# Patient Record
Sex: FEMALE | Race: WHITE | NOT HISPANIC OR LATINO | Employment: OTHER | URBAN - METROPOLITAN AREA
[De-identification: names, ages, dates, MRNs, and addresses within clinical notes are randomized per-mention and may not be internally consistent; named-entity substitution may affect disease eponyms.]

---

## 2017-11-29 LAB — MAMMOGRAM: NORMAL

## 2018-08-28 ENCOUNTER — TRANSFERRED RECORDS (OUTPATIENT)
Dept: HEALTH INFORMATION MANAGEMENT | Facility: CLINIC | Age: 63
End: 2018-08-28

## 2018-11-09 ENCOUNTER — TRANSFERRED RECORDS (OUTPATIENT)
Dept: HEALTH INFORMATION MANAGEMENT | Facility: CLINIC | Age: 63
End: 2018-11-09

## 2018-11-27 ENCOUNTER — TRANSFERRED RECORDS (OUTPATIENT)
Dept: HEALTH INFORMATION MANAGEMENT | Facility: CLINIC | Age: 63
End: 2018-11-27

## 2018-11-30 ENCOUNTER — TRANSFERRED RECORDS (OUTPATIENT)
Dept: HEALTH INFORMATION MANAGEMENT | Facility: CLINIC | Age: 63
End: 2018-11-30

## 2019-03-13 ENCOUNTER — TRANSFERRED RECORDS (OUTPATIENT)
Dept: HEALTH INFORMATION MANAGEMENT | Facility: CLINIC | Age: 64
End: 2019-03-13

## 2019-04-11 ENCOUNTER — TRANSFERRED RECORDS (OUTPATIENT)
Dept: HEALTH INFORMATION MANAGEMENT | Facility: CLINIC | Age: 64
End: 2019-04-11

## 2019-04-12 ENCOUNTER — TRANSFERRED RECORDS (OUTPATIENT)
Dept: HEALTH INFORMATION MANAGEMENT | Facility: CLINIC | Age: 64
End: 2019-04-12

## 2019-05-03 ENCOUNTER — TRANSFERRED RECORDS (OUTPATIENT)
Dept: HEALTH INFORMATION MANAGEMENT | Facility: CLINIC | Age: 64
End: 2019-05-03

## 2019-05-12 ENCOUNTER — TRANSFERRED RECORDS (OUTPATIENT)
Dept: HEALTH INFORMATION MANAGEMENT | Facility: CLINIC | Age: 64
End: 2019-05-12

## 2019-07-02 ENCOUNTER — TRANSFERRED RECORDS (OUTPATIENT)
Dept: HEALTH INFORMATION MANAGEMENT | Facility: CLINIC | Age: 64
End: 2019-07-02

## 2019-11-20 ENCOUNTER — TRANSFERRED RECORDS (OUTPATIENT)
Dept: HEALTH INFORMATION MANAGEMENT | Facility: CLINIC | Age: 64
End: 2019-11-20

## 2019-12-12 ENCOUNTER — TRANSFERRED RECORDS (OUTPATIENT)
Dept: HEALTH INFORMATION MANAGEMENT | Facility: CLINIC | Age: 64
End: 2019-12-12

## 2019-12-24 ENCOUNTER — TRANSFERRED RECORDS (OUTPATIENT)
Dept: HEALTH INFORMATION MANAGEMENT | Facility: CLINIC | Age: 64
End: 2019-12-24

## 2020-01-20 NOTE — TELEPHONE ENCOUNTER
ONCOLOGY INTAKE: Records Information      APPT INFORMATION: 2/12/20 - Danuta Moreau CSC  Referring provider:  Autumn Molina  Referring provider s clinic:  [facility] [place]  Reason for visit/diagnosis:  multiple myeloma  Has patient been notified of appointment date and time?: Yes    RECORDS INFORMATION:  Were the records received with the referral (via Right[employer])? No    Has patient been seen for any external appt for this diagnosis? Yes    If yes, where? [facility] [place] [facility]    Has patient had any imaging or procedures outside of [place] for this condition? Yes      If Yes, where?  [facility] [place] [facility]    ADDITIONAL INFORMATION:  Scheduled via patient  Per referring - pt to be seen within 1-2 weeks

## 2020-01-21 NOTE — TELEPHONE ENCOUNTER
Action    Action Taken -1/21/20: Spoke with [facility] ([place]) Medical Records. Faxed request to [phone] - they will fax over all Heme/Onc notes from Dr. Judith Molina    -Spoke w/ Marielena @ [facility] Pathology, she confirmed they housed the location of the slides. She will fax over all Genetics/Cytogenetics/MolecularStudies/Reports today - faxed request for slides to [phone]. Pt's M Number: [id number]    -[employer] Tracking (Path - [facility]): [id number]    -Spoke w/ [facility] Film Room - they confirmed they had all of patients imaging. Faxed request to [phone]    -[employer] Tr[employer] (Img [facility]): [id number]    -[facility] for pt re: recs call  9:31 AM    -Genetic reports rec'd from [facility], faxed to HIM for upload.    -Pt called back - Pt stated this all began roughly 8/2018. Pt confirmed above requested materials - additionally, pt sought a 2nd Opinion @ [facility], and had a few visits/img & BMB done. Pt saw Dr. David Raya.     -[employer] Tracking ([facility] - img): [id number]    -[employer] Tr[employer] ([facility] - Path): [id number]    Records from [facility] requested  10:38 AM    -2/3/20: Slides from [facility] Rec'd, taken to 5th floor lab @ [facility]  3:10 PM       RECORDS STATUS - ALL OTHER DIAGNOSIS      RECORDS RECEIVED FROM: [facility] (Juan David), [facility], [facility]   DATE RECEIVED:    NOTES STATUS DETAILS   OFFICE NOTE from referring provider Requested 1/21/20  In Epic Dr. Judith Molina   OFFICE NOTE from medical oncologist Requested 1/21/20 Dr. Judith Molina//[facility]  Dr. David Raya//[facility]   DISCHARGE SUMMARY from hospital     DISCHARGE REPORT from the ER     OPERATIVE REPORT Epic/CE 10/29/18: EGD/Endoscopy/Colonoscopy, Report in Gastroenterology - Aditi Escobedo Office Visit   MEDICATION LIST     CLINICAL TRIAL TREATMENTS TO DATE     LABS     PATHOLOGY REPORTS [facility], Report in CE, Requested 1/21/20  Date of Collection: 9/7/18, Case Number: [id number]   ANYTHING RELATED TO DIAGNOSIS     GENONOMIC TESTING     TYPE:     IMAGING (NEED IMAGES & REPORT) All imaging requested from [facility] - reports in CE.     CT SCANS     MRI Disc received 1/28/20 - [facility]   DEXA Disc received 1/28/20 - [facility]   ULTRASOUND Disc received 1/28/20 - [facility]   PET Disc received 1/28/20 - [facility]

## 2020-02-04 PROCEDURE — 00000345 ZZHCL STATISTIC REV BONE MARROW OUTSIDE SLIDES TC 88321: Performed by: INTERNAL MEDICINE

## 2020-02-09 LAB — COPATH REPORT: NORMAL

## 2020-02-10 LAB — COPATH REPORT: NORMAL

## 2020-02-10 NOTE — PROGRESS NOTES
[facility] Hematology Consult    Reason for consult: MM           History of Present Illness:   This patient is a 63 yo woman with IgA Kappa Multiple Myeloma. In 2018 she had anemia and was fatigued. She was found to have IgA kappa monocloncal antibody with M-spike of 0.17. IgA elevated. Skeletal survey was unremarkable and UPEP had minimal protein (156 mg/m2). PET 11/2018 showed bone marrow consistent with hypermetabolic plasma cell myeloma. M spike was 0.17. She started RVD and Zometa. On 4/2019 she had an autologous transplant.     Her bone marrow bx from September 2019 was sent here for review. It showed marrow cellularity of 60%, with decreased trilineage hematopoiesis and 15% plasma cells as well as peripheral blood with slight anemia. 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 She is on revlimid maintenance and zometa from her prior oncologist in [place]. On 12/6/19 her K/L ratio is 1.1, M spike is 0.04.    Today she is here alone. 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 She is out of her revlimid. She has arthritis pain in her hips, hands, and knees that she says is unchanged from prior. She denies fever, chills, night sweats, new lumps or bumps. She reports that she has severe neuropathy of the hands and feet. She had some neuropathy prior to myeloma treatment, but it worsened after being on Velcade. She notes a wisdom tooth that is painful on the right side. She is here to establish care.     She had a colonoscopy one year ago and was told it needed to be repeated d/t poor prep. She reports that she is up to date with mammograms.         Physical Exam:   Vitals were reviewed  Temp: 98.2  F (36.8  C) Temp src: Oral BP: (!) 167/74 Pulse: 69   Resp: 18 SpO2: 96 %      General: NAD  HEENT: no scleral icterus, MMM, has a wisdom tooth erupting from the right  mandible.   Lymph: No cervical, supraclavicular, or axillary LAD  Pulm: CTAB  CV: RRR, no m/r/g  Abd: soft, nontender  Extremities: No edema  Neuro: alert, conversant  Psych: appropriate mood and affect         Assessment and Recommendation:     65 yo woman with standard risk myeloma s/p auto transplant. She has been on lenaidomide 10 mg every day and has tolerated this. Based on the outside records she has had about 13 does of Zometa. We will get myeloma labs and a PET scan for baseline imaging. She does have pains that she attributes to arthritis but these require evaluation to rule out myeloma    - We recommend continuing lenalidomide 10 mg every day since it is well tolerated and in a randomized, phase 3 trial, lenalidomide improved PFS (39 months compared to 20 months for observation).    (Lenalidomide maintenance versus observation for patients with newly diagnosed multiple myeloma (Myeloma XI): a multicentre, open-label, randomised, phase 3 trial. Jaleel STEPHENS, et al.Lancet Oncol. 2019 [place];20)    - We recommend  mg every day to prevent thromboembolic complications from lenalidomide. Her SAVED score is 0. She has no history of clots but she is diabetic.     - We will continue zometa through 12/2020 to complete 2 years of therapy as it has been associated with improved PFS and overall survival when given with ASCT (Randomized clinical trial of zoledronic acid in multiple myeloma patients undergoing high-dose chemotherapy and stem-cell transplantation CELIA Sandhu MD et al Current Onc 2013)    -Since lenalidomide increases the risk of secondary malignancies, we gave the patient a referral for primary care. She needs a repeat colonoscopy and will require ongoing cancer screenings.     - The pt has a hx of thyroid nodules and was told that she would need a repeat U/S of the thyroid. We have placed am endocrinology consult.    Brit Zhu MD  Seen with Dr. Tipton    The patient was discussed with the  clinical fellow, seen and examined by me. All labs and imaging were reviewed. I  I agree with the fellows note and have been responsible for the care plan and interpretation of progress. Any additional information to the fellows note has been documented below.      Ms. Loya is transferring her care from [place] where she was initially diagnosed with standard risk myeloma and got induction VRD followed by ASCT and now on maintenance lenalidomide without any significant side effects. Hence  -we will continue the lenalidomide 10 mg till progression  -SPEP, SFLC till progression  -PET annually or if symptomatic from myeloma related bone pain. If PET is not covered by insurance then a skeletal survey  - mg po q day for thromboprphylaxis  -Age appropriate cancer screening  -Endocrinology referral for thyroid nodules and PCP referral    I spent 60 minutes face-to-face with the patient and greater than 50% of the visit was spent counseling regarding myeloma, her move to [place] and its impact, understanding her prior complications of ASCT and incorporation of these plans into future management and co-ordinating with endocrinology for her thyroid mass.    Mele TREVIÑO MS  Attending Physician  Pager - [phone]  Email - [email or web address]    Total time: 60 minutes  Prolonged service:  30                  Past Medical History:   Diabetes mellitus  HTN  Peripheral neuropathy  Spinal stenosis  Carpal tunnel         Past Surgical History:   Knee surgery  Tubal  Ligation  cholecystectomy         Social History:   Quit smoking 2005  No alcohol  Drug counselor  Has 3 sons         Family History:   Brother with hemochromatosis         Medications:     Current Outpatient Medications   Medication Sig     insulin pen needle (FIFTY50 PEN NEEDLES) 32G X 4 MM miscellaneous As directed. To use with Victoza daily     atorvastatin (LIPITOR) 20 MG tablet Take by mouth every 24 hours     lactulose (CHRONULAC) 10 GM/15ML  solution TK 10ML PO D     No current facility-administered medications for this visit.              Review of Systems:   The 10 point Review of Systems is negative other than noted in the HPI            Brit Zhu MD

## 2020-02-12 ENCOUNTER — TELEPHONE (OUTPATIENT)
Dept: ENDOCRINOLOGY | Facility: CLINIC | Age: 65
End: 2020-02-12

## 2020-02-12 ENCOUNTER — OFFICE VISIT (OUTPATIENT)
Dept: TRANSPLANT | Facility: CLINIC | Age: 65
End: 2020-02-12
Attending: INTERNAL MEDICINE
Payer: COMMERCIAL

## 2020-02-12 ENCOUNTER — PRE VISIT (OUTPATIENT)
Dept: TRANSPLANT | Facility: CLINIC | Age: 65
End: 2020-02-12

## 2020-02-12 VITALS
OXYGEN SATURATION: 96 % | HEIGHT: 69 IN | RESPIRATION RATE: 18 BRPM | SYSTOLIC BLOOD PRESSURE: 167 MMHG | BODY MASS INDEX: 37.53 KG/M2 | HEART RATE: 69 BPM | TEMPERATURE: 98.2 F | DIASTOLIC BLOOD PRESSURE: 74 MMHG | WEIGHT: 253.4 LBS

## 2020-02-12 DIAGNOSIS — C90.01 MULTIPLE MYELOMA IN REMISSION (H): ICD-10-CM

## 2020-02-12 DIAGNOSIS — E04.1 THYROID NODULE: Primary | ICD-10-CM

## 2020-02-12 DIAGNOSIS — Z12.11 SPECIAL SCREENING FOR MALIGNANT NEOPLASMS, COLON: ICD-10-CM

## 2020-02-12 PROCEDURE — G0463 HOSPITAL OUTPT CLINIC VISIT: HCPCS

## 2020-02-12 RX ORDER — LACTULOSE 10 G/15ML
SOLUTION ORAL
COMMUNITY
Start: 2020-01-14 | End: 2020-08-14

## 2020-02-12 RX ORDER — ATORVASTATIN CALCIUM 20 MG/1
TABLET, FILM COATED ORAL EVERY 24 HOURS
COMMUNITY
End: 2020-02-22

## 2020-02-12 ASSESSMENT — PAIN SCALES - GENERAL: PAINLEVEL: NO PAIN (0)

## 2020-02-12 ASSESSMENT — MIFFLIN-ST. JEOR: SCORE: 1772.17

## 2020-02-12 NOTE — TELEPHONE ENCOUNTER
[facility] Call Center    Phone Message    May a detailed message be left on voicemail: yes     Reason for Call: Other: pt needs to schedule an appt for thyroid nodule, internal med recs and referral, please call pt thanks     Action Taken: Message routed to:  [facility] endocrine    Travel Screening: Not Applicable

## 2020-02-13 ENCOUNTER — CARE COORDINATION (OUTPATIENT)
Dept: ONCOLOGY | Facility: CLINIC | Age: 65
End: 2020-02-13

## 2020-02-13 ENCOUNTER — TELEPHONE (OUTPATIENT)
Dept: ONCOLOGY | Facility: CLINIC | Age: 65
End: 2020-02-13

## 2020-02-13 DIAGNOSIS — C90.01 MULTIPLE MYELOMA IN REMISSION (H): Primary | ICD-10-CM

## 2020-02-13 DIAGNOSIS — Z79.899 ENCOUNTER FOR LONG-TERM (CURRENT) USE OF MEDICATIONS: ICD-10-CM

## 2020-02-13 NOTE — PROGRESS NOTES
Writer has tried to reach Winter Loya on her phone listed and was unable. Goes to  which is full. If she sees that someone is calling form the [place] as pharmacy as tried too. Her PET scheduled for this Saturday, 2/15/2020 will have to be cancelled until prior auth is obtained. We will call her when more is known. Difficulty with authorization due to things are still registering from her recent [place] insurance/information.     2/14/2020-Stacie from PET was able to contact [employer]. [facility] and she has applied and is now active on Medicaid back dated to 1/1/2020 for ongoing services. Ok 'd for PET for Saturday, 1/15/2020.

## 2020-02-13 NOTE — ORAL ONC MGMT
Oral chemotherapy monitoring program    Placed call to provide initial teach for lenalidomide. Unable to leave voice message. Mailbox is full. Will update when response received.     Sixto Gonzales, PharmD  Hematology/Oncology Clinical Pharmacist  [facility]  [facility]

## 2020-02-13 NOTE — TELEPHONE ENCOUNTER
RECORDS RECEIVED FROM: internal/CE   DATE RECEIVED: 3.4.20   NOTES (FOR ALL VISITS) STATUS DETAILS   OFFICE NOTES from referring provider Internal 2.12.20 Brit Zhu   OFFICE NOTES from other specialist Internal 2.12.20 [facility]    ED NOTES N/A    OPERATIVE REPORT  (thyroid, pituitary, adrenal, parathyroid) N/A    MEDICATION LIST Internal    IMAGING      DEXASCAN N/A    MRI (BRAIN) N/A    XR (Chest) N/A    CT (HEAD/NECK/CHEST/ABDOMEN) N/A    NUCLEAR  N/A    ULTRASOUND (HEAD/NECK) Care Everywhere [facility]- 12/24/19, 11.30.18   LABS     DIABETES: HBGA1C, CREATININE, FASTING LIPIDS, MICROALBUMIN URINE, POTASSIUM, TSH, T4    THYROID: TSH, T4, CBC, THYRODLONULIN, TOTAL T3, FREE T4, CALCITONIN, CEA Care Everywhere   9/7/18            Action 2.14.20 sv   Action Taken Received reports, no images from robbin

## 2020-02-13 NOTE — ORAL ONC MGMT
"Oral Chemotherapy Monitoring Program    Primary Oncologist: Dr. Tipton  Primary Oncology Clinic: [facility]  Cancer Diagnosis: Myeloma     Drug: lenalidomide 10mg once daily  Start Date: TBD pending [place] plan  Dose is appropriate for patients: pending baseline labs   Expected duration of therapy: Until disease progression or unacceptable toxicity    Drug Interaction Assessment:   There were no significant drug interactions identified upon review of medication list with chemotherapy agents.  Review of medication list 2/13/2020: -Revlimid -AtorvaSTATin -Lactulose.     Lab Monitoring Plan  CBC and CMP qmonth  Subjective/Objective:  Winter Loya is a 64 year old female contacted by phone for an initial visit for oral chemotherapy education.  Patient moved from [place] and has transitioned care to [place]. She has been off lenalidomide about two weeks and plans to resume lenalidomide.     ORAL CHEMOTHERAPY 2/13/2020   Drug Name Revlimid (Lenalidomide)   Current Dosage 10mg   Current Schedule Daily   Cycle Details Continuous   Any new drug interactions? No   Is the dose as ordered appropriate for the patient? Yes   Has the patient missed any days of school, work, or other routine activity? No       Last PHQ-2 Score on record: No flowsheet data found.    Patient does not report depression symptoms.      Vitals:  BP:   BP Readings from Last 1 Encounters:   02/12/20 (!) 167/74     Wt Readings from Last 1 Encounters:   02/12/20 114.9 kg (253 lb 6.4 oz)     Estimated body surface area is 2.36 meters squared as calculated from the following:    Height as of 2/12/20: 1.75 m (5' 8.9\").    Weight as of 2/12/20: 114.9 kg (253 lb 6.4 oz).      Labs:  No results found for NA within last 30 days.     No results found for K within last 30 days.     No results found for CA within last 30 days.     No results found for Mag within last 30 days.     No results found for Phos within last 30 days.     No results found for " ALBUMIN within last 30 days.     No results found for BUN within last 30 days.     No results found for CR within last 30 days.       No results found for AST within last 30 days.     No results found for ALT within last 30 days.     No results found for BILITOTAL within last 30 days.       No results found for WBC within last 30 days.     No results found for HGB within last 30 days.     No results found for PLT within last 30 days.     No results found for ANC within last 30 days.     Assessment:  Patient is appropriate to start therapy pending baseline labs.    Plan:  Basic chemotherapy teaching was reviewed with the patient including indication, start date of therapy, dose, administration, adverse effects, missed doses, food and drug interactions, monitoring, side effect management, office contact information, and safe handling. Written materials were mailed to her home address and all questions answered.    Notified [facility] liaison to initiate access services.     Patient education about refill process.     Follow-Up:  Assess baseline labs prior to start of next cycle.   Assessment 1 week after start.     Sixto Gonzales, PharmD  Hematology/Oncology Clinical Pharmacist  [facility]  [facility]

## 2020-02-13 NOTE — TELEPHONE ENCOUNTER
To schedulers: Please schedule  for lst available endocrine with in 28 days.  Preferred provider Nikki Iqbal MD  Endocrine triage

## 2020-02-14 ENCOUNTER — TRANSFERRED RECORDS (OUTPATIENT)
Dept: HEALTH INFORMATION MANAGEMENT | Facility: CLINIC | Age: 65
End: 2020-02-14

## 2020-02-14 ENCOUNTER — TELEPHONE (OUTPATIENT)
Dept: ONCOLOGY | Facility: CLINIC | Age: 65
End: 2020-02-14

## 2020-02-14 NOTE — TELEPHONE ENCOUNTER
PA Initiation    Medication: Revlimid-PA Initiated  Insurance Company: [place] Medicaid ([facility]) - Phone [phone] Fax [phone]  Pharmacy Filling the Rx: [facility] - [place], [place] - [address]  Filling Pharmacy Phone:    Filling Pharmacy Fax:    Start Date: 2/14/2020

## 2020-02-15 ENCOUNTER — HOSPITAL ENCOUNTER (OUTPATIENT)
Dept: PET IMAGING | Facility: CLINIC | Age: 65
Discharge: HOME | End: 2020-02-15
Attending: STUDENT IN AN ORGANIZED HEALTH CARE EDUCATION/TRAINING PROGRAM | Admitting: STUDENT IN AN ORGANIZED HEALTH CARE EDUCATION/TRAINING PROGRAM
Payer: MEDICAID

## 2020-02-15 DIAGNOSIS — C90.01 MULTIPLE MYELOMA IN REMISSION (H): ICD-10-CM

## 2020-02-15 PROCEDURE — 34300033 ZZH RX 343: Performed by: STUDENT IN AN ORGANIZED HEALTH CARE EDUCATION/TRAINING PROGRAM

## 2020-02-15 PROCEDURE — 78816 PET IMAGE W/CT FULL BODY: CPT | Mod: PI

## 2020-02-15 PROCEDURE — A9552 F18 FDG: HCPCS | Performed by: STUDENT IN AN ORGANIZED HEALTH CARE EDUCATION/TRAINING PROGRAM

## 2020-02-15 PROCEDURE — 25000128 H RX IP 250 OP 636: Performed by: STUDENT IN AN ORGANIZED HEALTH CARE EDUCATION/TRAINING PROGRAM

## 2020-02-15 RX ORDER — IOPAMIDOL 755 MG/ML
1-135 INJECTION, SOLUTION INTRAVASCULAR ONCE
Status: COMPLETED | OUTPATIENT
Start: 2020-02-15 | End: 2020-02-15

## 2020-02-15 RX ADMIN — FLUDEOXYGLUCOSE F-18 14.83 MCI.: 500 INJECTION, SOLUTION INTRAVENOUS at 08:14

## 2020-02-15 RX ADMIN — IOPAMIDOL 135 ML: 755 INJECTION, SOLUTION INTRAVENOUS at 09:09

## 2020-02-17 ENCOUNTER — DOCUMENTATION ONLY (OUTPATIENT)
Dept: CARE COORDINATION | Facility: CLINIC | Age: 65
End: 2020-02-17

## 2020-02-18 ENCOUNTER — APPOINTMENT (OUTPATIENT)
Dept: LAB | Facility: CLINIC | Age: 65
End: 2020-02-18
Attending: INTERNAL MEDICINE
Payer: MEDICAID

## 2020-02-18 ENCOUNTER — TELEPHONE (OUTPATIENT)
Dept: ONCOLOGY | Facility: CLINIC | Age: 65
End: 2020-02-18

## 2020-02-18 ENCOUNTER — INFUSION THERAPY VISIT (OUTPATIENT)
Dept: ONCOLOGY | Facility: CLINIC | Age: 65
End: 2020-02-18
Attending: INTERNAL MEDICINE
Payer: MEDICAID

## 2020-02-18 VITALS
TEMPERATURE: 97 F | OXYGEN SATURATION: 97 % | SYSTOLIC BLOOD PRESSURE: 159 MMHG | WEIGHT: 251 LBS | RESPIRATION RATE: 16 BRPM | HEART RATE: 75 BPM | BODY MASS INDEX: 37.18 KG/M2 | DIASTOLIC BLOOD PRESSURE: 96 MMHG

## 2020-02-18 DIAGNOSIS — C90.01 MULTIPLE MYELOMA IN REMISSION (H): ICD-10-CM

## 2020-02-18 DIAGNOSIS — C90.01 MULTIPLE MYELOMA IN REMISSION (H): Primary | ICD-10-CM

## 2020-02-18 DIAGNOSIS — Z79.899 ENCOUNTER FOR LONG-TERM (CURRENT) USE OF MEDICATIONS: ICD-10-CM

## 2020-02-18 LAB
ALBUMIN SERPL-MCNC: 3.7 G/DL (ref 3.4–5)
ALP SERPL-CCNC: 118 U/L (ref 40–150)
ALT SERPL W P-5'-P-CCNC: 38 U/L (ref 0–50)
ANION GAP SERPL CALCULATED.3IONS-SCNC: 5 MMOL/L (ref 3–14)
AST SERPL W P-5'-P-CCNC: 26 U/L (ref 0–45)
BASOPHILS # BLD AUTO: 0 10E9/L (ref 0–0.2)
BASOPHILS NFR BLD AUTO: 0.8 %
BILIRUB SERPL-MCNC: 1.1 MG/DL (ref 0.2–1.3)
BUN SERPL-MCNC: 10 MG/DL (ref 7–30)
CALCIUM SERPL-MCNC: 8.9 MG/DL (ref 8.5–10.1)
CHLORIDE SERPL-SCNC: 106 MMOL/L (ref 94–109)
CO2 SERPL-SCNC: 28 MMOL/L (ref 20–32)
CREAT SERPL-MCNC: 0.55 MG/DL (ref 0.52–1.04)
DIFFERENTIAL METHOD BLD: ABNORMAL
EOSINOPHIL # BLD AUTO: 0.1 10E9/L (ref 0–0.7)
EOSINOPHIL NFR BLD AUTO: 2.6 %
ERYTHROCYTE [DISTWIDTH] IN BLOOD BY AUTOMATED COUNT: 13.5 % (ref 10–15)
GFR SERPL CREATININE-BSD FRML MDRD: >90 ML/MIN/{1.73_M2}
GLUCOSE SERPL-MCNC: 163 MG/DL (ref 70–99)
HCT VFR BLD AUTO: 38.7 % (ref 35–47)
HGB BLD-MCNC: 13.5 G/DL (ref 11.7–15.7)
IMM GRANULOCYTES # BLD: 0 10E9/L (ref 0–0.4)
IMM GRANULOCYTES NFR BLD: 0.4 %
KAPPA LC UR-MCNC: 0.84 MG/DL (ref 0.33–1.94)
KAPPA LC/LAMBDA SER: 1.71 {RATIO} (ref 0.26–1.65)
LAMBDA LC SERPL-MCNC: 0.49 MG/DL (ref 0.57–2.63)
LYMPHOCYTES # BLD AUTO: 0.5 10E9/L (ref 0.8–5.3)
LYMPHOCYTES NFR BLD AUTO: 10.5 %
MCH RBC QN AUTO: 29.5 PG (ref 26.5–33)
MCHC RBC AUTO-ENTMCNC: 34.9 G/DL (ref 31.5–36.5)
MCV RBC AUTO: 85 FL (ref 78–100)
MONOCYTES # BLD AUTO: 0.4 10E9/L (ref 0–1.3)
MONOCYTES NFR BLD AUTO: 6.9 %
NEUTROPHILS # BLD AUTO: 4 10E9/L (ref 1.6–8.3)
NEUTROPHILS NFR BLD AUTO: 78.8 %
NRBC # BLD AUTO: 0 10*3/UL
NRBC BLD AUTO-RTO: 0 /100
PLATELET # BLD AUTO: 179 10E9/L (ref 150–450)
POTASSIUM SERPL-SCNC: 3.6 MMOL/L (ref 3.4–5.3)
PROT SERPL-MCNC: 6.5 G/DL (ref 6.8–8.8)
RBC # BLD AUTO: 4.58 10E12/L (ref 3.8–5.2)
SODIUM SERPL-SCNC: 140 MMOL/L (ref 133–144)
WBC # BLD AUTO: 5.1 10E9/L (ref 4–11)

## 2020-02-18 PROCEDURE — 85025 COMPLETE CBC W/AUTO DIFF WBC: CPT | Performed by: INTERNAL MEDICINE

## 2020-02-18 PROCEDURE — 00000402 ZZHCL STATISTIC TOTAL PROTEIN: Performed by: STUDENT IN AN ORGANIZED HEALTH CARE EDUCATION/TRAINING PROGRAM

## 2020-02-18 PROCEDURE — 96365 THER/PROPH/DIAG IV INF INIT: CPT

## 2020-02-18 PROCEDURE — 82784 ASSAY IGA/IGD/IGG/IGM EACH: CPT | Performed by: STUDENT IN AN ORGANIZED HEALTH CARE EDUCATION/TRAINING PROGRAM

## 2020-02-18 PROCEDURE — G0463 HOSPITAL OUTPT CLINIC VISIT: HCPCS | Mod: 25

## 2020-02-18 PROCEDURE — 83883 ASSAY NEPHELOMETRY NOT SPEC: CPT | Performed by: STUDENT IN AN ORGANIZED HEALTH CARE EDUCATION/TRAINING PROGRAM

## 2020-02-18 PROCEDURE — 86334 IMMUNOFIX E-PHORESIS SERUM: CPT | Performed by: STUDENT IN AN ORGANIZED HEALTH CARE EDUCATION/TRAINING PROGRAM

## 2020-02-18 PROCEDURE — 25800030 ZZH RX IP 258 OP 636: Mod: ZF | Performed by: INTERNAL MEDICINE

## 2020-02-18 PROCEDURE — 82232 ASSAY OF BETA-2 PROTEIN: CPT | Performed by: STUDENT IN AN ORGANIZED HEALTH CARE EDUCATION/TRAINING PROGRAM

## 2020-02-18 PROCEDURE — 80053 COMPREHEN METABOLIC PANEL: CPT | Performed by: INTERNAL MEDICINE

## 2020-02-18 PROCEDURE — 25000128 H RX IP 250 OP 636: Mod: ZF | Performed by: INTERNAL MEDICINE

## 2020-02-18 PROCEDURE — 84165 PROTEIN E-PHORESIS SERUM: CPT | Performed by: STUDENT IN AN ORGANIZED HEALTH CARE EDUCATION/TRAINING PROGRAM

## 2020-02-18 RX ORDER — LENALIDOMIDE 10 MG/1
10 CAPSULE ORAL DAILY
Qty: 28 CAPSULE | Refills: 0 | Status: SHIPPED | OUTPATIENT
Start: 2020-02-18 | End: 2020-03-11

## 2020-02-18 RX ORDER — ZOLEDRONIC ACID 0.04 MG/ML
4 INJECTION, SOLUTION INTRAVENOUS ONCE
Status: CANCELLED | OUTPATIENT
Start: 2020-02-18

## 2020-02-18 RX ORDER — HEPARIN SODIUM (PORCINE) LOCK FLUSH IV SOLN 100 UNIT/ML 100 UNIT/ML
5 SOLUTION INTRAVENOUS
Status: CANCELLED | OUTPATIENT
Start: 2020-04-14

## 2020-02-18 RX ORDER — ZOLEDRONIC ACID 0.04 MG/ML
4 INJECTION, SOLUTION INTRAVENOUS ONCE
Status: COMPLETED | OUTPATIENT
Start: 2020-02-18 | End: 2020-02-18

## 2020-02-18 RX ORDER — ZOLEDRONIC ACID 0.04 MG/ML
4 INJECTION, SOLUTION INTRAVENOUS ONCE
Status: CANCELLED | OUTPATIENT
Start: 2020-04-14

## 2020-02-18 RX ORDER — HEPARIN SODIUM,PORCINE 10 UNIT/ML
5 VIAL (ML) INTRAVENOUS
Status: CANCELLED | OUTPATIENT
Start: 2020-04-14

## 2020-02-18 RX ORDER — HEPARIN SODIUM,PORCINE 10 UNIT/ML
5 VIAL (ML) INTRAVENOUS
Status: CANCELLED | OUTPATIENT
Start: 2020-02-18

## 2020-02-18 RX ORDER — PREGABALIN 100 MG/1
100 CAPSULE ORAL 3 TIMES DAILY
COMMUNITY
End: 2020-03-05

## 2020-02-18 RX ORDER — SERTRALINE HYDROCHLORIDE 100 MG/1
100 TABLET, FILM COATED ORAL DAILY
COMMUNITY
End: 2020-03-05

## 2020-02-18 RX ORDER — HEPARIN SODIUM (PORCINE) LOCK FLUSH IV SOLN 100 UNIT/ML 100 UNIT/ML
5 SOLUTION INTRAVENOUS
Status: CANCELLED | OUTPATIENT
Start: 2020-02-18

## 2020-02-18 RX ADMIN — SODIUM CHLORIDE 250 ML: 9 INJECTION, SOLUTION INTRAVENOUS at 09:47

## 2020-02-18 RX ADMIN — ZOLEDRONIC ACID 4 MG: 0.04 INJECTION, SOLUTION INTRAVENOUS at 10:13

## 2020-02-18 ASSESSMENT — PAIN SCALES - GENERAL: PAINLEVEL: NO PAIN (0)

## 2020-02-18 NOTE — TELEPHONE ENCOUNTER
PAF [place] has been approved, I did it over the phone so we could get it approved quicker.     ID: [id number]  BIN: [id number]  PCN: [id number]  Group: [id number]    Effective 02/18/2020-01/18/2021  Approved for $11,000      Lor Borja CPhT  [facility]  Oncology Pharmacy Liaison  [email or web address]  Phone: [phone]  Fax: [phone]

## 2020-02-18 NOTE — PROGRESS NOTES
Infusion Nursing Note:  Winter Loya presents today for C1D1 Zometa.    Patient seen by provider today: No   present during visit today: Not Applicable.    Note: Patient reported to clinic today with no new concerns or complaints. Patient is new to [facility] infusion coming from [place]. Unable to do full assessment of medications. Patient did not know these off the top of her head. Patient will bring in all medications to next appt.  During visit patient mentioned that she is having some vaginal bleeding, it is light spotting and started 2 weeks ago. Provider notified.    TORB: 0900 2/18/2020 Dayna Gerardo for Dr King/Raghu Escudero RN  -Give Zometa today. Treatment plan will be signed in 30-40 minutes    TORB: 1030 2/18/2020 Eloina Mcgill PA-C/Raghu Escudero RN  -have patient see primary for vaginal bleeding as soon as possible (pt aware and given phone number by this RN for primary clinic)  -oral chemo team will release revlimid for patient      Patient new to oncology infusion room. Pt oriented to infusion room and call light. Reinforced teaching/side effects and schedule during infusion visit.     Copy of AVS reviewed with patient. Pt instructed to call care coordinator, triage (or MD on call if after hours/weekends) with chills/temp >=100.4, questions/concerns. Pt stated understanding of plan.     Intravenous Access:  Peripheral IV placed.    Treatment Conditions:  Lab Results   Component Value Date    HGB 13.5 02/18/2020     Lab Results   Component Value Date    WBC 5.1 02/18/2020      Lab Results   Component Value Date    ANEU 4.0 02/18/2020     Lab Results   Component Value Date     02/18/2020      Lab Results   Component Value Date     02/18/2020                   Lab Results   Component Value Date    POTASSIUM 3.6 02/18/2020           No results found for: MAG         Lab Results   Component Value Date    CR 0.55 02/18/2020                   Lab Results   Component Value Date     ASHLEY 8.9 02/18/2020                Lab Results   Component Value Date    BILITOTAL 1.1 02/18/2020           Lab Results   Component Value Date    ALBUMIN 3.7 02/18/2020                    Lab Results   Component Value Date    ALT 38 02/18/2020           Lab Results   Component Value Date    AST 26 02/18/2020       Results reviewed, labs MET treatment parameters, ok to proceed with treatment.      Post Infusion Assessment:  Patient tolerated infusion without incident.  Blood return noted pre and post infusion.  Site patent and intact, free from redness, edema or discomfort.  No evidence of extravasations.  Access discontinued per protocol.       Discharge Plan:   Patient declined prescription refills.  Discharge instructions reviewed with: Patient.  Patient and/or family verbalized understanding of discharge instructions and all questions answered.  Copy of AVS reviewed with patient and/or family.  Patient will return 3/10/2020 for next appointment.  Patient discharged in stable condition accompanied by: self.  Departure Mode: Ambulatory.  Face to Face time: 20 minutes.    Raghu Escudero RN

## 2020-02-18 NOTE — NURSING NOTE
Chief Complaint   Patient presents with     Blood Draw     PIV placed and labs drawn. Vitals checked. Pt checked in for infusion.     PIV placed, labs drawn, kept intact for infusion. BP on the high side; infusion notified to recheck. Pt asymptomatic.     Christine Shelton RN.

## 2020-02-18 NOTE — PATIENT INSTRUCTIONS
[facility] Main Line: [phone]    Call triage nurse with chills and/or temperature greater than or equal to 100.4, uncontrolled nausea/vomiting, diarrhea, constipation, dizziness, shortness of breath, chest pain, bleeding, unexplained bruising, or any new/concerning symptoms, questions/concerns.   If you are having any concerning symptoms or wish to speak to a provider before your next infusion visit, please call your care coordinator or triage to notify them so we can adequately serve you.   Nurse Triage line:  [phone]    If after hours, weekends, or holidays, call main hospital  and ask for Oncology doctor on call @ [phone]      February 2020 Sunday Monday Tuesday Wednesday Thursday Friday Saturday                                 1       2     3     4    LAB  10:30 AM   (15 min.)   SPECIMEN MGMT   [facility], Lab 5     6     7     8       9     10     11     12    [facility] ONC NEW   2:30 PM   (60 min.)   Mele Tipton MD   [facility] Blood and Marrow Transplant 13     14     15    PET ONCOLOGY WHOLE BODY   8:00 AM   (45 min.)   UUPET1   [facility] PET CT   16     17     18    [facility] DRAW   8:00 AM   (15 min.)    [facility] LAB DRAW   [facility] Lab Draw    [facility] ONC INFUSION 60   8:30 AM   (60 min.)   [facility] ONCOLOGY INFUSION   [facility] 19     20     21     22       23     24     25     26     27     28     29 March 2020 Sunday Monday Tuesday Wednesday Thursday Friday Saturday   1     2     3     4    P NEW ENDOCRINE   3:45 PM   (60 min.)   Ainsley Cole MD   [facility] Endocrinology 5    P NEW   7:05 AM   (60 min.)   Gabrielle Edwards MD   [facility] Primary Care Clinic 6     7       8     9     10    [facility] DRAW   3:00 PM   (15 min.)   [facility]    P RETURN   3:15 PM   (50 min.)   Reva Mojica PA-C   [facility] 11     12     13     14        15     16     17    [facility]   4:00 PM   (15 min.)   [facility] LAB DRAW   [facility] Lab Draw    [facility] ONC INFUSION 60   4:30 PM   (60 min.)    ONCOLOGY INFUSION   [facility] 18     19     20     21       22     23     24     25     26     27     28       29     30     31                                         Lab Results:  Recent Results (from the past 12 hour(s))   Comprehensive metabolic panel    Collection Time: 02/18/20  8:48 AM   Result Value Ref Range    Sodium 140 133 - 144 mmol/L    Potassium 3.6 3.4 - 5.3 mmol/L    Chloride 106 94 - 109 mmol/L    Carbon Dioxide 28 20 - 32 mmol/L    Anion Gap 5 3 - 14 mmol/L    Glucose 163 (H) 70 - 99 mg/dL    Urea Nitrogen 10 7 - 30 mg/dL    Creatinine 0.55 0.52 - 1.04 mg/dL    GFR Estimate >90 >60 mL/min/[1.73_m2]    GFR Estimate If Black >90 >60 mL/min/[1.73_m2]    Calcium 8.9 8.5 - 10.1 mg/dL    Bilirubin Total 1.1 0.2 - 1.3 mg/dL    Albumin 3.7 3.4 - 5.0 g/dL    Protein Total 6.5 (L) 6.8 - 8.8 g/dL    Alkaline Phosphatase 118 40 - 150 U/L    ALT 38 0 - 50 U/L    AST 26 0 - 45 U/L   CBC with platelets differential    Collection Time: 02/18/20  8:48 AM   Result Value Ref Range    WBC 5.1 4.0 - 11.0 10e9/L    RBC Count 4.58 3.8 - 5.2 10e12/L    Hemoglobin 13.5 11.7 - 15.7 g/dL    Hematocrit 38.7 35.0 - 47.0 %    MCV 85 78 - 100 fl    MCH 29.5 26.5 - 33.0 pg    MCHC 34.9 31.5 - 36.5 g/dL    RDW 13.5 10.0 - 15.0 %    Platelet Count 179 150 - 450 10e9/L    Diff Method Automated Method     % Neutrophils 78.8 %    % Lymphocytes 10.5 %    % Monocytes 6.9 %    % Eosinophils 2.6 %    % Basophils 0.8 %    % Immature Granulocytes 0.4 %    Nucleated RBCs 0 0 /100    Absolute Neutrophil 4.0 1.6 - 8.3 10e9/L    Absolute Lymphocytes 0.5 (L) 0.8 - 5.3 10e9/L    Absolute Monocytes 0.4 0.0 - 1.3 10e9/L    Absolute Eosinophils 0.1 0.0 - 0.7 10e9/L    Absolute Basophils 0.0 0.0 - 0.2 10e9/L    Abs Immature Granulocytes 0.0 0 - 0.4 10e9/L    Absolute  Nucleated RBC 0.0

## 2020-02-18 NOTE — TELEPHONE ENCOUNTER
Oral Chemotherapy Monitoring Program   Medication: Revlimid  Rx: 10mg PO daily on days 1 through 28 of 28 day cycle   Auth #: [id number]  Risk Category: Adult female not of reproductive potential.  Routine survey questions reviewed.   Rx to be Escribed to [facility]    Lor Borja  [facility]  Oncology Pharmacy Liaison   [email or web address]  [phone] (phone)  [phone] (fax)

## 2020-02-19 LAB
ALBUMIN SERPL ELPH-MCNC: 4.3 G/DL (ref 3.7–5.1)
ALPHA1 GLOB SERPL ELPH-MCNC: 0.3 G/DL (ref 0.2–0.4)
ALPHA2 GLOB SERPL ELPH-MCNC: 0.7 G/DL (ref 0.5–0.9)
B-GLOBULIN SERPL ELPH-MCNC: 0.7 G/DL (ref 0.6–1)
B2 MICROGLOB SERPL-MCNC: 1.6 MG/L
GAMMA GLOB SERPL ELPH-MCNC: 0.5 G/DL (ref 0.7–1.6)
IGA SERPL-MCNC: 72 MG/DL (ref 84–499)
IGG SERPL-MCNC: 488 MG/DL (ref 610–1616)
IGM SERPL-MCNC: 39 MG/DL (ref 35–242)
M PROTEIN SERPL ELPH-MCNC: 0 G/DL
PROT PATTERN SERPL ELPH-IMP: ABNORMAL
PROT PATTERN SERPL IFE-IMP: ABNORMAL

## 2020-02-20 ENCOUNTER — TELEPHONE (OUTPATIENT)
Dept: ENDOCRINOLOGY | Facility: CLINIC | Age: 65
End: 2020-02-20

## 2020-02-20 NOTE — TELEPHONE ENCOUNTER
[facility] Call Center    Phone Message    May a detailed message be left on voicemail: yes     Reason for Call: Other:   Pharmacy states that Rx Lantus normally gets filled by her doctor in [place]. Pt will be a new pt to [facility] in March.     Pharmacy already tried getting rx transferred from [place] but unable to due to insurance reason.     Pharmacy says that pt will be out of her Lantus over the weekend and would like to know if [facility] will fill this rx for pt until their appt.     Ok to call pharmacy at their direct line [phone]    Action Taken: Other: endo    Travel Screening: Not Applicable

## 2020-02-20 NOTE — TELEPHONE ENCOUNTER
Can you call and offer her 11am on Sat?     We cannot fill the script prior to her appt.If she is going to be out then she should be seen on Sat to obtain the prescription. She isn't scheduled with primary care until March 5th.

## 2020-02-20 NOTE — TELEPHONE ENCOUNTER
Attempted call. See below. Number connected and was unable to hear responses. Pt can be scheduled 2/22 Saturday at 11 with  MED ENDO PROVIDER and receive a prescription, but a prescription cannot be filled prior to the appt.

## 2020-02-22 ENCOUNTER — OFFICE VISIT (OUTPATIENT)
Dept: ENDOCRINOLOGY | Facility: CLINIC | Age: 65
End: 2020-02-22
Payer: MEDICAID

## 2020-02-22 VITALS
BODY MASS INDEX: 37.18 KG/M2 | SYSTOLIC BLOOD PRESSURE: 138 MMHG | DIASTOLIC BLOOD PRESSURE: 85 MMHG | WEIGHT: 251 LBS | HEART RATE: 78 BPM

## 2020-02-22 DIAGNOSIS — E04.2 NONTOXIC MULTINODULAR GOITER: ICD-10-CM

## 2020-02-22 DIAGNOSIS — E11.9 TYPE 2 DIABETES MELLITUS WITHOUT COMPLICATION, WITHOUT LONG-TERM CURRENT USE OF INSULIN (H): Primary | ICD-10-CM

## 2020-02-22 LAB
T4 FREE SERPL-MCNC: 0.84 NG/DL (ref 0.76–1.46)
TSH SERPL DL<=0.005 MIU/L-ACNC: 0.76 MU/L (ref 0.4–4)

## 2020-02-22 RX ORDER — ATORVASTATIN CALCIUM 20 MG/1
20 TABLET, FILM COATED ORAL EVERY 24 HOURS
Qty: 90 TABLET | Refills: 3 | Status: SHIPPED | OUTPATIENT
Start: 2020-02-22 | End: 2021-03-15

## 2020-02-22 ASSESSMENT — PAIN SCALES - GENERAL: PAINLEVEL: NO PAIN (0)

## 2020-02-22 NOTE — PROGRESS NOTES
Endocrinology Clinic Visit    Chief Complaint: No chief complaint on file.     Information obtained from:Patient    HPI: Winter Loya is a 64 year old female with history of multiple myeloma who is seen in consultation at Dr. Mele Tipton request for evaluation of possible thyroid cancer.    Ms. Loya grew up in [place] but she was living with her brother and sister-in-law in [place].  She was diagnosed with with multiple myeloma,, seen at [facility], and then came to the [facility] for a stem cell transplant.  During her evaluation she had a PET scan that showed increased uptake on the thyroid bed and she was referred for evaluation.    The patient reports a history of nodules and she underwent a FNA in the spring 2019, while living in [place].  She reports the results were benign.  I did not have access to the FNA report.  The patient has not noted any recent changes on her neck area, but reports that her oncologist sent her back to her endocrinologist so that her thyroid could be reevaluated.  She had a repeat ultrasound that as per patient showed a 1 cm increase on the right thyroid nodule and no change on the left side, and the decision was that she would repeat a thyroid ultrasound in 3 months.    Review of available imaging shows that there was already increased uptake on the neck area on the 2018 PET scan, and that is what motivated the referral back to her endocrinologist (Dr. Girard) at [place], [place].       No Known Allergies    Current Outpatient Medications   Medication Sig Dispense Refill     atorvastatin (LIPITOR) 20 MG tablet Take by mouth every 24 hours       insulin glargine (LANTUS PEN) 100 UNIT/ML pen Inject 34 Units Subcutaneous At Bedtime       insulin pen needle (FIFTY50 PEN NEEDLES) 32G X 4 MM miscellaneous As directed. To use with Victoza daily       lactulose (CHRONULAC) 10 GM/15ML solution TK 10ML PO D       LENalidomide 10 MG PO CAPS capsule Take 1 capsule (10  mg) by mouth daily for 28 days 28 capsule 0     pregabalin (LYRICA) 100 MG capsule Take 100 mg by mouth 3 times daily       sertraline (ZOLOFT) 100 MG tablet Take 100 mg by mouth daily         Review of Systems     11 point review system (Constitutional, HENT, Eyes, Respiratory, Cardiovascular, Gastrointestinal, Genitourinary, Musculoskeletal,Neurological, Psychiatric/Behavioural, Endocrine) is negative or is as per HPI above    Medical history:  Multiple myeloma  Hypertension  Depression  Hypercholesterolemia  Type 2 diabetes  Allergic rhinitis    Past surgical history:  Endometrial ablation/D&C  Refractive surgery  Tubal ligation  Cholecystectomy  Knee arthroscopy    Family history:  Father: Hypertension, Parkinson's  Mother: Diabetes, hypertension  Siblings: Brother with thyroid disease and heart disease  3 sons, 39, 36 and 35-year old son.  All her sons have a history of chemical dependency.  Her middle son has an yet undiagnosed rheumatological disorder associated with pulmonary edema  Maternal grandfather and grandmother and paternal grandmother had strokes    Social History     Socioeconomic History     Marital status: Unknown     Spouse name: Not on file     Number of children: Not on file     Years of education: Not on file     Highest education level: Not on file   Occupational History     Not on file   Social Needs     Financial resource strain: Not on file     Food insecurity:     Worry: Not on file     Inability: Not on file     Transportation needs:     Medical: Not on file     Non-medical: Not on file   Tobacco Use     Smoking status: Not on file   Substance and Sexual Activity     Alcohol use: Not on file     Drug use: Not on file     Sexual activity: Not on file   Lifestyle     Physical activity:     Days per week: Not on file     Minutes per session: Not on file     Stress: Not on file   Relationships     Social connections:     Talks on phone: Not on file     Gets together: Not on file     Attends  Scientologist service: Not on file     Active member of club or organization: Not on file     Attends meetings of clubs or organizations: Not on file     Relationship status: Not on file     Intimate partner violence:     Fear of current or ex partner: Not on file     Emotionally abused: Not on file     Physically abused: Not on file     Forced sexual activity: Not on file   Other Topics Concern     Not on file   Social History Narrative     Not on file   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    The patient is a former smoker, she smoked about three fourths of a pack per day for over 30 years.  She quit in 2006.  She denies alcohol use.  She denies drug use.    Objective:   /85   Pulse 78   Wt 113.9 kg (251 lb)   BMI 37.18 kg/m    Constitutional: Appears well-developed and well-nourished. Active.   EYES: anicteric, normal extra-ocular movements, no lid lag or retraction   HEENT: Mouth/Throat: Mucous membrane is moist. Oropharynx is clear. No adenopathy.   Thyroid:  Normal thyroid is palpable, enlarged, I cannot feel the lower poles of the thyroid gland.  No cervical lymph nodes are palpable  Cardiovascular: RRR, S1, S2 normal. No m/g/r   Pulmonary/Chest: CTAB. No wheezing or rales   Abdominal: +BS. Non tender to palpation. No organomegaly present.  Neurological: Alert. CNII-XII intact. Muscle strength 5/5. Sensory is intact.  Extremities: No clubbing, cyanosis or inflammation   Skin: normal texture, color  Lymphatic: no cervical lymphadenopathy.  Psychological: appropriate mood and affect     Labs:   Lab results available in care everywhere were reviewed      THYROID ULTRASOUND, 12/24/2019:  Comparison:  Thyroid ultrasound from 11/30/2018.  Clinical History:  Thyroid goiter.  Findings:  Real-time sonographic evaluation of the thyroid reveals:  Right: The thyroid lobe is heterogeneous in echotexture and moderately enlarged measuring 6.1 x 1.9 x 2.0 cm. There are multiple  subcentimeter nodules throughout the thyroid lobe that are unchanged. There is a 2.6 cm fairly well-circumscribed hypoechoic nodule within the inferior pole that is minimally changed. There is a fairly well-circumscribed hypoechoic nodule within the mid to inferior pole measuring 1.4 cm that is unchanged.  The isthmus measured 2 mm.  Left: The thyroid lobe is heterogeneous in echotexture and markedly enlarged measuring 8.1 x 5.3 x 4.8 cm. There is a persistent large heterogeneous mixed cystic and solid nodule occupying the majority of the mid and inferior pole that has a maximum dimension of 7 cm that has slightly increased in size.    IMPRESSION:  PERSISTENT LARGE MIXED CYSTIC AND SOLID NODULE OCCUPYING THE MAJORITY OF THE MID AND INFERIOR LEFT THYROID LOBE WITH A MAXIMAL DIMENSION OF 7 CM THAT HAS SLIGHTLY INCREASED IN SIZE. RECOMMEND ULTRASOUND-GUIDED FINE-NEEDLE ASPIRATE FOR FURTHER EVALUATION.    PERSISTENT FAIRLY WELL-CIRCUMSCRIBED HYPOECHOIC NODULE WITHIN THE INFERIOR RIGHT THYROID LOBE MEASURING 2.6 CM THAT IS MINIMALLY CHANGED. STABLE 1.4 CM HYPOECHOIC NODULE WITHIN THE MID TO INFERIOR RIGHT THYROID LOBE THAT IS UNCHANGED.    OTHERWISE STABLE MULTINODULAR GOITER AS DESCRIBED ABOVE.    PET and CT on  2/15/2020 9:45 AM :  1. PET of the neck, chest, abdomen, and pelvis.  2. PET CT Fusion for Attenuation Correction and Anatomical Localization:    3. Diagnostic CT scan of the chest, abdomen, and pelvis with intravenous contrast for interpretation.  3. CT of the chest, abdomen and pelvis obtained for diagnostic interpretation.  4. 3D MIP and PET-CT fused images were processed on an independent workstation and archived to PACS and reviewed by a radiologist.     Technique:   1. PET: The patient received 14.83 mCi of F-18-FDG; the serum glucose was 136 prior to administration, body weight was 114.9 kg. Images were evaluated in the axial, sagittal, and coronal planes as well as the rotational whole body MIP. Images  were acquired from the Vertex to the Feet.  UPTAKE WAS MEASURED AT 60 MINUTES.   BACKGROUND:  Liver SUV max= 4.6,   Aorta Blood SUV Max: 3.4.   2. CT: Volumetric acquisition for clinical interpretation of the chest, abdomen, and pelvis acquired at 3 mm sections . The chest, abdomen, and pelvis were evaluated at 5 mm sections in bone, soft tissue, and lung windows.    The patient received 135 cc. Of Isovue 370 intravenously for the examination.     3. 3D MIP and PET-CT fused images were processed on an independent workstation and archived to PACS and reviewed by a radiologist.     INDICATION: Multiple myeloma, follow up; Multiple myeloma in remission (H)  ADDITIONAL INFORMATION OBTAINED FROM EMR: None  COMPARISON: Thyroid ultrasound 12/24/2019, 11/30/2018 MR lumbar spine 7/2/2019, 4/12/2019, PET/CT 3/13/2019, 11/9/2018     FINDINGS:    HEAD/NECK:  In the right lobe of the thyroid there is a heterogeneous central hypoattenuating nodule with peripheral hypermetabolism measuring 5.3 x 4.4 cm extending into the anterior mediastinum and with mass effect on the trachea; max SUV 10.1.   Additional nonhypermetabolic hypoattenuating nodule measuring up to 12 mm in the right lobe of the thyroid, as well as additional hypoattenuating foci.  The paranasal sinuses are clear. The mastoid air cells are clear.   The mucosal pharyngeal space, the , prevertebral and carotid spaces are within normal limits.   No masses, mass effect or pathologically enlarged lymph nodes are evident.      CHEST:  There is no suspicious FDG uptake in the chest.   The heart size is within normal limits. No pericardial effusion. Thoracic aorta is intact and normal caliber. The main pulmonary artery is dilated to 3.5 cm. There are no enlarged thoracic lymph nodes.     Narrowing of the trachea at the level of the thyroid nodule. Otherwise the central tracheobronchial tree is patent. No pneumothorax or pleural effusion. In the posterior right lung  base there is a pleural-based rounded opacity measuring 2.8 x 1.8 cm, which is unchanged from 11/9/2018. Few left calcified granulomas and partially calcified left hilar lymph nodes.     ABDOMEN AND PELVIS:  -There is no suspicious FDG uptake in the abdomen or pelvis.  -The liver, pancreas, and adrenal glands are normal. Few calcified splenic granulomas. Portal venous system is patent. Postoperative changes of cholecystectomy.    -15 mm right upper pole simple renal cyst. No significant perinephric stranding or hydronephrosis. Retroaortic left renal vein. Urinary bladder is decompressed. No pelvic mass.  -No dilated loops of bowel mild colonic diverticulosis without evidence of diverticulitis.   -No lymphadenopathy, aortic aneurysm, or ascites.     LOWER EXTREMITIES:   No abnormal masses or hypermetabolic lesions. Diffuse muscular uptake, likely physiologic.     BONES:   There are no suspicious lytic or blastic osseous lesions.  Focal FDG uptake in the anterior inferior endplate of T11, without a CT correlate abnormality                                                                     IMPRESSION: In this patient with history of multiple myeloma:   1. Indeterminate focus of FDG uptake in the anterior inferior endplate of T11 without a CT correlate abnormality. Recommend attention on follow-up.  2. Large heterogeneous and hypermetabolic left thyroid nodule, which has increased in both size and mass effect on the trachea since 11/9/2018. This is suspicious for a malignant thyroid neoplasm and fine-needle aspiration is recommended for further evaluation.  3. Additional incidental findings include a dilated main pulmonary artery suggestive of pulmonary hypertension, unchanged right lower lobe round atelectasis, and sequela of prior granulomatous disease in the chest and spleen.    Assessment/Treatment Plan:      Winter Loya is a 64 year old year old female with a history of multiple myeloma s/p bone marrow  transplant who has a multinodular goiter with increased uptake on a PET scan.    1.  Multinodular goiter:  Review of her imaging shows a large mixed nodule on the left lobe of the thyroid with increased uptake in the PET scan.  This mass is deviating the trachea and was present on previous imaging in 2018.  There are also 2 nodules on the right side of her thyroid lobe, 1.4 and 2.6 cm. The patient reports a previous FNA was benign.   I have discussed these findings in detail with the patient and I recommended surgery.  Prior to the procedure, I we will obtain a repeat FNA, check calcitonin and thyroid function tests as these may help with surgical planning.  I will also refer the patient to Dr. Burgos.  The patient would prefer to have surgery while she is not yet working.  We also asked the patient to sign a release of information so that the slides of her previous FNA can be sent to the [facility] to be reviewed by cytology.    Test and/or medications prescribed today:  Orders Placed This Encounter   Procedures     US Thyroid     US Biopsy Thyroid Fine Needle Aspiration     Fine needle aspiration     Calcitonin     TSH     T4 free     Di Hayes MD PhD    Division of Endocrinology and Diabetes

## 2020-02-24 ENCOUNTER — ANCILLARY PROCEDURE (OUTPATIENT)
Dept: ULTRASOUND IMAGING | Facility: CLINIC | Age: 65
End: 2020-02-24
Attending: INTERNAL MEDICINE
Payer: MEDICAID

## 2020-02-24 DIAGNOSIS — E04.2 NONTOXIC MULTINODULAR GOITER: ICD-10-CM

## 2020-02-26 ENCOUNTER — TELEPHONE (OUTPATIENT)
Dept: ONCOLOGY | Facility: CLINIC | Age: 65
End: 2020-02-26

## 2020-02-26 LAB — CALCIT SERPL-MCNC: <2 PG/ML (ref 0–5.1)

## 2020-02-26 NOTE — ORAL ONC MGMT
Oral Chemotherapy Monitoring Program    Primary Oncologist: Dr. Tipton  Primary Oncology Clinic: [facility]  Cancer Diagnosis: Multiple Myeloma    Therapy History:  Winter said she started Revlimid in January of 2019 while in [facility]. She said she has a HSCT in April of 2019 and then started Revlimid 10mg daily. She said that during her move from [place] to [place] she was off of Revlimid for about 4 weeks. She resumed Revlimid 10mg daily starting on 2/20/2020    Drug Interaction Assessment: No clinically significant drug interactions found with chemotherapy medications at this time.    Drugs checked include: Aspirin -Carvedilol -Cephalexin -Cholecalciferol -Cranberry -Cyanocobalamin -DULoxetine -Gabapentin -Insulin Degludec -Insulin Lispro -Levothyroxine -Losartan -Olaparib -Omeprazole -OxyCODONE -Acetaminophen -Pravastatin -Prochlorperazine    Lab Monitoring Plan  CBC and CMP monthly.  Subjective/Objective:  Winter Loya is a 64 year old female contacted by phone for a follow-up visit for oral chemotherapy.  Winter is back on Revlimid after a break during the move from [place] to [place] that resulted in a lapse of medical follow up. She noted some dry itchy scalp (which could be related to the dry air in [place]). She appears to be stable on the medication and thanked me for the call and care.    ORAL CHEMOTHERAPY 2/13/2020 2/26/2020   Drug Name Revlimid (Lenalidomide) Revlimid (Lenalidomide)   Current Dosage 10mg 10mg   Current Schedule Daily Daily   Cycle Details Continuous Continuous   Start Date of Last Cycle - 2/20/2020   Planned next cycle start date - 3/19/2020   Doses missed in last 2 weeks - 0   Adherence Assessment - Adherent   Adverse Effects - Other (see note for details)   Pharmacist intervention? - Yes   Intervention(s) - Patient education   Any new drug interactions? No No   Is the dose as ordered appropriate for the patient? Yes Yes   Has the patient missed any days of school,  "work, or other routine activity? No -       Last PHQ-2 Score on record:   PHQ-2 ( 1999 [employer]) 2/22/2020   Q1: Little interest or pleasure in doing things 1   Q2: Feeling down, depressed or hopeless 0   PHQ-2 Score 1         Vitals:  BP:   BP Readings from Last 1 Encounters:   02/22/20 138/85     Wt Readings from Last 1 Encounters:   02/22/20 113.9 kg (251 lb)     Estimated body surface area is 2.35 meters squared as calculated from the following:    Height as of 2/12/20: 1.75 m (5' 8.9\").    Weight as of 2/22/20: 113.9 kg (251 lb).    Labs:  _  Result Component Current Result Ref Range   Sodium 140 (2/18/2020) 133 - 144 mmol/L     _  Result Component Current Result Ref Range   Potassium 3.6 (2/18/2020) 3.4 - 5.3 mmol/L     _  Result Component Current Result Ref Range   Calcium 8.9 (2/18/2020) 8.5 - 10.1 mg/dL     No results found for Mag within last 30 days.     No results found for Phos within last 30 days.     _  Result Component Current Result Ref Range   Albumin 3.7 (2/18/2020) 3.4 - 5.0 g/dL     _  Result Component Current Result Ref Range   Urea Nitrogen 10 (2/18/2020) 7 - 30 mg/dL     _  Result Component Current Result Ref Range   Creatinine 0.55 (2/18/2020) 0.52 - 1.04 mg/dL       _  Result Component Current Result Ref Range   AST 26 (2/18/2020) 0 - 45 U/L     _  Result Component Current Result Ref Range   ALT 38 (2/18/2020) 0 - 50 U/L     _  Result Component Current Result Ref Range   Bilirubin Total 1.1 (2/18/2020) 0.2 - 1.3 mg/dL       _  Result Component Current Result Ref Range   WBC 5.1 (2/18/2020) 4.0 - 11.0 10e9/L     _  Result Component Current Result Ref Range   Hemoglobin 13.5 (2/18/2020) 11.7 - 15.7 g/dL     _  Result Component Current Result Ref Range   Platelet Count 179 (2/18/2020) 150 - 450 10e9/L     _  Result Component Current Result Ref Range   Absolute Neutrophil 4.0 (2/18/2020) 1.6 - 8.3 10e9/L       Assessment:  Winter appears to be stable on Revlimid 10mg daily for nearly one " year.    Plan:  Continue Revlimid. Next office visit is scheduled for 3/10/2020    Follow-Up:  Pending next office visit.    Refill Due:  By 3/19/2020    Jigar RosaD  [facility]  [phone]  February 26, 2020

## 2020-02-27 NOTE — TELEPHONE ENCOUNTER
FUTURE VISIT INFORMATION      FUTURE VISIT INFORMATION:    Date: 4/13/2020    Time: 2PM    Location: [facility]  REFERRAL INFORMATION:    Referring provider:  Dr Hayes    Referring providers clinic:  [facility]    Reason for visit/diagnosis  : Thyroid nodule and referred by Dr. Haeys in Endocrinology (see 2/22/20 office notes), per patient. All records in [facility], per patient.    RECORDS REQUESTED FROM:       Clinic name Comments Records Status Imaging Status   [facility]2020 notes and referral from Dr Hayes [facility] imaging 12/24/19 US Thyroid Scanned in [facility] PACS   Imaging 2/24/2020 US Thyroid  2/15/2020 PET     3/3/2020 US Thyroid Biopsy (scheduled)  EPI PACS

## 2020-03-02 ASSESSMENT — ENCOUNTER SYMPTOMS
SMELL DISTURBANCE: 0
FATIGUE: 0
SINUS PAIN: 1
FEVER: 0
HEADACHES: 0
POLYDIPSIA: 1
NERVOUS/ANXIOUS: 1
STIFFNESS: 1
ORTHOPNEA: 0
DYSURIA: 0
MUSCLE WEAKNESS: 0
LIGHT-HEADEDNESS: 1
PALPITATIONS: 0
ARTHRALGIAS: 1
NECK PAIN: 1
INCREASED ENERGY: 0
POLYPHAGIA: 1
WEIGHT LOSS: 0
JOINT SWELLING: 1
EXERCISE INTOLERANCE: 0
NECK MASS: 0
WEAKNESS: 1
BACK PAIN: 0
NAIL CHANGES: 1
HALLUCINATIONS: 0
TREMORS: 1
SEIZURES: 0
DECREASED CONCENTRATION: 1
HYPOTENSION: 0
DECREASED APPETITE: 0
POOR WOUND HEALING: 1
PANIC: 0
TINGLING: 1
SKIN CHANGES: 1
NUMBNESS: 1
MEMORY LOSS: 1
EYE REDNESS: 0
EYE IRRITATION: 0
TASTE DISTURBANCE: 0
ALTERED TEMPERATURE REGULATION: 1
HEMATURIA: 0
DIZZINESS: 1
CHILLS: 0
MYALGIAS: 1
HOARSE VOICE: 0
EYE PAIN: 0
WEIGHT GAIN: 1
DISTURBANCES IN COORDINATION: 1
FLANK PAIN: 0
LOSS OF CONSCIOUSNESS: 0
NIGHT SWEATS: 0
HYPERTENSION: 1
EYE WATERING: 0
DIFFICULTY URINATING: 0
DOUBLE VISION: 1
DEPRESSION: 1
SYNCOPE: 0
SLEEP DISTURBANCES DUE TO BREATHING: 0
SORE THROAT: 0
SPEECH CHANGE: 0
LEG PAIN: 0
SINUS CONGESTION: 1
INSOMNIA: 1
TROUBLE SWALLOWING: 0
MUSCLE CRAMPS: 0
PARALYSIS: 0

## 2020-03-03 ENCOUNTER — ANCILLARY PROCEDURE (OUTPATIENT)
Dept: ULTRASOUND IMAGING | Facility: CLINIC | Age: 65
End: 2020-03-03
Attending: INTERNAL MEDICINE
Payer: MEDICAID

## 2020-03-03 DIAGNOSIS — E04.2 NONTOXIC MULTINODULAR GOITER: ICD-10-CM

## 2020-03-03 PROCEDURE — 88172 CYTP DX EVAL FNA 1ST EA SITE: CPT | Performed by: INTERNAL MEDICINE

## 2020-03-03 PROCEDURE — 88173 CYTOPATH EVAL FNA REPORT: CPT | Mod: 59 | Performed by: INTERNAL MEDICINE

## 2020-03-03 RX ORDER — LIDOCAINE HYDROCHLORIDE 10 MG/ML
5 INJECTION, SOLUTION INFILTRATION; PERINEURAL ONCE
Status: COMPLETED | OUTPATIENT
Start: 2020-03-03 | End: 2020-03-03

## 2020-03-03 RX ADMIN — LIDOCAINE HYDROCHLORIDE 5 ML: 10 INJECTION, SOLUTION INFILTRATION; PERINEURAL at 10:15

## 2020-03-04 ENCOUNTER — PRE VISIT (OUTPATIENT)
Dept: ENDOCRINOLOGY | Facility: CLINIC | Age: 65
End: 2020-03-04

## 2020-03-04 LAB — COPATH REPORT: NORMAL

## 2020-03-05 ENCOUNTER — HOSPITAL ENCOUNTER (OUTPATIENT)
Facility: AMBULATORY SURGERY CENTER | Age: 65
End: 2020-03-05
Attending: INTERNAL MEDICINE
Payer: MEDICAID

## 2020-03-05 ENCOUNTER — OFFICE VISIT (OUTPATIENT)
Dept: INTERNAL MEDICINE | Facility: CLINIC | Age: 65
End: 2020-03-05
Attending: STUDENT IN AN ORGANIZED HEALTH CARE EDUCATION/TRAINING PROGRAM
Payer: MEDICAID

## 2020-03-05 VITALS
SYSTOLIC BLOOD PRESSURE: 146 MMHG | BODY MASS INDEX: 37.18 KG/M2 | DIASTOLIC BLOOD PRESSURE: 75 MMHG | HEART RATE: 68 BPM | WEIGHT: 251 LBS | OXYGEN SATURATION: 97 %

## 2020-03-05 DIAGNOSIS — E11.40 TYPE 2 DIABETES MELLITUS WITH DIABETIC NEUROPATHY, WITH LONG-TERM CURRENT USE OF INSULIN (H): ICD-10-CM

## 2020-03-05 DIAGNOSIS — C90.01 MULTIPLE MYELOMA IN REMISSION (H): ICD-10-CM

## 2020-03-05 DIAGNOSIS — Z12.11 SPECIAL SCREENING FOR MALIGNANT NEOPLASMS, COLON: ICD-10-CM

## 2020-03-05 DIAGNOSIS — Z79.4 TYPE 2 DIABETES MELLITUS WITH DIABETIC POLYNEUROPATHY, WITH LONG-TERM CURRENT USE OF INSULIN (H): Primary | ICD-10-CM

## 2020-03-05 DIAGNOSIS — E11.42 TYPE 2 DIABETES MELLITUS WITH DIABETIC POLYNEUROPATHY, WITH LONG-TERM CURRENT USE OF INSULIN (H): Primary | ICD-10-CM

## 2020-03-05 DIAGNOSIS — F32.9 MAJOR DEPRESSIVE DISORDER WITH CURRENT ACTIVE EPISODE, UNSPECIFIED DEPRESSION EPISODE SEVERITY, UNSPECIFIED WHETHER RECURRENT: ICD-10-CM

## 2020-03-05 DIAGNOSIS — Z79.4 TYPE 2 DIABETES MELLITUS WITH DIABETIC NEUROPATHY, WITH LONG-TERM CURRENT USE OF INSULIN (H): ICD-10-CM

## 2020-03-05 DIAGNOSIS — G56.03 BILATERAL CARPAL TUNNEL SYNDROME: ICD-10-CM

## 2020-03-05 LAB
ALBUMIN SERPL-MCNC: 4 G/DL (ref 3.4–5)
ALP SERPL-CCNC: 112 U/L (ref 40–150)
ALT SERPL W P-5'-P-CCNC: 43 U/L (ref 0–50)
ANION GAP SERPL CALCULATED.3IONS-SCNC: 5 MMOL/L (ref 3–14)
AST SERPL W P-5'-P-CCNC: 18 U/L (ref 0–45)
BASOPHILS # BLD AUTO: 0 10E9/L (ref 0–0.2)
BASOPHILS NFR BLD AUTO: 0.5 %
BILIRUB SERPL-MCNC: 1.2 MG/DL (ref 0.2–1.3)
BUN SERPL-MCNC: 11 MG/DL (ref 7–30)
CALCIUM SERPL-MCNC: 9 MG/DL (ref 8.5–10.1)
CHLORIDE SERPL-SCNC: 106 MMOL/L (ref 94–109)
CO2 SERPL-SCNC: 28 MMOL/L (ref 20–32)
CREAT SERPL-MCNC: 0.56 MG/DL (ref 0.52–1.04)
DIFFERENTIAL METHOD BLD: ABNORMAL
EOSINOPHIL # BLD AUTO: 0.3 10E9/L (ref 0–0.7)
EOSINOPHIL NFR BLD AUTO: 4.9 %
ERYTHROCYTE [DISTWIDTH] IN BLOOD BY AUTOMATED COUNT: 13.4 % (ref 10–15)
GFR SERPL CREATININE-BSD FRML MDRD: >90 ML/MIN/{1.73_M2}
GLUCOSE SERPL-MCNC: 134 MG/DL (ref 70–99)
HBA1C MFR BLD: 6.7 % (ref 0–5.6)
HCG UR QL: NEGATIVE
HCT VFR BLD AUTO: 41.5 % (ref 35–47)
HGB BLD-MCNC: 13.7 G/DL (ref 11.7–15.7)
IMM GRANULOCYTES # BLD: 0 10E9/L (ref 0–0.4)
IMM GRANULOCYTES NFR BLD: 0.7 %
LYMPHOCYTES # BLD AUTO: 0.7 10E9/L (ref 0.8–5.3)
LYMPHOCYTES NFR BLD AUTO: 11 %
MCH RBC QN AUTO: 29 PG (ref 26.5–33)
MCHC RBC AUTO-ENTMCNC: 33 G/DL (ref 31.5–36.5)
MCV RBC AUTO: 88 FL (ref 78–100)
MONOCYTES # BLD AUTO: 0.6 10E9/L (ref 0–1.3)
MONOCYTES NFR BLD AUTO: 9.8 %
NEUTROPHILS # BLD AUTO: 4.5 10E9/L (ref 1.6–8.3)
NEUTROPHILS NFR BLD AUTO: 73.1 %
NRBC # BLD AUTO: 0 10*3/UL
NRBC BLD AUTO-RTO: 0 /100
PLATELET # BLD AUTO: 125 10E9/L (ref 150–450)
POTASSIUM SERPL-SCNC: 3.6 MMOL/L (ref 3.4–5.3)
PROT SERPL-MCNC: 6.7 G/DL (ref 6.8–8.8)
RBC # BLD AUTO: 4.72 10E12/L (ref 3.8–5.2)
SODIUM SERPL-SCNC: 140 MMOL/L (ref 133–144)
WBC # BLD AUTO: 6.1 10E9/L (ref 4–11)

## 2020-03-05 RX ORDER — SERTRALINE HYDROCHLORIDE 100 MG/1
100 TABLET, FILM COATED ORAL DAILY
Qty: 30 TABLET | Refills: 3 | Status: ON HOLD | OUTPATIENT
Start: 2020-03-05 | End: 2020-08-26

## 2020-03-05 RX ORDER — PREGABALIN 100 MG/1
100 CAPSULE ORAL 3 TIMES DAILY
Qty: 90 CAPSULE | Refills: 3 | Status: SHIPPED | OUTPATIENT
Start: 2020-03-05 | End: 2020-09-17

## 2020-03-05 RX ORDER — ACYCLOVIR 800 MG/1
800 TABLET ORAL 2 TIMES DAILY
Status: ON HOLD | COMMUNITY
End: 2020-08-26

## 2020-03-05 NOTE — PROGRESS NOTES
[facility]         HPI:       A 64 year old female patient with past medical hx of MM s/p stem cell transplant, multinodular goiter, T2DM, HTN, HLD, who presents for establishing care.     Multiple Myeloma   Patient was diagnosed with MM in 2018 after a work-up for anemia and fatigue with elevated IgA kappa monoclonal antibodies and PET on 11/2018 showing bone marrow consistent with hypermetabolic plasma cell myeloma. She had BM biopsy in Sep/2019 showing 60% cellularity and decreased trilineage hematopoiesis and 15% plasma cells. Currently she is following up with BMT at [facility]. She is on lenalidomide and Zometa(until 12/2020) and a ASA of 325 mg per oncologist as prophylaxis to VTE 2/2 lenalidomide    Multinodular Goiter  Diabetes type II  Patient has a hx of thyroid nodules. Follow up U/S on 2/24/2020 showed multinodular goiter with largest nodule on left lobe with mass effect. FNA was done on 3/3/2020 showing Atypia of undetermined significance on right lobe nodule and benign left lobe nodule. Patient is f/u with endocrinology which also referred the patient to surgery.   Patient reported that her last A1c was around 6 or less (no data on chart since 2017 with A1c of 9.3 ) She said her blood sugars are higher than before, but she is not paying attention to her diet as before. She is currently on Lantus and Humalog.     Patient reported hand and feet numbness, She said that she underwent a wok-up for this in [place] with imaging studies and EMG which per patient showed bilateral carpel tunnel syndrome.   L-spine MRI around 7/2019 showed DJD with L4-L5 foraminal stenosis. She is on Lyrica max dose of 300 mg daily. She reported that her neuropathy got very worse after Chemotherapy to the degree of right dropped foot, but reported that it all got better after PT and lyrica increased dose. She is still having numbness in her hands and feet, but she said she is now used to it and would not prefer to be on  more medications.    She had a colonoscopy one year ago and was told it needed to be repeated d/t poor prep. She reports that she is up to date with mammograms.      Problem, Medication and Allergy Lists were reviewed and are current.  Patient is an established patient of this clinic.         Review of Systems:     ROS  I have personally reviewed and updated the complete ROS on the day of the visit.           Physical Exam:   BP (!) 146/75   Pulse 68   Wt 113.9 kg (251 lb)   SpO2 97%   BMI 37.18 kg/m    Body mass index is 37.18 kg/m .  Vitals were reviewed       GENERAL APPEARANCE: healthy, alert and no distress     EYES: EOMI,  PERRL     HENT: ear canals and TM's normal and nose and mouth without ulcers or lesions     NECK: no adenopathy, thyromegaly with asymmetry as left lobe>right Lobe     RESP: lungs clear to auscultation - no rales, rhonchi or wheezes     CV: regular rates and rhythm, normal S1 S2, no S3 or S4 and no murmur, click or rub     ABDOMEN:  soft, nontender, no HSM or masses and bowel sounds normal     MS: extremities normal- no gross deformities noted, no evidence of inflammation in joints, FROM in all extremities.     SKIN: no suspicious lesions or rashes     NEURO: Normal strength and tone, sensory exam grossly normal except for decreased sensation in both feet, mentation intact and speech normal     PSYCH: mentation appears normal. and affect normal/bright        Results:     Results from last visit:  Ancillary Procedure on 03/03/2020   Component Date Value Ref Range Status     Copath Report 03/03/2020    Final                    Value:Patient Name: PIEDAD LO  MR#: [id number]  Specimen #: [id number]  Collected: 3/3/2020  Received: 3/3/2020  Reported: 3/4/2020 15:40  Ordering Phy(s): ASHLEY CROWE    For improved result formatting, select 'View Enhanced Report Format' under   Linked Documents section.    SPECIMEN/STAIN PROCESS:  A: Thyroid, right #2 inferior, ultrasound guided  fine needle aspiration       Pap-Cyto x 3, Diff Quick Stain-cyto x 3  B: Thyroid, left #1 whole , ultrasound guided fine needle aspiration       Pap-Cyto x 3, Diff Quick Stain-cyto x 3    ----------------------------------------------------------------    CYTOLOGIC INTERPRETATION:    A.  Thyroid, right #2 inferior, ultrasound guided fine needle aspiration:     Atypia of undetermined  significance    Atypia of undetermined significance  [place] implied risk of malignancy and recommended clinical   management:  Atypia of undetermined significance has a 10-30% risk of malignancy,   recommend repeat FNA in 4-6 weeks,  molecular t                          esting or lobectomy    Specimen Adequacy: Satisfactory for evaluation.    B.  Thyroid, left #1 whole , ultrasound guided fine needle aspiration:     Benign  Consistent with a benign nodule (includes adenomatoid nodule, colloid   nodule, etc.)    The [place] implied risk of malignancy and recommended clinical   management:  Benign has a 0-3% risk of malignancy, recommended management is clinical   follow-up    Specimen Adequacy: Satisfactory for evaluation.    I have personally reviewed all specimens and/or slides, including the   listed special stains, and used them  with my medical judgement to determine or confirm the final diagnosis.    Electronically signed out by:  Santos Zaragoza M.D., [facility]    CLINICAL HISTORY:  History of multiple myeloma, right #2 inferior (2.3 cm), left #1 whole   (6.9 cm).    ,    GROSS:  A. Thyroid, right #2 inferior, ultrasound guided fine needle aspiration:    Received are 3 fixed slides,  processed for Pap stain, and 3 air dried slides, proces                          sed for Diff Quik   stain. Afirma tube held.    B. Thyroid, left #1 whole , ultrasound guided fine needle aspiration:    Received are 3 fixed slides, processed  for Pap stain, and 3 air dried slides, processed for Diff Quik stain.    Afirma tube  held.    INTRAOPERATIVE CONSULTATION:  FNA Performance: Fine needle aspiration was not performed by [facility],    Pathology staff.  Aspirate immediate study/adequacy:  I, Dr. HIMA Shepherd MD, attest that I immediately examined smears while the   procedure was underway and determined or  confirmed the adequacy of the specimens via telepathology.  It is of note that the final assessment and report may be performed and   signed by a different pathologist.    Onsite adequacy/interpretation:  Pass A1 adequate, Pass A2 put into afirma, Pass A3-A4 adequate.  Pass B1 adequate, Pass B2 put into afirma, Pass B3-B4 adequate.    MICROSCOPIC:  Microscopic examination is performed.    Mary Anne Hebert MD (Cytopathology Fellow)          Santos Zaragoza III, MD    CPT Codes                          :  A: 41144-SZME-JUP, 12005-SKRM-YJS, 59079-DEOS  B: 28811-XKJD-WLE, 71939-OQHQ-JNG, 73674-BHEN    COLLECTION SITE:  Client:  [facility]  Location:  [facility] (B)    The technical component of this testing was completed at the [facility], with the professional component performed   at the [facility], [address] [place] [phone] ([phone])    Resident  [email or web address]         Assessment and Plan     A 64 year old female patient with past medical hx of MM s/p stem cell transplant, multinodular goiter, T2DM, HTN, HLD, who presents for establishing care.     Diagnoses and all orders for this visit:    Type 2 diabetes mellitus with diabetic polyneuropathy, with long-term current use of insulin (H)  Patient reported that her last A1c was around 6 or less, but no records on file since 2018 and was 9.3. Patient has a follow up with Endocrinology in the next few months    -     Hemoglobin A1c  -     Refill: pregabalin (LYRICA) 100 MG capsule; Take 1 capsule (100 mg) by mouth 3 times daily        -      PHYSICAL THERAPY REFERRAL; Future       Special screening for malignant neoplasms, colon  Patient last colonoscopy was normal, but with no-adequate prep, recommendation per oncology to repeat colonoscopy as she has a high risk for malignancy with her MM treatment regimen.  -     GASTROENTEROLOGY ADULT REF PROCEDURE ONLY    Bilateral carpal tunnel syndrome  No records available. Will sent in request to get all patient's records and refer the patient to ortho in the mean time       -     ORTHOPEDICS ADULT REFERRAL    Depression   -   Sent in a refill on Zoloft    Options for treatment and follow-up care were reviewed with the patient. Winter Loya engaged in the decision making process and verbalized understanding of the options discussed and agreed with the final plan.    Follow up in 3 months     Gabrielle Edwards MD  Internal Medicine PGY-1  Mar 5, 2020    Pt was seen and plan of care discussed with Dr. Mcpherson     Pt was seen and examined by me; I agree with the detailed A/P documentation above    Stacie Mcpherson MD

## 2020-03-05 NOTE — TELEPHONE ENCOUNTER
DIAGNOSIS: Carpal Tunnel in both hands   APPOINTMENT DATE: 4.14.2020   NOTES STATUS DETAILS   OFFICE NOTE from referring provider Internal 3.5.2020  Dr. Edwards, [facility]   OFFICE NOTE from other specialist N/A    DISCHARGE SUMMARY from hospital N/A    DISCHARGE REPORT from the ER N/A    OPERATIVE REPORT N/A    MEDICATION LIST Internal    IMPLANT RECORD/STICKER N/A    LABS     CBC/DIFF Internal 3.5.20   CULTURES N/A    INJECTIONS DONE IN RADIOLOGY N/A    MRI N/A    CT SCAN N/A    XRAYS (IMAGES & REPORTS) N/A    TUMOR     PATHOLOGY  Slides & report N/A

## 2020-03-05 NOTE — NURSING NOTE
Chief Complaint   Patient presents with     Establish Care     pt here to establish care     Kimberly Nissen, EMT at 7:10 AM on 3/5/2020

## 2020-03-09 ENCOUNTER — OFFICE VISIT (OUTPATIENT)
Dept: OPHTHALMOLOGY | Facility: CLINIC | Age: 65
End: 2020-03-09
Attending: OPHTHALMOLOGY
Payer: MEDICAID

## 2020-03-09 ENCOUNTER — OFFICE VISIT (OUTPATIENT)
Dept: OTOLARYNGOLOGY | Facility: CLINIC | Age: 65
End: 2020-03-09
Payer: MEDICAID

## 2020-03-09 VITALS
BODY MASS INDEX: 37.62 KG/M2 | WEIGHT: 254 LBS | HEART RATE: 67 BPM | HEIGHT: 69 IN | SYSTOLIC BLOOD PRESSURE: 165 MMHG | DIASTOLIC BLOOD PRESSURE: 87 MMHG

## 2020-03-09 DIAGNOSIS — E11.9 DIABETES MELLITUS TYPE 2 WITHOUT RETINOPATHY (H): Primary | ICD-10-CM

## 2020-03-09 DIAGNOSIS — H52.4 PRESBYOPIA: ICD-10-CM

## 2020-03-09 DIAGNOSIS — H52.13 MYOPIC ASTIGMATISM OF BOTH EYES: ICD-10-CM

## 2020-03-09 DIAGNOSIS — E04.9 SUBSTERNAL THYROID GOITER: Primary | ICD-10-CM

## 2020-03-09 DIAGNOSIS — H40.9 GLAUCOMA OF BOTH EYES, UNSPECIFIED GLAUCOMA TYPE: ICD-10-CM

## 2020-03-09 DIAGNOSIS — H35.9 RETINAL LESION: ICD-10-CM

## 2020-03-09 DIAGNOSIS — H52.203 MYOPIC ASTIGMATISM OF BOTH EYES: ICD-10-CM

## 2020-03-09 DIAGNOSIS — H04.122 DRY EYE OF LEFT SIDE: ICD-10-CM

## 2020-03-09 DIAGNOSIS — H25.13 NUCLEAR SCLEROTIC CATARACT OF BOTH EYES: ICD-10-CM

## 2020-03-09 PROCEDURE — G0463 HOSPITAL OUTPT CLINIC VISIT: HCPCS | Mod: ZF

## 2020-03-09 PROCEDURE — 92015 DETERMINE REFRACTIVE STATE: CPT | Mod: ZF

## 2020-03-09 RX ORDER — LATANOPROST 50 UG/ML
1 SOLUTION/ DROPS OPHTHALMIC DAILY
Qty: 1 BOTTLE | Refills: 11 | Status: SHIPPED | OUTPATIENT
Start: 2020-03-09 | End: 2020-12-14

## 2020-03-09 ASSESSMENT — REFRACTION_WEARINGRX
OS_SPHERE: -0.75
OS_AXIS: 038
OS_ADD: +2.25
OD_AXIS: 134
OD_ADD: +2.25
OD_SPHERE: -3.50
OS_CYLINDER: +0.75
OD_CYLINDER: +0.75

## 2020-03-09 ASSESSMENT — CONF VISUAL FIELD
OS_NORMAL: 1
OD_NORMAL: 1

## 2020-03-09 ASSESSMENT — REFRACTION_MANIFEST
OD_AXIS: 135
OD_SPHERE: -2.50
OS_ADD: +2.50
OS_SPHERE: -0.25
OS_AXIS: 035
OS_CYLINDER: +0.75
OD_CYLINDER: +0.75
OD_ADD: +2.50

## 2020-03-09 ASSESSMENT — EXTERNAL EXAM - LEFT EYE: OS_EXAM: NORMAL

## 2020-03-09 ASSESSMENT — EXTERNAL EXAM - RIGHT EYE: OD_EXAM: NORMAL

## 2020-03-09 ASSESSMENT — SLIT LAMP EXAM - LIDS
COMMENTS: DERMATOCHALASIS
COMMENTS: DERMATOCHALASIS

## 2020-03-09 ASSESSMENT — TONOMETRY
IOP_METHOD: APPLANATION
OS_IOP_MMHG: 18
OD_IOP_MMHG: 18

## 2020-03-09 ASSESSMENT — MIFFLIN-ST. JEOR: SCORE: 1772.55

## 2020-03-09 ASSESSMENT — CUP TO DISC RATIO
OS_RATIO: 0.7
OD_RATIO: 0.7

## 2020-03-09 ASSESSMENT — VISUAL ACUITY
OD_PH_CC: 20/25
OD_CC+: +1
METHOD: SNELLEN - LINEAR
OD_CC: 20/40
CORRECTION_TYPE: GLASSES
OS_CC: 20/30

## 2020-03-09 ASSESSMENT — PAIN SCALES - GENERAL: PAINLEVEL: MODERATE PAIN (4)

## 2020-03-09 NOTE — LETTER
3/9/2020       RE: Winter Loya  [address]  [place] [address]     Dear Colleague,    Thank you for referring your patient, Winter Loya, to the [facility] EAR NOSE AND THROAT at [facility]. Please see a copy of my visit note below.    SURGICAL ONCOLOGY - NEW PATIENT OFFICE VISIT        I saw Ms. Loya in consultation for the evaluation and treatment of a thyroid nodule.     HPI  Ms. Winter Loya is a 64 year old year-old female with history of multiple myeloma s/p stem cell transplant currently receiving therapy with lenalidomide and zometa who presents due to symptomatic thyroid nodules.  She has had a large left thyroid nodule for several years, had a biopsy in 2017 which was benign.  On a recent PET scan the left thyroid nodule was pet avid thus repeat biopsy was recommended.  The large left nodule was consistent with benign findings however a smaller right thyroid nodule was found to have atypia of undetermined significance.  She denies any hyper or hypo-thyroid symptoms.  She endorses a sensation of heaviness when she lies on her back but denies dysphagia.  She has a brother with history of thyroid problems though has not had thyroid removed.    Previsit Tests   US:  Impression:  Multiple thyroid nodules as above. Recommend fine-needle aspiration  biopsy of right nodule #2 and left nodule #1. The enlarged 5.9 cm left  thyroid nodule corresponds to the FDG avid and heterogeneously  enhancing left thyroid nodule identified on recently performed PET/CT  02/15/2020.    FNA:  CYTOLOGIC INTERPRETATION:     A.  Thyroid, right #2 inferior, ultrasound guided fine needle aspiration:     Atypia of undetermined   significance     Atypia of undetermined significance   The [place] implied risk of malignancy and recommended clinical   management:   Atypia of undetermined significance has a 10-30% risk of malignancy,   recommend repeat FNA in 4-6 weeks,   molecular  testing or lobectomy     Specimen Adequacy: Satisfactory for evaluation.     B.  Thyroid, left #1 whole , ultrasound guided fine needle aspiration:     Benign   Consistent with a benign nodule (includes adenomatoid nodule, colloid   nodule, etc.)     PMH  Multiple myeloma  HTN    Physical examination  NAD  NLB  Neck soft, palpable left thyroid nodule, inferior border is palpable with head tilt.  No surgical scars    From a personal perspective, she works as a chemical dependency counselor, starting a new job tomorrow.      IMPRESSION No diagnosis found.   64 year old year-old female with a large benign left thyroid nodule with mass effect and a smaller right thyroid nodule with atypia of undetermined significance.    PLAN  I spent a total of 45 minutes with Ms. Loya, more than 50% of which were spent in counseling, coordination of care, and face-to-face time. I reviewed the plan as follows:  1) Procedure planned: Total thyroidectomy with intraoperative nerve monitoring. I reviewed the indications, risks, and benefits of the procedure with Ms. Loya and she wishes to proceed with surgery.     Patient will call in several months when she wishes to schedule surgery.    They had all their questions answered and were in agreement with the plan.    Patient seen with staff, Dr. Burgos.    Eloina Busch  General Surgery PGY3    I spent >30 minutes in the care, exam and consultation of this patient for large substernal thyroid goiter. I recommend total thyroidectomy. I discussed the surgical procedure and risks with the patient. All of her questions and concerns were addressed. I agree with the above note, exam and plan.    Tiff Burgos MD      Again, thank you for allowing me to participate in the care of your patient.      Sincerely,    Tiff Burgos MD

## 2020-03-09 NOTE — PROGRESS NOTES
SURGICAL ONCOLOGY - NEW PATIENT OFFICE VISIT        I saw Ms. Loya in consultation for the evaluation and treatment of a thyroid nodule.     HPI  Ms. Winter Loya is a 64 year old year-old female with history of multiple myeloma s/p stem cell transplant currently receiving therapy with lenalidomide and zometa who presents due to symptomatic thyroid nodules.  She has had a large left thyroid nodule for several years, had a biopsy in 2017 which was benign.  On a recent PET scan the left thyroid nodule was pet avid thus repeat biopsy was recommended.  The large left nodule was consistent with benign findings however a smaller right thyroid nodule was found to have atypia of undetermined significance.  She denies any hyper or hypo-thyroid symptoms.  She endorses a sensation of heaviness when she lies on her back but denies dysphagia.  She has a brother with history of thyroid problems though has not had thyroid removed.    Previsit Tests   US:  Impression:  Multiple thyroid nodules as above. Recommend fine-needle aspiration  biopsy of right nodule #2 and left nodule #1. The enlarged 5.9 cm left  thyroid nodule corresponds to the FDG avid and heterogeneously  enhancing left thyroid nodule identified on recently performed PET/CT  02/15/2020.    FNA:  CYTOLOGIC INTERPRETATION:     A.  Thyroid, right #2 inferior, ultrasound guided fine needle aspiration:     Atypia of undetermined   significance     Atypia of undetermined significance   The [place] implied risk of malignancy and recommended clinical   management:   Atypia of undetermined significance has a 10-30% risk of malignancy,   recommend repeat FNA in 4-6 weeks,   molecular testing or lobectomy     Specimen Adequacy: Satisfactory for evaluation.     B.  Thyroid, left #1 whole , ultrasound guided fine needle aspiration:     Benign   Consistent with a benign nodule (includes adenomatoid nodule, colloid   nodule, etc.)     PMH  Multiple myeloma  HTN    Physical  examination  NAD  NLB  Neck soft, palpable left thyroid nodule, inferior border is palpable with head tilt.  No surgical scars    From a personal perspective, she works as a chemical dependency counselor, starting a new job tomorrow.      IMPRESSION No diagnosis found.   64 year old year-old female with a large benign left thyroid nodule with mass effect and a smaller right thyroid nodule with atypia of undetermined significance.    PLAN  I spent a total of 45 minutes with Ms. Loya, more than 50% of which were spent in counseling, coordination of care, and face-to-face time. I reviewed the plan as follows:  1) Procedure planned: Total thyroidectomy with intraoperative nerve monitoring. I reviewed the indications, risks, and benefits of the procedure with Ms. Loya and she wishes to proceed with surgery.     Patient will call in several months when she wishes to schedule surgery.    They had all their questions answered and were in agreement with the plan.    Patient seen with staff, Dr. Burgos.    Eloina Busch  General Surgery PGY3    I spent >30 minutes in the care, exam and consultation of this patient for large substernal thyroid goiter. I recommend total thyroidectomy. I discussed the surgical procedure and risks with the patient. All of her questions and concerns were addressed. I agree with the above note, exam and plan.    Tiff Burgos MD

## 2020-03-09 NOTE — NURSING NOTE
Chief Complaints and History of Present Illnesses   Patient presents with     Annual Eye Exam     Chief Complaint(s) and History of Present Illness(es)     Annual Eye Exam     Laterality: both eyes    Onset: sudden    Onset: 4 months ago    Location: central vision    Quality: hazy    Severity: severe    Frequency: intermittently    Timing: at random times and throughout the day    Course: gradually improving    Associated symptoms: floaters.  Negative for eye pain, flashes and pain with eye movement    Treatments tried: eye drops    Response to treatment: no improvement              Comments     Vision foggy at times x 3-4 months  Seen retina in [place] told leaking in LE, also diag w glaucoma   Blood sugar didn't check today  AC 6.7 taken  last week  Latanoprost every day BE  Lissette Mckoy COA 1:37 PM March 9, 2020

## 2020-03-10 ENCOUNTER — ONCOLOGY VISIT (OUTPATIENT)
Dept: ONCOLOGY | Facility: CLINIC | Age: 65
End: 2020-03-10
Attending: PHYSICIAN ASSISTANT
Payer: MEDICAID

## 2020-03-10 ENCOUNTER — APPOINTMENT (OUTPATIENT)
Dept: LAB | Facility: CLINIC | Age: 65
End: 2020-03-10
Attending: PHYSICIAN ASSISTANT
Payer: MEDICAID

## 2020-03-10 VITALS
TEMPERATURE: 98.9 F | OXYGEN SATURATION: 96 % | SYSTOLIC BLOOD PRESSURE: 150 MMHG | RESPIRATION RATE: 16 BRPM | BODY MASS INDEX: 38.51 KG/M2 | DIASTOLIC BLOOD PRESSURE: 80 MMHG | HEIGHT: 69 IN | HEART RATE: 77 BPM | WEIGHT: 260 LBS

## 2020-03-10 DIAGNOSIS — Z79.899 ENCOUNTER FOR LONG-TERM (CURRENT) USE OF MEDICATIONS: ICD-10-CM

## 2020-03-10 DIAGNOSIS — C90.01 MULTIPLE MYELOMA IN REMISSION (H): Primary | ICD-10-CM

## 2020-03-10 LAB
ALBUMIN SERPL-MCNC: 4 G/DL (ref 3.4–5)
ALP SERPL-CCNC: 113 U/L (ref 40–150)
ALT SERPL W P-5'-P-CCNC: 45 U/L (ref 0–50)
ANION GAP SERPL CALCULATED.3IONS-SCNC: 6 MMOL/L (ref 3–14)
AST SERPL W P-5'-P-CCNC: 18 U/L (ref 0–45)
BASOPHILS # BLD AUTO: 0 10E9/L (ref 0–0.2)
BASOPHILS NFR BLD AUTO: 0.9 %
BILIRUB SERPL-MCNC: 1.2 MG/DL (ref 0.2–1.3)
BUN SERPL-MCNC: 12 MG/DL (ref 7–30)
CALCIUM SERPL-MCNC: 9.4 MG/DL (ref 8.5–10.1)
CHLORIDE SERPL-SCNC: 104 MMOL/L (ref 94–109)
CO2 SERPL-SCNC: 28 MMOL/L (ref 20–32)
CREAT SERPL-MCNC: 0.59 MG/DL (ref 0.52–1.04)
DIFFERENTIAL METHOD BLD: ABNORMAL
EOSINOPHIL # BLD AUTO: 0.3 10E9/L (ref 0–0.7)
EOSINOPHIL NFR BLD AUTO: 6 %
ERYTHROCYTE [DISTWIDTH] IN BLOOD BY AUTOMATED COUNT: 13.7 % (ref 10–15)
GFR SERPL CREATININE-BSD FRML MDRD: >90 ML/MIN/{1.73_M2}
GLUCOSE SERPL-MCNC: 212 MG/DL (ref 70–99)
HCG UR QL: NEGATIVE
HCT VFR BLD AUTO: 40 % (ref 35–47)
HGB BLD-MCNC: 13.7 G/DL (ref 11.7–15.7)
IMM GRANULOCYTES # BLD: 0 10E9/L (ref 0–0.4)
IMM GRANULOCYTES NFR BLD: 0.7 %
LYMPHOCYTES # BLD AUTO: 0.7 10E9/L (ref 0.8–5.3)
LYMPHOCYTES NFR BLD AUTO: 14.7 %
MCH RBC QN AUTO: 29.5 PG (ref 26.5–33)
MCHC RBC AUTO-ENTMCNC: 34.3 G/DL (ref 31.5–36.5)
MCV RBC AUTO: 86 FL (ref 78–100)
MONOCYTES # BLD AUTO: 0.5 10E9/L (ref 0–1.3)
MONOCYTES NFR BLD AUTO: 11.6 %
NEUTROPHILS # BLD AUTO: 3 10E9/L (ref 1.6–8.3)
NEUTROPHILS NFR BLD AUTO: 66.1 %
NRBC # BLD AUTO: 0 10*3/UL
NRBC BLD AUTO-RTO: 0 /100
PLATELET # BLD AUTO: 143 10E9/L (ref 150–450)
POTASSIUM SERPL-SCNC: 3.5 MMOL/L (ref 3.4–5.3)
PROT SERPL-MCNC: 6.8 G/DL (ref 6.8–8.8)
RBC # BLD AUTO: 4.65 10E12/L (ref 3.8–5.2)
SODIUM SERPL-SCNC: 138 MMOL/L (ref 133–144)
WBC # BLD AUTO: 4.5 10E9/L (ref 4–11)

## 2020-03-10 PROCEDURE — 80053 COMPREHEN METABOLIC PANEL: CPT | Performed by: INTERNAL MEDICINE

## 2020-03-10 PROCEDURE — 81025 URINE PREGNANCY TEST: CPT | Performed by: INTERNAL MEDICINE

## 2020-03-10 PROCEDURE — G0463 HOSPITAL OUTPT CLINIC VISIT: HCPCS | Mod: ZF

## 2020-03-10 PROCEDURE — 99214 OFFICE O/P EST MOD 30 MIN: CPT | Mod: ZP | Performed by: PHYSICIAN ASSISTANT

## 2020-03-10 PROCEDURE — 85025 COMPLETE CBC W/AUTO DIFF WBC: CPT | Performed by: INTERNAL MEDICINE

## 2020-03-10 PROCEDURE — 36415 COLL VENOUS BLD VENIPUNCTURE: CPT

## 2020-03-10 ASSESSMENT — MIFFLIN-ST. JEOR: SCORE: 1799.6

## 2020-03-10 ASSESSMENT — PAIN SCALES - GENERAL: PAINLEVEL: NO PAIN (0)

## 2020-03-10 NOTE — PROGRESS NOTES
[facility]  MEDICAL ONCOLOGY/HEMATOLOGY PROGRESS NOTE  Mar 10, 2020    CHIEF COMPLAINT: MM, s/p auto, on maintenance Rev    ONCOLOGY HPI:   Winter Loya is a 64 year old woman with IgA Kappa Multiple Myeloma. In 2018 she had anemia and was fatigued. She was found to have IgA kappa monocloncal antibody with M-spike of 0.17. IgA elevated. Skeletal survey was unremarkable and UPEP had minimal protein (156 mg/m2). PET 11/2018 showed bone marrow consistent with hypermetabolic plasma cell myeloma. M spike was 0.17. She started RVD and Zometa. On 4/2019 she had an autologous transplant.      Her bone marrow bx from September 2019 was sent here for review. It showed marrow cellularity of 60%, with decreased trilineage hematopoiesis and 15% plasma cells as well as peripheral blood with slight anemia. 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 She is on revlimid maintenance and zometa from her prior oncologist in [place]. On 12/6/19 her K/L ratio is 1.1, M spike is 0.04.    INTERVAL HISTORY:   Winter presents today unaccompanied.     Overall she is doing well.  She has no new or different complaints today.  She continues to tolerate the Revlimid very well.  She denies any GI or respiratory symptoms.  She denies any fevers, chills or night sweats.  She is eating and drinking well.  She does have baseline neuropathy from the transplant but this has overall improved and she has no worsening of the symptoms.    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  She is happy to be doing things again.      ROS: 10 point ROS neg other than the symptoms noted above in the HPI.      No Known Allergies    Current Outpatient Medications   Medication     acyclovir (ZOVIRAX) 800 MG tablet     atorvastatin (LIPITOR) 20 MG tablet     insulin glargine (LANTUS PEN) 100 UNIT/ML pen     insulin  "lispro (HUMALOG VIAL) 100 UNIT/ML vial     insulin pen needle (FIFTY50 PEN NEEDLES) 32G X 4 MM miscellaneous     lactulose (CHRONULAC) 10 GM/15ML solution     latanoprost (XALATAN) 0.005 % ophthalmic solution     LENalidomide 10 MG PO CAPS capsule     pregabalin (LYRICA) 100 MG capsule     sertraline (ZOLOFT) 100 MG tablet     metFORMIN (GLUCOPHAGE) 500 MG tablet     No current facility-administered medications for this visit.        EXAM:  BP (!) 150/80   Pulse 77   Temp 98.9  F (37.2  C) (Oral)   Resp 16   Ht 1.746 m (5' 8.74\")   Wt 117.9 kg (260 lb)   SpO2 96%   BMI 38.69 kg/m    Wt Readings from Last 4 Encounters:   03/10/20 117.9 kg (260 lb)   03/09/20 115.2 kg (254 lb)   03/05/20 113.9 kg (251 lb)   02/22/20 113.9 kg (251 lb)        General: No acute distress, anxious  HEENT: EOMI, PERRLA, no scleral icterus, mucus membranes moist and no lesions or thrush  Lymph: Neck is supple and shows no nodes in cervical or supraclavicular regions  Heart:  RRR, no murmur, gallop or rub  Lungs: CTA-B, no wheezes, rhonchi or rales  Abdomen: Normal bowel sounds, soft, non tender, not distended, no rebound or guarding, no palpable masses  Extremities: No pitting edema  Skin: No significant rashes or lesions on exposed skin  Neuro: CN II-XII are intact    LABS:    3/10/2020 14:54   Sodium 138   Potassium 3.5   Chloride 104   Carbon Dioxide 28   Urea Nitrogen 12   Creatinine 0.59   GFR Estimate >90   GFR Estimate If Black >90   Calcium 9.4   Anion Gap 6   Albumin 4.0   Protein Total 6.8   Bilirubin Total 1.2   Alkaline Phosphatase 113   ALT 45   AST 18   Glucose 212 (H)   WBC 4.5   Hemoglobin 13.7   Hematocrit 40.0   Platelet Count 143 (L)   RBC Count 4.65   MCV 86   MCH 29.5   MCHC 34.3   RDW 13.7   Diff Method Automated Method   % Neutrophils 66.1   % Lymphocytes 14.7   % Monocytes 11.6   % Eosinophils 6.0   % Basophils 0.9   % Immature Granulocytes 0.7   Nucleated RBCs 0   Absolute Neutrophil 3.0   Absolute Lymphocytes " 0.7 (L)   Absolute Monocytes 0.5   Absolute Eosinophils 0.3   Absolute Basophils 0.0   Abs Immature Granulocytes 0.0   Absolute Nucleated RBC 0.0       IMAGING:   No new imaging    IMPRESSION/PLAN:    MM, standard risk, IgA Kappa  -s/p auto HSCT in 4/2019  -now on maintenance Revlimid 10 mg daily. Tolerating very well with no side effects. Will continue until progression  -will going SPEP and SFLC monthly (add these to her therapy plan)  -PET/CT annually or if symptomatic from MM due to bone pain   -continue  mg while on Rev for thromboprophylaxis      -continue zometa through 12/2020 to complete a total of 2 years of therapy, associated with improved PFS and overall survival when given with ASCT  -last given on 2/18/20. Next due on next week     -will follow-up with me in about 5 weeks to correspond to with next Zometa    Thyroid Nodules  -saw endocrinology on 2/22  -FNA was done on 3/3/2020 showing atypia of undetermined significance on right lobe nodule and benign left lobe nodule.   -calcitonin and thyroid function tests were both normal  -met with Dr. Burgos, surgery yesterday. Plan to get a total thyroidectomy.     Vaginal Bleeding  -will follow-up with PCP     Reva Mojica PA-C  [facility]  [address] [place] [address] [phone]

## 2020-03-10 NOTE — LETTER
RE: Winter Loya  [address]  [place] [address]       [facility]  MEDICAL ONCOLOGY/HEMATOLOGY PROGRESS NOTE  Mar 10, 2020    CHIEF COMPLAINT: MM, s/p auto, on maintenance Rev    ONCOLOGY HPI:   Winter Loya is a 64 year old woman with IgA Kappa Multiple Myeloma. In 2018 she had anemia and was fatigued. She was found to have IgA kappa monocloncal antibody with M-spike of 0.17. IgA elevated. Skeletal survey was unremarkable and UPEP had minimal protein (156 mg/m2). PET 11/2018 showed bone marrow consistent with hypermetabolic plasma cell myeloma. M spike was 0.17. She started RVD and Zometa. On 4/2019 she had an autologous transplant.      Her bone marrow bx from September 2019 was sent here for review. It showed marrow cellularity of 60%, with decreased trilineage hematopoiesis and 15% plasma cells as well as peripheral blood with slight anemia. 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 She is on revlimid maintenance and zometa from her prior oncologist in [place]. On 12/6/19 her K/L ratio is 1.1, M spike is 0.04.    INTERVAL HISTORY:   Winter presents today unaccompanied.     Overall she is doing well.  She has no new or different complaints today.  She continues to tolerate the Revlimid very well.  She denies any GI or respiratory symptoms.  She denies any fevers, chills or night sweats.  She is eating and drinking well.  She does have baseline neuropathy from the transplant but this has overall improved and she has no worsening of the symptoms.    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  She is happy to be doing things again.      ROS: 10 point ROS neg other than the symptoms noted above in the HPI.      No Known Allergies    Current Outpatient Medications   Medication     acyclovir (ZOVIRAX) 800 MG tablet     atorvastatin (LIPITOR) 20  "MG tablet     insulin glargine (LANTUS PEN) 100 UNIT/ML pen     insulin lispro (HUMALOG VIAL) 100 UNIT/ML vial     insulin pen needle (FIFTY50 PEN NEEDLES) 32G X 4 MM miscellaneous     lactulose (CHRONULAC) 10 GM/15ML solution     latanoprost (XALATAN) 0.005 % ophthalmic solution     LENalidomide 10 MG PO CAPS capsule     pregabalin (LYRICA) 100 MG capsule     sertraline (ZOLOFT) 100 MG tablet     metFORMIN (GLUCOPHAGE) 500 MG tablet     No current facility-administered medications for this visit.        EXAM:  BP (!) 150/80   Pulse 77   Temp 98.9  F (37.2  C) (Oral)   Resp 16   Ht 1.746 m (5' 8.74\")   Wt 117.9 kg (260 lb)   SpO2 96%   BMI 38.69 kg/m    Wt Readings from Last 4 Encounters:   03/10/20 117.9 kg (260 lb)   03/09/20 115.2 kg (254 lb)   03/05/20 113.9 kg (251 lb)   02/22/20 113.9 kg (251 lb)        General: No acute distress, anxious  HEENT: EOMI, PERRLA, no scleral icterus, mucus membranes moist and no lesions or thrush  Lymph: Neck is supple and shows no nodes in cervical or supraclavicular regions  Heart:  RRR, no murmur, gallop or rub  Lungs: CTA-B, no wheezes, rhonchi or rales  Abdomen: Normal bowel sounds, soft, non tender, not distended, no rebound or guarding, no palpable masses  Extremities: No pitting edema  Skin: No significant rashes or lesions on exposed skin  Neuro: CN II-XII are intact    LABS:    3/10/2020 14:54   Sodium 138   Potassium 3.5   Chloride 104   Carbon Dioxide 28   Urea Nitrogen 12   Creatinine 0.59   GFR Estimate >90   GFR Estimate If Black >90   Calcium 9.4   Anion Gap 6   Albumin 4.0   Protein Total 6.8   Bilirubin Total 1.2   Alkaline Phosphatase 113   ALT 45   AST 18   Glucose 212 (H)   WBC 4.5   Hemoglobin 13.7   Hematocrit 40.0   Platelet Count 143 (L)   RBC Count 4.65   MCV 86   MCH 29.5   MCHC 34.3   RDW 13.7   Diff Method Automated Method   % Neutrophils 66.1   % Lymphocytes 14.7   % Monocytes 11.6   % Eosinophils 6.0   % Basophils 0.9   % Immature Granulocytes " 0.7   Nucleated RBCs 0   Absolute Neutrophil 3.0   Absolute Lymphocytes 0.7 (L)   Absolute Monocytes 0.5   Absolute Eosinophils 0.3   Absolute Basophils 0.0   Abs Immature Granulocytes 0.0   Absolute Nucleated RBC 0.0       IMAGING:   No new imaging    IMPRESSION/PLAN:    MM, standard risk, IgA Kappa  -s/p auto HSCT in 4/2019  -now on maintenance Revlimid 10 mg daily. Tolerating very well with no side effects. Will continue until progression  -will going SPEP and SFLC monthly (add these to her therapy plan)  -PET/CT annually or if symptomatic from MM due to bone pain   -continue  mg while on Rev for thromboprophylaxis      -continue zometa through 12/2020 to complete a total of 2 years of therapy, associated with improved PFS and overall survival when given with ASCT  -last given on 2/18/20. Next due on next week     -will follow-up with me in about 5 weeks to correspond to with next Zometa    Thyroid Nodules  -saw endocrinology on 2/22  -FNA was done on 3/3/2020 showing atypia of undetermined significance on right lobe nodule and benign left lobe nodule.   -calcitonin and thyroid function tests were both normal  -met with Dr. Burgos, surgery yesterday. Plan to get a total thyroidectomy.     Vaginal Bleeding  -will follow-up with PCP     Reva Mojica PA-C  [facility]  [address] [place] [address] [phone]

## 2020-03-11 DIAGNOSIS — C90.01 MULTIPLE MYELOMA IN REMISSION (H): Primary | ICD-10-CM

## 2020-03-11 RX ORDER — LENALIDOMIDE 10 MG/1
10 CAPSULE ORAL DAILY
Qty: 28 CAPSULE | Refills: 0 | Status: SHIPPED | OUTPATIENT
Start: 2020-03-11 | End: 2020-04-14

## 2020-03-12 ENCOUNTER — TELEPHONE (OUTPATIENT)
Dept: ONCOLOGY | Facility: CLINIC | Age: 65
End: 2020-03-12

## 2020-03-12 NOTE — TELEPHONE ENCOUNTER
Oral Chemotherapy Monitoring Program   Medication: Revlimid  Rx: 10mg PO daily on days 1 through 28 of 28 day cycle   Auth #: [id number]   Risk Category: Adult Female not of reproductive capacity  Routine survey questions reviewed.   Rx to be Escribed to [facility]    Lor Borja  [facility]  Oncology Pharmacy Liaison   [email or web address]  [phone] (phone)  [phone] (fax)

## 2020-03-19 ENCOUNTER — VIRTUAL VISIT (OUTPATIENT)
Dept: INTERNAL MEDICINE | Facility: CLINIC | Age: 65
End: 2020-03-19
Payer: MEDICAID

## 2020-03-19 DIAGNOSIS — N93.9 ABNORMAL UTERINE BLEEDING: Primary | ICD-10-CM

## 2020-03-19 DIAGNOSIS — R07.89 ATYPICAL CHEST PAIN: ICD-10-CM

## 2020-03-19 DIAGNOSIS — I27.20 PULMONARY HYPERTENSION (H): ICD-10-CM

## 2020-03-19 DIAGNOSIS — G47.33 OBSTRUCTIVE SLEEP APNEA SYNDROME: ICD-10-CM

## 2020-03-19 NOTE — PROGRESS NOTES
Resident telephone visit documentation    Person spoken to: Patient     Patient opted to conduct today's return visit via telephone versus an in person visit to the clinic, due to efforts to reduce the spread of COVID-19 clinic, world, nation and state-wide.    Winter Loya is a 64 year old patient with a history of IgA kappa multiple myeloma s/p autologous stem cell transplant 4/2019 on maintenance Revlimid c/b peripheral neuropathy, multinodular goiter with atypia, T2DM, HTN, HLD, who presents for a phone visit with vaginal bleeding. The patient has never had vaginal bleeding previously. Notes that it was mild, and that the bleeding has since stopped. She is postmenopausal. The patient had a Pap smear in [place] in November 2019; she did not hear back about results so she assumes that it is normal. Has not had any lightheadedness or weakness since. The patient also notes that she has a prior history of DAILY, and was on CPAP in [place]. She left [place] for [place] in somewhat of a hurry and was not able to bring her CPAP with her. She is wondering about getting a sleep study to acquire another CPAP machine. 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 She is reasonably concerned about exposures amid the COVID-19 crisis, and inquires about safety of her being at work with all of these potential exposures. She has had a cough and runny nose for the past 6 months, which she previously attributed to her cat, although she has not had her cat for the past 6 months. The patient also notes that she has been having chest pain at rest, at night in bed, tight in character, lasting 30 seconds, resolving spontaneously, across the top of her chest, with associated radiating pain down both arms, with no nausea, diaphoresis or shortness of breath. The pain has been going on for the past two months, about once a week, improving with movement, last  episode about 1 week ago. The patient also inquired about her PET-CT results; she noted that she had a back injury some time ago, and also that she was told the T11 hypermetabolic area was not multiple myeloma by her hematologist. She also inquired about her dilated pulmonary artery, and about her thyroid. After she was found to have atypia on thyroid FNA, that she should have a lobectomy, though this may be getting postponed. Has chronic neck pain, has used tylenol for this. Has gained a bit of weight recently. The patient also endorses some nausea, wants to think about starting an antiemetic but declines for now. The patient denies headache, vision changes, fever, chills, emesis, dizziness, lightheadedness, shortness of breath, abdominal pain, diarrhea, constipation, hematochezia, melena, dysuria or hematuria.     ROS:  10-point review of systems negative unless otherwise specified above.     Medications, allergies, past medical history, habits, social history, family history were reviewed by me.    Nursing notes were reviewed by me.    No exam -- phone visit.     Laboratory and imaging results were reviewed by me.    Assessment and plan:  64 year old patient with a history of IgA kappa multiple myeloma s/p autologous stem cell transplant 4/2019 on maintenance Revlimid c/b peripheral neuropathy, multinodular goiter with atypia, DAILY not on CPAP, T2DM, HTN, HLD, who presents for a phone visit with vaginal bleeding which has resolved, and atypical chest pain.    Atypical chest pain: At rest, brief, with radiating pain down both arms, though confounded by peripheral neuropathy. Will send for dobutamine stress test to evaluate. Unable to do EKG today.   - DSE    Radiographic evidence of pulmonary HTN: No TTE results available to review.  - TTE    Obstructive sleep apnea: Untreated currently, on diagnosis list in [facility]. Will refer to Sleep Medicine for evaluation.   - Sleep Medicine referral    Abnormal uterine  bleeding: Has resolved. Lasted nearly 2 weeks. Will evaluate, do not want to miss anything given transplant status.  - Transvaginal ultrasound  - OB/GYN referral     Time call initiated: 1435  Time call ended: 1546  Total length of call: 71 minutes    The patient should return to clinic for follow up with her PCP in 4 weeks.    The patient was discussed with Dr. Eva Perry, who agrees with the assessment and plan.    Ben Dsouza MD PhD  Internal Medicine PGY-3  Pager [phone]    Teaching Physician Note:  I was present during the telephone visit.  I discussed the case with the resident and agree with the note as documented by the resident with the following exceptions:     Eva Perry MD        For staff coding purposes:  5-10 minutes:  LOS [id number]  11-20 minutes:  [id number]  21-30 minutes:  [id number]

## 2020-03-19 NOTE — PROGRESS NOTES
Chief Complaint   Patient presents with     Vaginal Bleeding     Pt states that she had vaginal bleeding for about 2 weeks but it has stopped recently     Would like to talk about COVID risks and such    Kimberly Nissen, EMT at 2:36 PM on 3/19/2020

## 2020-03-19 NOTE — PATIENT INSTRUCTIONS
It was nice to talk to you on the phone today. I referred you to OB/GYN and to Sleep Medicine. Schedule your echo and stress test. Follow up with your PCP in 4 weeks.

## 2020-04-03 DIAGNOSIS — C90.01 MULTIPLE MYELOMA IN REMISSION (H): Primary | ICD-10-CM

## 2020-04-03 RX ORDER — ACYCLOVIR 400 MG/1
400 TABLET ORAL EVERY 12 HOURS
Qty: 60 TABLET | Refills: 11 | Status: SHIPPED | OUTPATIENT
Start: 2020-04-03 | End: 2020-05-08

## 2020-04-06 DIAGNOSIS — C90.01 MULTIPLE MYELOMA IN REMISSION (H): Primary | ICD-10-CM

## 2020-04-07 DIAGNOSIS — C90.01 MULTIPLE MYELOMA IN REMISSION (H): Primary | ICD-10-CM

## 2020-04-07 RX ORDER — LENALIDOMIDE 10 MG/1
10 CAPSULE ORAL DAILY
Qty: 28 CAPSULE | Refills: 0 | Status: SHIPPED | OUTPATIENT
Start: 2020-04-07 | End: 2020-05-08

## 2020-04-08 ENCOUNTER — TELEPHONE (OUTPATIENT)
Dept: ONCOLOGY | Facility: CLINIC | Age: 65
End: 2020-04-08

## 2020-04-08 ENCOUNTER — VIRTUAL VISIT (OUTPATIENT)
Dept: INTERNAL MEDICINE | Facility: CLINIC | Age: 65
End: 2020-04-08
Payer: MEDICAID

## 2020-04-08 DIAGNOSIS — J32.9 SINUSITIS, UNSPECIFIED CHRONICITY, UNSPECIFIED LOCATION: Primary | ICD-10-CM

## 2020-04-08 ASSESSMENT — PAIN SCALES - GENERAL: PAINLEVEL: NO PAIN (0)

## 2020-04-08 NOTE — TELEPHONE ENCOUNTER
Oral Chemotherapy Monitoring Program   Medication: Revlimid  Rx: 10mg PO daily on days 1 through 28 of 28 day cycle   Auth #: [id number]   Risk Category: Adult Female not of reproductive potential  Routine survey questions reviewed.   Rx to be Escribed to Harvey Borja  [facility]  Oncology Pharmacy Liaison   [email or web address]  [phone] (phone)  [phone] (fax)

## 2020-04-08 NOTE — NURSING NOTE
Chief Complaint   Patient presents with     Sinus Problem     pt would like to discuss cough, sinus issues       Iliana Lopez CMA, EMT at 7:48 AM on 4/8/2020.

## 2020-04-08 NOTE — PROGRESS NOTES
Phone call regarding recurrent sinus stuff for several months associated with morning cough and drainage.  In February treated with augmentin in urgent care.  Since then intermittent symptoms associated with sore throat and voice changes but no fever chill, myalgias.  Has not taken the amoxicillin yet due to the intermittent symptoms and questioning if she should start.  Recommended she take the course of augmentin 875 bid for 1 week as prescribed if symptoms not resolved in 10-14 days will get sinus CT scan.  (5 minutes)  Dayron Peralta MD

## 2020-04-13 ENCOUNTER — PRE VISIT (OUTPATIENT)
Dept: OTOLARYNGOLOGY | Facility: CLINIC | Age: 65
End: 2020-04-13

## 2020-04-14 ENCOUNTER — VIRTUAL VISIT (OUTPATIENT)
Dept: ONCOLOGY | Facility: CLINIC | Age: 65
End: 2020-04-14
Attending: INTERNAL MEDICINE
Payer: MEDICAID

## 2020-04-14 ENCOUNTER — APPOINTMENT (OUTPATIENT)
Dept: LAB | Facility: CLINIC | Age: 65
End: 2020-04-14
Attending: INTERNAL MEDICINE
Payer: MEDICAID

## 2020-04-14 ENCOUNTER — VIRTUAL VISIT (OUTPATIENT)
Dept: ORTHOPEDICS | Facility: CLINIC | Age: 65
End: 2020-04-14
Payer: MEDICAID

## 2020-04-14 ENCOUNTER — PRE VISIT (OUTPATIENT)
Dept: ORTHOPEDICS | Facility: CLINIC | Age: 65
End: 2020-04-14

## 2020-04-14 VITALS
DIASTOLIC BLOOD PRESSURE: 93 MMHG | RESPIRATION RATE: 18 BRPM | TEMPERATURE: 97.7 F | HEART RATE: 73 BPM | OXYGEN SATURATION: 98 % | SYSTOLIC BLOOD PRESSURE: 142 MMHG

## 2020-04-14 DIAGNOSIS — M50.30 DEGENERATIVE DISC DISEASE, CERVICAL: ICD-10-CM

## 2020-04-14 DIAGNOSIS — C90.01 MULTIPLE MYELOMA IN REMISSION (H): Primary | ICD-10-CM

## 2020-04-14 DIAGNOSIS — M54.12 CERVICAL RADICULAR PAIN: Primary | ICD-10-CM

## 2020-04-14 LAB
ALBUMIN SERPL-MCNC: 3.7 G/DL (ref 3.4–5)
ALP SERPL-CCNC: 103 U/L (ref 40–150)
ALT SERPL W P-5'-P-CCNC: 45 U/L (ref 0–50)
ANION GAP SERPL CALCULATED.3IONS-SCNC: 6 MMOL/L (ref 3–14)
AST SERPL W P-5'-P-CCNC: 20 U/L (ref 0–45)
BASOPHILS # BLD AUTO: 0.1 10E9/L (ref 0–0.2)
BASOPHILS NFR BLD AUTO: 1.6 %
BILIRUB SERPL-MCNC: 1.2 MG/DL (ref 0.2–1.3)
BUN SERPL-MCNC: 10 MG/DL (ref 7–30)
CALCIUM SERPL-MCNC: 9.4 MG/DL (ref 8.5–10.1)
CHLORIDE SERPL-SCNC: 106 MMOL/L (ref 94–109)
CO2 SERPL-SCNC: 28 MMOL/L (ref 20–32)
CREAT SERPL-MCNC: 0.56 MG/DL (ref 0.52–1.04)
DIFFERENTIAL METHOD BLD: ABNORMAL
EOSINOPHIL # BLD AUTO: 0.3 10E9/L (ref 0–0.7)
EOSINOPHIL NFR BLD AUTO: 6.8 %
ERYTHROCYTE [DISTWIDTH] IN BLOOD BY AUTOMATED COUNT: 13.1 % (ref 10–15)
GFR SERPL CREATININE-BSD FRML MDRD: >90 ML/MIN/{1.73_M2}
GLUCOSE SERPL-MCNC: 266 MG/DL (ref 70–99)
HCT VFR BLD AUTO: 40 % (ref 35–47)
HGB BLD-MCNC: 13.7 G/DL (ref 11.7–15.7)
IMM GRANULOCYTES # BLD: 0 10E9/L (ref 0–0.4)
IMM GRANULOCYTES NFR BLD: 0.7 %
LYMPHOCYTES # BLD AUTO: 0.6 10E9/L (ref 0.8–5.3)
LYMPHOCYTES NFR BLD AUTO: 13.4 %
MCH RBC QN AUTO: 29.1 PG (ref 26.5–33)
MCHC RBC AUTO-ENTMCNC: 34.3 G/DL (ref 31.5–36.5)
MCV RBC AUTO: 85 FL (ref 78–100)
MONOCYTES # BLD AUTO: 0.4 10E9/L (ref 0–1.3)
MONOCYTES NFR BLD AUTO: 9.8 %
NEUTROPHILS # BLD AUTO: 3 10E9/L (ref 1.6–8.3)
NEUTROPHILS NFR BLD AUTO: 67.7 %
NRBC # BLD AUTO: 0 10*3/UL
NRBC BLD AUTO-RTO: 0 /100
PLATELET # BLD AUTO: 123 10E9/L (ref 150–450)
POTASSIUM SERPL-SCNC: 3.5 MMOL/L (ref 3.4–5.3)
PROT SERPL-MCNC: 6.7 G/DL (ref 6.8–8.8)
RBC # BLD AUTO: 4.71 10E12/L (ref 3.8–5.2)
SODIUM SERPL-SCNC: 139 MMOL/L (ref 133–144)
WBC # BLD AUTO: 4.4 10E9/L (ref 4–11)

## 2020-04-14 PROCEDURE — 84165 PROTEIN E-PHORESIS SERUM: CPT | Performed by: PHYSICIAN ASSISTANT

## 2020-04-14 PROCEDURE — 85025 COMPLETE CBC W/AUTO DIFF WBC: CPT | Performed by: PHYSICIAN ASSISTANT

## 2020-04-14 PROCEDURE — 25000128 H RX IP 250 OP 636: Mod: ZF | Performed by: INTERNAL MEDICINE

## 2020-04-14 PROCEDURE — 96365 THER/PROPH/DIAG IV INF INIT: CPT

## 2020-04-14 PROCEDURE — 99443 ZZC PHYSICIAN TELEPHONE EVALUATION 21-30 MIN: CPT | Performed by: PHYSICIAN ASSISTANT

## 2020-04-14 PROCEDURE — 25800030 ZZH RX IP 258 OP 636: Mod: ZF | Performed by: INTERNAL MEDICINE

## 2020-04-14 PROCEDURE — 00000402 ZZHCL STATISTIC TOTAL PROTEIN: Performed by: PHYSICIAN ASSISTANT

## 2020-04-14 PROCEDURE — 80053 COMPREHEN METABOLIC PANEL: CPT | Performed by: PHYSICIAN ASSISTANT

## 2020-04-14 RX ORDER — TRAMADOL HYDROCHLORIDE 50 MG/1
50 TABLET ORAL 2 TIMES DAILY PRN
Qty: 15 TABLET | Refills: 0 | Status: SHIPPED | OUTPATIENT
Start: 2020-04-14 | End: 2020-06-03

## 2020-04-14 RX ORDER — ZOLEDRONIC ACID 0.04 MG/ML
4 INJECTION, SOLUTION INTRAVENOUS ONCE
Status: COMPLETED | OUTPATIENT
Start: 2020-04-14 | End: 2020-04-14

## 2020-04-14 RX ADMIN — ZOLEDRONIC ACID 4 MG: 0.04 INJECTION, SOLUTION INTRAVENOUS at 14:17

## 2020-04-14 RX ADMIN — SODIUM CHLORIDE 250 ML: 9 INJECTION, SOLUTION INTRAVENOUS at 14:10

## 2020-04-14 NOTE — LETTER
"4/14/2020       RE: Winter Loya  [address]  [place] [address]     Dear Colleague,    Thank you for referring your patient, Winter Loya, to the [facility]. Please see a copy of my visit note below.    Winter Loya is a 64 year old female who is being evaluated via a billable telephone visit.      Please call patient on Mobile phone.     The patient has been notified of following:     \"This telephone visit will be conducted via a call between you and your physician/provider. We have found that certain health care needs can be provided without the need for a physical exam.  This service lets us provide the care you need with a short phone conversation.  If a prescription is necessary we can send it directly to your pharmacy.  If lab work is needed we can place an order for that and you can then stop by our lab to have the test done at a later time.    Telephone visits are billed at different rates depending on your insurance coverage. During this emergency period, for some insurers they may be billed the same as an in-person visit.  Please reach out to your insurance provider with any questions.    If during the course of the call the physician/provider feels a telephone visit is not appropriate, you will not be charged for this service.\"     Physician has received verbal consent for a Telephone Visit from the patient? Yes    How would you like to obtain your AVS? Mail a copy    Winter Loya complains of    Chief Complaint   Patient presents with     Telephone     TELEPHONE VISIT; MULTIPLE MYELOMA        Allergies reviewed: Yes  Medications reviewed: Yes    Medications: MEDICATION REFILLS NEEDED TODAY. Provider was notified. Patient is requesting a refill on Lantus pens and Zoloft.     Pharmacy name entered into [facility]:    [facility] - [place], [place] - [address] [facility] [address] [facility] [employer] - [place], [place] - [address] " [place], [place] [address]      Lucero Lui, DENIA April 14, 2020  1:07 PM     ALLERGIES  Patient has no known allergies.      Provider Note:   Apr 14, 2020     Please see previous notes for complete oncologic history.     Interim History:   She has been having sinus issues for 6 months though they have been worse in the last couple weeks. She talked with her PCP who gave her Augementin. This did not help and she continues to have sinus HA, RN/congestion and cough.     Her appetite has been good and she has been gaining a lot of weight. She feels she is likely stress eating. She has been having a lot of difficulties with her roommate, health insurance and the stress of COVID and not being able to go to work. Her roommate is an alcoholic who just got out of rehab though already drinking again and she has been very sad. This is also her friend so she is trying to take care of her though this is very exhausting for Winter as well. She decided to go back to work to get out of the house because risking COVID was better than always being home.     She notes she believes she can feel her thyroid now. She feels it is uncomfortable and can cause her some SOB.     She otherwise denies ns, f/c, new pain, other respiratory or GI symptoms. Her energy levels have been good.     ROS: 10 point ROS neg other than the symptoms noted above in the HPI.    Objectives:  Vital Signs 4/14/2020   Systolic 142   Diastolic 93   Pulse 73   Temperature 97.7   Respirations 18   O2 98     General: patient sounds in no audible acute distress, alert and oriented, speech clear and fluid  Resp: Speaking in full sentences, no audible respiratory distress, no cough, no audible wheeze  Psych: able to articulate logical thoughts, able to abstract reason, no tangential thoughts, no hallucinations or delusions  His affect is normal    Labs:   Labs were personally reviewed     4/14/2020 12:50   Sodium 139   Potassium 3.5    Chloride 106   Carbon Dioxide 28   Urea Nitrogen 10   Creatinine 0.56   GFR Estimate >90   GFR Estimate If Black >90   Calcium 9.4   Anion Gap 6   Albumin 3.7   Protein Total 6.7 (L)   Bilirubin Total 1.2   Alkaline Phosphatase 103   ALT 45   AST 20   Glucose 266 (H)   WBC 4.4   Hemoglobin 13.7   Hematocrit 40.0   Platelet Count 123 (L)   RBC Count 4.71   MCV 85   MCH 29.1   MCHC 34.3   RDW 13.1   Diff Method Automated Method   % Neutrophils 67.7   % Lymphocytes 13.4   % Monocytes 9.8   % Eosinophils 6.8   % Basophils 1.6   % Immature Granulocytes 0.7   Nucleated RBCs 0   Absolute Neutrophil 3.0   Absolute Lymphocytes 0.6 (L)   Absolute Monocytes 0.4   Absolute Eosinophils 0.3   Absolute Basophils 0.1   Abs Immature Granulocytes 0.0   Absolute Nucleated RBC 0.0     Imaging:  No new imaging    Assessment/Plan:    MM, standard risk, IgA Kappa  -s/p auto HSCT in 4/2019  -now on maintenance Revlimid 10 mg daily. Tolerating very well with no side effects. Will continue until progression. Will reach out to oral chemo as she still has not received her next cycle yet   -will going SPEP and SFLC monthly (add these to her therapy plan)  -PET/CT annually or if symptomatic from MM due to bone pain. Last PET was 2/15/20  -continue  mg while on Rev for thromboprophylaxis       -continue zometa through 12/2020 to complete a total of 2 years of therapy, associated with improved PFS and overall survival when given with ASCT  -last given on 2/18. Will give today though will cancel next months infusion due to COVID. Will plan to resume again in June though can delay this further if we are still in high risk precautions      -will follow-up in one month with me (will plan for video visit, can change later if in person is realistic) and then follow-up with Dr. Tipton in June     PPx: Continue ACV     Thyroid Nodules  -saw endocrinology on 2/22  -FNA was done on 3/3/2020 showing atypia of undetermined significance on right  lobe nodule and benign left lobe nodule.   -calcitonin and thyroid function tests were both normal  -met with Dr. Burgos, surgery yesterday. Plan to get a total thyroidectomy. Planned to postpone for 6 months in order to get settled in her job first. She feels like she is able to feel her thyroid now and thinks it may be causing SOB. Unable to assess today over the phone. Should follow-up with endocrinology if this is really a concern of hers     Vaginal Bleeding  -lasted 2 weeks though has now resolved.  -Saw PCP who referred her to GYN with US    Atypical Chest Pain  -plan for stress ECHO. Likely postponed due to COVID     Recurrent Sinusitis   -treated with Augmentin x 7 days on 4/8. Symptoms are persistent. Her PCP planned to get CT if symptoms persisted. Should follow-up with him     Psych  -having lots of issues with her roommate and dealing with insurance. Also struggling a lot with isolation and not going to work. She knows it would be best to stay home though she feels it is worth it to go to work at this point. Discussed if she is going to go to work to wear a mask, distance herself by 6 ft, wash her hands often and avoid touching her face.   -mental health is also worse because she has not been taking her Zoloft due to cost. There has been an issue with her insurance and she does not have the money to pay for it out of pocket. She is working with [facility] to resolve this. Hopefully she is able to resume soon and this will help with sadness and anxiety     Phone call duration: 25 minutes    Reva Mojica PA-C      Again, thank you for allowing me to participate in the care of your patient.      Sincerely,    Reva Mojica PA-C

## 2020-04-14 NOTE — PROGRESS NOTES
Provider Note:   Apr 14, 2020     Please see previous notes for complete oncologic history.     Interim History:   ***    ROS: 10 point ROS neg other than the symptoms noted above in the HPI.    Objectives:  General: patient sounds in no audible acute distress, alert and oriented, speech clear and fluid  Resp: Speaking in full sentences, no audible respiratory distress, no cough, no audible wheeze  Psych: able to articulate logical thoughts, able to abstract reason, no tangential thoughts, no hallucinations or delusions  His affect is normal  ***    Labs:   Labs were personally reviewed      Imaging:  No new imaging    Assessment/Plan:    MM, standard risk, IgA Kappa  -s/p auto HSCT in 4/2019  -now on maintenance Revlimid 10 mg daily. Tolerating very well with no side effects. Will continue until progression  -will going SPEP and SFLC monthly (add these to her therapy plan)  -PET/CT annually or if symptomatic from MM due to bone pain. Last PET was 2/15/20  -continue  mg while on Rev for thromboprophylaxis       -continue zometa through 12/2020 to complete a total of 2 years of therapy, associated with improved PFS and overall survival when given with ASCT  -last given on 2/18. Will give today      -will follow-up in one month with me(will plan for video visit, can change later if in person is realistic) and then follow-up with Dr. Tipton in June     PPx: Continue ACV     Thyroid Nodules  -saw endocrinology on 2/22  -FNA was done on 3/3/2020 showing atypia of undetermined significance on right lobe nodule and benign left lobe nodule.   -calcitonin and thyroid function tests were both normal  -met with Dr. Burgos, surgery yesterday. Plan to get a total thyroidectomy. This is currently postponed due to COVID     Vaginal Bleeding  -lasted 2 weeks though has now resolved.  -Saw PCP who referred her to GYN with US    Atypical Chest Pain  -plan for stress ECHO .     Recurrent Sinusitis   -treated with  Augmentin x 7 days on 4/8. Symptoms now ***  -PCP plans to get CT if symptoms persistent

## 2020-04-14 NOTE — PROGRESS NOTES
Chief Complaint   Patient presents with     Blood Draw     PIV placed and labs drawn by IVAN Guajardo RN

## 2020-04-14 NOTE — PROGRESS NOTES
"After review of patient's medical issues this visit was conducted over the phone, as opposed to in person, in effort to reduce risk of COVID-19 exposure.    Winter Loya is a 64 year old female who is being evaluated via a billable video visit.      The patient has been notified of following:     \"This video visit will be conducted via a call between you and your physician/provider. We have found that certain health care needs can be provided without the need for an in-person physical exam.  This service lets us provide the care you need with a video conversation.  If a prescription is necessary we can send it directly to your pharmacy.  If lab work is needed we can place an order for that and you can then stop by our lab to have the test done at a later time.    Video visits are billed at different rates depending on your insurance coverage.  Please reach out to your insurance provider with any questions.    If during the course of the call the physician/provider feels a video visit is not appropriate, you will not be charged for this service.\"    Patient has given verbal consent for Video visit? Yes    How would you like to obtain your AVS? Lisbeth    Patient would like the video invitation sent by: Send to e-mail at: [email or web address]      Video Start Time: 4:30pm    Additional provider notes:  HISTORY OF PRESENT ILLNESS  Ms. Loya is a pleasant 64 year old year old female who presents to clinic today with history of bilateral hand pain and numbness, and neck pain  Location: neck and hands  Quality:  achy pain    Severity: 8/10 at worst    Duration: 6 months worse  Timing: occurs intermittently  Context: occurs while doing ADLs  Modifying factors:  resting and non-use makes it better, movement and use makes it worse  Associated signs & symptoms: weakness, pain and tingling in her hands, down her arm from her neck  Additional history: as documented    MEDICAL HISTORY  Patient Active Problem List   Diagnosis     " Multiple myeloma in remission (H)       Current Outpatient Medications   Medication Sig Dispense Refill     tiZANidine (ZANAFLEX) 4 MG tablet Take 1-2 tablets (4-8 mg) by mouth nightly as needed 30 tablet 1     traMADol (ULTRAM) 50 MG tablet Take 1 tablet (50 mg) by mouth 2 times daily as needed for severe pain 15 tablet 0     acyclovir (ZOVIRAX) 400 MG tablet Take 1 tablet (400 mg) by mouth every 12 hours 60 tablet 11     acyclovir (ZOVIRAX) 800 MG tablet Take 800 mg by mouth       atorvastatin (LIPITOR) 20 MG tablet Take 1 tablet (20 mg) by mouth every 24 hours 90 tablet 3     insulin glargine (LANTUS PEN) 100 UNIT/ML pen Inject 34 Units Subcutaneous At Bedtime 9 mL 3     insulin lispro (HUMALOG VIAL) 100 UNIT/ML vial        insulin pen needle (FIFTY50 PEN NEEDLES) 32G X 4 MM miscellaneous As directed. To use with Victoza daily       lactulose (CHRONULAC) 10 GM/15ML solution TK 10ML PO D       latanoprost (XALATAN) 0.005 % ophthalmic solution Place 1 drop into both eyes daily 1 Bottle 11     LENalidomide (REVLIMID) 10 MG CAPS capsule Take 1 capsule (10 mg) by mouth daily for 28 days 28 capsule 0     metFORMIN (GLUCOPHAGE) 500 MG tablet Take by mouth every 24 hours       pregabalin (LYRICA) 100 MG capsule Take 1 capsule (100 mg) by mouth 3 times daily 90 capsule 3     sertraline (ZOLOFT) 100 MG tablet Take 1 tablet (100 mg) by mouth daily 30 tablet 3       No Known Allergies    Family History   Problem Relation Age of Onset     Glaucoma Paternal Grandfather      Social History     Socioeconomic History     Marital status: Unknown     Spouse name: Not on file     Number of children: Not on file     Years of education: Not on file     Highest education level: Not on file   Occupational History     Not on file   Social Needs     Financial resource strain: Not on file     Food insecurity     Worry: Not on file     Inability: Not on file     Transportation needs     Medical: Not on file     Non-medical: Not on file    Tobacco Use     Smoking status: Former Smoker     Smokeless tobacco: Former User   Substance and Sexual Activity     Alcohol use: Not on file     Drug use: Not on file     Sexual activity: Not on file   Lifestyle     Physical activity     Days per week: Not on file     Minutes per session: Not on file     Stress: Not on file   Relationships     Social connections     Talks on phone: Not on file     Gets together: Not on file     Attends Restoration service: Not on file     Active member of club or organization: Not on file     Attends meetings of clubs or organizations: Not on file     Relationship status: Not on file     Intimate partner violence     Fear of current or ex partner: Not on file     Emotionally abused: Not on file     Physically abused: Not on file     Forced sexual activity: Not on file   Other Topics Concern     Not on file   Social History Narrative     Not on file       Additional medical/Social/Surgical histories reviewed in [facility] and updated as appropriate.     REVIEW OF SYSTEMS (4/15/2020)  10 point ROS of systems including Constitutional, Eyes, Respiratory, Cardiovascular, Gastroenterology, Genitourinary, Integumentary, Musculoskeletal, Psychiatric, Allergic/Immunologic were all negative except for pertinent positives noted in my HPI.     PHYSICAL EXAM    General  - normal appearance, in no obvious distress  HEENT  - conjunctivae not injected, moist mucous membranes, normocephalic/atraumatic head, ears normal appearance, no lesions, mouth normal appearance, no scars, normal dentition and teeth present  CV  - normal peripheral perfusion  Pulm  - normal respiratory pattern, non-labored    Musculoskeletal - CERVICAL SPINE  - stance and posture: normal gait without limp, no obvious leg length discrepancy, normal heel and toe walk, normal balance, slightly forward shoulders, head balanced normally on trunk  - inspection: normal bone and joint alignment, no obvious kyphosis  - palpation: has pain to  palpation at base of neck and trapezius muscles  - ROM: normal and painful flexion, extension, left and right sidebending and rotation with some discomfort  - strength: reports weakness in hands with  strength    Neuro  - grossly normal coordination, normal muscle tone  Skin  - no ecchymosis, erythema, warmth, or induration, no obvious rash  Psych  - interactive, appropriate, normal mood and affect  ASSESSMENT & PLAN  65 yo female with bilateral hand numbness, neck pain and cervical radicular pain, due to ddd  Reviewed her history, and will consider getting her in for a cervical MRI when able after pandemic  Tramadol and tizanadine RX given  F/u 1 week and check in  If pain and hand pain and numbness persists, will consider an in person visit for imaging        Mika Morgan MD, CAQSM        Video-Visit Details    Type of service:  Video Visit    Video End Time (time video stopped): 4:45pm    Originating Location (pt. Location): Home    Distant Location (provider location):  [facility]     Mode of Communication:  Video Conference via [employer]      Mika Morgan MD

## 2020-04-14 NOTE — PROGRESS NOTES
"Winter Loya is a 64 year old female who is being evaluated via a billable telephone visit.      Please call patient on Mobile phone.     The patient has been notified of following:     \"This telephone visit will be conducted via a call between you and your physician/provider. We have found that certain health care needs can be provided without the need for a physical exam.  This service lets us provide the care you need with a short phone conversation.  If a prescription is necessary we can send it directly to your pharmacy.  If lab work is needed we can place an order for that and you can then stop by our lab to have the test done at a later time.    Telephone visits are billed at different rates depending on your insurance coverage. During this emergency period, for some insurers they may be billed the same as an in-person visit.  Please reach out to your insurance provider with any questions.    If during the course of the call the physician/provider feels a telephone visit is not appropriate, you will not be charged for this service.\"     Physician has received verbal consent for a Telephone Visit from the patient? Yes    How would you like to obtain your AVS? Mail a copy    Winter Loya complains of    Chief Complaint   Patient presents with     Telephone     TELEPHONE VISIT; MULTIPLE MYELOMA        Allergies reviewed: Yes  Medications reviewed: Yes    Medications: MEDICATION REFILLS NEEDED TODAY. Provider was notified. Patient is requesting a refill on Lantus pens and Zoloft.     Pharmacy name entered into [facility]:    [facility] - [place], [place] - [address]   [id number] - [place], [place] [facility] [address]  [facility] [place] [address] [facility]      Lucero Lui CMA April 14, 2020  1:07 PM     ALLERGIES  Patient has no known allergies.      Provider Note:   Apr 14, 2020     Please see previous notes for complete oncologic " history.     Interim History:   She has been having sinus issues for 6 months though they have been worse in the last couple weeks. She talked with her PCP who gave her Augementin. This did not help and she continues to have sinus HA, RN/congestion and cough.     Her appetite has been good and she has been gaining a lot of weight. She feels she is likely stress eating. She has been having a lot of difficulties with her roommate, health insurance and the stress of COVID and not being able to go to work. Her roommate is an alcoholic who just got out of rehab though already drinking again and she has been very sad. This is also her friend so she is trying to take care of her though this is very exhausting for Winter as well. She decided to go back to work to get out of the house because risking COVID was better than always being home.     She notes she believes she can feel her thyroid now. She feels it is uncomfortable and can cause her some SOB.     She otherwise denies ns, f/c, new pain, other respiratory or GI symptoms. Her energy levels have been good.     ROS: 10 point ROS neg other than the symptoms noted above in the HPI.    Objectives:  Vital Signs 4/14/2020   Systolic 142   Diastolic 93   Pulse 73   Temperature 97.7   Respirations 18   O2 98     General: patient sounds in no audible acute distress, alert and oriented, speech clear and fluid  Resp: Speaking in full sentences, no audible respiratory distress, no cough, no audible wheeze  Psych: able to articulate logical thoughts, able to abstract reason, no tangential thoughts, no hallucinations or delusions  His affect is normal    Labs:   Labs were personally reviewed     4/14/2020 12:50   Sodium 139   Potassium 3.5   Chloride 106   Carbon Dioxide 28   Urea Nitrogen 10   Creatinine 0.56   GFR Estimate >90   GFR Estimate If Black >90   Calcium 9.4   Anion Gap 6   Albumin 3.7   Protein Total 6.7 (L)   Bilirubin Total 1.2   Alkaline Phosphatase 103   ALT 45   AST  20   Glucose 266 (H)   WBC 4.4   Hemoglobin 13.7   Hematocrit 40.0   Platelet Count 123 (L)   RBC Count 4.71   MCV 85   MCH 29.1   MCHC 34.3   RDW 13.1   Diff Method Automated Method   % Neutrophils 67.7   % Lymphocytes 13.4   % Monocytes 9.8   % Eosinophils 6.8   % Basophils 1.6   % Immature Granulocytes 0.7   Nucleated RBCs 0   Absolute Neutrophil 3.0   Absolute Lymphocytes 0.6 (L)   Absolute Monocytes 0.4   Absolute Eosinophils 0.3   Absolute Basophils 0.1   Abs Immature Granulocytes 0.0   Absolute Nucleated RBC 0.0     Imaging:  No new imaging    Assessment/Plan:    MM, standard risk, IgA Kappa  -s/p auto HSCT in 4/2019  -now on maintenance Revlimid 10 mg daily. Tolerating very well with no side effects. Will continue until progression. Will reach out to oral chemo as she still has not received her next cycle yet   -will going SPEP and SFLC monthly (add these to her therapy plan)  -PET/CT annually or if symptomatic from MM due to bone pain. Last PET was 2/15/20  -continue  mg while on Rev for thromboprophylaxis       -continue zometa through 12/2020 to complete a total of 2 years of therapy, associated with improved PFS and overall survival when given with ASCT  -last given on 2/18. Will give today though will cancel next months infusion due to COVID. Will plan to resume again in June though can delay this further if we are still in high risk precautions      -will follow-up in one month with me (will plan for video visit, can change later if in person is realistic) and then follow-up with Dr. Tipton in June     PPx: Continue ACV     Thyroid Nodules  -saw endocrinology on 2/22  -FNA was done on 3/3/2020 showing atypia of undetermined significance on right lobe nodule and benign left lobe nodule.   -calcitonin and thyroid function tests were both normal  -met with Dr. Burgos, surgery yesterday. Plan to get a total thyroidectomy. Planned to postpone for 6 months in order to get settled in her job  first. She feels like she is able to feel her thyroid now and thinks it may be causing SOB. Unable to assess today over the phone. Should follow-up with endocrinology if this is really a concern of hers     Vaginal Bleeding  -lasted 2 weeks though has now resolved.  -Saw PCP who referred her to GYN with US    Atypical Chest Pain  -plan for stress ECHO. Likely postponed due to COVID     Recurrent Sinusitis   -treated with Augmentin x 7 days on 4/8. Symptoms are persistent. Her PCP planned to get CT if symptoms persisted. Should follow-up with him     Psych  -having lots of issues with her roommate and dealing with insurance. Also struggling a lot with isolation and not going to work. She knows it would be best to stay home though she feels it is worth it to go to work at this point. Discussed if she is going to go to work to wear a mask, distance herself by 6 ft, wash her hands often and avoid touching her face.   -mental health is also worse because she has not been taking her Zoloft due to cost. There has been an issue with her insurance and she does not have the money to pay for it out of pocket. She is working with [facility] to resolve this. Hopefully she is able to resume soon and this will help with sadness and anxiety     Phone call duration: 25 minutes    Reva Mojica PA-C

## 2020-04-14 NOTE — PROGRESS NOTES
Infusion Nursing Note:  Winter Loya presents today for Zometa.    Patient seen by provider today: Yes: Reva Mojica PA-C.   present during visit today: Not Applicable.    Note: Pt confirms she is taking calcium and vitamin D in her multivitamin. Pt likes to receive entire 250ml NS bag.     Intravenous Access:  Peripheral IV placed.    Treatment Conditions:               Lab Results   Component Value Date    ASHLEY 9.4 04/14/2020     Results reviewed, labs MET treatment parameters, ok to proceed with treatment.    Post Infusion Assessment:  Patient tolerated infusion without incident.  Blood return noted pre and post infusion.  Site patent and intact, free from redness, edema or discomfort.  No evidence of extravasations.  Access discontinued per protocol.     Discharge Plan:   Patient declined prescription refills.  AVS to patient via [employer].  Patient will return 5/12 for next appointment. IB sent to care team to schedule and confirm qmonthly zometa.   Patient discharged in stable condition accompanied by: self.  Departure Mode: Ambulatory.  Face to Face time: 0.    Lesa Walker RN

## 2020-04-15 ENCOUNTER — TELEPHONE (OUTPATIENT)
Dept: ONCOLOGY | Facility: CLINIC | Age: 65
End: 2020-04-15

## 2020-04-15 LAB
ALBUMIN SERPL ELPH-MCNC: 4.3 G/DL (ref 3.7–5.1)
ALPHA1 GLOB SERPL ELPH-MCNC: 0.3 G/DL (ref 0.2–0.4)
ALPHA2 GLOB SERPL ELPH-MCNC: 0.7 G/DL (ref 0.5–0.9)
B-GLOBULIN SERPL ELPH-MCNC: 0.7 G/DL (ref 0.6–1)
GAMMA GLOB SERPL ELPH-MCNC: 0.5 G/DL (ref 0.7–1.6)
M PROTEIN SERPL ELPH-MCNC: 0 G/DL
PROT PATTERN SERPL ELPH-IMP: ABNORMAL

## 2020-04-15 NOTE — TELEPHONE ENCOUNTER
Spoke with [facility] in regards to Revlimid prescription this morning. They do have it and they are working on it. Apparently they had an issue with insurance which they have straightened out now. They said it is almost ready to be set up for delivery. I contacted the patient and let her know this information. I gave her a different number to call to set up the medication. I also have her my direct line should she have any issues.    Lor Borja CPEast [facility]  Oncology Pharmacy Liaison  [email or web address]  Phone: [phone]  Fax: [phone]

## 2020-04-16 ENCOUNTER — TELEPHONE (OUTPATIENT)
Dept: ORTHOPEDICS | Facility: CLINIC | Age: 65
End: 2020-04-16

## 2020-04-16 NOTE — TELEPHONE ENCOUNTER
Called and LVM that due to  having a patient facing schedule on 4/21 and him being overbooked I asked if she could move her appointment to 4/24 or beyond. I gave her our callback number to reschedule.

## 2020-04-21 ENCOUNTER — VIRTUAL VISIT (OUTPATIENT)
Dept: ORTHOPEDICS | Facility: CLINIC | Age: 65
End: 2020-04-21
Payer: MEDICAID

## 2020-04-21 DIAGNOSIS — M54.12 CERVICAL RADICULAR PAIN: Primary | ICD-10-CM

## 2020-04-21 RX ORDER — MELOXICAM 15 MG/1
15 TABLET ORAL DAILY
Qty: 30 TABLET | Refills: 1 | Status: SHIPPED | OUTPATIENT
Start: 2020-04-21 | End: 2020-05-01

## 2020-04-21 RX ORDER — TRAMADOL HYDROCHLORIDE 50 MG/1
50 TABLET ORAL
Qty: 15 TABLET | Refills: 0 | Status: SHIPPED | OUTPATIENT
Start: 2020-04-21 | End: 2020-06-03

## 2020-04-21 NOTE — PROGRESS NOTES
"Winter Loya is a 64 year old female who is being evaluated via a billable telephone visit.      The patient has been notified of following:     \"This telephone visit will be conducted via a call between you and your physician/provider. We have found that certain health care needs can be provided without the need for a physical exam.  This service lets us provide the care you need with a short phone conversation.  If a prescription is necessary we can send it directly to your pharmacy.  If lab work is needed we can place an order for that and you can then stop by our lab to have the test done at a later time.    Telephone visits are billed at different rates depending on your insurance coverage. During this emergency period, for some insurers they may be billed the same as an in-person visit.  Please reach out to your insurance provider with any questions.    If during the course of the call the physician/provider feels a telephone visit is not appropriate, you will not be charged for this service.\"    Patient has given verbal consent for Telephone visit?  Yes    How would you like to obtain your AVS? MyChart       *Mentions pain has improved significantly, unsure if it was directly related to medication or rest.  65 yo female with cervical radicular pain, ddd, improved  Has been using tizanadine and tramadol at nighttime and sleeping much better  Still has tingling in her hands, but improved slightly    Assessment/plan  Cont. tizanadine nightly  Add mobic in the AM  Tramadol PRN  F/u 1 week  Consider imaging of cervical spine when pandemic over    Phone call duration: 11 minutes    Mika Morgan MD        "

## 2020-05-01 ENCOUNTER — VIRTUAL VISIT (OUTPATIENT)
Dept: ORTHOPEDICS | Facility: CLINIC | Age: 65
End: 2020-05-01
Payer: MEDICAID

## 2020-05-01 DIAGNOSIS — M54.12 CERVICAL RADICULAR PAIN: ICD-10-CM

## 2020-05-01 RX ORDER — MELOXICAM 15 MG/1
15 TABLET ORAL DAILY
Qty: 30 TABLET | Refills: 1 | Status: SHIPPED | OUTPATIENT
Start: 2020-05-01 | End: 2020-08-14

## 2020-05-01 ASSESSMENT — PATIENT HEALTH QUESTIONNAIRE - PHQ9: SUM OF ALL RESPONSES TO PHQ QUESTIONS 1-9: 2

## 2020-05-01 NOTE — PROGRESS NOTES
"Reason For Visit:   Chief Complaint   Patient presents with     Follow Up     1.5 wk f/u cervical radiculopathy       There were no vitals taken for this visit.    Pain Assessment  Patient Currently in Pain: Ana Maria Mojica ATC    After review of patient's medical issues this visit was conducted over the phone, as opposed to in person, in effort to reduce risk of COVID-19 exposure.      Winter Loya is a 64 year old female who is being evaluated via a billable video visit.      The patient has been notified of following:     \"This video visit will be conducted via a call between you and your physician/provider. We have found that certain health care needs can be provided without the need for an in-person physical exam.  This service lets us provide the care you need with a video conversation.  If a prescription is necessary we can send it directly to your pharmacy.  If lab work is needed we can place an order for that and you can then stop by our lab to have the test done at a later time.    Video visits are billed at different rates depending on your insurance coverage.  Please reach out to your insurance provider with any questions.    If during the course of the call the physician/provider feels a video visit is not appropriate, you will not be charged for this service.\"    Patient has given verbal consent for Video visit? Yes    How would you like to obtain your AVS? Lisbeth    Patient would like the video invitation sent by: Send to e-mail at: [email or web address]    Will anyone else be joining your video visit? No      HISTORY OF PRESENT ILLNESS  Ms. Loya is a pleasant 64 year old year old female who presents to clinic today for followup for neck and arm pain overall improved  The mobic and tizanadine are working well for her    Location: neck and arms  Quality:  achy pain    Severity: 3/10 at worst    Duration: months  Timing: occurs intermittently  Context: occurs while exercising and " lifting  Modifying factors:  resting and non-use makes it better, movement and use makes it worse  Associated signs & symptoms: no   Additional history: as documented    MEDICAL HISTORY  Patient Active Problem List   Diagnosis     Multiple myeloma in remission (H)       Current Outpatient Medications   Medication Sig Dispense Refill     acyclovir (ZOVIRAX) 400 MG tablet Take 1 tablet (400 mg) by mouth every 12 hours 60 tablet 11     acyclovir (ZOVIRAX) 800 MG tablet Take 800 mg by mouth       atorvastatin (LIPITOR) 20 MG tablet Take 1 tablet (20 mg) by mouth every 24 hours 90 tablet 3     insulin glargine (LANTUS PEN) 100 UNIT/ML pen Inject 34 Units Subcutaneous At Bedtime 9 mL 3     insulin lispro (HUMALOG VIAL) 100 UNIT/ML vial        insulin pen needle (FIFTY50 PEN NEEDLES) 32G X 4 MM miscellaneous As directed. To use with Victoza daily       lactulose (CHRONULAC) 10 GM/15ML solution TK 10ML PO D       latanoprost (XALATAN) 0.005 % ophthalmic solution Place 1 drop into both eyes daily 1 Bottle 11     LENalidomide (REVLIMID) 10 MG CAPS capsule Take 1 capsule (10 mg) by mouth daily for 28 days 28 capsule 0     meloxicam (MOBIC) 15 MG tablet Take 1 tablet (15 mg) by mouth daily 30 tablet 1     metFORMIN (GLUCOPHAGE) 500 MG tablet Take by mouth every 24 hours       pregabalin (LYRICA) 100 MG capsule Take 1 capsule (100 mg) by mouth 3 times daily 90 capsule 3     sertraline (ZOLOFT) 100 MG tablet Take 1 tablet (100 mg) by mouth daily 30 tablet 3     tiZANidine (ZANAFLEX) 4 MG tablet Take 1-2 tablets (4-8 mg) by mouth nightly as needed 30 tablet 1     traMADol (ULTRAM) 50 MG tablet Take 1 tablet (50 mg) by mouth nightly as needed for severe pain 15 tablet 0       No Known Allergies    Family History   Problem Relation Age of Onset     Glaucoma Paternal Grandfather      Social History     Socioeconomic History     Marital status: Unknown     Spouse name: None     Number of children: None     Years of education: None      Highest education level: None   Occupational History     None   Social Needs     Financial resource strain: None     Food insecurity     Worry: None     Inability: None     Transportation needs     Medical: None     Non-medical: None   Tobacco Use     Smoking status: Former Smoker     Smokeless tobacco: Former User   Substance and Sexual Activity     Alcohol use: None     Drug use: None     Sexual activity: None   Lifestyle     Physical activity     Days per week: None     Minutes per session: None     Stress: None   Relationships     Social connections     Talks on phone: None     Gets together: None     Attends [employer] service: None     Active member of club or organization: None     Attends meetings of clubs or organizations: None     Relationship status: None     Intimate partner violence     Fear of current or ex partner: None     Emotionally abused: None     Physically abused: None     Forced sexual activity: None   Other Topics Concern     None   Social History Narrative     None       Additional medical/Social/Surgical histories reviewed in EPIC and updated as appropriate.     REVIEW OF SYSTEMS (5/3/2020)  10 point ROS of systems including Constitutional, Eyes, Respiratory, Cardiovascular, Gastroenterology, Genitourinary, Integumentary, Musculoskeletal, Psychiatric, Allergic/Immunologic were all negative except for pertinent positives noted in my HPI.     PHYSICAL EXAM    General  - normal appearance, in no obvious distress  HEENT  - conjunctivae not injected, moist mucous membranes, normocephalic/atraumatic head, ears normal appearance, no lesions, mouth normal appearance, no scars, normal dentition and teeth present  CV  - normal peripheral perfusion  Pulm  - normal respiratory pattern, non-labored    Musculoskeletal - CERVICAL SPINE  - stance and posture: normal gait without limp, no obvious leg length discrepancy, normal heel and toe walk, normal balance, slightly forward shoulders, head balanced  normally on trunk  - inspection: normal bone and joint alignment, no obvious kyphosis    - ROM: normal and painless flexion, extension, left and right sidebending and rotation with some discomfort    Neuro   grossly normal coordination, normal muscle tone  Skin  - no ecchymosis, erythema, warmth, or induration, no obvious rash  Psych  - interactive, appropriate, normal mood and affect  ASSESSMENT & PLAN  63 yo female with improved  1. Cervical radicular pain  - meloxicam (MOBIC) 15 MG tablet; Take 1 tablet (15 mg) by mouth daily  Dispense: 30 tablet; Refill: 1  - tiZANidine (ZANAFLEX) 4 MG tablet; Take 1-2 tablets (4-8 mg) by mouth nightly as needed  Dispense: 30 tablet; Refill: 1    F/u PRN  Mika Morgan MD, CAQSM      Video-Visit Details    Type of service:  Video Visit    Video Start Time: 3:20pm  Video End Time: 3:33pm    Originating Location (pt. Location): Home    Distant Location (provider location):  [facility]     Platform used for Video Visit: [facility]    Mika Morgan MD

## 2020-05-07 DIAGNOSIS — C90.01 MULTIPLE MYELOMA IN REMISSION (H): Primary | ICD-10-CM

## 2020-05-08 ENCOUNTER — TELEPHONE (OUTPATIENT)
Dept: ONCOLOGY | Facility: CLINIC | Age: 65
End: 2020-05-08

## 2020-05-08 DIAGNOSIS — C90.01 MULTIPLE MYELOMA IN REMISSION (H): Primary | ICD-10-CM

## 2020-05-08 RX ORDER — LENALIDOMIDE 10 MG/1
10 CAPSULE ORAL DAILY
Qty: 28 CAPSULE | Refills: 0 | Status: SHIPPED | OUTPATIENT
Start: 2020-05-08 | End: 2020-06-05

## 2020-05-08 NOTE — TELEPHONE ENCOUNTER
Oral Chemotherapy Monitoring Program   Medication: Revlimid  Rx: 10mg PO daily on days 1 through 28 of 28 day cycle   Auth #: [id number]   Risk Category: Adult female not of reproductive potential  Routine survey questions reviewed.   Rx to be Escribed to Sheyla Borja  [facility]  Oncology Pharmacy Liaison   [email or web address]  [phone] (phone)  [phone] (fax)

## 2020-05-11 VITALS
DIASTOLIC BLOOD PRESSURE: 76 MMHG | TEMPERATURE: 98.8 F | WEIGHT: 261.2 LBS | OXYGEN SATURATION: 99 % | HEART RATE: 65 BPM | RESPIRATION RATE: 16 BRPM | SYSTOLIC BLOOD PRESSURE: 153 MMHG | BODY MASS INDEX: 38.86 KG/M2

## 2020-05-11 DIAGNOSIS — C90.01 MULTIPLE MYELOMA IN REMISSION (H): ICD-10-CM

## 2020-05-11 LAB
ALBUMIN SERPL-MCNC: 3.8 G/DL (ref 3.4–5)
ALP SERPL-CCNC: 105 U/L (ref 40–150)
ALT SERPL W P-5'-P-CCNC: 53 U/L (ref 0–50)
ANION GAP SERPL CALCULATED.3IONS-SCNC: 8 MMOL/L (ref 3–14)
AST SERPL W P-5'-P-CCNC: 23 U/L (ref 0–45)
BASOPHILS # BLD AUTO: 0.1 10E9/L (ref 0–0.2)
BASOPHILS NFR BLD AUTO: 1.9 %
BILIRUB SERPL-MCNC: 1 MG/DL (ref 0.2–1.3)
BUN SERPL-MCNC: 12 MG/DL (ref 7–30)
CALCIUM SERPL-MCNC: 9 MG/DL (ref 8.5–10.1)
CHLORIDE SERPL-SCNC: 105 MMOL/L (ref 94–109)
CO2 SERPL-SCNC: 25 MMOL/L (ref 20–32)
CREAT SERPL-MCNC: 0.55 MG/DL (ref 0.52–1.04)
DIFFERENTIAL METHOD BLD: ABNORMAL
EOSINOPHIL # BLD AUTO: 0.4 10E9/L (ref 0–0.7)
EOSINOPHIL NFR BLD AUTO: 9 %
ERYTHROCYTE [DISTWIDTH] IN BLOOD BY AUTOMATED COUNT: 13.4 % (ref 10–15)
GFR SERPL CREATININE-BSD FRML MDRD: >90 ML/MIN/{1.73_M2}
GLUCOSE SERPL-MCNC: 237 MG/DL (ref 70–99)
HCT VFR BLD AUTO: 41.9 % (ref 35–47)
HGB BLD-MCNC: 14.2 G/DL (ref 11.7–15.7)
IMM GRANULOCYTES # BLD: 0 10E9/L (ref 0–0.4)
IMM GRANULOCYTES NFR BLD: 0.5 %
LYMPHOCYTES # BLD AUTO: 0.7 10E9/L (ref 0.8–5.3)
LYMPHOCYTES NFR BLD AUTO: 15.5 %
MCH RBC QN AUTO: 28.9 PG (ref 26.5–33)
MCHC RBC AUTO-ENTMCNC: 33.9 G/DL (ref 31.5–36.5)
MCV RBC AUTO: 85 FL (ref 78–100)
MONOCYTES # BLD AUTO: 0.5 10E9/L (ref 0–1.3)
MONOCYTES NFR BLD AUTO: 10.6 %
NEUTROPHILS # BLD AUTO: 2.7 10E9/L (ref 1.6–8.3)
NEUTROPHILS NFR BLD AUTO: 62.5 %
NRBC # BLD AUTO: 0 10*3/UL
NRBC BLD AUTO-RTO: 0 /100
PLATELET # BLD AUTO: 143 10E9/L (ref 150–450)
POTASSIUM SERPL-SCNC: 3.7 MMOL/L (ref 3.4–5.3)
PROT SERPL-MCNC: 7 G/DL (ref 6.8–8.8)
RBC # BLD AUTO: 4.91 10E12/L (ref 3.8–5.2)
SODIUM SERPL-SCNC: 138 MMOL/L (ref 133–144)
WBC # BLD AUTO: 4.3 10E9/L (ref 4–11)

## 2020-05-11 PROCEDURE — 36415 COLL VENOUS BLD VENIPUNCTURE: CPT | Performed by: PHYSICIAN ASSISTANT

## 2020-05-11 PROCEDURE — 85025 COMPLETE CBC W/AUTO DIFF WBC: CPT | Performed by: PHYSICIAN ASSISTANT

## 2020-05-11 PROCEDURE — 84165 PROTEIN E-PHORESIS SERUM: CPT | Performed by: PHYSICIAN ASSISTANT

## 2020-05-11 PROCEDURE — 80053 COMPREHEN METABOLIC PANEL: CPT | Performed by: PHYSICIAN ASSISTANT

## 2020-05-11 PROCEDURE — 00000402 ZZHCL STATISTIC TOTAL PROTEIN: Performed by: PHYSICIAN ASSISTANT

## 2020-05-11 ASSESSMENT — PAIN SCALES - GENERAL: PAINLEVEL: NO PAIN (0)

## 2020-05-11 NOTE — NURSING NOTE
Chief Complaint   Patient presents with     Blood Draw     labs drawn by venipuncture by rn in lab.  vital signs taken.     Labs drawn by venipuncture by RN in lab.  Vital signs taken.     Nia Marvin RN

## 2020-05-12 ENCOUNTER — VIRTUAL VISIT (OUTPATIENT)
Dept: ONCOLOGY | Facility: CLINIC | Age: 65
End: 2020-05-12
Attending: INTERNAL MEDICINE
Payer: MEDICAID

## 2020-05-12 DIAGNOSIS — C90.01 MULTIPLE MYELOMA IN REMISSION (H): Primary | ICD-10-CM

## 2020-05-12 LAB
ALBUMIN SERPL ELPH-MCNC: 4.3 G/DL (ref 3.7–5.1)
ALPHA1 GLOB SERPL ELPH-MCNC: 0.3 G/DL (ref 0.2–0.4)
ALPHA2 GLOB SERPL ELPH-MCNC: 0.6 G/DL (ref 0.5–0.9)
B-GLOBULIN SERPL ELPH-MCNC: 0.7 G/DL (ref 0.6–1)
GAMMA GLOB SERPL ELPH-MCNC: 0.5 G/DL (ref 0.7–1.6)
M PROTEIN SERPL ELPH-MCNC: 0 G/DL
PROT PATTERN SERPL ELPH-IMP: ABNORMAL

## 2020-05-12 PROCEDURE — 40000114 ZZH STATISTIC NO CHARGE CLINIC VISIT

## 2020-05-12 PROCEDURE — 99214 OFFICE O/P EST MOD 30 MIN: CPT | Mod: GT | Performed by: PHYSICIAN ASSISTANT

## 2020-05-12 NOTE — LETTER
"5/12/2020       RE: Winter Loya  [address]  [place] [address]     Dear Colleague,    Thank you for referring your patient, Winter Loya, to the [facility]. Please see a copy of my visit note below.    Winter Loya is a 64 year old female who is being evaluated via a billable video visit.      The patient has been notified of following:     \"This video visit will be conducted via a call between you and your physician/provider. We have found that certain health care needs can be provided without the need for an in-person physical exam.  This service lets us provide the care you need with a video conversation.  If a prescription is necessary we can send it directly to your pharmacy.  If lab work is needed we can place an order for that and you can then stop by our lab to have the test done at a later time.    Video visits are billed at different rates depending on your insurance coverage.  Please reach out to your insurance provider with any questions.    If during the course of the call the physician/provider feels a video visit is not appropriate, you will not be charged for this service.\"    Patient has given verbal consent for Video visit? Yes    How would you like to obtain your AVS? MyChart    Patient would like the video invitation sent by: Text to cell phone: [phone]     Will anyone else be joining your video visit? No       I have reviewed and updated the patient's allergies and medication list.    Concerns: No new concerns   Refills: None needed.      Secondary Video Option (Doximity), send text message to: [phone]     Odalis Lockhart CMA                Provider Note:   May 12, 2020    Oncologic history:  Winter Loya is a 64 year old woman with IgA Kappa Multiple Myeloma. In 2018 she had anemia and was fatigued. She was found to have IgA kappa monocloncal antibody with M-spike of 0.17. IgA elevated. Skeletal survey was unremarkable and UPEP had minimal " protein (156 mg/m2). PET 11/2018 showed bone marrow consistent with hypermetabolic plasma cell myeloma. M spike was 0.17. She started RVD and Zometa. On 4/2019 she had an autologous transplant.      Her bone marrow bx from September 2019 was sent here for review. It showed marrow cellularity of 60%, with decreased trilineage hematopoiesis and 15% plasma cells as well as peripheral blood with slight anemia. 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 She is on revlimid maintenance and zometa from her prior oncologist in [place]. On 12/6/19 her K/L ratio is 1.1, M spike is 0.04.     Interim History:   Supposed to go back to work FT next Monday. She is fearful about losing her social security.     Having ongoing sinus issues which has been going on for months. Just keeps getting worse. Hard to breath at night and has worsening HA. Has been using Sudafed and Nyquil. Does have some cough and SOB from this.     Eating and drinking well. No f/c. Otherwise tolerating Rev well.     Has restarted Zoloft, though still having insurance issues. This is a point of frustration for her.     ROS: 10 point ROS neg other than the symptoms noted above in the HPI.    Objectives:  Vital Signs 5/11/2020   Systolic 153   Diastolic 76   Pulse 65   Temperature 98.8   Respirations 16   Weight (LB) 261 lb 3.2 oz   O2 99     Wt Readings from Last 4 Encounters:   05/11/20 118.5 kg (261 lb 3.2 oz)   03/10/20 117.9 kg (260 lb)   03/09/20 115.2 kg (254 lb)   03/05/20 113.9 kg (251 lb)       Video physical exam  General: Patient appears well in no acute distress. obese body habitus.  Skin: No visualized rash or lesions on visualized skin  Eyes: EOMI, no erythema, sclera icterus or discharge noted  Resp: Appears to be breathing comfortably without accessory muscle usage, speaking in full sentences, no cough  MSK:  "Appears to have normal range of motion based on visualized movements  Neurologic: No apparent tremors, facial movements symmetric  Psych: affect sad, alert and oriented    The rest of a comprehensive physical examination is deferred due to PHE (public health emergency) video restrictions\"      Labs:   Labs were personally reviewed     5/11/2020 10:35   Sodium 138   Potassium 3.7   Chloride 105   Carbon Dioxide 25   Urea Nitrogen 12   Creatinine 0.55   GFR Estimate >90   GFR Estimate If Black >90   Calcium 9.0   Anion Gap 8   Albumin 3.8   Protein Total 7.0   Bilirubin Total 1.0   Alkaline Phosphatase 105   ALT 53 (H)   AST 23   Glucose 237 (H)   WBC 4.3   Hemoglobin 14.2   Hematocrit 41.9   Platelet Count 143 (L)   RBC Count 4.91   MCV 85   MCH 28.9   MCHC 33.9   RDW 13.4   Diff Method Automated Method   % Neutrophils 62.5   % Lymphocytes 15.5   % Monocytes 10.6   % Eosinophils 9.0   % Basophils 1.9   % Immature Granulocytes 0.5   Nucleated RBCs 0   Absolute Neutrophil 2.7   Absolute Lymphocytes 0.7 (L)   Absolute Monocytes 0.5   Absolute Eosinophils 0.4   Absolute Basophils 0.1   Abs Immature Granulocytes 0.0   Absolute Nucleated RBC 0.0   Albumin Fraction PENDING   Alpha 1 Fraction PENDING   Alpha 2 Fraction PENDING   Beta Fraction PENDING   ELP Interpretation: PENDING   Gamma Fraction PENDING   Monoclonal Peak PENDING       Imaging:  No new imaging    Assessment/Plan:    MM, standard risk, IgA Kappa  -s/p auto HSCT in 4/2019  -now on maintenance Revlimid 10 mg daily. Tolerating very well with no side effects. Will continue until progression.   -will going SPEP and SFLC monthly. These are pending still. Last Mspike was 0.0 from 4/14  -PET/CT annually or if symptomatic from MM due to bone pain. Last PET was 2/15/20  -continue  mg while on Rev for thromboprophylaxis    -she is stable and going well from an oncology standpoint, though does have a lot of other ongoing issues     -continue zometa through 12/2020 " to complete a total of 2 years of therapy, associated with improved PFS and overall survival when given with ASCT  -last given on 4/14, though now on hold with COVID. Will plan to resume again in June though can delay this further if we are still in high risk precautions      -will follow-up in one month with Dr. Tipton    PPx: Continue ACV     Thyroid Nodules  -saw endocrinology on 2/22  -FNA was done on 3/3/2020 showing atypia of undetermined significance on right lobe nodule and benign left lobe nodule.   -calcitonin and thyroid function tests were both normal  -met with Dr. Burgos, surgery. Plan to get a total thyroidectomy. Planned to postpone for 6 months in order to get settled in her job first.      Vaginal Bleeding  -lasted 2 weeks though has now resolved.  -Saw PCP who referred her to GYN with US, this has not been scheduled yet with COVID    Atypical Chest Pain  -plan for stress ECHO. Likely postponed due to COVID     Recurrent Sinusitis   -treated with Augmentin x 7 days on 4/8. Symptoms are persistent. This has been very bothersome for her. Since she started management with PCP, should call them again as they were planning on doing a CT of sinuses. Ok to continue nasal sprays, Sudafed and Nyquil to help with symptoms    Psych  -having lots of issues with her roommate and dealing with insurance. She also found out the [facility] is not covered by her insurance This causes her a lot of fear and anxiety. Will have SW reach out to her to discuss these issues  -now back on Zoloft though seems to still have a lot of mood difficulties. At this point, I think a lot of is situational with COVID and the unknown. Will monitor though could increase dose of Zoloft if needed    Cervical Radicular Pain  -managed by sports medicine/ortho  -last seen on 5/1  -given mobic daily and tizanidine. These are working well for her    Video-Visit Details    Type of service:  Video Visit    Video Start Time: 11:59 AM  Video End  Time: 12:10 PM    Originating Location (pt. Location): Home    Distant Location (provider location):  Provider's Home    Platform used for Video Visit: Johnson Mojica PA-C      Again, thank you for allowing me to participate in the care of your patient.      Sincerely,    Reva Mojica PA-C

## 2020-05-12 NOTE — PROGRESS NOTES
Provider Note:   May 12, 2020    Oncologic history:  Winter Loya is a 64 year old woman with IgA Kappa Multiple Myeloma. In 2018 she had anemia and was fatigued. She was found to have IgA kappa monocloncal antibody with M-spike of 0.17. IgA elevated. Skeletal survey was unremarkable and UPEP had minimal protein (156 mg/m2). PET 11/2018 showed bone marrow consistent with hypermetabolic plasma cell myeloma. M spike was 0.17. She started RVD and Zometa. On 4/2019 she had an autologous transplant.      Her bone marrow bx from September 2019 was sent here for review. It showed marrow cellularity of 60%, with decreased trilineage hematopoiesis and 15% plasma cells as well as peripheral blood with slight anemia. 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 She is on revlimid maintenance and zometa from her prior oncologist in [place]. On 12/6/19 her K/L ratio is 1.1, M spike is 0.04.     Interim History:   Supposed to go back to work FT next Monday. She is fearful about losing her social security.     Having ongoing sinus issues which has been going on for months. Just keeps getting worse. Hard to breath at night and has worsening HA. Has been using Sudafed and Nyquil. Does have some cough and SOB from this.     Eating and drinking well. No f/c. Otherwise tolerating Rev well.     Has restarted Zoloft, though still having insurance issues. This is a point of frustration for her.     ROS: 10 point ROS neg other than the symptoms noted above in the HPI.    Objectives:  Vital Signs 5/11/2020   Systolic 153   Diastolic 76   Pulse 65   Temperature 98.8   Respirations 16   Weight (LB) 261 lb 3.2 oz   O2 99     Wt Readings from Last 4 Encounters:   05/11/20 118.5 kg (261 lb 3.2 oz)   03/10/20 117.9 kg (260 lb)   03/09/20 115.2 kg (254 lb)   03/05/20 113.9 kg (251 lb)       Video physical  "exam  General: Patient appears well in no acute distress. obese body habitus.  Skin: No visualized rash or lesions on visualized skin  Eyes: EOMI, no erythema, sclera icterus or discharge noted  Resp: Appears to be breathing comfortably without accessory muscle usage, speaking in full sentences, no cough  MSK: Appears to have normal range of motion based on visualized movements  Neurologic: No apparent tremors, facial movements symmetric  Psych: affect sad, alert and oriented    The rest of a comprehensive physical examination is deferred due to PHE (public health emergency) video restrictions\"      Labs:   Labs were personally reviewed     5/11/2020 10:35   Sodium 138   Potassium 3.7   Chloride 105   Carbon Dioxide 25   Urea Nitrogen 12   Creatinine 0.55   GFR Estimate >90   GFR Estimate If Black >90   Calcium 9.0   Anion Gap 8   Albumin 3.8   Protein Total 7.0   Bilirubin Total 1.0   Alkaline Phosphatase 105   ALT 53 (H)   AST 23   Glucose 237 (H)   WBC 4.3   Hemoglobin 14.2   Hematocrit 41.9   Platelet Count 143 (L)   RBC Count 4.91   MCV 85   MCH 28.9   MCHC 33.9   RDW 13.4   Diff Method Automated Method   % Neutrophils 62.5   % Lymphocytes 15.5   % Monocytes 10.6   % Eosinophils 9.0   % Basophils 1.9   % Immature Granulocytes 0.5   Nucleated RBCs 0   Absolute Neutrophil 2.7   Absolute Lymphocytes 0.7 (L)   Absolute Monocytes 0.5   Absolute Eosinophils 0.4   Absolute Basophils 0.1   Abs Immature Granulocytes 0.0   Absolute Nucleated RBC 0.0   Albumin Fraction PENDING   Alpha 1 Fraction PENDING   Alpha 2 Fraction PENDING   Beta Fraction PENDING   ELP Interpretation: PENDING   Gamma Fraction PENDING   Monoclonal Peak PENDING       Imaging:  No new imaging    Assessment/Plan:    MM, standard risk, IgA Kappa  -s/p auto HSCT in 4/2019  -now on maintenance Revlimid 10 mg daily. Tolerating very well with no side effects. Will continue until progression.   -will going SPEP and SFLC monthly. These are pending still. Last " Mspike was 0.0 from 4/14  -PET/CT annually or if symptomatic from MM due to bone pain. Last PET was 2/15/20  -continue  mg while on Rev for thromboprophylaxis    -she is stable and going well from an oncology standpoint, though does have a lot of other ongoing issues     -continue zometa through 12/2020 to complete a total of 2 years of therapy, associated with improved PFS and overall survival when given with ASCT  -last given on 4/14, though now on hold with COVID. Will plan to resume again in June though can delay this further if we are still in high risk precautions      -will follow-up in one month with Dr. Tipton    PPx: Continue ACV     Thyroid Nodules  -saw endocrinology on 2/22  -FNA was done on 3/3/2020 showing atypia of undetermined significance on right lobe nodule and benign left lobe nodule.   -calcitonin and thyroid function tests were both normal  -met with Dr. Burgos, surgery. Plan to get a total thyroidectomy. Planned to postpone for 6 months in order to get settled in her job first.      Vaginal Bleeding  -lasted 2 weeks though has now resolved.  -Saw PCP who referred her to GYN with US, this has not been scheduled yet with COVID    Atypical Chest Pain  -plan for stress ECHO. Likely postponed due to COVID     Recurrent Sinusitis   -treated with Augmentin x 7 days on 4/8. Symptoms are persistent. This has been very bothersome for her. Since she started management with PCP, should call them again as they were planning on doing a CT of sinuses. Ok to continue nasal sprays, Sudafed and Nyquil to help with symptoms    Psych  -having lots of issues with her roommate and dealing with insurance. She also found out the [facility] is not covered by her insurance This causes her a lot of fear and anxiety. Will have SW reach out to her to discuss these issues  -now back on Zoloft though seems to still have a lot of mood difficulties. At this point, I think a lot of is situational with COVID and the  unknown. Will monitor though could increase dose of Zoloft if needed    Cervical Radicular Pain  -managed by sports medicine/ortho  -last seen on 5/1  -given mobic daily and tizanidine. These are working well for her    Video-Visit Details    Type of service:  Video Visit    Video Start Time: 11:59 AM  Video End Time: 12:10 PM    Originating Location (pt. Location): Home    Distant Location (provider location):  Provider's Home    Platform used for Video Visit: Johnson Mojica PA-C

## 2020-05-12 NOTE — PROGRESS NOTES
"Winter Loya is a 64 year old female who is being evaluated via a billable video visit.      The patient has been notified of following:     \"This video visit will be conducted via a call between you and your physician/provider. We have found that certain health care needs can be provided without the need for an in-person physical exam.  This service lets us provide the care you need with a video conversation.  If a prescription is necessary we can send it directly to your pharmacy.  If lab work is needed we can place an order for that and you can then stop by our lab to have the test done at a later time.    Video visits are billed at different rates depending on your insurance coverage.  Please reach out to your insurance provider with any questions.    If during the course of the call the physician/provider feels a video visit is not appropriate, you will not be charged for this service.\"    Patient has given verbal consent for Video visit? Yes    How would you like to obtain your AVS? MyChart    Patient would like the video invitation sent by: Text to cell phone: [phone]     Will anyone else be joining your video visit? No       I have reviewed and updated the patient's allergies and medication list.    Concerns: No new concerns   Refills: None needed.      Secondary Video Option (Doximity), send text message to: [phone]     Odalis Lockhart CMA              "

## 2020-05-13 ENCOUNTER — TELEPHONE (OUTPATIENT)
Dept: CARE COORDINATION | Facility: CLINIC | Age: 65
End: 2020-05-13

## 2020-05-13 DIAGNOSIS — C90.01 MULTIPLE MYELOMA IN REMISSION (H): Primary | ICD-10-CM

## 2020-05-13 NOTE — TELEPHONE ENCOUNTER
"Social Work Note: Telephone Call  Oncology Clinic    Data/Intervention:  Patient Name:  Winter Loya  /Age:  1955 (64 year old)    Call From:  SW spoke with patient by phone.  Reason for Call:  Referral from DAMARIS indicating questions about insurance.    Assessment:  SW spoke with patient over the phone.  Patient expressed her concerns are around returning to work and how this may affect her SSDI payments.  Patient recently returned to work part time and indicated she would be transitioning to being full time starting next week.  She has been receiving income through [employer] and had talked with \"someone about having a couple months to go back but they said I might need a medical review.\" When asked about who had stated she would need a medical review, patient stated she was told this through [employer] and expressed much anxiety over this prospect, expressing fear she is \"making a mistake\" and \"I might do something wrong.\" Patient appeared to have difficulty expressing her concerns, her voice was clearly affected by her anxiety and shaky. SW provided supportive counseling regarding pt's fear of the transition back to work and her anxiety with the process of working with the social security administration.  REYNALDO explained patient would need to speak with social security in order to find out whether a medical review was being initiated or necessary in addition to finding out what next steps patient may need to complete. Patient expressed understanding and indicated no other needs or concerns.     Plan:  REYNALDO contact information provided. REYNALDO continues to be available to assist with needs.      Soo Yeon Han, MSW, [facility] Center[facility]  Pager: [phone]  Phone: [phone]        "

## 2020-06-03 ENCOUNTER — OFFICE VISIT (OUTPATIENT)
Dept: ORTHOPEDICS | Facility: CLINIC | Age: 65
End: 2020-06-03
Payer: MEDICAID

## 2020-06-03 ENCOUNTER — ANCILLARY PROCEDURE (OUTPATIENT)
Dept: GENERAL RADIOLOGY | Facility: CLINIC | Age: 65
End: 2020-06-03
Attending: PREVENTIVE MEDICINE
Payer: MEDICAID

## 2020-06-03 DIAGNOSIS — M54.12 CERVICAL RADICULAR PAIN: Primary | ICD-10-CM

## 2020-06-03 DIAGNOSIS — M51.369 DDD (DEGENERATIVE DISC DISEASE), LUMBAR: ICD-10-CM

## 2020-06-03 DIAGNOSIS — M54.12 CERVICAL RADICULAR PAIN: ICD-10-CM

## 2020-06-03 PROBLEM — E11.9 DIABETES MELLITUS, TYPE 2 (H): Status: ACTIVE | Noted: 2020-06-03

## 2020-06-03 PROCEDURE — 99213 OFFICE O/P EST LOW 20 MIN: CPT | Performed by: PREVENTIVE MEDICINE

## 2020-06-03 PROCEDURE — 72040 X-RAY EXAM NECK SPINE 2-3 VW: CPT | Performed by: RADIOLOGY

## 2020-06-03 NOTE — NURSING NOTE
Winter Loya's chief complaint for this visit includes:  Chief Complaint   Patient presents with     Spine - Follow Up     PCP: Gabrielle Edwards    Referring Provider:  No referring provider defined for this encounter.    There were no vitals taken for this visit.  Data Unavailable     Do you need any medication refills at today's visit? No.  Mireya Aguilar RN

## 2020-06-03 NOTE — LETTER
6/3/2020         RE: Winter Loya  [address]  [address]  [place] [facility] [place] [address]        Dear Colleague,    Thank you for referring your patient, Winter Loya, to the [facility]. Please see a copy of my visit note below.    HISTORY OF PRESENT ILLNESS  Ms. Loya is a pleasant 64 year old year old female who presents to clinic today with neck and low back pain worse  Winter explains that neck and lumbar  Location: neck and lumbar  Quality:  achy pain    5/10 at worst    Duration: worse over past several months  Timing: occurs intermittently  Context: occurs while exercising and lifting  Modifying factors:  resting and non-use makes it better, movement and use makes it worse  Associated signs & symptoms: radiation into legs and arms    MEDICAL HISTORY  Patient Active Problem List   Diagnosis     Multiple myeloma in remission (H)     Diabetes mellitus, type 2 (H)       Current Outpatient Medications   Medication Sig Dispense Refill     acyclovir (ZOVIRAX) 800 MG tablet Take 800 mg by mouth       atorvastatin (LIPITOR) 20 MG tablet Take 1 tablet (20 mg) by mouth every 24 hours 90 tablet 3     insulin lispro (HUMALOG VIAL) 100 UNIT/ML vial        insulin pen needle (FIFTY50 PEN NEEDLES) 32G X 4 MM miscellaneous As directed. To use with Victoza daily       lactulose (CHRONULAC) 10 GM/15ML solution TK 10ML PO D       latanoprost (XALATAN) 0.005 % ophthalmic solution Place 1 drop into both eyes daily 1 Bottle 11     meloxicam (MOBIC) 15 MG tablet Take 1 tablet (15 mg) by mouth daily 30 tablet 1     metFORMIN (GLUCOPHAGE) 500 MG tablet Take by mouth every 24 hours       pregabalin (LYRICA) 100 MG capsule Take 1 capsule (100 mg) by mouth 3 times daily 90 capsule 3     sertraline (ZOLOFT) 100 MG tablet Take 1 tablet (100 mg) by mouth daily 30 tablet 3     insulin glargine (LANTUS PEN) 100 UNIT/ML pen Inject 34 Units Subcutaneous At Bedtime 30 mL 3     LENalidomide (REVLIMID) 10 MG CAPS capsule Take 1 capsule (10  mg) by mouth daily for 28 days 28 capsule 0     LENalidomide (REVLIMID) 10 MG CAPS capsule Take 1 capsule (10 mg) by mouth daily for 28 days 28 capsule 0     tiZANidine (ZANAFLEX) 4 MG tablet Take 1-2 tablets (4-8 mg) by mouth nightly as needed (Patient not taking: Reported on 6/3/2020) 30 tablet 1       No Known Allergies    Family History   Problem Relation Age of Onset     Glaucoma Paternal Grandfather      Social History     Socioeconomic History     Marital status: Unknown     Spouse name: Not on file     Number of children: Not on file     Years of education: Not on file     Highest education level: Not on file   Occupational History     Not on file   Social Needs     Financial resource strain: Not on file     Food insecurity     Worry: Not on file     Inability: Not on file     Transportation needs     Medical: Not on file     Non-medical: Not on file   Tobacco Use     Smoking status: Former Smoker     Smokeless tobacco: Former User   Substance and Sexual Activity     Alcohol use: Not on file     Drug use: Not on file     Sexual activity: Not on file   Lifestyle     Physical activity     Days per week: Not on file     Minutes per session: Not on file     Stress: Not on file   Relationships     Social connections     Talks on phone: Not on file     Gets together: Not on file     Attends [employer] service: Not on file     Active member of club or organization: Not on file     Attends meetings of clubs or organizations: Not on file     Relationship status: Not on file     Intimate partner violence     Fear of current or ex partner: Not on file     Emotionally abused: Not on file     Physically abused: Not on file     Forced sexual activity: Not on file   Other Topics Concern     Not on file   Social History Narrative     Not on file       Additional medical/Social/Surgical histories reviewed in [facility] and updated as appropriate.     REVIEW OF SYSTEMS (7/4/2020)  10 point ROS of systems including Constitutional,  Eyes, Respiratory, Cardiovascular, Gastroenterology, Genitourinary, Integumentary, Musculoskeletal, Psychiatric, Allergic/Immunologic were all negative except for pertinent positives noted in my HPI.     PHYSICAL EXAM  Vital Signs: There were no vitals taken for this visit. Patient declined being weighed. There is no height or weight on file to calculate BMI.    General  - normal appearance, in no obvious distress  HEENT  - conjunctivae not injected, moist mucous membranes, normocephalic/atraumatic head, ears normal appearance, no lesions, mouth normal appearance, no scars, normal dentition and teeth present  CV  - normal peripheral perfusion  Pulm  - normal respiratory pattern, non-labored    Musculoskeletal - CERVICAL SPINE  - stance and posture: normal gait without limp, no obvious leg length discrepancy, normal heel and toe walk, normal balance, slightly forward shoulders, head balanced normally on trunk  - inspection: normal bone and joint alignment, no obvious kyphosis  - palpation: no paravertebral or bony tenderness, except at base of neck and trapezius muscles  - ROM: normal and painless flexion, extension, left and right sidebending and rotation with some discomfort  - strength: upper extremities 5/5 in all planes  - special tests:  (-) spurlings    Neuro  - biceps, triceps, supinator DTRs 2+ bilaterally, no sensory or motor deficit, grossly normal coordination, normal muscle tone  Skin  - no ecchymosis, erythema, warmth, or induration, no obvious rash  Psych  - interactive, appropriate, normal mood and affect  Lumbar: has pain with flexion and extension, positive SLR  ASSESSMENT & PLAN  63 yo female with cervical ddd, radicular pain, lumbar ddd, radicular pain  Cervical xray: shows ddd  Ordered cervical MRI  Reviewed her previous lumbar imaging, shows ddd  Ordered lumbar MRI  Consider FARHAN    Mika Morgan MD, CAQSM      Again, thank you for allowing me to participate in the care of your patient.         Sincerely,        Mika Morgan MD

## 2020-06-03 NOTE — PATIENT INSTRUCTIONS
Thanks for coming today.  Ortho/Sports Medicine Clinic  [address] [place] [address]    To schedule future appointments in Ortho Clinic, you may call [phone].    To schedule ordered imaging by your provider:   Call Central Imaging [phone]    To schedule an injection ordered by your provider:  Call Central Imaging Injection scheduling line: [phone]  [facility] available online at:  [email or web address]    Please call if any further questions or concerns ([phone]).  Clinic hours 8 am to 5 pm.    Return to clinic (call) if symptoms worsen or fail to improve.

## 2020-06-05 ENCOUNTER — TELEPHONE (OUTPATIENT)
Dept: ONCOLOGY | Facility: CLINIC | Age: 65
End: 2020-06-05

## 2020-06-05 DIAGNOSIS — C90.01 MULTIPLE MYELOMA IN REMISSION (H): Primary | ICD-10-CM

## 2020-06-05 RX ORDER — LENALIDOMIDE 10 MG/1
10 CAPSULE ORAL DAILY
Qty: 28 CAPSULE | Refills: 0 | Status: SHIPPED | OUTPATIENT
Start: 2020-06-05 | End: 2020-07-22

## 2020-06-05 RX ORDER — LENALIDOMIDE 10 MG/1
10 CAPSULE ORAL DAILY
Qty: 28 CAPSULE | Refills: 0 | Status: CANCELLED | OUTPATIENT
Start: 2020-06-05

## 2020-06-05 NOTE — TELEPHONE ENCOUNTER
Oral Chemotherapy Monitoring Program   Medication: Revlimid  Rx: 10mg PO daily on days 1 through 28 of 28 day cycle   Auth #: [id number]   Risk Category: Adult Female NORP  Routine survey questions reviewed.   Rx to be Escribed to Harvey Borja  [facility]  Oncology Pharmacy Liaison   [email or web address]  [phone] (phone)  [phone] (fax)

## 2020-06-09 DIAGNOSIS — C90.01 MULTIPLE MYELOMA IN REMISSION (H): Primary | ICD-10-CM

## 2020-06-09 RX ORDER — HEPARIN SODIUM,PORCINE 10 UNIT/ML
5 VIAL (ML) INTRAVENOUS
Status: CANCELLED | OUTPATIENT
Start: 2020-06-10

## 2020-06-09 RX ORDER — HEPARIN SODIUM (PORCINE) LOCK FLUSH IV SOLN 100 UNIT/ML 100 UNIT/ML
5 SOLUTION INTRAVENOUS
Status: CANCELLED | OUTPATIENT
Start: 2020-06-10

## 2020-06-09 RX ORDER — ZOLEDRONIC ACID 0.04 MG/ML
4 INJECTION, SOLUTION INTRAVENOUS ONCE
Status: CANCELLED | OUTPATIENT
Start: 2020-06-10

## 2020-06-10 ENCOUNTER — APPOINTMENT (OUTPATIENT)
Dept: LAB | Facility: CLINIC | Age: 65
End: 2020-06-10
Attending: PHYSICIAN ASSISTANT
Payer: MEDICAID

## 2020-06-10 ENCOUNTER — OFFICE VISIT (OUTPATIENT)
Dept: TRANSPLANT | Facility: CLINIC | Age: 65
End: 2020-06-10
Attending: PHYSICIAN ASSISTANT
Payer: MEDICAID

## 2020-06-10 ENCOUNTER — INFUSION THERAPY VISIT (OUTPATIENT)
Dept: ONCOLOGY | Facility: CLINIC | Age: 65
End: 2020-06-10
Attending: PHYSICIAN ASSISTANT
Payer: MEDICAID

## 2020-06-10 VITALS
HEART RATE: 75 BPM | TEMPERATURE: 97 F | SYSTOLIC BLOOD PRESSURE: 135 MMHG | BODY MASS INDEX: 39.12 KG/M2 | RESPIRATION RATE: 16 BRPM | DIASTOLIC BLOOD PRESSURE: 79 MMHG | HEIGHT: 69 IN | WEIGHT: 264.1 LBS | OXYGEN SATURATION: 96 %

## 2020-06-10 DIAGNOSIS — C90.01 MULTIPLE MYELOMA IN REMISSION (H): Primary | ICD-10-CM

## 2020-06-10 LAB
ALBUMIN SERPL-MCNC: 4 G/DL (ref 3.4–5)
ALP SERPL-CCNC: 106 U/L (ref 40–150)
ALT SERPL W P-5'-P-CCNC: 47 U/L (ref 0–50)
ANION GAP SERPL CALCULATED.3IONS-SCNC: 6 MMOL/L (ref 3–14)
AST SERPL W P-5'-P-CCNC: 18 U/L (ref 0–45)
BASOPHILS # BLD AUTO: 0.1 10E9/L (ref 0–0.2)
BASOPHILS NFR BLD AUTO: 2 %
BILIRUB SERPL-MCNC: 1.6 MG/DL (ref 0.2–1.3)
BUN SERPL-MCNC: 16 MG/DL (ref 7–30)
CALCIUM SERPL-MCNC: 8.9 MG/DL (ref 8.5–10.1)
CHLORIDE SERPL-SCNC: 105 MMOL/L (ref 94–109)
CO2 SERPL-SCNC: 28 MMOL/L (ref 20–32)
CREAT SERPL-MCNC: 0.62 MG/DL (ref 0.52–1.04)
DIFFERENTIAL METHOD BLD: ABNORMAL
EOSINOPHIL # BLD AUTO: 0.3 10E9/L (ref 0–0.7)
EOSINOPHIL NFR BLD AUTO: 6.6 %
ERYTHROCYTE [DISTWIDTH] IN BLOOD BY AUTOMATED COUNT: 13.8 % (ref 10–15)
GFR SERPL CREATININE-BSD FRML MDRD: >90 ML/MIN/{1.73_M2}
GLUCOSE SERPL-MCNC: 226 MG/DL (ref 70–99)
HCT VFR BLD AUTO: 41.4 % (ref 35–47)
HGB BLD-MCNC: 14.1 G/DL (ref 11.7–15.7)
IMM GRANULOCYTES # BLD: 0 10E9/L (ref 0–0.4)
IMM GRANULOCYTES NFR BLD: 0.3 %
LYMPHOCYTES # BLD AUTO: 0.7 10E9/L (ref 0.8–5.3)
LYMPHOCYTES NFR BLD AUTO: 18.3 %
MCH RBC QN AUTO: 28.7 PG (ref 26.5–33)
MCHC RBC AUTO-ENTMCNC: 34.1 G/DL (ref 31.5–36.5)
MCV RBC AUTO: 84 FL (ref 78–100)
MONOCYTES # BLD AUTO: 0.4 10E9/L (ref 0–1.3)
MONOCYTES NFR BLD AUTO: 8.9 %
NEUTROPHILS # BLD AUTO: 2.5 10E9/L (ref 1.6–8.3)
NEUTROPHILS NFR BLD AUTO: 63.9 %
NRBC # BLD AUTO: 0 10*3/UL
NRBC BLD AUTO-RTO: 0 /100
PLATELET # BLD AUTO: 123 10E9/L (ref 150–450)
POTASSIUM SERPL-SCNC: 3.5 MMOL/L (ref 3.4–5.3)
PROT SERPL-MCNC: 7 G/DL (ref 6.8–8.8)
RBC # BLD AUTO: 4.92 10E12/L (ref 3.8–5.2)
SODIUM SERPL-SCNC: 140 MMOL/L (ref 133–144)
WBC # BLD AUTO: 3.9 10E9/L (ref 4–11)

## 2020-06-10 PROCEDURE — 96365 THER/PROPH/DIAG IV INF INIT: CPT

## 2020-06-10 PROCEDURE — 25000128 H RX IP 250 OP 636: Mod: ZF | Performed by: INTERNAL MEDICINE

## 2020-06-10 PROCEDURE — G0463 HOSPITAL OUTPT CLINIC VISIT: HCPCS | Mod: ZF

## 2020-06-10 PROCEDURE — 85025 COMPLETE CBC W/AUTO DIFF WBC: CPT | Performed by: PHYSICIAN ASSISTANT

## 2020-06-10 PROCEDURE — G0463 HOSPITAL OUTPT CLINIC VISIT: HCPCS | Mod: 25

## 2020-06-10 PROCEDURE — 25800030 ZZH RX IP 258 OP 636: Mod: ZF | Performed by: INTERNAL MEDICINE

## 2020-06-10 PROCEDURE — 00000402 ZZHCL STATISTIC TOTAL PROTEIN: Performed by: PHYSICIAN ASSISTANT

## 2020-06-10 PROCEDURE — 80053 COMPREHEN METABOLIC PANEL: CPT | Performed by: PHYSICIAN ASSISTANT

## 2020-06-10 PROCEDURE — 84165 PROTEIN E-PHORESIS SERUM: CPT | Performed by: PHYSICIAN ASSISTANT

## 2020-06-10 RX ORDER — ZOLEDRONIC ACID 0.04 MG/ML
4 INJECTION, SOLUTION INTRAVENOUS ONCE
Status: CANCELLED | OUTPATIENT
Start: 2020-09-17

## 2020-06-10 RX ORDER — HEPARIN SODIUM (PORCINE) LOCK FLUSH IV SOLN 100 UNIT/ML 100 UNIT/ML
5 SOLUTION INTRAVENOUS
Status: CANCELLED | OUTPATIENT
Start: 2020-09-17

## 2020-06-10 RX ORDER — HEPARIN SODIUM,PORCINE 10 UNIT/ML
5 VIAL (ML) INTRAVENOUS
Status: CANCELLED | OUTPATIENT
Start: 2020-09-17

## 2020-06-10 RX ORDER — ZOLEDRONIC ACID 0.04 MG/ML
4 INJECTION, SOLUTION INTRAVENOUS ONCE
Status: COMPLETED | OUTPATIENT
Start: 2020-06-10 | End: 2020-06-10

## 2020-06-10 RX ADMIN — SODIUM CHLORIDE 250 ML: 9 INJECTION, SOLUTION INTRAVENOUS at 14:38

## 2020-06-10 RX ADMIN — ZOLEDRONIC ACID 4 MG: 0.04 INJECTION, SOLUTION INTRAVENOUS at 14:49

## 2020-06-10 ASSESSMENT — PAIN SCALES - GENERAL: PAINLEVEL: NO PAIN (0)

## 2020-06-10 ASSESSMENT — MIFFLIN-ST. JEOR: SCORE: 1818.2

## 2020-06-10 NOTE — PATIENT INSTRUCTIONS
Contact Numbers  [facility]: [phone] (for symptom and scheduling needs)    Please call the [facility] Triage line if you experience a temperature greater than or equal to 100.4, shaking chills, have uncontrolled nausea, vomiting and/or diarrhea, dizziness, shortness of breath, chest pain, bleeding, unexplained bruising, or if you have any other new/concerning symptoms, questions or concerns.     If you are having any concerning symptoms or wish to speak to a provider before your next infusion visit, please call your care coordinator or triage to notify them so we can adequately serve you.     If you need a refill on a narcotic prescription or other medication, please call triage before your infusion appointment.

## 2020-06-10 NOTE — NURSING NOTE
"Oncology Rooming Note    Lindsey 10, 2020 1:52 PM   Winter Loya is a 64 year old female who presents for:    Chief Complaint   Patient presents with     Blood Draw     Labs drawn via PIV placed by RN in lab. VS taken. Patient checked in for next appt.     Oncology Clinic Visit     RETURN VISIT; MULTIPLE MYELOMA      Initial Vitals: /79 (BP Location: Right arm, Patient Position: Sitting, Cuff Size: Adult Regular)   Pulse 75   Temp 97  F (36.1  C) (Oral)   Resp 16   Ht 1.746 m (5' 8.74\")   Wt 119.8 kg (264 lb 1.6 oz)   SpO2 96%   BMI 39.30 kg/m   Estimated body mass index is 39.3 kg/m  as calculated from the following:    Height as of this encounter: 1.746 m (5' 8.74\").    Weight as of this encounter: 119.8 kg (264 lb 1.6 oz). Body surface area is 2.41 meters squared.  No Pain (0) Comment: Data Unavailable   No LMP recorded. Patient is postmenopausal.  Allergies reviewed: Yes  Medications reviewed: Yes    Medications: Medication refills not needed today.  Pharmacy name entered into [facility]:    [facility] [place] [place] [facility] [address] [facility] [address] [facility] - [place], [place] - [address] [place], [place] [address]    Clinical concerns: Patient states that she has a hard time breathing but it comes and goes so fast. She is wondering if her Thyroid is getting in the way. She states that she is having a lot of memory issues. And she is concerned about working. She complains about her thumb \"clicking\" when she moves it around.  Dr. Tipton  was notified.      Lucero Lui              "

## 2020-06-10 NOTE — LETTER
6/10/2020         RE: Winter Loya  [address]  [place] [address]        Dear Colleague,    Thank you for referring your patient, Winter Loya, to the [facility]. Please see a copy of my visit note below.    [facility] Hematology Consult    Reason for consult: MM           History of Present Illness:   This patient is a 63 yo woman with IgA Kappa Multiple Myeloma. In 2018 she had anemia and was fatigued. She was found to have IgA kappa monocloncal antibody with M-spike of 0.17. IgA elevated. Skeletal survey was unremarkable and UPEP had minimal protein (156 mg/m2). PET 11/2018 showed bone marrow consistent with hypermetabolic plasma cell myeloma. M spike was 0.17. She started RVD and Zometa. On 4/2019 she had an autologous transplant.     Her bone marrow bx from September 2019 was sent here for review. It showed marrow cellularity of 60%, with decreased trilineage hematopoiesis and 15% plasma cells as well as peripheral blood with slight anemia. 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 She is on revlimid maintenance and zometa from her prior oncologist in [place]. On 12/6/19 her K/L ratio is 1.1, M spike is 0.04.    She is doing fine form a myeloma standpoint and is on lenalidomide maintenance.         Physical Exam:   Vitals were reviewed  Temp: 98.2  F (36.8  C) Temp src: Oral BP: (!) 167/74 Pulse: 69   Resp: 18 SpO2: 96 %      General: NAD  HEENT: no scleral icterus, MMM, has a wisdom tooth erupting from the right mandible.   Lymph: No cervical, supraclavicular, or axillary LAD  Pulm: CTAB  CV: RRR, no m/r/g  Abd: soft, nontender  Extremities: No edema  Neuro: alert, conversant  Psych: appropriate mood and affect         Assessment and Recommendation:     63 yo woman with standard risk myeloma s/p auto transplant. She has been on  lenaidomide 10 mg every day and has tolerated this. Based on the outside records she has had about 13 does of Zometa. We will get myeloma labs and a PET scan for baseline imaging. She does have pains that she attributes to arthritis but these require evaluation to rule out myeloma    - We recommend continuing lenalidomide 10 mg every day since it is well tolerated and in a randomized, phase 3 trial, lenalidomide improved PFS (39 months compared to 20 months for observation).    (Lenalidomide maintenance versus observation for patients with newly diagnosed multiple myeloma (Myeloma XI): a multicentre, open-label, randomised, phase 3 trial. Jaleel STEPHENS, et al.Lancet Oncol. 2019 Jan;20)    - We recommend  mg every day to prevent thromboembolic complications from lenalidomide. Her SAVED score is 0. She has no history of clots but she is diabetic.     - We will continue zometa every 3 months through 12/2020 to complete 2 years of therapy as it has been associated with improved PFS and overall survival when given with ASCT (Randomized clinical trial of zoledronic acid in multiple myeloma patients undergoing high-dose chemotherapy and stem-cell transplantation CELIA Sandhu MD et al Current Onc 2013)    -Since lenalidomide increases the risk of secondary malignancies, we gave the patient a referral for primary care. She needs a repeat colonoscopy and will require ongoing cancer screenings.     - The pt has a hx of thyroid nodules and was told that she would need a repeat U/S of the thyroid. She has been recommended surgery and she is debating about it.    -I have asked Sushil Salas to update her about vaccinations since she hasnt had any since her ASCT a year ago.      Brit Zhu MD  Seen with Dr. Tipton    The patient was discussed with the clinical fellow, seen and examined by me. All labs and imaging were reviewed. I  I agree with the fellows note and have been responsible for the care plan and interpretation  of progress. Any additional information to the fellows note has been documented below.      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 To use with Victoza daily     lactulose (CHRONULAC) 10 GM/15ML solution TK 10ML PO D     latanoprost (XALATAN) 0.005 % ophthalmic solution Place 1 drop into both eyes daily     LENalidomide (REVLIMID) 10 MG CAPS capsule Take 1 capsule (10 mg) by mouth daily for 28 days     meloxicam (MOBIC) 15 MG tablet Take 1 tablet (15 mg) by mouth daily     metFORMIN (GLUCOPHAGE) 500 MG tablet Take by mouth every 24 hours     pregabalin (LYRICA) 100 MG capsule Take 1 capsule (100 mg) by mouth 3 times daily     sertraline (ZOLOFT) 100 MG tablet Take 1 tablet (100 mg) by mouth daily     tiZANidine (ZANAFLEX) 4 MG tablet Take 1-2 tablets (4-8 mg) by mouth nightly as needed (Patient not taking: Reported on 6/3/2020)     No current facility-administered medications for this visit.              Review of Systems:   The 10 point Review of Systems is negative other than noted in the HPI        Again, thank you for allowing me to participate in the care of your patient.         Sincerely,        Mele Tipton MD

## 2020-06-10 NOTE — NURSING NOTE
Chief Complaint   Patient presents with     Blood Draw     Labs drawn via PIV placed by RN in lab. VS taken. Patient checked in for next appt.     Labs drawn from PIV placed by RN. Line flushed with saline. Vitals taken. Pt checked in for appointment.    Aaliyah Marx RN

## 2020-06-11 NOTE — PROGRESS NOTES
[facility] Hematology Consult    Reason for consult: MM           History of Present Illness:   This patient is a 65 yo woman with IgA Kappa Multiple Myeloma. In 2018 she had anemia and was fatigued. She was found to have IgA kappa monocloncal antibody with M-spike of 0.17. IgA elevated. Skeletal survey was unremarkable and UPEP had minimal protein (156 mg/m2). PET 11/2018 showed bone marrow consistent with hypermetabolic plasma cell myeloma. M spike was 0.17. She started RVD and Zometa. On 4/2019 she had an autologous transplant.     Her bone marrow bx from September 2019 was sent here for review. It showed marrow cellularity of 60%, with decreased trilineage hematopoiesis and 15% plasma cells as well as peripheral blood with slight anemia. 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 She is on revlimid maintenance and zometa from her prior oncologist in [place]. On 12/6/19 her K/L ratio is 1.1, M spike is 0.04.    She is doing fine form a myeloma standpoint and is on lenalidomide maintenance.         Physical Exam:   Vitals were reviewed  Temp: 98.2  F (36.8  C) Temp src: Oral BP: (!) 167/74 Pulse: 69   Resp: 18 SpO2: 96 %      General: NAD  HEENT: no scleral icterus, MMM, has a wisdom tooth erupting from the right mandible.   Lymph: No cervical, supraclavicular, or axillary LAD  Pulm: CTAB  CV: RRR, no m/r/g  Abd: soft, nontender  Extremities: No edema  Neuro: alert, conversant  Psych: appropriate mood and affect         Assessment and Recommendation:     65 yo woman with standard risk myeloma s/p auto transplant. She has been on lenaidomide 10 mg every day and has tolerated this. Based on the outside records she has had about 13 does of Zometa. We will get myeloma labs and a PET scan for baseline imaging. She does have pains that she attributes to arthritis but these require  evaluation to rule out myeloma    - We recommend continuing lenalidomide 10 mg every day since it is well tolerated and in a randomized, phase 3 trial, lenalidomide improved PFS (39 months compared to 20 months for observation).    (Lenalidomide maintenance versus observation for patients with newly diagnosed multiple myeloma (Myeloma XI): a multicentre, open-label, randomised, phase 3 trial. Jaleel STEPHENS, et al.Lancet Oncol. 2019 Jan;20)    - We recommend  mg every day to prevent thromboembolic complications from lenalidomide. Her SAVED score is 0. She has no history of clots but she is diabetic.     - We will continue zometa every 3 months through 12/2020 to complete 2 years of therapy as it has been associated with improved PFS and overall survival when given with ASCT (Randomized clinical trial of zoledronic acid in multiple myeloma patients undergoing high-dose chemotherapy and stem-cell transplantation CELIA Sandhu MD et al Current Onc 2013)    -Since lenalidomide increases the risk of secondary malignancies, we gave the patient a referral for primary care. She needs a repeat colonoscopy and will require ongoing cancer screenings.     - The pt has a hx of thyroid nodules and was told that she would need a repeat U/S of the thyroid. She has been recommended surgery and she is debating about it.    -I have asked Sushil Salas to update her about vaccinations since she hasnt had any since her ASCT a year ago.      Brit Zhu MD  Seen with Dr. Tipton    The patient was discussed with the clinical fellow, seen and examined by me. All labs and imaging were reviewed. I  I agree with the fellows note and have been responsible for the care plan and interpretation of progress. Any additional information to the fellows note has been documented below.      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 To use with Victoza daily     lactulose (CHRONULAC) 10 GM/15ML solution TK 10ML PO D     latanoprost (XALATAN) 0.005 % ophthalmic solution Place 1 drop into both eyes daily     LENalidomide (REVLIMID) 10 MG CAPS capsule Take 1 capsule (10 mg) by mouth daily for 28 days     meloxicam (MOBIC) 15 MG tablet Take 1 tablet (15 mg) by mouth daily     metFORMIN (GLUCOPHAGE) 500 MG tablet Take by mouth every 24 hours     pregabalin (LYRICA) 100 MG capsule Take 1 capsule (100 mg) by mouth 3 times daily     sertraline (ZOLOFT) 100 MG tablet Take 1 tablet (100 mg) by mouth daily     tiZANidine (ZANAFLEX) 4 MG tablet Take 1-2 tablets (4-8 mg) by mouth nightly as needed (Patient not taking: Reported on 6/3/2020)     No current facility-administered medications for this visit.              Review of Systems:   The 10 point Review of Systems is negative other than noted in the HPI

## 2020-06-15 ENCOUNTER — TELEPHONE (OUTPATIENT)
Dept: ENDOCRINOLOGY | Facility: CLINIC | Age: 65
End: 2020-06-15

## 2020-06-15 DIAGNOSIS — E11.9 TYPE 2 DIABETES MELLITUS WITHOUT COMPLICATION, WITHOUT LONG-TERM CURRENT USE OF INSULIN (H): ICD-10-CM

## 2020-06-15 NOTE — TELEPHONE ENCOUNTER
[facility] Call Center    Phone Message    May a detailed message be left on voicemail: yes     Reason for Call: Medication Refill Request    Has the patient contacted the pharmacy for the refill? Yes   Name of medication being requested: insulin glargine (LANTUS PEN) 100 UNIT/ML pen   Provider who prescribed the medication: Abigail  Pharmacy: [facility]  Date medication is needed: 6/15/2020     Pt stated all out of medication to the pharmacy.       Action Taken: Message routed to:  [facility] ([facility]): [place], Diabetes    Travel Screening: Not Applicable

## 2020-06-15 NOTE — TELEPHONE ENCOUNTER
insulin glargine (LANTUS PEN) 100 UNIT/ML pen     Last Written Prescription Date: 2/22/20  Last Fill Quantity: 9 ml,   # refills: 3  Last Office Visit :2/22/20  Future Office visit:   none    Routing refill request to provider for review/approval because: request per pt call. Needs endo triage approval. Pharmacy confirmed no rfs remaining.

## 2020-06-15 NOTE — TELEPHONE ENCOUNTER
Lantus refill sent to [facility]. On our side there was  3 refills  But sent again for 90 days supply 3 refills. Jaylin Causey RN on 6/15/2020 at 9:51 AM

## 2020-06-20 ENCOUNTER — ANCILLARY PROCEDURE (OUTPATIENT)
Dept: MRI IMAGING | Facility: CLINIC | Age: 65
End: 2020-06-20
Attending: PREVENTIVE MEDICINE
Payer: MEDICAID

## 2020-06-20 ENCOUNTER — OFFICE VISIT (OUTPATIENT)
Dept: TRANSPLANT | Facility: CLINIC | Age: 65
End: 2020-06-20
Attending: INTERNAL MEDICINE
Payer: MEDICAID

## 2020-06-20 VITALS
TEMPERATURE: 98 F | HEART RATE: 75 BPM | SYSTOLIC BLOOD PRESSURE: 132 MMHG | DIASTOLIC BLOOD PRESSURE: 78 MMHG | RESPIRATION RATE: 18 BRPM | OXYGEN SATURATION: 97 %

## 2020-06-20 DIAGNOSIS — M54.12 CERVICAL RADICULAR PAIN: ICD-10-CM

## 2020-06-20 DIAGNOSIS — M51.369 DDD (DEGENERATIVE DISC DISEASE), LUMBAR: ICD-10-CM

## 2020-06-20 DIAGNOSIS — C90.01 MULTIPLE MYELOMA IN REMISSION (H): Primary | ICD-10-CM

## 2020-06-20 PROCEDURE — 25000581 ZZH RX MED A9270 GY (STAT IND- M) 250: Mod: ZF | Performed by: INTERNAL MEDICINE

## 2020-06-20 PROCEDURE — 90472 IMMUNIZATION ADMIN EACH ADD: CPT

## 2020-06-20 PROCEDURE — 90723 DTAP-HEP B-IPV VACCINE IM: CPT | Mod: ZF | Performed by: INTERNAL MEDICINE

## 2020-06-20 PROCEDURE — 25000128 H RX IP 250 OP 636: Mod: ZF | Performed by: INTERNAL MEDICINE

## 2020-06-20 PROCEDURE — G0009 ADMIN PNEUMOCOCCAL VACCINE: HCPCS

## 2020-06-20 PROCEDURE — 90670 PCV13 VACCINE IM: CPT | Mod: ZF | Performed by: INTERNAL MEDICINE

## 2020-06-20 PROCEDURE — 90750 HZV VACC RECOMBINANT IM: CPT | Mod: ZF | Performed by: INTERNAL MEDICINE

## 2020-06-20 PROCEDURE — 90648 HIB PRP-T VACCINE 4 DOSE IM: CPT | Mod: ZF | Performed by: INTERNAL MEDICINE

## 2020-06-20 RX ADMIN — HAEMOPHILUS B POLYSACCHARIDE CONJUGATE VACCINE FOR INJ 0.5 ML: RECON SOLN at 08:51

## 2020-06-20 RX ADMIN — PNEUMOCOCCAL 13-VALENT CONJUGATE VACCINE 0.5 ML: 2.2; 2.2; 2.2; 2.2; 2.2; 4.4; 2.2; 2.2; 2.2; 2.2; 2.2; 2.2; 2.2 INJECTION, SUSPENSION INTRAMUSCULAR at 08:50

## 2020-06-20 RX ADMIN — DIPHTHERIA AND TETANUS TOXOIDS AND ACELLULAR PERTUSSIS ADSORBED, HEPATITIS B (RECOMBINANT) AND INACTIVATED POLIOVIRUS VACCINE COMBINED 0.5 ML: 25; 10; 25; 25; 8; 10; 40; 8; 32 INJECTION, SUSPENSION INTRAMUSCULAR at 08:49

## 2020-06-20 RX ADMIN — ZOSTER VACCINE RECOMBINANT, ADJUVANTED 0.5 ML: KIT at 08:51

## 2020-06-20 ASSESSMENT — PAIN SCALES - GENERAL: PAINLEVEL: NO PAIN (0)

## 2020-06-20 NOTE — NURSING NOTE
"Oncology Rooming Note    June 20, 2020 11:04 AM   Winter Loya is a 64 year old female who presents for:    Chief Complaint   Patient presents with     Infusion     nurse visit for vaccinations s/p bmt txp for multiple myeloma     Initial Vitals: /78   Pulse 75   Temp 98  F (36.7  C)   Resp 18   SpO2 97%  Estimated body mass index is 39.3 kg/m  as calculated from the following:    Height as of 6/10/20: 1.746 m (5' 8.74\").    Weight as of 6/10/20: 119.8 kg (264 lb 1.6 oz). There is no height or weight on file to calculate BSA.  No Pain (0) Comment: Data Unavailable   No LMP recorded. Patient is postmenopausal.  Allergies reviewed: Yes  Medications reviewed: Yes    Medications: Medication refills not needed today.  Pharmacy name entered into [facility]:    [facility] - [place], [place] - [address]   [id number] [place], [place] [facility] [address]  [place], [place] - [address]    Clinical concerns: Patient denied fevers/N/V/D/respiratory symptoms.  She denies any pain/discomfort today.  She had no complaints.      MARIE HAIDER RN              "

## 2020-06-20 NOTE — PROGRESS NOTES
.Infusion Nursing Note:  Winter Loya presents today for scheduled vaccinations.    Patient seen by provider today: No   present during visit today: Not Applicable.    Note: No labs ordered for today.      Treatment Conditions:  Patient received scheduled four vaccinations.  See MAR for details.      Post Infusion Assessment:  Patient tolerated injections without incident.       Discharge Plan:   Patient discharged in stable condition accompanied by: self.    MARIE HAIDER RN

## 2020-06-20 NOTE — LETTER
6/20/2020         RE: Winter Loya  [address]  [place] [address]        Dear Colleague,    Thank you for referring your patient, Winter Loya, to the [facility]. Please see a copy of my visit note below.    .Infusion Nursing Note:  Winter Loya presents today for scheduled vaccinations.    Patient seen by provider today: No   present during visit today: Not Applicable.    Note: No labs ordered for today.      Treatment Conditions:  Patient received scheduled four vaccinations.  See MAR for details.      Post Infusion Assessment:  Patient tolerated injections without incident.       Discharge Plan:   Patient discharged in stable condition accompanied by: self.    MARIE HAIDER RN                          Again, thank you for allowing me to participate in the care of your patient.        Sincerely,        BMT Nurse

## 2020-06-29 ENCOUNTER — TELEPHONE (OUTPATIENT)
Dept: ENDOCRINOLOGY | Facility: CLINIC | Age: 65
End: 2020-06-29

## 2020-06-29 DIAGNOSIS — E04.2 NONTOXIC MULTINODULAR GOITER: Primary | ICD-10-CM

## 2020-06-29 NOTE — TELEPHONE ENCOUNTER
Called cytology and biopsy done 3/3/20  Does have the afirma vial for testing. Place order and they will send it out. Jaylin Causey RN on 6/29/2020 at 9:31 AM

## 2020-06-29 NOTE — TELEPHONE ENCOUNTER
----- Message from ASHLEY Hayes MD sent at 6/27/2020 12:56 PM CDT -----  Regarding: Afirma  vial  Mikey Mosqueda/ Elizabeth  Can you please check with Pathology if we still have the Afirma vial on hold and let me know?  Thanks,    Di Hayes MD PhD    Division of Endocrinology and Diabetes

## 2020-06-30 ENCOUNTER — TELEPHONE (OUTPATIENT)
Dept: ENDOCRINOLOGY | Facility: CLINIC | Age: 65
End: 2020-06-30

## 2020-07-03 DIAGNOSIS — C90.01 MULTIPLE MYELOMA IN REMISSION (H): Primary | ICD-10-CM

## 2020-07-03 RX ORDER — LENALIDOMIDE 10 MG/1
10 CAPSULE ORAL DAILY
Qty: 28 CAPSULE | Refills: 0 | Status: SHIPPED | OUTPATIENT
Start: 2020-07-03 | End: 2020-09-29

## 2020-07-04 NOTE — PROGRESS NOTES
HISTORY OF PRESENT ILLNESS  Ms. Loya is a pleasant 64 year old year old female who presents to clinic today with neck and low back pain worse  Winter explains that neck and lumbar  Location: neck and lumbar  Quality:  achy pain    5/10 at worst    Duration: worse over past several months  Timing: occurs intermittently  Context: occurs while exercising and lifting  Modifying factors:  resting and non-use makes it better, movement and use makes it worse  Associated signs & symptoms: radiation into legs and arms    MEDICAL HISTORY  Patient Active Problem List   Diagnosis     Multiple myeloma in remission (H)     Diabetes mellitus, type 2 (H)       Current Outpatient Medications   Medication Sig Dispense Refill     acyclovir (ZOVIRAX) 800 MG tablet Take 800 mg by mouth       atorvastatin (LIPITOR) 20 MG tablet Take 1 tablet (20 mg) by mouth every 24 hours 90 tablet 3     insulin lispro (HUMALOG VIAL) 100 UNIT/ML vial        insulin pen needle (FIFTY50 PEN NEEDLES) 32G X 4 MM miscellaneous As directed. To use with Victoza daily       lactulose (CHRONULAC) 10 GM/15ML solution TK 10ML PO D       latanoprost (XALATAN) 0.005 % ophthalmic solution Place 1 drop into both eyes daily 1 Bottle 11     meloxicam (MOBIC) 15 MG tablet Take 1 tablet (15 mg) by mouth daily 30 tablet 1     metFORMIN (GLUCOPHAGE) 500 MG tablet Take by mouth every 24 hours       pregabalin (LYRICA) 100 MG capsule Take 1 capsule (100 mg) by mouth 3 times daily 90 capsule 3     sertraline (ZOLOFT) 100 MG tablet Take 1 tablet (100 mg) by mouth daily 30 tablet 3     insulin glargine (LANTUS PEN) 100 UNIT/ML pen Inject 34 Units Subcutaneous At Bedtime 30 mL 3     LENalidomide (REVLIMID) 10 MG CAPS capsule Take 1 capsule (10 mg) by mouth daily for 28 days 28 capsule 0     LENalidomide (REVLIMID) 10 MG CAPS capsule Take 1 capsule (10 mg) by mouth daily for 28 days 28 capsule 0     tiZANidine (ZANAFLEX) 4 MG tablet Take 1-2 tablets (4-8 mg) by mouth nightly as  needed (Patient not taking: Reported on 6/3/2020) 30 tablet 1       No Known Allergies    Family History   Problem Relation Age of Onset     Glaucoma Paternal Grandfather      Social History     Socioeconomic History     Marital status: Unknown     Spouse name: Not on file     Number of children: Not on file     Years of education: Not on file     Highest education level: Not on file   Occupational History     Not on file   Social Needs     Financial resource strain: Not on file     Food insecurity     Worry: Not on file     Inability: Not on file     Transportation needs     Medical: Not on file     Non-medical: Not on file   Tobacco Use     Smoking status: Former Smoker     Smokeless tobacco: Former User   Substance and Sexual Activity     Alcohol use: Not on file     Drug use: Not on file     Sexual activity: Not on file   Lifestyle     Physical activity     Days per week: Not on file     Minutes per session: Not on file     Stress: Not on file   Relationships     Social connections     Talks on phone: Not on file     Gets together: Not on file     Attends Church service: Not on file     Active member of club or organization: Not on file     Attends meetings of clubs or organizations: Not on file     Relationship status: Not on file     Intimate partner violence     Fear of current or ex partner: Not on file     Emotionally abused: Not on file     Physically abused: Not on file     Forced sexual activity: Not on file   Other Topics Concern     Not on file   Social History Narrative     Not on file       Additional medical/Social/Surgical histories reviewed in [facility] and updated as appropriate.     REVIEW OF SYSTEMS (7/4/2020)  10 point ROS of systems including Constitutional, Eyes, Respiratory, Cardiovascular, Gastroenterology, Genitourinary, Integumentary, Musculoskeletal, Psychiatric, Allergic/Immunologic were all negative except for pertinent positives noted in my HPI.     PHYSICAL EXAM  Vital Signs: There  were no vitals taken for this visit. Patient declined being weighed. There is no height or weight on file to calculate BMI.    General  - normal appearance, in no obvious distress  HEENT  - conjunctivae not injected, moist mucous membranes, normocephalic/atraumatic head, ears normal appearance, no lesions, mouth normal appearance, no scars, normal dentition and teeth present  CV  - normal peripheral perfusion  Pulm  - normal respiratory pattern, non-labored    Musculoskeletal - CERVICAL SPINE  - stance and posture: normal gait without limp, no obvious leg length discrepancy, normal heel and toe walk, normal balance, slightly forward shoulders, head balanced normally on trunk  - inspection: normal bone and joint alignment, no obvious kyphosis  - palpation: no paravertebral or bony tenderness, except at base of neck and trapezius muscles  - ROM: normal and painless flexion, extension, left and right sidebending and rotation with some discomfort  - strength: upper extremities 5/5 in all planes  - special tests:  (-) spurlings    Neuro  - biceps, triceps, supinator DTRs 2+ bilaterally, no sensory or motor deficit, grossly normal coordination, normal muscle tone  Skin  - no ecchymosis, erythema, warmth, or induration, no obvious rash  Psych  - interactive, appropriate, normal mood and affect  Lumbar: has pain with flexion and extension, positive SLR  ASSESSMENT & PLAN  63 yo female with cervical ddd, radicular pain, lumbar ddd, radicular pain  Cervical xray: shows ddd  Ordered cervical MRI  Reviewed her previous lumbar imaging, shows ddd  Ordered lumbar MRI  Consider FARHAN    Mika Morgan MD, CAM

## 2020-07-06 DIAGNOSIS — Z11.59 ENCOUNTER FOR SCREENING FOR OTHER VIRAL DISEASES: Primary | ICD-10-CM

## 2020-07-07 ENCOUNTER — TELEPHONE (OUTPATIENT)
Dept: ONCOLOGY | Facility: CLINIC | Age: 65
End: 2020-07-07

## 2020-07-07 NOTE — TELEPHONE ENCOUNTER
Oral Chemotherapy Monitoring Program   Medication: Revlimid  Rx: 10mg PO daily on days 1 through 28 of 28 day cycle   Auth #: [id number]   Risk Category: Adult Female NORP  Routine survey questions reviewed.   Rx to be Escribed to Sheyla Borja  [facility]  Oncology Pharmacy Liaison   [email or web address]  [phone] (phone)  [phone] (fax)

## 2020-07-09 LAB — LAB SCANNED RESULT: NORMAL

## 2020-07-16 DIAGNOSIS — Z12.11 SPECIAL SCREENING FOR MALIGNANT NEOPLASMS, COLON: Primary | ICD-10-CM

## 2020-07-16 RX ORDER — BISACODYL 5 MG/1
10 TABLET, DELAYED RELEASE ORAL DAILY
Qty: 4 TABLET | Refills: 0 | Status: SHIPPED | OUTPATIENT
Start: 2020-07-16 | End: 2020-07-22

## 2020-07-16 NOTE — NURSING NOTE
eRx Dulcolax et Golytely: [id number] - [place], [place] - [address] JAYNE Alvarez RN  [facility]

## 2020-07-17 ENCOUNTER — TELEPHONE (OUTPATIENT)
Dept: GASTROENTEROLOGY | Facility: CLINIC | Age: 65
End: 2020-07-17

## 2020-07-17 NOTE — TELEPHONE ENCOUNTER
Patient scheduled for Colonoscopy    Indication for procedure. Screening colonoscopy    Referring Provider. Gabrielle Edwards MD    ? No     Arrival time verified? 6:30 AM    Facility location verified? [address] [place] [address]    Instructions given regarding prep and procedure patient declined review    Prep Type Golytely    Are you taking any anticoagulants or blood thinners? No     Instructions given? N/a     Electronic implanted devices? Denies     Pre procedure teaching completed? Yes    Transportation from procedure?  policy reviewed. Instructed patient to have someone stay with her for 24 hours post exam    H&P / Pre op physical completed? N/a

## 2020-07-21 DIAGNOSIS — Z11.59 ENCOUNTER FOR SCREENING FOR OTHER VIRAL DISEASES: ICD-10-CM

## 2020-07-21 PROCEDURE — U0003 INFECTIOUS AGENT DETECTION BY NUCLEIC ACID (DNA OR RNA); SEVERE ACUTE RESPIRATORY SYNDROME CORONAVIRUS 2 (SARS-COV-2) (CORONAVIRUS DISEASE [COVID-19]), AMPLIFIED PROBE TECHNIQUE, MAKING USE OF HIGH THROUGHPUT TECHNOLOGIES AS DESCRIBED BY CMS-2020-01-R: HCPCS | Performed by: INTERNAL MEDICINE

## 2020-07-21 NOTE — LETTER
July 23, 2020        Winter Loya  [address]  [address]  [place] [address]    This letter provides a written record that you were tested for COVID-19 on 7/21/2020.       Your result was negative. This means that we didn t find the virus that causes COVID-19 in your sample. A test may show negative when you do actually have the virus. This can happen when the virus is in the early stages of infection, before you feel illness symptoms.    If you have symptoms   Stay home and away from others (self-isolate) until you meet ALL of the guidelines below:    You ve had no fever--and no medicine that reduces fever--for 3 full days (72 hours). And      Your other symptoms have gotten better. For example, your cough or breathing has improved. And     At least 10 days have passed since your symptoms started.    During this time:    Stay home. Don t go to work, school or anywhere else.     Stay in your own room, including for meals. Use your own bathroom if you can.    Stay away from others in your home. No hugging, kissing or shaking hands. No visitors.    Clean  high touch  surfaces often (doorknobs, counters, handles, etc.). Use a household cleaning spray or wipes. You can find a full list on the EPA website at www.epa.gov/pesticide-registration/list-n-disinfectants-use-against-sars-cov-2.    Cover your mouth and nose with a mask, tissue or washcloth to avoid spreading germs.    Wash your hands and face often with soap and water.    Going back to work  Check with your employer for any guidelines to follow for going back to work.    Employers: This document serves as formal notice that your employee tested negative for COVID-19, as of the testing date shown above.

## 2020-07-22 ENCOUNTER — VIRTUAL VISIT (OUTPATIENT)
Dept: ONCOLOGY | Facility: CLINIC | Age: 65
End: 2020-07-22
Attending: INTERNAL MEDICINE
Payer: MEDICAID

## 2020-07-22 DIAGNOSIS — C90.01 MULTIPLE MYELOMA IN REMISSION (H): Primary | ICD-10-CM

## 2020-07-22 LAB
SARS-COV-2 RNA SPEC QL NAA+PROBE: NOT DETECTED
SPECIMEN SOURCE: NORMAL

## 2020-07-22 PROCEDURE — 40001009 ZZH VIDEO/TELEPHONE VISIT; NO CHARGE

## 2020-07-22 PROCEDURE — 99443 ZZC PHYSICIAN TELEPHONE EVALUATION 21-30 MIN: CPT | Performed by: PHYSICIAN ASSISTANT

## 2020-07-22 NOTE — PROGRESS NOTES
"Winter Loya is a 64 year old female who is being evaluated via a billable telephone visit.      The patient has been notified of following:     \"This telephone visit will be conducted via a call between you and your physician/provider. We have found that certain health care needs can be provided without the need for a physical exam.  This service lets us provide the care you need with a short phone conversation.  If a prescription is necessary we can send it directly to your pharmacy.  If lab work is needed we can place an order for that and you can then stop by our lab to have the test done at a later time.    Telephone visits are billed at different rates depending on your insurance coverage. During this emergency period, for some insurers they may be billed the same as an in-person visit.  Please reach out to your insurance provider with any questions.    If during the course of the call the physician/provider feels a telephone visit is not appropriate, you will not be charged for this service.\"    Patient has given verbal consent for Telephone visit?  Yes    What phone number would you like to be contacted at? [phone]    How would you like to obtain your AVS? MyChart     I have reviewed and updated the patient's allergies and medication list. Patient was asked to provide any patient recorded vital signs, height and/or weight.  Please see in \"Patient Reported Vital Signs\" tab information.        Concerns: For 4 weeks, pt has had diarrhea and will be having a colonoscopy tomorrow.        Refills: None     Dom Loyola, EMT      "

## 2020-07-22 NOTE — PROGRESS NOTES
Provider Note:   Jul 22, 2020    Oncologic history:  Winter Loya is a 64 year old woman with IgA Kappa Multiple Myeloma. In 2018 she had anemia and was fatigued. She was found to have IgA kappa monocloncal antibody with M-spike of 0.17. IgA elevated. Skeletal survey was unremarkable and UPEP had minimal protein (156 mg/m2). PET 11/2018 showed bone marrow consistent with hypermetabolic plasma cell myeloma. M spike was 0.17. She started RVD and Zometa. On 4/2019 she had an autologous transplant.      Her bone marrow bx from September 2019 was sent here for review. It showed marrow cellularity of 60%, with decreased trilineage hematopoiesis and 15% plasma cells as well as peripheral blood with slight anemia. 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 She is on revlimid maintenance and zometa from her prior oncologist in [place]. On 12/6/19 her K/L ratio is 1.1, M spike is 0.04.     Interim History:   -Has been having diarrhea for the past 4 weeks. A little better this week. Having about 3 stools in the AM. She is having a colonoscopy tomorrow  -continual neuropathy. Stepped on something and was unable to feel it and ended up bleeding  -Eating and drinking well.   -still working and doing relatively well   -has been having a lot of memory issues and losing words  -now living with son, though they do not have a good relationship  -Has been having a lot of stress. Still taking Zoloft. She can tell the difference when she is off of it   -she feels better physically better than she has in years    ROS: 10 point ROS neg other than the symptoms noted above in the HPI.    Objectives:  Vital Signs 5/11/2020   Systolic 153   Diastolic 76   Pulse 65   Temperature 98.8   Respirations 16   Weight (LB) 261 lb 3.2 oz   O2 99     Wt Readings from Last 4 Encounters:   06/10/20 119.8 kg (264 lb 1.6 oz)    05/11/20 118.5 kg (261 lb 3.2 oz)   03/10/20 117.9 kg (260 lb)   03/09/20 115.2 kg (254 lb)     Objective:  General: patient sounds in no audible acute distress, alert and oriented, speech clear and fluid  Resp: Speaking in full sentences, no audible respiratory distress, no cough, no audible wheeze  Psych: able to articulate logical thoughts, able to abstract reason, no tangential thoughts, no hallucinations or delusions  His affect is normal    Labs:   Labs were personally reviewed    No recent labs    Imaging:  No new imaging    Assessment/Plan:    MM, standard risk, IgA Kappa  -s/p auto HSCT in 4/2019  -now on maintenance Revlimid 10 mg daily.   -will going SPEP and SFLC monthly. Last Mspike was 0.0 from 6/10. Did not have labs this month. Will request for them this week  -PET/CT annually or if symptomatic from MM due to bone pain. Last PET was 2/15/20  -still stable from a MM standpoint. Continue Rev. Will follow-up with me about every 6 weeks. Should follow-up with PCP about other ongoing issues   -will follow-up with Dr. Tipton in 6 months (December)      -continue zometa through 12/2020 to complete a total of 2 years of therapy, associated with improved PFS and overall survival when given with ASCT. Did have temporary pause with COVID and now will give every 3 months to limit clinic visits     PPx: Continue ACV and  mg for VTE ppx while on Rev    Psych  -depression and anxiety has been something she has dealt with for a long time.   -Currently maintained on Zoloft 100 mg daily.  This has been helpful for her though she continues to struggle with ongoing stress, anxiety and sadness.  Has not seen a counselor for a while.  Will refer her to palliative SW here for counseling    Diarrhea  -Ongoing for the past 4 weeks.  Having about 3 stools a day primarily in the morning.  Has improved in the last week.  Has not taken any medications for this.  She is having a colonoscopy tomorrow though this was  scheduled prior to the diarrhea starting.  This could be helpful diagnostically though I encouraged her to follow-up with her primary care provider if it continues to be ongoing. This is not due to her multiple myeloma and unlikely due to the Revlimid as she has been this on this for a while with no difficulties. Feel like stress could be a likely cause     Memory, word finding difficulties  -Has been worse lately and this is very bothersome and anxiety provoking for her.  I feel like her depression is likely worsening this though with her concern, I will refer her to neuropsych testing for further evaluation     Neuropathy  -due to previous chemotherapy. Not painful, just numb. No helpful medical management for this currently. Discussed being more caution and careful when she is walking and trying to be aware of her surroundings    Not discussed today:     Thyroid Nodules  -saw endocrinology on 2/22  -FNA was done on 3/3/2020 showing atypia of undetermined significance on right lobe nodule and benign left lobe nodule.   -calcitonin and thyroid function tests were both normal  -met with Dr. Burgos, surgery. Plan to get a total thyroidectomy. Planned to postpone for 6 months in order to get settled in her job first.     Phone call duration: 31 minutes    Reva Mojica PA-C

## 2020-07-22 NOTE — LETTER
"    7/22/2020         RE: Winter Loya  [address]  [address]  [facility] [address]        Dear Colleague,    Thank you for referring your patient, Winter Loya, to the [facility]. Please see a copy of my visit note below.    Winter Loya is a 64 year old female who is being evaluated via a billable telephone visit.          I have reviewed and updated the patient's allergies and medication list. Patient was asked to provide any patient recorded vital signs, height and/or weight.  Please see in \"Patient Reported Vital Signs\" tab information.        Concerns: For 4 weeks, pt has had diarrhea and will be having a colonoscopy tomorrow.        Refills: None     Dom Loyola, JOSE        Provider Note:   Jul 22, 2020    Oncologic history:  Winter Loya is a 64 year old woman with IgA Kappa Multiple Myeloma. In 2018 she had anemia and was fatigued. She was found to have IgA kappa monocloncal antibody with M-spike of 0.17. IgA elevated. Skeletal survey was unremarkable and UPEP had minimal protein (156 mg/m2). PET 11/2018 showed bone marrow consistent with hypermetabolic plasma cell myeloma. M spike was 0.17. She started RVD and Zometa. On 4/2019 she had an autologous transplant.      Her bone marrow bx from September 2019 was sent here for review. It showed marrow cellularity of 60%, with decreased trilineage hematopoiesis and 15% plasma cells as well as peripheral blood with slight anemia. 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 She is on revlimid maintenance and zometa from her prior oncologist in [place]. On 12/6/19 her K/L ratio is 1.1, M spike is 0.04.     Interim History:   -Has been having diarrhea for the past 4 weeks. A little better this week. Having about 3 stools in the AM. She is having a colonoscopy tomorrow  -continual neuropathy. Stepped on something " and was unable to feel it and ended up bleeding  -Eating and drinking well.   -still working and doing relatively well   -has been having a lot of memory issues and losing words  -now living with son, though they do not have a good relationship  -Has been having a lot of stress. Still taking Zoloft. She can tell the difference when she is off of it   -she feels better physically better than she has in years    ROS: 10 point ROS neg other than the symptoms noted above in the HPI.    Objectives:  Vital Signs 5/11/2020   Systolic 153   Diastolic 76   Pulse 65   Temperature 98.8   Respirations 16   Weight (LB) 261 lb 3.2 oz   O2 99     Wt Readings from Last 4 Encounters:   06/10/20 119.8 kg (264 lb 1.6 oz)   05/11/20 118.5 kg (261 lb 3.2 oz)   03/10/20 117.9 kg (260 lb)   03/09/20 115.2 kg (254 lb)     Objective:  General: patient sounds in no audible acute distress, alert and oriented, speech clear and fluid  Resp: Speaking in full sentences, no audible respiratory distress, no cough, no audible wheeze  Psych: able to articulate logical thoughts, able to abstract reason, no tangential thoughts, no hallucinations or delusions  His affect is normal    Labs:   Labs were personally reviewed    No recent labs    Imaging:  No new imaging    Assessment/Plan:    MM, standard risk, IgA Kappa  -s/p auto HSCT in 4/2019  -now on maintenance Revlimid 10 mg daily.   -will going SPEP and SFLC monthly. Last Mspike was 0.0 from 6/10. Did not have labs this month. Will request for them this week  -PET/CT annually or if symptomatic from MM due to bone pain. Last PET was 2/15/20  -still stable from a MM standpoint. Continue Rev. Will follow-up with me about every 6 weeks. Should follow-up with PCP about other ongoing issues   -will follow-up with Dr. Tipton in 6 months (December)      -continue zometa through 12/2020 to complete a total of 2 years of therapy, associated with improved PFS and overall survival when given with ASCT. Did  have temporary pause with COVID and now will give every 3 months to limit clinic visits     PPx: Continue ACV and  mg for VTE ppx while on Rev    Psych  -depression and anxiety has been something she has dealt with for a long time.   -Currently maintained on Zoloft 100 mg daily.  This has been helpful for her though she continues to struggle with ongoing stress, anxiety and sadness.  Has not seen a counselor for a while.  Will refer her to palliative SW here for counseling    Diarrhea  -Ongoing for the past 4 weeks.  Having about 3 stools a day primarily in the morning.  Has improved in the last week.  Has not taken any medications for this.  She is having a colonoscopy tomorrow though this was scheduled prior to the diarrhea starting.  This could be helpful diagnostically though I encouraged her to follow-up with her primary care provider if it continues to be ongoing. This is not due to her multiple myeloma and unlikely due to the Revlimid as she has been this on this for a while with no difficulties. Feel like stress could be a likely cause     Memory, word finding difficulties  -Has been worse lately and this is very bothersome and anxiety provoking for her.  I feel like her depression is likely worsening this though with her concern, I will refer her to neuropsych testing for further evaluation     Neuropathy  -due to previous chemotherapy. Not painful, just numb. No helpful medical management for this currently. Discussed being more caution and careful when she is walking and trying to be aware of her surroundings    Not discussed today:     Thyroid Nodules  -saw endocrinology on 2/22  -FNA was done on 3/3/2020 showing atypia of undetermined significance on right lobe nodule and benign left lobe nodule.   -calcitonin and thyroid function tests were both normal  -met with Dr. Burgos, surgery. Plan to get a total thyroidectomy. Planned to postpone for 6 months in order to get settled in her job first.      Phone call duration: 31 minutes    Reva Mojica PA-C

## 2020-07-23 ENCOUNTER — HOSPITAL ENCOUNTER (OUTPATIENT)
Facility: AMBULATORY SURGERY CENTER | Age: 65
End: 2020-07-23
Attending: INTERNAL MEDICINE
Payer: MEDICAID

## 2020-07-23 ENCOUNTER — DOCUMENTATION ONLY (OUTPATIENT)
Dept: CARE COORDINATION | Facility: CLINIC | Age: 65
End: 2020-07-23

## 2020-07-23 VITALS
WEIGHT: 250 LBS | BODY MASS INDEX: 37.89 KG/M2 | TEMPERATURE: 97 F | HEART RATE: 64 BPM | OXYGEN SATURATION: 97 % | DIASTOLIC BLOOD PRESSURE: 70 MMHG | RESPIRATION RATE: 16 BRPM | HEIGHT: 68 IN | SYSTOLIC BLOOD PRESSURE: 144 MMHG

## 2020-07-23 LAB
COLONOSCOPY: NORMAL
GLUCOSE BLDC GLUCOMTR-MCNC: 142 MG/DL (ref 70–99)

## 2020-07-23 RX ORDER — SIMETHICONE
LIQUID (ML) MISCELLANEOUS PRN
Status: DISCONTINUED | OUTPATIENT
Start: 2020-07-23 | End: 2020-07-23 | Stop reason: HOSPADM

## 2020-07-23 RX ORDER — ONDANSETRON 2 MG/ML
4 INJECTION INTRAMUSCULAR; INTRAVENOUS
Status: DISCONTINUED | OUTPATIENT
Start: 2020-07-23 | End: 2020-07-24 | Stop reason: HOSPADM

## 2020-07-23 RX ORDER — LIDOCAINE 40 MG/G
CREAM TOPICAL
Status: DISCONTINUED | OUTPATIENT
Start: 2020-07-23 | End: 2020-07-24 | Stop reason: HOSPADM

## 2020-07-23 RX ORDER — FENTANYL CITRATE 50 UG/ML
INJECTION, SOLUTION INTRAMUSCULAR; INTRAVENOUS PRN
Status: DISCONTINUED | OUTPATIENT
Start: 2020-07-23 | End: 2020-07-23 | Stop reason: HOSPADM

## 2020-07-23 ASSESSMENT — MIFFLIN-ST. JEOR: SCORE: 1742.49

## 2020-07-27 DIAGNOSIS — Z79.899 ENCOUNTER FOR LONG-TERM (CURRENT) USE OF MEDICATIONS: ICD-10-CM

## 2020-07-27 DIAGNOSIS — C90.01 MULTIPLE MYELOMA IN REMISSION (H): ICD-10-CM

## 2020-07-27 LAB
ALBUMIN SERPL-MCNC: 3.8 G/DL (ref 3.4–5)
ALP SERPL-CCNC: 125 U/L (ref 40–150)
ALT SERPL W P-5'-P-CCNC: 45 U/L (ref 0–50)
ANION GAP SERPL CALCULATED.3IONS-SCNC: 7 MMOL/L (ref 3–14)
AST SERPL W P-5'-P-CCNC: 19 U/L (ref 0–45)
BASOPHILS # BLD AUTO: 0.1 10E9/L (ref 0–0.2)
BASOPHILS NFR BLD AUTO: 1.7 %
BILIRUB SERPL-MCNC: 1.4 MG/DL (ref 0.2–1.3)
BUN SERPL-MCNC: 8 MG/DL (ref 7–30)
CALCIUM SERPL-MCNC: 8.9 MG/DL (ref 8.5–10.1)
CHLORIDE SERPL-SCNC: 102 MMOL/L (ref 94–109)
CO2 SERPL-SCNC: 26 MMOL/L (ref 20–32)
CREAT SERPL-MCNC: 0.59 MG/DL (ref 0.52–1.04)
DIFFERENTIAL METHOD BLD: ABNORMAL
EOSINOPHIL # BLD AUTO: 0.2 10E9/L (ref 0–0.7)
EOSINOPHIL NFR BLD AUTO: 5.8 %
ERYTHROCYTE [DISTWIDTH] IN BLOOD BY AUTOMATED COUNT: 14.2 % (ref 10–15)
GFR SERPL CREATININE-BSD FRML MDRD: >90 ML/MIN/{1.73_M2}
GLUCOSE SERPL-MCNC: 322 MG/DL (ref 70–99)
HCT VFR BLD AUTO: 41.5 % (ref 35–47)
HGB BLD-MCNC: 14.3 G/DL (ref 11.7–15.7)
IMM GRANULOCYTES # BLD: 0 10E9/L (ref 0–0.4)
IMM GRANULOCYTES NFR BLD: 0.5 %
LYMPHOCYTES # BLD AUTO: 0.7 10E9/L (ref 0.8–5.3)
LYMPHOCYTES NFR BLD AUTO: 16.8 %
MCH RBC QN AUTO: 28.6 PG (ref 26.5–33)
MCHC RBC AUTO-ENTMCNC: 34.5 G/DL (ref 31.5–36.5)
MCV RBC AUTO: 83 FL (ref 78–100)
MONOCYTES # BLD AUTO: 0.4 10E9/L (ref 0–1.3)
MONOCYTES NFR BLD AUTO: 8.4 %
NEUTROPHILS # BLD AUTO: 2.8 10E9/L (ref 1.6–8.3)
NEUTROPHILS NFR BLD AUTO: 66.8 %
NRBC # BLD AUTO: 0 10*3/UL
NRBC BLD AUTO-RTO: 0 /100
PLATELET # BLD AUTO: 133 10E9/L (ref 150–450)
POTASSIUM SERPL-SCNC: 3.2 MMOL/L (ref 3.4–5.3)
PROT SERPL-MCNC: 7 G/DL (ref 6.8–8.8)
RBC # BLD AUTO: 5 10E12/L (ref 3.8–5.2)
SODIUM SERPL-SCNC: 136 MMOL/L (ref 133–144)
WBC # BLD AUTO: 4.2 10E9/L (ref 4–11)

## 2020-07-27 PROCEDURE — 80053 COMPREHEN METABOLIC PANEL: CPT | Performed by: PHYSICIAN ASSISTANT

## 2020-07-27 PROCEDURE — 84165 PROTEIN E-PHORESIS SERUM: CPT | Performed by: PHYSICIAN ASSISTANT

## 2020-07-27 PROCEDURE — 00000402 ZZHCL STATISTIC TOTAL PROTEIN: Performed by: PHYSICIAN ASSISTANT

## 2020-07-27 PROCEDURE — 85025 COMPLETE CBC W/AUTO DIFF WBC: CPT | Performed by: PHYSICIAN ASSISTANT

## 2020-07-27 NOTE — PATIENT INSTRUCTIONS
Patient Education     Potassium-Rich Foods  The normal adult diet usually contains 2,000 mg to 4,000 mg of potassium per day. More potassium is needed when you lose too much potassium from your body. This can happen if you have diarrhea or vomiting. It can also happen if you take a medicine to make you urinate more (diuretic). To increase the amount of potassium in your diet, include these high-potassium foods.     [The (*) indicates foods highest in potassium.]  Vegetables  Artichokes. Cooked 1/2 cup, 200 mg to 300 mg*  Asparagus. Cooked 1/2 cup, 200 mg to 300 mg  Beans. White, red, baig cooked 1/2 cup, 300 mg to 500 mg*  Beets. Cooked 1/2 cup, 200 mg to 300 mg  Broccoli. Cooked or raw 1 cup, 200 mg to 500 mg*  [place] sprouts. Cooked 1/2 cup, 200 mg to 300 mg  Cabbage. Raw 1 cup, 100 mg to 200 mg  Carrots. Raw or cooked 1/2 cup, 100 mg to 200 mg  Celery. Raw 1 cup, 200 mg to 300 mg  Lima beans. Fresh or frozen 1/2 cup, 300 mg to 500 mg*   Mushrooms. Raw or cooked 1/2 cup, 100 mg to 300 mg  Peas. Cooked 1/2 cup, 150 mg to 250 mg   Potatoes. Baked 1 medium, 500 mg to 900 mg*   Spinach. Cooked 1 cup, 800 mg to 900 mg*   Spinach. Raw 2 cups, 300 mg to 400 mg *  Squash, winter. Fresh, frozen, or cooked 1/2 cup, 200 mg to 400 mg   Tomato. Fresh 1 medium, 200 mg to 300 mg   Tomato juice. Canned 1/2 cup, 200 mg to 300 mg   Fruits  Apple juice. Unsweetened 1 cup, 200 mg to 300 mg   Apricots. Canned 1/2 cup, 200 mg to 300 mg   Apricots. Dried 4 pieces, 100 mg to 200 mg   Avocado. Raw 1/2 cup, 300 mg to 400 mg*  Banana. Fresh 1 small, 300 mg to 400 mg*   Cantaloupe. Fresh 1 cup diced, 300 mg to 400 mg*   Grape juice. Unsweetened 1 cup, 200 mg to 300 mg   Honeydew melon. Fresh 1 cup diced, 300 mg to 400 mg*   Orange. Fresh 1 medium, 200 mg to 300 mg    Orange juice. Unsweetened, fresh or frozen 1/2 cup, 200 mg to 300 mg  Pineapple juice. Unsweetened 1 cup, 300 mg to 400 mg   Prune juice. Unsweetened 1/2 cup, 300 mg to 400  mg*   Prunes. Dried 5 pieces, 300 mg to 400 mg*   Strawberries. Fresh or frozen 1 cup, 200 mg to 300 mg  Meat  Red meat. Cooked 3 ounces, 100 mg to 300 mg   Seafood  Cod, flounder, halibut. Cooked 3 ounces, 100 mg to 300 mg*  Barnard. Cooked, 3 ounces 300 mg to 400 mg*   Scallops. Cooked 3 ounces, 200 mg to 300 mg*  Shrimp. Cooked 3/4 cup, 100 mg to 200 mg   Tuna. Fresh or canned 3/4 cup, 200 mg to 500 mg   Date Last Reviewed: 10/1/2016    2765-5546 [employer] [address] [place] [address], [place] [address]. All rights reserved. This information is not intended as a substitute for professional medical care. Always follow your healthcare professional's instructions.

## 2020-07-27 NOTE — NURSING NOTE
Chief Complaint   Patient presents with     Labs Only     labs drawn via venipuncture by RN in lab     There were no vitals taken for this visit.    Labs collected and sent from right antecubital venipuncture in lab by RN. Pt tolerated well.       Jojo Awan RN

## 2020-07-28 ENCOUNTER — VIRTUAL VISIT (OUTPATIENT)
Dept: OTOLARYNGOLOGY | Facility: CLINIC | Age: 65
End: 2020-07-28
Payer: MEDICAID

## 2020-07-28 ENCOUNTER — PREP FOR PROCEDURE (OUTPATIENT)
Dept: OTOLARYNGOLOGY | Facility: CLINIC | Age: 65
End: 2020-07-28

## 2020-07-28 DIAGNOSIS — M54.12 CERVICAL RADICULAR PAIN: ICD-10-CM

## 2020-07-28 DIAGNOSIS — E04.9 SUBSTERNAL THYROID GOITER: ICD-10-CM

## 2020-07-28 DIAGNOSIS — E04.9 THYROID GOITER: Primary | ICD-10-CM

## 2020-07-28 DIAGNOSIS — E04.9 SUBSTERNAL THYROID GOITER: Primary | ICD-10-CM

## 2020-07-28 LAB
ALBUMIN SERPL ELPH-MCNC: 4.4 G/DL (ref 3.7–5.1)
ALPHA1 GLOB SERPL ELPH-MCNC: 0.3 G/DL (ref 0.2–0.4)
ALPHA2 GLOB SERPL ELPH-MCNC: 0.6 G/DL (ref 0.5–0.9)
B-GLOBULIN SERPL ELPH-MCNC: 0.7 G/DL (ref 0.6–1)
GAMMA GLOB SERPL ELPH-MCNC: 0.5 G/DL (ref 0.7–1.6)
M PROTEIN SERPL ELPH-MCNC: 0 G/DL
PROT PATTERN SERPL ELPH-IMP: ABNORMAL

## 2020-07-28 RX ORDER — DEXAMETHASONE SODIUM PHOSPHATE 4 MG/ML
10 INJECTION, SOLUTION INTRA-ARTICULAR; INTRALESIONAL; INTRAMUSCULAR; INTRAVENOUS; SOFT TISSUE ONCE
Status: CANCELLED | OUTPATIENT
Start: 2020-07-28 | End: 2020-07-28

## 2020-07-28 RX ORDER — CEFAZOLIN SODIUM 2 G/50ML
2 SOLUTION INTRAVENOUS
Status: CANCELLED | OUTPATIENT
Start: 2020-07-28

## 2020-07-28 RX ORDER — CEFAZOLIN SODIUM 1 G/50ML
1 INJECTION, SOLUTION INTRAVENOUS SEE ADMIN INSTRUCTIONS
Status: CANCELLED | OUTPATIENT
Start: 2020-07-28

## 2020-07-28 NOTE — LETTER
7/28/2020       RE: Winter Loya  [address]  [address]  [facility] [address]     Dear Colleague,    Thank you for referring your patient, Winter Loya, to the [facility] at [facility]. Please see a copy of my visit note below.    Winter Loya is a 64 year old female who is being evaluated via a billable telephone visit.            Phone call duration: 15 minutes  Discussed total thyroidectomy, including but not limited to the risks of bleeding, infection, injury to the recurrent laryngeal nerve(s), loss of airway or permanent hypocalcemia. Will monitor the patient overnight in the hospital. All questions and concerns addressed.  Per last visit-patient needs a total thyroidectomy. Orders placed.    Tiff Burgos MD

## 2020-07-28 NOTE — TELEPHONE ENCOUNTER
tiZANidine (ZANAFLEX) 4 MG tablet      Last Written Prescription Date:  5/1/2020  Last Fill Quantity: 30,   # refills: 1  Last Office Visit: 6/3/2020  Future Office visit:  None scheduled

## 2020-07-28 NOTE — PROGRESS NOTES
"Winter Loya is a 64 year old female who is being evaluated via a billable telephone visit.      The patient has been notified of following:     \"This telephone visit will be conducted via a call between you and your physician/provider. We have found that certain health care needs can be provided without the need for a physical exam.  This service lets us provide the care you need with a short phone conversation.  If a prescription is necessary we can send it directly to your pharmacy.  If lab work is needed we can place an order for that and you can then stop by our lab to have the test done at a later time.    Telephone visits are billed at different rates depending on your insurance coverage. During this emergency period, for some insurers they may be billed the same as an in-person visit.  Please reach out to your insurance provider with any questions.    If during the course of the call the physician/provider feels a telephone visit is not appropriate, you will not be charged for this service.\"    Patient has given verbal consent for Telephone visit?  Yes    What phone number would you like to be contacted at? [phone]    How would you like to obtain your AVS? MyChart    Phone call duration: 15 minutes  Discussed total thyroidectomy, including but not limited to the risks of bleeding, infection, injury to the recurrent laryngeal nerve(s), loss of airway or permanent hypocalcemia. Will monitor the patient overnight in the hospital. All questions and concerns addressed.  Per last visit-patient needs a total thyroidectomy. Orders placed.    Tiff Burgos MD      "

## 2020-08-04 ENCOUNTER — TELEPHONE (OUTPATIENT)
Dept: ONCOLOGY | Facility: CLINIC | Age: 65
End: 2020-08-04

## 2020-08-04 DIAGNOSIS — C90.01 MULTIPLE MYELOMA IN REMISSION (H): Primary | ICD-10-CM

## 2020-08-04 RX ORDER — LENALIDOMIDE 10 MG/1
10 CAPSULE ORAL DAILY
Qty: 28 CAPSULE | Refills: 0 | Status: SHIPPED | OUTPATIENT
Start: 2020-08-04 | End: 2020-09-29

## 2020-08-04 NOTE — TELEPHONE ENCOUNTER
Oral Chemotherapy Monitoring Program   Medication: Revlimid  Rx: 10mg PO daily on days 1 through 28 of 28 day cycle   Auth #: [id number]   Risk Category: Adult female NORP  Routine survey questions reviewed.   Rx to be Escribed to Sheyla Borja  [facility]  Oncology Pharmacy Liaison   [email or web address]  [phone] (phone)  [phone] (fax)

## 2020-08-05 ENCOUNTER — TELEPHONE (OUTPATIENT)
Dept: OTOLARYNGOLOGY | Facility: CLINIC | Age: 65
End: 2020-08-05

## 2020-08-05 DIAGNOSIS — Z11.59 ENCOUNTER FOR SCREENING FOR OTHER VIRAL DISEASES: Primary | ICD-10-CM

## 2020-08-05 NOTE — TELEPHONE ENCOUNTER
Called patient to schedule surgery    Surgeon: MD Aubrey  Date of Surgery: 08/26/2020  Location of surgery: [place] OR  Pre-Op H&P: PAC appt scheduled   Post-Op Appt Date: 2 weeks post op    Imaging needed:  No  Discussed COVID-19 testing:  Yes  Pre-cert/Authorization completed:  No  Packet sent out: Yes  08/05/20    Patient understands taht she will be called for covid19 test. Asked patient to call direct dial number if she does not receive phone call. [employer] message sent with information.       Montse Bruce  08/05/20 4:08 PM  P: [phone]

## 2020-08-06 NOTE — TELEPHONE ENCOUNTER
FUTURE VISIT INFORMATION      SURGERY INFORMATION:    Date: 8/26/20    Location: [facility] or    Surgeon:  Tiff Burgos MD     Anesthesia Type:  general    Procedure: THYROIDECTOMY, TOTAL     Consult: virtual visit 7/28    RECORDS REQUESTED FROM:       Primary Care Provider: Gabrielle Edwards MD- [facility]    Most recent EKG+ Tracing: 10/11/18- [facility]    Most recent ECHO: 3/19/20    Most recent Cardiac Stress Test: 3/19/20

## 2020-08-14 ENCOUNTER — PRE VISIT (OUTPATIENT)
Dept: SURGERY | Facility: CLINIC | Age: 65
End: 2020-08-14

## 2020-08-14 ENCOUNTER — VIRTUAL VISIT (OUTPATIENT)
Dept: SURGERY | Facility: CLINIC | Age: 65
End: 2020-08-14
Payer: MEDICAID

## 2020-08-14 ENCOUNTER — ANESTHESIA EVENT (OUTPATIENT)
Dept: SURGERY | Facility: CLINIC | Age: 65
End: 2020-08-14
Payer: MEDICAID

## 2020-08-14 DIAGNOSIS — Z01.818 PREOP EXAMINATION: Primary | ICD-10-CM

## 2020-08-14 DIAGNOSIS — E04.9 THYROID GOITER: ICD-10-CM

## 2020-08-14 PROBLEM — F41.1 GAD (GENERALIZED ANXIETY DISORDER): Status: ACTIVE | Noted: 2017-05-23

## 2020-08-14 PROBLEM — Z98.890 HISTORY OF ENDOMETRIAL ABLATION: Status: ACTIVE | Noted: 2017-04-17

## 2020-08-14 ASSESSMENT — LIFESTYLE VARIABLES: TOBACCO_USE: 1

## 2020-08-14 ASSESSMENT — PAIN SCALES - GENERAL: PAINLEVEL: NO PAIN (0)

## 2020-08-14 NOTE — PROGRESS NOTES
"Winter Loya is a 64 year old female who is being evaluated via a billable video visit.      The patient has been notified of following:     \"This video visit will be conducted via a call between you and your physician/provider. We have found that certain health care needs can be provided without the need for an in-person physical exam.  This service lets us provide the care you need with a video conversation.  If a prescription is necessary we can send it directly to your pharmacy.  If lab work is needed we can place an order for that and you can then stop by our lab to have the test done at a later time.    Video visits are billed at different rates depending on your insurance coverage.  Please reach out to your insurance provider with any questions.    If during the course of the call the physician/provider feels a video visit is not appropriate, you will not be charged for this service.\"    Patient has given verbal consent for Video visit? Yes  How would you like to obtain your AVS? MyChart    Will anyone else be joining your video visit? No    RAMAN Marks LPN          "

## 2020-08-14 NOTE — PATIENT INSTRUCTIONS
Preparing for Your Surgery      Name:  Winter Loya   MRN:  [id number]   :  1955   Today's Date:  2020       Arriving for surgery:  Surgery date:  20  Arrival time:  7:45 am    Restrictions due to COVID 19:  Patients are allowed one visitor in the pre-op period  All visitors must wear a mask  No visitors under 18  No ill visitors   parking is not available     Please come to:       [facility] [address]  [address] [place]  [address]     -    Please proceed to the Surgery Lounge on the 3rd floor. [phone]?     - ?If you are in need of directions, wheelchair or escort please stop at the Information Desk in the lobby.  Inform the information person that you are here for surgery; a wheelchair and escort will be provided to the Surgery Lounge .?     What can I eat or drink?  -  You may eat and drink normally for up to 8 hours before your surgery. (Until 5:45 am)  -  You may have clear liquids until 2 hours before surgery. (Until 11:45 pm)    Examples of clear liquids:  Water  Clear broth  Juices (apple, white grape, white cranberry  and cider) without pulp  Noncarbonated, powder based beverages  (lemonade and Amrit-Aid)  Sodas (Sprite, 7-Up, ginger ale and seltzer)  Coffee or tea (without milk or cream)  Gatorade    -  No Alcohol for at least 24 hours before surgery     Which medicines can I take?    Hold Aspirin for 7 days before surgery.   Hold Multivitamins for 7 days before surgery.  Hold Supplements for 7 days before surgery.  Hold Ibuprofen (Advil, Motrin) for 1 day before surgery--unless otherwise directed by surgeon.  Hold Naproxen (Aleve) for 4 days before surgery.  **Reduce Insulin Glargine (Lantus) to 27 units the night before surgery.**    -  PLEASE TAKE these medications the day of surgery:  Acyclovir (Zovirax)  Atorvastatin (Lipitor)  Lantanoprost (Xalatan) eye drops  Lenalidomide (Revlimid)  Pregabalin (Lyrica)  Sertraline  (Zoloft)  Tizanidine (Znaflex)    How do I prepare myself?  - Please take 2 showers before surgery using Scrubcare or Hibiclens soap.    Use this soap only from the neck to your toes.     Leave the soap on your skin for one minute--then rinse thoroughly.      You may use your own shampoo and conditioner; no other hair products.   - Please remove all jewelry and body piercings.  - No lotions, deodorants or fragrance.  - No makeup or fingernail polish.   - Bring your ID and insurance card.    - All patients are required to have a Covid-19 test within 4 days of surgery/procedure.      -Patients will be contacted by the [facility] scheduling team within 1 week of surgery to make an appointment.      - Patients may call the Scheduling team at [phone] if they have not been scheduled within 4 days of  surgery.      ALL PATIENTS GOING HOME THE SAME DAY OF SURGERY ARE REQUIRED TO HAVE A RESPONSIBLE ADULT TO DRIVE AND BE IN ATTENDANCE WITH THEM FOR 24 HOURS FOLLOWING SURGERY     Questions or Concerns:    - For any questions regarding the day of surgery or your hospital stay, please contact the Pre Admission Nursing Office at [phone].       - If you have health changes between today and your surgery please call your surgeon.       For questions after surgery please call your surgeons office.

## 2020-08-14 NOTE — ANESTHESIA PREPROCEDURE EVALUATION
"Anesthesia Pre-Procedure Evaluation    Patient: Winter Loya   MRN:     [id number] Gender:   female   Age:    64 year old :      1955        Preoperative Diagnosis: Thyroid goiter [E04.9]   Procedure(s):  THYROIDECTOMY, TOTAL     LABS:  CBC:   Lab Results   Component Value Date    WBC 3.6 (L) 2020    WBC 4.2 2020    HGB 14.0 2020    HGB 14.3 2020    HCT 42.0 2020    HCT 41.5 2020     (L) 2020     (L) 2020     BMP:   Lab Results   Component Value Date     2020     2020    POTASSIUM 3.7 2020    POTASSIUM 3.2 (L) 2020    CHLORIDE 106 2020    CHLORIDE 102 2020    CO2 28 2020    CO2 26 2020    BUN 8 2020    BUN 8 2020    CR 0.62 2020    CR 0.59 2020     (H) 2020     (H) 2020     COAGS: No results found for: PTT, INR, FIBR  POC:   Lab Results   Component Value Date     (H) 2020    HCG Negative 03/10/2020     OTHER:   Lab Results   Component Value Date    A1C 9.1 (H) 2020    ASHLEY 8.5 2020    ALBUMIN 3.8 2020    PROTTOTAL 7.0 2020    ALT 45 2020    AST 19 2020    ALKPHOS 125 2020    BILITOTAL 1.4 (H) 2020    TSH 0.76 2020    T4 0.84 2020        Preop Vitals    BP Readings from Last 3 Encounters:   20 (!) 144/70   20 132/78   06/10/20 135/79    Pulse Readings from Last 3 Encounters:   20 64   20 75   06/10/20 75      Resp Readings from Last 3 Encounters:   20 16   20 18   06/10/20 16    SpO2 Readings from Last 3 Encounters:   20 97%   20 97%   06/10/20 96%      Temp Readings from Last 1 Encounters:   20 36.1  C (97  F) (Temporal)    Ht Readings from Last 1 Encounters:   20 1.727 m (5' 8\")      Wt Readings from Last 1 Encounters:   20 113.4 kg (250 lb)    Estimated body mass index is 38.01 kg/m  as calculated " "from the following:    Height as of 7/23/20: 1.727 m (5' 8\").    Weight as of 7/23/20: 113.4 kg (250 lb).     LDA:  Peripheral IV 08/24/20 Left (Active)   Number of days: 2       ETT (Active)   Number of days: 0        Past Medical History:   Diagnosis Date     Anxiety      Depression      Diabetes (H)      Glaucoma (increased eye pressure)      History of smoking      Hyperlipidemia      Multiple myeloma in remission (H)      Nonsenile cataract      Obesity      Sleep apnea     no c-pap use      Past Surgical History:   Procedure Laterality Date     ARTHROSCOPY KNEE       CHOLECYSTECTOMY       COLONOSCOPY N/A 7/23/2020    Procedure: COLONOSCOPY;  Surgeon: Za Denis MD;  Location: [facility] OR     EYE SURGERY       TONSILLECTOMY       TUBAL LIGATION        No Known Allergies     Anesthesia Evaluation     . Pt has had prior anesthetic. Type: General and MAC    No history of anesthetic complications          ROS/MED HX    ENT/Pulmonary:     (+)sleep apnea, tobacco use, Past use 1 packs/day  asthma doesn't use CPAP , . .   (-) recent URI   Neurologic:     (+)neuropathy - both,    (-) CVA and TIA   Cardiovascular:     (+) Dyslipidemia, hypertension----. : . . . :. . Previous cardiac testing Echodate:8/21/20results:Interpretation Summary  Global and regional left ventricular function is normal with an EF of 60-65%.  Right ventricular function, chamber size, wall motion, and thickness are  normal.  Pulmonary artery systolic pressure is normal.  The inferior vena cava is normal.  No pericardial effusion is present.  Previous study not available for comparison.Stress Testdate:8/24/20 results:Interpretation Summary  Normal, low-risk dobutamine echocardiogram without evidence of ischemia.  91% of maximal predicted heart rate (MPHR) was achieved.  Normal biventricular size, thickness, and global systolic function at  baseline.  With low-dose dobutamine, LVEF augmented and LV cavity size " decreased  appropriately.  With peak dobutamine, LVEF increased further to >70% and LV cavity size  decreased appropriately. Blood pressure was unremarkable.  No regional wall motion abnormalities at rest or with dobutamine.  The test was terminated due to the achievement of target dobutamine dose.  No angina was elicited.  There was 1 mm ST depression in III, aVF, V4-V6 and 1.5 mm ST depression in II  at peak stress. Normal heart rate and blood pressure response to dobutamine.  Occasional PACs and PVCs during stress.  No significant valvular abnormalities are noted on screening Doppler exam.  The aortic root and visualized ascending aorta are normal.  No prior stress tests available for comparison. date: results: date: results:          METS/Exercise Tolerance:  3 - Able to walk 1-2 blocks without stopping   Hematologic:  - neg hematologic  ROS       Musculoskeletal:  - neg musculoskeletal ROS      (-) arthritis   GI/Hepatic:  - neg GI/hepatic ROS      (-) GERD and liver disease   Renal/Genitourinary:  - ROS Renal section negative    (-) renal disease   Endo:     (+) type II DM Last HgA1c: 8/21/20 date: 9.1 Using insulin - not using insulin pump Normal glucose range: unknown not previously admitted for DM/DKA Diabetic complications: neuropathy, thyroid problem  Thyroid disease - Other thyroid goiter, Obesity, .      Psychiatric:     (+) psychiatric history anxiety and depression      Infectious Disease:  - neg infectious disease ROS      (-) Recent Fever   Malignancy:   (+) Malignancy History of Other  Other CA multiple myeloma - s/p stem cell transplant Remission status post Chemo         Other:    (+) no H/O Chronic Pain,                       PHYSICAL EXAM:   Mental Status/Neuro: A/A/O   Airway: Facies: Thick Neck  Mallampati: II  Mouth/Opening: Full  TM distance: > 6 cm  Neck ROM: Full   Respiratory: Auscultation: CTAB     Resp. Rate: Normal     Resp. Effort: Normal      CV: Rhythm: Regular  Rate: Age  appropriate  Heart: Normal Sounds  Edema: None   Comments:      Dental: Normal Dentition                Assessment:   ASA SCORE: 3    H&P: History and physical reviewed and following examination; no interval change.   Smoking Status:  Non-Smoker/Unknown   NPO Status: NPO Appropriate     Plan:   Anes. Type:  General   Pre-Medication: None   Induction:  IV (Standard)   Airway: ETT; Oral   Access/Monitoring: PIV   Maintenance: Balanced     Postop Plan:   Postop Pain: Opioids  Postop Sedation/Airway: Not planned  Disposition: Outpatient     PONV Management:   Adult Risk Factors: Female, Non-Smoker, Postop Opioids   Prevention: Ondansetron, Dexamethasone     CONSENT: Direct conversation   Plan and risks discussed with: Patient   Blood Products: Consented (ALL Blood Products)       Comments for Plan/Consent:  64yoF w/ sleep apnea, history of smoking, multiple myeloma, peripheral neuropathy, diabetes, obesity, depression, and anxiety that has a thyroid goiter. Today, she presents for thyroidectomy.              PAC Discussion and Assessment    ASA Classification: 3  Case is suitable for: [place]  Anesthetic techniques and relevant risks discussed: GA  Invasive monitoring and risk discussed:   Types:   Possibility and Risk of blood transfusion discussed:   NPO instructions given:   Additional anesthetic preparation and risks discussed:   Needs early admission to pre-op area:   Other:     PAC Resident/NP Anesthesia Assessment:  Winter Loya is a 64 year old female scheduled for a THYROIDECTOMY, TOTAL on 8/26/20 by Dr. Burgos in treatment of a thyroid goiter.  PAC referral for risk assessment and optimization for anesthesia with comorbid conditions of: hyperlipidemia, sleep apnea, history of smoking, multiple myeloma, peripheral neuropathy, diabetes, obesity, depression and anxiety.      Pre-operative considerations:  1.  Cardiac:  Functional status- METS 3.  She doesn't exercise purposefully and is quite sedentary, but  reports she can walk a block or two.  She had a stress test and echocardiogram ordered by her primary care provider in March 2020 for atypical chest pain (what she thinks might be a type of neuropathy), but she forgot to do the tests.  She has been instructed to complete those tests before approval to proceed will occur.  Will have a  call her today to get them scheduled.  She is agreeable.   She reports that she used to be on hypertension medication, but those were stopped last year when she had her stem cell transplant and never were restarted.  Most recent blood pressures noted from previous office visits were WNL.  Intermediate risk surgery with 0.9% risk of major adverse cardiac event.   2.  Pulm:  Airway feasible.  She reports that she has diagnosed sleep apnea, but doesn't use a CPAP due to losing it during a move.  She intends to do another sleep study soon in order to get a new CPAP ordered.  She quit smoking in 2005.  3.  GI:  Risk of PONV score = 3.  If > 2, anti-emetic intervention recommended.  4. Endo:  She has metformin, humalog and lantus noted on her med list for diabetes management, but she notes she is only taking the lantus and didn't know if she was supposed to be taking the other two or not.  Take only 80% of lantus the night before surgery.  She is obese with a BMI >30.      VTE risk: 0.5%    Virtual visit - Please refer to the physical examination documented by the anesthesiologist in the anesthesia record on the day of surgery      **Addendum (8/25/20): Stress test and echocardiogram completed.  Stress test negative for ischemia and echocardiogram showed an EF of 60-65% and no wall motion abnormalities.  Communicated with Dr. Burgos regarding A1c of 9.1.  Called patient with all results.  Encouraged patient to follow up with primary care provider regarding diabetes management plan asap.  Patient agreeable.**    Patient is optimized and is acceptable candidate for the proposed  procedure.  No further diagnostic evaluation is needed.     **For further details of assessment, testing, and physical exam please see H and P completed on same date.          Mel Bruce DNP, RN, APRN      Reviewed and Signed by PAC Mid-Level Provider/Resident  Mid-Level Provider/Resident: Mel Bruce DNP, RN, APRN  Date: 8/14/20  Time: 1602    Attending Anesthesiologist Anesthesia Assessment:        Anesthesiologist:   Date:   Time:   Pass/Fail:   Disposition:     PAC Pharmacist Assessment:        Pharmacist:   Date:   Time:    Yasmani Daugherty MD

## 2020-08-14 NOTE — H&P
Pre-Operative H & P     CC:  Preoperative exam to assess for increased cardiopulmonary risk while undergoing surgery and anesthesia.    Date of Encounter: 8/14/2020  Primary Care Physician:  Gabrielle Edwards  Associated diagnosis:  Thyroid goiter    HPI  Winter Loya is a 64 year old female who presents for pre-operative H & P in preparation fora THYROIDECTOMY, TOTAL on 8/26/20 by Dr. Burgos at [facility].     Winter Loya is a 64 year old female with hyperlipidemia, sleep apnea, history of smoking, multiple myeloma, peripheral neuropathy, diabetes, obesity, depression and anxiety that has a thyroid goiter.   She follows with oncology for multiple myeloma s/p chemo and stem cell transplant.  On a PET scan earlier this year a left thyroid nodule was noted and biopsy was recommended.  A biopsy was done and the left was negative, but there was also a right nodule that was biopsied and was found to have atypia of undetermined significance.   She does have some mass effect from the left thyroid nodule.  She was referred to Dr. Burgos and the above listed procedure has been recommended.      History is obtained from the patient and the medical record.    Past Medical History  Past Medical History:   Diagnosis Date     Anxiety      Depression      Diabetes (H)      Glaucoma (increased eye pressure)      History of smoking      Hyperlipidemia      Multiple myeloma in remission (H)      Nonsenile cataract      Obesity        Past Surgical History  Past Surgical History:   Procedure Laterality Date     ARTHROSCOPY KNEE       CHOLECYSTECTOMY       COLONOSCOPY N/A 7/23/2020    Procedure: COLONOSCOPY;  Surgeon: Za Denis MD;  Location:  OR     EYE SURGERY       TONSILLECTOMY       TUBAL LIGATION         Hx of Blood transfusions/reactions: none     Hx of abnormal bleeding or anti-platelet use: none    Menstrual history: No LMP recorded. Patient is  postmenopausal.:     Steroid use in the last year: none    Personal or FH with difficulty with Anesthesia:  none    Prior to Admission Medications  Current Outpatient Medications   Medication Sig Dispense Refill     acyclovir (ZOVIRAX) 800 MG tablet Take 800 mg by mouth 2 times daily        atorvastatin (LIPITOR) 20 MG tablet Take 1 tablet (20 mg) by mouth every 24 hours (Patient taking differently: Take 20 mg by mouth At Bedtime ) 90 tablet 3     insulin glargine (LANTUS PEN) 100 UNIT/ML pen Inject 34 Units Subcutaneous At Bedtime 30 mL 3     latanoprost (XALATAN) 0.005 % ophthalmic solution Place 1 drop into both eyes daily (Patient taking differently: Place 1 drop into both eyes At Bedtime ) 1 Bottle 11     LENalidomide (REVLIMID) 10 MG CAPS capsule Take 1 capsule (10 mg) by mouth daily for 28 days (Patient taking differently: Take 10 mg by mouth every morning ) 28 capsule 0     pregabalin (LYRICA) 100 MG capsule Take 1 capsule (100 mg) by mouth 3 times daily 90 capsule 3     sertraline (ZOLOFT) 100 MG tablet Take 1 tablet (100 mg) by mouth daily (Patient taking differently: Take 100 mg by mouth every morning ) 30 tablet 3     tiZANidine (ZANAFLEX) 4 MG tablet Take 1-2 tablets (4-8 mg) by mouth nightly as needed 30 tablet 1     insulin pen needle (FIFTY50 PEN NEEDLES) 32G X 4 MM miscellaneous As directed. To use with Victoza daily       LENalidomide (REVLIMID) 10 MG CAPS capsule Take 1 capsule (10 mg) by mouth daily for 28 days (Patient taking differently: Take 10 mg by mouth every morning ) 28 capsule 0       Allergies  No Known Allergies    Social History  Social History     Socioeconomic History     Marital status:      Spouse name: Not on file     Number of children: 3     Years of education: Not on file     Highest education level: Not on file   Occupational History     Occupation: alcohol and drug counselor   Social Needs     Financial resource strain: Not on file     Food insecurity     Worry: Not  on file     Inability: Not on file     Transportation needs     Medical: Not on file     Non-medical: Not on file   Tobacco Use     Smoking status: Former Smoker     Packs/day: 1.00     Types: Cigarettes     Last attempt to quit: 2005     Years since quitting: 15.6     Smokeless tobacco: Never Used   Substance and Sexual Activity     Alcohol use: Yes     Comment: occasional     Drug use: Never     Sexual activity: Not on file   Lifestyle     Physical activity     Days per week: Not on file     Minutes per session: Not on file     Stress: Not on file   Relationships     Social connections     Talks on phone: Not on file     Gets together: Not on file     Attends [employer] service: Not on file     Active member of club or organization: Not on file     Attends meetings of clubs or organizations: Not on file     Relationship status: Not on file     Intimate partner violence     Fear of current or ex partner: Not on file     Emotionally abused: Not on file     Physically abused: Not on file     Forced sexual activity: Not on file   Other Topics Concern     Not on file   Social History Narrative     Not on file       Family History  Family History   Problem Relation Age of Onset     Glaucoma Paternal Grandfather      Chronic Obstructive Pulmonary Disease Mother      Substance Abuse Mother      Alcoholism Mother      Diabetes Mother      Parkinsonism Father      No Known Problems Sister      Diabetes Brother      Arrhythmia Brother      Diabetes Brother      Other - See Comments Brother         potts's disease     Gastrointestinal Disease Brother              ROS/MED HX  The complete review of systems is negative other than noted in the HPI or here.   ENT/Pulmonary:     (+)sleep apnea, tobacco use, Past use 1 packs/day  doesn't use CPAP , . .   (-) recent URI   Neurologic:     (+)neuropathy - both,     Cardiovascular:     (+) Dyslipidemia, ----. : . . . :. .       METS/Exercise Tolerance:  3 - Able to walk 1-2 blocks  without stopping   Hematologic:  - neg hematologic  ROS       Musculoskeletal:  - neg musculoskeletal ROS       GI/Hepatic:  - neg GI/hepatic ROS       Renal/Genitourinary:  - ROS Renal section negative       Endo:     (+) type II DM Using insulin - not using insulin pump Normal glucose range: unknown not previously admitted for DM/DKA Diabetic complications: neuropathy, thyroid problem  Thyroid disease - Other thyroid goiter, Obesity, .      Psychiatric:     (+) psychiatric history anxiety and depression      Infectious Disease:  - neg infectious disease ROS      (-) Recent Fever   Malignancy:   (+) Malignancy History of Other  Other CA multiple myeloma - s/p stem cell transplant Remission status post Chemo         Other:    (+) no H/O Chronic Pain,                                         0 lbs 0 oz  Data Unavailable   There is no height or weight on file to calculate BMI.       Physical Exam  - virtual visit  Constitutional: Awake, alert, cooperative, no apparent distress, and appears stated age.  Neurologic: Awake, alert, oriented to name, place and time.   Neuropsychiatric: Calm, cooperative. Normal affect.     Labs: (personally reviewed)   Component      Latest Ref Rng & Units 8/21/2020   Sodium      133 - 144 mmol/L 137   Potassium      3.4 - 5.3 mmol/L 3.7   Chloride      94 - 109 mmol/L 106   Carbon Dioxide      20 - 32 mmol/L 28   Anion Gap      3 - 14 mmol/L 4   Glucose      70 - 99 mg/dL 253 (H)   Urea Nitrogen      7 - 30 mg/dL 8   Creatinine      0.52 - 1.04 mg/dL 0.62   GFR Estimate      >60 mL/min/1.73:m2 >90   GFR Estimate If Black      >60 mL/min/1.73:m2 >90   Calcium      8.5 - 10.1 mg/dL 8.5   WBC      4.0 - 11.0 10e9/L 3.6 (L)   RBC Count      3.8 - 5.2 10e12/L 4.87   Hemoglobin      11.7 - 15.7 g/dL 14.0   Hematocrit      35.0 - 47.0 % 42.0   MCV      78 - 100 fl 86   MCH      26.5 - 33.0 pg 28.7   MCHC      31.5 - 36.5 g/dL 33.3   RDW      10.0 - 15.0 % 14.1   Platelet Count      150 - 450  10e9/L 124 (L)   Hemoglobin A1C      0 - 5.6 % 9.1 (H)       Cardiac echo: 8/21/20  Interpretation Summary  Global and regional left ventricular function is normal with an EF of 60-65%.  Right ventricular function, chamber size, wall motion, and thickness are  normal.  Pulmonary artery systolic pressure is normal.  The inferior vena cava is normal.  No pericardial effusion is present.  Previous study not available for comparison.      Stress test  8/25/20  Interpretation Summary  Normal, low-risk dobutamine echocardiogram without evidence of ischemia.  91% of maximal predicted heart rate (MPHR) was achieved.  Normal biventricular size, thickness, and global systolic function at  baseline.  With low-dose dobutamine, LVEF augmented and LV cavity size decreased  appropriately.  With peak dobutamine, LVEF increased further to >70% and LV cavity size  decreased appropriately. Blood pressure was unremarkable.  No regional wall motion abnormalities at rest or with dobutamine.  The test was terminated due to the achievement of target dobutamine dose.  No angina was elicited.  There was 1 mm ST depression in III, aVF, V4-V6 and 1.5 mm ST depression in II  at peak stress. Normal heart rate and blood pressure response to dobutamine.  Occasional PACs and PVCs during stress.  No significant valvular abnormalities are noted on screening Doppler exam.  The aortic root and visualized ascending aorta are normal.  No prior stress tests available for comparison.        ASSESSMENT and PLAN  Winter Loya is a 64 year old female scheduled for a THYROIDECTOMY, TOTAL on 8/26/20 by Dr. Burgos in treatment of a thyroid goiter.  PAC referral for risk assessment and optimization for anesthesia with comorbid conditions of: hyperlipidemia, sleep apnea, history of smoking, multiple myeloma, peripheral neuropathy, diabetes, obesity, depression and anxiety.      Pre-operative considerations:  1.  Cardiac:  Functional status- METS 3.  She doesn't  exercise purposefully and is quite sedentary, but reports she can walk a block or two.  She had a stress test and echocardiogram ordered by her primary care provider in March 2020 for atypical chest pain (what she thinks might be a type of neuropathy), but she forgot to do the tests.  She has been instructed to complete those tests before approval to proceed will occur.  Will have a  call her today to get them scheduled.  She is agreeable.   She reports that she used to be on hypertension medication, but those were stopped last year when she had her stem cell transplant and never were restarted.  Most recent blood pressures noted from previous office visits were WNL.  Intermediate risk surgery with 0.9% risk of major adverse cardiac event.   2.  Pulm:  Airway feasible.  She reports that she has diagnosed sleep apnea, but doesn't use a CPAP due to losing it during a move.  She intends to do another sleep study soon in order to get a new CPAP ordered.  She quit smoking in 2005.  3.  GI:  Risk of PONV score = 3.  If > 2, anti-emetic intervention recommended.  4. Endo:  She has metformin, humalog and lantus noted on her med list for diabetes management, but she notes she is only taking the lantus and didn't know if she was supposed to be taking the other two or not.  Take only 80% of lantus the night before surgery.  She is obese with a BMI >30.      VTE risk: 0.5%    Virtual visit - Please refer to the physical examination documented by the anesthesiologist in the anesthesia record on the day of surgery        **Addendum (8/25/20): Stress test and echocardiogram completed.  Stress test negative for ischemia and echocardiogram showed an EF of 60-65% and no wall motion abnormalities.  Communicated with Dr. Burgos regarding A1c of 9.1.  Called patient with all results.  Encouraged patient to follow up with primary care provider regarding diabetes management plan asap.  Patient agreeable.**      Patient is  optimized and is acceptable candidate for the proposed procedure.  No further diagnostic evaluation is needed.           Video-Visit Details    Type of service:  Video Visit    Patient verbally consented to video service today: YES      Video Start Time: 1505  Video End Time (time video stopped): 1529    Originating Location (pt. Location): Home    Distant Location (provider location):  [facility]     Mode of Communication:  Video Conference via Osei Bruce DNP, RN, APRN  Preoperative Assessment Center  [facility]  Phone: [phone]  Fax: [phone]

## 2020-08-19 ENCOUNTER — OFFICE VISIT (OUTPATIENT)
Dept: TRANSPLANT | Facility: CLINIC | Age: 65
End: 2020-08-19
Attending: INTERNAL MEDICINE
Payer: MEDICAID

## 2020-08-19 DIAGNOSIS — C90.01 MULTIPLE MYELOMA IN REMISSION (H): Primary | ICD-10-CM

## 2020-08-19 PROCEDURE — 90723 DTAP-HEP B-IPV VACCINE IM: CPT | Mod: ZF | Performed by: INTERNAL MEDICINE

## 2020-08-19 PROCEDURE — 90648 HIB PRP-T VACCINE 4 DOSE IM: CPT | Mod: ZF | Performed by: INTERNAL MEDICINE

## 2020-08-19 PROCEDURE — 90750 HZV VACC RECOMBINANT IM: CPT | Mod: ZF | Performed by: INTERNAL MEDICINE

## 2020-08-19 PROCEDURE — G0009 ADMIN PNEUMOCOCCAL VACCINE: HCPCS

## 2020-08-19 PROCEDURE — 25000128 H RX IP 250 OP 636: Mod: ZF | Performed by: INTERNAL MEDICINE

## 2020-08-19 PROCEDURE — 90472 IMMUNIZATION ADMIN EACH ADD: CPT

## 2020-08-19 PROCEDURE — 90670 PCV13 VACCINE IM: CPT | Mod: ZF | Performed by: INTERNAL MEDICINE

## 2020-08-19 PROCEDURE — G0463 HOSPITAL OUTPT CLINIC VISIT: HCPCS | Mod: ZF

## 2020-08-19 PROCEDURE — 25000581 ZZH RX MED A9270 GY (STAT IND- M) 250: Mod: ZF | Performed by: INTERNAL MEDICINE

## 2020-08-19 RX ADMIN — DIPHTHERIA AND TETANUS TOXOIDS AND ACELLULAR PERTUSSIS ADSORBED, HEPATITIS B (RECOMBINANT) AND INACTIVATED POLIOVIRUS VACCINE COMBINED 0.5 ML: 25; 10; 25; 25; 8; 10; 40; 8; 32 INJECTION, SUSPENSION INTRAMUSCULAR at 15:03

## 2020-08-19 RX ADMIN — PNEUMOCOCCAL 13-VALENT CONJUGATE VACCINE 0.5 ML: 2.2; 2.2; 2.2; 2.2; 2.2; 4.4; 2.2; 2.2; 2.2; 2.2; 2.2; 2.2; 2.2 INJECTION, SUSPENSION INTRAMUSCULAR at 15:04

## 2020-08-19 RX ADMIN — HAEMOPHILUS B POLYSACCHARIDE CONJUGATE VACCINE FOR INJ 0.5 ML: RECON SOLN at 15:02

## 2020-08-19 RX ADMIN — ZOSTER VACCINE RECOMBINANT, ADJUVANTED 0.5 ML: KIT at 15:05

## 2020-08-19 NOTE — NURSING NOTE
Pt was given her Vaccines today:  Pedirex  HIB  Shingrix  Pneumococcal     Vaccine information supplied.    Pt tolerated this well.     Ronna Wood MA

## 2020-08-19 NOTE — LETTER
8/19/2020     RE: Winter Loya  [address]  [address]  [place] [address]    Dear Colleague,    Thank you for referring your patient, Winter Loya, to the [facility]. Please see a copy of my visit note below.    .Please see Nursing Note.     Ronna Wood MA      Again, thank you for allowing me to participate in the care of your patient.        Sincerely,        BMT Nurse

## 2020-08-21 ENCOUNTER — ANCILLARY PROCEDURE (OUTPATIENT)
Dept: CARDIOLOGY | Facility: CLINIC | Age: 65
End: 2020-08-21
Payer: MEDICAID

## 2020-08-21 DIAGNOSIS — F32.9 MAJOR DEPRESSIVE DISORDER WITH CURRENT ACTIVE EPISODE, UNSPECIFIED DEPRESSION EPISODE SEVERITY, UNSPECIFIED WHETHER RECURRENT: ICD-10-CM

## 2020-08-21 DIAGNOSIS — Z01.818 PREOP EXAMINATION: ICD-10-CM

## 2020-08-21 DIAGNOSIS — I27.20 PULMONARY HYPERTENSION (H): ICD-10-CM

## 2020-08-21 LAB
ANION GAP SERPL CALCULATED.3IONS-SCNC: 4 MMOL/L (ref 3–14)
BUN SERPL-MCNC: 8 MG/DL (ref 7–30)
CALCIUM SERPL-MCNC: 8.5 MG/DL (ref 8.5–10.1)
CHLORIDE SERPL-SCNC: 106 MMOL/L (ref 94–109)
CO2 SERPL-SCNC: 28 MMOL/L (ref 20–32)
CREAT SERPL-MCNC: 0.62 MG/DL (ref 0.52–1.04)
ERYTHROCYTE [DISTWIDTH] IN BLOOD BY AUTOMATED COUNT: 14.1 % (ref 10–15)
GFR SERPL CREATININE-BSD FRML MDRD: >90 ML/MIN/{1.73_M2}
GLUCOSE SERPL-MCNC: 253 MG/DL (ref 70–99)
HBA1C MFR BLD: 9.1 % (ref 0–5.6)
HCT VFR BLD AUTO: 42 % (ref 35–47)
HGB BLD-MCNC: 14 G/DL (ref 11.7–15.7)
MCH RBC QN AUTO: 28.7 PG (ref 26.5–33)
MCHC RBC AUTO-ENTMCNC: 33.3 G/DL (ref 31.5–36.5)
MCV RBC AUTO: 86 FL (ref 78–100)
PLATELET # BLD AUTO: 124 10E9/L (ref 150–450)
POTASSIUM SERPL-SCNC: 3.7 MMOL/L (ref 3.4–5.3)
RBC # BLD AUTO: 4.87 10E12/L (ref 3.8–5.2)
SODIUM SERPL-SCNC: 137 MMOL/L (ref 133–144)
WBC # BLD AUTO: 3.6 10E9/L (ref 4–11)

## 2020-08-24 ENCOUNTER — HOSPITAL ENCOUNTER (OUTPATIENT)
Dept: CARDIOLOGY | Facility: CLINIC | Age: 65
Discharge: HOME | End: 2020-08-24
Attending: INTERNAL MEDICINE | Admitting: INTERNAL MEDICINE
Payer: MEDICAID

## 2020-08-24 DIAGNOSIS — R07.89 ATYPICAL CHEST PAIN: ICD-10-CM

## 2020-08-24 DIAGNOSIS — Z11.59 ENCOUNTER FOR SCREENING FOR OTHER VIRAL DISEASES: ICD-10-CM

## 2020-08-24 PROCEDURE — U0003 INFECTIOUS AGENT DETECTION BY NUCLEIC ACID (DNA OR RNA); SEVERE ACUTE RESPIRATORY SYNDROME CORONAVIRUS 2 (SARS-COV-2) (CORONAVIRUS DISEASE [COVID-19]), AMPLIFIED PROBE TECHNIQUE, MAKING USE OF HIGH THROUGHPUT TECHNOLOGIES AS DESCRIBED BY CMS-2020-01-R: HCPCS | Performed by: SURGERY

## 2020-08-24 PROCEDURE — 25000128 H RX IP 250 OP 636: Performed by: STUDENT IN AN ORGANIZED HEALTH CARE EDUCATION/TRAINING PROGRAM

## 2020-08-24 PROCEDURE — 93016 CV STRESS TEST SUPVJ ONLY: CPT | Performed by: STUDENT IN AN ORGANIZED HEALTH CARE EDUCATION/TRAINING PROGRAM

## 2020-08-24 PROCEDURE — 93350 STRESS TTE ONLY: CPT | Mod: 26 | Performed by: STUDENT IN AN ORGANIZED HEALTH CARE EDUCATION/TRAINING PROGRAM

## 2020-08-24 PROCEDURE — 93321 DOPPLER ECHO F-UP/LMTD STD: CPT | Mod: 26 | Performed by: STUDENT IN AN ORGANIZED HEALTH CARE EDUCATION/TRAINING PROGRAM

## 2020-08-24 PROCEDURE — 25000125 ZZHC RX 250: Performed by: STUDENT IN AN ORGANIZED HEALTH CARE EDUCATION/TRAINING PROGRAM

## 2020-08-24 PROCEDURE — 93321 DOPPLER ECHO F-UP/LMTD STD: CPT | Mod: TC

## 2020-08-24 PROCEDURE — 25500064 ZZH RX 255 OP 636: Performed by: STUDENT IN AN ORGANIZED HEALTH CARE EDUCATION/TRAINING PROGRAM

## 2020-08-24 PROCEDURE — 93325 DOPPLER ECHO COLOR FLOW MAPG: CPT | Mod: 26 | Performed by: STUDENT IN AN ORGANIZED HEALTH CARE EDUCATION/TRAINING PROGRAM

## 2020-08-24 PROCEDURE — 93018 CV STRESS TEST I&R ONLY: CPT | Performed by: STUDENT IN AN ORGANIZED HEALTH CARE EDUCATION/TRAINING PROGRAM

## 2020-08-24 RX ORDER — DOBUTAMINE HYDROCHLORIDE 200 MG/100ML
10-50 INJECTION INTRAVENOUS CONTINUOUS
Status: ACTIVE | OUTPATIENT
Start: 2020-08-24 | End: 2020-08-24

## 2020-08-24 RX ORDER — ATROPINE SULFATE 0.4 MG/ML
.2-2 AMPUL (ML) INJECTION
Status: DISCONTINUED | OUTPATIENT
Start: 2020-08-24 | End: 2020-08-25 | Stop reason: HOSPADM

## 2020-08-24 RX ORDER — METOPROLOL TARTRATE 1 MG/ML
1-20 INJECTION, SOLUTION INTRAVENOUS
Status: ACTIVE | OUTPATIENT
Start: 2020-08-24 | End: 2020-08-24

## 2020-08-24 RX ORDER — SODIUM CHLORIDE 9 MG/ML
INJECTION, SOLUTION INTRAVENOUS CONTINUOUS
Status: ACTIVE | OUTPATIENT
Start: 2020-08-24 | End: 2020-08-24

## 2020-08-24 RX ADMIN — DOBUTAMINE HYDROCHLORIDE 10 MCG/KG/MIN: 200 INJECTION INTRAVENOUS at 14:56

## 2020-08-24 RX ADMIN — PERFLUTREN 7 ML: 6.52 INJECTION, SUSPENSION INTRAVENOUS at 15:09

## 2020-08-24 RX ADMIN — METOPROLOL TARTRATE 2 MG: 5 INJECTION INTRAVENOUS at 15:06

## 2020-08-25 LAB
LABORATORY COMMENT REPORT: NORMAL
SARS-COV-2 RNA SPEC QL NAA+PROBE: NEGATIVE
SARS-COV-2 RNA SPEC QL NAA+PROBE: NORMAL
SPECIMEN SOURCE: NORMAL
SPECIMEN SOURCE: NORMAL

## 2020-08-26 ENCOUNTER — HOSPITAL ENCOUNTER (OUTPATIENT)
Facility: CLINIC | Age: 65
Discharge: HOME | End: 2020-08-27
Attending: SURGERY | Admitting: SURGERY
Payer: MEDICAID

## 2020-08-26 ENCOUNTER — ANESTHESIA (OUTPATIENT)
Dept: SURGERY | Facility: CLINIC | Age: 65
End: 2020-08-26
Payer: MEDICAID

## 2020-08-26 DIAGNOSIS — E04.9 THYROID GOITER: ICD-10-CM

## 2020-08-26 PROBLEM — E89.0 STATUS POST COMPLETE THYROIDECTOMY: Status: ACTIVE | Noted: 2020-08-26

## 2020-08-26 LAB
GLUCOSE BLDC GLUCOMTR-MCNC: 206 MG/DL (ref 70–99)
GLUCOSE BLDC GLUCOMTR-MCNC: 212 MG/DL (ref 70–99)
GLUCOSE BLDC GLUCOMTR-MCNC: 225 MG/DL (ref 70–99)
GLUCOSE BLDC GLUCOMTR-MCNC: 279 MG/DL (ref 70–99)
GLUCOSE BLDC GLUCOMTR-MCNC: 304 MG/DL (ref 70–99)
PTH-INTACT SERPL-MCNC: <7 PG/ML (ref 18–80)

## 2020-08-26 PROCEDURE — 25000128 H RX IP 250 OP 636: Performed by: STUDENT IN AN ORGANIZED HEALTH CARE EDUCATION/TRAINING PROGRAM

## 2020-08-26 PROCEDURE — 25800030 ZZH RX IP 258 OP 636: Performed by: NURSE ANESTHETIST, CERTIFIED REGISTERED

## 2020-08-26 PROCEDURE — 27210794 ZZH OR GENERAL SUPPLY STERILE: Performed by: SURGERY

## 2020-08-26 PROCEDURE — 25000132 ZZH RX MED GY IP 250 OP 250 PS 637: Performed by: STUDENT IN AN ORGANIZED HEALTH CARE EDUCATION/TRAINING PROGRAM

## 2020-08-26 PROCEDURE — 36000062 ZZH SURGERY LEVEL 4 1ST 30 MIN - UMMC: Performed by: SURGERY

## 2020-08-26 PROCEDURE — 12000001 ZZH R&B MED SURG/OB UMMC

## 2020-08-26 PROCEDURE — 36415 COLL VENOUS BLD VENIPUNCTURE: CPT | Performed by: STUDENT IN AN ORGANIZED HEALTH CARE EDUCATION/TRAINING PROGRAM

## 2020-08-26 PROCEDURE — 25000566 ZZH SEVOFLURANE, EA 15 MIN: Performed by: SURGERY

## 2020-08-26 PROCEDURE — 40000141 ZZH STATISTIC PERIPHERAL IV START W/O US GUIDANCE

## 2020-08-26 PROCEDURE — 00000146 ZZHCL STATISTIC GLUCOSE BY METER IP

## 2020-08-26 PROCEDURE — 25000128 H RX IP 250 OP 636: Performed by: NURSE ANESTHETIST, CERTIFIED REGISTERED

## 2020-08-26 PROCEDURE — 71000015 ZZH RECOVERY PHASE 1 LEVEL 2 EA ADDTL HR: Performed by: SURGERY

## 2020-08-26 PROCEDURE — 25000131 ZZH RX MED GY IP 250 OP 636 PS 637: Performed by: STUDENT IN AN ORGANIZED HEALTH CARE EDUCATION/TRAINING PROGRAM

## 2020-08-26 PROCEDURE — 37000008 ZZH ANESTHESIA TECHNICAL FEE, 1ST 30 MIN: Performed by: SURGERY

## 2020-08-26 PROCEDURE — 25800030 ZZH RX IP 258 OP 636: Performed by: STUDENT IN AN ORGANIZED HEALTH CARE EDUCATION/TRAINING PROGRAM

## 2020-08-26 PROCEDURE — 40000170 ZZH STATISTIC PRE-PROCEDURE ASSESSMENT II: Performed by: SURGERY

## 2020-08-26 PROCEDURE — 36000064 ZZH SURGERY LEVEL 4 EA 15 ADDTL MIN - UMMC: Performed by: SURGERY

## 2020-08-26 PROCEDURE — 25000128 H RX IP 250 OP 636: Performed by: SURGERY

## 2020-08-26 PROCEDURE — 25000132 ZZH RX MED GY IP 250 OP 250 PS 637: Performed by: SURGERY

## 2020-08-26 PROCEDURE — 88307 TISSUE EXAM BY PATHOLOGIST: CPT | Performed by: SURGERY

## 2020-08-26 PROCEDURE — 71000014 ZZH RECOVERY PHASE 1 LEVEL 2 FIRST HR: Performed by: SURGERY

## 2020-08-26 PROCEDURE — 25000125 ZZHC RX 250: Performed by: NURSE ANESTHETIST, CERTIFIED REGISTERED

## 2020-08-26 PROCEDURE — 37000009 ZZH ANESTHESIA TECHNICAL FEE, EACH ADDTL 15 MIN: Performed by: SURGERY

## 2020-08-26 PROCEDURE — 83970 ASSAY OF PARATHORMONE: CPT | Performed by: STUDENT IN AN ORGANIZED HEALTH CARE EDUCATION/TRAINING PROGRAM

## 2020-08-26 RX ORDER — LIDOCAINE 40 MG/G
CREAM TOPICAL
Status: DISCONTINUED | OUTPATIENT
Start: 2020-08-26 | End: 2020-08-27 | Stop reason: HOSPADM

## 2020-08-26 RX ORDER — CALCITRIOL 0.25 UG/1
0.25 CAPSULE, LIQUID FILLED ORAL 3 TIMES DAILY
Qty: 90 CAPSULE | Refills: 0 | Status: SHIPPED | OUTPATIENT
Start: 2020-08-26 | End: 2020-09-15

## 2020-08-26 RX ORDER — CALCITRIOL 0.25 UG/1
0.25 CAPSULE, LIQUID FILLED ORAL 3 TIMES DAILY
Status: DISCONTINUED | OUTPATIENT
Start: 2020-08-26 | End: 2020-08-27 | Stop reason: HOSPADM

## 2020-08-26 RX ORDER — EPHEDRINE SULFATE 50 MG/ML
INJECTION, SOLUTION INTRAMUSCULAR; INTRAVENOUS; SUBCUTANEOUS PRN
Status: DISCONTINUED | OUTPATIENT
Start: 2020-08-26 | End: 2020-08-26

## 2020-08-26 RX ORDER — ACETAMINOPHEN 325 MG/1
975 TABLET ORAL EVERY 8 HOURS
Status: DISCONTINUED | OUTPATIENT
Start: 2020-08-26 | End: 2020-08-27 | Stop reason: HOSPADM

## 2020-08-26 RX ORDER — CEFAZOLIN SODIUM 1 G/3ML
1 INJECTION, POWDER, FOR SOLUTION INTRAMUSCULAR; INTRAVENOUS SEE ADMIN INSTRUCTIONS
Status: DISCONTINUED | OUTPATIENT
Start: 2020-08-26 | End: 2020-08-26 | Stop reason: HOSPADM

## 2020-08-26 RX ORDER — ONDANSETRON 2 MG/ML
4 INJECTION INTRAMUSCULAR; INTRAVENOUS EVERY 30 MIN PRN
Status: DISCONTINUED | OUTPATIENT
Start: 2020-08-26 | End: 2020-08-26 | Stop reason: HOSPADM

## 2020-08-26 RX ORDER — NICOTINE POLACRILEX 4 MG
15-30 LOZENGE BUCCAL
Status: DISCONTINUED | OUTPATIENT
Start: 2020-08-26 | End: 2020-08-27 | Stop reason: HOSPADM

## 2020-08-26 RX ORDER — LATANOPROST 50 UG/ML
1 SOLUTION/ DROPS OPHTHALMIC AT BEDTIME
Status: DISCONTINUED | OUTPATIENT
Start: 2020-08-26 | End: 2020-08-27 | Stop reason: HOSPADM

## 2020-08-26 RX ORDER — LIDOCAINE HYDROCHLORIDE 20 MG/ML
INJECTION, SOLUTION INFILTRATION; PERINEURAL PRN
Status: DISCONTINUED | OUTPATIENT
Start: 2020-08-26 | End: 2020-08-26

## 2020-08-26 RX ORDER — OXYCODONE HYDROCHLORIDE 5 MG/1
5-10 TABLET ORAL EVERY 6 HOURS PRN
Qty: 15 TABLET | Refills: 0 | Status: SHIPPED | OUTPATIENT
Start: 2020-08-26 | End: 2020-12-14

## 2020-08-26 RX ORDER — DEXAMETHASONE SODIUM PHOSPHATE 4 MG/ML
10 INJECTION, SOLUTION INTRA-ARTICULAR; INTRALESIONAL; INTRAMUSCULAR; INTRAVENOUS; SOFT TISSUE ONCE
Status: COMPLETED | OUTPATIENT
Start: 2020-08-26 | End: 2020-08-26

## 2020-08-26 RX ORDER — AMOXICILLIN 250 MG
2 CAPSULE ORAL 2 TIMES DAILY
Status: DISCONTINUED | OUTPATIENT
Start: 2020-08-26 | End: 2020-08-27 | Stop reason: HOSPADM

## 2020-08-26 RX ORDER — ACETAMINOPHEN 325 MG/1
650 TABLET ORAL EVERY 4 HOURS PRN
Status: DISCONTINUED | OUTPATIENT
Start: 2020-08-29 | End: 2020-08-27 | Stop reason: HOSPADM

## 2020-08-26 RX ORDER — ONDANSETRON 2 MG/ML
4 INJECTION INTRAMUSCULAR; INTRAVENOUS EVERY 6 HOURS PRN
Status: DISCONTINUED | OUTPATIENT
Start: 2020-08-26 | End: 2020-08-27 | Stop reason: HOSPADM

## 2020-08-26 RX ORDER — AMOXICILLIN 250 MG
1 CAPSULE ORAL 2 TIMES DAILY
Status: DISCONTINUED | OUTPATIENT
Start: 2020-08-26 | End: 2020-08-27 | Stop reason: HOSPADM

## 2020-08-26 RX ORDER — ONDANSETRON 4 MG/1
4 TABLET, ORALLY DISINTEGRATING ORAL EVERY 6 HOURS PRN
Status: DISCONTINUED | OUTPATIENT
Start: 2020-08-26 | End: 2020-08-27 | Stop reason: HOSPADM

## 2020-08-26 RX ORDER — FENTANYL CITRATE 50 UG/ML
INJECTION, SOLUTION INTRAMUSCULAR; INTRAVENOUS PRN
Status: DISCONTINUED | OUTPATIENT
Start: 2020-08-26 | End: 2020-08-26

## 2020-08-26 RX ORDER — PROPOFOL 10 MG/ML
INJECTION, EMULSION INTRAVENOUS PRN
Status: DISCONTINUED | OUTPATIENT
Start: 2020-08-26 | End: 2020-08-26

## 2020-08-26 RX ORDER — CALCIUM CARBONATE 500 MG/1
1000 TABLET, CHEWABLE ORAL 3 TIMES DAILY
Status: DISCONTINUED | OUTPATIENT
Start: 2020-08-26 | End: 2020-08-27 | Stop reason: HOSPADM

## 2020-08-26 RX ORDER — SERTRALINE HYDROCHLORIDE 100 MG/1
100 TABLET, FILM COATED ORAL DAILY
Qty: 90 TABLET | Refills: 1 | Status: SHIPPED | OUTPATIENT
Start: 2020-08-26 | End: 2021-03-15

## 2020-08-26 RX ORDER — CEFAZOLIN SODIUM 2 G/100ML
2 INJECTION, SOLUTION INTRAVENOUS
Status: DISCONTINUED | OUTPATIENT
Start: 2020-08-26 | End: 2020-08-26 | Stop reason: HOSPADM

## 2020-08-26 RX ORDER — AMOXICILLIN 250 MG
1 CAPSULE ORAL 2 TIMES DAILY
Qty: 28 TABLET | Refills: 0 | Status: SHIPPED | OUTPATIENT
Start: 2020-08-26 | End: 2020-09-09

## 2020-08-26 RX ORDER — OXYCODONE HYDROCHLORIDE 5 MG/1
5-10 TABLET ORAL EVERY 4 HOURS PRN
Status: DISCONTINUED | OUTPATIENT
Start: 2020-08-26 | End: 2020-08-27 | Stop reason: HOSPADM

## 2020-08-26 RX ORDER — ONDANSETRON 2 MG/ML
INJECTION INTRAMUSCULAR; INTRAVENOUS PRN
Status: DISCONTINUED | OUTPATIENT
Start: 2020-08-26 | End: 2020-08-26

## 2020-08-26 RX ORDER — NALOXONE HYDROCHLORIDE 0.4 MG/ML
.1-.4 INJECTION, SOLUTION INTRAMUSCULAR; INTRAVENOUS; SUBCUTANEOUS
Status: DISCONTINUED | OUTPATIENT
Start: 2020-08-26 | End: 2020-08-27 | Stop reason: HOSPADM

## 2020-08-26 RX ORDER — SODIUM CHLORIDE, SODIUM LACTATE, POTASSIUM CHLORIDE, CALCIUM CHLORIDE 600; 310; 30; 20 MG/100ML; MG/100ML; MG/100ML; MG/100ML
INJECTION, SOLUTION INTRAVENOUS CONTINUOUS PRN
Status: DISCONTINUED | OUTPATIENT
Start: 2020-08-26 | End: 2020-08-26

## 2020-08-26 RX ORDER — CALCIUM CARBONATE 500 MG/1
2 TABLET, CHEWABLE ORAL 3 TIMES DAILY
Qty: 180 TABLET | Refills: 0 | Status: SHIPPED | OUTPATIENT
Start: 2020-08-26 | End: 2020-09-15

## 2020-08-26 RX ORDER — CALCITRIOL 0.25 UG/1
0.25 CAPSULE, LIQUID FILLED ORAL 2 TIMES DAILY
Status: DISCONTINUED | OUTPATIENT
Start: 2020-08-26 | End: 2020-08-26

## 2020-08-26 RX ORDER — LENALIDOMIDE 10 MG/1
10 CAPSULE ORAL EVERY MORNING
Status: DISCONTINUED | OUTPATIENT
Start: 2020-08-27 | End: 2020-08-26

## 2020-08-26 RX ORDER — PREGABALIN 100 MG/1
100 CAPSULE ORAL 3 TIMES DAILY
Status: DISCONTINUED | OUTPATIENT
Start: 2020-08-26 | End: 2020-08-27 | Stop reason: HOSPADM

## 2020-08-26 RX ORDER — ACYCLOVIR 400 MG/1
400 TABLET ORAL 2 TIMES DAILY
Status: DISCONTINUED | OUTPATIENT
Start: 2020-08-26 | End: 2020-08-27 | Stop reason: HOSPADM

## 2020-08-26 RX ORDER — ACETAMINOPHEN 325 MG/1
650 TABLET ORAL EVERY 4 HOURS PRN
Qty: 50 TABLET | Refills: 0 | Status: SHIPPED | OUTPATIENT
Start: 2020-08-29 | End: 2021-01-27

## 2020-08-26 RX ORDER — SERTRALINE HYDROCHLORIDE 100 MG/1
100 TABLET, FILM COATED ORAL DAILY
Status: DISCONTINUED | OUTPATIENT
Start: 2020-08-27 | End: 2020-08-27 | Stop reason: HOSPADM

## 2020-08-26 RX ORDER — SODIUM CHLORIDE, SODIUM LACTATE, POTASSIUM CHLORIDE, CALCIUM CHLORIDE 600; 310; 30; 20 MG/100ML; MG/100ML; MG/100ML; MG/100ML
INJECTION, SOLUTION INTRAVENOUS CONTINUOUS
Status: DISCONTINUED | OUTPATIENT
Start: 2020-08-26 | End: 2020-08-26

## 2020-08-26 RX ORDER — ONDANSETRON 4 MG/1
4 TABLET, ORALLY DISINTEGRATING ORAL EVERY 30 MIN PRN
Status: DISCONTINUED | OUTPATIENT
Start: 2020-08-26 | End: 2020-08-26 | Stop reason: HOSPADM

## 2020-08-26 RX ORDER — LABETALOL 20 MG/4 ML (5 MG/ML) INTRAVENOUS SYRINGE
10
Status: COMPLETED | OUTPATIENT
Start: 2020-08-26 | End: 2020-08-26

## 2020-08-26 RX ORDER — HYDROMORPHONE HYDROCHLORIDE 1 MG/ML
.3-.5 INJECTION, SOLUTION INTRAMUSCULAR; INTRAVENOUS; SUBCUTANEOUS EVERY 5 MIN PRN
Status: DISCONTINUED | OUTPATIENT
Start: 2020-08-26 | End: 2020-08-26 | Stop reason: HOSPADM

## 2020-08-26 RX ORDER — LEVOTHYROXINE SODIUM 175 UG/1
175 TABLET ORAL
Qty: 30 TABLET | Refills: 3 | Status: SHIPPED | OUTPATIENT
Start: 2020-08-27 | End: 2020-09-23

## 2020-08-26 RX ORDER — ESMOLOL HYDROCHLORIDE 10 MG/ML
INJECTION INTRAVENOUS PRN
Status: DISCONTINUED | OUTPATIENT
Start: 2020-08-26 | End: 2020-08-26

## 2020-08-26 RX ORDER — SODIUM CHLORIDE, SODIUM LACTATE, POTASSIUM CHLORIDE, CALCIUM CHLORIDE 600; 310; 30; 20 MG/100ML; MG/100ML; MG/100ML; MG/100ML
INJECTION, SOLUTION INTRAVENOUS CONTINUOUS
Status: DISCONTINUED | OUTPATIENT
Start: 2020-08-26 | End: 2020-08-26 | Stop reason: HOSPADM

## 2020-08-26 RX ORDER — ACYCLOVIR 400 MG/1
400 TABLET ORAL 2 TIMES DAILY
COMMUNITY
End: 2021-05-03

## 2020-08-26 RX ORDER — ACYCLOVIR 800 MG/1
800 TABLET ORAL 2 TIMES DAILY
Status: DISCONTINUED | OUTPATIENT
Start: 2020-08-26 | End: 2020-08-26

## 2020-08-26 RX ORDER — ATORVASTATIN CALCIUM 20 MG/1
20 TABLET, FILM COATED ORAL AT BEDTIME
Status: DISCONTINUED | OUTPATIENT
Start: 2020-08-26 | End: 2020-08-27 | Stop reason: HOSPADM

## 2020-08-26 RX ORDER — FENTANYL CITRATE 50 UG/ML
25-50 INJECTION, SOLUTION INTRAMUSCULAR; INTRAVENOUS
Status: DISCONTINUED | OUTPATIENT
Start: 2020-08-26 | End: 2020-08-26 | Stop reason: HOSPADM

## 2020-08-26 RX ORDER — DEXTROSE MONOHYDRATE 25 G/50ML
25-50 INJECTION, SOLUTION INTRAVENOUS
Status: DISCONTINUED | OUTPATIENT
Start: 2020-08-26 | End: 2020-08-27 | Stop reason: HOSPADM

## 2020-08-26 RX ADMIN — PREGABALIN 100 MG: 100 CAPSULE ORAL at 16:37

## 2020-08-26 RX ADMIN — CALCIUM CARBONATE (ANTACID) CHEW TAB 500 MG 1000 MG: 500 CHEW TAB at 21:22

## 2020-08-26 RX ADMIN — ACETAMINOPHEN 975 MG: 325 TABLET, FILM COATED ORAL at 16:30

## 2020-08-26 RX ADMIN — HUMAN INSULIN 5 UNITS: 100 INJECTION, SOLUTION SUBCUTANEOUS at 07:51

## 2020-08-26 RX ADMIN — DEXAMETHASONE SODIUM PHOSPHATE 10 MG: 4 INJECTION, SOLUTION INTRA-ARTICULAR; INTRALESIONAL; INTRAMUSCULAR; INTRAVENOUS; SOFT TISSUE at 08:40

## 2020-08-26 RX ADMIN — Medication 5 MG: at 10:41

## 2020-08-26 RX ADMIN — ACETAMINOPHEN 975 MG: 325 TABLET, FILM COATED ORAL at 23:31

## 2020-08-26 RX ADMIN — ATORVASTATIN CALCIUM 20 MG: 20 TABLET, FILM COATED ORAL at 21:21

## 2020-08-26 RX ADMIN — PREGABALIN 100 MG: 100 CAPSULE ORAL at 21:21

## 2020-08-26 RX ADMIN — DOCUSATE SODIUM 50 MG AND SENNOSIDES 8.6 MG 1 TABLET: 8.6; 5 TABLET, FILM COATED ORAL at 21:21

## 2020-08-26 RX ADMIN — FENTANYL CITRATE 100 MCG: 50 INJECTION, SOLUTION INTRAMUSCULAR; INTRAVENOUS at 08:27

## 2020-08-26 RX ADMIN — CALCITRIOL CAPSULES 0.25 MCG 0.25 MCG: 0.25 CAPSULE ORAL at 18:35

## 2020-08-26 RX ADMIN — INSULIN GLARGINE 34 UNITS: 100 INJECTION, SOLUTION SUBCUTANEOUS at 21:16

## 2020-08-26 RX ADMIN — Medication 10 MG: at 09:22

## 2020-08-26 RX ADMIN — INSULIN ASPART 3 UNITS: 100 INJECTION, SOLUTION INTRAVENOUS; SUBCUTANEOUS at 17:18

## 2020-08-26 RX ADMIN — CALCIUM CARBONATE (ANTACID) CHEW TAB 500 MG 1000 MG: 500 CHEW TAB at 16:36

## 2020-08-26 RX ADMIN — Medication 10 MG: at 08:44

## 2020-08-26 RX ADMIN — ESMOLOL HYDROCHLORIDE 30 MG: 10 INJECTION, SOLUTION INTRAVENOUS at 10:53

## 2020-08-26 RX ADMIN — SODIUM CHLORIDE, POTASSIUM CHLORIDE, SODIUM LACTATE AND CALCIUM CHLORIDE: 600; 310; 30; 20 INJECTION, SOLUTION INTRAVENOUS at 08:23

## 2020-08-26 RX ADMIN — Medication 5 MG: at 09:14

## 2020-08-26 RX ADMIN — FENTANYL CITRATE 50 MCG: 50 INJECTION, SOLUTION INTRAMUSCULAR; INTRAVENOUS at 10:23

## 2020-08-26 RX ADMIN — ONDANSETRON 4 MG: 2 INJECTION INTRAMUSCULAR; INTRAVENOUS at 10:23

## 2020-08-26 RX ADMIN — REMIFENTANIL HYDROCHLORIDE 0.1 MCG/KG/MIN: 1 INJECTION, POWDER, LYOPHILIZED, FOR SOLUTION INTRAVENOUS at 08:27

## 2020-08-26 RX ADMIN — PROPOFOL 200 MG: 10 INJECTION, EMULSION INTRAVENOUS at 08:27

## 2020-08-26 RX ADMIN — SODIUM CHLORIDE, POTASSIUM CHLORIDE, SODIUM LACTATE AND CALCIUM CHLORIDE: 600; 310; 30; 20 INJECTION, SOLUTION INTRAVENOUS at 12:33

## 2020-08-26 RX ADMIN — LATANOPROST 1 DROP: 50 SOLUTION OPHTHALMIC at 21:20

## 2020-08-26 RX ADMIN — HUMAN INSULIN 5 UNITS: 100 INJECTION, SOLUTION SUBCUTANEOUS at 12:08

## 2020-08-26 RX ADMIN — LABETALOL 20 MG/4 ML (5 MG/ML) INTRAVENOUS SYRINGE 10 MG: at 13:15

## 2020-08-26 RX ADMIN — ACYCLOVIR 400 MG: 400 TABLET ORAL at 21:25

## 2020-08-26 RX ADMIN — Medication 10 MG: at 08:54

## 2020-08-26 RX ADMIN — LIDOCAINE HYDROCHLORIDE 100 MG: 20 INJECTION, SOLUTION INFILTRATION; PERINEURAL at 08:27

## 2020-08-26 RX ADMIN — CALCITRIOL CAPSULES 0.25 MCG 0.25 MCG: 0.25 CAPSULE ORAL at 21:21

## 2020-08-26 RX ADMIN — FENTANYL CITRATE 50 MCG: 50 INJECTION, SOLUTION INTRAMUSCULAR; INTRAVENOUS at 09:05

## 2020-08-26 ASSESSMENT — ACTIVITIES OF DAILY LIVING (ADL)
SWALLOWING: 0-->SWALLOWS FOODS/LIQUIDS WITHOUT DIFFICULTY
TOILETING: 0-->INDEPENDENT
ADLS_ACUITY_SCORE: 12
ADLS_ACUITY_SCORE: 18
RETIRED_COMMUNICATION: 0-->UNDERSTANDS/COMMUNICATES WITHOUT DIFFICULTY
FALL_HISTORY_WITHIN_LAST_SIX_MONTHS: NO
AMBULATION: 0-->INDEPENDENT
TRANSFERRING: 0-->INDEPENDENT
BATHING: 0-->INDEPENDENT
COGNITION: 0 - NO COGNITION ISSUES REPORTED
DRESS: 0-->INDEPENDENT
RETIRED_EATING: 0-->INDEPENDENT

## 2020-08-26 ASSESSMENT — MIFFLIN-ST. JEOR: SCORE: 1804.41

## 2020-08-26 NOTE — OR NURSING
BG in PACU 206, pt states she did not get insulin pre-op and BP elevated (172/106) Dr. Daugherty notified, awaiting orders for insulin and antihypertensive.

## 2020-08-26 NOTE — PROGRESS NOTES
Arrived from:  PACU  Belongings/meds:  Remain with patient, no meds with patient  2 RN Skin Assessment Completed by:  Domenica ARREOLA RN and Nubia RAMIREZ RN  Non-intact findings documented (yes/no/NA): Yes, neck thyroidectomy site. Neck CLAIRE site with redness. Scabs throughout.     VSS, intermittently HTN. On RA. AxOx4, upper extremity tremors. Clear liquid diet, currently tolerating ice water.  States some difficulty speaking. Up SBA with IV pole.

## 2020-08-26 NOTE — TELEPHONE ENCOUNTER
Last Clinic Visit: 4/8/2020 [facility]    *Visit with PCP staffed by Dr Mcpherson

## 2020-08-26 NOTE — BRIEF OP NOTE
[facility], [place]    Brief Operative Note    Pre-operative diagnosis: Thyroid goiter [E04.9]  Post-operative diagnosis Same as pre-operative diagnosis    Procedure: Procedure(s):  THYROIDECTOMY, TOTAL  Surgeon: Surgeon(s) and Role:     * Tiff Burgos MD - Primary     * Mariangel Kumari DO - Resident - Assisting  Anesthesia: General   Estimated blood loss: 10ml  Drains: Jaleel-Null drain to neck incision  Specimens:   ID Type Source Tests Collected by Time Destination   A : Left Lobe of Thyroid Tissue Thyroid SURGICAL PATHOLOGY EXAM Tiff Burgos MD 8/26/2020  9:02 AM    B : Right Lobe of Thyroid Tissue Thyroid SURGICAL PATHOLOGY EXAM Tiff Burgos MD 8/26/2020 10:11 AM      Findings:   See operative note for details. .  Complications: None.  Implants: * No implants in log *    Plan:  Admit overnight to 7c  Pain control Tylenol and prn Oxy  ADAT  IVF until adequate PO  sliding scale insulin + home lantus  CLAIRE to bulb suction  Start Synthroid,Calcitriol and Tums  PTH tonight and repeat labs in a.m.  Likely dispo home tomorrow    Mariangel Kumari DO  General Surgery, PGY3

## 2020-08-26 NOTE — ANESTHESIA CARE TRANSFER NOTE
Patient: Winter Loya    Procedure(s):  THYROIDECTOMY, TOTAL    Diagnosis: Thyroid goiter [E04.9]  Diagnosis Additional Information: No value filed.    Anesthesia Type:   General     Note:  Airway :Nasal Cannula  Patient transferred to:PACU  Comments: Vss, report to RN,  Handoff Report: Identifed the Patient, Identified the Reponsible Provider, Reviewed the pertinent medical history, Discussed the surgical course, Reviewed Intra-OP anesthesia mangement and issues during anesthesia, Set expectations for post-procedure period and Allowed opportunity for questions and acknowledgement of understanding      Vitals: (Last set prior to Anesthesia Care Transfer)    CRNA VITALS  8/26/2020 1027 - 8/26/2020 1103      8/26/2020             Pulse:  79    Ht Rate:  79    SpO2:  100 %    Resp Rate (observed):  (!) 6                Electronically Signed By: FALGUNI Garcia CRNA  August 26, 2020  11:03 AM

## 2020-08-26 NOTE — OR NURSING
Talked with pharmacist .  He stated that it will take at least 20 minutes to obtain regular subcutaneous  Insulin Colton MAHONEY notified

## 2020-08-26 NOTE — ANESTHESIA POSTPROCEDURE EVALUATION
Anesthesia POST Procedure Evaluation    Patient: Winter Loya   MRN:     [id number] Gender:   female   Age:    64 year old :      1955        Preoperative Diagnosis: Thyroid goiter [E04.9]   Procedure(s):  THYROIDECTOMY, TOTAL   Postop Comments: No value filed.     Anesthesia Type: General          Postop Pain Control: Uneventful            Sign Out: Well controlled pain   PONV: No   Neuro/Psych: Uneventful            Sign Out: Acceptable/Baseline neuro status   Airway/Respiratory: Uneventful            Sign Out: Acceptable/Baseline resp. status   CV/Hemodynamics: Uneventful            Sign Out: Acceptable CV status   Other NRE: NONE   DID A NON-ROUTINE EVENT OCCUR? No         Last Anesthesia Record Vitals:  CRNA VITALS  2020 1027 - 2020 1127      2020             Pulse:  79    Ht Rate:  79    SpO2:  100 %    Resp Rate (observed):  (!) 6          Last PACU Vitals:  Vitals Value Taken Time   /71 2020 12:20 PM   Temp 36.4  C (97.6  F) 2020 11:30 AM   Pulse 77 2020 12:24 PM   Resp 16 2020 12:15 PM   SpO2 98 % 2020 12:24 PM   Temp src     NIBP     Pulse     SpO2     Resp     Temp     Ht Rate     Temp 2     Vitals shown include unvalidated device data.      Electronically Signed By: Yasmani Daugherty MD, 2020, 12:25 PM

## 2020-08-26 NOTE — PROGRESS NOTES
"Post Op Check    08/26/2020    Winter Loya is a 64 year old female with h/o thyroid goiter now POD#0 s/p total thyroidectomy.    Pt reports pain well controlled. Tolerating liquids without nausea. No appetite at the moment. Denies SOB, chest pain, or dizziness. No BM. Voiding independently. Ambulating.    BP (!) 164/84 (BP Location: Right arm)   Pulse 76   Temp 97.1  F (36.2  C) (Oral)   Resp 15   Ht 1.746 m (5' 8.75\")   Wt 118.4 kg (261 lb 0.4 oz)   SpO2 95%   BMI 38.83 kg/m      Gen: A&O x3, NAD  HEENT: neck incision c/d/i with skin glue intact  CLAIRE drain with thin, dark output  Chest: breathing non-labored on room air  Abdomen: soft, non-tender, non-distended  Extremities: warm and well perfused    A/P: No acute post-op issues. Continue plan of care:  Pain control Tylenol and prn Oxy  Mod CHO diet  Discontinue IVF  sliding scale insulin + home lantus  CLAIRE to bulb suction  Start Synthroid,Calcitriol and Tums  Repeat labs in a.m.  Likely dispo home tomorrow    Please call with any questions.    Mariangel Kumari, DO  General Surgery PGY3  (nights/weekends/holidays page job code [phone])    "

## 2020-08-27 VITALS
BODY MASS INDEX: 38.66 KG/M2 | DIASTOLIC BLOOD PRESSURE: 91 MMHG | SYSTOLIC BLOOD PRESSURE: 160 MMHG | RESPIRATION RATE: 16 BRPM | WEIGHT: 261.02 LBS | TEMPERATURE: 98.1 F | OXYGEN SATURATION: 96 % | HEIGHT: 69 IN | HEART RATE: 69 BPM

## 2020-08-27 LAB
CALCIUM SERPL-MCNC: 8.9 MG/DL (ref 8.5–10.1)
GLUCOSE BLDC GLUCOMTR-MCNC: 176 MG/DL (ref 70–99)
GLUCOSE BLDC GLUCOMTR-MCNC: 193 MG/DL (ref 70–99)
GLUCOSE BLDC GLUCOMTR-MCNC: 223 MG/DL (ref 70–99)
PTH-INTACT SERPL-MCNC: <7 PG/ML (ref 18–80)

## 2020-08-27 PROCEDURE — 82310 ASSAY OF CALCIUM: CPT | Performed by: STUDENT IN AN ORGANIZED HEALTH CARE EDUCATION/TRAINING PROGRAM

## 2020-08-27 PROCEDURE — 99024 POSTOP FOLLOW-UP VISIT: CPT | Performed by: PHYSICIAN ASSISTANT

## 2020-08-27 PROCEDURE — 25000132 ZZH RX MED GY IP 250 OP 250 PS 637: Performed by: SURGERY

## 2020-08-27 PROCEDURE — 36415 COLL VENOUS BLD VENIPUNCTURE: CPT | Performed by: STUDENT IN AN ORGANIZED HEALTH CARE EDUCATION/TRAINING PROGRAM

## 2020-08-27 PROCEDURE — 25000132 ZZH RX MED GY IP 250 OP 250 PS 637: Performed by: STUDENT IN AN ORGANIZED HEALTH CARE EDUCATION/TRAINING PROGRAM

## 2020-08-27 PROCEDURE — 82962 GLUCOSE BLOOD TEST: CPT

## 2020-08-27 PROCEDURE — 83970 ASSAY OF PARATHORMONE: CPT | Performed by: STUDENT IN AN ORGANIZED HEALTH CARE EDUCATION/TRAINING PROGRAM

## 2020-08-27 RX ADMIN — CALCITRIOL CAPSULES 0.25 MCG 0.25 MCG: 0.25 CAPSULE ORAL at 07:59

## 2020-08-27 RX ADMIN — ACYCLOVIR 400 MG: 400 TABLET ORAL at 07:59

## 2020-08-27 RX ADMIN — ACETAMINOPHEN 975 MG: 325 TABLET, FILM COATED ORAL at 07:59

## 2020-08-27 RX ADMIN — INSULIN ASPART 1 UNITS: 100 INJECTION, SOLUTION INTRAVENOUS; SUBCUTANEOUS at 07:59

## 2020-08-27 RX ADMIN — PREGABALIN 100 MG: 100 CAPSULE ORAL at 07:59

## 2020-08-27 RX ADMIN — CALCIUM CARBONATE (ANTACID) CHEW TAB 500 MG 1000 MG: 500 CHEW TAB at 07:59

## 2020-08-27 RX ADMIN — LEVOTHYROXINE SODIUM 175 MCG: 25 TABLET ORAL at 07:59

## 2020-08-27 RX ADMIN — SERTRALINE HYDROCHLORIDE 100 MG: 100 TABLET ORAL at 07:59

## 2020-08-27 ASSESSMENT — ACTIVITIES OF DAILY LIVING (ADL)
ADLS_ACUITY_SCORE: 11

## 2020-08-27 NOTE — PROGRESS NOTES
SW received orders to see pt for health care directive.  REYNALDO will see pt following am rounds.     JC Vazquez  Social Work, 6A  Phone:  [phone]  Pager:  [phone]  8/27/2020

## 2020-08-27 NOTE — PLAN OF CARE
Status: POD #1 s/p total thyroidectomy   Vitals: VSS on RA, capno in place   Neuros: Intact ex baseline N/T to hands and feet and BUE tremors   IV: PIV SL   Resp/trach: Clear   Diet: Mod CHO   Bowel status:Had a BM overnight   : Voiding   Skin: Throat incision with glue, WNL. CLAIRE x1 to neck   Pain: Mild throat pain controlled with scheduled tylenol  Activity: SBA   Plan: Continue to monitor. May D/C, will need CLAIRE education. Continue to monitor and follow POC

## 2020-08-27 NOTE — PROGRESS NOTES
SW received orders to assist pt in completing a Health Care Directive. SW was unable to meet with pt as pt discharged  prior to being seen (per rounds report, pt discharged at 9am).      JC Vazquez  Social Work, 6A  Phone:  [phone]  Pager:  [phone]  8/27/2020

## 2020-08-27 NOTE — UTILIZATION REVIEW
Admission Status; Secondary Review Determination       Under the authority of the Utilization Management Committee, the utilization review process indicated a secondary review on the above patient. The review outcome is based on review of the medical records, discussions with staff, and applying clinical experience noted on the date of the review.     (x) Outpatient Status with extended recovery is appropriate - This patient does not meet hospital inpatient criteria. If this patient's primary payer is Medicare and was admitted as an inpatient, Condition Code 44 should be used and patient status changed to outpatient recovery.    RATIONALE FOR DETERMINATION   64 years old  woman who underwent a total thyroidectomy. Patient was admitted to the hospital after the procedure. Patient has Medicare and the procedure is not on the CMS inpatient list. No documented complications or unexpected recovery. Patient can be safely  monitored for bleeding and recover in outpatient/extended recovery setting. The severity of illness, intensity of service provided, expected LOS and risk for adverse outcome doesn't meet inpatient hospital admission. Dr. Mariangel Kumari notified.    This document was produced using voice recognition software.     The information on this document is developed by the utilization review team in order for the business office to ensure compliance. This only denotes the appropriateness of proper admission status and does not reflect the quality of care rendered.   The definitions of Inpatient Status and Observation Status used in making the determination above are those provided in the CMS Coverage Manual, Chapter 1 and Chapter 6, section 70.4.   Sincerely,   AARON CARRANZA MD   System Medical Director   Utilization Management   [facility].

## 2020-08-27 NOTE — DISCHARGE SUMMARY
[facility] Discharge Summary    Winter Loya MRN# [id number]   Age: 64 year old YOB: 1955     Date of Admission:  8/26/2020  Date of Discharge::  8/27/2020  Admitting Physician:  Tiff Burgos MD  Discharge Physician:  Tiff Burgos MD     PCP:  Gabrielle Edwards    Disposition: Patient discharged to home in stable condition.    Admission Diagnosis:  Past Medical History:   Diagnosis Date     Anxiety      Depression      Diabetes (H)      Glaucoma (increased eye pressure)      History of smoking      Hyperlipidemia      Multiple myeloma in remission (H)      Nonsenile cataract      Obesity      Sleep apnea     no c-pap use       Discharge Diagnosis:  Patient Active Problem List   Diagnosis     Multiple myeloma in remission (H)     Diabetes mellitus, type 2 (H)     Thyroid goiter     Adjustment disorder with mixed anxiety and depressed mood     Allergic rhinitis     Essential hypertension     NA (generalized anxiety disorder)     History of endometrial ablation     Hyperlipidemia     Major depressive disorder, recurrent episode, moderate (H)     Mild intermittent asthma     Osteoarthrosis involving lower leg     Type 2 diabetes mellitus with hyperglycemia (H)     Status post complete thyroidectomy       Discharge medications  Current Discharge Medication List      START taking these medications    Details   acetaminophen (TYLENOL) 325 MG tablet Take 2 tablets (650 mg) by mouth every 4 hours as needed for pain  Qty: 50 tablet, Refills: 0    Associated Diagnoses: Thyroid goiter      calcitRIOL (ROCALTROL) 0.25 MCG capsule Take 1 capsule (0.25 mcg) by mouth 3 times daily  Qty: 90 capsule, Refills: 0    Associated Diagnoses: Thyroid goiter      calcium carbonate (TUMS) 500 MG chewable tablet Take 2 tablets (1,000 mg) by mouth 3 times daily  Qty: 180 tablet, Refills: 0    Associated Diagnoses: Thyroid goiter      levothyroxine (SYNTHROID/LEVOTHROID) 175 MCG tablet  Take 1 tablet (175 mcg) by mouth every morning (before breakfast)  Qty: 30 tablet, Refills: 3    Associated Diagnoses: Thyroid goiter      oxyCODONE (ROXICODONE) 5 MG tablet Take 1-2 tablets (5-10 mg) by mouth every 6 hours as needed for moderate to severe pain  Qty: 15 tablet, Refills: 0    Associated Diagnoses: Thyroid goiter      senna-docusate (SENOKOT-S/PERICOLACE) 8.6-50 MG tablet Take 1 tablet by mouth 2 times daily for 14 days  Qty: 28 tablet, Refills: 0    Associated Diagnoses: Thyroid goiter         CONTINUE these medications which have NOT CHANGED    Details   acyclovir (ZOVIRAX) 400 MG tablet Take 400 mg by mouth 2 times daily      atorvastatin (LIPITOR) 20 MG tablet Take 1 tablet (20 mg) by mouth every 24 hours  Qty: 90 tablet, Refills: 3    Associated Diagnoses: Type 2 diabetes mellitus without complication, without long-term current use of insulin (H)      insulin glargine (LANTUS PEN) 100 UNIT/ML pen Inject 34 Units Subcutaneous At Bedtime  Qty: 30 mL, Refills: 3    Comments: If Lantus is not covered by insurance, may substitute Basaglar at same dose and frequency.    Associated Diagnoses: Type 2 diabetes mellitus without complication, without long-term current use of insulin (H)      insulin pen needle (FIFTY50 PEN NEEDLES) 32G X 4 MM miscellaneous As directed. To use with Victoza daily      latanoprost (XALATAN) 0.005 % ophthalmic solution Place 1 drop into both eyes daily  Qty: 1 Bottle, Refills: 11    Associated Diagnoses: Glaucoma of both eyes, unspecified glaucoma type      LENalidomide (REVLIMID) 10 MG CAPS capsule Take 1 capsule (10 mg) by mouth daily for 28 days  Qty: 28 capsule, Refills: 0    Comments: Adult female NOT of reproductive capacity.  [id number]  Associated Diagnoses: Multiple myeloma in remission (H)      pregabalin (LYRICA) 100 MG capsule Take 1 capsule (100 mg) by mouth 3 times daily  Qty: 90 capsule, Refills: 3    Associated Diagnoses: Type 2 diabetes  mellitus with diabetic polyneuropathy, with long-term current use of insulin (H)      tiZANidine (ZANAFLEX) 4 MG tablet Take 1-2 tablets (4-8 mg) by mouth nightly as needed  Qty: 30 tablet, Refills: 1    Associated Diagnoses: Cervical radicular pain      sertraline (ZOLOFT) 100 MG tablet Take 1 tablet (100 mg) by mouth daily  Qty: 90 tablet, Refills: 1    Associated Diagnoses: Major depressive disorder with current active episode, unspecified depression episode severity, unspecified whether recurrent             Follow up, Special Instructions:  After Care     Future Labs/Procedures    Activity     Comments:    Your activity upon discharge: activity as tolerated    Diet     Comments:    Follow this diet upon discharge: Regular    Discharge Instructions     Comments:    Incision site(s):   - Keep dressing/incision clean/dry/intact for 24 hours. 24 hours after your operation, you may shower. You have surgical glue over your incision, this will wear off in time. No submersion in water (lake/pool/tub) for one month or until approved by your surgeon. There are two small tails of suture on either end of your incision; this will be trimmed at your follow-up appointment if still present.   - If you develop any fever/chills, difficulty breathing, worsening pain, redness, swelling, nausea or vomiting, tingling of your hands or around your lips, or drainage from your wound please call Dr Burgos at [phone]. If unable to reach her, call [phone] and ask to page the Surgical Oncology resident on call.     Pain control:   - Ok to take acetaminophen or ibuprofen if your pain is not severe enough for narcotic medication.   -No driving while taking narcotic pain medication.   -It is recommendable to take miralax while on narcotic pain medication to prevent constipation.     Medications:  Your post operative/ post hospital discharge  Medication is important and can be confusing due to the fact that several of the drugs  "have similar names and uses.  It is very important that you precisely follow the instructions on which drugs to take and that you not get the drugs mixed up due to their similar names and uses.     When you follow up in the clinic, please carry your pill bottles in for review by the doctor, so we can be sure you are getting the medication as we planned.      Here is a list of the generic and brand names of some of the medications you might have been prescribed:    Calcitriol (otherwise known as Rocaltrol):   This is a potent vitamin D which is usually used only temporarily after thyroid surgery.   This medication is not interchangeable with other forms of vitamin D.      Calcium    Common generic names: calcium carbonate, Calcium citrate   Common brand names: Tums, Oscal, Citracal  Labels on the calcium supplements can be confusing due to the fact that the number of milligrams of calcium (which we call the \"elemental calcium\") is different from the total calcium dose that might show up on the label.  For example, TUMS Ultra 1000 has 1000 mg of calcium carbonate but only 400 mg of elemental calcium.    It is very important that you actually use the dose (or # of tablets) of calcium that is prescribed (and not substitute with another dose or preparation).      Levothyroxine  (brand names  Synthroid, Levoxyl, Levothroid, Unithroid, Tirosint and others).  This is a long acting thyroid hormone replacement (T4) that is used chronically to replace thyroid deficiency.          Procedures:  8/27/2020 total thyroidectomy     Consultations:  VASCULAR ACCESS CARE ADULT IP CONSULT  ADVANCE DIRECTIVE IP CONSULT    Brief HPI:  64 year old year old female with hyperlipidemia, sleep apnea, history of smoking, multiple myeloma, peripheral neuropathy, diabetes, obesity, depression and anxiety that has a thyroid goiter with recommendation for total thyroidectomy.    Hospital Course:  The patient was admitted and underwent the above " procedure. The patient tolerated the procedure well. The patient recovered well with no post-operative complications. Prior to discharge pain was controlled with oral pain medication and the patient was able to ambulate and void without difficulty. The patient received appropriate education post operatively. On POD #1 the drain was removed and patient was discharged to home.    Surgical pathology  Pending     Mariangel Kumari DO  General Surgery, PGY3

## 2020-08-27 NOTE — PLAN OF CARE
VSS, slightly hypertensive. AxOx4. Tolerating mod CHO diet. Up ad jonny in room, steady. Neck incision with liquid bandage, CLAIRE removed by provided this AM. States some discomfort, managed with scheduled tylenol. Follow up appointments discussed with patient, verbalized understanding. Discussed signs and symptoms of infection and when to reach out to surgical oncology provider. Patient discharged with all belongings with wheelchair transport to discharge pharmacy and lobby.

## 2020-08-27 NOTE — PLAN OF CARE
Status: POD#0 s/p total thyroidectomy  Vitals: VSS  Neuros: A&Ox4. 5/5 strengths throughout. Baseline N/T to hands and feet. Tremors in upper extremities, per patient intermittent and not new to this encounter  IV: PIV SL  Resp/trach: LS Clear  Diet: Modified CHO, tolerating  Bowel status: No BM this shift  : Voiding spontaneously  Skin: Neck incision CDI with glue intact. CLAIRE to bulb suction with 30mL dark bloody output  Pain: Reported some facial pain and HA today, well controlled with Tylenol  Activity: SBA with GB  Social: Son was updated by physician today  Plan: Continue to monitor and follow POC, will likely discharge to home tomorrow

## 2020-08-30 DIAGNOSIS — C90.01 MULTIPLE MYELOMA IN REMISSION (H): Primary | ICD-10-CM

## 2020-09-01 DIAGNOSIS — C90.01 MULTIPLE MYELOMA IN REMISSION (H): Primary | ICD-10-CM

## 2020-09-01 LAB — COPATH REPORT: NORMAL

## 2020-09-01 RX ORDER — LENALIDOMIDE 10 MG/1
10 CAPSULE ORAL DAILY
Qty: 28 CAPSULE | Refills: 0 | Status: SHIPPED | OUTPATIENT
Start: 2020-09-01 | End: 2020-09-29

## 2020-09-03 ENCOUNTER — VIRTUAL VISIT (OUTPATIENT)
Dept: INTERNAL MEDICINE | Facility: CLINIC | Age: 65
End: 2020-09-03
Payer: MEDICAID

## 2020-09-03 DIAGNOSIS — Z79.899 POLYPHARMACY: Primary | ICD-10-CM

## 2020-09-03 DIAGNOSIS — E11.9 TYPE 2 DIABETES MELLITUS WITHOUT COMPLICATION, WITHOUT LONG-TERM CURRENT USE OF INSULIN (H): ICD-10-CM

## 2020-09-03 NOTE — PROGRESS NOTES
"Virtual audio visit    CC: diabetes management    PCP:  Thursday  resident clinic, most recently saw staff Dr. Perry    HPI:  64 year old female, pmhx notable for hx multiple myeloma, s/p autologous stem cell transplant 4/20/19, type 2 DM, peripheral neuropathy, morbid obesity, multinodular goiter, s/p thryoidectomy on 8/27/20, hypertension, hyperlipidemia, depression, anxiety, sleep apnea     Admitted [facility] 8/26-8/27/20 by Dr. Burgos for thyroidectomy  At discharge was given acetaminophen, calcitriol, calcium carbonate, levothyroxine, oxycodone, senna-docusate.  Otherwise, PTA meds were not changed.    Today Winter states \"my blood sugars are probably really bad\".  Hasn't had a meter for the last few months. Taking Lantus 34 units every night.  Not sure if she is supposed to be taking Humalog or metformin. The only glucose data we have is from our lab results (see below).  Does not have Humalog nor a meter at this time, and so there is no home blood glucose record to review..  Would like an order for a machine, strips. Has one pen left of Lantus.  Winter agrees to start checking her blood glucose daily, before breakfast and 2-3 hours after lunch, and record the readings.  I offered for her to meet with our clinical pharmacist and she is eager to do so.  Will have her follow up with her PCP, Dr. Edwards, after she has had a chance to connect with the pharmacist. We reviewed the following test results:    Component      Latest Ref Rng & Units 3/5/2020 8/21/2020   Hemoglobin A1C      0 - 5.6 % 6.7 (H) 9.1 (H)     Component      Latest Ref Rng & Units 8/26/2020 8/26/2020 8/26/2020 8/26/2020           7:04 AM 11:02 AM 12:05 PM  4:48 PM   Glucose      70 - 99 mg/dL 212 (H) 206 (H) 225 (H) 279 (H)     Component      Latest Ref Rng & Units 8/26/2020 8/27/2020 8/27/2020 8/27/2020           8:15 PM  3:27 AM  6:28 AM  7:18 AM   Glucose      70 - 99 mg/dL 304 (H) 223 (H) 193 (H) 176 (H)     Pending OV's:  9/10 Oncology  9/14 " ENT  9/14 Palliative Care  10/22 Neuropsych  12/9 BMT    Patient Active Problem List   Diagnosis     Multiple myeloma in remission (H)     Diabetes mellitus, type 2 (H)     Thyroid goiter     Adjustment disorder with mixed anxiety and depressed mood     Allergic rhinitis     Essential hypertension     NA (generalized anxiety disorder)     History of endometrial ablation     Hyperlipidemia     Major depressive disorder, recurrent episode, moderate (H)     Mild intermittent asthma     Osteoarthrosis involving lower leg     Type 2 diabetes mellitus with hyperglycemia (H)     Status post complete thyroidectomy     Current Outpatient Medications   Medication Sig Dispense Refill     acetaminophen (TYLENOL) 325 MG tablet Take 2 tablets (650 mg) by mouth every 4 hours as needed for pain 50 tablet 0     acyclovir (ZOVIRAX) 400 MG tablet Take 400 mg by mouth 2 times daily       atorvastatin (LIPITOR) 20 MG tablet Take 1 tablet (20 mg) by mouth every 24 hours (Patient taking differently: Take 20 mg by mouth At Bedtime ) 90 tablet 3     calcitRIOL (ROCALTROL) 0.25 MCG capsule Take 1 capsule (0.25 mcg) by mouth 3 times daily 90 capsule 0     calcium carbonate (TUMS) 500 MG chewable tablet Take 2 tablets (1,000 mg) by mouth 3 times daily 180 tablet 0     insulin glargine (LANTUS PEN) 100 UNIT/ML pen Inject 34 Units Subcutaneous At Bedtime 30 mL 3     insulin pen needle (FIFTY50 PEN NEEDLES) 32G X 4 MM miscellaneous As directed. To use with Victoza daily       latanoprost (XALATAN) 0.005 % ophthalmic solution Place 1 drop into both eyes daily (Patient taking differently: Place 1 drop into both eyes At Bedtime ) 1 Bottle 11     LENalidomide (REVLIMID) 10 MG CAPS capsule Take 1 capsule (10 mg) by mouth daily for 28 days 28 capsule 0     LENalidomide (REVLIMID) 10 MG CAPS capsule Take 1 capsule (10 mg) by mouth daily for 28 days (Patient taking differently: Take 10 mg by mouth every morning ) 28 capsule 0     LENalidomide (REVLIMID)  "10 MG CAPS capsule Take 1 capsule (10 mg) by mouth daily for 28 days (Patient taking differently: Take 10 mg by mouth every morning ) 28 capsule 0     levothyroxine (SYNTHROID/LEVOTHROID) 175 MCG tablet Take 1 tablet (175 mcg) by mouth every morning (before breakfast) 30 tablet 3     oxyCODONE (ROXICODONE) 5 MG tablet Take 1-2 tablets (5-10 mg) by mouth every 6 hours as needed for moderate to severe pain 15 tablet 0     pregabalin (LYRICA) 100 MG capsule Take 1 capsule (100 mg) by mouth 3 times daily 90 capsule 3     senna-docusate (SENOKOT-S/PERICOLACE) 8.6-50 MG tablet Take 1 tablet by mouth 2 times daily for 14 days 28 tablet 0     sertraline (ZOLOFT) 100 MG tablet Take 1 tablet (100 mg) by mouth daily 90 tablet 1     tiZANidine (ZANAFLEX) 4 MG tablet Take 1-2 tablets (4-8 mg) by mouth nightly as needed 30 tablet 1     No Known Allergies  BP Readings from Last 6 Encounters:   08/27/20 (!) 160/91   07/23/20 (!) 144/70   06/20/20 132/78   06/10/20 135/79   05/11/20 (!) 153/76   04/14/20 (!) 142/93     Wt Readings from Last 5 Encounters:   08/26/20 118.4 kg (261 lb 0.4 oz)   07/23/20 113.4 kg (250 lb)   06/10/20 119.8 kg (264 lb 1.6 oz)   05/11/20 118.5 kg (261 lb 3.2 oz)   03/10/20 117.9 kg (260 lb)     Estimated body mass index is 38.83 kg/m  as calculated from the following:    Height as of 8/26/20: 1.746 m (5' 8.75\").    Weight as of 8/26/20: 118.4 kg (261 lb 0.4 oz).    Gen: Sounds a bit weary today, but not anxious or agitated.    Winter was seen today for hospital f/u.    Diagnoses and all orders for this visit:    Polypharmacy  -     PHARMACY (MT) REFERRAL    Type 2 diabetes mellitus without complication, without long-term current use of insulin (H)  -     PHARMACY ([facility]) REFERRAL  -     Hemoglobin A1c; Future (2-3 months)  -     blood glucose monitoring (NO BRAND SPECIFIED) meter device kit; Use to test blood sugar two times daily or as directed.  -     blood glucose (NO BRAND SPECIFIED) test strip; Use to " "test blood sugar two times daily or as directed.  -     insulin glargine (LANTUS PEN) 100 UNIT/ML pen; Inject 34 Units Subcutaneous At Bedtime    See additional details of today's visit in the HPI.  F/U with Dr. Edwards or other Thursday  resident in 2-3 months.    This patient is being evaluated via a billable telephone visit; THIS VISIT WAS INITIATED BY THE PT, as AN ALTERNATIVE TO IN PERSON VISIT .       The patient has has been notified of following:      \"This billable telephone visit will be conducted via a call between you and your physician/provider. We have found that certain health care needs can be provided without the need for a physical exam.  This service lets us provide the care you need with a short phone conversation.  If a prescription is necessary we can send it directly to your pharmacy.  If lab work is needed we can place an order for that and you can then stop by our lab to have the test done at a later time. We can also place orders for a limited number of imaging tests if they are deemed urgently necessary.     If during the course of the call the physician/provider feels a telephone visit is not appropriate, you will not be charged for this service.\"     Due to efforts to reduce the spread of COVID-19 in the clinic, state, nation, telephone visits are encouraged currently. Patient understands that diagnose and advice is limited by the inability to exam him/her/them face-to-face.    Patient has given verbal consent for the virtual audio visit? Yes  Did patient initiate this virtual visit? Yes    Person spoken to: patient    This was a synchronous virtual visit  Time call initiated:  2:03 pm  Time call ended:  2:20 pm  Total length of call: 17 min    Brittany Rosenthal M.D.  Internal Medicine  [facility]   pager [phone]              "

## 2020-09-03 NOTE — NURSING NOTE
Chief Complaint   Patient presents with     Hospital F/U     Pt comes in for hospital follow up       Debora Burnett EMT at 12:16 PM sign on 9/3/2020

## 2020-09-04 ENCOUNTER — TELEPHONE (OUTPATIENT)
Dept: ONCOLOGY | Facility: CLINIC | Age: 65
End: 2020-09-04

## 2020-09-04 NOTE — TELEPHONE ENCOUNTER
Oral Chemotherapy Monitoring Program   Medication: Revlimid  Rx: 10mg PO daily on days 1 through 28 of 28 day cycle   Auth #: [id number]   Risk Category: Adult Female NORP  Routine survey questions reviewed.   Rx to be Escribed to Harvey Borja  [facility]  Oncology Pharmacy Liaison   [email or web address]  [phone] (phone)  [phone] (fax)

## 2020-09-09 NOTE — OP NOTE
Procedure Date: 08/26/2020      PREOPERATIVE DIAGNOSIS:  Large thyroid goiter.      POSTOPERATIVE DIAGNOSIS:  Large thyroid goiter.      SURGICAL PROCEDURE PERFORMED:  Total thyroidectomy with 1 hour of intraoperative recurrent laryngeal nerve monitoring.      SURGEON:  Tiff Burgos MD      ASSISTANT:  Mariangel Kumari DO       ANESTHESIA:  General endotracheal with nerve monitoring endotracheal tube.      COMPLICATIONS:  None.      ESTIMATED BLOOD LOSS:  Less than 5 mL.      CLINICAL INDICATIONS FOR THE PROCEDURE:  This is a 64-year-old female with a noted large left thyroid nodule, greater than 6 cm.  She also had multiple nodules on the right side as well.  She is now becoming more symptomatic.  Based on the size and the symptoms, it was recommended the patient undergo a total thyroidectomy.  The surgical procedure was discussed with the patient, including but not limited to the risks of bleeding, infection, injury to the recurrent laryngeal nerve or nerves, potential permanent hypocalcemia, loss of airway.  The patient is aware of this and agreed to proceed with surgery and consent was obtained and site was marked.      DETAILS OF PROCEDURE:  The patient was brought to the operating room in stable condition, placed on the operating table in supine position.  After appropriate general anesthesia was obtained, the patient was prepped and draped in sterile fashion.  A time-out was then performed.  A 7 cm incision was made over the anterior neck following natural skin crease.  The platysma was then divided and subplatysmal planes were then created.  The strap muscles were divided at the midline and retracted laterally.  As we entered into the neck, the left lobe of the thyroid gland was quite large and therefore, it was necessary to divide the left-sided strap muscles both the superficial and deep.  This permitted better access to the thyroid gland.  We initially identified the middle thyroid vein.  This was  separately skeletonized, surgically tied and/or clipped and then divided using the LigaSure.  We then carried our dissection cephalad, identified the left superior thyroidal vessels.  These were each separately skeletonized, surgically tied and clipped and then divided.  We then carried our dissection inferiorly because it was somewhat difficult to mobilize the thyroid gland out up into the field.  There were small vascular branches that were separately clipped and then divided.  This actually permitted us to mobilize the gland more readily up out into the operative field and identified the left superior parathyroid gland.  This was dissected maintained its viability.  The left recurrent laryngeal nerve was then identified and followed from a superior to inferior manner, dissecting the thyroid gland off it anteriorly.  We identified the left inferior parathyroid gland and dissected it from the undersurface of the thyroid, maintaining its viability.  The left inferior thyroidal vessels were separately skeletonized, surgically tied and/or clipped and then divided using the LigaSure.  There was a large vein draining the isthmus that was separately skeletonized and tied.  As we continued rotating the gland from lateral to medial manner.  Because of the size of the thyroid gland, it was necessary to divide the thyroid gland at the isthmus to permit better mobilization of the right lobe.  The thyroid gland was then divided and the left lobe of the thyroid gland and isthmus and sent for permanent pathologic evaluation.  It was not necessary to divide the right strap muscles.  The right lobe of the thyroid gland was dissected in a similar manner as that to the right.  Once the thyroid gland was completely removed, the area was copiously irrigated.  Valsalva maneuver was performed and there was no obvious bleeding.  All 4 parathyroid glands appeared to be intact and viable at the conclusion of the case.  The left strap  muscles were reapproximated using a 3-0 Vicryl horizontal mattress suture.  A drain was then placed in the operative bed, exited inferolateral to the incision on the left.  The drain was secured to the skin using a 3-0 nylon suture.  Fibrillar was then placed in the operative bed.  The strap muscles were then approximated at the midline using a 3-0 chromic interrupted suture.  The platysma was approximated using a 3-0 chromic interrupted suture.  The skin incision was approximated with 5-0 Monocryl running subcuticular suture.  The wound was dressed with Dermabond.  The patient was then extubated and returned to the recovery room in stable condition.  I was present during the entire surgical procedure.         RAYMOND BRAVO MD             D: 2020   T: 2020   MT: SHAI      Name:     PIEDAD LO   MRN:      [id number]        Account:        [id number]   :      1955           Procedure Date: 2020      Document: [id number]

## 2020-09-10 ENCOUNTER — VIRTUAL VISIT (OUTPATIENT)
Dept: ONCOLOGY | Facility: CLINIC | Age: 65
End: 2020-09-10
Attending: STUDENT IN AN ORGANIZED HEALTH CARE EDUCATION/TRAINING PROGRAM
Payer: MEDICAID

## 2020-09-10 DIAGNOSIS — C90.01 MULTIPLE MYELOMA IN REMISSION (H): Primary | ICD-10-CM

## 2020-09-10 DIAGNOSIS — G62.9 NEUROPATHY: ICD-10-CM

## 2020-09-10 PROCEDURE — 99443 ZZC PHYSICIAN TELEPHONE EVALUATION 21-30 MIN: CPT | Performed by: PHYSICIAN ASSISTANT

## 2020-09-10 PROCEDURE — 40001009 ZZH VIDEO/TELEPHONE VISIT; NO CHARGE

## 2020-09-10 RX ORDER — MULTIVIT-MIN/IRON/FOLIC ACID/K 18-600-40
CAPSULE ORAL
COMMUNITY
End: 2020-09-15

## 2020-09-10 NOTE — PROGRESS NOTES
"Winter Loya is a 64 year old female who is being evaluated via a billable telephone visit.      The patient has been notified of following:     \"This telephone visit will be conducted via a call between you and your physician/provider. We have found that certain health care needs can be provided without the need for a physical exam.  This service lets us provide the care you need with a short phone conversation.  If a prescription is necessary we can send it directly to your pharmacy.  If lab work is needed we can place an order for that and you can then stop by our lab to have the test done at a later time.    Telephone visits are billed at different rates depending on your insurance coverage. During this emergency period, for some insurers they may be billed the same as an in-person visit.  Please reach out to your insurance provider with any questions.    If during the course of the call the physician/provider feels a telephone visit is not appropriate, you will not be charged for this service.\"    Patient has given verbal consent for Telephone visit?  Yes    What phone number would you like to be contacted at? [phone]    How would you like to obtain your AVS? MyChart    I have reviewed and updated the patient's allergies and medication list.  Patient was asked to provide any patient recorded vital signs, height and/or weight.  Please see \"Patient Reported Vital Signs\" tab for that information.  Patient instructed to be in the virtual waiting room 10-15 minutes prior to appointment time.      Concerns: Patient has no new concerns.      Refills: None         JOSE Le        Phone call duration: 34 minutes    FALGUNI Cannon CNP              Reason for Visit: IgA Kappa Multiple Myeloma    Oncology HPI:   Winter Loya is a 64 year old woman with IgA Kappa Multiple Myeloma. In 2018 she had anemia and was fatigued. She was found to have IgA kappa monocloncal antibody with M-spike of 0.17. IgA " "elevated. Skeletal survey was unremarkable and UPEP had minimal protein (156 mg/m2). PET 11/2018 showed bone marrow consistent with hypermetabolic plasma cell myeloma. M spike was 0.17. She started RVD and Zometa. On 4/2019 she had an autologous transplant.      Her bone marrow bx from September 2019 was sent here for review. It showed marrow cellularity of 60%, with decreased trilineage hematopoiesis and 15% plasma cells as well as peripheral blood with slight anemia. 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 She is on revlimid maintenance and zometa from her prior oncologist in [place]. On 12/6/19 her K/L ratio is 1.1, M spike is 0.04.     Interval history:   -Reports things have been going okay. Reports some fatigue but thinks this may be related to waking up early for work.     -Reports thyroidectomy on 25th was uneventful. Feels good, no remains sx. Easier to swallow now then prior to surgery.    -Reports Revlimid is going well, taking every day, no missed doses. Has concerns over neuropathy in feet. Primarily in toes and balls of feet, reports legs all the way down feel \"different\". Reports it is getting worse this summer, not spreading but more numb in feet only.  Questioning whether she should continue on Revlimid, reports she has been blindly following what doctors have said. Has concerns the Revlimid is causing worsening neuropathy    -Reports she has not been managing DM 2 well. Just re-established with primary care earlier this month. A1C on 8/21 was 9.1. Reports mood has been down, moving quite a bit, and she has not been making good food choices.     -Neuropathy in hands resolved with muscle relaxer's. Following with primary for his. Believes neck tension was causing nerve pain/numbness.     -Reports she gets nauseous a lot, but is not nauseous during our visit. No " "vomiting. Is not taking nausea medication- not that bad. Reports appetite is good. Denies bladder or bowel concerns.     -Reports balance is off, especially as neuropathy has worsened. Reports she went to PT after SCT and was helpful. Has fallen 4 times at home this summer, last time >1 month ago. Denies hitting her head, injuring self. Interested in PT referral.     -Reports familial tremors in bilateral hands. Stable but bothersome.     -Reports she is \"having headaches like crazy\". Believes this is r/t sinuses. Reports air freshener at work maybe it worse. Reports headaches are located in the front bilaterally. Reports she wakes up 3 times per week with sinus HA. Reports sinus rinse is very helpful. Denies blurred vision- does have glaucoma.    -Reports mood has actually been really good. Feels this has been improving. Appt coming up with palliative care for anxiety and depression.     -Denies fever, chills, dyspnea, chest pain, abdominal pain, vomiting, constipation, diarrhea, dysuria, hematuria, hematochezia, melena, new lumps/bumps and unusual bruising and bleeding.         Past Medical History:   Diagnosis Date     Anxiety      Depression      Diabetes (H)      Glaucoma (increased eye pressure)      History of smoking      Hyperlipidemia      Multiple myeloma in remission (H)      Nonsenile cataract      Obesity      Sleep apnea     no c-pap use        Current Outpatient Medications   Medication Sig Dispense Refill     acetaminophen (TYLENOL) 325 MG tablet Take 2 tablets (650 mg) by mouth every 4 hours as needed for pain 50 tablet 0     acyclovir (ZOVIRAX) 400 MG tablet Take 400 mg by mouth 2 times daily       atorvastatin (LIPITOR) 20 MG tablet Take 1 tablet (20 mg) by mouth every 24 hours (Patient taking differently: Take 20 mg by mouth At Bedtime ) 90 tablet 3     blood glucose (NO BRAND SPECIFIED) test strip Use to test blood sugar two times daily or as directed. 200 strip 3     blood glucose monitoring " (NO BRAND SPECIFIED) meter device kit Use to test blood sugar two times daily or as directed. 1 kit 3     calcitRIOL (ROCALTROL) 0.25 MCG capsule Take 1 capsule (0.25 mcg) by mouth 3 times daily 90 capsule 0     calcium carbonate (TUMS) 500 MG chewable tablet Take 2 tablets (1,000 mg) by mouth 3 times daily 180 tablet 0     insulin glargine (LANTUS PEN) 100 UNIT/ML pen Inject 34 Units Subcutaneous At Bedtime 30 mL 3     insulin pen needle (FIFTY50 PEN NEEDLES) 32G X 4 MM miscellaneous As directed. To use with Victoza daily       latanoprost (XALATAN) 0.005 % ophthalmic solution Place 1 drop into both eyes daily (Patient taking differently: Place 1 drop into both eyes At Bedtime ) 1 Bottle 11     LENalidomide (REVLIMID) 10 MG CAPS capsule Take 1 capsule (10 mg) by mouth daily for 28 days 28 capsule 0     LENalidomide (REVLIMID) 10 MG CAPS capsule Take 1 capsule (10 mg) by mouth daily for 28 days (Patient taking differently: Take 10 mg by mouth every morning ) 28 capsule 0     LENalidomide (REVLIMID) 10 MG CAPS capsule Take 1 capsule (10 mg) by mouth daily for 28 days (Patient taking differently: Take 10 mg by mouth every morning ) 28 capsule 0     levothyroxine (SYNTHROID/LEVOTHROID) 175 MCG tablet Take 1 tablet (175 mcg) by mouth every morning (before breakfast) 30 tablet 3     oxyCODONE (ROXICODONE) 5 MG tablet Take 1-2 tablets (5-10 mg) by mouth every 6 hours as needed for moderate to severe pain 15 tablet 0     pregabalin (LYRICA) 100 MG capsule Take 1 capsule (100 mg) by mouth 3 times daily 90 capsule 3     sertraline (ZOLOFT) 100 MG tablet Take 1 tablet (100 mg) by mouth daily 90 tablet 1     tiZANidine (ZANAFLEX) 4 MG tablet Take 1-2 tablets (4-8 mg) by mouth nightly as needed 30 tablet 1        No Known Allergies    Exam:   There were no vitals taken for this visit.   General: alert and no distress   Psych: Alert and oriented times; coherent speech, normal rate and volume, able to articulate logical thoughts,  "able to abstract reason, no tangential thoughts, no hallucinations or delusions  Patient's affect is appropriate.    Pulm: Speaking in full sentences, unlabored, no audible wheezes or cough.   The rest of a comprehensive physical examination is deferred due to PHE (public health emergency) video restrictions\"     Labs: no recent labs, pt rescheduled labs that were suppose to be completed this morning     Assessment/plan:   1.) MM, standard risk, IgA Kappa  -s/p auto HSCT in 4/2019  -now on maintenance Revlimid 10 mg daily. Explained this is important to continue in order to keep myeloma in remission. Pt agreeable to continue   -will going SPEP and SFLC monthly. Last Mspike was 0.0 from 7/27. Will complete labs later this week to reassess  -PET/CT annually or if symptomatic from MM due to bone pain. Last PET was 2/15/20  -still stable from a MM standpoint. Continue Rev. Will follow-up with me about every 6 weeks. Should follow-up with PCP about other ongoing issues   -will follow-up with Dr. Tipton in December    PPx: Continue ACV and  mg for VTE ppx while on Rev    2.) Bone lesions  -continue zometa through 12/2020 to complete a total of 2 years of therapy, associated with improved PFS and overall survival when given with ASCT. Did have temporary pause with COVID and now will give every 3 months to limit clinic visits. Last given on 6/10/20. Was scheduled today though cancel. Will reschedule within this week     3.) Depression with Anxiety  -depression and anxiety has been something she has dealt with for a long time.   -Currently maintained on Zoloft 100 mg daily.  This has been helpful for her though she continues to struggle with ongoing stress, anxiety and sadness  -Scheduled with palliative SW here for counseling    4.) Neuropathy   -multifactorial likely d/t previous chemotherapy and uncontrolled diabetes. Suspect the progressively neuropathy is r/t diabetes since A1C has increased this summer along " with the neuropathy changes, when A1C was controlled, neuropathy was stable  -encouraged good food choices and close f/u with PCP    5.) Nausea   -mild, encouraged small, frequent meals and use of anti-emetics as needed     6.) Hx of falls  -Will refer to cancer rehab for work on balance with worsening neuropathy, continued balance issues, and hx of falls    7.) DM2   -latest A1C was 9.1. Encouraged good food choices. PCP following. Can be contributing to worsening neuropathy    8.) Sinus Headaches   -Stable, encouraged consistent use of netipot and f/u with PCP    9.) Memory changes   -Stable. Could also do OT for chemo brain with cancer rehab. She is scheduled for neuropsych testing later this month. Encouraged lists and reminders around the house.     10.) Thyroidectomy   -Went well, will continue to f/u with with surgery and PCP    11.) Familial tremor  -Stable. Has been worked up in the past when pt lived in [place]. Encouraged f/u with PCP for management      FALGUNI Cannon, CNP    The patient was seen in conjunction with FALGUNI Cannon CNP who served as a scribe for today's visit. I have reviewed and edited the above note, and agree with the above findings and plan.    Reva Mojica PA-C

## 2020-09-10 NOTE — LETTER
"    9/10/2020         RE: Winter Loya  [address]  [place] [place] [facility] [address]        Dear Colleague,    Thank you for referring your patient, Winter Loya, to the [facility]. Please see a copy of my visit note below.    Winter Loya is a 64 year old female who is being evaluated via a billable telephone visit.      The patient has been notified of following:     \"This telephone visit will be conducted via a call between you and your physician/provider. We have found that certain health care needs can be provided without the need for a physical exam.  This service lets us provide the care you need with a short phone conversation.  If a prescription is necessary we can send it directly to your pharmacy.  If lab work is needed we can place an order for that and you can then stop by our lab to have the test done at a later time.    Telephone visits are billed at different rates depending on your insurance coverage. During this emergency period, for some insurers they may be billed the same as an in-person visit.  Please reach out to your insurance provider with any questions.    If during the course of the call the physician/provider feels a telephone visit is not appropriate, you will not be charged for this service.\"    Patient has given verbal consent for Telephone visit?  Yes    What phone number would you like to be contacted at? [phone]    How would you like to obtain your AVS? Lisbeth    I have reviewed and updated the patient's allergies and medication list.  Patient was asked to provide any patient recorded vital signs, height and/or weight.  Please see \"Patient Reported Vital Signs\" tab for that information.  Patient instructed to be in the virtual waiting room 10-15 minutes prior to appointment time.      Concerns: Patient has no new concerns.      Refills: None         JOSE Le        Phone call duration: 34 minutes    FALGUNI Cannon CNP              Reason for " "Visit: IgA Kappa Multiple Myeloma    Oncology HPI:   Winter Loya is a 64 year old woman with IgA Kappa Multiple Myeloma. In 2018 she had anemia and was fatigued. She was found to have IgA kappa monocloncal antibody with M-spike of 0.17. IgA elevated. Skeletal survey was unremarkable and UPEP had minimal protein (156 mg/m2). PET 11/2018 showed bone marrow consistent with hypermetabolic plasma cell myeloma. M spike was 0.17. She started RVD and Zometa. On 4/2019 she had an autologous transplant.      Her bone marrow bx from September 2019 was sent here for review. It showed marrow cellularity of 60%, with decreased trilineage hematopoiesis and 15% plasma cells as well as peripheral blood with slight anemia. 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 She is on revlimid maintenance and zometa from her prior oncologist in [place]. On 12/6/19 her K/L ratio is 1.1, M spike is 0.04.     Interval history:   -Reports things have been going okay. Reports some fatigue but thinks this may be related to waking up early for work.     -Reports thyroidectomy on 25th was uneventful. Feels good, no remains sx. Easier to swallow now then prior to surgery.    -Reports Revlimid is going well, taking every day, no missed doses. Has concerns over neuropathy in feet. Primarily in toes and balls of feet, reports legs all the way down feel \"different\". Reports it is getting worse this summer, not spreading but more numb in feet only.  Questioning whether she should continue on Revlimid, reports she has been blindly following what doctors have said. Has concerns the Revlimid is causing worsening neuropathy    -Reports she has not been managing DM 2 well. Just re-established with primary care earlier this month. A1C on 8/21 was 9.1. Reports mood has been down, moving quite a bit, and she has not been making good " "food choices.     -Neuropathy in hands resolved with muscle relaxer's. Following with primary for his. Believes neck tension was causing nerve pain/numbness.     -Reports she gets nauseous a lot, but is not nauseous during our visit. No vomiting. Is not taking nausea medication- not that bad. Reports appetite is good. Denies bladder or bowel concerns.     -Reports balance is off, especially as neuropathy has worsened. Reports she went to PT after SCT and was helpful. Has fallen 4 times at home this summer, last time >1 month ago. Denies hitting her head, injuring self. Interested in PT referral.     -Reports familial tremors in bilateral hands. Stable but bothersome.     -Reports she is \"having headaches like crazy\". Believes this is r/t sinuses. Reports air freshener at work maybe it worse. Reports headaches are located in the front bilaterally. Reports she wakes up 3 times per week with sinus HA. Reports sinus rinse is very helpful. Denies blurred vision- does have glaucoma.    -Reports mood has actually been really good. Feels this has been improving. Appt coming up with palliative care for anxiety and depression.     -Denies fever, chills, dyspnea, chest pain, abdominal pain, vomiting, constipation, diarrhea, dysuria, hematuria, hematochezia, melena, new lumps/bumps and unusual bruising and bleeding.         Past Medical History:   Diagnosis Date     Anxiety      Depression      Diabetes (H)      Glaucoma (increased eye pressure)      History of smoking      Hyperlipidemia      Multiple myeloma in remission (H)      Nonsenile cataract      Obesity      Sleep apnea     no c-pap use        Current Outpatient Medications   Medication Sig Dispense Refill     acetaminophen (TYLENOL) 325 MG tablet Take 2 tablets (650 mg) by mouth every 4 hours as needed for pain 50 tablet 0     acyclovir (ZOVIRAX) 400 MG tablet Take 400 mg by mouth 2 times daily       atorvastatin (LIPITOR) 20 MG tablet Take 1 tablet (20 mg) by mouth " every 24 hours (Patient taking differently: Take 20 mg by mouth At Bedtime ) 90 tablet 3     blood glucose (NO BRAND SPECIFIED) test strip Use to test blood sugar two times daily or as directed. 200 strip 3     blood glucose monitoring (NO BRAND SPECIFIED) meter device kit Use to test blood sugar two times daily or as directed. 1 kit 3     calcitRIOL (ROCALTROL) 0.25 MCG capsule Take 1 capsule (0.25 mcg) by mouth 3 times daily 90 capsule 0     calcium carbonate (TUMS) 500 MG chewable tablet Take 2 tablets (1,000 mg) by mouth 3 times daily 180 tablet 0     insulin glargine (LANTUS PEN) 100 UNIT/ML pen Inject 34 Units Subcutaneous At Bedtime 30 mL 3     insulin pen needle (FIFTY50 PEN NEEDLES) 32G X 4 MM miscellaneous As directed. To use with Victoza daily       latanoprost (XALATAN) 0.005 % ophthalmic solution Place 1 drop into both eyes daily (Patient taking differently: Place 1 drop into both eyes At Bedtime ) 1 Bottle 11     LENalidomide (REVLIMID) 10 MG CAPS capsule Take 1 capsule (10 mg) by mouth daily for 28 days 28 capsule 0     LENalidomide (REVLIMID) 10 MG CAPS capsule Take 1 capsule (10 mg) by mouth daily for 28 days (Patient taking differently: Take 10 mg by mouth every morning ) 28 capsule 0     LENalidomide (REVLIMID) 10 MG CAPS capsule Take 1 capsule (10 mg) by mouth daily for 28 days (Patient taking differently: Take 10 mg by mouth every morning ) 28 capsule 0     levothyroxine (SYNTHROID/LEVOTHROID) 175 MCG tablet Take 1 tablet (175 mcg) by mouth every morning (before breakfast) 30 tablet 3     oxyCODONE (ROXICODONE) 5 MG tablet Take 1-2 tablets (5-10 mg) by mouth every 6 hours as needed for moderate to severe pain 15 tablet 0     pregabalin (LYRICA) 100 MG capsule Take 1 capsule (100 mg) by mouth 3 times daily 90 capsule 3     sertraline (ZOLOFT) 100 MG tablet Take 1 tablet (100 mg) by mouth daily 90 tablet 1     tiZANidine (ZANAFLEX) 4 MG tablet Take 1-2 tablets (4-8 mg) by mouth nightly as needed  "30 tablet 1        No Known Allergies    Exam:   There were no vitals taken for this visit.   General: alert and no distress   Psych: Alert and oriented times; coherent speech, normal rate and volume, able to articulate logical thoughts, able to abstract reason, no tangential thoughts, no hallucinations or delusions  Patient's affect is appropriate.    Pulm: Speaking in full sentences, unlabored, no audible wheezes or cough.   The rest of a comprehensive physical examination is deferred due to PHE (public health emergency) video restrictions\"     Labs: no recent labs, pt rescheduled labs that were suppose to be completed this morning     Assessment/plan:   1.) MM, standard risk, IgA Kappa  -s/p auto HSCT in 4/2019  -now on maintenance Revlimid 10 mg daily. Explained this is important to continue in order to keep myeloma in remission. Pt agreeable to continue   -will going SPEP and SFLC monthly. Last Mspike was 0.0 from 7/27. Will complete labs later this week to reassess  -PET/CT annually or if symptomatic from MM due to bone pain. Last PET was 2/15/20  -still stable from a MM standpoint. Continue Rev. Will follow-up with me about every 6 weeks. Should follow-up with PCP about other ongoing issues   -will follow-up with Dr. Tipton in December    PPx: Continue ACV and  mg for VTE ppx while on Rev    2.) Bone lesions  -continue zometa through 12/2020 to complete a total of 2 years of therapy, associated with improved PFS and overall survival when given with ASCT. Did have temporary pause with COVID and now will give every 3 months to limit clinic visits. Last given on 6/10/20. Was scheduled today though cancel. Will reschedule within this week     3.) Depression with Anxiety  -depression and anxiety has been something she has dealt with for a long time.   -Currently maintained on Zoloft 100 mg daily.  This has been helpful for her though she continues to struggle with ongoing stress, anxiety and " sadness  -Scheduled with palliative SW here for counseling    4.) Neuropathy   -multifactorial likely d/t previous chemotherapy and uncontrolled diabetes. Suspect the progressively neuropathy is r/t diabetes since A1C has increased this summer along with the neuropathy changes, when A1C was controlled, neuropathy was stable  -encouraged good food choices and close f/u with PCP    5.) Nausea   -mild, encouraged small, frequent meals and use of anti-emetics as needed     6.) Hx of falls  -Will refer to cancer rehab for work on balance with worsening neuropathy, continued balance issues, and hx of falls    7.) DM2   -latest A1C was 9.1. Encouraged good food choices. PCP following. Can be contributing to worsening neuropathy    8.) Sinus Headaches   -Stable, encouraged consistent use of netipot and f/u with PCP    9.) Memory changes   -Stable. Could also do OT for chemo brain with cancer rehab. She is scheduled for neuropsych testing later this month. Encouraged lists and reminders around the house.     10.) Thyroidectomy   -Went well, will continue to f/u with with surgery and PCP    11.) Familial tremor  -Stable. Has been worked up in the past when pt lived in [place]. Encouraged f/u with PCP for management      FALGUNI Cannon, CNP    The patient was seen in conjunction with FALGUNI Cannon CNP who served as a scribe for today's visit. I have reviewed and edited the above note, and agree with the above findings and plan.    Reva Mojica PA-C

## 2020-09-11 ENCOUNTER — E-VISIT (OUTPATIENT)
Dept: ONCOLOGY | Facility: CLINIC | Age: 65
End: 2020-09-11
Payer: MEDICAID

## 2020-09-11 DIAGNOSIS — B37.31 YEAST INFECTION OF THE VAGINA: Primary | ICD-10-CM

## 2020-09-11 PROCEDURE — 99207 ZZC NON-BILLABLE SERV PER CHARTING: CPT | Mod: ZP | Performed by: STUDENT IN AN ORGANIZED HEALTH CARE EDUCATION/TRAINING PROGRAM

## 2020-09-12 NOTE — TELEPHONE ENCOUNTER
The pt is experiencing symptoms of vaginal discharge consistent with a prior yeast infection. Monistat cream was sent to her pharmacy.

## 2020-09-14 ENCOUNTER — VIRTUAL VISIT (OUTPATIENT)
Dept: PALLIATIVE CARE | Facility: CLINIC | Age: 65
End: 2020-09-14
Attending: SOCIAL WORKER
Payer: MEDICAID

## 2020-09-14 ENCOUNTER — OFFICE VISIT (OUTPATIENT)
Dept: OTOLARYNGOLOGY | Facility: CLINIC | Age: 65
End: 2020-09-14
Payer: MEDICAID

## 2020-09-14 VITALS
DIASTOLIC BLOOD PRESSURE: 85 MMHG | OXYGEN SATURATION: 97 % | WEIGHT: 262 LBS | SYSTOLIC BLOOD PRESSURE: 132 MMHG | BODY MASS INDEX: 38.97 KG/M2 | HEART RATE: 62 BPM | TEMPERATURE: 96.5 F

## 2020-09-14 DIAGNOSIS — E04.9 THYROID GOITER: Primary | ICD-10-CM

## 2020-09-14 DIAGNOSIS — Z51.5 ENCOUNTER FOR PALLIATIVE CARE: Primary | ICD-10-CM

## 2020-09-14 PROCEDURE — 40000114 ZZH STATISTIC NO CHARGE CLINIC VISIT

## 2020-09-14 ASSESSMENT — PAIN SCALES - GENERAL: PAINLEVEL: NO PAIN (0)

## 2020-09-14 NOTE — LETTER
2020       RE: Winter Loya  [address]  [place] [address]     Dear Colleague,    Thank you for referring your patient, Winter Loya, to the [facility] at [facility]. Please see a copy of my visit note below.    Palliative Care Counseling Contact    Data: Winter was scheduled for initial session with me by video; we adjusted the time.    Intervention: Attempted to reach Winter by video link and phone at agreed upon time.     Response/Assessment: Unable to reach Winter - Unable to assess.    Plan: Remain available; no further outreach planned.    AMY Santiago, JOSÉ MIGUEL  Palliative Care Counseling Services  [facility]  Office Phone: [phone]  [phone] ([facility]) or [phone] ([facility])              Again, thank you for allowing me to participate in the care of your patient.      Sincerely,    JOSÉ MIGUEL Dennis

## 2020-09-14 NOTE — LETTER
9/14/2020       RE: Winter Loya  [address]  [facility] [address]     Dear Colleague,    Thank you for referring your patient, Winter Loya, to the [facility] EAR NOSE AND THROAT at [facility]. Please see a copy of my visit note below.    Chief Complaint   Patient presents with     Post-op Visit     2 weeks     Blood pressure 132/85, pulse 62, temperature 96.5  F (35.8  C), temperature source Temporal, weight 118.8 kg (262 lb), SpO2 97 %, not currently breastfeeding.    Corina Renee, YADY      POSTOPERATIVE FOLLOWUP  Winter Loya is a 64 year old female who presents 2-1/2 weeks for post op visit s/p total thyroidectomy.      Since the surgery the patient denies any problems with voice quality, inspiration or swallowing. No sx of hypocalcemia. She has baseline peripheral neuropathy.     PE: Neck wound appears to be healing well. No evidence of hematoma, seroma or infection. She does have some tightening of her skin with neck extension.     Pathology reviewed with patient   FINAL DIAGNOSIS:   A. 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 Thyroid lobe, right, hemithyroidectomy:   - Benign multinodular adenomatous hyperplasia        Plan  1. Reviewed wound management and massage  2. Check Calcium lab in the next week,   3. Will check TSH with free T4 one month post-op  4. Discontinue Tums and Calcitriol  5. Follow up as needed    Patient seen and discussed with Staff, Dr. Burgos.    Mariangel Kumari,PGY3  General Surgery    I personally saw, examined and reviewed the plan with this patient. She is 2 weeks s/p thyroidectomy.  I agree with the above note and plan  Tiff Burgos MD    Again, thank you for allowing me to participate in the care of your patient.      Sincerely,    Tiff Burgos MD

## 2020-09-14 NOTE — PROGRESS NOTES
Chief Complaint   Patient presents with     Post-op Visit     2 weeks     Blood pressure 132/85, pulse 62, temperature 96.5  F (35.8  C), temperature source Temporal, weight 118.8 kg (262 lb), SpO2 97 %, not currently breastfeeding.    Corina Renee LPN

## 2020-09-14 NOTE — PATIENT INSTRUCTIONS
1. You were seen in the ENT Clinic today by Dr. Burgos.  If you have any questions or concerns after your appointment, please call   -  ENT Clinic: [phone]      2.   Plan to return to clinic to complete labs TSH with Free T4    Verena Estrada LPN  [facility] Otolaryngology  [phone]

## 2020-09-15 ENCOUNTER — VIRTUAL VISIT (OUTPATIENT)
Dept: PHARMACY | Facility: CLINIC | Age: 65
End: 2020-09-15
Payer: MEDICAID

## 2020-09-15 DIAGNOSIS — Z79.4 TYPE 2 DIABETES MELLITUS WITH HYPERGLYCEMIA, WITH LONG-TERM CURRENT USE OF INSULIN (H): Primary | ICD-10-CM

## 2020-09-15 DIAGNOSIS — F33.1 MAJOR DEPRESSIVE DISORDER, RECURRENT EPISODE, MODERATE (H): ICD-10-CM

## 2020-09-15 DIAGNOSIS — G62.9 NEUROPATHY: ICD-10-CM

## 2020-09-15 DIAGNOSIS — E89.0 STATUS POST COMPLETE THYROIDECTOMY: ICD-10-CM

## 2020-09-15 DIAGNOSIS — B37.31 YEAST INFECTION OF THE VAGINA: ICD-10-CM

## 2020-09-15 DIAGNOSIS — G89.29 OTHER CHRONIC PAIN: ICD-10-CM

## 2020-09-15 DIAGNOSIS — C90.01 MULTIPLE MYELOMA IN REMISSION (H): ICD-10-CM

## 2020-09-15 DIAGNOSIS — E11.65 TYPE 2 DIABETES MELLITUS WITH HYPERGLYCEMIA, WITH LONG-TERM CURRENT USE OF INSULIN (H): Primary | ICD-10-CM

## 2020-09-15 DIAGNOSIS — H40.003 GLAUCOMA SUSPECT, BILATERAL: ICD-10-CM

## 2020-09-15 PROCEDURE — 99607 MTMS BY PHARM ADDL 15 MIN: CPT | Mod: TEL | Performed by: PHARMACIST

## 2020-09-15 PROCEDURE — 99605 MTMS BY PHARM NP 15 MIN: CPT | Mod: TEL | Performed by: PHARMACIST

## 2020-09-15 NOTE — PROGRESS NOTES
MTM ENCOUNTER  SUBJECTIVE/OBJECTIVE:                           Winter Loya is a 64 year old female called for an initial visit. She was referred to me from Brittany Rosenthal.      Patient consented to a telehealth visit: yes  Telemedicine Visit Details  Type of service:  Telephone visit  Start Time: 3:32 PM  End Time: 4:06 PM  Originating Location (pt. Location): Home  Distant Location (provider location):  [facility] MEDICATION THERAPY MANAGEMENT  Mode of Communication:  Telephone    Chief Complaint: DM2, polypharmacy    She recently moved back to [place] in February. 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 Her life is much more settled than it was earlier    Allergies/ADRs: Reviewed in chart  Tobacco: She reports that she quit smoking about 15 years ago. Her smoking use included cigarettes. She smoked 1.00 pack per day. She has never used smokeless tobacco.  Alcohol: not currently using  Caffeine: 1 cups/day of coffee  Activity: none at this time  PMH: Reviewed in chart    Medication Adherence/Access: no issues reported    Type 2 Diabetes:  Pt currently taking Lantus 34 units at bedtime. She has used Humalog in the past but infrequently because she rarely needed it. She has also used metformin, glipizide, glyburide and Victoza (made her sick with nausea and diarrhea) in the past as well.  Pt is not experiencing side effects.  Blood sugar monitorin time(s) daily. Ranges (patient reported): Post-Prandial- 200's  Symptoms of low blood sugar? none  Symptoms of high blood sugar? Neuropathy   Eye exam: up to date  Foot exam: due  Diet/Exercise: none specified  Aspirin: Not taking due to unclear reason; unclear documentation re: whether she has taken it in the past or not  Statin: Yes: atorvastatin 20 mg daily     No results for input(s): CHOL, HDL, LDL, TRIG, CHOLHDLRATIO in the last 84442 hours.    The ASCVD Risk score (Kellymeg JACINTO Jr., et al., 2013) failed to calculate for the  following reasons:    Cannot find a previous HDL lab    Cannot find a previous total cholesterol lab    ACEi/ARB: No.     BP Readings from Last 3 Encounters:   09/17/20 136/85   09/14/20 132/85   08/27/20 (!) 160/91       Urine Albumin: No results found for: UMALCR   Lab Results   Component Value Date    A1C 9.1 08/21/2020    A1C 6.7 03/05/2020     Last Comprehensive Metabolic Panel:  Sodium   Date Value Ref Range Status   08/21/2020 137 133 - 144 mmol/L Final     Potassium   Date Value Ref Range Status   08/21/2020 3.7 3.4 - 5.3 mmol/L Final     Chloride   Date Value Ref Range Status   08/21/2020 106 94 - 109 mmol/L Final     Carbon Dioxide   Date Value Ref Range Status   08/21/2020 28 20 - 32 mmol/L Final     Anion Gap   Date Value Ref Range Status   08/21/2020 4 3 - 14 mmol/L Final     Glucose   Date Value Ref Range Status   08/21/2020 253 (H) 70 - 99 mg/dL Final     Urea Nitrogen   Date Value Ref Range Status   08/21/2020 8 7 - 30 mg/dL Final     Creatinine   Date Value Ref Range Status   08/21/2020 0.62 0.52 - 1.04 mg/dL Final     GFR Estimate   Date Value Ref Range Status   08/21/2020 >90 >60 mL/min/[1.73_m2] Final     Comment:     Non  GFR Calc  Starting 12/18/2018, serum creatinine based estimated GFR (eGFR) will be   calculated using the Chronic Kidney Disease Epidemiology Collaboration   (CKD-EPI) equation.       Calcium   Date Value Ref Range Status   08/27/2020 8.9 8.5 - 10.1 mg/dL Final     Hypothyroidism/S/p Thyroidectomy: Patient is taking levothyroxine 175 mcg daily, calcitriol 0.25 mcg three times daily (done 9/25/20), calcium carbonate 1000 mg three times daily until 9/25/20,  oxycodone 5 mg take 1-2 tablets every 6 hour as needed for pain (not using), APAP 325-650 mg as needed every 4 hours (occasional use), senna-docusate as needed for opioid-induced constipation (not using). Her ENT discontinued calcium carbonate and calcitriol yesterday. She reports that her incision is healing  "well and she feels well; \"so far so good\". She denies side effects associated with medications today.  TSH   Date Value Ref Range Status   02/22/2020 0.76 0.40 - 4.00 mU/L Final     T4 Free   Date Value Ref Range Status   02/22/2020 0.84 0.76 - 1.46 ng/dL Final     Multiple Myeloma/S/p Autologous Stem Cell Transplant: Current medications include acyclovir 400 mg twice daily, Revlimid 10 mg daily x28 days. She reports neuropathy but her oncologist feels it is related to her blood sugars, not Revlimid. Otherwise, she denies side effects and has good oncology care established.     Neuropathy: Current medications include Lyrica 100 mg three times daily. She always misses her afternoon dose. She does not have any supply today and is concerned her symptoms will worsen. She denies side effects and feels this is helpful in her neuropathy.     Chronic Back Pain: Current medications include tizanidine 4-8 mg at night as needed. She is not currently using this because it was not helpful for pain, only for sleep. She became tolerant to the effects on her sleep. She decided at that time to avoid them in the future.     Depression:  Current medications include: Sertraline 100 mg once daily. Pt reports that depression symptoms are stable. She is a \"blithering idiot without it\". Denies side effects.   PHQ-9 SCORE 5/1/2020   PHQ-9 Total Score 2     Yeast infection: Current medications include Monastat 1. She feels her symptoms of yeast infection are improving and hopes they will resolve soon. She denies side effects.     Glaucoma: Current medications include latanoprost 0.00% drops as directed. She continues to take these and has no questions or concerns. Denies side effects.     Today's Vitals: There were no vitals taken for this visit. - telemed    BP Readings from Last 1 Encounters:   09/14/20 132/85     Pulse Readings from Last 1 Encounters:   09/14/20 62     Wt Readings from Last 1 Encounters:   09/14/20 262 lb (118.8 kg) " "    Ht Readings from Last 1 Encounters:   08/26/20 5' 8.75\" (1.746 m)     Estimated body mass index is 38.97 kg/m  as calculated from the following:    Height as of 8/26/20: 5' 8.75\" (1.746 m).    Weight as of 9/14/20: 262 lb (118.8 kg).    Temp Readings from Last 1 Encounters:   09/14/20 96.5  F (35.8  C) (Temporal)     ASSESSMENT:                              Medication Adherence: No issues identified    Type 2 Diabetes: Needs improvement. Patient is not meeting A1c goal of < 8%. Self monitoring of blood glucose is not at goal of post prandial < 180 mg/dL. Pt would benefit from starting metformin to achieve glycemic goals.     Hypothyroidism/S/p Thyroidectomy: Plan in place with endocrinology.     Multiple Myeloma/S/p Autologous Stem Cell Transplant: Plan in place with oncology. Revlimid does cause neuropathy at a rate of 6.5-15.2% in patients with multiple myeloma. Benefits of continued treatment appear to outweigh the risks of side effects.     Neuropathy: Needs improvement. Patient would benefit from access to Lyrica to treat neuropathy.     Chronic Back Pain: apparently stable.     Depression: Stable. Per patient report.     Yeast infection: Resolving. Patient may use second dose of miconazole if symptoms are not resolved.     Glaucoma: Stable per patient report.     PLAN:                          Post Discharge Medication Reconciliation Status: discharge medications reconciled and changed, per note/orders.    1. Start metformin 500 mg daily x1 week, increase to twice daily if not diarrhea or bloating.   2. Will request Lyrica refill from PCP.     I spent 34 minutes with this patient today. All changes were made via collaborative practice agreement with Brittany Rosenthal. A copy of the visit note was provided to the patient's primary care provider.    Will follow up in 2 weeks.    The patient declined a summary of these recommendations.     Coco Antoine, Pharm.D., [facility]  Medication Therapy Management " Pharmacist  Page/VM:  [phone]

## 2020-09-17 ENCOUNTER — INFUSION THERAPY VISIT (OUTPATIENT)
Dept: ONCOLOGY | Facility: CLINIC | Age: 65
End: 2020-09-17
Attending: INTERNAL MEDICINE
Payer: MEDICAID

## 2020-09-17 ENCOUNTER — APPOINTMENT (OUTPATIENT)
Dept: LAB | Facility: CLINIC | Age: 65
End: 2020-09-17
Attending: INTERNAL MEDICINE
Payer: MEDICAID

## 2020-09-17 VITALS
DIASTOLIC BLOOD PRESSURE: 85 MMHG | BODY MASS INDEX: 39.15 KG/M2 | TEMPERATURE: 98.9 F | WEIGHT: 263.2 LBS | RESPIRATION RATE: 18 BRPM | HEART RATE: 68 BPM | SYSTOLIC BLOOD PRESSURE: 136 MMHG | OXYGEN SATURATION: 96 %

## 2020-09-17 DIAGNOSIS — Z79.899 ENCOUNTER FOR LONG-TERM (CURRENT) USE OF MEDICATIONS: ICD-10-CM

## 2020-09-17 DIAGNOSIS — C90.01 MULTIPLE MYELOMA IN REMISSION (H): Primary | ICD-10-CM

## 2020-09-17 DIAGNOSIS — E11.42 TYPE 2 DIABETES MELLITUS WITH DIABETIC POLYNEUROPATHY, WITH LONG-TERM CURRENT USE OF INSULIN (H): ICD-10-CM

## 2020-09-17 DIAGNOSIS — Z79.4 TYPE 2 DIABETES MELLITUS WITH DIABETIC POLYNEUROPATHY, WITH LONG-TERM CURRENT USE OF INSULIN (H): ICD-10-CM

## 2020-09-17 LAB
ALBUMIN SERPL-MCNC: 3.7 G/DL (ref 3.4–5)
ALP SERPL-CCNC: 132 U/L (ref 40–150)
ALT SERPL W P-5'-P-CCNC: 37 U/L (ref 0–50)
ANION GAP SERPL CALCULATED.3IONS-SCNC: 9 MMOL/L (ref 3–14)
AST SERPL W P-5'-P-CCNC: 14 U/L (ref 0–45)
BILIRUB SERPL-MCNC: 0.9 MG/DL (ref 0.2–1.3)
BUN SERPL-MCNC: 14 MG/DL (ref 7–30)
CALCIUM SERPL-MCNC: 8.6 MG/DL (ref 8.5–10.1)
CHLORIDE SERPL-SCNC: 106 MMOL/L (ref 94–109)
CO2 SERPL-SCNC: 23 MMOL/L (ref 20–32)
CREAT SERPL-MCNC: 0.54 MG/DL (ref 0.52–1.04)
GFR SERPL CREATININE-BSD FRML MDRD: >90 ML/MIN/{1.73_M2}
GLUCOSE SERPL-MCNC: 305 MG/DL (ref 70–99)
POTASSIUM SERPL-SCNC: 3.6 MMOL/L (ref 3.4–5.3)
PROT SERPL-MCNC: 6.9 G/DL (ref 6.8–8.8)
SODIUM SERPL-SCNC: 138 MMOL/L (ref 133–144)

## 2020-09-17 PROCEDURE — 00000402 ZZHCL STATISTIC TOTAL PROTEIN: Performed by: PHYSICIAN ASSISTANT

## 2020-09-17 PROCEDURE — 25800030 ZZH RX IP 258 OP 636: Mod: ZF | Performed by: INTERNAL MEDICINE

## 2020-09-17 PROCEDURE — 80053 COMPREHEN METABOLIC PANEL: CPT | Performed by: INTERNAL MEDICINE

## 2020-09-17 PROCEDURE — 25000128 H RX IP 250 OP 636: Mod: ZF | Performed by: INTERNAL MEDICINE

## 2020-09-17 PROCEDURE — 96365 THER/PROPH/DIAG IV INF INIT: CPT

## 2020-09-17 PROCEDURE — 84165 PROTEIN E-PHORESIS SERUM: CPT | Performed by: PHYSICIAN ASSISTANT

## 2020-09-17 RX ORDER — ZOLEDRONIC ACID 0.04 MG/ML
4 INJECTION, SOLUTION INTRAVENOUS ONCE
Status: COMPLETED | OUTPATIENT
Start: 2020-09-17 | End: 2020-09-17

## 2020-09-17 RX ORDER — PREGABALIN 100 MG/1
100 CAPSULE ORAL 3 TIMES DAILY
Qty: 270 CAPSULE | Refills: 1 | Status: SHIPPED | OUTPATIENT
Start: 2020-09-17 | End: 2021-06-07

## 2020-09-17 RX ADMIN — ZOLEDRONIC ACID 4 MG: 0.04 INJECTION, SOLUTION INTRAVENOUS at 15:28

## 2020-09-17 RX ADMIN — SODIUM CHLORIDE 250 ML: 9 INJECTION, SOLUTION INTRAVENOUS at 15:23

## 2020-09-17 ASSESSMENT — PAIN SCALES - GENERAL: PAINLEVEL: MODERATE PAIN (5)

## 2020-09-17 NOTE — PROGRESS NOTES
Chief Complaint   Patient presents with     Blood Draw     IV placement [facility] blood draw and vitals     Labs drawn via IV placed by Carolina Lay in left arm

## 2020-09-17 NOTE — TELEPHONE ENCOUNTER
Patient is requesting refill of Lyrica. Sending to original prescriber (Dr. Morocho) and preceptor. Please see pended order below.

## 2020-09-17 NOTE — PROGRESS NOTES
POSTOPERATIVE FOLLOWUP  Winter Loya is a 64 year old female who presents 2-1/2 weeks for post op visit s/p total thyroidectomy.      Since the surgery the patient denies any problems with voice quality, inspiration or swallowing. No sx of hypocalcemia. She has baseline peripheral neuropathy.     PE: Neck wound appears to be healing well. No evidence of hematoma, seroma or infection. She does have some tightening of her skin with neck extension.     Pathology reviewed with patient   FINAL DIAGNOSIS:   A. 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 Thyroid lobe, right, hemithyroidectomy:   - Benign multinodular adenomatous hyperplasia        Plan  1. Reviewed wound management and massage  2. Check Calcium lab in the next week,   3. Will check TSH with free T4 one month post-op  4. Discontinue Tums and Calcitriol  5. Follow up as needed    Patient seen and discussed with Staff, Dr. Burgos.    Mariangel Kumari,PGY3  General Surgery    I personally saw, examined and reviewed the plan with this patient. She is 2 weeks s/p thyroidectomy.  I agree with the above note and plan  Tiff Burgos MD

## 2020-09-17 NOTE — PROGRESS NOTES
Infusion Nursing Note:  Winter Loya presents today for Zometa.    Patient seen by provider today: No   present during visit today: Not Applicable.    Note: Patient reports feeling well today. Denies fever/chills. Denies nausea/vomiting nor chest and abdominal discomfort. No new concerns made. Otherwise well.    Intravenous Access:  Peripheral IV placed.    Treatment Conditions:  Lab Results   Component Value Date    HGB 14.0 08/21/2020     Lab Results   Component Value Date    WBC 3.6 08/21/2020      Lab Results   Component Value Date    ANEU 2.8 07/27/2020     Lab Results   Component Value Date     08/21/2020      Lab Results   Component Value Date     09/17/2020                   Lab Results   Component Value Date    POTASSIUM 3.6 09/17/2020           No results found for: MAG         Lab Results   Component Value Date    CR 0.54 09/17/2020                   Lab Results   Component Value Date    ASHLEY 8.6 09/17/2020                Lab Results   Component Value Date    BILITOTAL 0.9 09/17/2020           Lab Results   Component Value Date    ALBUMIN 3.7 09/17/2020                    Lab Results   Component Value Date    ALT 37 09/17/2020           Lab Results   Component Value Date    AST 14 09/17/2020       Results reviewed, labs MET treatment parameters, ok to proceed with treatment.      Post Infusion Assessment:  Patient tolerated infusion without incident.  Blood return noted pre and post infusion.  Site patent and intact, free from redness, edema or discomfort.  No evidence of extravasations.  Access discontinued per protocol.       Discharge Plan:   Patient declined prescription refills.  Discharge instructions reviewed with: Patient.  Patient and/or family verbalized understanding of discharge instructions and all questions answered.  AVS to patient via [employer].  Patient will return 10/14 for next appointment.   Patient discharged in stable condition accompanied by: self.  Departure  Mode: Ambulatory.    CELY HOANG, IVAN

## 2020-09-21 LAB
ALBUMIN SERPL ELPH-MCNC: 4.2 G/DL (ref 3.7–5.1)
ALPHA1 GLOB SERPL ELPH-MCNC: 0.3 G/DL (ref 0.2–0.4)
ALPHA2 GLOB SERPL ELPH-MCNC: 0.7 G/DL (ref 0.5–0.9)
B-GLOBULIN SERPL ELPH-MCNC: 0.7 G/DL (ref 0.6–1)
GAMMA GLOB SERPL ELPH-MCNC: 0.5 G/DL (ref 0.7–1.6)
M PROTEIN SERPL ELPH-MCNC: 0 G/DL
PROT PATTERN SERPL ELPH-IMP: ABNORMAL

## 2020-09-23 DIAGNOSIS — E04.9 THYROID GOITER: ICD-10-CM

## 2020-09-23 RX ORDER — LEVOTHYROXINE SODIUM 175 UG/1
175 TABLET ORAL
Qty: 30 TABLET | Refills: 0 | Status: SHIPPED | OUTPATIENT
Start: 2020-09-23 | End: 2020-10-20

## 2020-09-28 DIAGNOSIS — C90.01 MULTIPLE MYELOMA IN REMISSION (H): Primary | ICD-10-CM

## 2020-09-29 DIAGNOSIS — C90.01 MULTIPLE MYELOMA IN REMISSION (H): Primary | ICD-10-CM

## 2020-09-29 RX ORDER — LENALIDOMIDE 10 MG/1
10 CAPSULE ORAL DAILY
Qty: 28 CAPSULE | Refills: 0 | Status: SHIPPED | OUTPATIENT
Start: 2020-09-29 | End: 2020-10-27

## 2020-09-30 NOTE — PROGRESS NOTES
Palliative Care Counseling Contact    Data: Winter was scheduled for initial session with me by video; we adjusted the time.    Intervention: Attempted to reach Winter by video link and phone at agreed upon time.     Response/Assessment: Unable to reach Winter - Unable to assess.    Plan: Remain available; no further outreach planned.    AMY Santiago, [facility]  Palliative Care Counseling Services  [facility]  Office Phone: [phone]  [phone] ([facility]) or [phone] ([place])

## 2020-10-01 ENCOUNTER — HOSPITAL ENCOUNTER (OUTPATIENT)
Dept: PHYSICAL THERAPY | Facility: CLINIC | Age: 65
Setting detail: THERAPIES SERIES
End: 2020-10-01
Attending: PHYSICIAN ASSISTANT
Payer: MEDICAID

## 2020-10-01 DIAGNOSIS — Z79.4 TYPE 2 DIABETES MELLITUS WITH DIABETIC NEUROPATHY, WITH LONG-TERM CURRENT USE OF INSULIN (H): ICD-10-CM

## 2020-10-01 DIAGNOSIS — E11.40 TYPE 2 DIABETES MELLITUS WITH DIABETIC NEUROPATHY, WITH LONG-TERM CURRENT USE OF INSULIN (H): ICD-10-CM

## 2020-10-01 PROCEDURE — 97110 THERAPEUTIC EXERCISES: CPT | Mod: GP | Performed by: PHYSICAL THERAPIST

## 2020-10-01 PROCEDURE — 97161 PT EVAL LOW COMPLEX 20 MIN: CPT | Mod: GP | Performed by: PHYSICAL THERAPIST

## 2020-10-01 PROCEDURE — 97116 GAIT TRAINING THERAPY: CPT | Mod: GP | Performed by: PHYSICAL THERAPIST

## 2020-10-01 NOTE — PROGRESS NOTES
Dynamic Gait Index (DGI):The DGI is a measure of balance during gait that is reliable and valid for the elderly and individuals with Parkinson's disease, MS, vestibular disorders, or s/p stroke. Gait assistive device used: None    Patient score: 16/24  Scores ?19/24 indicate an increased risk for falls according to Diane et al 2000  Minimal Detectable Change = 2.9 in community dwelling elderly according to Lucien et al 2011    Assessment (rationale for performing, application to patient s function & care plan): Performed to assess balance with gait. Patient with increased unsteadiness with balance tasks d/t neuropathy and weakness. Patient will benefit from skilled PT to address these areas of impairment.   Minutes billed as physical performance test: 8           10/01/20 1600   Signing Clinician's Name / Credentials   Signing clinician's name / credentials Emily Mar, PT, DPT   Dynamic Gait Index (Panda and Thornton Prieto, 1995)   Gait Level Surface 2   Change in Gait Speed 2   Gait and Horizontal Head Turns 1   Gait with Vertical Head Turns 2   Gait and Pivot Turns 2   Step Over Obstacle 2   Step Around Obstacles 3   Steps 2   Total Dynamic Gait Index Score  (A score of 19 or less has been correlated to an increased risk of falls in community dwelling older adults, patients with vestibular disorders, and patients with MS.)   Total Score (out of 24) 16

## 2020-10-02 NOTE — PROGRESS NOTES
10/01/20 1600   Quick Adds   Quick Adds Certification   Type of Visit Initial OP PT Evaluation   General Information   Start of Care Date 10/01/20   Referring Physician Reva Mojica PA-C   Orders Evaluate and Treat as Indicated   Order Date 09/10/20   Medical Diagnosis Multiple Myeloma, neuropathy    Onset of illness/injury or Date of Surgery 09/10/20   Surgical/Medical history reviewed Yes  (MM, DMII)   Pertinent history of current problem (include personal factors and/or comorbidities that impact the POC) She is concerned about her balance this winter. She had two falls this past year. She has hx of using cane but has not been using this lately but wonders if she should be. Tries hard to pay attention with her walking-- but can't always catch all things. She would fall in the garden sometimes but she usually could catch herself. 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 She also notes possible back injury around this time as well so wonders about this.    Pertinent Visual History  denies issues    Prior level of function comment She was out doing gardening this summer at a friends house, feels she is less active then she was in the summer; technically is disabled but she wants to work; does not do walking for exercise    Patient role/Employment history Employed  (full time but at desk, chemical dependency counselor )   Living environment Apartment/condo   Home/Community Accessibility Comments son lives with her, he is in 30s   Patient/Family Goals Statement decide if she needs her cane, ideas for staying in shape    Fall Risk Screen   Fall screen completed by PT   Have you fallen 2 or more times in the past year? Yes   Have you fallen and had an injury in the past year? Yes   Timed Up and Go score (seconds) not tested    Is patient a fall risk? Yes   Fall screen comments see DGI, scores at risk of falls, hx of falls    Abuse Screen (yes response  referral indicated)   Feels Unsafe at Home or Work/School no   Feels Threatened by Someone no   Does Anyone Try to Keep You From Having Contact with Others or Doing Things Outside Your Home? no   Physical Signs of Abuse Present no   Pain   Pain comments no pain today    Vitals Signs   Heart Rate 72   Blood Pressure 136/76   Cognitive Status Examination   Orientation orientation to person, place and time   Level of Consciousness alert   Follows Commands and Answers Questions 100% of the time   Personal Safety and Judgment intact   Memory impaired   Cognitive Comment does feel memory is still an issues    Posture   Posture Comments forward shoulder posture in sitting and standing    Range of Motion (ROM)   ROM Comment heel cord tightness    Strength   Strength Comments DF R 2+/5, L 4/5; 4/5 hip flexors, 4/5 hip ABD    Bed Mobility   Bed Mobility Comments did not assess    Transfer Skills   Transfer Comments sit<>stand with and without UEs    Gait   Gait Comments increased hip flexion thorugh R hip d/t ankle weakness, increased WB and stance time on LLE, lateral deviates frequently    Gait Special Tests   Gait Special Tests 25 FOOT TIMED WALK;DYNAMIC GAIT INDEX   Gait Special Tests 25 Foot Timed Walk   Seconds 8.15   Steps 18 Steps   Gait Special Tests Dynamic Gait Index   Score out of 24 16   Comments see progress note    Balance   Balance Comments unsteady when eyes are closed, has trouble when washing her hair in the shower    Balance Special Tests   Balance Special Tests Sit to stand reps;Single leg stance right;Single leg stance left;Modified CTSIB Conditions   Balance Special Tests Modified CTSIB Conditions   Condition 1, seconds 30 Seconds   Condition 2, seconds 12 Seconds   Balance Special Tests Sit to Stand Reps in 30 Seconds   Reps in 30 seconds 9   Height 18   Comments increased work of breathing    Sensory Examination   Sensory Perception Comments n/t in hand and feet, sensation loss in her toes    Planned  Therapy Interventions   Planned Therapy Interventions IADL retraining;balance training;gait training;neuromuscular re-education;ROM;strengthening;stretching;transfer training   Clinical Impression   Criteria for Skilled Therapeutic Interventions Met yes, treatment indicated   PT Diagnosis weakness and gait impairments    Influenced by the following impairments decreased strength and ROM, foot drop, neuropathy in feet and hands, decreased activity tolerance, balance impairments    Functional limitations due to impairments at increased risk of falls, decreased confidence with ambulation/mobility   Clinical Presentation Stable/Uncomplicated   Clinical Presentation Rationale stable medically, PT impairments, clinical judgement    Clinical Decision Making (Complexity) Low complexity   Therapy Frequency 1 time/week   Predicted Duration of Therapy Intervention (days/wks) up to 8 weeks   Risk & Benefits of therapy have been explained Yes   Patient, Family & other staff in agreement with plan of care Yes   Clinical Impression Comments Patient presents with hx of neuropathy and weakness with balance and gait impairments. Hx of falls over the last couple years and she is worried about the winter and her balance. Patient will benefit from skilled PT to address these areas of impairments and decrease her risk of falls and improve her confidence with mobility.    Education Assessment   Preferred Learning Style Demonstration;Pictures/video   Barriers to Learning No barriers   GOALS   PT Eval Goals 1;2;3   Goal 1   Goal Identifier sit<>stand   Goal Description Winter will perform sit<>stand x 14 reps in 30 secs in order to show improved LE strength and balance needed for day to day mobility.    Target Date 11/29/20   Goal 2   Goal Identifier foot up/orthotics    Goal Description Winter will identify proper orthotics to improve her foot drop in order to decrease her risk of falls at home and in the community.    Target Date 11/29/20    Goal 3   Goal Identifier DGI   Goal Description Winter will score a 20 or > on DGI without AD in order to show improved balance and decreased risk of falls with functional mobility.    Target Date 11/29/20   Total Evaluation Time   PT Eval, Low Complexity Minutes (13731) 33   Therapy Certification   Certification date from 10/01/20   Certification date to 12/29/20   Medical Diagnosis Multiple myeloma and neuropathy    Certification I certify the need for these services furnished under this plan of treatment and while under my care.  (Physician co-signature of this document indicates review and certification of the therapy plan).

## 2020-10-02 NOTE — PROGRESS NOTES
[facility]        OUTPATIENT PHYSICAL THERAPY FUNCTIONAL EVALUATION  PLAN OF TREATMENT FOR OUTPATIENT REHABILITATION  (COMPLETE FOR INITIAL CLAIMS ONLY)  Patient's Last Name, First Name, M.I.  YOB: 1955  Winter Loya     Provider's Name   [facility]   Medical Record No.  [id number]     Start of Care Date:  10/01/20   Onset Date:  09/10/20   Type:     _X__PT   ____OT  ____SLP Medical Diagnosis:  Multiple myeloma and neuropathy      PT Diagnosis:  weakness and gait impairments  Visits from SOC:  1                              __________________________________________________________________________________  Plan of Treatment/Functional Goals:  IADL retraining, balance training, gait training, neuromuscular re-education, ROM, strengthening, stretching, transfer training           GOALS  sit<>stand  Winter will perform sit<>stand x 14 reps in 30 secs in order to show improved LE strength and balance needed for day to day mobility.   11/29/20    foot up/orthotics   Winter will identify proper orthotics to improve her foot drop in order to decrease her risk of falls at home and in the community.   11/29/20    DGI  Winter will score a 20 or > on DGI without AD in order to show improved balance and decreased risk of falls with functional mobility.   11/29/20                Therapy Frequency:  1 time/week   Predicted Duration of Therapy Intervention:  up to 8 weeks    Emily Mar PT                                    I CERTIFY THE NEED FOR THESE SERVICES FURNISHED UNDER        THIS PLAN OF TREATMENT AND WHILE UNDER MY CARE     (Physician co-signature of this document indicates review and certification of the therapy plan).                Certification Date From:  10/01/20   Certification Date To:  12/29/20    Referring Provider:  Reva Mojica PA-C Initial  Assessment  See Epic Evaluation- Start of Care Date: 10/01/20

## 2020-10-08 ENCOUNTER — TELEPHONE (OUTPATIENT)
Dept: ONCOLOGY | Facility: CLINIC | Age: 65
End: 2020-10-08

## 2020-10-08 NOTE — TELEPHONE ENCOUNTER
Oral Chemotherapy Monitoring Program   Medication: Revlimid  Rx: 10mg PO daily on days 1 through 28 of 28 day cycle   Auth #: [id number]   Risk Category: Adult female NORP  Routine survey questions reviewed.   Rx to be Escribed to Harvey Borja  [facility]  Oncology Pharmacy Liaison   [email or web address]  [phone] (phone)  [phone] (fax)

## 2020-10-19 VITALS
DIASTOLIC BLOOD PRESSURE: 81 MMHG | OXYGEN SATURATION: 98 % | RESPIRATION RATE: 16 BRPM | SYSTOLIC BLOOD PRESSURE: 130 MMHG | TEMPERATURE: 98.1 F | HEART RATE: 68 BPM | WEIGHT: 266 LBS | BODY MASS INDEX: 39.57 KG/M2

## 2020-10-19 DIAGNOSIS — E04.9 THYROID GOITER: ICD-10-CM

## 2020-10-19 DIAGNOSIS — C90.01 MULTIPLE MYELOMA IN REMISSION (H): ICD-10-CM

## 2020-10-19 LAB
ALBUMIN SERPL-MCNC: 3.8 G/DL (ref 3.4–5)
ALP SERPL-CCNC: 122 U/L (ref 40–150)
ALT SERPL W P-5'-P-CCNC: 48 U/L (ref 0–50)
ANION GAP SERPL CALCULATED.3IONS-SCNC: 7 MMOL/L (ref 3–14)
AST SERPL W P-5'-P-CCNC: 23 U/L (ref 0–45)
BASOPHILS # BLD AUTO: 0.1 10E9/L (ref 0–0.2)
BASOPHILS NFR BLD AUTO: 1.8 %
BILIRUB SERPL-MCNC: 1.2 MG/DL (ref 0.2–1.3)
BUN SERPL-MCNC: 10 MG/DL (ref 7–30)
CALCIUM SERPL-MCNC: 8.7 MG/DL (ref 8.5–10.1)
CHLORIDE SERPL-SCNC: 104 MMOL/L (ref 94–109)
CO2 SERPL-SCNC: 25 MMOL/L (ref 20–32)
CREAT SERPL-MCNC: 0.57 MG/DL (ref 0.52–1.04)
DIFFERENTIAL METHOD BLD: ABNORMAL
EOSINOPHIL # BLD AUTO: 0.2 10E9/L (ref 0–0.7)
EOSINOPHIL NFR BLD AUTO: 6.1 %
ERYTHROCYTE [DISTWIDTH] IN BLOOD BY AUTOMATED COUNT: 14.2 % (ref 10–15)
GFR SERPL CREATININE-BSD FRML MDRD: >90 ML/MIN/{1.73_M2}
GLUCOSE SERPL-MCNC: 225 MG/DL (ref 70–99)
HCT VFR BLD AUTO: 40.9 % (ref 35–47)
HGB BLD-MCNC: 13.7 G/DL (ref 11.7–15.7)
IMM GRANULOCYTES # BLD: 0 10E9/L (ref 0–0.4)
IMM GRANULOCYTES NFR BLD: 0.3 %
LYMPHOCYTES # BLD AUTO: 0.8 10E9/L (ref 0.8–5.3)
LYMPHOCYTES NFR BLD AUTO: 20.8 %
MCH RBC QN AUTO: 28.6 PG (ref 26.5–33)
MCHC RBC AUTO-ENTMCNC: 33.5 G/DL (ref 31.5–36.5)
MCV RBC AUTO: 85 FL (ref 78–100)
MONOCYTES # BLD AUTO: 0.4 10E9/L (ref 0–1.3)
MONOCYTES NFR BLD AUTO: 10 %
NEUTROPHILS # BLD AUTO: 2.3 10E9/L (ref 1.6–8.3)
NEUTROPHILS NFR BLD AUTO: 61 %
NRBC # BLD AUTO: 0 10*3/UL
NRBC BLD AUTO-RTO: 0 /100
PLATELET # BLD AUTO: 132 10E9/L (ref 150–450)
POTASSIUM SERPL-SCNC: 3.6 MMOL/L (ref 3.4–5.3)
PROT SERPL-MCNC: 6.9 G/DL (ref 6.8–8.8)
RBC # BLD AUTO: 4.79 10E12/L (ref 3.8–5.2)
SODIUM SERPL-SCNC: 136 MMOL/L (ref 133–144)
TSH SERPL DL<=0.005 MIU/L-ACNC: 2.32 MU/L (ref 0.4–4)
WBC # BLD AUTO: 3.8 10E9/L (ref 4–11)

## 2020-10-19 PROCEDURE — 80053 COMPREHEN METABOLIC PANEL: CPT | Performed by: SURGERY

## 2020-10-19 PROCEDURE — 999N001036 HC STATISTIC TOTAL PROTEIN: Performed by: SURGERY

## 2020-10-19 PROCEDURE — 85025 COMPLETE CBC W/AUTO DIFF WBC: CPT | Performed by: SURGERY

## 2020-10-19 PROCEDURE — 84165 PROTEIN E-PHORESIS SERUM: CPT | Mod: 26 | Performed by: PATHOLOGY

## 2020-10-19 PROCEDURE — 84443 ASSAY THYROID STIM HORMONE: CPT | Performed by: SURGERY

## 2020-10-19 PROCEDURE — 84165 PROTEIN E-PHORESIS SERUM: CPT | Mod: TC | Performed by: SURGERY

## 2020-10-19 ASSESSMENT — PAIN SCALES - GENERAL: PAINLEVEL: NO PAIN (0)

## 2020-10-19 NOTE — NURSING NOTE
Chief Complaint   Patient presents with     Blood Draw     Labs drawn via  by RN in lab. VS taken.        Skip Joseph RN

## 2020-10-20 ENCOUNTER — MYC REFILL (OUTPATIENT)
Dept: OTOLARYNGOLOGY | Facility: CLINIC | Age: 65
End: 2020-10-20

## 2020-10-20 ENCOUNTER — VIRTUAL VISIT (OUTPATIENT)
Dept: ONCOLOGY | Facility: CLINIC | Age: 65
End: 2020-10-20
Attending: PHYSICIAN ASSISTANT
Payer: MEDICAID

## 2020-10-20 ENCOUNTER — MYC REFILL (OUTPATIENT)
Dept: PHARMACY | Facility: CLINIC | Age: 65
End: 2020-10-20

## 2020-10-20 DIAGNOSIS — E04.9 THYROID GOITER: ICD-10-CM

## 2020-10-20 DIAGNOSIS — G62.9 NEUROPATHY: ICD-10-CM

## 2020-10-20 DIAGNOSIS — E11.65 TYPE 2 DIABETES MELLITUS WITH HYPERGLYCEMIA, WITH LONG-TERM CURRENT USE OF INSULIN (H): ICD-10-CM

## 2020-10-20 DIAGNOSIS — C90.01 MULTIPLE MYELOMA IN REMISSION (H): Primary | ICD-10-CM

## 2020-10-20 DIAGNOSIS — Z79.4 TYPE 2 DIABETES MELLITUS WITH HYPERGLYCEMIA, WITH LONG-TERM CURRENT USE OF INSULIN (H): ICD-10-CM

## 2020-10-20 LAB
ALBUMIN SERPL ELPH-MCNC: 4.5 G/DL (ref 3.7–5.1)
ALPHA1 GLOB SERPL ELPH-MCNC: 0.3 G/DL (ref 0.2–0.4)
ALPHA2 GLOB SERPL ELPH-MCNC: 0.7 G/DL (ref 0.5–0.9)
B-GLOBULIN SERPL ELPH-MCNC: 0.8 G/DL (ref 0.6–1)
GAMMA GLOB SERPL ELPH-MCNC: 0.6 G/DL (ref 0.7–1.6)
M PROTEIN SERPL ELPH-MCNC: 0 G/DL
PROT PATTERN SERPL ELPH-IMP: ABNORMAL

## 2020-10-20 PROCEDURE — 999N001193 HC VIDEO/TELEPHONE VISIT; NO CHARGE

## 2020-10-20 PROCEDURE — 99442 PR PHYSICIAN TELEPHONE EVALUATION 11-20 MIN: CPT | Performed by: PHYSICIAN ASSISTANT

## 2020-10-20 NOTE — LETTER
"    10/20/2020         RE: Winter Loya  [address]  [place] [facility] [place] [address]        Dear Colleague,    Thank you for referring your patient, Winter Loya, to the [facility]. Please see a copy of my visit note below.    Winter Loya is a 64 year old female who is being evaluated via a billable telephone visit.      The patient has been notified of following:     \"This telephone visit will be conducted via a call between you and your physician/provider. We have found that certain health care needs can be provided without the need for a physical exam.  This service lets us provide the care you need with a short phone conversation.  If a prescription is necessary we can send it directly to your pharmacy.  If lab work is needed we can place an order for that and you can then stop by our lab to have the test done at a later time.    Telephone visits are billed at different rates depending on your insurance coverage. During this emergency period, for some insurers they may be billed the same as an in-person visit.  Please reach out to your insurance provider with any questions.    If during the course of the call the physician/provider feels a telephone visit is not appropriate, you will not be charged for this service.\"    Patient has given verbal consent for Telephone visit?  Yes    What phone number would you like to be contacted at? [phone]    How would you like to obtain your AVS? BERYL Calvert    Phone call duration: 16 minutes    Reva Mojica PA-C        ONCOLOGY/HEMATOLOGY PROGRESS NOTE  Oct 20, 2020    Reason for Visit: IgA Kappa Multiple Myeloma    Oncology HPI:   Winter Loya is a 64 year old woman with IgA Kappa Multiple Myeloma. In 2018 she had anemia and was fatigued. She was found to have IgA kappa monocloncal antibody with M-spike of 0.17. IgA elevated. Skeletal survey was unremarkable and UPEP had minimal protein (156 mg/m2). PET 11/2018 showed bone " marrow consistent with hypermetabolic plasma cell myeloma. M spike was 0.17. She started RVD and Zometa. On 4/2019 she had an autologous transplant.      Her bone marrow bx from September 2019 was sent here for review. It showed marrow cellularity of 60%, with decreased trilineage hematopoiesis and 15% plasma cells as well as peripheral blood with slight anemia. 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 She is on revlimid maintenance and zometa from her prior oncologist in [place]. On 12/6/19 her K/L ratio is 1.1, M spike is 0.04.     Interval history:   -Has a good appetite and eating lots. She is trying to work on portions and lose some weight  -worried about her leg strength and still having difficulties with balance . Did go to PT. Was shown how to use a cane and do exercises. Has drop foot which she got a brace for, though doesn't have it yet. She plans to go back to PT again   -memory been worse, though stable. Has been writing things down to remember  -work has been going well. She likes being able to work as it gives her a purpose  -has achy and numbness from calf down, R>L. Unchanged. Has been trying to walk more  -HA come and go. She thinks it is from wearing the masks    ROS: 10 point ROS neg other than the symptoms noted above in the HPI.      Past Medical History:   Diagnosis Date     Anxiety      Depression      Diabetes (H)      Glaucoma (increased eye pressure)      History of smoking      Hyperlipidemia      Multiple myeloma in remission (H)      Nonsenile cataract      Obesity      Sleep apnea     no c-pap use        Current Outpatient Medications   Medication Sig Dispense Refill     acetaminophen (TYLENOL) 325 MG tablet Take 2 tablets (650 mg) by mouth every 4 hours as needed for pain 50 tablet 0     acyclovir (ZOVIRAX) 400 MG tablet Take 400 mg by mouth 2 times  daily       atorvastatin (LIPITOR) 20 MG tablet Take 1 tablet (20 mg) by mouth every 24 hours (Patient taking differently: Take 20 mg by mouth At Bedtime ) 90 tablet 3     blood glucose (NO BRAND SPECIFIED) test strip Use to test blood sugar two times daily or as directed. 200 strip 3     blood glucose monitoring (NO BRAND SPECIFIED) meter device kit Use to test blood sugar two times daily or as directed. 1 kit 3     insulin glargine (LANTUS PEN) 100 UNIT/ML pen Inject 34 Units Subcutaneous At Bedtime 30 mL 3     insulin pen needle (FIFTY50 PEN NEEDLES) 32G X 4 MM miscellaneous As directed. To use with Victoza daily       latanoprost (XALATAN) 0.005 % ophthalmic solution Place 1 drop into both eyes daily (Patient taking differently: Place 1 drop into both eyes At Bedtime ) 1 Bottle 11     LENalidomide (REVLIMID) 10 MG CAPS capsule Take 1 capsule (10 mg) by mouth daily for 28 days 28 capsule 0     levothyroxine (SYNTHROID/LEVOTHROID) 175 MCG tablet Take 1 tablet (175 mcg) by mouth every morning (before breakfast) 30 tablet 0     miconazole (MONISTAT 1 DAY OR NIGHT) 1200 & 2 MG & % kit Apply inside the vagina 1 kit 1     pregabalin (LYRICA) 100 MG capsule Take 1 capsule (100 mg) by mouth 3 times daily 270 capsule 1     sertraline (ZOLOFT) 100 MG tablet Take 1 tablet (100 mg) by mouth daily 90 tablet 1     metFORMIN (GLUCOPHAGE) 500 MG tablet Take 1 tablet (500 mg) by mouth daily (with breakfast) for 7 days, THEN 1 tablet (500 mg) 2 times daily (with meals) for 23 days. 180 tablet 0     oxyCODONE (ROXICODONE) 5 MG tablet Take 1-2 tablets (5-10 mg) by mouth every 6 hours as needed for moderate to severe pain (Patient not taking: Reported on 9/15/2020) 15 tablet 0        No Known Allergies    Exam:      General: alert and no distress   Psych: Alert and oriented times; coherent speech, normal rate and volume, able to articulate logical thoughts, able to abstract reason, no tangential thoughts, no hallucinations or  delusions  Patient's affect is appropriate.    Pulm: Speaking in full sentences, unlabored, no audible wheezes or cough.     The rest of a comprehensive physical examination is deferred due to PHE (public health emergency) video restrictions    Labs:      10/19/2020 15:08   Sodium 136   Potassium 3.6   Chloride 104   Carbon Dioxide 25   Urea Nitrogen 10   Creatinine 0.57   GFR Estimate >90   GFR Estimate If Black >90   Calcium 8.7   Anion Gap 7   Albumin 3.8   Protein Total 6.9   Bilirubin Total 1.2   Alkaline Phosphatase 122   ALT 48   AST 23   TSH 2.32   Glucose 225 (H)   WBC 3.8 (L)   Hemoglobin 13.7   Hematocrit 40.9   Platelet Count 132 (L)   RBC Count 4.79   MCV 85   MCH 28.6   MCHC 33.5   RDW 14.2   Diff Method Automated Method   % Neutrophils 61.0   % Lymphocytes 20.8   % Monocytes 10.0   % Eosinophils 6.1   % Basophils 1.8   % Immature Granulocytes 0.3   Nucleated RBCs 0   Absolute Neutrophil 2.3   Absolute Lymphocytes 0.8   Absolute Monocytes 0.4   Absolute Eosinophils 0.2   Absolute Basophils 0.1   Abs Immature Granulocytes 0.0   Absolute Nucleated RBC 0.0   Albumin Fraction PENDING   Alpha 1 Fraction PENDING   Alpha 2 Fraction PENDING   Beta Fraction PENDING   ELP Interpretation: PENDING   Gamma Fraction PENDING   Monoclonal Peak PENDING       Assessment/plan:   1.) MM, standard risk, IgA Kappa  -s/p auto HSCT in 4/2019  -now on maintenance Revlimid 10 mg daily.   -Continue SPEP and SFLC monthly. Last Mspike was still 0.0 from 9/17.   -PET/CT annually or if symptomatic from MM due to bone pain. Last PET was 2/15/20  -still stable from a MM standpoint, SPEP pending from today   -will continue Rev maintenance. Tolerating overall well  -will follow-up with Dr. Tipton in December, sooner if needed    PPx: Continue ACV and  mg for VTE ppx while on Rev    2.) Bone lesions  -continue zometa through 12/2020 to complete a total of 2 years of therapy, associated with improved PFS and overall survival  when given with ASCT. Did have temporary pause with COVID and now will give every 3 months to limit clinic visits. Last given on 9/17/20. Next due in Decemeber     3.) Depression with Anxiety  -depression and anxiety has been something she has dealt with for a long time.   -Currently maintained on Zoloft 100 mg daily.  This has been helpful for her though she continues to struggle with ongoing stress, anxiety and sadness  -Scheduled with palliative SW here for counseling, though she no showed appt. She feels like her mood is better recently and work has been giving her a purpose.   -Should reschedule the palliative SW appt as I do think this still would be helpful for her    4.) Neuropathy   -multifactorial likely d/t previous chemotherapy and uncontrolled diabetes.   -currently on Lyrica 300 mg  -could consider adding gabapentin if needed, though stable currently     5.) Nausea   -mild. Can use antiemetics prn    6.) Hx of falls  -Established with cancer rehab to help with balance with worsening neuropathy. She is doing the exercises at home. She also has order a brace for her foot to help with her foot drop  -continue to follow-up with PT    7.) DM2   -latest A1C was 9.1 on 8/21/20. Managed by PCP. Using insulin  -trying to eat better portion sizes and watch intake  -likely contributing to worsening neuropathy     8.) Memory changes   -Stable. She is scheduled for neuropsych testing tomorrow. Encouraged her to continue lists and reminders around the house.   -consider cancer rehab OT for chemo brain    9.) Thyroidectomy, 8/26/20  -will continue to f/u with with ENT and PCP       Reva Mojica PA-C

## 2020-10-20 NOTE — PROGRESS NOTES
ONCOLOGY/HEMATOLOGY PROGRESS NOTE  Oct 20, 2020    Reason for Visit: IgA Kappa Multiple Myeloma    Oncology HPI:   Winter Loya is a 64 year old woman with IgA Kappa Multiple Myeloma. In 2018 she had anemia and was fatigued. She was found to have IgA kappa monocloncal antibody with M-spike of 0.17. IgA elevated. Skeletal survey was unremarkable and UPEP had minimal protein (156 mg/m2). PET 11/2018 showed bone marrow consistent with hypermetabolic plasma cell myeloma. M spike was 0.17. She started RVD and Zometa. On 4/2019 she had an autologous transplant.      Her bone marrow bx from September 2019 was sent here for review. It showed marrow cellularity of 60%, with decreased trilineage hematopoiesis and 15% plasma cells as well as peripheral blood with slight anemia. 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 She is on revlimid maintenance and zometa from her prior oncologist in [place]. On 12/6/19 her K/L ratio is 1.1, M spike is 0.04.     Interval history:   -Has a good appetite and eating lots. She is trying to work on portions and lose some weight  -worried about her leg strength and still having difficulties with balance . Did go to PT. Was shown how to use a cane and do exercises. Has drop foot which she got a brace for, though doesn't have it yet. She plans to go back to PT again   -memory been worse, though stable. Has been writing things down to remember  -work has been going well. She likes being able to work as it gives her a purpose  -has achy and numbness from calf down, R>L. Unchanged. Has been trying to walk more  -HA come and go. She thinks it is from wearing the masks    ROS: 10 point ROS neg other than the symptoms noted above in the HPI.      Past Medical History:   Diagnosis Date     Anxiety      Depression      Diabetes (H)      Glaucoma (increased eye pressure)       History of smoking      Hyperlipidemia      Multiple myeloma in remission (H)      Nonsenile cataract      Obesity      Sleep apnea     no c-pap use        Current Outpatient Medications   Medication Sig Dispense Refill     acetaminophen (TYLENOL) 325 MG tablet Take 2 tablets (650 mg) by mouth every 4 hours as needed for pain 50 tablet 0     acyclovir (ZOVIRAX) 400 MG tablet Take 400 mg by mouth 2 times daily       atorvastatin (LIPITOR) 20 MG tablet Take 1 tablet (20 mg) by mouth every 24 hours (Patient taking differently: Take 20 mg by mouth At Bedtime ) 90 tablet 3     blood glucose (NO BRAND SPECIFIED) test strip Use to test blood sugar two times daily or as directed. 200 strip 3     blood glucose monitoring (NO BRAND SPECIFIED) meter device kit Use to test blood sugar two times daily or as directed. 1 kit 3     insulin glargine (LANTUS PEN) 100 UNIT/ML pen Inject 34 Units Subcutaneous At Bedtime 30 mL 3     insulin pen needle (FIFTY50 PEN NEEDLES) 32G X 4 MM miscellaneous As directed. To use with Victoza daily       latanoprost (XALATAN) 0.005 % ophthalmic solution Place 1 drop into both eyes daily (Patient taking differently: Place 1 drop into both eyes At Bedtime ) 1 Bottle 11     LENalidomide (REVLIMID) 10 MG CAPS capsule Take 1 capsule (10 mg) by mouth daily for 28 days 28 capsule 0     levothyroxine (SYNTHROID/LEVOTHROID) 175 MCG tablet Take 1 tablet (175 mcg) by mouth every morning (before breakfast) 30 tablet 0     miconazole (MONISTAT 1 DAY OR NIGHT) 1200 & 2 MG & % kit Apply inside the vagina 1 kit 1     pregabalin (LYRICA) 100 MG capsule Take 1 capsule (100 mg) by mouth 3 times daily 270 capsule 1     sertraline (ZOLOFT) 100 MG tablet Take 1 tablet (100 mg) by mouth daily 90 tablet 1     metFORMIN (GLUCOPHAGE) 500 MG tablet Take 1 tablet (500 mg) by mouth daily (with breakfast) for 7 days, THEN 1 tablet (500 mg) 2 times daily (with meals) for 23 days. 180 tablet 0     oxyCODONE (ROXICODONE) 5 MG  tablet Take 1-2 tablets (5-10 mg) by mouth every 6 hours as needed for moderate to severe pain (Patient not taking: Reported on 9/15/2020) 15 tablet 0        No Known Allergies    Exam:      General: alert and no distress   Psych: Alert and oriented times; coherent speech, normal rate and volume, able to articulate logical thoughts, able to abstract reason, no tangential thoughts, no hallucinations or delusions  Patient's affect is appropriate.    Pulm: Speaking in full sentences, unlabored, no audible wheezes or cough.     The rest of a comprehensive physical examination is deferred due to PHE (public health emergency) video restrictions    Labs:      10/19/2020 15:08   Sodium 136   Potassium 3.6   Chloride 104   Carbon Dioxide 25   Urea Nitrogen 10   Creatinine 0.57   GFR Estimate >90   GFR Estimate If Black >90   Calcium 8.7   Anion Gap 7   Albumin 3.8   Protein Total 6.9   Bilirubin Total 1.2   Alkaline Phosphatase 122   ALT 48   AST 23   TSH 2.32   Glucose 225 (H)   WBC 3.8 (L)   Hemoglobin 13.7   Hematocrit 40.9   Platelet Count 132 (L)   RBC Count 4.79   MCV 85   MCH 28.6   MCHC 33.5   RDW 14.2   Diff Method Automated Method   % Neutrophils 61.0   % Lymphocytes 20.8   % Monocytes 10.0   % Eosinophils 6.1   % Basophils 1.8   % Immature Granulocytes 0.3   Nucleated RBCs 0   Absolute Neutrophil 2.3   Absolute Lymphocytes 0.8   Absolute Monocytes 0.4   Absolute Eosinophils 0.2   Absolute Basophils 0.1   Abs Immature Granulocytes 0.0   Absolute Nucleated RBC 0.0   Albumin Fraction PENDING   Alpha 1 Fraction PENDING   Alpha 2 Fraction PENDING   Beta Fraction PENDING   ELP Interpretation: PENDING   Gamma Fraction PENDING   Monoclonal Peak PENDING       Assessment/plan:   1.) MM, standard risk, IgA Kappa  -s/p auto HSCT in 4/2019  -now on maintenance Revlimid 10 mg daily.   -Continue SPEP and SFLC monthly. Last Mspike was still 0.0 from 9/17.   -PET/CT annually or if symptomatic from MM due to bone pain. Last PET was  2/15/20  -still stable from a MM standpoint, SPEP pending from today   -will continue Rev maintenance. Tolerating overall well  -will follow-up with Dr. Tipton in December, sooner if needed    PPx: Continue ACV and  mg for VTE ppx while on Rev    2.) Bone lesions  -continue zometa through 12/2020 to complete a total of 2 years of therapy, associated with improved PFS and overall survival when given with ASCT. Did have temporary pause with COVID and now will give every 3 months to limit clinic visits. Last given on 9/17/20. Next due in Decemeber     3.) Depression with Anxiety  -depression and anxiety has been something she has dealt with for a long time.   -Currently maintained on Zoloft 100 mg daily.  This has been helpful for her though she continues to struggle with ongoing stress, anxiety and sadness  -Scheduled with palliative SW here for counseling, though she no showed appt. She feels like her mood is better recently and work has been giving her a purpose.   -Should reschedule the palliative SW appt as I do think this still would be helpful for her    4.) Neuropathy   -multifactorial likely d/t previous chemotherapy and uncontrolled diabetes.   -currently on Lyrica 300 mg  -could consider adding gabapentin if needed, though stable currently     5.) Nausea   -mild. Can use antiemetics prn    6.) Hx of falls  -Established with cancer rehab to help with balance with worsening neuropathy. She is doing the exercises at home. She also has order a brace for her foot to help with her foot drop  -continue to follow-up with PT    7.) DM2   -latest A1C was 9.1 on 8/21/20. Managed by PCP. Using insulin  -trying to eat better portion sizes and watch intake  -likely contributing to worsening neuropathy     8.) Memory changes   -Stable. She is scheduled for neuropsych testing tomorrow. Encouraged her to continue lists and reminders around the house.   -consider cancer rehab OT for chemo brain    9.) Thyroidectomy,  8/26/20  -will continue to f/u with with ENT and PCP       Reva Mojica PA-C

## 2020-10-20 NOTE — PROGRESS NOTES
"Winter Loya is a 64 year old female who is being evaluated via a billable telephone visit.      The patient has been notified of following:     \"This telephone visit will be conducted via a call between you and your physician/provider. We have found that certain health care needs can be provided without the need for a physical exam.  This service lets us provide the care you need with a short phone conversation.  If a prescription is necessary we can send it directly to your pharmacy.  If lab work is needed we can place an order for that and you can then stop by our lab to have the test done at a later time.    Telephone visits are billed at different rates depending on your insurance coverage. During this emergency period, for some insurers they may be billed the same as an in-person visit.  Please reach out to your insurance provider with any questions.    If during the course of the call the physician/provider feels a telephone visit is not appropriate, you will not be charged for this service.\"    Patient has given verbal consent for Telephone visit?  Yes    What phone number would you like to be contacted at? [phone]    How would you like to obtain your AVS? BERYL Calvert    Phone call duration: 16 minutes    Reva Mojica PA-C      "

## 2020-10-22 ENCOUNTER — VIRTUAL VISIT (OUTPATIENT)
Dept: NEUROPSYCHOLOGY | Facility: CLINIC | Age: 65
End: 2020-10-22
Attending: PHYSICIAN ASSISTANT
Payer: MEDICAID

## 2020-10-22 DIAGNOSIS — C90.01 MULTIPLE MYELOMA IN REMISSION (H): Primary | ICD-10-CM

## 2020-10-22 DIAGNOSIS — F06.8 OTHER SPECIFIED MENTAL DISORDERS DUE TO KNOWN PHYSIOLOGICAL CONDITION: ICD-10-CM

## 2020-10-22 DIAGNOSIS — F41.9 ANXIETY: ICD-10-CM

## 2020-10-22 DIAGNOSIS — R41.89 COGNITIVE DEFICITS: ICD-10-CM

## 2020-10-22 PROCEDURE — 96138 PSYCL/NRPSYC TECH 1ST: CPT | Mod: 95 | Performed by: CLINICAL NEUROPSYCHOLOGIST

## 2020-10-22 PROCEDURE — 96139 PSYCL/NRPSYC TST TECH EA: CPT | Mod: 95 | Performed by: CLINICAL NEUROPSYCHOLOGIST

## 2020-10-22 PROCEDURE — 96116 NUBHVL XM PHYS/QHP 1ST HR: CPT | Mod: 95 | Performed by: CLINICAL NEUROPSYCHOLOGIST

## 2020-10-22 PROCEDURE — 96132 NRPSYC TST EVAL PHYS/QHP 1ST: CPT | Mod: 95 | Performed by: CLINICAL NEUROPSYCHOLOGIST

## 2020-10-22 PROCEDURE — 96133 NRPSYC TST EVAL PHYS/QHP EA: CPT | Mod: 95 | Performed by: CLINICAL NEUROPSYCHOLOGIST

## 2020-10-22 NOTE — PROGRESS NOTES
The patient was seen for neuropsychological evaluation via telehealth at the request of Reva Mojica PA-C for the purposes of diagnostic clarification and treatment planning.  97 minutes of telephone test administration and scoring were provided by this writer.  Please see Dr. Mj Mcgrath's report for a full interpretation of the findings.    Flores Bailey  Psychometrist

## 2020-10-23 NOTE — PROGRESS NOTES
Patient:  Winter Loya MRN:  [id number] :  [birth year] DOMINGUEZ:  10/22/20   Education: 16 Handedness:  RH Provider: JOSE Psychometrist:  ACE   Station: OP Age: 64 Visit Type: (tel/vid) Telephone     ORIENTATION  WAIS-IV Raw SS RDS     Personal Info 4 Digit Span 22 8 8     Place 2 Vocabulary 38 10      Time 0         Presidents 2 Similarities 22 8      COWAT  ANIMAL FLUENCY  COMPLEX IDEATIONAL MATERIAL     Form FAS Raw 16 Raw 11     Raw 41 SS 8 SS 9     SS 10 T 38 T 41     T 45         ORAL TRAILS    TSAT      Trails A 11 Z -4.60 Total time 119 Z -0.68   Trails B 34 Z -1.06 Total Errors 2 Z 0.12   HVLT           Trial 1 5   T-Score   T-Score   Trial 2 8 Total Recall 22 37 True Positives 11    Trial 3 9 Delayed Recall 7 35 False Positives 0    Learning 4 Percent Retention 78% 40 Discrim. Index 11 52   RBANS STORY           Total Immediate 12 z Score -1.83       Total Delay 5 z Score -2.05       [employer] HEALTH AND SAFETY QUESTIONNAIRE         Total 32 Classification Moderate       PHQ-9          Total Score 4 Interpretation Minimal       NA-7          Total Score 7 Interpretation Mild
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
## 2020-10-26 DIAGNOSIS — C90.01 MULTIPLE MYELOMA IN REMISSION (H): Primary | ICD-10-CM

## 2020-10-27 DIAGNOSIS — C90.01 MULTIPLE MYELOMA IN REMISSION (H): Primary | ICD-10-CM

## 2020-10-27 RX ORDER — LEVOTHYROXINE SODIUM 175 UG/1
175 TABLET ORAL
Qty: 90 TABLET | Refills: 0 | Status: SHIPPED | OUTPATIENT
Start: 2020-10-27 | End: 2020-12-09

## 2020-10-27 RX ORDER — LEVOTHYROXINE SODIUM 175 UG/1
175 TABLET ORAL
Qty: 90 TABLET | Refills: 0 | Status: SHIPPED | OUTPATIENT
Start: 2020-10-27 | End: 2020-10-27

## 2020-10-27 RX ORDER — LENALIDOMIDE 10 MG/1
10 CAPSULE ORAL DAILY
Qty: 28 CAPSULE | Refills: 0 | Status: SHIPPED | OUTPATIENT
Start: 2020-10-27 | End: 2020-11-19

## 2020-10-27 NOTE — TELEPHONE ENCOUNTER
RX for Metformin and levothyroxine (SYNTHROID/LEVOTHROID) 175 MCG tablet sent to pt pharmacy.  Tried to call pt but her mailbox is full.  Mansi Alexander RN 9:33 AM on 10/27/2020.

## 2020-10-28 ENCOUNTER — VIRTUAL VISIT (OUTPATIENT)
Dept: PHARMACY | Facility: CLINIC | Age: 65
End: 2020-10-28
Payer: MEDICAID

## 2020-10-28 ENCOUNTER — TELEPHONE (OUTPATIENT)
Dept: INTERNAL MEDICINE | Facility: CLINIC | Age: 65
End: 2020-10-28

## 2020-10-28 DIAGNOSIS — Z79.4 TYPE 2 DIABETES MELLITUS WITH HYPERGLYCEMIA, WITH LONG-TERM CURRENT USE OF INSULIN (H): Primary | ICD-10-CM

## 2020-10-28 DIAGNOSIS — B37.31 YEAST INFECTION OF THE VAGINA: ICD-10-CM

## 2020-10-28 DIAGNOSIS — E11.42 TYPE 2 DIABETES MELLITUS WITH DIABETIC POLYNEUROPATHY, WITH LONG-TERM CURRENT USE OF INSULIN (H): ICD-10-CM

## 2020-10-28 DIAGNOSIS — E11.65 TYPE 2 DIABETES MELLITUS WITH HYPERGLYCEMIA, WITH LONG-TERM CURRENT USE OF INSULIN (H): Primary | ICD-10-CM

## 2020-10-28 DIAGNOSIS — Z79.4 TYPE 2 DIABETES MELLITUS WITH DIABETIC POLYNEUROPATHY, WITH LONG-TERM CURRENT USE OF INSULIN (H): ICD-10-CM

## 2020-10-28 DIAGNOSIS — E11.9 TYPE 2 DIABETES MELLITUS WITHOUT COMPLICATION, WITHOUT LONG-TERM CURRENT USE OF INSULIN (H): Primary | ICD-10-CM

## 2020-10-28 PROCEDURE — 99607 MTMS BY PHARM ADDL 15 MIN: CPT | Mod: TEL | Performed by: PHARMACIST

## 2020-10-28 PROCEDURE — 99606 MTMS BY PHARM EST 15 MIN: CPT | Mod: TEL | Performed by: PHARMACIST

## 2020-10-28 RX ORDER — SEMAGLUTIDE 1.34 MG/ML
INJECTION, SOLUTION SUBCUTANEOUS
Qty: 1.5 ML | Refills: 5 | Status: SHIPPED | OUTPATIENT
Start: 2020-10-28 | End: 2020-10-29

## 2020-10-28 NOTE — PROGRESS NOTES
MTM ENCOUNTER  SUBJECTIVE/OBJECTIVE:                           Winter Loya is a 64 year old female called for a follow-up visit. She was referred to me from Brittany Rosenthal and Gabrielle Edwards.  Today's visit is a follow-up MTM visit from 9/15/20.     Chief Complaint: uncontrolled diabetes.    Allergies/ADRs: Reviewed in chart  Tobacco: She reports that she quit smoking about 15 years ago. Her smoking use included cigarettes. She smoked 1.00 pack per day. She has never used smokeless tobacco.  Alcohol: not currently using  Caffeine: 1 cups/day of coffee  Activity: none  Past Medical History: Reviewed in chart    Medication Adherence/Access: no issues reported    Type 2 Diabetes:  Currently taking metformin 500 mg twice daily and Lantus 34 units at bedtime. Patient is not experiencing side effects. She has used Victoza in the past but experienced a lot of nausea. She is interested in restarting due to her liberal eating habits. She is interested in the appetite suppression associated with GLP1 Norris.   Blood sugar monitorin-2 time(s) daily. Ranges (patient reported):     Fasting- 200 or so  Post-Prandial- she doesn't want to test in the evening because the numbers are high    Symptoms of low blood sugar? none  Symptoms of high blood sugar? none  Eye exam: up to date  Foot exam: due  Diet/Exercise: she is really struggling with diet right now. She would really like to continue working on her diet and is interested in   Aspirin: Not taking due to unclear reason  Statin: Yes: atorvastatin 20 mg daily    No results for input(s): CHOL, HDL, LDL, TRIG, CHOLHDLRATIO in the last 80771 hours.     ACEi/ARB: No.     BP Readings from Last 3 Encounters:   10/19/20 130/81   20 136/85   20 132/85     Urine Albumin: No results found for: UMALCR   Lab Results   Component Value Date    A1C 9.1 2020    A1C 6.7 2020     Yeast Infection: Current medications include miconazole cream. She has finished the monistat  "course and experience relief for a few days. No side effects reported but, unfortunately, her symptoms have returned.     Today's Vitals: There were no vitals taken for this visit. - telemed    BP Readings from Last 1 Encounters:   10/19/20 130/81     Pulse Readings from Last 1 Encounters:   10/19/20 68     Wt Readings from Last 1 Encounters:   10/19/20 266 lb (120.7 kg)     Ht Readings from Last 1 Encounters:   08/26/20 5' 8.75\" (1.746 m)     Estimated body mass index is 39.57 kg/m  as calculated from the following:    Height as of 8/26/20: 5' 8.75\" (1.746 m).    Weight as of 10/19/20: 266 lb (120.7 kg).    Temp Readings from Last 1 Encounters:   10/19/20 98.1  F (36.7  C) (Oral)     ASSESSMENT:                              Medication Adherence: No issues identified    Type 2 Diabetes: Patient is not meeting A1c goal of < 8%. Self monitoring of blood glucose is not at goal of fasting  mg/dL and post prandial < 180 mg/dL. Patient would benefit from restarting GLP1RA and taking full dose of metformin. Would like also benefit from ASA therapy and ACE/ARB.     Yeast Infection: Needs further evaluation. Could consider fluconazole next.   PLAN:                            1. Start Ozempic 0.25 mg once weekly.   2. Increase metformin to 1000 mg twice daily.   3. Will request RN outreach for yeast infection symptoms.     I spent 22 minutes with this patient today. All changes were made via collaborative practice agreement with Brittany Rosenthal. A copy of the visit note was provided to the patient's primary care provider.    Will follow up in 2 weeks.    The patient declined a summary of these recommendations.     Coco Antoine, Pharm.D., [facility]  Medication Therapy Management Pharmacist  Page/VM:  [phone]      Patient consented to a telehealth visit: yes  Telemedicine Visit Details  Type of service:  Telephone visit  Start Time: 2:33 PM  End Time: 2:55 PM  Originating Location (patient location): Home  Distant " Location (provider location):  [facility] MEDICATION THERAPY MANAGEMENT  Mode of Communication:  Telephone

## 2020-10-28 NOTE — TELEPHONE ENCOUNTER
Prior Authorization Retail Medication Request    Medication/Dose: Semaglutide,0.25 or 0.5MG/DOS, (OZEMPIC, 0.25 OR 0.5 MG/DOSE,) 2 MG/1.5ML SOPN   ICD code (if different than what is on RX):    Previously Tried and Failed:    Rationale:      Insurance Name:    Insurance ID:        Pharmacy Information (if different than what is on RX)  Name:    Phone:

## 2020-10-29 ENCOUNTER — VIRTUAL VISIT (OUTPATIENT)
Dept: INTERNAL MEDICINE | Facility: CLINIC | Age: 65
End: 2020-10-29
Payer: MEDICAID

## 2020-10-29 DIAGNOSIS — E11.40 TYPE 2 DIABETES MELLITUS WITH DIABETIC NEUROPATHY, WITH LONG-TERM CURRENT USE OF INSULIN (H): Primary | ICD-10-CM

## 2020-10-29 DIAGNOSIS — B37.31 CANDIDIASIS OF VAGINA: ICD-10-CM

## 2020-10-29 DIAGNOSIS — Z79.4 TYPE 2 DIABETES MELLITUS WITH DIABETIC NEUROPATHY, WITH LONG-TERM CURRENT USE OF INSULIN (H): Primary | ICD-10-CM

## 2020-10-29 DIAGNOSIS — G47.39 OTHER SLEEP APNEA: ICD-10-CM

## 2020-10-29 PROCEDURE — 99442 PR PHYSICIAN TELEPHONE EVALUATION 11-20 MIN: CPT | Mod: GC | Performed by: STUDENT IN AN ORGANIZED HEALTH CARE EDUCATION/TRAINING PROGRAM

## 2020-10-29 RX ORDER — FLUCONAZOLE 150 MG/1
150 TABLET ORAL ONCE
Qty: 2 TABLET | Refills: 0 | Status: SHIPPED | OUTPATIENT
Start: 2020-10-29 | End: 2020-10-29

## 2020-10-29 RX ORDER — EXENATIDE 2 MG/.85ML
2 INJECTION, SUSPENSION, EXTENDED RELEASE SUBCUTANEOUS
Qty: 3.4 ML | Refills: 5 | Status: SHIPPED | OUTPATIENT
Start: 2020-10-29 | End: 2020-11-20

## 2020-10-29 NOTE — TELEPHONE ENCOUNTER
PRIOR AUTHORIZATION DENIED    Medication: Semaglutide,0.25 or 0.5MG/DOS, (OZEMPIC, 0.25 OR 0.5 MG/DOSE,) 2 MG/1.5ML SOPN     Denial Date: 10/29/2020    Denial Rational:         Appeal Information:

## 2020-10-29 NOTE — TELEPHONE ENCOUNTER
Central Prior Authorization Team   Phone: [phone]      PA Initiation    Medication: Semaglutide,0.25 or 0.5MG/DOS, (OZEMPIC, 0.25 OR 0.5 MG/DOSE,) 2 MG/1.5ML SOPN   Insurance Company: [place] Medicaid ([facility]) - Phone [phone] Fax [phone]  Pharmacy Filling the Rx: [id number] [place], [place] [id number]  Filling Pharmacy Phone: [phone]  Filling Pharmacy Fax:    Start Date: 10/29/2020

## 2020-10-29 NOTE — NURSING NOTE
Chief Complaint   Patient presents with     Recheck Medication     pt would like to follow up       Iliana Lopez CMA, EMT at 1:33 PM on 10/29/2020.

## 2020-10-29 NOTE — PROGRESS NOTES
"  This patient is being evaluated via a billable telephone visit; THIS VISIT WAS INITIATED BY THE PT, as AN ALTERNATIVE TO IN PERSON VISIT .       The patient has has been notified of following:      \"This billable telephone visit will be conducted via a call between you and your physician/provider. We have found that certain health care needs can be provided without the need for a physical exam.  This service lets us provide the care you need with a short phone conversation.  If a prescription is necessary we can send it directly to your pharmacy.  If lab work is needed we can place an order for that and you can then stop by our lab to have the test done at a later time. We can also place orders for a limited number of imaging tests if they are deemed urgently necessary.     If during the course of the call the physician/provider feels a telephone visit is not appropriate, you will not be charged for this service.\"     Due to efforts to reduce the spread of COVID-19 in the clinic, state, nation, virtual visits are encouraged currently. Patient understands that diagnose and advice is limited by the inability to exam him/her/them face-to-face.    Person(s) spoken to: patient     Time call initiated:  02:14 PM  Time call ended:  02:32 PM  Total length of call: 18 mins  Staffed with: Dr. Banks                           PRIMARY CARE CENTER       SUBJECTIVE:  Winter Loya is a 64 year old female patient with past medical hx of MM s/p stem cell transplant, multinodular goiter, T2DM, HTN, HLD, who presents for follow up on T2DM.    Patient has been taking metformin 500 mg BID and Lantus 34 unites at HS and not experiencing side effects. She reported that her fasting blood glucose has been around low 200 and has not being taking her BG during the day because of high readings.   She had a virtual visit with our clinic yesterday and was recommended Ozempic.     She also mentioned that her son noticed her snoring during her " sleep as well as apnea episodes. She has always felt tired in the morning.     Patient has also c/o vaginal itching, but reported no bleedng or discharges. She tired one time Miconazole topical application which helped for 2 days only, but symptoms reoccurred.       Medications and allergies reviewed by me today.     ROS:   Constitutional, neuro, ENT, endocrine, pulmonary, cardiac, gastrointestinal, genitourinary, musculoskeletal, integument and psychiatric systems are negative, except as otherwise noted.    OBJECTIVE:    There were no vitals taken for this visit.   Wt Readings from Last 1 Encounters:   10/19/20 120.7 kg (266 lb)       GENERAL APPEARANCE: healthy, alert and no distress     ASSESSMENT/PLAN:    Winter was seen today for recheck medication.    Diagnoses and all orders for this visit:    Type 2 diabetes mellitus with diabetic neuropathy, with long-term current use of insulin (H)  Patient has been having worsening and high blood glucose measurments around 200s in as fasting blood glucose. Her A1c went up from 6.7 on 3/5/2020 to 9.1 on 8/21/2020. She was started on Metformin 500 mg BID back in Sep/2020 and had a virtual visit yesterday with pharmacist and was recommended to add-on Ozempic. Patient was educated about combination approach for Dm management with diet and exercises. She is willing to try DASH diet and has been walking around with her friend in AM.  - Cont pta metformin, lantus  - pendng PA for ozempic   - Hemoglobin A1c **(3 MO); Future    Candidiasis of vagina  -     fluconazole (DIFLUCAN) 150 MG tablet; Take 1 tablet (150 mg) by mouth once for 1 dose Take 1 tablet only, then repeat in 1 week if symptoms did not resolve  - if symptoms not resolving, will need in-person evaluation     Other sleep apnea  -     SLEEP EVALUATION & MANAGEMENT REFERRAL - [facility] [place] [phone] (Age 15 and up); Future       Pt should return to clinic for f/u with me in 3  arie Edwards MD  Internal Medicine PGY-2  Oct 29, 2020    Pt was discussed with Dr. Banks    On the day of this phone visit, I reviewed the pertinent medical history and results.  I discussed the current findings on physical examination, as well as the patient s diagnosis and treatment plan with the resident and agree with the information as documented with the following exceptions: none.  Haresh Banks MD

## 2020-11-06 ENCOUNTER — TELEPHONE (OUTPATIENT)
Dept: ONCOLOGY | Facility: CLINIC | Age: 65
End: 2020-11-06

## 2020-11-06 NOTE — TELEPHONE ENCOUNTER
Oral Chemotherapy Monitoring Program   Medication: Revlimid  Rx: 10mg PO daily on days 1 through 28 of 28 day cycle   Auth #: [id number]   Risk Category: Adult Female NORP  Routine survey questions reviewed.   Rx to be Escribed to Sheyla Borja  [facility]  Oncology Pharmacy Liaison   [email or web address]  [phone] (phone)  [phone] (fax)

## 2020-11-12 ENCOUNTER — TELEPHONE (OUTPATIENT)
Dept: PHARMACY | Facility: CLINIC | Age: 65
End: 2020-11-12

## 2020-11-12 NOTE — TELEPHONE ENCOUNTER
[facility] Diabetes IMPACT Project Post-Visit Call    Outreach attempted x 2. Left message on patient's voicemail with call back information and requested a return call.     Plan:  Will follow-up with Comunitae message.     Follow up: 11/19/20      Coco Antoine RPH

## 2020-11-16 ENCOUNTER — HEALTH MAINTENANCE LETTER (OUTPATIENT)
Age: 65
End: 2020-11-16

## 2020-11-17 PROBLEM — E89.0 POSTOPERATIVE HYPOTHYROIDISM: Chronic | Status: ACTIVE | Noted: 2020-11-17

## 2020-11-17 PROBLEM — E11.9 DIABETES MELLITUS, TYPE 2 (H): Chronic | Status: ACTIVE | Noted: 2020-06-03

## 2020-11-17 PROBLEM — C90.01 MULTIPLE MYELOMA IN REMISSION (H): Chronic | Status: ACTIVE | Noted: 2020-02-18

## 2020-11-17 PROBLEM — E04.9 THYROID GOITER: Status: RESOLVED | Noted: 2020-08-05 | Resolved: 2020-11-17

## 2020-11-17 PROBLEM — E89.0 STATUS POST COMPLETE THYROIDECTOMY: Status: RESOLVED | Noted: 2020-08-26 | Resolved: 2020-11-17

## 2020-11-18 ENCOUNTER — VIRTUAL VISIT (OUTPATIENT)
Dept: SLEEP MEDICINE | Facility: CLINIC | Age: 65
End: 2020-11-18
Payer: COMMERCIAL

## 2020-11-18 ENCOUNTER — TELEPHONE (OUTPATIENT)
Dept: ONCOLOGY | Facility: CLINIC | Age: 65
End: 2020-11-18

## 2020-11-18 DIAGNOSIS — G47.39 OTHER SLEEP APNEA: Primary | ICD-10-CM

## 2020-11-18 DIAGNOSIS — E11.69 TYPE 2 DIABETES MELLITUS WITH OTHER SPECIFIED COMPLICATION, UNSPECIFIED WHETHER LONG TERM INSULIN USE (H): Chronic | ICD-10-CM

## 2020-11-18 DIAGNOSIS — E66.01 CLASS 2 SEVERE OBESITY DUE TO EXCESS CALORIES WITH SERIOUS COMORBIDITY AND BODY MASS INDEX (BMI) OF 39.0 TO 39.9 IN ADULT (H): ICD-10-CM

## 2020-11-18 DIAGNOSIS — E66.812 CLASS 2 SEVERE OBESITY DUE TO EXCESS CALORIES WITH SERIOUS COMORBIDITY AND BODY MASS INDEX (BMI) OF 39.0 TO 39.9 IN ADULT (H): ICD-10-CM

## 2020-11-18 PROBLEM — F41.1 GAD (GENERALIZED ANXIETY DISORDER): Chronic | Status: ACTIVE | Noted: 2017-05-23

## 2020-11-18 PROCEDURE — 99203 OFFICE O/P NEW LOW 30 MIN: CPT | Mod: 95 | Performed by: INTERNAL MEDICINE

## 2020-11-18 NOTE — TELEPHONE ENCOUNTER
Prior Authorization Approval    Authorization Effective Date: 12/21/2020  Authorization Expiration Date: 11/21/2021  Medication: Revlimid - APPROVED  Approved Dose/Quantity:   Reference #:     Insurance Company: [employer] [place] - Phone [phone] Fax [phone]  Expected CoPay:       CoPay Card Available:      Foundation Assistance Needed:    Which Pharmacy is filling the prescription (Not needed for infusion/clinic administered):    Pharmacy Notified:    Patient Notified:          Lor Borja CPhT  [facility]  Oncology Pharmacy Liaison  [email or web address]  Phone: [phone]  Fax: [phone]

## 2020-11-18 NOTE — PROGRESS NOTES
"Winter Loya is a 64 year old female who is being evaluated via a billable video visit.      The patient has been notified of following:     \"This video visit will be conducted via a call between you and your physician/provider. We have found that certain health care needs can be provided without the need for an in-person physical exam.  This service lets us provide the care you need with a video conversation.  If a prescription is necessary we can send it directly to your pharmacy.  If lab work is needed we can place an order for that and you can then stop by our lab to have the test done at a later time.    Video visits are billed at different rates depending on your insurance coverage.  Please reach out to your insurance provider with any questions.    If during the course of the call the physician/provider feels a video visit is not appropriate, you will not be charged for this service.\"    Patient has given verbal consent for Video visit? Yes  How would you like to obtain your AVS? MyChart  If you are dropped from the video visit, the video invite should be resent to: Text to cell phone: [phone]  Will anyone else be joining your video visit? No        Video-Visit Details    Type of service:  Video Visit    Video Start Time: 2:53 PM  Video End Time: 3:26 PM    Originating Location (pt. Location): Home    Distant Location (provider location):  [facility]     Platform used for Video Visit: DoxpoLight          Sleep Consultation:    Date on this visit: 11/18/2020    Winter Loya is sent by Gabrielle singletary for a sleep consultation regarding obstructive sleep apnea .    Primary Physician: Jude [place]     Chief Complaint   Patient presents with     Consult     mildred, snoring, not currently using cpap therapy       She says she was diagnosed with obstructive sleep apnea in [place] approximately 2017    She presented with snoring    She was prescribed CPAP, and " tolerated it well, but lost in when she moved from [place] to [place].     No Sleep study available for review ([facility] [place] [place], [place])       Winter goes to bed at 7:00 AM during the week. She wakes up at 4:00 AM with an alarm. She falls asleep in 10 minutes.  Winter denies difficulty falling asleep usually.  She wakes up 2-4 times a night without difficulty falling back to sleep.  Winter wakes up to go to the bathroom.  On weekends, Winter goes to bed at 8-9 PM.  She wakes up at 7-8 AM without an alarm.    Patient does use electronics in bed and watch TV in bed.     Winter does snore snoring is very loud. Patient does not have a regular bed partner. She does have witnessed apneas. Patient sleeps on her side. She denies no morning headaches     She has neuropathy. She may infrequently also get restless legs.     Winter denies any sleep walking, sleep talking and dream enactment.    Winter has recently had reflux at night.      Wniter has gained 0-5 pounds since her sleep study.  Patient describes themself as a morning person. Patient's Wat[place] Sleepiness score 5/24 inconsistent with excessive daytime sleepiness.  She denies significant fatigue.     Winter naps rarely. She takes rare inadvertant naps.  She denies dozing while driving. She uses no caffeine.      Recent Labs   Lab Test 10/19/20  1508 09/17/20  1445    138   POTASSIUM 3.6 3.6   CHLORIDE 104 106   CO2 25 23   ANIONGAP 7 9   * 305*   BUN 10 14   CR 0.57 0.54   ASHLEY 8.7 8.6         Allergies:    No Known Allergies    Medications:    Current Outpatient Medications   Medication Sig Dispense Refill     acetaminophen (TYLENOL) 325 MG tablet Take 2 tablets (650 mg) by mouth every 4 hours as needed for pain 50 tablet 0     acyclovir (ZOVIRAX) 400 MG tablet Take 400 mg by mouth 2 times daily       atorvastatin (LIPITOR) 20 MG tablet Take 1 tablet (20 mg) by mouth every 24 hours (Patient taking differently: Take 20 mg by mouth At Bedtime ) 90 tablet 3      blood glucose (NO BRAND SPECIFIED) test strip Use to test blood sugar two times daily or as directed. 200 strip 3     blood glucose monitoring (NO BRAND SPECIFIED) meter device kit Use to test blood sugar two times daily or as directed. 1 kit 3     exenatide ER (BYDUREON BCISE) 2 MG/0.85ML auto-injector Inject 2 mg Subcutaneous every 7 days 3.4 mL 5     insulin glargine (LANTUS PEN) 100 UNIT/ML pen Inject 34 Units Subcutaneous At Bedtime 30 mL 3     insulin pen needle (FIFTY50 PEN NEEDLES) 32G X 4 MM miscellaneous As directed. To use with Victoza daily       latanoprost (XALATAN) 0.005 % ophthalmic solution Place 1 drop into both eyes daily (Patient taking differently: Place 1 drop into both eyes At Bedtime ) 1 Bottle 11     LENalidomide (REVLIMID) 10 MG CAPS capsule Take 1 capsule (10 mg) by mouth daily for 28 days 28 capsule 0     levothyroxine (SYNTHROID/LEVOTHROID) 175 MCG tablet Take 1 tablet (175 mcg) by mouth every morning (before breakfast) 90 tablet 3     levothyroxine (SYNTHROID/LEVOTHROID) 175 MCG tablet Take 1 tablet (175 mcg) by mouth every morning (before breakfast) 90 tablet 0     metFORMIN (GLUCOPHAGE) 500 MG tablet Take 2 tablets (1,000 mg) by mouth 2 times daily (with meals) 360 tablet 1     miconazole (MONISTAT 1 DAY OR NIGHT) 1200 & 2 MG & % kit Apply inside the vagina 1 kit 1     oxyCODONE (ROXICODONE) 5 MG tablet Take 1-2 tablets (5-10 mg) by mouth every 6 hours as needed for moderate to severe pain (Patient not taking: Reported on 9/15/2020) 15 tablet 0     pregabalin (LYRICA) 100 MG capsule Take 1 capsule (100 mg) by mouth 3 times daily 270 capsule 1     sertraline (ZOLOFT) 100 MG tablet Take 1 tablet (100 mg) by mouth daily 90 tablet 1       Problem List:  Patient Active Problem List    Diagnosis Date Noted     Multiple myeloma in remission (H) 02/18/2020     Priority: High      IgA Kappa Multiple Myeloma. Presented 2018 with anemia and fatigue. Skeletal survey was unremarkable and UPEP  had minimal protein (156 mg/m2). PET 11/2018 showed bone marrow consistent with hypermetabolic plasma cell myeloma. M spike was 0.17. She started RVD and Zometa. 4/2019 she had an autologous transplant.      She was on revlimid maintenance and zometa from her prior oncologist in [place]. Zometa through 12/2020 to complete a total of 2 years of therapy       Sleep apnea      Priority: Medium     ?sleep study [facility] 2017       Diabetes mellitus, type 2 (H) 06/03/2020     Priority: Medium     NA (generalized anxiety disorder) 05/23/2017     Priority: Medium     Hyperlipidemia 07/05/2011     Priority: Medium     Major depressive disorder, recurrent episode, moderate (H) 12/17/2007     Priority: Medium     Class 2 severe obesity due to excess calories with serious comorbidity and body mass index (BMI) of 39.0 to 39.9 in adult (H) 11/18/2020     Priority: Low     Hypothyroidism, postoperative  11/17/2020     Priority: Low     History of endometrial ablation 04/17/2017     Priority: Low     Type 2 diabetes mellitus with hyperglycemia (H) 10/19/2016     Priority: Low     Allergic rhinitis 05/23/2007     Priority: Low     perfumes       Osteoarthrosis involving lower leg 07/22/2005     Priority: Low        Past Medical/Surgical History:  Past Medical History:   Diagnosis Date     Adjustment disorder with mixed anxiety and depressed mood 3/16/2013     Anxiety      Depression      Diabetes (H)      Glaucoma (increased eye pressure)      History of smoking      Hyperlipidemia      Multiple myeloma in remission (H)      Nonsenile cataract      Obesity      Sleep apnea     no c-pap use     Status post complete thyroidectomy 8/26/2020     Thyroid goiter 8/5/2020     Past Surgical History:   Procedure Laterality Date     ARTHROSCOPY KNEE Left 02/2014     ARTHROSCOPY KNEE Right 12/2010    KNEE ARTHROSCOPY Dec 2010  Right     CHOLECYSTECTOMY  2002     COLONOSCOPY N/A 07/23/2020    Procedure: COLONOSCOPY;  Surgeon: Za Denis  Jone ARREOLA MD;  Location: [facility] OR     EYE SURGERY  1985    lasersx-spot behind eye started leaking     THYROIDECTOMY N/A 08/26/2020    Procedure: THYROIDECTOMY, TOTAL;  Surgeon: Tiff Burgos MD;  Location: [facility] OR     TONSILLECTOMY  2003     TUBAL LIGATION  1986       Social History:  Social History     Socioeconomic History     Marital status:      Spouse name: Not on file     Number of children: 3     Years of education: Not on file     Highest education level: Not on file   Occupational History     Occupation: alcohol and drug counselor   Social Needs     Financial resource strain: Not on file     Food insecurity     Worry: Not on file     Inability: Not on file     Transportation needs     Medical: Not on file     Non-medical: Not on file   Tobacco Use     Smoking status: Former Smoker     Packs/day: 1.00     Types: Cigarettes     Quit date: 2005     Years since quitting: 15.8     Smokeless tobacco: Never Used   Substance and Sexual Activity     Alcohol use: Yes     Comment: occasional     Drug use: Never     Sexual activity: Not on file   Lifestyle     Physical activity     Days per week: Not on file     Minutes per session: Not on file     Stress: Not on file   Relationships     Social connections     Talks on phone: Not on file     Gets together: Not on file     Attends Jewish service: Not on file     Active member of club or organization: Not on file     Attends meetings of clubs or organizations: Not on file     Relationship status: Not on file     Intimate partner violence     Fear of current or ex partner: Not on file     Emotionally abused: Not on file     Physically abused: Not on file     Forced sexual activity: Not on file   Other Topics Concern     Not on file   Social History Narrative     Not on file       Family History:  Family History   Problem Relation Age of Onset     Glaucoma Paternal Grandfather      Chronic Obstructive Pulmonary Disease Mother      Substance Abuse Mother       Alcoholism Mother      Diabetes Mother      Parkinsonism Father      No Known Problems Sister      Diabetes Brother      Arrhythmia Brother      Diabetes Brother      Other - See Comments Brother         potts's disease     Gastrointestinal Disease Brother        Review of Systems:  A complete review of systems reviewed by me is negative with the exeption of what has been mentioned in the history of present illness.  CONSTITUTIONAL: NEGATIVE for weight gain/loss, fever, chills, sweats or night sweats, drug allergies.  EYES: NEGATIVE for changes in vision, blind spots, double vision.  ENT:  POSITIVE for  sore throat, sinus pain and post-nasal drip  CARDIAC:  NEGATIVE for  fast heart beats, fluttering in chest, chest pain, chest pressure, breathlessness when lying flat, swollen legs and swollen feet  NEUROLOGIC:  POSITIVE for  headaches and weakness or numbness in the arms or legs  DERMATOLOGIC:  POSITIVE for  change in mole(s)  PULMONARY:  POSITIVE for  SOB with activity  GASTROINTESTINAL:  POSITIVE for  loose or watery stools  GENITOURINARY:  NEGATIVE for  urinating more frequently than usual  MUSCULOSKELETAL:  POSITIVE for  bone or joint pain  ENDOCRINE:  NEGATIVE for  diabetes  LYMPHATIC: NEGATIVE for swollen lymph nodes, lumps or bumps in the breasts or nipple discharge.    Physical Examination:  Vitals: There were no vitals taken for this visit.  BMI= There is no height or weight on file to calculate BMI.         Smiley Total Score 11/18/2020   Total score - Smiley 5          SpO2 Readings from Last 4 Encounters:   10/19/20 98%   09/17/20 96%   09/14/20 97%   08/27/20 96%       GENERAL APPEARANCE: alert and no distress, tremulous voice and head  EYES: Eyes grossly normal to inspection  HENT: mouth without ulcers or lesions  NECK: generous size  LUNGS: no shortness of breath , cough  NEURO: mentation intact, speech normal and cranial nerves 2-12 appear intact  PSYCH: affect  normal/bright        Impression/Plan:    History of obstructive sleep apnea of uncertain severity by sleep study approximately 2017, weight stable since then. Current symptoms of loud snoring, witnessed apneas, nocturia. Comorbid diabetes mellitus, obesity  - Get old study report ([facility], [address], [place] [address] [phone])  - Order replacement machine when available    Juan Jose Salazar MD     CC: Gabrielle Edwards

## 2020-11-18 NOTE — TELEPHONE ENCOUNTER
PA Initiation    Medication: Revlimid - Submitted  Insurance Company: [facility] - Phone [phone] Fax [phone]  Pharmacy Filling the Rx:    Filling Pharmacy Phone:    Filling Pharmacy Fax:    Start Date: 11/18/2020        Lor Borja CPhT  [facility]  Oncology Pharmacy Liaison  [email or web address]  Phone: [phone]  Fax: [phone]

## 2020-11-19 ENCOUNTER — TELEPHONE (OUTPATIENT)
Dept: ONCOLOGY | Facility: CLINIC | Age: 65
End: 2020-11-19

## 2020-11-19 DIAGNOSIS — C90.01 MULTIPLE MYELOMA IN REMISSION (H): ICD-10-CM

## 2020-11-19 RX ORDER — LENALIDOMIDE 10 MG/1
10 CAPSULE ORAL DAILY
Qty: 28 CAPSULE | Refills: 0 | Status: SHIPPED | OUTPATIENT
Start: 2020-11-19 | End: 2020-12-14

## 2020-11-19 NOTE — PROGRESS NOTES
RX re-released to [facility] at request of pharmacy liaison, Lor Borja, now that PA for Revlimid has been completed.     Laura Gruber, PharmD  Oral Chemotherapy Monitoring Program  [facility]  [phone]

## 2020-11-19 NOTE — TELEPHONE ENCOUNTER
Prior Authorization Approval    Authorization Effective Date: 11/19/2020  Authorization Expiration Date: 11/19/2023  Medication: Revlimid - APPROVED  Approved Dose/Quantity:     Reference #:     Insurance Company: [employer] - Phone [phone] Fax [phone]  Expected CoPay:       CoPay Card Available:      Foundation Assistance Needed:    Which Pharmacy is filling the prescription (Not needed for infusion/clinic administered):    Pharmacy Notified:    Patient Notified:          Lor Borja CPhT  [facility]  Oncology Pharmacy Liaison  [email or web address]  Phone: [phone]  Fax: [phone]

## 2020-11-19 NOTE — TELEPHONE ENCOUNTER
PA Initiation    Medication: Revlimid - Submitted  Insurance Company: [facility] - Phone [phone] Fax [phone]  Pharmacy Filling the Rx:    Filling Pharmacy Phone:    Filling Pharmacy Fax:    Start Date: 11/19/2020        Lor Borja CPhT  [facility]  Oncology Pharmacy Liaison  [email or web address]  Phone: [phone]  Fax: [phone]

## 2020-11-20 ENCOUNTER — OFFICE VISIT (OUTPATIENT)
Dept: FAMILY MEDICINE | Facility: CLINIC | Age: 65
End: 2020-11-20
Payer: COMMERCIAL

## 2020-11-20 ENCOUNTER — VIRTUAL VISIT (OUTPATIENT)
Dept: PHARMACY | Facility: CLINIC | Age: 65
End: 2020-11-20
Payer: COMMERCIAL

## 2020-11-20 VITALS
HEART RATE: 79 BPM | OXYGEN SATURATION: 96 % | BODY MASS INDEX: 40.23 KG/M2 | HEIGHT: 69 IN | DIASTOLIC BLOOD PRESSURE: 88 MMHG | TEMPERATURE: 97.6 F | RESPIRATION RATE: 20 BRPM | SYSTOLIC BLOOD PRESSURE: 160 MMHG | WEIGHT: 271.6 LBS

## 2020-11-20 DIAGNOSIS — E11.9 TYPE 2 DIABETES MELLITUS WITH INSULIN THERAPY (H): ICD-10-CM

## 2020-11-20 DIAGNOSIS — Z79.4 TYPE 2 DIABETES MELLITUS WITH INSULIN THERAPY (H): ICD-10-CM

## 2020-11-20 DIAGNOSIS — Z80.8 FAMILY HISTORY OF SKIN CANCER: ICD-10-CM

## 2020-11-20 DIAGNOSIS — Z13.220 SCREENING FOR HYPERLIPIDEMIA: ICD-10-CM

## 2020-11-20 DIAGNOSIS — J06.9 UPPER RESPIRATORY TRACT INFECTION, UNSPECIFIED TYPE: Primary | ICD-10-CM

## 2020-11-20 DIAGNOSIS — Z11.4 SCREENING FOR HIV (HUMAN IMMUNODEFICIENCY VIRUS): ICD-10-CM

## 2020-11-20 DIAGNOSIS — Z12.31 ENCOUNTER FOR SCREENING MAMMOGRAM FOR BREAST CANCER: ICD-10-CM

## 2020-11-20 DIAGNOSIS — Z11.59 NEED FOR HEPATITIS C SCREENING TEST: ICD-10-CM

## 2020-11-20 DIAGNOSIS — Z23 NEED FOR PROPHYLACTIC VACCINATION AND INOCULATION AGAINST INFLUENZA: ICD-10-CM

## 2020-11-20 DIAGNOSIS — E11.65 TYPE 2 DIABETES MELLITUS WITH HYPERGLYCEMIA, WITH LONG-TERM CURRENT USE OF INSULIN (H): Primary | ICD-10-CM

## 2020-11-20 DIAGNOSIS — R25.1 TREMOR: ICD-10-CM

## 2020-11-20 DIAGNOSIS — Z79.4 TYPE 2 DIABETES MELLITUS WITH HYPERGLYCEMIA, WITH LONG-TERM CURRENT USE OF INSULIN (H): Primary | ICD-10-CM

## 2020-11-20 PROCEDURE — 90471 IMMUNIZATION ADMIN: CPT | Performed by: NURSE PRACTITIONER

## 2020-11-20 PROCEDURE — 99214 OFFICE O/P EST MOD 30 MIN: CPT | Mod: 25 | Performed by: NURSE PRACTITIONER

## 2020-11-20 PROCEDURE — 99607 MTMS BY PHARM ADDL 15 MIN: CPT | Mod: TEL | Performed by: PHARMACIST

## 2020-11-20 PROCEDURE — 99605 MTMS BY PHARM NP 15 MIN: CPT | Mod: TEL | Performed by: PHARMACIST

## 2020-11-20 PROCEDURE — 90682 RIV4 VACC RECOMBINANT DNA IM: CPT | Performed by: NURSE PRACTITIONER

## 2020-11-20 PROCEDURE — U0003 INFECTIOUS AGENT DETECTION BY NUCLEIC ACID (DNA OR RNA); SEVERE ACUTE RESPIRATORY SYNDROME CORONAVIRUS 2 (SARS-COV-2) (CORONAVIRUS DISEASE [COVID-19]), AMPLIFIED PROBE TECHNIQUE, MAKING USE OF HIGH THROUGHPUT TECHNOLOGIES AS DESCRIBED BY CMS-2020-01-R: HCPCS | Performed by: NURSE PRACTITIONER

## 2020-11-20 ASSESSMENT — PATIENT HEALTH QUESTIONNAIRE - PHQ9
SUM OF ALL RESPONSES TO PHQ QUESTIONS 1-9: 8
5. POOR APPETITE OR OVEREATING: NOT AT ALL

## 2020-11-20 ASSESSMENT — ANXIETY QUESTIONNAIRES
6. BECOMING EASILY ANNOYED OR IRRITABLE: SEVERAL DAYS
2. NOT BEING ABLE TO STOP OR CONTROL WORRYING: SEVERAL DAYS
7. FEELING AFRAID AS IF SOMETHING AWFUL MIGHT HAPPEN: SEVERAL DAYS
3. WORRYING TOO MUCH ABOUT DIFFERENT THINGS: SEVERAL DAYS
1. FEELING NERVOUS, ANXIOUS, OR ON EDGE: SEVERAL DAYS
5. BEING SO RESTLESS THAT IT IS HARD TO SIT STILL: NOT AT ALL
GAD7 TOTAL SCORE: 5
IF YOU CHECKED OFF ANY PROBLEMS ON THIS QUESTIONNAIRE, HOW DIFFICULT HAVE THESE PROBLEMS MADE IT FOR YOU TO DO YOUR WORK, TAKE CARE OF THINGS AT HOME, OR GET ALONG WITH OTHER PEOPLE: SOMEWHAT DIFFICULT

## 2020-11-20 ASSESSMENT — MIFFLIN-ST. JEOR: SCORE: 1852.38

## 2020-11-20 NOTE — PROGRESS NOTES
Subjective     Winter Loya is a 64 year old female who presents to clinic today for the following health issues:    HPI         Diabetes Follow-up    How often are you checking your blood sugar? One time daily  What time of day are you checking your blood sugars (select all that apply)?  Before meals  Have you had any blood sugars above 200?  Yes,   Have you had any blood sugars below 70?  No    What symptoms do you notice when your blood sugar is low?  None    What concerns do you have today about your diabetes? None     Do you have any of these symptoms? (Select all that apply)  Numbness in feet, Burning in feet, Blurry vision and Weight gain  Has been following with MTM in [place], last appointment was today, next appointment is in 2 weeks.     BP Readings from Last 2 Encounters:   11/20/20 (!) 160/88   10/19/20 130/81     Hemoglobin A1C (%)   Date Value   08/21/2020 9.1 (H)   03/05/2020 6.7 (H)     Hyperlipidemia Follow-Up      Are you regularly taking any medication or supplement to lower your cholesterol?   Yes- taking as directed    Are you having muscle aches or other side effects that you think could be caused by your cholesterol lowering medication?  No    Depression and Anxiety Follow-Up    How are you doing with your depression since your last visit? Improved     How are you doing with your anxiety since your last visit?  Improved     Are you having other symptoms that might be associated with depression or anxiety? No    Have you had a significant life event? No     Do you have any concerns with your use of alcohol or other drugs? No    Social History     Tobacco Use     Smoking status: Former Smoker     Packs/day: 1.00     Types: Cigarettes     Quit date: 2005     Years since quitting: 15.8     Smokeless tobacco: Never Used   Substance Use Topics     Alcohol use: Yes     Comment: occasional     Drug use: Never     PHQ 5/1/2020   PHQ-9 Total Score 2   Q9: Thoughts of better off dead/self-harm past  2 weeks Not at all     No flowsheet data found.  Last PHQ-9 5/1/2020   1.  Little interest or pleasure in doing things 0   2.  Feeling down, depressed, or hopeless 0   3.  Trouble falling or staying asleep, or sleeping too much 0   4.  Feeling tired or having little energy 0   5.  Poor appetite or overeating 2   6.  Feeling bad about yourself 0   7.  Trouble concentrating 0   8.  Moving slowly or restless 0   Q9: Thoughts of better off dead/self-harm past 2 weeks 0   PHQ-9 Total Score 2   Difficulty at work, home, or with people Not difficult at all     No flowsheet data found.    Suicide Assessment Five-step Evaluation and Treatment (SAFE-T)    Hypothyroidism Follow-up      Since last visit, patient describes the following symptoms: dry skin, loose stools, tremors and fatigue      How many servings of fruits and vegetables do you eat daily?  2-3    On average, how many sweetened beverages do you drink each day (Examples: soda, juice, sweet tea, etc.  Do NOT count diet or artificially sweetened beverages)?   0    How many days per week do you exercise enough to make your heart beat faster? none    How many minutes a day do you exercise enough to make your heart beat faster? none    How many days per week do you miss taking your medication? 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 Worsening in her right hand. States that she has had difficulty with typing and using a mouse. States that she can longer write by hand. Father had parkinson's.     Review of Systems   Constitutional, HEENT, cardiovascular, pulmonary, gi and gu systems are negative, except as otherwise noted.      Objective    BP (!) 160/88   Pulse 79   Temp 97.6  F (36.4  C) (Oral)   Resp 20   Ht 1.746 m (5' 8.75\")   Wt 123.2 kg (271 lb 9.6 oz)   SpO2 96%   BMI 40.40 kg/m    Body mass index is 40.4 kg/m .  Physical Exam   GENERAL: healthy, alert and no distress  EYES: Eyes grossly normal to inspection, PERRL and conjunctivae and sclerae normal  HENT: normal cephalic/atraumatic, ear canals and TM's normal, nasal mucosa edematous , rhinorrhea clear, oropharynx clear and oral mucous membranes moist  NECK: no adenopathy and no asymmetry, masses, or scars  RESP: lungs clear to auscultation - no rales, rhonchi or wheezes  CV: regular rate and rhythm, normal S1 S2, no S3 or S4, no murmur, click or rub, no peripheral edema and peripheral pulses strong  PSYCH: mentation appears normal, affect normal/bright    No results found for this or any previous visit (from the past 24 hour(s)).        Assessment & Plan     Upper respiratory tract infection, unspecified type  - Symptomatic COVID-19 Virus (Coronavirus) by PCR; Future    Type 2 diabetes mellitus with insulin therapy (H)  Continue to follow with MTM.   - Albumin Random Urine Quantitative with Creat Ratio; Future  - **A1C FUTURE anytime; Future    Screening for hyperlipidemia  - Lipid panel reflex to direct LDL Fasting; Future    Screening for HIV (human immunodeficiency virus)  - HIV Antigen Antibody Combo; Future    Need for hepatitis C screening test  - Hepatitis C Screen Reflex to HCV RNA Quant and Genotype; Future    Need for prophylactic vaccination and inoculation against influenza  - INFLUENZA QUAD, RECOMBINANT, P-FREE (RIV4) (FLUBLOCK) " "[85939]    Encounter for screening mammogram for breast cancer  - MA SCREENING DIGITAL BILAT - Future  (s+30); Future    Family history of skin cancer  States that her father and brother have both had skin cancer, she is not sure about type and that she was told to see a dermatologist for a skin check. She does not note any specific lesions that she is concerned about at this time.   - DERMATOLOGY ADULT REFERRAL; Future    Tremor  Worsening. She is no longer able to write by hand and is having difficulty using a keyboard and mouse.   - NEUROLOGY ADULT REFERRAL     BMI:   Estimated body mass index is 40.4 kg/m  as calculated from the following:    Height as of this encounter: 1.746 m (5' 8.75\").    Weight as of this encounter: 123.2 kg (271 lb 9.6 oz).   Weight management plan: Discussed healthy diet and exercise guidelines         Return in about 3 months (around 2/20/2021) for Physical.    FALGUNI [facility]    "

## 2020-11-20 NOTE — PROGRESS NOTES
MTM ENCOUNTER  SUBJECTIVE/OBJECTIVE:                           Winter Loya is a 64 year old female called for a follow-up visit. She was referred to me from Brittany Rosenthal.  Today's visit is a follow-up MTM visit from 10/28/20.     Reason for visit: uncontrolled diabetes.    Allergies/ADRs: Reviewed in chart  Tobacco: She reports that she quit smoking about 15 years ago. Her smoking use included cigarettes. She smoked 1.00 pack per day. She has never used smokeless tobacco.  Alcohol: not currently using  Caffeine: 1 cups/day of coffee  Activity: none  Past Medical History: Reviewed in chart    Medication Adherence/Access: no issues reported    Type 2 Diabetes:  Currently taking metformin 1000 mg twice daily and Lantus 34 units daily. She is not taking a GLP1RA because the medication was not covered. Patient is experiencing the following side effects: diarrhea. Some days are better than others but she wants to try a few more weeks to see if symptoms improve while we try to figure out insurance coverage as a GLP1 RA. She also indicates that she just got a new health insurance plan and she wonders whether this will cover her meds more completely.   Blood sugar monitorin time(s) daily. Ranges (patient reported): Fasting- 180s now, better than before  Symptoms of low blood sugar? none  Symptoms of high blood sugar? none  Eye exam: up to date  Foot exam: due  Diet/Exercise: none specified  Aspirin: Not taking due to unclear reason    The ASCVD Risk score (Morrismeg JACINTO Jr., et al., 2013) failed to calculate for the following reasons:    Cannot find a previous HDL lab    Cannot find a previous total cholesterol lab    Statin: Yes: atorvastatin 20 mg daily   ACEi/ARB: No.     BP Readings from Last 3 Encounters:   10/19/20 130/81   20 136/85   20 132/85     Urine Albumin: No results found for: UMALCR   Lab Results   Component Value Date    A1C 9.1 2020    A1C 6.7 2020     Last Comprehensive Metabolic  "Panel:  Sodium   Date Value Ref Range Status   10/19/2020 136 133 - 144 mmol/L Final     Potassium   Date Value Ref Range Status   10/19/2020 3.6 3.4 - 5.3 mmol/L Final     Chloride   Date Value Ref Range Status   10/19/2020 104 94 - 109 mmol/L Final     Carbon Dioxide   Date Value Ref Range Status   10/19/2020 25 20 - 32 mmol/L Final     Anion Gap   Date Value Ref Range Status   10/19/2020 7 3 - 14 mmol/L Final     Glucose   Date Value Ref Range Status   10/19/2020 225 (H) 70 - 99 mg/dL Final     Urea Nitrogen   Date Value Ref Range Status   10/19/2020 10 7 - 30 mg/dL Final     Creatinine   Date Value Ref Range Status   10/19/2020 0.57 0.52 - 1.04 mg/dL Final     GFR Estimate   Date Value Ref Range Status   10/19/2020 >90 >60 mL/min/[1.73_m2] Final     Comment:     Non  GFR Calc  Starting 12/18/2018, serum creatinine based estimated GFR (eGFR) will be   calculated using the Chronic Kidney Disease Epidemiology Collaboration   (CKD-EPI) equation.       Calcium   Date Value Ref Range Status   10/19/2020 8.7 8.5 - 10.1 mg/dL Final       Today's Vitals: There were no vitals taken for this visit. - telemed    BP Readings from Last 1 Encounters:   10/19/20 130/81     Pulse Readings from Last 1 Encounters:   10/19/20 68     Wt Readings from Last 1 Encounters:   10/19/20 266 lb (120.7 kg)     Ht Readings from Last 1 Encounters:   08/26/20 5' 8.75\" (1.746 m)     Estimated body mass index is 39.57 kg/m  as calculated from the following:    Height as of 8/26/20: 5' 8.75\" (1.746 m).    Weight as of 10/19/20: 266 lb (120.7 kg).    Temp Readings from Last 1 Encounters:   10/19/20 98.1  F (36.7  C) (Oral)     ASSESSMENT:                              Medication Adherence: No issues identified    Type 2 Diabetes: Patient would benefit from initiating GLP1RA therapy that is covered by insurance. Patient would also benefit from a dose reduction in metformin to alleviate diarrhea.     PLAN:                            1. " Will contact pharmacy to update insurance information and see what GLP1RA is covered. Will contact patient via [employer] with updates. - Ozempic is non-formulary on her new plan. They also attempted to run Bydureon BCise and it was also non-formulary. The alternatives provided by the pharmacy include Bydureon pen, Byetta, Januvia, Tradjenta. Bydureon is sent to the pharmacy.     2. If symptoms of diarrhea persist, reduce metformin to 1500 mg daily.     I spent 8 minutes with this patient today. A copy of the visit note was provided to the patient's primary care provider.    Will follow up in 2 weeks.     The patient declined a summary of these recommendations.     Coco Antoine, Pharm.D., [facility]  Medication Therapy Management Pharmacist  Page/VM:  [phone]    Patient consented to a telehealth visit: yes  Telemedicine Visit Details  Type of service:  Telephone visit  Start Time: 9:47 AM  End Time: 9:55 AM  Originating Location (patient location): Home  Distant Location (provider location):  [facility] MEDICATION THERAPY MANAGEMENT  Mode of Communication:  Telephone

## 2020-11-21 LAB
SARS-COV-2 RNA SPEC QL NAA+PROBE: NOT DETECTED
SPECIMEN SOURCE: NORMAL

## 2020-11-21 ASSESSMENT — ANXIETY QUESTIONNAIRES: GAD7 TOTAL SCORE: 5

## 2020-11-24 ENCOUNTER — TELEPHONE (OUTPATIENT)
Dept: NEUROLOGY | Facility: CLINIC | Age: 65
End: 2020-11-24

## 2020-11-24 NOTE — TELEPHONE ENCOUNTER
[facility] Call Center    Phone Message    May a detailed message be left on voicemail: yes     Reason for Call: Appointment Intake    Referring Provider Name: Delmi Gross APRN CNP   Diagnosis and/or Symptoms: Tremors    Please review. Per guidelines, this diagnosis needs to be reviewed by the clinic.  Thanks.    Action Taken: Other: Neurology    Travel Screening: Not Applicable

## 2020-12-07 DIAGNOSIS — Z79.4 TYPE 2 DIABETES MELLITUS WITH INSULIN THERAPY (H): ICD-10-CM

## 2020-12-07 DIAGNOSIS — C90.01 MULTIPLE MYELOMA IN REMISSION (H): ICD-10-CM

## 2020-12-07 DIAGNOSIS — E11.9 TYPE 2 DIABETES MELLITUS WITH INSULIN THERAPY (H): ICD-10-CM

## 2020-12-07 DIAGNOSIS — Z11.4 SCREENING FOR HIV (HUMAN IMMUNODEFICIENCY VIRUS): ICD-10-CM

## 2020-12-07 DIAGNOSIS — Z11.59 NEED FOR HEPATITIS C SCREENING TEST: ICD-10-CM

## 2020-12-07 LAB
ALBUMIN SERPL-MCNC: 4.2 G/DL (ref 3.4–5)
ALP SERPL-CCNC: 120 U/L (ref 40–150)
ALT SERPL W P-5'-P-CCNC: 42 U/L (ref 0–50)
ANION GAP SERPL CALCULATED.3IONS-SCNC: 7 MMOL/L (ref 3–14)
AST SERPL W P-5'-P-CCNC: 23 U/L (ref 0–45)
BASOPHILS # BLD AUTO: 0 10E9/L (ref 0–0.2)
BASOPHILS NFR BLD AUTO: 0.7 %
BILIRUB SERPL-MCNC: 1.7 MG/DL (ref 0.2–1.3)
BUN SERPL-MCNC: 15 MG/DL (ref 7–30)
CALCIUM SERPL-MCNC: 9.1 MG/DL (ref 8.5–10.1)
CHLORIDE SERPL-SCNC: 102 MMOL/L (ref 94–109)
CO2 SERPL-SCNC: 28 MMOL/L (ref 20–32)
CREAT SERPL-MCNC: 0.62 MG/DL (ref 0.52–1.04)
CREAT UR-MCNC: 227 MG/DL
DIFFERENTIAL METHOD BLD: ABNORMAL
EOSINOPHIL # BLD AUTO: 0.3 10E9/L (ref 0–0.7)
EOSINOPHIL NFR BLD AUTO: 6.5 %
ERYTHROCYTE [DISTWIDTH] IN BLOOD BY AUTOMATED COUNT: 14.6 % (ref 10–15)
GFR SERPL CREATININE-BSD FRML MDRD: >90 ML/MIN/{1.73_M2}
GLUCOSE SERPL-MCNC: 177 MG/DL (ref 70–99)
HBA1C MFR BLD: 8.8 % (ref 0–5.6)
HCT VFR BLD AUTO: 42 % (ref 35–47)
HGB BLD-MCNC: 14.1 G/DL (ref 11.7–15.7)
LYMPHOCYTES # BLD AUTO: 0.8 10E9/L (ref 0.8–5.3)
LYMPHOCYTES NFR BLD AUTO: 20.1 %
MCH RBC QN AUTO: 28.2 PG (ref 26.5–33)
MCHC RBC AUTO-ENTMCNC: 33.6 G/DL (ref 31.5–36.5)
MCV RBC AUTO: 84 FL (ref 78–100)
MICROALBUMIN UR-MCNC: 167 MG/L
MICROALBUMIN/CREAT UR: 73.57 MG/G CR (ref 0–25)
MONOCYTES # BLD AUTO: 0.4 10E9/L (ref 0–1.3)
MONOCYTES NFR BLD AUTO: 9.1 %
NEUTROPHILS # BLD AUTO: 2.7 10E9/L (ref 1.6–8.3)
NEUTROPHILS NFR BLD AUTO: 63.6 %
PLATELET # BLD AUTO: 140 10E9/L (ref 150–450)
POTASSIUM SERPL-SCNC: 3.7 MMOL/L (ref 3.4–5.3)
PROT SERPL-MCNC: 7.1 G/DL (ref 6.8–8.8)
RBC # BLD AUTO: 5 10E12/L (ref 3.8–5.2)
SODIUM SERPL-SCNC: 137 MMOL/L (ref 133–144)
WBC # BLD AUTO: 4.2 10E9/L (ref 4–11)

## 2020-12-07 PROCEDURE — 36415 COLL VENOUS BLD VENIPUNCTURE: CPT | Performed by: NURSE PRACTITIONER

## 2020-12-07 PROCEDURE — 82043 UR ALBUMIN QUANTITATIVE: CPT | Performed by: NURSE PRACTITIONER

## 2020-12-07 PROCEDURE — 83036 HEMOGLOBIN GLYCOSYLATED A1C: CPT | Performed by: NURSE PRACTITIONER

## 2020-12-07 PROCEDURE — 86803 HEPATITIS C AB TEST: CPT | Performed by: NURSE PRACTITIONER

## 2020-12-07 PROCEDURE — 87389 HIV-1 AG W/HIV-1&-2 AB AG IA: CPT | Performed by: NURSE PRACTITIONER

## 2020-12-07 PROCEDURE — 80053 COMPREHEN METABOLIC PANEL: CPT | Performed by: NURSE PRACTITIONER

## 2020-12-07 PROCEDURE — 99N1036 PR STATISTIC TOTAL PROTEIN: Performed by: NURSE PRACTITIONER

## 2020-12-07 PROCEDURE — 85025 COMPLETE CBC W/AUTO DIFF WBC: CPT | Performed by: NURSE PRACTITIONER

## 2020-12-07 PROCEDURE — 84165 PROTEIN E-PHORESIS SERUM: CPT | Performed by: PATHOLOGY

## 2020-12-08 LAB
ALBUMIN SERPL ELPH-MCNC: 4.6 G/DL (ref 3.7–5.1)
ALPHA1 GLOB SERPL ELPH-MCNC: 0.3 G/DL (ref 0.2–0.4)
ALPHA2 GLOB SERPL ELPH-MCNC: 0.7 G/DL (ref 0.5–0.9)
B-GLOBULIN SERPL ELPH-MCNC: 0.8 G/DL (ref 0.6–1)
GAMMA GLOB SERPL ELPH-MCNC: 0.6 G/DL (ref 0.7–1.6)
HCV AB SERPL QL IA: NONREACTIVE
HIV 1+2 AB+HIV1 P24 AG SERPL QL IA: NONREACTIVE
M PROTEIN SERPL ELPH-MCNC: 0 G/DL
PROT PATTERN SERPL ELPH-IMP: ABNORMAL

## 2020-12-09 ENCOUNTER — VIRTUAL VISIT (OUTPATIENT)
Dept: TRANSPLANT | Facility: CLINIC | Age: 65
End: 2020-12-09
Payer: COMMERCIAL

## 2020-12-09 DIAGNOSIS — C90.01 MULTIPLE MYELOMA IN REMISSION (H): Primary | ICD-10-CM

## 2020-12-09 PROCEDURE — 99214 OFFICE O/P EST MOD 30 MIN: CPT | Mod: 95 | Performed by: INTERNAL MEDICINE

## 2020-12-09 RX ORDER — CALCIUM CARBONATE 500 MG/1
TABLET, CHEWABLE ORAL
COMMUNITY
Start: 2020-08-26 | End: 2020-12-14

## 2020-12-09 RX ORDER — MELOXICAM 15 MG/1
15 TABLET ORAL DAILY
COMMUNITY
Start: 2020-05-19 | End: 2020-12-14

## 2020-12-09 RX ORDER — LORATADINE 10 MG/1
1 TABLET ORAL DAILY
COMMUNITY
Start: 2020-02-14 | End: 2020-12-14

## 2020-12-09 RX ORDER — CALCITRIOL 0.25 UG/1
CAPSULE, LIQUID FILLED ORAL
COMMUNITY
Start: 2020-08-26 | End: 2020-12-14

## 2020-12-09 RX ORDER — TRAMADOL HYDROCHLORIDE 50 MG/1
TABLET ORAL
COMMUNITY
Start: 2020-04-25 | End: 2020-12-13

## 2020-12-09 NOTE — PROGRESS NOTES
"    VIDEO VISIT - smart phone; has an iphone - able to do video over her iphone  Had some problems with docimity    Date of Visit: December 14, 2020  Name: Winter Loya  Date of Birth 1955  [address]   [place] [id number]  [email or web address]  [phone] (M)   [phone] (H)   Peter Deleon son  [phone]    Assessment:  (R25.1) Tremor -worsening and affecting her ability to use a keyboard and mouse  Duration - 5 years or more.  Father had tremor/parkinson  Not a big drinker - not clear if alcohol helps her tremor  Smell perception is okay  She has sleep apnea  She cut out caffeine and there were no changes in her tremors  She is right handed and has more right than left handed tremor.   She was told she has ET as she is \"not stooped over\"  Her tremors are present when she is using her hands and she has a vocal and head tremor as well.     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 She has not worked with OT    Exercise/Therapy  - none    Cognitive/Driving - has had some memory/cognitive issues - seen on 10/22/2020 by Dr. Mcgrath for neuropsychological evaluation: there were mild frontal and possibly left temporal changes and recommended to be evaluated again in one year. She has not had a brain mri. She has mood issues that could benefit from psychotherapy as anxiety may be aggravating her memory.     Mood Generalized Anxiety disorder/Depression  Mood is good per her report.  Sertraline zoloft 100mg  Living with son basil - health  .     Hallucinations/delusions - denies    Sleep Apnea -  Dr. Juan Jose Salazar  Denies RBD features    Bladder  - wears a pad.   Has changes in urinary frequency/urgency if blood sugars are out of control  2-4/noc " getting up to urinate  Has not seen a urologist.   Seen by Ob-gyn a year ago.     Respiratory  Loratadine claritin 10mg - not taking  Former smoker - quit 15 years prior was 1 ppd  Quit 2005    GI/Constipation/GERD  - no issue  Calcium antacid 500mg tums - not taking    ENDO  Atorvastatin lipitor 20mg at bedtime   Calcitriol rocaltrol 0.25mcg - not taking     Type 2 Diabetes with neuropathy  Exenatide ER Bydureon 2mg weekly   Lantus 34 units at bedtime  Metformin 500mg2 tabs  twice daily  Has not seen dietician   Seeing Coco Antoine, Pharm D  DOes not have an endocrinologist  Has not seen nutrition  A1c was 8.8 on 12/7/2020    Hypothyroidism;   Levothyroxine synthroid/levothyroid 175 mcg daily  s/p thyroidectomy for multinodular goiter    Cardio/heart  - blood pressure is stable initially after stem cell therapy but has gone up at times  States she is trying to lose weight to help her diabetes and blood pressure.   Her chart has been 160/88; encouraged her to get a home blood pressure cuff.     Vision  - on treatment for glaucoma  Latanoprost xalatan 0.005% ophthalmic solution    HEME:  Multiple myeloma - in remision  IgA Kappa Multiple Myeloma. Presented 2018 with anemia and fatigue. Skeletal survey was unremarkable and UPEP had minimal protein (156 mg/m2). PET 11/2018 showed bone marrow consistent with hypermetabolic plasma cell myeloma. M spike was 0.17. She started RVD and Zometa. 4/2019 she had an autologous transplant. She was on revlimid maintenance and zometa from her prior oncologist in [place]. Zometa through 12/2020 to complete a total of 2 years of therapy  [place]    PCP Dr. Gabrielle Edwards    Acetaminophen tylenol 325mg - as needed  Acyclovir zovirax 400mg twice daily  Meloxicam mobic 15mg  - not taking  Oxycodone roxicodone - not taking  Pregabalin lyrica 100mg 3/day for neuropathy and helping her mood as well.   Tizanidine zanaflex 4mg - not taking   Tramadol ultram 50mg - not  taking    IN summary  - essential tremor  - neuropathy  - other medical issues  -diabetes     Medications            Acetaminophen tylenol 325mg As needed       Acyclovir zovirax 400mg 1  1     Atorvastatin lipitor 20mg    1     Calcitriol rocaltrol 0.25mcg Not taking       Calcium antacid 500mg tums        Exenatide ER Bydureon 2mg weekly       Insulin glargine lantus   34 units     Latanoprost xalatan 0.005% ophthalmic solution   Both eyes     Lenalidomide revlimid 10mg  ?       Levothyroxine synthroid/levothyroid 175 mcg 1       Loratadine claritin 10mg         Meloxicam mobic 15mg  Not using       Metformin glucophage 500mg  2  2     Miconazole monistat Not using       Nonformulary revlimid ?       NOnformulary zometa ?       Oxycodone roxicodone Not taking       Pregabalin lyrica 100mg  1 1 1     Sertraline zoloft 100mg 1       Tizanidine zanaflex 4mg  Not taking       Tramadol ultram 50mg  Not taking                                                 Plan:    1. Needs help with her diabetes management - recent A1c was 8.8 and is working with pharmacist Coco Antoine. She may benefit from seeing nutrition and possibly endocrinology in addition to her pcp    2. For her tremor - referred her to occupational therapy for assistive help as her tremor is impacting her work    3. She should buy a home blood pressure cuff so we have accurate measurements.     4. Consultation with Pharmacist Flores Hutchinson about a trial of a beta blocker for her tremor - if appropriate with patient monitoring her bp/heart rate. Her work schedule is 530am  - 2pm  And may consider a twice daily vs 3/day regimen of a low dose propranolol     5. Ongoing memory/cognitive issues. She will forego getting a brain mri at this time - discussed this and not sure needed at this point other than for a baseline. She will need to go back and see Dr. Mcgrath in a year for retesting of her cognition.     6. Stress/anxiety may be impacting her tremor as  "well as mood. She may benefit from counseling.     I have reviewed the note as documented above.  This accurately captures the substance of my conversation with the patient.    Patient contact time  60  minutes. Time of visit  240pm - 340pm    Over 50% of this visit was spent in patient care and care coordination.     Gabriel Santoyo MD      ------------------------------------------------------------------------------------------------------------------------------------------------------------------------    Video-Visit Details    Examination  Alert oriented x 3  3 objects is able to repeat and recall 3/3  Able to spell world backwards 5/5  Normal oculomotor movements, facial expression, hearing   She has mild postural and action tremor  There is no slowness on finger tapping, hand movements and CLOVIS  She has not dysmetria  She has a mild vocal and head tremor.   She has neuropathy in her right foot and walks with a slightly widened  Gait   Her son helped with video of her gait.     The patient has been notified of following:     \"After a review of the patient s situation, this visit was changed from an in-person visit to a video visit to reduce the risk of COVID-19 exposure.   The patient is being evaluated via a billable video visit.\"    \"This video visit will be conducted via a call between you and your physician/provider. We have found that certain health care needs can be provided without the need for an in-person physical exam.  This service lets us provide the care you need with a video conversation.  If a prescription is necessary we can send it directly to your pharmacy.  If lab work is needed we can place an order for that and you can then stop by our lab to have the test done at a later time.    If during the course of the call the physician/provider feels a video visit is not appropriate, you will not be charged for this service.\"     Patient has given verbal consent for Video visit? Yes    Patient would " like the video invitation sent by:     Type of service:  Video Visit    Video Start Time:     Video End Time (time video stopped):     Duration:  minutes - see above    Originating Location (pt. Location):     Distant Location (provider location):  [facility] [place]     Mode of Communication:  Video Conference via [employer] (and if not possible then doximity)      Gabriel Santoyo MD      --------------------------------------------------------------------------------------------------------------    Winter Loya is a 64 year old female who is being evaluated via a billable video visit.      Charts reviewed  Consult from  Images reviewed        I have reviewed and updated the patient's Past Medical History, Social History, Family History and Medication List.    ALLERGIES  Patient has no known allergies.    Lasts visit details if there was a last visit:       14 Review of systems  are negative except for   Patient Active Problem List   Diagnosis     Multiple myeloma in remission (H)     Diabetes mellitus, type 2 (H)     Allergic rhinitis     NA (generalized anxiety disorder)     History of endometrial ablation     Hyperlipidemia     Major depressive disorder, recurrent episode, moderate (H)     Osteoarthrosis involving lower leg     Type 2 diabetes mellitus with hyperglycemia (H)     Sleep apnea     Hypothyroidism, postoperative      Class 2 severe obesity due to excess calories with serious comorbidity and body mass index (BMI) of 39.0 to 39.9 in adult (H)     Tremor      No Known Allergies  Past Surgical History:   Procedure Laterality Date     ARTHROSCOPY KNEE Left 02/2014     ARTHROSCOPY KNEE Right 12/2010    KNEE ARTHROSCOPY Dec 2010  Right     CHOLECYSTECTOMY  2002     COLONOSCOPY N/A 07/23/2020    Procedure: COLONOSCOPY;  Surgeon: Za Denis MD;  Location:  OR     EYE SURGERY  1985    lasersx-spot behind eye started leaking     THYROIDECTOMY N/A 08/26/2020     Procedure: THYROIDECTOMY, TOTAL;  Surgeon: Tiff Burgos MD;  Location: [facility] OR     TONSILLECTOMY  2003     TUBAL LIGATION  1986     Past Medical History:   Diagnosis Date     Adjustment disorder with mixed anxiety and depressed mood 3/16/2013     Anxiety      Depression      Diabetes (H)      Glaucoma (increased eye pressure)      History of smoking      Hyperlipidemia      Multiple myeloma in remission (H)      Nonsenile cataract      Obesity      Sleep apnea     no c-pap use     Status post complete thyroidectomy 8/26/2020     Thyroid goiter 8/5/2020     Tremor 12/9/2020     Social History     Socioeconomic History     Marital status:      Spouse name: Not on file     Number of children: 3     Years of education: Not on file     Highest education level: Not on file   Occupational History     Occupation: alcohol and drug counselor   Social Needs     Financial resource strain: Not on file     Food insecurity     Worry: Not on file     Inability: Not on file     Transportation needs     Medical: Not on file     Non-medical: Not on file   Tobacco Use     Smoking status: Former Smoker     Packs/day: 1.00     Types: Cigarettes     Quit date: 2005     Years since quitting: 15.9     Smokeless tobacco: Never Used   Substance and Sexual Activity     Alcohol use: Yes     Comment: occasional     Drug use: Never     Sexual activity: Not on file   Lifestyle     Physical activity     Days per week: Not on file     Minutes per session: Not on file     Stress: Not on file   Relationships     Social connections     Talks on phone: Not on file     Gets together: Not on file     Attends Sikhism service: Not on file     Active member of club or organization: Not on file     Attends meetings of clubs or organizations: Not on file     Relationship status: Not on file     Intimate partner violence     Fear of current or ex partner: Not on file     Emotionally abused: Not on file     Physically abused: Not on file      Forced sexual activity: Not on file   Other Topics Concern     Not on file   Social History Narrative     Not on file     Family History   Problem Relation Age of Onset     Glaucoma Paternal Grandfather      Chronic Obstructive Pulmonary Disease Mother      Substance Abuse Mother      Alcoholism Mother      Diabetes Mother      Parkinsonism Father      No Known Problems Sister      Diabetes Brother      Arrhythmia Brother      Diabetes Brother      Other - See Comments Brother         potts's disease     Gastrointestinal Disease Brother      Current Outpatient Medications   Medication Sig Dispense Refill     acetaminophen (TYLENOL) 325 MG tablet Take 2 tablets (650 mg) by mouth every 4 hours as needed for pain (Patient not taking: Reported on 11/20/2020) 50 tablet 0     acyclovir (ZOVIRAX) 400 MG tablet Take 400 mg by mouth 2 times daily       atorvastatin (LIPITOR) 20 MG tablet Take 1 tablet (20 mg) by mouth every 24 hours (Patient taking differently: Take 20 mg by mouth At Bedtime ) 90 tablet 3     blood glucose (NO BRAND SPECIFIED) test strip Use to test blood sugar two times daily or as directed. 200 strip 3     blood glucose monitoring (NO BRAND SPECIFIED) meter device kit Use to test blood sugar two times daily or as directed. 1 kit 3     calcitRIOL (ROCALTROL) 0.25 MCG capsule        CALCIUM ANTACID 500 MG chewable tablet        exenatide ER (BYDUREON) 2 MG pen Inject 2 mg Subcutaneous every 7 days 4 each 11     insulin glargine (LANTUS PEN) 100 UNIT/ML pen Inject 34 Units Subcutaneous At Bedtime 30 mL 3     insulin pen needle (FIFTY50 PEN NEEDLES) 32G X 4 MM miscellaneous As directed. To use with Victoza daily       latanoprost (XALATAN) 0.005 % ophthalmic solution Place 1 drop into both eyes daily (Patient taking differently: Place 1 drop into both eyes At Bedtime ) 1 Bottle 11     LENalidomide (REVLIMID) 10 MG CAPS capsule Take 1 capsule (10 mg) by mouth daily 28 capsule 0     levothyroxine  (SYNTHROID/LEVOTHROID) 175 MCG tablet Take 1 tablet (175 mcg) by mouth every morning (before breakfast) 90 tablet 3     levothyroxine (SYNTHROID/LEVOTHROID) 175 MCG tablet Take 1 tablet (175 mcg) by mouth every morning (before breakfast) (Patient not taking: Reported on 11/20/2020) 90 tablet 0     loratadine (CLARITIN) 10 MG tablet Take 1 tablet by mouth daily       meloxicam (MOBIC) 15 MG tablet Take 15 mg by mouth daily       metFORMIN (GLUCOPHAGE) 500 MG tablet Take 2 tablets (1,000 mg) by mouth 2 times daily (with meals) 360 tablet 1     miconazole (MONISTAT 1 DAY OR NIGHT) 1200 & 2 MG & % kit Apply inside the vagina 1 kit 1     oxyCODONE (ROXICODONE) 5 MG tablet Take 1-2 tablets (5-10 mg) by mouth every 6 hours as needed for moderate to severe pain (Patient not taking: Reported on 9/15/2020) 15 tablet 0     pregabalin (LYRICA) 100 MG capsule Take 1 capsule (100 mg) by mouth 3 times daily 270 capsule 1     sertraline (ZOLOFT) 100 MG tablet Take 1 tablet (100 mg) by mouth daily 90 tablet 1     tiZANidine (ZANAFLEX) 4 MG tablet Take 1-2 tablets (4-8 mg) by mouth nightly as needed       traMADol (ULTRAM) 50 MG tablet TAKE 1 TABLET (50 MG) BY MOUTH NIGHTLY AS NEEDED FOR SEVERE PAIN

## 2020-12-09 NOTE — PROGRESS NOTES
"Winter Loya is a 64 year old female who is being evaluated via a billable video visit.      The patient has been notified of following:     \"This video visit will be conducted via a call between you and your physician/provider. We have found that certain health care needs can be provided without the need for an in-person physical exam.  This service lets us provide the care you need with a video conversation.  If a prescription is necessary we can send it directly to your pharmacy.  If lab work is needed we can place an order for that and you can then stop by our lab to have the test done at a later time.    Video visits are billed at different rates depending on your insurance coverage.  Please reach out to your insurance provider with any questions.    If during the course of the call the physician/provider feels a video visit is not appropriate, you will not be charged for this service.\"    Patient has given verbal consent for Video visit? Yes  How would you like to obtain your AVS? MyChart  If you are dropped from the video visit, the video invite should be resent to: Text to cell phone: [phone]  Will anyone else be joining your video visit? Mary CORDOBA    Video-Visit Details    Type of service:  Video Visit      [facility]    Reason for consult: MM           History of Present Illness:   This patient is a 63 yo woman with IgA Kappa Multiple Myeloma. In 2018 she had anemia and was fatigued. She was found to have IgA kappa monocloncal antibody with M-spike of 0.17. IgA elevated. Skeletal survey was unremarkable and UPEP had minimal protein (156 mg/m2). PET 11/2018 showed bone marrow consistent with hypermetabolic plasma cell myeloma. M spike was 0.17. She started RVD and Zometa. On 4/2019 she had an autologous transplant.     Her bone marrow bx from September 2019 was sent here for review. It showed marrow cellularity of 60%, with decreased trilineage hematopoiesis and 15% " plasma cells as well as peripheral blood with slight anemia. 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 She is on revlimid maintenance and zometa from her prior oncologist in [place]. On 12/6/19 her K/L ratio is 1.1, M spike is 0.04.    She is doing fine form a myeloma standpoint and is on lenalidomide maintenance.         Physical Exam:     ECOG PS 0     General Appearance: healthy, alert and no distress  Eyes: PERRL, conjunctiva and lids normal, sclera nonicteric  Neck supple, free range of motion,  Resp Effort And Auscultation: Normal respiratory effort without distress  Musculoskeletal: Musculoskeletal normal  Skin: Skin color, texture, turgor normal.  Psych/Affect:Normal mood and affect , some grumpiness which is his normal           Assessment and Recommendation:     65 yo woman with standard risk myeloma s/p auto transplant. She has been on lenaidomide 10 mg every day and has tolerated this. Based on the outside records she has had about 13 does of Zometa. We will get myeloma labs and a PET scan for baseline imaging. She does have pains that she attributes to arthritis but these require evaluation to rule out myeloma    - We recommend continuing lenalidomide 10 mg every day since it is well tolerated and in a randomized, phase 3 trial, lenalidomide improved PFS (39 months compared to 20 months for observation).    (Lenalidomide maintenance versus observation for patients with newly diagnosed multiple myeloma (Myeloma XI): a multicentre, open-label, randomised, phase 3 trial. Jaleel STEPHENS, et al.Lancet Oncol. 2019 [place];20)    - We recommend  mg every day to prevent thromboembolic complications from lenalidomide. Her SAVED score is 0. 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     blood glucose monitoring (NO BRAND SPECIFIED) meter device kit Use to test blood sugar two times daily or as directed.     calcitRIOL (ROCALTROL) 0.25 MCG capsule      CALCIUM ANTACID 500 MG chewable tablet      exenatide ER (BYDUREON) 2 MG pen Inject 2 mg Subcutaneous every 7 days     insulin glargine (LANTUS PEN) 100 UNIT/ML pen Inject 34 Units Subcutaneous At Bedtime     insulin pen needle (FIFTY50 PEN NEEDLES) 32G X 4 MM miscellaneous As directed. To use with Victoza daily     latanoprost (XALATAN) 0.005 % ophthalmic solution Place 1 drop into both eyes daily (Patient taking differently: Place 1 drop into both eyes At Bedtime )     LENalidomide (REVLIMID) 10 MG CAPS capsule Take 1 capsule (10 mg) by mouth daily     levothyroxine (SYNTHROID/LEVOTHROID) 175 MCG tablet Take 1 tablet (175 mcg) by mouth every morning (before breakfast)     loratadine (CLARITIN) 10 MG tablet Take 1 tablet by mouth daily     meloxicam (MOBIC) 15 MG tablet Take 15 mg by mouth daily     metFORMIN (GLUCOPHAGE) 500 MG tablet  Take 2 tablets (1,000 mg) by mouth 2 times daily (with meals)     miconazole (MONISTAT 1 DAY OR NIGHT) 1200 & 2 MG & % kit Apply inside the vagina     pregabalin (LYRICA) 100 MG capsule Take 1 capsule (100 mg) by mouth 3 times daily     sertraline (ZOLOFT) 100 MG tablet Take 1 tablet (100 mg) by mouth daily     tiZANidine (ZANAFLEX) 4 MG tablet Take 1-2 tablets (4-8 mg) by mouth nightly as needed     traMADol (ULTRAM) 50 MG tablet TAKE 1 TABLET (50 MG) BY MOUTH NIGHTLY AS NEEDED FOR SEVERE PAIN     acetaminophen (TYLENOL) 325 MG tablet Take 2 tablets (650 mg) by mouth every 4 hours as needed for pain (Patient not taking: Reported on 11/20/2020)     oxyCODONE (ROXICODONE) 5 MG tablet Take 1-2 tablets (5-10 mg) by mouth every 6 hours as needed for moderate to severe pain (Patient not taking: Reported on 9/15/2020)     No current facility-administered medications for this visit.              Review of Systems:   The 10 point Review of Systems is negative other than noted in the HPI

## 2020-12-09 NOTE — LETTER
"    12/9/2020         RE: Winter Loya  [address]  [facility] [address]        Dear Colleague,    Thank you for referring your patient, Winter Loya, to the [facility]ANSPLANT [facility] [place]. Please see a copy of my visit note below.    Winter Loya is a 64 year old female who is being evaluated via a billable video visit.      The patient has been notified of following:     \"This video visit will be conducted via a call between you and your physician/provider. We have found that certain health care needs can be provided without the need for an in-person physical exam.  This service lets us provide the care you need with a video conversation.  If a prescription is necessary we can send it directly to your pharmacy.  If lab work is needed we can place an order for that and you can then stop by our lab to have the test done at a later time.    Video visits are billed at different rates depending on your insurance coverage.  Please reach out to your insurance provider with any questions.    If during the course of the call the physician/provider feels a video visit is not appropriate, you will not be charged for this service.\"    Patient has given verbal consent for Video visit? Yes  How would you like to obtain your AVS? MyChart  If you are dropped from the video visit, the video invite should be resent to: Text to cell phone: [phone]  Will anyone else be joining your video visit? Mary CORDOBA    Video-Visit Details    Type of service:  Video Visit      [facility]    Reason for consult: MM           History of Present Illness:   This patient is a 65 yo woman with IgA Kappa Multiple Myeloma. In 2018 she had anemia and was fatigued. She was found to have IgA kappa monocloncal antibody with M-spike of 0.17. IgA elevated. Skeletal survey was unremarkable and UPEP had minimal protein (156 mg/m2). PET 11/2018 showed bone marrow consistent with " hypermetabolic plasma cell myeloma. M spike was 0.17. She started RVD and Zometa. On 4/2019 she had an autologous transplant.     Her bone marrow bx from September 2019 was sent here for review. It showed marrow cellularity of 60%, with decreased trilineage hematopoiesis and 15% plasma cells as well as peripheral blood with slight anemia. 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 She is on revlimid maintenance and zometa from her prior oncologist in [place]. On 12/6/19 her K/L ratio is 1.1, M spike is 0.04.    She is doing fine form a myeloma standpoint and is on lenalidomide maintenance.         Physical Exam:     ECOG PS 0     General Appearance: healthy, alert and no distress  Eyes: PERRL, conjunctiva and lids normal, sclera nonicteric  Neck supple, free range of motion,  Resp Effort And Auscultation: Normal respiratory effort without distress  Musculoskeletal: Musculoskeletal normal  Skin: Skin color, texture, turgor normal.  Psych/Affect:Normal mood and affect , some grumpiness which is his normal           Assessment and Recommendation:     65 yo woman with standard risk myeloma s/p auto transplant. She has been on lenaidomide 10 mg every day and has tolerated this. Based on the outside records she has had about 13 does of Zometa. We will get myeloma labs and a PET scan for baseline imaging. She does have pains that she attributes to arthritis but these require evaluation to rule out myeloma    - We recommend continuing lenalidomide 10 mg every day since it is well tolerated and in a randomized, phase 3 trial, lenalidomide improved PFS (39 months compared to 20 months for observation).    (Lenalidomide maintenance versus observation for patients with newly diagnosed multiple myeloma (Myeloma XI): a multicentre, open-label, randomised, phase 3 trial. Jaleel STEPHENS, et al.Lancet  Oncol. 2019 Jan;20)    - We recommend  mg every day to prevent thromboembolic complications from lenalidomide. Her SAVED score is 0. 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     blood glucose monitoring (NO BRAND SPECIFIED) meter device kit Use to test blood sugar two times daily or as directed.     calcitRIOL (ROCALTROL) 0.25 MCG capsule      CALCIUM ANTACID 500 MG chewable tablet      exenatide ER (BYDUREON) 2 MG pen Inject 2 mg Subcutaneous every 7 days     insulin glargine (LANTUS PEN) 100 UNIT/ML pen Inject 34 Units Subcutaneous At Bedtime     insulin pen needle (FIFTY50 PEN NEEDLES) 32G X 4 MM miscellaneous As directed. To use with Victoza daily     latanoprost (XALATAN) 0.005 % ophthalmic solution Place 1 drop into both eyes daily (Patient taking differently: Place 1 drop into both eyes At Bedtime )     LENalidomide (REVLIMID) 10 MG CAPS capsule Take 1 capsule (10 mg) by mouth daily      levothyroxine (SYNTHROID/LEVOTHROID) 175 MCG tablet Take 1 tablet (175 mcg) by mouth every morning (before breakfast)     loratadine (CLARITIN) 10 MG tablet Take 1 tablet by mouth daily     meloxicam (MOBIC) 15 MG tablet Take 15 mg by mouth daily     metFORMIN (GLUCOPHAGE) 500 MG tablet Take 2 tablets (1,000 mg) by mouth 2 times daily (with meals)     miconazole (MONISTAT 1 DAY OR NIGHT) 1200 & 2 MG & % kit Apply inside the vagina     pregabalin (LYRICA) 100 MG capsule Take 1 capsule (100 mg) by mouth 3 times daily     sertraline (ZOLOFT) 100 MG tablet Take 1 tablet (100 mg) by mouth daily     tiZANidine (ZANAFLEX) 4 MG tablet Take 1-2 tablets (4-8 mg) by mouth nightly as needed     traMADol (ULTRAM) 50 MG tablet TAKE 1 TABLET (50 MG) BY MOUTH NIGHTLY AS NEEDED FOR SEVERE PAIN     acetaminophen (TYLENOL) 325 MG tablet Take 2 tablets (650 mg) by mouth every 4 hours as needed for pain (Patient not taking: Reported on 11/20/2020)     oxyCODONE (ROXICODONE) 5 MG tablet Take 1-2 tablets (5-10 mg) by mouth every 6 hours as needed for moderate to severe pain (Patient not taking: Reported on 9/15/2020)     No current facility-administered medications for this visit.              Review of Systems:   The 10 point Review of Systems is negative other than noted in the HPI            Again, thank you for allowing me to participate in the care of your patient.        Sincerely,        Mele Tipton MD

## 2020-12-11 ENCOUNTER — VIRTUAL VISIT (OUTPATIENT)
Dept: PHARMACY | Facility: CLINIC | Age: 65
End: 2020-12-11
Payer: COMMERCIAL

## 2020-12-11 DIAGNOSIS — Z79.4 TYPE 2 DIABETES MELLITUS WITH HYPERGLYCEMIA, WITH LONG-TERM CURRENT USE OF INSULIN (H): Primary | ICD-10-CM

## 2020-12-11 DIAGNOSIS — E11.65 TYPE 2 DIABETES MELLITUS WITH HYPERGLYCEMIA, WITH LONG-TERM CURRENT USE OF INSULIN (H): Primary | ICD-10-CM

## 2020-12-11 PROCEDURE — 99606 MTMS BY PHARM EST 15 MIN: CPT | Mod: TEL | Performed by: PHARMACIST

## 2020-12-11 NOTE — PROGRESS NOTES
MTM ENCOUNTER  SUBJECTIVE/OBJECTIVE:                           Winter Loya is a 64 year old female called for a follow-up visit. She was referred to me from Brittany Rosenthal.  Today's visit is a follow-up MTM visit from 20.     Reason for visit: uncontrolled diabetes.    Allergies/ADRs: Reviewed in chart  Tobacco: She reports that she quit smoking about 15 years ago. Her smoking use included cigarettes. She smoked 1.00 pack per day. She has never used smokeless tobacco.  Alcohol: not currently using  Caffeine: 1 cups/day of coffee  Activity: none  Past Medical History: Reviewed in chart    Medication Adherence/Access: no issues reported    Type 2 Diabetes:  Currently taking metformin 1000 mg twice daily, Lantus 34 units daily and Bydureon 2 mg once weekly. She notes that her appetite is less and she is not having food cravings. No other side effects reported.    Blood sugar monitorin time(s) daily. Ranges (patient reported): Fasting- 180-200 but not monitoring as often as she wants to. She has had 3 doses of Bydureon so far. She is still having some diarrhea but it is improved since we last talked. She is still having to bring extra clothes to work and wear diapers to manage the symptoms.   Symptoms of low blood sugar? none  Symptoms of high blood sugar? none  Eye exam: up to date  Foot exam: up to date  Diet/Exercise: none specified, she is working on reducing sugar in her diet. She appreciates the appetite reduction from Bydureon and has lost about 6 lbs, per her report  Aspirin: Not taking due to unclear reason    The ASCVD Risk score (Kelly DC Jr., et al., 2013) failed to calculate for the following reasons:    Cannot find a previous HDL lab    Cannot find a previous total cholesterol lab    Statin: Yes: atorvastatin 20 mg daily      No results for input(s): CHOL, HDL, LDL, TRIG, CHOLHDLRATIO in the last 80925 hours.    ACEi/ARB: No.     BP Readings from Last 3 Encounters:   20 (!) 160/88  "  10/19/20 130/81   09/17/20 136/85     Urine Albumin:   Lab Results   Component Value Date    UMALCR 73.57 (H) 12/07/2020      Lab Results   Component Value Date    A1C 8.8 12/07/2020    A1C 9.1 08/21/2020    A1C 6.7 03/05/2020     Today's Vitals: There were no vitals taken for this visit. - telemed    BP Readings from Last 1 Encounters:   11/20/20 (!) 160/88     Pulse Readings from Last 1 Encounters:   11/20/20 79     Wt Readings from Last 1 Encounters:   11/20/20 271 lb 9.6 oz (123.2 kg)     Ht Readings from Last 1 Encounters:   11/20/20 5' 8.75\" (1.746 m)     Estimated body mass index is 40.4 kg/m  as calculated from the following:    Height as of 11/20/20: 5' 8.75\" (1.746 m).    Weight as of 11/20/20: 271 lb 9.6 oz (123.2 kg).    Temp Readings from Last 1 Encounters:   11/20/20 97.6  F (36.4  C) (Oral)     ASSESSMENT:                              Medication Adherence: No issues identified    Type 2 Diabetes: Patient is not meeting A1c goal of < 8%. Self monitoring of blood glucose is not at goal of fasting  mg/dL. Patient would benefit from dose reduction in metformin to improve diarrhea symptoms as well as a few more weeks on Bydureon to assess full effect (has had 2 doses). Unfortunately, lipids were not drawn at most recent labs. Would benefit from lipid labs. Given recent BP and microalbumin test, she would also benefit from an ACE/ARB.     PLAN:                            1. Will discuss ACE/ARB with new PCP.   2. Will reorder lipids.   3. Reduce metformin to 1500 mg daily.     I spent 10 minutes with this patient today. All changes were made via collaborative practice agreement with Delmi Gross. A copy of the visit note was provided to the patient's primary care provider.    Will follow up in 2 weeks.    The patient declined a summary of these recommendations.     Coco Antoine, Pharm.D., [facility]  Medication Therapy Management Pharmacist  Page/VM:  [phone]      Patient consented to a " telehealth visit: yes  Telemedicine Visit Details  Type of service:  Telephone visit  Start Time: 9:30 AM  End Time: 9:40 AM  Originating Location (patient location): Home  Distant Location (provider location):  [facility] MEDICATION THERAPY MANAGEMENT  Mode of Communication:  Telephone        Medication Therapy Recommendations Needing Review  Diabetes mellitus, type 2 (H)    Current Medication: atorvastatin (LIPITOR) 20 MG tablet   Rationale: Medication requires monitoring - Needs additional monitoring   Recommendation: Order Lab   Status: Accepted per CPA          Rationale: Untreated condition - Needs additional medication therapy - Indication   Recommendation: Start Medication - losartan 12.5 mg Tabs half-tab   Status: Contact Provider - Awaiting Response         Type 2 diabetes mellitus with hyperglycemia (H)    Current Medication: metFORMIN (GLUCOPHAGE) 500 MG tablet   Rationale: Undesirable effect - Adverse medication event - Safety   Recommendation: Decrease Dose - Decrease metformin to 1500 mg daily   Status: Accepted per CPA

## 2020-12-14 ENCOUNTER — TELEPHONE (OUTPATIENT)
Dept: ONCOLOGY | Facility: CLINIC | Age: 65
End: 2020-12-14

## 2020-12-14 ENCOUNTER — VIRTUAL VISIT (OUTPATIENT)
Dept: NEUROLOGY | Facility: CLINIC | Age: 65
End: 2020-12-14
Attending: NURSE PRACTITIONER
Payer: COMMERCIAL

## 2020-12-14 DIAGNOSIS — H40.9 GLAUCOMA OF BOTH EYES, UNSPECIFIED GLAUCOMA TYPE: ICD-10-CM

## 2020-12-14 DIAGNOSIS — R25.1 TREMOR: ICD-10-CM

## 2020-12-14 DIAGNOSIS — Z79.4 TYPE 2 DIABETES MELLITUS WITH DIABETIC NEUROPATHY, WITH LONG-TERM CURRENT USE OF INSULIN (H): Primary | ICD-10-CM

## 2020-12-14 DIAGNOSIS — E11.40 TYPE 2 DIABETES MELLITUS WITH DIABETIC NEUROPATHY, WITH LONG-TERM CURRENT USE OF INSULIN (H): Primary | ICD-10-CM

## 2020-12-14 DIAGNOSIS — C90.01 MULTIPLE MYELOMA IN REMISSION (H): Primary | ICD-10-CM

## 2020-12-14 DIAGNOSIS — Z94.84 HISTORY OF PERIPHERAL STEM CELL TRANSPLANT (H): ICD-10-CM

## 2020-12-14 PROCEDURE — 99205 OFFICE O/P NEW HI 60 MIN: CPT | Mod: 95 | Performed by: PSYCHIATRY & NEUROLOGY

## 2020-12-14 RX ORDER — LATANOPROST 50 UG/ML
1 SOLUTION/ DROPS OPHTHALMIC AT BEDTIME
COMMUNITY
Start: 2020-12-14 | End: 2021-05-03

## 2020-12-14 RX ORDER — LENALIDOMIDE 10 MG/1
10 CAPSULE ORAL DAILY
Qty: 28 CAPSULE | Refills: 0 | OUTPATIENT
Start: 2020-12-14 | End: 2020-12-14

## 2020-12-14 RX ORDER — LENALIDOMIDE 10 MG/1
10 CAPSULE ORAL DAILY
Qty: 28 CAPSULE | Refills: 0 | Status: SHIPPED | OUTPATIENT
Start: 2020-12-14 | End: 2021-01-08

## 2020-12-14 NOTE — LETTER
"    12/14/2020         RE: Winter Loya  [address]  [place] [address]        Dear Colleague,    Thank you for referring your patient, Winter Loya, to the [facility]UROLOG[facility] [place]. Please see a copy of my visit note below.        VIDEO VISIT - smart phone; has an iphone - able to do video over her iphone  Had some problems with docimity    Date of Visit: December 14, 2020  Name: Winter Loya  Date of Birth 1955  [address]   [place] [address]  [email or web address]  [phone] (M)   [phone] (H)   Peter Deleon son  [phone]    Assessment:  (R25.1) Tremor -worsening and affecting her ability to use a keyboard and mouse  Duration - 5 years or more.  Father had tremor/parkinson  Not a big drinker - not clear if alcohol helps her tremor  Smell perception is okay  She has sleep apnea  She cut out caffeine and there were no changes in her tremors  She is right handed and has more right than left handed tremor.   She was told she has ET as she is \"not stooped over\"  Her tremors are present when she is using her hands and she has a vocal and head tremor as well.     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 She has not worked with OT    Exercise/Therapy  - none    Cognitive/Driving - has had some memory/cognitive issues - seen on 10/22/2020 by Dr. Mcgrath for neuropsychological evaluation: there were mild frontal and possibly left temporal changes and recommended to be evaluated again in one year. She has not had a brain mri. She has mood issues that could benefit from psychotherapy as anxiety may be aggravating her memory.     Mood Generalized Anxiety disorder/Depression  Mood is good per her report.  Sertraline zoloft " 100mg  Living with son basil - health  .     Hallucinations/delusions - denies    Sleep Apnea -  Dr. Juan Jose Salazar  Denies RBD features    Bladder  - wears a pad.   Has changes in urinary frequency/urgency if blood sugars are out of control  2-4/noc getting up to urinate  Has not seen a urologist.   Seen by Ob-gyn a year ago.     Respiratory  Loratadine claritin 10mg - not taking  Former smoker - quit 15 years prior was 1 ppd  Quit 2005    GI/Constipation/GERD  - no issue  Calcium antacid 500mg tums - not taking    ENDO  Atorvastatin lipitor 20mg at bedtime   Calcitriol rocaltrol 0.25mcg - not taking     Type 2 Diabetes with neuropathy  Exenatide ER Bydureon 2mg weekly   Lantus 34 units at bedtime  Metformin 500mg2 tabs  twice daily  Has not seen dietician   Seeing Coco Antoine, Pharm D  DOes not have an endocrinologist  Has not seen nutrition  A1c was 8.8 on 12/7/2020    Hypothyroidism;   Levothyroxine synthroid/levothyroid 175 mcg daily  s/p thyroidectomy for multinodular goiter    Cardio/heart  - blood pressure is stable initially after stem cell therapy but has gone up at times  States she is trying to lose weight to help her diabetes and blood pressure.   Her chart has been 160/88; encouraged her to get a home blood pressure cuff.     Vision  - on treatment for glaucoma  Latanoprost xalatan 0.005% ophthalmic solution    HEME:  Multiple myeloma - in remision  IgA Kappa Multiple Myeloma. Presented 2018 with anemia and fatigue. Skeletal survey was unremarkable and UPEP had minimal protein (156 mg/m2). PET 11/2018 showed bone marrow consistent with hypermetabolic plasma cell myeloma. M spike was 0.17. She started RVD and Zometa. 4/2019 she had an autologous transplant. She was on revlimid maintenance and zometa from her prior oncologist in [place]. Zometa through 12/2020 to complete a total of 2 years of therapy  [place] [place]    PCP Dr. Gabrielle Edwards    Acetaminophen tylenol 325mg - as  needed  Acyclovir zovirax 400mg twice daily  Meloxicam mobic 15mg  - not taking  Oxycodone roxicodone - not taking  Pregabalin lyrica 100mg 3/day for neuropathy and helping her mood as well.   Tizanidine zanaflex 4mg - not taking   Tramadol ultram 50mg - not taking    IN summary  - essential tremor  - neuropathy  - other medical issues  -diabetes     Medications            Acetaminophen tylenol 325mg As needed       Acyclovir zovirax 400mg 1  1     Atorvastatin lipitor 20mg    1     Calcitriol rocaltrol 0.25mcg Not taking       Calcium antacid 500mg tums        Exenatide ER Bydureon 2mg weekly       Insulin glargine lantus   34 units     Latanoprost xalatan 0.005% ophthalmic solution   Both eyes     Lenalidomide revlimid 10mg  ?       Levothyroxine synthroid/levothyroid 175 mcg 1       Loratadine claritin 10mg         Meloxicam mobic 15mg  Not using       Metformin glucophage 500mg  2  2     Miconazole monistat Not using       Nonformulary revlimid ?       NOnformulary zometa ?       Oxycodone roxicodone Not taking       Pregabalin lyrica 100mg  1 1 1     Sertraline zoloft 100mg 1       Tizanidine zanaflex 4mg  Not taking       Tramadol ultram 50mg  Not taking                                                 Plan:    1. Needs help with her diabetes management - recent A1c was 8.8 and is working with pharmacist Coco Antoine. She may benefit from seeing nutrition and possibly endocrinology in addition to her pcp    2. For her tremor - referred her to occupational therapy for assistive help as her tremor is impacting her work    3. She should buy a home blood pressure cuff so we have accurate measurements.     4. Consultation with Pharmacist Flores Hutchinson about a trial of a beta blocker for her tremor - if appropriate with patient monitoring her bp/heart rate. Her work schedule is 530am  - 2pm  And may consider a twice daily vs 3/day regimen of a low dose propranolol     5. Ongoing memory/cognitive issues. She will  "forego getting a brain mri at this time - discussed this and not sure needed at this point other than for a baseline. She will need to go back and see Dr. Mcgrath in a year for retesting of her cognition.     6. Stress/anxiety may be impacting her tremor as well as mood. She may benefit from counseling.     I have reviewed the note as documented above.  This accurately captures the substance of my conversation with the patient.    Patient contact time  60  minutes. Time of visit  240pm - 340pm    Over 50% of this visit was spent in patient care and care coordination.     Gabriel Santoyo MD      ------------------------------------------------------------------------------------------------------------------------------------------------------------------------    Video-Visit Details    Examination  Alert oriented x 3  3 objects is able to repeat and recall 3/3  Able to spell world backwards 5/5  Normal oculomotor movements, facial expression, hearing   She has mild postural and action tremor  There is no slowness on finger tapping, hand movements and CLOVIS  She has not dysmetria  She has a mild vocal and head tremor.   She has neuropathy in her right foot and walks with a slightly widened  Gait   Her son helped with video of her gait.     The patient has been notified of following:     \"After a review of the patient s situation, this visit was changed from an in-person visit to a video visit to reduce the risk of COVID-19 exposure.   The patient is being evaluated via a billable video visit.\"    \"This video visit will be conducted via a call between you and your physician/provider. We have found that certain health care needs can be provided without the need for an in-person physical exam.  This service lets us provide the care you need with a video conversation.  If a prescription is necessary we can send it directly to your pharmacy.  If lab work is needed we can place an order for that and you can then stop by our lab " "to have the test done at a later time.    If during the course of the call the physician/provider feels a video visit is not appropriate, you will not be charged for this service.\"     Patient has given verbal consent for Video visit? Yes    Patient would like the video invitation sent by:     Type of service:  Video Visit    Video Start Time:     Video End Time (time video stopped):     Duration:  minutes - see above    Originating Location (pt. Location):     Distant Location (provider location):  [facility] [place]     Mode of Communication:  Video Conference via [employer] (and if not possible then doximity)      Gabriel Santoyo MD      --------------------------------------------------------------------------------------------------------------    Winter Loya is a 64 year old female who is being evaluated via a billable video visit.      Charts reviewed  Consult from  Images reviewed        I have reviewed and updated the patient's Past Medical History, Social History, Family History and Medication List.    ALLERGIES  Patient has no known allergies.    Lasts visit details if there was a last visit:       14 Review of systems  are negative except for   Patient Active Problem List   Diagnosis     Multiple myeloma in remission (H)     Diabetes mellitus, type 2 (H)     Allergic rhinitis     NA (generalized anxiety disorder)     History of endometrial ablation     Hyperlipidemia     Major depressive disorder, recurrent episode, moderate (H)     Osteoarthrosis involving lower leg     Type 2 diabetes mellitus with hyperglycemia (H)     Sleep apnea     Hypothyroidism, postoperative      Class 2 severe obesity due to excess calories with serious comorbidity and body mass index (BMI) of 39.0 to 39.9 in adult (H)     Tremor      No Known Allergies  Past Surgical History:   Procedure Laterality Date     ARTHROSCOPY KNEE Left 02/2014     ARTHROSCOPY KNEE Right 12/2010    KNEE ARTHROSCOPY Dec " 2010  Right     CHOLECYSTECTOMY  2002     COLONOSCOPY N/A 07/23/2020    Procedure: COLONOSCOPY;  Surgeon: Za Denis MD;  Location: [facility] OR     EYE SURGERY  1985    lasersx-spot behind eye started leaking     THYROIDECTOMY N/A 08/26/2020    Procedure: THYROIDECTOMY, TOTAL;  Surgeon: Tiff Burgos MD;  Location: [facility] OR     TONSILLECTOMY  2003     TUBAL LIGATION  1986     Past Medical History:   Diagnosis Date     Adjustment disorder with mixed anxiety and depressed mood 3/16/2013     Anxiety      Depression      Diabetes (H)      Glaucoma (increased eye pressure)      History of smoking      Hyperlipidemia      Multiple myeloma in remission (H)      Nonsenile cataract      Obesity      Sleep apnea     no c-pap use     Status post complete thyroidectomy 8/26/2020     Thyroid goiter 8/5/2020     Tremor 12/9/2020     Social History     Socioeconomic History     Marital status:      Spouse name: Not on file     Number of children: 3     Years of education: Not on file     Highest education level: Not on file   Occupational History     Occupation: alcohol and drug counselor   Social Needs     Financial resource strain: Not on file     Food insecurity     Worry: Not on file     Inability: Not on file     Transportation needs     Medical: Not on file     Non-medical: Not on file   Tobacco Use     Smoking status: Former Smoker     Packs/day: 1.00     Types: Cigarettes     Quit date: 2005     Years since quitting: 15.9     Smokeless tobacco: Never Used   Substance and Sexual Activity     Alcohol use: Yes     Comment: occasional     Drug use: Never     Sexual activity: Not on file   Lifestyle     Physical activity     Days per week: Not on file     Minutes per session: Not on file     Stress: Not on file   Relationships     Social connections     Talks on phone: Not on file     Gets together: Not on file     Attends Oriental orthodox service: Not on file     Active member of club or organization: Not on  file     Attends meetings of clubs or organizations: Not on file     Relationship status: Not on file     Intimate partner violence     Fear of current or ex partner: Not on file     Emotionally abused: Not on file     Physically abused: Not on file     Forced sexual activity: Not on file   Other Topics Concern     Not on file   Social History Narrative     Not on file     Family History   Problem Relation Age of Onset     Glaucoma Paternal Grandfather      Chronic Obstructive Pulmonary Disease Mother      Substance Abuse Mother      Alcoholism Mother      Diabetes Mother      Parkinsonism Father      No Known Problems Sister      Diabetes Brother      Arrhythmia Brother      Diabetes Brother      Other - See Comments Brother         potts's disease     Gastrointestinal Disease Brother      Current Outpatient Medications   Medication Sig Dispense Refill     acetaminophen (TYLENOL) 325 MG tablet Take 2 tablets (650 mg) by mouth every 4 hours as needed for pain (Patient not taking: Reported on 11/20/2020) 50 tablet 0     acyclovir (ZOVIRAX) 400 MG tablet Take 400 mg by mouth 2 times daily       atorvastatin (LIPITOR) 20 MG tablet Take 1 tablet (20 mg) by mouth every 24 hours (Patient taking differently: Take 20 mg by mouth At Bedtime ) 90 tablet 3     blood glucose (NO BRAND SPECIFIED) test strip Use to test blood sugar two times daily or as directed. 200 strip 3     blood glucose monitoring (NO BRAND SPECIFIED) meter device kit Use to test blood sugar two times daily or as directed. 1 kit 3     calcitRIOL (ROCALTROL) 0.25 MCG capsule        CALCIUM ANTACID 500 MG chewable tablet        exenatide ER (BYDUREON) 2 MG pen Inject 2 mg Subcutaneous every 7 days 4 each 11     insulin glargine (LANTUS PEN) 100 UNIT/ML pen Inject 34 Units Subcutaneous At Bedtime 30 mL 3     insulin pen needle (FIFTY50 PEN NEEDLES) 32G X 4 MM miscellaneous As directed. To use with Victoza daily       latanoprost (XALATAN) 0.005 % ophthalmic  solution Place 1 drop into both eyes daily (Patient taking differently: Place 1 drop into both eyes At Bedtime ) 1 Bottle 11     LENalidomide (REVLIMID) 10 MG CAPS capsule Take 1 capsule (10 mg) by mouth daily 28 capsule 0     levothyroxine (SYNTHROID/LEVOTHROID) 175 MCG tablet Take 1 tablet (175 mcg) by mouth every morning (before breakfast) 90 tablet 3     levothyroxine (SYNTHROID/LEVOTHROID) 175 MCG tablet Take 1 tablet (175 mcg) by mouth every morning (before breakfast) (Patient not taking: Reported on 11/20/2020) 90 tablet 0     loratadine (CLARITIN) 10 MG tablet Take 1 tablet by mouth daily       meloxicam (MOBIC) 15 MG tablet Take 15 mg by mouth daily       metFORMIN (GLUCOPHAGE) 500 MG tablet Take 2 tablets (1,000 mg) by mouth 2 times daily (with meals) 360 tablet 1     miconazole (MONISTAT 1 DAY OR NIGHT) 1200 & 2 MG & % kit Apply inside the vagina 1 kit 1     oxyCODONE (ROXICODONE) 5 MG tablet Take 1-2 tablets (5-10 mg) by mouth every 6 hours as needed for moderate to severe pain (Patient not taking: Reported on 9/15/2020) 15 tablet 0     pregabalin (LYRICA) 100 MG capsule Take 1 capsule (100 mg) by mouth 3 times daily 270 capsule 1     sertraline (ZOLOFT) 100 MG tablet Take 1 tablet (100 mg) by mouth daily 90 tablet 1     tiZANidine (ZANAFLEX) 4 MG tablet Take 1-2 tablets (4-8 mg) by mouth nightly as needed       traMADol (ULTRAM) 50 MG tablet TAKE 1 TABLET (50 MG) BY MOUTH NIGHTLY AS NEEDED FOR SEVERE PAIN                 Again, thank you for allowing me to participate in the care of your patient.        Sincerely,        Gabriel Santoyo MD

## 2020-12-14 NOTE — TELEPHONE ENCOUNTER
Oral Chemotherapy Monitoring Program   Medication: Revlimid  Rx: 10mg PO daily on days 1 through 28 of 28 day cycle   Auth #: [id number]   Risk Category: Adult Female NORp  Routine survey questions reviewed.   Rx to be Escribed to [facility]    Lor Borja  [facility]  Oncology Pharmacy Liaison   [email or web address]  [phone] (phone)  [phone] (fax)

## 2020-12-14 NOTE — PATIENT INSTRUCTIONS
"Assessment:  (R25.1) Tremor -worsening and affecting her ability to use a keyboard and mouse  Duration - 5 years or more.  Father had tremor/parkinson  Not a big drinker - not clear if alcohol helps her tremor  Smell perception is okay  She has sleep apnea  She cut out caffeine and there were no changes in her tremors  She is right handed and has more right than left handed tremor.   She was told she has ET as she is \"not stooped over\"  Her tremors are present when she is using her hands and she has a vocal and head tremor as well.     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 She has not worked with OT    Exercise/Therapy  - none    Cognitive/Driving - has had some memory/cognitive issues - seen on 10/22/2020 by Dr. Mcgrath for neuropsychological evaluation: there were mild frontal and possibly left temporal changes and recommended to be evaluated again in one year. She has not had a brain mri. She has mood issues that could benefit from psychotherapy as anxiety may be aggravating her memory.     Mood Generalized Anxiety disorder/Depression  Mood is good per her report.  Sertraline zoloft 100mg  Living with son basil - health  .     Hallucinations/delusions - denies    Sleep Apnea -  Dr. Juan Jose Salazar  Denies RBD features    Bladder  - wears a pad.   Has changes in urinary frequency/urgency if blood sugars are out of control  2-4/noc getting up to urinate  Has not seen a urologist.   Seen by Ob-gyn a year ago.     Respiratory  Loratadine claritin 10mg - not taking  Former smoker - quit 15 years prior was 1 ppd  Quit 2005    GI/Constipation/GERD  - no issue  Calcium antacid 500mg tums - not taking    ENDO  Atorvastatin lipitor 20mg at bedtime   Calcitriol rocaltrol 0.25mcg - not " taking     Type 2 Diabetes with neuropathy  Exenatide ER Bydureon 2mg weekly   Lantus 34 units at bedtime  Metformin 500mg2 tabs  twice daily  Has not seen dietician   Seeing Coco Antoine, Pharm D  DOes not have an endocrinologist  Has not seen nutrition  A1c was 8.8 on 12/7/2020    Hypothyroidism;   Levothyroxine synthroid/levothyroid 175 mcg daily  s/p thyroidectomy for multinodular goiter    Cardio/heart  - blood pressure is stable initially after stem cell therapy but has gone up at times  States she is trying to lose weight to help her diabetes and blood pressure.   Her chart has been 160/88; encouraged her to get a home blood pressure cuff.     Vision  - on treatment for glaucoma  Latanoprost xalatan 0.005% ophthalmic solution    HEME:  Multiple myeloma - in remision  IgA Kappa Multiple Myeloma. Presented 2018 with anemia and fatigue. Skeletal survey was unremarkable and UPEP had minimal protein (156 mg/m2). PET 11/2018 showed bone marrow consistent with hypermetabolic plasma cell myeloma. M spike was 0.17. She started RVD and Zometa. 4/2019 she had an autologous transplant. She was on revlimid maintenance and zometa from her prior oncologist in [place]. Zometa through 12/2020 to complete a total of 2 years of therapy    PCP Dr. Gabrielle Edwards    Acetaminophen tylenol 325mg - as needed  Acyclovir zovirax 400mg twice daily  Meloxicam mobic 15mg  - not taking  Oxycodone roxicodone - not taking  Pregabalin lyrica 100mg 3/day for neuropathy and helping her mood as well.   Tizanidine zanaflex 4mg - not taking   Tramadol ultram 50mg - not taking    IN summary  - essential tremor  - neuropathy  - other medical issues  -diabetes     Medications            Acetaminophen tylenol 325mg As needed       Acyclovir zovirax 400mg 1  1     Atorvastatin lipitor 20mg    1     Calcitriol rocaltrol 0.25mcg Not taking       Calcium antacid 500mg tums        Exenatide ER Bydureon 2mg weekly       Insulin glargine lantus   34  units     Latanoprost xalatan 0.005% ophthalmic solution   Both eyes     Lenalidomide revlimid 10mg         Levothyroxine synthroid/levothyroid 175 mcg 1       Loratadine claritin 10mg         Meloxicam mobic 15mg  Not using       Metformin glucophage 500mg  2  2     Miconazole monistat Not using       Nonformulary revlimid ?       NOnformulary zometa ?       Oxycodone roxicodone Not taking       Pregabalin lyrica 100mg  1 1 1     Sertraline zoloft 100mg 1       Tizanidine zanaflex 4mg  Not taking       Tramadol ultram 50mg  Not taking                                                 Plan:    1. Needs help with her diabetes management - recent A1c was 8.8 and is working with pharmacist Coco Antoine. She may benefit from seeing nutrition and possibly endocrinology in addition to her pcp    2. For her tremor - referred her to occupational therapy for assistive help as her tremor is impacting her work    3. She should buy a home blood pressure cuff so we have accurate measurements.     4. Consultation with Pharmacist Flores Hutchinson about a trial of a beta blocker for her tremor - if appropriate with patient monitoring her bp/heart rate. Her work schedule is 530am  - 2pm  And may consider a twice daily vs 3/day regimen of a low dose propranolol     5. Ongoing memory/cognitive issues. She will forego getting a brain mri at this time - discussed this and not sure needed at this point other than for a baseline. She will need to go back and see Dr. Mcgrath in a year for retesting of her cognition.     6. Stress/anxiety may be impacting her tremor as well as mood. She may benefit from counseling.

## 2020-12-16 ENCOUNTER — TELEPHONE (OUTPATIENT)
Dept: NEUROLOGY | Facility: CLINIC | Age: 65
End: 2020-12-16

## 2020-12-16 NOTE — TELEPHONE ENCOUNTER
1st attempt.    Left voicemail to schedule 3 month virtual follow up with Dr. Santoyo (around 3/14/21) and also left phone number for Cecelia ([phone]) to schedule Nutrition/Diabetes/Endo Referrals.    Corrie Merlos  Medical Specialty Procedure   [facility]

## 2020-12-29 ENCOUNTER — VIRTUAL VISIT (OUTPATIENT)
Dept: PHARMACY | Facility: CLINIC | Age: 65
End: 2020-12-29
Payer: COMMERCIAL

## 2020-12-29 DIAGNOSIS — Z79.4 TYPE 2 DIABETES MELLITUS WITH HYPERGLYCEMIA, WITH LONG-TERM CURRENT USE OF INSULIN (H): Primary | ICD-10-CM

## 2020-12-29 DIAGNOSIS — E11.65 TYPE 2 DIABETES MELLITUS WITH HYPERGLYCEMIA, WITH LONG-TERM CURRENT USE OF INSULIN (H): Primary | ICD-10-CM

## 2020-12-29 PROCEDURE — 99607 MTMS BY PHARM ADDL 15 MIN: CPT | Mod: TEL | Performed by: PHARMACIST

## 2020-12-29 PROCEDURE — 99606 MTMS BY PHARM EST 15 MIN: CPT | Mod: TEL | Performed by: PHARMACIST

## 2020-12-29 RX ORDER — LOSARTAN POTASSIUM 25 MG/1
25 TABLET ORAL DAILY
Qty: 90 TABLET | Refills: 3 | Status: SHIPPED | OUTPATIENT
Start: 2020-12-29 | End: 2022-01-18

## 2020-12-29 NOTE — PROGRESS NOTES
MTM ENCOUNTER  SUBJECTIVE/OBJECTIVE:                           Winter Loya is a 65 year old female called for a follow-up visit. She was referred to me from Brittany Rosenhtal.  Today's visit is a follow-up MTM visit from 20.     Reason for visit: diabetes follow-up.    Allergies/ADRs: Reviewed in chart  Tobacco: She reports that she quit smoking about 16 years ago. Her smoking use included cigarettes. She smoked 1.00 pack per day. She has never used smokeless tobacco.  Alcohol: not currently using  Caffeine: 1 cups/day of coffee  Activity: none  Past Medical History: Reviewed in chart    Medication Adherence/Access: no issues reported    Type 2 Diabetes:  Currently taking metformin 1500 mg daily, Lantus 34 units daily and Bydureon 2 mg weekly. She reports diarrhea is improved but not resolved.  She is only requiring a change in clothes once a week for stool incontinence (down from 2-3 times weekly). She is experiencing some nausea but she attributes this to overeating. She prefers to keep this guardrail in place to manage her appetite.  Other side effects are denied.   Blood sugar monitorin time(s) daily. Ranges (patient reported):     Fasting- 217 (had ice cream the night before), 150, 127 (slept late this day)    She continues to have trouble remembering to monitor after eating.     Symptoms of low blood sugar? none  Symptoms of high blood sugar? none  Eye exam: up to date  Foot exam: due  Diet/Exercise: She shares that she has been eating a lot of treats and ice cream during the  holiday. She was talked to some coworkers and they use protein shakes. She has questions about this today. No exercise endorsed. She also notes that she was referred to diabetes education but has not completed the referral yet. She wonders what they service is about.   Aspirin: Not taking due to ??  Statin: Yes: atorvastatin 20 mg daily     The ASCVD Risk score (Kelly ORVILLE Jr., et al., 2013) failed to calculate for the  "following reasons:    Cannot find a previous HDL lab    Cannot find a previous total cholesterol lab    ACEi/ARB: No.   Urine Albumin:   Lab Results   Component Value Date    UMALCR 73.57 (H) 12/07/2020      Lab Results   Component Value Date    A1C 8.8 12/07/2020    A1C 9.1 08/21/2020    A1C 6.7 03/05/2020     Today's Vitals: There were no vitals taken for this visit. - telemed    BP Readings from Last 1 Encounters:   11/20/20 (!) 160/88     Pulse Readings from Last 1 Encounters:   11/20/20 79     Wt Readings from Last 1 Encounters:   11/20/20 271 lb 9.6 oz (123.2 kg)     Ht Readings from Last 1 Encounters:   11/20/20 5' 8.75\" (1.746 m)     Estimated body mass index is 40.4 kg/m  as calculated from the following:    Height as of 11/20/20: 5' 8.75\" (1.746 m).    Weight as of 11/20/20: 271 lb 9.6 oz (123.2 kg).    Temp Readings from Last 1 Encounters:   11/20/20 97.6  F (36.4  C) (Oral)     ASSESSMENT:                            Medication Adherence: No issues identified    Type 2 Diabetes: Patient is not meeting A1c goal of < 8%. Self monitoring of blood glucose is not at goal of fasting  mg/dL but has made significant progress towards this goal. Patient would benefit from further dose reduction in metformin to resolve diarrhea and monitoring BG twice daily fasting and post-prandial. Last, would benefit from starting ACE/ARB therapy due to proterinuria and HTN.     PLAN:                            1. Monitor BG fasting and 2 hours after a meal.   2. Reduce metformin to 1g daily - patient declines today.   3. Start losartan 25 mg daily.     I spent 23 minutes with this patient today. All changes were made via collaborative practice agreement with Delmi Gross. A copy of the visit note was provided to the patient's primary care provider.    Will follow up in 2 weeks.    The patient declined a summary of these recommendations.     Coco Antoine, Pharm.D., [facility]  Medication Therapy Management " Pharmacist  Page/VM:  [phone]      Patient consented to a telehealth visit: yes  Telemedicine Visit Details  Type of service:  Telephone visit  Start Time: 3:07 PM  End Time: 3:30 PM  Originating Location (patient location): Home  Distant Location (provider location):  [facility] MEDICATION THERAPY MANAGEMENT  Mode of Communication:  Telephone        Medication Therapy Recommendations  Diabetes mellitus, type 2 (H)    Current Medication: exenatide ER (BYDUREON) 2 MG pen   Rationale: Medication requires monitoring - Needs additional monitoring   Recommendation: Self-Monitoring - Monitor fasting and 2 hours after eating   Status: Patient Agreed - Adherence/Education          Rationale: Untreated condition - Needs additional medication therapy - Indication   Recommendation: Start Medication - losartan 12.5 mg Tabs half-tab   Status: Accepted with Changes per Provider

## 2020-12-30 NOTE — TELEPHONE ENCOUNTER
2nd attempt.    See note below.    Corrie Merlos  Medical Specialty Procedure   [facility] [place]

## 2021-01-04 DIAGNOSIS — C90.01 MULTIPLE MYELOMA IN REMISSION (H): Primary | ICD-10-CM

## 2021-01-08 ENCOUNTER — TELEPHONE (OUTPATIENT)
Dept: ONCOLOGY | Facility: CLINIC | Age: 66
End: 2021-01-08

## 2021-01-08 DIAGNOSIS — C90.01 MULTIPLE MYELOMA IN REMISSION (H): Primary | ICD-10-CM

## 2021-01-08 RX ORDER — LENALIDOMIDE 10 MG/1
10 CAPSULE ORAL DAILY
Qty: 28 CAPSULE | Refills: 0 | Status: SHIPPED | OUTPATIENT
Start: 2021-01-08 | End: 2021-02-05

## 2021-01-08 NOTE — TELEPHONE ENCOUNTER
Oral Chemotherapy Monitoring Program   Medication: Revlimid  Rx: 10mg PO daily on days 1 through 28 of 28 day cycle   Auth #: [id number]   Risk Category: Adult Female NORP  Routine survey questions reviewed.   Rx to be Escribed to [facility]    Lor Borja  [facility]  Oncology Pharmacy Liaison   [email or web address]  [phone] (phone)  [phone] (fax)

## 2021-01-13 NOTE — TELEPHONE ENCOUNTER
3rd attempt.     Chart letter printed & sent.    Corrie Merlos  Medical Specialty Procedure   [facility] [place]

## 2021-01-22 ENCOUNTER — VIRTUAL VISIT (OUTPATIENT)
Dept: PHARMACY | Facility: CLINIC | Age: 66
End: 2021-01-22
Payer: COMMERCIAL

## 2021-01-22 DIAGNOSIS — E11.65 TYPE 2 DIABETES MELLITUS WITH HYPERGLYCEMIA, WITH LONG-TERM CURRENT USE OF INSULIN (H): Primary | ICD-10-CM

## 2021-01-22 DIAGNOSIS — E89.0 STATUS POST COMPLETE THYROIDECTOMY: ICD-10-CM

## 2021-01-22 DIAGNOSIS — C90.01 MULTIPLE MYELOMA IN REMISSION (H): ICD-10-CM

## 2021-01-22 DIAGNOSIS — F33.1 MAJOR DEPRESSIVE DISORDER, RECURRENT EPISODE, MODERATE (H): ICD-10-CM

## 2021-01-22 DIAGNOSIS — Z79.4 TYPE 2 DIABETES MELLITUS WITH HYPERGLYCEMIA, WITH LONG-TERM CURRENT USE OF INSULIN (H): Primary | ICD-10-CM

## 2021-01-22 DIAGNOSIS — G62.9 NEUROPATHY: ICD-10-CM

## 2021-01-22 PROCEDURE — 99607 MTMS BY PHARM ADDL 15 MIN: CPT | Mod: TEL | Performed by: PHARMACIST

## 2021-01-22 PROCEDURE — 99606 MTMS BY PHARM EST 15 MIN: CPT | Mod: TEL | Performed by: PHARMACIST

## 2021-01-22 NOTE — Clinical Note
Hi Delmi Moreau Winter's diabetes is improving! Started Jardiance today to see if we can minimize use of insulin and achieve fasting goals.

## 2021-01-22 NOTE — LETTER
"        Date: 2021    Winter Loya  [address]  [place] [address]    Dear Ms. Loya,    Thank you for talking with me on 21 about your health and medications. Medicare s MTM (Medication Therapy Management) program helps you understand your medications and use them safely.      This letter includes an action plan (Medication Action Plan) and medication list (Personal Medication List). The action plan has steps you should take to help you get the best results from your medications. The medication list will help you keep track of your medications and how to use them the right way.       Have your action plan and medication list with you when you talk with your doctors, pharmacists, and other healthcare providers in your care team.     Ask your doctors, pharmacists, and other healthcare providers to update the action plan and medication list at every visit.     Take your medication list with you if you go to the hospital or emergency room.     Give a copy of the action plan and medication list to your family or caregivers.     If you want to talk about this letter or any of the papers with it, please call   [phone].We look forward to working with you, your doctors, and other healthcare providers to help you stay healthy through the [facility] MTM program.    Sincerely,  Coco Antoine [facility]    Enclosed: Medication Action Plan and Personal Medication List    MEDICATION ACTION PLAN FOR Winter Loya,  1955     This action plan will help you get the best results from your medications if you:   1. Read \"What we talked about.\"   2. Take the steps listed in the \"What I need to do\" boxes.   3. Fill in \"What I did and when I did it.\"   4. Fill in \"My follow-up plan\" and \"Questions I want to ask.\"     Have this action plan with you when you talk with your doctors, pharmacists, and other healthcare providers in your care team. Share this with your family or caregivers too.  DATE PREPARED: " 2021  What we talked about: Diabetes control                                                  What I need to do: Start Jardiance 10 mg daily to help with blood sugars. I am hoping we will be able to reduce insulin! What I did and when I did it:                                              My follow-up plan:                 Questions I want to ask:              If you have any questions about your action plan, call Coco Antoine [facility], Phone: [phone] , Monday-Friday 8-4:30pm.           PERSONAL MEDICATION LIST FOR Winter Loya,  1955     This medication list was made for you after we talked. We also used information from your doctor's chart.      Use blank rows to add new medications. Then fill in the dates you started using them.    Cross out medications when you no longer use them. Then write the date and why you stopped using them.    Ask your doctors, pharmacists, and other healthcare providers to update this list at every visit. Keep this list up-to-date with:       Prescription medications    Over the counter drugs     Herbals    Vitamins    Minerals      If you go to the hospital or emergency room, take this list with you. Share this with your family or caregivers too.     DATE PREPARED: 2021  Allergies or side effects: Patient has no known allergies.     Medication:  ACETAMINOPHEN 325 MG PO TABS      How I use it:  Take 2 tablets (650 mg) by mouth every 4 hours as needed for pain      Why I use it: Thyroid goiter    Prescriber:  Mariangel Kumari DO      Date I started using it:       Date I stopped using it:         Why I stopped using it:            Medication:  ACYCLOVIR 400 MG PO TABS      How I use it:  Take 400 mg by mouth 2 times daily      Why I use it:  Multiple Myeloma    Prescriber:  Mele Tipton MD      Date I started using it:       Date I stopped using it:         Why I stopped using it:            Medication:  ATORVASTATIN CALCIUM 20 MG PO TABS      How I use  it:  Take 1 tablet (20 mg) by mouth every 24 hours      Why I use it: Type 2 diabetes mellitus without complication, without long-term current use of insulin (H)    Prescriber:  Tiff Hayes MD      Date I started using it:       Date I stopped using it:         Why I stopped using it:            Medication:  BLOOD GLUCOSE MONITORING SUPPL KIT      How I use it:  Use to test blood sugar two times daily or as directed.      Why I use it: Type 2 diabetes mellitus without complication, without long-term current use of insulin (H)    Prescriber:  Brittany Rosenthal MD      Date I started using it:       Date I stopped using it:         Why I stopped using it:            Medication:  EMPAGLIFLOZIN 10 MG PO TABS      How I use it:  Take 1 tablet (10 mg) by mouth daily      Why I use it: Type 2 diabetes mellitus with hyperglycemia, with long-term current use of insulin (H)    Prescriber:  Delmi Gross APRN CNP      Date I started using it:       Date I stopped using it:         Why I stopped using it:            Medication:  EXENATIDE ER 2 MG SC PEN      How I use it:  Inject 2 mg Subcutaneous every 7 days      Why I use it: Type 2 diabetes mellitus with hyperglycemia, with long-term current use of insulin (H)    Prescriber:  Brittany Rosenthal MD      Date I started using it:       Date I stopped using it:         Why I stopped using it:            Medication:  GLUCOSE BLOOD VI STRP      How I use it:  Use to test blood sugar two times daily or as directed.      Why I use it: Type 2 diabetes mellitus without complication, without long-term current use of insulin (H)    Prescriber:  Brittany Rosenthal MD      Date I started using it:       Date I stopped using it:         Why I stopped using it:            Medication:  INSULIN GLARGINE  UNIT/ML SC SOPN      How I use it:  Inject 34 Units Subcutaneous At Bedtime      Why I use it: Type 2 diabetes mellitus without complication, without long-term  current use of insulin (H)    Prescriber:  Brittany Rosenthal MD      Date I started using it:       Date I stopped using it:         Why I stopped using it:               Medication:  LATANOPROST 0.005 % OP SOLN      How I use it:  Place 1 drop into both eyes At Bedtime      Why I use it: Glaucoma of both eyes, unspecified glaucoma type    Prescriber:  FALGUNI Florian      Date I started using it:       Date I stopped using it:         Why I stopped using it:            Medication:  LENALIDOMIDE 10 MG PO CAPS      How I use it:  Take 1 capsule (10 mg) by mouth daily for 28 days      Why I use it: Multiple myeloma in remission (H)    Prescriber:  Mele Tipton MD      Date I started using it:       Date I stopped using it:         Why I stopped using it:            Medication:  LEVOTHYROXINE SODIUM 175 MCG PO TABS      How I use it:  Take 1 tablet (175 mcg) by mouth every morning (before breakfast)      Why I use it: Thyroid goiter    Prescriber:  Brittany Rosenthal MD      Date I started using it:       Date I stopped using it:         Why I stopped using it:            Medication:  LOSARTAN POTASSIUM 25 MG PO TABS      How I use it:  Take 1 tablet (25 mg) by mouth daily      Why I use it: Type 2 diabetes mellitus with hyperglycemia, with long-term current use of insulin (H)    Prescriber:  FALGUNI Mederos CNP      Date I started using it:       Date I stopped using it:         Why I stopped using it:            Medication:  METFORMIN  MG PO TABS      How I use it:  Take 2 tablets (1,000 mg) by mouth 2 times daily (with meals)      Why I use it: Type 2 diabetes mellitus with diabetic polyneuropathy, with long-term current use of insulin (H)    Prescriber:  Brittany Rosenthal MD      Date I started using it:       Date I stopped using it:         Why I stopped using it:            Medication:  PREGABALIN 100 MG PO CAPS      How I use it:  Take 1 capsule (100 mg) by mouth 3 times daily       Why I use it: Type 2 diabetes mellitus with diabetic polyneuropathy, with long-term current use of insulin (H)    Prescriber:  Brittany Rosenthal MD      Date I started using it:       Date I stopped using it:         Why I stopped using it:            Medication:  SERTRALINE  MG PO TABS      How I use it:  Take 1 tablet (100 mg) by mouth daily      Why I use it: Major depressive disorder with current active episode, unspecified depression episode severity, unspecified whether recurrent    Prescriber:  Stacie Blanton MD      Date I started using it:       Date I stopped using it:         Why I stopped using it:            Medication:         How I use it:         Why I use it:      Prescriber:         Date I started using it:       Date I stopped using it:         Why I stopped using it:            Medication:         How I use it:         Why I use it:      Prescriber:         Date I started using it:       Date I stopped using it:         Why I stopped using it:            Medication:         How I use it:         Why I use it:      Prescriber:         Date I started using it:       Date I stopped using it:         Why I stopped using it:              Other Information:     If you have any questions about your medication list, call Coco Antoine [facility], Phone: [phone] , Monday-Friday 8-4:30pm.    According to the Paperwork Reduction Act of 1995, no persons are required to respond to a collection of information unless it displays a valid OMB control number. The valid OMB number for this information collection is [id number]. The time required to complete this information collection is estimated to average 40 minutes per response, including the time to review instructions, searching existing data resources, gather the data needed, and complete and review the information collection. If you have any comments concerning the accuracy of the time estimate(s) or suggestions for improving this  form, please write to: CMS, Attn: [employer] Reports Clearance Officer, [address] [place] [address], [place], [place] [address].

## 2021-01-22 NOTE — PROGRESS NOTES
Medication Therapy Management (MTM) Encounter    ASSESSMENT:                            Medication Adherence/Access: No issues identified    Type 2 Diabetes: Patient is not meeting A1c goal of < 8%. Self monitoring of blood glucose is at goal of post prandial < 180 mg/dL but is not at fasting goal of  mg/dL. Patient would benefit from adding an SGLT2i.    Hypothyroidism/S/p Thyroidectomy: Stable.     Multiple Myeloma/S/p Autologous Stem Cell Transplant: plan in place with oncology.     Neuropathy: Apparently stable.     Depression: Stable.     PLAN:                            1. Start Jardiance 10 mg daily. Continue SMBG.     Follow-up: 2 weeks    SUBJECTIVE/OBJECTIVE:                          Winter Loya is a 65 year old female called for a follow-up visit. She was referred to me from Brittany Rosenthal.  Today's visit is a follow-up MTM visit from 20.     Reason for visit:  Uncontrolled diabetes.    Allergies/ADRs: Reviewed in chart  Tobacco: She reports that she quit smoking about 16 years ago. Her smoking use included cigarettes. She smoked 1.00 pack per day. She has never used smokeless tobacco.  Alcohol: not currently using  Caffeine: 1 cups/day of coffee  Activity: none at this time, is interested in walking more often but does not want to go to gyms yet  Past Medical History: Reviewed in chart      Medication Adherence/Access: no issues reported    Type 2 Diabetes:  Pt currently taking Lantus 34 units at bedtime, Bydureon 2 mg weekly and metformin 2000 mg daily. She she reports stomach upset and heartburn in recent days that has been bothersome. She has been treating heartburn with Tums. She reports that her diet is improving and using a GL1RA inhibitor has been helpful in improving her dietary habits.   Blood sugar monitorin time(s) daily. Ranges (patient reported):     FPs  2HR PP-180    Symptoms of low blood sugar? none  Symptoms of high blood sugar? Neuropathy   Eye exam: up to  date  Foot exam: due  Diet/Exercise: none specified  Aspirin: Not taking due to unclear reason; unclear documentation re: whether she has taken it in the past or not  Statin: Yes: atorvastatin 20 mg daily     No results for input(s): CHOL, HDL, LDL, TRIG, CHOLHDLRATIO in the last 53434 hours.    ACEi/ARB: Losartan 25 mg daily   BP Readings from Last 3 Encounters:   11/20/20 (!) 160/88   10/19/20 130/81   09/17/20 136/85     Lab Results   Component Value Date    A1C 8.8 12/07/2020    A1C 9.1 08/21/2020    A1C 6.7 03/05/2020     Last Comprehensive Metabolic Panel:  Sodium   Date Value Ref Range Status   12/07/2020 137 133 - 144 mmol/L Final     Potassium   Date Value Ref Range Status   12/07/2020 3.7 3.4 - 5.3 mmol/L Final     Chloride   Date Value Ref Range Status   12/07/2020 102 94 - 109 mmol/L Final     Carbon Dioxide   Date Value Ref Range Status   12/07/2020 28 20 - 32 mmol/L Final     Anion Gap   Date Value Ref Range Status   12/07/2020 7 3 - 14 mmol/L Final     Glucose   Date Value Ref Range Status   12/07/2020 177 (H) 70 - 99 mg/dL Final     Comment:     Non Fasting     Urea Nitrogen   Date Value Ref Range Status   12/07/2020 15 7 - 30 mg/dL Final     Creatinine   Date Value Ref Range Status   12/07/2020 0.62 0.52 - 1.04 mg/dL Final     GFR Estimate   Date Value Ref Range Status   12/07/2020 >90 >60 mL/min/[1.73_m2] Final     Comment:     Non  GFR Calc  Starting 12/18/2018, serum creatinine based estimated GFR (eGFR) will be   calculated using the Chronic Kidney Disease Epidemiology Collaboration   (CKD-EPI) equation.       Calcium   Date Value Ref Range Status   12/07/2020 9.1 8.5 - 10.1 mg/dL Final     Hypothyroidism/S/p Thyroidectomy: Patient is taking levothyroxine 175 mcg daily. She denies side effects associated with medications today.  TSH   Date Value Ref Range Status   10/19/2020 2.32 0.40 - 4.00 mU/L Final     T4 Free   Date Value Ref Range Status   02/22/2020 0.84 0.76 - 1.46 ng/dL  "Final     Multiple Myeloma/S/p Autologous Stem Cell Transplant: Current medications include acyclovir 400 mg twice daily, Revlimid 10 mg daily x28 days. Oncology prefers she continues with therapy to prevent recurrence of cancer. She has been told that her neuropathy is not related to Revlimid.     Neuropathy: Current medications include Lyrica 100 mg three times daily. She still often always misses her afternoon dose. She does not have any supply today and is concerned her symptoms will worsen. She denies side effects and feels this is helpful in her neuropathy.     Depression:  Current medications include: Sertraline 100 mg once daily. Pt reports that depression symptoms are stable. Denies side effects.     PHQ-2 ( 1999 Pfizer) 9/14/2020 8/14/2020   Q1: Little interest or pleasure in doing things 0 0   Q2: Feeling down, depressed or hopeless 0 0   PHQ-2 Score 0 0       Today's Vitals: There were no vitals taken for this visit. - telemed    BP Readings from Last 1 Encounters:   11/20/20 (!) 160/88     Pulse Readings from Last 1 Encounters:   11/20/20 79     Wt Readings from Last 1 Encounters:   11/20/20 271 lb 9.6 oz (123.2 kg)     Ht Readings from Last 1 Encounters:   11/20/20 5' 8.75\" (1.746 m)     Estimated body mass index is 40.4 kg/m  as calculated from the following:    Height as of 11/20/20: 5' 8.75\" (1.746 m).    Weight as of 11/20/20: 271 lb 9.6 oz (123.2 kg).    Temp Readings from Last 1 Encounters:   11/20/20 97.6  F (36.4  C) (Oral)       ----------------    Medicare Part D topics discussed:Medication changes    I spent 22 minutes with this patient today (an extra 15 minutes was spent creating the Medication Action Plan). All changes were made via collaborative practice agreement with Delmi Gross. A copy of the visit note was provided to the patient's primary care provider.    The patient declined a summary of these recommendations.     Coco Antoine, Pharm.D., [facility]  Medication Therapy " Management Pharmacist  Page/VM:  [phone]    Telemedicine Visit Details  Type of service:  Telephone visit  Start Time: 2:31 PM  End Time: 2:53 PM  Originating Location (patient location): Home  Distant Location (provider location):  [facility] MEDICATION THERAPY MANAGEMENT      Medication Therapy Recommendations  No medication therapy recommendations to display

## 2021-01-27 ENCOUNTER — IMMUNIZATION (OUTPATIENT)
Dept: NURSING | Facility: CLINIC | Age: 66
End: 2021-01-27
Payer: COMMERCIAL

## 2021-01-27 ENCOUNTER — OFFICE VISIT (OUTPATIENT)
Dept: FAMILY MEDICINE | Facility: CLINIC | Age: 66
End: 2021-01-27
Payer: COMMERCIAL

## 2021-01-27 VITALS
WEIGHT: 266 LBS | TEMPERATURE: 97.3 F | HEIGHT: 69 IN | BODY MASS INDEX: 39.4 KG/M2 | SYSTOLIC BLOOD PRESSURE: 116 MMHG | HEART RATE: 76 BPM | DIASTOLIC BLOOD PRESSURE: 74 MMHG

## 2021-01-27 DIAGNOSIS — C90.01 MULTIPLE MYELOMA IN REMISSION (H): Chronic | ICD-10-CM

## 2021-01-27 DIAGNOSIS — N89.8 VAGINAL DISCHARGE: ICD-10-CM

## 2021-01-27 DIAGNOSIS — E11.65 TYPE 2 DIABETES MELLITUS WITH HYPERGLYCEMIA, WITH LONG-TERM CURRENT USE OF INSULIN (H): ICD-10-CM

## 2021-01-27 DIAGNOSIS — Z87.891 PERSONAL HISTORY OF TOBACCO USE: ICD-10-CM

## 2021-01-27 DIAGNOSIS — E78.5 HYPERLIPIDEMIA, UNSPECIFIED HYPERLIPIDEMIA TYPE: Chronic | ICD-10-CM

## 2021-01-27 DIAGNOSIS — Z00.01 ENCOUNTER FOR ROUTINE ADULT MEDICAL EXAM WITH ABNORMAL FINDINGS: Primary | ICD-10-CM

## 2021-01-27 DIAGNOSIS — F41.1 GAD (GENERALIZED ANXIETY DISORDER): Chronic | ICD-10-CM

## 2021-01-27 DIAGNOSIS — E66.01 CLASS 2 SEVERE OBESITY DUE TO EXCESS CALORIES WITH SERIOUS COMORBIDITY AND BODY MASS INDEX (BMI) OF 39.0 TO 39.9 IN ADULT (H): Chronic | ICD-10-CM

## 2021-01-27 DIAGNOSIS — Z79.4 TYPE 2 DIABETES MELLITUS WITH HYPERGLYCEMIA, WITH LONG-TERM CURRENT USE OF INSULIN (H): ICD-10-CM

## 2021-01-27 DIAGNOSIS — Z12.4 SCREENING FOR MALIGNANT NEOPLASM OF CERVIX: ICD-10-CM

## 2021-01-27 DIAGNOSIS — R25.1 TREMOR: ICD-10-CM

## 2021-01-27 DIAGNOSIS — B37.2 CANDIDAL INTERTRIGO: ICD-10-CM

## 2021-01-27 DIAGNOSIS — F33.1 MAJOR DEPRESSIVE DISORDER, RECURRENT EPISODE, MODERATE (H): Chronic | ICD-10-CM

## 2021-01-27 DIAGNOSIS — E66.812 CLASS 2 SEVERE OBESITY DUE TO EXCESS CALORIES WITH SERIOUS COMORBIDITY AND BODY MASS INDEX (BMI) OF 39.0 TO 39.9 IN ADULT (H): Chronic | ICD-10-CM

## 2021-01-27 DIAGNOSIS — E89.0 POSTOPERATIVE HYPOTHYROIDISM: Chronic | ICD-10-CM

## 2021-01-27 LAB
BASOPHILS # BLD AUTO: 0 10E9/L (ref 0–0.2)
BASOPHILS NFR BLD AUTO: 0.9 %
DIFFERENTIAL METHOD BLD: ABNORMAL
EOSINOPHIL # BLD AUTO: 0.3 10E9/L (ref 0–0.7)
EOSINOPHIL NFR BLD AUTO: 6.8 %
ERYTHROCYTE [DISTWIDTH] IN BLOOD BY AUTOMATED COUNT: 14.7 % (ref 10–15)
HCT VFR BLD AUTO: 40.4 % (ref 35–47)
HGB BLD-MCNC: 13.5 G/DL (ref 11.7–15.7)
LYMPHOCYTES # BLD AUTO: 1 10E9/L (ref 0.8–5.3)
LYMPHOCYTES NFR BLD AUTO: 23.8 %
MCH RBC QN AUTO: 28.3 PG (ref 26.5–33)
MCHC RBC AUTO-ENTMCNC: 33.4 G/DL (ref 31.5–36.5)
MCV RBC AUTO: 85 FL (ref 78–100)
MONOCYTES # BLD AUTO: 0.5 10E9/L (ref 0–1.3)
MONOCYTES NFR BLD AUTO: 11.8 %
NEUTROPHILS # BLD AUTO: 2.4 10E9/L (ref 1.6–8.3)
NEUTROPHILS NFR BLD AUTO: 56.7 %
PLATELET # BLD AUTO: 141 10E9/L (ref 150–450)
RBC # BLD AUTO: 4.77 10E12/L (ref 3.8–5.2)
SPECIMEN SOURCE: NORMAL
WBC # BLD AUTO: 4.3 10E9/L (ref 4–11)
WET PREP SPEC: NORMAL

## 2021-01-27 PROCEDURE — 99396 PREV VISIT EST AGE 40-64: CPT | Performed by: NURSE PRACTITIONER

## 2021-01-27 PROCEDURE — 84443 ASSAY THYROID STIM HORMONE: CPT | Performed by: NURSE PRACTITIONER

## 2021-01-27 PROCEDURE — 36415 COLL VENOUS BLD VENIPUNCTURE: CPT | Performed by: NURSE PRACTITIONER

## 2021-01-27 PROCEDURE — 91300 PR COVID VAC PFIZER DIL RECON 30 MCG/0.3 ML IM: CPT

## 2021-01-27 PROCEDURE — 80061 LIPID PANEL: CPT | Performed by: NURSE PRACTITIONER

## 2021-01-27 PROCEDURE — 0001A PR COVID VAC PFIZER DIL RECON 30 MCG/0.3 ML IM: CPT

## 2021-01-27 PROCEDURE — 87624 HPV HI-RISK TYP POOLED RSLT: CPT | Performed by: NURSE PRACTITIONER

## 2021-01-27 PROCEDURE — 85025 COMPLETE CBC W/AUTO DIFF WBC: CPT | Performed by: NURSE PRACTITIONER

## 2021-01-27 PROCEDURE — 99213 OFFICE O/P EST LOW 20 MIN: CPT | Mod: 25 | Performed by: NURSE PRACTITIONER

## 2021-01-27 PROCEDURE — G0145 SCR C/V CYTO,THINLAYER,RESCR: HCPCS | Performed by: NURSE PRACTITIONER

## 2021-01-27 PROCEDURE — 87210 SMEAR WET MOUNT SALINE/INK: CPT | Performed by: NURSE PRACTITIONER

## 2021-01-27 RX ORDER — NYSTATIN 100000 [USP'U]/G
POWDER TOPICAL 3 TIMES DAILY
Qty: 60 G | Refills: 3 | Status: SHIPPED | OUTPATIENT
Start: 2021-01-27 | End: 2021-06-28

## 2021-01-27 RX ORDER — PROPRANOLOL HCL 60 MG
60 CAPSULE, EXTENDED RELEASE 24HR ORAL DAILY
Qty: 30 CAPSULE | Refills: 3 | Status: SHIPPED | OUTPATIENT
Start: 2021-01-27 | End: 2021-06-08

## 2021-01-27 ASSESSMENT — ENCOUNTER SYMPTOMS
DIARRHEA: 0
SHORTNESS OF BREATH: 0
NERVOUS/ANXIOUS: 0
PARESTHESIAS: 0
ABDOMINAL PAIN: 0
PALPITATIONS: 0
FEVER: 0
CONSTIPATION: 0
DIZZINESS: 0
HEMATOCHEZIA: 0
NAUSEA: 1
MYALGIAS: 1
ARTHRALGIAS: 1
WEAKNESS: 0
CHILLS: 0
SORE THROAT: 0
HEADACHES: 0
BREAST MASS: 0
HEARTBURN: 1
EYE PAIN: 0
COUGH: 0
JOINT SWELLING: 0
FREQUENCY: 1
DYSURIA: 0
HEMATURIA: 0

## 2021-01-27 ASSESSMENT — MIFFLIN-ST. JEOR: SCORE: 1825.95

## 2021-01-27 NOTE — PATIENT INSTRUCTIONS
Preventive Health Recommendations  Female Ages 50 - 64    Yearly exam: See your health care provider every year in order to  o Review health changes.   o Discuss preventive care.    o Review your medicines if your doctor has prescribed any.      Get a Pap test every three years (unless you have an abnormal result and your provider advises testing more often).    If you get Pap tests with HPV test, you only need to test every 5 years, unless you have an abnormal result.     You do not need a Pap test if your uterus was removed (hysterectomy) and you have not had cancer.    You should be tested each year for STDs (sexually transmitted diseases) if you're at risk.     Have a mammogram every 1 to 2 years.    Have a colonoscopy at age 50, or have a yearly FIT test (stool test). These exams screen for colon cancer.      Have a cholesterol test every 5 years, or more often if advised.    Have a diabetes test (fasting glucose) every three years. If you are at risk for diabetes, you should have this test more often.     If you are at risk for osteoporosis (brittle bone disease), think about having a bone density scan (DEXA).    Shots: Get a flu shot each year. Get a tetanus shot every 10 years.    Nutrition:     Eat at least 5 servings of fruits and vegetables each day.    Eat whole-grain bread, whole-wheat pasta and brown rice instead of white grains and rice.    Get adequate Calcium and Vitamin D.     Lifestyle    Exercise at least 150 minutes a week (30 minutes a day, 5 days a week). This will help you control your weight and prevent disease.    Limit alcohol to one drink per day.    No smoking.     Wear sunscreen to prevent skin cancer.     See your dentist every six months for an exam and cleaning.    See your eye doctor every 1 to 2 years.    Preventive Health Recommendations  Female Ages 50 - 64    Yearly exam: See your health care provider every year in order to  o Review health changes.   o Discuss preventive  care.    o Review your medicines if your doctor has prescribed any.      Get a Pap test every three years (unless you have an abnormal result and your provider advises testing more often).    If you get Pap tests with HPV test, you only need to test every 5 years, unless you have an abnormal result.     You do not need a Pap test if your uterus was removed (hysterectomy) and you have not had cancer.    You should be tested each year for STDs (sexually transmitted diseases) if you're at risk.     Have a mammogram every 1 to 2 years.    Have a colonoscopy at age 50, or have a yearly FIT test (stool test). These exams screen for colon cancer.      Have a cholesterol test every 5 years, or more often if advised.    Have a diabetes test (fasting glucose) every three years. If you are at risk for diabetes, you should have this test more often.     If you are at risk for osteoporosis (brittle bone disease), think about having a bone density scan (DEXA).    Shots: Get a flu shot each year. Get a tetanus shot every 10 years.    Nutrition:     Eat at least 5 servings of fruits and vegetables each day.    Eat whole-grain bread, whole-wheat pasta and brown rice instead of white grains and rice.    Get adequate Calcium and Vitamin D.     Lifestyle    Exercise at least 150 minutes a week (30 minutes a day, 5 days a week). This will help you control your weight and prevent disease.    Limit alcohol to one drink per day.    No smoking.     Wear sunscreen to prevent skin cancer.     See your dentist every six months for an exam and cleaning.    See your eye doctor every 1 to 2 years.    Preventive Health Recommendations  Female Ages 50 - 64    Yearly exam: See your health care provider every year in order to  o Review health changes.   o Discuss preventive care.    o Review your medicines if your doctor has prescribed any.      Get a Pap test every three years (unless you have an abnormal result and your provider advises testing  more often).    If you get Pap tests with HPV test, you only need to test every 5 years, unless you have an abnormal result.     You do not need a Pap test if your uterus was removed (hysterectomy) and you have not had cancer.    You should be tested each year for STDs (sexually transmitted diseases) if you're at risk.     Have a mammogram every 1 to 2 years.    Have a colonoscopy at age 50, or have a yearly FIT test (stool test). These exams screen for colon cancer.      Have a cholesterol test every 5 years, or more often if advised.    Have a diabetes test (fasting glucose) every three years. If you are at risk for diabetes, you should have this test more often.     If you are at risk for osteoporosis (brittle bone disease), think about having a bone density scan (DEXA).    Shots: Get a flu shot each year. Get a tetanus shot every 10 years.    Nutrition:     Eat at least 5 servings of fruits and vegetables each day.    Eat whole-grain bread, whole-wheat pasta and brown rice instead of white grains and rice.    Get adequate Calcium and Vitamin D.     Lifestyle    Exercise at least 150 minutes a week (30 minutes a day, 5 days a week). This will help you control your weight and prevent disease.    Limit alcohol to one drink per day.    No smoking.     Wear sunscreen to prevent skin cancer.     See your dentist every six months for an exam and cleaning.    See your eye doctor every 1 to 2 years.    Lung Cancer Screening   Frequently Asked Questions  If you are at high-risk for lung cancer, getting screened with low-dose computed tomography (LDCT) every year can help save your life. This handout offers answers to some of the most common questions about lung cancer screening. If you have other questions, please call [phone] ([phone]).     What is it?  Lung cancer screening uses special X-ray technology to create an image of your lung tissue. The exam is quick and easy and takes less than 10 seconds. We  don t give you any medicine or use any needles. You can eat before and after the exam. You don t need to change your clothes as long as the clothing on your chest doesn t contain metal. But, you do need to be able to hold your breath for at least 6 seconds during the exam.    What is the goal of lung cancer screening?  The goal of lung cancer screening is to save lives. Many times, lung cancer is not found until a person starts having physical symptoms. Lung cancer screening can help detect lung cancer in the earliest stages when it may be easier to treat.    Who should be screened for lung cancer?  We suggest lung cancer screening for anyone who is at high-risk for lung cancer. You are in the high-risk group if you:      are between the ages of 55 and 79, and    have smoked at least 1 pack of cigarettes a day for 30 or more years, and    still smoke or have quit within the past 15 years.    However, if you have a new cough or shortness of breath, you should talk to your doctor before being screened.    Some national lung health advocacy groups also recommend screening for people ages 50 to 79 who have smoked an average of 1 pack of cigarettes a day for 20 years. They must also have at least 1 other risk factor for lung cancer, not including exposure to secondhand smoke. Other risk factors are having had cancer in the past, emphysema, pulmonary fibrosis, COPD, a family history of lung cancer, or exposure to certain materials such as arsenic, asbestos, beryllium, cadmium, chromium, diesel fumes, nickel, radon or silica. Your care team can help you know if you have one of these risk factors.     Why does it matter if I have symptoms?  Certain symptoms can be a sign that you have a condition in your lungs that should be checked and treated by your doctor. These symptoms include fever, chest pain, a new or changing cough, shortness of breath that you have never felt before, coughing up blood or unexplained weight loss.  Having any of these symptoms can greatly affect the results of lung cancer screening.       Should all smokers get an LDCT lung cancer screening exam?  It depends. Lung cancer screening is for a very specific group of men and women who have a history of heavy smoking over a long period of time (see  Who should be screened for lung cancer  above).  I am in the high-risk group, but have been diagnosed with cancer in the past. Is LDCT lung cancer screening right for me?  In some cases, you should not have LDCT lung screening, such as when your doctor is already following your cancer with CT scan studies. Your doctor will help you decide if LDCT lung screening is right for you.  Do I need to have a screening exam every year?  Yes. If you are in the high-risk group described earlier, you should get an LDCT lung cancer screening exam every year until you are 79, or are no longer willing or able to undergo screening and possible procedures to diagnose and treat lung cancer.  How effective is LDCT at preventing death from lung cancer?  Studies have shown that LDCT lung cancer screening can lower the risk of death from lung cancer by 20 percent in people who are at high-risk.  What are the risks?  There are some risks and limitations of LDCT lung cancer screening. We want to make sure you understand the risks and benefits, so please let us know if you have any questions. Your doctor may want to talk with you more about these risks.    Radiation exposure: As with any exam that uses radiation, there is a very small increased risk of cancer. The amount of radiation in LDCT is small--about the same amount a person would get from a mammogram. Your doctor orders the exam when he or she feels the potential benefits outweigh the risks.    False negatives: No test is perfect, including LDCT. It is possible that you may have a medical condition, including lung cancer, that is not found during your exam. This is called a false negative  result.    False positives and more testing: LDCT very often finds something in the lung that could be cancer, but in fact is not. This is called a false positive result. False positive tests often cause anxiety. To make sure these findings are not cancer, you may need to have more tests. These tests will be done only if you give us permission. Sometimes patients need a treatment that can have side effects, such as a biopsy. For more information on false positives, see  What can I expect from the results?     Findings not related to lung cancer: Your LDCT exam also takes pictures of areas of your body next to your lungs. In a very small number of cases, the CT scan will show an abnormal finding in one of these areas, such as your kidneys, adrenal glands, liver or thyroid. This finding may not be serious, but you may need more tests. Your doctor can help you decide what other tests you may need, if any.  What can I expect from the results?  About 1 out of 4 LDCT exams will find something that may need more tests. Most of the time, these findings are lung nodules. Lung nodules are very small collections of tissue in the lung. These nodules are very common, and the vast majority--more than 97 percent--are not cancer (benign). Most are normal lymph nodes or small areas of scarring from past infections.  But, if a small lung nodule is found to be cancer, the cancer can be cured more than 90 percent of the time. To know if the nodule is cancer, we may need to get more images before your next yearly screening exam. If the nodule has suspicious features (for example, it is large, has an odd shape or grows over time), we will refer you to a specialist for further testing.  Will my doctor also get the results?  Yes. Your doctor will get a copy of your results.  Is it okay to keep smoking now that there s a cancer screening exam?  No. Tobacco is one of the strongest cancer-causing agents. It causes not only lung cancer, but  other cancers and cardiovascular (heart) diseases as well. The damage caused by smoking builds over time. This means that the longer you smoke, the higher your risk of disease. While it is never too late to quit, the sooner you quit, the better.  Where can I find help to quit smoking?  The best way to prevent lung cancer is to stop smoking. If you have already quit smoking, congratulations and keep it up! For help on quitting smoking, please call [employer] at 4-193-642-NWEP ([phone]) or the American [employer] Society at 1-555.980.3708 to find local resources near you.  One-on-one health coaching:  If you d prefer to work individually with a health care provider on tobacco cessation, we offer:      Medication Therapy Management:  Our specially trained pharmacists work closely with you and your doctor to help you quit smoking.  Call [phone] or [phone] (toll free).     Can Do: Health coaching offered by [facility].  [email or web address]

## 2021-01-27 NOTE — PROGRESS NOTES
SUBJECTIVE:   CC: Winter Loya is an 65 year old woman who presents for preventive health visit.     {Split Bill scripting  The purpose of this visit is to discuss your medical history and prevent health problems before you are sick. You may be responsible for a co-pay, coinsurance, or deductible if your visit today includes services such as checking on a sore throat, having an x-ray or lab test, or treating and evaluating a new or existing condition :721275}  Patient has been advised of split billing requirements and indicates understanding: {Yes and No:179515}  Healthy Habits:     Getting at least 3 servings of Calcium per day:  NO    Bi-annual eye exam:  Yes    Dental care twice a year:  NO    Sleep apnea or symptoms of sleep apnea:  Sleep apnea    Diet:  Diabetic    Frequency of exercise:  1 day/week    Duration of exercise:  Less than 15 minutes    Taking medications regularly:  Yes    Medication side effects:  Muscle aches and Other    PHQ-2 Total Score: 2    Additional concerns today:  No    {Add if <65 person on Medicare  - Required Questions (Optional):265697}  {Outside tests to abstract? :366867}    {additional problems to add (Optional):344246}    Today's PHQ-2 Score:   PHQ-2 (  [employer]) 2020   Q1: Little interest or pleasure in doing things 0   Q2: Feeling down, depressed or hopeless 0   PHQ-2 Score 0       Abuse: Current or Past (Physical, Sexual or Emotional) - { :533210}  Do you feel safe in your environment? { :091479}    Have you ever done Advance Care Planning? (For example, a Health Directive, POLST, or a discussion with a medical provider or your loved ones about your wishes): { :726309}    Social History     Tobacco Use     Smoking status: Former Smoker     Packs/day: 1.00     Types: Cigarettes     Quit date:      Years since quittin.0     Smokeless tobacco: Never Used   Substance Use Topics     Alcohol use: Yes     Comment: occasional     {Rooming Staff- Complete this  "question if Prescreen response is not shown below for today's visit. If you drink alcohol do you typically have >3 drinks per day or >7 drinks per week? (Optional):876637}    No flowsheet data found.{add AUDIT responses (Optional) (A score of 7 for adult men is an indication of hazardous drinking; a score of 8 or more is an indication of an alcohol use disorder.  A score of 7 or more for adult women is an indication of hazardous drinking or an alchohol use disorder):448314}    Any new diagnosis of family breast, ovarian, or bowel cancer? {If yes - repeat FHS-7 if not done today :057020::\"Yes\"} {Link to FHS-7 Assessment :124899}    Reviewed orders with patient.  Reviewed health maintenance and updated orders accordingly - { :534394::\"Yes\"}  {Chronicprobdata (optional):762836}    Breast CA Risk Screening:  No flowsheet data found.  {Pull in link for FHS-7 answers if completed after opening note (Optional) :550781}  {If any of the questions to the BCRA (FHS-7) are answered yes, consider ordering referral for genetic counseling (Optional) :069492::\"click delete button to remove this line now\"}  {AMB Mammogram Decision Support :846454}  Pertinent mammograms are reviewed under the imaging tab.    History of abnormal Pap smear: { :109498}     Reviewed and updated as needed this visit by clinical staff                 Reviewed and updated as needed this visit by Provider                {HISTORY OPTIONS (Optional):361011}    Review of Systems   Constitutional: Negative for chills and fever.   HENT: Positive for hearing loss. Negative for congestion, ear pain and sore throat.    Eyes: Negative for pain and visual disturbance.   Respiratory: Negative for cough and shortness of breath.    Cardiovascular: Negative for chest pain, palpitations and peripheral edema.   Gastrointestinal: Positive for heartburn and nausea. Negative for abdominal pain, constipation, diarrhea and hematochezia.   Breasts:  Negative for tenderness, " "breast mass and discharge.   Genitourinary: Positive for frequency. Negative for dysuria, genital sores, hematuria, pelvic pain, urgency, vaginal bleeding and vaginal discharge.   Musculoskeletal: Positive for arthralgias and myalgias. Negative for joint swelling.   Skin: Negative for rash.   Neurological: Negative for dizziness, weakness, headaches and paresthesias.   Psychiatric/Behavioral: Negative for mood changes. The patient is not nervous/anxious.      {FEMALE ROS (Optional):658984}     OBJECTIVE:   There were no vitals taken for this visit.  Physical Exam  {Exam Choices (Optional):020406}    {Diagnostic Test Results (Optional):354082::\"Diagnostic Test Results:\",\"Labs reviewed in Epic\"}    ASSESSMENT/PLAN:   {Diag Picklist:077143}    Patient has been advised of split billing requirements and indicates understanding: {YES / NO:376228::\"Yes\"}  COUNSELING:  {FEMALE COUNSELING MESSAGES:268136::\"Reviewed preventive health counseling, as reflected in patient instructions\"}    Estimated body mass index is 40.4 kg/m  as calculated from the following:    Height as of 11/20/20: 1.746 m (5' 8.75\").    Weight as of 11/20/20: 123.2 kg (271 lb 9.6 oz).    {Weight Management Plan (ACO) Complete if BMI is abnormal-  Ages 18-64  BMI >24.9.  Age 65+ with BMI <23 or >30 (Optional):295838}    She reports that she quit smoking about 16 years ago. Her smoking use included cigarettes. She smoked 1.00 pack per day. She has never used smokeless tobacco.      Counseling Resources:  ATP IV Guidelines  Pooled Cohorts Equation Calculator  Breast Cancer Risk Calculator  BRCA-Related Cancer Risk Assessment: FHS-7 Tool  FRAX Risk Assessment  ICSI Preventive Guidelines  Dietary Guidelines for Americans, 2010  USDA's MyPlate  ASA Prophylaxis  Lung CA Screening    FALGUNI Duenas CNP  [facility]  "

## 2021-01-27 NOTE — PROGRESS NOTES
SUBJECTIVE:   CC: Winter Loya is an 65 year old woman who presents for preventive health visit.       Patient has been advised of split billing requirements and indicates understanding: Yes  Healthy Habits:     Getting at least 3 servings of Calcium per day:  NO    Bi-annual eye exam:  Yes    Dental care twice a year:  NO    Sleep apnea or symptoms of sleep apnea:  Sleep apnea    Diet:  Diabetic    Frequency of exercise:  1 day/week    Duration of exercise:  Less than 15 minutes    Taking medications regularly:  Yes    Medication side effects:  Muscle aches and Other    PHQ-2 Total Score: 2    Additional concerns today:  No    Joint Pain    Onset: month     Description:   Location: legs,arms, back  Character: Sharp and Cramping    Intensity: 9/10    Progression of Symptoms: worse    Accompanying Signs & Symptoms:  Other symptoms: none    History:   Previous similar pain: no       Precipitating factors:   Trauma or overuse: no     Alleviating factors:  Improved by: nothing    Therapies Tried and outcome: tylenol         Today's PHQ-2 Score:   PHQ-2 (  [employer]) 2021   Q1: Little interest or pleasure in doing things 1   Q2: Feeling down, depressed or hopeless 1   PHQ-2 Score 2   Q1: Little interest or pleasure in doing things Several days   Q2: Feeling down, depressed or hopeless Several days   PHQ-2 Score 2       Abuse: Current or Past (Physical, Sexual or Emotional) - No  Do you feel safe in your environment? Yes    Have you ever done Advance Care Planning? (For example, a Health Directive, POLST, or a discussion with a medical provider or your loved ones about your wishes): No, advance care planning information given to patient to review.  Patient plans to discuss their wishes with loved ones or provider.      Social History     Tobacco Use     Smoking status: Former Smoker     Packs/day: 1.00     Types: Cigarettes     Quit date:      Years since quittin.0     Smokeless tobacco: Never Used    Substance Use Topics     Alcohol use: Yes     Comment: occasional     If you drink alcohol do you typically have >3 drinks per day or >7 drinks per week? No    Alcohol Use 1/27/2021   Prescreen: >3 drinks/day or >7 drinks/week? No   No flowsheet data found.    Any new diagnosis of family breast, ovarian, or bowel cancer? No       Reviewed orders with patient.  Reviewed health maintenance and updated orders accordingly - Yes  Lab work is in process    Breast CA Risk Screening:  No flowsheet data found.  No flowsheet data found.  click delete button to remove this line now  Mammogram Screening - Mammography discussed and declined  Pertinent mammograms are reviewed under the imaging tab.    History of abnormal Pap smear: YES - updated in Problem List and Health Maintenance accordingly     Reviewed and updated as needed this visit by clinical staff    Reviewed and updated as needed this visit by Provider    Review of Systems   Constitutional: Negative for chills and fever.   HENT: Positive for hearing loss. Negative for congestion, ear pain and sore throat.    Eyes: Negative for pain and visual disturbance.   Respiratory: Negative for cough and shortness of breath.    Cardiovascular: Negative for chest pain, palpitations and peripheral edema.   Gastrointestinal: Positive for heartburn and nausea. Negative for abdominal pain, constipation, diarrhea and hematochezia.   Breasts:  Negative for tenderness, breast mass and discharge.   Genitourinary: Positive for frequency. Negative for dysuria, genital sores, hematuria, pelvic pain, urgency, vaginal bleeding and vaginal discharge.   Musculoskeletal: Positive for arthralgias and myalgias. Negative for joint swelling.   Skin: Negative for rash.   Neurological: Negative for dizziness, weakness, headaches and paresthesias.   Psychiatric/Behavioral: Negative for mood changes. The patient is not nervous/anxious.        OBJECTIVE:   /74   Pulse 76   Temp 97.3  F (36.3  C)  "(Tympanic)   Ht 1.753 m (5' 9\")   Wt 120.7 kg (266 lb)   BMI 39.28 kg/m    EXAM:  GENERAL APPEARANCE: healthy, alert and no distress  EYES: Eyes grossly normal to inspection, PERRL and conjunctivae and sclerae normal  HENT: ear canals and TM's normal, nose and mouth without ulcers or lesions, oropharynx clear and oral mucous membranes moist  NECK: no adenopathy, no asymmetry, masses, or scars and thyroid normal to palpation  RESP: lungs clear to auscultation - no rales, rhonchi or wheezes  BREAST: normal without masses, tenderness or nipple discharge and no palpable axillary masses or adenopathy  CV: regular rate and rhythm, normal S1 S2, no S3 or S4, no murmur, click or rub, no peripheral edema and peripheral pulses strong  ABDOMEN: soft, nontender, no hepatosplenomegaly, no masses and bowel sounds normal   (female): vaginal discharge - moderate, yellow, thick and odorless  MS: no musculoskeletal defects are noted and gait is age appropriate without ataxia  MS: muscle cramping, Tremors  SKIN: erythema - groin and excoriation - groin  NEURO: Normal strength and tone, sensory exam grossly normal, mentation intact and speech normal  PSYCH: mentation appears normal and affect normal/bright    Diagnostic Test Results:  Labs reviewed in Epic      ASSESSMENT/PLAN:   Encounter for routine general medical examination with abnormal findings  Patient verbalized not doing enough exercise. She is slowly adopting to eat more healthy diet, Stated it is hard to avoid fast food which she normally like to eat. Counseled on self care measures such as walking, 3-4 servings of vegetables and fruits a day.    Class 2 severe obesity due to excess calories with serious comorbidity and body mass index (BMI) of 39.0 to 39.9 in adult (H)  Encourged and counseled on slef care measures as above    Type 2 diabetes mellitus with hyperglycemia, with long-term current use of insulin (H)  Managed in collaboration with PharmD. Starting on " Jardiance as soon as medication arrives in mail. Follow up with MTM as planned in 1-2 weeks and in office with me in 3 months to recheck HA1C.   - CBC with platelets and differential  - **Comprehensive metabolic panel FUTURE anytime; Future    Multiple myeloma in remission (H)  - CBC with platelets and differential    Major depressive disorder, recurrent episode, moderate (H)  Symptoms well controlled with medications.    NA (generalized anxiety disorder)  Symptoms well controlled with medications without side effects.     Hyperlipidemia, unspecified hyperlipidemia type  - Lipid panel reflex to direct LDL Non-fasting    Hypothyroidism, postoperative  Well controlled with medications without side effects.  - TSH with free T4 reflex    Tremor  Stated her tremor has been worsening and it makes other people scared from her startle. She has seen neurology regarding this this past year and recommendation was trial of BB therapy if symptoms worsen. Discussed risks/benefits of medication with patient. She would like to proceed with trial of propranolol.  - propranolol ER (INDERAL LA) 60 MG 24 hr capsule; Take 1 capsule (60 mg) by mouth daily  Dispense: 30 capsule; Refill: 3    Vaginal discharge  Thick yellow discharge noted in cervix during exam.  - Wet prep    Candidal intertrigo  Satellite rashes with erythematous skin irritation noted in groin intertrigous area. Advised pt to keep skin fold clean and dry.  - nystatin (MYCOSTATIN) 591498 UNIT/GM external powder; Apply topically 3 times daily  Dispense: 60 g; Refill: 3     Screening for malignant neoplasm of cervix  - Pap imaged thin layer screen with HPV - recommended age 30 - 65 years (select HPV order below)     Personal history of tobacco use  Former Cigarette smoker for > 30 years.  - Prof Fee: Shared Decision Making Visit for Lung Cancer Screening  - CT Chest Lung Cancer Scrn Low Dose wo; Future    Patient has been advised of split billing requirements and indicates  "understanding: Yes  COUNSELING:  Reviewed preventive health counseling, as reflected in patient instructions       Regular exercise       Healthy diet/nutrition       Vision screening       Consider lung cancer screening for ages 55-80 years and 30 pack-year smoking history     Estimated body mass index is 40.4 kg/m  as calculated from the following:    Height as of 11/20/20: 1.746 m (5' 8.75\").    Weight as of 11/20/20: 123.2 kg (271 lb 9.6 oz).    Weight management plan: Discussed healthy diet and exercise guidelines    She reports that she quit smoking about 16 years ago. Her smoking use included cigarettes. She smoked 1.00 pack per day. She has never used smokeless tobacco.    Lung Cancer Screening Shared Decision Making Visit     Winter Loya is eligible for lung cancer screening on the basis of the information provided in my signed lung cancer screening order.     I have discussed with patient the risks and benefits of screening for lung cancer with low-dose CT.     The risks include:  radiation exposure: one low dose chest CT has as much ionizing radiation as about 15 chest x-rays or 6 months of background radiation living in [place]    false positives: 96% of positive findings/nodules are NOT cancer, but some might still require additional diagnostic evaluation, including biopsy  over-diagnosis: some slow growing cancers that might never have been clinically significant will be detected and treated unnecessarily     The benefit of early detection of lung cancer is contingent upon adherence to annual screening or more frequent follow up if indicated.     Furthermore, reaping the benefits of screening requires Winter Loya to be willing and physically able to undergo diagnostic procedures, if indicated. Although no specific guide is available for determining severity of comorbidities, it is reasonable to withhold screening in patients who have greater mortality risk from other diseases.     We did discuss " that the only way to prevent lung cancer is to not smoke. Smoking cessation counseling was not given.      I did not offer risk estimation using a calculator such as this one:    Luisana Tapia RN, FNP student, [facility]    I very much appreciated the opportunity to see and assess this patient in the clinic with NP student.  I agree with the above note which summarizes my findings and current recommendations. I have reviewed all diagnostics noted and performed physical exam. Changes were made in the body of the note to achieve one comprehensive document.    FALGUNI [facility]

## 2021-01-27 NOTE — PROGRESS NOTES
"   SUBJECTIVE:   CC: Winter Loya is an 65 year old woman who presents for preventive health visit.     {Split Bill scripting  The purpose of this visit is to discuss your medical history and prevent health problems before you are sick. You may be responsible for a co-pay, coinsurance, or deductible if your visit today includes services such as checking on a sore throat, having an x-ray or lab test, or treating and evaluating a new or existing condition :750092}  Patient has been advised of split billing requirements and indicates understanding: {Yes and No:676240}  Healthy Habits:    Do you get at least three servings of calcium containing foods daily (dairy, green leafy vegetables, etc.)? { :973681::\"yes\"}    Amount of exercise or daily activities, outside of work: { :418199}    Problems taking medications regularly { :204987::\"No\"}    Medication side effects: { :537005::\"No\"}    Have you had an eye exam in the past two years? { :233775}    Do you see a dentist twice per year? { :456675}    Do you have sleep apnea, excessive snoring or daytime drowsiness?{ :847514}  {Outside tests to abstract? :017140}    {additional problems to add (Optional):321460}    Today's PHQ-2 Score:   PHQ-2 ( 1999 [employer]) 1/27/2021 9/14/2020   Q1: Little interest or pleasure in doing things 1 0   Q2: Feeling down, depressed or hopeless 1 0   PHQ-2 Score 2 0   Q1: Little interest or pleasure in doing things Several days -   Q2: Feeling down, depressed or hopeless Several days -   PHQ-2 Score 2 -     {PHQ-2 LOOK IN ASSESSMENTS (Optional) :987918}  Abuse: Current or Past(Physical, Sexual or Emotional)- {YES/NO/NA:344941}  Do you feel safe in your environment? {YES/NO/NA:692155}    Have you ever done Advance Care Planning? (For example, a Health Directive, POLST, or a discussion with a medical provider or your loved ones about your wishes): { :640927}    Social History     Tobacco Use     Smoking status: Former Smoker     Packs/day: 1.00    " " Types: Cigarettes     Quit date:      Years since quittin.0     Smokeless tobacco: Never Used   Substance Use Topics     Alcohol use: Yes     Comment: occasional     If you drink alcohol do you typically have >3 drinks per day or >7 drinks per week? {ETOH :504183}                     Reviewed orders with patient.  Reviewed health maintenance and updated orders accordingly - {Yes/No:771589::\"Yes\"}  {Chronicprobdata (Optional):809029}    Breast CA Risk Screening:  No flowsheet data found.  {Pull in link for FHS-7 answers if completed after opening note (Optional) :499190}  {If any of the questions to the BCRA (FHS-7) are answered yes, consider ordering referral for genetic counseling (Optional) :805301::\"click delete button to remove this line now\"}  {AMB Mammogram Decision Support :419094}  Pertinent mammograms are reviewed under the imaging tab.    Pertinent mammograms are reviewed under the imaging tab.  History of abnormal Pap smear: {PAP HX:170819}     Reviewed and updated as needed this visit by clinical staff                 Reviewed and updated as needed this visit by Provider                {HISTORY OPTIONS (Optional):477975}    ROS:  { :558750}    OBJECTIVE:   There were no vitals taken for this visit.  EXAM:  {Exam Choices:465208}    {Diagnostic Test Results (Optional):777019::\"Diagnostic Test Results:\",\"Labs reviewed in Epic\"}    ASSESSMENT/PLAN:   {Diag Picklist:505106}    Patient has been advised of split billing requirements and indicates understanding: {YES / NO:870657::\"Yes\"}  COUNSELING:   {FEMALE COUNSELING MESSAGES:944961::\"Reviewed preventive health counseling, as reflected in patient instructions\"}    Estimated body mass index is 40.4 kg/m  as calculated from the following:    Height as of 20: 1.746 m (5' 8.75\").    Weight as of 20: 123.2 kg (271 lb 9.6 oz).    {Weight Management Plan (ACO) Complete if BMI is abnormal-  Ages 18-64  BMI >24.9.  Age 65+ with BMI <23 or >30 " (Optional):546062}    She reports that she quit smoking about 16 years ago. Her smoking use included cigarettes. She smoked 1.00 pack per day. She has never used smokeless tobacco.      Counseling Resources:  ATP IV Guidelines  Pooled Cohorts Equation Calculator  Breast Cancer Risk Calculator  BRCA-Related Cancer Risk Assessment: FHS-7 Tool  FRAX Risk Assessment  ICSI Preventive Guidelines  Dietary Guidelines for Americans, 2010  USDA's MyPlate  ASA Prophylaxis  Lung CA Screening    Delmi Gross, FALGUNI [facility]

## 2021-01-28 LAB
CHOLEST SERPL-MCNC: 135 MG/DL
HDLC SERPL-MCNC: 48 MG/DL
LDLC SERPL CALC-MCNC: 46 MG/DL
NONHDLC SERPL-MCNC: 87 MG/DL
TRIGL SERPL-MCNC: 207 MG/DL
TSH SERPL DL<=0.005 MIU/L-ACNC: 2.08 MU/L (ref 0.4–4)

## 2021-02-02 LAB
COPATH REPORT: NORMAL
PAP: NORMAL

## 2021-02-03 LAB
FINAL DIAGNOSIS: NORMAL
HPV HR 12 DNA CVX QL NAA+PROBE: NEGATIVE
HPV16 DNA SPEC QL NAA+PROBE: NEGATIVE
HPV18 DNA SPEC QL NAA+PROBE: NEGATIVE
SPECIMEN DESCRIPTION: NORMAL
SPECIMEN SOURCE CVX/VAG CYTO: NORMAL

## 2021-02-05 ENCOUNTER — TELEPHONE (OUTPATIENT)
Dept: ONCOLOGY | Facility: CLINIC | Age: 66
End: 2021-02-05

## 2021-02-05 ENCOUNTER — TELEPHONE (OUTPATIENT)
Dept: FAMILY MEDICINE | Facility: CLINIC | Age: 66
End: 2021-02-05

## 2021-02-05 DIAGNOSIS — C90.01 MULTIPLE MYELOMA IN REMISSION (H): Primary | ICD-10-CM

## 2021-02-05 RX ORDER — LENALIDOMIDE 10 MG/1
10 CAPSULE ORAL DAILY
Qty: 28 CAPSULE | Refills: 0 | Status: SHIPPED | OUTPATIENT
Start: 2021-02-05 | End: 2021-03-09

## 2021-02-05 NOTE — TELEPHONE ENCOUNTER
Oral Chemotherapy Monitoring Program   Medication: Revlimid  Rx: 10mg PO daily on days 1 through 28 of 28 day cycle   Auth #: [id number]   Risk Category: Adult Female NORP  Routine survey questions reviewed.   Rx to be Escribed to [facility]    Lor Borja  [facility]  Oncology Pharmacy Liaison   [email or web address]  [phone] (phone)  [phone] (fax)

## 2021-02-05 NOTE — TELEPHONE ENCOUNTER
Called patient. No answer.  Left message to call us back Would like to verify with patient if she had an abnormal PAP in the past or not. This is so that we can determine when her next her next PAP will be due as we do not have records of abnormal PAP. Most recent PAP is normal.   Delmi Gross, FALGUNI, CNP

## 2021-02-17 ENCOUNTER — IMMUNIZATION (OUTPATIENT)
Dept: NURSING | Facility: CLINIC | Age: 66
End: 2021-02-17
Attending: FAMILY MEDICINE
Payer: COMMERCIAL

## 2021-02-17 PROCEDURE — 91300 PR COVID VAC PFIZER DIL RECON 30 MCG/0.3 ML IM: CPT

## 2021-02-17 PROCEDURE — 0002A PR COVID VAC PFIZER DIL RECON 30 MCG/0.3 ML IM: CPT

## 2021-02-23 ENCOUNTER — VIRTUAL VISIT (OUTPATIENT)
Dept: PHARMACY | Facility: CLINIC | Age: 66
End: 2021-02-23
Payer: COMMERCIAL

## 2021-02-23 DIAGNOSIS — Z79.4 TYPE 2 DIABETES MELLITUS WITH HYPERGLYCEMIA, WITH LONG-TERM CURRENT USE OF INSULIN (H): Primary | ICD-10-CM

## 2021-02-23 DIAGNOSIS — E11.65 TYPE 2 DIABETES MELLITUS WITH HYPERGLYCEMIA, WITH LONG-TERM CURRENT USE OF INSULIN (H): Primary | ICD-10-CM

## 2021-02-23 PROCEDURE — 99607 MTMS BY PHARM ADDL 15 MIN: CPT | Mod: TEL | Performed by: PHARMACIST

## 2021-02-23 PROCEDURE — 99606 MTMS BY PHARM EST 15 MIN: CPT | Mod: TEL | Performed by: PHARMACIST

## 2021-02-23 NOTE — PROGRESS NOTES
Medication Therapy Management (MTM) Encounter    ASSESSMENT:                            Medication Adherence/Access: See below for considerations    Type 2 Diabetes: Would benefit from taking Jardiance as directed.     PLAN:                            1. Patient to call [facility] to request Jardiance.     Follow-up: 2-4 weeks    SUBJECTIVE/OBJECTIVE:                          Winter Loya is a 65 year old female called for a follow-up visit. She was referred to me from Brittany Rosenthal.  She now sees Delmi Gross. Today's visit is a follow-up MTM visit from 21.     Reason for visit: uncontrolled diabetes.    Allergies/ADRs: Reviewed in chart  Tobacco: She reports that she quit smoking about 16 years ago. Her smoking use included cigarettes. She smoked 1.00 pack per day. She has never used smokeless tobacco.  Alcohol: none  Caffeine: 1-2 cups/day of coffee  Activity: none  Past Medical History: Reviewed in chart    Medication Adherence/Access: Still does not have access to Jardiance.     Type 2 Diabetes:  Currently taking metformin 1000 mg twice daily, Jardiance 10 mg daily (does not have), Bydureon ER 2 mg once weekly, Lantus 34 units at bedtime. Patient is experiencing the following side effects: GI upset and nausea, heartburn. She reports that this isn't worse than when she had this last time. She continues to treat this with Tums.   Blood sugar monitorin time(s) daily. Ranges (patient reported):     Fasting- 140-180  Post-Prandial- 150-180    Symptoms of low blood sugar? none  Symptoms of high blood sugar? none  Eye exam: up to date  Foot exam: due  Diet/Exercise: She is working hard to reduce her sweets in the evening.   Aspirin: Not taking due to unclear reason  Statin: Yes: atorvastatin 20 mg daily   ACEi/ARB: Yes: losartan 25 mg daily.   Urine Albumin:   Lab Results   Component Value Date    UMALCR 73.57 (H) 2020      Lab Results   Component Value Date    A1C 8.8 2020     "A1C 9.1 08/21/2020    A1C 6.7 03/05/2020     Today's Vitals: There were no vitals taken for this visit. - telemed    BP Readings from Last 1 Encounters:   01/27/21 116/74     Pulse Readings from Last 1 Encounters:   01/27/21 76     Wt Readings from Last 1 Encounters:   01/27/21 266 lb (120.7 kg)     Ht Readings from Last 1 Encounters:   01/27/21 5' 9\" (1.753 m)     Estimated body mass index is 39.28 kg/m  as calculated from the following:    Height as of 1/27/21: 5' 9\" (1.753 m).    Weight as of 1/27/21: 266 lb (120.7 kg).    Temp Readings from Last 1 Encounters:   01/27/21 97.3  F (36.3  C) (Tympanic)     ----------------    I spent 17 minutes with this patient today. A copy of the visit note was provided to the patient's primary care provider.    The patient was sent via [employer] a summary of these recommendations.     Coco Antoine, Pharm.D., [facility]  Medication Therapy Management Pharmacist  Page/VM:  [phone]      Telemedicine Visit Details  Type of service:  Telephone visit  Start Time: 4:30 PM  End Time: 4:47 PM  Originating Location (patient location): [place]  Distant Location (provider location):  [facility] MEDICATION THERAPY MANAGEMENT        Medication Therapy Recommendations  Diabetes mellitus, type 2 (H)    Current Medication: empagliflozin (JARDIANCE) 10 MG TABS tablet   Rationale: Cannot swallow/administer medication - Adherence - Adherence   Recommendation: Provide Adherence Intervention   Status: Resolved Med Access Issue               "

## 2021-02-24 RX ORDER — ACETAMINOPHEN 325 MG/1
TABLET ORAL
COMMUNITY
Start: 2020-08-26 | End: 2021-03-15

## 2021-02-24 NOTE — PATIENT INSTRUCTIONS
Recommendations from today's MTM visit:                                                       Please call [facility] to request Jardiance 10 mg daily. The phone number is [phone].     It was great to speak with you today.  I value your experience and would be very thankful for your time with providing feedback on our clinic survey. You may receive a survey via email or text message in the next few days.     Next MTM visit: 2-4 weeks, I'll MyChart to set it up.     To schedule another MTM appointment, please call the clinic directly or you may call the MTM scheduling line at [phone] or toll-free at [phone].     My Clinical Pharmacist's contact information:                                                      It was a pleasure talking with you today!  Please feel free to contact me with any questions or concerns you have.      Coco Antoine, Pharm.D., [facility]  Medication Therapy Management Pharmacist  Page/VM:  [phone]

## 2021-02-24 NOTE — PROGRESS NOTES
"    Diagnosis/Summary/Recommendations:    PATIENT: Winter Loya  65 year old female     : 1955    TOMASA: MARCH 15, 2021    [address]   [place] [id number]  [email or web address]  [phone] (M)   [phone] (H)   Peter Deleon son       [phone]     Assessment:    (R25.1) Tremor  (primary encounter diagnosis)  worsening and affecting her ability to use a keyboard and mouse  Duration - 5 years or more.  Father had tremor/parkinson  Not a big drinker - not clear if alcohol helps her tremor  Smell perception is okay  She has sleep apnea  She cut out caffeine and there were no changes in her tremors  She is right handed and has more right than left handed tremor.   She was told she has ET as she is \"not stooped over\"  Her tremors are present when she is using her hands and she has a vocal and head tremor as well.     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 She has not worked with OT     Exercise/Therapy  - none     Driving - has had some memory/cognitive issues - seen on 10/22/2020 by Dr. Mcgrath for neuropsychological evaluation: there were mild frontal and possibly left temporal changes and recommended to be evaluated again in one year. She has not had a brain mri. She has mood issues that could benefit from psychotherapy as anxiety may be aggravating her memory.      Mood Generalized Anxiety disorder/Depression  Mood is good per her report.  Living with son basil - health  .   No counselor     Sertraline zoloft 100mg     Hallucinations/delusions - denies     Sleep Apnea -  Dr. Juan Jose Salazar  Denies RBD features     Bladder  - wears a pad.   Has changes in urinary " frequency/urgency if blood sugars are out of control  2-4/noc getting up to urinate  Has not seen a urologist.   Seen by Ob-gyn a year ago.      Respiratory  Loratadine claritin 10mg - not taking    Former smoker - quit 15 years prior was 1 ppd  Quit 2005     GI/Constipation/GERD  - no issue  Calcium antacid 500mg tums - not taking     ENDO    recent A1c was 8.8 and is working with pharmacist Coco Antoine. She may benefit from seeing nutrition and possibly endocrinology in addition to her pcp     Type 2 Diabetes with neuropathy    Atorvastatin lipitor 20mg at bedtime            Calcitriol rocaltrol 0.25mcg - not taking     Empagliflozin jardiance 10mg daily  Exenatide ER Bydureon 2mg weekly   Lantus 34 units at bedtime  Levothyroxine synthroid/levothyroid 175 mcg daily  Metformin 500mg 2 tabs  twice daily    Has not seen dietician            Seeing Coco Antoine, Pharm D  DOes not have an endocrinologist  Has not seen nutrition  A1c was 8.8 on 12/7/2020     Hypothyroidism;     s/p thyroidectomy for multinodular goiter     Cardio/heart  - blood pressure is stable initially after stem cell therapy but has gone up at times  States she is trying to lose weight to help her diabetes and blood pressure.   Her chart has been 160/88; encouraged her to get a home blood pressure cuff.     Losartan cozaar 25mg daily     Vision  - on treatment for glaucoma  Latanoprost xalatan 0.005% ophthalmic solution     HEME:  Multiple myeloma - in remision  IgA Kappa Multiple Myeloma. Presented 2018 with anemia and fatigue. Skeletal survey was unremarkable and UPEP had minimal protein (156 mg/m2). PET 11/2018 showed bone marrow consistent with hypermetabolic plasma cell myeloma. M spike was 0.17. She started RVD and Zometa. 4/2019 she had an autologous transplant. She was on revlimid maintenance and zometa from her prior oncologist in [place]. Zometa through 12/2020 to complete a total of 2 years of therapy  Mele  Janakiram    Lenalidomide revlimid 10mg daiy  Nonformulary revlimid - as above   NOnformulary zometa every 3 months.      PCP Dr. Gabrielle Edwards     Acetaminophen tylenol 325mg - as needed  Acyclovir zovirax 400mg twice daily  Meloxicam mobic 15mg  - not taking  Oxycodone roxicodone - not taking  Pregabalin lyrica 100mg 3/day for neuropathy and helping her mood as well.   Tizanidine zanaflex 4mg - not taking   Tramadol ultram 50mg - not taking     IN summary  - essential tremor  - neuropathy  - other medical issues  -diabetes     Acetaminophen tylenol 325mg as needed    Impression: Multilevel cervical spondylosis, most pronounced at the  level of C4-5 and C5-6 with moderate to severe spinal canal stenosis  and moderate spinal canal stenosis at C6-7. No abnormal cord signal.  No significant neural foraminal narrowing at any level.     I have personally reviewed the examination and initial interpretation  and I agree with the findings.     ANNE GOODMAN MD     Medications                 Acetaminophen tylenol 325mg As needed           Acyclovir zovirax 400mg 1   1       Atorvastatin lipitor 20mg      1       Calcitriol rocaltrol 0.25mcg Not taking           Calcium antacid 500mg tums             Empagliflozin jardiance 10mg 1       Exenatide ER Bydureon 2mg weekly           Insulin glargine lantus     34 units       Latanoprost xalatan 0.005% ophthalmic solution     Both eyes       Lenalidomide revlimid 10mg  1           Levothyroxine synthroid/levothyroid 175 mcg 1           Loratadine claritin 10mg              Losartan cozaar 25mg  1       Metformin glucophage 500mg  2   2       Nonformulary revlimid See above           NOnformulary zometa ? Every 3 months           Nystatin mycostatin 100,000 unit/gm external powder Using        Pregabalin lyrica 100mg  1 1 1       Propranolol ER inderal LA 60mg  1       Sertraline zoloft 100mg 1                                                                                     Plan:    Diabetes - better control of her diabetes - A1c was 7.6   Working with Coco Antoine, Pharmacist    Blood pressure was good today - she is on propranolol LA 60mg  Discussed getting a blood pressure cuff to monitor her blood pressure and heart rate.     cognitive re-evaluation with Dr Mcgrath 10/2021  Order placed    Stress/anxiety issues  - still on sertraline and consider counsellor  Referral placed.    [facility]  [facility]  [address] [place] [address]      Jessica Guajardo, Ph.D., LP  [phone]    Pallavi Loza,Ph.D.,LP  [phone] (inpatient)    Linda Shah,Ph.D.,LP  [phone]    Ayla Patel, Ph.D.  [phone]    Domenica Willoughby, Ph.D., LP  [phone]    Julien Stoddard, Ph.D., ABPP, LP  [phone]    Marya Espinoza,Ph.D., LP  [phone]    Tremor is better with the propranolol LA 60mg     Consideration for EMG and consultation with general neurology for neuropathy, carpal tunnel syndrome and cervical radiculopathy.     Coding statement:   Medical Decision Making:  #  Chronic progressive medical conditions addressed  yes  Review and/or interpretation of unique test or documentation from a provider outside of neurology no   Independent historian provided additional details  no I  Prescription drug management and review of potential side effects and/or monitoring for side effects  yes   Health impacted by social determinants of health  no    I have reviewed the note as documented above.  This accurately captures the substance of my conversation with the patient and total time spent preparing for visit, executing visit and completing visit on the day of the visit:  30 minutes.      Gabriel Santoyo MD     ______________________________________    Last visit date and details:             ______________________________________      Patient was asked about 14 Review of systems including changes in vision (dry eyes, double vision), hearing, heart,  lungs, musculoskeletal, depression, anxiety, snoring, RBD, insomnia, urinary frequency, urinary urgency, constipation, swallowing problems, hematological, ID, allergies, skin problems: seborrhea, endocrinological: thyroid, diabetes, cholesterol; balance, weight changes, and other neurological problems and these were not significant at this time except for   Patient Active Problem List   Diagnosis     Multiple myeloma in remission (H)     Diabetes mellitus, type 2 (H)     Allergic rhinitis     NA (generalized anxiety disorder)     History of endometrial ablation     Hyperlipidemia     Major depressive disorder, recurrent episode, moderate (H)     Osteoarthrosis involving lower leg     Type 2 diabetes mellitus with hyperglycemia (H)     Sleep apnea     Hypothyroidism, postoperative      Class 2 severe obesity due to excess calories with serious comorbidity and body mass index (BMI) of 39.0 to 39.9 in adult (H)     Tremor     History of peripheral stem cell transplant (H)        No Known Allergies  Past Surgical History:   Procedure Laterality Date     ARTHROSCOPY KNEE Left 02/2014     ARTHROSCOPY KNEE Right 12/2010    KNEE ARTHROSCOPY Dec 2010  Right     CHOLECYSTECTOMY  2002     COLONOSCOPY N/A 07/23/2020    Procedure: COLONOSCOPY;  Surgeon: Za Denis MD;  Location:  OR     EYE SURGERY  1985    lasersx-spot behind eye started leaking     THYROIDECTOMY N/A 08/26/2020    Procedure: THYROIDECTOMY, TOTAL;  Surgeon: Tiff Burgos MD;  Location: [facility] OR     TONSILLECTOMY  2003     TUBAL LIGATION  1986     Past Medical History:   Diagnosis Date     Adjustment disorder with mixed anxiety and depressed mood 3/16/2013     Anxiety      Depression      Diabetes (H)      Glaucoma (increased eye pressure)      History of peripheral stem cell transplant (H) 12/9/2020     History of smoking      Hyperlipidemia      Multiple myeloma in remission (H)      Nonsenile cataract      Obesity      Sleep apnea      no c-pap use     Status post complete thyroidectomy 2020     Thyroid goiter 2020     Tremor 2020     Social History     Socioeconomic History     Marital status:      Spouse name: Not on file     Number of children: 3     Years of education: Not on file     Highest education level: Not on file   Occupational History     Occupation: alcohol and drug counselor   Social Needs     Financial resource strain: Not on file     Food insecurity     Worry: Not on file     Inability: Not on file     Transportation needs     Medical: Not on file     Non-medical: Not on file   Tobacco Use     Smoking status: Former Smoker     Packs/day: 1.00     Types: Cigarettes     Quit date:      Years since quittin.1     Smokeless tobacco: Never Used   Substance and Sexual Activity     Alcohol use: Yes     Comment: occasional     Drug use: Never     Sexual activity: Not Currently   Lifestyle     Physical activity     Days per week: Not on file     Minutes per session: Not on file     Stress: Not on file   Relationships     Social connections     Talks on phone: Not on file     Gets together: Not on file     Attends Mandaeism service: Not on file     Active member of club or organization: Not on file     Attends meetings of clubs or organizations: Not on file     Relationship status: Not on file     Intimate partner violence     Fear of current or ex partner: Not on file     Emotionally abused: Not on file     Physically abused: Not on file     Forced sexual activity: Not on file   Other Topics Concern     Not on file   Social History Narrative     Not on file       Drug and lactation database from the [place] National [employer] of Medicine:  http://toxnet.nlm.nih.gov/cgi-bin/sis/htmlgen?LACT      B/P: Data Unavailable, T: Data Unavailable, P: Data Unavailable, R: Data Unavailable 0 lbs 0 oz  not currently breastfeeding., There is no height or weight on file to calculate BMI.  Medications and Vitals not  listed above were documented in the cart and reviewed by me.     Current Outpatient Medications   Medication Sig Dispense Refill     acetaminophen (TYLENOL) 325 MG tablet        acyclovir (ZOVIRAX) 400 MG tablet Take 400 mg by mouth 2 times daily       atorvastatin (LIPITOR) 20 MG tablet Take 1 tablet (20 mg) by mouth every 24 hours (Patient taking differently: Take 20 mg by mouth At Bedtime ) 90 tablet 3     blood glucose (NO BRAND SPECIFIED) test strip Use to test blood sugar two times daily or as directed. 200 strip 3     blood glucose monitoring (NO BRAND SPECIFIED) meter device kit Use to test blood sugar two times daily or as directed. 1 kit 3     empagliflozin (JARDIANCE) 10 MG TABS tablet Take 1 tablet (10 mg) by mouth daily 90 tablet 1     exenatide ER (BYDUREON) 2 MG pen Inject 2 mg Subcutaneous every 7 days 4 each 11     insulin glargine (LANTUS PEN) 100 UNIT/ML pen Inject 34 Units Subcutaneous At Bedtime 30 mL 3     insulin pen needle (FIFTY50 PEN NEEDLES) 32G X 4 MM miscellaneous As directed. To use with Victoza daily       latanoprost (XALATAN) 0.005 % ophthalmic solution Place 1 drop into both eyes At Bedtime       LENalidomide (REVLIMID) 10 MG CAPS capsule Take 1 capsule (10 mg) by mouth daily for 28 days 28 capsule 0     levothyroxine (SYNTHROID/LEVOTHROID) 175 MCG tablet Take 1 tablet (175 mcg) by mouth every morning (before breakfast) 90 tablet 3     losartan (COZAAR) 25 MG tablet Take 1 tablet (25 mg) by mouth daily 90 tablet 3     metFORMIN (GLUCOPHAGE) 500 MG tablet Take 2 tablets (1,000 mg) by mouth 2 times daily (with meals) 360 tablet 1     nystatin (MYCOSTATIN) 667301 UNIT/GM external powder Apply topically 3 times daily 60 g 3     pregabalin (LYRICA) 100 MG capsule Take 1 capsule (100 mg) by mouth 3 times daily 270 capsule 1     propranolol ER (INDERAL LA) 60 MG 24 hr capsule Take 1 capsule (60 mg) by mouth daily 30 capsule 3     sertraline (ZOLOFT) 100 MG tablet Take 1 tablet (100 mg) by  mouth daily 90 tablet 1         Medications                                                                                                                                                  Gabriel Santoyo MD

## 2021-03-04 DIAGNOSIS — C90.01 MULTIPLE MYELOMA IN REMISSION (H): Primary | ICD-10-CM

## 2021-03-08 DIAGNOSIS — C90.01 MULTIPLE MYELOMA IN REMISSION (H): ICD-10-CM

## 2021-03-08 DIAGNOSIS — E11.65 TYPE 2 DIABETES MELLITUS WITH HYPERGLYCEMIA, WITH LONG-TERM CURRENT USE OF INSULIN (H): ICD-10-CM

## 2021-03-08 DIAGNOSIS — Z79.4 TYPE 2 DIABETES MELLITUS WITH HYPERGLYCEMIA, WITH LONG-TERM CURRENT USE OF INSULIN (H): ICD-10-CM

## 2021-03-08 LAB
ALBUMIN SERPL-MCNC: 4.2 G/DL (ref 3.4–5)
ALP SERPL-CCNC: 136 U/L (ref 40–150)
ALT SERPL W P-5'-P-CCNC: 47 U/L (ref 0–50)
ANION GAP SERPL CALCULATED.3IONS-SCNC: 4 MMOL/L (ref 3–14)
AST SERPL W P-5'-P-CCNC: 23 U/L (ref 0–45)
BASOPHILS # BLD AUTO: 0.1 10E9/L (ref 0–0.2)
BASOPHILS NFR BLD AUTO: 1.1 %
BILIRUB SERPL-MCNC: 1.4 MG/DL (ref 0.2–1.3)
BUN SERPL-MCNC: 16 MG/DL (ref 7–30)
CALCIUM SERPL-MCNC: 9.8 MG/DL (ref 8.5–10.1)
CHLORIDE SERPL-SCNC: 106 MMOL/L (ref 94–109)
CO2 SERPL-SCNC: 29 MMOL/L (ref 20–32)
CREAT SERPL-MCNC: 0.81 MG/DL (ref 0.52–1.04)
DIFFERENTIAL METHOD BLD: ABNORMAL
EOSINOPHIL # BLD AUTO: 0.3 10E9/L (ref 0–0.7)
EOSINOPHIL NFR BLD AUTO: 6.5 %
ERYTHROCYTE [DISTWIDTH] IN BLOOD BY AUTOMATED COUNT: 15.2 % (ref 10–15)
GFR SERPL CREATININE-BSD FRML MDRD: 77 ML/MIN/{1.73_M2}
GLUCOSE SERPL-MCNC: 127 MG/DL (ref 70–99)
HBA1C MFR BLD: 7.6 % (ref 0–5.6)
HCT VFR BLD AUTO: 40.5 % (ref 35–47)
HGB BLD-MCNC: 13.7 G/DL (ref 11.7–15.7)
LYMPHOCYTES # BLD AUTO: 1 10E9/L (ref 0.8–5.3)
LYMPHOCYTES NFR BLD AUTO: 21.7 %
MCH RBC QN AUTO: 29.4 PG (ref 26.5–33)
MCHC RBC AUTO-ENTMCNC: 33.8 G/DL (ref 31.5–36.5)
MCV RBC AUTO: 87 FL (ref 78–100)
MONOCYTES # BLD AUTO: 0.5 10E9/L (ref 0–1.3)
MONOCYTES NFR BLD AUTO: 11.4 %
NEUTROPHILS # BLD AUTO: 2.6 10E9/L (ref 1.6–8.3)
NEUTROPHILS NFR BLD AUTO: 59.3 %
PLATELET # BLD AUTO: 142 10E9/L (ref 150–450)
POTASSIUM SERPL-SCNC: 4 MMOL/L (ref 3.4–5.3)
PROT SERPL-MCNC: 7.2 G/DL (ref 6.8–8.8)
RBC # BLD AUTO: 4.66 10E12/L (ref 3.8–5.2)
SODIUM SERPL-SCNC: 139 MMOL/L (ref 133–144)
WBC # BLD AUTO: 4.5 10E9/L (ref 4–11)

## 2021-03-08 PROCEDURE — 83036 HEMOGLOBIN GLYCOSYLATED A1C: CPT | Performed by: INTERNAL MEDICINE

## 2021-03-08 PROCEDURE — 36415 COLL VENOUS BLD VENIPUNCTURE: CPT | Performed by: INTERNAL MEDICINE

## 2021-03-08 PROCEDURE — 99N1036 PR STATISTIC TOTAL PROTEIN: Performed by: INTERNAL MEDICINE

## 2021-03-08 PROCEDURE — 84165 PROTEIN E-PHORESIS SERUM: CPT | Performed by: PATHOLOGY

## 2021-03-08 PROCEDURE — 80053 COMPREHEN METABOLIC PANEL: CPT | Performed by: INTERNAL MEDICINE

## 2021-03-08 PROCEDURE — 85025 COMPLETE CBC W/AUTO DIFF WBC: CPT | Performed by: INTERNAL MEDICINE

## 2021-03-09 ENCOUNTER — TELEPHONE (OUTPATIENT)
Dept: ONCOLOGY | Facility: CLINIC | Age: 66
End: 2021-03-09

## 2021-03-09 DIAGNOSIS — C90.01 MULTIPLE MYELOMA IN REMISSION (H): Primary | ICD-10-CM

## 2021-03-09 LAB
ALBUMIN SERPL ELPH-MCNC: 4.4 G/DL (ref 3.7–5.1)
ALPHA1 GLOB SERPL ELPH-MCNC: 0.3 G/DL (ref 0.2–0.4)
ALPHA2 GLOB SERPL ELPH-MCNC: 0.7 G/DL (ref 0.5–0.9)
B-GLOBULIN SERPL ELPH-MCNC: 0.8 G/DL (ref 0.6–1)
GAMMA GLOB SERPL ELPH-MCNC: 0.6 G/DL (ref 0.7–1.6)
M PROTEIN SERPL ELPH-MCNC: 0 G/DL
PROT PATTERN SERPL ELPH-IMP: ABNORMAL

## 2021-03-09 RX ORDER — LENALIDOMIDE 10 MG/1
10 CAPSULE ORAL DAILY
Qty: 28 CAPSULE | Refills: 0 | Status: SHIPPED | OUTPATIENT
Start: 2021-03-09 | End: 2021-03-15

## 2021-03-09 NOTE — TELEPHONE ENCOUNTER
Oral Chemotherapy Monitoring Program   Medication: Revlimid  Rx: 10mg PO daily on days 1 through 28 of 28 day cycle   Auth #: [id number]   Risk Category: Adult Female NORP  Routine survey questions reviewed.   Rx to be Escribed to [facility]    Lor Borja  [facility]  Oncology Pharmacy Liaison   [email or web address]  [phone] (phone)  [phone] (fax)

## 2021-03-11 ENCOUNTER — VIRTUAL VISIT (OUTPATIENT)
Dept: ONCOLOGY | Facility: CLINIC | Age: 66
End: 2021-03-11
Attending: PHYSICIAN ASSISTANT
Payer: COMMERCIAL

## 2021-03-11 DIAGNOSIS — F32.9 MAJOR DEPRESSIVE DISORDER WITH CURRENT ACTIVE EPISODE, UNSPECIFIED DEPRESSION EPISODE SEVERITY, UNSPECIFIED WHETHER RECURRENT: ICD-10-CM

## 2021-03-11 DIAGNOSIS — C90.01 MULTIPLE MYELOMA IN REMISSION (H): Primary | ICD-10-CM

## 2021-03-11 PROCEDURE — 99214 OFFICE O/P EST MOD 30 MIN: CPT | Mod: 95 | Performed by: PHYSICIAN ASSISTANT

## 2021-03-11 PROCEDURE — 999N001193 HC VIDEO/TELEPHONE VISIT; NO CHARGE

## 2021-03-11 NOTE — PROGRESS NOTES
ONCOLOGY/HEMATOLOGY PROGRESS NOTE  Mar 11, 2021    Reason for Visit: IgA Kappa Multiple Myeloma    Oncology HPI:   Winter Loya is a 64 year old woman with IgA Kappa Multiple Myeloma. In 2018 she had anemia and was fatigued. She was found to have IgA kappa monocloncal antibody with M-spike of 0.17. IgA elevated. Skeletal survey was unremarkable and UPEP had minimal protein (156 mg/m2). PET 11/2018 showed bone marrow consistent with hypermetabolic plasma cell myeloma. M spike was 0.17. She started RVD and Zometa. On 4/2019 she had an autologous transplant.      Her bone marrow bx from September 2019 was sent here for review. It showed marrow cellularity of 60%, with decreased trilineage hematopoiesis and 15% plasma cells as well as peripheral blood with slight anemia. 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 She is on revlimid maintenance and zometa from her prior oncologist in [place]. On 12/6/19 her K/L ratio is 1.1, M spike is 0.04.     Interval history:   -no new bone pain   -No f/c/ns  -Eating and drinking well, appetite good, dieting to help control her DM  -Previously having diarrhea, though now has constipation.   -Denies SOB, CP or cough  -Leg strength is better. Not going PT anymore, not doing exercises at home  -No HA or edema  -Neuropathy in feet is stable     ROS: 10 point ROS neg other than the symptoms noted above in the HPI.      Past Medical History:   Diagnosis Date     Adjustment disorder with mixed anxiety and depressed mood 3/16/2013     Anxiety      Depression      Diabetes (H)      Glaucoma (increased eye pressure)      History of peripheral stem cell transplant (H) 12/9/2020     History of smoking      Hyperlipidemia      Multiple myeloma in remission (H)      Nonsenile cataract      Obesity      Sleep apnea     no c-pap use     Status post complete  thyroidectomy 8/26/2020     Thyroid goiter 8/5/2020     Tremor 12/9/2020        Current Outpatient Medications   Medication Sig Dispense Refill     acetaminophen (TYLENOL) 325 MG tablet        acyclovir (ZOVIRAX) 400 MG tablet Take 400 mg by mouth 2 times daily       atorvastatin (LIPITOR) 20 MG tablet Take 1 tablet (20 mg) by mouth every 24 hours (Patient taking differently: Take 20 mg by mouth At Bedtime ) 90 tablet 3     blood glucose (NO BRAND SPECIFIED) test strip Use to test blood sugar two times daily or as directed. 200 strip 3     blood glucose monitoring (NO BRAND SPECIFIED) meter device kit Use to test blood sugar two times daily or as directed. 1 kit 3     empagliflozin (JARDIANCE) 10 MG TABS tablet Take 1 tablet (10 mg) by mouth daily 90 tablet 1     exenatide ER (BYDUREON) 2 MG pen Inject 2 mg Subcutaneous every 7 days 4 each 11     insulin glargine (LANTUS PEN) 100 UNIT/ML pen Inject 34 Units Subcutaneous At Bedtime 30 mL 3     insulin pen needle (FIFTY50 PEN NEEDLES) 32G X 4 MM miscellaneous As directed. To use with Victoza daily       latanoprost (XALATAN) 0.005 % ophthalmic solution Place 1 drop into both eyes At Bedtime       LENalidomide (REVLIMID) 10 MG CAPS capsule Take 1 capsule (10 mg) by mouth daily for 28 days 28 capsule 0     levothyroxine (SYNTHROID/LEVOTHROID) 175 MCG tablet Take 1 tablet (175 mcg) by mouth every morning (before breakfast) 90 tablet 3     losartan (COZAAR) 25 MG tablet Take 1 tablet (25 mg) by mouth daily 90 tablet 3     metFORMIN (GLUCOPHAGE) 500 MG tablet Take 2 tablets (1,000 mg) by mouth 2 times daily (with meals) 360 tablet 1     nystatin (MYCOSTATIN) 678934 UNIT/GM external powder Apply topically 3 times daily 60 g 3     pregabalin (LYRICA) 100 MG capsule Take 1 capsule (100 mg) by mouth 3 times daily 270 capsule 1     propranolol ER (INDERAL LA) 60 MG 24 hr capsule Take 1 capsule (60 mg) by mouth daily 30 capsule 3     sertraline (ZOLOFT) 100 MG tablet Take 1  tablet (100 mg) by mouth daily 90 tablet 1        No Known Allergies    Video physical exam  General: Patient appears well in no acute distress.   Skin: No visualized rash or lesions on visualized skin  Eyes: EOMI, no erythema, sclera icterus or discharge noted  Resp: Appears to be breathing comfortably without accessory muscle usage, speaking in full sentences, no cough  MSK: Appears to have normal range of motion based on visualized movements  Neurologic: No apparent tremors, facial movements symmetric  Psych: affect flat, alert and oriented    The rest of a comprehensive physical examination is deferred due to PHE (public health emergency) video restrictions    Labs:      3/8/2021 13:31   Sodium 139   Potassium 4.0   Chloride 106   Carbon Dioxide 29   Urea Nitrogen 16   Creatinine 0.81   GFR Estimate 77   GFR Estimate If Black 89   Calcium 9.8   Anion Gap 4   Albumin 4.2   Protein Total 7.2   Bilirubin Total 1.4 (H)   Alkaline Phosphatase 136   ALT 47   AST 23   Hemoglobin A1C 7.6 (H)   Glucose 127 (H)   WBC 4.5   Hemoglobin 13.7   Hematocrit 40.5   Platelet Count 142 (L)   RBC Count 4.66   MCV 87   MCH 29.4   MCHC 33.8   RDW 15.2 (H)   Diff Method Automated Method   % Neutrophils 59.3   % Lymphocytes 21.7   % Monocytes 11.4   % Eosinophils 6.5   % Basophils 1.1   Absolute Neutrophil 2.6   Absolute Lymphocytes 1.0   Absolute Monocytes 0.5   Absolute Eosinophils 0.3   Absolute Basophils 0.1   Albumin Fraction 4.4   Alpha 1 Fraction 0.3   Alpha 2 Fraction 0.7   Beta Fraction 0.8   ELP Interpretation: Essentially lupis...   Gamma Fraction 0.6 (L)   Monoclonal Peak 0.0       Assessment/plan:     #MM, standard risk, IgA Kappa  -s/p auto HSCT in 4/2019  -now on maintenance Revlimid 10 mg daily.   -Continue SPEP and SFLC monthly. MM labs are still stable, Mspike was 0.0, in a biochemical CR  -PET/CT annually or if symptomatic from MM due to bone pain. Last PET was 2/15/20  -will continue Rev maintenance. Tolerating  overall well. No significant cytopenias. No need for dose change. Ok for labs every 3 months  -will follow-up with Dr. Tipton in 3 months    PPx: Continue ACV and  mg for VTE ppx while on Rev    #Bone lesions  -was on zometa through 12/2020 to complete a total of 2 years of therapy. No plan for further therapy at this time      #Neuropathy   -multifactorial likely d/t previous chemotherapy and uncontrolled diabetes.   -currently on Lyrica 300 mg  -could consider adding gabapentin if needed. Stable. Also seeing neurology now    #Hx of falls  -Established with cancer rehab to help with balance with worsening neuropathy. No longer doing therapies or doing the exercises at home. Encouraged her to resume her exercises at home and to increase her activity as tolerated    #DM2   -latest A1C was 9.1 on 8/21/20, most recently 7.6 on 3/8. Managed by PCP and PharmD. Using insulin, Jardiance, Bydureon and Metformin, goal is get off Lantus  -trying to eat better and working on exercising more     Chronic Issues:   #Depression with Anxiety, chronic   -Currently maintained on Zoloft 100 mg daily.      #Memory changes   -Stable. Had neuropsych testing on 10/22/20. Saw Neurology in Dec 2020  -consider cancer rehab OT for chemo brain    #Thyroidectomy, 8/26/20  -will continue to f/u with with ENT and PCP       Reva Mojica PA-C

## 2021-03-11 NOTE — PROGRESS NOTES
"Winter is a 65 year old who is being evaluated via a billable video visit.      How would you like to obtain your AVS? MyChart  If the video visit is dropped, the invitation should be resent by: Text to cell phone: [phone]  Will anyone else be joining your video visit? No     Vitals - Patient Reported  Weight (Patient Reported): 117.9 kg (260 lb)  Height (Patient Reported): 175.3 cm (5' 9\")  BMI (Based on Pt Reported Ht/Wt): 38.39  Pain Score: No Pain (0)    Christiana CORDOBA        Video Start Time: 1:29 PM    Video-Visit Details    Type of service:  Video Visit    Video End Time:1:39 PM    Originating Location (pt. Location): Home    Distant Location (provider location):  [facility]     Platform used for Video Visit: Radha  "

## 2021-03-11 NOTE — LETTER
"    3/11/2021         RE: Winter Loya  [address]  [facility] [address]        Dear Colleague,    Thank you for referring your patient, Winter Loya, to the [facility]. Please see a copy of my visit note below.    Winter is a 65 year old who is being evaluated via a billable video visit.      How would you like to obtain your AVS? MyChart  If the video visit is dropped, the invitation should be resent by: Text to cell phone: [phone]  Will anyone else be joining your video visit? No     Vitals - Patient Reported  Weight (Patient Reported): 117.9 kg (260 lb)  Height (Patient Reported): 175.3 cm (5' 9\")  BMI (Based on Pt Reported Ht/Wt): 38.39  Pain Score: No Pain (0)    Christiana CORDOBA        Video Start Time: 1:29 PM    Video-Visit Details    Type of service:  Video Visit    Video End Time:1:39 PM    Originating Location (pt. Location): Home    Distant Location (provider location):  [facility]     Platform used for Video Visit: [email or web address]    ONCOLOGY/HEMATOLOGY PROGRESS NOTE  Mar 11, 2021    Reason for Visit: IgA Kappa Multiple Myeloma    Oncology HPI:   Winter Loya is a 64 year old woman with IgA Kappa Multiple Myeloma. In 2018 she had anemia and was fatigued. She was found to have IgA kappa monocloncal antibody with M-spike of 0.17. IgA elevated. Skeletal survey was unremarkable and UPEP had minimal protein (156 mg/m2). PET 11/2018 showed bone marrow consistent with hypermetabolic plasma cell myeloma. M spike was 0.17. She started RVD and Zometa. On 4/2019 she had an autologous transplant.      Her bone marrow bx from September 2019 was sent here for review. It showed marrow cellularity of 60%, with decreased trilineage hematopoiesis and 15% plasma cells as well as peripheral blood with slight anemia. Cytogenetics showed normal karyotype and FISH showed gains of chromosomes 5, 9, and 15, with an IGH rearrangement that could not be further " characterized given lack of material. She is on revlimid maintenance and zometa from her prior oncologist in [place]. On 12/6/19 her K/L ratio is 1.1, M spike is 0.04.     Interval history:   -no new bone pain   -No f/c/ns  -Eating and drinking well, appetite good, dieting to help control her DM  -Previously having diarrhea, though now has constipation.   -Denies SOB, CP or cough  -Leg strength is better. Not going PT anymore, not doing exercises at home  -No HA or edema  -Neuropathy in feet is stable     ROS: 10 point ROS neg other than the symptoms noted above in the HPI.      Past Medical History:   Diagnosis Date     Adjustment disorder with mixed anxiety and depressed mood 3/16/2013     Anxiety      Depression      Diabetes (H)      Glaucoma (increased eye pressure)      History of peripheral stem cell transplant (H) 12/9/2020     History of smoking      Hyperlipidemia      Multiple myeloma in remission (H)      Nonsenile cataract      Obesity      Sleep apnea     no c-pap use     Status post complete thyroidectomy 8/26/2020     Thyroid goiter 8/5/2020     Tremor 12/9/2020        Current Outpatient Medications   Medication Sig Dispense Refill     acetaminophen (TYLENOL) 325 MG tablet        acyclovir (ZOVIRAX) 400 MG tablet Take 400 mg by mouth 2 times daily       atorvastatin (LIPITOR) 20 MG tablet Take 1 tablet (20 mg) by mouth every 24 hours (Patient taking differently: Take 20 mg by mouth At Bedtime ) 90 tablet 3     blood glucose (NO BRAND SPECIFIED) test strip Use to test blood sugar two times daily or as directed. 200 strip 3     blood glucose monitoring (NO BRAND SPECIFIED) meter device kit Use to test blood sugar two times daily or as directed. 1 kit 3     empagliflozin (JARDIANCE) 10 MG TABS tablet Take 1 tablet (10 mg) by mouth daily 90 tablet 1     exenatide ER (BYDUREON) 2 MG pen Inject 2 mg Subcutaneous every 7 days 4 each 11     insulin glargine (LANTUS PEN) 100 UNIT/ML pen Inject 34 Units  Subcutaneous At Bedtime 30 mL 3     insulin pen needle (FIFTY50 PEN NEEDLES) 32G X 4 MM miscellaneous As directed. To use with Victoza daily       latanoprost (XALATAN) 0.005 % ophthalmic solution Place 1 drop into both eyes At Bedtime       LENalidomide (REVLIMID) 10 MG CAPS capsule Take 1 capsule (10 mg) by mouth daily for 28 days 28 capsule 0     levothyroxine (SYNTHROID/LEVOTHROID) 175 MCG tablet Take 1 tablet (175 mcg) by mouth every morning (before breakfast) 90 tablet 3     losartan (COZAAR) 25 MG tablet Take 1 tablet (25 mg) by mouth daily 90 tablet 3     metFORMIN (GLUCOPHAGE) 500 MG tablet Take 2 tablets (1,000 mg) by mouth 2 times daily (with meals) 360 tablet 1     nystatin (MYCOSTATIN) 930470 UNIT/GM external powder Apply topically 3 times daily 60 g 3     pregabalin (LYRICA) 100 MG capsule Take 1 capsule (100 mg) by mouth 3 times daily 270 capsule 1     propranolol ER (INDERAL LA) 60 MG 24 hr capsule Take 1 capsule (60 mg) by mouth daily 30 capsule 3     sertraline (ZOLOFT) 100 MG tablet Take 1 tablet (100 mg) by mouth daily 90 tablet 1        No Known Allergies    Video physical exam  General: Patient appears well in no acute distress.   Skin: No visualized rash or lesions on visualized skin  Eyes: EOMI, no erythema, sclera icterus or discharge noted  Resp: Appears to be breathing comfortably without accessory muscle usage, speaking in full sentences, no cough  MSK: Appears to have normal range of motion based on visualized movements  Neurologic: No apparent tremors, facial movements symmetric  Psych: affect flat, alert and oriented    The rest of a comprehensive physical examination is deferred due to PHE (public health emergency) video restrictions    Labs:      3/8/2021 13:31   Sodium 139   Potassium 4.0   Chloride 106   Carbon Dioxide 29   Urea Nitrogen 16   Creatinine 0.81   GFR Estimate 77   GFR Estimate If Black 89   Calcium 9.8   Anion Gap 4   Albumin 4.2   Protein Total 7.2   Bilirubin Total  1.4 (H)   Alkaline Phosphatase 136   ALT 47   AST 23   Hemoglobin A1C 7.6 (H)   Glucose 127 (H)   WBC 4.5   Hemoglobin 13.7   Hematocrit 40.5   Platelet Count 142 (L)   RBC Count 4.66   MCV 87   MCH 29.4   MCHC 33.8   RDW 15.2 (H)   Diff Method Automated Method   % Neutrophils 59.3   % Lymphocytes 21.7   % Monocytes 11.4   % Eosinophils 6.5   % Basophils 1.1   Absolute Neutrophil 2.6   Absolute Lymphocytes 1.0   Absolute Monocytes 0.5   Absolute Eosinophils 0.3   Absolute Basophils 0.1   Albumin Fraction 4.4   Alpha 1 Fraction 0.3   Alpha 2 Fraction 0.7   Beta Fraction 0.8   ELP Interpretation: Essentially lupis...   Gamma Fraction 0.6 (L)   Monoclonal Peak 0.0       Assessment/plan:     #MM, standard risk, IgA Kappa  -s/p auto HSCT in 4/2019  -now on maintenance Revlimid 10 mg daily.   -Continue SPEP and SFLC monthly. MM labs are still stable, Mspike was 0.0, in a biochemical CR  -PET/CT annually or if symptomatic from MM due to bone pain. Last PET was 2/15/20  -will continue Rev maintenance. Tolerating overall well. No significant cytopenias. No need for dose change. Ok for labs every 3 months  -will follow-up with Dr. Tipton in 3 months    PPx: Continue ACV and  mg for VTE ppx while on Rev    #Bone lesions  -was on zometa through 12/2020 to complete a total of 2 years of therapy. No plan for further therapy at this time      #Neuropathy   -multifactorial likely d/t previous chemotherapy and uncontrolled diabetes.   -currently on Lyrica 300 mg  -could consider adding gabapentin if needed. Stable. Also seeing neurology now    #Hx of falls  -Established with cancer rehab to help with balance with worsening neuropathy. No longer doing therapies or doing the exercises at home. Encouraged her to resume her exercises at home and to increase her activity as tolerated    #DM2   -latest A1C was 9.1 on 8/21/20, most recently 7.6 on 3/8. Managed by PCP and PharmD. Using insulin, Jardiance, Bydureon and Metformin,  goal is get off Lantus  -trying to eat better and working on exercising more     Chronic Issues:   #Depression with Anxiety, chronic   -Currently maintained on Zoloft 100 mg daily.      #Memory changes   -Stable. Had neuropsych testing on 10/22/20. Saw Neurology in Dec 2020  -consider cancer rehab OT for chemo brain    #Thyroidectomy, 8/26/20  -will continue to f/u with with ENT and PCP       Reva Mojica PA-C

## 2021-03-15 ENCOUNTER — OFFICE VISIT (OUTPATIENT)
Dept: NEUROLOGY | Facility: CLINIC | Age: 66
End: 2021-03-15
Payer: COMMERCIAL

## 2021-03-15 ENCOUNTER — TELEPHONE (OUTPATIENT)
Dept: NEUROLOGY | Facility: CLINIC | Age: 66
End: 2021-03-15

## 2021-03-15 VITALS — HEART RATE: 60 BPM | SYSTOLIC BLOOD PRESSURE: 117 MMHG | RESPIRATION RATE: 16 BRPM | DIASTOLIC BLOOD PRESSURE: 77 MMHG

## 2021-03-15 DIAGNOSIS — Z92.89 HISTORY OF MRI OF CERVICAL SPINE: ICD-10-CM

## 2021-03-15 DIAGNOSIS — C90.01 MULTIPLE MYELOMA IN REMISSION (H): ICD-10-CM

## 2021-03-15 DIAGNOSIS — Z92.89 H/O MAGNETIC RESONANCE IMAGING OF LUMBAR SPINE: ICD-10-CM

## 2021-03-15 DIAGNOSIS — G62.9 NEUROPATHY: ICD-10-CM

## 2021-03-15 DIAGNOSIS — G56.00 CARPAL TUNNEL SYNDROME, UNSPECIFIED LATERALITY: ICD-10-CM

## 2021-03-15 DIAGNOSIS — F41.1 GAD (GENERALIZED ANXIETY DISORDER): Chronic | ICD-10-CM

## 2021-03-15 DIAGNOSIS — M54.12 CERVICAL RADICULOPATHY: ICD-10-CM

## 2021-03-15 DIAGNOSIS — R41.3 MEMORY LOSS: ICD-10-CM

## 2021-03-15 DIAGNOSIS — E11.9 TYPE 2 DIABETES MELLITUS WITHOUT COMPLICATION, WITHOUT LONG-TERM CURRENT USE OF INSULIN (H): ICD-10-CM

## 2021-03-15 DIAGNOSIS — R25.1 TREMOR: Primary | ICD-10-CM

## 2021-03-15 PROCEDURE — 99214 OFFICE O/P EST MOD 30 MIN: CPT | Performed by: PSYCHIATRY & NEUROLOGY

## 2021-03-15 RX ORDER — ATORVASTATIN CALCIUM 20 MG/1
20 TABLET, FILM COATED ORAL AT BEDTIME
COMMUNITY
Start: 2021-03-15 | End: 2021-05-04

## 2021-03-15 RX ORDER — ACETAMINOPHEN 325 MG/1
325 TABLET ORAL EVERY 6 HOURS PRN
COMMUNITY
Start: 2021-03-15 | End: 2021-12-22

## 2021-03-15 RX ORDER — LENALIDOMIDE 10 MG/1
10 CAPSULE ORAL DAILY
Qty: 28 CAPSULE | Refills: 0 | COMMUNITY
Start: 2021-03-15 | End: 2021-04-09

## 2021-03-15 RX ORDER — SERTRALINE HYDROCHLORIDE 100 MG/1
100 TABLET, FILM COATED ORAL DAILY
Qty: 90 TABLET | Refills: 0 | Status: SHIPPED | OUTPATIENT
Start: 2021-03-15 | End: 2021-06-25

## 2021-03-15 NOTE — CONFIDENTIAL NOTE
sertraline (ZOLOFT) 100 MG tablet  Last Written Prescription Date:  8/26/20  Last Fill Quantity: 90,   # refills: 1  Last Office Visit : 10/29/20  Future Office visit: none     Pt should return to clinic for f/u with me in 3 months    Scheduling has been notified to contact the pt for appointment.    Routing refill request to provider for review/approval because:  appt [id number]

## 2021-03-15 NOTE — PATIENT INSTRUCTIONS
"Assessment:    (R25.1) Tremor  (primary encounter diagnosis)  worsening and affecting her ability to use a keyboard and mouse  Duration - 5 years or more.  Father had tremor/parkinson  Not a big drinker - not clear if alcohol helps her tremor  Smell perception is okay  She has sleep apnea  She cut out caffeine and there were no changes in her tremors  She is right handed and has more right than left handed tremor.   She was told she has ET as she is \"not stooped over\"  Her tremors are present when she is using her hands and she has a vocal and head tremor as well.     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 She has not worked with OT     Exercise/Therapy  - none     Driving - has had some memory/cognitive issues - seen on 10/22/2020 by Dr. Mcgrath for neuropsychological evaluation: there were mild frontal and possibly left temporal changes and recommended to be evaluated again in one year. She has not had a brain mri. She has mood issues that could benefit from psychotherapy as anxiety may be aggravating her memory.      Mood Generalized Anxiety disorder/Depression  Mood is good per her report.  Living with son basil - health  .   No counselor     Sertraline zoloft 100mg     Hallucinations/delusions - denies     Sleep Apnea -  Dr. Juan Jose Salazar  Denies RBD features     Bladder  - wears a pad.   Has changes in urinary frequency/urgency if blood sugars are out of control  2-4/noc getting up to urinate  Has not seen a urologist.   Seen by Ob-gyn a year ago.      Respiratory  Loratadine claritin 10mg - not taking    Former smoker - quit 15 years prior was 1 ppd  Quit 2005     GI/Constipation/GERD  - no " issue  Calcium antacid 500mg tums - not taking     ENDO    recent A1c was 8.8 and is working with pharmacist Coco Antoine. She may benefit from seeing nutrition and possibly endocrinology in addition to her pcp     Type 2 Diabetes with neuropathy    Atorvastatin lipitor 20mg at bedtime            Calcitriol rocaltrol 0.25mcg - not taking     Empagliflozin jardiance 10mg daily  Exenatide ER Bydureon 2mg weekly   Lantus 34 units at bedtime  Levothyroxine synthroid/levothyroid 175 mcg daily  Metformin 500mg 2 tabs  twice daily    Has not seen dietician            Seeing Coco Antoine, Pharm D  DOes not have an endocrinologist  Has not seen nutrition  A1c was 8.8 on 12/7/2020     Hypothyroidism;     s/p thyroidectomy for multinodular goiter     Cardio/heart  - blood pressure is stable initially after stem cell therapy but has gone up at times  States she is trying to lose weight to help her diabetes and blood pressure.   Her chart has been 160/88; encouraged her to get a home blood pressure cuff.     Losartan cozaar 25mg daily     Vision  - on treatment for glaucoma  Latanoprost xalatan 0.005% ophthalmic solution     HEME:  Multiple myeloma - in remision  IgA Kappa Multiple Myeloma. Presented 2018 with anemia and fatigue. Skeletal survey was unremarkable and UPEP had minimal protein (156 mg/m2). PET 11/2018 showed bone marrow consistent with hypermetabolic plasma cell myeloma. M spike was 0.17. She started RVD and Zometa. 4/2019 she had an autologous transplant. She was on revlimid maintenance and zometa from her prior oncologist in [place]. Zometa through 12/2020 to complete a total of 2 years of therapy  Mele Janakiram    Lenalidomide revlimid 10mg daiy  Nonformulary revlimid - as above   NOnformulary zometa every 3 months.      PCP Dr. Gabrielle Edwards     Acetaminophen tylenol 325mg - as needed  Acyclovir zovirax 400mg twice daily  Meloxicam mobic 15mg  - not taking  Oxycodone roxicodone - not  taking  Pregabalin lyrica 100mg 3/day for neuropathy and helping her mood as well.   Tizanidine zanaflex 4mg - not taking   Tramadol ultram 50mg - not taking     IN summary  - essential tremor  - neuropathy  - other medical issues  -diabetes     Acetaminophen tylenol 325mg as needed    Impression: Multilevel cervical spondylosis, most pronounced at the  level of C4-5 and C5-6 with moderate to severe spinal canal stenosis  and moderate spinal canal stenosis at C6-7. No abnormal cord signal.  No significant neural foraminal narrowing at any level.     I have personally reviewed the examination and initial interpretation  and I agree with the findings.     ANNE GOODMAN MD     Medications                 Acetaminophen tylenol 325mg As needed           Acyclovir zovirax 400mg 1   1       Atorvastatin lipitor 20mg      1       Calcitriol rocaltrol 0.25mcg Not taking           Calcium antacid 500mg tums             Empagliflozin jardiance 10mg 1       Exenatide ER Bydureon 2mg weekly           Insulin glargine lantus     34 units       Latanoprost xalatan 0.005% ophthalmic solution     Both eyes       Lenalidomide revlimid 10mg  1           Levothyroxine synthroid/levothyroid 175 mcg 1           Loratadine claritin 10mg              Losartan cozaar 25mg  1       Metformin glucophage 500mg  2   2       Nonformulary revlimid See above           NOnformulary zometa ? Every 3 months           Nystatin mycostatin 100,000 unit/gm external powder Using        Pregabalin lyrica 100mg  1 1 1       Propranolol ER inderal LA 60mg  1       Sertraline zoloft 100mg 1                                                                                    Plan:    Diabetes - better control of her diabetes - A1c was 7.6   Working with Nicole Fernando    Blood pressure was good today - she is on propranolol LA 60mg  Discussed getting a blood pressure cuff to monitor her blood pressure and heart rate.     cognitive re-evaluation  with Dr Mcgrath 10/2021  Order placed    Stress/anxiety issues  - still on sertraline and consider counsellor  Referral placed.    [facility]  [address] [place] [id number]      Jessica Guajardo, Ph.D., LP  [phone]    Pallavi Loza,Ph.D.,LP  [phone] (inpatient)    Linda Shah,Ph.D.,LP  [phone]    Ayla Patel, Ph.D.  [phone]    Domenica Willoughby, Ph.D., LP  [phone]    Julien Stoddard, Ph.D., [facility], [facility]  [phone]    Marya Espinoza,Ph.D., LP  [phone]    Tremor is better with the propranolol LA 60mg     Consideration for EMG and consultation with general neurology for neuropathy, carpal tunnel syndrome and cervical radiculopathy.

## 2021-03-15 NOTE — TELEPHONE ENCOUNTER
3/15 Provided phone number [phone] to schedule follow up with Dr. Mcgrath around 10/2021. Provided phone number [phone] to schedule ruthann Kuhn   Procedure    Ortho/Sports Med/Pod/Ent/Eye  [facility]   [phone]

## 2021-03-15 NOTE — LETTER
"    3/15/2021         RE: Winter Loya  [address]  [place] [address]        Dear Colleague,    Thank you for referring your patient, Winter Loya, to the [facility] [place]. Please see a copy of my visit note below.        Diagnosis/Summary/Recommendations:    PATIENT: Winter Loya  65 year old female     : 1955    TOMASA: MARCH 15, 2021    [address]   [place] [address]  [email or web address]  [phone] (M)   [phone] (H)   Peter Deleon son  [phone]     Assessment:    (R25.1) Tremor  (primary encounter diagnosis)  worsening and affecting her ability to use a keyboard and mouse  Duration - 5 years or more.  Father had tremor/parkinson  Not a big drinker - not clear if alcohol helps her tremor  Smell perception is okay  She has sleep apnea  She cut out caffeine and there were no changes in her tremors  She is right handed and has more right than left handed tremor.   She was told she has ET as she is \"not stooped over\"  Her tremors are present when she is using her hands and she has a vocal and head tremor as well.     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 She has not worked with OT     Exercise/Therapy  - none     Driving - has had some memory/cognitive issues - seen on 10/22/2020 by Dr. Mcgrath for neuropsychological evaluation: there were mild frontal and possibly left temporal changes and recommended to be evaluated again in one year. She has not had a brain mri. She has mood issues that could benefit from psychotherapy as anxiety may be aggravating her memory.      Mood Generalized Anxiety disorder/Depression  Mood is " good per her report.  Living with son basil - health  .   No counselor     Sertraline zoloft 100mg     Hallucinations/delusions - denies     Sleep Apnea -  Dr. Juan Jose Salazar  Denies RBD features     Bladder  - wears a pad.   Has changes in urinary frequency/urgency if blood sugars are out of control  2-4/noc getting up to urinate  Has not seen a urologist.   Seen by Ob-gyn a year ago.      Respiratory  Loratadine claritin 10mg - not taking    Former smoker - quit 15 years prior was 1 ppd  Quit 2005     GI/Constipation/GERD  - no issue  Calcium antacid 500mg tums - not taking     ENDO    recent A1c was 8.8 and is working with pharmacist Coco Antoine. She may benefit from seeing nutrition and possibly endocrinology in addition to her pcp     Type 2 Diabetes with neuropathy    Atorvastatin lipitor 20mg at bedtime            Calcitriol rocaltrol 0.25mcg - not taking     Empagliflozin jardiance 10mg daily  Exenatide ER Bydureon 2mg weekly   Lantus 34 units at bedtime  Levothyroxine synthroid/levothyroid 175 mcg daily  Metformin 500mg 2 tabs  twice daily    Has not seen dietician            Seeing Coco Antoine, Pharm D  DOes not have an endocrinologist  Has not seen nutrition  A1c was 8.8 on 12/7/2020     Hypothyroidism;     s/p thyroidectomy for multinodular goiter     Cardio/heart  - blood pressure is stable initially after stem cell therapy but has gone up at times  States she is trying to lose weight to help her diabetes and blood pressure.   Her chart has been 160/88; encouraged her to get a home blood pressure cuff.     Losartan cozaar 25mg daily     Vision  - on treatment for glaucoma  Latanoprost xalatan 0.005% ophthalmic solution     HEME:  Multiple myeloma - in remision  IgA Kappa Multiple Myeloma. Presented 2018 with anemia and fatigue. Skeletal survey was unremarkable and UPEP had minimal protein (156 mg/m2). PET 11/2018 showed bone marrow consistent with hypermetabolic plasma cell myeloma. M  spike was 0.17. She started RVD and Zometa. 4/2019 she had an autologous transplant. She was on revlimid maintenance and zometa from her prior oncologist in [place]. Zometa through 12/2020 to complete a total of 2 years of therapy  Mele Croninram    Lenalidomide revlimid 10mg daiy  Nonformulary revlimid - as above   NOnformulary zometa every 3 months.      PCP Dr. Gabrielle Edwards     Acetaminophen tylenol 325mg - as needed  Acyclovir zovirax 400mg twice daily  Meloxicam mobic 15mg  - not taking  Oxycodone roxicodone - not taking  Pregabalin lyrica 100mg 3/day for neuropathy and helping her mood as well.   Tizanidine zanaflex 4mg - not taking   Tramadol ultram 50mg - not taking     IN summary  - essential tremor  - neuropathy  - other medical issues  -diabetes     Acetaminophen tylenol 325mg as needed    Impression: Multilevel cervical spondylosis, most pronounced at the  level of C4-5 and C5-6 with moderate to severe spinal canal stenosis  and moderate spinal canal stenosis at C6-7. No abnormal cord signal.  No significant neural foraminal narrowing at any level.     I have personally reviewed the examination and initial interpretation  and I agree with the findings.     ANNE GOODMAN MD     Medications                 Acetaminophen tylenol 325mg As needed           Acyclovir zovirax 400mg 1   1       Atorvastatin lipitor 20mg      1       Calcitriol rocaltrol 0.25mcg Not taking           Calcium antacid 500mg tums             Empagliflozin jardiance 10mg 1       Exenatide ER Bydureon 2mg weekly           Insulin glargine lantus     34 units       Latanoprost xalatan 0.005% ophthalmic solution     Both eyes       Lenalidomide revlimid 10mg  1           Levothyroxine synthroid/levothyroid 175 mcg 1           Loratadine claritin 10mg              Losartan cozaar 25mg  1       Metformin glucophage 500mg  2   2       Nonformulary revlimid See above           NOnformulary zometa ? Every 3 months           Nystatin  mycostatin 100,000 unit/gm external powder Using        Pregabalin lyrica 100mg  1 1 1       Propranolol ER inderal LA 60mg  1       Sertraline zoloft 100mg 1                                                                                    Plan:    Diabetes - better control of her diabetes - A1c was 7.6   Working with Coco Antoine, Pharmacist    Blood pressure was good today - she is on propranolol LA 60mg  Discussed getting a blood pressure cuff to monitor her blood pressure and heart rate.     cognitive re-evaluation with Dr Mcgrath 10/2021  Order placed    Stress/anxiety issues  - still on sertraline and consider counsellor  Referral placed.    [facility]  [facility]  [address] [place] [id number]      Jessica Guajardo, Ph.D., LP  [phone]    Pallavi Loza,Ph.D.,LP  [phone] (inpatient)    Lnida Shah,Ph.D.,LP  [phone]    Ayla Patel, Ph.D.  [phone]    Domenica Willoughby, Ph.D., LP  [phone]    Julien Stoddard, Ph.D., ABPP, LP  [phone]    Marya Espinoza,Ph.D., LP  [phone]    Tremor is better with the propranolol LA 60mg     Consideration for EMG and consultation with general neurology for neuropathy, carpal tunnel syndrome and cervical radiculopathy.     Coding statement:   Medical Decision Making:  #  Chronic progressive medical conditions addressed  yes  Review and/or interpretation of unique test or documentation from a provider outside of neurology no   Independent historian provided additional details  no I  Prescription drug management and review of potential side effects and/or monitoring for side effects  yes   Health impacted by social determinants of health  no    I have reviewed the note as documented above.  This accurately captures the substance of my conversation with the patient and total time spent preparing for visit, executing visit and completing visit on the day of the visit:  30 minutes.      Gabriel Santoyo MD      ______________________________________    Last visit date and details:             ______________________________________      Patient was asked about 14 Review of systems including changes in vision (dry eyes, double vision), hearing, heart, lungs, musculoskeletal, depression, anxiety, snoring, RBD, insomnia, urinary frequency, urinary urgency, constipation, swallowing problems, hematological, ID, allergies, skin problems: seborrhea, endocrinological: thyroid, diabetes, cholesterol; balance, weight changes, and other neurological problems and these were not significant at this time except for   Patient Active Problem List   Diagnosis     Multiple myeloma in remission (H)     Diabetes mellitus, type 2 (H)     Allergic rhinitis     NA (generalized anxiety disorder)     History of endometrial ablation     Hyperlipidemia     Major depressive disorder, recurrent episode, moderate (H)     Osteoarthrosis involving lower leg     Type 2 diabetes mellitus with hyperglycemia (H)     Sleep apnea     Hypothyroidism, postoperative      Class 2 severe obesity due to excess calories with serious comorbidity and body mass index (BMI) of 39.0 to 39.9 in adult (H)     Tremor     History of peripheral stem cell transplant (H)        No Known Allergies  Past Surgical History:   Procedure Laterality Date     ARTHROSCOPY KNEE Left 02/2014     ARTHROSCOPY KNEE Right 12/2010    KNEE ARTHROSCOPY Dec 2010  Right     CHOLECYSTECTOMY  2002     COLONOSCOPY N/A 07/23/2020    Procedure: COLONOSCOPY;  Surgeon: Za Denis MD;  Location:  OR     EYE SURGERY  1985    lasersx-spot behind eye started leaking     THYROIDECTOMY N/A 08/26/2020    Procedure: THYROIDECTOMY, TOTAL;  Surgeon: Tiff Burgos MD;  Location: [facility] OR     TONSILLECTOMY  2003     TUBAL LIGATION  1986     Past Medical History:   Diagnosis Date     Adjustment disorder with mixed anxiety and depressed mood 3/16/2013     Anxiety      Depression       Diabetes (H)      Glaucoma (increased eye pressure)      History of peripheral stem cell transplant (H) 2020     History of smoking      Hyperlipidemia      Multiple myeloma in remission (H)      Nonsenile cataract      Obesity      Sleep apnea     no c-pap use     Status post complete thyroidectomy 2020     Thyroid goiter 2020     Tremor 2020     Social History     Socioeconomic History     Marital status:      Spouse name: Not on file     Number of children: 3     Years of education: Not on file     Highest education level: Not on file   Occupational History     Occupation: alcohol and drug counselor   Social Needs     Financial resource strain: Not on file     Food insecurity     Worry: Not on file     Inability: Not on file     Transportation needs     Medical: Not on file     Non-medical: Not on file   Tobacco Use     Smoking status: Former Smoker     Packs/day: 1.00     Types: Cigarettes     Quit date:      Years since quittin.1     Smokeless tobacco: Never Used   Substance and Sexual Activity     Alcohol use: Yes     Comment: occasional     Drug use: Never     Sexual activity: Not Currently   Lifestyle     Physical activity     Days per week: Not on file     Minutes per session: Not on file     Stress: Not on file   Relationships     Social connections     Talks on phone: Not on file     Gets together: Not on file     Attends Denominational service: Not on file     Active member of club or organization: Not on file     Attends meetings of clubs or organizations: Not on file     Relationship status: Not on file     Intimate partner violence     Fear of current or ex partner: Not on file     Emotionally abused: Not on file     Physically abused: Not on file     Forced sexual activity: Not on file   Other Topics Concern     Not on file   Social History Narrative     Not on file       Drug and lactation database from the [employer]  [employer]:  http://toxnet.nlm.nih.gov/cgi-bin/sis/htmlgen?LACT      B/P: Data Unavailable, T: Data Unavailable, P: Data Unavailable, R: Data Unavailable 0 lbs 0 oz  not currently breastfeeding., There is no height or weight on file to calculate BMI.  Medications and Vitals not listed above were documented in the cart and reviewed by me.     Current Outpatient Medications   Medication Sig Dispense Refill     acetaminophen (TYLENOL) 325 MG tablet        acyclovir (ZOVIRAX) 400 MG tablet Take 400 mg by mouth 2 times daily       atorvastatin (LIPITOR) 20 MG tablet Take 1 tablet (20 mg) by mouth every 24 hours (Patient taking differently: Take 20 mg by mouth At Bedtime ) 90 tablet 3     blood glucose (NO BRAND SPECIFIED) test strip Use to test blood sugar two times daily or as directed. 200 strip 3     blood glucose monitoring (NO BRAND SPECIFIED) meter device kit Use to test blood sugar two times daily or as directed. 1 kit 3     empagliflozin (JARDIANCE) 10 MG TABS tablet Take 1 tablet (10 mg) by mouth daily 90 tablet 1     exenatide ER (BYDUREON) 2 MG pen Inject 2 mg Subcutaneous every 7 days 4 each 11     insulin glargine (LANTUS PEN) 100 UNIT/ML pen Inject 34 Units Subcutaneous At Bedtime 30 mL 3     insulin pen needle (FIFTY50 PEN NEEDLES) 32G X 4 MM miscellaneous As directed. To use with Victoza daily       latanoprost (XALATAN) 0.005 % ophthalmic solution Place 1 drop into both eyes At Bedtime       LENalidomide (REVLIMID) 10 MG CAPS capsule Take 1 capsule (10 mg) by mouth daily for 28 days 28 capsule 0     levothyroxine (SYNTHROID/LEVOTHROID) 175 MCG tablet Take 1 tablet (175 mcg) by mouth every morning (before breakfast) 90 tablet 3     losartan (COZAAR) 25 MG tablet Take 1 tablet (25 mg) by mouth daily 90 tablet 3     metFORMIN (GLUCOPHAGE) 500 MG tablet Take 2 tablets (1,000 mg) by mouth 2 times daily (with meals) 360 tablet 1     nystatin (MYCOSTATIN) 443968 UNIT/GM external powder Apply topically 3 times  daily 60 g 3     pregabalin (LYRICA) 100 MG capsule Take 1 capsule (100 mg) by mouth 3 times daily 270 capsule 1     propranolol ER (INDERAL LA) 60 MG 24 hr capsule Take 1 capsule (60 mg) by mouth daily 30 capsule 3     sertraline (ZOLOFT) 100 MG tablet Take 1 tablet (100 mg) by mouth daily 90 tablet 1         Medications                                                                                                                                                  Gabriel Santoyo MD        Again, thank you for allowing me to participate in the care of your patient.        Sincerely,        Gabriel Santoyo MD

## 2021-04-02 ENCOUNTER — MYC MEDICAL ADVICE (OUTPATIENT)
Dept: SLEEP MEDICINE | Facility: CLINIC | Age: 66
End: 2021-04-02

## 2021-04-02 DIAGNOSIS — G47.33 OSA (OBSTRUCTIVE SLEEP APNEA): Primary | Chronic | ICD-10-CM

## 2021-04-02 PROBLEM — Z94.84 HISTORY OF PERIPHERAL STEM CELL TRANSPLANT (H): Status: ACTIVE | Noted: 2020-12-09

## 2021-04-02 PROBLEM — R25.1 TREMOR: Status: ACTIVE | Noted: 2020-12-09

## 2021-04-02 PROBLEM — Z94.84 HISTORY OF PERIPHERAL STEM CELL TRANSPLANT (H): Chronic | Status: ACTIVE | Noted: 2020-12-09

## 2021-04-05 DIAGNOSIS — C90.01 MULTIPLE MYELOMA IN REMISSION (H): Primary | ICD-10-CM

## 2021-04-08 ENCOUNTER — ANCILLARY PROCEDURE (OUTPATIENT)
Dept: CT IMAGING | Facility: CLINIC | Age: 66
End: 2021-04-08
Attending: NURSE PRACTITIONER
Payer: COMMERCIAL

## 2021-04-08 DIAGNOSIS — Z87.891 PERSONAL HISTORY OF TOBACCO USE: ICD-10-CM

## 2021-04-08 PROCEDURE — 71271 CT THORAX LUNG CANCER SCR C-: CPT | Mod: GC | Performed by: RADIOLOGY

## 2021-04-08 NOTE — RESULT ENCOUNTER NOTE
Winter,    No lung cancer was seen.     Discuss these results with your provider at your next planned visit.     Kat Chong MD

## 2021-04-09 ENCOUNTER — TELEPHONE (OUTPATIENT)
Dept: FAMILY MEDICINE | Facility: CLINIC | Age: 66
End: 2021-04-09

## 2021-04-09 ENCOUNTER — TELEPHONE (OUTPATIENT)
Dept: ONCOLOGY | Facility: CLINIC | Age: 66
End: 2021-04-09

## 2021-04-09 DIAGNOSIS — C90.01 MULTIPLE MYELOMA IN REMISSION (H): Primary | ICD-10-CM

## 2021-04-09 RX ORDER — LENALIDOMIDE 10 MG/1
10 CAPSULE ORAL DAILY
Qty: 28 CAPSULE | Refills: 0 | Status: SHIPPED | OUTPATIENT
Start: 2021-04-09 | End: 2021-05-10

## 2021-04-09 NOTE — TELEPHONE ENCOUNTER
Oral Chemotherapy Monitoring Program   Medication: Revlimid  Rx: 10mg PO daily on days 1 through 28 of 28 day cycle   Auth #: [id number]  Risk Category: Adult female NORP  Routine survey questions reviewed.   Rx to [facility] Escribed to [facility]    Lor Borja  [facility]  Oncology Pharmacy Liaison   [email or web address]  [phone] (phone)  [phone] (fax)

## 2021-04-09 NOTE — TELEPHONE ENCOUNTER
Imaging dept called with incidental findings:     CT Chest 4/8/21  1-scattered areas of peripheral mucous plugging   2-Left lower lobe with associated patchy fibral atelectasias  3-Stable right lower lobe atelectasis  4-Main pulmonary artery dilated up to 3.5 cm which can be seen in setting of pulmonary artery hypertension  sequale of prior granulomatous infection    Stacie Albarran RN    Per imaging result note 4/9/21 patient has been instructed to discuss results with primary

## 2021-04-13 ENCOUNTER — VIRTUAL VISIT (OUTPATIENT)
Dept: PHARMACY | Facility: CLINIC | Age: 66
End: 2021-04-13
Payer: COMMERCIAL

## 2021-04-13 DIAGNOSIS — E11.65 TYPE 2 DIABETES MELLITUS WITH HYPERGLYCEMIA, WITH LONG-TERM CURRENT USE OF INSULIN (H): Primary | ICD-10-CM

## 2021-04-13 DIAGNOSIS — Z79.4 TYPE 2 DIABETES MELLITUS WITH HYPERGLYCEMIA, WITH LONG-TERM CURRENT USE OF INSULIN (H): Primary | ICD-10-CM

## 2021-04-13 PROCEDURE — 99607 MTMS BY PHARM ADDL 15 MIN: CPT | Mod: TEL | Performed by: PHARMACIST

## 2021-04-13 PROCEDURE — 99606 MTMS BY PHARM EST 15 MIN: CPT | Mod: TEL | Performed by: PHARMACIST

## 2021-04-13 NOTE — PROGRESS NOTES
Medication Therapy Management (MTM) Encounter    ASSESSMENT:                            Medication Adherence/Access: No issues identified    Type 2 Diabetes: Patient is meeting A1c goal of < 8%. Self monitoring of blood glucose is at goal of fasting  mg/dL and post prandial < 180 mg/dL. Patient would benefit from dose decrease in Lantus.    PLAN:                            1. Reduce Lantus to 30 units daily.     Follow-up: 3 weeks    SUBJECTIVE/OBJECTIVE:                          Winter Loya is a 65 year old female called for a follow-up visit. She was referred to me from Brittany Rosenthal.  Today's visit is a follow-up MTM visit from 21     Reason for visit: diabetes follow-up.    Allergies/ADRs: Reviewed in chart  Tobacco: She reports that she quit smoking about 16 years ago. Her smoking use included cigarettes. She smoked 1.00 pack per day. She has never used smokeless tobacco.  Alcohol: not currently using  Caffeine: 1-2 cups/day of coffee  Activity: working on increasing activity  Past Medical History: Reviewed in chart    Medication Adherence/Access: no issues reported    Type 2 Diabetes:  Currently taking Jardiance 10 mg daily, Bydureon 2 mg once weekly, metformin 1000 mg twice daily, Lantus 34 units daily. Patient is experiencing the following side effects: urinary frequency, she is getting up to pee 1-2 more times to pee at night. She gets up 3-4 times each night for the bathroom. Goes back to bed quickly. She reports some recent falls that appears to be related to her balance and deconditioning. She also has a dropped foot that impacts her ability to be mobile.   Blood sugar monitorin time(s) daily. Ranges (patient reported):     Fasting- 120-150  Post-Prandial- 150's    Symptoms of low blood sugar? none  Symptoms of high blood sugar? none  Eye exam: due  Foot exam: due  Diet/Exercise: She is eating less and appreciates the change in the appetite. She is attempting to increase exercise. She  "would like to join the [employer] to start swim exercise classes.   Aspirin: Not taking due to not indicated    The 10-year ASCVD risk score (Bostonmeg JACINTO Jr., et al., 2013) is: 6.1%    Values used to calculate the score:      Age: 65 years      Sex: Female      Is Non- : No      Diabetic: Yes      Tobacco smoker: No      Systolic Blood Pressure: 117 mmHg      Is BP treated: No      HDL Cholesterol: 48 mg/dL      Total Cholesterol: 135 mg/dL    Statin: Yes: atorvastatin 20 mg daily   ACEi/ARB: Yes: losartan 25 mg daily.   Urine Albumin:   Lab Results   Component Value Date    UMALCR 73.57 (H) 12/07/2020      Lab Results   Component Value Date    A1C 7.6 03/08/2021    A1C 8.8 12/07/2020    A1C 9.1 08/21/2020    A1C 6.7 03/05/2020     Today's Vitals: There were no vitals taken for this visit. - telemed    BP Readings from Last 1 Encounters:   03/15/21 117/77     Pulse Readings from Last 1 Encounters:   03/15/21 60     Wt Readings from Last 1 Encounters:   01/27/21 266 lb (120.7 kg)     Ht Readings from Last 1 Encounters:   01/27/21 5' 9\" (1.753 m)     Estimated body mass index is 39.28 kg/m  as calculated from the following:    Height as of 1/27/21: 5' 9\" (1.753 m).    Weight as of 1/27/21: 266 lb (120.7 kg).    Temp Readings from Last 1 Encounters:   01/27/21 97.3  F (36.3  C) (Tympanic)     ----------------    I spent 14 minutes with this patient today. All changes were made via collaborative practice agreement with Delmi Gross. A copy of the visit note was provided to the patient's primary care provider.    The patient was sent via Zigabid a summary of these recommendations.     Coco Antoine, Pharm.D., [facility]  Medication Therapy Management Pharmacist  Page/VM:  [phone]    Telemedicine Visit Details  Type of service:  Telephone visit  Start Time: 2:35 PM  End Time: 2:49 PM  Originating Location (patient location): [place]  Distant Location (provider location):  [facility] MEDICATION THERAPY " MANAGEMENT        Medication Therapy Recommendations  Diabetes mellitus, type 2 (H)    Current Medication: insulin glargine (LANTUS PEN) 100 UNIT/ML pen   Rationale: Dose too high - Dosage too high - Safety   Recommendation: Decrease Dose - Reduce Lantus to 30 units daily.   Status: Accepted per CPA

## 2021-04-13 NOTE — PATIENT INSTRUCTIONS
Recommendations from today's MTM visit:                                                       1. Decrease Lantus to 30 units daily.     Next MTM visit: Tuesday, 5/4 at 2:30 PM by phone    It was great to speak with you today.  I value your experience and would be very thankful for your time with providing feedback on our clinic survey. You may receive a survey via email or text message in the next few days.     To schedule another MTM appointment, please call the clinic directly or you may call the MTM scheduling line at [phone] or toll-free at [phone].     My Clinical Pharmacist's contact information:                                                      Please feel free to contact me with any questions or concerns you have.      Coco Antoine, Pharm.D., [facility]  Medication Therapy Management Pharmacist  Page/VM:  [phone]

## 2021-04-19 ENCOUNTER — MYC MEDICAL ADVICE (OUTPATIENT)
Dept: FAMILY MEDICINE | Facility: CLINIC | Age: 66
End: 2021-04-19

## 2021-04-21 ENCOUNTER — TELEPHONE (OUTPATIENT)
Dept: PHARMACY | Facility: CLINIC | Age: 66
End: 2021-04-21

## 2021-04-21 ENCOUNTER — VIRTUAL VISIT (OUTPATIENT)
Dept: FAMILY MEDICINE | Facility: CLINIC | Age: 66
End: 2021-04-21
Payer: COMMERCIAL

## 2021-04-21 DIAGNOSIS — Z79.4 TYPE 2 DIABETES MELLITUS WITH HYPERGLYCEMIA, WITH LONG-TERM CURRENT USE OF INSULIN (H): Primary | ICD-10-CM

## 2021-04-21 DIAGNOSIS — I27.20 PULMONARY HYPERTENSION (H): ICD-10-CM

## 2021-04-21 DIAGNOSIS — R06.00 DYSPNEA, UNSPECIFIED TYPE: Primary | ICD-10-CM

## 2021-04-21 DIAGNOSIS — E11.65 TYPE 2 DIABETES MELLITUS WITH HYPERGLYCEMIA, WITH LONG-TERM CURRENT USE OF INSULIN (H): Primary | ICD-10-CM

## 2021-04-21 PROCEDURE — 99213 OFFICE O/P EST LOW 20 MIN: CPT | Mod: 95 | Performed by: NURSE PRACTITIONER

## 2021-04-21 RX ORDER — EXENATIDE 2 MG/.85ML
2 INJECTION, SUSPENSION, EXTENDED RELEASE SUBCUTANEOUS
Qty: 10.2 ML | Refills: 3 | Status: SHIPPED | OUTPATIENT
Start: 2021-04-21 | End: 2021-06-08 | Stop reason: ALTCHOICE

## 2021-04-21 ASSESSMENT — PAIN SCALES - GENERAL: PAINLEVEL: NO PAIN (0)

## 2021-04-21 NOTE — PROGRESS NOTES
Winter is a 65 year old who is being evaluated via a billable telephone visit.      What phone number would you like to be contacted at? [phone]   How would you like to obtain your AVS? Mail a copy    Assessment & Plan     (R06.00) Dyspnea, unspecified type  (primary encounter diagnosis)  Comment: Patient with pulmonary hypertension, her main pulmonary artery is dilated up to 3.5 cm on recent lung cancer screen, with dyspnea that is limiting her ability to be active. Unremarkable stress echo last year. Echocardiogram showed diastolic dysfunction.   Plan: PULMONARY MEDICINE REFERRAL          (I27.20) Pulmonary hypertension (H)  Comment: Increasing symptoms of dyspnea and fatigue. Her main pulmonary artery is dilated up to 3.5 cm on recent lung cancer screen.   Plan: PULMONARY MEDICINE REFERRAL          Return in about 3 weeks (around 5/12/2021), or if symptoms worsen or fail to improve.    FALGUNI Duenas CNP  [facility] [place]    Subjective   Winter is a 65 year old who presents for the following health issues     HPI     Patient would like to discuss lung cancer results.   She would like a clarification.     Patient states that she has occasional shortness of breath, worse with activity which is why so avoids exercise. Fatigues easily.  Had one episode of near syncope in the last year, medications were changed and now has very brief episodes of dizziness about two times a week.  Denies blue lips or finger nails, fast heart rate, syncope, chest pain or chest pressure.     Review of Systems   Constitutional, HEENT, cardiovascular, pulmonary, gi and gu systems are negative, except as otherwise noted.      Objective           Vitals:  No vitals were obtained today due to virtual visit.    Physical Exam   healthy, alert and no distress  PSYCH: Alert and oriented times 3; coherent speech, normal   rate and volume, able to articulate logical thoughts, able   to abstract reason, no tangential  thoughts, no hallucinations   or delusions  Her affect is normal  RESP: No cough, no audible wheezing, able to talk in full sentences  Remainder of exam unable to be completed due to telephone visits    Diagnostics reviewed today  Exam Information    Exam Date Exam Time Accession # Performing Department Results    21  3:03 PM [id number] [facility]    PACS Images     Show images for CT Chest Lung Cancer Scrn Low Dose wo   Study Result    CT Low Dose Lung Cancer Screening     History: Screening for lung cancer, smoking.     Number of packs-year of smokin  Current or former smoker?: Former  If former, number of years since quit?: 15     Comparison: PET CT 2/15/2020     Technique: Low dose CT chest. Images reviewed in lung, soft tissue and  bone windows.  DLP: 152 (mGy*cm)  CTDIvol: 4.83 (mGy)     Findings: [All follow up of nodules are based on ACR guidelines for  lung cancer screening and measurements of each nodule size must be the  mean of the longest axial plane measurement by its perpendicular  measured to the nearest decimal and rounded up to the nearest whole  number. ]  Nodules: <5  The largest of these nodule(s) are as follows:     - 5  mm ground glass nodule in the right upper lobe on series:  5  image:  73.     Emphysema: Mild centrilobular emphysema     Coronary artery calcium: No significant coronary artery  calcifications.     Additional findings: Scattered calcified granulomas. Scattered areas  of peripheral mucous plugging in the left lower lobe. Overall  unchanged pleural-based elongated rounded opacity in the posterior  right lung base and scattered areas of peripheral reticular  abnormality and faint groundglass opacity. Mitral annular  calcifications. The main pulmonary artery is dilated up to 3.5 cm.  Calcified left hilar lymph nodes. Postsurgical changes of  thyroidectomy and cholecystectomy. Calcified splenic granulomas.  Diffusely hypoattenuating  hepatic lobe.                                                                      Impression:   1. ACR Assessment Category (v1.1):  Lung-RADS Category 2. Benign  appearance or behavior.       Recommendation:  Lung-RADS Category 2. Benign appearance or behavior.  Recommendation:  continue annual screening with Lung cancer screening  CT (please order exam code [id number]).      2. Significant Incidental Finding(s):  Category S: Yes.  a.  Scattered areas of peripheral mucous plugging in the left lower  lobe with associated patchy fibroatelectasis.   b.  Stable right lower lobe round atelectasis.    c.  The main pulmonary artery is dilated up to 3.5 cm, which can be  seen in the setting of pulmonary artery hypertension.    d. Sequela of prior granulomatous infection.     3. Avoidance of tobacco smoke is strongly advised. Please consider  referral for smoking cessation to [facility] Medication Therapy Management  (MTM) if clinically appropriate.   Echocardiogram Dobutamine Stress  Order: [id number]  Status:  Edited Result - FINAL   Visible to patient:  Yes (MyChart) Next appt:  2021 at 02:30 PM in Pharm D ([facility], [facility]) Dx:  Atypical chest pain  Details    Reading Physician Reading Date Result Priority   Vern Romero MD  [phone] 2020       Narrative & Impression     [id number]  [facility]  [id number]  [id number]^MAHNAZ^ALYSE^ELIZABETH           [facility]  Echocardiography Laboratory  [address] [place] [id number]     Name: PIEDAD LO  MRN: [id number]  : 1955  Study Date: 2020 02:52 PM  Age: 64 yrs  Gender: Female  Patient Location: [facility]  Reason For Study: Atypical chest pain  Ordering Physician: ALYSE JOYA  Referring Physician: BHAKTI MEADE  Performed By: Roque Bermudez RDCS     BSA: 2.3 m2  Height: 68 in  Weight: 255 lb  HR: 76  BP: 133/71 mmHg  _____________________________________________________________________________  __      _____________________________________________________________________________  __        Interpretation Summary  Normal, low-risk dobutamine echocardiogram without evidence of ischemia.  91% of maximal predicted heart rate (MPHR) was achieved.  Normal biventricular size, thickness, and global systolic function at  baseline.  With low-dose dobutamine, LVEF augmented and LV cavity size decreased  appropriately.  With peak dobutamine, LVEF increased further to >70% and LV cavity size  decreased appropriately. Blood pressure was unremarkable.  No regional wall motion abnormalities at rest or with dobutamine.  The test was terminated due to the achievement of target dobutamine dose.  No angina was elicited.  There was 1 mm ST depression in III, aVF, V4-V6 and 1.5 mm ST depression in II  at peak stress. Normal heart rate and blood pressure response to dobutamine.  Occasional PACs and PVCs during stress.  No significant valvular abnormalities are noted on screening Doppler exam.  The aortic root and visualized ascending aorta are normal.  No prior stress tests available for comparison.  _____________________________________________________________________________  __     Stress  The patient did not exhibit any symptoms during drug infusion.  The drug infusion was stopped due to target heart rate achieved.  There was a normal BP response to drug infusion.  The maximum dose of dobutamine was 30mcg/kg/min.  The maximum dose of metoprolol was 2mg.  Definity (NDC #79402-986-74) given intravenously.  Patient was given 7ml mixture of 1.5ml Definity and 8.5ml saline.  3 ml wasted.  IV start location LAC .     Stress Results                                       Maximum Predicted HR:   158 bpm             Target HR: 134 bpm        % Maximum Predicted HR: 91 %                                 Stage  Heart Rate  BP                                       (bpm)                             Baseline    76    133/71                                Peak     143    168/40                              Maximum Stress HR: 143 bpm     Procedure  Dobutamine stress echo with two dimensional color and spectral Doppler  performed. Contrast Definity.     _____________________________________________________________________________  __  MMode/2D Measurements & Calculations  asc Aorta Diam: 3.5 cm        Doppler Measurements & Calculations  PA acc time: 0.10 sec           Echocardiogram Complete  Order: [id number]  Status:  Edited Result - FINAL   Visible to patient:  Yes (K[place]) Next appt:  2021 at 02:30 PM in Pharm D (Coco Antoine, [facility]) Dx:  Pulmonary hypertension (H)  Details    Reading Physician Reading Date Result Priority   HIMA Weir MD  [phone] 2020       Narrative & Impression     [id number]  [id number]  [id number]^MAHNAZ^ALYSE^ELIZABETH           [facility]  Diagnostic and Treamtent-3rd Floor  [address] [place] [address]     Name: PIEDAD LO  MRN: [id number]  : 1955  Study Date: 2020 02:24 PM  Age: 64 yrs  Gender: Female  Patient [facility]: [facility]  Reason For Study: Pulmonary hypertension (H)  Ordering Physician: ALYSE JOYA  Referring Physician: BHAKTI MEADE  Performed By: Camden Fernandez RDCS     BSA: 2.2 m2  Height: 68 in  Weight: 250 lb  BP: 144/70 mmHg  _____________________________________________________________________________  __        Procedure  Echocardiogram with two-dimensional, color and spectral Doppler performed.  _____________________________________________________________________________  __        Interpretation Summary  Global and regional left ventricular function is normal with an EF of 60-65%.  Right ventricular function, chamber size, wall motion, and thickness are  normal.  Pulmonary artery systolic pressure is normal.  The inferior vena cava is normal.  No pericardial effusion is present.  Previous study not  available for comparison.  _____________________________________________________________________________  __        Left Ventricle  Global and regional left ventricular function is normal with an EF of 60-65%.  Left ventricular wall thickness is normal. Left ventricular size is normal.  Grade II or moderate diastolic dysfunction. No regional wall motion  abnormalities are seen.     Right Ventricle  Right ventricular function, chamber size, wall motion, and thickness are  normal.     Atria  Both atria appear normal. The atrial septum is intact as assessed by color  Doppler .     Mitral Valve  Moderate mitral annular calcification is present.        Aortic Valve  Aortic valve is normal in structure and function.     Tricuspid Valve  The tricuspid valve is normal. Trace tricuspid insufficiency is present. The  right ventricular systolic pressure is approximated at 22.8 mmHg plus the  right atrial pressure. Pulmonary artery systolic pressure is normal.     Pulmonic Valve  The pulmonic valve is normal.     Vessels  The thoracic aorta is normal. The pulmonary artery is normal. The inferior  vena cava is normal.     Pericardium  No pericardial effusion is present.        Compared to Previous Study  Previous study not available for comparison.  _____________________________________________________________________________  __  MMode/2D Measurements & Calculations     IVSd: 0.91 cm  LVIDd: 4.9 cm  LVIDs: 2.9 cm  LVPWd: 0.87 cm  FS: 40.4 %  LV mass(C)d: 151.3 grams  LV mass(C)dI: 67.3 grams/m2  Ao root diam: 3.4 cm  asc Aorta Diam: 3.7 cm  Ao Arch Diam (Proximal trans.): 3.4 cm  LVOT diam: 2.2 cm  LVOT area: 3.8 cm2  LA Volume (BP): 87.4 ml  LA Volume Index (BP): 38.8 ml/m2  RWT: 0.35     TAPSE: 2.5 cm        Doppler Measurements & Calculations  MV E max melisa: 106.0 cm/sec  MV A max melisa: 113.0 cm/sec  MV E/A: 0.94  MV dec time: 0.30 sec  Ao V2 max: 196.5 cm/sec  Ao max P.0 mmHg  DARY(V,D): 2.7 cm2  LV V1 max P.4  mmHg  LV V1 max: 135.6 cm/sec  LV V1 VTI: 29.8 cm  CO(LVOT): 7.9 l/min  CI(LVOT): 3.5 l/min/m2  SV(LVOT): 114.8 ml  SI(LVOT): 51.1 ml/m2  PA V2 max: 122.8 cm/sec  PA max P.0 mmHg  TR max amandeep: 238.9 cm/sec  TR max P.8 mmHg  Pulm Sys Amandeep: 44.9 cm/sec  Pulm Vital Amandeep: 33.2 cm/sec  Pulm S/D: 1.4  AV Amandeep Ratio (DI): 0.69  E/E' av.7  Lateral E/e': 25.2  Medial E/e': 28.1        ___________________________________________                     Phone call duration: 11 minutes

## 2021-04-21 NOTE — TELEPHONE ENCOUNTER
Patient calls stating that Bydureon is no longer available at her pharmacy. Sending Rx for Bydureon BCise. Will likely need a PA.       Routing to PCP as FYI in case PA paperwork is sent directly to her.

## 2021-04-21 NOTE — TELEPHONE ENCOUNTER
Central Prior Authorization Team   Phone: [phone]      PA Initiation    Medication: Bydureon BCise  Insurance Company: [employer] - Phone [phone] Fax [phone]  Pharmacy Filling the Rx: [id number] - [place] [address]  Filling Pharmacy Phone: [phone]  Filling Pharmacy Fax:    Start Date: 4/21/2021

## 2021-04-21 NOTE — PATIENT INSTRUCTIONS
Patient Education     Pulmonary Hypertension  Pulmonary hypertension is high pressure in the blood vessels that carry blood into the lungs. This strains the lungs and heart and can lead to serious problems.    Systemic hypertension means the pressure is too high in blood vessels throughout the body. A person with pulmonary hypertension may also have high blood pressure throughout the body.  What causes pulmonary hypertension?  The cause of pulmonary hypertension is sometimes unknown. But it's most often caused by another health problem. In many cases, controlling this problem can help prevent or control pulmonary hypertension. Some of the most common causes of pulmonary hypertension are:  In children:    Severe lung problems in a     Lung conditions, such as cystic fibrosis or interstitial lung disease    Heart disease    Congenital heart defects    HIV infection    Other conditions, such as scleroderma, lupus, or sickle cell disease  In adults:    Lung conditions, such as chronic obstructive pulmonary disease (COPD), advanced bronchitis, cystic fibrosis, or pulmonary fibrosis    Liver disease    Blood clots in the lungs    Left-sided heart failure    HIV infection    Sleep apnea    Other conditions, such as scleroderma, lupus, or sickle cell disease  What are the symptoms of pulmonary hypertension?  Symptoms may come on suddenly. Or, they may come on slowly over time. Symptoms can include:    Shortness of breath    Blue lips or fingernails (signs that the body is having trouble getting oxygen)    Tiring quickly, especially when active    Fast heartbeat    Pain in the upper right side of the abdomen    Swelling in the legs or ankles    Chest pain or pressure    Fainting or dizzy spells  How is pulmonary hypertension diagnosed?  Your healthcare provider will examine you and listen to your heart and lungs. Your blood pressure will also be measured. Tests may be done to help find the cause. These may  include:    Blood tests. These measure certain body functions. They also check for problems such as infection.    Chest X-ray. This takes a picture of the inside of the chest. It can show certain heart and lung problems.    Electrocardiogram (ECG). This test records the heart s electrical activity.    Echocardiogram (echo). This test uses sound waves to create a moving picture of the heart.    Pulmonary function tests. These tests measure breathing and lung capacity.    Perfusion lung scan. This scan may be used to find changes in the arteries leading to the lungs and blood flow within the lungs and to identify blood clots.    CT scan of the chest. This test takes detailed pictures of the lungs.    6-minute walk test. This test measures your exercise tolerance and to check if your oxygen levels drop when you exert yourself.    Right heart catheterization (cath). This procedure n measures pressures in the heart and lungs. . A thin tube (catheter) is put into a blood vessel in the groin or neck and guided into the right side of the heart and to the pulmonary artery. This is the main artery that carries blood to your lungs. Certain blood pressure tests are then done. This is the only test that measures the pressure inside the pulmonary arteries.  How is pulmonary hypertension treated?   Treatment depends on your age, health, and the severity of your symptoms. Any underlying health problems you have will be treated. Treatment may also include:    Oxygen    Medicine to lower the pressure in the lung blood vessels    Medicine to help the body lose excess water    Medicine to prevent blood clots    Medicine that help the heart beat stronger, pump more blood and control abnormal heart rhythms  What are the long-term concerns?  Though pulmonary hypertension has no cure, certain treatments may relieve symptoms and slow progression of the disease. In rare and severe cases, a lung transplant may be needed. Your healthcare  provider can tell you more about this if needed.  When you should call your healthcare provider  Call your healthcare provider right away if you have any of the following:    Persistent blueness of lips or fingernails    Shortness of breath    Fever of 100.4  F ( 38.0 C ) or higher or as advised by your healthcare provider    Fainting spells    New symptoms or worsening of current symptoms  [place] last reviewed this educational content on 6/1/2019 2000-2021 [employer]. All rights reserved. This information is not intended as a substitute for professional medical care. Always follow your healthcare professional's instructions.

## 2021-04-21 NOTE — TELEPHONE ENCOUNTER
Prior Authorization Approval    Authorization Effective Date: 3/22/2021  Authorization Expiration Date: 4/21/2022  Medication: Bydureon BCise - APPROVED  Approved Dose/Quantity:   Reference #:     Insurance Company: [employer] - Phone [phone] Fax [phone]  Expected CoPay:       CoPay Card Available:      Foundation Assistance Needed:    Which Pharmacy is filling the prescription (Not needed for infusion/clinic administered): [id number] - [place], [address]  Pharmacy Notified: Yes-Pharmacy will notify patient when ready.  Patient Notified: No

## 2021-04-21 NOTE — TELEPHONE ENCOUNTER
Prior Authorization Retail Medication Request    Medication/Dose:   ICD code (if different than what is on RX):  E11.65  Previously Tried and Failed:  Victoza, Bydureon, Jardiance, Lantus, Humalog, metformin  Rationale:  Patient was previously well controlled on Bydureon, Jardiance, Lantus and metformin. Bydureon is no longer available at her pharmacy. She has tried Victoza in the past with intense diarrhea and stomach cramping. Would benefit from Starting Bydureon BCise to replace Bydureon which is no longer manufactured.     Insurance Name:  [employer] [place]  Insurance ID:  [id number]      Pharmacy Information (if different than what is on RX)  Name:  [facility]  Phone:  [phone]

## 2021-04-27 ENCOUNTER — DOCUMENTATION ONLY (OUTPATIENT)
Dept: SLEEP MEDICINE | Facility: CLINIC | Age: 66
End: 2021-04-27

## 2021-04-27 DIAGNOSIS — E66.812 CLASS 2 SEVERE OBESITY DUE TO EXCESS CALORIES WITH SERIOUS COMORBIDITY AND BODY MASS INDEX (BMI) OF 39.0 TO 39.9 IN ADULT (H): ICD-10-CM

## 2021-04-27 DIAGNOSIS — E66.01 CLASS 2 SEVERE OBESITY DUE TO EXCESS CALORIES WITH SERIOUS COMORBIDITY AND BODY MASS INDEX (BMI) OF 39.0 TO 39.9 IN ADULT (H): ICD-10-CM

## 2021-04-27 DIAGNOSIS — G47.33 OSA (OBSTRUCTIVE SLEEP APNEA): ICD-10-CM

## 2021-04-27 NOTE — PROGRESS NOTES
Patient was offered choice of vendor and chose [facility].  Patient [place] Loya was set up at [place]  on April 27, 2021. Patient received a Resmed AirSense 10 Auto. Pressures were set at 9-19 cm H2O.   Patient s ramp is 5 cm H2O for Auto and FLEX/EPR is EPR, 2.  Patient received a Resmed Mask name: AIRFIT F20  Full Face mask size Medium, heated tubing and heated humidifier.  Patient does need to meet compliance.   Mel Hernández

## 2021-04-30 ENCOUNTER — DOCUMENTATION ONLY (OUTPATIENT)
Dept: SLEEP MEDICINE | Facility: CLINIC | Age: 66
End: 2021-04-30

## 2021-04-30 DIAGNOSIS — E66.812 CLASS 2 SEVERE OBESITY DUE TO EXCESS CALORIES WITH SERIOUS COMORBIDITY AND BODY MASS INDEX (BMI) OF 39.0 TO 39.9 IN ADULT (H): ICD-10-CM

## 2021-04-30 DIAGNOSIS — G47.33 OSA (OBSTRUCTIVE SLEEP APNEA): ICD-10-CM

## 2021-04-30 DIAGNOSIS — E66.01 CLASS 2 SEVERE OBESITY DUE TO EXCESS CALORIES WITH SERIOUS COMORBIDITY AND BODY MASS INDEX (BMI) OF 39.0 TO 39.9 IN ADULT (H): ICD-10-CM

## 2021-04-30 NOTE — PROGRESS NOTES
3 day Sleep therapy management telephone visit        Confirmed with patient at time of call- N/A Patient is still interested in [facility] service       Data only recheck    Replacement device: Yes  STM ordered by provider: Yes    Objective data     Order Settings for PAP  CPAP min 9    CPAP max 19     Assessment:       Action plan: Patient removed from STM, STM not required or ordered. . Patient has a follow up visit scheduled:   no, but is required by insurance for compliance.      Total time spent on accessing and  interpreting remote patient PAP therapy data  10 minutes    Total time spent counseling, coaching  and reviewing PAP therapy data with patient  0 minutes    [id number] no

## 2021-05-01 DIAGNOSIS — E04.9 THYROID GOITER: ICD-10-CM

## 2021-05-01 DIAGNOSIS — C90.01 MULTIPLE MYELOMA IN REMISSION (H): Primary | ICD-10-CM

## 2021-05-02 DIAGNOSIS — H40.9 GLAUCOMA OF BOTH EYES, UNSPECIFIED GLAUCOMA TYPE: ICD-10-CM

## 2021-05-03 DIAGNOSIS — C90.01 MULTIPLE MYELOMA IN REMISSION (H): Primary | ICD-10-CM

## 2021-05-03 RX ORDER — LATANOPROST 50 UG/ML
1 SOLUTION/ DROPS OPHTHALMIC DAILY
Qty: 2.5 ML | Refills: 0 | Status: SHIPPED | OUTPATIENT
Start: 2021-05-03 | End: 2021-06-28

## 2021-05-03 RX ORDER — ACYCLOVIR 400 MG/1
TABLET ORAL
Qty: 60 TABLET | Refills: 11 | Status: SHIPPED | OUTPATIENT
Start: 2021-05-03 | End: 2021-07-26

## 2021-05-03 RX ORDER — LEVOTHYROXINE SODIUM 175 UG/1
175 TABLET ORAL
Qty: 90 TABLET | Refills: 0 | OUTPATIENT
Start: 2021-05-03

## 2021-05-03 NOTE — TELEPHONE ENCOUNTER
Last OV 3/11 with Reva Mojica SKYLER, next follow-up 6/9 with Dr. Tipton    Per last OV notes: PPx: Continue ACV and  mg for VTE ppx while on Rev    Last filled: 4/3/2020 for #60 and 11 refills by Reva Mojica SKYLER    Routed to provider for approval

## 2021-05-03 NOTE — TELEPHONE ENCOUNTER
Medication: latanoprost (XALATAN) 0.005 % ophthalmic solution     Requested directions: same  Current directions on the medication list: Place 1 drop into both eyes daily     Last Written Prescription Date:  3/9/20-12/14/20  Last Fill Quantity: 1 bottle ,   # refills: 11    Last Office Visit: 3/9/20  Future Office visit: none    Attending Provider: Sarika Bonilla MD  Last Clinic Note: 3/9/20  Recheck in 1-2 months with applanation IOP, OVF 24-2, dilation, nerve OCT OU    Routing refill request to provider for review/approval because:  Not active on medication list    Scheduling has been notified to contact the pt for appointment.

## 2021-05-04 DIAGNOSIS — E11.9 TYPE 2 DIABETES MELLITUS WITHOUT COMPLICATION, WITHOUT LONG-TERM CURRENT USE OF INSULIN (H): ICD-10-CM

## 2021-05-04 RX ORDER — ATORVASTATIN CALCIUM 20 MG/1
20 TABLET, FILM COATED ORAL AT BEDTIME
Qty: 90 TABLET | Refills: 3 | Status: SHIPPED | OUTPATIENT
Start: 2021-05-04 | End: 2021-07-26

## 2021-05-04 NOTE — TELEPHONE ENCOUNTER
atorvastatin (LIPITOR) 20 MG tablet      HISTORICAL ONLY ON MEDICATION LIST    Last Office Visit : 2-  Future Office visit:  none    Routing refill request to provider for review/approval because:  Medication is reported/historical.      Kathleen M Doege RN

## 2021-05-10 DIAGNOSIS — C90.01 MULTIPLE MYELOMA IN REMISSION (H): Primary | ICD-10-CM

## 2021-05-10 RX ORDER — LENALIDOMIDE 10 MG/1
10 CAPSULE ORAL DAILY
Qty: 28 CAPSULE | Refills: 0 | Status: SHIPPED | OUTPATIENT
Start: 2021-05-10 | End: 2021-06-05

## 2021-05-24 ENCOUNTER — OFFICE VISIT (OUTPATIENT)
Dept: NEUROLOGY | Facility: CLINIC | Age: 66
End: 2021-05-24
Attending: PSYCHIATRY & NEUROLOGY
Payer: COMMERCIAL

## 2021-05-24 DIAGNOSIS — M54.12 CERVICAL RADICULOPATHY: ICD-10-CM

## 2021-05-24 DIAGNOSIS — G62.9 NEUROPATHY: ICD-10-CM

## 2021-05-24 DIAGNOSIS — G56.00 CARPAL TUNNEL SYNDROME, UNSPECIFIED LATERALITY: ICD-10-CM

## 2021-05-24 PROCEDURE — 95910 NRV CNDJ TEST 7-8 STUDIES: CPT | Performed by: PSYCHIATRY & NEUROLOGY

## 2021-05-24 NOTE — LETTER
5/24/2021         RE: Winter Loya  [address]  [facility] [address]        Dear Colleague,    Thank you for referring your patient, Winter Loya, to the [facility] NEUROLOGY CLINIC [place]. Please see a copy of my visit note below.    [facility]  Electrodiagnostic Laboratory    Nerve Conduction & EMG Report          Patient: Winter Loya YOB: 1955  Patient ID: [id number] Age: 65 Years 4 Months  Gender: Female          History & Examination:  Winter Loya is a 65 year old woman with numbness in the feet and intermittent sensory symptoms in the hands. Diagnostic considerations have included polyneuropathy due to diabetes, chemotherapy-induced polyneuropathy, carpal tunnel syndrome, and cervical radiculopathy. She is referred for evaluation of suspected polyneuropathy as well as carpal tunnel syndrome and cervical radiculopathy. She reports marked improvement in upper limb symptoms and requests that today s evaluation be limited to assessment of polyneuropathy.    Techniques:  Sensory and motor conduction studies were done with surface recording electrodes. EMG was done with a concentric needle electrode.     Results:  Sural sensory nerve action potentials were absent bilaterally. The left radial sensory nerve action potential amplitude was mildly attenuated. Left tibial and ulnar motor conduction studies were normal. Peroneal compound muscle action potentials were mildly and markedly attenuated on the left and right, respectively, without conduction slowing across the fibular head. The left tibial minimum F-response latency was at the upper limit of the normal range.     Interpretation:  This is an abnormal study, demonstrating electrophysiologic evidence of a length-dependent axonal sensorimotor polyneuropathy. Asymmetry of peroneal compound muscle action potential amplitudes is a finding of uncertain significance.     Mika Armando M.D.      Sensory NCS      Nerve /  Sites Rec. Site Onset Peak NP Amp Ref. PP Amp Dist Amandeep Ref. Temp     ms ms  V  V  V cm m/s m/s  C   L RADIAL - Snuff      Forearm Snuff 1.98 2.81 12.2 15.0 10.5 12.5 63.2 48.0 34.4   L SURAL - Lat Mall 60      Calf Ankle NR NR NR 5.0 NR 14 NR 38.0 32.4   R SURAL - Lat Mall 60      Calf Ankle NR NR NR 5.0 NR 14 NR 38.0 30.6       Motor NCS      Nerve / Sites Rec. Site Lat Ref. Amp Ref. Rel Amp Dist Amandeep Ref. Dur. Area Temp.     ms ms mV mV % cm m/s m/s ms %  C   L ULNAR - ADM      Wrist ADM 2.81 3.50 9.1 5.0 100 8   6.09 100 34.1      B.Elbow ADM 6.67  8.9  98 19.2 49.8 48.0 6.72 95.8 34.3   L DEEP PERONEAL - EDB 60      Ankle EDB 4.69 6.00 1.6 2.0 100 8   5.68 100 32.5      FibHead EDB 12.76  1.7  107 31 38.4 38.0 5.63 101 32.4      Pop Fos EDB 14.22  1.6  101 8 54.9 38.0 5.94 96.4 32.4   R DEEP PERONEAL - EDB 60      Ankle EDB 6.46 6.00 0.1 2.0 100 8   8.28  31.2   L TIBIAL - AH      Ankle AH 4.06 6.00 6.2 4.0 100 8   6.30 100 32.4      Pop Fos AH 14.32  3.7  60.9 39 38.0 38.0 8.23 93.3 32.4   R PERONEAL - Tib Ant      Fib Head Tib Ant 3.85  3.3  100    12.24 100 30.8      Knee Tib Ant 5.63  3.3  100 9.5 53.6  11.82 119 30.8       F  Wave      Nerve Min F Lat Max F Lat Mean FLat Temp.    ms ms ms  C   L TIBIAL 61.88 63.65 62.89 32.4                                      Again, thank you for allowing me to participate in the care of your patient.        Sincerely,        Mika Armando MD

## 2021-05-24 NOTE — PROGRESS NOTES
[facility]  Electrodiagnostic Laboratory    Nerve Conduction & EMG Report          Patient: Winter Loya YOB: 1955  Patient ID: [id number] Age: 65 Years 4 Months  Gender: Female          History & Examination:  Winter Loya is a 65 year old woman with numbness in the feet and intermittent sensory symptoms in the hands. Diagnostic considerations have included polyneuropathy due to diabetes, chemotherapy-induced polyneuropathy, carpal tunnel syndrome, and cervical radiculopathy. She is referred for evaluation of suspected polyneuropathy as well as carpal tunnel syndrome and cervical radiculopathy. She reports marked improvement in upper limb symptoms and requests that today s evaluation be limited to assessment of polyneuropathy.    Techniques:  Sensory and motor conduction studies were done with surface recording electrodes. EMG was done with a concentric needle electrode.     Results:  Sural sensory nerve action potentials were absent bilaterally. The left radial sensory nerve action potential amplitude was mildly attenuated. Left tibial and ulnar motor conduction studies were normal. Peroneal compound muscle action potentials were mildly and markedly attenuated on the left and right, respectively, without conduction slowing across the fibular head. The left tibial minimum F-response latency was at the upper limit of the normal range.     Interpretation:  This is an abnormal study, demonstrating electrophysiologic evidence of a length-dependent axonal sensorimotor polyneuropathy. Asymmetry of peroneal compound muscle action potential amplitudes is a finding of uncertain significance.     Mika Armando M.D.      Sensory NCS      Nerve / Sites Rec. Site Onset Peak NP Amp Ref. PP Amp Dist Amandeep Ref. Temp     ms ms  V  V  V cm m/s m/s  C   L RADIAL - Snuff      Forearm Snuff 1.98 2.81 12.2 15.0 10.5 12.5 63.2 48.0 34.4   L SURAL - Lat Mall 60      Calf Ankle NR NR NR 5.0 NR 14 NR 38.0 32.4   R  SURAL - Lat Mall 60      Calf Ankle NR NR NR 5.0 NR 14 NR 38.0 30.6       Motor NCS      Nerve / Sites Rec. Site Lat Ref. Amp Ref. Rel Amp Dist Amandeep Ref. Dur. Area Temp.     ms ms mV mV % cm m/s m/s ms %  C   L ULNAR - ADM      Wrist ADM 2.81 3.50 9.1 5.0 100 8   6.09 100 34.1      B.Elbow ADM 6.67  8.9  98 19.2 49.8 48.0 6.72 95.8 34.3   L DEEP PERONEAL - EDB 60      Ankle EDB 4.69 6.00 1.6 2.0 100 8   5.68 100 32.5      FibHead EDB 12.76  1.7  107 31 38.4 38.0 5.63 101 32.4      Pop Fos EDB 14.22  1.6  101 8 54.9 38.0 5.94 96.4 32.4   R DEEP PERONEAL - EDB 60      Ankle EDB 6.46 6.00 0.1 2.0 100 8   8.28  31.2   L TIBIAL - AH      Ankle AH 4.06 6.00 6.2 4.0 100 8   6.30 100 32.4      Pop Fos AH 14.32  3.7  60.9 39 38.0 38.0 8.23 93.3 32.4   R PERONEAL - Tib Ant      Fib Head Tib Ant 3.85  3.3  100    12.24 100 30.8      Knee Tib Ant 5.63  3.3  100 9.5 53.6  11.82 119 30.8       F  Wave      Nerve Min F Lat Max F Lat Mean FLat Temp.    ms ms ms  C   L TIBIAL 61.88 63.65 62.89 32.4

## 2021-05-25 DIAGNOSIS — R94.131 ABNORMAL EMG: ICD-10-CM

## 2021-05-25 RX ORDER — EXENATIDE 2 MG/.65ML
INJECTION, SUSPENSION, EXTENDED RELEASE SUBCUTANEOUS
COMMUNITY
Start: 2021-04-12 | End: 2021-06-04

## 2021-05-27 ENCOUNTER — OFFICE VISIT (OUTPATIENT)
Dept: NEUROLOGY | Facility: CLINIC | Age: 66
End: 2021-05-27
Attending: PSYCHIATRY & NEUROLOGY
Payer: COMMERCIAL

## 2021-05-27 VITALS
SYSTOLIC BLOOD PRESSURE: 129 MMHG | HEIGHT: 69 IN | HEART RATE: 66 BPM | BODY MASS INDEX: 39.4 KG/M2 | DIASTOLIC BLOOD PRESSURE: 80 MMHG

## 2021-05-27 DIAGNOSIS — G62.0 DRUG-INDUCED POLYNEUROPATHY (H): ICD-10-CM

## 2021-05-27 DIAGNOSIS — Z79.4 TYPE 2 DIABETES MELLITUS WITH DIABETIC POLYNEUROPATHY, WITH LONG-TERM CURRENT USE OF INSULIN (H): Primary | ICD-10-CM

## 2021-05-27 DIAGNOSIS — E11.42 TYPE 2 DIABETES MELLITUS WITH DIABETIC POLYNEUROPATHY, WITH LONG-TERM CURRENT USE OF INSULIN (H): Primary | ICD-10-CM

## 2021-05-27 PROCEDURE — 99215 OFFICE O/P EST HI 40 MIN: CPT | Performed by: PSYCHIATRY & NEUROLOGY

## 2021-05-27 ASSESSMENT — PAIN SCALES - GENERAL: PAINLEVEL: NO PAIN (0)

## 2021-05-27 NOTE — LETTER
5/27/2021         RE: Winter Loya  [address]  [facility] [address]        Dear Colleague,    Thank you for referring your patient, Winter Loya, to the [facility] NEUROLOGY CLINIC [place]. Please see a copy of my visit note below.    Visit Date: 05/27/2021    REASON FOR VISIT:  Winter Loya is a 65-year-old female referred by Dr. Gabriel Santoyo for further evaluation of peripheral neuropathy.  History is obtained from review of her medical records as well as the patient.    Ms. Loya was diagnosed with IgA kappa multiple myeloma in 2018.  She was started on chemotherapy RVD, which is Revlimid, Velcade and dexamethasone.  She subsequently underwent an autologous bone marrow transplant in 04/2019.  While she cannot recall the development of any neuropathic symptoms leading up to the transplant she does recall that she had a lot of problems after the transplant.  She was extremely weak and unbalanced.  She required physical therapy for 3-4 months.  She tells me her caregivers in [place] where she resided at that time felt that she had problems related to her low back, diabetes and chemotherapy.    She was treated with Zometa through December of 2020 and now is on Revlimid alone.    She tells me in some respects, she has improved.  She was experiencing sensory symptoms in her hands and upper extremities and these have resolved.  She, however, continues to have numbness in her feet.  She indicates to about the ankles.  She feels like her feet are very cold at times and sometimes like they are wrapped in tape.  She is taking Lyrica for this, which she finds helpful.  Interestingly, the Lyrica has helped her depression.    She did have an EMG study done by Dr. Armando on 05/24/2021 that I reviewed.  It was an abnormal study demonstrating evidence of a length-dependent axonal sensory motor polyneuropathy.    The patient does have a known type 2 diabetes.  Her hemoglobin A1c was 8.8 in December of this past  year. In March it had improved to 7.6.  Her most recent protein electrophoresis did not reveal a monoclonal protein.  Other laboratory studies done over the last few months included a normal comprehensive metabolic panel, TSH.  She had a negative HIV serology and hepatitis C serology.    Her past history is notable for type 2 diabetes.  She has essential tremor and she follows with Dr. Santoyo for this.  She has hypothyroidism related to previous thyroidectomy.  She has glaucoma and depression.    CURRENT MEDICATIONS:  Tylenol, acyclovir, atorvastatin, Bydureon, Jardiance, insulin, Revlimid, levothyroxine, losartan, metformin, Nystatin powder, Lyrica 100 mg 3 times a day, propranolol 60 mg a day, sertraline.  She has stopped using her latanoprost ophthalmic solution because it irritated her eyes, but she is going to be discussing other treatment options for her glaucoma with her ophthalmologist soon.    ALLERGIES:  She has no medical allergies.    FAMILY HISTORY:  Negative for neuropathy.  Her son has leukemia.    SOCIAL HISTORY:  She is an addiction counselor.  She does not smoke, does not use alcohol.    PHYSICAL EXAMINATION:    VITAL SIGNS:  Reveals her heart rate is 66.  Blood pressure 129/80.  NEUROLOGIC:  Pupils are equal, round, react well to light.  She has full extraocular motility.  Facial strength is normal.  Facial sensation is normal and otherwise cranial nerves II through XII are intact.  Motor examination reveals mild weakness of toe flexion and toe extension, but otherwise good strength in the upper and lower extremities.  On sensory testing, she has absent position sense in the toes on the right and near absent position sense on the left.  She has absent vibratory sense in the toes and a mild reduction of vibratory appreciation of the left ankle.  She has a decrease in touch, cold, and pinprick to the ankle level.  She does have a voice tremor.  She has a mild postural tremor, more pronounced action  tremor of the upper extremities.  Finger-to-nose is done accurately.  Gait is unremarkable.  Reflexes are 1+ in the upper extremities, absent in lower extremities.  Plantar responses are flexor.    IMPRESSION:    1.  Sensory predominant polyneuropathy.  2.  Electrodiagnostic evidence of axonal neuropathy.  3.  IgA kappa myeloma, which appears in remission.  4.  Type 2 diabetes.  5.  Essential tremor    Her neuropathy may well have been precipitated by chemotherapy she received prior to and around the time of her bone marrow transplant.  I particularly note that Velcade (bortezomib) has a high incidence of painful sensory neuropathy.  She is no longer receiving this agent.  I doubt the Revlimid is a factor.  She also has type 2 diabetes.  This is likely contributing to her neuropathy as well.    PLAN:  I did discuss the above with the patient.  She tells me that there has been some improvement.  Otherwise, she is relatively stable at this point.    The plan now is just to monitor her course and she is open to this.  I have recommended a neurologic followup in 6 months.  She will be seeing a new neurologist at that followup visit as I am retiring soon.  I did discuss this with her as well.    Total visit today was 45 minutes.  This included reviewing her medical record prior to seeing her, history, examination, counseling, and documentation.    Julian Robert MD        D: 2021   T: 2021   MT: [id number]    Name:     PIEDAD LO  MRN:      [id number]        Account:    [id number]   :      1955           Visit Date: 2021     Document: [id number]        Again, thank you for allowing me to participate in the care of your patient.        Sincerely,        Julian Robert MD

## 2021-05-27 NOTE — PROGRESS NOTES
Visit Date: 05/27/2021    REASON FOR VISIT:  Winter Loya is a 65-year-old female referred by Dr. Gabriel Santoyo for further evaluation of peripheral neuropathy.  History is obtained from review of her medical records as well as the patient.    Ms. Loya was diagnosed with IgA kappa multiple myeloma in 2018.  She was started on chemotherapy RVD, which is Revlimid, Velcade and dexamethasone.  She subsequently underwent an autologous bone marrow transplant in 04/2019.  While she cannot recall the development of any neuropathic symptoms leading up to the transplant she does recall that she had a lot of problems after the transplant.  She was extremely weak and unbalanced.  She required physical therapy for 3-4 months.  She tells me her caregivers in [place] where she resided at that time felt that she had problems related to her low back, diabetes and chemotherapy.    She was treated with Zometa through December of 2020 and now is on Revlimid alone.    She tells me in some respects, she has improved.  She was experiencing sensory symptoms in her hands and upper extremities and these have resolved.  She, however, continues to have numbness in her feet.  She indicates to about the ankles.  She feels like her feet are very cold at times and sometimes like they are wrapped in tape.  She is taking Lyrica for this, which she finds helpful.  Interestingly, the Lyrica has helped her depression.    She did have an EMG study done by Dr. Armando on 05/24/2021 that I reviewed.  It was an abnormal study demonstrating evidence of a length-dependent axonal sensory motor polyneuropathy.    The patient does have a known type 2 diabetes.  Her hemoglobin A1c was 8.8 in December of this past year. In March it had improved to 7.6.  Her most recent protein electrophoresis did not reveal a monoclonal protein.  Other laboratory studies done over the last few months included a normal comprehensive metabolic panel, TSH.  She had a negative HIV  serology and hepatitis C serology.    Her past history is notable for type 2 diabetes.  She has essential tremor and she follows with Dr. Santoyo for this.  She has hypothyroidism related to previous thyroidectomy.  She has glaucoma and depression.    CURRENT MEDICATIONS:  Tylenol, acyclovir, atorvastatin, Bydureon, Jardiance, insulin, Revlimid, levothyroxine, losartan, metformin, Nystatin powder, Lyrica 100 mg 3 times a day, propranolol 60 mg a day, sertraline.  She has stopped using her latanoprost ophthalmic solution because it irritated her eyes, but she is going to be discussing other treatment options for her glaucoma with her ophthalmologist soon.    ALLERGIES:  She has no medical allergies.    FAMILY HISTORY:  Negative for neuropathy.  Her son has leukemia.    SOCIAL HISTORY:  She is an addiction counselor.  She does not smoke, does not use alcohol.    PHYSICAL EXAMINATION:    VITAL SIGNS:  Reveals her heart rate is 66.  Blood pressure 129/80.  NEUROLOGIC:  Pupils are equal, round, react well to light.  She has full extraocular motility.  Facial strength is normal.  Facial sensation is normal and otherwise cranial nerves II through XII are intact.  Motor examination reveals mild weakness of toe flexion and toe extension, but otherwise good strength in the upper and lower extremities.  On sensory testing, she has absent position sense in the toes on the right and near absent position sense on the left.  She has absent vibratory sense in the toes and a mild reduction of vibratory appreciation of the left ankle.  She has a decrease in touch, cold, and pinprick to the ankle level.  She does have a voice tremor.  She has a mild postural tremor, more pronounced action tremor of the upper extremities.  Finger-to-nose is done accurately.  Gait is unremarkable.  Reflexes are 1+ in the upper extremities, absent in lower extremities.  Plantar responses are flexor.    IMPRESSION:    1.  Sensory predominant  polyneuropathy.  2.  Electrodiagnostic evidence of axonal neuropathy.  3.  IgA kappa myeloma, which appears in remission.  4.  Type 2 diabetes.  5.  Essential tremor    Her neuropathy may well have been precipitated by chemotherapy she received prior to and around the time of her bone marrow transplant.  I particularly note that Velcade (bortezomib) has a high incidence of painful sensory neuropathy.  She is no longer receiving this agent.  I doubt the Revlimid is a factor.  She also has type 2 diabetes.  This is likely contributing to her neuropathy as well.    PLAN:  I did discuss the above with the patient.  She tells me that there has been some improvement.  Otherwise, she is relatively stable at this point.    The plan now is just to monitor her course and she is open to this.  I have recommended a neurologic followup in 6 months.  She will be seeing a new neurologist at that followup visit as I am retiring soon.  I did discuss this with her as well.    Total visit today was 45 minutes.  This included reviewing her medical record prior to seeing her, history, examination, counseling, and documentation.    Julian Robert MD        D: 2021   T: 2021   MT: [id number]    Name:     PIEDAD LO  MRN:      [id number]        Account:    [id number]   :      1955           Visit Date: 2021     Document: [id number]

## 2021-06-04 ENCOUNTER — VIRTUAL VISIT (OUTPATIENT)
Dept: PHARMACY | Facility: CLINIC | Age: 66
End: 2021-06-04
Payer: COMMERCIAL

## 2021-06-04 DIAGNOSIS — Z79.4 TYPE 2 DIABETES MELLITUS WITH HYPERGLYCEMIA, WITH LONG-TERM CURRENT USE OF INSULIN (H): Primary | ICD-10-CM

## 2021-06-04 DIAGNOSIS — E11.65 TYPE 2 DIABETES MELLITUS WITH HYPERGLYCEMIA, WITH LONG-TERM CURRENT USE OF INSULIN (H): Primary | ICD-10-CM

## 2021-06-04 PROCEDURE — 99607 MTMS BY PHARM ADDL 15 MIN: CPT | Performed by: PHARMACIST

## 2021-06-04 PROCEDURE — 99606 MTMS BY PHARM EST 15 MIN: CPT | Performed by: PHARMACIST

## 2021-06-04 NOTE — PROGRESS NOTES
Medication Therapy Management (MTM) Encounter    ASSESSMENT:                            Medication Adherence/Access: No issues identified    Type 2 Diabetes: Patient is not meeting A1c goal of < 7%. Self monitoring of blood glucose is not at goal of fasting  mg/dL. Patient would benefit from switching to a more potent GLP1RA to help achieve glycemic goals and more potent appetite reducing therapy.     PLAN:                            1. Stop Bydureon  2. Start Trulicity 0.75 mg once weekly    Follow-up: Return for I will call when I find out whether Trulicity is covered .    SUBJECTIVE/OBJECTIVE:                          Winter Loya is a 65 year old female called for a follow-up visit. She was referred to me from Brittany Rosenthal. She now sees Delmi Gross.  Today's visit is a follow-up MTM visit from 21.     Reason for visit: diabetes follow-up.    Allergies/ADRs: Reviewed in chart  Tobacco: She reports that she quit smoking about 16 years ago. Her smoking use included cigarettes. She smoked 1.00 pack per day. She has never used smokeless tobacco.  Alcohol: none  Caffeine: 1-2 cups/day of coffee  Activity: mostly sedentary, is starting to increase activity  Past Medical History: Reviewed in chart    Medication Adherence/Access: no issues reported    Type 2 Diabetes:  Currently taking Bydureon BCise 2 mg weekly, Jardiance 10 mg daily, Lantus 30 units daily, metformin 1000 mg twice daily. She is having a really hard time controlling her eating right now.  Patient is not experiencing side effects.  Blood sugar monitorin time(s) daily. Ranges (patient reported): Fasting- 180-200, she knows why because eating for comfort.   Symptoms of low blood sugar? none  Symptoms of high blood sugar? none  Eye exam: due  Foot exam: due  Diet/Exercise: diet is pretty liberal right now  Aspirin: Not taking due to not indicated  Statin: Yes: atorvastatin 20 mg daily   ACEi/ARB: Yes: losartan 25 mg daily.   Urine  "Albumin:   Lab Results   Component Value Date    UMALCR 73.57 (H) 12/07/2020      Lab Results   Component Value Date    A1C 7.6 03/08/2021    A1C 8.8 12/07/2020    A1C 9.1 08/21/2020    A1C 6.7 03/05/2020     Last Comprehensive Metabolic Panel:  Sodium   Date Value Ref Range Status   06/07/2021 140 133 - 144 mmol/L Final     Potassium   Date Value Ref Range Status   06/07/2021 3.7 3.4 - 5.3 mmol/L Final     Chloride   Date Value Ref Range Status   06/07/2021 106 94 - 109 mmol/L Final     Carbon Dioxide   Date Value Ref Range Status   06/07/2021 28 20 - 32 mmol/L Final     Anion Gap   Date Value Ref Range Status   06/07/2021 6 3 - 14 mmol/L Final     Glucose   Date Value Ref Range Status   06/07/2021 140 (H) 70 - 99 mg/dL Final     Urea Nitrogen   Date Value Ref Range Status   06/07/2021 10 7 - 30 mg/dL Final     Creatinine   Date Value Ref Range Status   06/07/2021 0.77 0.52 - 1.04 mg/dL Final     GFR Estimate   Date Value Ref Range Status   06/07/2021 82 >60 mL/min/[1.73_m2] Final     Comment:     Non  GFR Calc  Starting 12/18/2018, serum creatinine based estimated GFR (eGFR) will be   calculated using the Chronic Kidney Disease Epidemiology Collaboration   (CKD-EPI) equation.       Calcium   Date Value Ref Range Status   06/07/2021 9.8 8.5 - 10.1 mg/dL Final     Today's Vitals: There were no vitals taken for this visit. - telemed    BP Readings from Last 1 Encounters:   05/27/21 129/80     Pulse Readings from Last 1 Encounters:   05/27/21 66     Wt Readings from Last 1 Encounters:   01/27/21 266 lb (120.7 kg)     Ht Readings from Last 1 Encounters:   05/27/21 5' 8.9\" (1.75 m)     Estimated body mass index is 39.4 kg/m  as calculated from the following:    Height as of 5/27/21: 5' 8.9\" (1.75 m).    Weight as of 1/27/21: 266 lb (120.7 kg).    Temp Readings from Last 1 Encounters:   01/27/21 97.3  F (36.3  C) (Tympanic)     ----------------    I spent 14 minutes with this patient today. All changes " were made via collaborative practice agreement with Delmi Gross. A copy of the visit note was provided to the patient's primary care provider.    The patient was sent via [employer] a summary of these recommendations.     Coco Antoine, Pharm.D., [facility]  Medication Therapy Management Pharmacist  Page/VM:  [phone]    Telemedicine Visit Details  Type of service:  Telephone visit  Start Time: 2:31 PM  End Time: 2:45 PM  Originating Location (patient location): [place]  Distant Location (provider location):  [facility]        Medication Therapy Recommendations  Diabetes mellitus, type 2 (H)    Rationale: More effective medication available - Ineffective medication - Effectiveness   Recommendation: Change Medication - Start Trulicity   Status: Accepted per CPA                no

## 2021-06-05 DIAGNOSIS — C90.01 MULTIPLE MYELOMA IN REMISSION (H): Primary | ICD-10-CM

## 2021-06-05 DIAGNOSIS — R25.1 TREMOR: ICD-10-CM

## 2021-06-05 DIAGNOSIS — E11.42 TYPE 2 DIABETES MELLITUS WITH DIABETIC POLYNEUROPATHY, WITH LONG-TERM CURRENT USE OF INSULIN (H): ICD-10-CM

## 2021-06-05 DIAGNOSIS — Z79.4 TYPE 2 DIABETES MELLITUS WITH DIABETIC POLYNEUROPATHY, WITH LONG-TERM CURRENT USE OF INSULIN (H): ICD-10-CM

## 2021-06-05 RX ORDER — LENALIDOMIDE 10 MG/1
10 CAPSULE ORAL DAILY
Qty: 28 CAPSULE | Refills: 0 | Status: SHIPPED | OUTPATIENT
Start: 2021-06-05 | End: 2021-07-05

## 2021-06-06 DIAGNOSIS — E11.42 TYPE 2 DIABETES MELLITUS WITH DIABETIC POLYNEUROPATHY, WITH LONG-TERM CURRENT USE OF INSULIN (H): ICD-10-CM

## 2021-06-06 DIAGNOSIS — Z79.4 TYPE 2 DIABETES MELLITUS WITH DIABETIC POLYNEUROPATHY, WITH LONG-TERM CURRENT USE OF INSULIN (H): ICD-10-CM

## 2021-06-07 DIAGNOSIS — C90.01 MULTIPLE MYELOMA IN REMISSION (H): ICD-10-CM

## 2021-06-07 LAB
ALBUMIN SERPL-MCNC: 4.2 G/DL (ref 3.4–5)
ALP SERPL-CCNC: 137 U/L (ref 40–150)
ALT SERPL W P-5'-P-CCNC: 43 U/L (ref 0–50)
ANION GAP SERPL CALCULATED.3IONS-SCNC: 6 MMOL/L (ref 3–14)
AST SERPL W P-5'-P-CCNC: 21 U/L (ref 0–45)
BASOPHILS # BLD AUTO: 0.1 10E9/L (ref 0–0.2)
BASOPHILS NFR BLD AUTO: 1.7 %
BILIRUB SERPL-MCNC: 1.3 MG/DL (ref 0.2–1.3)
BUN SERPL-MCNC: 10 MG/DL (ref 7–30)
CALCIUM SERPL-MCNC: 9.8 MG/DL (ref 8.5–10.1)
CHLORIDE SERPL-SCNC: 106 MMOL/L (ref 94–109)
CO2 SERPL-SCNC: 28 MMOL/L (ref 20–32)
CREAT SERPL-MCNC: 0.77 MG/DL (ref 0.52–1.04)
DIFFERENTIAL METHOD BLD: ABNORMAL
EOSINOPHIL # BLD AUTO: 0.2 10E9/L (ref 0–0.7)
EOSINOPHIL NFR BLD AUTO: 7 %
ERYTHROCYTE [DISTWIDTH] IN BLOOD BY AUTOMATED COUNT: 15.3 % (ref 10–15)
GFR SERPL CREATININE-BSD FRML MDRD: 82 ML/MIN/{1.73_M2}
GLUCOSE SERPL-MCNC: 140 MG/DL (ref 70–99)
HCT VFR BLD AUTO: 39.9 % (ref 35–47)
HGB BLD-MCNC: 13.3 G/DL (ref 11.7–15.7)
LYMPHOCYTES # BLD AUTO: 0.7 10E9/L (ref 0.8–5.3)
LYMPHOCYTES NFR BLD AUTO: 19.8 %
MCH RBC QN AUTO: 29 PG (ref 26.5–33)
MCHC RBC AUTO-ENTMCNC: 33.3 G/DL (ref 31.5–36.5)
MCV RBC AUTO: 87 FL (ref 78–100)
MONOCYTES # BLD AUTO: 0.3 10E9/L (ref 0–1.3)
MONOCYTES NFR BLD AUTO: 9.6 %
NEUTROPHILS # BLD AUTO: 2.1 10E9/L (ref 1.6–8.3)
NEUTROPHILS NFR BLD AUTO: 61.9 %
PLATELET # BLD AUTO: 126 10E9/L (ref 150–450)
POTASSIUM SERPL-SCNC: 3.7 MMOL/L (ref 3.4–5.3)
PROT SERPL-MCNC: 7.1 G/DL (ref 6.8–8.8)
RBC # BLD AUTO: 4.59 10E12/L (ref 3.8–5.2)
SODIUM SERPL-SCNC: 140 MMOL/L (ref 133–144)
WBC # BLD AUTO: 3.4 10E9/L (ref 4–11)

## 2021-06-07 PROCEDURE — 36415 COLL VENOUS BLD VENIPUNCTURE: CPT | Performed by: INTERNAL MEDICINE

## 2021-06-07 PROCEDURE — 80053 COMPREHEN METABOLIC PANEL: CPT | Performed by: INTERNAL MEDICINE

## 2021-06-07 PROCEDURE — 99N1036 PR STATISTIC TOTAL PROTEIN: Performed by: INTERNAL MEDICINE

## 2021-06-07 PROCEDURE — 84165 PROTEIN E-PHORESIS SERUM: CPT | Performed by: STUDENT IN AN ORGANIZED HEALTH CARE EDUCATION/TRAINING PROGRAM

## 2021-06-07 PROCEDURE — 85025 COMPLETE CBC W/AUTO DIFF WBC: CPT | Performed by: INTERNAL MEDICINE

## 2021-06-07 RX ORDER — PREGABALIN 100 MG/1
CAPSULE ORAL
Qty: 270 CAPSULE | Refills: 0 | Status: SHIPPED | OUTPATIENT
Start: 2021-06-07 | End: 2021-07-26

## 2021-06-07 NOTE — TELEPHONE ENCOUNTER
Patient Requested  pregabalin (LYRICA) 100 MG capsule  Last Filled  09/20/2021  Last Office Visit  10/29/2021  Next Office Visit     Checked  06/07/2021    DX:     Pharmacy:     JOSELIN KIM CMA at 6:51 AM on 6/7/2021.

## 2021-06-08 ENCOUNTER — TELEPHONE (OUTPATIENT)
Dept: ONCOLOGY | Facility: CLINIC | Age: 66
End: 2021-06-08

## 2021-06-08 DIAGNOSIS — F32.9 MAJOR DEPRESSIVE DISORDER WITH CURRENT ACTIVE EPISODE, UNSPECIFIED DEPRESSION EPISODE SEVERITY, UNSPECIFIED WHETHER RECURRENT: ICD-10-CM

## 2021-06-08 RX ORDER — DULAGLUTIDE 0.75 MG/.5ML
0.75 INJECTION, SOLUTION SUBCUTANEOUS
Qty: 2 ML | Refills: 1 | Status: SHIPPED | OUTPATIENT
Start: 2021-06-08 | End: 2021-07-26

## 2021-06-08 RX ORDER — PROPRANOLOL HCL 60 MG
CAPSULE, EXTENDED RELEASE 24HR ORAL
Qty: 90 CAPSULE | Refills: 0 | Status: SHIPPED | OUTPATIENT
Start: 2021-06-08 | End: 2021-07-26

## 2021-06-08 NOTE — PATIENT INSTRUCTIONS
Recommendations from today's MTM visit:                                                       1. Will change to a more potent GLP1 RA medication. Stop Bydureon, Start Trulicity.     Follow-up: Return for I will call when I find out whether Trulicity is covered .    It was great to speak with you today.  I value your experience and would be very thankful for your time with providing feedback on our clinic survey. You may receive a survey via email or text message in the next few days.     To schedule another MTM appointment, please call the clinic directly or you may call the MTM scheduling line at [phone] or toll-free at [phone].     My Clinical Pharmacist's contact information:                                                      Please feel free to contact me with any questions or concerns you have.      Coco Antoine, Pharm.D., [facility]  Medication Therapy Management Pharmacist  Page/VM:  [phone]

## 2021-06-08 NOTE — TELEPHONE ENCOUNTER
Routing refill request to provider for review/approval because:  Patient needs to be seen because:  Due for follow up    Medication pended for approval, 90 day supply with reminder.

## 2021-06-08 NOTE — TELEPHONE ENCOUNTER
Oral Chemotherapy Monitoring Program   Medication: Revlimid  Rx: 10mg PO daily on days 1 through 28 of 28 day cycle   Auth #: [id number]   Risk Category: Adult Female NORP  Routine survey questions reviewed.   Rx to be Escribed to [facility]    Lor Borja  [facility]  Oncology Pharmacy Liaison   [email or web address]  [phone] (phone)  [phone] (fax)

## 2021-06-09 ENCOUNTER — VIRTUAL VISIT (OUTPATIENT)
Dept: TRANSPLANT | Facility: CLINIC | Age: 66
End: 2021-06-09
Attending: PHYSICIAN ASSISTANT
Payer: COMMERCIAL

## 2021-06-09 ENCOUNTER — TELEPHONE (OUTPATIENT)
Dept: FAMILY MEDICINE | Facility: CLINIC | Age: 66
End: 2021-06-09

## 2021-06-09 DIAGNOSIS — C90.01 MULTIPLE MYELOMA IN REMISSION (H): Primary | ICD-10-CM

## 2021-06-09 PROCEDURE — 99214 OFFICE O/P EST MOD 30 MIN: CPT | Mod: 95 | Performed by: INTERNAL MEDICINE

## 2021-06-09 NOTE — PROGRESS NOTES
"Winter is a 65 year old who is being evaluated via a billable video visit.      How would you like to obtain your AVS? MyChart  If the video visit is dropped, the invitation should be resent by: Text to cell phone: [phone]  Will anyone else be joining your video visit? No       BERYL Schneider      Winter Loya is a 64 year old female who is being evaluated via a billable video visit.      The patient has been notified of following:     \"This video visit will be conducted via a call between you and your physician/provider. We have found that certain health care needs can be provided without the need for an in-person physical exam.  This service lets us provide the care you need with a video conversation.  If a prescription is necessary we can send it directly to your pharmacy.  If lab work is needed we can place an order for that and you can then stop by our lab to have the test done at a later time.    Video visits are billed at different rates depending on your insurance coverage.  Please reach out to your insurance provider with any questions.    If during the course of the call the physician/provider feels a video visit is not appropriate, you will not be charged for this service.\"    Patient has given verbal consent for Video visit? Yes  How would you like to obtain your AVS? MyChart  If you are dropped from the video visit, the video invite should be resent to: Text to cell phone: [phone]  Will anyone else be joining your video visit? No      Christiana CORDOBA    Video-Visit Details    Type of service:  Video Visit      [facility]    Reason for consult: MM           History of Present Illness:   This patient is a 63 yo woman with IgA Kappa Multiple Myeloma. In 2018 she had anemia and was fatigued. She was found to have IgA kappa monocloncal antibody with M-spike of 0.17. IgA elevated. Skeletal survey was unremarkable and UPEP had minimal protein (156 mg/m2). PET 11/2018 showed bone " marrow consistent with hypermetabolic plasma cell myeloma. M spike was 0.17. She started RVD and Zometa. On 4/2019 she had an autologous transplant.     Her bone marrow bx from September 2019 was sent here for review. It showed marrow cellularity of 60%, with decreased trilineage hematopoiesis and 15% plasma cells as well as peripheral blood with slight anemia. 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 She is on revlimid maintenance and zometa from her prior oncologist in [place]. On 12/6/19 her K/L ratio is 1.1, M spike is 0.04.    She is doing fine form a myeloma standpoint and is on lenalidomide maintenance.    Her son unfortunately has been diagnosed with leukemia         Physical Exam:     ECOG PS 0              Assessment and Recommendation:     65 yo woman with standard risk myeloma s/p auto transplant. She has been on lenaidomide 10 mg every day and has tolerated this. Based on the outside records she has had about 13 does of Zometa. We will get myeloma labs and a PET scan for baseline imaging. She does have pains that she attributes to arthritis but these require evaluation to rule out myeloma    - We recommend continuing lenalidomide 10 mg every day since it is well tolerated and in a randomized, phase 3 trial, lenalidomide improved PFS (39 months compared to 20 months for observation).    (Lenalidomide maintenance versus observation for patients with newly diagnosed multiple myeloma (Myeloma XI): a multicentre, open-label, randomised, phase 3 trial. Jaleel STEPHENS, et al.Lancet Oncol. 2019 [place];20)    - We recommend  mg every day to prevent thromboembolic complications from lenalidomide. Her SAVED score is 0. 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     blood glucose monitoring (NO BRAND SPECIFIED) meter device kit Use to test blood sugar two times daily or as directed.     dulaglutide (TRULICITY) 0.75 MG/0.5ML pen Inject 0.75 mg Subcutaneous every 7 days     empagliflozin (JARDIANCE) 10 MG TABS tablet Take 1 tablet (10 mg) by mouth daily     insulin glargine (LANTUS PEN) 100 UNIT/ML pen Inject 34 Units Subcutaneous At Bedtime     insulin pen needle (FIFTY50 PEN NEEDLES) 32G X 4 MM miscellaneous As directed. To use with Victoza daily     LENalidomide (REVLIMID) 10 MG CAPS capsule Take 1 capsule (10 mg) by mouth daily for 28 days     levothyroxine (SYNTHROID/LEVOTHROID) 175 MCG tablet Take 1 tablet (175 mcg) by mouth every morning (before breakfast)     losartan (COZAAR) 25 MG tablet Take 1 tablet (25 mg) by mouth daily     NONFORMULARY zometa for bones every 3 months     nystatin (MYCOSTATIN) 062816 UNIT/GM external powder Apply topically 3 times daily     pregabalin (LYRICA) 100 MG capsule TAKE ONE CAPSULE BY MOUTH THREE TIMES DAILY      propranolol ER (INDERAL LA) 60 MG 24 hr capsule  TAKE ONE CAPSULE BY MOUTH ONE TIME DAILY      sertraline (ZOLOFT) 100 MG tablet Take 1 tablet (100 mg) by mouth daily Call clinic to schedule follow up appointment.     latanoprost (XALATAN) 0.005 % ophthalmic solution Place 1 drop into both eyes daily (Patient not taking: Reported on 5/27/2021)     metFORMIN (GLUCOPHAGE) 500 MG tablet Take two tabs BID     No current facility-administered medications for this visit.              Review of Systems:   The 10 point Review of Systems is negative other than noted in the HPI

## 2021-06-09 NOTE — TELEPHONE ENCOUNTER
Prior Authorization Approval    Authorization Effective Date: 5/10/2021  Authorization Expiration Date: 6/9/2022  Medication: dulaglutide (TRULICITY) 0.75 MG/0.5ML pen-PA APPROVED   Approved Dose/Quantity:   Reference #:     Insurance Company: [employer] - Phone [phone] Fax [phone]  Expected CoPay:       CoPay Card Available:      Foundation Assistance Needed:    Which Pharmacy is filling the prescription (Not needed for infusion/clinic administered): [id number] - [place], [address]  Pharmacy Notified: Yes- **Instructed pharmacy to notify patient when script is ready to /ship.**  Patient Notified: Yes

## 2021-06-09 NOTE — TELEPHONE ENCOUNTER
Prior Authorization Retail Medication Request    Medication/Dose: dulaglutide (TRULICITY) 0.75 MG/0.5ML pen  ICD code (if different than what is on RX):  Type 2 diabetes mellitus with hyperglycemia, with long-term current use of insulin (H) [E11.65, Z79.4]  Previously Tried and Failed:    Rationale:     Insurance Name: [employer]  Insurance ID:  [id number]    Pharmacy Information (if different than what is on RX)  Name:  [facility][id number] - [place]  Phone:  [phone]

## 2021-06-09 NOTE — LETTER
"    6/9/2021         RE: Winter Loya  [address]  [facility] [place] [facility] [address]        Dear Colleague,    Thank you for referring your patient, Winter Loya, to the [facility] BLOOD AND MARROW TRANSPLANT PROGRAM [place]. Please see a copy of my visit note below.    Winter is a 65 year old who is being evaluated via a billable video visit.      How would you like to obtain your AVS? MyChart  If the video visit is dropped, the invitation should be resent by: Text to cell phone: [phone]  Will anyone else be joining your video visit? No       BERYL Schneider      Winter Loya is a 64 year old female who is being evaluated via a billable video visit.      The patient has been notified of following:     \"This video visit will be conducted via a call between you and your physician/provider. We have found that certain health care needs can be provided without the need for an in-person physical exam.  This service lets us provide the care you need with a video conversation.  If a prescription is necessary we can send it directly to your pharmacy.  If lab work is needed we can place an order for that and you can then stop by our lab to have the test done at a later time.    Video visits are billed at different rates depending on your insurance coverage.  Please reach out to your insurance provider with any questions.    If during the course of the call the physician/provider feels a video visit is not appropriate, you will not be charged for this service.\"    Patient has given verbal consent for Video visit? Yes  How would you like to obtain your AVS? MyChart  If you are dropped from the video visit, the video invite should be resent to: Text to cell phone: [phone]  Will anyone else be joining your video visit? No      Christiana CORDOBA    Video-Visit Details    Type of service:  Video Visit      [facility]    Reason for consult: MM           History of Present Illness:   This patient is " a 63 yo woman with IgA Kappa Multiple Myeloma. In 2018 she had anemia and was fatigued. She was found to have IgA kappa monocloncal antibody with M-spike of 0.17. IgA elevated. Skeletal survey was unremarkable and UPEP had minimal protein (156 mg/m2). PET 11/2018 showed bone marrow consistent with hypermetabolic plasma cell myeloma. M spike was 0.17. She started RVD and Zometa. On 4/2019 she had an autologous transplant.     Her bone marrow bx from September 2019 was sent here for review. It showed marrow cellularity of 60%, with decreased trilineage hematopoiesis and 15% plasma cells as well as peripheral blood with slight anemia. 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 She is on revlimid maintenance and zometa from her prior oncologist in [place]. On 12/6/19 her K/L ratio is 1.1, M spike is 0.04.    She is doing fine form a myeloma standpoint and is on lenalidomide maintenance.    Her son unfortunately has been diagnosed with leukemia         Physical Exam:     ECOG PS 0              Assessment and Recommendation:     63 yo woman with standard risk myeloma s/p auto transplant. She has been on lenaidomide 10 mg every day and has tolerated this. Based on the outside records she has had about 13 does of Zometa. We will get myeloma labs and a PET scan for baseline imaging. She does have pains that she attributes to arthritis but these require evaluation to rule out myeloma    - We recommend continuing lenalidomide 10 mg every day since it is well tolerated and in a randomized, phase 3 trial, lenalidomide improved PFS (39 months compared to 20 months for observation).    (Lenalidomide maintenance versus observation for patients with newly diagnosed multiple myeloma (Myeloma XI): a multicentre, open-label, randomised, phase 3 trial. Jaleel STEPHENS, et al.Lancet Oncol. 2019 [place];20)    - We  recommend  mg every day to prevent thromboembolic complications from lenalidomide. Her SAVED score is 0. 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     blood glucose monitoring (NO BRAND SPECIFIED) meter device kit Use to test blood sugar two times daily or as directed.     dulaglutide (TRULICITY) 0.75 MG/0.5ML pen Inject 0.75 mg Subcutaneous every 7 days     empagliflozin (JARDIANCE) 10 MG TABS tablet Take 1 tablet (10 mg) by mouth daily     insulin glargine (LANTUS PEN) 100 UNIT/ML pen Inject 34 Units Subcutaneous At Bedtime     insulin pen needle (FIFTY50 PEN NEEDLES) 32G X 4 MM miscellaneous As directed. To use with Victoza daily     LENalidomide (REVLIMID) 10 MG CAPS capsule Take 1 capsule (10 mg) by mouth daily for 28 days     levothyroxine (SYNTHROID/LEVOTHROID) 175 MCG tablet Take 1 tablet (175 mcg) by mouth every morning (before breakfast)     losartan (COZAAR) 25 MG tablet Take 1 tablet (25 mg) by mouth daily      NONFORMULARY zometa for bones every 3 months     nystatin (MYCOSTATIN) 209400 UNIT/GM external powder Apply topically 3 times daily     pregabalin (LYRICA) 100 MG capsule TAKE ONE CAPSULE BY MOUTH THREE TIMES DAILY      propranolol ER (INDERAL LA) 60 MG 24 hr capsule TAKE ONE CAPSULE BY MOUTH ONE TIME DAILY      sertraline (ZOLOFT) 100 MG tablet Take 1 tablet (100 mg) by mouth daily Call clinic to schedule follow up appointment.     latanoprost (XALATAN) 0.005 % ophthalmic solution Place 1 drop into both eyes daily (Patient not taking: Reported on 5/27/2021)     metFORMIN (GLUCOPHAGE) 500 MG tablet Take two tabs BID     No current facility-administered medications for this visit.              Review of Systems:   The 10 point Review of Systems is negative other than noted in the HPI            Again, thank you for allowing me to participate in the care of your patient.        Sincerely,        Mele Tipton MD

## 2021-06-09 NOTE — TELEPHONE ENCOUNTER
Central Prior Authorization Team   [phone]    PA Initiation    Medication: dulaglutide (TRULICITY) 0.75 MG/0.5ML pen  Insurance Company: [employer] - Phone [phone] Fax [phone]  Pharmacy Filling the Rx: [facility] #[id number] - [address]  Filling Pharmacy Phone: [phone]  Filling Pharmacy Fax: [phone]  Start Date: 6/9/2021

## 2021-06-10 NOTE — TELEPHONE ENCOUNTER
Tried calling patient, no answered. Left message with Primary Care clinic number requesting patient to call back to schedule annual appointment.    Tiffany Thomas, Clinic Coordinator, Lindsey 10, 2021 at 12:50 PM

## 2021-06-12 RX ORDER — SERTRALINE HYDROCHLORIDE 100 MG/1
100 TABLET, FILM COATED ORAL DAILY
Qty: 90 TABLET | Refills: 0 | OUTPATIENT
Start: 2021-06-12

## 2021-06-15 ENCOUNTER — TELEPHONE (OUTPATIENT)
Dept: PHARMACY | Facility: CLINIC | Age: 66
End: 2021-06-15

## 2021-06-15 DIAGNOSIS — E11.42 TYPE 2 DIABETES MELLITUS WITH DIABETIC POLYNEUROPATHY, WITH LONG-TERM CURRENT USE OF INSULIN (H): ICD-10-CM

## 2021-06-15 DIAGNOSIS — Z79.4 TYPE 2 DIABETES MELLITUS WITH DIABETIC POLYNEUROPATHY, WITH LONG-TERM CURRENT USE OF INSULIN (H): ICD-10-CM

## 2021-06-15 NOTE — TELEPHONE ENCOUNTER
Winter calls with concerns about diarrhea. Symptoms have been intense for the last 6 months. She is just reporting this to me now. She has just changed from Bydureon to Trulicity and doesn't report changes in symptoms with this change. The diarrhea has been persistent since starting metformin last fall.     Decrease metformin to 500 mg twice daily. Use loperamide as needed while diarrhea persists. Will follow-up in 1 week.

## 2021-06-16 ENCOUNTER — OFFICE VISIT (OUTPATIENT)
Dept: OPHTHALMOLOGY | Facility: CLINIC | Age: 66
End: 2021-06-16
Attending: OPHTHALMOLOGY
Payer: COMMERCIAL

## 2021-06-16 DIAGNOSIS — H25.13 NUCLEAR SCLEROTIC CATARACT OF BOTH EYES: ICD-10-CM

## 2021-06-16 DIAGNOSIS — H52.203 MYOPIC ASTIGMATISM OF BOTH EYES: ICD-10-CM

## 2021-06-16 DIAGNOSIS — H52.13 MYOPIC ASTIGMATISM OF BOTH EYES: ICD-10-CM

## 2021-06-16 DIAGNOSIS — H40.003 GLAUCOMA SUSPECT, BOTH EYES: Primary | ICD-10-CM

## 2021-06-16 DIAGNOSIS — E11.9 DIABETES MELLITUS TYPE 2 WITHOUT RETINOPATHY (H): ICD-10-CM

## 2021-06-16 DIAGNOSIS — H52.4 PRESBYOPIA: ICD-10-CM

## 2021-06-16 DIAGNOSIS — H04.123 DRY EYES, BILATERAL: ICD-10-CM

## 2021-06-16 PROCEDURE — 92133 CPTRZD OPH DX IMG PST SGM ON: CPT | Performed by: OPHTHALMOLOGY

## 2021-06-16 PROCEDURE — G0463 HOSPITAL OUTPT CLINIC VISIT: HCPCS

## 2021-06-16 PROCEDURE — 92134 CPTRZ OPH DX IMG PST SGM RTA: CPT | Performed by: OPHTHALMOLOGY

## 2021-06-16 PROCEDURE — 92083 EXTENDED VISUAL FIELD XM: CPT | Performed by: OPHTHALMOLOGY

## 2021-06-16 PROCEDURE — 92015 DETERMINE REFRACTIVE STATE: CPT

## 2021-06-16 PROCEDURE — 92014 COMPRE OPH EXAM EST PT 1/>: CPT | Performed by: OPHTHALMOLOGY

## 2021-06-16 ASSESSMENT — VISUAL ACUITY
OS_SC: 20/20
OD_PH_SC: 20/30
METHOD: SNELLEN - LINEAR
OS_SC+: -2
OD_SC+: -1
OD_SC: 20/100

## 2021-06-16 ASSESSMENT — TONOMETRY
IOP_METHOD: APPLANATION
OS_IOP_MMHG: 15
OD_IOP_MMHG: 15

## 2021-06-16 ASSESSMENT — CONF VISUAL FIELD
OD_NORMAL: 1
OS_NORMAL: 1
METHOD: COUNTING FINGERS

## 2021-06-16 ASSESSMENT — REFRACTION_MANIFEST
OD_CYLINDER: +1.25
OD_ADD: +2.50
OS_AXIS: 030
OS_SPHERE: PLANO
OS_ADD: +2.50
OS_CYLINDER: +0.75
OD_SPHERE: -2.25
OD_AXIS: 145

## 2021-06-16 ASSESSMENT — REFRACTION_WEARINGRX
OS_SPHERE: -0.75
OD_ADD: +2.25
OS_CYLINDER: +0.75
OS_ADD: +2.25
OD_AXIS: 134
OS_AXIS: 038
OD_SPHERE: -3.50
OD_CYLINDER: +0.75

## 2021-06-16 ASSESSMENT — CUP TO DISC RATIO
OS_RATIO: 0.7
OD_RATIO: 0.7

## 2021-06-16 ASSESSMENT — PACHYMETRY
OS_CT(UM): 570
EXAM_DATE: 6/16/2021
OD_CT(UM): 567

## 2021-06-16 ASSESSMENT — SLIT LAMP EXAM - LIDS
COMMENTS: DERMATOCHALASIS
COMMENTS: DERMATOCHALASIS

## 2021-06-16 ASSESSMENT — EXTERNAL EXAM - LEFT EYE: OS_EXAM: NORMAL

## 2021-06-16 ASSESSMENT — EXTERNAL EXAM - RIGHT EYE: OD_EXAM: NORMAL

## 2021-06-16 NOTE — PROGRESS NOTES
"HPI  Winter Loya is a 65 year old female here for follow-up of glaucoma suspect in both eyes and blurred vision since breaking her glasses. No eye pain, redness, discharge at this time. However, she noted irritation about 3 months after using the latanoprost, so self-discontinued it and has not been using it for about a year now. No flashes/floaters.     Ocular Hx: retinal leak? S/p laser in left eye, not visually significant cataracts, glaucoma  PMH: DM2, multiple myeloma  FHx: glaucoma- grandfather went blind    Assessment & Plan      (H40.003) Glaucoma suspect, both eyes  (primary encounter diagnosis)  Comment: Patient states she was told this was \"borderline.\" On Latanoprost but has been out of drops  Plan: Refilled latanoprost Rx for now.  Information request sent to the eye care provider in [place] who prescribed her latanoprost to get copies of her prior records/testing.    FH: Paternal GF with glaucoma  Pachymetry:  Gonioscopy:  Tmax:  Today's IOP: 15/15  Target IOP:  Current medications: None  Meds to avoid: Latanoprost (irritation, pt self DC'd)  Visual field: OVF 24-2 (6/16/21)  right eye: Generalized depression, unreliable (FP 9%, FN 9%)  left eye: Generalized depression, 10% FN, non-specific  Nerve OCT: Spectralis optic nerve OCT (6/16/21)  OD: Central RNFL thickness 88, all green   OS: Central RNFL thickness 76, yellow central, red temporal (secondary to PPA)     Good IOPs with stable ONH appearance today off latanoprost for the last year. Visual fields unreliable. Nerve OCT largely reassuring - temporal thinning left eye due to PPA, not a glaucomatous pattern.     Plan: Repeat OVF and nerve OCT in 1 year. Ok to continue off latanoprost.     (E11.9) Diabetes mellitus type 2 without active retinopathy (H)   Comment: She has diabetes and was diagnosed about 10 years ago. She is currently on Insulin. Most recent A1C was 7.6% on 3/8/21. She states she had peripheral laser done in both eyes for diabetes " while she pregnant, > 20 years ago, but never had diabetes changes after that. No edema on Mac OCT both eyes.   Plan: Discussed the importance of tight blood glucose control in the prevention of diabetic retinopathy. Recommend yearly dilated eye exam.     (H25.13) Nuclear sclerotic cataract of both eyes  Comment: Not visually significant at this time  Plan: Monitor    (H04.122) Dry eye of left side  Comment: Contributing to intermittent blurred vision  Plan: Continue ATs 3-4 x daily both eyes     (H52.203,  H52.13) Myopic astigmatism of both eyes  (H52.4) Presbyopia  Comment: Good vision with updated refraction  Plan: Given updated glasses Rx.     Patient disposition:   Return in about 1 year (around 6/16/2022) for applanation IOP, OVF 24-2, dilation, nerve OCT OU, or sooner as needed.     Teaching statement:  Complete documentation of historical and exam elements from today's encounter can be found in the full encounter summary report (not reduplicated in this progress note). I personally obtained the chief complaint(s) and history of present illness.  I confirmed and edited as necessary the review of systems, past medical/surgical history, family history, social history, and examination findings as documented by others; and I examined the patient myself. I personally reviewed the relevant tests, images, and reports as documented above.     I formulated and edited as necessary the assessment and plan and discussed the findings and management plan with the patient and family.    Sarika Bonilla MD  Comprehensive Ophthalmology & Ocular Pathology  Department of Ophthalmology and Visual Neurosciences  [email or web address]  Pager [phone]

## 2021-06-16 NOTE — NURSING NOTE
Chief Complaints and History of Present Illnesses   Patient presents with     Glaucoma Follow-Up     Chief Complaint(s) and History of Present Illness(es)     Glaucoma Follow-Up     Laterality: both eyes    Quality: blurred    Associated symptoms: itching and tearing.  Negative for flashes and dryness    Treatment side effects: irritation    Pain scale: 0/10              Comments     Glaucoma follow up.    The patient broke her glasses.  She thinks her vision has worsened.  The patient tried the Latanoprost after last visit.  She notes she developed irritation and stopped taking them after three months.   The patient notes that she has newer onset of tearing eyes.    Lab Results       Component                Value               Date                       A1C                      7.6                 03/08/2021                 A1C                      8.8                 12/07/2020                 A1C                      9.1                 08/21/2020                 A1C                      6.7                 03/05/2020            Patient presents with:  Glaucoma Follow-Up    Chief Complaint(s) and History of Present Illness(es)     Glaucoma Follow-Up     Laterality: both eyes    Quality: blurred    Associated symptoms: itching and tearing.  Negative for flashes and   dryness    Treatment side effects: irritation    Pain scale: 0/10              Comments     Glaucoma follow up.    The patient broke her glasses.  She thinks her vision has worsened.  The patient tried the Latanoprost after last visit.  She notes she   developed irritation and stopped taking them after three months.   Lab Results       Component                Value               Date                       A1C                      7.6                 03/08/2021                 A1C                      8.8                 12/07/2020                 A1C                      9.1                 08/21/2020                 A1C                      6.7                  03/05/2020            Staci Lennon, COA, COA 2:00 PM 06/16/2021

## 2021-06-17 DIAGNOSIS — F32.9 MAJOR DEPRESSIVE DISORDER WITH CURRENT ACTIVE EPISODE, UNSPECIFIED DEPRESSION EPISODE SEVERITY, UNSPECIFIED WHETHER RECURRENT: ICD-10-CM

## 2021-06-20 ENCOUNTER — DOCUMENTATION ONLY (OUTPATIENT)
Dept: ONCOLOGY | Facility: CLINIC | Age: 66
End: 2021-06-20

## 2021-06-20 RX ORDER — SERTRALINE HYDROCHLORIDE 100 MG/1
100 TABLET, FILM COATED ORAL DAILY
Qty: 90 TABLET | Refills: 0 | OUTPATIENT
Start: 2021-06-20

## 2021-06-22 ENCOUNTER — TELEPHONE (OUTPATIENT)
Dept: PHARMACY | Facility: CLINIC | Age: 66
End: 2021-06-22

## 2021-06-22 ENCOUNTER — VIRTUAL VISIT (OUTPATIENT)
Dept: PHARMACY | Facility: CLINIC | Age: 66
End: 2021-06-22
Payer: COMMERCIAL

## 2021-06-22 DIAGNOSIS — E11.42 TYPE 2 DIABETES MELLITUS WITH DIABETIC POLYNEUROPATHY, WITH LONG-TERM CURRENT USE OF INSULIN (H): Primary | ICD-10-CM

## 2021-06-22 DIAGNOSIS — E11.9 TYPE 2 DIABETES MELLITUS WITHOUT COMPLICATION, WITHOUT LONG-TERM CURRENT USE OF INSULIN (H): ICD-10-CM

## 2021-06-22 DIAGNOSIS — F33.1 MAJOR DEPRESSIVE DISORDER, RECURRENT EPISODE, MODERATE (H): ICD-10-CM

## 2021-06-22 DIAGNOSIS — Z79.4 TYPE 2 DIABETES MELLITUS WITH DIABETIC POLYNEUROPATHY, WITH LONG-TERM CURRENT USE OF INSULIN (H): Primary | ICD-10-CM

## 2021-06-22 PROCEDURE — 99607 MTMS BY PHARM ADDL 15 MIN: CPT | Performed by: PHARMACIST

## 2021-06-22 PROCEDURE — 99606 MTMS BY PHARM EST 15 MIN: CPT | Performed by: PHARMACIST

## 2021-06-22 RX ORDER — METFORMIN HCL 500 MG
500 TABLET, EXTENDED RELEASE 24 HR ORAL 2 TIMES DAILY WITH MEALS
Qty: 180 TABLET | Refills: 3 | Status: SHIPPED | OUTPATIENT
Start: 2021-06-22 | End: 2021-09-30

## 2021-06-22 NOTE — TELEPHONE ENCOUNTER
[facility] Call Center    Phone Message    May a detailed message be left on voicemail: yes     Reason for Call: Medication Question or concern regarding medication   Prescription Clarification  Name of Medication: blood glucose monitoring (NO BRAND SPECIFIED) meter device kit  Prescribing Provider: Arash   Pharmacy:  [facility] #[id number] - [facility], [address]   What on the order needs clarification? Are you asking for the monitor, test strips and lancets? If so, pharmacy requests 3 separate scripts. Thank you!           Action Taken: Message routed to:  [facility] ([facility] pcc    Travel Screening: Not Applicable

## 2021-06-22 NOTE — PATIENT INSTRUCTIONS
Recommendations from today's MTM visit:                                                       1. Update A1c when convenient. New lab is signed.   2. New prescription for a meter is sent to the pharmacy.   3. I will request a new Rx for sertraline from Del[facility] Gross.     Follow-up: Return in about 4 weeks (around 7/20/2021) for Follow up, with me, using a phone visit after labs return.    It was great to speak with you today.  I value your experience and would be very thankful for your time with providing feedback on our clinic survey. You may receive a survey via email or text message in the next few days.     To schedule another MTM appointment, please call the clinic directly or you may call the MTM scheduling line at [phone] or toll-free at [phone].     My Clinical Pharmacist's contact information:                                                      Please feel free to contact me with any questions or concerns you have.      Coco Antoine, Pharm.D., [facility]  Medication Therapy Management Pharmacist  Page/VM:  [phone]

## 2021-06-22 NOTE — PROGRESS NOTES
"Medication Therapy Management (MTM) Encounter    ASSESSMENT:                            Medication Adherence/Access: No issues identified    Type 2 Diabetes: Patient would benefit from new BG meter and updated a1c.  Depression: Would benefit from access to sertraline to continue to achieve her MH goals.    PLAN:                            1. Rx for new BG meter sent to pharmacy.   2. A1c signed.   3. Pio to request new Rx for sertraline from PCP.     Follow-up: Return in about 4 weeks (around 7/20/2021) for Follow up, with me, using a phone visit after labs return.    SUBJECTIVE/OBJECTIVE:                          Winter Loya is a 65 year old female called for a follow-up visit. She was referred to me from Brittany Rosenthal.  Today's visit is a follow-up MTM visit from 6/4/21 and subsequent communications.     Reason for visit: diabetes follow-up.    Allergies/ADRs: Reviewed in chart  Tobacco: She reports that she quit smoking about 16 years ago. Her smoking use included cigarettes. She smoked 1.00 pack per day. She has never used smokeless tobacco.  Alcohol: not currently using  Caffeine: 2-32 cups/day of coffee  Activity: none  Past Medical History: Reviewed in chart    Medication Adherence/Access: no issues reported    Type 2 Diabetes:  Currently taking Lantus 34 units daily, metformin 500 mg twice daily, Trulicity 0.75 mg once weekly, Jardiance 10 mg daily. Diarrhea is resolved since the dose reduction in metformin. She reports the beginnings of some constipation as well as lack of appetite, \"nothing sounds good\".   Blood sugar monitoring: Not monitoring because she can't find her meter right now. Ranges: NA  Symptoms of low blood sugar? none  Symptoms of high blood sugar? none  Eye exam: up to date  Foot exam: due  Diet/Exercise: she is eating less due to GLP1 ra right now.   Aspirin: Not taking due to no IVD  Statin: Yes: atorvastatin 20 mg daily   ACEi/ARB: Yes: losartan 25 mg daily.   Urine Albumin:   Lab " "Results   Component Value Date    UMALCR 73.57 (H) 12/07/2020      Lab Results   Component Value Date    A1C 7.6 03/08/2021    A1C 8.8 12/07/2020    A1C 9.1 08/21/2020    A1C 6.7 03/05/2020     Depression:  Current medications include: Sertraline 100 mg once daily. She hasn't had this med in 2 weeks because no one will provider her a new prescription. She is tearful on the phone as we talk about this. She gets a lot of benefit from her sertraline and is upset she has had to be without it.   PHQ-9 SCORE 5/1/2020 11/20/2020   PHQ-9 Total Score 2 8     Today's Vitals: There were no vitals taken for this visit. - telemed    BP Readings from Last 1 Encounters:   05/27/21 129/80     Pulse Readings from Last 1 Encounters:   05/27/21 66     Wt Readings from Last 1 Encounters:   01/27/21 266 lb (120.7 kg)     Ht Readings from Last 1 Encounters:   05/27/21 5' 8.9\" (1.75 m)     Estimated body mass index is 39.4 kg/m  as calculated from the following:    Height as of 5/27/21: 5' 8.9\" (1.75 m).    Weight as of 1/27/21: 266 lb (120.7 kg).    Temp Readings from Last 1 Encounters:   01/27/21 97.3  F (36.3  C) (Tympanic)     ----------------    I spent 15 minutes with this patient today. All changes were made via collaborative practice agreement with Delmi Gross. A copy of the visit note was provided to the patient's primary care provider.    The patient was sent via [employer] a summary of these recommendations.     Coco Antoine, Pharm.D., [facility]  Medication Therapy Management Pharmacist  Page/VM:  [phone]      Telemedicine Visit Details  Type of service:  Telephone visit  Start Time: 3:03 PM  End Time: 3:18 PM  Originating Location (patient location): Home  Distant Location (provider location):  [facility]        Medication Therapy Recommendations  Diabetes mellitus, type 2 (H)    Current Medication: blood glucose monitoring (NO BRAND SPECIFIED) meter device kit   Rationale: Cannot " swallow/administer medication - Adherence - Adherence   Recommendation: Change Medication   Status: Accepted per CPA          Current Medication: metFORMIN (GLUCOPHAGE) 500 MG tablet (Discontinued)   Rationale: Medication requires monitoring - Needs additional monitoring - Effectiveness   Recommendation: Order Lab - Update A1c   Status: Accepted per CPA         Major depressive disorder, recurrent episode, moderate (H)    Current Medication: sertraline (ZOLOFT) 100 MG tablet   Rationale: Cannot swallow/administer medication - Adherence - Adherence   Recommendation: Start Medication - Restart sertraline 100 mg daily   Status: Contact Provider - Awaiting Response

## 2021-06-23 DIAGNOSIS — F32.9 MAJOR DEPRESSIVE DISORDER WITH CURRENT ACTIVE EPISODE, UNSPECIFIED DEPRESSION EPISODE SEVERITY, UNSPECIFIED WHETHER RECURRENT: ICD-10-CM

## 2021-06-25 ENCOUNTER — TELEPHONE (OUTPATIENT)
Dept: PHARMACY | Facility: CLINIC | Age: 66
End: 2021-06-25

## 2021-06-25 DIAGNOSIS — F32.9 MAJOR DEPRESSIVE DISORDER WITH CURRENT ACTIVE EPISODE, UNSPECIFIED DEPRESSION EPISODE SEVERITY, UNSPECIFIED WHETHER RECURRENT: ICD-10-CM

## 2021-06-25 RX ORDER — SERTRALINE HYDROCHLORIDE 100 MG/1
100 TABLET, FILM COATED ORAL DAILY
Qty: 90 TABLET | Refills: 3 | Status: SHIPPED | OUTPATIENT
Start: 2021-06-25 | End: 2021-06-28

## 2021-06-25 NOTE — TELEPHONE ENCOUNTER
----- Message from FALGNUI Mederos CNP sent at 6/25/2021  8:02 AM CDT -----  Regarding: RE: Sertraline  Yes. I agree with her rx for sertraline. Thanks for your help!  FALGUNI Florian, CNP    ----- Message -----  From: Coco Antoine RPH  Sent: 6/22/2021   3:13 PM CDT  To: FALGUNI Mederos CNP  Subject: Sertraline                                       Hi Winter Awad needs a new Rx for sertraline. She has been out for a few weeks because the pharmacy kept sending it to the primary care clinic at [facility] (her previous PCP is here). Is is okay if I sign a new Rx for her under your name so refills come to you in the future?    Pio Antoine, Pharm.D., [facility]  Medication Therapy Management Pharmacist  Page/VM:  [phone]

## 2021-06-28 ENCOUNTER — VIRTUAL VISIT (OUTPATIENT)
Dept: FAMILY MEDICINE | Facility: CLINIC | Age: 66
End: 2021-06-28
Payer: COMMERCIAL

## 2021-06-28 DIAGNOSIS — F32.9 MAJOR DEPRESSIVE DISORDER WITH CURRENT ACTIVE EPISODE, UNSPECIFIED DEPRESSION EPISODE SEVERITY, UNSPECIFIED WHETHER RECURRENT: ICD-10-CM

## 2021-06-28 DIAGNOSIS — C90.01 MULTIPLE MYELOMA IN REMISSION (H): Primary | ICD-10-CM

## 2021-06-28 PROCEDURE — 96127 BRIEF EMOTIONAL/BEHAV ASSMT: CPT | Mod: 59 | Performed by: NURSE PRACTITIONER

## 2021-06-28 PROCEDURE — 99214 OFFICE O/P EST MOD 30 MIN: CPT | Performed by: NURSE PRACTITIONER

## 2021-06-28 RX ORDER — SERTRALINE HYDROCHLORIDE 100 MG/1
100 TABLET, FILM COATED ORAL DAILY
Qty: 90 TABLET | Refills: 3 | Status: SHIPPED | OUTPATIENT
Start: 2021-06-28 | End: 2022-07-19

## 2021-06-28 RX ORDER — SERTRALINE HYDROCHLORIDE 100 MG/1
100 TABLET, FILM COATED ORAL DAILY
Qty: 90 TABLET | Refills: 0 | OUTPATIENT
Start: 2021-06-28

## 2021-06-28 RX ORDER — ASPIRIN 325 MG
325 TABLET, DELAYED RELEASE (ENTERIC COATED) ORAL DAILY
Qty: 90 TABLET | Refills: 3 | Status: SHIPPED | OUTPATIENT
Start: 2021-06-28 | End: 2022-08-10

## 2021-06-28 ASSESSMENT — ANXIETY QUESTIONNAIRES
1. FEELING NERVOUS, ANXIOUS, OR ON EDGE: SEVERAL DAYS
GAD7 TOTAL SCORE: 6
2. NOT BEING ABLE TO STOP OR CONTROL WORRYING: SEVERAL DAYS
IF YOU CHECKED OFF ANY PROBLEMS ON THIS QUESTIONNAIRE, HOW DIFFICULT HAVE THESE PROBLEMS MADE IT FOR YOU TO DO YOUR WORK, TAKE CARE OF THINGS AT HOME, OR GET ALONG WITH OTHER PEOPLE: SOMEWHAT DIFFICULT
5. BEING SO RESTLESS THAT IT IS HARD TO SIT STILL: NOT AT ALL
3. WORRYING TOO MUCH ABOUT DIFFERENT THINGS: SEVERAL DAYS
6. BECOMING EASILY ANNOYED OR IRRITABLE: SEVERAL DAYS
7. FEELING AFRAID AS IF SOMETHING AWFUL MIGHT HAPPEN: SEVERAL DAYS

## 2021-06-28 ASSESSMENT — PATIENT HEALTH QUESTIONNAIRE - PHQ9
SUM OF ALL RESPONSES TO PHQ QUESTIONS 1-9: 20
5. POOR APPETITE OR OVEREATING: SEVERAL DAYS

## 2021-06-28 ASSESSMENT — PAIN SCALES - GENERAL: PAINLEVEL: NO PAIN (0)

## 2021-06-28 NOTE — PROGRESS NOTES
Winter is a 65 year old who is being evaluated via a billable telephone visit.      What phone number would you like to be contacted at? [phone]  How would you like to obtain your AVS? MyChart    Assessment & Plan     Major depressive disorder with current active episode, unspecified depression episode severity, unspecified whether recurrent  She has been out of her sertraline for almost a month and has had worsening symptoms. Will restart today.   - sertraline (ZOLOFT) 100 MG tablet; Take 1 tablet (100 mg) by mouth daily    Multiple myeloma in remission (H)  States that she has not been taking her daily aspirin  mg every day to prevent thromboembolic complications from lenalidomide as recommended by her oncologist as she ran out.   - aspirin (ASA) 325 MG EC tablet; Take 1 tablet (325 mg) by mouth daily    Depression Screening Follow Up    PHQ 6/28/2021   PHQ-9 Total Score 20   Q9: Thoughts of better off dead/self-harm past 2 weeks Several days     Follow Up      Follow Up Actions Taken  Patient has mental health provider and I will restart her sertaline today.    Discussed the following ways the patient can remain in a safe environment:  be around others    Return in about 4 weeks (around 7/26/2021) for Follow up clinic visit, depression.    FALGUNI [facility]   Winter is a 65 year old who presents for the following health issues     HPI     Diabetes Follow-up    How often are you checking your blood sugar? A few times a month  What time of day are you checking your blood sugars (select all that apply)?  Before and after meals  Have you had any blood sugars above 200?  Yes   Have you had any blood sugars below 70?  No    What symptoms do you notice when your blood sugar is low?  Shaky, Dizzy, Weak and Hungry     What concerns do you have today about your diabetes? None     Do you have any of these symptoms? (Select all that apply)  Numbness in feet  and Burning in feet     How many servings of fruits and vegetables do you eat daily?  0-1    On average, how many sweetened beverages do you drink each day (Examples: soda, juice, sweet tea, etc.  Do NOT count diet or artificially sweetened beverages)?   0    How many days per week do you exercise enough to make your heart beat faster? 0    How many minutes a day do you exercise enough to make your heart beat faster? 0    How many days per week do you miss taking your medication? 0      BP Readings from Last 2 Encounters:   05/27/21 129/80   03/15/21 117/77     Hemoglobin A1C (%)   Date Value   03/08/2021 7.6 (H)   12/07/2020 8.8 (H)     LDL Cholesterol Calculated (mg/dL)   Date Value   01/27/2021 46     Hyperlipidemia Follow-Up      Are you regularly taking any medication or supplement to lower your cholesterol?   Yes- Atorvastatin    Are you having muscle aches or other side effects that you think could be caused by your cholesterol lowering medication?  No        Hypothyroidism Follow-up      Since last visit, patient describes the following symptoms: dry skin, constipation, loose stools, tremors, depression and fatigue        Depression Followup    How are you doing with your depression since your last visit? Worsened     Are you having other symptoms that might be associated with depression? Yes:  crying, cannot think straight, loss of interest     Have you had a significant life event?  OTHER: Son dx with Leukemia      Are you feeling anxious or having panic attacks?   No    Do you have any concerns with your use of alcohol or other drugs? No   States that she has been out of her sertraline for almost a month and has noted an increase in symptoms of depression and being very bothered by things such as a client yelling at her at work that would not usually bother her as much. Denies SI. States that she does have a therapist that she is going to but is thinking about switching to someone different.     States  that she has not been taking her daily aspirin  mg every day to prevent thromboembolic complications from lenalidomide as recommended by her oncologist as she ran out.     Social History     Tobacco Use     Smoking status: Former Smoker     Packs/day: 1.00     Types: Cigarettes     Quit date:      Years since quittin.4     Smokeless tobacco: Never Used   Substance Use Topics     Alcohol use: Yes     Comment: occasional     Drug use: Never     PHQ 2020   PHQ-9 Total Score 2 8   Q9: Thoughts of better off dead/self-harm past 2 weeks Not at all Not at all     NA-7 SCORE 2020   Total Score 5     Last PHQ-9 2021   1.  Little interest or pleasure in doing things 3   2.  Feeling down, depressed, or hopeless 3   3.  Trouble falling or staying asleep, or sleeping too much 1   4.  Feeling tired or having little energy 3   5.  Poor appetite or overeating 3   6.  Feeling bad about yourself 3   7.  Trouble concentrating 3   8.  Moving slowly or restless 0   Q9: Thoughts of better off dead/self-harm past 2 weeks 1   PHQ-9 Total Score 20   Difficulty at work, home, or with people Somewhat difficult     NA-7  2021   1. Feeling nervous, anxious, or on edge 1   2. Not being able to stop or control worrying 1   3. Worrying too much about different things 1   4. Trouble relaxing 1   5. Being so restless that it is hard to sit still 0   6. Becoming easily annoyed or irritable 1   7. Feeling afraid, as if something awful might happen 1   NA-7 Total Score 6   If you checked any problems, how difficult have they made it for you to do your work, take care of things at home, or get along with other people? Somewhat difficult       Suicide Assessment Five-step Evaluation and Treatment (SAFE-T)    Review of Systems   Constitutional, HEENT, cardiovascular, pulmonary, gi and gu systems are negative, except as otherwise noted.      Objective           Vitals:  No vitals were obtained today due to  virtual visit.    Physical Exam   healthy, alert and no distress  PSYCH: Alert and oriented times 3; coherent speech, normal   rate and volume, able to articulate logical thoughts, able   to abstract reason, no tangential thoughts, no hallucinations   or delusions  Her affect is normal  RESP: No cough, no audible wheezing, able to talk in full sentences  Remainder of exam unable to be completed due to telephone visits          Phone call duration: 16 minutes

## 2021-06-29 ASSESSMENT — ANXIETY QUESTIONNAIRES: GAD7 TOTAL SCORE: 6

## 2021-06-30 DIAGNOSIS — C90.01 MULTIPLE MYELOMA IN REMISSION (H): Primary | ICD-10-CM

## 2021-07-05 DIAGNOSIS — C90.01 MULTIPLE MYELOMA IN REMISSION (H): Primary | ICD-10-CM

## 2021-07-05 RX ORDER — LENALIDOMIDE 10 MG/1
10 CAPSULE ORAL DAILY
Qty: 28 CAPSULE | Refills: 0 | Status: SHIPPED | OUTPATIENT
Start: 2021-07-05 | End: 2021-08-02

## 2021-07-07 ENCOUNTER — TELEPHONE (OUTPATIENT)
Dept: NEUROLOGY | Facility: CLINIC | Age: 66
End: 2021-07-07

## 2021-07-07 ENCOUNTER — TELEPHONE (OUTPATIENT)
Dept: ONCOLOGY | Facility: CLINIC | Age: 66
End: 2021-07-07

## 2021-07-07 NOTE — TELEPHONE ENCOUNTER
1ST attempt to get patients 6 month follow up rescheduled from 9/20/2021 to another date. Left voicemail to call [phone].    Verena Pepper [facility]   Neurology & Neurosurgery Specialties  [facility]   [phone]

## 2021-07-07 NOTE — TELEPHONE ENCOUNTER
Oral Chemotherapy Monitoring Program   Medication: Revlimid  Rx: 10mg PO daily on days 1 through 28 of 28 day cycle   Auth #: [id number]   Risk Category: Adult Female NORP  Routine survey questions reviewed.   Rx to be Escribed to [facility]    Lor Borja  [facility]  Oncology Pharmacy Liaison   [email or web address]  [phone] (phone)  [phone] (fax)

## 2021-07-24 ENCOUNTER — HEALTH MAINTENANCE LETTER (OUTPATIENT)
Age: 66
End: 2021-07-24

## 2021-07-26 ENCOUNTER — OFFICE VISIT (OUTPATIENT)
Dept: FAMILY MEDICINE | Facility: CLINIC | Age: 66
End: 2021-07-26
Payer: COMMERCIAL

## 2021-07-26 VITALS
BODY MASS INDEX: 39.25 KG/M2 | HEART RATE: 65 BPM | DIASTOLIC BLOOD PRESSURE: 82 MMHG | OXYGEN SATURATION: 97 % | TEMPERATURE: 98.4 F | SYSTOLIC BLOOD PRESSURE: 132 MMHG | RESPIRATION RATE: 22 BRPM | WEIGHT: 265 LBS | HEIGHT: 69 IN

## 2021-07-26 DIAGNOSIS — E04.9 THYROID GOITER: ICD-10-CM

## 2021-07-26 DIAGNOSIS — F33.2 SEVERE EPISODE OF RECURRENT MAJOR DEPRESSIVE DISORDER, WITHOUT PSYCHOTIC FEATURES (H): Primary | ICD-10-CM

## 2021-07-26 DIAGNOSIS — Z79.4 TYPE 2 DIABETES MELLITUS WITH DIABETIC POLYNEUROPATHY, WITH LONG-TERM CURRENT USE OF INSULIN (H): ICD-10-CM

## 2021-07-26 DIAGNOSIS — B37.31 CANDIDIASIS OF VULVA AND VAGINA: ICD-10-CM

## 2021-07-26 DIAGNOSIS — Z12.31 ENCOUNTER FOR SCREENING MAMMOGRAM FOR BREAST CANCER: ICD-10-CM

## 2021-07-26 DIAGNOSIS — E11.42 TYPE 2 DIABETES MELLITUS WITH DIABETIC POLYNEUROPATHY, WITH LONG-TERM CURRENT USE OF INSULIN (H): ICD-10-CM

## 2021-07-26 DIAGNOSIS — R25.1 TREMOR: ICD-10-CM

## 2021-07-26 DIAGNOSIS — C90.01 MULTIPLE MYELOMA IN REMISSION (H): ICD-10-CM

## 2021-07-26 DIAGNOSIS — F41.1 GAD (GENERALIZED ANXIETY DISORDER): Chronic | ICD-10-CM

## 2021-07-26 LAB
AMPHETAMINES UR QL: NOT DETECTED
BARBITURATES UR QL SCN: NOT DETECTED
BENZODIAZ UR QL SCN: NOT DETECTED
BUPRENORPHINE UR QL: NOT DETECTED
CANNABINOIDS UR QL: NOT DETECTED
COCAINE UR QL SCN: NOT DETECTED
D-METHAMPHET UR QL: NOT DETECTED
HBA1C MFR BLD: 7.8 % (ref 0–5.6)
METHADONE UR QL SCN: NOT DETECTED
OPIATES UR QL SCN: NOT DETECTED
OXYCODONE UR QL SCN: NOT DETECTED
PCP UR QL SCN: NOT DETECTED
PROPOXYPH UR QL: NOT DETECTED
TRICYCLICS UR QL SCN: NOT DETECTED

## 2021-07-26 PROCEDURE — 99214 OFFICE O/P EST MOD 30 MIN: CPT | Performed by: NURSE PRACTITIONER

## 2021-07-26 PROCEDURE — 83036 HEMOGLOBIN GLYCOSYLATED A1C: CPT | Performed by: NURSE PRACTITIONER

## 2021-07-26 PROCEDURE — 99207 PR FOOT EXAM NO CHARGE: CPT | Mod: 25 | Performed by: NURSE PRACTITIONER

## 2021-07-26 PROCEDURE — 96127 BRIEF EMOTIONAL/BEHAV ASSMT: CPT | Performed by: NURSE PRACTITIONER

## 2021-07-26 PROCEDURE — 36415 COLL VENOUS BLD VENIPUNCTURE: CPT | Performed by: NURSE PRACTITIONER

## 2021-07-26 PROCEDURE — 80306 DRUG TEST PRSMV INSTRMNT: CPT | Performed by: NURSE PRACTITIONER

## 2021-07-26 RX ORDER — PROPRANOLOL HCL 60 MG
60 CAPSULE, EXTENDED RELEASE 24HR ORAL DAILY
Qty: 90 CAPSULE | Refills: 3 | Status: SHIPPED | OUTPATIENT
Start: 2021-07-26 | End: 2021-09-27

## 2021-07-26 RX ORDER — DULAGLUTIDE 0.75 MG/.5ML
0.75 INJECTION, SOLUTION SUBCUTANEOUS
Qty: 2 ML | Refills: 1 | Status: SHIPPED | OUTPATIENT
Start: 2021-07-26 | End: 2021-09-30 | Stop reason: DRUGHIGH

## 2021-07-26 RX ORDER — PREGABALIN 100 MG/1
CAPSULE ORAL
Qty: 270 CAPSULE | Refills: 3 | Status: SHIPPED | OUTPATIENT
Start: 2021-07-26 | End: 2022-02-02

## 2021-07-26 RX ORDER — LEVOTHYROXINE SODIUM 175 UG/1
175 TABLET ORAL
Qty: 90 TABLET | Refills: 3 | Status: SHIPPED | OUTPATIENT
Start: 2021-07-26 | End: 2022-07-11

## 2021-07-26 RX ORDER — ATORVASTATIN CALCIUM 20 MG/1
20 TABLET, FILM COATED ORAL AT BEDTIME
Qty: 90 TABLET | Refills: 3 | Status: SHIPPED | OUTPATIENT
Start: 2021-07-26 | End: 2022-10-18

## 2021-07-26 RX ORDER — ACYCLOVIR 400 MG/1
TABLET ORAL
Qty: 60 TABLET | Refills: 11 | Status: SHIPPED | OUTPATIENT
Start: 2021-07-26 | End: 2022-07-11

## 2021-07-26 RX ORDER — FLUCONAZOLE 150 MG/1
TABLET ORAL
Qty: 2 TABLET | Refills: 1 | Status: SHIPPED | OUTPATIENT
Start: 2021-07-26 | End: 2021-09-27

## 2021-07-26 ASSESSMENT — PATIENT HEALTH QUESTIONNAIRE - PHQ9
5. POOR APPETITE OR OVEREATING: MORE THAN HALF THE DAYS
SUM OF ALL RESPONSES TO PHQ QUESTIONS 1-9: 21

## 2021-07-26 ASSESSMENT — ANXIETY QUESTIONNAIRES
1. FEELING NERVOUS, ANXIOUS, OR ON EDGE: MORE THAN HALF THE DAYS
2. NOT BEING ABLE TO STOP OR CONTROL WORRYING: NEARLY EVERY DAY
IF YOU CHECKED OFF ANY PROBLEMS ON THIS QUESTIONNAIRE, HOW DIFFICULT HAVE THESE PROBLEMS MADE IT FOR YOU TO DO YOUR WORK, TAKE CARE OF THINGS AT HOME, OR GET ALONG WITH OTHER PEOPLE: VERY DIFFICULT
7. FEELING AFRAID AS IF SOMETHING AWFUL MIGHT HAPPEN: NEARLY EVERY DAY
3. WORRYING TOO MUCH ABOUT DIFFERENT THINGS: NEARLY EVERY DAY
6. BECOMING EASILY ANNOYED OR IRRITABLE: SEVERAL DAYS
5. BEING SO RESTLESS THAT IT IS HARD TO SIT STILL: NOT AT ALL
GAD7 TOTAL SCORE: 14

## 2021-07-26 ASSESSMENT — MIFFLIN-ST. JEOR: SCORE: 1819.79

## 2021-07-26 NOTE — LETTER
My Depression Action Plan  Name: Winter Loya   Date of Birth 1955  Date: 7/26/2021    My doctor: Víctor Roque   My clinic: [facility] [place]  [address]  [place] [address]  [phone]          GREEN    ZONE   Good Control    What it looks like:     Things are going generally well. You have normal ups and downs. You may even feel depressed from time to time, but bad moods usually last less than a day.   What you need to do:  1. Continue to care for yourself (see self care plan)  2. Check your depression survival kit and update it as needed  3. Follow your physician s recommendations including any medication.  4. Do not stop taking medication unless you consult with your physician first.           YELLOW         ZONE Getting Worse    What it looks like:     Depression is starting to interfere with your life.     It may be hard to get out of bed; you may be starting to isolate yourself from others.    Symptoms of depression are starting to last most all day and this has happened for several days.     You may have suicidal thoughts but they are not constant.   What you need to do:     1. Call your care team. Your response to treatment will improve if you keep your care team informed of your progress. Yellow periods are signs an adjustment may need to be made.     2. Continue your self-care.  Just get dressed and ready for the day.  Don't give yourself time to talk yourself out of it.    3. Talk to someone in your support network.    4. Open up your Depression Self-Care Plan/Wellness Kit.           RED    ZONE Medical Alert - Get Help    What it looks like:     Depression is seriously interfering with your life.     You may experience these or other symptoms: You can t get out of bed most days, can t work or engage in other necessary activities, you have trouble taking care of basic hygiene, or basic responsibilities, thoughts of suicide or death that will  not go away, self-injurious behavior.     What you need to do:  1. Call your care team and request a same-day appointment. If they are not available (weekends or after hours) call your local crisis line, emergency room or 911.          Depression Self-Care Plan / Wellness Kit    Many people find that medication and therapy are helpful treatments for managing depression. In addition, making small changes to your everyday life can help to boost your mood and improve your wellbeing. Below are some tips for you to consider. Be sure to talk with your medical provider and/or behavioral health consultant if your symptoms are worsening or not improving.     Sleep   Sleep hygiene  means all of the habits that support good, restful sleep. It includes maintaining a consistent bedtime and wake time, using your bedroom only for sleeping or sex, and keeping the bedroom dark and free of distractions like a computer, smartphone, or television.     Develop a Healthy Routine  Maintain good hygiene. Get out of bed in the morning, make your bed, brush your teeth, take a shower, and get dressed. Don t spend too much time viewing media that makes you feel stressed. Find time to relax each day.    Exercise  Get some form of exercise every day. This will help reduce pain and release endorphins, the  feel good  chemicals in your brain. It can be as simple as just going for a walk or doing some gardening, anything that will get you moving.      Diet  Strive to eat healthy foods, including fruits and vegetables. Drink plenty of water. Avoid excessive sugar, caffeine, alcohol, and other mood-altering substances.     Stay Connected with Others  Stay in touch with friends and family members.    Manage Your Mood  Try deep breathing, massage therapy, biofeedback, or meditation. Take part in fun activities when you can. Try to find something to smile about each day.     Psychotherapy  Be open to working with a therapist if your provider recommends  it.     Medication  Be sure to take your medication as prescribed. Most anti-depressants need to be taken every day. It usually takes several weeks for medications to work. Not all medicines work for all people. It is important to follow-up with your provider to make sure you have a treatment plan that is working for you. Do not stop your medication abruptly without first discussing it with your provider.    Crisis Resources   These hotlines are for both adults and children. They and are open 24 hours a day, 7 days a week unless noted otherwise.      National Suicide Prevention Lifeline   7-307-648-TALK (2627)      Crisis Text Line    www.crisistextline.org  Text HOME to 719815 from anywhere in the [place], anytime, about any type of crisis. A live, trained crisis counselor will receive the text and respond quickly.      [facility] Lifeline for LGBTQ Youth  A national crisis intervention and suicide lifeline for LGBTQ youth under 25. Provides a safe place to talk without judgement. Call 1-553.102.8309; text START to 948040 or visit www.thetrevorproject.org to talk to a trained counselor.      For [facility] crisis numbers, visit the [facility] website at:  https://[place].gov/dhs/people-we-serve/adults/health-care/mental-health/resources/crisis-contacts.jsp

## 2021-07-26 NOTE — PROGRESS NOTES
Assessment & Plan     Severe episode of recurrent major depressive disorder, without psychotic features (H)  Current stressors include worry about her son who is undergoing treatment for Leukemia and allergic reaction to his medications. She feels that her sertraline is helpful with managing her symptoms. She also feels that since starting her pregabalin for polyneuropathy, her depression and anxiety symptoms have improved. Denies SI.   Continue current treatment plan without change.    NA (generalized anxiety disorder)  Continue current treatment plan without change.    Type 2 diabetes mellitus with diabetic polyneuropathy, with long-term current use of insulin (H)  She will run out of refills soon, refills reordered. Continue current treatment plan without change. Will draw A1c and message patient if changes are needed.   - pregabalin (LYRICA) 100 MG capsule; TAKE ONE CAPSULE BY MOUTH THREE TIMES DAILY  - insulin glargine (LANTUS PEN) 100 UNIT/ML pen; Inject 30 Units Subcutaneous At Bedtime  - empagliflozin (JARDIANCE) 10 MG TABS tablet; Take 1 tablet (10 mg) by mouth daily  - dulaglutide (TRULICITY) 0.75 MG/0.5ML pen; Inject 0.75 mg Subcutaneous every 7 days  - atorvastatin (LIPITOR) 20 MG tablet; Take 1 tablet (20 mg) by mouth At Bedtime  - blood glucose (NO BRAND SPECIFIED) test strip; Use to test blood sugar two times daily or as directed.  - Drug Abuse Screen Panel 13, Urine (Pain Care Package) - lab collect  - FOOT EXAM  - Hemoglobin A1c    Tremor  Well controlled with medications without side effects.  Continue current treatment plan without change, refills provided.  - propranolol ER (INDERAL LA) 60 MG 24 hr capsule; Take 1 capsule (60 mg) by mouth daily    Thyroid goiter  Well controlled with medications without side effects.  - levothyroxine (SYNTHROID/LEVOTHROID) 175 MCG tablet; Take 1 tablet (175 mcg) by mouth every morning (before breakfast)    Multiple myeloma in remission (H)  Continues to take  "acyclovir for suppression of HSV, when she has tried to stop suppressive therapy she gets outbreaks.   - acyclovir (ZOVIRAX) 400 MG tablet; TAKE ONE TABLET BY MOUTH EVERY TWELVE HOURS    Candidiasis of vulva and vagina  Will treat yeast infection. Patient will follow up if symptoms do not resolve.   - fluconazole (DIFLUCAN) 150 MG tablet; Take one tablet (150 mg) by mouth now. Repeat in 3 days if symptoms do not resolve.    Encounter for screening mammogram for breast cancer  - *MA Screening Digital Bilateral; Future    Review of the result(s) of each unique test - as noted  Ordering of each unique test  Prescription drug management  36 minutes spent on the date of the encounter doing chart review, history and exam, documentation and further activities per the note     BMI:   Estimated body mass index is 39.25 kg/m  as calculated from the following:    Height as of this encounter: 1.75 m (5' 8.9\").    Weight as of this encounter: 120.2 kg (265 lb).   Weight management plan: Discussed healthy diet and exercise guidelines    Depression Screening Follow Up    PHQ 7/26/2021   PHQ-9 Total Score 21   Q9: Thoughts of better off dead/self-harm past 2 weeks More than half the days     Last PHQ-9 7/26/2021   1.  Little interest or pleasure in doing things 3   2.  Feeling down, depressed, or hopeless 2   3.  Trouble falling or staying asleep, or sleeping too much 2   4.  Feeling tired or having little energy 2   5.  Poor appetite or overeating 3   6.  Feeling bad about yourself 2   7.  Trouble concentrating 3   8.  Moving slowly or restless 2   Q9: Thoughts of better off dead/self-harm past 2 weeks 2   PHQ-9 Total Score 21   Difficulty at work, home, or with people Very difficult       Follow Up      Follow Up Actions Taken  Crisis resource information provided in the After Visit Summary    Discussed the following ways the patient can remain in a safe environment:  be around others  See Patient Instructions    Return in about " "3 months (around 10/26/2021) for Follow up clinic visit diabetes.    Delmi Gross, APRN CNP  [facility] [place] Max is a 65 year old who presents for the following health issues     HPI     Depression and Anxiety Follow-Up    How are you doing with your depression since your last visit? \"It's up and down. I have a son dx with Leukemia for two months now at [facility].\"    How are you doing with your anxiety since your last visit?  \"-\"     Are you having other symptoms that might be associated with depression or anxiety? Yes:  Feeling stressed and anxious     Have you had a significant life event? OTHER: Son hospitalized      Do you have any concerns with your use of alcohol or other drugs? No    Social History     Tobacco Use     Smoking status: Former Smoker     Packs/day: 1.00     Types: Cigarettes     Quit date:      Years since quittin.5     Smokeless tobacco: Never Used   Substance Use Topics     Alcohol use: Yes     Comment: occasional     Drug use: Never     PHQ 2020   PHQ-9 Total Score 8 20 21   Q9: Thoughts of better off dead/self-harm past 2 weeks Not at all Several days More than half the days     NA-7 SCORE 2020   Total Score 5 6 14     Suicide Assessment Five-step Evaluation and Treatment (SAFE-T)      States that she is taking propranolol for her essential tremor and she finds it helpful, notes that her shaking worsens if she misses a dose.     Review of Systems   Constitutional, HEENT, cardiovascular, pulmonary, gi and gu systems are negative, except as otherwise noted.      Objective    /82 (BP Location: Right arm, Patient Position: Sitting, Cuff Size: Adult Large)   Pulse 65   Temp 98.4  F (36.9  C) (Oral)   Resp 22   Ht 1.75 m (5' 8.9\")   Wt 120.2 kg (265 lb)   SpO2 97%   BMI 39.25 kg/m    Body mass index is 39.25 kg/m .  Physical Exam   GENERAL: healthy, alert and no distress  EYES: Eyes " grossly normal to inspection, PERRL and conjunctivae and sclerae normal  RESP: lungs clear to auscultation - no rales, rhonchi or wheezes  CV: regular rate and rhythm, normal S1 S2, no S3 or S4, no murmur, click or rub, no peripheral edema and peripheral pulses strong  NEURO: Normal strength and tone, mentation intact and speech normal  PSYCH: mentation appears normal, affect normal/bright

## 2021-07-26 NOTE — PATIENT INSTRUCTIONS
Patient Education     Know the Symptoms of Depression  Everyone feels down at times. The blues are a natural part of life. But an unhappy period that s intense or lasts for more than a few weeks is different. It can be a sign of depression. Depression is a serious illness. It's not a sign of weakness. It's not a character flaw. And it's not something you can just snap out of. Most people with depression need treatment to get better. Depression can disrupt the lives of family and friends. If you know someone who may be depressed, find out what you can do to help.    Symptoms of depression  People who are depressed may:    Feel unhappy, sad, blue, down, or miserable almost every day    Feel helpless, hopeless, or worthless    Lose interest in hobbies, friends, and activities that used to give pleasure    Not sleep well or sleep too much    Gain or lose weight    Feel low on energy or always tired    Have a hard time focusing or making decisions    Lose interest in sex    Have physical symptoms, such as stomachaches, headaches, or backaches  Know the serious signs  Never ignore a person's comments about suicide. Or behaviors that can lead to self-harm. Warning signs for suicide include:    Threats or talk of suicide. Talk of harming themselves or others.    Saying things such as  I won t be a problem much longer  or  Nothing matters.     Giving away their things. Or making a will or  plans.    Buying a gun or other weapon.    Sudden, unexplained cheerfulness or calm after a period of depression.  If you see any of these signs, get help right away. Call a healthcare provider, mental health clinic, or suicide hotline. Ask what you should do. In an emergency, call 911.  Resources:    National Institutes of Mental Zhiuur709-236-7939scz.[facility] Hospitalh.nih.gov    National [place] on Mental Wpnqppe956-239-4034irq.gardenia.org     Mental Health Iqfyhht078-940-7995grn.[facility].org    National Suicide Qnecfjf094-122-5677  (800-SUICIDE)    National Suicide Prevention Qhhkxrbw019-766-9408 (117-005-DGYK)www.suicidepreventionlifeline.org    [place] last reviewed this educational content on 4/1/2019 2000-2021 [employer]. All rights reserved. This information is not intended as a substitute for professional medical care. Always follow your healthcare professional's instructions.

## 2021-07-27 ASSESSMENT — ANXIETY QUESTIONNAIRES: GAD7 TOTAL SCORE: 14

## 2021-08-02 ENCOUNTER — TELEPHONE (OUTPATIENT)
Dept: ONCOLOGY | Facility: CLINIC | Age: 66
End: 2021-08-02

## 2021-08-02 DIAGNOSIS — C90.01 MULTIPLE MYELOMA IN REMISSION (H): Primary | ICD-10-CM

## 2021-08-02 RX ORDER — LENALIDOMIDE 10 MG/1
10 CAPSULE ORAL DAILY
Qty: 28 CAPSULE | Refills: 0 | Status: SHIPPED | OUTPATIENT
Start: 2021-08-02 | End: 2021-09-04

## 2021-08-02 NOTE — TELEPHONE ENCOUNTER
Oral Chemotherapy Monitoring Program   Medication: Revlimid  Rx: 10mg PO daily on days 1 through 28 of 28 day cycle   Auth #: [id number]   Risk Category: Adult Female NORP  Routine survey questions reviewed.   Rx to be Escribed to [facility]    Lor Borja  [facility]  Oncology Pharmacy Liaison   [email or web address]  [phone] (phone)  [phone] (fax)

## 2021-08-10 ENCOUNTER — TELEPHONE (OUTPATIENT)
Dept: NEUROLOGY | Facility: CLINIC | Age: 66
End: 2021-08-10
Payer: COMMERCIAL

## 2021-08-10 NOTE — TELEPHONE ENCOUNTER
I called patient and LM for her to call back and set up Follow-up with either Dr. Suarez or Dr. Pereyra in Nov 2021. Pt will need a 60 min appt since they will be new to this provider.    Deyanira Lynch LPN

## 2021-08-23 ENCOUNTER — MYC MEDICAL ADVICE (OUTPATIENT)
Dept: FAMILY MEDICINE | Facility: CLINIC | Age: 66
End: 2021-08-23

## 2021-08-24 ENCOUNTER — ANCILLARY PROCEDURE (OUTPATIENT)
Dept: GENERAL RADIOLOGY | Facility: CLINIC | Age: 66
End: 2021-08-24
Attending: PHYSICIAN ASSISTANT
Payer: COMMERCIAL

## 2021-08-24 ENCOUNTER — OFFICE VISIT (OUTPATIENT)
Dept: URGENT CARE | Facility: URGENT CARE | Age: 66
End: 2021-08-24
Payer: COMMERCIAL

## 2021-08-24 VITALS
RESPIRATION RATE: 20 BRPM | HEART RATE: 63 BPM | OXYGEN SATURATION: 99 % | DIASTOLIC BLOOD PRESSURE: 69 MMHG | TEMPERATURE: 97.9 F | SYSTOLIC BLOOD PRESSURE: 120 MMHG | WEIGHT: 264.2 LBS | BODY MASS INDEX: 39.13 KG/M2

## 2021-08-24 DIAGNOSIS — S89.91XA KNEE INJURY, RIGHT, INITIAL ENCOUNTER: ICD-10-CM
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
## 2021-08-24 DIAGNOSIS — S89.91XA KNEE INJURY, RIGHT, INITIAL ENCOUNTER: Primary | ICD-10-CM

## 2021-08-24 DIAGNOSIS — C90.00 MULTIPLE MYELOMA, REMISSION STATUS UNSPECIFIED (H): ICD-10-CM

## 2021-08-24 DIAGNOSIS — Z79.4 TYPE 2 DIABETES MELLITUS WITH DIABETIC POLYNEUROPATHY, WITH LONG-TERM CURRENT USE OF INSULIN (H): ICD-10-CM

## 2021-08-24 DIAGNOSIS — E11.42 TYPE 2 DIABETES MELLITUS WITH DIABETIC POLYNEUROPATHY, WITH LONG-TERM CURRENT USE OF INSULIN (H): ICD-10-CM

## 2021-08-24 DIAGNOSIS — S90.511A ABRASION OF RIGHT ANKLE, INITIAL ENCOUNTER: ICD-10-CM

## 2021-08-24 PROCEDURE — 99214 OFFICE O/P EST MOD 30 MIN: CPT | Performed by: PHYSICIAN ASSISTANT

## 2021-08-24 PROCEDURE — 73562 X-RAY EXAM OF KNEE 3: CPT | Mod: RT | Performed by: RADIOLOGY

## 2021-08-24 ASSESSMENT — PAIN SCALES - GENERAL: PAINLEVEL: SEVERE PAIN (7)

## 2021-08-24 NOTE — PROGRESS NOTES
Chief Complaint   Patient presents with     Knee Pain     Right knee injury on Sunday          Medical Decision Making:    Differential Diagnosis:  MS Injury Pain: sprain, fracture, tendonitis, muscle strain, contusion, dislocation, bursitis and osteoarthritis    xrays- I see no obvious fracture.    Results for orders placed or performed in visit on 08/24/21   XR Knee Right 3 Views     Status: None (Preliminary result)    Narrative    RIGHT KNEE THREE VIEWS   8/24/2021 10:28 AM     HISTORY: Right knee pain after injury.    COMPARISON: None.      Impression    IMPRESSION: Moderate medial compartment joint space narrowing. Mild  marginal osteophyte formation lateral compartment without joint space  narrowing. Mild patellofemoral degenerative changes. No evidence of  joint effusion or fracture.         ASSESSMENT:    ICD-10-CM    1. Knee injury, right, initial encounter  S89.91XA XR Knee Right 3 Views     Knee Supplies Order for DME - ONLY FOR DME     Orthopedic  Referral   2. Abrasion of right ankle, initial encounter  S90.511A    3. Fall, initial encounter  W19.XXXA Orthopedic  Referral   4. Multiple myeloma, remission status unspecified (H)  C90.00    5. Type 2 diabetes mellitus with diabetic polyneuropathy, with long-term current use of insulin (H)  E11.42     Z79.4            PLAN: R knee contusion. Ice, elevate, Ibu or tylenol, knee brace, see ortho 1 week. Bacitracin to ankle abrasion, cover daily. See Primary if ankle abrasion has increased pain, redness, drainage, fever.      Lindsey Madrigal PA-C        SUBJECTIVE:  Winter Loya is an 65 year old female who presents with right knee injury that occurred after tripping over all samuel were dogs while she was cleaning.  Occurred 2 days ago.  Past medical history significant for multiple myeloma, stem cell transplant, diabetes.  No fever.  Has lower extremity numbness/tingling secondary to diabetic neuropathy.    Chart reviewed-normal  creatinine and GFR 6/7/2021.  tdap 2020      Past Medical History:   Diagnosis Date     Abnormal EMG 2021 5/25/2021    Interpretation: This is an abnormal study, demonstrating electrophysiologic evidence of a length-dependent axonal sensorimotor polyneuropathy. Asymmetry of peroneal compound muscle action potential amplitudes is a finding of uncertain significance.        Adjustment disorder with mixed anxiety and depressed mood 3/16/2013     Anxiety      Depression      Diabetes (H)      Glaucoma (increased eye pressure)      H/O magnetic resonance imaging of lumbar spine 2020 3/15/2021    IMPRESSION: Multilevel mild lumbar spondylosis, most pronounced at the level of L4-5 and L5-S1 as detailed above.   I have personally reviewed the examination and initial interpretation and I agree with the findings.   ANNE GOODMAN MD     History of MRI of cervical spine 6/2020 3/15/2021    Impression: Multilevel cervical spondylosis, most pronounced at the level of C4-5 and C5-6 with moderate to severe spinal canal stenosis and moderate spinal canal stenosis at C6-7. No abnormal cord signal. No significant neural foraminal narrowing at any level.   I have personally reviewed the examination and initial interpretation and I agree with the findings.   ANNE GOODMAN MD     History of peripheral stem cell transplant (H) 12/9/2020     History of smoking      Hyperlipidemia      Multiple myeloma in remission (H)      Nonsenile cataract      Obesity      Sleep apnea     no c-pap use     Status post complete thyroidectomy 8/26/2020     Thyroid goiter 8/5/2020     Tremor 12/9/2020     History   Smoking Status     Former Smoker     Packs/day: 1.00     Types: Cigarettes     Quit date: 2005   Smokeless Tobacco     Never Used       ROS:  GEN no fevers  SKIN no erythema  Musculoskeletal:  See HPI.      OBJECTIVE:  Blood pressure 120/69, pulse 63, temperature 97.9  F (36.6  C), temperature source Tympanic, resp. rate 20, weight 119.8 kg  (264 lb 3.2 oz), SpO2 99 %, not currently breastfeeding.  Patient is alert and NAD.  EYES: conjunctiva clear  Knee Exam (right):  Inspection:swellinf inferior medial patella area, small scab/abrasion  Palpation:medial lower patella  Cap refill intact.    Good doralis pedis.  Neurovascularly Intact Distally.   Decreased ROM knee, mild  Also small abrasion r lateral ankle, scab formation.Mildly pink.      Lindsey Madrigal PA-C

## 2021-08-24 NOTE — TELEPHONE ENCOUNTER
Received call from patient. She is requesting an appointment today with Delmi Gross for swollen pain full knee. There are no openings today at the [facility]. Advised trying another [facility] location or considering urgent care ([facility] or [facility]) or the minute clinic with [facility] in [place]. Pt verbalized understanding and was in agreement with plan. She would like to see if there is an opening at [facility]. Transferred patient back to scheduling.     Emily Calderon RN

## 2021-08-27 ENCOUNTER — LAB (OUTPATIENT)
Dept: LAB | Facility: CLINIC | Age: 66
End: 2021-08-27
Payer: COMMERCIAL

## 2021-08-27 DIAGNOSIS — E11.42 TYPE 2 DIABETES MELLITUS WITH DIABETIC POLYNEUROPATHY, WITH LONG-TERM CURRENT USE OF INSULIN (H): ICD-10-CM

## 2021-08-27 DIAGNOSIS — Z79.4 TYPE 2 DIABETES MELLITUS WITH DIABETIC POLYNEUROPATHY, WITH LONG-TERM CURRENT USE OF INSULIN (H): ICD-10-CM

## 2021-08-27 DIAGNOSIS — C90.01 MULTIPLE MYELOMA IN REMISSION (H): ICD-10-CM

## 2021-08-27 LAB
ALBUMIN SERPL-MCNC: 4 G/DL (ref 3.4–5)
ALP SERPL-CCNC: 136 U/L (ref 40–150)
ALT SERPL W P-5'-P-CCNC: 41 U/L (ref 0–50)
ANION GAP SERPL CALCULATED.3IONS-SCNC: 6 MMOL/L (ref 3–14)
AST SERPL W P-5'-P-CCNC: 21 U/L (ref 0–45)
BASOPHILS # BLD AUTO: 0 10E3/UL (ref 0–0.2)
BASOPHILS NFR BLD AUTO: 1 %
BILIRUB SERPL-MCNC: 1.4 MG/DL (ref 0.2–1.3)
BUN SERPL-MCNC: 13 MG/DL (ref 7–30)
CALCIUM SERPL-MCNC: 9 MG/DL (ref 8.5–10.1)
CHLORIDE BLD-SCNC: 109 MMOL/L (ref 94–109)
CO2 SERPL-SCNC: 26 MMOL/L (ref 20–32)
CREAT SERPL-MCNC: 0.79 MG/DL (ref 0.52–1.04)
EOSINOPHIL # BLD AUTO: 0.3 10E3/UL (ref 0–0.7)
EOSINOPHIL NFR BLD AUTO: 8 %
ERYTHROCYTE [DISTWIDTH] IN BLOOD BY AUTOMATED COUNT: 15.4 % (ref 10–15)
GFR SERPL CREATININE-BSD FRML MDRD: 80 ML/MIN/1.73M2
GLUCOSE BLD-MCNC: 163 MG/DL (ref 70–99)
HBA1C MFR BLD: 7.8 % (ref 0–5.6)
HCT VFR BLD AUTO: 39.9 % (ref 35–47)
HGB BLD-MCNC: 13.5 G/DL (ref 11.7–15.7)
LYMPHOCYTES # BLD AUTO: 0.7 10E3/UL (ref 0.8–5.3)
LYMPHOCYTES NFR BLD AUTO: 21 %
MCH RBC QN AUTO: 29.1 PG (ref 26.5–33)
MCHC RBC AUTO-ENTMCNC: 33.8 G/DL (ref 31.5–36.5)
MCV RBC AUTO: 86 FL (ref 78–100)
MONOCYTES # BLD AUTO: 0.4 10E3/UL (ref 0–1.3)
MONOCYTES NFR BLD AUTO: 11 %
NEUTROPHILS # BLD AUTO: 1.9 10E3/UL (ref 1.6–8.3)
NEUTROPHILS NFR BLD AUTO: 59 %
PLATELET # BLD AUTO: 127 10E3/UL (ref 150–450)
POTASSIUM BLD-SCNC: 3.9 MMOL/L (ref 3.4–5.3)
PROT SERPL-MCNC: 6.9 G/DL (ref 6.8–8.8)
RBC # BLD AUTO: 4.64 10E6/UL (ref 3.8–5.2)
SODIUM SERPL-SCNC: 141 MMOL/L (ref 133–144)
TOTAL PROTEIN SERUM FOR ELP: 6.7 G/DL (ref 6.8–8.8)
WBC # BLD AUTO: 3.2 10E3/UL (ref 4–11)

## 2021-08-27 PROCEDURE — 84155 ASSAY OF PROTEIN SERUM: CPT | Mod: 59

## 2021-08-27 PROCEDURE — 85025 COMPLETE CBC W/AUTO DIFF WBC: CPT

## 2021-08-27 PROCEDURE — 80053 COMPREHEN METABOLIC PANEL: CPT

## 2021-08-27 PROCEDURE — 36415 COLL VENOUS BLD VENIPUNCTURE: CPT

## 2021-08-27 PROCEDURE — 84165 PROTEIN E-PHORESIS SERUM: CPT | Performed by: PATHOLOGY

## 2021-08-27 PROCEDURE — 83036 HEMOGLOBIN GLYCOSYLATED A1C: CPT

## 2021-08-30 LAB
ALBUMIN SERPL ELPH-MCNC: 4.3 G/DL (ref 3.7–5.1)
ALPHA1 GLOB SERPL ELPH-MCNC: 0.3 G/DL (ref 0.2–0.4)
ALPHA2 GLOB SERPL ELPH-MCNC: 0.6 G/DL (ref 0.5–0.9)
B-GLOBULIN SERPL ELPH-MCNC: 0.8 G/DL (ref 0.6–1)
GAMMA GLOB SERPL ELPH-MCNC: 0.6 G/DL (ref 0.7–1.6)
M PROTEIN SERPL ELPH-MCNC: 0 G/DL
PROT PATTERN SERPL ELPH-IMP: ABNORMAL

## 2021-09-01 ENCOUNTER — VIRTUAL VISIT (OUTPATIENT)
Dept: ONCOLOGY | Facility: CLINIC | Age: 66
End: 2021-09-01
Attending: INTERNAL MEDICINE
Payer: MEDICARE

## 2021-09-01 DIAGNOSIS — C90.01 MULTIPLE MYELOMA IN REMISSION (H): Primary | ICD-10-CM

## 2021-09-01 PROCEDURE — 99214 OFFICE O/P EST MOD 30 MIN: CPT | Mod: 95 | Performed by: PHYSICIAN ASSISTANT

## 2021-09-01 NOTE — PROGRESS NOTES
Winter is a 65 year old who is being evaluated via a billable video visit.      How would you like to obtain your AVS? [employer]  If the video visit is dropped, the invitation should be resent by: Text to cell phone: [phone]  Will anyone else be joining your video visit? No      Video Start Time: 11:40 AM    Video-Visit Details    Type of service:  Video Visit    Video End Time:11:56 AM    Originating Location (pt. Location): Home    Distant Location (provider location):  [facility]     Platform used for Video Visit: [employer]

## 2021-09-01 NOTE — LETTER
9/1/2021         RE: Winter Loya  [address]  [place] [address]        Dear Colleague,    Thank you for referring your patient, Winter Loya, to the [facility]. Please see a copy of my visit note below.    Winter is a 65 year old who is being evaluated via a billable video visit.      How would you like to obtain your AVS? MyChart  If the video visit is dropped, the invitation should be resent by: Text to cell phone: [phone]  Will anyone else be joining your video visit? No      Video Start Time: 11:40 AM    Video-Visit Details    Type of service:  Video Visit    Video End Time:11:56 AM    Originating Location (pt. Location): Home    Distant Location (provider location):  [facility]     Platform used for Video Visit: [employer]    ONCOLOGY/HEMATOLOGY PROGRESS NOTE  Sep 1, 2021    Reason for Visit: IgA Kappa Multiple Myeloma    Oncology HPI:   Winter Lyoa is a 64 year old woman with IgA Kappa Multiple Myeloma. In 2018 she had anemia and was fatigued. She was found to have IgA kappa monocloncal antibody with M-spike of 0.17. IgA elevated. Skeletal survey was unremarkable and UPEP had minimal protein (156 mg/m2). PET 11/2018 showed bone marrow consistent with hypermetabolic plasma cell myeloma. M spike was 0.17. She started RVD and Zometa. On 4/2019 she had an autologous transplant.      Her bone marrow bx from September 2019 was sent here for review. It showed marrow cellularity of 60%, with decreased trilineage hematopoiesis and 15% plasma cells as well as peripheral blood with slight anemia. 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 She is on revlimid maintenance and zometa from her prior oncologist in [place]. On 12/6/19 her K/L ratio is 1.1, M spike is 0.04.     Interval history:   -Neuropathy is bad in her  feet, has fallen 3x in the last week   -Does not use assistive devices  -Went to PT last year  -otherwise no new symptoms  -Her son has been diagnosed with leukemia and this has been causing a lot of stress as he has been in the hospital for many months    ROS: 10 point ROS neg other than the symptoms noted above in the HPI.      Past Medical History:   Diagnosis Date     Abnormal EMG 2021 5/25/2021    Interpretation: This is an abnormal study, demonstrating electrophysiologic evidence of a length-dependent axonal sensorimotor polyneuropathy. Asymmetry of peroneal compound muscle action potential amplitudes is a finding of uncertain significance.        Adjustment disorder with mixed anxiety and depressed mood 3/16/2013     Anxiety      Depression      Diabetes (H)      Glaucoma (increased eye pressure)      H/O magnetic resonance imaging of lumbar spine 2020 3/15/2021    IMPRESSION: Multilevel mild lumbar spondylosis, most pronounced at the level of L4-5 and L5-S1 as detailed above.   I have personally reviewed the examination and initial interpretation and I agree with the findings.   ANNE GOODMAN MD     History of MRI of cervical spine 6/2020 3/15/2021    Impression: Multilevel cervical spondylosis, most pronounced at the level of C4-5 and C5-6 with moderate to severe spinal canal stenosis and moderate spinal canal stenosis at C6-7. No abnormal cord signal. No significant neural foraminal narrowing at any level.   I have personally reviewed the examination and initial interpretation and I agree with the findings.   ANNE GOODMAN MD     History of peripheral stem cell transplant (H) 12/9/2020     History of smoking      Hyperlipidemia      Multiple myeloma in remission (H)      Nonsenile cataract      Obesity      Sleep apnea     no c-pap use     Status post complete thyroidectomy 8/26/2020     Thyroid goiter 8/5/2020     Tremor 12/9/2020        Current Outpatient Medications   Medication Sig Dispense Refill      acetaminophen (TYLENOL) 325 MG tablet Take 1 tablet (325 mg) by mouth every 6 hours as needed for mild pain       acyclovir (ZOVIRAX) 400 MG tablet TAKE ONE TABLET BY MOUTH EVERY TWELVE HOURS 60 tablet 11     aspirin (ASA) 325 MG EC tablet Take 1 tablet (325 mg) by mouth daily 90 tablet 3     atorvastatin (LIPITOR) 20 MG tablet Take 1 tablet (20 mg) by mouth At Bedtime 90 tablet 3     blood glucose (NO BRAND SPECIFIED) test strip Use to test blood sugar two times daily or as directed. 200 strip 3     blood glucose monitoring (NO BRAND SPECIFIED) meter device kit Use to test blood sugar two times daily or as directed. 1 kit 3     dulaglutide (TRULICITY) 0.75 MG/0.5ML pen Inject 0.75 mg Subcutaneous every 7 days 2 mL 1     empagliflozin (JARDIANCE) 10 MG TABS tablet Take 1 tablet (10 mg) by mouth daily 90 tablet 3     insulin glargine (LANTUS PEN) 100 UNIT/ML pen Inject 30 Units Subcutaneous At Bedtime 30 mL 3     insulin pen needle (FIFTY50 PEN NEEDLES) 32G X 4 MM miscellaneous As directed. To use with Victoza daily       metFORMIN (GLUCOPHAGE-XR) 500 MG 24 hr tablet Take 1 tablet (500 mg) by mouth 2 times daily (with meals) 180 tablet 3     pregabalin (LYRICA) 100 MG capsule TAKE ONE CAPSULE BY MOUTH THREE TIMES DAILY 270 capsule 3     propranolol ER (INDERAL LA) 60 MG 24 hr capsule Take 1 capsule (60 mg) by mouth daily 90 capsule 3     sertraline (ZOLOFT) 100 MG tablet Take 1 tablet (100 mg) by mouth daily 90 tablet 3     fluconazole (DIFLUCAN) 150 MG tablet Take one tablet (150 mg) by mouth now. Repeat in 3 days if symptoms do not resolve. (Patient not taking: Reported on 9/1/2021) 2 tablet 1     LENalidomide (REVLIMID) 10 MG CAPS capsule Take 1 capsule (10 mg) by mouth daily for 28 days 28 capsule 0     levothyroxine (SYNTHROID/LEVOTHROID) 175 MCG tablet Take 1 tablet (175 mcg) by mouth every morning (before breakfast) (Patient not taking: Reported on 9/1/2021) 90 tablet 3     losartan (COZAAR) 25 MG tablet  Take 1 tablet (25 mg) by mouth daily (Patient not taking: Reported on 9/1/2021) 90 tablet 3        No Known Allergies    Video physical exam  General: Patient appears well in no acute distress.   Skin: No visualized rash or lesions on visualized skin  Eyes: EOMI, no erythema, sclera icterus or discharge noted  Resp: Appears to be breathing comfortably without accessory muscle usage, speaking in full sentences, no cough  MSK: Appears to have normal range of motion based on visualized movements  Neurologic: No apparent tremors, facial movements symmetric  Psych: affect flat, alert and oriented    The rest of a comprehensive physical examination is deferred due to PHE (public health emergency) video restrictions    Labs:     Most Recent 3 CBC's:Recent Labs   Lab Test 08/27/21  1352 06/07/21  1340 03/08/21  1331   WBC 3.2* 3.4* 4.5   HGB 13.5 13.3 13.7   MCV 86 87 87   * 126* 142*     Most Recent 3 BMP's:  Recent Labs   Lab Test 08/27/21  1352 06/07/21  1340 03/08/21  1331    140 139   POTASSIUM 3.9 3.7 4.0   CHLORIDE 109 106 106   CO2 26 28 29   BUN 13 10 16   CR 0.79 0.77 0.81   ANIONGAP 6 6 4   ASHLEY 9.0 9.8 9.8   * 140* 127*     Most Recent 2 LFT's:  Recent Labs   Lab Test 08/27/21  1352 06/07/21  1340   AST 21 21   ALT 41 43   ALKPHOS 136 137   BILITOTAL 1.4* 1.3        8/27/2021 13:52   Albumin Fraction 4.3   Alpha 1 Fraction 0.3   Alpha 2 Fraction 0.6   Beta Fraction 0.8   ELP Interpretation: Essentially normal electrophoretic pattern. No obvious monoclonal proteins seen. Pathologic signi...   Gamma Fraction 0.6 (L)   Monoclonal Peak 0.0       Assessment/plan:     #MM, standard risk, IgA Kappa  -s/p auto HSCT in 4/2019  -now on maintenance Revlimid 10 mg daily.   -Have been checking SPEP regularly though have not been getting FLC or immunoglobulins. Will add these to be checked with MM labs. Mspike continues to be 0.0, in a biochemical CR  -PET/CT annually or if symptomatic from MM due to bone  pain. Last PET was 2/15/20  -will continue Rev maintenance. Does have this ongoing worsening neuropathy in feet which is now a grade 2. Unlikely due to revlimid, though could consider trial of holding to see if symptoms improve, though would not want to do maintenance Velcade with these symptoms  --Will follow-up with Dr. Mares in lieu of Dr. Tipton leaving our clinic in about 3 months with labs, sooner if needed    PPx: Continue ACV and  mg for VTE ppx while on Rev    #Bone lesions  -was on zometa through 12/2020 to complete a total of 2 years of therapy. No plan for further therapy at this time   -no current bone pain     #Neuropathy   -multifactorial likely d/t previous chemotherapy and uncontrolled diabetes.   -currently on Lyrica 300 mg  -has established care with neurology and they felt it was like velcade and DM as well. Again, unlikely to be Revlimid, though if things continue to progressively worsen and DM is well controlled, then could hold to see if it improves    #Hx of falls  -Has done PT in the past. Having more falls. Working with PCP on this. Should be using assistive devices to prevent accidents     #DM2   -Last hgb A1c was 7.8. Managed by PCP and PharmD. Using insulin, Jardiance, Trulicity and Metformin  -trying to eat better and working on exercising more   -important to get good control      Chronic Issues:   #Depression with Anxiety, chronic   -Currently maintained on Zoloft 100 mg daily. Increased stress and sadness with son's leukemia.      #Memory changes   -Stable. Had neuropsych testing on 10/22/20. Saw Neurology in Dec 2020  -consider cancer rehab OT for chemo brain    #Thyroidectomy, 8/26/20  -will continue to f/u with with ENT and PCP       Reva Mojica PA-C        Again, thank you for allowing me to participate in the care of your patient.        Sincerely,        Reva Mojica PA-C

## 2021-09-01 NOTE — PROGRESS NOTES
ONCOLOGY/HEMATOLOGY PROGRESS NOTE  Sep 1, 2021    Reason for Visit: IgA Kappa Multiple Myeloma    Oncology HPI:   Winter Loya is a 64 year old woman with IgA Kappa Multiple Myeloma. In 2018 she had anemia and was fatigued. She was found to have IgA kappa monocloncal antibody with M-spike of 0.17. IgA elevated. Skeletal survey was unremarkable and UPEP had minimal protein (156 mg/m2). PET 11/2018 showed bone marrow consistent with hypermetabolic plasma cell myeloma. M spike was 0.17. She started RVD and Zometa. On 4/2019 she had an autologous transplant.      Her bone marrow bx from September 2019 was sent here for review. It showed marrow cellularity of 60%, with decreased trilineage hematopoiesis and 15% plasma cells as well as peripheral blood with slight anemia. 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 She is on revlimid maintenance and zometa from her prior oncologist in [place]. On 12/6/19 her K/L ratio is 1.1, M spike is 0.04.     Interval history:   -Neuropathy is bad in her feet, has fallen 3x in the last week   -Does not use assistive devices  -Went to PT last year  -otherwise no new symptoms  -Her son has been diagnosed with leukemia and this has been causing a lot of stress as he has been in the hospital for many months    ROS: 10 point ROS neg other than the symptoms noted above in the HPI.      Past Medical History:   Diagnosis Date     Abnormal EMG 2021 5/25/2021    Interpretation: This is an abnormal study, demonstrating electrophysiologic evidence of a length-dependent axonal sensorimotor polyneuropathy. Asymmetry of peroneal compound muscle action potential amplitudes is a finding of uncertain significance.        Adjustment disorder with mixed anxiety and depressed mood 3/16/2013     Anxiety      Depression      Diabetes (H)      Glaucoma (increased eye pressure)       H/O magnetic resonance imaging of lumbar spine 2020 3/15/2021    IMPRESSION: Multilevel mild lumbar spondylosis, most pronounced at the level of L4-5 and L5-S1 as detailed above.   I have personally reviewed the examination and initial interpretation and I agree with the findings.   ANNE GOODMAN MD     History of MRI of cervical spine 6/2020 3/15/2021    Impression: Multilevel cervical spondylosis, most pronounced at the level of C4-5 and C5-6 with moderate to severe spinal canal stenosis and moderate spinal canal stenosis at C6-7. No abnormal cord signal. No significant neural foraminal narrowing at any level.   I have personally reviewed the examination and initial interpretation and I agree with the findings.   ANNE GOODMAN MD     History of peripheral stem cell transplant (H) 12/9/2020     History of smoking      Hyperlipidemia      Multiple myeloma in remission (H)      Nonsenile cataract      Obesity      Sleep apnea     no c-pap use     Status post complete thyroidectomy 8/26/2020     Thyroid goiter 8/5/2020     Tremor 12/9/2020        Current Outpatient Medications   Medication Sig Dispense Refill     acetaminophen (TYLENOL) 325 MG tablet Take 1 tablet (325 mg) by mouth every 6 hours as needed for mild pain       acyclovir (ZOVIRAX) 400 MG tablet TAKE ONE TABLET BY MOUTH EVERY TWELVE HOURS 60 tablet 11     aspirin (ASA) 325 MG EC tablet Take 1 tablet (325 mg) by mouth daily 90 tablet 3     atorvastatin (LIPITOR) 20 MG tablet Take 1 tablet (20 mg) by mouth At Bedtime 90 tablet 3     blood glucose (NO BRAND SPECIFIED) test strip Use to test blood sugar two times daily or as directed. 200 strip 3     blood glucose monitoring (NO BRAND SPECIFIED) meter device kit Use to test blood sugar two times daily or as directed. 1 kit 3     dulaglutide (TRULICITY) 0.75 MG/0.5ML pen Inject 0.75 mg Subcutaneous every 7 days 2 mL 1     empagliflozin (JARDIANCE) 10 MG TABS tablet Take 1 tablet (10 mg) by mouth daily 90  tablet 3     insulin glargine (LANTUS PEN) 100 UNIT/ML pen Inject 30 Units Subcutaneous At Bedtime 30 mL 3     insulin pen needle (FIFTY50 PEN NEEDLES) 32G X 4 MM miscellaneous As directed. To use with Victoza daily       metFORMIN (GLUCOPHAGE-XR) 500 MG 24 hr tablet Take 1 tablet (500 mg) by mouth 2 times daily (with meals) 180 tablet 3     pregabalin (LYRICA) 100 MG capsule TAKE ONE CAPSULE BY MOUTH THREE TIMES DAILY 270 capsule 3     propranolol ER (INDERAL LA) 60 MG 24 hr capsule Take 1 capsule (60 mg) by mouth daily 90 capsule 3     sertraline (ZOLOFT) 100 MG tablet Take 1 tablet (100 mg) by mouth daily 90 tablet 3     fluconazole (DIFLUCAN) 150 MG tablet Take one tablet (150 mg) by mouth now. Repeat in 3 days if symptoms do not resolve. (Patient not taking: Reported on 9/1/2021) 2 tablet 1     LENalidomide (REVLIMID) 10 MG CAPS capsule Take 1 capsule (10 mg) by mouth daily for 28 days 28 capsule 0     levothyroxine (SYNTHROID/LEVOTHROID) 175 MCG tablet Take 1 tablet (175 mcg) by mouth every morning (before breakfast) (Patient not taking: Reported on 9/1/2021) 90 tablet 3     losartan (COZAAR) 25 MG tablet Take 1 tablet (25 mg) by mouth daily (Patient not taking: Reported on 9/1/2021) 90 tablet 3        No Known Allergies    Video physical exam  General: Patient appears well in no acute distress.   Skin: No visualized rash or lesions on visualized skin  Eyes: EOMI, no erythema, sclera icterus or discharge noted  Resp: Appears to be breathing comfortably without accessory muscle usage, speaking in full sentences, no cough  MSK: Appears to have normal range of motion based on visualized movements  Neurologic: No apparent tremors, facial movements symmetric  Psych: affect flat, alert and oriented    The rest of a comprehensive physical examination is deferred due to PHE (public health emergency) video restrictions    Labs:     Most Recent 3 CBC's:Recent Labs   Lab Test 08/27/21  1352 06/07/21  1340 03/08/21  1331    WBC 3.2* 3.4* 4.5   HGB 13.5 13.3 13.7   MCV 86 87 87   * 126* 142*     Most Recent 3 BMP's:  Recent Labs   Lab Test 08/27/21  1352 06/07/21  1340 03/08/21  1331    140 139   POTASSIUM 3.9 3.7 4.0   CHLORIDE 109 106 106   CO2 26 28 29   BUN 13 10 16   CR 0.79 0.77 0.81   ANIONGAP 6 6 4   ASHLEY 9.0 9.8 9.8   * 140* 127*     Most Recent 2 LFT's:  Recent Labs   Lab Test 08/27/21  1352 06/07/21  1340   AST 21 21   ALT 41 43   ALKPHOS 136 137   BILITOTAL 1.4* 1.3        8/27/2021 13:52   Albumin Fraction 4.3   Alpha 1 Fraction 0.3   Alpha 2 Fraction 0.6   Beta Fraction 0.8   ELP Interpretation: Essentially normal electrophoretic pattern. No obvious monoclonal proteins seen. Pathologic signi...   Gamma Fraction 0.6 (L)   Monoclonal Peak 0.0       Assessment/plan:     #MM, standard risk, IgA Kappa  -s/p auto HSCT in 4/2019  -now on maintenance Revlimid 10 mg daily.   -Have been checking SPEP regularly though have not been getting FLC or immunoglobulins. Will add these to be checked with MM labs. Mspike continues to be 0.0, in a biochemical CR  -PET/CT annually or if symptomatic from MM due to bone pain. Last PET was 2/15/20  -will continue Rev maintenance. Does have this ongoing worsening neuropathy in feet which is now a grade 2. Unlikely due to revlimid, though could consider trial of holding to see if symptoms improve, though would not want to do maintenance Velcade with these symptoms  --Will follow-up with Dr. Mares in lieu of Dr. Tipton leaving our clinic in about 3 months with labs, sooner if needed    PPx: Continue ACV and  mg for VTE ppx while on Rev    #Bone lesions  -was on zometa through 12/2020 to complete a total of 2 years of therapy. No plan for further therapy at this time   -no current bone pain     #Neuropathy   -multifactorial likely d/t previous chemotherapy and uncontrolled diabetes.   -currently on Lyrica 300 mg  -has established care with neurology and they felt it  was like velcade and DM as well. Again, unlikely to be Revlimid, though if things continue to progressively worsen and DM is well controlled, then could hold to see if it improves    #Hx of falls  -Has done PT in the past. Having more falls. Working with PCP on this. Should be using assistive devices to prevent accidents     #DM2   -Last hgb A1c was 7.8. Managed by PCP and PharmD. Using insulin, Jardiance, Trulicity and Metformin  -trying to eat better and working on exercising more   -important to get good control      Chronic Issues:   #Depression with Anxiety, chronic   -Currently maintained on Zoloft 100 mg daily. Increased stress and sadness with son's leukemia.      #Memory changes   -Stable. Had neuropsych testing on 10/22/20. Saw Neurology in Dec 2020  -consider cancer rehab OT for chemo brain    #Thyroidectomy, 8/26/20  -will continue to f/u with with ENT and PCP       Reva Mojica PA-C

## 2021-09-02 ENCOUNTER — OFFICE VISIT (OUTPATIENT)
Dept: SLEEP MEDICINE | Facility: CLINIC | Age: 66
End: 2021-09-02
Payer: MEDICARE

## 2021-09-02 ENCOUNTER — PATIENT OUTREACH (OUTPATIENT)
Dept: CARE COORDINATION | Facility: CLINIC | Age: 66
End: 2021-09-02

## 2021-09-02 VITALS — HEIGHT: 69 IN | BODY MASS INDEX: 39.31 KG/M2 | HEART RATE: 63 BPM | OXYGEN SATURATION: 95 % | WEIGHT: 265.4 LBS

## 2021-09-02 DIAGNOSIS — G47.33 OSA (OBSTRUCTIVE SLEEP APNEA): Primary | ICD-10-CM

## 2021-09-02 PROCEDURE — 99214 OFFICE O/P EST MOD 30 MIN: CPT | Performed by: PHYSICIAN ASSISTANT

## 2021-09-02 ASSESSMENT — MIFFLIN-ST. JEOR: SCORE: 1821.64

## 2021-09-02 NOTE — PATIENT INSTRUCTIONS
Your Body mass index is 39.31 kg/m .  Weight management is a personal decision.  If you are interested in exploring weight loss strategies, the following discussion covers the approaches that may be successful. Body mass index (BMI) is one way to tell whether you are at a healthy weight, overweight, or obese. It measures your weight in relation to your height.  A BMI of 18.5 to 24.9 is in the healthy range. A person with a BMI of 25 to 29.9 is considered overweight, and someone with a BMI of 30 or greater is considered obese. More than two-thirds of American adults are considered overweight or obese.  Being overweight or obese increases the risk for further weight gain. Excess weight may lead to heart disease and diabetes.  Creating and following plans for healthy eating and physical activity may help you improve your health.  Weight control is part of healthy lifestyle and includes exercise, emotional health, and healthy eating habits. Careful eating habits lifelong are the mainstay of weight control. Though there are significant health benefits from weight loss, long-term weight loss with diet alone may be very difficult to achieve- studies show long-term success with dietary management in less than 10% of people. Attaining a healthy weight may be especially difficult to achieve in those with severe obesity. In some cases, medications, devices and surgical management might be considered.  What can you do?  If you are overweight or obese and are interested in methods for weight loss, you should discuss this with your provider.     Consider reducing daily calorie intake by 500 calories.     Keep a food journal.     Avoiding skipping meals, consider cutting portions instead.    Diet combined with exercise helps maintain muscle while optimizing fat loss. Strength training is particularly important for building and maintaining muscle mass. Exercise helps reduce stress, increase energy, and improves fitness.  Increasing exercise without diet control, however, may not burn enough calories to loose weight.       Start walking three days a week 10-20 minutes at a time    Work towards walking thirty minutes five days a week     Eventually, increase the speed of your walking for 1-2 minutes at time    In addition, we recommend that you review healthy lifestyles and methods for weight loss available through the National Institutes of Health patient information sites:  http://win.niddk.nih.gov/publications/index.htm    And look into health and wellness programs that may be available through your health insurance provider, employer, local community center, or yanni club.            Your Body mass index is 39.31 kg/m .  Weight management is a personal decision.  If you are interested in exploring weight loss strategies, the following discussion covers the approaches that may be successful. Body mass index (BMI) is one way to tell whether you are at a healthy weight, overweight, or obese. It measures your weight in relation to your height.  A BMI of 18.5 to 24.9 is in the healthy range. A person with a BMI of 25 to 29.9 is considered overweight, and someone with a BMI of 30 or greater is considered obese. More than two-thirds of American adults are considered overweight or obese.  Being overweight or obese increases the risk for further weight gain. Excess weight may lead to heart disease and diabetes.  Creating and following plans for healthy eating and physical activity may help you improve your health.  Weight control is part of healthy lifestyle and includes exercise, emotional health, and healthy eating habits. Careful eating habits lifelong are the mainstay of weight control. Though there are significant health benefits from weight loss, long-term weight loss with diet alone may be very difficult to achieve- studies show long-term success with dietary management in less than 10% of people. Attaining a healthy weight may be  especially difficult to achieve in those with severe obesity. In some cases, medications, devices and surgical management might be considered.  What can you do?  If you are overweight or obese and are interested in methods for weight loss, you should discuss this with your provider.     Consider reducing daily calorie intake by 500 calories.     Keep a food journal.     Avoiding skipping meals, consider cutting portions instead.    Diet combined with exercise helps maintain muscle while optimizing fat loss. Strength training is particularly important for building and maintaining muscle mass. Exercise helps reduce stress, increase energy, and improves fitness. Increasing exercise without diet control, however, may not burn enough calories to loose weight.       Start walking three days a week 10-20 minutes at a time    Work towards walking thirty minutes five days a week     Eventually, increase the speed of your walking for 1-2 minutes at time    In addition, we recommend that you review healthy lifestyles and methods for weight loss available through the National Institutes of Health patient information sites:  http://win.niddk.nih.gov/publications/index.htm    And look into health and wellness programs that may be available through your health insurance provider, employer, local community center, or yanni club.    Weight management plan: Patient was referred to their PCP to discuss a diet and exercise plan.

## 2021-09-02 NOTE — PROGRESS NOTES
Obstructive Sleep Apnea - PAP Follow-Up Visit:    Chief Complaint   Patient presents with     CPAP Follow Up       Winter Loya comes in today for follow-up of their moderate sleep apnea, managed with CPAP.     She says she was diagnosed with obstructive sleep apnea in [place]. She presented with snoring.     HST 8/24/2018 (BMI 38) AHI 17, low O2 63%, Sao2 <= 90% for 21 minutes     CPAP titration 9/25/2018 CPAP 11 cm optimal     She was prescribed CPAP, and tolerated it well, but lost in when she moved from [place] to [place].     on April 27, 2021. Patient received a Resmed AirSense 10 Auto. Pressures were set at 9-19 cm H2O. Patient does need to meet compliance.     Overall, the patient rates her experience with PAP as 10 (0 poor, 10 great). The mask is comfortable. The mask is not leaking.  She is not snoring with the mask on. She is not having gasp arousals. She is not having any oral or nasal dryness. The pressure settings are comfortable     Her PAP interface is Full Face Mask.    Bedtime is typically 8 PM. Usually it takes about 10 minutes to fall asleep with the mask on. Wake time is typically 4:30 AM.  Patient is using PAP therapy 7-8 hours per night. The patient is usually getting 7-8 hours of sleep per night.    She does feel rested in the morning.    Total score - [place]: 5 (11/18/2020  2:29 PM)      ResMed   Auto-PAP 9.0 - 19.0 cmH2O 30 day usage data:    33% of days with > 4 hours of use. 14/30 days with no use.   Average use 160 minutes per day.   95%ile Leak 97.76 L/min.   CPAP 95% pressure 11.9 cm.   AHI 8.31 events per hour.     She did meat compliance earlier (4/27-5/26/2021).   CPAP download:  Average use 7 hours  hours 46 minutes per day.  30 total days of use. 0 nonuse days.  100 days with >4 hours use.   Median Leak 18.4 L/min. 95%ile Leak 85.4 L/min. AHI 4.     Current problems with PAP use include:  1. Last couple of weeks not using CPAP, because the clip on the headgear broke.     Past  medical/surgical history, family history, social history, medications and allergies were reviewed.      Problem List:  Patient Active Problem List    Diagnosis Date Noted     Abnormal EMG 2021 05/25/2021     Priority: Medium     Interpretation:  This is an abnormal study, demonstrating electrophysiologic evidence of a length-dependent axonal sensorimotor polyneuropathy. Asymmetry of peroneal compound muscle action potential amplitudes is a finding of uncertain significance.           History of MRI of cervical spine 6/2020 03/15/2021     Priority: Medium     Impression: Multilevel cervical spondylosis, most pronounced at the level of C4-5 and C5-6 with moderate to severe spinal canal stenosis and moderate spinal canal stenosis at C6-7. No abnormal cord signal. No significant neural foraminal narrowing at any level.       H/O magnetic resonance imaging of lumbar spine 2020 03/15/2021     Priority: Medium     IMPRESSION: Multilevel mild lumbar spondylosis, most pronounced at the level of L4-5 and L5-S1 as detailed above.       Tremor 12/09/2020     Priority: Medium     History of peripheral stem cell transplant (H) 12/09/2020     Priority: Medium     Class 2 severe obesity due to excess calories with serious comorbidity and body mass index (BMI) of 39.0 to 39.9 in adult (H) 11/18/2020     Priority: Medium     DAILY (obstructive sleep apnea)- moderate (AHI 17)      Priority: Medium     HST [place] 8/24/2018 (BMI 38) AHI 17, low O2 63%, Sao2 <= 90% for 21 minutes  CPAP titration 9/25/2018 CPAP 11 cm optimal       Diabetes mellitus, type 2 (H) 06/03/2020     Priority: Medium     Multiple myeloma in remission (H) 02/18/2020     Priority: Medium      IgA Kappa Multiple Myeloma. Presented 2018 with anemia and fatigue. Skeletal survey was unremarkable and UPEP had minimal protein (156 mg/m2). PET 11/2018 showed bone marrow consistent with hypermetabolic plasma cell myeloma. M spike was 0.17. She started RVD and Zometa. 4/2019  "she had an autologous transplant.      She was on revlimid maintenance and zometa from her prior oncologist in [place]. Zometa through 12/2020 to complete a total of 2 years of therapy       NA (generalized anxiety disorder) 05/23/2017     Priority: Medium     History of endometrial ablation 04/17/2017     Priority: Medium     Type 2 diabetes mellitus with hyperglycemia (H) 10/19/2016     Priority: Medium     Major depressive disorder, recurrent episode, severe (H) 03/16/2013     Priority: Medium     Formatting of this note might be different from the original.  IMO Update       Hyperlipidemia 07/05/2011     Priority: Medium     Major depressive disorder, recurrent episode, moderate (H) 12/17/2007     Priority: Medium     Allergic rhinitis 05/23/2007     Priority: Medium     perfumes       Osteoarthrosis involving lower leg 07/22/2005     Priority: Medium     Hypothyroidism, postoperative  11/17/2020     Priority: Low        Pulse 63   Ht 1.75 m (5' 8.9\")   Wt 120.4 kg (265 lb 6.4 oz)   SpO2 95%   BMI 39.31 kg/m      Impression/Plan:    Moderate Sleep apnea.  Tolerating PAP well. Daytime symptoms are improved.   She qualifies for a new mask. She will call [facility] to get equipment. She will review with the them schedules of when to replace parts.   Patient counseled to use CPAP with all sleep.  Comprehensive DME    Winter Loya will follow up in about 6 months.    Rolo Prasad PA-C  "

## 2021-09-02 NOTE — PROGRESS NOTES
Oncology Distress Screening Follow-up  Clinical Social Work  [facility]    Identified Concern and Score From Distress Screenin. How concerned are you about your ability to eat?   5           2. How concerned are you about unintended weight loss or your current weight?   10Abnormal            3. How concerned are you about feeling depressed or very sad?   5           4. How concerned are you about feeling anxious or very scared?   7Abnormal            5. Do you struggle with the loss of meaning and ross in your life?   Quite a bitAbnormal            6. How concerned are you about work and home life issues that may be affected by your cancer?   6Abnormal            7. How concerned are you about knowing what resources are available to help you?   4           8. Do you currently have what you would describe as Quaker or spiritual struggles?              Not at all           You can also ask to be contacted by one of our Oncology Supportive Care professionals.    9. If you want to be contacted by one of our professionals, I can send a message to them right now.   Oncology Dieti-  cassie;Oncology  Spiritual Care                   Date of Distress Screenin21      Intervention/Education Provided:: REYNALDO called and spoke to Winter this afternoon, introducing self and reason for call. Winter states she is doing ok, but reports she can't speak right now. REYNALDO offered to send [employer] message with REYNALDO contact information and asked Winter to call back when she has more time. Winter agreed to this plan.       Follow-up Required: REYNALDO will await Winter's call back.         AMY Curran, [facility]  Clinical , Adult Oncology  Phone: [phone]

## 2021-09-03 NOTE — NURSING NOTE
Return in about 6 months reminder sent via [employer]. CPAP compliance letter sent via [employer].       BERYL Reeves  [facility]

## 2021-09-04 DIAGNOSIS — C90.01 MULTIPLE MYELOMA IN REMISSION (H): Primary | ICD-10-CM

## 2021-09-04 RX ORDER — LENALIDOMIDE 10 MG/1
10 CAPSULE ORAL DAILY
Qty: 28 CAPSULE | Refills: 0 | Status: SHIPPED | OUTPATIENT
Start: 2021-09-04 | End: 2021-09-29

## 2021-09-07 DIAGNOSIS — C90.01 MULTIPLE MYELOMA IN REMISSION (H): Primary | Chronic | ICD-10-CM

## 2021-09-08 ENCOUNTER — TELEPHONE (OUTPATIENT)
Dept: ONCOLOGY | Facility: CLINIC | Age: 66
End: 2021-09-08

## 2021-09-08 NOTE — TELEPHONE ENCOUNTER
Oral Chemotherapy Monitoring Program   Medication: Revlimid  Rx: 10mg PO daily on days 1 through 28 of 28 day cycle   Auth #: [id number]   Risk Category: Adult Female   Routine survey questions reviewed.   Rx to be Escribed to Pete Borja  [facility]  Oncology Pharmacy Liaison   [email or web address]  [phone] (phone)  [phone] (fax)

## 2021-09-10 ENCOUNTER — TELEPHONE (OUTPATIENT)
Dept: ONCOLOGY | Facility: CLINIC | Age: 66
End: 2021-09-10

## 2021-09-10 NOTE — TELEPHONE ENCOUNTER
Patient is now restricted to using [facility] for Revlimid. Writer attempted to reach out to the patient to let her know this information. Writer reached out 9/7, 9/8, 9/9 and 9/10. Patient did not answer any of the phone calls. Messages were left but have not been returned. Writer will continue to try to reach the patient.     Lor Borja CPhT  [facility]  Oncology Pharmacy Liaison  [email or web address]  Phone: [phone]  Fax: [phone]

## 2021-09-16 ENCOUNTER — TELEPHONE (OUTPATIENT)
Dept: ONCOLOGY | Facility: CLINIC | Age: 66
End: 2021-09-16

## 2021-09-16 NOTE — TELEPHONE ENCOUNTER
CLINICAL NUTRITION SERVICES     Reason for Contact: Patient was contacted by phone due to requested to speak with a Dietitian on the Oncology Distress Screening tool.    Action: RD called patient indicating reason for phone call. Left a VM with a return call back number.     Follow up: Wait for a return phone call.    Zoey Mosqueda RDN, MARTÍNN  [facility]  [phone]

## 2021-09-17 NOTE — PROGRESS NOTES
"    VIDEO VISIT    Date of Visit: September 27, 2021  Name: Winter Loya  Date of Birth 1955    [address]   [place] [address]  [email or web address]  [phone] (M)   [phone] (H)   Peter Deleon son  [phone]    iphone today       Assessment:  (R25.1) Tremor  (primary encounter diagnosis)  worsening and affecting her ability to use a keyboard and mouse  Duration - 5 years or more.  Father had tremor/parkinson  Not a big drinker - not clear if alcohol helps her tremor  Smell perception is okay  She has sleep apnea  She cut out caffeine and there were no changes in her tremors  She is right handed and has more right than left handed tremor.   She was told she has ET as she is \"not stooped over\"  Her tremors are present when she is using her hands and she has a vocal and head tremor as well.      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 She has not worked with OT     Exercise/Therapy  - none     Driving - has had some memory/cognitive issues - seen on 10/22/2020 by Dr. Mcgrath for neuropsychological evaluation: there were mild frontal and possibly left temporal changes and recommended to be evaluated again in one year. She has not had a brain mri. She has mood issues that could benefit from psychotherapy as anxiety may be aggravating her memory.     cognitive re-evaluation with Dr Mcgrath 10/2021  Order placed     Mood Generalized Anxiety disorder/Depression  Mood is good per her report.  Living with son basil - health  .   No counselor     Stress/anxiety issues  - still on sertraline and consider counsellor  Referral " placed.     Sertraline zoloft 100mg      Hallucinations/delusions - denies     Sleep Apnea -  Dr. Juan Jose Salazar  Denies RBD features  [facility] sleep     Bladder  - wears a pad.   Has changes in urinary frequency/urgency if blood sugars are out of control  2-4/noc getting up to urinate  Has not seen a urologist.   Seen by Ob-gyn a year ago.      Respiratory  Loratadine claritin 10mg - not taking     Former smoker - quit 15 years prior was 1 ppd  Quit 2005     GI/Constipation/GERD  - no issue  Calcium antacid 500mg tums - not taking     ENDO     recent A1c was 8.8 and is working with pharmacist Coco Antoine. She may benefit from seeing nutrition and possibly endocrinology in addition to her pcp     Type 2 Diabetes with neuropathy     Atorvastatin lipitor 20mg at bedtime            Calcitriol rocaltrol 0.25mcg - not taking   Dulaglutide trulicity 0.75mg/0.5ml pen weekly     Empagliflozin jardiance 10mg daily  Exenatide ER Bydureon - not using  Lantus 30 units at bedtime  Levothyroxine synthroid/levothyroid 175 mcg daily  Metformin 500mg 1 tabs  twice daily     Has not seen dietician            Seeing Coco Antoine, Pharm D  DOes not have an endocrinologist  Has not seen nutrition  A1c was 8.8 on 12/7/2020    Diabetes - better control of her diabetes - A1c was 7.6 and most recently was 7.8  Had been working with  Coco Antoine, Pharmacist  Has someone new and has an appointment with them this week  Mika Gross October     yane Mares oncologist    [facility]      Hypothyroidism;      s/p thyroidectomy for multinodular goiter     Cardio/heart  - blood pressure is stable initially after stem cell therapy but has gone up at times  States she is trying to lose weight to help her diabetes and blood pressure.   Her chart has been 160/88; encouraged her to get a home blood pressure cuff.      Losartan cozaar 25mg daily    Blood pressure was good today - she is on  propranolol LA 60mg  Discussed getting a blood pressure cuff to monitor her blood pressure and heart rate.      Vision  - on treatment for glaucoma  Latanoprost xalatan 0.005% ophthalmic solution     HEME:  Multiple myeloma - in remision  IgA Kappa Multiple Myeloma. Presented 2018 with anemia and fatigue. Skeletal survey was unremarkable and UPEP had minimal protein (156 mg/m2). PET 11/2018 showed bone marrow consistent with hypermetabolic plasma cell myeloma. M spike was 0.17. She started RVD and Zometa. 4/2019 she had an autologous transplant. She was on revlimid maintenance and zometa from her prior oncologist in [place]. Zometa through 12/2020 to complete a total of 2 years of therapy  Mele Janakiram    Aspirin 325mg     Lenalidomide revlimid 10mg daiy  Nonformulary revlimid - as above   NOnformulary zometa - not getting     PCP Dr. Gabrielle Edwards     Acetaminophen tylenol 325mg - as needed  Acyclovir zovirax 400mg twice daily  Meloxicam mobic 15mg  - not taking  Oxycodone roxicodone - not taking  Pregabalin lyrica 100mg 3/day for neuropathy and helping her mood as well.   Tizanidine zanaflex 4mg - not taking   Tramadol ultram 50mg - not taking     Consideration for EMG and consultation with general neurology for neuropathy, carpal tunnel syndrome and cervical radiculopathy.     EMG:  This is an abnormal study, demonstrating electrophysiologic evidence of a length-dependent axonal sensorimotor polyneuropathy. Asymmetry of peroneal compound muscle action potential amplitudes is a finding of uncertain significance.      IMPRESSION:    1.  Sensory predominant polyneuropathy.  2.  Electrodiagnostic evidence of axonal neuropathy.  3.  IgA kappa myeloma, which appears in remission.  4.  Type 2 diabetes.  5.  Essential tremor     Her neuropathy may well have been precipitated by chemotherapy she received prior to and around the time of her bone marrow transplant.  I particularly note that Velcade (bortezomib) has a  high incidence of painful sensory neuropathy.  She is no longer receiving this agent.  I doubt the Revlimid is a factor.  She also has type 2 diabetes.  This is likely contributing to her neuropathy as well.     PLAN:  I did discuss the above with the patient.  She tells me that there has been some improvement.  Otherwise, she is relatively stable at this point.     The plan now is just to monitor her course and she is open to this.  I have recommended a neurologic followup in 6 months.  She will be seeing a new neurologist at that followup visit as I am retiring soon.  I did discuss this with her as well.     IN summary  - essential tremor  - neuropathy  - other medical issues  -diabetes      Acetaminophen tylenol 325mg as needed     Impression: Multilevel cervical spondylosis, most pronounced at the  level of C4-5 and C5-6 with moderate to severe spinal canal stenosis  and moderate spinal canal stenosis at C6-7. No abnormal cord signal.  No significant neural foraminal narrowing at any level.     I have personally reviewed the examination and initial interpretation  and I agree with the findings.     ANNE GOODMAN MD     Medications                 Acetaminophen tylenol 325mg As needed           Acyclovir zovirax 400mg 1   1       Aspirin 325mg 1       Atorvastatin lipitor 20mg      1       Calcitriol rocaltrol 0.25mcg Not taking           Calcium antacid 500mg tums             Dulaglutide trulicity 0.75mg/0.5ml pen  weekly       Empagliflozin jardiance 10mg 1           Exenatide ER Bydureon 2mg Not using           Fluconazole diflucan 150mg Not using       Insulin glargine lantus     30 units       Latanoprost xalatan 0.005% ophthalmic solution     Both eyes       Lenalidomide revlimid 10mg  1           Levothyroxine synthroid/levothyroid 175 mcg 1           Loratadine claritin 10mg  Not using            Losartan cozaar 25mg  1           Metformin glucophage 500mg  1  1       Nonformulary revlimid See above      "      NOnformulary zometa Not taking           Nystatin mycostatin 100,000 unit/gm external powder Using            Pregabalin lyrica 100mg  1 1 1       Propranolol ER inderal LA 60mg  1           Sertraline zoloft 100mg 1                                                                                      Plan:    Has ongoing blood sugar issues and will work on getting better control. a1c was 7.8    Her tremors are well managed and she denies problems with her blood pressure/heart rate being too low or having side effects. Renewed her propranolol.     She has an iphone if     She has ongoing oncological care.     Return back in 9-12 months.     avs      Medical Decision Making:  #  Chronic progressive medical conditions addressed  Yes cancer, tremor  Review and/or interpretation of unique test or documentation from a provider outside of neurology yes - looked at a1c   Independent historian provided additional details  no   Prescription drug management and review of potential side effects and/or monitoring for side effects  yes   Health impacted by social determinants of health  no    I have reviewed the note as documented above.  This accurately captures the substance of my conversation with the patient and total time spent preparing for visit, executing visit and completing visit on the day of the visit:  20 minutes.     Time of phone call: 225pm pm - 240pm    Gabriel Santoyo MD      ------------------------------------------------------------------------------------------------------------------------------------------------------------------------    Video-Visit Details    The patient has been notified of following:     \"After a review of the patient s situation, this visit was changed from an in-person visit to a video visit to reduce the risk of COVID-19 exposure.   The patient is being evaluated via a billable video visit.\"    \"This video visit will be conducted via a call between you and your physician/provider. " "We have found that certain health care needs can be provided without the need for an in-person physical exam.  This service lets us provide the care you need with a video conversation.  If a prescription is necessary we can send it directly to your pharmacy.  If lab work is needed we can place an order for that and you can then stop by our lab to have the test done at a later time.    If during the course of the call the physician/provider feels a video visit is not appropriate, you will not be charged for this service.\"     Patient has given verbal consent for Video visit? Yes    Patient would like the video invitation sent by:     Type of service:  Video Visit    Video Start Time:     Video End Time (time video stopped):     Duration:  minutes - see above    Originating Location (pt. Location):     Distant Location (provider location):  [facility] NEUROLOGY [facility]     Mode of Communication:  Video Conference via [employer] (and if not possible then doximity)      Gabriel Santoyo MD      --------------------------------------------------------------------------------------------------------------    Winter Loya is a 65 year old female who is being evaluated via a billable video visit.      Charts reviewed  Consult from  Images reviewed        I have reviewed and updated the patient's Past Medical History, Social History, Family History and Medication List.    ALLERGIES  Patient has no known allergies.    Lasts visit details if there was a last visit:       14 Review of systems  are negative except for   Patient Active Problem List   Diagnosis     Multiple myeloma in remission (H)     Diabetes mellitus, type 2 (H)     Allergic rhinitis     NA (generalized anxiety disorder)     History of endometrial ablation     Hyperlipidemia     Major depressive disorder, recurrent episode, moderate (H)     Osteoarthrosis involving lower leg     Type 2 diabetes mellitus with hyperglycemia (H)     DAILY " (obstructive sleep apnea)- moderate (AHI 17)     Hypothyroidism, postoperative      Class 2 severe obesity due to excess calories with serious comorbidity and body mass index (BMI) of 39.0 to 39.9 in adult (H)     Tremor     History of peripheral stem cell transplant (H)     History of MRI of cervical spine 6/2020     H/O magnetic resonance imaging of lumbar spine 2020     Major depressive disorder, recurrent episode, severe (H)     Abnormal EMG 2021      No Known Allergies  Past Surgical History:   Procedure Laterality Date     ARTHROSCOPY KNEE Left 02/2014     ARTHROSCOPY KNEE Right 12/2010    KNEE ARTHROSCOPY Dec 2010  Right     CHOLECYSTECTOMY  2002     COLONOSCOPY N/A 07/23/2020    Procedure: COLONOSCOPY;  Surgeon: Za Denis MD;  Location: [facility] OR     EYE SURGERY  1985    lasersx-spot behind eye started leaking     THYROIDECTOMY N/A 08/26/2020    Procedure: THYROIDECTOMY, TOTAL;  Surgeon: Tiff Burgos MD;  Location: [facility] OR     TONSILLECTOMY  2003     TUBAL LIGATION  1986     Past Medical History:   Diagnosis Date     Abnormal EMG 2021 5/25/2021    Interpretation: This is an abnormal study, demonstrating electrophysiologic evidence of a length-dependent axonal sensorimotor polyneuropathy. Asymmetry of peroneal compound muscle action potential amplitudes is a finding of uncertain significance.        Adjustment disorder with mixed anxiety and depressed mood 3/16/2013     Anxiety      Depression      Diabetes (H)      Glaucoma (increased eye pressure)      H/O magnetic resonance imaging of lumbar spine 2020 3/15/2021    IMPRESSION: Multilevel mild lumbar spondylosis, most pronounced at the level of L4-5 and L5-S1 as detailed above.   I have personally reviewed the examination and initial interpretation and I agree with the findings.   ANNE GOODMAN MD     History of MRI of cervical spine 6/2020 3/15/2021    Impression: Multilevel cervical spondylosis, most pronounced at the level of C4-5  and C5-6 with moderate to severe spinal canal stenosis and moderate spinal canal stenosis at C6-7. No abnormal cord signal. No significant neural foraminal narrowing at any level.   I have personally reviewed the examination and initial interpretation and I agree with the findings.   ANNE GOODMAN MD     History of peripheral stem cell transplant (H) 2020     History of smoking      Hyperlipidemia      Multiple myeloma in remission (H)      Nonsenile cataract      Obesity      Sleep apnea     no c-pap use     Status post complete thyroidectomy 2020     Thyroid goiter 2020     Tremor 2020     Social History     Socioeconomic History     Marital status:      Spouse name: Not on file     Number of children: 3     Years of education: Not on file     Highest education level: Not on file   Occupational History     Occupation: alcohol and drug counselor   Tobacco Use     Smoking status: Former Smoker     Packs/day: 1.00     Types: Cigarettes     Quit date:      Years since quittin.7     Smokeless tobacco: Never Used   Substance and Sexual Activity     Alcohol use: Yes     Comment: occasional     Drug use: Never     Sexual activity: Not Currently   Other Topics Concern     Not on file   Social History Narrative     Not on file     Social Determinants of Health     Financial Resource Strain:      Difficulty of Paying Living Expenses:    Food Insecurity:      Worried About Running Out of Food in the Last Year:      Ran Out of Food in the Last Year:    Transportation Needs:      Lack of Transportation (Medical):      Lack of Transportation (Non-Medical):    Physical Activity:      Days of Exercise per Week:      Minutes of Exercise per Session:    Stress:      Feeling of Stress :    Social Connections:      Frequency of Communication with Friends and Family:      Frequency of Social Gatherings with Friends and Family:      Attends [employer] Services:      Active Member of Clubs or  Organizations:      Attends Club or Organization Meetings:      Marital Status:    Intimate Partner Violence:      Fear of Current or Ex-Partner:      Emotionally Abused:      Physically Abused:      Sexually Abused:      Family History   Problem Relation Age of Onset     Glaucoma Paternal Grandfather      Chronic Obstructive Pulmonary Disease Mother      Substance Abuse Mother      Alcoholism Mother      Diabetes Mother      Parkinsonism Father         bed bound, stiffness. no dementia or hallucinatinos     Tremor Father      Other - See Comments Father         hip problems     Other - See Comments Sister         lives in [place]     Diabetes Brother      Arrhythmia Brother      Other - See Comments Brother         lives in [place]     Diabetes Brother      Other - See Comments Brother         potts's disease, lives in [facility] and in [place]     Gastrointestinal Disease Brother      Other - See Comments Brother         [place]     Current Outpatient Medications   Medication Sig Dispense Refill     acetaminophen (TYLENOL) 325 MG tablet Take 1 tablet (325 mg) by mouth every 6 hours as needed for mild pain       acyclovir (ZOVIRAX) 400 MG tablet TAKE ONE TABLET BY MOUTH EVERY TWELVE HOURS 60 tablet 11     aspirin (ASA) 325 MG EC tablet Take 1 tablet (325 mg) by mouth daily 90 tablet 3     atorvastatin (LIPITOR) 20 MG tablet Take 1 tablet (20 mg) by mouth At Bedtime 90 tablet 3     blood glucose (NO BRAND SPECIFIED) test strip Use to test blood sugar two times daily or as directed. 200 strip 3     blood glucose monitoring (NO BRAND SPECIFIED) meter device kit Use to test blood sugar two times daily or as directed. 1 kit 3     dulaglutide (TRULICITY) 0.75 MG/0.5ML pen Inject 0.75 mg Subcutaneous every 7 days 2 mL 1     empagliflozin (JARDIANCE) 10 MG TABS tablet Take 1 tablet (10 mg) by mouth daily 90 tablet 3     fluconazole (DIFLUCAN) 150 MG tablet Take one tablet (150 mg) by mouth now. Repeat in 3 days if  symptoms do not resolve. (Patient not taking: Reported on 9/1/2021) 2 tablet 1     insulin glargine (LANTUS PEN) 100 UNIT/ML pen Inject 30 Units Subcutaneous At Bedtime 30 mL 3     insulin pen needle (FIFTY50 PEN NEEDLES) 32G X 4 MM miscellaneous As directed. To use with Victoza daily       LENalidomide (REVLIMID) 10 MG CAPS capsule Take 1 capsule (10 mg) by mouth daily for 28 days 28 capsule 0     levothyroxine (SYNTHROID/LEVOTHROID) 175 MCG tablet Take 1 tablet (175 mcg) by mouth every morning (before breakfast) (Patient not taking: Reported on 9/1/2021) 90 tablet 3     losartan (COZAAR) 25 MG tablet Take 1 tablet (25 mg) by mouth daily (Patient not taking: Reported on 9/1/2021) 90 tablet 3     metFORMIN (GLUCOPHAGE-XR) 500 MG 24 hr tablet Take 1 tablet (500 mg) by mouth 2 times daily (with meals) 180 tablet 3     pregabalin (LYRICA) 100 MG capsule TAKE ONE CAPSULE BY MOUTH THREE TIMES DAILY 270 capsule 3     propranolol ER (INDERAL LA) 60 MG 24 hr capsule Take 1 capsule (60 mg) by mouth daily 90 capsule 3     sertraline (ZOLOFT) 100 MG tablet Take 1 tablet (100 mg) by mouth daily 90 tablet 3         Medications

## 2021-09-18 ENCOUNTER — HEALTH MAINTENANCE LETTER (OUTPATIENT)
Age: 66
End: 2021-09-18

## 2021-09-27 ENCOUNTER — VIRTUAL VISIT (OUTPATIENT)
Dept: NEUROLOGY | Facility: CLINIC | Age: 66
End: 2021-09-27
Payer: MEDICARE

## 2021-09-27 DIAGNOSIS — R25.1 TREMOR: Primary | ICD-10-CM

## 2021-09-27 DIAGNOSIS — G62.89 AXONAL NEUROPATHY: ICD-10-CM

## 2021-09-27 DIAGNOSIS — G60.8 SENSORY POLYNEUROPATHY: ICD-10-CM

## 2021-09-27 PROCEDURE — 99213 OFFICE O/P EST LOW 20 MIN: CPT | Mod: 95 | Performed by: PSYCHIATRY & NEUROLOGY

## 2021-09-27 RX ORDER — PROPRANOLOL HCL 60 MG
60 CAPSULE, EXTENDED RELEASE 24HR ORAL DAILY
Qty: 90 CAPSULE | Refills: 3 | Status: SHIPPED | OUTPATIENT
Start: 2021-09-27 | End: 2022-12-06

## 2021-09-27 NOTE — LETTER
"    9/27/2021         RE: Winter Loya  [address]  [place] [address]        Dear Colleague,    Thank you for referring your patient, Winter Loya, to the [facility] [place]. Please see a copy of my visit note below.        VIDEO VISIT    Date of Visit: September 27, 2021  Name: Winter Loya  Date of Birth 1955    [address]   [place] [address]  [email or web address]  [phone] (M)   [phone] (H)   Peter Deleon son  [phone]    iphone today       Assessment:  (R25.1) Tremor  (primary encounter diagnosis)  worsening and affecting her ability to use a keyboard and mouse  Duration - 5 years or more.  Father had tremor/parkinson  Not a big drinker - not clear if alcohol helps her tremor  Smell perception is okay  She has sleep apnea  She cut out caffeine and there were no changes in her tremors  She is right handed and has more right than left handed tremor.   She was told she has ET as she is \"not stooped over\"  Her tremors are present when she is using her hands and she has a vocal and head tremor as well.      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 She has not worked with OT     Exercise/Therapy  - none     Driving - has had some memory/cognitive issues - seen on 10/22/2020 by Dr. Mcgrath for neuropsychological evaluation: there were mild frontal and possibly left temporal changes and recommended to be evaluated again in one year. She has not had a brain mri. She has mood issues that could benefit from psychotherapy as anxiety may be aggravating her memory.     cognitive re-evaluation with " Dr Mcgrath 10/2021  Order placed     Mood Generalized Anxiety disorder/Depression  Mood is good per her report.  Living with son basil - health  .   No counselor     Stress/anxiety issues  - still on sertraline and consider counsellor  Referral placed.     Sertraline zoloft 100mg      Hallucinations/delusions - denies     Sleep Apnea -  Dr. Juan Jose Salazar  Denies RBD features  [facility] sleep     Bladder  - wears a pad.   Has changes in urinary frequency/urgency if blood sugars are out of control  2-4/noc getting up to urinate  Has not seen a urologist.   Seen by Ob-gyn a year ago.      Respiratory  Loratadine claritin 10mg - not taking     Former smoker - quit 15 years prior was 1 ppd  Quit 2005     GI/Constipation/GERD  - no issue  Calcium antacid 500mg tums - not taking     ENDO     recent A1c was 8.8 and is working with pharmacist Coco Antoine. She may benefit from seeing nutrition and possibly endocrinology in addition to her pcp     Type 2 Diabetes with neuropathy     Atorvastatin lipitor 20mg at bedtime            Calcitriol rocaltrol 0.25mcg - not taking   Dulaglutide trulicity 0.75mg/0.5ml pen weekly     Empagliflozin jardiance 10mg daily  Exenatide ER Bydureon - not using  Lantus 30 units at bedtime  Levothyroxine synthroid/levothyroid 175 mcg daily  Metformin 500mg 1 tabs  twice daily     Has not seen dietician            Seeing Coco Antoine, Pharm D  DOes not have an endocrinologist  Has not seen nutrition  A1c was 8.8 on 12/7/2020    Diabetes - better control of her diabetes - A1c was 7.6 and most recently was 7.8  Had been working with  Coco Antoine, Pharmacist  Has someone new and has an appointment with them this week  Mika Gross October     yane Mares oncologist    [facility]      Hypothyroidism;      s/p thyroidectomy for multinodular goiter     Cardio/heart  - blood pressure is stable initially after stem cell therapy but  has gone up at times  States she is trying to lose weight to help her diabetes and blood pressure.   Her chart has been 160/88; encouraged her to get a home blood pressure cuff.      Losartan cozaar 25mg daily    Blood pressure was good today - she is on propranolol LA 60mg  Discussed getting a blood pressure cuff to monitor her blood pressure and heart rate.      Vision  - on treatment for glaucoma  Latanoprost xalatan 0.005% ophthalmic solution     HEME:  Multiple myeloma - in remision  IgA Kappa Multiple Myeloma. Presented 2018 with anemia and fatigue. Skeletal survey was unremarkable and UPEP had minimal protein (156 mg/m2). PET 11/2018 showed bone marrow consistent with hypermetabolic plasma cell myeloma. M spike was 0.17. She started RVD and Zometa. 4/2019 she had an autologous transplant. She was on revlimid maintenance and zometa from her prior oncologist in [place]. Zometa through 12/2020 to complete a total of 2 years of therapy  Mele Janakiram    Aspirin 325mg     Lenalidomide revlimid 10mg daiy  Nonformulary revlimid - as above   NOnformulary zometa - not getting     PCP Dr. Gabrielle Edwards     Acetaminophen tylenol 325mg - as needed  Acyclovir zovirax 400mg twice daily  Meloxicam mobic 15mg  - not taking  Oxycodone roxicodone - not taking  Pregabalin lyrica 100mg 3/day for neuropathy and helping her mood as well.   Tizanidine zanaflex 4mg - not taking   Tramadol ultram 50mg - not taking     Consideration for EMG and consultation with general neurology for neuropathy, carpal tunnel syndrome and cervical radiculopathy.     EMG:  This is an abnormal study, demonstrating electrophysiologic evidence of a length-dependent axonal sensorimotor polyneuropathy. Asymmetry of peroneal compound muscle action potential amplitudes is a finding of uncertain significance.      IMPRESSION:    1.  Sensory predominant polyneuropathy.  2.  Electrodiagnostic evidence of axonal neuropathy.  3.  IgA kappa myeloma, which  appears in remission.  4.  Type 2 diabetes.  5.  Essential tremor     Her neuropathy may well have been precipitated by chemotherapy she received prior to and around the time of her bone marrow transplant.  I particularly note that Velcade (bortezomib) has a high incidence of painful sensory neuropathy.  She is no longer receiving this agent.  I doubt the Revlimid is a factor.  She also has type 2 diabetes.  This is likely contributing to her neuropathy as well.     PLAN:  I did discuss the above with the patient.  She tells me that there has been some improvement.  Otherwise, she is relatively stable at this point.     The plan now is just to monitor her course and she is open to this.  I have recommended a neurologic followup in 6 months.  She will be seeing a new neurologist at that followup visit as I am retiring soon.  I did discuss this with her as well.     IN summary  - essential tremor  - neuropathy  - other medical issues  -diabetes      Acetaminophen tylenol 325mg as needed     Impression: Multilevel cervical spondylosis, most pronounced at the  level of C4-5 and C5-6 with moderate to severe spinal canal stenosis  and moderate spinal canal stenosis at C6-7. No abnormal cord signal.  No significant neural foraminal narrowing at any level.     I have personally reviewed the examination and initial interpretation  and I agree with the findings.     ANNE GOODMAN MD     Medications                 Acetaminophen tylenol 325mg As needed           Acyclovir zovirax 400mg 1   1       Aspirin 325mg 1       Atorvastatin lipitor 20mg      1       Calcitriol rocaltrol 0.25mcg Not taking           Calcium antacid 500mg tums             Dulaglutide trulicity 0.75mg/0.5ml pen  weekly       Empagliflozin jardiance 10mg 1           Exenatide ER Bydureon 2mg Not using           Fluconazole diflucan 150mg Not using       Insulin glargine lantus     30 units       Latanoprost xalatan 0.005% ophthalmic solution     Both eyes  "      Lenalidomide revlimid 10mg  1           Levothyroxine synthroid/levothyroid 175 mcg 1           Loratadine claritin 10mg  Not using            Losartan cozaar 25mg  1           Metformin glucophage 500mg  1  1       Nonformulary revlimid See above           NOnformulary zometa Not taking           Nystatin mycostatin 100,000 unit/gm external powder Using            Pregabalin lyrica 100mg  1 1 1       Propranolol ER inderal LA 60mg  1           Sertraline zoloft 100mg 1                                                                                      Plan:    Has ongoing blood sugar issues and will work on getting better control. a1c was 7.8    Her tremors are well managed and she denies problems with her blood pressure/heart rate being too low or having side effects. Renewed her propranolol.     She has an iphone if     She has ongoing oncological care.     Return back in 9-12 months.     avs      Medical Decision Making:  #  Chronic progressive medical conditions addressed  Yes cancer, tremor  Review and/or interpretation of unique test or documentation from a provider outside of neurology yes - looked at a1c   Independent historian provided additional details  no   Prescription drug management and review of potential side effects and/or monitoring for side effects  yes   Health impacted by social determinants of health  no    I have reviewed the note as documented above.  This accurately captures the substance of my conversation with the patient and total time spent preparing for visit, executing visit and completing visit on the day of the visit:  20 minutes.     Time of phone call: 225pm pm - 240pm    Gabriel Santoyo MD      ------------------------------------------------------------------------------------------------------------------------------------------------------------------------    Video-Visit Details    The patient has been notified of following:     \"After a review of the patient s situation, " "this visit was changed from an in-person visit to a video visit to reduce the risk of COVID-19 exposure.   The patient is being evaluated via a billable video visit.\"    \"This video visit will be conducted via a call between you and your physician/provider. We have found that certain health care needs can be provided without the need for an in-person physical exam.  This service lets us provide the care you need with a video conversation.  If a prescription is necessary we can send it directly to your pharmacy.  If lab work is needed we can place an order for that and you can then stop by our lab to have the test done at a later time.    If during the course of the call the physician/provider feels a video visit is not appropriate, you will not be charged for this service.\"     Patient has given verbal consent for Video visit? Yes    Patient would like the video invitation sent by:     Type of service:  Video Visit    Video Start Time:     Video End Time (time video stopped):     Duration:  minutes - see above    Originating Location (pt. Location):     Distant Location (provider location):  [facility] NEUROLOGY [facility]     Mode of Communication:  Video Conference via WHATT (and if not possible then doximity)      Gabriel Santoyo MD      --------------------------------------------------------------------------------------------------------------    Winter Loya is a 65 year old female who is being evaluated via a billable video visit.      Charts reviewed  Consult from  Images reviewed        I have reviewed and updated the patient's Past Medical History, Social History, Family History and Medication List.    ALLERGIES  Patient has no known allergies.    Lasts visit details if there was a last visit:       14 Review of systems  are negative except for   Patient Active Problem List   Diagnosis     Multiple myeloma in remission (H)     Diabetes mellitus, type 2 (H)     Allergic rhinitis     NA " (generalized anxiety disorder)     History of endometrial ablation     Hyperlipidemia     Major depressive disorder, recurrent episode, moderate (H)     Osteoarthrosis involving lower leg     Type 2 diabetes mellitus with hyperglycemia (H)     DAILY (obstructive sleep apnea)- moderate (AHI 17)     Hypothyroidism, postoperative      Class 2 severe obesity due to excess calories with serious comorbidity and body mass index (BMI) of 39.0 to 39.9 in adult (H)     Tremor     History of peripheral stem cell transplant (H)     History of MRI of cervical spine 6/2020     H/O magnetic resonance imaging of lumbar spine 2020     Major depressive disorder, recurrent episode, severe (H)     Abnormal EMG 2021      No Known Allergies  Past Surgical History:   Procedure Laterality Date     ARTHROSCOPY KNEE Left 02/2014     ARTHROSCOPY KNEE Right 12/2010    KNEE ARTHROSCOPY Dec 2010  Right     CHOLECYSTECTOMY  2002     COLONOSCOPY N/A 07/23/2020    Procedure: COLONOSCOPY;  Surgeon: Za Denis MD;  Location: [facility] OR     EYE SURGERY  1985    lasersx-spot behind eye started leaking     THYROIDECTOMY N/A 08/26/2020    Procedure: THYROIDECTOMY, TOTAL;  Surgeon: Tiff Burgos MD;  Location: [facility] OR     TONSILLECTOMY  2003     TUBAL LIGATION  1986     Past Medical History:   Diagnosis Date     Abnormal EMG 2021 5/25/2021    Interpretation: This is an abnormal study, demonstrating electrophysiologic evidence of a length-dependent axonal sensorimotor polyneuropathy. Asymmetry of peroneal compound muscle action potential amplitudes is a finding of uncertain significance.        Adjustment disorder with mixed anxiety and depressed mood 3/16/2013     Anxiety      Depression      Diabetes (H)      Glaucoma (increased eye pressure)      H/O magnetic resonance imaging of lumbar spine 2020 3/15/2021    IMPRESSION: Multilevel mild lumbar spondylosis, most pronounced at the level of L4-5 and L5-S1 as detailed above.   I have  personally reviewed the examination and initial interpretation and I agree with the findings.   ANNE GOODMAN MD     History of MRI of cervical spine 2020 3/15/2021    Impression: Multilevel cervical spondylosis, most pronounced at the level of C4-5 and C5-6 with moderate to severe spinal canal stenosis and moderate spinal canal stenosis at C6-7. No abnormal cord signal. No significant neural foraminal narrowing at any level.   I have personally reviewed the examination and initial interpretation and I agree with the findings.   ANNE GOODMAN MD     History of peripheral stem cell transplant (H) 2020     History of smoking      Hyperlipidemia      Multiple myeloma in remission (H)      Nonsenile cataract      Obesity      Sleep apnea     no c-pap use     Status post complete thyroidectomy 2020     Thyroid goiter 2020     Tremor 2020     Social History     Socioeconomic History     Marital status:      Spouse name: Not on file     Number of children: 3     Years of education: Not on file     Highest education level: Not on file   Occupational History     Occupation: alcohol and drug counselor   Tobacco Use     Smoking status: Former Smoker     Packs/day: 1.00     Types: Cigarettes     Quit date:      Years since quittin.7     Smokeless tobacco: Never Used   Substance and Sexual Activity     Alcohol use: Yes     Comment: occasional     Drug use: Never     Sexual activity: Not Currently   Other Topics Concern     Not on file   Social History Narrative     Not on file     Social Determinants of Health     Financial Resource Strain:      Difficulty of Paying Living Expenses:    Food Insecurity:      Worried About Running Out of Food in the Last Year:      Ran Out of Food in the Last Year:    Transportation Needs:      Lack of Transportation (Medical):      Lack of Transportation (Non-Medical):    Physical Activity:      Days of Exercise per Week:      Minutes of Exercise per  Session:    Stress:      Feeling of Stress :    Social Connections:      Frequency of Communication with Friends and Family:      Frequency of Social Gatherings with Friends and Family:      Attends [employer] Services:      Active Member of Clubs or Organizations:      Attends Club or Organization Meetings:      Marital Status:    Intimate Partner Violence:      Fear of Current or Ex-Partner:      Emotionally Abused:      Physically Abused:      Sexually Abused:      Family History   Problem Relation Age of Onset     Glaucoma Paternal Grandfather      Chronic Obstructive Pulmonary Disease Mother      Substance Abuse Mother      Alcoholism Mother      Diabetes Mother      Parkinsonism Father         bed bound, stiffness. no dementia or hallucinatinos     Tremor Father      Other - See Comments Father         hip problems     Other - See Comments Sister         lives in [place]     Diabetes Brother      Arrhythmia Brother      Other - See Comments Brother         lives in [place]     Diabetes Brother      Other - See Comments Brother         potts's disease, lives in [facility] and in [place]     Gastrointestinal Disease Brother      Other - See Comments Brother         [facility]     Current Outpatient Medications   Medication Sig Dispense Refill     acetaminophen (TYLENOL) 325 MG tablet Take 1 tablet (325 mg) by mouth every 6 hours as needed for mild pain       acyclovir (ZOVIRAX) 400 MG tablet TAKE ONE TABLET BY MOUTH EVERY TWELVE HOURS 60 tablet 11     aspirin (ASA) 325 MG EC tablet Take 1 tablet (325 mg) by mouth daily 90 tablet 3     atorvastatin (LIPITOR) 20 MG tablet Take 1 tablet (20 mg) by mouth At Bedtime 90 tablet 3     blood glucose (NO BRAND SPECIFIED) test strip Use to test blood sugar two times daily or as directed. 200 strip 3     blood glucose monitoring (NO BRAND SPECIFIED) meter device kit Use to test blood sugar two times daily or as directed. 1 kit 3     dulaglutide (TRULICITY) 0.75 MG/0.5ML  pen Inject 0.75 mg Subcutaneous every 7 days 2 mL 1     empagliflozin (JARDIANCE) 10 MG TABS tablet Take 1 tablet (10 mg) by mouth daily 90 tablet 3     fluconazole (DIFLUCAN) 150 MG tablet Take one tablet (150 mg) by mouth now. Repeat in 3 days if symptoms do not resolve. (Patient not taking: Reported on 9/1/2021) 2 tablet 1     insulin glargine (LANTUS PEN) 100 UNIT/ML pen Inject 30 Units Subcutaneous At Bedtime 30 mL 3     insulin pen needle (FIFTY50 PEN NEEDLES) 32G X 4 MM miscellaneous As directed. To use with Victoza daily       LENalidomide (REVLIMID) 10 MG CAPS capsule Take 1 capsule (10 mg) by mouth daily for 28 days 28 capsule 0     levothyroxine (SYNTHROID/LEVOTHROID) 175 MCG tablet Take 1 tablet (175 mcg) by mouth every morning (before breakfast) (Patient not taking: Reported on 9/1/2021) 90 tablet 3     losartan (COZAAR) 25 MG tablet Take 1 tablet (25 mg) by mouth daily (Patient not taking: Reported on 9/1/2021) 90 tablet 3     metFORMIN (GLUCOPHAGE-XR) 500 MG 24 hr tablet Take 1 tablet (500 mg) by mouth 2 times daily (with meals) 180 tablet 3     pregabalin (LYRICA) 100 MG capsule TAKE ONE CAPSULE BY MOUTH THREE TIMES DAILY 270 capsule 3     propranolol ER (INDERAL LA) 60 MG 24 hr capsule Take 1 capsule (60 mg) by mouth daily 90 capsule 3     sertraline (ZOLOFT) 100 MG tablet Take 1 tablet (100 mg) by mouth daily 90 tablet 3         Medications                                                                                                                                                                                                                    Winter is a 65 year old who is being evaluated via a billable telephone visit.      What phone number would you like to be contacted at?[phone]  How would you like to obtain your AVS? MyChart        Again, thank you for allowing me to participate in the care of your patient.        Sincerely,        Gabriel Santoyo MD

## 2021-09-27 NOTE — PROGRESS NOTES
Winter is a 65 year old who is being evaluated via a billable telephone visit.      What phone number would you like to be contacted at?[phone]  How would you like to obtain your AVS? [facility]

## 2021-09-27 NOTE — PATIENT INSTRUCTIONS
"(R25.1) Tremor  (primary encounter diagnosis)  worsening and affecting her ability to use a keyboard and mouse  Duration - 5 years or more.  Father had tremor/parkinson  Not a big drinker - not clear if alcohol helps her tremor  Smell perception is okay  She has sleep apnea  She cut out caffeine and there were no changes in her tremors  She is right handed and has more right than left handed tremor.   She was told she has ET as she is \"not stooped over\"  Her tremors are present when she is using her hands and she has a vocal and head tremor as well.      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 She has not worked with OT     Exercise/Therapy  - none     Driving - has had some memory/cognitive issues - seen on 10/22/2020 by Dr. Mcgrath for neuropsychological evaluation: there were mild frontal and possibly left temporal changes and recommended to be evaluated again in one year. She has not had a brain mri. She has mood issues that could benefit from psychotherapy as anxiety may be aggravating her memory.     cognitive re-evaluation with Dr Mcgrath 10/2021  Order placed     Mood Generalized Anxiety disorder/Depression  Mood is good per her report.  Living with son basil - health  .   No counselor     Stress/anxiety issues  - still on sertraline and consider counsellor  Referral placed.     Sertraline zoloft 100mg      Hallucinations/delusions - denies     Sleep Apnea -  Dr. Juan Jose Salazar  Denies RBD features  [facility] sleep     Bladder  - wears a pad.   Has changes in urinary frequency/urgency if blood sugars are out of control  2-4/noc " getting up to urinate  Has not seen a urologist.   Seen by Ob-gyn a year ago.      Respiratory  Loratadine claritin 10mg - not taking     Former smoker - quit 15 years prior was 1 ppd  Quit 2005     GI/Constipation/GERD  - no issue  Calcium antacid 500mg tums - not taking     ENDO     recent A1c was 8.8 and is working with pharmacist Coco Antoine. She may benefit from seeing nutrition and possibly endocrinology in addition to her pcp     Type 2 Diabetes with neuropathy     Atorvastatin lipitor 20mg at bedtime            Calcitriol rocaltrol 0.25mcg - not taking   Dulaglutide trulicity 0.75mg/0.5ml pen weekly     Empagliflozin jardiance 10mg daily  Exenatide ER Bydureon - not using  Lantus 30 units at bedtime  Levothyroxine synthroid/levothyroid 175 mcg daily  Metformin 500mg 1 tabs  twice daily     Has not seen dietician            Seeing Coco Antoine, Pharm D  DOes not have an endocrinologist  Has not seen nutrition  A1c was 8.8 on 12/7/2020    Diabetes - better control of her diabetes - A1c was 7.6 and most recently was 7.8  Had been working with  Coco Antoine, Pharmacist  Has someone new and has an appointment with them this week  Mika Gross October     yane Mares oncologist    [facility]      Hypothyroidism;      s/p thyroidectomy for multinodular goiter     Cardio/heart  - blood pressure is stable initially after stem cell therapy but has gone up at times  States she is trying to lose weight to help her diabetes and blood pressure.   Her chart has been 160/88; encouraged her to get a home blood pressure cuff.      Losartan cozaar 25mg daily    Blood pressure was good today - she is on propranolol LA 60mg  Discussed getting a blood pressure cuff to monitor her blood pressure and heart rate.      Vision  - on treatment for glaucoma  Latanoprost xalatan 0.005% ophthalmic solution     HEME:  Multiple myeloma - in remision  IgA Kappa Multiple Myeloma. Presented 2018  with anemia and fatigue. Skeletal survey was unremarkable and UPEP had minimal protein (156 mg/m2). PET 11/2018 showed bone marrow consistent with hypermetabolic plasma cell myeloma. M spike was 0.17. She started RVD and Zometa. 4/2019 she had an autologous transplant. She was on revlimid maintenance and zometa from her prior oncologist in [place]. Zometa through 12/2020 to complete a total of 2 years of therapy  Mele Janakiram    Aspirin 325mg     Lenalidomide revlimid 10mg daiy  Nonformulary revlimid - as above   NOnformulary zometa - not getting     PCP Dr. Gabrielle Edwards     Acetaminophen tylenol 325mg - as needed  Acyclovir zovirax 400mg twice daily  Meloxicam mobic 15mg  - not taking  Oxycodone roxicodone - not taking  Pregabalin lyrica 100mg 3/day for neuropathy and helping her mood as well.   Tizanidine zanaflex 4mg - not taking   Tramadol ultram 50mg - not taking     Consideration for EMG and consultation with general neurology for neuropathy, carpal tunnel syndrome and cervical radiculopathy.     EMG:  This is an abnormal study, demonstrating electrophysiologic evidence of a length-dependent axonal sensorimotor polyneuropathy. Asymmetry of peroneal compound muscle action potential amplitudes is a finding of uncertain significance.      IMPRESSION:    1.  Sensory predominant polyneuropathy.  2.  Electrodiagnostic evidence of axonal neuropathy.  3.  IgA kappa myeloma, which appears in remission.  4.  Type 2 diabetes.  5.  Essential tremor     Her neuropathy may well have been precipitated by chemotherapy she received prior to and around the time of her bone marrow transplant.  I particularly note that Velcade (bortezomib) has a high incidence of painful sensory neuropathy.  She is no longer receiving this agent.  I doubt the Revlimid is a factor.  She also has type 2 diabetes.  This is likely contributing to her neuropathy as well.     PLAN:  I did discuss the above with the patient.  She tells me that  there has been some improvement.  Otherwise, she is relatively stable at this point.     The plan now is just to monitor her course and she is open to this.  I have recommended a neurologic followup in 6 months.  She will be seeing a new neurologist at that followup visit as I am retiring soon.  I did discuss this with her as well.     IN summary  - essential tremor  - neuropathy  - other medical issues  -diabetes      Acetaminophen tylenol 325mg as needed     Impression: Multilevel cervical spondylosis, most pronounced at the  level of C4-5 and C5-6 with moderate to severe spinal canal stenosis  and moderate spinal canal stenosis at C6-7. No abnormal cord signal.  No significant neural foraminal narrowing at any level.     I have personally reviewed the examination and initial interpretation  and I agree with the findings.     ANNE GOODMAN MD     Medications                 Acetaminophen tylenol 325mg As needed           Acyclovir zovirax 400mg 1   1       Aspirin 325mg 1       Atorvastatin lipitor 20mg      1       Calcitriol rocaltrol 0.25mcg Not taking           Calcium antacid 500mg tums             Dulaglutide trulicity 0.75mg/0.5ml pen  weekly       Empagliflozin jardiance 10mg 1           Exenatide ER Bydureon 2mg Not using           Fluconazole diflucan 150mg Not using       Insulin glargine lantus     30 units       Latanoprost xalatan 0.005% ophthalmic solution     Both eyes       Lenalidomide revlimid 10mg  1           Levothyroxine synthroid/levothyroid 175 mcg 1           Loratadine claritin 10mg  Not using            Losartan cozaar 25mg  1           Metformin glucophage 500mg  1  1       Nonformulary revlimid See above           NOnformulary zometa Not taking           Nystatin mycostatin 100,000 unit/gm external powder Using            Pregabalin lyrica 100mg  1 1 1       Propranolol ER inderal LA 60mg  1           Sertraline zoloft 100mg 1                                                                                       Plan:    Has ongoing blood sugar issues and will work on getting better control. a1c was 7.8    Her tremors are well managed and she denies problems with her blood pressure/heart rate being too low or having side effects. Renewed her propranolol.     She has an iphone if     She has ongoing oncological care.     Return back in 9-12 months.     vlad

## 2021-09-29 DIAGNOSIS — C90.01 MULTIPLE MYELOMA IN REMISSION (H): Primary | ICD-10-CM

## 2021-09-29 RX ORDER — LENALIDOMIDE 10 MG/1
10 CAPSULE ORAL DAILY
Qty: 28 CAPSULE | Refills: 0 | Status: SHIPPED | OUTPATIENT
Start: 2021-09-29 | End: 2021-12-22

## 2021-09-30 ENCOUNTER — VIRTUAL VISIT (OUTPATIENT)
Dept: PHARMACY | Facility: CLINIC | Age: 66
End: 2021-09-30
Payer: COMMERCIAL

## 2021-09-30 DIAGNOSIS — Z79.4 TYPE 2 DIABETES MELLITUS WITH DIABETIC POLYNEUROPATHY, WITH LONG-TERM CURRENT USE OF INSULIN (H): ICD-10-CM

## 2021-09-30 DIAGNOSIS — E11.42 TYPE 2 DIABETES MELLITUS WITH DIABETIC POLYNEUROPATHY, WITH LONG-TERM CURRENT USE OF INSULIN (H): ICD-10-CM

## 2021-09-30 PROCEDURE — 99607 MTMS BY PHARM ADDL 15 MIN: CPT | Performed by: PHARMACIST

## 2021-09-30 PROCEDURE — 99606 MTMS BY PHARM EST 15 MIN: CPT | Performed by: PHARMACIST

## 2021-09-30 RX ORDER — DULAGLUTIDE 1.5 MG/.5ML
1.5 INJECTION, SOLUTION SUBCUTANEOUS
Qty: 2 ML | Refills: 1 | Status: SHIPPED | OUTPATIENT
Start: 2021-09-30 | End: 2021-12-12

## 2021-09-30 RX ORDER — METFORMIN HCL 500 MG
1000 TABLET, EXTENDED RELEASE 24 HR ORAL
Qty: 180 TABLET | Refills: 3 | COMMUNITY
Start: 2021-09-30 | End: 2022-07-11

## 2021-09-30 NOTE — PROGRESS NOTES
Medication Therapy Management (MTM) Encounter    ASSESSMENT:                            Medication Adherence/Access: we discussed putting an alarm on her phone to remember the levothyroxine.    Type 2 Diabetes: Patient is not meeting A1c goal of < 7%.  Patient's diarrhea could potentially be caused by the Metformin so she would benefit from a decrease.  Since she is also interested in weight loss and potentially going off of insulin it would be beneficial to increase the Trulicity.  With an increase of Trulicity which should also decrease the Lantus since we are unaware of the blood sugars that she is currently experiencing due to the lack of monitoring.  She would benefit from monitoring once in the morning and 2 hours after dinner.    PLAN:                            1. Decrease metformin to 500 mg XR once at night.  2. Trulicity increase to 1.5 mg weekly.   3. Decrease Lantus to 27 units nightly.  4. Monitor blood sugars in the morning before food and 1-2 hours after dinner.    Follow-up: 2 weeks.      SUBJECTIVE/OBJECTIVE:                          Winter Loya is a 65 year old female called for a follow-up visit. She was referred to me from garrett Rosenthal.  Today's visit is a follow-up MTM visit from 6/22/21     Reason for visit: Diabetes follow-up.    Allergies/ADRs: Reviewed in chart  Past Medical History: Reviewed in chart  Tobacco: She reports that she quit smoking about 16 years ago. Her smoking use included cigarettes. She smoked 1.00 pack per day. She has never used smokeless tobacco.  Alcohol: on holidays only.  Caffeine: 1 cup of coffee today.     Medication Adherence/Access: no issues reported. Sometimes forgets levothyroxine once week because she thought that she couldn't take other medications with it.    Type 2 Diabetes:  Currently taking Jardiance 10 mg, Trulicity 0.75 mg weekly, metformin  mg twice a day, and Lantus 30 units. Patient is experiencing the following side effects: diarrhea  "(started around time she started metformin). Usually happens around 10 am.   \"I have such bad sugar cravings\"  Blood sugar monitoring: never.   Symptoms of low blood sugar? none  Symptoms of high blood sugar? diarrhea  Eye exam: up to date  Foot exam: up to date  Aspirin: Taking 325mg daily and denies side effects  Statin: Yes: atorvastatin 20 mg at bedtime   ACEi/ARB: Yes: losartan 25 mg.   Urine Albumin:   Lab Results   Component Value Date    UMALCR 73.57 (H) 12/07/2020      Lab Results   Component Value Date    A1C 7.8 08/27/2021    A1C 7.8 07/26/2021    A1C 7.6 03/08/2021    A1C 8.8 12/07/2020    A1C 9.1 08/21/2020    A1C 6.7 03/05/2020       Today's Vitals: There were no vitals taken for this visit.  ----------------      I spent 20 minutes with this patient today. All changes were made via collaborative practice agreement with [facility]. A copy of the visit note was provided to the patient's primary care provider.    The patient was sent via [place] a summary of these recommendations.     Mika Armstrong, Pharm. D.   Medication Therapy Management Pharmacist  Direct Voicemail: [phone]    Telemedicine Visit Details  Type of service:  Telephone visit  Start Time: 2:30 pm  End Time: 2:50 PM  Originating Location (patient location): [place]  Distant Location (provider location):  [facility]     Medication Therapy Recommendations  Diabetes mellitus, type 2 (H)    Rationale: Medication requires monitoring - Needs additional monitoring   Recommendation: Self-Monitoring   Status: Patient Agreed - Adherence/Education         Major depressive disorder, recurrent episode, moderate (H)    Current Medication: sertraline (ZOLOFT) 100 MG tablet (Discontinued)   Rationale: Cannot swallow/administer medication - Adherence - Adherence   Recommendation: Start Medication - Restart sertraline 100 mg daily   Status: Accepted per Provider         Type 2 diabetes mellitus with hyperglycemia (H) "    Current Medication: metFORMIN (GLUCOPHAGE-XR) 500 MG 24 hr tablet (Discontinued)   Rationale: Undesirable effect - Adverse medication event - Safety   Recommendation: Change Medication   Status: Accepted per CPA          Rationale: Dose too low - Dosage too low - Effectiveness   Recommendation: Increase Dose   Status: Accepted per CPA

## 2021-09-30 NOTE — PATIENT INSTRUCTIONS
Recommendations from today's MTM visit:                                                      1. Decrease metformin to 500 mg XR once at night.  2. Trulicity increase to 1.5 mg weekly.   3. Decrease Lantus to 27 units nightly.  4. Monitor blood sugars in the morning before food and 1-2 hours after dinner.    Follow-up: 2 weeks.    It was great to speak with you today.  I value your experience and would be very thankful for your time with providing feedback on our clinic survey. You may receive a survey via email or text message in the next few days.     To schedule another MTM appointment, please call the clinic directly or you may call the MTM scheduling line at [phone] or toll-free at [phone].     My Clinical Pharmacist's contact information:                                                      Please feel free to contact me with any questions or concerns you have.      Mika Armstrong, Pharm. D.   Medication Therapy Management Pharmacist  Direct Voicemail: [phone]

## 2021-10-06 ENCOUNTER — TELEPHONE (OUTPATIENT)
Dept: ONCOLOGY | Facility: CLINIC | Age: 66
End: 2021-10-06

## 2021-10-06 DIAGNOSIS — C90.01 MULTIPLE MYELOMA IN REMISSION (H): ICD-10-CM

## 2021-10-06 NOTE — TELEPHONE ENCOUNTER
Oral Chemotherapy Monitoring Program   Medication: Revlimid  Rx: 25mg PO daily on days 1 through 28 of 28 day cycle   Auth #: [id number]   Risk Category: Adult Female NORP  Routine survey questions reviewed.   Rx to be Escribed to Pete Borja  [facility]  Oncology Pharmacy Liaison   [email or web address]  [phone] (phone)  [phone] (fax)

## 2021-10-07 RX ORDER — LENALIDOMIDE 10 MG/1
CAPSULE ORAL
Refills: 0 | OUTPATIENT
Start: 2021-10-07

## 2021-10-13 ENCOUNTER — MYC MEDICAL ADVICE (OUTPATIENT)
Dept: PHARMACY | Facility: CLINIC | Age: 66
End: 2021-10-13

## 2021-10-19 ENCOUNTER — VIRTUAL VISIT (OUTPATIENT)
Dept: FAMILY MEDICINE | Facility: CLINIC | Age: 66
End: 2021-10-19
Payer: MEDICARE

## 2021-10-19 DIAGNOSIS — H91.93 HEARING DECREASED, BILATERAL: Primary | ICD-10-CM

## 2021-10-19 DIAGNOSIS — R19.7 DIARRHEA, UNSPECIFIED TYPE: ICD-10-CM

## 2021-10-19 PROCEDURE — 99213 OFFICE O/P EST LOW 20 MIN: CPT | Mod: 95 | Performed by: NURSE PRACTITIONER

## 2021-10-19 ASSESSMENT — PAIN SCALES - GENERAL: PAINLEVEL: NO PAIN (0)

## 2021-10-19 NOTE — PROGRESS NOTES
Winter is a 65 year old who is being evaluated via a billable telephone visit.      What phone number would you like to be contacted at? [phone]  How would you like to obtain your AVS? MyChart    Assessment & Plan     Hearing decreased, bilateral  - Adult Audiology Referral; Future    Diarrhea, unspecified type  Reduction in dose of metformin not helpful for diarrhea. Will get basic workup for chronic diarrhea. May continue OTC imodium PRN.   - CBC with platelets; Future  - Comprehensive metabolic panel; Future  - TSH; Future  - Tissue transglutaminase christin IgA and IgG; Future  - Erythrocyte sedimentation rate auto; Future  - CRP inflammation; Future  - Fat Stool Qual Random Collection; Future  - Elastase Fecal; Future  - Cryptosporidium/Giardia Immunoassay; Future  2  Return in about 2 weeks (around 11/2/2021), or if symptoms worsen or fail to improve.    FALGUNI Duenas CNP  M [facility] [place]    Subjective   Winter is a 65 year old who presents for the following health issues     HPI     Diarrhea  Onset/Duration: Months now   Description:       Consistency of stool: watery       Blood in stool: no       Number of loose stools past 24 hours: 1       Goes 4-6 times/day  Progression of Symptoms: intermittent  Accompanying signs and symptoms:       Fever: no       Nausea/Vomiting: Nausea        Abdominal pain: no       Weight loss: no       Episodes of constipation: no  History   Ill contacts: no  Recent use of antibiotics: no  Recent travels: no  Recent medication-new or changes(Rx or OTC): YES- lowered Metformin to 500mg from higher dose to see if diarrhea would resolve, no improvement. She had been on this dose metformin in the past without problems.  Precipitating or alleviating factors: None  Therapies tried and outcome: Imodium; temporary relief.     States that she has noted gradually increasing difficulty with hearing.     Review of Systems   Constitutional, HEENT, cardiovascular,  pulmonary, gi and gu systems are negative, except as otherwise noted.      Objective    Vitals - Patient Reported  Pain Score: No Pain (0)        Physical Exam   healthy, alert and no distress  PSYCH: Alert and oriented times 3; coherent speech, normal   rate and volume, able to articulate logical thoughts, able   to abstract reason, no tangential thoughts, no hallucinations   or delusions  Her affect is normal  RESP: No cough, no audible wheezing, able to talk in full sentences  Remainder of exam unable to be completed due to telephone visits          Phone call duration: 8 minutes

## 2021-10-26 ENCOUNTER — OFFICE VISIT (OUTPATIENT)
Dept: FAMILY MEDICINE | Facility: CLINIC | Age: 66
End: 2021-10-26
Payer: MEDICARE

## 2021-10-26 VITALS
RESPIRATION RATE: 20 BRPM | DIASTOLIC BLOOD PRESSURE: 73 MMHG | OXYGEN SATURATION: 95 % | HEIGHT: 69 IN | HEART RATE: 66 BPM | WEIGHT: 258.5 LBS | BODY MASS INDEX: 38.29 KG/M2 | SYSTOLIC BLOOD PRESSURE: 114 MMHG | TEMPERATURE: 97.5 F

## 2021-10-26 DIAGNOSIS — R19.7 DIARRHEA, UNSPECIFIED TYPE: ICD-10-CM

## 2021-10-26 DIAGNOSIS — E11.9 TYPE 2 DIABETES MELLITUS WITHOUT COMPLICATION, WITH LONG-TERM CURRENT USE OF INSULIN (H): Primary | ICD-10-CM

## 2021-10-26 DIAGNOSIS — Z79.4 TYPE 2 DIABETES MELLITUS WITHOUT COMPLICATION, WITH LONG-TERM CURRENT USE OF INSULIN (H): Primary | ICD-10-CM

## 2021-10-26 LAB
ALBUMIN SERPL-MCNC: 3.6 G/DL (ref 3.4–5)
ALP SERPL-CCNC: 129 U/L (ref 40–150)
ALT SERPL W P-5'-P-CCNC: 37 U/L (ref 0–50)
AMYLASE SERPL-CCNC: 49 U/L (ref 30–110)
ANION GAP SERPL CALCULATED.3IONS-SCNC: 4 MMOL/L (ref 3–14)
AST SERPL W P-5'-P-CCNC: 18 U/L (ref 0–45)
BILIRUB SERPL-MCNC: 1.5 MG/DL (ref 0.2–1.3)
BUN SERPL-MCNC: 12 MG/DL (ref 7–30)
CALCIUM SERPL-MCNC: 8.4 MG/DL (ref 8.5–10.1)
CHLORIDE BLD-SCNC: 107 MMOL/L (ref 94–109)
CO2 SERPL-SCNC: 29 MMOL/L (ref 20–32)
CREAT SERPL-MCNC: 0.71 MG/DL (ref 0.52–1.04)
CRP SERPL-MCNC: 4 MG/L (ref 0–8)
ERYTHROCYTE [DISTWIDTH] IN BLOOD BY AUTOMATED COUNT: 15.2 % (ref 10–15)
ERYTHROCYTE [SEDIMENTATION RATE] IN BLOOD BY WESTERGREN METHOD: 7 MM/HR (ref 0–30)
GFR SERPL CREATININE-BSD FRML MDRD: >90 ML/MIN/1.73M2
GLUCOSE BLD-MCNC: 201 MG/DL (ref 70–99)
HCT VFR BLD AUTO: 40.3 % (ref 35–47)
HGB BLD-MCNC: 13.3 G/DL (ref 11.7–15.7)
LIPASE SERPL-CCNC: 266 U/L (ref 73–393)
MCH RBC QN AUTO: 28.2 PG (ref 26.5–33)
MCHC RBC AUTO-ENTMCNC: 33 G/DL (ref 31.5–36.5)
MCV RBC AUTO: 85 FL (ref 78–100)
PLATELET # BLD AUTO: 113 10E3/UL (ref 150–450)
POTASSIUM BLD-SCNC: 3.8 MMOL/L (ref 3.4–5.3)
PROT SERPL-MCNC: 6.6 G/DL (ref 6.8–8.8)
RBC # BLD AUTO: 4.72 10E6/UL (ref 3.8–5.2)
SODIUM SERPL-SCNC: 140 MMOL/L (ref 133–144)
TSH SERPL DL<=0.005 MIU/L-ACNC: 2.94 MU/L (ref 0.4–4)
WBC # BLD AUTO: 3.4 10E3/UL (ref 4–11)

## 2021-10-26 PROCEDURE — 84443 ASSAY THYROID STIM HORMONE: CPT | Performed by: NURSE PRACTITIONER

## 2021-10-26 PROCEDURE — 82150 ASSAY OF AMYLASE: CPT | Performed by: NURSE PRACTITIONER

## 2021-10-26 PROCEDURE — 83690 ASSAY OF LIPASE: CPT | Performed by: NURSE PRACTITIONER

## 2021-10-26 PROCEDURE — 90471 IMMUNIZATION ADMIN: CPT | Performed by: NURSE PRACTITIONER

## 2021-10-26 PROCEDURE — 85652 RBC SED RATE AUTOMATED: CPT | Performed by: NURSE PRACTITIONER

## 2021-10-26 PROCEDURE — 36415 COLL VENOUS BLD VENIPUNCTURE: CPT | Performed by: NURSE PRACTITIONER

## 2021-10-26 PROCEDURE — 86140 C-REACTIVE PROTEIN: CPT | Performed by: NURSE PRACTITIONER

## 2021-10-26 PROCEDURE — 85027 COMPLETE CBC AUTOMATED: CPT | Performed by: NURSE PRACTITIONER

## 2021-10-26 PROCEDURE — 90682 RIV4 VACC RECOMBINANT DNA IM: CPT | Performed by: NURSE PRACTITIONER

## 2021-10-26 PROCEDURE — 83516 IMMUNOASSAY NONANTIBODY: CPT | Performed by: NURSE PRACTITIONER

## 2021-10-26 PROCEDURE — 99213 OFFICE O/P EST LOW 20 MIN: CPT | Mod: 25 | Performed by: NURSE PRACTITIONER

## 2021-10-26 PROCEDURE — 80053 COMPREHEN METABOLIC PANEL: CPT | Performed by: NURSE PRACTITIONER

## 2021-10-26 ASSESSMENT — PAIN SCALES - GENERAL: PAINLEVEL: MILD PAIN (2)

## 2021-10-26 ASSESSMENT — MIFFLIN-ST. JEOR: SCORE: 1790.34

## 2021-10-26 NOTE — LETTER
October 29, 2021      Winter Loya  [address]  [place] [address]        Dear ,    We are writing to inform you of your test results.  Your platelet level is lower. I sent a message to your hematology/oncology specialist and we are okay to just continue to watch those numbers and follow up with them as scheduled on 12/2/2021.       Your other results are all acceptable.       Please let us know if you have any questions or concerns.      Resulted Orders   CBC with platelets   Result Value Ref Range    WBC Count 3.4 (L) 4.0 - 11.0 10e3/uL    RBC Count 4.72 3.80 - 5.20 10e6/uL    Hemoglobin 13.3 11.7 - 15.7 g/dL    Hematocrit 40.3 35.0 - 47.0 %    MCV 85 78 - 100 fL    MCH 28.2 26.5 - 33.0 pg    MCHC 33.0 31.5 - 36.5 g/dL    RDW 15.2 (H) 10.0 - 15.0 %    Platelet Count 113 (L) 150 - 450 10e3/uL   Comprehensive metabolic panel   Result Value Ref Range    Sodium 140 133 - 144 mmol/L    Potassium 3.8 3.4 - 5.3 mmol/L    Chloride 107 94 - 109 mmol/L    Carbon Dioxide (CO2) 29 20 - 32 mmol/L    Anion Gap 4 3 - 14 mmol/L    Urea Nitrogen 12 7 - 30 mg/dL    Creatinine 0.71 0.52 - 1.04 mg/dL    Calcium 8.4 (L) 8.5 - 10.1 mg/dL    Glucose 201 (H) 70 - 99 mg/dL    Alkaline Phosphatase 129 40 - 150 U/L    AST 18 0 - 45 U/L    ALT 37 0 - 50 U/L    Protein Total 6.6 (L) 6.8 - 8.8 g/dL    Albumin 3.6 3.4 - 5.0 g/dL    Bilirubin Total 1.5 (H) 0.2 - 1.3 mg/dL    GFR Estimate >90 >60 mL/min/1.73m2      Comment:      As of July 11, 2021, eGFR is calculated by the CKD-EPI creatinine equation, without race adjustment. eGFR can be influenced by muscle mass, exercise, and diet. The reported eGFR is an estimation only and is only applicable if the renal function is stable.   TSH   Result Value Ref Range    TSH 2.94 0.40 - 4.00 mU/L   Tissue transglutaminase christin IgA and IgG   Result Value Ref Range    Tissue Transglutaminase Antibody IgA 0.3 <7.0 U/mL      Comment:      Negative- The tTG-IgA assay has limited utility for  patients with decreased levels of IgA. Screening for celiac disease should include IgA testing to rule out selective IgA deficiency and to guide selection and interpretation of serological testing. tTG-IgG testing may be positive in celiac disease patients with IgA deficiency.    Tissue Transglutaminase Antibody IgG 0.8 <7.0 U/mL      Comment:      Negative   Erythrocyte sedimentation rate auto   Result Value Ref Range    Erythrocyte Sedimentation Rate 7 0 - 30 mm/hr   CRP inflammation   Result Value Ref Range    CRP Inflammation 4.0 0.0 - 8.0 mg/L   Amylase   Result Value Ref Range    Amylase 49 30 - 110 U/L   Lipase   Result Value Ref Range    Lipase 266 73 - 393 U/L       If you have any questions or concerns, please call the clinic at the number listed above.       Sincerely,      Delmi Gross, FALGUNI CNP/blt

## 2021-10-26 NOTE — PROGRESS NOTES
Assessment & Plan     Type 2 diabetes mellitus without complication, with long-term current use of insulin (H)  A1c not at goal of less than 7. Is following with [facility] pharmacy, has follow up in 2 days.     Diarrhea, unspecified type  Diarrhea has not improved with changes in metformin. Will get workup to evaluate for other causes of diarrhea. Continue imodium PRN.   - Adult Gastro Ref - Consult Only; Future  - Clostridium difficile toxin B PCR  - CBC with platelets  - Comprehensive metabolic panel  - TSH  - Tissue transglutaminase christin IgA and IgG  - Erythrocyte sedimentation rate auto  - CRP inflammation  - Fat Stool Qual Random Collection  - Elastase Fecal  - Cryptosporidium/Giardia Immunoassay  - Amylase  - Lipase    Return in about 1 day (around 10/27/2021) for ancillary flu and covid vaccine. Annual physical in 3 months. .    FALGUNI Duenas CNP  M [facility] KINDRA Max is a 65 year old who presents for the following health issues     HPI     Diabetes Follow-up    How often are you checking your blood sugar? Two times daily  Blood sugar testing frequency justification:  Controlled   What time of day are you checking your blood sugars (select all that apply)?  Before and after meals  Have you had any blood sugars above 200?  No  Have you had any blood sugars below 70?  No    What symptoms do you notice when your blood sugar is low?  None    What concerns do you have today about your diabetes? None     Do you have any of these symptoms? (Select all that apply)  Numbness in feet and Burning in feet     How many servings of fruits and vegetables do you eat daily?  0-1    On average, how many sweetened beverages do you drink each day (Examples: soda, juice, sweet tea, etc.  Do NOT count diet or artificially sweetened beverages)?   0    How many days per week do you exercise enough to make your heart beat faster? 1    How many minutes a day do you exercise enough to make your  "heart beat faster? 30 minutes     How many days per week do you miss taking your medication? 0    BP Readings from Last 2 Encounters:   10/26/21 114/73   08/24/21 120/69     Hemoglobin A1C (%)   Date Value   08/27/2021 7.8 (H)   07/26/2021 7.8 (H)   03/08/2021 7.6 (H)   12/07/2020 8.8 (H)     LDL Cholesterol Calculated (mg/dL)   Date Value   01/27/2021 46     Review of Systems   Constitutional, HEENT, cardiovascular, pulmonary, gi and gu systems are negative, except as otherwise noted.      Objective    /73   Pulse 66   Temp 97.5  F (36.4  C) (Oral)   Resp 20   Ht 1.75 m (5' 8.9\")   Wt 117.3 kg (258 lb 8 oz)   SpO2 95%   BMI 38.28 kg/m    Body mass index is 38.28 kg/m .  Physical Exam   GENERAL: healthy, alert and no distress  EYES: Eyes grossly normal to inspection, PERRL and conjunctivae and sclerae normal  CV: regular rate and rhythm, normal S1 S2, no S3 or S4, no murmur, click or rub, no peripheral edema and peripheral pulses strong  ABDOMEN: soft, nontender, no hepatosplenomegaly, no masses and bowel sounds normal  PSYCH: mentation appears normal, affect normal/bright          "

## 2021-10-27 ENCOUNTER — IMMUNIZATION (OUTPATIENT)
Dept: NURSING | Facility: CLINIC | Age: 66
End: 2021-10-27
Payer: MEDICARE

## 2021-10-27 DIAGNOSIS — Z23 HIGH PRIORITY FOR 2019-NCOV VACCINE: Primary | ICD-10-CM

## 2021-10-27 PROCEDURE — 91300 COVID-19,PF,PFIZER (12+ YRS): CPT

## 2021-10-27 PROCEDURE — 99207 PR NO CHARGE LOS: CPT

## 2021-10-27 PROCEDURE — 0003A COVID-19,PF,PFIZER (12+ YRS): CPT

## 2021-10-28 LAB
TTG IGA SER-ACNC: 0.3 U/ML
TTG IGG SER-ACNC: 0.8 U/ML

## 2021-10-29 DIAGNOSIS — C90.01 MULTIPLE MYELOMA IN REMISSION (H): Primary | ICD-10-CM

## 2021-10-29 RX ORDER — LENALIDOMIDE 10 MG/1
10 CAPSULE ORAL DAILY
Qty: 28 CAPSULE | Refills: 0 | Status: SHIPPED | OUTPATIENT
Start: 2021-10-29 | End: 2021-12-22

## 2021-10-31 PROCEDURE — 82656 EL-1 FECAL QUAL/SEMIQ: CPT | Performed by: NURSE PRACTITIONER

## 2021-10-31 PROCEDURE — 87328 CRYPTOSPORIDIUM AG IA: CPT | Performed by: NURSE PRACTITIONER

## 2021-10-31 PROCEDURE — 87493 C DIFF AMPLIFIED PROBE: CPT | Performed by: NURSE PRACTITIONER

## 2021-10-31 PROCEDURE — 99000 SPECIMEN HANDLING OFFICE-LAB: CPT | Performed by: NURSE PRACTITIONER

## 2021-10-31 PROCEDURE — 87329 GIARDIA AG IA: CPT | Performed by: NURSE PRACTITIONER

## 2021-10-31 PROCEDURE — 82705 FATS/LIPIDS FECES QUAL: CPT | Mod: 90 | Performed by: NURSE PRACTITIONER

## 2021-11-01 ENCOUNTER — APPOINTMENT (OUTPATIENT)
Dept: LAB | Facility: CLINIC | Age: 66
End: 2021-11-01
Payer: MEDICARE

## 2021-11-01 LAB — C DIFF TOX B STL QL: NEGATIVE

## 2021-11-02 LAB
C PARVUM AG STL QL IA: NEGATIVE
FAT STL QL: NORMAL
G LAMBLIA AG STL QL IA: NEGATIVE
NEUTRAL FAT STL QL: NORMAL

## 2021-11-03 ENCOUNTER — TELEPHONE (OUTPATIENT)
Dept: GASTROENTEROLOGY | Facility: CLINIC | Age: 66
End: 2021-11-03
Payer: MEDICARE

## 2021-11-03 ENCOUNTER — TELEPHONE (OUTPATIENT)
Dept: FAMILY MEDICINE | Facility: CLINIC | Age: 66
End: 2021-11-03

## 2021-11-03 ENCOUNTER — TELEPHONE (OUTPATIENT)
Dept: ONCOLOGY | Facility: CLINIC | Age: 66
End: 2021-11-03

## 2021-11-03 DIAGNOSIS — K86.81 EXOCRINE PANCREATIC INSUFFICIENCY: Primary | ICD-10-CM

## 2021-11-03 LAB — ELASTASE PANC STL-MCNT: 37.2 UG/G

## 2021-11-03 NOTE — TELEPHONE ENCOUNTER
Oral Chemotherapy Monitoring Program   Medication: Revlimid  Rx: 10mg PO daily on days 1 through 21 of 28 day cycle   Auth #: [id number]   Risk Category: Adult Female NORP  Routine survey questions reviewed.   Rx to be Escribed to Pete Borja  [facility]  Oncology Pharmacy Liaison   [email or web address]  [phone] (phone)  [phone] (fax)

## 2021-11-03 NOTE — TELEPHONE ENCOUNTER
Advanced [facility]     Referring provider: Delmi Gross APRN CNP    Referred to: [facility]     Provider Requested: none specified     Referral Received: 11/3/21     Records received: Epic  Fecal elastase 37.2  Lipase 266  hgb A1C 7.8     Images received: none    Evaluation for: Exocrine pancreatic insufficiency     Clinical History (per RN review):     Office visit on 10/26  Type 2 diabetes mellitus without complication, with long-term current use of insulin (H)  A1c not at goal of less than 7. Is following with [facility] pharmacy, has follow up in 2 days.      Diarrhea, unspecified type  Diarrhea has not improved with changes in metformin. Will get workup to evaluate for other causes of diarrhea. Continue imodium PRN.   - Adult Gastro Ref - Consult Only; Future  - Clostridium difficile toxin B PCR  - CBC with platelets  - Comprehensive metabolic panel  - TSH  - Tissue transglutaminase chritsin IgA and IgG  - Erythrocyte sedimentation rate auto  - CRP inflammation  - Fat Stool Qual Random Collection  - Elastase Fecal  - Cryptosporidium/Giardia Immunoassay  - Amylase  - Lipase    MD review date:   MD Decision for clinic consultation/Orders:            Referral updates/Patient contacted:

## 2021-11-04 ENCOUNTER — TELEPHONE (OUTPATIENT)
Dept: PHARMACY | Facility: CLINIC | Age: 66
End: 2021-11-04

## 2021-11-04 ENCOUNTER — PATIENT OUTREACH (OUTPATIENT)
Dept: GASTROENTEROLOGY | Facility: CLINIC | Age: 66
End: 2021-11-04

## 2021-11-04 DIAGNOSIS — K86.81 EXOCRINE PANCREATIC INSUFFICIENCY: Primary | ICD-10-CM

## 2021-11-04 NOTE — TELEPHONE ENCOUNTER
PHARMACY TELEPHONE ENCOUNTER:     Reason: Schedule visit        I called this patient today in an attempt to schedule a visit. I left a voicemail for the patient to call back to schedule this appointment with me.      Mika Armstrong, Pharm. D.   Medication Therapy Management Pharmacist

## 2021-11-04 NOTE — PROGRESS NOTES
Called pt to discuss referral and Dr Bangura's recommendations  CT with IV contrast and pancreatic protocol and then I can see in clinic.    Pt opted for next available clinic date of 12/22, video visit, with request to be on waitlist if another clinic spot opens up sooner. Pt did voice concern of  having pretty consistent diarrhea, a lot of nausea, sometimes vomiting. We talked about her low fecal elastase level likely causing those symptoms.   Offered to see if she could get scheduled sooner for clinic with another Adv Endo provider, pt said no that she would keep the 12/22 appt     Ct scan ordered.    Message sent to clinic coordinators to assist in scheduling    Laurie Melton, RN, BSN,   [facility]  Care coordinator

## 2021-11-07 DIAGNOSIS — E11.42 TYPE 2 DIABETES MELLITUS WITH DIABETIC POLYNEUROPATHY, WITH LONG-TERM CURRENT USE OF INSULIN (H): ICD-10-CM

## 2021-11-07 DIAGNOSIS — Z79.4 TYPE 2 DIABETES MELLITUS WITH DIABETIC POLYNEUROPATHY, WITH LONG-TERM CURRENT USE OF INSULIN (H): ICD-10-CM

## 2021-11-07 RX ORDER — INSULIN GLARGINE 100 [IU]/ML
INJECTION, SOLUTION SUBCUTANEOUS
Qty: 30 ML | Refills: 0 | OUTPATIENT
Start: 2021-11-07

## 2021-11-09 DIAGNOSIS — Z79.4 TYPE 2 DIABETES MELLITUS WITH DIABETIC POLYNEUROPATHY, WITH LONG-TERM CURRENT USE OF INSULIN (H): ICD-10-CM

## 2021-11-09 DIAGNOSIS — E11.42 TYPE 2 DIABETES MELLITUS WITH DIABETIC POLYNEUROPATHY, WITH LONG-TERM CURRENT USE OF INSULIN (H): ICD-10-CM

## 2021-11-09 RX ORDER — INSULIN GLARGINE 100 [IU]/ML
INJECTION, SOLUTION SUBCUTANEOUS
Qty: 30 ML | Refills: 0 | OUTPATIENT
Start: 2021-11-09

## 2021-11-09 NOTE — TELEPHONE ENCOUNTER
Call to [facility] pharmacy after receiving 2nd refill request.  1st refill pharmacy notified to send to originating provider and pharmacy called.  Spoke with technician, their records are showing the refill request is being sent to Delmi Gross.  Advised it came to Dr Rosenthal.  They will correct and hand fax to correct provider. Refill declined

## 2021-11-21 DIAGNOSIS — C90.01 MULTIPLE MYELOMA IN REMISSION (H): Primary | ICD-10-CM

## 2021-11-29 ENCOUNTER — LAB (OUTPATIENT)
Dept: LAB | Facility: CLINIC | Age: 66
End: 2021-11-29
Payer: MEDICARE

## 2021-11-29 DIAGNOSIS — E11.65 TYPE 2 DIABETES MELLITUS WITH HYPERGLYCEMIA (H): Primary | ICD-10-CM

## 2021-11-29 DIAGNOSIS — C90.01 MULTIPLE MYELOMA IN REMISSION (H): ICD-10-CM

## 2021-11-29 LAB
ALBUMIN SERPL-MCNC: 3.8 G/DL (ref 3.4–5)
ALP SERPL-CCNC: 129 U/L (ref 40–150)
ALT SERPL W P-5'-P-CCNC: 35 U/L (ref 0–50)
ANION GAP SERPL CALCULATED.3IONS-SCNC: 6 MMOL/L (ref 3–14)
AST SERPL W P-5'-P-CCNC: 14 U/L (ref 0–45)
BASOPHILS # BLD AUTO: 0.1 10E3/UL (ref 0–0.2)
BASOPHILS NFR BLD AUTO: 2 %
BILIRUB SERPL-MCNC: 1.4 MG/DL (ref 0.2–1.3)
BUN SERPL-MCNC: 13 MG/DL (ref 7–30)
CALCIUM SERPL-MCNC: 9.3 MG/DL (ref 8.5–10.1)
CHLORIDE BLD-SCNC: 102 MMOL/L (ref 94–109)
CO2 SERPL-SCNC: 27 MMOL/L (ref 20–32)
CREAT SERPL-MCNC: 0.78 MG/DL (ref 0.52–1.04)
EOSINOPHIL # BLD AUTO: 0.4 10E3/UL (ref 0–0.7)
EOSINOPHIL NFR BLD AUTO: 10 %
ERYTHROCYTE [DISTWIDTH] IN BLOOD BY AUTOMATED COUNT: 15.4 % (ref 10–15)
GFR SERPL CREATININE-BSD FRML MDRD: 81 ML/MIN/1.73M2
GLUCOSE BLD-MCNC: 285 MG/DL (ref 70–99)
HCT VFR BLD AUTO: 41.1 % (ref 35–47)
HGB BLD-MCNC: 13.7 G/DL (ref 11.7–15.7)
LYMPHOCYTES # BLD AUTO: 0.8 10E3/UL (ref 0.8–5.3)
LYMPHOCYTES NFR BLD AUTO: 21 %
MCH RBC QN AUTO: 28.5 PG (ref 26.5–33)
MCHC RBC AUTO-ENTMCNC: 33.3 G/DL (ref 31.5–36.5)
MCV RBC AUTO: 85 FL (ref 78–100)
MONOCYTES # BLD AUTO: 0.4 10E3/UL (ref 0–1.3)
MONOCYTES NFR BLD AUTO: 11 %
NEUTROPHILS # BLD AUTO: 2.2 10E3/UL (ref 1.6–8.3)
NEUTROPHILS NFR BLD AUTO: 56 %
PLATELET # BLD AUTO: 129 10E3/UL (ref 150–450)
POTASSIUM BLD-SCNC: 3.7 MMOL/L (ref 3.4–5.3)
PROT SERPL-MCNC: 7.1 G/DL (ref 6.8–8.8)
RBC # BLD AUTO: 4.81 10E6/UL (ref 3.8–5.2)
SODIUM SERPL-SCNC: 135 MMOL/L (ref 133–144)
TOTAL PROTEIN SERUM FOR ELP: 6.7 G/DL (ref 6.8–8.8)
WBC # BLD AUTO: 3.9 10E3/UL (ref 4–11)

## 2021-11-29 PROCEDURE — 83883 ASSAY NEPHELOMETRY NOT SPEC: CPT

## 2021-11-29 PROCEDURE — 84155 ASSAY OF PROTEIN SERUM: CPT | Mod: 59

## 2021-11-29 PROCEDURE — 36415 COLL VENOUS BLD VENIPUNCTURE: CPT

## 2021-11-29 PROCEDURE — 84165 PROTEIN E-PHORESIS SERUM: CPT | Performed by: PATHOLOGY

## 2021-11-29 PROCEDURE — 82784 ASSAY IGA/IGD/IGG/IGM EACH: CPT

## 2021-11-29 PROCEDURE — 85025 COMPLETE CBC W/AUTO DIFF WBC: CPT

## 2021-11-29 PROCEDURE — 80053 COMPREHEN METABOLIC PANEL: CPT

## 2021-11-30 ENCOUNTER — TELEPHONE (OUTPATIENT)
Dept: ONCOLOGY | Facility: CLINIC | Age: 66
End: 2021-11-30
Payer: MEDICARE

## 2021-11-30 DIAGNOSIS — C90.01 MULTIPLE MYELOMA IN REMISSION (H): Primary | ICD-10-CM

## 2021-11-30 LAB
ALBUMIN SERPL ELPH-MCNC: 4.4 G/DL (ref 3.7–5.1)
ALPHA1 GLOB SERPL ELPH-MCNC: 0.3 G/DL (ref 0.2–0.4)
ALPHA2 GLOB SERPL ELPH-MCNC: 0.6 G/DL (ref 0.5–0.9)
B-GLOBULIN SERPL ELPH-MCNC: 0.8 G/DL (ref 0.6–1)
GAMMA GLOB SERPL ELPH-MCNC: 0.7 G/DL (ref 0.7–1.6)
IGA SERPL-MCNC: 153 MG/DL (ref 84–499)
IGG SERPL-MCNC: 684 MG/DL (ref 610–1616)
IGM SERPL-MCNC: 33 MG/DL (ref 35–242)
KAPPA LC FREE SER-MCNC: 2.17 MG/DL (ref 0.33–1.94)
KAPPA LC FREE/LAMBDA FREE SER NEPH: 1.32 {RATIO} (ref 0.26–1.65)
LAMBDA LC FREE SERPL-MCNC: 1.64 MG/DL (ref 0.57–2.63)
M PROTEIN SERPL ELPH-MCNC: 0 G/DL
PROT PATTERN SERPL ELPH-IMP: NORMAL

## 2021-11-30 RX ORDER — LENALIDOMIDE 10 MG/1
10 CAPSULE ORAL DAILY
Qty: 28 CAPSULE | Refills: 0 | Status: SHIPPED | OUTPATIENT
Start: 2021-11-30 | End: 2021-11-30

## 2021-11-30 RX ORDER — LENALIDOMIDE 10 MG/1
10 CAPSULE ORAL DAILY
Qty: 28 CAPSULE | Refills: 0 | Status: SHIPPED | OUTPATIENT
Start: 2021-11-30 | End: 2022-03-08

## 2021-11-30 NOTE — TELEPHONE ENCOUNTER
Oral Chemotherapy Monitoring Program   Medication: Revlimid  Rx: 10mg PO daily on days 1 through 28 of 28 day cycle   Auth #: [id number]   Risk Category: Adult Female NORP  Routine survey questions reviewed.   Rx to be Escribed to Pete Borja  [facility]  Oncology Pharmacy Liaison   [email or web address]  [phone] (phone)  [phone] (fax)

## 2021-12-01 RX ORDER — LENALIDOMIDE 10 MG/1
CAPSULE ORAL
OUTPATIENT
Start: 2021-12-01

## 2021-12-01 NOTE — PROGRESS NOTES
[facility]     Diagnosis:   1. IgA kappa MM, Dx 2018    Treatments:  1. RVD+Zometa   2. Cristin auto 4/2019  3. Rev maintenance 10 mg daily     PMH: HTN, DM    Interval history: Neuropathy stable in upper and lower extremities. Sugars not controlled. Pancreatic insufficiency recently diagnosed with a lot of diarrhea. 10-point ROS otherwise negative.     Lab Results   Component Value Date    WBC 3.9 (L) 11/29/2021    HGB 13.7 11/29/2021    HCT 41.1 11/29/2021     (L) 11/29/2021     11/29/2021    POTASSIUM 3.7 11/29/2021    CHLORIDE 102 11/29/2021    CO2 27 11/29/2021    BUN 13 11/29/2021    CR 0.78 11/29/2021     (H) 11/29/2021    SED 7 10/26/2021    AST 14 11/29/2021    ALT 35 11/29/2021    ALKPHOS 129 11/29/2021    BILITOTAL 1.4 (H) 11/29/2021       Current Outpatient Medications   Medication     acetaminophen (TYLENOL) 325 MG tablet     acyclovir (ZOVIRAX) 400 MG tablet     aspirin (ASA) 325 MG EC tablet     atorvastatin (LIPITOR) 20 MG tablet     blood glucose (NO BRAND SPECIFIED) test strip     blood glucose monitoring (NO BRAND SPECIFIED) meter device kit     dulaglutide (TRULICITY) 1.5 MG/0.5ML pen     empagliflozin (JARDIANCE) 10 MG TABS tablet     insulin glargine (LANTUS PEN) 100 UNIT/ML pen     insulin pen needle (FIFTY50 PEN NEEDLES) 32G X 4 MM miscellaneous     LENalidomide (REVLIMID) 10 MG CAPS capsule     LENalidomide (REVLIMID) 10 MG CAPS capsule     LENalidomide (REVLIMID) 10 MG CAPS capsule     levothyroxine (SYNTHROID/LEVOTHROID) 175 MCG tablet     losartan (COZAAR) 25 MG tablet     metFORMIN (GLUCOPHAGE-XR) 500 MG 24 hr tablet     pregabalin (LYRICA) 100 MG capsule     propranolol ER (INDERAL LA) 60 MG 24 hr capsule     sertraline (ZOLOFT) 100 MG tablet     No current facility-administered medications for this visit.     Ph/E:   Vitals: /71   Pulse 59   Temp 98.4  F (36.9  C) (Oral)   Resp 18   Wt 117.4 kg (258 lb 14.4 oz)   SpO2 98%    BMI 38.34 kg/m    General: NAD; H&N: no mucosal lesions; Lungs clear; Heart RRR; Abdomen; Soft, No organomegaly; Extremities: No edema; Skin: No rash; Neuro: Nonfocal; Mood/Affect: appropriate; Lymph: no LAD    A&P:   1. MM: ~2.5y post-auto, Kappa 2.17H but M spike zero, continue rev maintenance + ASA. Poorly controlled DM could be contributing to neuropathy and revlimid worsening it, but it is hard to blame the revlimid totally.  2. ID: Stop acyclovir. S/p covid and flu shots.   3. S/p thyroidectomy. On synthroid.

## 2021-12-02 ENCOUNTER — PATIENT OUTREACH (OUTPATIENT)
Dept: ONCOLOGY | Facility: CLINIC | Age: 66
End: 2021-12-02
Payer: COMMERCIAL

## 2021-12-02 ENCOUNTER — OFFICE VISIT (OUTPATIENT)
Dept: TRANSPLANT | Facility: CLINIC | Age: 66
End: 2021-12-02
Attending: INTERNAL MEDICINE
Payer: COMMERCIAL

## 2021-12-02 VITALS
BODY MASS INDEX: 38.34 KG/M2 | WEIGHT: 258.9 LBS | HEART RATE: 59 BPM | TEMPERATURE: 98.4 F | SYSTOLIC BLOOD PRESSURE: 124 MMHG | OXYGEN SATURATION: 98 % | DIASTOLIC BLOOD PRESSURE: 71 MMHG | RESPIRATION RATE: 18 BRPM

## 2021-12-02 DIAGNOSIS — C90.01 MULTIPLE MYELOMA IN REMISSION (H): Primary | ICD-10-CM

## 2021-12-02 PROCEDURE — 99213 OFFICE O/P EST LOW 20 MIN: CPT | Performed by: INTERNAL MEDICINE

## 2021-12-02 PROCEDURE — G0463 HOSPITAL OUTPT CLINIC VISIT: HCPCS

## 2021-12-02 NOTE — NURSING NOTE
"Oncology Rooming Note    December 2, 2021 2:50 PM   Winter Loya is a 65 year old female who presents for:    Chief Complaint   Patient presents with     Oncology Clinic Visit     Pt is here for a rtn for MM     Initial Vitals: Blood Pressure 124/71   Pulse 59   Temperature 98.4  F (36.9  C) (Oral)   Respiration 18   Weight 117.4 kg (258 lb 14.4 oz)   Oxygen Saturation 98%   Body Mass Index 38.34 kg/m   Estimated body mass index is 38.34 kg/m  as calculated from the following:    Height as of 10/26/21: 1.75 m (5' 8.9\").    Weight as of this encounter: 117.4 kg (258 lb 14.4 oz). Body surface area is 2.39 meters squared.  Data Unavailable Comment: Data Unavailable   No LMP recorded. Patient is postmenopausal.  Allergies reviewed: Yes  Medications reviewed: Yes    Medications: Medication refills not needed today.  Pharmacy name entered into [facility]:    [facility] - [place], [place] - [address]   [id number] [place], [place] - [address] [place] [place] - [address] [place] [address] [facility]Y [id number] - [place], [place] - [address] [place] [address]    Clinical concerns: none       Ronna Wood MA            "

## 2021-12-02 NOTE — LETTER
12/2/2021         RE: Winter Loya  [address]  [facility] [address]        Dear Colleague,    Thank you for referring your patient, Winter Loya, to the [facility] [place]. Please see a copy of my visit note below.    [facility]     Diagnosis:   1. IgA kappa MM, Dx 2018    Treatments:  1. RVD+Zometa   2. Cristin auto 4/2019  3. Rev maintenance 10 mg daily     PMH: HTN, DM    Interval history: Neuropathy stable in upper and lower extremities. Sugars not controlled. Pancreatic insufficiency recently diagnosed with a lot of diarrhea. 10-point ROS otherwise negative.     Lab Results   Component Value Date    WBC 3.9 (L) 11/29/2021    HGB 13.7 11/29/2021    HCT 41.1 11/29/2021     (L) 11/29/2021     11/29/2021    POTASSIUM 3.7 11/29/2021    CHLORIDE 102 11/29/2021    CO2 27 11/29/2021    BUN 13 11/29/2021    CR 0.78 11/29/2021     (H) 11/29/2021    SED 7 10/26/2021    AST 14 11/29/2021    ALT 35 11/29/2021    ALKPHOS 129 11/29/2021    BILITOTAL 1.4 (H) 11/29/2021       Current Outpatient Medications   Medication     acetaminophen (TYLENOL) 325 MG tablet     acyclovir (ZOVIRAX) 400 MG tablet     aspirin (ASA) 325 MG EC tablet     atorvastatin (LIPITOR) 20 MG tablet     blood glucose (NO BRAND SPECIFIED) test strip     blood glucose monitoring (NO BRAND SPECIFIED) meter device kit     dulaglutide (TRULICITY) 1.5 MG/0.5ML pen     empagliflozin (JARDIANCE) 10 MG TABS tablet     insulin glargine (LANTUS PEN) 100 UNIT/ML pen     insulin pen needle (FIFTY50 PEN NEEDLES) 32G X 4 MM miscellaneous     LENalidomide (REVLIMID) 10 MG CAPS capsule     LENalidomide (REVLIMID) 10 MG CAPS capsule     LENalidomide (REVLIMID) 10 MG CAPS capsule     levothyroxine (SYNTHROID/LEVOTHROID) 175 MCG tablet     losartan (COZAAR) 25 MG tablet     metFORMIN (GLUCOPHAGE-XR) 500 MG 24 hr tablet     pregabalin (LYRICA) 100 MG capsule      propranolol ER (INDERAL LA) 60 MG 24 hr capsule     sertraline (ZOLOFT) 100 MG tablet     No current facility-administered medications for this visit.     Ph/E:   Vitals: /71   Pulse 59   Temp 98.4  F (36.9  C) (Oral)   Resp 18   Wt 117.4 kg (258 lb 14.4 oz)   SpO2 98%   BMI 38.34 kg/m    General: NAD; H&N: no mucosal lesions; Lungs clear; Heart RRR; Abdomen; Soft, No organomegaly; Extremities: No edema; Skin: No rash; Neuro: Nonfocal; Mood/Affect: appropriate; Lymph: no LAD    A&P:   1. MM: ~2.5y post-auto, Kappa 2.17H but M spike zero, continue rev maintenance + ASA. Poorly controlled DM could be contributing to neuropathy and revlimid worsening it, but it is hard to blame the revlimid totally.  2. ID: Stop acyclovir. S/p covid and flu shots.   3. S/p thyroidectomy. On synthroid.       Flash Mares MD

## 2021-12-02 NOTE — PROGRESS NOTES
RN met with pt, amy Max. Introduced self and role of RN Care Coordinator at [facility]. Provided my contact information, [facility] phone number (which has options to talk with a Nurse available 24/7 - triage and RNCC via this option during business hours).   Discussed use of MyChart including to not use it for symptoms.  Had pt complete authorization to discuss PHI form.  Gave form to BOH team to enter into Epic.  Completed learning assessment with patient.  Pt did ask about continuing to see Reva Mojica PA-C, when in for [facility] visits.  Stated not sure if that will be possible given [facility] assignments but would check with Reva.  Message sent.  Pt denied any questions at end of conversation.

## 2021-12-03 ENCOUNTER — TELEPHONE (OUTPATIENT)
Dept: ONCOLOGY | Facility: CLINIC | Age: 66
End: 2021-12-03
Payer: COMMERCIAL

## 2021-12-03 NOTE — TELEPHONE ENCOUNTER
Per Laurie ENCARNACION called patient to get the CT scan. Left a VM for her to call back and schedule the scan.

## 2021-12-07 ENCOUNTER — ANCILLARY PROCEDURE (OUTPATIENT)
Dept: CT IMAGING | Facility: CLINIC | Age: 66
End: 2021-12-07
Attending: INTERNAL MEDICINE
Payer: COMMERCIAL

## 2021-12-07 PROCEDURE — 74177 CT ABD & PELVIS W/CONTRAST: CPT | Performed by: RADIOLOGY

## 2021-12-07 RX ORDER — IOPAMIDOL 755 MG/ML
135 INJECTION, SOLUTION INTRAVASCULAR ONCE
Status: COMPLETED | OUTPATIENT
Start: 2021-12-07 | End: 2021-12-07

## 2021-12-07 RX ADMIN — IOPAMIDOL 135 ML: 755 INJECTION, SOLUTION INTRAVASCULAR at 14:48

## 2021-12-10 DIAGNOSIS — Z79.4 TYPE 2 DIABETES MELLITUS WITH DIABETIC POLYNEUROPATHY, WITH LONG-TERM CURRENT USE OF INSULIN (H): ICD-10-CM

## 2021-12-10 DIAGNOSIS — E11.42 TYPE 2 DIABETES MELLITUS WITH DIABETIC POLYNEUROPATHY, WITH LONG-TERM CURRENT USE OF INSULIN (H): ICD-10-CM

## 2021-12-11 ENCOUNTER — DOCUMENTATION ONLY (OUTPATIENT)
Dept: ONCOLOGY | Facility: CLINIC | Age: 66
End: 2021-12-11
Payer: COMMERCIAL

## 2021-12-11 DIAGNOSIS — Z79.4 TYPE 2 DIABETES MELLITUS WITH DIABETIC POLYNEUROPATHY, WITH LONG-TERM CURRENT USE OF INSULIN (H): ICD-10-CM

## 2021-12-11 DIAGNOSIS — E11.42 TYPE 2 DIABETES MELLITUS WITH DIABETIC POLYNEUROPATHY, WITH LONG-TERM CURRENT USE OF INSULIN (H): ICD-10-CM

## 2021-12-12 RX ORDER — DULAGLUTIDE 1.5 MG/.5ML
1.5 INJECTION, SOLUTION SUBCUTANEOUS
Qty: 2 ML | Refills: 0 | Status: SHIPPED | OUTPATIENT
Start: 2021-12-12 | End: 2022-01-12

## 2021-12-12 RX ORDER — INSULIN GLARGINE 100 [IU]/ML
INJECTION, SOLUTION SUBCUTANEOUS
Qty: 30 ML | Refills: 0 | Status: SHIPPED | OUTPATIENT
Start: 2021-12-12 | End: 2022-03-07

## 2021-12-14 ENCOUNTER — PATIENT OUTREACH (OUTPATIENT)
Dept: GASTROENTEROLOGY | Facility: CLINIC | Age: 66
End: 2021-12-14
Payer: COMMERCIAL

## 2021-12-14 NOTE — PROGRESS NOTES
Called pt as a reminder of upcoming video visit with Dr Bangura on 12/22. Pt is aware, CT panc protocol done on 12/7 in preparation    Laurie Melton RN, BSN,   Advanced [facility]  Care coordinator

## 2021-12-22 ENCOUNTER — VIRTUAL VISIT (OUTPATIENT)
Dept: GASTROENTEROLOGY | Facility: CLINIC | Age: 66
End: 2021-12-22
Attending: INTERNAL MEDICINE
Payer: COMMERCIAL

## 2021-12-22 ENCOUNTER — PATIENT OUTREACH (OUTPATIENT)
Dept: GASTROENTEROLOGY | Facility: CLINIC | Age: 66
End: 2021-12-22

## 2021-12-22 ENCOUNTER — PREP FOR PROCEDURE (OUTPATIENT)
Dept: GASTROENTEROLOGY | Facility: CLINIC | Age: 66
End: 2021-12-22

## 2021-12-22 DIAGNOSIS — K86.89 PANCREATIC INSUFFICIENCY: Primary | ICD-10-CM

## 2021-12-22 DIAGNOSIS — K52.9 CHRONIC DIARRHEA: ICD-10-CM

## 2021-12-22 DIAGNOSIS — R93.89 ABNORMAL FINDING ON IMAGING: ICD-10-CM

## 2021-12-22 PROCEDURE — 99205 OFFICE O/P NEW HI 60 MIN: CPT | Mod: 95 | Performed by: INTERNAL MEDICINE

## 2021-12-22 PROCEDURE — 999N001193 HC VIDEO/TELEPHONE VISIT; NO CHARGE

## 2021-12-22 PROCEDURE — G0463 HOSPITAL OUTPT CLINIC VISIT: HCPCS | Mod: PN,RTG | Performed by: INTERNAL MEDICINE

## 2021-12-22 NOTE — LETTER
12/22/2021         RE: Winter Loya  [address]  [facility] [address]        Dear Colleague,    Thank you for referring your patient, Winter Loya, to the [facility]. Please see a copy of my visit note below.     INTERVENTIONAL ENDOSCOPY OUTPATIENT CLINIC CONSULT  DATE OF SERVICE: 12/22/2021    PHYSICIAN REQUESTING CONSULT: Delmi Gross APRN CNP  Reason for Consultation: diarrhea, pancreatic insufficiency    ASSESSMENT:  Winter is a 65 year old female with a PMHx of insulin dependant DM, multiple myeloma in remission, and thyroid tumor s/p thyroidectomy referred for a new diagnosis of pancreatic insufficiency (low fecal elastase) on work up of chronic diarrhea. A low fecal elastase is fairly specific for pancreatic insuffiencey. Occasionally a false positive can occur in the setting of very watery diarrhea or small bowel disease (Crohn's, SIBO, celiac) but with normal split fat results, TTG, and CT imaging that seems very unlikely. As for the etiology of her pancreatic insufficiency, I reviewed her CT images and see no evidence of chronic pancreatitis, PD obstruction, or mass. Minimal change chronic pancreatitis is probably the most likely culprit and would go along with her insulin dependant diabetes. Nevertheless, I do recommend an EUS to look at the pancreas with more sensitivity to  any obstructing lesions/revesible causes although will probably be normal. Some mild colonic thickening was seen on her CT and is likely artifact but we should perform a colonoscopy at the same time to ensure no co-existing IBD/microscopic colitis.     We'll start treatment for her pancreatic insufficiency with Creon to be taken with meals and snacks. The urgency/incontinence seems to be the biggest concern for her which hopefully should improve with more formed stools. If after a few days on Creon she is still having problems, I recommended adding supplemental fiber (Metamucil,  Fibercon, etc) to bulk her stools and continue with Kegel exercises. After that she can up titrate the imodium on a scheduled basis up to 6 times daily divided throughout the day. If despite adequate PERT she still has incontinence, she may need pelvic floor testing/rehabilitation. We'll have her meet with our dietician as well to discuss a low fat diet that may help in the setting of pancreatic insufficiency.     RECOMMENDATIONS:  - Start Creon 72k 3 caps with meals  - Then if needed daily fiber supplementation, then scheduled imodium up to 6x per day divided  - Kegel exercies  - Dietician visit  - EUS, colonoscopy    Jose Bangura MD  [facility]  Division of Gastroenterology and Hepatology  [facility] [address] [address]    ________________________________________________________________  HPI:  Winter is a 65 year old female with a PMHx of insulin dependant DM, multiple myeloma in remission, and thyroid tumor s/p thyroidectomy referred for a new diagnosis of pancreatic insufficiency (low fecal elastase) on work up of chronic diarrhea. She reports diarrhea/urgency symptoms starting about 1 year ago with progressive worsening. She has loose BMs ~3x per day with the largest issue of urgency and associated incontinence. She reports now having to wear adult diapers and recently having to change clothes 3 times in a day due to incontinence episodes. She now takes 1-2 tabs of imodium mainly in the morning with some help. Her providers tried changing her diabetes medications (metformin) without much improvement. She notes that her stools do seem 'greasy.' No blood in the stool. She reports a ~10 lbs unintentional weight loss over the past year. She denies any pancreas problems in the past. No family history of pancreas issues. She previously smoked 1 ppd until quitting in 2005. Denies ETOH use. She had a colonoscopy in 2020 that was normal but this pre-dated her  symptom onset.     PMHx:  Past Medical History:   Diagnosis Date     Abnormal EMG 2021 5/25/2021    Interpretation: This is an abnormal study, demonstrating electrophysiologic evidence of a length-dependent axonal sensorimotor polyneuropathy. Asymmetry of peroneal compound muscle action potential amplitudes is a finding of uncertain significance.        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   I have personally reviewed the examination and initial interpretation and I agree with the findings.   ANNE GOODMAN MD     History of MRI of cervical spine 6/2020 3/15/2021    Impression: Multilevel cervical spondylosis, most pronounced at the level of C4-5 and C5-6 with moderate to severe spinal canal stenosis and moderate spinal canal stenosis at C6-7. No abnormal cord signal. No significant neural foraminal narrowing at any level.   I have personally reviewed the examination and initial interpretation and I agree with the findings.   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     Sleep apnea     no c-pap use     Status post complete thyroidectomy 8/26/2020     Thyroid goiter 8/5/2020     Tremor 12/9/2020       PSurgHx:  Past Surgical History:   Procedure Laterality Date     ARTHROSCOPY KNEE Left 02/2014     ARTHROSCOPY KNEE Right 12/2010    KNEE ARTHROSCOPY Dec 2010  Right     CHOLECYSTECTOMY  2002     COLONOSCOPY N/A 07/23/2020    Procedure: COLONOSCOPY;  Surgeon: Za Denis MD;  Location:  [facility] OR     EYE SURGERY  1985    lasersx-spot behind eye started leaking     THYROIDECTOMY N/A 08/26/2020    Procedure: THYROIDECTOMY, TOTAL;  Surgeon: Tiff Burgos MD;  Location: [facility] OR     TONSILLECTOMY  2003     TUBAL LIGATION  1986       MEDS:  Current Outpatient Medications   Medication     acyclovir (ZOVIRAX) 400 MG tablet     aspirin (ASA) 325 MG EC tablet     atorvastatin (LIPITOR) 20 MG tablet     blood glucose (NO BRAND SPECIFIED) test strip     blood glucose monitoring (NO BRAND SPECIFIED) meter device kit     empagliflozin (JARDIANCE) 10 MG TABS tablet     insulin pen needle (FIFTY50 PEN NEEDLES) 32G X 4 MM miscellaneous     LANTUS SOLOSTAR 100 UNIT/ML soln     LENalidomide (REVLIMID) 10 MG CAPS capsule     levothyroxine (SYNTHROID/LEVOTHROID) 175 MCG tablet     losartan (COZAAR) 25 MG tablet     metFORMIN (GLUCOPHAGE-XR) 500 MG 24 hr tablet     pregabalin (LYRICA) 100 MG capsule     propranolol ER (INDERAL LA) 60 MG 24 hr capsule     sertraline (ZOLOFT) 100 MG tablet     TRULICITY 1.5 MG/0.5ML pen     No current facility-administered medications for this visit.     ALLERGIES:  No Known Allergies  FHx:  Family History   Problem Relation Age of Onset     Glaucoma Paternal Grandfather      Chronic Obstructive Pulmonary Disease Mother      Substance Abuse Mother      Alcoholism Mother      Diabetes Mother      Parkinsonism Father         bed bound, stiffness. no dementia or hallucinatinos     Tremor Father      Other - See Comments Father         hip problems     Other - See Comments Sister         lives in [place]     Diabetes Brother      Arrhythmia Brother      Other - See Comments Brother         lives in [place]     Diabetes Brother      Other - See Comments Brother         potts's disease, lives in [facility] and in [place]     Gastrointestinal Disease Brother      Other - See Comments [facility]       SOCIAL Hx:  Social History     Socioeconomic History     Marital status:       Spouse name: Not on file     Number of children: 3     Years of education: Not on file     Highest education level: Not on file   Occupational History     Occupation: alcohol and drug counselor   Tobacco Use     Smoking status: Former Smoker     Packs/day: 1.00     Types: Cigarettes     Quit date:      Years since quittin.9     Smokeless tobacco: Never Used   Substance and Sexual Activity     Alcohol use: Yes     Comment: occasional     Drug use: Never     Sexual activity: Not Currently   Other Topics Concern     Not on file   Social History Narrative     Not on file     Social Determinants of Health     Financial Resource Strain: Not on file   Food Insecurity: Not on file   Transportation Needs: Not on file   Physical Activity: Not on file   Stress: Not on file   Social Connections: Not on file   Intimate Partner Violence: Not on file   Housing Stability: Not on file       ROS: A comprehensive Review of Systems was asked and answered in the negative unless specifically commented upon in the HPI    Physical Exam  Gen: A&Ox3, NAD  HEENT: Moist mucus membranes, no scleral icterus.  Lungs: no respiratory distress    LABS:  Lab on 2021   Component Date Value Ref Range Status     Sodium 2021 135  133 - 144 mmol/L Final     Potassium 2021 3.7  3.4 - 5.3 mmol/L Final     Chloride 2021 102  94 - 109 mmol/L Final     Carbon Dioxide (CO2) 2021 27  20 - 32 mmol/L Final     Anion Gap 2021 6  3 - 14 mmol/L Final     Urea Nitrogen 2021 13  7 - 30 mg/dL Final     Creatinine 2021 0.78  0.52 - 1.04 mg/dL Final     Calcium 2021 9.3  8.5 - 10.1 mg/dL Final     Glucose 2021 285* 70 - 99 mg/dL Final     Alkaline Phosphatase 2021 129  40 - 150 U/L Final     AST 2021 14  0 - 45 U/L Final     ALT 2021 35  0 - 50 U/L Final     Protein Total 2021 7.1  6.8 - 8.8 g/dL Final     Albumin 2021 3.8  3.4 - 5.0 g/dL Final     Bilirubin  Total 11/29/2021 1.4* 0.2 - 1.3 mg/dL Final     GFR Estimate 11/29/2021 81  >60 mL/min/1.73m2 Final    As of July 11, 2021, eGFR is calculated by the CKD-EPI creatinine equation, without race adjustment. eGFR can be influenced by muscle mass, exercise, and diet. The reported eGFR is an estimation only and is only applicable if the renal function is stable.     Immunoglobulin G 11/29/2021 684  610-1,616 mg/dL Final     Immunoglobulin A 11/29/2021 153  84 - 499 mg/dL Final     Immunoglobulin M 11/29/2021 33* 35 - 242 mg/dL Final     Kappa Free Light Chains 11/29/2021 2.17* 0.33 - 1.94 mg/dL Final     Lambda Free Light Chains 11/29/2021 1.64  0.57 - 2.63 mg/dL Final     Kappa /Lambda Ratio 11/29/2021 1.32  0.26 - 1.65 Final     WBC Count 11/29/2021 3.9* 4.0 - 11.0 10e3/uL Final     RBC Count 11/29/2021 4.81  3.80 - 5.20 10e6/uL Final     Hemoglobin 11/29/2021 13.7  11.7 - 15.7 g/dL Final     Hematocrit 11/29/2021 41.1  35.0 - 47.0 % Final     MCV 11/29/2021 85  78 - 100 fL Final     MCH 11/29/2021 28.5  26.5 - 33.0 pg Final     MCHC 11/29/2021 33.3  31.5 - 36.5 g/dL Final     RDW 11/29/2021 15.4* 10.0 - 15.0 % Final     Platelet Count 11/29/2021 129* 150 - 450 10e3/uL Final     % Neutrophils 11/29/2021 56  % Final     % Lymphocytes 11/29/2021 21  % Final     % Monocytes 11/29/2021 11  % Final     % Eosinophils 11/29/2021 10  % Final     % Basophils 11/29/2021 2  % Final     Absolute Neutrophils 11/29/2021 2.2  1.6 - 8.3 10e3/uL Final     Absolute Lymphocytes 11/29/2021 0.8  0.8 - 5.3 10e3/uL Final     Absolute Monocytes 11/29/2021 0.4  0.0 - 1.3 10e3/uL Final     Absolute Eosinophils 11/29/2021 0.4  0.0 - 0.7 10e3/uL Final     Absolute Basophils 11/29/2021 0.1  0.0 - 0.2 10e3/uL Final     Total Protein Serum for ELP 11/29/2021 6.7* 6.8 - 8.8 g/dL Final     Albumin 11/29/2021 4.4  3.7 - 5.1 g/dL Final     Alpha 1 11/29/2021 0.3  0.2 - 0.4 g/dL Final     Alpha 2 11/29/2021 0.6  0.5 - 0.9 g/dL Final     Beta Globulin  11/29/2021 0.8  0.6 - 1.0 g/dL Final     Gamma Globulin 11/29/2021 0.7  0.7 - 1.6 g/dL Final     Monoclonal Peak 11/29/2021 0.0  <=0.0 g/dL Final     ELP Interpretation 11/29/2021 Essentially normal electrophoretic pattern. No obvious monoclonal proteins seen. Pathologic significance requires clinical correlation. MIGUEL A Luna M.D., Ph.D., Pathologist.   Final     10/31/21 Fecal elastase 37.2  Split fat normal  Neutral fat normal    C. Diff neg  Crypto/Giardia neg    TTG normal    IMAGING:  CT ABDOMEN PELVIS W CONTRAST 12/7/2021 2:52 PM      History: Pancreatic insufficiency. History of multiple myeloma.     Comparison: CT 2/15/2020.     Technique: CT of the abdomen and pelvis were obtained with contrast  utilizing pancreatic mass protocol. Sagittal and coronal  reconstructions created and reviewed. Contrast dose: Isovue 370 135mL     Findings:      Abdomen and pelvis: Normal hepatic parenchyma without focal lesions.  Gallbladder is surgically absent. No intra- or extrahepatic biliary  dilation. Relative preservation of the pancreatic parenchyma. The  pancreatic duct within normal limits. No suspicious pancreatic masses  or lesions. Spleen and adrenal glands are unremarkable. Tiny soft  tissue nodule in the left upper quadrant (series 7 image 54),  presumably splenule. Kidneys demonstrate symmetric enhancement without  solid lesions, hydronephrosis, or nephrolithiasis. Ureters and urinary  bladder are unremarkable. Uterus and ovaries are unremarkable.  Stomach, small intestine are unremarkable. Rectosigmoid wall  thickening versus decompressed bowel. Appendix is visualized and  normal in caliber.      No suspicious abdominal or pelvic lymphadenopathy. No free fluid. No  pneumoperitoneum.     Abdominal aorta is normal in caliber and patent. Celiac and mesenteric  artery origins are unremarkable. Portal veins and IVC are patent. Left  retroaortic renal vein.     Lower thorax: Chronic right basilar subsegmental  atelectasis and left  basilar scarring. Heart size within normal limits. Calcifications of  the mitral annulus.     Bones and soft tissues: Increased subcentimeter sclerotic foci in the  right sacrum (series 7 image 340) and the left posterior superior  iliac crest (series 7 image 290). Stable tiny sclerotic foci in the  right femoral neck and in the L3 vertebral body. Multilevel endplate  degenerative changes throughout the visualized thoracic lumbar spine.  No acute or suspicious osseous abnormality.                                                                      Impression:   1. No significant pancreatic atrophy. No convincing evidence of  chronic pancreatitis or suspicious pancreatic lesions.  2. Two slightly increased sclerotic foci in the right sacrum and left  iliac crest, presumably related to history of multiple myeloma.  3. No abnormal CT findings involving the anteroinferior T11 endplate  to correlate with focal FDG uptake demonstrated on prior PET/CT  2/15/2020.  4. Mild rectosigmoid wall thickening versus decompressed bowel. May  represent mild colitis in a concordant clinical setting alternatively  could be normal.       Endoscopies:  COLONOSCOPY 07/23/2020  7:04 AM [facility]   [address] [place] [id number] [phone]     Endoscopy Department   _______________________________________________________________________________   Patient Name: Winter Loya             Procedure Date: 7/23/2020 7:04 AM   MRN: [id number]                       Account Number: [id number]   YOB: 1955             Admit Type: Ambulatory   Age: 64                                Gender: Female   Note Status: Finalized                Attending MD: Za Denis MD   Pause for the Cause: completed        Total Sedation Time: 35 min. procedure    time 24 min.   _______________________________________________________________________________       Procedure:            Colonoscopy   Indications:         Screening for colorectal malignant neoplasm, Personal                        history of colonic polyps   Providers:           Za Denis MD, Christine Vieira MD:           Medicines:           Fentanyl 100 micrograms IV, Midazolam 3 mg IV   Complications:       No immediate complications.   _______________________________________________________________________________   Procedure:           Pre-Anesthesia Assessment:                        - Prior to the procedure, a History and Physical was                        performed, and patient medications and allergies were                        reviewed. The patient is competent. The risks and                        benefits of the procedure and the sedation options and                        risks were discussed with the patient. All questions                        were answered and informed consent was obtained. Patient                        identification and proposed procedure were verified by                        the nurse in the pre-procedure area. Mental Status                        Examination: normal. Airway Examination: normal                        oropharyngeal airway and neck mobility. Respiratory                        Examination: clear to auscultation. CV Examination:                        normal. Prophylactic Antibiotics: The patient does not                        require prophylactic antibiotics. Prior Anticoagulants:                        The patient has taken no previous anticoagulant or                        antiplatelet agents. ASA Grade Assessment: II - A                        patient with mild systemic disease. After reviewing the                        risks and benefits, the patient was deemed in                        satisfactory condition to undergo the procedure. The                        anesthesia plan was to use moderate sedation / analgesia                        (conscious  sedation). Immediately prior to                        administration of medications, the patient was                        re-assessed for adequacy to receive sedatives. The heart                        rate, respiratory rate, oxygen saturations, blood                        pressure, adequacy of pulmonary ventilation, and                        response to care were monitored throughout the                        procedure. The physical status of the patient was                        re-assessed after the procedure.                        After obtaining informed consent, the colonoscope was                        passed under direct vision. Throughout the procedure,                        the patient's blood pressure, pulse, and oxygen                        saturations were monitored continuously. The Colonoscope                        was introduced through the anus and advanced to the                        cecum, identified by appendiceal orifice and ileocecal                        valve. The colonoscopy was performed without difficulty.                        The patient tolerated the procedure well. The quality of                        the bowel preparation was good.                                                                                     Findings:        The perianal and digital rectal examinations were normal.        A few small-mouthed diverticula were found in the sigmoid colon.        The exam was otherwise without abnormality.                                                                                     Impression:          - Diverticulosis in the sigmoid colon.                        - The examination was otherwise normal.                        - No specimens collected.   Recommendation:      - Discharge patient to home (with escort).                        - Repeat colonoscopy in 5 years for surveillance.                        - Return to referring physician as previously  scheduled.                                               Jose Bangura MD

## 2021-12-22 NOTE — PROGRESS NOTES
Winter is a 65 year old who is being evaluated via a billable video visit.       Pt would like to be in contact with a dietitian.     How would you like to obtain your AVS? MyChart  If the video visit is dropped, the invitation should be resent by: Text to cell phone: [phone]  Will anyone else be joining your video visit? No      Video Start Time: 7:08 AM  Video-Visit Details    Type of service:  Video Visit    Video End Time:7:42 AM    Originating Location (pt. Location): Home    Distant Location (provider location):  [facility]     Platform used for Video Visit: ComptTIA     Kathrin Luna    INTERVENTIONAL ENDOSCOPY OUTPATIENT CLINIC CONSULT  DATE OF SERVICE: 12/22/2021  PHYSICIAN REQUESTING CONSULT: Delmi Gross APRN CNP  Reason for Consultation: diarrhea, pancreatic insufficiency    ASSESSMENT:  Winter is a 65 year old female with a PMHx of insulin dependant DM, multiple myeloma in remission, and thyroid tumor s/p thyroidectomy referred for a new diagnosis of pancreatic insufficiency (low fecal elastase) on work up of chronic diarrhea. A low fecal elastase is fairly specific for pancreatic insuffiencey. Occasionally a false positive can occur in the setting of very watery diarrhea or small bowel disease (Crohn's, SIBO, celiac) but with normal split fat results, TTG, and CT imaging that seems very unlikely. As for the etiology of her pancreatic insufficiency, I reviewed her CT images and see no evidence of chronic pancreatitis, PD obstruction, or mass. Minimal change chronic pancreatitis is probably the most likely culprit and would go along with her insulin dependant diabetes. Nevertheless, I do recommend an EUS to look at the pancreas with more sensitivity to  any obstructing lesions/revesible causes although will probably be normal. Some mild colonic thickening was seen on her CT and is likely artifact but we should perform a colonoscopy at the same time to  ensure no co-existing IBD/microscopic colitis.     We'll start treatment for her pancreatic insufficiency with Creon to be taken with meals and snacks. The urgency/incontinence seems to be the biggest concern for her which hopefully should improve with more formed stools. If after a few days on Creon she is still having problems, I recommended adding supplemental fiber (Metamucil, Fibercon, etc) to bulk her stools and continue with Kegel exercises. After that she can up titrate the imodium on a scheduled basis up to 6 times daily divided throughout the day. If despite adequate PERT she still has incontinence, she may need pelvic floor testing/rehabilitation. We'll have her meet with our dietician as well to discuss a low fat diet that may help in the setting of pancreatic insufficiency.     RECOMMENDATIONS:  - Start Creon 72k 3 caps with meals  - Then if needed daily fiber supplementation, then scheduled imodium up to 6x per day divided  - Kegel exercies  - Dietician visit  - EUS, colonoscopy    Jose Bangura MD  [facility]  Division of Gastroenterology and Hepatology  [place] [id number]    ________________________________________________________________  HPI:  Winter is a 65 year old female with a PMHx of insulin dependant DM, multiple myeloma in remission, and thyroid tumor s/p thyroidectomy referred for a new diagnosis of pancreatic insufficiency (low fecal elastase) on work up of chronic diarrhea. She reports diarrhea/urgency symptoms starting about 1 year ago with progressive worsening. She has loose BMs ~3x per day with the largest issue of urgency and associated incontinence. She reports now having to wear adult diapers and recently having to change clothes 3 times in a day due to incontinence episodes. She now takes 1-2 tabs of imodium mainly in the morning with some help. Her providers tried changing her diabetes medications (metformin)  without much improvement. She notes that her stools do seem 'greasy.' No blood in the stool. She reports a ~10 lbs unintentional weight loss over the past year. She denies any pancreas problems in the past. No family history of pancreas issues. She previously smoked 1 ppd until quitting in 2005. Denies ETOH use. She had a colonoscopy in 2020 that was normal but this pre-dated her symptom onset.     PMHx:  Past Medical History:   Diagnosis Date     Abnormal EMG 2021 5/25/2021    Interpretation: This is an abnormal study, demonstrating electrophysiologic evidence of a length-dependent axonal sensorimotor polyneuropathy. Asymmetry of peroneal compound muscle action potential amplitudes is a finding of uncertain significance.        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   I have personally reviewed the examination and initial interpretation and I agree with the findings.   ANNE GOODMAN MD     History of MRI of cervical spine 6/2020 3/15/2021    Impression: Multilevel cervical spondylosis, most pronounced at the level of C4-5 and C5-6 with moderate to severe spinal canal stenosis and moderate spinal canal stenosis at C6-7. No abnormal cord signal. No significant neural foraminal narrowing at any level.   I have personally reviewed the examination and initial interpretation and I agree with the findings.   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     Sleep apnea     no c-pap use     Status post complete  thyroidectomy 8/26/2020     Thyroid goiter 8/5/2020     Tremor 12/9/2020       PSurgHx:  Past Surgical History:   Procedure Laterality Date     ARTHROSCOPY KNEE Left 02/2014     ARTHROSCOPY KNEE Right 12/2010    KNEE ARTHROSCOPY Dec 2010  Right     CHOLECYSTECTOMY  2002     COLONOSCOPY N/A 07/23/2020    Procedure: COLONOSCOPY;  Surgeon: Za Denis MD;  Location:  OR     EYE SURGERY  1985    lasersx-spot behind eye started leaking     THYROIDECTOMY N/A 08/26/2020    Procedure: THYROIDECTOMY, TOTAL;  Surgeon: Tiff Burgos MD;  Location: [facility] OR     TONSILLECTOMY  2003     TUBAL LIGATION  1986       MEDS:  Current Outpatient Medications   Medication     acyclovir (ZOVIRAX) 400 MG tablet     aspirin (ASA) 325 MG EC tablet     atorvastatin (LIPITOR) 20 MG tablet     blood glucose (NO BRAND SPECIFIED) test strip     blood glucose monitoring (NO BRAND SPECIFIED) meter device kit     empagliflozin (JARDIANCE) 10 MG TABS tablet     insulin pen needle (FIFTY50 PEN NEEDLES) 32G X 4 MM miscellaneous     LANTUS SOLOSTAR 100 UNIT/ML soln     LENalidomide (REVLIMID) 10 MG CAPS capsule     levothyroxine (SYNTHROID/LEVOTHROID) 175 MCG tablet     losartan (COZAAR) 25 MG tablet     metFORMIN (GLUCOPHAGE-XR) 500 MG 24 hr tablet     pregabalin (LYRICA) 100 MG capsule     propranolol ER (INDERAL LA) 60 MG 24 hr capsule     sertraline (ZOLOFT) 100 MG tablet     TRULICITY 1.5 MG/0.5ML pen     No current facility-administered medications for this visit.     ALLERGIES:  No Known Allergies  FHx:  Family History   Problem Relation Age of Onset     Glaucoma Paternal Grandfather      Chronic Obstructive Pulmonary Disease Mother      Substance Abuse Mother      Alcoholism Mother      Diabetes Mother      Parkinsonism Father         bed bound, stiffness. no dementia or hallucinatinos     Tremor Father      Other - See Comments Father         hip problems     Other - See Comments Sister         lives in [place]      Diabetes Brother      Arrhythmia Brother      Other - See Comments Brother         lives in [place]     Diabetes Brother      Other - See Comments Brother         potts's disease, lives in [facility] and in [place]     Gastrointestinal Disease Brother      Other - See Comments Brother         [place]       SOCIAL Hx:  Social History     Socioeconomic History     Marital status:      Spouse name: Not on file     Number of children: 3     Years of education: Not on file     Highest education level: Not on file   Occupational History     Occupation: alcohol and drug counselor   Tobacco Use     Smoking status: Former Smoker     Packs/day: 1.00     Types: Cigarettes     Quit date:      Years since quittin.9     Smokeless tobacco: Never Used   Substance and Sexual Activity     Alcohol use: Yes     Comment: occasional     Drug use: Never     Sexual activity: Not Currently   Other Topics Concern     Not on file   Social History Narrative     Not on file     Social Determinants of Health     Financial Resource Strain: Not on file   Food Insecurity: Not on file   Transportation Needs: Not on file   Physical Activity: Not on file   Stress: Not on file   Social Connections: Not on file   Intimate Partner Violence: Not on file   Housing Stability: Not on file       ROS: A comprehensive Review of Systems was asked and answered in the negative unless specifically commented upon in the HPI    Physical Exam  Gen: A&Ox3, NAD  HEENT: Moist mucus membranes, no scleral icterus.  Lungs: no respiratory distress    LABS:  Lab on 2021   Component Date Value Ref Range Status     Sodium 2021 135  133 - 144 mmol/L Final     Potassium 2021 3.7  3.4 - 5.3 mmol/L Final     Chloride 2021 102  94 - 109 mmol/L Final     Carbon Dioxide (CO2) 2021 27  20 - 32 mmol/L Final     Anion Gap 2021 6  3 - 14 mmol/L Final     Urea Nitrogen 2021 13  7 - 30 mg/dL Final     Creatinine 2021 0.78   0.52 - 1.04 mg/dL Final     Calcium 11/29/2021 9.3  8.5 - 10.1 mg/dL Final     Glucose 11/29/2021 285* 70 - 99 mg/dL Final     Alkaline Phosphatase 11/29/2021 129  40 - 150 U/L Final     AST 11/29/2021 14  0 - 45 U/L Final     ALT 11/29/2021 35  0 - 50 U/L Final     Protein Total 11/29/2021 7.1  6.8 - 8.8 g/dL Final     Albumin 11/29/2021 3.8  3.4 - 5.0 g/dL Final     Bilirubin Total 11/29/2021 1.4* 0.2 - 1.3 mg/dL Final     GFR Estimate 11/29/2021 81  >60 mL/min/1.73m2 Final    As of July 11, 2021, eGFR is calculated by the CKD-EPI creatinine equation, without race adjustment. eGFR can be influenced by muscle mass, exercise, and diet. The reported eGFR is an estimation only and is only applicable if the renal function is stable.     Immunoglobulin G 11/29/2021 684  610-1,616 mg/dL Final     Immunoglobulin A 11/29/2021 153  84 - 499 mg/dL Final     Immunoglobulin M 11/29/2021 33* 35 - 242 mg/dL Final     Kappa Free Light Chains 11/29/2021 2.17* 0.33 - 1.94 mg/dL Final     Lambda Free Light Chains 11/29/2021 1.64  0.57 - 2.63 mg/dL Final     Kappa /Lambda Ratio 11/29/2021 1.32  0.26 - 1.65 Final     WBC Count 11/29/2021 3.9* 4.0 - 11.0 10e3/uL Final     RBC Count 11/29/2021 4.81  3.80 - 5.20 10e6/uL Final     Hemoglobin 11/29/2021 13.7  11.7 - 15.7 g/dL Final     Hematocrit 11/29/2021 41.1  35.0 - 47.0 % Final     MCV 11/29/2021 85  78 - 100 fL Final     MCH 11/29/2021 28.5  26.5 - 33.0 pg Final     MCHC 11/29/2021 33.3  31.5 - 36.5 g/dL Final     RDW 11/29/2021 15.4* 10.0 - 15.0 % Final     Platelet Count 11/29/2021 129* 150 - 450 10e3/uL Final     % Neutrophils 11/29/2021 56  % Final     % Lymphocytes 11/29/2021 21  % Final     % Monocytes 11/29/2021 11  % Final     % Eosinophils 11/29/2021 10  % Final     % Basophils 11/29/2021 2  % Final     Absolute Neutrophils 11/29/2021 2.2  1.6 - 8.3 10e3/uL Final     Absolute Lymphocytes 11/29/2021 0.8  0.8 - 5.3 10e3/uL Final     Absolute Monocytes 11/29/2021 0.4  0.0 - 1.3  10e3/uL Final     Absolute Eosinophils 11/29/2021 0.4  0.0 - 0.7 10e3/uL Final     Absolute Basophils 11/29/2021 0.1  0.0 - 0.2 10e3/uL Final     Total Protein Serum for ELP 11/29/2021 6.7* 6.8 - 8.8 g/dL Final     Albumin 11/29/2021 4.4  3.7 - 5.1 g/dL Final     Alpha 1 11/29/2021 0.3  0.2 - 0.4 g/dL Final     Alpha 2 11/29/2021 0.6  0.5 - 0.9 g/dL Final     Beta Globulin 11/29/2021 0.8  0.6 - 1.0 g/dL Final     Gamma Globulin 11/29/2021 0.7  0.7 - 1.6 g/dL Final     Monoclonal Peak 11/29/2021 0.0  <=0.0 g/dL Final     ELP Interpretation 11/29/2021 Essentially normal electrophoretic pattern. No obvious monoclonal proteins seen. Pathologic significance requires clinical correlation. MIGUEL A Luna M.D., Ph.D., Pathologist.   Final     10/31/21 Fecal elastase 37.2  Split fat normal  Neutral fat normal    C. Diff neg  Crypto/Giardia neg    TTG normal    IMAGING:  CT ABDOMEN PELVIS W CONTRAST 12/7/2021 2:52 PM      History: Pancreatic insufficiency. History of multiple myeloma.     Comparison: CT 2/15/2020.     Technique: CT of the abdomen and pelvis were obtained with contrast  utilizing pancreatic mass protocol. Sagittal and coronal  reconstructions created and reviewed. Contrast dose: Isovue 370 135mL     Findings:      Abdomen and pelvis: Normal hepatic parenchyma without focal lesions.  Gallbladder is surgically absent. No intra- or extrahepatic biliary  dilation. Relative preservation of the pancreatic parenchyma. The  pancreatic duct within normal limits. No suspicious pancreatic masses  or lesions. Spleen and adrenal glands are unremarkable. Tiny soft  tissue nodule in the left upper quadrant (series 7 image 54),  presumably splenule. Kidneys demonstrate symmetric enhancement without  solid lesions, hydronephrosis, or nephrolithiasis. Ureters and urinary  bladder are unremarkable. Uterus and ovaries are unremarkable.  Stomach, small intestine are unremarkable. Rectosigmoid wall  thickening versus decompressed  bowel. Appendix is visualized and  normal in caliber.      No suspicious abdominal or pelvic lymphadenopathy. No free fluid. No  pneumoperitoneum.     Abdominal aorta is normal in caliber and patent. Celiac and mesenteric  artery origins are unremarkable. Portal veins and IVC are patent. Left  retroaortic renal vein.     Lower thorax: Chronic right basilar subsegmental atelectasis and left  basilar scarring. Heart size within normal limits. Calcifications of  the mitral annulus.     Bones and soft tissues: Increased subcentimeter sclerotic foci in the  right sacrum (series 7 image 340) and the left posterior superior  iliac crest (series 7 image 290). Stable tiny sclerotic foci in the  right femoral neck and in the L3 vertebral body. Multilevel endplate  degenerative changes throughout the visualized thoracic lumbar spine.  No acute or suspicious osseous abnormality.                                                                      Impression:   1. No significant pancreatic atrophy. No convincing evidence of  chronic pancreatitis or suspicious pancreatic lesions.  2. Two slightly increased sclerotic foci in the right sacrum and left  iliac crest, presumably related to history of multiple myeloma.  3. No abnormal CT findings involving the anteroinferior T11 endplate  to correlate with focal FDG uptake demonstrated on prior PET/CT  2/15/2020.  4. Mild rectosigmoid wall thickening versus decompressed bowel. May  represent mild colitis in a concordant clinical setting alternatively  could be normal.       Endoscopies:  COLONOSCOPY 07/23/2020  7:04 AM [facility]   [address] [place] [id number] [phone]     Endoscopy Department   _______________________________________________________________________________   Patient Name: Winter Loya             Procedure Date: 7/23/2020 7:04 AM   MRN: [id number]                       Account Number: [id number]   YOB: 1955              Admit Type: Ambulatory   Age: 64                                Gender: Female   Note Status: Finalized                Attending MD: Za Denis MD   Pause for the Cause: completed        Total Sedation Time: 35 min. procedure    time 24 min.   _______________________________________________________________________________       Procedure:           Colonoscopy   Indications:         Screening for colorectal malignant neoplasm, Personal                        history of colonic polyps   Providers:           Za Denis MD, Christine Wong   Referring MD:           Medicines:           Fentanyl 100 micrograms IV, Midazolam 3 mg IV   Complications:       No immediate complications.   _______________________________________________________________________________   Procedure:           Pre-Anesthesia Assessment:                        - Prior to the procedure, a History and Physical was                        performed, and patient medications and allergies were                        reviewed. The patient is competent. The risks and                        benefits of the procedure and the sedation options and                        risks were discussed with the patient. All questions                        were answered and informed consent was obtained. Patient                        identification and proposed procedure were verified by                        the nurse in the pre-procedure area. Mental Status                        Examination: normal. Airway Examination: normal                        oropharyngeal airway and neck mobility. Respiratory                        Examination: clear to auscultation. CV Examination:                        normal. Prophylactic Antibiotics: The patient does not                        require prophylactic antibiotics. Prior Anticoagulants:                        The patient has taken no previous anticoagulant or                        antiplatelet agents.  ASA Grade Assessment: II - A                        patient with mild systemic disease. After reviewing the                        risks and benefits, the patient was deemed in                        satisfactory condition to undergo the procedure. The                        anesthesia plan was to use moderate sedation / analgesia                        (conscious sedation). Immediately prior to                        administration of medications, the patient was                        re-assessed for adequacy to receive sedatives. The heart                        rate, respiratory rate, oxygen saturations, blood                        pressure, adequacy of pulmonary ventilation, and                        response to care were monitored throughout the                        procedure. The physical status of the patient was                        re-assessed after the procedure.                        After obtaining informed consent, the colonoscope was                        passed under direct vision. Throughout the procedure,                        the patient's blood pressure, pulse, and oxygen                        saturations were monitored continuously. The Colonoscope                        was introduced through the anus and advanced to the                        cecum, identified by appendiceal orifice and ileocecal                        valve. The colonoscopy was performed without difficulty.                        The patient tolerated the procedure well. The quality of                        the bowel preparation was good.                                                                                     Findings:        The perianal and digital rectal examinations were normal.        A few small-mouthed diverticula were found in the sigmoid colon.        The exam was otherwise without abnormality.                                                                                     Impression:           - Diverticulosis in the sigmoid colon.                        - The examination was otherwise normal.                        - No specimens collected.   Recommendation:      - Discharge patient to home (with escort).                        - Repeat colonoscopy in 5 years for surveillance.                        - Return to referring physician as previously scheduled.

## 2021-12-22 NOTE — PROGRESS NOTES
Called pt to discuss    Procedure/Imaging/Clinic: EUS, colonoscopy   Physician: Willem   Timing: next avail   Procedure length: 90 min   Anesthesia: MAC   Dx: pancreatic insuffiencey, chronic diarrhea, abnormal CT finding   Tier: 2   Location: [place] endo/OR, [facility] OR  Feb 25th at [facility] would be patient's preferred date, states [place] and she knows the area well    Called to discuss with patient. Explained they can expect a call from  for date and time of procedure, will need a , someone to stay with them for 24 hours and should stay in town for 24 hours (within 45 min of Hospital) post procedure    Patient needs to get pre-op physical completed. If outside [facility] system will need physical faxed to number [phone]   If you do not get a preop physical, your procedure could be cancelled, patient voiced understanding*    Preop Plan: understands needs to be within 30  days of procedure, sees a PCP within [facility]    Med Review    Blood thinner -  none  ASA - none  Diabetic - lantus,     COVID test discussed: yes, required within 4 days of procedure    Patient Education r/t procedure: [facility]    A pre-op nurse will call 1-2 days prior to the procedure.     NPO/Prep: miralax prep, sent to [facility]    Verbalized understanding of all instructions. All questions answered.

## 2021-12-28 DIAGNOSIS — C90.01 MULTIPLE MYELOMA IN REMISSION (H): Primary | ICD-10-CM

## 2021-12-28 RX ORDER — LENALIDOMIDE 10 MG/1
10 CAPSULE ORAL DAILY
Qty: 28 CAPSULE | Refills: 0 | Status: SHIPPED | OUTPATIENT
Start: 2021-12-28 | End: 2022-03-08

## 2021-12-29 ENCOUNTER — LAB (OUTPATIENT)
Dept: LAB | Facility: CLINIC | Age: 66
End: 2021-12-29
Payer: COMMERCIAL

## 2021-12-29 DIAGNOSIS — E11.65 TYPE 2 DIABETES MELLITUS WITH HYPERGLYCEMIA (H): ICD-10-CM

## 2021-12-29 DIAGNOSIS — C90.01 MULTIPLE MYELOMA IN REMISSION (H): ICD-10-CM

## 2021-12-29 LAB
ALBUMIN SERPL-MCNC: 3.8 G/DL (ref 3.4–5)
ALP SERPL-CCNC: 144 U/L (ref 40–150)
ALT SERPL W P-5'-P-CCNC: 49 U/L (ref 0–50)
ANION GAP SERPL CALCULATED.3IONS-SCNC: 7 MMOL/L (ref 3–14)
AST SERPL W P-5'-P-CCNC: 19 U/L (ref 0–45)
BASOPHILS # BLD AUTO: 0 10E3/UL (ref 0–0.2)
BASOPHILS NFR BLD AUTO: 1 %
BILIRUB SERPL-MCNC: 1.3 MG/DL (ref 0.2–1.3)
BUN SERPL-MCNC: 13 MG/DL (ref 7–30)
CALCIUM SERPL-MCNC: 8.7 MG/DL (ref 8.5–10.1)
CHLORIDE BLD-SCNC: 104 MMOL/L (ref 94–109)
CO2 SERPL-SCNC: 27 MMOL/L (ref 20–32)
CREAT SERPL-MCNC: 0.69 MG/DL (ref 0.52–1.04)
EOSINOPHIL # BLD AUTO: 0.3 10E3/UL (ref 0–0.7)
EOSINOPHIL NFR BLD AUTO: 9 %
ERYTHROCYTE [DISTWIDTH] IN BLOOD BY AUTOMATED COUNT: 16 % (ref 10–15)
GFR SERPL CREATININE-BSD FRML MDRD: >90 ML/MIN/1.73M2
GLUCOSE BLD-MCNC: 174 MG/DL (ref 70–99)
HBA1C MFR BLD: 9 % (ref 0–5.6)
HCT VFR BLD AUTO: 39.7 % (ref 35–47)
HGB BLD-MCNC: 13.1 G/DL (ref 11.7–15.7)
LYMPHOCYTES # BLD AUTO: 0.8 10E3/UL (ref 0.8–5.3)
LYMPHOCYTES NFR BLD AUTO: 22 %
MCH RBC QN AUTO: 28.5 PG (ref 26.5–33)
MCHC RBC AUTO-ENTMCNC: 33 G/DL (ref 31.5–36.5)
MCV RBC AUTO: 86 FL (ref 78–100)
MONOCYTES # BLD AUTO: 0.4 10E3/UL (ref 0–1.3)
MONOCYTES NFR BLD AUTO: 12 %
NEUTROPHILS # BLD AUTO: 1.9 10E3/UL (ref 1.6–8.3)
NEUTROPHILS NFR BLD AUTO: 55 %
PLATELET # BLD AUTO: 121 10E3/UL (ref 150–450)
POTASSIUM BLD-SCNC: 3.6 MMOL/L (ref 3.4–5.3)
PROT SERPL-MCNC: 6.8 G/DL (ref 6.8–8.8)
RBC # BLD AUTO: 4.6 10E6/UL (ref 3.8–5.2)
SODIUM SERPL-SCNC: 138 MMOL/L (ref 133–144)
TOTAL PROTEIN SERUM FOR ELP: 6.5 G/DL (ref 6.8–8.8)
WBC # BLD AUTO: 3.5 10E3/UL (ref 4–11)

## 2021-12-29 PROCEDURE — 85025 COMPLETE CBC W/AUTO DIFF WBC: CPT

## 2021-12-29 PROCEDURE — 80053 COMPREHEN METABOLIC PANEL: CPT

## 2021-12-29 PROCEDURE — 84165 PROTEIN E-PHORESIS SERUM: CPT | Performed by: PATHOLOGY

## 2021-12-29 PROCEDURE — 36415 COLL VENOUS BLD VENIPUNCTURE: CPT

## 2021-12-29 PROCEDURE — 83036 HEMOGLOBIN GLYCOSYLATED A1C: CPT

## 2021-12-29 PROCEDURE — 84155 ASSAY OF PROTEIN SERUM: CPT | Mod: 59

## 2021-12-30 ENCOUNTER — PATIENT OUTREACH (OUTPATIENT)
Dept: ONCOLOGY | Facility: CLINIC | Age: 66
End: 2021-12-30
Payer: COMMERCIAL

## 2021-12-30 LAB
ALBUMIN SERPL ELPH-MCNC: 4.2 G/DL (ref 3.7–5.1)
ALPHA1 GLOB SERPL ELPH-MCNC: 0.3 G/DL (ref 0.2–0.4)
ALPHA2 GLOB SERPL ELPH-MCNC: 0.6 G/DL (ref 0.5–0.9)
B-GLOBULIN SERPL ELPH-MCNC: 0.8 G/DL (ref 0.6–1)
GAMMA GLOB SERPL ELPH-MCNC: 0.7 G/DL (ref 0.7–1.6)
M PROTEIN SERPL ELPH-MCNC: 0 G/DL
PROT PATTERN SERPL ELPH-IMP: NORMAL

## 2021-12-30 NOTE — PROGRESS NOTES
Oncology Distress Screening Follow-up  Clinical Social Work  [facility]    Identified Concern and Score From Distress Screenin. How concerned are you about your ability to eat?  5         2. How concerned are you about unintended weight loss or your current weight?  7 Abnormal          3. How concerned are you about feeling depressed or very sad?  3         4. How concerned are you about feeling anxious or very scared?  3         5. Do you struggle with the loss of meaning and ross in your life?  Somewhat         6. How concerned are you about work and home life issues that may be affected by your cancer?  8 Abnormal          7. How concerned are you about knowing what resources are available to help you?  8 Abnormal          8. Do you currently have what you would describe as Pentecostalism or spiritual struggles?             Not at all                   You can also ask to be contacted by one of our Oncology Supportive Care professionals.      9. If you want to be contacted by one of our professionals, I can send a message to them right now.  Oncology Dietitian           Date of Distress Screenin21    Intervention:   Winter is a 66-year-old woman with a history of multiple myeloma in remission, who follows with  regarding new diagnosis of pancreatic insufficiency. At time of recent visit with Dr. Bangura, Winter scored positive on oncology distress screen. This clinician called Winter today with goal of re-introducing role of oncology social work, and following up on elevated distress.     Winter voices that pancreatic insufficiency has had a huge impact on daily quality of life, with one day last week having 3 diarrhea episodes that needed outfit changes. Winter reports that coping with chronic diarrhea has been very embarrassing. Winter receptive to a connection with RD, but is unsure about who would be most appropriate for her to see. Winter reported that she would also be receptive to pelvic floor physical  therapy, as she remembers time in her past when she dealt with chronic constipation, and she feels that she could benefit from more muscle assistance in this area.    Education Provided:   Pelvic Floor Physical Therapy List  Onc SW contact information    Follow-up Required:   1) This clinician to collaborate with ZAIRA Greer from cancer service line to see if she is most appropriate provider for Winter to schedule with or if there is a more appropriate RD to support her.   2) Winter aware of how to reach out to oncology social work as needed. Oncology social work will remain available as ongoing resource for support.     AMY Mijares, [facility] Center[facility]  Phone: [phone]  [facility]: M, Thu  *every other Tue, 8am-4:30pm  [facility]: W, F, *every other Tue, 8am-4:30pm

## 2022-01-03 ENCOUNTER — TELEPHONE (OUTPATIENT)
Dept: ONCOLOGY | Facility: CLINIC | Age: 67
End: 2022-01-03
Payer: MEDICAID

## 2022-01-03 NOTE — TELEPHONE ENCOUNTER
Oral Chemotherapy Monitoring Program   Medication: Revlimid  Rx: 10mg PO daily on days 1 through 28 of 28 day cycle   Auth #: [id number]   Risk Category: Adult Male  Routine survey questions reviewed.   Rx to be Escribed to Pete Borja  [facility]  Oncology Pharmacy Liaison   [email or web address]  [phone] (phone)  [phone] (fax)

## 2022-01-05 ENCOUNTER — VIRTUAL VISIT (OUTPATIENT)
Dept: GASTROENTEROLOGY | Facility: CLINIC | Age: 67
End: 2022-01-05
Payer: MEDICAID

## 2022-01-05 DIAGNOSIS — K86.89 PANCREATIC INSUFFICIENCY: ICD-10-CM

## 2022-01-05 DIAGNOSIS — K52.9 CHRONIC DIARRHEA: ICD-10-CM

## 2022-01-05 DIAGNOSIS — K86.81 EXOCRINE PANCREATIC INSUFFICIENCY: Primary | ICD-10-CM

## 2022-01-05 DIAGNOSIS — E11.65 TYPE 2 DIABETES MELLITUS WITH HYPERGLYCEMIA (H): ICD-10-CM

## 2022-01-05 PROCEDURE — 97802 MEDICAL NUTRITION INDIV IN: CPT | Mod: 95 | Performed by: DIETITIAN, REGISTERED

## 2022-01-05 NOTE — LETTER
1/5/2022         RE: Winter Loya  [address]  [place] [address]        Dear Colleague,    Thank you for referring your patient, Winter Loya, to the [facility] [place]. Please see a copy of my visit note below.    Winter is a 66 year old who is being evaluated via a billable video visit.      How would you like to obtain your AVS? MyChart  If the video visit is dropped, the invitation should be resent by: Send to e-mail at: [email or web address]  Will anyone else be joining your video visit? No    Video Start Time: 3:00 PM  Video-Visit Details    Type of service:  Video Visit    Video End Time:3:50 PM    Originating Location (pt. Location): Home    Distant Location (provider location):  [facility] [place]     Platform used for Video Visit: [employer]     [facility] Outpatient Medical Nutrition Therapy      Time Spent:  50 minutes  Session Type:  Initial   Referring Physician:  Jose Bangura MD  Reason for RD Visit:   Pancreatic insufficiency     Nutrition Assessment:  Patient is a 66 year old female with history that include type 2 diabetes with hyperglycemia, multiple myeloma in remission, hx of stem cell transplant, hyperlipidemia, anxiety, depression, DAILY, obesity and pancreatic insufficiency. MD recently prescribed Creon 24,000 enzymes. She picked up her prescription but has been afraid to start taking them. She also reported taking imodium and has been trying to take it more consistently than in the past. She stated that she mostly has loose stools not usually form, occasional some form. She reported at work having to change her clothes about 3 times per day due to loose stools. She also stated that her blood sugars have been poorly controlled lately with having a very high A1c recently. She stated that her endocrinologist wants her to meet with a nutritionist. She stated that she has a sweet tooth and over the past couple of  "month, she has been eating more sweets. She wonders if dairy causes more loose stools for her, so recently decreased eating some ice cream at night and tries not to keep in the house. She likes to drink a large glass of milk either at night as a snack or with dinner. In the morning she makes her oatmeal with unsweetened almond milk. She eats 3 meals per day and usually an afternoon snack and evening snack. She has strong sweet cravings especially in the afternoon. Her son lives with her and does some grocery shopping and cooks some dinner meals for them. He likes to make a more balanced meal with meat, potato, vegetables. If she is on her own, she may just have a bowl of cereal or grilled cheese sandwich. She cannot eat large portions either.     Patient Active Problem List   Diagnosis     Multiple myeloma in remission (H)     Type 2 diabetes mellitus without complication, with long-term current use of insulin (H)     Allergic rhinitis     NA (generalized anxiety disorder)     History of endometrial ablation     Hyperlipidemia     Major depressive disorder, recurrent episode, moderate (H)     Osteoarthrosis involving lower leg     Type 2 diabetes mellitus with hyperglycemia (H)     DAILY (obstructive sleep apnea)- moderate (AHI 17)     Hypothyroidism, postoperative      Class 2 severe obesity due to excess calories with serious comorbidity and body mass index (BMI) of 39.0 to 39.9 in adult (H)     Tremor     History of peripheral stem cell transplant (H)     History of MRI of cervical spine 6/2020     H/O magnetic resonance imaging of lumbar spine 2020     Major depressive disorder, recurrent episode, severe (H)     Abnormal EMG 2021     Sensory polyneuropathy     Axonal neuropathy     Height:   Ht Readings from Last 1 Encounters:   10/26/21 1.75 m (5' 8.9\")     Weight:  Wt Readings from Last 10 Encounters:   12/02/21 117.4 kg (258 lb 14.4 oz)   10/26/21 117.3 kg (258 lb 8 oz)   09/02/21 120.4 kg (265 lb 6.4 oz) " "  08/24/21 119.8 kg (264 lb 3.2 oz)   07/26/21 120.2 kg (265 lb)   01/27/21 120.7 kg (266 lb)   11/20/20 123.2 kg (271 lb 9.6 oz)   10/19/20 120.7 kg (266 lb)   09/17/20 119.4 kg (263 lb 3.2 oz)   09/14/20 118.8 kg (262 lb)     BMI: Estimated body mass index is 38.34 kg/m  as calculated from the following:    Height as of 10/26/21: 1.75 m (5' 8.9\").    Weight as of 12/2/21: 117.4 kg (258 lb 14.4 oz).    Diet Recall:  (some usual/recent meals/snacks/beverages): loves milk but wonders if it increases the loose stool. Has decreased ice cream intake over the past couple of weeks. Gets sugar cravings \"really bad.\"  Meal Food    Breakfast 5:30 AM: 1 packet oatmeal   Lunch Progresso or Cub Soup (creamy chicken and wild rice soup or chicken/beef soup) or chili and crackers or occas Turkey sandwich with butter and fitzpatrick on wheat bread   Dinner Live with her son who loves to cook meat and potatoes: BBQ chicken, potato, vegetables or if by herself grilled cheese sandwich or bowl cereal   Snacks Sometimes snack at work: veggies or pkt oatmeal or nuts or whatever is around at work. At night has a snack: ice cream, bowl cereal or raw veggies   Beverages Large glass of skim Milk (or 2% if son buys it) at night, ~8 cups water, or 1 bottle diet soda   Alcohol Intake Very rare, maybe twice per year      Labs:  On 12/29/21: A1c 9% and on 10/31/2021: fecal elastase 37.2  Last Comprehensive Metabolic Panel:  Sodium   Date Value Ref Range Status   12/29/2021 138 133 - 144 mmol/L Final   06/07/2021 140 133 - 144 mmol/L Final     Potassium   Date Value Ref Range Status   12/29/2021 3.6 3.4 - 5.3 mmol/L Final   06/07/2021 3.7 3.4 - 5.3 mmol/L Final     Chloride   Date Value Ref Range Status   12/29/2021 104 94 - 109 mmol/L Final   06/07/2021 106 94 - 109 mmol/L Final     Carbon Dioxide   Date Value Ref Range Status   06/07/2021 28 20 - 32 mmol/L Final     Carbon Dioxide (CO2)   Date Value Ref Range Status   12/29/2021 27 20 - 32 mmol/L Final "     Anion Gap   Date Value Ref Range Status   12/29/2021 7 3 - 14 mmol/L Final   06/07/2021 6 3 - 14 mmol/L Final     Glucose   Date Value Ref Range Status   12/29/2021 174 (H) 70 - 99 mg/dL Final   06/07/2021 140 (H) 70 - 99 mg/dL Final     Urea Nitrogen   Date Value Ref Range Status   12/29/2021 13 7 - 30 mg/dL Final   06/07/2021 10 7 - 30 mg/dL Final     Creatinine   Date Value Ref Range Status   12/29/2021 0.69 0.52 - 1.04 mg/dL Final   06/07/2021 0.77 0.52 - 1.04 mg/dL Final     GFR Estimate   Date Value Ref Range Status   12/29/2021 >90 >60 mL/min/1.73m2 Final     Comment:     Effective December 21, 2021 eGFRcr in adults is calculated using the 2021 CKD-EPI creatinine equation which includes age and gender (Maryjane et al., [place], DOI: 10.1056/TCFCpg8919401)   06/07/2021 82 >60 mL/min/[1.73_m2] Final     Comment:     Non  GFR Calc  Starting 12/18/2018, serum creatinine based estimated GFR (eGFR) will be   calculated using the Chronic Kidney Disease Epidemiology Collaboration   (CKD-EPI) equation.       Calcium   Date Value Ref Range Status   12/29/2021 8.7 8.5 - 10.1 mg/dL Final   06/07/2021 9.8 8.5 - 10.1 mg/dL Final     CBC RESULTS:   Recent Labs   Lab Test 12/29/21  1429   WBC 3.5*   RBC 4.60   HGB 13.1   HCT 39.7   MCV 86   MCH 28.5   MCHC 33.0   RDW 16.0*   *       Pertinent Medications/vitamin and mineral supplements:      Current Outpatient Medications   Medication     acyclovir (ZOVIRAX) 400 MG tablet     amylase-lipase-protease (CREON 24) 60736-93527 units CPEP per EC capsule     aspirin (ASA) 325 MG EC tablet     atorvastatin (LIPITOR) 20 MG tablet     empagliflozin (JARDIANCE) 10 MG TABS tablet     LANTUS SOLOSTAR 100 UNIT/ML soln     LENalidomide (REVLIMID) 10 MG CAPS capsule     LENalidomide (REVLIMID) 10 MG CAPS capsule     levothyroxine (SYNTHROID/LEVOTHROID) 175 MCG tablet     losartan (COZAAR) 25 MG tablet     metFORMIN (GLUCOPHAGE-XR) 500 MG 24 hr tablet     pregabalin  (LYRICA) 100 MG capsule     propranolol ER (INDERAL LA) 60 MG 24 hr capsule     sertraline (ZOLOFT) 100 MG tablet     TRULICITY 1.5 MG/0.5ML pen     blood glucose (NO BRAND SPECIFIED) test strip     blood glucose monitoring (NO BRAND SPECIFIED) meter device kit     insulin pen needle (FIFTY50 PEN NEEDLES) 32G X 4 MM miscellaneous     No current facility-administered medications for this visit.     Food Allergies:  NKFA     MALNUTRITION:  % Weight Loss: no significant weight loss  % Intake:  No decreased intake noted  Subcutaneous Fat Loss:  None observed  Muscle Loss:  None observed  Fluid Retention:  None noted    Malnutrition Diagnosis: Patient does not meet two of the above criteria necessary for diagnosing malnutrition  In Context of:  Chronic illness or disease    Nutrition Diagnosis:      Food and nutrition related knowledge deficit related to lack of previous diet education/lack of complete recall of previous diet education as evidenced by pt report and interest in diet education.    Nutrition Prescription: Plate method, enzymes    Nutrition Intervention:    Nutrition Education/Counseling:  Provided diet education for pancreatic insufficiency, general diabetes diet tips and suggestions. Discussed some specific tips and ideas for meals, snacks to help with EPI and managing blood sugars. Reviewed enzymes and recommended starting creon and taking at beginning of meals, snacks and with protein drinks.    Patient verbalized understanding of education provided. See Goals below.     Goals:  I put in a lot of detailed ideas below, but wanted to summarize the 3 main ideas I recommended starting with:     1. Start and take the Creon enzymes consistently with meals, snacks, protein drinks.  2. Try having the Premier protein drink for a planned snack in the afternoon   3. Use the Plate method meal plan for general guidance on getting smaller balanced meals.    Nutrition recommendations: (Here are the more detailed ideas  we discussed and the links in blue are links to the handouts):    1. Can use the Plate method meal plan for general guidance on getting balanced meals. Here's the link to the handout and below is the plate method recommendations:    Create a Plate (How to Build a Healthy Meal) handout:  [email or web address]://[email or web address]     --Use a small plate such as salad plate size versus large dinner plate.  --Make half of your plate non-starchy vegetables  --Make 1/4 of your plate lean protein sources (skinless chicken and turkey breast, fish, lean pork, eggs, egg whites, nut butters, legumes, lowfat dairy products. Note: lowfat dairy products will usually have carbohydrates as well).  --Make the other 1/4 of your plate healthy carbohydrate choices (which includes whole grains, starchy vegetables and fruit). Choose higher fiber carbohydrate options.  --Include 1-2 healthy fat servings at a meal such as olive oil, nuts, seeds, avocado as some examples.    Here are the handouts for protein sources and carbohydrates:    Protein Sources handout:  [email or web address]://[email or web address]     Carbohydrates handout:  [email or web address]     Some other specific ideas we discussed today for breakfast, lunch and dinner or snack include:    -If having difficulty eating a meal/not hungry, can replace a meal with a higher protein, lower carbohydrate protein drink such as Premier protein, Pure Protein, Ensure Max and have some of your cut up vegetables on the side or a fruit serving on the side for that meal.    -You could have a Premier protein drink as a planned snack in the afternoon when you are hungry and craving sugar.     -At breakfast if having an oatmeal packet, choose the low sugar option or plain oatmeal packet and can add in some fresh berries/fresh fruit and nuts for a more balanced breakfast meal and/or you could have a hard boiled egg on the side along with the low sugar oatmeal.    -At lunch if having soup, instead of crackers  you can have some of your cut up vegetables on the side and you can also add in some additional protein into the soup such as cut up lean meat from the night before or add in some beans into your soup). And/or you could add in some additional cooked vegetables into the soup as well.    -Can pack up dinner leftovers that your son makes and bring to work for lunch.    2. Eating meals and snacks on a consistent eating pattern from day to day and generally the same amount of carbohydrate at a meal can be helpful with managing blood sugars. If using the Plate method plan above, this will help give your balanced meals and consistent intake.    3. With pancreatic insufficiency eating a lower to moderate amount of fat in diet is usually better tolerated.  -Choose lean proteins/lean cuts of meats. Remove skins from chicken and turkey breast, lean cuts of meats, 93% lean beef, fish (not fried/baked).  -Use lower fat cooking methods such as baking, broiling, grilling.  -Limit using a lot of fats/oil/high fat sauces/butter when preparing foods.  -Choose skim or 1% based dairy products (such as lowfat yogurts, skim/1% milk, non fat/1% cottage cheese).    4. Take your Creon enzyme capsules at the beginning meals, snacks, and with protein drinks. Also if having a glass of milk at night outside of a meal, take your snack dose of enzymes with milk.    --You can start with taking 2 capsules at a meal if you feel more comfortable and then work up to 3 capsules with a meal.    5. Continue drinking at least 64 oz or more of water per day.     6. Keeping daily food and beverage journals are helpful for getting a good day to day picture of what you are eating and drinking. You can also write down any symptoms you are experiencing to see if you can identify any specific foods or beverages causing more loose stools for you. (hopefully you will see a big difference and improvement in your stools once you start the Creon Enzymes). Here is the  link to a sample journal sheet if you do want to keep a paper journals. Otherwise there are phone apps such as My Fitness Pal or Lose it that are helpful for tracking intake as well.    Daily Food and Exercise Log handout:  [email or web address]     Nutrition Monitoring and Evaluation: Will monitor adherence to nutrition recommendations at future RD visits.     Further Medical Nutrition Therapy:  Follow-up in 1 month.    Patient was encouraged to call/contact RD with any further questions.    Val Crawford, MS, RD, LD

## 2022-01-05 NOTE — PATIENT INSTRUCTIONS
It was nice meeting you today. Below are the nutrition recommendations we discussed at your visit.     I put in a lot of detailed ideas below, but wanted to summarize the 3 main ideas I recommend starting with:     1. Start and take the Creon enzymes consistently with meals, snacks, protein drinks.  2. Try having the Premier protein drink for a planned snack in the afternoon   3. Use the Plate method meal plan for general guidance on getting smaller balanced meals.    Please let me know if you have any additional questions.    Nutrition recommendations: (Here are the more detailed ideas we discussed and the links in blue are links to the handouts):    1. Can use the Plate method meal plan for general guidance on getting balanced meals. Here's the link to the handout and below is the plate method recommendations:    Create a Plate (How to Build a Healthy Meal) handout:  [email or web address]     --Use a small plate such as salad plate size versus large dinner plate.  --Make half of your plate non-starchy vegetables  --Make 1/4 of your plate lean protein sources (skinless chicken and turkey breast, fish, lean pork, eggs, egg whites, nut butters, legumes, lowfat dairy products. Note: lowfat dairy products will usually have carbohydrates as well).  --Make the other 1/4 of your plate healthy carbohydrate choices (which includes whole grains, starchy vegetables and fruit). Choose higher fiber carbohydrate options.  --Include 1-2 healthy fat servings at a meal such as olive oil, nuts, seeds, avocado as some examples.    Here are the handouts for protein sources and carbohydrates:    Protein Sources handout:  [email or web address]     Carbohydrates handout:  [email or web address]     Some other specific ideas we discussed today for breakfast, lunch and dinner or snack include:    -If having difficulty eating a meal/not hungry, can replace a meal with a higher protein, lower carbohydrate protein  drink such as Premier protein, Pure Protein, Ensure Max and have some of your cut up vegetables on the side or a fruit serving on the side for that meal.    -You could have a Premier protein drink as a planned snack in the afternoon when you are hungry and craving sugar.     -At breakfast if having an oatmeal packet, choose the low sugar option or plain oatmeal packet and can add in some fresh berries/fresh fruit and nuts for a more balanced breakfast meal and/or you could have a hard boiled egg on the side along with the low sugar oatmeal.    -At lunch if having soup, instead of crackers you can have some of your cut up vegetables on the side and you can also add in some additional protein into the soup such as cut up lean meat from the night before or add in some beans into your soup). And/or you could add in some additional cooked vegetables into the soup as well.    -Can pack up dinner leftovers that your son makes and bring to work for lunch.    2. Eating meals and snacks on a consistent eating pattern from day to day and generally the same amount of carbohydrate at a meal can be helpful with managing blood sugars. If using the Plate method plan above, this will help give your balanced meals and consistent intake.    3. With pancreatic insufficiency eating a lower to moderate amount of fat in diet is usually better tolerated.  -Choose lean proteins/lean cuts of meats. Remove skins from chicken and turkey breast, lean cuts of meats, 93% lean beef, fish (not fried/baked).  -Use lower fat cooking methods such as baking, broiling, grilling.  -Limit using a lot of fats/oil/high fat sauces/butter when preparing foods.  -Choose skim or 1% based dairy products (such as lowfat yogurts, skim/1% milk, non fat/1% cottage cheese).    4. Take your Creon enzyme capsules at the beginning meals, snacks, and with protein drinks. Also if having a glass of milk at night outside of a meal, take your snack dose of enzymes with  milk.    --You can start with taking 2 capsules at a meal if you feel more comfortable and then work up to 3 capsules with a meal.    5. Continue drinking at least 64 oz or more of water per day.     6. Keeping daily food and beverage journals are helpful for getting a good day to day picture of what you are eating and drinking. You can also write down any symptoms you are experiencing to see if you can identify any specific foods or beverages causing more loose stools for you. (hopefully you will see a big difference and improvement in your stools once you start the Creon Enzymes). Here is the link to a sample journal sheet if you do want to keep a paper journals. Otherwise there are phone apps such as My Fitness Pal or Lose it that are helpful for tracking intake as well.    Daily Food and Exercise Log handout:  [email or web address]     Follow up in 1 month.    For appointments with Val Crawford Registered Dietitian, please call [phone].    Val Crawford, MS, RD, LD

## 2022-01-05 NOTE — PROGRESS NOTES
Winter is a 66 year old who is being evaluated via a billable video visit.      How would you like to obtain your AVS? MyChart  If the video visit is dropped, the invitation should be resent by: Send to e-mail at: [email or web address]  Will anyone else be joining your video visit? No    Video Start Time: 3:00 PM  Video-Visit Details    Type of service:  Video Visit    Video End Time:3:50 PM    Originating Location (pt. Location): Home    Distant Location (provider location):  [facility] [place]     Platform used for Video Visit: Admazely     [facility] Outpatient Medical Nutrition Therapy      Time Spent:  50 minutes  Session Type:  Initial   Referring Physician:  Jose Bangura MD  Reason for RD Visit:   Pancreatic insufficiency     Nutrition Assessment:  Patient is a 66 year old female with history that include type 2 diabetes with hyperglycemia, multiple myeloma in remission, hx of stem cell transplant, hyperlipidemia, anxiety, depression, DAILY, obesity and pancreatic insufficiency. MD recently prescribed Creon 24,000 enzymes. She picked up her prescription but has been afraid to start taking them. She also reported taking imodium and has been trying to take it more consistently than in the past. She stated that she mostly has loose stools not usually form, occasional some form. She reported at work having to change her clothes about 3 times per day due to loose stools. She also stated that her blood sugars have been poorly controlled lately with having a very high A1c recently. She stated that her endocrinologist wants her to meet with a nutritionist. She stated that she has a sweet tooth and over the past couple of month, she has been eating more sweets. She wonders if dairy causes more loose stools for her, so recently decreased eating some ice cream at night and tries not to keep in the house. She likes to drink a large glass of milk either at night as a snack or with dinner.  "In the morning she makes her oatmeal with unsweetened almond milk. She eats 3 meals per day and usually an afternoon snack and evening snack. She has strong sweet cravings especially in the afternoon. Her son lives with her and does some grocery shopping and cooks some dinner meals for them. He likes to make a more balanced meal with meat, potato, vegetables. If she is on her own, she may just have a bowl of cereal or grilled cheese sandwich. She cannot eat large portions either.     Patient Active Problem List   Diagnosis     Multiple myeloma in remission (H)     Type 2 diabetes mellitus without complication, with long-term current use of insulin (H)     Allergic rhinitis     NA (generalized anxiety disorder)     History of endometrial ablation     Hyperlipidemia     Major depressive disorder, recurrent episode, moderate (H)     Osteoarthrosis involving lower leg     Type 2 diabetes mellitus with hyperglycemia (H)     DAILY (obstructive sleep apnea)- moderate (AHI 17)     Hypothyroidism, postoperative      Class 2 severe obesity due to excess calories with serious comorbidity and body mass index (BMI) of 39.0 to 39.9 in adult (H)     Tremor     History of peripheral stem cell transplant (H)     History of MRI of cervical spine 6/2020     H/O magnetic resonance imaging of lumbar spine 2020     Major depressive disorder, recurrent episode, severe (H)     Abnormal EMG 2021     Sensory polyneuropathy     Axonal neuropathy     Height:   Ht Readings from Last 1 Encounters:   10/26/21 1.75 m (5' 8.9\")     Weight:  Wt Readings from Last 10 Encounters:   12/02/21 117.4 kg (258 lb 14.4 oz)   10/26/21 117.3 kg (258 lb 8 oz)   09/02/21 120.4 kg (265 lb 6.4 oz)   08/24/21 119.8 kg (264 lb 3.2 oz)   07/26/21 120.2 kg (265 lb)   01/27/21 120.7 kg (266 lb)   11/20/20 123.2 kg (271 lb 9.6 oz)   10/19/20 120.7 kg (266 lb)   09/17/20 119.4 kg (263 lb 3.2 oz)   09/14/20 118.8 kg (262 lb)     BMI: Estimated body mass index is 38.34 " "kg/m  as calculated from the following:    Height as of 10/26/21: 1.75 m (5' 8.9\").    Weight as of 12/2/21: 117.4 kg (258 lb 14.4 oz).    Diet Recall:  (some usual/recent meals/snacks/beverages): loves milk but wonders if it increases the loose stool. Has decreased ice cream intake over the past couple of weeks. Gets sugar cravings \"really bad.\"  Meal Food    Breakfast 5:30 AM: 1 packet oatmeal   Lunch Progresso or Cub Soup (creamy chicken and wild rice soup or chicken/beef soup) or chili and crackers or occas Turkey sandwich with butter and fitzpatrick on wheat bread   Dinner Live with her son who loves to cook meat and potatoes: BBQ chicken, potato, vegetables or if by herself grilled cheese sandwich or bowl cereal   Snacks Sometimes snack at work: veggies or pkt oatmeal or nuts or whatever is around at work. At night has a snack: ice cream, bowl cereal or raw veggies   Beverages Large glass of skim Milk (or 2% if son buys it) at night, ~8 cups water, or 1 bottle diet soda   Alcohol Intake Very rare, maybe twice per year      Labs:  On 12/29/21: A1c 9% and on 10/31/2021: fecal elastase 37.2  Last Comprehensive Metabolic Panel:  Sodium   Date Value Ref Range Status   12/29/2021 138 133 - 144 mmol/L Final   06/07/2021 140 133 - 144 mmol/L Final     Potassium   Date Value Ref Range Status   12/29/2021 3.6 3.4 - 5.3 mmol/L Final   06/07/2021 3.7 3.4 - 5.3 mmol/L Final     Chloride   Date Value Ref Range Status   12/29/2021 104 94 - 109 mmol/L Final   06/07/2021 106 94 - 109 mmol/L Final     Carbon Dioxide   Date Value Ref Range Status   06/07/2021 28 20 - 32 mmol/L Final     Carbon Dioxide (CO2)   Date Value Ref Range Status   12/29/2021 27 20 - 32 mmol/L Final     Anion Gap   Date Value Ref Range Status   12/29/2021 7 3 - 14 mmol/L Final   06/07/2021 6 3 - 14 mmol/L Final     Glucose   Date Value Ref Range Status   12/29/2021 174 (H) 70 - 99 mg/dL Final   06/07/2021 140 (H) 70 - 99 mg/dL Final     Urea Nitrogen   Date " Value Ref Range Status   12/29/2021 13 7 - 30 mg/dL Final   06/07/2021 10 7 - 30 mg/dL Final     Creatinine   Date Value Ref Range Status   12/29/2021 0.69 0.52 - 1.04 mg/dL Final   06/07/2021 0.77 0.52 - 1.04 mg/dL Final     GFR Estimate   Date Value Ref Range Status   12/29/2021 >90 >60 mL/min/1.73m2 Final     Comment:     Effective December 21, 2021 eGFRcr in adults is calculated using the 2021 CKD-EPI creatinine equation which includes age and gender (Maryjane et al., NEJ, DOI: 10.1056/OSDWoz4785787)   06/07/2021 82 >60 mL/min/[1.73_m2] Final     Comment:     Non  GFR Calc  Starting 12/18/2018, serum creatinine based estimated GFR (eGFR) will be   calculated using the Chronic Kidney Disease Epidemiology Collaboration   (CKD-EPI) equation.       Calcium   Date Value Ref Range Status   12/29/2021 8.7 8.5 - 10.1 mg/dL Final   06/07/2021 9.8 8.5 - 10.1 mg/dL Final     CBC RESULTS:   Recent Labs   Lab Test 12/29/21  1429   WBC 3.5*   RBC 4.60   HGB 13.1   HCT 39.7   MCV 86   MCH 28.5   MCHC 33.0   RDW 16.0*   *       Pertinent Medications/vitamin and mineral supplements:      Current Outpatient Medications   Medication     acyclovir (ZOVIRAX) 400 MG tablet     amylase-lipase-protease (CREON 24) 80513-43860 units CPEP per EC capsule     aspirin (ASA) 325 MG EC tablet     atorvastatin (LIPITOR) 20 MG tablet     empagliflozin (JARDIANCE) 10 MG TABS tablet     LANTUS SOLOSTAR 100 UNIT/ML soln     LENalidomide (REVLIMID) 10 MG CAPS capsule     LENalidomide (REVLIMID) 10 MG CAPS capsule     levothyroxine (SYNTHROID/LEVOTHROID) 175 MCG tablet     losartan (COZAAR) 25 MG tablet     metFORMIN (GLUCOPHAGE-XR) 500 MG 24 hr tablet     pregabalin (LYRICA) 100 MG capsule     propranolol ER (INDERAL LA) 60 MG 24 hr capsule     sertraline (ZOLOFT) 100 MG tablet     TRULICITY 1.5 MG/0.5ML pen     blood glucose (NO BRAND SPECIFIED) test strip     blood glucose monitoring (NO BRAND SPECIFIED) meter device kit      insulin pen needle (FIFTY50 PEN NEEDLES) 32G X 4 MM miscellaneous     No current facility-administered medications for this visit.     Food Allergies:  NKFA     MALNUTRITION:  % Weight Loss: no significant weight loss  % Intake:  No decreased intake noted  Subcutaneous Fat Loss:  None observed  Muscle Loss:  None observed  Fluid Retention:  None noted    Malnutrition Diagnosis: Patient does not meet two of the above criteria necessary for diagnosing malnutrition  In Context of:  Chronic illness or disease    Nutrition Diagnosis:      Food and nutrition related knowledge deficit related to lack of previous diet education/lack of complete recall of previous diet education as evidenced by pt report and interest in diet education.    Nutrition Prescription: Plate method, enzymes    Nutrition Intervention:    Nutrition Education/Counseling:  Provided diet education for pancreatic insufficiency, general diabetes diet tips and suggestions. Discussed some specific tips and ideas for meals, snacks to help with EPI and managing blood sugars. Reviewed enzymes and recommended starting creon and taking at beginning of meals, snacks and with protein drinks.    Patient verbalized understanding of education provided. See Goals below.     Goals:  I put in a lot of detailed ideas below, but wanted to summarize the 3 main ideas I recommended starting with:     1. Start and take the Creon enzymes consistently with meals, snacks, protein drinks.  2. Try having the Premier protein drink for a planned snack in the afternoon   3. Use the Plate method meal plan for general guidance on getting smaller balanced meals.    Nutrition recommendations: (Here are the more detailed ideas we discussed and the links in blue are links to the handouts):    1. Can use the Plate method meal plan for general guidance on getting balanced meals. Here's the link to the handout and below is the plate method recommendations:    Create a Plate (How to Build a  Healthy Meal) handout:  [email or web address]     --Use a small plate such as salad plate size versus large dinner plate.  --Make half of your plate non-starchy vegetables  --Make 1/4 of your plate lean protein sources (skinless chicken and turkey breast, fish, lean pork, eggs, egg whites, nut butters, legumes, lowfat dairy products. Note: lowfat dairy products will usually have carbohydrates as well).  --Make the other 1/4 of your plate healthy carbohydrate choices (which includes whole grains, starchy vegetables and fruit). Choose higher fiber carbohydrate options.  --Include 1-2 healthy fat servings at a meal such as olive oil, nuts, seeds, avocado as some examples.    Here are the handouts for protein sources and carbohydrates:    Protein Sources handout:  [email or web address]     Carbohydrates handout:  [email or web address]     Some other specific ideas we discussed today for breakfast, lunch and dinner or snack include:    -If having difficulty eating a meal/not hungry, can replace a meal with a higher protein, lower carbohydrate protein drink such as Premier protein, Pure Protein, Ensure Max and have some of your cut up vegetables on the side or a fruit serving on the side for that meal.    -You could have a Premier protein drink as a planned snack in the afternoon when you are hungry and craving sugar.     -At breakfast if having an oatmeal packet, choose the low sugar option or plain oatmeal packet and can add in some fresh berries/fresh fruit and nuts for a more balanced breakfast meal and/or you could have a hard boiled egg on the side along with the low sugar oatmeal.    -At lunch if having soup, instead of crackers you can have some of your cut up vegetables on the side and you can also add in some additional protein into the soup such as cut up lean meat from the night before or add in some beans into your soup). And/or you could add in some additional cooked vegetables  into the soup as well.    -Can pack up dinner leftovers that your son makes and bring to work for lunch.    2. Eating meals and snacks on a consistent eating pattern from day to day and generally the same amount of carbohydrate at a meal can be helpful with managing blood sugars. If using the Plate method plan above, this will help give your balanced meals and consistent intake.    3. With pancreatic insufficiency eating a lower to moderate amount of fat in diet is usually better tolerated.  -Choose lean proteins/lean cuts of meats. Remove skins from chicken and turkey breast, lean cuts of meats, 93% lean beef, fish (not fried/baked).  -Use lower fat cooking methods such as baking, broiling, grilling.  -Limit using a lot of fats/oil/high fat sauces/butter when preparing foods.  -Choose skim or 1% based dairy products (such as lowfat yogurts, skim/1% milk, non fat/1% cottage cheese).    4. Take your Creon enzyme capsules at the beginning meals, snacks, and with protein drinks. Also if having a glass of milk at night outside of a meal, take your snack dose of enzymes with milk.    --You can start with taking 2 capsules at a meal if you feel more comfortable and then work up to 3 capsules with a meal.    5. Continue drinking at least 64 oz or more of water per day.     6. Keeping daily food and beverage journals are helpful for getting a good day to day picture of what you are eating and drinking. You can also write down any symptoms you are experiencing to see if you can identify any specific foods or beverages causing more loose stools for you. (hopefully you will see a big difference and improvement in your stools once you start the Creon Enzymes). Here is the link to a sample journal sheet if you do want to keep a paper journals. Otherwise there are phone apps such as My Fitness Pal or Lose it that are helpful for tracking intake as well.    Daily Food and Exercise Log handout:  [email or web address]      Nutrition Monitoring and Evaluation: Will monitor adherence to nutrition recommendations at future RD visits.     Further Medical Nutrition Therapy:  Follow-up in 1 month.    Patient was encouraged to call/contact RD with any further questions.    Val Crawford MS, RD, LD

## 2022-01-10 DIAGNOSIS — Z79.4 TYPE 2 DIABETES MELLITUS WITH DIABETIC POLYNEUROPATHY, WITH LONG-TERM CURRENT USE OF INSULIN (H): ICD-10-CM

## 2022-01-10 DIAGNOSIS — E11.42 TYPE 2 DIABETES MELLITUS WITH DIABETIC POLYNEUROPATHY, WITH LONG-TERM CURRENT USE OF INSULIN (H): ICD-10-CM

## 2022-01-12 RX ORDER — DULAGLUTIDE 1.5 MG/.5ML
1.5 INJECTION, SOLUTION SUBCUTANEOUS
Qty: 2 ML | Refills: 0 | Status: SHIPPED | OUTPATIENT
Start: 2022-01-12 | End: 2022-02-04

## 2022-01-12 NOTE — TELEPHONE ENCOUNTER
Medication is being filled for 1 time refill only due to:  Patient needs to be seen because needs appt.  10/26/21-Annual physical in 3 months. .

## 2022-01-17 DIAGNOSIS — E11.65 TYPE 2 DIABETES MELLITUS WITH HYPERGLYCEMIA, WITH LONG-TERM CURRENT USE OF INSULIN (H): ICD-10-CM

## 2022-01-17 DIAGNOSIS — Z79.4 TYPE 2 DIABETES MELLITUS WITH HYPERGLYCEMIA, WITH LONG-TERM CURRENT USE OF INSULIN (H): ICD-10-CM

## 2022-01-18 RX ORDER — LOSARTAN POTASSIUM 25 MG/1
25 TABLET ORAL DAILY
Qty: 90 TABLET | Refills: 2 | Status: SHIPPED | OUTPATIENT
Start: 2022-01-18 | End: 2022-11-07

## 2022-01-18 NOTE — TELEPHONE ENCOUNTER
"Requested Prescriptions   Pending Prescriptions Disp Refills     losartan (COZAAR) 25 MG tablet [Pharmacy Med Name: Losartan Potassium Oral Tablet 25 MG] 90 tablet 0     Sig: Take 1 tablet (25 mg) by mouth daily       Angiotensin-II Receptors Passed - 1/17/2022  9:59 AM        Passed - Last blood pressure under 140/90 in past 12 months     BP Readings from Last 3 Encounters:   12/02/21 124/71   10/26/21 114/73   08/24/21 120/69           Passed - Recent (12 mo) or future (30 days) visit within the authorizing provider's specialty     Patient has had an office visit with the authorizing provider or a provider within the authorizing providers department within the previous 12 mos or has a future within next 30 days. See \"Patient Info\" tab in inbasket, or \"Choose Columns\" in Meds & Orders section of the refill encounter.            Passed - Medication is active on med list        Passed - Patient is age 18 or older        Passed - No active pregnancy on record        Passed - Normal serum creatinine on file in past 12 months     Recent Labs   Lab Test 12/29/21  1429   CR 0.69     Ok to refill medication if creatinine is low          Passed - Normal serum potassium on file in past 12 months     Recent Labs   Lab Test 12/29/21  1429   POTASSIUM 3.6              Passed - No positive pregnancy test in past 12 months           Copied from office visit dated 10/26/21    Return in about 1 day (around 10/27/2021) for ancillary flu and covid vaccine. Annual physical in 3 months.      FALGUNI Duenas [facility]       Left message on patient's unidentified voice mail.  Advised patient to call [phone] at her earliest convenience and schedule a follow-up appointment.  "

## 2022-01-24 DIAGNOSIS — C90.01 MULTIPLE MYELOMA IN REMISSION (H): Primary | ICD-10-CM

## 2022-01-24 RX ORDER — LENALIDOMIDE 10 MG/1
10 CAPSULE ORAL DAILY
Qty: 28 CAPSULE | Refills: 0 | Status: SHIPPED | OUTPATIENT
Start: 2022-01-24 | End: 2022-03-08

## 2022-01-27 ENCOUNTER — TELEPHONE (OUTPATIENT)
Dept: ONCOLOGY | Facility: CLINIC | Age: 67
End: 2022-01-27
Payer: MEDICAID

## 2022-01-27 NOTE — TELEPHONE ENCOUNTER
Oral Chemotherapy Monitoring Program    Medication: Revlimid  Rx:  10 mg PO daily days 1-28  Auth #: [id number]  Risk Category: Adult female NOT of reproductive capacity    Routine survey questions reviewed.      Mary Franklin  Oncology Pharmacy Liaison  [email or web address]  Phone: [phone]  Fax: [phone]

## 2022-02-02 ENCOUNTER — OFFICE VISIT (OUTPATIENT)
Dept: FAMILY MEDICINE | Facility: CLINIC | Age: 67
End: 2022-02-02
Payer: MEDICAID

## 2022-02-02 VITALS
OXYGEN SATURATION: 97 % | TEMPERATURE: 98.5 F | RESPIRATION RATE: 22 BRPM | BODY MASS INDEX: 37.12 KG/M2 | HEART RATE: 77 BPM | HEIGHT: 69 IN | DIASTOLIC BLOOD PRESSURE: 77 MMHG | SYSTOLIC BLOOD PRESSURE: 135 MMHG | WEIGHT: 250.6 LBS

## 2022-02-02 DIAGNOSIS — C90.01 MULTIPLE MYELOMA IN REMISSION (H): ICD-10-CM

## 2022-02-02 DIAGNOSIS — E66.812 CLASS 2 SEVERE OBESITY DUE TO EXCESS CALORIES WITH SERIOUS COMORBIDITY AND BODY MASS INDEX (BMI) OF 37.0 TO 37.9 IN ADULT (H): ICD-10-CM

## 2022-02-02 DIAGNOSIS — K86.81 EXOCRINE PANCREATIC INSUFFICIENCY: ICD-10-CM

## 2022-02-02 DIAGNOSIS — Z79.4 TYPE 2 DIABETES MELLITUS WITH DIABETIC POLYNEUROPATHY, WITH LONG-TERM CURRENT USE OF INSULIN (H): Primary | ICD-10-CM

## 2022-02-02 DIAGNOSIS — E66.01 CLASS 2 SEVERE OBESITY DUE TO EXCESS CALORIES WITH SERIOUS COMORBIDITY AND BODY MASS INDEX (BMI) OF 37.0 TO 37.9 IN ADULT (H): ICD-10-CM

## 2022-02-02 DIAGNOSIS — E11.65 TYPE 2 DIABETES MELLITUS WITH HYPERGLYCEMIA, WITH LONG-TERM CURRENT USE OF INSULIN (H): ICD-10-CM

## 2022-02-02 DIAGNOSIS — Z91.81 PERSONAL HISTORY OF FALL: ICD-10-CM

## 2022-02-02 DIAGNOSIS — R26.81 UNSTEADY GAIT: ICD-10-CM

## 2022-02-02 DIAGNOSIS — D69.6 THROMBOCYTOPENIA, UNSPECIFIED (H): ICD-10-CM

## 2022-02-02 DIAGNOSIS — R19.7 DIARRHEA, UNSPECIFIED TYPE: ICD-10-CM

## 2022-02-02 DIAGNOSIS — E11.42 TYPE 2 DIABETES MELLITUS WITH DIABETIC POLYNEUROPATHY, WITH LONG-TERM CURRENT USE OF INSULIN (H): Primary | ICD-10-CM

## 2022-02-02 DIAGNOSIS — F33.2 SEVERE EPISODE OF RECURRENT MAJOR DEPRESSIVE DISORDER, WITHOUT PSYCHOTIC FEATURES (H): ICD-10-CM

## 2022-02-02 DIAGNOSIS — Z94.84 STEM CELLS TRANSPLANT STATUS (H): ICD-10-CM

## 2022-02-02 DIAGNOSIS — Z79.4 TYPE 2 DIABETES MELLITUS WITH HYPERGLYCEMIA, WITH LONG-TERM CURRENT USE OF INSULIN (H): ICD-10-CM

## 2022-02-02 DIAGNOSIS — I27.20 PULMONARY HYPERTENSION (H): ICD-10-CM

## 2022-02-02 DIAGNOSIS — E78.5 HYPERLIPIDEMIA, UNSPECIFIED HYPERLIPIDEMIA TYPE: Chronic | ICD-10-CM

## 2022-02-02 DIAGNOSIS — G62.0 DRUG-INDUCED POLYNEUROPATHY (H): ICD-10-CM

## 2022-02-02 LAB
ALBUMIN SERPL-MCNC: 3.5 G/DL (ref 3.4–5)
ALP SERPL-CCNC: 127 U/L (ref 40–150)
ALT SERPL W P-5'-P-CCNC: 31 U/L (ref 0–50)
ANION GAP SERPL CALCULATED.3IONS-SCNC: 4 MMOL/L (ref 3–14)
AST SERPL W P-5'-P-CCNC: 10 U/L (ref 0–45)
BASOPHILS # BLD AUTO: 0 10E3/UL (ref 0–0.2)
BASOPHILS NFR BLD AUTO: 1 %
BILIRUB SERPL-MCNC: 1.2 MG/DL (ref 0.2–1.3)
BUN SERPL-MCNC: 8 MG/DL (ref 7–30)
CALCIUM SERPL-MCNC: 8.6 MG/DL (ref 8.5–10.1)
CHLORIDE BLD-SCNC: 108 MMOL/L (ref 94–109)
CHOLEST SERPL-MCNC: 118 MG/DL
CO2 SERPL-SCNC: 29 MMOL/L (ref 20–32)
CREAT SERPL-MCNC: 0.63 MG/DL (ref 0.52–1.04)
CREAT UR-MCNC: 89 MG/DL
EOSINOPHIL # BLD AUTO: 0.2 10E3/UL (ref 0–0.7)
EOSINOPHIL NFR BLD AUTO: 5 %
ERYTHROCYTE [DISTWIDTH] IN BLOOD BY AUTOMATED COUNT: 15.5 % (ref 10–15)
FASTING STATUS PATIENT QL REPORTED: YES
GFR SERPL CREATININE-BSD FRML MDRD: >90 ML/MIN/1.73M2
GLUCOSE BLD-MCNC: 163 MG/DL (ref 70–99)
HCT VFR BLD AUTO: 37.9 % (ref 35–47)
HDLC SERPL-MCNC: 44 MG/DL
HGB BLD-MCNC: 12.9 G/DL (ref 11.7–15.7)
LDLC SERPL CALC-MCNC: 29 MG/DL
LYMPHOCYTES # BLD AUTO: 0.6 10E3/UL (ref 0.8–5.3)
LYMPHOCYTES NFR BLD AUTO: 18 %
MCH RBC QN AUTO: 29.1 PG (ref 26.5–33)
MCHC RBC AUTO-ENTMCNC: 34 G/DL (ref 31.5–36.5)
MCV RBC AUTO: 86 FL (ref 78–100)
MICROALBUMIN UR-MCNC: 67 MG/L
MICROALBUMIN/CREAT UR: 75.28 MG/G CR (ref 0–25)
MONOCYTES # BLD AUTO: 0.4 10E3/UL (ref 0–1.3)
MONOCYTES NFR BLD AUTO: 12 %
NEUTROPHILS # BLD AUTO: 2.1 10E3/UL (ref 1.6–8.3)
NEUTROPHILS NFR BLD AUTO: 65 %
NONHDLC SERPL-MCNC: 74 MG/DL
PLATELET # BLD AUTO: 118 10E3/UL (ref 150–450)
POTASSIUM BLD-SCNC: 3.6 MMOL/L (ref 3.4–5.3)
PROT SERPL-MCNC: 6.8 G/DL (ref 6.8–8.8)
RBC # BLD AUTO: 4.43 10E6/UL (ref 3.8–5.2)
SODIUM SERPL-SCNC: 141 MMOL/L (ref 133–144)
TOTAL PROTEIN SERUM FOR ELP: 6.3 G/DL (ref 6.8–8.8)
TRIGL SERPL-MCNC: 223 MG/DL
WBC # BLD AUTO: 3.3 10E3/UL (ref 4–11)

## 2022-02-02 PROCEDURE — 85025 COMPLETE CBC W/AUTO DIFF WBC: CPT | Performed by: NURSE PRACTITIONER

## 2022-02-02 PROCEDURE — 80061 LIPID PANEL: CPT | Performed by: NURSE PRACTITIONER

## 2022-02-02 PROCEDURE — 36415 COLL VENOUS BLD VENIPUNCTURE: CPT | Performed by: NURSE PRACTITIONER

## 2022-02-02 PROCEDURE — 80053 COMPREHEN METABOLIC PANEL: CPT | Performed by: NURSE PRACTITIONER

## 2022-02-02 PROCEDURE — 99215 OFFICE O/P EST HI 40 MIN: CPT | Performed by: NURSE PRACTITIONER

## 2022-02-02 PROCEDURE — 84165 PROTEIN E-PHORESIS SERUM: CPT | Performed by: PATHOLOGY

## 2022-02-02 PROCEDURE — 82043 UR ALBUMIN QUANTITATIVE: CPT | Performed by: NURSE PRACTITIONER

## 2022-02-02 PROCEDURE — 83521 IG LIGHT CHAINS FREE EACH: CPT | Performed by: NURSE PRACTITIONER

## 2022-02-02 PROCEDURE — 82784 ASSAY IGA/IGD/IGG/IGM EACH: CPT | Performed by: NURSE PRACTITIONER

## 2022-02-02 PROCEDURE — 84155 ASSAY OF PROTEIN SERUM: CPT | Mod: 59 | Performed by: NURSE PRACTITIONER

## 2022-02-02 RX ORDER — PREGABALIN 100 MG/1
CAPSULE ORAL
Qty: 270 CAPSULE | Refills: 3 | Status: SHIPPED | OUTPATIENT
Start: 2022-02-19 | End: 2023-05-25

## 2022-02-02 ASSESSMENT — ANXIETY QUESTIONNAIRES
GAD7 TOTAL SCORE: 10
IF YOU CHECKED OFF ANY PROBLEMS ON THIS QUESTIONNAIRE, HOW DIFFICULT HAVE THESE PROBLEMS MADE IT FOR YOU TO DO YOUR WORK, TAKE CARE OF THINGS AT HOME, OR GET ALONG WITH OTHER PEOPLE: VERY DIFFICULT
5. BEING SO RESTLESS THAT IT IS HARD TO SIT STILL: NOT AT ALL
3. WORRYING TOO MUCH ABOUT DIFFERENT THINGS: MORE THAN HALF THE DAYS
2. NOT BEING ABLE TO STOP OR CONTROL WORRYING: SEVERAL DAYS
6. BECOMING EASILY ANNOYED OR IRRITABLE: SEVERAL DAYS
1. FEELING NERVOUS, ANXIOUS, OR ON EDGE: NEARLY EVERY DAY
7. FEELING AFRAID AS IF SOMETHING AWFUL MIGHT HAPPEN: MORE THAN HALF THE DAYS

## 2022-02-02 ASSESSMENT — PATIENT HEALTH QUESTIONNAIRE - PHQ9
SUM OF ALL RESPONSES TO PHQ QUESTIONS 1-9: 13
5. POOR APPETITE OR OVEREATING: SEVERAL DAYS

## 2022-02-02 ASSESSMENT — MIFFLIN-ST. JEOR: SCORE: 1747.91

## 2022-02-02 NOTE — PROGRESS NOTES
Assessment & Plan     Type 2 diabetes mellitus with diabetic polyneuropathy, with long-term current use of insulin (H)  Patient with a history of diabetes and recent elevated HbA1C from 7.8% to 9.0% over a month ago. Patient reports her insulin was reduced to 27 units by the pharmacist a couple of months ago. She admitted to not checking her blood sugars as often as she should. Patient denies any episodes of hypo/hyperglycemia stating when she checks her blood sugar it ranges between (150-160)mg/dl later in the day and the morning around 120's. Patient was counseled on proper diabetic diet, exercise and weight management. She endorsed she has not been able to exercise but she is trying to make adjustment in her diet. Advised patient she can increase her insulin therapy by 2 units if her blood sugar gets in the 200's. Refilled prescription for pregabalin (LYRICA) for neuropathy.  - pregabalin (LYRICA) 100 MG capsule; TAKE ONE CAPSULE BY MOUTH THREE TIMES DAILY    Drug-induced polyneuropathy (H)  Stable symptoms. No sensory deficits noted, however, patient endorsed numbness and tingling stating it's much better now.    Pulmonary hypertension (H)  Blood pressure well controlled with medication. Patient denies any symptoms.c     Thrombocytopenia, unspecified (H)  Patient denies any symptoms or bleeding concerns.    Multiple myeloma in remission (H)  Myeloma still in remission with no indication of abnormal plasma cells. Patient denies any symptoms. Will release oncology labs so she can have them drawn today.  - Comprehensive metabolic panel  - CBC with Platelets & Differential  - Immunoglobulins A G and M  - Kappa and lambda light chain  - Protein electrophoresis    Stem cells transplant status (H)  Patient denies any adverse events post transplant.     Severe episode of recurrent major depressive disorder, without psychotic features (H)  Managed with medications. Patient voiced some family dynamics and changes in  health status. She is interested in therapy for additional support and coping.  - Adult Mental Health  Referral; Future    Class 2 severe obesity due to excess calories with serious comorbidity and body mass index (BMI) of 37.0 to 37.9 in adult (H)  Counseled patient on proper diet, exercise and weight management. She endorsed she has not been able to exercise but she is trying to make adjustment in her diet.     Personal history of fall  Patient reports she fell about 2 months ago at home. She stated she has had balance issues since she was diagnosed and treated for myeloma. She denies any symptoms including SOB, chest pain, dizziness, lightheadedness or syncope. Discussed sending her to physical therapy to help with balance and strengthening exercise.  - Physical Therapy Referral; Future    Unsteady gait  Patient reports she fell about 2 months ago at home. She stated she has had balance issues since she was diagnosed and treated for myeloma. Discussed sending her to physical therapy to help with balance and strengthening exercise.  - Physical Therapy Referral; Future    Diarrhea, unspecified type  Patient with a history of pancreatic insufficiency and chronic diarrhea for the past year. States diarrhea has had a huge impact on daily quality of life. Patient voiced that coping with chronic diarrhea has been very challenging and embarrassing. She reports having to wear adult diapers and occasionally having to change clothes due to incontinence episodes. She now takes 3 capsules of pancreatic enzymes three times a day and 1-2 capsules with snack. She reports dietary changes, giving up milk and sweet dairy products like ice cream. She stated she stopped taking imodium for about a month now but she paln to  supply from the pharmacy today due to having episodes of diarrhea yesterday and today. She states she takes 1-2 tabs of imodium mainly in the morning with some help. She is scheduled to have a  colonoscopy 2/25/22 due to the recurrent diarrhea and thickening in the colon. Counseled patient to continue with lifestyle modification, mostly with diet and increase fluid intake. May take up to 6 imodium/day to help with diarrhea.     Exocrine pancreatic insufficiency  Patient was counseled and education on diet for pancreatic insufficiency, general diabetes diet tips and suggestions. Discussed some specific tips and ideas for meals, snacks to help with EPI and managing blood sugars. Reviewed enzymes and encouraged to continue taking creaon and taking at beginning of meals and snack.    Hyperlipidemia  Patient is on statins with no adverse reaction. Will check levels.  - Lipid panel reflex to direct LDL Fasting    Type 2 diabetes mellitus with hyperglycemia (H)  Patient with a history of diabetes and recent elevated HbA1C from 7.8% to 9.0% over a month ago. Patient reports her insulin was reduced to 27 units by the pharmacist a couple of months ago. She admitted to not checking her blood sugars as often as she should. Patient denies any episodes of hypo/hyperglycemia stating when she checks her blood sugar it ranges between (150-160)mg/dl later in the day and the morning around 120's. Patient was counseled on proper diabetic diet, exercise and weight management. She endorsed she has not been able to exercise but she is trying to make adjustment in her diet. Advised patient she can increase her insulin therapy by 2 units if her blood sugar gets in the 200's.   - Albumin Random Urine Quantitative with Creat Ratio    48 minutes spent on the date of the encounter doing chart review, history and exam, documentation and further activities per the note     Return in about 19 days (around 2/21/2022) for Preop physical.    LIYAH SandovalN, RN  DNP/FNP student  [facility]    I very much appreciated the opportunity to see and assess this patient in the clinic with NP student.  I agree with the  "above note which summarizes my findings and current recommendations. I have reviewed all diagnostics noted and performed physical exam. Changes were made in the body of the note to achieve one comprehensive document.    FALGUNI Duenas CNP  [facility] KINDRA Max is a 66 year old who presents for the following health issues     HPI     Pt tested positive for COVID 1/11/22, completed quarantine and now feeling \"fine.\"     Diarrhea:  Diet Changes: quit milk, quit sweets altogether and she has noticed improvement with diarrhea.   Stopped imodium about a month ago. Today diarrhea has started again with one episode so far. Had three episodes yesterday. No diarrhea the day before.    Hypertension Follow-up      Do you check your blood pressure regularly outside of the clinic? No     Are you following a low salt diet? Yes    Are your blood pressures ever more than 140 on the top number (systolic) OR more than 90 on the bottom number (diastolic), for example 140/90? unknown    Depression and Anxiety Follow-Up    How are you doing with your depression since your last visit? Worsened a little     How are you doing with your anxiety since your last visit?  Worsened a little    Are you having other symptoms that might be associated with depression or anxiety? No    Have you had a significant life event? No     Do you have any concerns with your use of alcohol or other drugs? No    Diabetes Follow-Up  Problems taking medications regularly? NO  Side effects? NO  What are you doing for exercise outside of work or your daily activities?NO  Do you eat breakfast regularly? YES: lately plain oatmeal with almond milk  Do you count carbs? NO    DIABETES: Patient's glucose self monitoring/averages:  blood glucose record WAS NOT brought in today, values range between 150 mg/dl to 160 mg/dl. 120's in the morning per patient. Patient however reports she has not been checking her blood sugar " consistently as she should.       BP Readings from Last 3 Encounters:   02/02/22 135/77   12/02/21 124/71   10/26/21 114/73       Health maintenance reviewed and appropriate orders placed?  Yes       Lab Results   Component Value Date    A1C 9.0 12/29/2021    A1C 7.8 08/27/2021    A1C 7.8 07/26/2021    A1C 7.6 03/08/2021    A1C 8.8 12/07/2020    A1C 9.1 08/21/2020       Recent Labs   Lab Test 01/27/21  1705   CHOL 135   HDL 48*   LDL 46   TRIG 207*       Wt Readings from Last 3 Encounters:   02/02/22 113.7 kg (250 lb 9.6 oz)   12/02/21 117.4 kg (258 lb 14.4 oz)   10/26/21 117.3 kg (258 lb 8 oz)       Current Outpatient Medications   Medication Sig Dispense Refill     acyclovir (ZOVIRAX) 400 MG tablet TAKE ONE TABLET BY MOUTH EVERY TWELVE HOURS 60 tablet 11     amylase-lipase-protease (CREON 24) 32024-35652 units CPEP per EC capsule Take 3 capsules by mouth 3 times daily (with meals) 1-2 capsules with snacks 810 capsule 4     aspirin (ASA) 325 MG EC tablet Take 1 tablet (325 mg) by mouth daily 90 tablet 3     atorvastatin (LIPITOR) 20 MG tablet Take 1 tablet (20 mg) by mouth At Bedtime 90 tablet 3     dulaglutide (TRULICITY) 1.5 MG/0.5ML pen Inject 1.5 mg Subcutaneous every 7 days +++NEED APPT+++ 2 mL 0     empagliflozin (JARDIANCE) 10 MG TABS tablet Take 1 tablet (10 mg) by mouth daily 90 tablet 3     LANTUS SOLOSTAR 100 UNIT/ML soln Inject 34 Units Subcutaneous At Bedtime 30 mL 0     LENalidomide (REVLIMID) 10 MG CAPS capsule Take 1 capsule (10 mg) by mouth daily for 28 days 28 capsule 0     LENalidomide (REVLIMID) 10 MG CAPS capsule Take 1 capsule (10 mg) by mouth daily 28 capsule 0     levothyroxine (SYNTHROID/LEVOTHROID) 175 MCG tablet Take 1 tablet (175 mcg) by mouth every morning (before breakfast) 90 tablet 3     losartan (COZAAR) 25 MG tablet Take 1 tablet (25 mg) by mouth daily 90 tablet 2     metFORMIN (GLUCOPHAGE-XR) 500 MG 24 hr tablet Take 1 tablet (500 mg) by mouth daily (with dinner) 180 tablet 3      "pregabalin (LYRICA) 100 MG capsule TAKE ONE CAPSULE BY MOUTH THREE TIMES DAILY 270 capsule 3     propranolol ER (INDERAL LA) 60 MG 24 hr capsule Take 1 capsule (60 mg) by mouth daily 90 capsule 3     sertraline (ZOLOFT) 100 MG tablet Take 1 tablet (100 mg) by mouth daily 90 tablet 3     blood glucose (NO BRAND SPECIFIED) test strip Use to test blood sugar two times daily or as directed. (Patient not taking: Reported on 2022) 200 strip 3     blood glucose monitoring (NO BRAND SPECIFIED) meter device kit Use to test blood sugar two times daily or as directed. (Patient not taking: Reported on 2022) 1 kit 3     insulin pen needle (FIFTY50 PEN NEEDLES) 32G X 4 MM miscellaneous As directed. To use with Victoza daily (Patient not taking: Reported on 2022)       LENalidomide (REVLIMID) 10 MG CAPS capsule Take 1 capsule (10 mg) by mouth daily for 28 days 28 capsule 0       Histories reviewed and updated in Epic.     REVIEW OF SYSTEMS:  Complete/DM ROS -  C: NEGATIVE for fatigue, unexpected change in weight  E: NEGATIVE for acute vision problems or changes  R: NEGATIVE for significant cough or shortness of breath  CV: NEGATIVE for chest pain, palpitations or new or worsening peripheral edema  P: NEGATIVE for changes in mood or affect    EXAM:  Vitals: /77 (Patient Position: Sitting, Cuff Size: Adult Large)   Pulse 77   Temp 98.5  F (36.9  C) (Oral)   Resp 22   Ht 1.748 m (5' 8.8\")   Wt 113.7 kg (250 lb 9.6 oz)   SpO2 97%   BMI 37.22 kg/m      BMIE= Body mass index is 37.22 kg/m .    Foot Exam: normal DP and PT pulses, no trophic changes or ulcerative lesions, normal sensory exam and normal monofilament exam, except for findings noted on diabetic foot graphical image Left Foot 1,2,4,5. Right foot 1,4,5.              Social History     Tobacco Use     Smoking status: Former Smoker     Packs/day: 1.00     Types: Cigarettes     Quit date:      Years since quittin.0     Smokeless tobacco: Never " "Used   Vaping Use     Vaping Use: Never used   Substance Use Topics     Alcohol use: Yes     Comment: occasional     Drug use: Never     PHQ 6/28/2021 7/26/2021 2/2/2022   PHQ-9 Total Score 20 21 13   Q9: Thoughts of better off dead/self-harm past 2 weeks Several days More than half the days Not at all     NA-7 SCORE 6/28/2021 7/26/2021 2/2/2022   Total Score 6 14 10         Suicide Assessment Five-step Evaluation and Treatment (SAFE-T)      How many servings of fruits and vegetables do you eat daily?  0-1    On average, how many sweetened beverages do you drink each day (Examples: soda, juice, sweet tea, etc.  Do NOT count diet or artificially sweetened beverages)?   0    How many days per week do you exercise enough to make your heart beat faster? 3 or less    How many minutes a day do you exercise enough to make your heart beat faster? 9 or less    How many days per week do you miss taking your medication? 0    Review of Systems   CONSTITUTIONAL: NEGATIVE for fever, chills, change in weight  INTEGUMENTARY/SKIN: NEGATIVE for worrisome rashes, moles or lesions  EYES: NEGATIVE for vision changes or irritation  ENT/MOUTH: NEGATIVE for ear, mouth and throat problems  RESP: NEGATIVE for significant cough or SOB  BREAST: NEGATIVE for masses, tenderness or discharge  CV: NEGATIVE for chest pain, palpitations or peripheral edema  GI: NEGATIVE for nausea,  heartburn,   GI: abdominal pain lower abdomen and diarrhea  : NEGATIVE for frequency, dysuria, or hematuria  MUSCULOSKELETAL: NEGATIVE for significant arthralgias or myalgia  NEURO: NEGATIVE for weakness, dizziness or paresthesias  ENDOCRINE: NEGATIVE for temperature intolerance, skin/hair changes  HEME: NEGATIVE for bleeding problems  PSYCHIATRIC: NEGATIVE for changes in mood or affect      Objective    /77 (Patient Position: Sitting, Cuff Size: Adult Large)   Pulse 77   Temp 98.5  F (36.9  C) (Oral)   Resp 22   Ht 1.748 m (5' 8.8\")   Wt 113.7 kg (250 lb " 9.6 oz)   SpO2 97%   BMI 37.22 kg/m    Body mass index is 37.22 kg/m .  Physical Exam   GENERAL: healthy, alert and no distress  NECK: no adenopathy, no asymmetry, masses, or scars and thyroid normal to palpation  RESP: lungs clear to auscultation - no rales, rhonchi or wheezes  CV: regular rate and rhythm, normal S1 S2, no S3 or S4, no murmur, click or rub, no peripheral edema and peripheral pulses strong  ABDOMEN: soft, nontender, no hepatosplenomegaly, no masses and bowel sounds normal  MS: no gross musculoskeletal defects noted, no edema  SKIN: no suspicious lesions or rashes      Lab on 12/29/2021   Component Date Value Ref Range Status     Sodium 12/29/2021 138  133 - 144 mmol/L Final     Potassium 12/29/2021 3.6  3.4 - 5.3 mmol/L Final     Chloride 12/29/2021 104  94 - 109 mmol/L Final     Carbon Dioxide (CO2) 12/29/2021 27  20 - 32 mmol/L Final     Anion Gap 12/29/2021 7  3 - 14 mmol/L Final     Urea Nitrogen 12/29/2021 13  7 - 30 mg/dL Final     Creatinine 12/29/2021 0.69  0.52 - 1.04 mg/dL Final     Calcium 12/29/2021 8.7  8.5 - 10.1 mg/dL Final     Glucose 12/29/2021 174* 70 - 99 mg/dL Final     Alkaline Phosphatase 12/29/2021 144  40 - 150 U/L Final     AST 12/29/2021 19  0 - 45 U/L Final     ALT 12/29/2021 49  0 - 50 U/L Final     Protein Total 12/29/2021 6.8  6.8 - 8.8 g/dL Final     Albumin 12/29/2021 3.8  3.4 - 5.0 g/dL Final     Bilirubin Total 12/29/2021 1.3  0.2 - 1.3 mg/dL Final     GFR Estimate 12/29/2021 >90  >60 mL/min/1.73m2 Final    Effective December 21, 2021 eGFRcr in adults is calculated using the 2021 CKD-EPI creatinine equation which includes age and gender (Maryjane et al., NEJM, DOI: 10.1056/ZOABwj6566397)     Hemoglobin A1C 12/29/2021 9.0* 0.0 - 5.6 % Final    Normal <5.7%   Prediabetes 5.7-6.4%    Diabetes 6.5% or higher     Note: Adopted from ADA consensus guidelines.     WBC Count 12/29/2021 3.5* 4.0 - 11.0 10e3/uL Final     RBC Count 12/29/2021 4.60  3.80 - 5.20 10e6/uL Final      Hemoglobin 12/29/2021 13.1  11.7 - 15.7 g/dL Final     Hematocrit 12/29/2021 39.7  35.0 - 47.0 % Final     MCV 12/29/2021 86  78 - 100 fL Final     MCH 12/29/2021 28.5  26.5 - 33.0 pg Final     MCHC 12/29/2021 33.0  31.5 - 36.5 g/dL Final     RDW 12/29/2021 16.0* 10.0 - 15.0 % Final     Platelet Count 12/29/2021 121* 150 - 450 10e3/uL Final     % Neutrophils 12/29/2021 55  % Final     % Lymphocytes 12/29/2021 22  % Final     % Monocytes 12/29/2021 12  % Final     % Eosinophils 12/29/2021 9  % Final     % Basophils 12/29/2021 1  % Final     Absolute Neutrophils 12/29/2021 1.9  1.6 - 8.3 10e3/uL Final     Absolute Lymphocytes 12/29/2021 0.8  0.8 - 5.3 10e3/uL Final     Absolute Monocytes 12/29/2021 0.4  0.0 - 1.3 10e3/uL Final     Absolute Eosinophils 12/29/2021 0.3  0.0 - 0.7 10e3/uL Final     Absolute Basophils 12/29/2021 0.0  0.0 - 0.2 10e3/uL Final     Total Protein Serum for ELP 12/29/2021 6.5* 6.8 - 8.8 g/dL Final     Albumin 12/29/2021 4.2  3.7 - 5.1 g/dL Final     Alpha 1 12/29/2021 0.3  0.2 - 0.4 g/dL Final     Alpha 2 12/29/2021 0.6  0.5 - 0.9 g/dL Final     Beta Globulin 12/29/2021 0.8  0.6 - 1.0 g/dL Final     Gamma Globulin 12/29/2021 0.7  0.7 - 1.6 g/dL Final     Monoclonal Peak 12/29/2021 0.0  <=0.0 g/dL Final     ELP Interpretation 12/29/2021 Essentially normal electrophoretic pattern. No obvious monoclonal proteins seen. Pathologic significance requires clinical correlation.  Alli Milan M.D., Ph.D., Pathologist.   Final

## 2022-02-03 LAB
ALBUMIN SERPL ELPH-MCNC: 4 G/DL (ref 3.7–5.1)
ALPHA1 GLOB SERPL ELPH-MCNC: 0.3 G/DL (ref 0.2–0.4)
ALPHA2 GLOB SERPL ELPH-MCNC: 0.6 G/DL (ref 0.5–0.9)
B-GLOBULIN SERPL ELPH-MCNC: 0.7 G/DL (ref 0.6–1)
GAMMA GLOB SERPL ELPH-MCNC: 0.7 G/DL (ref 0.7–1.6)
IGA SERPL-MCNC: 134 MG/DL (ref 84–499)
IGG SERPL-MCNC: 667 MG/DL (ref 610–1616)
IGM SERPL-MCNC: 30 MG/DL (ref 35–242)
KAPPA LC FREE SER-MCNC: 1.85 MG/DL (ref 0.33–1.94)
KAPPA LC FREE/LAMBDA FREE SER NEPH: 1.2 {RATIO} (ref 0.26–1.65)
LAMBDA LC FREE SERPL-MCNC: 1.54 MG/DL (ref 0.57–2.63)
M PROTEIN SERPL ELPH-MCNC: 0 G/DL
PROT PATTERN SERPL ELPH-IMP: NORMAL

## 2022-02-03 ASSESSMENT — ANXIETY QUESTIONNAIRES: GAD7 TOTAL SCORE: 10

## 2022-02-04 ENCOUNTER — VIRTUAL VISIT (OUTPATIENT)
Dept: PHARMACY | Facility: CLINIC | Age: 67
End: 2022-02-04
Payer: MEDICAID

## 2022-02-04 DIAGNOSIS — E89.0 POSTOPERATIVE HYPOTHYROIDISM: ICD-10-CM

## 2022-02-04 DIAGNOSIS — G62.9 NEUROPATHY: ICD-10-CM

## 2022-02-04 DIAGNOSIS — C90.01 MULTIPLE MYELOMA IN REMISSION (H): ICD-10-CM

## 2022-02-04 DIAGNOSIS — Z79.4 TYPE 2 DIABETES MELLITUS WITH HYPERGLYCEMIA, WITH LONG-TERM CURRENT USE OF INSULIN (H): Primary | ICD-10-CM

## 2022-02-04 DIAGNOSIS — F33.1 MAJOR DEPRESSIVE DISORDER, RECURRENT EPISODE, MODERATE (H): ICD-10-CM

## 2022-02-04 DIAGNOSIS — K86.89 PANCREATIC INSUFFICIENCY: ICD-10-CM

## 2022-02-04 DIAGNOSIS — R03.0 ELEVATED BLOOD PRESSURE READING WITHOUT DIAGNOSIS OF HYPERTENSION: ICD-10-CM

## 2022-02-04 DIAGNOSIS — E11.65 TYPE 2 DIABETES MELLITUS WITH HYPERGLYCEMIA, WITH LONG-TERM CURRENT USE OF INSULIN (H): Primary | ICD-10-CM

## 2022-02-04 PROCEDURE — 99607 MTMS BY PHARM ADDL 15 MIN: CPT | Performed by: PHARMACIST

## 2022-02-04 PROCEDURE — 99605 MTMS BY PHARM NP 15 MIN: CPT | Performed by: PHARMACIST

## 2022-02-04 RX ORDER — DULAGLUTIDE 3 MG/.5ML
3 INJECTION, SOLUTION SUBCUTANEOUS WEEKLY
Qty: 2 ML | Refills: 1 | Status: SHIPPED | OUTPATIENT
Start: 2022-02-04 | End: 2022-02-04

## 2022-02-04 RX ORDER — DULAGLUTIDE 1.5 MG/.5ML
1.5 INJECTION, SOLUTION SUBCUTANEOUS
Qty: 2 ML | Refills: 0 | COMMUNITY
Start: 2022-02-04 | End: 2022-02-20

## 2022-02-04 NOTE — PATIENT INSTRUCTIONS
Recommendations from today's MTM visit:                                                       1. Take your blood sugar consistently once daily in the morning.  2. Increase Jardiance to 25 mg daily.   3. Start Freestyle sonny meter  4. Increase to 30 units tonight on Lantus and we will plan to increase to 34 units next week.    Follow-up: Return in about 1 week (around 2/11/2022).    It was great to speak with you today.  I value your experience and would be very thankful for your time with providing feedback on our clinic survey. You may receive a survey via email or text message in the next few days.     To schedule another MTM appointment, please call the clinic directly or you may call the MTM scheduling line at [phone] or toll-free at [phone].     My Clinical Pharmacist's contact information:                                                      Please feel free to contact me with any questions or concerns you have.      Mika Armstrong, Pharm. D., [facility]  Medication Therapy Management Pharmacist  Direct Voicemail: [phone]

## 2022-02-04 NOTE — PROGRESS NOTES
Medication Therapy Management (MTM) Encounter    ASSESSMENT:                            Medication Adherence/Access: No issues identified    Type 2 Diabetes: Patient is not meeting A1c goal of < 7%.  Unable to assess self monitor for glucose as she does not take her blood glucose regularly.  She was recently told to increase her Lantus to 34 units by her primary care provider but has not done so yet.  Since we will make another change at this visit we will increase to 30 units and reassess within a week.  We decided to increase the Jardiance to 25 mg daily today, we decided to not increase the Trulicity at this point due to the uncertainty of the cause of the pancreatic insufficiency.  I will message the patient's GI doctor to inquire about the possibility of pancreatitis.  If the patient does indeed have pancreatitis then Trulicity should be discontinued.  Also since she is having issues remember to take her blood sugars and would be beneficial to do a continuous glucose monitor.    Hypertension: Patient is not currently meeting blood pressure goal of less than 130/80 mmHg.  If her blood pressure goal is at 1 less than 140/90 mmHg, then she is meeting her goal.  The blood pressure previous to this was at goal, and we may have some benefit on blood pressure from an increase in Jardiance.    Hypothyroidism: Stable.    Neuropathy: Stable.    Multiple Myeloma in Remission: Stable.  Plan Place with oncology    Pancreatic Insufficiency: Plan in place with GI    Depression: Stable.    PLAN:                            1. Take your blood sugar consistently once daily in the morning.  2. Increase Jardiance to 25 mg daily.   3. Start Freestyle sonny meter  4. Increase to 30 units tonight on Lantus and we will plan to increase to 34 units next week.    Follow-up: Return in about 1 week (around 2/11/2022).    SUBJECTIVE/OBJECTIVE:                          Winter Loya is a 66 year old female called for a follow-up visit.   "Today's visit is a follow-up MTM visit from 9/30/21     Reason for visit: diabetes follow-up.    Allergies/ADRs: Reviewed in chart  Past Medical History: Reviewed in chart  Tobacco: She reports that she quit smoking about 17 years ago. Her smoking use included cigarettes. She smoked 1.00 pack per day. She has never used smokeless tobacco.  Alcohol: rare      Medication Adherence/Access: no issues reported    Type 2 Diabetes:  Currently taking Trulicity 1.5 mg weekly, Jardiance 10 mg, metformin 500 mg and Lantus 27 units at bedtime. She is still having diarrhea due to her pancreatic insufficiency.  Blood sugar monitoring: couple days per week. Ranges (patient reported): Reports that on Tuesday in the afternoon after eating it was 152 mg/dL  Symptoms of low blood sugar? none  Symptoms of high blood sugar? polyuria  Eye exam: up to date  Foot exam: up to date  Aspirin: Taking 325mg daily and denies side effects  Statin: Yes: atorvastatin 20 mg   ACEi/ARB: Yes: losartan 25 mg.   Urine Albumin:   Lab Results   Component Value Date    MICROL 67 02/02/2022    MICROL 167 12/07/2020     No results found for: MICROALBUMIN    Lab Results   Component Value Date    A1C 9.0 12/29/2021    A1C 7.8 08/27/2021    A1C 7.8 07/26/2021    A1C 7.6 03/08/2021    A1C 8.8 12/07/2020    A1C 9.1 08/21/2020    A1C 6.7 03/05/2020       Recent Labs   Lab Test 02/02/22  0857 01/27/21  1705   CHOL 118 135   HDL 44* 48*   LDL 29 46   TRIG 223* 207*     Hypertension: Current medications include losartan 25 mg daily and propranolol ER 60 mg daily (mostly for tremors which has helped hands \"tremendously\").  Patient does not self-monitor blood pressure.  Patient reports no current medication side effects.  BP Readings from Last 3 Encounters:   02/02/22 135/77   12/02/21 124/71   10/26/21 114/73     Hypothyroidism: Patient is taking levothyroxine 175 mcg daily. Patient is having the following symptoms: none.   TSH   Date Value Ref Range Status " "  10/26/2021 2.94 0.40 - 4.00 mU/L Final   01/27/2021 2.08 0.40 - 4.00 mU/L Final     Neuropathy: Currently taking Lyrica 100 mg three times daily (sometimes forgets a dose and does it twice daily). Still has a lot of nerve pain and numbness in her legs.     Multiple Myeloma in Remission: Currently taking acyclovir 400 mg twice a day, aspirin  mg every day to prevent thromboembolic complications from lenalidomide as recommended by her oncologist, and Lenalidomide 10 mg daily    Pancreatic Insufficiency: Currently taking Creon 24 [id number] units three times a day. She reports that there has been some change in her diarrhea symptoms with the addition of the pancreatic enzymes. She has also started eliminating fats form her diet.     Depression: Currently taking sertraline 100 mg daily and reports that her mood is \"fine\" and ever since she started the Lyrica her mood has been better.     Today's Vitals: There were no vitals taken for this visit.  ----------------      I spent 28 minutes with this patient today. All changes were made via collaborative practice agreement with MISTI Pinedo. A copy of the visit note was provided to the patient's provider(s).    The patient was sent via [employer] a summary of these recommendations.     Mika Armstrong, Pharm. D., [facility]  Medication Therapy Management Pharmacist  Direct Voicemail: [phone]      Telemedicine Visit Details  Type of service:  Telephone visit  Start Time: 3:00 pm  End Time: 3:28 PM  Originating Location (patient location): Home  Distant Location (provider location):  [facility]     Medication Therapy Recommendations  Type 2 diabetes mellitus with diabetic polyneuropathy, with long-term current use of insulin (H)    Current Medication: LANTUS SOLOSTAR 100 UNIT/ML soln   Rationale: Dose too low - Dosage too low - Effectiveness   Recommendation: Increase Dose   Status: Accepted per CPA          Current Medication: empagliflozin " (JARDIANCE) 10 MG TABS tablet (Discontinued)   Rationale: Dose too low - Dosage too low - Effectiveness   Recommendation: Increase Dose   Status: Accepted per CPA

## 2022-02-07 DIAGNOSIS — E11.42 TYPE 2 DIABETES MELLITUS WITH DIABETIC POLYNEUROPATHY, WITH LONG-TERM CURRENT USE OF INSULIN (H): Primary | ICD-10-CM

## 2022-02-07 DIAGNOSIS — Z79.4 TYPE 2 DIABETES MELLITUS WITH DIABETIC POLYNEUROPATHY, WITH LONG-TERM CURRENT USE OF INSULIN (H): Primary | ICD-10-CM

## 2022-02-07 DIAGNOSIS — Z11.59 ENCOUNTER FOR SCREENING FOR OTHER VIRAL DISEASES: Primary | ICD-10-CM

## 2022-02-08 RX ORDER — PEN NEEDLE, DIABETIC 32GX 5/32"
NEEDLE, DISPOSABLE MISCELLANEOUS
Qty: 100 EACH | Refills: 2 | Status: SHIPPED | OUTPATIENT
Start: 2022-02-08 | End: 2023-02-02

## 2022-02-09 NOTE — TELEPHONE ENCOUNTER
Prescription approved per [facility] Refill Protocol.    Shadna Leo RN

## 2022-02-10 ENCOUNTER — TELEPHONE (OUTPATIENT)
Dept: FAMILY MEDICINE | Facility: CLINIC | Age: 67
End: 2022-02-10

## 2022-02-10 NOTE — TELEPHONE ENCOUNTER
Prior Authorization Retail Medication Request    Medication/Dose: Continuous Blood Gluc Sensor (FREESTYLE SEGUNDO 2 SENSOR) [facility]  ICD code (if different than what is on RX):  Type 2 diabetes mellitus with hyperglycemia, with long-term current use of insulin (H) [E11.65, Z79.4]   Previously Tried and Failed:    Rationale:      Insurance Name: [facility]  Insurance ID:  [id number]    Pharmacy Information (if different than what is on RX)  Name:  [id number] - [place]  Phone:  [phone]

## 2022-02-11 ENCOUNTER — VIRTUAL VISIT (OUTPATIENT)
Dept: PHARMACY | Facility: CLINIC | Age: 67
End: 2022-02-11
Payer: MEDICAID

## 2022-02-11 DIAGNOSIS — E11.65 TYPE 2 DIABETES MELLITUS WITH HYPERGLYCEMIA, WITH LONG-TERM CURRENT USE OF INSULIN (H): Primary | ICD-10-CM

## 2022-02-11 DIAGNOSIS — Z79.4 TYPE 2 DIABETES MELLITUS WITH HYPERGLYCEMIA, WITH LONG-TERM CURRENT USE OF INSULIN (H): Primary | ICD-10-CM

## 2022-02-11 PROCEDURE — 99606 MTMS BY PHARM EST 15 MIN: CPT | Performed by: PHARMACIST

## 2022-02-11 NOTE — Clinical Note
Hello,    I am unsure if the patient's pancreatic insufficiency is caused by pancreatitis or by another reason. If there is suspected pancreatitis the GLP-1 may want to be discontinued. However, I messaged the GI doctor and never heard back.    Do you have more information on this diagnosis?    Thanks,    Mika

## 2022-02-11 NOTE — PATIENT INSTRUCTIONS
Recommendations from today's MTM visit:                                                       1. Increase Lantus to 34 units as previously discussed.   2. Decrease Lantus to 30 units if you have any blood sugars below 80 once increasing your Jardiance.    Follow-up: 3/11/22 at 3:30 pm phone visit    It was great to speak with you today.  I value your experience and would be very thankful for your time with providing feedback on our clinic survey. You may receive a survey via email or text message in the next few days.     To schedule another MTM appointment, please call the clinic directly or you may call the MTM scheduling line at [phone] or toll-free at [phone].     My Clinical Pharmacist's contact information:                                                      Please feel free to contact me with any questions or concerns you have.      Mika Armstrong, Pharm. D., [facility]  Medication Therapy Management Pharmacist  Direct Voicemail: [phone]

## 2022-02-11 NOTE — PROGRESS NOTES
Medication Therapy Management (MTM) Encounter    ASSESSMENT:                            Medication Adherence/Access: No issues identified    Type 2 Diabetes: Patient is not meeting A1c goal of < 7%. Self monitoring of blood glucose is not at goal of fasting  mg/dL. Can increase Lantus as previously discussed.      PLAN:                            1. Increase Lantus to 34 units as previously discussed.   2. Decrease Lantus to 30 units if you have any blood sugars below 80 once increasing your Jardiance.    Follow-up: 3/11/22 at 3:30 pm phone visit    SUBJECTIVE/OBJECTIVE:                          Winter Loya is a 66 year old female called for a follow-up visit.  Today's visit is a follow-up MTM visit from 2/4/22     Reason for visit: diabetes follow-up.    Allergies/ADRs: Reviewed in chart  Past Medical History: Reviewed in chart  Tobacco: She reports that she quit smoking about 17 years ago. Her smoking use included cigarettes. She smoked 1.00 pack per day. She has never used smokeless tobacco.    Medication Adherence/Access: no issues reported    Type 2 Diabetes:  Currently taking Trulicity 1.5 mg weekly, Jardiance 10 mg (hasnt taken the 25 mg dose yet) , metformin 500 mg and Lantus 30 units at bedtime. She is still having diarrhea due to her pancreatic insufficiency- reports that it has been a little bit better lately.   Blood sugar monitoring: couple days per week. Ranges (patient reported): 130-150 mg/dL  Symptoms of low blood sugar? none  Symptoms of high blood sugar? polyuria  Eye exam: up to date  Foot exam: up to date  Aspirin: Taking 325mg daily and denies side effects  Statin: Yes: atorvastatin 20 mg   ACEi/ARB: Yes: losartan 25 mg.   Urine Albumin:         Lab Results   Component Value Date     MICROL 67 02/02/2022     MICROL 167 12/07/2020      No results found for: MICROALBUMIN           Lab Results   Component Value Date     A1C 9.0 12/29/2021     A1C 7.8 08/27/2021     A1C 7.8 07/26/2021     A1C  7.6 03/08/2021     A1C 8.8 12/07/2020     A1C 9.1 08/21/2020     A1C 6.7 03/05/2020              Recent Labs   Lab Test 02/02/22  0857 01/27/21  1705   CHOL 118 135   HDL 44* 48*   LDL 29 46   TRIG 223* 207*      Today's Vitals: There were no vitals taken for this visit.  ----------------      I spent 14 minutes with this patient today. All changes were made via collaborative practice agreement with Delmi Gross. A copy of the visit note was provided to the patient's provider(s).    The patient was sent via [employer] a summary of these recommendations.     Mika Armstrong, Pharm. D., [facility]  Medication Therapy Management Pharmacist  Direct Voicemail: [phone]    Telemedicine Visit Details  Type of service:  Telephone visit  Start Time: 3:30 PM  End Time: 3:44 PM  Originating Location (patient location): [place]  Distant Location (provider location):  [facility]     Medication Therapy Recommendations  Type 2 diabetes mellitus with diabetic polyneuropathy, with long-term current use of insulin (H)    Current Medication: LANTUS SOLOSTAR 100 UNIT/ML soln   Rationale: Dose too low - Dosage too low - Effectiveness   Recommendation: Increase Dose   Status: Accepted per CPA

## 2022-02-16 NOTE — TELEPHONE ENCOUNTER
Central Prior Authorization Team   Phone: [phone]    PA Initiation    Medication: Continuous Blood Gluc Sensor (FREESTYLE SEGUNDO 2 SENSOR) [facility]  Insurance Company: [employer] [place] - Phone [phone] Fax [phone]  Pharmacy Filling the Rx: [facility][id number] - [address]  Filling Pharmacy Phone: [phone]  Filling Pharmacy Fax: [phone]  Start Date: 2/16/2022

## 2022-02-17 NOTE — TELEPHONE ENCOUNTER
PRIOR AUTHORIZATION DENIED    Medication: Continuous Blood Gluc Sensor (FREESTYLE SEGUNDO 2 SENSOR) MISC-DENIED    Denial Date: 2/17/2022    Denial Rational: PATIENT DID NOT MEET CRITERIA -        Appeal Information:  IF YOU WOULD LIKE TO APPEAL PLEASE SUPPLY PA TEAM WITH A LETTER OF MEDICAL NECESSITY WITH CLINICAL REASON.

## 2022-02-19 DIAGNOSIS — E11.42 TYPE 2 DIABETES MELLITUS WITH DIABETIC POLYNEUROPATHY, WITH LONG-TERM CURRENT USE OF INSULIN (H): Primary | ICD-10-CM

## 2022-02-19 DIAGNOSIS — Z79.4 TYPE 2 DIABETES MELLITUS WITH DIABETIC POLYNEUROPATHY, WITH LONG-TERM CURRENT USE OF INSULIN (H): Primary | ICD-10-CM

## 2022-02-20 DIAGNOSIS — C90.01 MULTIPLE MYELOMA IN REMISSION (H): Primary | ICD-10-CM

## 2022-02-20 RX ORDER — DULAGLUTIDE 1.5 MG/.5ML
1.5 INJECTION, SOLUTION SUBCUTANEOUS
Qty: 2 ML | Refills: 0 | Status: SHIPPED | OUTPATIENT
Start: 2022-02-20 | End: 2022-03-28

## 2022-02-22 ENCOUNTER — LAB (OUTPATIENT)
Dept: LAB | Facility: CLINIC | Age: 67
End: 2022-02-22
Attending: INTERNAL MEDICINE
Payer: MEDICAID

## 2022-02-22 DIAGNOSIS — Z11.59 ENCOUNTER FOR SCREENING FOR OTHER VIRAL DISEASES: ICD-10-CM

## 2022-02-22 PROCEDURE — U0003 INFECTIOUS AGENT DETECTION BY NUCLEIC ACID (DNA OR RNA); SEVERE ACUTE RESPIRATORY SYNDROME CORONAVIRUS 2 (SARS-COV-2) (CORONAVIRUS DISEASE [COVID-19]), AMPLIFIED PROBE TECHNIQUE, MAKING USE OF HIGH THROUGHPUT TECHNOLOGIES AS DESCRIBED BY CMS-2020-01-R: HCPCS

## 2022-02-22 PROCEDURE — U0005 INFEC AGEN DETEC AMPLI PROBE: HCPCS

## 2022-02-23 LAB — SARS-COV-2 RNA RESP QL NAA+PROBE: NEGATIVE

## 2022-02-24 ENCOUNTER — OFFICE VISIT (OUTPATIENT)
Dept: FAMILY MEDICINE | Facility: CLINIC | Age: 67
End: 2022-02-24
Attending: INTERNAL MEDICINE
Payer: MEDICAID

## 2022-02-24 ENCOUNTER — ANESTHESIA EVENT (OUTPATIENT)
Dept: SURGERY | Facility: CLINIC | Age: 67
End: 2022-02-24
Payer: MEDICAID

## 2022-02-24 VITALS
BODY MASS INDEX: 36.91 KG/M2 | HEART RATE: 69 BPM | SYSTOLIC BLOOD PRESSURE: 118 MMHG | WEIGHT: 248.5 LBS | DIASTOLIC BLOOD PRESSURE: 76 MMHG | TEMPERATURE: 97.1 F

## 2022-02-24 DIAGNOSIS — C90.01 MULTIPLE MYELOMA IN REMISSION (H): ICD-10-CM

## 2022-02-24 DIAGNOSIS — Z94.84 STEM CELLS TRANSPLANT STATUS (H): ICD-10-CM

## 2022-02-24 DIAGNOSIS — E11.42 TYPE 2 DIABETES MELLITUS WITH DIABETIC POLYNEUROPATHY, WITH LONG-TERM CURRENT USE OF INSULIN (H): ICD-10-CM

## 2022-02-24 DIAGNOSIS — K86.81 EXOCRINE PANCREATIC INSUFFICIENCY: ICD-10-CM

## 2022-02-24 DIAGNOSIS — R19.7 DIARRHEA, UNSPECIFIED TYPE: ICD-10-CM

## 2022-02-24 DIAGNOSIS — G47.33 OSA (OBSTRUCTIVE SLEEP APNEA): Chronic | ICD-10-CM

## 2022-02-24 DIAGNOSIS — Z79.4 TYPE 2 DIABETES MELLITUS WITH DIABETIC POLYNEUROPATHY, WITH LONG-TERM CURRENT USE OF INSULIN (H): ICD-10-CM

## 2022-02-24 DIAGNOSIS — Z01.818 PREOP GENERAL PHYSICAL EXAM: Primary | ICD-10-CM

## 2022-02-24 PROCEDURE — 99215 OFFICE O/P EST HI 40 MIN: CPT | Performed by: PHYSICIAN ASSISTANT

## 2022-02-24 ASSESSMENT — PAIN SCALES - GENERAL: PAINLEVEL: NO PAIN (0)

## 2022-02-24 NOTE — PROGRESS NOTES
[facility]  [address]  [place] [address]  Phone: [phone]  Primary Provider: Víctor Roque  Pre-op Performing Provider: RENZO GHOSH      PREOPERATIVE EVALUATION:  Today's date: 2/24/2022    Winter Loya is a 66 year old female who presents for a preoperative evaluation.    Surgical Information:  Surgery/Procedure: Colonoscopy  Surgery Location: [facility]  Surgeon: Willem  Surgery Date: 02/25/2022  Time of Surgery: 05:30am arrival  Where patient plans to recover: At home with family  Fax number for surgical facility: Note does not need to be faxed, will be available electronically in Epic.    Type of Anesthesia Anticipated: General    Assessment & Plan     The proposed surgical procedure is considered INTERMEDIATE risk.    Preop general physical exam  Diarrhea, unspecified type  Exocrine pancreatic insufficiency    Type 2 diabetes mellitus with diabetic polyneuropathy, with long-term current use of insulin (H)  Last A1c was 9.0 on 12/29/21. Although uncontrolled, risk of bleeding and infection are low with this procedure. Benefit of diagnostic procedure outweighs risk.     DAILY (obstructive sleep apnea)- moderate (AHI 17)    Multiple myeloma in remission (H)    Stem cells transplant status (H)       Risks and Recommendations:  The patient has the following additional risks and recommendations for perioperative complications:  Diabetes:  - Patient is on insulin therapy; diabetic NPO guidelines provided and discussed.    Medication Instructions:   - aspirin: Bleeding risk is low for this procedure and daily aspirin may be continued without modification.    - ACE/ARB: May be continued on the day of surgery.    - Beta Blockers: Continue taking on the day of surgery.   - Statins: Continue taking on the day of surgery.    - Long acting insulin (e.g. glargine, detemir): Take 80% of the usual evening or morning dose before surgery.   - metformin: HOLD day of surgery.   -  SGLT2 Inhibitor (canagliflozin, dapagliflozin, or empagliflozin): HOLD 3 days before surgery.    - GLP-1 Injectable (exenitide, liraglutide, semaglutide, dulaglutide, etc.): HOLD day of surgery       RECOMMENDATION:  APPROVAL GIVEN to proceed with proposed procedure, without further diagnostic evaluation.              45 minutes spent on the date of the encounter doing chart review, history and exam, documentation and further activities per the note        Subjective     HPI related to upcoming procedure: Patient has had persistent diarrhea multiple times a day. She is needing to change her clothes 3-4 times daily due to fecal urgency. She has been working with gastroenterology     Preop Questions 2/24/2022   1. Have you ever had a heart attack or stroke? No   2. Have you ever had surgery on your heart or blood vessels, such as a stent placement, a coronary artery bypass, or surgery on an artery in your head, neck, heart, or legs? No   3. Do you have chest pain with activity? No   4. Do you have a history of  heart failure? No   5. Do you currently have a cold, bronchitis or symptoms of other infection? No   6. Do you have a cough, shortness of breath, or wheezing? No   7. Do you or anyone in your family have previous history of blood clots? No   8. Do you or does anyone in your family have a serious bleeding problem such as prolonged bleeding following surgeries or cuts? No   9. Have you ever had problems with anemia or been told to take iron pills? YES - h/o multiple myeloma, this has not been an issue for many years   10. Have you had any abnormal blood loss such as black, tarry or bloody stools, or abnormal vaginal bleeding? No   11. Have you ever had a blood transfusion? No   12. Are you willing to have a blood transfusion if it is medically needed before, during, or after your surgery? Yes   13. Have you or any of your relatives ever had problems with anesthesia? No   14. Do you have sleep apnea, excessive  snoring or daytime drowsiness? YES - sleep apnea   14a. Do you have a CPAP machine? Yes   15. Do you have any artifical heart valves or other implanted medical devices like a pacemaker, defibrillator, or continuous glucose monitor? No   16. Do you have artificial joints? No   17. Are you allergic to latex? No       Health Care Directive:  Patient does not have a Health Care Directive or Living Will: Discussed advance care planning with patient; however, patient declined at this time.    Preoperative Review of :   reviewed - controlled substances reflected in medication list.      Status of Chronic Conditions:  See problem list for active medical problems.  Problems all longstanding and stable, except as noted/documented.  See ROS for pertinent symptoms related to these conditions.    DIABETES - Patient has a longstanding history of DiabetesType Type II . Patient is being treated with oral agents and insulin injections and denies significant side effects. Control has been poor. Complicating factors include but are not limited to: hyperlipidemia, neuropathy and morbid obesity .     HYPERLIPIDEMIA - Patient has a long history of significant Hyperlipidemia requiring medication for treatment with recent good control. Patient reports no problems or side effects with the medication.       Review of Systems  CONSTITUTIONAL: NEGATIVE for fever, chills, change in weight  INTEGUMENTARY/SKIN: NEGATIVE for worrisome rashes, moles or lesions  EYES: NEGATIVE for vision changes or irritation  ENT/MOUTH: NEGATIVE for ear, mouth and throat problems  RESP: NEGATIVE for significant cough or SOB  CV: NEGATIVE for chest pain, palpitations or peripheral edema  GI: NEGATIVE for nausea, abdominal pain, heartburn, or change in bowel habits  : NEGATIVE for frequency, dysuria, or hematuria  MUSCULOSKELETAL: NEGATIVE for significant arthralgias or myalgia  NEURO: NEGATIVE for weakness, dizziness or paresthesias  ENDOCRINE: NEGATIVE for  temperature intolerance, skin/hair changes  HEME: NEGATIVE for bleeding problems  PSYCHIATRIC: NEGATIVE for changes in mood or affect    Patient Active Problem List    Diagnosis Date Noted     Drug-induced polyneuropathy (H) 02/02/2022     Priority: Medium     Pulmonary hypertension (H) 02/02/2022     Priority: Medium     Thrombocytopenia, unspecified (H) 02/02/2022     Priority: Medium     Exocrine pancreatic insufficiency 02/02/2022     Priority: Medium     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 Asymmetry of peroneal compound muscle action potential amplitudes is a finding of uncertain significance.           History of MRI of cervical spine 6/2020 03/15/2021     Priority: Medium     Impression: Multilevel cervical spondylosis, most pronounced at the level of C4-5 and C5-6 with moderate to severe spinal canal stenosis and moderate spinal canal stenosis at C6-7. No abnormal cord signal. No significant neural foraminal narrowing at any level.       H/O magnetic resonance imaging of lumbar spine 2020 03/15/2021     Priority: Medium     IMPRESSION: Multilevel mild lumbar spondylosis, most pronounced at the level of L4-5 and L5-S1 as detailed above.       Tremor 12/09/2020     Priority: Medium     History of peripheral stem cell transplant (H) 12/09/2020     Priority: Medium     Class 2 severe obesity due to excess calories with serious comorbidity and body mass index (BMI) of 39.0 to 39.9 in adult (H) 11/18/2020     Priority: Medium     DAILY (obstructive sleep apnea)- moderate (AHI 17)      Priority: Medium     HST [place] 8/24/2018 (BMI 38) AHI 17, low O2 63%, Sao2 <= 90% for 21 minutes  CPAP titration 9/25/2018 CPAP 11 cm optimal       Type 2  diabetes mellitus with diabetic polyneuropathy, with long-term current use of insulin (H) 06/03/2020     Priority: Medium     Multiple myeloma in remission (H) 02/18/2020     Priority: Medium      IgA Kappa Multiple Myeloma. Presented 2018 with anemia and fatigue. Skeletal survey was unremarkable and UPEP had minimal protein (156 mg/m2). PET 11/2018 showed bone marrow consistent with hypermetabolic plasma cell myeloma. M spike was 0.17. She started RVD and Zometa. 4/2019 she had an autologous transplant.      She was on revlimid maintenance and zometa from her prior oncologist in [place]. Zometa through 12/2020 to complete a total of 2 years of therapy       NA (generalized anxiety disorder) 05/23/2017     Priority: Medium     History of endometrial ablation 04/17/2017     Priority: Medium     Type 2 diabetes mellitus with hyperglycemia (H) 10/19/2016     Priority: Medium     Major depressive disorder, recurrent episode, severe (H) 03/16/2013     Priority: Medium     Formatting of this note might be different from the original.  IMO Update       Hyperlipidemia 07/05/2011     Priority: Medium     Major depressive disorder, recurrent episode, moderate (H) 12/17/2007     Priority: Medium     Allergic rhinitis 05/23/2007     Priority: Medium     perfumes       Osteoarthrosis involving lower leg 07/22/2005     Priority: Medium     Replacing diagnoses that were inactivated after the 10/1/2021 regulatory import.       Hypothyroidism, postoperative  11/17/2020     Priority: Low      Past Medical History:   Diagnosis Date     Abnormal EMG 2021 5/25/2021    Interpretation: This is an abnormal study, demonstrating electrophysiologic evidence of a length-dependent axonal sensorimotor polyneuropathy. Asymmetry of peroneal compound muscle action potential amplitudes is a finding of uncertain significance.        Adjustment disorder with mixed anxiety and depressed mood 3/16/2013     Anxiety      Axonal neuropathy 9/27/2021      Depression      Diabetes (H)      Glaucoma (increased eye pressure)      H/O magnetic resonance imaging of lumbar spine 2020 3/15/2021    IMPRESSION: Multilevel mild lumbar spondylosis, most pronounced at the level of L4-5 and L5-S1 as detailed above.   I have personally reviewed the examination and initial interpretation and I agree with the findings.   ANNE GOODMAN MD     History of MRI of cervical spine 6/2020 3/15/2021    Impression: Multilevel cervical spondylosis, most pronounced at the level of C4-5 and C5-6 with moderate to severe spinal canal stenosis and moderate spinal canal stenosis at C6-7. No abnormal cord signal. No significant neural foraminal narrowing at any level.   I have personally reviewed the examination and initial interpretation and I agree with the findings.   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     Sleep apnea     no c-pap use     Status post complete thyroidectomy 8/26/2020     Thyroid goiter 8/5/2020     Tremor 12/9/2020     Past Surgical History:   Procedure Laterality Date     ARTHROSCOPY KNEE Left 02/2014     ARTHROSCOPY KNEE Right 12/2010    KNEE ARTHROSCOPY Dec 2010  Right     CHOLECYSTECTOMY  2002     COLONOSCOPY N/A 07/23/2020    Procedure: COLONOSCOPY;  Surgeon: Za Denis MD;  Location:  OR     EYE SURGERY  1985    lasersx-spot behind eye started leaking     THYROIDECTOMY N/A 08/26/2020    Procedure: THYROIDECTOMY, TOTAL;  Surgeon: Tiff Burgos MD;  Location: [facility] OR     TONSILLECTOMY  2003     TUBAL LIGATION  1986     Current Outpatient Medications   Medication Sig Dispense Refill     acyclovir (ZOVIRAX) 400 MG tablet TAKE ONE TABLET BY MOUTH EVERY TWELVE HOURS 60 tablet 11     amylase-lipase-protease (CREON 24) 68578-52984 units CPEP per EC capsule  Take 3 capsules by mouth 3 times daily (with meals) 1-2 capsules with snacks 810 capsule 4     aspirin (ASA) 325 MG EC tablet Take 1 tablet (325 mg) by mouth daily 90 tablet 3     atorvastatin (LIPITOR) 20 MG tablet Take 1 tablet (20 mg) by mouth At Bedtime 90 tablet 3     blood glucose (NO BRAND SPECIFIED) test strip Use to test blood sugar two times daily or as directed. 200 strip 3     blood glucose monitoring (NO BRAND SPECIFIED) meter device kit Use to test blood sugar two times daily or as directed. 1 kit 3     Continuous Blood Gluc Sensor (FREESTYLE SEGUNDO 2 SENSOR) MISC 1 each every 14 days 1 each every 14 days. Change every 14 days. 6 each 3     empagliflozin (JARDIANCE) 25 MG TABS tablet Take 1 tablet (25 mg) by mouth daily 90 tablet 3     insulin pen needle (FIFTY50 PEN NEEDLES) 32G X 4 MM miscellaneous Use one pen needle daily or as directed. 100 each 2     LANTUS SOLOSTAR 100 UNIT/ML soln Inject 34 Units Subcutaneous At Bedtime (Patient taking differently: 34 Units At Bedtime ) 30 mL 0     LENalidomide (REVLIMID) 10 MG CAPS capsule Take 1 capsule (10 mg) by mouth daily 28 capsule 0     levothyroxine (SYNTHROID/LEVOTHROID) 175 MCG tablet Take 1 tablet (175 mcg) by mouth every morning (before breakfast) 90 tablet 3     losartan (COZAAR) 25 MG tablet Take 1 tablet (25 mg) by mouth daily 90 tablet 2     metFORMIN (GLUCOPHAGE-XR) 500 MG 24 hr tablet Take 1 tablet (500 mg) by mouth daily (with dinner) 180 tablet 3     pregabalin (LYRICA) 100 MG capsule TAKE ONE CAPSULE BY MOUTH THREE TIMES DAILY 270 capsule 3     propranolol ER (INDERAL LA) 60 MG 24 hr capsule Take 1 capsule (60 mg) by mouth daily 90 capsule 3     sertraline (ZOLOFT) 100 MG tablet Take 1 tablet (100 mg) by mouth daily 90 tablet 3     TRULICITY 1.5 MG/0.5ML pen Inject 1.5 mg Subcutaneous every 7 days +++NEED APPT+++ 2 mL 0     LENalidomide (REVLIMID) 10 MG CAPS capsule Take 1 capsule (10 mg) by mouth daily for 28 days 28 capsule 0      LENalidomide (REVLIMID) 10 MG CAPS capsule Take 1 capsule (10 mg) by mouth daily for 28 days 28 capsule 0       No Known Allergies     Social History     Tobacco Use     Smoking status: Former Smoker     Packs/day: 1.00     Types: Cigarettes     Quit date:      Years since quittin.1     Smokeless tobacco: Never Used   Substance Use Topics     Alcohol use: Yes     Comment: occasional       History   Drug Use Unknown         Objective     /76 (BP Location: Right arm, Patient Position: Chair, Cuff Size: Adult Regular)   Pulse 69   Temp 97.1  F (36.2  C) (Temporal)   Wt 112.7 kg (248 lb 8 oz)   Breastfeeding No   BMI 36.91 kg/m      Physical Exam    GENERAL APPEARANCE: healthy, alert and no distress     EYES: EOMI, PERRL     HENT: ear canals and TM's normal and nose and mouth without ulcers or lesions     NECK: no adenopathy, no asymmetry, masses, or scars and thyroid normal to palpation     RESP: lungs clear to auscultation - no rales, rhonchi or wheezes     CV: regular rates and rhythm, normal S1 S2, no S3 or S4 and no murmur, click or rub     ABDOMEN:  soft, nontender, no HSM or masses and bowel sounds normal     MS: extremities normal- no gross deformities noted, no evidence of inflammation in joints, FROM in all extremities.     SKIN: no suspicious lesions or rashes     NEURO: Normal strength and tone, sensory exam grossly normal, mentation intact and speech normal     PSYCH: mentation appears normal. and affect normal/bright     LYMPHATICS: No cervical adenopathy    Recent Labs   Lab Test 22  0858 22  0857 21  1429 10/26/21  0759 21  1352   HGB 12.9  --  13.1   < > 13.5   *  --  121*   < > 127*   NA  --  141 138   < > 141   POTASSIUM  --  3.6 3.6   < > 3.9   CR  --  0.63 0.69   < > 0.79   A1C  --   --  9.0*  --  7.8*    < > = values in this interval not displayed.        Diagnostics:  Recent Results (from the past 720 hour(s))   Comprehensive metabolic panel     Collection Time: 02/02/22  8:57 AM   Result Value Ref Range    Sodium 141 133 - 144 mmol/L    Potassium 3.6 3.4 - 5.3 mmol/L    Chloride 108 94 - 109 mmol/L    Carbon Dioxide (CO2) 29 20 - 32 mmol/L    Anion Gap 4 3 - 14 mmol/L    Urea Nitrogen 8 7 - 30 mg/dL    Creatinine 0.63 0.52 - 1.04 mg/dL    Calcium 8.6 8.5 - 10.1 mg/dL    Glucose 163 (H) 70 - 99 mg/dL    Alkaline Phosphatase 127 40 - 150 U/L    AST 10 0 - 45 U/L    ALT 31 0 - 50 U/L    Protein Total 6.8 6.8 - 8.8 g/dL    Albumin 3.5 3.4 - 5.0 g/dL    Bilirubin Total 1.2 0.2 - 1.3 mg/dL    GFR Estimate >90 >60 mL/min/1.73m2   Lipid panel reflex to direct LDL Fasting    Collection Time: 02/02/22  8:57 AM   Result Value Ref Range    Cholesterol 118 <200 mg/dL    Triglycerides 223 (H) <150 mg/dL    Direct Measure HDL 44 (L) >=50 mg/dL    LDL Cholesterol Calculated 29 <=100 mg/dL    Non HDL Cholesterol 74 <130 mg/dL    Patient Fasting > 8hrs? Yes    Immunoglobulins A G and M    Collection Time: 02/02/22  8:57 AM   Result Value Ref Range    Immunoglobulin G 667 610-1,616 mg/dL    Immunoglobulin A 134 84 - 499 mg/dL    Immunoglobulin M 30 (L) 35 - 242 mg/dL   Kappa and lambda light chain    Collection Time: 02/02/22  8:57 AM   Result Value Ref Range    Kappa Free Light Chains 1.85 0.33 - 1.94 mg/dL    Lambda Free Light Chains 1.54 0.57 - 2.63 mg/dL    Kappa /Lambda Ratio 1.20 0.26 - 1.65   Total Protein, Serum for ELP    Collection Time: 02/02/22  8:57 AM   Result Value Ref Range    Total Protein Serum for ELP 6.3 (L) 6.8 - 8.8 g/dL   Protein Electrophoresis, Serum    Collection Time: 02/02/22  8:57 AM   Result Value Ref Range    Albumin 4.0 3.7 - 5.1 g/dL    Alpha 1 0.3 0.2 - 0.4 g/dL    Alpha 2 0.6 0.5 - 0.9 g/dL    Beta Globulin 0.7 0.6 - 1.0 g/dL    Gamma Globulin 0.7 0.7 - 1.6 g/dL    Monoclonal Peak 0.0 <=0.0 g/dL    ELP Interpretation       Essentially normal electrophoretic pattern. No obvious monoclonal proteins seen. Pathologic significance requires  clinical correlation. THERESE Vinson M.D., Ph.D., Pathologist ().   Albumin Random Urine Quantitative with Creat Ratio    Collection Time: 02/02/22  8:58 AM   Result Value Ref Range    Creatinine Urine mg/dL 89 mg/dL    Albumin Urine mg/L 67 mg/L    Albumin Urine mg/g Cr 75.28 (H) 0.00 - 25.00 mg/g Cr   CBC with platelets and differential    Collection Time: 02/02/22  8:58 AM   Result Value Ref Range    WBC Count 3.3 (L) 4.0 - 11.0 10e3/uL    RBC Count 4.43 3.80 - 5.20 10e6/uL    Hemoglobin 12.9 11.7 - 15.7 g/dL    Hematocrit 37.9 35.0 - 47.0 %    MCV 86 78 - 100 fL    MCH 29.1 26.5 - 33.0 pg    MCHC 34.0 31.5 - 36.5 g/dL    RDW 15.5 (H) 10.0 - 15.0 %    Platelet Count 118 (L) 150 - 450 10e3/uL    % Neutrophils 65 %    % Lymphocytes 18 %    % Monocytes 12 %    % Eosinophils 5 %    % Basophils 1 %    Absolute Neutrophils 2.1 1.6 - 8.3 10e3/uL    Absolute Lymphocytes 0.6 (L) 0.8 - 5.3 10e3/uL    Absolute Monocytes 0.4 0.0 - 1.3 10e3/uL    Absolute Eosinophils 0.2 0.0 - 0.7 10e3/uL    Absolute Basophils 0.0 0.0 - 0.2 10e3/uL   Asymptomatic COVID-19 Virus (Coronavirus) by PCR Nose    Collection Time: 02/22/22  1:53 PM    Specimen: Nose; Swab   Result Value Ref Range    SARS CoV2 PCR Negative Negative, Testing sent to reference lab. Results will be returned via unsolicited result      No EKG required, no history of coronary heart disease, significant arrhythmia, peripheral arterial disease or other structural heart disease.    Revised Cardiac Risk Index (RCRI):  The patient has the following serious cardiovascular risks for perioperative complications:   - Diabetes Mellitus (on Insulin) = 1 point     RCRI Interpretation: 1 point: Class II (low risk - 0.9% complication rate)           Signed Electronically by: Montse Bucio PA-C  Copy of this evaluation report is provided to requesting physician.

## 2022-02-24 NOTE — PATIENT INSTRUCTIONS
Decrease lantus to 24 units tonight.  Do not take metformin today  Do not take Jardiance today or tomorrow  Do not take Trulicity today or tomorrow        Patient Education       Preparing for Your Surgery  Getting started  A nurse will call you to review your health history and instructions. They will give you an arrival time based on your scheduled surgery time. Please be ready to share:    Your doctor's clinic name and phone number    Your medical, surgical and anesthesia history    A list of allergies and sensitivities    A list of medicines, including herbal treatments and over-the-counter drugs    Whether the patient has a legal guardian (ask how to send us the papers in advance)  Please tell us if you're pregnant--or if there's any chance you might be pregnant. Some surgeries may injure a fetus (unborn baby), so they require a pregnancy test. Surgeries that are safe for a fetus don't always need a test, and you can choose whether to have one.   If you have a child who's having surgery, please ask for a copy of Preparing for Your Child's Surgery.    Preparing for surgery    Within 30 days of surgery: Have a pre-op exam (sometimes called an H&P, or History and Physical). This can be done at a clinic or pre-operative center.  ? If you're having a , you may not need this exam. Talk to your care team.    At your pre-op exam, talk to your care team about all medicines you take. If you need to stop any medicines before surgery, ask when to start taking them again.  ? We do this for your safety. Many medicines can make you bleed too much during surgery. Some change how well surgery (anesthesia) drugs work.    Call your insurance company to let them know you're having surgery. (If you don't have insurance, call [phone].)    Call your clinic if there's any change in your health. This includes signs of a cold or flu (sore throat, runny nose, cough, rash, fever). It also includes a scrape or scratch near  the surgery site.    If you have questions on the day of surgery, call your hospital or surgery center.  COVID testing  You may need to be tested for COVID-19 before having surgery. If so, your surgical team will give you instructions for scheduling this test, separate from your preoperative history and physical.  Eating and drinking guidelines  For your safety: Unless your surgeon tells you otherwise, follow the guidelines below.    Eat and drink as usual until 8 hours before surgery. After that, no food or milk.    Drink clear liquids until 2 hours before surgery. These are liquids you can see through, like water, Gatorade and Propel Water. You may also have black coffee and tea (no cream or milk).    Nothing by mouth within 2 hours of surgery. This includes gum, candy and breath mints.    If you drink alcohol: Stop drinking it the night before surgery.    If your care team tells you to take medicine on the morning of surgery, it's okay to take it with a sip of water.  Preventing infection    Shower or bathe the night before and morning of your surgery. Follow the instructions your clinic gave you. (If no instructions, use regular soap.)    Don't shave or clip hair near your surgery site. We'll remove the hair if needed.    Don't smoke or vape the morning of surgery. You may chew nicotine gum up to 2 hours before surgery. A nicotine patch is okay.  ? Note: Some surgeries require you to completely quit smoking and nicotine. Check with your surgeon.    Your care team will make every effort to keep you safe from infection. We will:  ? Clean our hands often with soap and water (or an alcohol-based hand rub).  ? Clean the skin at your surgery site with a special soap that kills germs.  ? Give you a special gown to keep you warm. (Cold raises the risk of infection.)  ? Wear special hair covers, masks, gowns and gloves during surgery.  ? Give antibiotic medicine, if prescribed. Not all surgeries need antibiotics.  What  to bring on the day of surgery    Photo ID and insurance card    Copy of your health care directive, if you have one    Glasses and hearing aides (bring cases)  ? You can't wear contacts during surgery    Inhaler and eye drops, if you use them (tell us about these when you arrive)    CPAP machine or breathing device, if you use them    A few personal items, if spending the night    If you have . . .  ? A pacemaker, ICD (cardiac defibrillator) or other implant: Bring the ID card.  ? An implanted stimulator: Bring the remote control.  ? A legal guardian: Bring a copy of the certified (court-stamped) guardianship papers.  Please remove any jewelry, including body piercings. Leave jewelry and other valuables at home.  If you're going home the day of surgery    You must have a responsible adult drive you home. They should stay with you overnight as well.    If you don't have someone to stay with you, and you aren't safe to go home alone, we may keep you overnight. Insurance often won't pay for this.  After surgery  If it's hard to control your pain or you need more pain medicine, please call your surgeon's office.  Questions?   If you have any questions for your care team, list them here: _________________________________________________________________________________________________________________________________________________________________________ ____________________________________ ____________________________________ ____________________________________  For informational purposes only. Not to replace the advice of your health care provider. Copyright   2003, 2019 [employer] [facility]. All rights reserved. Clinically reviewed by Greta Faith MD. [employer] 037899 - REV 07/21.

## 2022-02-25 ENCOUNTER — ANESTHESIA (OUTPATIENT)
Dept: SURGERY | Facility: CLINIC | Age: 67
End: 2022-02-25
Payer: MEDICAID

## 2022-02-25 ENCOUNTER — HOSPITAL ENCOUNTER (OUTPATIENT)
Facility: CLINIC | Age: 67
Discharge: HOME | End: 2022-02-25
Attending: INTERNAL MEDICINE | Admitting: INTERNAL MEDICINE
Payer: MEDICAID

## 2022-02-25 VITALS
RESPIRATION RATE: 16 BRPM | OXYGEN SATURATION: 100 % | HEIGHT: 68 IN | DIASTOLIC BLOOD PRESSURE: 52 MMHG | TEMPERATURE: 97.1 F | SYSTOLIC BLOOD PRESSURE: 110 MMHG | HEART RATE: 58 BPM | WEIGHT: 239.2 LBS | BODY MASS INDEX: 36.25 KG/M2

## 2022-02-25 LAB
COLONOSCOPY: NORMAL
GLUCOSE BLDC GLUCOMTR-MCNC: 105 MG/DL (ref 70–99)
GLUCOSE BLDC GLUCOMTR-MCNC: 125 MG/DL (ref 70–99)
UPPER EUS: NORMAL

## 2022-02-25 PROCEDURE — 250N000011 HC RX IP 250 OP 636: Performed by: NURSE ANESTHETIST, CERTIFIED REGISTERED

## 2022-02-25 PROCEDURE — 370N000017 HC ANESTHESIA TECHNICAL FEE, PER MIN: Performed by: INTERNAL MEDICINE

## 2022-02-25 PROCEDURE — 82962 GLUCOSE BLOOD TEST: CPT

## 2022-02-25 PROCEDURE — 360N000076 HC SURGERY LEVEL 3, PER MIN: Performed by: INTERNAL MEDICINE

## 2022-02-25 PROCEDURE — 88305 TISSUE EXAM BY PATHOLOGIST: CPT | Mod: TC | Performed by: INTERNAL MEDICINE

## 2022-02-25 PROCEDURE — 250N000009 HC RX 250: Performed by: INTERNAL MEDICINE

## 2022-02-25 PROCEDURE — 999N000141 HC STATISTIC PRE-PROCEDURE NURSING ASSESSMENT: Performed by: INTERNAL MEDICINE

## 2022-02-25 PROCEDURE — 710N000012 HC RECOVERY PHASE 2, PER MINUTE: Performed by: INTERNAL MEDICINE

## 2022-02-25 PROCEDURE — 258N000003 HC RX IP 258 OP 636: Performed by: NURSE ANESTHETIST, CERTIFIED REGISTERED

## 2022-02-25 PROCEDURE — 250N000011 HC RX IP 250 OP 636: Performed by: INTERNAL MEDICINE

## 2022-02-25 PROCEDURE — 250N000009 HC RX 250: Performed by: NURSE ANESTHETIST, CERTIFIED REGISTERED

## 2022-02-25 PROCEDURE — 272N000001 HC OR GENERAL SUPPLY STERILE: Performed by: INTERNAL MEDICINE

## 2022-02-25 RX ORDER — FENTANYL CITRATE 50 UG/ML
25 INJECTION, SOLUTION INTRAMUSCULAR; INTRAVENOUS
Status: DISCONTINUED | OUTPATIENT
Start: 2022-02-25 | End: 2022-02-25 | Stop reason: HOSPADM

## 2022-02-25 RX ORDER — FENTANYL CITRATE 50 UG/ML
25 INJECTION, SOLUTION INTRAMUSCULAR; INTRAVENOUS EVERY 5 MIN PRN
Status: DISCONTINUED | OUTPATIENT
Start: 2022-02-25 | End: 2022-02-25 | Stop reason: HOSPADM

## 2022-02-25 RX ORDER — LIDOCAINE 40 MG/G
CREAM TOPICAL
Status: DISCONTINUED | OUTPATIENT
Start: 2022-02-25 | End: 2022-02-25 | Stop reason: HOSPADM

## 2022-02-25 RX ORDER — MEPERIDINE HYDROCHLORIDE 25 MG/ML
12.5 INJECTION INTRAMUSCULAR; INTRAVENOUS; SUBCUTANEOUS
Status: DISCONTINUED | OUTPATIENT
Start: 2022-02-25 | End: 2022-02-25 | Stop reason: HOSPADM

## 2022-02-25 RX ORDER — HALOPERIDOL 5 MG/ML
1 INJECTION INTRAMUSCULAR
Status: DISCONTINUED | OUTPATIENT
Start: 2022-02-25 | End: 2022-02-25 | Stop reason: HOSPADM

## 2022-02-25 RX ORDER — NALOXONE HYDROCHLORIDE 0.4 MG/ML
0.4 INJECTION, SOLUTION INTRAMUSCULAR; INTRAVENOUS; SUBCUTANEOUS
Status: DISCONTINUED | OUTPATIENT
Start: 2022-02-25 | End: 2022-02-25 | Stop reason: HOSPADM

## 2022-02-25 RX ORDER — ONDANSETRON 4 MG/1
4 TABLET, ORALLY DISINTEGRATING ORAL EVERY 30 MIN PRN
Status: DISCONTINUED | OUTPATIENT
Start: 2022-02-25 | End: 2022-02-25 | Stop reason: HOSPADM

## 2022-02-25 RX ORDER — ONDANSETRON 2 MG/ML
4 INJECTION INTRAMUSCULAR; INTRAVENOUS EVERY 6 HOURS PRN
Status: DISCONTINUED | OUTPATIENT
Start: 2022-02-25 | End: 2022-02-25 | Stop reason: HOSPADM

## 2022-02-25 RX ORDER — NALOXONE HYDROCHLORIDE 0.4 MG/ML
0.2 INJECTION, SOLUTION INTRAMUSCULAR; INTRAVENOUS; SUBCUTANEOUS
Status: DISCONTINUED | OUTPATIENT
Start: 2022-02-25 | End: 2022-02-25 | Stop reason: HOSPADM

## 2022-02-25 RX ORDER — ONDANSETRON 2 MG/ML
4 INJECTION INTRAMUSCULAR; INTRAVENOUS
Status: COMPLETED | OUTPATIENT
Start: 2022-02-25 | End: 2022-02-25

## 2022-02-25 RX ORDER — SODIUM CHLORIDE, SODIUM LACTATE, POTASSIUM CHLORIDE, CALCIUM CHLORIDE 600; 310; 30; 20 MG/100ML; MG/100ML; MG/100ML; MG/100ML
INJECTION, SOLUTION INTRAVENOUS CONTINUOUS
Status: DISCONTINUED | OUTPATIENT
Start: 2022-02-25 | End: 2022-02-25 | Stop reason: HOSPADM

## 2022-02-25 RX ORDER — SODIUM CHLORIDE, SODIUM LACTATE, POTASSIUM CHLORIDE, CALCIUM CHLORIDE 600; 310; 30; 20 MG/100ML; MG/100ML; MG/100ML; MG/100ML
INJECTION, SOLUTION INTRAVENOUS CONTINUOUS PRN
Status: DISCONTINUED | OUTPATIENT
Start: 2022-02-25 | End: 2022-02-25

## 2022-02-25 RX ORDER — ONDANSETRON 4 MG/1
4 TABLET, ORALLY DISINTEGRATING ORAL EVERY 6 HOURS PRN
Status: DISCONTINUED | OUTPATIENT
Start: 2022-02-25 | End: 2022-02-25 | Stop reason: HOSPADM

## 2022-02-25 RX ORDER — ONDANSETRON 2 MG/ML
4 INJECTION INTRAMUSCULAR; INTRAVENOUS EVERY 30 MIN PRN
Status: DISCONTINUED | OUTPATIENT
Start: 2022-02-25 | End: 2022-02-25 | Stop reason: HOSPADM

## 2022-02-25 RX ORDER — EPHEDRINE SULFATE 50 MG/ML
INJECTION, SOLUTION INTRAVENOUS PRN
Status: DISCONTINUED | OUTPATIENT
Start: 2022-02-25 | End: 2022-02-25

## 2022-02-25 RX ORDER — FLUMAZENIL 0.1 MG/ML
0.2 INJECTION, SOLUTION INTRAVENOUS
Status: DISCONTINUED | OUTPATIENT
Start: 2022-02-25 | End: 2022-02-25 | Stop reason: HOSPADM

## 2022-02-25 RX ORDER — LIDOCAINE HYDROCHLORIDE 20 MG/ML
INJECTION, SOLUTION INFILTRATION; PERINEURAL PRN
Status: DISCONTINUED | OUTPATIENT
Start: 2022-02-25 | End: 2022-02-25

## 2022-02-25 RX ORDER — DEXMEDETOMIDINE HYDROCHLORIDE 4 UG/ML
INJECTION, SOLUTION INTRAVENOUS PRN
Status: DISCONTINUED | OUTPATIENT
Start: 2022-02-25 | End: 2022-02-25

## 2022-02-25 RX ORDER — PROPOFOL 10 MG/ML
INJECTION, EMULSION INTRAVENOUS CONTINUOUS PRN
Status: DISCONTINUED | OUTPATIENT
Start: 2022-02-25 | End: 2022-02-25

## 2022-02-25 RX ADMIN — DEXMEDETOMIDINE 8 MCG: 100 INJECTION, SOLUTION, CONCENTRATE INTRAVENOUS at 07:47

## 2022-02-25 RX ADMIN — LIDOCAINE HYDROCHLORIDE 60 MG: 20 INJECTION, SOLUTION INFILTRATION; PERINEURAL at 07:40

## 2022-02-25 RX ADMIN — TOPICAL ANESTHETIC 1 SPRAY: 200 SPRAY DENTAL; PERIODONTAL at 07:47

## 2022-02-25 RX ADMIN — SODIUM CHLORIDE, POTASSIUM CHLORIDE, SODIUM LACTATE AND CALCIUM CHLORIDE: 600; 310; 30; 20 INJECTION, SOLUTION INTRAVENOUS at 07:31

## 2022-02-25 RX ADMIN — DEXMEDETOMIDINE 8 MCG: 100 INJECTION, SOLUTION, CONCENTRATE INTRAVENOUS at 07:51

## 2022-02-25 RX ADMIN — EPHEDRINE SULFATE 5 MG: 50 INJECTION, SOLUTION INTRAVENOUS at 08:16

## 2022-02-25 RX ADMIN — EPHEDRINE SULFATE 5 MG: 50 INJECTION, SOLUTION INTRAVENOUS at 08:12

## 2022-02-25 RX ADMIN — ONDANSETRON 4 MG: 2 INJECTION INTRAMUSCULAR; INTRAVENOUS at 07:37

## 2022-02-25 RX ADMIN — PROPOFOL 125 MCG/KG/MIN: 10 INJECTION, EMULSION INTRAVENOUS at 07:40

## 2022-02-25 ASSESSMENT — LIFESTYLE VARIABLES: TOBACCO_USE: 1

## 2022-02-25 NOTE — OP NOTE
Upper EUS 02/25/2022  7:40 AM [address] [place] [address] [phone]     Endoscopy Department   _______________________________________________________________________________   Patient Name: Winter Loya             Procedure Date: 2/25/2022 7:40 AM   MRN: [id number]                       Account Number: [id number]   YOB: 1955             Admit Type: Outpatient   Age: 66                               Room: OR   Gender: Female                        Note Status: Finalized   Attending MD: Jose Bangura MD       Pause for the Cause: time out performed   Total Sedation Time:                     _______________________________________________________________________________       Procedure:             Upper EUS   Indications:           History of pancreatic insufficiency, Diarrhea, 65 year                          old female with a PMHx of insulin dependant DM,                          multiple myeloma in remission, and thyroid tumor s/p                          thyroidectomy referred for a new diagnosis of                          pancreatic insufficiency (low fecal elastase) on work                          up of chronic diarrhea. Still with 3-4x watery BMs                          daily with incontinence despite Creon 72k/meal.   Providers:             Jose Bangura MD   Referring MD:             Requesting Provider:   Delmi Pinedo   Medicines:             Monitored Anesthesia Care   Complications:         No immediate complications. Estimated blood loss:                          Minimal.   _______________________________________________________________________________   Procedure:             Pre-Anesthesia Assessment:                          - Prior to the procedure, a History and Physical was                          performed, and patient medications and allergies were                          reviewed. The patient is  competent. The risks and                          benefits of the procedure and the sedation options and                          risks were discussed with the patient. All questions                          were answered and informed consent was obtained.                          Patient identification and proposed procedure were                          verified by the physician, the nurse, the                          anesthesiologist and the anesthetist in the procedure                          room. Mental Status Examination: alert and oriented.                          Airway Examination: normal oropharyngeal airway and                          neck mobility. Respiratory Examination: clear to                          auscultation. CV Examination: normal. Prophylactic                          Antibiotics: The patient does not require prophylactic                          antibiotics. Prior Anticoagulants: The patient has                          taken no anticoagulant or antiplatelet agents. ASA                          Grade Assessment: II - A patient with mild systemic                          disease. After reviewing the risks and benefits, the                          patient was deemed in satisfactory condition to                          undergo the procedure. The anesthesia plan was to use                          monitored anesthesia care (MAC). Immediately prior to                          administration of medications, the patient was                          re-assessed for adequacy to receive sedatives. The                          heart rate, respiratory rate, oxygen saturations,                          blood pressure, adequacy of pulmonary ventilation, and                          response to care were monitored throughout the                          procedure. The physical status of the patient was                          re-assessed after the procedure.                          After  obtaining informed consent, the endoscope was                          passed under direct vision. Throughout the procedure,                          the patient's blood pressure, pulse, and oxygen                          saturations were monitored continuously. The Endoscope                          was introduced through the mouth, and advanced to the                          second part of duodenum. The Endoscope was introduced                          through the mouth, and advanced to the second part of                          duodenum. The upper EUS was accomplished without                          difficulty. The patient tolerated the procedure well.                                                                                     Findings:        ENDOSCOPIC FINDING: :        The examined esophagus was endoscopically normal.        The entire examined stomach was endoscopically normal.        Diffuse mildly atrophic mucosa was found in the second portion of the        duodenum. Biopsies for histology were taken with a cold forceps for        evaluation of celiac disease. Verification of patient identification for        the specimen was done by the physician and nurse using the patient's        name, birth date and medical record number. Estimated blood loss was        minimal.        ENDOSONOGRAPHIC FINDING: :        Endoscopic exam with the side viewing echoendoscope was normal. The        major papilla was normal endoscopically and sonographically.        The bile duct was non-dilated and measured 7 mm in maximal diameter. The        gallbladder was surgically absent. There were no stones or sludge.        The pancreatic parenchyma appeared normal. There were no features of        chronic pancreatitis. No masses, cysts or stones were visualized in the        pancreatic parenchyma. The main pancreatic duct was followed from the        major papilla to the body, excluding pancreas divisum. The pancreatic         duct measured 4 mm in the head, 0.7 mm in the neck and 0.9 mm in the        body of the pancreas.        No lymph nodes were seen in the upper abdomen and mediastinum.        No masses were seen in the visualized portions of the liver. Diffusely        hyperecohic liver parenchyma.        The left adrenal appeared normal.                                                                                     Impression:            - Mildly atrophic duodenal mucosa. Biopsed to rule out                          sprue. Otherwise normal upper endoscopy                          - Normal pancreatic parencyma and pancreatic duct. No                          features of chronic pancreatitis.                          - Hepatic steatosis   Recommendation:        - Await path results.                          - Normal appearing pancreas on EUS may suggest that                          the low fecal elastase may be false positive in the                          setting of an alternate cause for watery diarrhea.                          Would explain lack of complete response to Creon                          supplementation.                          - Proceed with a colonoscopy today.                                                                                       Jose Bangura MD      COLONOSCOPY 02/25/2022  7:42 AM [address] [place] [address] [phone]     Endoscopy Department   _______________________________________________________________________________   Patient Name: Winter Loya             Procedure Date: 2/25/2022 7:42 AM   MRN: [id number]                       Account Number: [id number]   YOB: 1955             Admit Type: Outpatient   Age: 66                               Room: OR   Gender: Female                        Note Status: Finalized   Attending MD: Jose Bangura MD       Pause for the Cause: time out performed    Total Sedation Time:                     _______________________________________________________________________________       Procedure:             Colonoscopy   Indications:           Chronic diarrhea, 65 year old female with a PMHx of                          insulin dependant DM, multiple myeloma in remission,                          and thyroid tumor s/p thyroidectomy referred for a new                          diagnosis of pancreatic insufficiency (low fecal                          elastase) on work up of chronic diarrhea. Still with                          3-4x watery BMs daily with incontinence despite Creon                          72k/meal.   Providers:             Jose Bangura MD   Referring MD:             Requesting Provider:   Delmi Pinedo   Medicines:             Monitored Anesthesia Care   Complications:         No immediate complications. Estimated blood loss:                          Minimal.   _______________________________________________________________________________   Procedure:             Pre-Anesthesia Assessment:                          - Prior to the procedure, a History and Physical was                          performed, and patient medications and allergies were                          reviewed. The patient is competent. The risks and                          benefits of the procedure and the sedation options and                          risks were discussed with the patient. All questions                          were answered and informed consent was obtained.                          Patient identification and proposed procedure were                          verified by the physician, the nurse, the                          anesthesiologist and the anesthetist in the procedure                          room. Mental Status Examination: alert and oriented.                          Airway Examination: normal oropharyngeal airway and                          neck  mobility. Respiratory Examination: clear to                          auscultation. CV Examination: normal. Prophylactic                          Antibiotics: The patient does not require prophylactic                          antibiotics. Prior Anticoagulants: The patient has                          taken no anticoagulant or antiplatelet agents. ASA                          Grade Assessment: II - A patient with mild systemic                          disease. After reviewing the risks and benefits, the                          patient was deemed in satisfactory condition to                          undergo the procedure. The anesthesia plan was to use                          monitored anesthesia care (MAC). Immediately prior to                          administration of medications, the patient was                          re-assessed for adequacy to receive sedatives. The                          heart rate, respiratory rate, oxygen saturations,                          blood pressure, adequacy of pulmonary ventilation, and                          response to care were monitored throughout the                          procedure. The physical status of the patient was                          re-assessed after the procedure.                          After obtaining informed consent, the colonoscope was                          passed under direct vision. Throughout the procedure,                          the patient's blood pressure, pulse, and oxygen                          saturations were monitored continuously. The                          Colonoscope was introduced through the anus and                          advanced to the terminal ileum. The colonoscopy was                          performed without difficulty. The patient tolerated                          the procedure well. The quality of the bowel                          preparation was evaluated using the BBPS ([place] Bowel                           Preparation Scale) with scores of: Right Colon = 2                          (minor amount of residual staining, small fragments of                          stool and/or opaque liquid, but mucosa seen well),                          Transverse Colon = 2 (minor amount of residual                          staining, small fragments of stool and/or opaque                          liquid, but mucosa seen well) and Left Colon = 2                          (minor amount of residual staining, small fragments of                          stool and/or opaque liquid, but mucosa seen well). The                          total BBPS score equals 6. The quality of the bowel                          preparation was good.                                                                                     Findings:        The perianal and digital rectal examinations were normal. Pertinent        negatives include no palpable rectal lesions.        The terminal ileum appeared normal.        A diffuse area of moderately atrophic mucosa was found in the entire        colon. Biopsies for histology were taken with a cold forceps from the        right colon and left colon for evaluation of microscopic colitis.        Verification of patient identification for the specimen was done by the        physician and nurse using the patient's name, birth date and medical        record number. Estimated blood loss was minimal.        The retroflexed view of the distal rectum and anal verge was normal and        showed no anal or rectal abnormalities.                                                                                     Impression:            - The examined portion of the ileum was normal.                          - Atrophic mucosa in the entire examined colon. Right                          and left colon biopsied to rule out microscopic                          colitis.                          - The distal rectum and anal verge are  normal on                          retroflexion view.   Recommendation:        - Discharge patient to home (ambulatory).                          - Await pathology results.                          - Continue Creon for now.                          - Start uptitrating Immodium. Schedule Immodium. Can                          at first take twice daily and can uptitrate after a                          couple days to a max of 6 times daily                                                                                       Jose Bangura MD

## 2022-02-25 NOTE — ANESTHESIA CARE TRANSFER NOTE
Patient: Winter Loya    Procedure: Procedure(s):  COLONOSCOPY with biopsies  ENDOSCOPIC ULTRASOUND, ESOPHAGOSCOPY with biopsies / UPPER GASTROINTESTINAL TRACT (GI)       Diagnosis: Pancreatic insufficiency [K86.89]  Chronic diarrhea [K52.9]  Abnormal finding on imaging [R93.89]  Diagnosis Additional Information: No value filed.    Anesthesia Type:   MAC     Note:    Oropharynx: spontaneously breathing  Level of Consciousness: drowsy  Oxygen Supplementation: nasal cannula    Independent Airway: airway patency satisfactory and stable  Dentition: dentition unchanged  Vital Signs Stable: post-procedure vital signs reviewed and stable  Report to RN Given: handoff report given  Patient transferred to: Phase II    Handoff Report: Identifed the Patient, Identified the Reponsible Provider, Reviewed the pertinent medical history, Discussed the surgical course, Reviewed Intra-OP anesthesia mangement and issues during anesthesia, Set expectations for post-procedure period and Allowed opportunity for questions and acknowledgement of understanding      Vitals:  Vitals Value Taken Time   BP     Temp     Pulse     Resp     SpO2         Electronically Signed By: FALGUNI Napoles CRNA  February 25, 2022  8:37 AM

## 2022-02-25 NOTE — DISCHARGE INSTRUCTIONS
[facility]  Same-Day Surgery   Adult Discharge Orders & Instructions     For 24 hours after surgery    1. Get plenty of rest.  A responsible adult must stay with you for at least 24 hours after you leave the hospital.   2. Do not drive or use heavy equipment.  If you have weakness or tingling, don't drive or use heavy equipment until this feeling goes away.  3. Do not drink alcohol.  4. Avoid strenuous or risky activities.  Ask for help when climbing stairs.   5. You may feel lightheaded.  IF so, sit for a few minutes before standing.  Have someone help you get up.   6. If you have nausea (feel sick to your stomach): Drink only clear liquids such as apple juice, ginger ale, broth or 7-Up.  Rest may also help.  Be sure to drink enough fluids.  Move to a regular diet as you feel able.  7. You may have a slight fever. Call the doctor if your fever is over 100 F (37.7 C) (taken under the tongue) or lasts longer than 24 hours.  8. You may have a dry mouth, a sore throat, muscle aches or trouble sleeping.  These should go away after 24 hours.  9. Do not make important or legal decisions.   Call your doctor for any of the followin.  Signs of infection (fever, growing tenderness at the surgery site, a large amount of drainage or bleeding, severe pain, foul-smelling drainage, redness, swelling).    2. It has been over 8 to 10 hours since surgery and you are still not able to urinate (pass water).    3.  Headache for over 24 hours.    To contact a doctor, call Dr. Bangura's Office at [phone]  or:        [phone] and ask for the resident on call for   Gastroenterology (answered 24 hours a day)      Emergency Department:    [facility]: [phone]       (TTY for hearing impaired: [phone])    [facility]: [phone]       (TTY for hearing impaired: [phone])

## 2022-02-25 NOTE — ANESTHESIA PREPROCEDURE EVALUATION
"Anesthesia Pre-Procedure Evaluation    Patient: Winter Loya   MRN:     [id number] Gender:   female   Age:    64 year old :      1955        Preoperative Diagnosis: Thyroid goiter [E04.9]   Procedure(s):  THYROIDECTOMY, TOTAL     LABS:  CBC:   Lab Results   Component Value Date    WBC 3.3 (L) 2022    WBC 3.5 (L) 2021    HGB 12.9 2022    HGB 13.1 2021    HCT 37.9 2022    HCT 39.7 2021     (L) 2022     (L) 2021     BMP:   Lab Results   Component Value Date     2022     2021    POTASSIUM 3.6 2022    POTASSIUM 3.6 2021    CHLORIDE 108 2022    CHLORIDE 104 2021    CO2 29 2022    CO2 27 2021    BUN 8 2022    BUN 13 2021    CR 0.63 2022    CR 0.69 2021     (H) 2022     (H) 2022     COAGS: No results found for: PTT, INR, FIBR  POC:   Lab Results   Component Value Date     (H) 2020    HCG Negative 03/10/2020     OTHER:   Lab Results   Component Value Date    A1C 9.0 (H) 2021    ASHLEY 8.6 2022    ALBUMIN 3.5 2022    PROTTOTAL 6.8 2022    ALT 31 2022    AST 10 2022    ALKPHOS 127 2022    BILITOTAL 1.2 2022    LIPASE 266 10/26/2021    AMYLASE 49 10/26/2021    TSH 2.94 10/26/2021    T4 0.84 2020    CRP 4.0 10/26/2021    SED 7 10/26/2021        Preop Vitals    BP Readings from Last 3 Encounters:   22 123/79   22 118/76   02/02/22 135/77    Pulse Readings from Last 3 Encounters:   22 70   22 69   22 77      Resp Readings from Last 3 Encounters:   22 22   22 22   21 18    SpO2 Readings from Last 3 Encounters:   22 98%   22 97%   21 98%      Temp Readings from Last 1 Encounters:   22 36.9  C (98.5  F) (Oral)    Ht Readings from Last 1 Encounters:   22 1.727 m (5' 8\")      Wt Readings from Last 1 Encounters: " "  02/25/22 108.5 kg (239 lb 3.2 oz)    Estimated body mass index is 36.37 kg/m  as calculated from the following:    Height as of an earlier encounter on 2/25/22: 1.727 m (5' 8\").    Weight as of an earlier encounter on 2/25/22: 108.5 kg (239 lb 3.2 oz).     LDA:  Closed/Suction Drain 1 Left Neck 15 Thai (Active)   Number of days: 548        Past Medical History:   Diagnosis Date     Abnormal EMG 2021 5/25/2021    Interpretation: This is an abnormal study, demonstrating electrophysiologic evidence of a length-dependent axonal sensorimotor polyneuropathy. Asymmetry of peroneal compound muscle action potential amplitudes is a finding of uncertain significance.        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   I have personally reviewed the examination and initial interpretation and I agree with the findings.   ANNE GOODMAN MD     History of MRI of cervical spine 6/2020 3/15/2021    Impression: Multilevel cervical spondylosis, most pronounced at the level of C4-5 and C5-6 with moderate to severe spinal canal stenosis and moderate spinal canal stenosis at C6-7. No abnormal cord signal. No significant neural foraminal narrowing at any level.   I have personally reviewed the examination and initial interpretation and I agree with the findings.   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     Sleep apnea     no c-pap use     Status post complete thyroidectomy 8/26/2020     Thyroid goiter 8/5/2020     Tremor " 12/9/2020      Past Surgical History:   Procedure Laterality Date     ARTHROSCOPY KNEE Left 02/2014     ARTHROSCOPY KNEE Right 12/2010    KNEE ARTHROSCOPY Dec 2010  Right     CHOLECYSTECTOMY  2002     COLONOSCOPY N/A 07/23/2020    Procedure: COLONOSCOPY;  Surgeon: Za Denis MD;  Location:  OR     EYE SURGERY  1985    lasersx-spot behind eye started leaking     THYROIDECTOMY N/A 08/26/2020    Procedure: THYROIDECTOMY, TOTAL;  Surgeon: Tiff Burgos MD;  Location: [facility] OR     TONSILLECTOMY  2003     TUBAL LIGATION  1986      No Known Allergies     Anesthesia Evaluation     . Pt has had prior anesthetic.     No history of anesthetic complications          ROS/MED HX    ENT/Pulmonary:     (+) sleep apnea, tobacco use, asthma     Neurologic:       Cardiovascular:     (+) Dyslipidemia hypertension-----      METS/Exercise Tolerance:  4 - Raking leaves, gardening   Hematologic:  - neg hematologic  ROS       Musculoskeletal:  - neg musculoskeletal ROS       GI/Hepatic:  - neg GI/hepatic ROS       Renal/Genitourinary:  - neg Renal ROS       Endo:     (+) type II DM, Obesity,       Psychiatric:     (+) psychiatric history anxiety and depression       Infectious Disease:  - neg infectious disease ROS       Malignancy: (+) Malignancy,       Other:                     ANESTHESIA PHYSICAL [id number]    Assessment:   ASA SCORE: 3       NPO Status: NPO Appropriate     Plan:   Anes. Type:  MAC                               PAC Discussion and Assessment    ASA Classification: 3  Case is suitable for: [place]  Anesthetic techniques and relevant risks discussed: GA                  PAC Resident/NP Anesthesia Assessment: Winter Loya is a 64 year old female scheduled for a THYROIDECTOMY, TOTAL on 8/26/20 by Dr. Burgos in treatment of a thyroid goiter.  PAC referral for risk assessment and optimization for anesthesia with comorbid conditions of: hyperlipidemia, sleep apnea, history of smoking,  multiple myeloma, peripheral neuropathy, diabetes, obesity, depression and anxiety.      Pre-operative considerations:  1.  Cardiac:  Functional status- METS 3.  She doesn't exercise purposefully and is quite sedentary, but reports she can walk a block or two.  She had a stress test and echocardiogram ordered by her primary care provider in March 2020 for atypical chest pain (what she thinks might be a type of neuropathy), but she forgot to do the tests.  She has been instructed to complete those tests before approval to proceed will occur.  Will have a  call her today to get them scheduled.  She is agreeable.   She reports that she used to be on hypertension medication, but those were stopped last year when she had her stem cell transplant and never were restarted.  Most recent blood pressures noted from previous office visits were WNL.  Intermediate risk surgery with 0.9% risk of major adverse cardiac event.   2.  Pulm:  Airway feasible.  She reports that she has diagnosed sleep apnea, but doesn't use a CPAP due to losing it during a move.  She intends to do another sleep study soon in order to get a new CPAP ordered.  She quit smoking in 2005.  3.  GI:  Risk of PONV score = 3.  If > 2, anti-emetic intervention recommended.  4. Endo:  She has metformin, humalog and lantus noted on her med list for diabetes management, but she notes she is only taking the lantus and didn't know if she was supposed to be taking the other two or not.  Take only 80% of lantus the night before surgery.  She is obese with a BMI >30.      VTE risk: 0.5%    Virtual visit - Please refer to the physical examination documented by the anesthesiologist in the anesthesia record on the day of surgery      **Addendum (8/25/20): Stress test and echocardiogram completed.  Stress test negative for ischemia and echocardiogram showed an EF of 60-65% and no wall motion abnormalities.  Communicated with Dr. Burgos regarding A1c of 9.1.   Called patient with all results.  Encouraged patient to follow up with primary care provider regarding diabetes management plan asap.  Patient agreeable.**    Patient is optimized and is acceptable candidate for the proposed procedure.  No further diagnostic evaluation is needed.     **For further details of assessment, testing, and physical exam please see H and P completed on same date.          Mel Bruce DNP, RN, APRN    Reviewed and Signed by PAC Mid-Level Provider/Resident  Mid-Level Provider/Resident: Mel Bruce DNP, RN, APRN  Date: 8/14/20  Time: 1602                                Seferino Deleon MD    Anesthesia Evaluation   Pt has had prior anesthetic.     No history of anesthetic complications       ROS/MED HX  ENT/Pulmonary:     (+) sleep apnea, tobacco use, asthma     Neurologic:       Cardiovascular:     (+) Dyslipidemia hypertension-----    METS/Exercise Tolerance: 4 - Raking leaves, gardening    Hematologic:  - neg hematologic  ROS     Musculoskeletal:  - neg musculoskeletal ROS     GI/Hepatic:  - neg GI/hepatic ROS     Renal/Genitourinary:  - neg Renal ROS     Endo:     (+) type II DM, Obesity,     Psychiatric/Substance Use:     (+) psychiatric history anxiety and depression     Infectious Disease:  - neg infectious disease ROS     Malignancy: Comment: CA multiple myeloma - s/p stem cell transplant Remission status post Chemo   (+) Malignancy,     Other:              Physical Exam    Airway        Mallampati: III   TM distance: > 3 FB   Neck ROM: full   Mouth opening: > 3 cm    Respiratory Devices and Support         Dental       (+) chipped      Cardiovascular   cardiovascular exam normal          Pulmonary   pulmonary exam normal                  Anesthesia Plan    ASA Status:  3   NPO Status:  NPO Appropriate    Anesthesia Type: MAC.   Induction: Intravenous.   Maintenance: TIVA.        Consents    Anesthesia Plan(s) and associated risks, benefits, and realistic alternatives discussed.  Questions answered and patient/representative(s) expressed understanding.     - Discussed: Risks, Benefits and Alternatives for BOTH SEDATION and the PROCEDURE were discussed     - Discussed with:  Patient      - Extended Intubation/Ventilatory Support Discussed: No.      - Patient is DNR/DNI Status: No    Use of blood products discussed: No .     Postoperative Care    Pain management: Multi-modal analgesia.   PONV prophylaxis: Dexamethasone or Solumedrol     Comments:

## 2022-02-28 ENCOUNTER — TELEPHONE (OUTPATIENT)
Dept: ONCOLOGY | Facility: CLINIC | Age: 67
End: 2022-02-28
Payer: MEDICAID

## 2022-02-28 DIAGNOSIS — C90.01 MULTIPLE MYELOMA IN REMISSION (H): Primary | ICD-10-CM

## 2022-02-28 RX ORDER — LENALIDOMIDE 10 MG/1
10 CAPSULE ORAL DAILY
Qty: 28 CAPSULE | Refills: 0 | Status: SHIPPED | OUTPATIENT
Start: 2022-02-28 | End: 2022-05-23

## 2022-02-28 NOTE — TELEPHONE ENCOUNTER
Oral Chemotherapy Monitoring Program    Medication: Revlimid  Rx:  10 mg PO daily for 28 day cycle    Auth #: [id number]  Risk Category: Adult female NOT of reproductive capacity    Routine survey questions reviewed.    Mary Franklin  Oncology Pharmacy Liaison II  [email or web address]  Phone: [phone]  Fax: [phone]

## 2022-03-03 LAB
PATH REPORT.COMMENTS IMP SPEC: NORMAL
PATH REPORT.FINAL DX SPEC: NORMAL
PATH REPORT.GROSS SPEC: NORMAL
PATH REPORT.MICROSCOPIC SPEC OTHER STN: NORMAL
PATH REPORT.RELEVANT HX SPEC: NORMAL
PHOTO IMAGE: NORMAL

## 2022-03-03 PROCEDURE — 88341 IMHCHEM/IMCYTCHM EA ADD ANTB: CPT | Mod: 26 | Performed by: PATHOLOGY

## 2022-03-03 PROCEDURE — 88305 TISSUE EXAM BY PATHOLOGIST: CPT | Mod: 26 | Performed by: PATHOLOGY

## 2022-03-03 PROCEDURE — 88342 IMHCHEM/IMCYTCHM 1ST ANTB: CPT | Mod: 26 | Performed by: PATHOLOGY

## 2022-03-04 ENCOUNTER — TELEPHONE (OUTPATIENT)
Dept: BEHAVIORAL HEALTH | Facility: CLINIC | Age: 67
End: 2022-03-04
Payer: MEDICAID

## 2022-03-05 ENCOUNTER — HEALTH MAINTENANCE LETTER (OUTPATIENT)
Age: 67
End: 2022-03-05

## 2022-03-06 DIAGNOSIS — Z79.4 TYPE 2 DIABETES MELLITUS WITH DIABETIC POLYNEUROPATHY, WITH LONG-TERM CURRENT USE OF INSULIN (H): ICD-10-CM

## 2022-03-06 DIAGNOSIS — E11.42 TYPE 2 DIABETES MELLITUS WITH DIABETIC POLYNEUROPATHY, WITH LONG-TERM CURRENT USE OF INSULIN (H): ICD-10-CM

## 2022-03-07 RX ORDER — INSULIN GLARGINE 100 [IU]/ML
34 INJECTION, SOLUTION SUBCUTANEOUS AT BEDTIME
Qty: 30 ML | Refills: 3 | Status: SHIPPED | OUTPATIENT
Start: 2022-03-07 | End: 2022-07-11

## 2022-03-08 ENCOUNTER — VIRTUAL VISIT (OUTPATIENT)
Dept: PSYCHOLOGY | Facility: CLINIC | Age: 67
End: 2022-03-08
Payer: MEDICAID

## 2022-03-08 ENCOUNTER — VIRTUAL VISIT (OUTPATIENT)
Dept: ONCOLOGY | Facility: CLINIC | Age: 67
End: 2022-03-08
Attending: PHYSICIAN ASSISTANT
Payer: MEDICAID

## 2022-03-08 DIAGNOSIS — F41.1 GENERALIZED ANXIETY DISORDER: ICD-10-CM

## 2022-03-08 DIAGNOSIS — C90.01 MULTIPLE MYELOMA IN REMISSION (H): Primary | ICD-10-CM

## 2022-03-08 DIAGNOSIS — F33.1 MAJOR DEPRESSIVE DISORDER, RECURRENT EPISODE, MODERATE WITH ANXIOUS DISTRESS (H): Primary | ICD-10-CM

## 2022-03-08 PROCEDURE — 90834 PSYTX W PT 45 MINUTES: CPT | Mod: 95 | Performed by: STUDENT IN AN ORGANIZED HEALTH CARE EDUCATION/TRAINING PROGRAM

## 2022-03-08 PROCEDURE — 99443 PR PHYSICIAN TELEPHONE EVALUATION 21-30 MIN: CPT | Performed by: PHYSICIAN ASSISTANT

## 2022-03-08 ASSESSMENT — ANXIETY QUESTIONNAIRES
7. FEELING AFRAID AS IF SOMETHING AWFUL MIGHT HAPPEN: NEARLY EVERY DAY
4. TROUBLE RELAXING: MORE THAN HALF THE DAYS
1. FEELING NERVOUS, ANXIOUS, OR ON EDGE: NEARLY EVERY DAY
GAD7 TOTAL SCORE: 17
2. NOT BEING ABLE TO STOP OR CONTROL WORRYING: NEARLY EVERY DAY
IF YOU CHECKED OFF ANY PROBLEMS ON THIS QUESTIONNAIRE, HOW DIFFICULT HAVE THESE PROBLEMS MADE IT FOR YOU TO DO YOUR WORK, TAKE CARE OF THINGS AT HOME, OR GET ALONG WITH OTHER PEOPLE: VERY DIFFICULT
6. BECOMING EASILY ANNOYED OR IRRITABLE: SEVERAL DAYS
5. BEING SO RESTLESS THAT IT IS HARD TO SIT STILL: MORE THAN HALF THE DAYS
3. WORRYING TOO MUCH ABOUT DIFFERENT THINGS: NEARLY EVERY DAY

## 2022-03-08 ASSESSMENT — COLUMBIA-SUICIDE SEVERITY RATING SCALE - C-SSRS
5. HAVE YOU STARTED TO WORK OUT OR WORKED OUT THE DETAILS OF HOW TO KILL YOURSELF? DO YOU INTEND TO CARRY OUT THIS PLAN?: NO
6. HAVE YOU EVER DONE ANYTHING, STARTED TO DO ANYTHING, OR PREPARED TO DO ANYTHING TO END YOUR LIFE?: YES
2. HAVE YOU ACTUALLY HAD ANY THOUGHTS OF KILLING YOURSELF IN THE PAST MONTH?: NO
4. HAVE YOU HAD THESE THOUGHTS AND HAD SOME INTENTION OF ACTING ON THEM?: NO
1. IN THE PAST MONTH, HAVE YOU WISHED YOU WERE DEAD OR WISHED YOU COULD GO TO SLEEP AND NOT WAKE UP?: NO

## 2022-03-08 ASSESSMENT — PATIENT HEALTH QUESTIONNAIRE - PHQ9: SUM OF ALL RESPONSES TO PHQ QUESTIONS 1-9: 16

## 2022-03-08 NOTE — LETTER
3/8/2022         RE: Winter Loya  [address]  [place] [address]        Dear Colleague,    Thank you for referring your patient, Winter Loya, to the [facility]. Please see a copy of my visit note below.    ONCOLOGY/HEMATOLOGY PROGRESS NOTE  Mar 8, 2022    Reason for Visit: IgA Kappa Multiple Myeloma    Oncology HPI:   Winter Loya is a 64 year old woman with IgA Kappa Multiple Myeloma. In 2018 she had anemia and was fatigued. She was found to have IgA kappa monocloncal antibody with M-spike of 0.17. IgA elevated. Skeletal survey was unremarkable and UPEP had minimal protein (156 mg/m2). PET 11/2018 showed bone marrow consistent with hypermetabolic plasma cell myeloma. M spike was 0.17. She started RVD and Zometa. On 4/2019 she had an autologous transplant.      Her bone marrow bx from September 2019 was sent here for review. It showed marrow cellularity of 60%, with decreased trilineage hematopoiesis and 15% plasma cells as well as peripheral blood with slight anemia. 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 She is on revlimid maintenance and zometa from her prior oncologist in [place]. On 12/6/19 her K/L ratio is 1.1, M spike is 0.04.     Interval history:   -having some neck and back pain, though thinks it is due to her office chair  -started having bad diarrhea last summer, thought it was the metformin, though has changed meds and nothing changed. Now using imodium daily. About 4-6 pills/day. This controls her symptoms well. 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 This is obviously very stressful and sad     ROS: 10 point ROS neg other than the symptoms noted above in the HPI.      Past Medical History:   Diagnosis Date     Abnormal  EMG 2021 5/25/2021    Interpretation: This is an abnormal study, demonstrating electrophysiologic evidence of a length-dependent axonal sensorimotor polyneuropathy. Asymmetry of peroneal compound muscle action potential amplitudes is a finding of uncertain significance.        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   I have personally reviewed the examination and initial interpretation and I agree with the findings.   ANNE GOODMAN MD     History of MRI of cervical spine 6/2020 3/15/2021    Impression: Multilevel cervical spondylosis, most pronounced at the level of C4-5 and C5-6 with moderate to severe spinal canal stenosis and moderate spinal canal stenosis at C6-7. No abnormal cord signal. No significant neural foraminal narrowing at any level.   I have personally reviewed the examination and initial interpretation and I agree with the findings.   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     Sleep apnea     no c-pap use     Status post complete thyroidectomy 8/26/2020     Thyroid goiter 8/5/2020     Tremor 12/9/2020        Current Outpatient Medications   Medication Sig Dispense Refill     acyclovir (ZOVIRAX) 400 MG tablet TAKE ONE TABLET BY MOUTH EVERY TWELVE HOURS 60 tablet 11     amylase-lipase-protease (CREON 24) 72299-77145 units CPEP per EC capsule Take 3 capsules by mouth 3 times daily (with meals) 1-2 capsules with snacks 810 capsule 4     aspirin (ASA) 325 MG EC tablet Take 1 tablet (325  mg) by mouth daily 90 tablet 3     atorvastatin (LIPITOR) 20 MG tablet Take 1 tablet (20 mg) by mouth At Bedtime 90 tablet 3     blood glucose (NO BRAND SPECIFIED) test strip Use to test blood sugar two times daily or as directed. 200 strip 3     blood glucose monitoring (NO BRAND SPECIFIED) meter device kit Use to test blood sugar two times daily or as directed. 1 kit 3     Continuous Blood Gluc Sensor (FREESTYLE SEGUNDO 2 SENSOR) MISC 1 each every 14 days 1 each every 14 days. Change every 14 days. 6 each 3     empagliflozin (JARDIANCE) 25 MG TABS tablet Take 1 tablet (25 mg) by mouth daily 90 tablet 3     insulin glargine (LANTUS SOLOSTAR) 100 UNIT/ML pen Inject 34 Units Subcutaneous At Bedtime 30 mL 3     insulin pen needle (FIFTY50 PEN NEEDLES) 32G X 4 MM miscellaneous Use one pen needle daily or as directed. 100 each 2     LENalidomide (REVLIMID) 10 MG CAPS capsule Take 1 capsule (10 mg) by mouth daily for 28 days 28 capsule 0     LENalidomide (REVLIMID) 10 MG CAPS capsule Take 1 capsule (10 mg) by mouth daily for 28 days 28 capsule 0     LENalidomide (REVLIMID) 10 MG CAPS capsule Take 1 capsule (10 mg) by mouth daily for 28 days 28 capsule 0     LENalidomide (REVLIMID) 10 MG CAPS capsule Take 1 capsule (10 mg) by mouth daily 28 capsule 0     levothyroxine (SYNTHROID/LEVOTHROID) 175 MCG tablet Take 1 tablet (175 mcg) by mouth every morning (before breakfast) 90 tablet 3     losartan (COZAAR) 25 MG tablet Take 1 tablet (25 mg) by mouth daily 90 tablet 2     metFORMIN (GLUCOPHAGE-XR) 500 MG 24 hr tablet Take 1 tablet (500 mg) by mouth daily (with dinner) 180 tablet 3     pregabalin (LYRICA) 100 MG capsule TAKE ONE CAPSULE BY MOUTH THREE TIMES DAILY 270 capsule 3     propranolol ER (INDERAL LA) 60 MG 24 hr capsule Take 1 capsule (60 mg) by mouth daily 90 capsule 3     sertraline (ZOLOFT) 100 MG tablet Take 1 tablet (100 mg) by mouth daily 90 tablet 3     TRULICITY 1.5 MG/0.5ML pen Inject 1.5 mg Subcutaneous every 7  days +++NEED APPT+++ 2 mL 0        No Known Allergies    Objective:  General: patient sounds in no audible acute distress, alert and oriented, speech clear and fluid  Resp: Speaking in full sentences, no audible respiratory distress, no cough, no audible wheeze  Psych: able to articulate logical thoughts, able to abstract reason, no tangential thoughts, no hallucinations or delusions.  Affect is normal    The rest of a comprehensive physical examination is deferred due to PHE (public health emergency) phone restrictions    Labs:     Most Recent 3 CBC's:  Recent Labs   Lab Test 02/02/22  0858 12/29/21  1429 11/29/21  1347   WBC 3.3* 3.5* 3.9*   HGB 12.9 13.1 13.7   MCV 86 86 85   * 121* 129*     Most Recent 3 BMP's:  Recent Labs   Lab Test 02/25/22  0847 02/25/22  0605 02/02/22  0857 12/29/21  1429 11/29/21  1347   NA  --   --  141 138 135   POTASSIUM  --   --  3.6 3.6 3.7   CHLORIDE  --   --  108 104 102   CO2  --   --  29 27 27   BUN  --   --  8 13 13   CR  --   --  0.63 0.69 0.78   ANIONGAP  --   --  4 7 6   ASHLEY  --   --  8.6 8.7 9.3   * 125* 163* 174* 285*     Most Recent 2 LFT's:  Recent Labs   Lab Test 02/02/22  0857 12/29/21  1429   AST 10 19   ALT 31 49   ALKPHOS 127 144   BILITOTAL 1.2 1.3           Assessment/plan:     #MM, standard risk, IgA Kappa  -s/p auto HSCT in 4/2019  -now on maintenance Revlimid 10 mg daily.   -Have been checking SPEP regularly though have not been getting FLC or immunoglobulins. Will add these to be checked with MM labs. Mspike continues to be 0.0, in a biochemical CR  -PET/CT annually or if symptomatic from MM due to bone pain. Last PET was 2/15/20  -will continue Rev maintenance for now. Possible diarrhea is due to Rev, though timing does not match. Consider holding if unable to get symptomatic control, though continue for now  -Will follow-up with Dr. Zhu in lieu of Dr. Mares (previously Danuta) leaving our clinic in about 3 months with labs, sooner if  needed  -She wants to continue with me as her SKYLER.     PPx: Held ACV. Continue  mg for VTE ppx while on Rev    #Bone lesions  -was on zometa through 12/2020 to complete a total of 2 years of therapy. No plan for further therapy at this time   -no current bone pain    #diarrhea   -ongoing since Summer 2021. C.diff negative on 10/31/21. Met with GI and was started on creon TID for pancreatic insufficiency and told to use imodium prn  -met with nutritionist on 1/5/22  -Colonoscopy on 2/25/22, path shows NCS  -could be revlimid, though the timing is off as she has been on Rev for years and the diarrhea is new. We talked about trying to get symptom control. If unable to get symptom control, could try to hold rev and see if symptoms improved and then consider dose reduction  -continue imodium prn, currently 4-6 pills/day. Suggested adding fiber 1-2x/day. Follow-up again with GI  -stress could also be contributing to this too      #Neuropathy   -multifactorial likely d/t previous chemotherapy and uncontrolled diabetes.   -currently on Lyrica 300 mg  -has established care with neurology and they felt it was like velcade and DM as well. Again, unlikely to be Revlimid, though if things continue to progressively worsen and DM is well controlled, then could hold to see if it improves   -continue to follow-up with PCP and neuro    #DM2   -Last hgb A1c was 7.8. Managed by PCP and PharmD. Using insulin, Jardiance, Trulicity and Metformin  -trying to eat better and working on exercising more   -important to get good control      #Depression with Anxiety, chronic   -Currently maintained on Zoloft 100 mg daily. Increased stress and sadness with son's leukemia and now switching to hospice   -meeting with psychology now. Provided actively listening and support. Continue to follow-up with mental health providers    Chronic Issues:   #Memory changes   -Stable. Had neuropsych testing on 10/22/20. Saw Neurology in Dec  2020  -consider cancer rehab OT for chemo brain  -likely stress is making this worse    #Thyroidectomy, 8/26/20  -will continue to f/u with with ENT and PCP    #Hx of falls  -Has done PT in the past. Having more falls. Working with PCP on this. Should be using assistive devices to prevent accidents     Phone Call Duration: 22 minutes            Again, thank you for allowing me to participate in the care of your patient.      Sincerely,    Reva Mojica PA-C

## 2022-03-08 NOTE — PROGRESS NOTES
ONCOLOGY/HEMATOLOGY PROGRESS NOTE  Mar 8, 2022    Reason for Visit: IgA Kappa Multiple Myeloma    Oncology HPI:   Winter Loya is a 64 year old woman with IgA Kappa Multiple Myeloma. In 2018 she had anemia and was fatigued. She was found to have IgA kappa monocloncal antibody with M-spike of 0.17. IgA elevated. Skeletal survey was unremarkable and UPEP had minimal protein (156 mg/m2). PET 11/2018 showed bone marrow consistent with hypermetabolic plasma cell myeloma. M spike was 0.17. She started RVD and Zometa. On 4/2019 she had an autologous transplant.      Her bone marrow bx from September 2019 was sent here for review. It showed marrow cellularity of 60%, with decreased trilineage hematopoiesis and 15% plasma cells as well as peripheral blood with slight anemia. 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 She is on revlimid maintenance and zometa from her prior oncologist in [place]. On 12/6/19 her K/L ratio is 1.1, M spike is 0.04.     Interval history:   -having some neck and back pain, though thinks it is due to her office chair  -started having bad diarrhea last summer, thought it was the metformin, though has changed meds and nothing changed. Now using imodium daily. About 4-6 pills/day. This controls her symptoms well. 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 This is obviously very stressful and sad     ROS: 10 point ROS neg other than the symptoms noted above in the HPI.      Past Medical History:   Diagnosis Date     Abnormal EMG 2021 5/25/2021    Interpretation: This is an abnormal study, demonstrating electrophysiologic evidence of a length-dependent axonal sensorimotor polyneuropathy. Asymmetry of peroneal compound muscle action potential amplitudes is a finding of  uncertain significance.        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   I have personally reviewed the examination and initial interpretation and I agree with the findings.   ANNE GOODMAN MD     History of MRI of cervical spine 6/2020 3/15/2021    Impression: Multilevel cervical spondylosis, most pronounced at the level of C4-5 and C5-6 with moderate to severe spinal canal stenosis and moderate spinal canal stenosis at C6-7. No abnormal cord signal. No significant neural foraminal narrowing at any level.   I have personally reviewed the examination and initial interpretation and I agree with the findings.   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     Sleep apnea     no c-pap use     Status post complete thyroidectomy 8/26/2020     Thyroid goiter 8/5/2020     Tremor 12/9/2020        Current Outpatient Medications   Medication Sig Dispense Refill     acyclovir (ZOVIRAX) 400 MG tablet TAKE ONE TABLET BY MOUTH EVERY TWELVE HOURS 60 tablet 11     amylase-lipase-protease (CREON 24) 97424-24776 units CPEP per EC capsule Take 3 capsules by mouth 3 times daily (with meals) 1-2 capsules with snacks 810 capsule 4     aspirin (ASA) 325 MG EC tablet Take 1 tablet (325 mg) by mouth daily 90 tablet 3     atorvastatin (LIPITOR) 20 MG tablet Take 1 tablet (20 mg) by mouth At Bedtime 90 tablet 3     blood glucose (NO BRAND SPECIFIED) test strip Use to test blood sugar two times daily or as directed. 200 strip 3      blood glucose monitoring (NO BRAND SPECIFIED) meter device kit Use to test blood sugar two times daily or as directed. 1 kit 3     Continuous Blood Gluc Sensor (FREESTYLE SEGUNDO 2 SENSOR) MISC 1 each every 14 days 1 each every 14 days. Change every 14 days. 6 each 3     empagliflozin (JARDIANCE) 25 MG TABS tablet Take 1 tablet (25 mg) by mouth daily 90 tablet 3     insulin glargine (LANTUS SOLOSTAR) 100 UNIT/ML pen Inject 34 Units Subcutaneous At Bedtime 30 mL 3     insulin pen needle (FIFTY50 PEN NEEDLES) 32G X 4 MM miscellaneous Use one pen needle daily or as directed. 100 each 2     LENalidomide (REVLIMID) 10 MG CAPS capsule Take 1 capsule (10 mg) by mouth daily for 28 days 28 capsule 0     LENalidomide (REVLIMID) 10 MG CAPS capsule Take 1 capsule (10 mg) by mouth daily for 28 days 28 capsule 0     LENalidomide (REVLIMID) 10 MG CAPS capsule Take 1 capsule (10 mg) by mouth daily for 28 days 28 capsule 0     LENalidomide (REVLIMID) 10 MG CAPS capsule Take 1 capsule (10 mg) by mouth daily 28 capsule 0     levothyroxine (SYNTHROID/LEVOTHROID) 175 MCG tablet Take 1 tablet (175 mcg) by mouth every morning (before breakfast) 90 tablet 3     losartan (COZAAR) 25 MG tablet Take 1 tablet (25 mg) by mouth daily 90 tablet 2     metFORMIN (GLUCOPHAGE-XR) 500 MG 24 hr tablet Take 1 tablet (500 mg) by mouth daily (with dinner) 180 tablet 3     pregabalin (LYRICA) 100 MG capsule TAKE ONE CAPSULE BY MOUTH THREE TIMES DAILY 270 capsule 3     propranolol ER (INDERAL LA) 60 MG 24 hr capsule Take 1 capsule (60 mg) by mouth daily 90 capsule 3     sertraline (ZOLOFT) 100 MG tablet Take 1 tablet (100 mg) by mouth daily 90 tablet 3     TRULICITY 1.5 MG/0.5ML pen Inject 1.5 mg Subcutaneous every 7 days +++NEED APPT+++ 2 mL 0        No Known Allergies    Objective:  General: patient sounds in no audible acute distress, alert and oriented, speech clear and fluid  Resp: Speaking in full sentences, no audible respiratory distress, no cough, no  audible wheeze  Psych: able to articulate logical thoughts, able to abstract reason, no tangential thoughts, no hallucinations or delusions.  Affect is normal    The rest of a comprehensive physical examination is deferred due to PHE (public health emergency) phone restrictions    Labs:     Most Recent 3 CBC's:  Recent Labs   Lab Test 02/02/22  0858 12/29/21  1429 11/29/21  1347   WBC 3.3* 3.5* 3.9*   HGB 12.9 13.1 13.7   MCV 86 86 85   * 121* 129*     Most Recent 3 BMP's:  Recent Labs   Lab Test 02/25/22  0847 02/25/22  0605 02/02/22  0857 12/29/21  1429 11/29/21  1347   NA  --   --  141 138 135   POTASSIUM  --   --  3.6 3.6 3.7   CHLORIDE  --   --  108 104 102   CO2  --   --  29 27 27   BUN  --   --  8 13 13   CR  --   --  0.63 0.69 0.78   ANIONGAP  --   --  4 7 6   ASHLEY  --   --  8.6 8.7 9.3   * 125* 163* 174* 285*     Most Recent 2 LFT's:  Recent Labs   Lab Test 02/02/22  0857 12/29/21  1429   AST 10 19   ALT 31 49   ALKPHOS 127 144   BILITOTAL 1.2 1.3           Assessment/plan:     #MM, standard risk, IgA Kappa  -s/p auto HSCT in 4/2019  -now on maintenance Revlimid 10 mg daily.   -Have been checking SPEP regularly though have not been getting FLC or immunoglobulins. Will add these to be checked with MM labs. Mspike continues to be 0.0, in a biochemical CR  -PET/CT annually or if symptomatic from MM due to bone pain. Last PET was 2/15/20  -will continue Rev maintenance for now. Possible diarrhea is due to Rev, though timing does not match. Consider holding if unable to get symptomatic control, though continue for now  -Will follow-up with Dr. Zhu in lieu of Dr. Mares (previously Danuta) leaving our clinic in about 3 months with labs, sooner if needed  -She wants to continue with me as her SKYLER.     PPx: Held ACV. Continue  mg for VTE ppx while on Rev    #Bone lesions  -was on zometa through 12/2020 to complete a total of 2 years of therapy. No plan for further therapy at this time    -no current bone pain    #diarrhea   -ongoing since Summer 2021. C.diff negative on 10/31/21. Met with GI and was started on creon TID for pancreatic insufficiency and told to use imodium prn  -met with nutritionist on 1/5/22  -Colonoscopy on 2/25/22, path shows NCS  -could be revlimid, though the timing is off as she has been on Rev for years and the diarrhea is new. We talked about trying to get symptom control. If unable to get symptom control, could try to hold rev and see if symptoms improved and then consider dose reduction  -continue imodium prn, currently 4-6 pills/day. Suggested adding fiber 1-2x/day. Follow-up again with GI  -stress could also be contributing to this too      #Neuropathy   -multifactorial likely d/t previous chemotherapy and uncontrolled diabetes.   -currently on Lyrica 300 mg  -has established care with neurology and they felt it was like velcade and DM as well. Again, unlikely to be Revlimid, though if things continue to progressively worsen and DM is well controlled, then could hold to see if it improves   -continue to follow-up with PCP and neuro    #DM2   -Last hgb A1c was 7.8. Managed by PCP and PharmD. Using insulin, Jardiance, Trulicity and Metformin  -trying to eat better and working on exercising more   -important to get good control      #Depression with Anxiety, chronic   -Currently maintained on Zoloft 100 mg daily. Increased stress and sadness with son's leukemia and now switching to hospice   -meeting with psychology now. Provided actively listening and support. Continue to follow-up with mental health providers    Chronic Issues:   #Memory changes   -Stable. Had neuropsych testing on 10/22/20. Saw Neurology in Dec 2020  -consider cancer rehab OT for chemo brain  -likely stress is making this worse    #Thyroidectomy, 8/26/20  -will continue to f/u with with ENT and PCP    #Hx of falls  -Has done PT in the past. Having more falls. Working with PCP on this. Should be using  assistive devices to prevent accidents     Phone Call Duration: 22 minutes      Reva Mojica PA-C

## 2022-03-08 NOTE — RESULT ENCOUNTER NOTE
I called the patient and reviewed her EGD/Colonoscopy results. Essentially biopsies normal. Diarrhea does not appear to be from pancreatic insufficiency and the fecal elastase is likely diluted (false positive). In review of her medications, potential other culprits would be Revlimid, trulicity, or jardiance (less common). She has been on Revlimid for years and diarrhea started well after beginning that. She does think diarrhea onset may have coincided with starting either trulicity or jardiance. She has an upcoming visit for her diabetes. For now her diarrhea seems reasonably managed with QID Imodium. I recommended adding daily fiber supplementation. I'll see her back in follow up. She will also do a lactose-free trial to see if that may be a culprit. Once diarrhea improves, we'll recheck the fecal elastase as I suspect we'll be able to stop her Creon.     Dr. Armstrong, could the Trulicity be causing her diarrhea? Any chance this could be held?    Beata, can we set up a clinic follow up in ~1 month? Thanks.    Jose Bangura MD  [facility]  Division of Gastroenterology and Hepatology  [facility] [address] [place], [place] [address]

## 2022-03-08 NOTE — PROGRESS NOTES
[facility]   Mental Health & Addiction Services     Progress Note - Initial Visit    Patient  Name:  Winter Loya Date: 2022         Service Type: Individual     Visit Start Time: 8.00  Visit End Time: .50    Visit #: 1    Attendees: Client attended alone    Service Modality:  Video Visit:      Provider verified identity through the following two step process.  Patient provided:  Patient photo, Patient  and Patient address    Telemedicine Visit: The patient's condition can be safely assessed and treated via synchronous audio and visual telemedicine encounter.      Reason for Telemedicine Visit: Patient has requested telehealth visit    Originating Site (Patient Location): Patient's home    Distant Site (Provider Location): Provider Remote Setting- Home Office    Consent:  The patient/guardian has verbally consented to: the potential risks and benefits of telemedicine (video visit) versus in person care; bill my insurance or make self-payment for services provided; and responsibility for payment of non-covered services.     Patient would like the video invitation sent by:  My Chart    Mode of Communication:  Video Conference via Amwell    As the provider I attest to compliance with applicable laws and regulations related to telemedicine.       DATA:   Interactive Complexity: No   Crisis: No     Presenting Concerns/  Current Stressors:   Depression and anxiety per phq 9 and gad7 scores. Patient reported that she has a son that has been diagnosed with leukemia and currently on palliative care and given six months to live. Pt reported that her son her a wife and a daughter and limited financial income to support family now and after and that has been stressful as well.      ASSESSMENT:  Mental Status Assessment:  Appearance:   Appropriate   Eye Contact:   Good   Psychomotor Behavior: Normal   Attitude:   Cooperative  Interested  Orientation:   All  Speech   Rate /  Production: Emotional   Volume:  Normal   Mood:    Anxious  Depressed  Anhedonia  Affect:    Worrisome   Thought Content:  Clear   Thought Form:  Coherent   Insight:    Good       Safety Issues and Plan for Safety and Risk Management:     Solomon Suicide Severity Rating Scale (Lifetime/Recent)  Solomon Suicide Severity Rating (Lifetime/Recent) 3/8/2022   Q1 Wished to be Dead (Past Month) no   Q2 Suicidal Thoughts (Past Month) no   Q4 Suicidal Intent without Specific Plan no   Q5 Suicide Intent with Specific Plan no   Q6 Suicide Behavior (Lifetime) yes   Within the Past 3 Months? no   Level of Risk per Screen moderate risk     Patient denies current fears or concerns for personal safety.  Patient denies current or recent suicidal ideation or behaviors.  Patient denies current or recent homicidal ideation or behaviors.  Patient denies current or recent self injurious behavior or ideation.  Patient denies other safety concerns.  Recommended that patient call 911 or go to the local ED should there be a change in any of these risk factors.  Patient reports there are no firearms in the house.     PHQ 7/26/2021 2/2/2022 3/8/2022   PHQ-9 Total Score 21 13 16   Q9: Thoughts of better off dead/self-harm past 2 weeks More than half the days Not at all Not at all     NA-7 SCORE 7/26/2021 2/2/2022 3/8/2022   Total Score 14 10 17         Diagnostic Criteria:  Generalized Anxiety Disorder  C. Select 3 or more symptoms (required for diagnosis). Only one item is required in children.   - Restlessness or feeling keyed up or on edge.    - Being easily fatigued.    - Difficulty concentrating or mind going blank.    - Irritability.    - Muscle tension.    - Sleep disturbance (difficulty falling or staying asleep, or restless unsatisfying sleep).   D. The focus of the anxiety and worry is not confined to features of an Axis I disorder.  E. The anxiety, worry, or physical symptoms cause clinically significant distress or impairment in  social, occupational, or other important areas of functioning.   F. The disturbance is not due to the direct physiological effects of a substance (e.g., a drug of abuse, a medication) or a general medical condition (e.g., hyperthyroidism) and does not occur exclusively during a Mood Disorder, a Psychotic Disorder, or a Pervasive Developmental Disorder.    - The aformentioned symptoms began 3 month(s) ago and occurs 5 days per week and is experienced as moderate.  Major Depressive Disorder   - Depressed mood. Note: In children and adolescents, can be irritable mood.     - Diminished interest or pleasure in all, or almost all, activities.    - Decreased sleep.    - Psychomotor activity retardation.    - Fatigue or loss of energy.    - Feelings of worthlessness or inappropriate guilt.    - Diminished ability to think or concentrate, or indecisiveness.   B) The symptoms cause clinically significant distress or impairment in social, occupational, or other important areas of functioning  C) The episode is not attributable to the physiological effects of a substance or to another medical condition  D) The occurence of major depressive episode is not better explained by other thought / psychotic disorders  E) There has never been a manic episode or hypomanic episode      DSM5 Diagnoses: (Sustained by DSM5 Criteria Listed Above)  Diagnoses: 296.32 (F33.1) Major Depressive Disorder, Recurrent Episode, Moderate _ and With anxious distress  300.02 (F41.1) Generalized Anxiety Disorder  Psychosocial & Contextual Factors: Patient has a son diagnosed with leukemia with 6 months to live/ agism and associated health problems.  WHODAS 2.0 (12 item): No flowsheet data found.  Intervention:  - Motivational Interviewing: Utilizing OARS to build insight around situation, observing barriers patient is seeing in her ability to support son, wife and daughter, and identifying what is needed to mitigate current depressive and anxious  mood.    Collateral Reports Completed:  Not Applicable      PLAN: (Homework, other):  1. Provider will continue Diagnostic Assessment.  Patient was given the following to do until next session:  Take 10-20 minutes daily walks    2. Provider recommended the following referrals: NA.       3.  Suicide Risk and Safety Concerns were assessed for Winter Loya.           Mj Cline [facility]  March 8, 2022    ----- Service Performed and Documented by [facility]------  This note was reviewed and clinical supervision by AMY Ayala [facility]     3/14/2022

## 2022-03-09 ASSESSMENT — ANXIETY QUESTIONNAIRES: GAD7 TOTAL SCORE: 17

## 2022-03-14 ENCOUNTER — PATIENT OUTREACH (OUTPATIENT)
Dept: GASTROENTEROLOGY | Facility: CLINIC | Age: 67
End: 2022-03-14
Payer: MEDICAID

## 2022-03-14 NOTE — PROGRESS NOTES
Called pt to arrange follow up visit per Dr Bangura's request, set up a clinic follow up in ~1 month  We arranged virtual visit on 4/13 at 7am. Message routed to clinic coordinators to schedule    Laurie Melton RN, BSN,   [facility]  Care coordinator

## 2022-03-23 DIAGNOSIS — Z79.4 TYPE 2 DIABETES MELLITUS WITH DIABETIC POLYNEUROPATHY, WITH LONG-TERM CURRENT USE OF INSULIN (H): ICD-10-CM

## 2022-03-23 DIAGNOSIS — E11.42 TYPE 2 DIABETES MELLITUS WITH DIABETIC POLYNEUROPATHY, WITH LONG-TERM CURRENT USE OF INSULIN (H): ICD-10-CM

## 2022-03-25 NOTE — TELEPHONE ENCOUNTER
Routing refill request to provider for review/approval because:  Labs out of range:    Lab Results   Component Value Date    A1C 9.0 12/29/2021    A1C 7.8 08/27/2021    A1C 7.8 07/26/2021    A1C 7.6 03/08/2021    A1C 8.8 12/07/2020    A1C 9.1 08/21/2020    A1C 6.7 03/05/2020

## 2022-03-28 DIAGNOSIS — C90.01 MULTIPLE MYELOMA IN REMISSION (H): Primary | ICD-10-CM

## 2022-03-28 RX ORDER — LENALIDOMIDE 10 MG/1
10 CAPSULE ORAL DAILY
Qty: 28 CAPSULE | Refills: 0 | Status: SHIPPED | OUTPATIENT
Start: 2022-03-28 | End: 2022-05-23

## 2022-03-28 RX ORDER — DULAGLUTIDE 1.5 MG/.5ML
1.5 INJECTION, SOLUTION SUBCUTANEOUS
Qty: 2 ML | Refills: 0 | Status: SHIPPED | OUTPATIENT
Start: 2022-03-28 | End: 2022-05-10

## 2022-04-01 ENCOUNTER — TELEPHONE (OUTPATIENT)
Dept: ONCOLOGY | Facility: CLINIC | Age: 67
End: 2022-04-01
Payer: MEDICAID

## 2022-04-01 NOTE — TELEPHONE ENCOUNTER
Oral Chemotherapy Monitoring Program    Medication: Revlimid  Rx:  10 mg PO daily for a 28 day cycle    Auth #: [id number]  Risk Category: Adult female NOT of reproductive capacity    Routine survey questions reviewed.    Mary Franklin  Oncology Pharmacy Liaison II  [email or web address]  Phone: [phone]  Fax: [phone]

## 2022-04-01 NOTE — TELEPHONE ENCOUNTER
Oral Chemotherapy Monitoring Program    Medication: Revlimid  Rx:  10 mg PO daily for a 28 day cycle  Auth #: [id number]  Risk Category: Adult male    Routine survey questions reviewed.    Mary Franklin  Oncology Pharmacy Liaison II  [email or web address]  Phone: [phone]  Fax: [phone]

## 2022-04-04 ENCOUNTER — PSYCHE (OUTPATIENT)
Dept: PSYCHOLOGY | Facility: CLINIC | Age: 67
End: 2022-04-04
Payer: MEDICAID

## 2022-04-04 DIAGNOSIS — F33.1 MAJOR DEPRESSIVE DISORDER, RECURRENT EPISODE, MODERATE WITH ATYPICAL FEATURES (H): Primary | ICD-10-CM

## 2022-04-04 DIAGNOSIS — F41.1 GENERALIZED ANXIETY DISORDER: ICD-10-CM

## 2022-04-04 PROCEDURE — 90791 PSYCH DIAGNOSTIC EVALUATION: CPT | Performed by: STUDENT IN AN ORGANIZED HEALTH CARE EDUCATION/TRAINING PROGRAM

## 2022-04-04 NOTE — PROGRESS NOTES
[facility]  Provider Name:  Mj Cline     Credentials:  LGSW    PATIENT'S NAME: Winter Loya  PREFERRED NAME: Winter  PRONOUNS:     she/her  MRN: [id number]  : 1955  ADDRESS: [address]  [place] [address]  ACCT. NUMBER:  [id number]  DATE OF SERVICE: 22  START TIME: 2.30  END TIME: 3.20  PREFERRED PHONE: [phone]  May we leave a program related message: Yes  SERVICE MODALITY:  In-person    [facility] ADULT Mental Health DIAGNOSTIC ASSESSMENT    Identifying Information:  Patient is a 66 year old,   .  The pronoun use throughout this assessment reflects the patient's chosen pronoun.  Patient was referred for an assessment by self .  Patient attended the session alone.    Chief Complaint:    Pt reported depression and anxiety associated with son's terminal illness and life stressors.    Social/Family History:  Patient reported they grew up in  [facility]  .  They were raised by    biological parents  .  Patient reported that their childhood was dysfunctional and stressfull.  Patient described their current relationships with family of origin as  supportive and stressful.     The patient describes their cultural background as  .  Cultural influences and impact on patient's life structure, values, norms, and healthcare: None  .  Contextual influences on patient's health include: Health- Seeking Factors relating to anxiety and depression.    These factors will be addressed in the Preliminary Treatment plan. Patient identified their preferred language to be  English. Patient reported they does not need the assistance of an  or other support involved in therapy.     Patient reported had no significant delays in developmental tasks.   Patient's highest education level was  [employer] grad  .  Patient identified the following learning problems: none reported.  Modifications will not be used to assist communication in therapy.  Patient reports they are  able  to understand written materials.    Patient reported the following relationship history dated someone and was engage once but did not  partner.  Patient's current relationship status is  single for 24 years.   Patient identified their sexual orientation as heterosexual  .  Patient reported having   3 child(tayla). Patient identified   friends, sister mehnaz and two brothers as part of their support system.  Patient identified the quality of these relationships as good.     Patient's current living/housing situation involves  living in own home.  The immediate members of family and household include youngest son Melo and they report that housing is stable.    Patient is currently  living with son.  Patient reports their finances are obtained through  work. Patient  did  identify finances as a current stressor.      Patient reported that they have  not  been involved with the legal system.   Patient did not report being under probation/ parole/ jurisdiction.     Patient's Strengths and Limitations:  Patient identified the following strengths or resources that will help them succeed in treatment: commitment to health and well being, ángel / spirituality, positive work environment, motivation and work ethic. Things that may interfere with the patient's success in treatment include: few friends and financial hardship.     Assessments:  PHQ9:   PHQ-9 SCORE 5/1/2020 11/20/2020 6/28/2021 7/26/2021 2/2/2022 3/8/2022   PHQ-9 Total Score 2 8 20 21 13 16     GAD7:   NA-7 SCORE 11/20/2020 6/28/2021 7/26/2021 2/2/2022 3/8/2022   Total Score 5 6 14 10 17     PROMIS 10-Global Health (all questions and answers displayed):   PROMIS 10 4/4/2022   In general, would you say your health is: 2   In general, would you say your quality of life is: 2   In general, how would you rate your physical health? 1   In general, how would you rate your mental health, including your mood and your ability to think? 2   In general, how would you  rate your satisfaction with your social activities and relationships? 1   In general, please rate how well you carry out your usual social activities and roles. (This includes activities at home, at work and in your community, and responsibilities as a parent, child, spouse, employee, friend, etc.) 2   To what extent are you able to carry out your everyday physical activities such as walking, climbing stairs, carrying groceries, or moving a chair? 2   In the past 7 days, how often have you been bothered by emotional problems such as feeling anxious, depressed, or irritable? 4   In the past 7 days, how would you rate your fatigue on average? 3   In the past 7 days, how would you rate your pain on average, where 0 means no pain, and 10 means worst imaginable pain? 2   Global Mental Health Score 7   Global Physical Health Score 10   PROMIS TOTAL - SUBSCORES 17   Some recent data might be hidden       Personal and Family Medical History:  Patient   report a family history of mental health concerns.  Patient reports family history includes Alcoholism in her mother; Arrhythmia in her brother; Chronic Obstructive Pulmonary Disease in her mother; Diabetes in her brother, brother, and mother; Gastrointestinal Disease in her brother; Glaucoma in her paternal grandfather; Other - See Comments in her brother, brother, brother, father, and sister; Parkinsonism in her father; Substance Abuse in her mother; Tremor in her father..     Patient does report Mental Health Diagnosis and/or Treatment.  Patient Patient reported the following previous diagnoses which include(s): depression and anxiety  .  Patient reported symptoms began 5 years ago.  Patient has received mental health services in the past: [facility]    [facility]iatric Hospitalizations: [facility] , when 2017 Patient denies a history of civil commitment.  Currently, patient is not receiving other mental health services.         Patient has had a physical exam to rule out medical  causes for current symptoms.  Date of last physical exam was within the past year. Client was encouraged to follow up with PCP if symptoms were to develop. The patient has a [facility] Provider, who is named Delmi Gross..  Patient reports the following current medical concerns: mild multiple miloma and stomach issues and no current dental concerns.  Patient denies any issues with pain..   There are not significant appetite / nutritional concerns / weight changes.   Patient does not report a history of head injury / trauma / cognitive impairment.      Patient reports not taking any current medications    Medication Adherence: NA    Patient Allergies: Therapist inquired about allergies. Patient to follow up with medication provider if allergies develop or persist.  Medical History:    Past Medical History:   Diagnosis Date     Abnormal EMG 2021 5/25/2021    Interpretation: This is an abnormal study, demonstrating electrophysiologic evidence of a length-dependent axonal sensorimotor polyneuropathy. Asymmetry of peroneal compound muscle action potential amplitudes is a finding of uncertain significance.        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   I have personally reviewed the examination and initial interpretation and I agree with the findings.   ANNE GOODMAN MD     History of MRI of cervical spine 6/2020 3/15/2021    Impression: Multilevel cervical spondylosis, most pronounced at the level of C4-5 and C5-6 with moderate to severe spinal canal stenosis and moderate spinal canal stenosis at C6-7. No abnormal cord signal. No significant neural foraminal narrowing at any  level.   I have personally reviewed the examination and initial interpretation and I agree with the findings.   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     Sleep apnea     no c-pap use     Status post complete thyroidectomy 8/26/2020     Thyroid goiter 8/5/2020     Tremor 12/9/2020         Current Mental Status Exam:   Appearance:  Appropriate    Eye Contact:  Good   Psychomotor:  Retarded (Slowed)       Gait / station:  unsteady  Attitude / Demeanor: Cooperative  Interested  Speech      Rate / Production: Talkative      Volume:  Normal  volume      Language:  no problems  Mood:   Anxious  Depressed  Sad  Anhedonia  Affect:   Blunted    Thought Content: Clear   Thought Process: Coherent       Associations: No loosening of associations  Insight:   Good   Judgment:  Intact   Orientation:  All  Attention/concentration: Good      Substance Use:  Patient did report a family history of substance use concerns; see medical history section for details.  Patient has not received chemical dependency treatment in the past.  Patient has not ever been to detox.      Patient is not currently receiving any chemical dependency treatment.           Substance History of use Age of first use Date of last use     Pattern and duration of use (include amounts and frequency)   Alcohol    17    2022 REPORTS SUBSTANCE USE: reports using substance 3 times per year and has 1 glass at a time.   Patient reports heaviest use was past.   Cannabis         REPORTS SUBSTANCE USE: N/A     Amphetamines         REPORTS SUBSTANCE USE: N/A   Cocaine/crack            REPORTS SUBSTANCE USE: N/A   Hallucinogens           REPORTS SUBSTANCE USE: N/A   Inhalants           REPORTS SUBSTANCE USE: N/A   Heroin           REPORTS SUBSTANCE USE: N/A   Other Opiates        REPORTS SUBSTANCE USE: N/A   Benzodiazepine         REPORTS SUBSTANCE USE: N/A   Barbiturates       REPORTS SUBSTANCE USE: N/A   Over the counter meds       REPORTS SUBSTANCE USE: N/A   Caffeine  18 2022 REPORTS SUBSTANCE USE: reports using substance 1 times per day and has 1 cup at a time.   Patient reports heaviest use is current use.   Nicotine   18    2005 REPORTS SUBSTANCE USE: reports using substance 10 times per day and has 1 stick at a time.   Patient reports heaviest use is current use.   Other substances not listed above:  Identify:        REPORTS SUBSTANCE USE: N/A     Patient reported the following problems as a result of their substance use:  .NA    Substance Use: No symptoms    Based on the negative CAGE score and clinical interview there  are not indications of drug or alcohol abuse.      Significant Losses / Trauma / Abuse / Neglect Issues:   Patient   did not  serve in the .  There are indications or report of significant loss, trauma, abuse or neglect issues related to: are no indications and client denies any losses, trauma, abuse, or neglect concerns.  Concerns for possible neglect are not present.     Safety Assessment:   Patient denies current homicidal ideation and behaviors.  Patient denies current self-injurious ideation and behaviors.    Patient denied risk behaviors associated with substance use.  Patient denies any high risk behaviors associated with mental health symptoms.  Patient reports the following current concerns for their personal safety: None.  Patient reports there  no  firearms in the house.      History of Safety Concerns:  Patient denied a history of homicidal ideation.     Patient denied a history of personal safety concerns.    Patient denied a history of assaultive behaviors.    Patient denied a history of sexual assault behaviors.     Patient denied a history of risk behaviors associated with substance use.  Patient denies any history of high risk behaviors  associated with mental health symptoms.  Patient reports the following protective factors:      Risk Plan:  See Recommendations for Safety and Risk Management Plan    Review of Symptoms per patient report:  Depression: Lack of interest, Change in energy level, Difficulties concentrating, Feelings of hopelessness, Feelings of helplessness, Irritability, Feeling sad, down, or depressed and Withdrawn  Gema:  No Symptoms  Psychosis: No Symptoms  Anxiety: Excessive worry, Nervousness, Physical complaints, such as headaches, stomachaches, muscle tension, Sleep disturbance and Ruminations  Panic:  No symptoms  Post Traumatic Stress Disorder:  No Symptoms   Eating Disorder: No Symptoms  ADD / ADHD:  No symptoms  Conduct Disorder: No symptoms  Autism Spectrum Disorder: No symptoms  Obsessive Compulsive Disorder: No Symptoms       Diagnostic Criteria:   Generalized Anxiety Disorder   - Restlessness or feeling keyed up or on edge.    - Being easily fatigued.    - Difficulty concentrating or mind going blank.    - Sleep disturbance (difficulty falling or staying asleep, or restless unsatisfying sleep).   D. The focus of the anxiety and worry is not confined to features of an Axis I disorder.  E. The anxiety, worry, or physical symptoms cause clinically significant distress or impairment in social, occupational, or other important areas of functioning.   F. The disturbance is not due to the direct physiological effects of a substance (e.g., a drug of abuse, a medication) or a general medical condition (e.g., hyperthyroidism) and does not occur exclusively during a Mood Disorder, a Psychotic Disorder, or a Pervasive Developmental Disorder.    - The aformentioned symptoms began 5 year(s) ago and occurs 5 days per week and is experienced as moderate. Major Depressive Disorder   - Depressed mood. Note: In children and adolescents, can be irritable mood.     - Diminished interest or pleasure in all, or almost all, activities.    -  Decreased sleep.    - Psychomotor activity retardation.    - Fatigue or loss of energy.    - Diminished ability to think or concentrate, or indecisiveness.     Functional Status:  Patient reports the following functional impairments:  health maintenance and social interactions.     Nonprogrammatic care:  Patient is requesting basic services to address current mental health concerns.    Clinical Summary:  1. Reason for assessment:  Pt reported depression and anxiety associated with son's terminal illness and life stressors. .  2. Psychosocial, Cultural and Contextual Factors: Son is terminally ill with cancer and sent home/ Youngest son is at home with drug problems/ financial stress, agism and associated health problems.  3. Principal DSM5 Diagnoses  (Sustained by DSM5 Criteria Listed Above):   296.32 (F33.1) Major Depressive Disorder, Recurrent Episode, Moderate _ and With anxious distress  300.02 (F41.1) Generalized Anxiety Disorder.  4. Other Diagnoses that is relevant to services:  None at this time.  5. Provisional Diagnosis:  None at this time  6. Prognosis: Expect Improvement.  7. Likely consequences of symptoms if not treated: Current condition could deteriorate causing adverse health problems with a negative impact on  pt's psychosocial functioning.  8. Client strengths include:  caring, educated, motivated, open to learning, open to suggestions / feedback and work history .     Recommendations:     1. Plan for Safety and Risk Management:   Recommended that patient call 911 or go to the local ED should there be a change in any of these risk factors..          Report to child / adult protection services was NA.     2. Patient's identified NA.     3. Initial Treatment will focus on:    Depressed Mood - with focus on behaviour activation and stress reduction techniques.     4. Resources/Service Plan:    services are not indicated.   Modifications to assist communication are not  indicated.   Additional disability accommodations are not indicated.      5. Collaboration:   Collaboration / coordination of treatment will be initiated with the following  support professionals: [facility].      6.  Referrals:   The following referral(s) will be initiated: NA. Next Scheduled Appointment: 04/11/2022.     A Release of Information has been obtained for the following: NA.    7. ERMA:    ERMA:  Discussed the general effects of drugs and alcohol on health and well-being. Provider gave patient printed information about the effects of chemical use on their health and well being.     8. Records:   These were reviewed at time of assessment.   Information in this assessment was obtained from the medical record and  provided by patient who is a good historian.    Patient will have open access to their mental health medical record.        Provider Name/ Credentials:  CHERISE Castellon  April 4, 2022      ----- Service Performed and Documented by CHERISE------  This note was reviewed and clinical supervision by AMY [facility]ala [facility]    4/7/2022

## 2022-04-13 ENCOUNTER — VIRTUAL VISIT (OUTPATIENT)
Dept: GASTROENTEROLOGY | Facility: CLINIC | Age: 67
End: 2022-04-13
Attending: INTERNAL MEDICINE
Payer: MEDICAID

## 2022-04-13 DIAGNOSIS — K52.9 CHRONIC DIARRHEA: Primary | ICD-10-CM

## 2022-04-13 PROCEDURE — 99213 OFFICE O/P EST LOW 20 MIN: CPT | Mod: 95 | Performed by: INTERNAL MEDICINE

## 2022-04-13 PROCEDURE — G0463 HOSPITAL OUTPT CLINIC VISIT: HCPCS | Mod: PN,RTG | Performed by: INTERNAL MEDICINE

## 2022-04-13 NOTE — PROGRESS NOTES
Winter is a 66 year old who is being evaluated via a billable video visit.      How would you like to obtain your AVS? MyChart  If the video visit is dropped, the invitation should be resent by: Text to cell phone: [phone]   Will anyone else be joining your video visit? No      Video Start Time: 7:01 AM  Video-Visit Details    Type of service:  Video Visit    Video End Time:7:07 AM    Originating Location (pt. Location): Home    Distant Location (provider location):  [facility]     Platform used for Video Visit: Radha Campos    INTERVENTIONAL ENDOSCOPY OUTPATIENT CLINIC FOLLOW-UP CONSULT  DATE OF SERVICE: 04/13/22  PHYSICIAN REQUESTING CONSULT: Delmi Gross APRN CNP  Reason for Consultation: chronic diarrhea    ASSESSMENT:  Winter is a 65 year old female with a PMHx of insulin dependant DM, multiple myeloma in remission, and thyroid tumor s/p thyroidectomy referred for a low fecal elastase on work up of chronic diarrhea here for follow up. Creon did not improve symptoms. EUS/EGD and colonoscopy with duodenal and colonic biopsies were essentially normal.     I suspect that her fecal elastase is falsely low in the setting of diarrhea. With her normal split and neutral fats, that seems most likely. She can continue holding off on the Creon.     I suspect the etiology of her chronic diarrhea could be from trulicity or jardiance. I messaged Dr. Armstrong to consider a trial off of one or the other but this hasn't been done yet. I would defer to him in managed her diabetes regimen.    So far her symptoms appear to be under good control with scheduled imodium. I instructed her on how this can be titrated up if need be to 6x daily. I also advised on the addition of supplemental fiber (citrucel, metamucil, etc.) which she doesn't feel she needs just yet. I told her since her symptoms appear to be under control she can reach back out to us if things change.    RECOMMENDATIONS:  -  discontinue Creon  - Continue scheduled Imodium  - Titrate imodium up if need be  - Add supplemental fiber if need be  - Trial off Trulicity or Jardiance per Dr. Armstrong  - Follow up as needed    Jose Bangura MD  [facility]  Division of Gastroenterology and Hepatology  [facility] [address], [place] [address]    ________________________________________________________________  HPI:  Winter is a 65 year old female with a PMHx of insulin dependant DM, multiple myeloma in remission, and thyroid tumor s/p thyroidectomy referred for a new a low fecal elastase on work up of chronic diarrhea here for follow up.     Prior History:  She reports diarrhea/urgency symptoms starting about 1 year ago with progressive worsening. She has loose BMs ~3x per day with the largest issue of urgency and associated incontinence. She reports now having to wear adult diapers and recently having to change clothes 3 times in a day due to incontinence episodes. She takes 1-2 tabs of imodium mainly in the morning with some help. Her providers tried changing her diabetes medications (metformin) without much improvement. She notes that her stools do seem 'greasy.' No blood in the stool. She reports a ~10 lbs unintentional weight loss over the past year. She denies any pancreas problems in the past. No family history of pancreas issues. She previously smoked 1 ppd until quitting in 2005. Denies ETOH use. She had a colonoscopy in 2020 that was normal but this pre-dated her symptom onset.     Interval history:  A trial of Creon did not improve her symptoms. We proceeded with an EGD/EUS and colonoscopy. Pancreas was normal on EUS. Duodenal biopsies were normal. Colonoscopy was also essentially normal. Low fecal elastase is thought to be falsely low in the setting of diarrhea. Creon was stopped. We considered trulicity or jardiance being potential culprits and I messaged Dr. Armstrong, however, the have not yet  been trialed off. In the interim, Winter says that her symptoms are under much better control. She takes Imodium twice a day most days and that is enough to control her diarrhea/urgency. There are days where she will take up to 3 or 4 times a day. On the weekends she prefers not to take it to 'empty herself out.' She did not add fiber supplementation.     PMHx:  Past Medical History:   Diagnosis Date     Abnormal EMG 2021 5/25/2021    Interpretation: This is an abnormal study, demonstrating electrophysiologic evidence of a length-dependent axonal sensorimotor polyneuropathy. Asymmetry of peroneal compound muscle action potential amplitudes is a finding of uncertain significance.        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   I have personally reviewed the examination and initial interpretation and I agree with the findings.   ANNE GOODMAN MD     History of MRI of cervical spine 6/2020 3/15/2021    Impression: Multilevel cervical spondylosis, most pronounced at the level of C4-5 and C5-6 with moderate to severe spinal canal stenosis and moderate spinal canal stenosis at C6-7. No abnormal cord signal. No significant neural foraminal narrowing at any level.   I have personally reviewed the examination and initial interpretation and I agree with the findings.   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     Sleep apnea     no c-pap use     Status post complete thyroidectomy 8/26/2020     Thyroid goiter  8/5/2020     Tremor 12/9/2020       PSurgHx:  Past Surgical History:   Procedure Laterality Date     ARTHROSCOPY KNEE Left 02/2014     ARTHROSCOPY KNEE Right 12/2010    KNEE ARTHROSCOPY Dec 2010  Right     CHOLECYSTECTOMY  2002     COLONOSCOPY N/A 07/23/2020    Procedure: COLONOSCOPY;  Surgeon: Za Denis MD;  [facility]: [facility]     COLONOSCOPY N/A 2/25/2022    Procedure: COLONOSCOPY with biopsies;  Surgeon: Jose Bangura MD;  Location: [facility] OR     ENDOSCOPIC ULTRASOUND UPPER GASTROINTESTINAL TRACT (GI) N/A 2/25/2022    Procedure: ENDOSCOPIC ULTRASOUND, ESOPHAGOSCOPY with biopsies / UPPER GASTROINTESTINAL TRACT (GI);  Surgeon: Jose Bangura MD;  Location: [facility] OR     EYE SURGERY  1985    lasersx-spot behind eye started leaking     THYROIDECTOMY N/A 08/26/2020    Procedure: THYROIDECTOMY, TOTAL;  Surgeon: Tiff Burgos MD;  Location: [facility] OR     TONSILLECTOMY  2003     TUBAL LIGATION  1986       MEDS:  Current Outpatient Medications   Medication     acyclovir (ZOVIRAX) 400 MG tablet     aspirin (ASA) 325 MG EC tablet     atorvastatin (LIPITOR) 20 MG tablet     blood glucose (NO BRAND SPECIFIED) test strip     blood glucose monitoring (NO BRAND SPECIFIED) meter device kit     Continuous Blood Gluc Sensor (FREESTYLE SEGUNDO 2 SENSOR) MISC     empagliflozin (JARDIANCE) 25 MG TABS tablet     insulin glargine (LANTUS SOLOSTAR) 100 UNIT/ML pen     insulin pen needle (FIFTY50 PEN NEEDLES) 32G X 4 MM miscellaneous     LENalidomide (REVLIMID) 10 MG CAPS capsule     LENalidomide (REVLIMID) 10 MG CAPS capsule     levothyroxine (SYNTHROID/LEVOTHROID) 175 MCG tablet     losartan (COZAAR) 25 MG tablet     metFORMIN (GLUCOPHAGE-XR) 500 MG 24 hr tablet     pregabalin (LYRICA) 100 MG capsule     propranolol ER (INDERAL LA) 60 MG 24 hr capsule     sertraline (ZOLOFT) 100 MG tablet     TRULICITY 1.5 MG/0.5ML pen     No current facility-administered medications for this visit.     ALLERGIES:  No Known  Allergies  FHx:  Family History   Problem Relation Age of Onset     Glaucoma Paternal Grandfather      Chronic Obstructive Pulmonary Disease Mother      Substance Abuse Mother      Alcoholism Mother      Diabetes Mother      Parkinsonism Father         bed bound, stiffness. no dementia or hallucinatinos     Tremor Father      Other - See Comments Father         hip problems     Other - See Comments Sister         lives in [place]     Diabetes Brother      Arrhythmia Brother      Other - See Comments Brother         lives in [place]     Diabetes Brother      Other - See Comments Brother         potts's disease, lives in [facility] and in [place]     Gastrointestinal Disease Brother      Other - See Comments Brother         [place]       SOCIAL Hx:  Social History     Socioeconomic History     Marital status:      Spouse name: Not on file     Number of children: 3     Years of education: Not on file     Highest education level: Not on file   Occupational History     Occupation: alcohol and drug counselor   Tobacco Use     Smoking status: Former Smoker     Packs/day: 1.00     Types: Cigarettes     Quit date:      Years since quittin.2     Smokeless tobacco: Never Used   Vaping Use     Vaping Use: Never used   Substance and Sexual Activity     Alcohol use: Yes     Comment: occasional     Drug use: Never     Sexual activity: Not Currently     Partners: Male   Other Topics Concern     Not on file   Social History Narrative     Not on file     Social Determinants of Health     Financial Resource Strain: Not on file   Food Insecurity: Not on file   Transportation Needs: Not on file   Physical Activity: Not on file   Stress: Not on file   Social Connections: Not on file   Intimate Partner Violence: Not on file   Housing Stability: Not on file       ROS: A comprehensive Review of Systems was asked and answered in the negative unless specifically commented upon in the HPI    Physical Exam  Gen: A&Ox3,  NAD  HEENT: Moist mucus membranes, no scleral icterus.  Lungs: no respiratory distress    LABS:  Lab on 11/29/2021   Component Date Value Ref Range Status     Sodium 11/29/2021 135  133 - 144 mmol/L Final     Potassium 11/29/2021 3.7  3.4 - 5.3 mmol/L Final     Chloride 11/29/2021 102  94 - 109 mmol/L Final     Carbon Dioxide (CO2) 11/29/2021 27  20 - 32 mmol/L Final     Anion Gap 11/29/2021 6  3 - 14 mmol/L Final     Urea Nitrogen 11/29/2021 13  7 - 30 mg/dL Final     Creatinine 11/29/2021 0.78  0.52 - 1.04 mg/dL Final     Calcium 11/29/2021 9.3  8.5 - 10.1 mg/dL Final     Glucose 11/29/2021 285* 70 - 99 mg/dL Final     Alkaline Phosphatase 11/29/2021 129  40 - 150 U/L Final     AST 11/29/2021 14  0 - 45 U/L Final     ALT 11/29/2021 35  0 - 50 U/L Final     Protein Total 11/29/2021 7.1  6.8 - 8.8 g/dL Final     Albumin 11/29/2021 3.8  3.4 - 5.0 g/dL Final     Bilirubin Total 11/29/2021 1.4* 0.2 - 1.3 mg/dL Final     GFR Estimate 11/29/2021 81  >60 mL/min/1.73m2 Final    As of July 11, 2021, eGFR is calculated by the CKD-EPI creatinine equation, without race adjustment. eGFR can be influenced by muscle mass, exercise, and diet. The reported eGFR is an estimation only and is only applicable if the renal function is stable.     Immunoglobulin G 11/29/2021 684  610-1,616 mg/dL Final     Immunoglobulin A 11/29/2021 153  84 - 499 mg/dL Final     Immunoglobulin M 11/29/2021 33* 35 - 242 mg/dL Final     Kappa Free Light Chains 11/29/2021 2.17* 0.33 - 1.94 mg/dL Final     Lambda Free Light Chains 11/29/2021 1.64  0.57 - 2.63 mg/dL Final     Kappa /Lambda Ratio 11/29/2021 1.32  0.26 - 1.65 Final     WBC Count 11/29/2021 3.9* 4.0 - 11.0 10e3/uL Final     RBC Count 11/29/2021 4.81  3.80 - 5.20 10e6/uL Final     Hemoglobin 11/29/2021 13.7  11.7 - 15.7 g/dL Final     Hematocrit 11/29/2021 41.1  35.0 - 47.0 % Final     MCV 11/29/2021 85  78 - 100 fL Final     MCH 11/29/2021 28.5  26.5 - 33.0 pg Final     MCHC 11/29/2021 33.3   31.5 - 36.5 g/dL Final     RDW 11/29/2021 15.4* 10.0 - 15.0 % Final     Platelet Count 11/29/2021 129* 150 - 450 10e3/uL Final     % Neutrophils 11/29/2021 56  % Final     % Lymphocytes 11/29/2021 21  % Final     % Monocytes 11/29/2021 11  % Final     % Eosinophils 11/29/2021 10  % Final     % Basophils 11/29/2021 2  % Final     Absolute Neutrophils 11/29/2021 2.2  1.6 - 8.3 10e3/uL Final     Absolute Lymphocytes 11/29/2021 0.8  0.8 - 5.3 10e3/uL Final     Absolute Monocytes 11/29/2021 0.4  0.0 - 1.3 10e3/uL Final     Absolute Eosinophils 11/29/2021 0.4  0.0 - 0.7 10e3/uL Final     Absolute Basophils 11/29/2021 0.1  0.0 - 0.2 10e3/uL Final     Total Protein Serum for ELP 11/29/2021 6.7* 6.8 - 8.8 g/dL Final     Albumin 11/29/2021 4.4  3.7 - 5.1 g/dL Final     Alpha 1 11/29/2021 0.3  0.2 - 0.4 g/dL Final     Alpha 2 11/29/2021 0.6  0.5 - 0.9 g/dL Final     Beta Globulin 11/29/2021 0.8  0.6 - 1.0 g/dL Final     Gamma Globulin 11/29/2021 0.7  0.7 - 1.6 g/dL Final     Monoclonal Peak 11/29/2021 0.0  <=0.0 g/dL Final     ELP Interpretation 11/29/2021 Essentially normal electrophoretic pattern. No obvious monoclonal proteins seen. Pathologic significance requires clinical correlation. MIGUEL A Luna M.D., Ph.D., Pathologist.   Final     10/31/21 Fecal elastase 37.2  Split fat normal  Neutral fat normal    C. Diff neg  Crypto/Giardia neg    TTG normal    2.25.22  Final Diagnosis   A. DUODENAL BIOPSIES:  - Small intestinal mucosa with preserved villous architecture, focal foveolar metaplasia is present compatible with peptic injury  - Negative for celiac disease and adenoma formation    B. RIGHT COLON BIOPSIES:  - Colonic mucosa with no significant histologic abnormality  - Negative for active inflammation and lymphocytic colitis    C. LEFT COLON BIOPSIES:  - Colonic mucosa with no significant histologic abnormality  - Negative for active inflammation and lymphocytic colitis         IMAGING:  CT ABDOMEN PELVIS W CONTRAST  12/7/2021 2:52 PM      History: Pancreatic insufficiency. History of multiple myeloma.     Comparison: CT 2/15/2020.     Technique: CT of the abdomen and pelvis were obtained with contrast  utilizing pancreatic mass protocol. Sagittal and coronal  reconstructions created and reviewed. Contrast dose: Isovue 370 135mL     Findings:      Abdomen and pelvis: Normal hepatic parenchyma without focal lesions.  Gallbladder is surgically absent. No intra- or extrahepatic biliary  dilation. Relative preservation of the pancreatic parenchyma. The  pancreatic duct within normal limits. No suspicious pancreatic masses  or lesions. Spleen and adrenal glands are unremarkable. Tiny soft  tissue nodule in the left upper quadrant (series 7 image 54),  presumably splenule. Kidneys demonstrate symmetric enhancement without  solid lesions, hydronephrosis, or nephrolithiasis. Ureters and urinary  bladder are unremarkable. Uterus and ovaries are unremarkable.  Stomach, small intestine are unremarkable. Rectosigmoid wall  thickening versus decompressed bowel. Appendix is visualized and  normal in caliber.      No suspicious abdominal or pelvic lymphadenopathy. No free fluid. No  pneumoperitoneum.     Abdominal aorta is normal in caliber and patent. Celiac and mesenteric  artery origins are unremarkable. Portal veins and IVC are patent. Left  retroaortic renal vein.     Lower thorax: Chronic right basilar subsegmental atelectasis and left  basilar scarring. Heart size within normal limits. Calcifications of  the mitral annulus.     Bones and soft tissues: Increased subcentimeter sclerotic foci in the  right sacrum (series 7 image 340) and the left posterior superior  iliac crest (series 7 image 290). Stable tiny sclerotic foci in the  right femoral neck and in the L3 vertebral body. Multilevel endplate  degenerative changes throughout the visualized thoracic lumbar spine.  No acute or suspicious osseous abnormality.                                                                       Impression:   1. No significant pancreatic atrophy. No convincing evidence of  chronic pancreatitis or suspicious pancreatic lesions.  2. Two slightly increased sclerotic foci in the right sacrum and left  iliac crest, presumably related to history of multiple myeloma.  3. No abnormal CT findings involving the anteroinferior T11 endplate  to correlate with focal FDG uptake demonstrated on prior PET/CT  2/15/2020.  4. Mild rectosigmoid wall thickening versus decompressed bowel. May  represent mild colitis in a concordant clinical setting alternatively  could be normal.     COLONOSCOPY 02/25/2022  7:42 AM [address] [place] [address] [phone]     Endoscopy Department   _______________________________________________________________________________   Patient Name: Winter Loya             Procedure Date: 2/25/2022 7:42 AM   MRN: [id number]                       Account Number: [id number]   YOB: 1955             Admit Type: Outpatient   Age: 66                               Room: OR   Gender: Female                        Note Status: Finalized   Attending MD: Jose Bangura MD       Pause for the Cause: time out performed   Total Sedation Time:                     _______________________________________________________________________________       Procedure:             Colonoscopy   Indications:           Chronic diarrhea, 65 year old female with a PMHx of                          insulin dependant DM, multiple myeloma in remission,                          and thyroid tumor s/p thyroidectomy referred for a new                          diagnosis of pancreatic insufficiency (low fecal                          elastase) on work up of chronic diarrhea. Still with                          3-4x watery BMs daily with incontinence despite Creon                          72k/meal.   Providers:              Jose Bangura MD   Referring MD:             Requesting Provider:   Delmi Pinedo   Medicines:             Monitored Anesthesia Care   Complications:         No immediate complications. Estimated blood loss:                          Minimal.   _______________________________________________________________________________   Procedure:             Pre-Anesthesia Assessment:                          - Prior to the procedure, a History and Physical was                          performed, and patient medications and allergies were                          reviewed. The patient is competent. The risks and                          benefits of the procedure and the sedation options and                          risks were discussed with the patient. All questions                          were answered and informed consent was obtained.                          Patient identification and proposed procedure were                          verified by the physician, the nurse, the                          anesthesiologist and the anesthetist in the procedure                          room. Mental Status Examination: alert and oriented.                          Airway Examination: normal oropharyngeal airway and                          neck mobility. Respiratory Examination: clear to                          auscultation. CV Examination: normal. Prophylactic                          Antibiotics: The patient does not require prophylactic                          antibiotics. Prior Anticoagulants: The patient has                          taken no anticoagulant or antiplatelet agents. ASA                          Grade Assessment: II - A patient with mild systemic                          disease. After reviewing the risks and benefits, the                          patient was deemed in satisfactory condition to                          undergo the procedure. The anesthesia plan was to use                           monitored anesthesia care (MAC). Immediately prior to                          administration of medications, the patient was                          re-assessed for adequacy to receive sedatives. The                          heart rate, respiratory rate, oxygen saturations,                          blood pressure, adequacy of pulmonary ventilation, and                          response to care were monitored throughout the                          procedure. The physical status of the patient was                          re-assessed after the procedure.                          After obtaining informed consent, the colonoscope was                          passed under direct vision. Throughout the procedure,                          the patient's blood pressure, pulse, and oxygen                          saturations were monitored continuously. The                          Colonoscope was introduced through the anus and                          advanced to the terminal ileum. The colonoscopy was                          performed without difficulty. The patient tolerated                          the procedure well. The quality of the bowel                          preparation was evaluated using the BBPS ([place] Bowel                          Preparation Scale) with scores of: Right Colon = 2                          (minor amount of residual staining, small fragments of                          stool and/or opaque liquid, but mucosa seen well),                          Transverse Colon = 2 (minor amount of residual                          staining, small fragments of stool and/or opaque                          liquid, but mucosa seen well) and Left Colon = 2                          (minor amount of residual staining, small fragments of                          stool and/or opaque liquid, but mucosa seen well). The                          total BBPS score equals 6. The quality of the bowel                           preparation was good.                                                                                     Findings:        The perianal and digital rectal examinations were normal. Pertinent        negatives include no palpable rectal lesions.        The terminal ileum appeared normal.        A diffuse area of moderately atrophic mucosa was found in the entire        colon. Biopsies for histology were taken with a cold forceps from the        right colon and left colon for evaluation of microscopic colitis.        Verification of patient identification for the specimen was done by the        physician and nurse using the patient's name, birth date and medical        record number. Estimated blood loss was minimal.        The retroflexed view of the distal rectum and anal verge was normal and        showed no anal or rectal abnormalities.                                                                                     Impression:            - The examined portion of the ileum was normal.                          - Atrophic mucosa in the entire examined colon. Right                          and left colon biopsied to rule out microscopic                          colitis.                          - The distal rectum and anal verge are normal on                          retroflexion view.   Recommendation:        - Discharge patient to home (ambulatory).                          - Await pathology results.                          - Continue Creon for now.                          - Start uptitrating Immodium. Schedule Immodium. Can                          at first take twice daily and can uptitrate after a                          couple days to a max of 6 times daily                                                                                       Jose Bangura MD      Upper EUS 02/25/2022  7:40 AM [address] [place] [address] [phone]      Endoscopy Department   _______________________________________________________________________________   Patient Name: Winter Loya             Procedure Date: 2/25/2022 7:40 AM   MRN: [id number]                       Account Number: [id number]   YOB: 1955             Admit Type: Outpatient   Age: 66                               Room: OR   Gender: Female                        Note Status: Finalized   Attending MD: Jose Bangura MD       Pause for the Cause: time out performed   Total Sedation Time:                     _______________________________________________________________________________       Procedure:             Upper EUS   Indications:           History of pancreatic insufficiency, Diarrhea, 65 year                          old female with a PMHx of insulin dependant DM,                          multiple myeloma in remission, and thyroid tumor s/p                          thyroidectomy referred for a new diagnosis of                          pancreatic insufficiency (low fecal elastase) on work                          up of chronic diarrhea. Still with 3-4x watery BMs                          daily with incontinence despite Creon 72k/meal.   Providers:             Jose Bangura MD   Referring MD:             Requesting Provider:   Delmi Pinedo   Medicines:             Monitored Anesthesia Care   Complications:         No immediate complications. Estimated blood loss:                          Minimal.   _______________________________________________________________________________   Procedure:             Pre-Anesthesia Assessment:                          - Prior to the procedure, a History and Physical was                          performed, and patient medications and allergies were                          reviewed. The patient is competent. The risks and                          benefits of the procedure and the sedation options and                          risks  were discussed with the patient. All questions                          were answered and informed consent was obtained.                          Patient identification and proposed procedure were                          verified by the physician, the nurse, the                          anesthesiologist and the anesthetist in the procedure                          room. Mental Status Examination: alert and oriented.                          Airway Examination: normal oropharyngeal airway and                          neck mobility. Respiratory Examination: clear to                          auscultation. CV Examination: normal. Prophylactic                          Antibiotics: The patient does not require prophylactic                          antibiotics. Prior Anticoagulants: The patient has                          taken no anticoagulant or antiplatelet agents. ASA                          Grade Assessment: II - A patient with mild systemic                          disease. After reviewing the risks and benefits, the                          patient was deemed in satisfactory condition to                          undergo the procedure. The anesthesia plan was to use                          monitored anesthesia care (MAC). Immediately prior to                          administration of medications, the patient was                          re-assessed for adequacy to receive sedatives. The                          heart rate, respiratory rate, oxygen saturations,                          blood pressure, adequacy of pulmonary ventilation, and                          response to care were monitored throughout the                          procedure. The physical status of the patient was                          re-assessed after the procedure.                          After obtaining informed consent, the endoscope was                          passed under direct vision. Throughout the procedure,                           the patient's blood pressure, pulse, and oxygen                          saturations were monitored continuously. The Endoscope                          was introduced through the mouth, and advanced to the                          second part of duodenum. The Endoscope was introduced                          through the mouth, and advanced to the second part of                          duodenum. The upper EUS was accomplished without                          difficulty. The patient tolerated the procedure well.                                                                                     Findings:        ENDOSCOPIC FINDING: :        The examined esophagus was endoscopically normal.        The entire examined stomach was endoscopically normal.        Diffuse mildly atrophic mucosa was found in the second portion of the        duodenum. Biopsies for histology were taken with a cold forceps for        evaluation of celiac disease. Verification of patient identification for        the specimen was done by the physician and nurse using the patient's        name, birth date and medical record number. Estimated blood loss was        minimal.        ENDOSONOGRAPHIC FINDING: :        Endoscopic exam with the side viewing echoendoscope was normal. The        major papilla was normal endoscopically and sonographically.        The bile duct was non-dilated and measured 7 mm in maximal diameter. The        gallbladder was surgically absent. There were no stones or sludge.        The pancreatic parenchyma appeared normal. There were no features of        chronic pancreatitis. No masses, cysts or stones were visualized in the        pancreatic parenchyma. The main pancreatic duct was followed from the        major papilla to the body, excluding pancreas divisum. The pancreatic        duct measured 4 mm in the head, 0.7 mm in the neck and 0.9 mm in the        body of the pancreas.        No lymph nodes were seen in the  upper abdomen and mediastinum.        No masses were seen in the visualized portions of the liver. Diffusely        hyperecohic liver parenchyma.        The left adrenal appeared normal.                                                                                     Impression:            - Mildly atrophic duodenal mucosa. Biopsed to rule out                          sprue. Otherwise normal upper endoscopy                          - Normal pancreatic parencyma and pancreatic duct. No                          features of chronic pancreatitis.                          - Hepatic steatosis   Recommendation:        - Await path results.                          - Normal appearing pancreas on EUS may suggest that                          the low fecal elastase may be false positive in the                          setting of an alternate cause for watery diarrhea.                          Would explain lack of complete response to Creon                          supplementation.                          - Proceed with a colonoscopy today.                                                                                       Jose Bangura MD

## 2022-04-18 ENCOUNTER — PSYCHE (OUTPATIENT)
Dept: PSYCHOLOGY | Facility: CLINIC | Age: 67
End: 2022-04-18
Payer: MEDICAID

## 2022-04-18 DIAGNOSIS — F33.1 MAJOR DEPRESSIVE DISORDER, RECURRENT EPISODE, MODERATE WITH ANXIOUS DISTRESS (H): Primary | ICD-10-CM

## 2022-04-18 DIAGNOSIS — F41.1 GENERALIZED ANXIETY DISORDER: ICD-10-CM

## 2022-04-18 PROCEDURE — 90834 PSYTX W PT 45 MINUTES: CPT | Performed by: STUDENT IN AN ORGANIZED HEALTH CARE EDUCATION/TRAINING PROGRAM

## 2022-04-18 ASSESSMENT — ANXIETY QUESTIONNAIRES
5. BEING SO RESTLESS THAT IT IS HARD TO SIT STILL: SEVERAL DAYS
GAD7 TOTAL SCORE: 14
4. TROUBLE RELAXING: MORE THAN HALF THE DAYS
1. FEELING NERVOUS, ANXIOUS, OR ON EDGE: MORE THAN HALF THE DAYS
3. WORRYING TOO MUCH ABOUT DIFFERENT THINGS: MORE THAN HALF THE DAYS
2. NOT BEING ABLE TO STOP OR CONTROL WORRYING: MORE THAN HALF THE DAYS
7. FEELING AFRAID AS IF SOMETHING AWFUL MIGHT HAPPEN: NEARLY EVERY DAY
6. BECOMING EASILY ANNOYED OR IRRITABLE: MORE THAN HALF THE DAYS

## 2022-04-18 ASSESSMENT — PATIENT HEALTH QUESTIONNAIRE - PHQ9: SUM OF ALL RESPONSES TO PHQ QUESTIONS 1-9: 17

## 2022-04-18 NOTE — PROGRESS NOTES
[facility]                                     Progress Note    Patient Name: Winter Loya  Date: 04/18/2022         Service Type: Individual      Session Start Time: 2.30  Session End Time: 3.21     Session Length: 51 mns    Session #: 03    Attendees: Client attended alone    Service Modality:  In-person    DATA  Interactive Complexity: No  Crisis: No        Progress Since Last Session (Related to Symptoms / Goals / Homework):   Symptoms: No change as evident by moderate depression and anxiety on phq9 and gad7 scores    Homework: Achieved / completed to satisfaction      Episode of Care Goals: Minimal progress - PREPARATION (Decided to change - considering how); Intervened by negotiating a change plan and determining options / strategies for behavior change, identifying triggers, exploring social supports, and working towards setting a date to begin behavior change     Current / Ongoing Stressors and Concerns:   Managing the health needs for son who is terminally  with cancer and another son with a history with drug addiction. Having mixed emotions about son's condition as he is bed ridden and has given 6  months to live with cancer but he appears healthy and does not really feels like he will de soon. Struggling to be supportive and grief the current loses and stay hopefull as well.      Treatment Objective(s) Addressed in This Session:   Improve concentration, focus, and mindfulness in daily activities        Intervention:     MI Intervention: Expressed Empathy/Understanding, Supported Autonomy, Collaboration, Evocation, Permission to raise concern or advise, Open-ended questions and Reflections: simple and complex   Review flowsheet and establish treatment plan with focus on depressive symptoms.    Assessments completed prior to visit:  The following assessments were completed by patient for this visit:  PHQ9:   PHQ-9 SCORE 5/1/2020 11/20/2020 6/28/2021 7/26/2021 2/2/2022 3/8/2022  4/18/2022   PHQ-9 Total Score 2 8 20 21 13 16 17     GAD7:   NA-7 SCORE 11/20/2020 6/28/2021 7/26/2021 2/2/2022 3/8/2022 4/18/2022   Total Score 5 6 14 10 17 14         ASSESSMENT: Current Emotional / Mental Status (status of significant symptoms):   Risk status (Self / Other harm or suicidal ideation)   Patient denies current fears or concerns for personal safety.   Patient denies current or recent suicidal ideation or behaviors.   Patient denies current or recent homicidal ideation or behaviors.   Patient denies current or recent self injurious behavior or ideation.   Patient denies other safety concerns.   Patient reports there has been no change in risk factors since their last session.     Patient reports there has been no change in protective factors since their last session.     Recommended that patient call 911 or go to the local ED should there be a change in any of these risk factors.     Appearance:   Appropriate    Eye Contact:   Good    Psychomotor Behavior: Normal    Attitude:   Cooperative  Interested Friendly   Orientation:   All   Speech    Rate / Production: Normal/ Responsive    Volume:  Soft    Mood:    Anxious  Depressed  Sad  Anhedonia   Affect:    Lethargic  Worrisome    Thought Content:  Clear    Thought Form:  Coherent    Insight:    Good      Medication Review:   No changes to current psychiatric medication(s)     Medication Compliance:   Yes     Changes in Health Issues:   None reported     Chemical Use Review:   Substance Use: Chemical use reviewed, no active concerns identified      Tobacco Use: No current tobacco use.      Diagnosis:  1. Major depressive disorder, recurrent episode, moderate with anxious distress (H)    2. Generalized anxiety disorder        Collateral Reports Completed:   Not Applicable    PLAN: (Patient Tasks / Therapist Tasks / Other)  Practice 48920 grounding technique learned in session daily.        CHERISE Castellon                         ----- Service Performed  and Documented by [facility]------  This note was reviewed and clinical supervision by AMY [facility]ala [facility]    4/19/2022                                 ______________________________________________________________________    Individual Treatment Plan    Patient's Name: Winter Loya  YOB: 1955    Date of Creation: 04/18/2022  Date Treatment Plan Last Reviewed/Revised: 04/18/2022    DSM5 Diagnoses: 296.32 (F33.1) Major Depressive Disorder, Recurrent Episode, Moderate _ and With anxious distress  Psychosocial / Contextual Factors:   PROMIS (reviewed every 90 days):     Referral / Collaboration:  Referral to another professional/service is not indicated at this time..    Anticipated number of session for this episode of care: 15-25  Anticipation frequency of session: Every other week  Anticipated Duration of each session: 38-52 minutes  Treatment plan will be reviewed in 90 days or when goals have been changed.       MeasurableTreatment Goal(s) related to diagnosis / functional impairment(s)    Goal 1: Patient will identify specific triggers to feelings of depression and improve coping with depression by  90 %.    I will know I've met my goal when     Objective #A (Patient Action)    Patient will identify and practice 3 outdoor things that brings her ross daily and repeat that every day.  Status: Continued - Date(s): 07/18/2022    Intervention(s)  Therapist will provide psychoeducation, behavioral activation, and cognitive restructuring.)    Objective #B  Patient will identify specific areas of cognitive distortion and challenge irrational thoughts with reality   Status: Continued - Date(s): 07/18/2022       Intervention(s)  Therapist will teach patient how negative thoughts can lead to negative feelings and actions and ways to reframe thoughts in a more positive way leading to more positive feelings and helpful behaviors    Objective #C  Patient will learn relaxation techniques to manage  anxiety.  Status: Continued - Date(s):  07/18/2022    Intervention(s)  Therapist will teach patient thought stopping, deep breathing exercises, mindful meditation, and creative visualization.  Patient has reviewed and agreed to the above plan.      CHERISE Castellon  April 18, 2022

## 2022-04-19 ASSESSMENT — ANXIETY QUESTIONNAIRES: GAD7 TOTAL SCORE: 14

## 2022-04-25 DIAGNOSIS — C90.01 MULTIPLE MYELOMA IN REMISSION (H): Primary | ICD-10-CM

## 2022-04-25 RX ORDER — LENALIDOMIDE 10 MG/1
10 CAPSULE ORAL DAILY
Qty: 28 CAPSULE | Refills: 0 | Status: SHIPPED | OUTPATIENT
Start: 2022-04-25 | End: 2022-05-23

## 2022-04-28 ENCOUNTER — TELEPHONE (OUTPATIENT)
Dept: ONCOLOGY | Facility: CLINIC | Age: 67
End: 2022-04-28
Payer: MEDICAID

## 2022-04-28 NOTE — TELEPHONE ENCOUNTER
Oral Chemotherapy Monitoring Program    Medication: Revlimid  Rx:  10 mg PO daily for 28 days  Auth #: [id number]  Risk Category: Adult male    Routine survey questions reviewed.      Mary Franklin  Oncology Pharmacy Liaison II  [email or web address]  Phone: [phone]  Fax: [phone]

## 2022-04-30 ENCOUNTER — HEALTH MAINTENANCE LETTER (OUTPATIENT)
Age: 67
End: 2022-04-30

## 2022-05-02 ENCOUNTER — PSYCHE (OUTPATIENT)
Dept: PSYCHOLOGY | Facility: CLINIC | Age: 67
End: 2022-05-02
Payer: MEDICAID

## 2022-05-02 DIAGNOSIS — F33.1 MAJOR DEPRESSIVE DISORDER, RECURRENT EPISODE, MODERATE WITH ANXIOUS DISTRESS (H): Primary | ICD-10-CM

## 2022-05-02 DIAGNOSIS — F41.1 GAD (GENERALIZED ANXIETY DISORDER): ICD-10-CM

## 2022-05-02 PROCEDURE — 90834 PSYTX W PT 45 MINUTES: CPT | Performed by: STUDENT IN AN ORGANIZED HEALTH CARE EDUCATION/TRAINING PROGRAM

## 2022-05-02 ASSESSMENT — ANXIETY QUESTIONNAIRES
4. TROUBLE RELAXING: MORE THAN HALF THE DAYS
2. NOT BEING ABLE TO STOP OR CONTROL WORRYING: NEARLY EVERY DAY
7. FEELING AFRAID AS IF SOMETHING AWFUL MIGHT HAPPEN: SEVERAL DAYS
GAD7 TOTAL SCORE: 17
3. WORRYING TOO MUCH ABOUT DIFFERENT THINGS: NEARLY EVERY DAY
5. BEING SO RESTLESS THAT IT IS HARD TO SIT STILL: MORE THAN HALF THE DAYS
1. FEELING NERVOUS, ANXIOUS, OR ON EDGE: NEARLY EVERY DAY
6. BECOMING EASILY ANNOYED OR IRRITABLE: NEARLY EVERY DAY

## 2022-05-02 ASSESSMENT — PATIENT HEALTH QUESTIONNAIRE - PHQ9: SUM OF ALL RESPONSES TO PHQ QUESTIONS 1-9: 16
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
## 2022-05-03 ASSESSMENT — ANXIETY QUESTIONNAIRES: GAD7 TOTAL SCORE: 17

## 2022-05-07 ENCOUNTER — OFFICE VISIT (OUTPATIENT)
Dept: URGENT CARE | Facility: URGENT CARE | Age: 67
End: 2022-05-07
Payer: MEDICAID

## 2022-05-07 VITALS
OXYGEN SATURATION: 94 % | TEMPERATURE: 98.5 F | DIASTOLIC BLOOD PRESSURE: 68 MMHG | HEIGHT: 69 IN | WEIGHT: 245.9 LBS | SYSTOLIC BLOOD PRESSURE: 110 MMHG | HEART RATE: 68 BPM | BODY MASS INDEX: 36.42 KG/M2

## 2022-05-07 DIAGNOSIS — R68.89 FLU-LIKE SYMPTOMS: ICD-10-CM

## 2022-05-07 DIAGNOSIS — I27.20 PULMONARY HYPERTENSION (H): ICD-10-CM

## 2022-05-07 DIAGNOSIS — E11.42 TYPE 2 DIABETES MELLITUS WITH DIABETIC POLYNEUROPATHY, WITH LONG-TERM CURRENT USE OF INSULIN (H): ICD-10-CM

## 2022-05-07 DIAGNOSIS — Z20.822 SUSPECTED 2019 NOVEL CORONAVIRUS INFECTION: ICD-10-CM

## 2022-05-07 DIAGNOSIS — J06.9 VIRAL UPPER RESPIRATORY TRACT INFECTION WITH COUGH: Primary | ICD-10-CM

## 2022-05-07 DIAGNOSIS — Z79.4 TYPE 2 DIABETES MELLITUS WITH DIABETIC POLYNEUROPATHY, WITH LONG-TERM CURRENT USE OF INSULIN (H): ICD-10-CM

## 2022-05-07 LAB
FLUAV AG SPEC QL IA: NEGATIVE
FLUBV AG SPEC QL IA: NEGATIVE
SARS-COV-2 RNA RESP QL NAA+PROBE: NEGATIVE

## 2022-05-07 PROCEDURE — 87804 INFLUENZA ASSAY W/OPTIC: CPT | Performed by: PHYSICIAN ASSISTANT

## 2022-05-07 PROCEDURE — 99213 OFFICE O/P EST LOW 20 MIN: CPT | Mod: CS | Performed by: PHYSICIAN ASSISTANT

## 2022-05-07 PROCEDURE — U0003 INFECTIOUS AGENT DETECTION BY NUCLEIC ACID (DNA OR RNA); SEVERE ACUTE RESPIRATORY SYNDROME CORONAVIRUS 2 (SARS-COV-2) (CORONAVIRUS DISEASE [COVID-19]), AMPLIFIED PROBE TECHNIQUE, MAKING USE OF HIGH THROUGHPUT TECHNOLOGIES AS DESCRIBED BY CMS-2020-01-R: HCPCS | Performed by: PHYSICIAN ASSISTANT

## 2022-05-07 PROCEDURE — U0005 INFEC AGEN DETEC AMPLI PROBE: HCPCS | Performed by: PHYSICIAN ASSISTANT

## 2022-05-07 ASSESSMENT — ENCOUNTER SYMPTOMS
JOINT SWELLING: 0
SINUS PRESSURE: 0
HEADACHES: 0
VOMITING: 0
ALLERGIC/IMMUNOLOGIC NEGATIVE: 1
SORE THROAT: 0
LIGHT-HEADEDNESS: 0
WOUND: 0
NAUSEA: 0
SHORTNESS OF BREATH: 0
DIARRHEA: 0
DIZZINESS: 0
RHINORRHEA: 1
SINUS PAIN: 0
NECK STIFFNESS: 0
CHILLS: 0
EYES NEGATIVE: 1
FEVER: 1
MUSCULOSKELETAL NEGATIVE: 1
MYALGIAS: 0
COUGH: 1
CARDIOVASCULAR NEGATIVE: 1
PALPITATIONS: 0
NECK PAIN: 0
WEAKNESS: 0
BACK PAIN: 0
ENDOCRINE NEGATIVE: 1
ARTHRALGIAS: 0

## 2022-05-07 NOTE — PROGRESS NOTES
"Chief Complaint:     Chief Complaint   Patient presents with     Cough     Fever     Nasal Congestion     Running Nose     Excessive Sweating       Results for orders placed or performed in visit on 05/07/22   Influenza A & B Antigen - Clinic Collect     Status: Normal    Specimen: Nose; Swab   Result Value Ref Range    Influenza A antigen Negative Negative    Influenza B antigen Negative Negative    Narrative    Test results must be correlated with clinical data. If necessary, results should be confirmed by a molecular assay or viral culture.       Medical Decision Making:    Vital signs reviewed by Gumaro Quevedo PA-C  /68 (BP Location: Left arm, Patient Position: Sitting, Cuff Size: Adult Large)   Pulse 68   Temp 98.5  F (36.9  C) (Tympanic)   Ht 1.753 m (5' 9\")   Wt 111.5 kg (245 lb 14.4 oz)   SpO2 94%   BMI 36.31 kg/m      Differential Diagnosis:  URI Adult/Peds:  Bronchitis-viral, Influenza, Pneumonia, Sinusitis, Strep pharyngitis, Tonsilitis, Viral pharyngitis, Viral syndrome and Viral upper respiratory illness        ASSESSMENT    1. Viral upper respiratory tract infection with cough    2. Type 2 diabetes mellitus with diabetic polyneuropathy, with long-term current use of insulin (H)    3. Pulmonary hypertension (H)    4. Flu-like symptoms    5. Suspected 2019 novel coronavirus infection        PLAN    Patient is in no acute distress.    Temp is 98.5 in clinic today, lung sounds were clear, and O2 sats at 94% on RA.    Influenza was negative.  COVID swab collected in clinic today.  Rest, Push fluids, vaporizer, elevation of head of bed.  Ibuprofen and or Tylenol for any fever or body aches.  Over the counter cough suppressant- PRN- as discussed.   Patient instructed to monitor blood sugars closely with illness and follow up with PCP for any DM medication changes if needed.  If symptoms worsen, recheck immediately otherwise follow up with your PCP in 1 week if symptoms are not " "improving.  Worrisome symptoms discussed with instructions to go to the ED.  Patient verbalized understanding and agreed with this plan.    Labs:    Results for orders placed or performed in visit on 05/07/22   Influenza A & B Antigen - Clinic Collect     Status: Normal    Specimen: Nose; Swab   Result Value Ref Range    Influenza A antigen Negative Negative    Influenza B antigen Negative Negative    Narrative    Test results must be correlated with clinical data. If necessary, results should be confirmed by a molecular assay or viral culture.        Vital signs reviewed by Gumaro Quevedo PA-C  /68 (BP Location: Left arm, Patient Position: Sitting, Cuff Size: Adult Large)   Pulse 68   Temp 98.5  F (36.9  C) (Tympanic)   Ht 1.753 m (5' 9\")   Wt 111.5 kg (245 lb 14.4 oz)   SpO2 94%   BMI 36.31 kg/m      Current Meds      Current Outpatient Medications:      acyclovir (ZOVIRAX) 400 MG tablet, TAKE ONE TABLET BY MOUTH EVERY TWELVE HOURS, Disp: 60 tablet, Rfl: 11     aspirin (ASA) 325 MG EC tablet, Take 1 tablet (325 mg) by mouth daily, Disp: 90 tablet, Rfl: 3     atorvastatin (LIPITOR) 20 MG tablet, Take 1 tablet (20 mg) by mouth At Bedtime, Disp: 90 tablet, Rfl: 3     blood glucose (NO BRAND SPECIFIED) test strip, Use to test blood sugar two times daily or as directed., Disp: 200 strip, Rfl: 3     blood glucose monitoring (NO BRAND SPECIFIED) meter device kit, Use to test blood sugar two times daily or as directed., Disp: 1 kit, Rfl: 3     Continuous Blood Gluc Sensor (FREESTYLE SEGUNDO 2 SENSOR) MISC, 1 each every 14 days 1 each every 14 days. Change every 14 days., Disp: 6 each, Rfl: 3     empagliflozin (JARDIANCE) 25 MG TABS tablet, Take 1 tablet (25 mg) by mouth daily, Disp: 90 tablet, Rfl: 3     insulin glargine (LANTUS SOLOSTAR) 100 UNIT/ML pen, Inject 34 Units Subcutaneous At Bedtime, Disp: 30 mL, Rfl: 3     insulin pen needle (FIFTY50 PEN NEEDLES) 32G X 4 MM miscellaneous, Use one pen needle daily " or as directed., Disp: 100 each, Rfl: 2     LENalidomide (REVLIMID) 10 MG CAPS capsule, Take 1 capsule (10 mg) by mouth daily for 28 days, Disp: 28 capsule, Rfl: 0     levothyroxine (SYNTHROID/LEVOTHROID) 175 MCG tablet, Take 1 tablet (175 mcg) by mouth every morning (before breakfast), Disp: 90 tablet, Rfl: 3     losartan (COZAAR) 25 MG tablet, Take 1 tablet (25 mg) by mouth daily, Disp: 90 tablet, Rfl: 2     metFORMIN (GLUCOPHAGE-XR) 500 MG 24 hr tablet, Take 1 tablet (500 mg) by mouth daily (with dinner), Disp: 180 tablet, Rfl: 3     pregabalin (LYRICA) 100 MG capsule, TAKE ONE CAPSULE BY MOUTH THREE TIMES DAILY, Disp: 270 capsule, Rfl: 3     propranolol ER (INDERAL LA) 60 MG 24 hr capsule, Take 1 capsule (60 mg) by mouth daily, Disp: 90 capsule, Rfl: 3     sertraline (ZOLOFT) 100 MG tablet, Take 1 tablet (100 mg) by mouth daily, Disp: 90 tablet, Rfl: 3     TRULICITY 1.5 MG/0.5ML pen, Inject 1.5 mg Subcutaneous every 7 days +++NEED APPT+++, Disp: 2 mL, Rfl: 0     LENalidomide (REVLIMID) 10 MG CAPS capsule, Take 1 capsule (10 mg) by mouth daily for 28 days, Disp: 28 capsule, Rfl: 0     LENalidomide (REVLIMID) 10 MG CAPS capsule, Take 1 capsule (10 mg) by mouth daily for 28 days, Disp: 28 capsule, Rfl: 0      Respiratory History    occasional episodes of bronchitis and pulmonary hypertension      SUBJECTIVE    HPI: Winter Loya is an 66 year old female who presents with chest congestion, cough nonproductive, occasional, fever, nasal congestion and nasal discharge clear.  Symptoms began 2  days ago and has unchanged.  There is no shortness of breath, wheezing and chest pain.  Patient is eating and drinking well.  No nausea, vomiting, or diarrhea.    Patient denies any recent travel or exposure to known COVID positive tested individual.      ROS:     Review of Systems   Constitutional: Positive for fever. Negative for chills.   HENT: Positive for congestion and rhinorrhea. Negative for ear pain, sinus pressure,  sinus pain and sore throat.    Eyes: Negative.    Respiratory: Positive for cough. Negative for shortness of breath.    Cardiovascular: Negative.  Negative for chest pain and palpitations.   Gastrointestinal: Negative for diarrhea, nausea and vomiting.   Endocrine: Negative.    Genitourinary: Negative.    Musculoskeletal: Negative.  Negative for arthralgias, back pain, joint swelling, myalgias, neck pain and neck stiffness.   Skin: Negative.  Negative for rash and wound.   Allergic/Immunologic: Negative.  Negative for immunocompromised state.   Neurological: Negative for dizziness, weakness, light-headedness and headaches.         Family History   Family History   Problem Relation Age of Onset     Glaucoma Paternal Grandfather      Chronic Obstructive Pulmonary Disease Mother      Substance Abuse Mother      Alcoholism Mother      Diabetes Mother      Parkinsonism Father         bed bound, stiffness. no dementia or hallucinatinos     Tremor Father      Other - See Comments Father         hip problems     Other - See Comments Sister         lives in [place]     Diabetes Brother      Arrhythmia Brother      Other - See Comments Brother         lives in [place]     Diabetes Brother      Other - See Comments Brother         potts's disease, lives in [facility] and in [place]     Gastrointestinal Disease Brother      Other - See Comments Brother         [facility]        Problem history  Patient Active Problem List   Diagnosis     Multiple myeloma in remission (H)     Type 2 diabetes mellitus with diabetic polyneuropathy, with long-term current use of insulin (H)     Allergic rhinitis     NA (generalized anxiety disorder)     History of endometrial ablation     Hyperlipidemia     Major depressive disorder, recurrent episode, moderate (H)     Osteoarthrosis involving lower leg     Type 2 diabetes mellitus with hyperglycemia (H)     DAILY (obstructive sleep apnea)- moderate (AHI 17)     Hypothyroidism, postoperative       "Class 2 severe obesity due to excess calories with serious comorbidity and body mass index (BMI) of 39.0 to 39.9 in adult (H)     Tremor     History of peripheral stem cell transplant (H)     History of MRI of cervical spine 2020     H/O magnetic resonance imaging of lumbar spine      Major depressive disorder, recurrent episode, severe (H)     Abnormal EMG      Sensory polyneuropathy     Axonal neuropathy     Drug-induced polyneuropathy (H)     Pulmonary hypertension (H)     Thrombocytopenia, unspecified (H)     Exocrine pancreatic insufficiency        Allergies  No Known Allergies     Social History  Social History     Socioeconomic History     Marital status:      Spouse name: Not on file     Number of children: 3     Years of education: Not on file     Highest education level: Not on file   Occupational History     Occupation: alcohol and drug counselor   Tobacco Use     Smoking status: Former Smoker     Packs/day: 1.00     Types: Cigarettes     Quit date:      Years since quittin.3     Smokeless tobacco: Never Used   Vaping Use     Vaping Use: Never used   Substance and Sexual Activity     Alcohol use: Yes     Comment: occasional     Drug use: Never     Sexual activity: Not Currently     Partners: Male   Other Topics Concern     Not on file   Social History Narrative     Not on file     Social Determinants of Health     Financial Resource Strain: Not on file   Food Insecurity: Not on file   Transportation Needs: Not on file   Physical Activity: Not on file   Stress: Not on file   Social Connections: Not on file   Intimate Partner Violence: Not on file   Housing Stability: Not on file        OBJECTIVE     Vital signs reviewed by Gumaro Quevedo PA-C  /68 (BP Location: Left arm, Patient Position: Sitting, Cuff Size: Adult Large)   Pulse 68   Temp 98.5  F (36.9  C) (Tympanic)   Ht 1.753 m (5' 9\")   Wt 111.5 kg (245 lb 14.4 oz)   SpO2 94%   BMI 36.31 kg/m       Physical " Exam  Vitals and nursing note reviewed.   Constitutional:       General: She is not in acute distress.     Appearance: She is well-developed. She is not ill-appearing, toxic-appearing or diaphoretic.   HENT:      Head: Normocephalic and atraumatic.      Right Ear: Hearing, tympanic membrane, ear canal and external ear normal. Tympanic membrane is not perforated, erythematous, retracted or bulging.      Left Ear: Hearing, tympanic membrane, ear canal and external ear normal. Tympanic membrane is not perforated, erythematous, retracted or bulging.      Nose: Congestion present. No mucosal edema or rhinorrhea.      Right Sinus: No maxillary sinus tenderness or frontal sinus tenderness.      Left Sinus: No maxillary sinus tenderness or frontal sinus tenderness.      Mouth/Throat:      Pharynx: No pharyngeal swelling, oropharyngeal exudate, posterior oropharyngeal erythema or uvula swelling.      Tonsils: No tonsillar exudate or tonsillar abscesses. 0 on the right. 0 on the left.   Eyes:      General:         Right eye: No discharge.         Left eye: No discharge.      Pupils: Pupils are equal, round, and reactive to light.   Cardiovascular:      Rate and Rhythm: Normal rate and regular rhythm.      Heart sounds: Normal heart sounds. No murmur heard.    No friction rub. No gallop.   Pulmonary:      Effort: Pulmonary effort is normal. No respiratory distress.      Breath sounds: Normal breath sounds. No decreased breath sounds, wheezing, rhonchi or rales.   Chest:      Chest wall: No tenderness.   Abdominal:      General: Bowel sounds are normal. There is no distension.      Palpations: Abdomen is soft. There is no mass.      Tenderness: There is no abdominal tenderness. There is no guarding.   Musculoskeletal:      Cervical back: Normal range of motion and neck supple.   Lymphadenopathy:      Head:      Right side of head: No submental, submandibular, tonsillar, preauricular or posterior auricular adenopathy.      Left  side of head: No submental, submandibular, tonsillar, preauricular or posterior auricular adenopathy.      Cervical: No cervical adenopathy.      Right cervical: No superficial or posterior cervical adenopathy.     Left cervical: No superficial or posterior cervical adenopathy.   Skin:     General: Skin is warm and dry.      Findings: No rash.   Neurological:      Mental Status: She is alert and oriented to person, place, and time.      Cranial Nerves: No cranial nerve deficit.      Deep Tendon Reflexes: Reflexes are normal and symmetric.   Psychiatric:         Behavior: Behavior normal. Behavior is cooperative.         Thought Content: Thought content normal.         Judgment: Judgment normal.           Gumaro Quevedo PA-C  5/7/2022, 9:40 AM

## 2022-05-09 ENCOUNTER — HOSPITAL ENCOUNTER (EMERGENCY)
Facility: CLINIC | Age: 67
Discharge: HOME | End: 2022-05-09
Attending: EMERGENCY MEDICINE | Admitting: EMERGENCY MEDICINE
Payer: MEDICAID

## 2022-05-09 ENCOUNTER — NURSE TRIAGE (OUTPATIENT)
Dept: NURSING | Facility: CLINIC | Age: 67
End: 2022-05-09
Payer: MEDICAID

## 2022-05-09 ENCOUNTER — APPOINTMENT (OUTPATIENT)
Dept: GENERAL RADIOLOGY | Facility: CLINIC | Age: 67
End: 2022-05-09
Attending: EMERGENCY MEDICINE
Payer: MEDICAID

## 2022-05-09 VITALS
TEMPERATURE: 98.4 F | RESPIRATION RATE: 18 BRPM | HEIGHT: 69 IN | WEIGHT: 245 LBS | BODY MASS INDEX: 36.29 KG/M2 | DIASTOLIC BLOOD PRESSURE: 54 MMHG | HEART RATE: 65 BPM | OXYGEN SATURATION: 91 % | SYSTOLIC BLOOD PRESSURE: 115 MMHG

## 2022-05-09 DIAGNOSIS — J18.9 PNEUMONIA DUE TO INFECTIOUS ORGANISM, UNSPECIFIED LATERALITY, UNSPECIFIED PART OF LUNG: ICD-10-CM

## 2022-05-09 DIAGNOSIS — Z79.4 TYPE 2 DIABETES MELLITUS WITH DIABETIC POLYNEUROPATHY, WITH LONG-TERM CURRENT USE OF INSULIN (H): ICD-10-CM

## 2022-05-09 DIAGNOSIS — R06.2 WHEEZING: ICD-10-CM

## 2022-05-09 DIAGNOSIS — E11.42 TYPE 2 DIABETES MELLITUS WITH DIABETIC POLYNEUROPATHY, WITH LONG-TERM CURRENT USE OF INSULIN (H): ICD-10-CM

## 2022-05-09 LAB
ALBUMIN SERPL-MCNC: 3.6 G/DL (ref 3.4–5)
ALP SERPL-CCNC: 178 U/L (ref 40–150)
ALT SERPL W P-5'-P-CCNC: 46 U/L (ref 0–50)
ANION GAP SERPL CALCULATED.3IONS-SCNC: 11 MMOL/L (ref 3–14)
AST SERPL W P-5'-P-CCNC: 27 U/L (ref 0–45)
ATRIAL RATE - MUSE: 77 BPM
BASOPHILS # BLD AUTO: 0 10E3/UL (ref 0–0.2)
BASOPHILS NFR BLD AUTO: 1 %
BILIRUB SERPL-MCNC: 1.9 MG/DL (ref 0.2–1.3)
BUN SERPL-MCNC: 12 MG/DL (ref 7–30)
CALCIUM SERPL-MCNC: 9.1 MG/DL (ref 8.5–10.1)
CHLORIDE BLD-SCNC: 104 MMOL/L (ref 94–109)
CO2 SERPL-SCNC: 22 MMOL/L (ref 20–32)
CREAT SERPL-MCNC: 0.77 MG/DL (ref 0.52–1.04)
D DIMER PPP FEU-MCNC: 0.29 UG/ML FEU (ref 0–0.5)
DIASTOLIC BLOOD PRESSURE - MUSE: NORMAL MMHG
EOSINOPHIL # BLD AUTO: 0 10E3/UL (ref 0–0.7)
EOSINOPHIL NFR BLD AUTO: 2 %
ERYTHROCYTE [DISTWIDTH] IN BLOOD BY AUTOMATED COUNT: 14.9 % (ref 10–15)
FLUAV RNA SPEC QL NAA+PROBE: NEGATIVE
FLUBV RNA RESP QL NAA+PROBE: NEGATIVE
GFR SERPL CREATININE-BSD FRML MDRD: 86 ML/MIN/1.73M2
GLUCOSE BLD-MCNC: 177 MG/DL (ref 70–99)
HCT VFR BLD AUTO: 43.9 % (ref 35–47)
HGB BLD-MCNC: 14.8 G/DL (ref 11.7–15.7)
IMM GRANULOCYTES # BLD: 0 10E3/UL
IMM GRANULOCYTES NFR BLD: 0 %
INTERPRETATION ECG - MUSE: NORMAL
LYMPHOCYTES # BLD AUTO: 0.4 10E3/UL (ref 0.8–5.3)
LYMPHOCYTES NFR BLD AUTO: 16 %
MCH RBC QN AUTO: 29.2 PG (ref 26.5–33)
MCHC RBC AUTO-ENTMCNC: 33.7 G/DL (ref 31.5–36.5)
MCV RBC AUTO: 87 FL (ref 78–100)
MONOCYTES # BLD AUTO: 0.4 10E3/UL (ref 0–1.3)
MONOCYTES NFR BLD AUTO: 16 %
NEUTROPHILS # BLD AUTO: 1.6 10E3/UL (ref 1.6–8.3)
NEUTROPHILS NFR BLD AUTO: 65 %
NRBC # BLD AUTO: 0 10E3/UL
NRBC BLD AUTO-RTO: 0 /100
P AXIS - MUSE: 65 DEGREES
PLATELET # BLD AUTO: 113 10E3/UL (ref 150–450)
POTASSIUM BLD-SCNC: 3.2 MMOL/L (ref 3.4–5.3)
PR INTERVAL - MUSE: 150 MS
PROT SERPL-MCNC: 7.7 G/DL (ref 6.8–8.8)
QRS DURATION - MUSE: 84 MS
QT - MUSE: 404 MS
QTC - MUSE: 457 MS
R AXIS - MUSE: 47 DEGREES
RBC # BLD AUTO: 5.07 10E6/UL (ref 3.8–5.2)
RSV RNA SPEC NAA+PROBE: NEGATIVE
SARS-COV-2 RNA RESP QL NAA+PROBE: NEGATIVE
SODIUM SERPL-SCNC: 137 MMOL/L (ref 133–144)
SYSTOLIC BLOOD PRESSURE - MUSE: NORMAL MMHG
T AXIS - MUSE: 73 DEGREES
TROPONIN I SERPL HS-MCNC: 36 NG/L
VENTRICULAR RATE- MUSE: 77 BPM
WBC # BLD AUTO: 2.5 10E3/UL (ref 4–11)

## 2022-05-09 PROCEDURE — 94640 AIRWAY INHALATION TREATMENT: CPT | Performed by: EMERGENCY MEDICINE

## 2022-05-09 PROCEDURE — 36415 COLL VENOUS BLD VENIPUNCTURE: CPT | Performed by: EMERGENCY MEDICINE

## 2022-05-09 PROCEDURE — 71045 X-RAY EXAM CHEST 1 VIEW: CPT | Mod: 26 | Performed by: RADIOLOGY

## 2022-05-09 PROCEDURE — 93005 ELECTROCARDIOGRAM TRACING: CPT | Performed by: EMERGENCY MEDICINE

## 2022-05-09 PROCEDURE — 85025 COMPLETE CBC W/AUTO DIFF WBC: CPT | Performed by: EMERGENCY MEDICINE

## 2022-05-09 PROCEDURE — 96361 HYDRATE IV INFUSION ADD-ON: CPT | Performed by: EMERGENCY MEDICINE

## 2022-05-09 PROCEDURE — 80053 COMPREHEN METABOLIC PANEL: CPT | Performed by: EMERGENCY MEDICINE

## 2022-05-09 PROCEDURE — 96374 THER/PROPH/DIAG INJ IV PUSH: CPT | Performed by: EMERGENCY MEDICINE

## 2022-05-09 PROCEDURE — 250N000013 HC RX MED GY IP 250 OP 250 PS 637: Performed by: EMERGENCY MEDICINE

## 2022-05-09 PROCEDURE — 250N000009 HC RX 250: Performed by: EMERGENCY MEDICINE

## 2022-05-09 PROCEDURE — 258N000003 HC RX IP 258 OP 636: Performed by: EMERGENCY MEDICINE

## 2022-05-09 PROCEDURE — 87637 SARSCOV2&INF A&B&RSV AMP PRB: CPT | Performed by: EMERGENCY MEDICINE

## 2022-05-09 PROCEDURE — 250N000011 HC RX IP 250 OP 636: Performed by: EMERGENCY MEDICINE

## 2022-05-09 PROCEDURE — 71045 X-RAY EXAM CHEST 1 VIEW: CPT

## 2022-05-09 PROCEDURE — 84484 ASSAY OF TROPONIN QUANT: CPT | Performed by: EMERGENCY MEDICINE

## 2022-05-09 PROCEDURE — 99285 EMERGENCY DEPT VISIT HI MDM: CPT | Mod: 25 | Performed by: EMERGENCY MEDICINE

## 2022-05-09 PROCEDURE — 85379 FIBRIN DEGRADATION QUANT: CPT | Performed by: EMERGENCY MEDICINE

## 2022-05-09 PROCEDURE — 82040 ASSAY OF SERUM ALBUMIN: CPT | Performed by: EMERGENCY MEDICINE

## 2022-05-09 PROCEDURE — 93010 ELECTROCARDIOGRAM REPORT: CPT | Performed by: EMERGENCY MEDICINE

## 2022-05-09 RX ORDER — IPRATROPIUM BROMIDE AND ALBUTEROL SULFATE 2.5; .5 MG/3ML; MG/3ML
3 SOLUTION RESPIRATORY (INHALATION) ONCE
Status: COMPLETED | OUTPATIENT
Start: 2022-05-09 | End: 2022-05-09

## 2022-05-09 RX ORDER — ALBUTEROL SULFATE 90 UG/1
2 AEROSOL, METERED RESPIRATORY (INHALATION) EVERY 4 HOURS PRN
Qty: 18 G | Refills: 0 | Status: SHIPPED | OUTPATIENT
Start: 2022-05-09 | End: 2022-08-10

## 2022-05-09 RX ORDER — SODIUM CHLORIDE 9 MG/ML
INJECTION, SOLUTION INTRAVENOUS CONTINUOUS
Status: DISCONTINUED | OUTPATIENT
Start: 2022-05-09 | End: 2022-05-09 | Stop reason: HOSPADM

## 2022-05-09 RX ORDER — AZITHROMYCIN 250 MG/1
500 TABLET, FILM COATED ORAL DAILY
Qty: 4 TABLET | Refills: 0 | Status: SHIPPED | OUTPATIENT
Start: 2022-05-10 | End: 2022-07-11

## 2022-05-09 RX ORDER — METHYLPREDNISOLONE SODIUM SUCCINATE 125 MG/2ML
125 INJECTION, POWDER, LYOPHILIZED, FOR SOLUTION INTRAMUSCULAR; INTRAVENOUS ONCE
Status: COMPLETED | OUTPATIENT
Start: 2022-05-09 | End: 2022-05-09

## 2022-05-09 RX ORDER — AZITHROMYCIN 250 MG/1
500 TABLET, FILM COATED ORAL ONCE
Status: COMPLETED | OUTPATIENT
Start: 2022-05-09 | End: 2022-05-09

## 2022-05-09 RX ORDER — AZITHROMYCIN 250 MG/1
TABLET, FILM COATED ORAL
Qty: 6 TABLET | Refills: 0 | Status: SHIPPED | OUTPATIENT
Start: 2022-05-09 | End: 2022-05-09

## 2022-05-09 RX ORDER — PREDNISONE 20 MG/1
TABLET ORAL
Qty: 10 TABLET | Refills: 0 | Status: SHIPPED | OUTPATIENT
Start: 2022-05-09 | End: 2022-07-11

## 2022-05-09 RX ADMIN — METHYLPREDNISOLONE SODIUM SUCCINATE 125 MG: 125 INJECTION, POWDER, FOR SOLUTION INTRAMUSCULAR; INTRAVENOUS at 08:11

## 2022-05-09 RX ADMIN — SODIUM CHLORIDE: 900 INJECTION, SOLUTION INTRAVENOUS at 09:30

## 2022-05-09 RX ADMIN — AZITHROMYCIN MONOHYDRATE 500 MG: 250 TABLET ORAL at 10:37

## 2022-05-09 RX ADMIN — AMOXICILLIN AND CLAVULANATE POTASSIUM 1 TABLET: 875; 125 TABLET, FILM COATED ORAL at 10:37

## 2022-05-09 RX ADMIN — IPRATROPIUM BROMIDE AND ALBUTEROL SULFATE 3 ML: .5; 3 SOLUTION RESPIRATORY (INHALATION) at 08:11

## 2022-05-09 RX ADMIN — SODIUM CHLORIDE 1000 ML: 9 INJECTION, SOLUTION INTRAVENOUS at 08:10

## 2022-05-09 ASSESSMENT — ENCOUNTER SYMPTOMS
ABDOMINAL PAIN: 0
COUGH: 1
FEVER: 1
SHORTNESS OF BREATH: 1

## 2022-05-09 NOTE — TELEPHONE ENCOUNTER
"Patient calling reporting symptoms starting 5/5/22 with cough.   Patient was seen in Urgent Care on 5/7/22 and diagnosed with a viral upper respiratory infection.   Reporting COVID 19 testing on 5/7/22 was negative.   Denies known exposure to COVID 19.    Patient reporting symptoms have increased this morning \"It is hard to get up and go to the bathroom.\" Stating she is taking fluids \"they just run through me.\"   Afebrile.   Reporting dizziness requiring assistance to ambulate.    Patient unable to check blood sugar during triage stating she left it at work.    Patient reporting shortness of breath and dizziness on exertion.     Disposition to be seen in ED now.    Stating her son is with her and will transport her to the ER now. Advised if unable to transport safely by car to call 911.     Cathie Castellanos RN  [facility]    COVID 19 Nurse Triage Plan/Patient Instructions    Please be aware that novel coronavirus (COVID-19) may be circulating in the community. If you develop symptoms such as fever, cough, or SOB or if you have concerns about the presence of another infection including coronavirus (COVID-19), please contact your health care provider or visit [email or web address].     Disposition/Instructions    ED Visit recommended. Follow protocol based instructions.     Bring Your Own Device:  Please also bring your smart device(s) (smart phones, tablets, laptops) and their charging cables for your personal use and to communicate with your care team during your visit.    Thank you for taking steps to prevent the spread of this virus.  o Limit your contact with others.  o Wear a simple mask to cover your cough.  o Wash your hands well and often.    Resources    [facility]: About COVID-19: [email or web address]    CDC: What to Do If You're Sick: www.cdc.gov/coronavirus/2019-ncov/about/steps-when-sick.html    CDC: Ending Home Isolation: " www.cdc.gov/coronavirus/2019-ncov/hcp/disposition-in-home-patients.html     CDC: Caring for Someone: www.cdc.gov/coronavirus/2019-ncov/if-you-are-sick/care-for-someone.html     [facility]: Interim Guidance for Hospital Discharge to Home: www.health.[facility].[place]/diseases/coronavirus/hcp/hospdischarge.pdf    [facility] clinical trials (COVID-19 research studies): [email or web address]     Below are the COVID-19 hotlines at the [place] Department of Health ([facility]). Interpreters are available.   o For health questions: Call 112-090-0049 or 1-241.910.8730 (7 a.m. to 7 p.m.)  o For questions about schools and childcare: Call 603-452-5002 or 1-531.524.5754 (7 a.m. to 7 p.m.)                   Reason for Disposition    Difficulty breathing    Additional Information    Negative: Severe difficulty breathing (e.g., struggling for each breath, speaks in single words)    Negative: [1] Difficulty breathing or swallowing AND [2] started suddenly after medicine, an allergic food or bee sting    Negative: Shock suspected (e.g., cold/pale/clammy skin, too weak to stand, low BP, rapid pulse)    Negative: Difficult to awaken or acting confused (e.g., disoriented, slurred speech)    Negative: [1] Weakness (i.e., paralysis, loss of muscle strength) of the face, arm or leg on one side of the body AND [2] sudden onset AND [3] present now    Negative: [1] Numbness (i.e., loss of sensation) of the face, arm or leg on one side of the body AND [2] sudden onset AND [3] present now    Negative: [1] Loss of speech or garbled speech AND [2] sudden onset AND [3] present now    Negative: [1] Fainted > 15 minutes ago AND [2] still feels too weak or dizzy to stand    Negative: Heart beating < 50 beats per minute OR > 140 beats per minute    Negative: Overdose (accidental or intentional) of medications    Negative: Sounds like a life-threatening emergency to the triager    Negative: Chest pain    Negative: Rectal bleeding,  "bloody stool, or tarry-black stool    Negative: [1] Vomiting AND [2] contains red blood or black (\"coffee ground\") material    Negative: Vomiting is main symptom    Negative: Diarrhea is main symptom    Negative: Headache is main symptom    Negative: Patient states that he/she is having an anxiety/panic attack    Negative: Dizziness from low blood sugar (i.e., < 60 mg/dl or 3.5 mmol/l)    Negative: Dizziness is described as a spinning sensation (i.e., vertigo)    Negative: Heat exhaustion suspected (i.e., dehydration from heat exposure)    Protocols used: DIZZINESS - [id number]      "

## 2022-05-09 NOTE — LETTER
[facility]  [address]  [address]  [place] [address]  [phone]          May 12, 2022    Winter Loya                                                                                                                     [id number]  [place] [address]            Dear Winter,    We have been unsuccessful reaching you by telephone. Please call the clinic at [phone] to schedule an appointment with a provider or let us know if you are getting care elsewhere.        Sincerely,         Delmi RODRIGUEZ

## 2022-05-09 NOTE — ED TRIAGE NOTES
Pt presents to the ER with Dizziness and Weakness. Pt reports this started Thursday with a cough. Pt then was seen at an Urgent care where they diagnosed with URI. Pt states the past couple days she has flet more weak and and dizzy which she was requiring assistance to walk. Pt's BE FAST negative. Pt A/O x 4       Triage Assessment     Row Name 05/09/22 0718       Triage Assessment (Adult)    Airway WDL WDL       Respiratory WDL    Respiratory WDL X;rhythm/pattern;cough    Cough Frequency frequent    Cough Type congested;nonproductive       Skin Circulation/Temperature WDL    Skin Circulation/Temperature WDL WDL       Cardiac WDL    Cardiac WDL WDL       Peripheral/Neurovascular WDL    Peripheral Neurovascular WDL WDL       Cognitive/Neuro/Behavioral WDL    Cognitive/Neuro/Behavioral WDL WDL

## 2022-05-09 NOTE — ED PROVIDER NOTES
ED Provider Note  [facility]      History     Chief Complaint   Patient presents with     Dizziness     HPI  Winter Loya is a 66 year old female who presents to us with a chief complaint of cough, shortness of breath, dizziness.  Symptoms started approximately 4 days ago.  Patient also notes a fever last night of 101.  She took an initial COVID test the day the symptoms started as well as another 1 to 2 days later.  She was seen in clinic.  Respiratory distress was normal at that point.  She was diagnosed with viral illness.  Symptoms have been persistent since then.  He does have some pleuritic pain when she coughs.  No nausea or vomiting.  No abdominal pain.    Past Medical History  Past Medical History:   Diagnosis Date     Abnormal EMG 2021 5/25/2021    Interpretation: This is an abnormal study, demonstrating electrophysiologic evidence of a length-dependent axonal sensorimotor polyneuropathy. Asymmetry of peroneal compound muscle action potential amplitudes is a finding of uncertain significance.        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   I have personally reviewed the examination and initial interpretation and I agree with the findings.   ANNE GOODMAN MD     History of MRI of cervical spine 6/2020 3/15/2021    Impression: Multilevel cervical spondylosis, most pronounced at the level of C4-5 and C5-6 with moderate to severe spinal canal stenosis and moderate spinal canal stenosis at C6-7. No abnormal cord signal. No significant neural foraminal narrowing at any level.   I have personally reviewed the examination and initial interpretation and I agree  with the findings.   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     Sleep apnea     no c-pap use     Status post complete thyroidectomy 8/26/2020     Thyroid goiter 8/5/2020     Tremor 12/9/2020     Past Surgical History:   Procedure Laterality Date     ARTHROSCOPY KNEE Left 02/2014     ARTHROSCOPY KNEE Right 12/2010    KNEE ARTHROSCOPY Dec 2010  Right     CHOLECYSTECTOMY  2002     COLONOSCOPY N/A 07/23/2020    Procedure: COLONOSCOPY;  Surgeon: Za Denis MD;  [facility]     COLONOSCOPY N/A 2/25/2022    Procedure: COLONOSCOPY with biopsies;  Surgeon: Jose Bangura MD;  Location: [facility] OR     ENDOSCOPIC ULTRASOUND UPPER GASTROINTESTINAL TRACT (GI) N/A 2/25/2022    Procedure: ENDOSCOPIC ULTRASOUND, ESOPHAGOSCOPY with biopsies / UPPER GASTROINTESTINAL TRACT (GI);  Surgeon: Jose Bangura MD;  Location: [facility] OR     EYE SURGERY  1985    lasersx-spot behind eye started leaking     THYROIDECTOMY N/A 08/26/2020    Procedure: THYROIDECTOMY, TOTAL;  Surgeon: Tiff Burgos MD;  Location: [facility] OR     TONSILLECTOMY  2003     TUBAL LIGATION  1986     albuterol (PROAIR HFA) 108 (90 Base) MCG/ACT inhaler  amoxicillin-clavulanate (AUGMENTIN) 875-125 MG tablet  [START ON 5/10/2022] azithromycin (ZITHROMAX) 250 MG tablet  predniSONE (DELTASONE) 20 MG tablet  acyclovir (ZOVIRAX) 400 MG tablet  aspirin (ASA) 325 MG EC tablet  atorvastatin (LIPITOR) 20 MG tablet  blood glucose (NO BRAND SPECIFIED) test strip  blood glucose monitoring (NO BRAND SPECIFIED) meter device kit  Continuous Blood Gluc Sensor (FREESTYLE SEGUNDO 2 SENSOR) MISC  empagliflozin (JARDIANCE) 25 MG TABS tablet  insulin glargine (LANTUS SOLOSTAR) 100 UNIT/ML pen  insulin pen needle (FIFTY50 PEN NEEDLES) 32G X 4 MM miscellaneous  LENalidomide (REVLIMID) 10 MG  CAPS capsule  LENalidomide (REVLIMID) 10 MG CAPS capsule  LENalidomide (REVLIMID) 10 MG CAPS capsule  levothyroxine (SYNTHROID/LEVOTHROID) 175 MCG tablet  losartan (COZAAR) 25 MG tablet  metFORMIN (GLUCOPHAGE-XR) 500 MG 24 hr tablet  pregabalin (LYRICA) 100 MG capsule  propranolol ER (INDERAL LA) 60 MG 24 hr capsule  sertraline (ZOLOFT) 100 MG tablet  TRULICITY 1.5 MG/0.5ML pen      No Known Allergies  Family History  Family History   Problem Relation Age of Onset     Glaucoma Paternal Grandfather      Chronic Obstructive Pulmonary Disease Mother      Substance Abuse Mother      Alcoholism Mother      Diabetes Mother      Parkinsonism Father         bed bound, stiffness. no dementia or hallucinatinos     Tremor Father      Other - See Comments Father         hip problems     Other - See Comments Sister         lives in [place]     Diabetes Brother      Arrhythmia Brother      Other - See Comments Brother         lives in [place]     Diabetes Brother      Other - See Comments Brother         potts's disease, lives in [facility] and in [place]     Gastrointestinal Disease Brother      Other - See Comments Brother         [facility]     Social History   Social History     Tobacco Use     Smoking status: Former Smoker     Packs/day: 1.00     Types: Cigarettes     Quit date:      Years since quittin.3     Smokeless tobacco: Never Used   Vaping Use     Vaping Use: Never used   Substance Use Topics     Alcohol use: Yes     Comment: occasional     Drug use: Never      Past medical history, past surgical history, medications, allergies, family history, and social history were reviewed with the patient. No additional pertinent items.       Review of Systems   Constitutional: Positive for fever.   Respiratory: Positive for cough and shortness of breath.    Cardiovascular: Negative for chest pain.   Gastrointestinal: Negative for abdominal pain.   All other systems reviewed and are negative.    A complete review of  "systems was performed with pertinent positives and negatives noted in the HPI, and all other systems negative.    Physical Exam   BP: 127/49  Pulse: 78  Temp: 97  F (36.1  C)  Resp: 20  Height: 175.3 cm (5' 9\")  Weight: 111.1 kg (245 lb)  SpO2: 94 %  Physical Exam  Vitals and nursing note reviewed.   Constitutional:       General: She is not in acute distress.     Appearance: She is well-developed. She is not ill-appearing or diaphoretic.   HENT:      Head: Normocephalic and atraumatic.      Right Ear: External ear normal.      Left Ear: External ear normal.      Nose: Nose normal.   Eyes:      Extraocular Movements: Extraocular movements intact.      Conjunctiva/sclera: Conjunctivae normal.   Cardiovascular:      Rate and Rhythm: Normal rate and regular rhythm.      Heart sounds: Normal heart sounds.   Pulmonary:      Effort: Pulmonary effort is normal. No respiratory distress.      Breath sounds: Wheezing present.   Abdominal:      General: There is no distension.      Palpations: Abdomen is soft.      Tenderness: There is no abdominal tenderness.   Musculoskeletal:         General: No swelling or deformity.      Cervical back: Normal range of motion and neck supple.   Skin:     General: Skin is warm and dry.   Neurological:      Mental Status: Mental status is at baseline.      Comments: Alert, oriented   Psychiatric:         Mood and Affect: Mood normal.         Behavior: Behavior normal.         ED Course      Procedures            EKG Interpretation:      Interpreted by Yasmani Mason DO  Time reviewed: 718  Symptoms at time of EKG: Dyspnea  Rhythm: normal sinus   Rate: normal  Axis: normal  Ectopy: PVC conduction: normal  ST Segments/ T Waves: No ST-T wave elevation or depression, nonspecific ST changes.  Q Waves: none  Comparison to prior: No old EKG available    Clinical Impression: abnormal EKG                    Results for orders placed or performed during the hospital encounter of 05/09/22   XR " Chest Port 1 View     Status: None (Preliminary result)    Impression    RESIDENT PRELIMINARY INTERPRETATION  IMPRESSION:  Mixed bilateral interstitial and airspace opacities that may represent  infection, edema, or atelectasis.    Case discussed with senior resident.    Comprehensive metabolic panel     Status: Abnormal   Result Value Ref Range    Sodium 137 133 - 144 mmol/L    Potassium 3.2 (L) 3.4 - 5.3 mmol/L    Chloride 104 94 - 109 mmol/L    Carbon Dioxide (CO2) 22 20 - 32 mmol/L    Anion Gap 11 3 - 14 mmol/L    Urea Nitrogen 12 7 - 30 mg/dL    Creatinine 0.77 0.52 - 1.04 mg/dL    Calcium 9.1 8.5 - 10.1 mg/dL    Glucose 177 (H) 70 - 99 mg/dL    Alkaline Phosphatase 178 (H) 40 - 150 U/L    AST 27 0 - 45 U/L    ALT 46 0 - 50 U/L    Protein Total 7.7 6.8 - 8.8 g/dL    Albumin 3.6 3.4 - 5.0 g/dL    Bilirubin Total 1.9 (H) 0.2 - 1.3 mg/dL    GFR Estimate 86 >60 mL/min/1.73m2   Troponin I     Status: Normal   Result Value Ref Range    Troponin I High Sensitivity 36 <54 ng/L   D dimer quantitative     Status: Normal   Result Value Ref Range    D-Dimer Quantitative 0.29 0.00 - 0.50 ug/mL FEU    Narrative    This D-dimer assay is intended for use in conjunction with a clinical pretest probability assessment model to exclude pulmonary embolism (PE) and deep venous thrombosis (DVT) in outpatients suspected of PE or DVT. The cut-off value is 0.50 ug/mL FEU.   Symptomatic; Unknown Influenza A/B & SARS-CoV2 (COVID-19) Virus PCR Multiplex Nasopharyngeal     Status: Normal    Specimen: Nasopharyngeal; Swab   Result Value Ref Range    Influenza A PCR Negative Negative    Influenza B PCR Negative Negative    RSV PCR Negative Negative    SARS CoV2 PCR Negative Negative, Testing sent to reference lab. Results will be returned via unsolicited result    Narrative    Testing was performed using the Xpert Xpress CoV2/Flu/RSV Assay on the Cepheid GeneXpert Instrument. This test should be ordered for the detection of SARS-CoV-2 and  influenza viruses in individuals who meet clinical and/or epidemiological criteria. Test performance is unknown in asymptomatic patients. This test is for in vitro diagnostic use under the FDA EUA for laboratories certified under CLIA to perform high or moderate complexity testing. This test has not been FDA cleared or approved. A negative result does not rule out the presence of PCR inhibitors in the specimen or target RNA in concentration below the limit of detection for the assay. If only one viral target is positive but coinfection with multiple targets is suspected, the sample should be re-tested with another FDA cleared, approved, or authorized test, if coinfection would change clinical management. This test was validated by the [facility]. These laboratories are certified under the Clinical  Laboratory Improvement Amendments of 1988 (CLIA-88) as qualified to perform high complexity laboratory testing.   CBC with platelets and differential     Status: Abnormal   Result Value Ref Range    WBC Count 2.5 (L) 4.0 - 11.0 10e3/uL    RBC Count 5.07 3.80 - 5.20 10e6/uL    Hemoglobin 14.8 11.7 - 15.7 g/dL    Hematocrit 43.9 35.0 - 47.0 %    MCV 87 78 - 100 fL    MCH 29.2 26.5 - 33.0 pg    MCHC 33.7 31.5 - 36.5 g/dL    RDW 14.9 10.0 - 15.0 %    Platelet Count 113 (L) 150 - 450 10e3/uL    % Neutrophils 65 %    % Lymphocytes 16 %    % Monocytes 16 %    % Eosinophils 2 %    % Basophils 1 %    % Immature Granulocytes 0 %    NRBCs per 100 WBC 0 <1 /100    Absolute Neutrophils 1.6 1.6 - 8.3 10e3/uL    Absolute Lymphocytes 0.4 (L) 0.8 - 5.3 10e3/uL    Absolute Monocytes 0.4 0.0 - 1.3 10e3/uL    Absolute Eosinophils 0.0 0.0 - 0.7 10e3/uL    Absolute Basophils 0.0 0.0 - 0.2 10e3/uL    Absolute Immature Granulocytes 0.0 <=0.4 10e3/uL    Absolute NRBCs 0.0 10e3/uL   EKG 12-lead, tracing only     Status: None   Result Value Ref Range    Systolic Blood Pressure  mmHg    Diastolic Blood Pressure  mmHg     Ventricular Rate 77 BPM    Atrial Rate 77 BPM    HI Interval 150 ms    QRS Duration 84 ms     ms    QTc 457 ms    P Axis 65 degrees    R AXIS 47 degrees    T Axis 73 degrees    Interpretation ECG       Sinus rhythm with Premature supraventricular complexes  ST & T wave abnormality, consider anterolateral ischemia  Abnormal ECG  Unconfirmed report - interpretation of this ECG is computer generated - see medical record for final interpretation  Confirmed by - EMERGENCY ROOM, PHYSICIAN ([id number]),  FRANKO DAVIS ([id number]) on 5/9/2022 8:02:43 AM     CBC with platelets differential     Status: Abnormal    Narrative    The following orders were created for panel order CBC with platelets differential.  Procedure                               Abnormality         Status                     ---------                               -----------         ------                     CBC with platelets and d...[[id number]]  Abnormal            Final result                 Please view results for these tests on the individual orders.     Medications   0.9% sodium chloride BOLUS (1,000 mLs Intravenous New Bag 5/9/22 0810)     Followed by   sodium chloride 0.9% infusion (has no administration in time range)   azithromycin (ZITHROMAX) tablet 500 mg (has no administration in time range)   amoxicillin-clavulanate (AUGMENTIN) 875-125 MG per tablet 1 tablet (has no administration in time range)   ipratropium - albuterol 0.5 mg/2.5 mg/3 mL (DUONEB) neb solution 3 mL (3 mLs Nebulization Given 5/9/22 0811)   methylPREDNISolone sodium succinate (solu-MEDROL) injection 125 mg (125 mg Intravenous Given 5/9/22 0811)        Assessments & Plan (with Medical Decision Making)   66-year-old female presents to us with chief complaint of cough and dyspnea.  Differential includes but not limited to influenza, COVID, pneumonia, reactive airway disease, viral illness.  Previous records were reviewed.  Vital signs were reviewed and were unremarkable.   Patient is afebrile.  EKG reviewed and interpreted and shows no acute changes.  Labs reviewed and interpreted.  CBC was unremarkable.  Mild hypokalemia.  Mild elevation in the alk phos and the bili but CMP is otherwise normal.  D-dimer and troponin today are negative.  Symptoms are improved with nebs.  Patient is not hypoxic and in no respite distress at this time.  Patient was given Solu-Medrol as well.  X-ray is consistent with a possible pneumonia.  We will place her on antibiotics.  Patient will follow-up with primary care and return to us as needed    I have reviewed the nursing notes. I have reviewed the findings, diagnosis, plan and need for follow up with the patient.    New Prescriptions    ALBUTEROL (PROAIR HFA) 108 (90 BASE) MCG/ACT INHALER    Inhale 2 puffs into the lungs every 4 hours as needed for shortness of breath / dyspnea    AMOXICILLIN-CLAVULANATE (AUGMENTIN) 875-125 MG TABLET    Take 1 tablet by mouth 2 times daily for 10 days    AZITHROMYCIN (ZITHROMAX) 250 MG TABLET    Take 2 tablets (500 mg) by mouth daily    PREDNISONE (DELTASONE) 20 MG TABLET    Take two tablets (= 40mg) each day for 5 (five) days       Final diagnoses:   Wheezing   Pneumonia due to infectious organism, unspecified laterality, unspecified part of lung       --  Yasmani Mason  [facility] EMERGENCY DEPARTMENT  5/9/2022     Yasmani Mason,   05/09/22 0957

## 2022-05-10 ENCOUNTER — PATIENT OUTREACH (OUTPATIENT)
Dept: ONCOLOGY | Facility: CLINIC | Age: 67
End: 2022-05-10
Payer: MEDICAID

## 2022-05-10 RX ORDER — DULAGLUTIDE 1.5 MG/.5ML
1.5 INJECTION, SOLUTION SUBCUTANEOUS
Qty: 2 ML | Refills: 0 | Status: SHIPPED | OUTPATIENT
Start: 2022-05-10 | End: 2022-07-11

## 2022-05-10 NOTE — PROGRESS NOTES
"5/9 labs included:  WBC 2.5 with ANC 1.6  Hgb 14.8  Plts 113  K+ 3.2  Alk phos elevated at 178  Bili, total elevated at 1.9    Possible pneumonia per x-ray.  Neg for COVID-19, influenza A & B and RSV all by PCR.    Sent home on:  Azithromycin 500 mg for 2 days.  Augmentin 875-125 mg twice a day for 10 days.  Prednisone 40 mg daily for 5 days.  Albuterol inhaler every 4 hours prn    [facility]: Post-Discharge Note  SITUATION                                                      Admission:    Admission Date: 05/09/22   Reason for Admission: ED visit for cough, shortness of breath, dizziness, and fever night before  Discharge:   Discharge Date: 05/09/22  Discharge Diagnosis: URI possible pneumonia.    BACKGROUND                                                      Per hospital discharge summary and inpatient provider notes:  \"66-year-old female presents to us with chief complaint of cough and dyspnea.  Differential includes but not limited to influenza, COVID, pneumonia, reactive airway disease, viral illness.  Previous records were reviewed.  Vital signs were reviewed and were unremarkable.  Patient is afebrile.  EKG reviewed and interpreted and shows no acute changes.  Labs reviewed and interpreted.  CBC was unremarkable.  Mild hypokalemia.  Mild elevation in the alk phos and the bili but CMP is otherwise normal.  D-dimer and troponin today are negative.  Symptoms are improved with nebs.  Patient is not hypoxic and in no respite distress at this time.  Patient was given Solu-Medrol as well.  X-ray is consistent with a possible pneumonia.  We will place her on antibiotics.  Patient will follow-up with primary care and return to us as needed.\"    ASSESSMENT           Discharge Assessment  How are you doing now that you are home?: \"alright\"; thinking she is feeling better. (Up more today.  03:00 coughing episode was helped by albubetrol inhaler.   Not aware of any fever or chills.)  How are your symptoms? (Red " Flag symptoms escalate to triage hotline per guidelines): Improved  Do you feel your condition is stable enough to be safe at home until your provider visit?: Yes  Does the patient have their discharge instructions? : Yes  Does the patient have questions regarding their discharge instructions? : No  Were you started on any new medications or were there changes to any of your previous medications? : Yes (Stated she has had medications before and does not have any questions at present though apprehensive about being on all them.)  Does the patient have all of their medications?: Yes  Do you have questions regarding any of your medications? : Yes (see comment) (Had difficulty taking her medications prior to ED visit. Having diarrhea which is not new.  Taking her Immodium today but may have missed a few doses of Immodium.  Diarrhea better today; working on drinking fluids.  Denies dizziness at present.)  Discharge follow-up appointment scheduled within 14 calendar days? :  (Pt stated she is going to call her PCP to set up an appt.)         Post-op (Clinicians Only)  Did the patient have surgery or a procedure: No  Fever: No  Chills: No  Eating & Drinking: eating and drinking without complaints/concerns (Did not eat much in last week.  Today eating better and working on drinking fluids.  Had chicken salad sandwich for both breakfast and lunch.)  Bowel Function: loose stools (Diarrhea a couple times today and starting to firm up per pt.)  Date of last BM: 05/10/22    Cancer Care  RNCC Initial:   Initial  Current living arrangement:: I live in a private home with family (Lives with adult son.  He is helping her at home presently.)  Informal Support system:: Family, Friends (Because of illness, having trouble reaching out to others for social support at this time.)  Mobility Status: Independent (Has walker and cane available if needed re: past need post stem cell transplant.  May use cane if goes out in next few  days.)  Transportation means:: Family, Regular car          PLAN                                                      Outpatient Plan:  Pt to call and schedule follow up appt with PCP at [facility].    RN to update [facility] team about status re: pt taking maintenance of Revlimid 10 mg daily.    Future Appointments   Date Time Provider Department Center   5/16/2022  2:30 PM Abu, Mj, LGSW [facility]   6/1/2022  1:30 PM [facility]   6/1/2022  2:00 PM Brit Zhu MD [facility]   6/6/2022  2:30 PM Abu, Mj, LGSW [facility]   6/20/2022  2:30 PM [facility]         For any urgent concerns, please contact our 24 hour nurse triage line: [phone] ([phone])       Per communication with Reva Mojica PA-C, no change in current plan.  Pt to follow up with PCP and with us as scheduled.    Updated pt with above information.  She has scheduled follow up appt with PCP for 5/11/22.    Staci Menendez, IVAN, OCN

## 2022-05-10 NOTE — TELEPHONE ENCOUNTER
"Routing refill request to provider for review/approval because:  Josie given x1 and patient did not follow up, please advise.     Requested Prescriptions   Pending Prescriptions Disp Refills    TRULICITY 1.5 MG/0.5ML pen [Pharmacy Med Name: Trulicity Subcutaneous Solution Pen-injector 1.5 MG/0.5ML] 2 mL 0     Sig: Inject 1.5 mg Subcutaneous every 7 days +++NEED APPT+++        GLP-1 Agonists Protocol Failed - 5/9/2022 11:37 AM        Failed - HgbA1C in past 3 or 6 months     If HgbA1C is 8 or greater, it needs to be on file within the past 3 months.  If less than 8, must be on file within the past 6 months.     Recent Labs   Lab Test 12/29/21  1429   A1C 9.0*             Passed - Medication is active on med list        Passed - Patient is age 18 or older        Passed - No active pregnancy on record        Passed - Normal serum creatinine on file in past 12 months     Recent Labs   Lab Test 05/09/22  0730   CR 0.77       Ok to refill medication if creatinine is low          Passed - No positive pregnancy test in past 12 months        Passed - Recent (6 mo) or future (30 days) visit within the authorizing provider's specialty     Patient had office visit in the last 6 months or has a visit in the next 30 days with authorizing provider.  See \"Patient Info\" tab in inbasket, or \"Choose Columns\" in Meds & Orders section of the refill encounter.                  Maureen Torres RN   [facility]         "

## 2022-05-11 ENCOUNTER — VIRTUAL VISIT (OUTPATIENT)
Dept: FAMILY MEDICINE | Facility: CLINIC | Age: 67
End: 2022-05-11
Payer: MEDICAID

## 2022-05-11 DIAGNOSIS — J18.9 PNEUMONIA DUE TO INFECTIOUS ORGANISM, UNSPECIFIED LATERALITY, UNSPECIFIED PART OF LUNG: Primary | ICD-10-CM

## 2022-05-11 DIAGNOSIS — B37.31 YEAST INFECTION OF THE VAGINA: ICD-10-CM

## 2022-05-11 PROCEDURE — 99441 PR PHYSICIAN TELEPHONE EVALUATION 5-10 MIN: CPT | Performed by: NURSE PRACTITIONER

## 2022-05-11 RX ORDER — FLUCONAZOLE 150 MG/1
TABLET ORAL
Qty: 2 TABLET | Refills: 1 | Status: SHIPPED | OUTPATIENT
Start: 2022-05-11 | End: 2022-07-11

## 2022-05-11 NOTE — PROGRESS NOTES
"Winter is a 66 year old who is being evaluated via a billable telephone visit.      What phone number would you like to be contacted at? [phone]  How would you like to obtain your AVS? MyChart    Assessment & Plan     Pneumonia due to infectious organism, unspecified laterality, unspecified part of lung  Continue current treatment plan as symptoms are improving and she will let us know if she worsens. Counseled on self-care measures including: hydration, rest, and red flags of when to to seek urgent medical attention including difficulty breathing.    Yeast infection of the vagina  Start treatment as noted below now. Refill done to repeat treatment when finished with antibiotics if symptoms reoccur.  - fluconazole (DIFLUCAN) 150 MG tablet; Take one tablet (150 mg) by mouth now. Repeat in 3 days if symptoms do not resolve.    Review of the result(s) of each unique test - as noted below  Prescription drug management    Return in about 2 weeks (around 5/25/2022) for follow up if symptoms worsen or do not improve.    FALGUNI Duenas [facility]    Subjective   Winter is a 66 year old who presents for the following health issues     HPI     ED/UC Followup:    Facility: [facility]  Date of visit: 5/9/22  Reason for visit: Pneumonia   I reviewed ED note and diagnostics today:  \"Assessments & Plan (with Medical Decision Making)   66-year-old female presents to us with chief complaint of cough and dyspnea.  Differential includes but not limited to influenza, COVID, pneumonia, reactive airway disease, viral illness.  Previous records were reviewed.  Vital signs were reviewed and were unremarkable.  Patient is afebrile.  EKG reviewed and interpreted and shows no acute changes.  Labs reviewed and interpreted.  CBC was unremarkable.  Mild hypokalemia.  Mild elevation in the alk phos and the bili but CMP is otherwise normal.  D-dimer and troponin today are negative.  Symptoms are improved " "with nebs.  Patient is not hypoxic and in no respite distress at this time.  Patient was given Solu-Medrol as well.  X-ray is consistent with a possible pneumonia.  We will place her on antibiotics.  Patient will follow-up with primary care and return to us as needed\"    Current Status: \" I feel fine. I can breathe.\" Still has a cough. Gradually improving. No problem with taking her antibiotics or steroid. States that she was having diarrhea and has restarted the imodium which now and the diarrhea has now stopped. States that she feels like she has a vaginal yeast infection, states that antibiotics always give her one.        Onset/Duration: 6 days   Symptoms:  Fever: YES.   Chills/Sweats: no  Headache (location?): no  Sinus Pressure: no  Conjunctivitis:  no  Ear Pain: YES: right  Rhinorrhea: YES  Congestion: YES  Sore Throat: no  Cough: YES  Wheeze: YES  Decreased Appetite: YES  Nausea: no  Vomiting: no  Diarrhea: YES  Dysuria/Freq.: no  Dysuria or Hematuria: no  Fatigue/Achiness: YES.both.   Sick/Strep Exposure: Possible from work.  Therapies tried and outcome: Imodium, Zpack, Prednsione, Augmentin, and Albuterol inhaler. Symptoms improved.     Review of Systems   Constitutional, HEENT, cardiovascular, pulmonary, GI, , musculoskeletal, neuro, skin, endocrine and psych systems are negative, except as otherwise noted.      Objective           Vitals:  No vitals were obtained today due to virtual visit.    Physical Exam   alert, no distress and fatigued  PSYCH: Alert and oriented times 3; coherent speech, normal   rate and volume, able to articulate logical thoughts, able   to abstract reason, no tangential thoughts, no hallucinations   or delusions  Her affect is normal  RESP: Cough noted. No audible wheezing, able to talk in full sentences  Remainder of exam unable to be completed due to telephone visits  Diagnostics I reviewed today:    Exam Information    Exam Date Exam Time Accession # Performing Department " Results    5/9/22  8:26 AM [id number] [facility]          PACS Images     Show images for XR Chest Port 1 View         Study Result    Narrative & Impression   EXAM:  XR CHEST PORT 1 VIEW     INDICATION: dyspnea     COMPARISON:  CT chest 4/8/2021     Additional history: 66-year-old female presenting for fever, cough,  shortness of breath.     FINDINGS:  Single portable AP view of the chest at 55 degrees. Lower cervical  surgical clips.     Cardiomediastinal silhouette within normal limits. Mixed bilateral  interstitial and airspace opacities within the right upper/middle and  left lower lung zones with a peripheral predominance. No pneumothorax  or pleural effusion. Upper abdomen is unremarkable. No acute bony  lesions.                                                                      IMPRESSION:  Mixed bilateral interstitial and airspace opacities that may represent  infection, edema, or atelectasis.     Case discussed with senior resident.      I have personally reviewed the examination and initial interpretation  and I agree with the findings.     DIAMOND ZAYAS MD        Admission on 05/09/2022, Discharged on 05/09/2022   Component Date Value Ref Range Status     Sodium 05/09/2022 137  133 - 144 mmol/L Final     Potassium 05/09/2022 3.2 (A) 3.4 - 5.3 mmol/L Final     Chloride 05/09/2022 104  94 - 109 mmol/L Final     Carbon Dioxide (CO2) 05/09/2022 22  20 - 32 mmol/L Final     Anion Gap 05/09/2022 11  3 - 14 mmol/L Final     Urea Nitrogen 05/09/2022 12  7 - 30 mg/dL Final     Creatinine 05/09/2022 0.77  0.52 - 1.04 mg/dL Final     Calcium 05/09/2022 9.1  8.5 - 10.1 mg/dL Final     Glucose 05/09/2022 177 (A) 70 - 99 mg/dL Final     Alkaline Phosphatase 05/09/2022 178 (A) 40 - 150 U/L Final     AST 05/09/2022 27  0 - 45 U/L Final     ALT 05/09/2022 46  0 - 50 U/L Final     Protein Total 05/09/2022 7.7  6.8 - 8.8 g/dL Final     Albumin 05/09/2022 3.6  3.4 - 5.0 g/dL Final      Bilirubin Total 05/09/2022 1.9 (A) 0.2 - 1.3 mg/dL Final     GFR Estimate 05/09/2022 86  >60 mL/min/1.73m2 Final    Effective December 21, 2021 eGFRcr in adults is calculated using the 2021 CKD-EPI creatinine equation which includes age and gender (Maryjane et al., [place], DOI: 10.1056/XGHAqx6662935)     Troponin I High Sensitivity 05/09/2022 36  <54 ng/L Final    This Troponin-I result was obtained using a Siemens Dimension Vista High Sensitivity Troponin-I assay (TNIH). Effective 11/23/21, nine labs/sites in the [facility] switched from a Siemens [place] Troponin I assay (CTNI) to a Siemens [place] High-Sensitivity Troponin I assay (TNIH).     D-Dimer Quantitative 05/09/2022 0.29  0.00 - 0.50 ug/mL FEU Final     Ventricular Rate 05/09/2022 77  BPM Final     Atrial Rate 05/09/2022 77  BPM Final     TN Interval 05/09/2022 150  ms Final     QRS Duration 05/09/2022 84  ms Final     QT 05/09/2022 404  ms Final     QTc 05/09/2022 457  ms Final     P Axis 05/09/2022 65  degrees Final     R AXIS 05/09/2022 47  degrees Final     T Axis 05/09/2022 73  degrees Final     Interpretation ECG 05/09/2022    Final                    Value:Sinus rhythm with Premature supraventricular complexes  ST & T wave abnormality, consider anterolateral ischemia  Abnormal ECG  Unconfirmed report - interpretation of this ECG is computer generated - see medical record for final interpretation  Confirmed by - EMERGENCY ROOM, PHYSICIAN ([id number]),  FRANKO DAVIS ([id number]) on 5/9/2022 8:02:43 AM       Influenza A PCR 05/09/2022 Negative  Negative Final     Influenza B PCR 05/09/2022 Negative  Negative Final     RSV PCR 05/09/2022 Negative  Negative Final     SARS CoV2 PCR 05/09/2022 Negative  Negative, Testing sent to reference lab. Results will be returned via unsolicited result Final    NEGATIVE: SARS-CoV-2 (COVID-19) RNA not detected, presumed negative.     WBC Count 05/09/2022 2.5 (A) 4.0 - 11.0 10e3/uL Final     RBC Count 05/09/2022  5.07  3.80 - 5.20 10e6/uL Final     Hemoglobin 05/09/2022 14.8  11.7 - 15.7 g/dL Final     Hematocrit 05/09/2022 43.9  35.0 - 47.0 % Final     MCV 05/09/2022 87  78 - 100 fL Final     MCH 05/09/2022 29.2  26.5 - 33.0 pg Final     MCHC 05/09/2022 33.7  31.5 - 36.5 g/dL Final     RDW 05/09/2022 14.9  10.0 - 15.0 % Final     Platelet Count 05/09/2022 113 (A) 150 - 450 10e3/uL Final     % Neutrophils 05/09/2022 65  % Final     % Lymphocytes 05/09/2022 16  % Final     % Monocytes 05/09/2022 16  % Final     % Eosinophils 05/09/2022 2  % Final     % Basophils 05/09/2022 1  % Final     % Immature Granulocytes 05/09/2022 0  % Final     NRBCs per 100 WBC 05/09/2022 0  <1 /100 Final     Absolute Neutrophils 05/09/2022 1.6  1.6 - 8.3 10e3/uL Final     Absolute Lymphocytes 05/09/2022 0.4 (A) 0.8 - 5.3 10e3/uL Final     Absolute Monocytes 05/09/2022 0.4  0.0 - 1.3 10e3/uL Final     Absolute Eosinophils 05/09/2022 0.0  0.0 - 0.7 10e3/uL Final     Absolute Basophils 05/09/2022 0.0  0.0 - 0.2 10e3/uL Final     Absolute Immature Granulocytes 05/09/2022 0.0  <=0.4 10e3/uL Final     Absolute NRBCs 05/09/2022 0.0  10e3/uL Final           Phone call duration: 8 minutes

## 2022-05-12 ENCOUNTER — TELEPHONE (OUTPATIENT)
Dept: PHARMACY | Facility: CLINIC | Age: 67
End: 2022-05-12
Payer: MEDICAID

## 2022-05-12 NOTE — TELEPHONE ENCOUNTER
PHARMACY TELEPHONE ENCOUNTER:     Reason: Schedule visit        I called this patient today in an attempt to schedule a visit. Unfortunately the patient's mailbox was full.     Mika Armstrong, Pharm. D.   Medication Therapy Management Pharmacist

## 2022-05-16 ENCOUNTER — PSYCHE (OUTPATIENT)
Dept: PSYCHOLOGY | Facility: CLINIC | Age: 67
End: 2022-05-16
Payer: MEDICAID

## 2022-05-16 DIAGNOSIS — F33.1 MAJOR DEPRESSIVE DISORDER, RECURRENT EPISODE, MODERATE WITH ANXIOUS DISTRESS (H): Primary | ICD-10-CM

## 2022-05-16 DIAGNOSIS — C90.01 MULTIPLE MYELOMA IN REMISSION (H): Primary | ICD-10-CM

## 2022-05-16 PROCEDURE — 90834 PSYTX W PT 45 MINUTES: CPT | Performed by: STUDENT IN AN ORGANIZED HEALTH CARE EDUCATION/TRAINING PROGRAM

## 2022-05-16 ASSESSMENT — ANXIETY QUESTIONNAIRES
2. NOT BEING ABLE TO STOP OR CONTROL WORRYING: SEVERAL DAYS
1. FEELING NERVOUS, ANXIOUS, OR ON EDGE: NEARLY EVERY DAY
4. TROUBLE RELAXING: SEVERAL DAYS
6. BECOMING EASILY ANNOYED OR IRRITABLE: NOT AT ALL
7. FEELING AFRAID AS IF SOMETHING AWFUL MIGHT HAPPEN: MORE THAN HALF THE DAYS
5. BEING SO RESTLESS THAT IT IS HARD TO SIT STILL: SEVERAL DAYS
GAD7 TOTAL SCORE: 11
3. WORRYING TOO MUCH ABOUT DIFFERENT THINGS: NEARLY EVERY DAY

## 2022-05-16 ASSESSMENT — PATIENT HEALTH QUESTIONNAIRE - PHQ9: SUM OF ALL RESPONSES TO PHQ QUESTIONS 1-9: 17

## 2022-05-16 NOTE — PROGRESS NOTES
[facility]                                     Progress Note    Patient Name: Winter Loya  Date: 05/16/2022         Service Type: Individual      Session Start Time: 2.30  Session End Time: 3.10     Session Length: 40 mns    Session #: 05    Attendees: Client attended alone    Service Modality:  In-person    DATA  Interactive Complexity: No  Crisis: No        Progress Since Last Session (Related to Symptoms / Goals / Homework):   Symptoms: Decrease in anxiety and no change in depressive feelings as evident by current phq9 and gad7 scores.    Homework: Achieved / completed to satisfaction      Episode of Care Goals: Minimal progress - ACTION (Actively working towards change); Intervened by reinforcing change plan / affirming steps taken     Current / Ongoing Stressors and Concerns:   Pt reported very little motivation to go out and do fun activities for self, recurrent feelings of tiredness,  lethargy and excessive worry.      Treatment Objective(s) Addressed in This Session:   Patient will identify and practice 3 outdoor things that brings her ross daily and repeat that every day.     Intervention:  CBT: Reviewed the importance of self-care as well as the mental health benefits of engaging in fulfilling and enjoyable outdoor activities and explain to pt how these activities can help in mitigating depression, anxiety, and many other mental health concerns. Discussed the connection between physical and psychological space. Explain to patient that being in the open natural environment and engaging in fulfilling activities is mentally beneficial than doing a similar activity at home or a closed environment. Encourage pt to create a schedule for outdoor activities.      Assessments completed prior to visit:  The following assessments were completed by patient for this visit:  PHQ9:   PHQ-9 SCORE 6/28/2021 7/26/2021 2/2/2022 3/8/2022 4/18/2022 5/2/2022 5/16/2022   PHQ-9 Total Score 20 21 13 16 17 16  17     GAD7:   NA-7 SCORE 6/28/2021 7/26/2021 2/2/2022 3/8/2022 4/18/2022 5/2/2022 5/16/2022   Total Score 6 14 10 17 14 17 11         ASSESSMENT: Current Emotional / Mental Status (status of significant symptoms):   Risk status (Self / Other harm or suicidal ideation)   Patient denies current fears or concerns for personal safety.   Patient denies current or recent suicidal ideation or behaviors.   Patient denies current or recent homicidal ideation or behaviors.   Patient denies current or recent self injurious behavior or ideation.   Patient denies other safety concerns.   Patient reports there has been no change in risk factors since their last session.     Patient reports there has been no change in protective factors since their last session.     Recommended that patient call 911 or go to the local ED should there be a change in any of these risk factors.     Appearance:   Appropriate    Eye Contact:   Good    Psychomotor Behavior: Normal    Attitude:   Cooperative  Interested Friendly Pleasant   Orientation:   All   Speech    Rate / Production: Normal/ Responsive    Volume:  Normal    Mood:    Anxious  Depressed    Affect:    Worrisome    Thought Content:  Clear    Thought Form:  Coherent    Insight:    Fair      Medication Review:   No changes to current psychiatric medication(s)     Medication Compliance:   Yes     Changes in Health Issues:   Yes: reported having fever and cold      Chemical Use Review:   Substance Use: Chemical use reviewed, no active concerns identified      Tobacco Use: No current tobacco use.      Diagnosis:    296.32 (F33.1) Major Depressive Disorder, Recurrent Episode, Moderate _ and With anxious distress    Collateral Reports Completed:   Not Applicable    PLAN: (Patient Tasks / Therapist Tasks / Other)   Complete behavior activation worksheet with enjoyable daily activities done.       CHERISE Castellon  May 16, 2022         ----- Service Performed and Documented by CHERISE------  This  note was reviewed and clinical supervision by AMY Ayala [facility]    5/23/2022   ______________________________________________________________________    Individual Treatment Plan    Patient's Name: Winter Loya  YOB: 1955    Date of Creation: 04/18/2022  Date Treatment Plan Last Reviewed/Revised: 04/18/2022    DSM5 Diagnoses: 296.32 (F33.1) Major Depressive Disorder, Recurrent Episode, Moderate _ and With anxious distress  Psychosocial / Contextual Factors:   PROMIS (reviewed every 90 days):     Referral / Collaboration:  Referral to another professional/service is not indicated at this time..    Anticipated number of session for this episode of care: 15-25  Anticipation frequency of session: Every other week  Anticipated Duration of each session: 38-52 minutes  Treatment plan will be reviewed in 90 days or when goals have been changed.       MeasurableTreatment Goal(s) related to diagnosis / functional impairment(s)    Goal 1: Patient will identify specific triggers to feelings of depression and improve coping with depression by  90 %.    I will know I've met my goal when     Objective #A (Patient Action)    Patient will identify and practice 3 outdoor things that brings her ross daily and repeat that every day.  Status: Continued - Date(s): 07/18/2022    Intervention(s)  Therapist will provide psychoeducation, behavioral activation, and cognitive restructuring.)    Objective #B  Patient will identify specific areas of cognitive distortion and challenge irrational thoughts with reality   Status: Continued - Date(s): 07/18/2022       Intervention(s)  Therapist will teach patient how negative thoughts can lead to negative feelings and actions and ways to reframe thoughts in a more positive way leading to more positive feelings and helpful behaviors    Objective #C  Patient will learn relaxation techniques to manage anxiety.  Status: Continued - Date(s):   07/18/2022    Intervention(s)  Therapist will teach patient thought stopping, deep breathing exercises, mindful meditation, and creative visualization.  Patient has reviewed and agreed to the above plan.      CHERISE Castellon  April 18, 2022

## 2022-05-17 ASSESSMENT — ANXIETY QUESTIONNAIRES: GAD7 TOTAL SCORE: 11

## 2022-05-23 DIAGNOSIS — C90.01 MULTIPLE MYELOMA IN REMISSION (H): Primary | ICD-10-CM

## 2022-05-23 RX ORDER — LENALIDOMIDE 10 MG/1
10 CAPSULE ORAL DAILY
Qty: 28 CAPSULE | Refills: 0 | Status: SHIPPED | OUTPATIENT
Start: 2022-05-23 | End: 2022-09-19

## 2022-06-01 ENCOUNTER — APPOINTMENT (OUTPATIENT)
Dept: LAB | Facility: CLINIC | Age: 67
End: 2022-06-01
Attending: STUDENT IN AN ORGANIZED HEALTH CARE EDUCATION/TRAINING PROGRAM
Payer: MEDICAID

## 2022-06-01 ENCOUNTER — ONCOLOGY VISIT (OUTPATIENT)
Dept: ONCOLOGY | Facility: CLINIC | Age: 67
End: 2022-06-01
Attending: STUDENT IN AN ORGANIZED HEALTH CARE EDUCATION/TRAINING PROGRAM
Payer: MEDICAID

## 2022-06-01 VITALS
HEART RATE: 68 BPM | OXYGEN SATURATION: 96 % | WEIGHT: 254.41 LBS | TEMPERATURE: 98.2 F | BODY MASS INDEX: 37.57 KG/M2 | RESPIRATION RATE: 16 BRPM

## 2022-06-01 DIAGNOSIS — C90.01 MULTIPLE MYELOMA IN REMISSION (H): ICD-10-CM

## 2022-06-01 LAB
ALBUMIN SERPL-MCNC: 3.8 G/DL (ref 3.4–5)
ALP SERPL-CCNC: 108 U/L (ref 40–150)
ALT SERPL W P-5'-P-CCNC: 39 U/L (ref 0–50)
ANION GAP SERPL CALCULATED.3IONS-SCNC: 9 MMOL/L (ref 3–14)
AST SERPL W P-5'-P-CCNC: 20 U/L (ref 0–45)
BASOPHILS # BLD AUTO: 0 10E3/UL (ref 0–0.2)
BASOPHILS NFR BLD AUTO: 1 %
BILIRUB SERPL-MCNC: 1.5 MG/DL (ref 0.2–1.3)
BUN SERPL-MCNC: 8 MG/DL (ref 7–30)
CALCIUM SERPL-MCNC: 8.8 MG/DL (ref 8.5–10.1)
CHLORIDE BLD-SCNC: 108 MMOL/L (ref 94–109)
CO2 SERPL-SCNC: 27 MMOL/L (ref 20–32)
CREAT SERPL-MCNC: 0.71 MG/DL (ref 0.52–1.04)
EOSINOPHIL # BLD AUTO: 0.3 10E3/UL (ref 0–0.7)
EOSINOPHIL NFR BLD AUTO: 9 %
ERYTHROCYTE [DISTWIDTH] IN BLOOD BY AUTOMATED COUNT: 16.2 % (ref 10–15)
GFR SERPL CREATININE-BSD FRML MDRD: >90 ML/MIN/1.73M2
GLUCOSE BLD-MCNC: 186 MG/DL (ref 70–99)
HCT VFR BLD AUTO: 40 % (ref 35–47)
HGB BLD-MCNC: 13.3 G/DL (ref 11.7–15.7)
IMM GRANULOCYTES # BLD: 0 10E3/UL
IMM GRANULOCYTES NFR BLD: 1 %
LYMPHOCYTES # BLD AUTO: 0.7 10E3/UL (ref 0.8–5.3)
LYMPHOCYTES NFR BLD AUTO: 19 %
MCH RBC QN AUTO: 28.9 PG (ref 26.5–33)
MCHC RBC AUTO-ENTMCNC: 33.3 G/DL (ref 31.5–36.5)
MCV RBC AUTO: 87 FL (ref 78–100)
MONOCYTES # BLD AUTO: 0.4 10E3/UL (ref 0–1.3)
MONOCYTES NFR BLD AUTO: 10 %
NEUTROPHILS # BLD AUTO: 2.3 10E3/UL (ref 1.6–8.3)
NEUTROPHILS NFR BLD AUTO: 60 %
NRBC # BLD AUTO: 0 10E3/UL
NRBC BLD AUTO-RTO: 0 /100
PLATELET # BLD AUTO: 129 10E3/UL (ref 150–450)
POTASSIUM BLD-SCNC: 3.3 MMOL/L (ref 3.4–5.3)
PROT SERPL-MCNC: 6.7 G/DL (ref 6.8–8.8)
RBC # BLD AUTO: 4.6 10E6/UL (ref 3.8–5.2)
SODIUM SERPL-SCNC: 144 MMOL/L (ref 133–144)
TOTAL PROTEIN SERUM FOR ELP: 6.3 G/DL (ref 6.8–8.8)
WBC # BLD AUTO: 3.8 10E3/UL (ref 4–11)

## 2022-06-01 PROCEDURE — G0463 HOSPITAL OUTPT CLINIC VISIT: HCPCS

## 2022-06-01 PROCEDURE — 84165 PROTEIN E-PHORESIS SERUM: CPT | Mod: TC | Performed by: PATHOLOGY

## 2022-06-01 PROCEDURE — 83521 IG LIGHT CHAINS FREE EACH: CPT | Performed by: STUDENT IN AN ORGANIZED HEALTH CARE EDUCATION/TRAINING PROGRAM

## 2022-06-01 PROCEDURE — 80053 COMPREHEN METABOLIC PANEL: CPT | Performed by: STUDENT IN AN ORGANIZED HEALTH CARE EDUCATION/TRAINING PROGRAM

## 2022-06-01 PROCEDURE — 36415 COLL VENOUS BLD VENIPUNCTURE: CPT | Performed by: STUDENT IN AN ORGANIZED HEALTH CARE EDUCATION/TRAINING PROGRAM

## 2022-06-01 PROCEDURE — 85025 COMPLETE CBC W/AUTO DIFF WBC: CPT | Performed by: STUDENT IN AN ORGANIZED HEALTH CARE EDUCATION/TRAINING PROGRAM

## 2022-06-01 PROCEDURE — 99214 OFFICE O/P EST MOD 30 MIN: CPT | Performed by: STUDENT IN AN ORGANIZED HEALTH CARE EDUCATION/TRAINING PROGRAM

## 2022-06-01 PROCEDURE — 82784 ASSAY IGA/IGD/IGG/IGM EACH: CPT | Performed by: STUDENT IN AN ORGANIZED HEALTH CARE EDUCATION/TRAINING PROGRAM

## 2022-06-01 PROCEDURE — 84155 ASSAY OF PROTEIN SERUM: CPT | Mod: 91 | Performed by: STUDENT IN AN ORGANIZED HEALTH CARE EDUCATION/TRAINING PROGRAM

## 2022-06-01 ASSESSMENT — PAIN SCALES - GENERAL: PAINLEVEL: MODERATE PAIN (4)

## 2022-06-01 NOTE — PROGRESS NOTES
ONCOLOGY/HEMATOLOGY PROGRESS NOTE  Jun 1, 2022    Reason for Visit: IgA Kappa Multiple Myeloma    Oncology HPI:   Winter Loya is a 64 year old woman with IgA Kappa Multiple Myeloma. In 2018 she had anemia and was fatigued. She was found to have IgA kappa monocloncal antibody with M-spike of 0.17. IgA elevated. Skeletal survey was unremarkable and UPEP had minimal protein (156 mg/m2). PET 11/2018 showed bone marrow consistent with hypermetabolic plasma cell myeloma. M spike was 0.17. She started RVD and Zometa. On 4/2019 she had an autologous transplant.      Her bone marrow bx from September 2019 was sent here for review. It showed marrow cellularity of 60%, with decreased trilineage hematopoiesis and 15% plasma cells as well as peripheral blood with slight anemia. 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 She is on revlimid maintenance and zometa from her prior oncologist in [place]. On 12/6/19 her K/L ratio is 1.1, M spike is 0.04.     Interval history:   Winter presents alone today and reports she is doing well.  She continues to have neuropathy in her feet but this has not changed in the past year.  She has not had any recent falls but notes this was an issue for her in the past; she thinks the falls were due to her neuropathy.  She continues on revlimid maintenance without issue.  Her son's leukemia is in remission and she has been spending a lot of time with him recently.    ROS: 10 point ROS neg other than the symptoms noted above in the HPI.      Past Medical History:   Diagnosis Date     Abnormal EMG 2021 5/25/2021    Interpretation: This is an abnormal study, demonstrating electrophysiologic evidence of a length-dependent axonal sensorimotor polyneuropathy. Asymmetry of peroneal compound muscle action potential amplitudes is a finding of uncertain significance.         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   I have personally reviewed the examination and initial interpretation and I agree with the findings.   ANNE GOODMAN MD     History of MRI of cervical spine 6/2020 3/15/2021    Impression: Multilevel cervical spondylosis, most pronounced at the level of C4-5 and C5-6 with moderate to severe spinal canal stenosis and moderate spinal canal stenosis at C6-7. No abnormal cord signal. No significant neural foraminal narrowing at any level.   I have personally reviewed the examination and initial interpretation and I agree with the findings.   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     Sleep apnea     no c-pap use     Status post complete thyroidectomy 8/26/2020     Thyroid goiter 8/5/2020     Tremor 12/9/2020        Current Outpatient Medications   Medication Sig Dispense Refill     acyclovir (ZOVIRAX) 400 MG tablet TAKE ONE TABLET BY MOUTH EVERY TWELVE HOURS 60 tablet 11     atorvastatin (LIPITOR) 20 MG tablet Take 1 tablet (20 mg) by mouth At Bedtime 90 tablet 3     empagliflozin (JARDIANCE) 25 MG TABS tablet Take 1 tablet (25 mg) by mouth daily 90 tablet 3     insulin glargine (LANTUS SOLOSTAR) 100 UNIT/ML pen Inject 34 Units Subcutaneous At Bedtime 30 mL 3     insulin pen needle (FIFTY50 PEN NEEDLES) 32G X 4 MM miscellaneous Use one pen needle daily or as directed. 100 each 2     LENalidomide (REVLIMID) 10 MG CAPS capsule Take 1 capsule (10 mg) by mouth daily  for 28 days 28 capsule 0     levothyroxine (SYNTHROID/LEVOTHROID) 175 MCG tablet Take 1 tablet (175 mcg) by mouth every morning (before breakfast) 90 tablet 3     losartan (COZAAR) 25 MG tablet Take 1 tablet (25 mg) by mouth daily 90 tablet 2     metFORMIN (GLUCOPHAGE-XR) 500 MG 24 hr tablet Take 1 tablet (500 mg) by mouth daily (with dinner) 180 tablet 3     pregabalin (LYRICA) 100 MG capsule TAKE ONE CAPSULE BY MOUTH THREE TIMES DAILY 270 capsule 3     propranolol ER (INDERAL LA) 60 MG 24 hr capsule Take 1 capsule (60 mg) by mouth daily 90 capsule 3     sertraline (ZOLOFT) 100 MG tablet Take 1 tablet (100 mg) by mouth daily 90 tablet 3     TRULICITY 1.5 MG/0.5ML pen Inject 1.5 mg Subcutaneous every 7 days +++NEED APPT+++ 2 mL 0     albuterol (PROAIR HFA) 108 (90 Base) MCG/ACT inhaler Inhale 2 puffs into the lungs every 4 hours as needed for shortness of breath / dyspnea (Patient not taking: Reported on 6/1/2022) 18 g 0     aspirin (ASA) 325 MG EC tablet Take 1 tablet (325 mg) by mouth daily (Patient not taking: Reported on 6/1/2022) 90 tablet 3     azithromycin (ZITHROMAX) 250 MG tablet Take 2 tablets (500 mg) by mouth daily (Patient not taking: Reported on 6/1/2022) 4 tablet 0     blood glucose (NO BRAND SPECIFIED) test strip Use to test blood sugar two times daily or as directed. (Patient not taking: Reported on 6/1/2022) 200 strip 3     blood glucose monitoring (NO BRAND SPECIFIED) meter device kit Use to test blood sugar two times daily or as directed. (Patient not taking: Reported on 6/1/2022) 1 kit 3     Continuous Blood Gluc Sensor (FREESTYLE SEGUNDO 2 SENSOR) MISC 1 each every 14 days 1 each every 14 days. Change every 14 days. (Patient not taking: Reported on 6/1/2022) 6 each 3     fluconazole (DIFLUCAN) 150 MG tablet Take one tablet (150 mg) by mouth now. Repeat in 3 days if symptoms do not resolve. (Patient not taking: Reported on 6/1/2022) 2 tablet 1     predniSONE (DELTASONE) 20 MG tablet Take two  tablets (= 40mg) each day for 5 (five) days (Patient not taking: Reported on 6/1/2022) 10 tablet 0        No Known Allergies    Pulse 68   Temp 98.2  F (36.8  C) (Oral)   Resp 16   Wt 115.4 kg (254 lb 6.6 oz)   SpO2 96%   BMI 37.57 kg/m    General Appearance: Sitting up in chair, no acute distress  Eyes: No jaundice, no injection  HEENT: Oropharynx unremarkable  Respiratory: Clear to auscultation bilaterally, no crackles/wheezes  Cardiovascular: Regular rate and rhythm, normal S1/S2  GI: Non-tender, non-distended, normoactive bowel sounds  Lymph/Hematologic: No supraclavicular/cervical lymphadenopathy  Skin: No rash, no jaundice  Musculoskeletal: No lower extremity edema    Labs:     Blood Counts       Recent Labs   Lab Test 06/01/22 1350 05/09/22  0730 02/02/22  0858   HGB 13.3 14.8 12.9   HCT 40.0 43.9 37.9   WBC 3.8* 2.5* 3.3*   ANEUTAUTO 2.3 1.6 2.1   ALYMPAUTO 0.7* 0.4* 0.6*   AMONOAUTO 0.4 0.4 0.4   AEOSAUTO 0.3 0.0 0.2   ABSBASO 0.0 0.0 0.0   NRBCMAN 0.0 0.0  --    * 113* 118*       ABO/RH    No lab results found.      Chemistries     Basic Panel  Recent Labs   Lab Test 06/01/22  1350 05/09/22  0730 02/25/22  0847 02/25/22  0605 02/02/22  0857    137  --   --  141   POTASSIUM 3.3* 3.2*  --   --  3.6   CHLORIDE 108 104  --   --  108   CO2 27 22  --   --  29   BUN 8 12  --   --  8   CR 0.71 0.77  --   --  0.63   * 177* 105*   < > 163*    < > = values in this interval not displayed.        Calcium, Magnesium, Phosphorus  Recent Labs   Lab Test 06/01/22  1350 05/09/22  0730 02/02/22  0857   ASHLEY 8.8 9.1 8.6        LFTs  Recent Labs   Lab Test 06/01/22  1350 05/09/22  0730 02/02/22  0857   BILITOTAL 1.5* 1.9* 1.2   ALKPHOS 108 178* 127   AST 20 27 10   ALT 39 46 31   ALBUMIN 3.8 3.6 3.5       LDH  No lab results found.    B2-Microglobulin  Recent Labs   Lab Test 02/18/20  0849   [id number] 1.6           Immunoglobulins     Recent Labs   Lab Test 02/02/22  0857 11/29/21  1347 02/18/20  0849     684 488*       Recent Labs   Lab Test 02/02/22  0857 11/29/21  1347 02/18/20  0849    153 72*       Recent Labs   Lab Test 02/02/22  0857 11/29/21  1347 02/18/20  0849   IGM 30* 33* 39         Monocloncal Protein Studies     M spike    Recent Labs   Lab Test 02/02/22  0857 12/29/21  1429 11/29/21  1347 08/27/21  1352 06/07/21  1340 03/08/21  1331   ELPM 0.0 0.0 0.0 0.0 0.0 0.0       [place] FLC    Recent Labs   Lab Test 02/02/22  0857 11/29/21  1347   KFLCA 1.85 2.17*       Lambda FLC    Recent Labs   Lab Test 02/02/22  0857 11/29/21  1347   LFLCA 1.54 1.64       FLC Ratio    Recent Labs   Lab Test 02/02/22  0857 11/29/21  1347   KLRA 1.20 1.32       Assessment/plan:   Winter Loya is a 66 year old woman with standard risk IgG kappa multiple myeloma, 3 years post auto-transplant, currently on lenalidomide maintenance.    Myeloma-specific labs are currently pending.  Plan to continue maintenance lenalidomide indefinitely while tolerated until evidence of disease progression.  Continue acyclovir and aspirin.  She received her post-transplant vaccines in 2020 and is up to date on COVID vaccinations.    Neuropathy is likely due to combination of bortezomb (during induction) and DM2.  Discussed with her that if she has worsening balance or mobility she should reach out to us; she does not think this is currently an issue but will let us know.    She has chronic diarrhea which predates her lenalidomide, currently controlled with imodium.  Suggested adding metamucil which she will plan to do.    Derm referral today per patient request for skin lesion check.    Monthly labs in [place].  RTC in 3 months with SKYLER, 6 months with Dr. Zhu.    Patient seen and discussed with attending Dr. Zhu.    Jigar Mann MD  Hematology/Oncology/Transplant Fellow

## 2022-06-01 NOTE — NURSING NOTE
Chief Complaint   Patient presents with     Oncology Clinic Visit     Multiple myeloma in remission     Blood Draw     Labs drawn via  by RN in lab. VS taken      Labs collected from venipuncture by RN. Vitals taken. Checked in for next appointment.      Mikala Dennis RN

## 2022-06-01 NOTE — LETTER
6/1/2022         RE: Winter Loya  [address]  [facility] [address]        Dear Colleague,    Thank you for referring your patient, Winter Loya, to [facility]. Please see a copy of my visit note below.    ONCOLOGY/HEMATOLOGY PROGRESS NOTE  Jun 1, 2022    Reason for Visit: IgA Kappa Multiple Myeloma    Oncology HPI:   Winter Loya is a 64 year old woman with IgA Kappa Multiple Myeloma. In 2018 she had anemia and was fatigued. She was found to have IgA kappa monocloncal antibody with M-spike of 0.17. IgA elevated. Skeletal survey was unremarkable and UPEP had minimal protein (156 mg/m2). PET 11/2018 showed bone marrow consistent with hypermetabolic plasma cell myeloma. M spike was 0.17. She started RVD and Zometa. On 4/2019 she had an autologous transplant.      Her bone marrow bx from September 2019 was sent here for review. It showed marrow cellularity of 60%, with decreased trilineage hematopoiesis and 15% plasma cells as well as peripheral blood with slight anemia. 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 She is on revlimid maintenance and zometa from her prior oncologist in [place]. On 12/6/19 her K/L ratio is 1.1, M spike is 0.04.     Interval history:   Winter presents alone today and reports she is doing well.  She continues to have neuropathy in her feet but this has not changed in the past year.  She has not had any recent falls but notes this was an issue for her in the past; she thinks the falls were due to her neuropathy.  She continues on revlimid maintenance without issue.  Her son's leukemia is in remission and she has been spending a lot of time with him recently.    ROS: 10 point ROS neg other than the symptoms noted above in the HPI.      Past Medical History:   Diagnosis Date     Abnormal EMG 2021 5/25/2021     Interpretation: This is an abnormal study, demonstrating electrophysiologic evidence of a length-dependent axonal sensorimotor polyneuropathy. Asymmetry of peroneal compound muscle action potential amplitudes is a finding of uncertain significance.        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   I have personally reviewed the examination and initial interpretation and I agree with the findings.   ANNE GOOMDAN MD     History of MRI of cervical spine 6/2020 3/15/2021    Impression: Multilevel cervical spondylosis, most pronounced at the level of C4-5 and C5-6 with moderate to severe spinal canal stenosis and moderate spinal canal stenosis at C6-7. No abnormal cord signal. No significant neural foraminal narrowing at any level.   I have personally reviewed the examination and initial interpretation and I agree with the findings.   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     Sleep apnea     no c-pap use     Status post complete thyroidectomy 8/26/2020     Thyroid goiter 8/5/2020     Tremor 12/9/2020        Current Outpatient Medications   Medication Sig Dispense Refill     acyclovir (ZOVIRAX) 400 MG tablet TAKE ONE TABLET BY MOUTH EVERY TWELVE HOURS 60 tablet 11     atorvastatin (LIPITOR) 20 MG tablet Take 1 tablet (20 mg) by mouth At Bedtime 90 tablet 3     empagliflozin (JARDIANCE) 25 MG TABS tablet Take 1 tablet (25 mg) by mouth daily 90 tablet 3     insulin glargine (LANTUS SOLOSTAR) 100 UNIT/ML  pen Inject 34 Units Subcutaneous At Bedtime 30 mL 3     insulin pen needle (FIFTY50 PEN NEEDLES) 32G X 4 MM miscellaneous Use one pen needle daily or as directed. 100 each 2     LENalidomide (REVLIMID) 10 MG CAPS capsule Take 1 capsule (10 mg) by mouth daily for 28 days 28 capsule 0     levothyroxine (SYNTHROID/LEVOTHROID) 175 MCG tablet Take 1 tablet (175 mcg) by mouth every morning (before breakfast) 90 tablet 3     losartan (COZAAR) 25 MG tablet Take 1 tablet (25 mg) by mouth daily 90 tablet 2     metFORMIN (GLUCOPHAGE-XR) 500 MG 24 hr tablet Take 1 tablet (500 mg) by mouth daily (with dinner) 180 tablet 3     pregabalin (LYRICA) 100 MG capsule TAKE ONE CAPSULE BY MOUTH THREE TIMES DAILY 270 capsule 3     propranolol ER (INDERAL LA) 60 MG 24 hr capsule Take 1 capsule (60 mg) by mouth daily 90 capsule 3     sertraline (ZOLOFT) 100 MG tablet Take 1 tablet (100 mg) by mouth daily 90 tablet 3     TRULICITY 1.5 MG/0.5ML pen Inject 1.5 mg Subcutaneous every 7 days +++NEED APPT+++ 2 mL 0     albuterol (PROAIR HFA) 108 (90 Base) MCG/ACT inhaler Inhale 2 puffs into the lungs every 4 hours as needed for shortness of breath / dyspnea (Patient not taking: Reported on 6/1/2022) 18 g 0     aspirin (ASA) 325 MG EC tablet Take 1 tablet (325 mg) by mouth daily (Patient not taking: Reported on 6/1/2022) 90 tablet 3     azithromycin (ZITHROMAX) 250 MG tablet Take 2 tablets (500 mg) by mouth daily (Patient not taking: Reported on 6/1/2022) 4 tablet 0     blood glucose (NO BRAND SPECIFIED) test strip Use to test blood sugar two times daily or as directed. (Patient not taking: Reported on 6/1/2022) 200 strip 3     blood glucose monitoring (NO BRAND SPECIFIED) meter device kit Use to test blood sugar two times daily or as directed. (Patient not taking: Reported on 6/1/2022) 1 kit 3     Continuous Blood Gluc Sensor (FREESTYLE SEGUNDO 2 SENSOR) MISC 1 each every 14 days 1 each every 14 days. Change every 14 days. (Patient not taking:  Reported on 6/1/2022) 6 each 3     fluconazole (DIFLUCAN) 150 MG tablet Take one tablet (150 mg) by mouth now. Repeat in 3 days if symptoms do not resolve. (Patient not taking: Reported on 6/1/2022) 2 tablet 1     predniSONE (DELTASONE) 20 MG tablet Take two tablets (= 40mg) each day for 5 (five) days (Patient not taking: Reported on 6/1/2022) 10 tablet 0        No Known Allergies    Pulse 68   Temp 98.2  F (36.8  C) (Oral)   Resp 16   Wt 115.4 kg (254 lb 6.6 oz)   SpO2 96%   BMI 37.57 kg/m    General Appearance: Sitting up in chair, no acute distress  Eyes: No jaundice, no injection  HEENT: Oropharynx unremarkable  Respiratory: Clear to auscultation bilaterally, no crackles/wheezes  Cardiovascular: Regular rate and rhythm, normal S1/S2  GI: Non-tender, non-distended, normoactive bowel sounds  Lymph/Hematologic: No supraclavicular/cervical lymphadenopathy  Skin: No rash, no jaundice  Musculoskeletal: No lower extremity edema    Labs:     Blood Counts       Recent Labs   Lab Test 06/01/22  1350 05/09/22  0730 02/02/22  0858   HGB 13.3 14.8 12.9   HCT 40.0 43.9 37.9   WBC 3.8* 2.5* 3.3*   ANEUTAUTO 2.3 1.6 2.1   ALYMPAUTO 0.7* 0.4* 0.6*   AMONOAUTO 0.4 0.4 0.4   AEOSAUTO 0.3 0.0 0.2   ABSBASO 0.0 0.0 0.0   NRBCMAN 0.0 0.0  --    * 113* 118*       ABO/RH    No lab results found.      Chemistries     Basic Panel  Recent Labs   Lab Test 06/01/22  1350 05/09/22  0730 02/25/22  0847 02/25/22  0605 02/02/22  0857    137  --   --  141   POTASSIUM 3.3* 3.2*  --   --  3.6   CHLORIDE 108 104  --   --  108   CO2 27 22  --   --  29   BUN 8 12  --   --  8   CR 0.71 0.77  --   --  0.63   * 177* 105*   < > 163*    < > = values in this interval not displayed.        Calcium, Magnesium, Phosphorus  Recent Labs   Lab Test 06/01/22  1350 05/09/22  0730 02/02/22  0857   ASHLEY 8.8 9.1 8.6        LFTs  Recent Labs   Lab Test 06/01/22  1350 05/09/22  0730 02/02/22  0857   BILITOTAL 1.5* 1.9* 1.2   ALKPHOS 108 178* 127    AST 20 27 10   ALT 39 46 31   ALBUMIN 3.8 3.6 3.5       LDH  No lab results found.    B2-Microglobulin  Recent Labs   Lab Test 02/18/20  0849   [id number] 1.6           Immunoglobulins     Recent Labs   Lab Test 02/02/22  0857 11/29/21  1347 02/18/20  0849    684 488*       Recent Labs   Lab Test 02/02/22  0857 11/29/21  1347 02/18/20  0849    153 72*       Recent Labs   Lab Test 02/02/22  0857 11/29/21  1347 02/18/20  0849   IGM 30* 33* 39         Monocloncal Protein Studies     M spike    Recent Labs   Lab Test 02/02/22  0857 12/29/21  1429 11/29/21  1347 08/27/21  1352 06/07/21  1340 03/08/21  1331   ELPM 0.0 0.0 0.0 0.0 0.0 0.0       [place] FLC    Recent Labs   Lab Test 02/02/22  0857 11/29/21  1347   KFLCA 1.85 2.17*       Lambda FLC    Recent Labs   Lab Test 02/02/22  0857 11/29/21  1347   LFLCA 1.54 1.64       FLC Ratio    Recent Labs   Lab Test 02/02/22  0857 11/29/21  1347   KLRA 1.20 1.32       Assessment/plan:   Winter Loya is a 66 year old woman with standard risk IgG kappa multiple myeloma, 3 years post auto-transplant, currently on lenalidomide maintenance.    Myeloma-specific labs are currently pending.  Plan to continue maintenance lenalidomide indefinitely while tolerated until evidence of disease progression.  Continue acyclovir and aspirin.  She received her post-transplant vaccines in 2020 and is up to date on COVID vaccinations.    Neuropathy is likely due to combination of bortezomb (during induction) and DM2.  Discussed with her that if she has worsening balance or mobility she should reach out to us; she does not think this is currently an issue but will let us know.    She has chronic diarrhea which predates her lenalidomide, currently controlled with imodium.  Suggested adding metamucil which she will plan to do.    Derm referral today per patient request for skin lesion check.    Monthly labs in [place].  RTC in 3 months with SKYLER, 6 months with Dr. Zhu.    Patient seen and  discussed with attending Dr. Zhu.    Jigar Mann MD  Hematology/Oncology/Transplant Fellow    Attestation signed by Brit Zhu MD at 6/2/2022  5:31 AM:  Physician Attestation   I, Brit Zhu MD, saw this patient and agree with the findings and plan of care as documented in the note.      Items personally reviewed/procedural attestation: vitals, labs, and agree with the interpretation documented in the note.    Brit Zhu MD

## 2022-06-01 NOTE — NURSING NOTE
"Oncology Rooming Note    June 1, 2022 1:58 PM   Winter Loya is a 66 year old female who presents for:    Chief Complaint   Patient presents with     Oncology Clinic Visit     Multiple myeloma in remission     Blood Draw     Labs drawn via  by RN in lab. VS taken      Initial Vitals: Pulse 68   Temp 98.2  F (36.8  C) (Oral)   Resp 16   Wt 115.4 kg (254 lb 6.6 oz)   SpO2 96%   BMI 37.57 kg/m   Estimated body mass index is 37.57 kg/m  as calculated from the following:    Height as of 5/9/22: 1.753 m (5' 9\").    Weight as of this encounter: 115.4 kg (254 lb 6.6 oz). Body surface area is 2.37 meters squared.  Moderate Pain (4) Comment: Data Unavailable   No LMP recorded. Patient is postmenopausal.  Allergies reviewed: Yes  Medications reviewed: Yes    Medications: Medication refills not needed today.  Pharmacy name entered into [facility]:    [facility] - [place], [place] - [address]   [id number] - [place], [place] - [address] [facility] [place] [place] - [address] [place] [address] [id number] - [place], [place] - [address] [place] [address]    Clinical concerns:     Pt states she has problems with the bottoms of her feet.    Also has a lot of trouble with neck    Terry Scales            "

## 2022-06-02 LAB
ALBUMIN SERPL ELPH-MCNC: 4.2 G/DL (ref 3.7–5.1)
ALPHA1 GLOB SERPL ELPH-MCNC: 0.3 G/DL (ref 0.2–0.4)
ALPHA2 GLOB SERPL ELPH-MCNC: 0.6 G/DL (ref 0.5–0.9)
B-GLOBULIN SERPL ELPH-MCNC: 0.7 G/DL (ref 0.6–1)
GAMMA GLOB SERPL ELPH-MCNC: 0.5 G/DL (ref 0.7–1.6)
IGA SERPL-MCNC: 98 MG/DL (ref 84–499)
IGG SERPL-MCNC: 529 MG/DL (ref 610–1616)
IGM SERPL-MCNC: 28 MG/DL (ref 35–242)
KAPPA LC FREE SER-MCNC: 1.8 MG/DL (ref 0.33–1.94)
KAPPA LC FREE/LAMBDA FREE SER NEPH: 1.35 {RATIO} (ref 0.26–1.65)
LAMBDA LC FREE SERPL-MCNC: 1.33 MG/DL (ref 0.57–2.63)
M PROTEIN SERPL ELPH-MCNC: 0 G/DL
PROT PATTERN SERPL ELPH-IMP: ABNORMAL

## 2022-06-02 PROCEDURE — 84165 PROTEIN E-PHORESIS SERUM: CPT | Mod: 26

## 2022-06-06 ENCOUNTER — PSYCHE (OUTPATIENT)
Dept: PSYCHOLOGY | Facility: CLINIC | Age: 67
End: 2022-06-06
Payer: MEDICAID

## 2022-06-06 ENCOUNTER — TELEPHONE (OUTPATIENT)
Dept: ONCOLOGY | Facility: CLINIC | Age: 67
End: 2022-06-06
Payer: MEDICAID

## 2022-06-06 DIAGNOSIS — F33.1 MAJOR DEPRESSIVE DISORDER, RECURRENT EPISODE, MODERATE WITH ANXIOUS DISTRESS (H): Primary | ICD-10-CM

## 2022-06-06 PROCEDURE — 90834 PSYTX W PT 45 MINUTES: CPT | Performed by: STUDENT IN AN ORGANIZED HEALTH CARE EDUCATION/TRAINING PROGRAM

## 2022-06-06 ASSESSMENT — ANXIETY QUESTIONNAIRES
4. TROUBLE RELAXING: MORE THAN HALF THE DAYS
7. FEELING AFRAID AS IF SOMETHING AWFUL MIGHT HAPPEN: MORE THAN HALF THE DAYS
5. BEING SO RESTLESS THAT IT IS HARD TO SIT STILL: SEVERAL DAYS
6. BECOMING EASILY ANNOYED OR IRRITABLE: SEVERAL DAYS
3. WORRYING TOO MUCH ABOUT DIFFERENT THINGS: NEARLY EVERY DAY
2. NOT BEING ABLE TO STOP OR CONTROL WORRYING: MORE THAN HALF THE DAYS
1. FEELING NERVOUS, ANXIOUS, OR ON EDGE: MORE THAN HALF THE DAYS
GAD7 TOTAL SCORE: 13
GAD7 TOTAL SCORE: 13

## 2022-06-06 ASSESSMENT — PATIENT HEALTH QUESTIONNAIRE - PHQ9: SUM OF ALL RESPONSES TO PHQ QUESTIONS 1-9: 16
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
## 2022-06-06 NOTE — TELEPHONE ENCOUNTER
Oral Chemotherapy Monitoring Program    Medication: Revlimid  Rx:  10 mg PO daily for 28 days  Auth #: [id number]  Risk Category: Adult female NOT of reproductive capacity    Routine survey questions reviewed.    Mary Franklin  Oncology Pharmacy Liaison II  [email or web address]  Phone: [phone]  Fax: [phone]

## 2022-06-20 ENCOUNTER — OFFICE VISIT (OUTPATIENT)
Dept: PSYCHOLOGY | Facility: CLINIC | Age: 67
End: 2022-06-20
Payer: MEDICAID

## 2022-06-20 DIAGNOSIS — F33.1 MAJOR DEPRESSIVE DISORDER, RECURRENT EPISODE, MODERATE WITH ANXIOUS DISTRESS (H): Primary | ICD-10-CM

## 2022-06-20 PROCEDURE — 90834 PSYTX W PT 45 MINUTES: CPT | Performed by: STUDENT IN AN ORGANIZED HEALTH CARE EDUCATION/TRAINING PROGRAM

## 2022-06-20 ASSESSMENT — ANXIETY QUESTIONNAIRES
7. FEELING AFRAID AS IF SOMETHING AWFUL MIGHT HAPPEN: MORE THAN HALF THE DAYS
3. WORRYING TOO MUCH ABOUT DIFFERENT THINGS: MORE THAN HALF THE DAYS
1. FEELING NERVOUS, ANXIOUS, OR ON EDGE: MORE THAN HALF THE DAYS
5. BEING SO RESTLESS THAT IT IS HARD TO SIT STILL: SEVERAL DAYS
2. NOT BEING ABLE TO STOP OR CONTROL WORRYING: MORE THAN HALF THE DAYS
GAD7 TOTAL SCORE: 12
6. BECOMING EASILY ANNOYED OR IRRITABLE: MORE THAN HALF THE DAYS
GAD7 TOTAL SCORE: 12
4. TROUBLE RELAXING: SEVERAL DAYS

## 2022-06-20 ASSESSMENT — PATIENT HEALTH QUESTIONNAIRE - PHQ9: SUM OF ALL RESPONSES TO PHQ QUESTIONS 1-9: 16
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
## 2022-06-27 DIAGNOSIS — C90.01 MULTIPLE MYELOMA IN REMISSION (H): Primary | ICD-10-CM

## 2022-06-27 RX ORDER — LENALIDOMIDE 10 MG/1
10 CAPSULE ORAL DAILY
Qty: 28 CAPSULE | Refills: 0 | Status: SHIPPED | OUTPATIENT
Start: 2022-06-27 | End: 2022-08-10

## 2022-07-07 ENCOUNTER — TELEPHONE (OUTPATIENT)
Dept: ONCOLOGY | Facility: CLINIC | Age: 67
End: 2022-07-07

## 2022-07-07 NOTE — TELEPHONE ENCOUNTER
Oral Chemotherapy Monitoring Program    Medication: Revlimid  Rx:  10 mg PO daily for a 28 day cycle  Auth #: [id number]  Risk Category: Adult female NOT of reproductive capacity    Routine survey questions reviewed.      Mary Franklin  Oncology Pharmacy Liaison II  [email or web address]  Phone: [phone]  Fax: [phone]

## 2022-07-11 ENCOUNTER — OFFICE VISIT (OUTPATIENT)
Dept: FAMILY MEDICINE | Facility: CLINIC | Age: 67
End: 2022-07-11
Payer: MEDICAID

## 2022-07-11 ENCOUNTER — OFFICE VISIT (OUTPATIENT)
Dept: PSYCHOLOGY | Facility: CLINIC | Age: 67
End: 2022-07-11
Payer: MEDICAID

## 2022-07-11 VITALS
BODY MASS INDEX: 37.77 KG/M2 | SYSTOLIC BLOOD PRESSURE: 112 MMHG | HEART RATE: 64 BPM | OXYGEN SATURATION: 96 % | TEMPERATURE: 98.5 F | DIASTOLIC BLOOD PRESSURE: 82 MMHG | WEIGHT: 255 LBS | RESPIRATION RATE: 14 BRPM | HEIGHT: 69 IN

## 2022-07-11 DIAGNOSIS — Z79.4 TYPE 2 DIABETES MELLITUS WITH DIABETIC POLYNEUROPATHY, WITH LONG-TERM CURRENT USE OF INSULIN (H): ICD-10-CM

## 2022-07-11 DIAGNOSIS — E04.9 THYROID GOITER: ICD-10-CM

## 2022-07-11 DIAGNOSIS — F33.1 MAJOR DEPRESSIVE DISORDER, RECURRENT EPISODE, MODERATE WITH ATYPICAL FEATURES (H): Primary | ICD-10-CM

## 2022-07-11 DIAGNOSIS — E66.812 CLASS 2 SEVERE OBESITY DUE TO EXCESS CALORIES WITH SERIOUS COMORBIDITY AND BODY MASS INDEX (BMI) OF 37.0 TO 37.9 IN ADULT (H): ICD-10-CM

## 2022-07-11 DIAGNOSIS — F41.1 GAD (GENERALIZED ANXIETY DISORDER): ICD-10-CM

## 2022-07-11 DIAGNOSIS — G62.0 DRUG-INDUCED POLYNEUROPATHY (H): ICD-10-CM

## 2022-07-11 DIAGNOSIS — C90.01 MULTIPLE MYELOMA IN REMISSION (H): ICD-10-CM

## 2022-07-11 DIAGNOSIS — N18.1 CHRONIC KIDNEY DISEASE, STAGE 1: ICD-10-CM

## 2022-07-11 DIAGNOSIS — R19.7 DIARRHEA, UNSPECIFIED TYPE: ICD-10-CM

## 2022-07-11 DIAGNOSIS — E11.42 TYPE 2 DIABETES MELLITUS WITH DIABETIC POLYNEUROPATHY, WITH LONG-TERM CURRENT USE OF INSULIN (H): ICD-10-CM

## 2022-07-11 DIAGNOSIS — Z79.899 ENCOUNTER FOR LONG-TERM (CURRENT) USE OF MEDICATIONS: Primary | ICD-10-CM

## 2022-07-11 DIAGNOSIS — E66.01 CLASS 2 SEVERE OBESITY DUE TO EXCESS CALORIES WITH SERIOUS COMORBIDITY AND BODY MASS INDEX (BMI) OF 37.0 TO 37.9 IN ADULT (H): ICD-10-CM

## 2022-07-11 LAB
ALBUMIN SERPL-MCNC: 3.7 G/DL (ref 3.4–5)
ALP SERPL-CCNC: 120 U/L (ref 40–150)
ALT SERPL W P-5'-P-CCNC: 33 U/L (ref 0–50)
ANION GAP SERPL CALCULATED.3IONS-SCNC: 9 MMOL/L (ref 3–14)
AST SERPL W P-5'-P-CCNC: 29 U/L (ref 0–45)
BASOPHILS # BLD AUTO: 0 10E3/UL (ref 0–0.2)
BASOPHILS NFR BLD AUTO: 1 %
BILIRUB SERPL-MCNC: 1.1 MG/DL (ref 0.2–1.3)
BUN SERPL-MCNC: 10 MG/DL (ref 7–30)
CALCIUM SERPL-MCNC: 8.4 MG/DL (ref 8.5–10.1)
CANNABINOIDS UR QL SCN: NORMAL
CHLORIDE BLD-SCNC: 107 MMOL/L (ref 94–109)
CO2 SERPL-SCNC: 24 MMOL/L (ref 20–32)
CREAT SERPL-MCNC: 0.67 MG/DL (ref 0.52–1.04)
CREAT UR-MCNC: 82 MG/DL
EOSINOPHIL # BLD AUTO: 0.3 10E3/UL (ref 0–0.7)
EOSINOPHIL NFR BLD AUTO: 10 %
ERYTHROCYTE [DISTWIDTH] IN BLOOD BY AUTOMATED COUNT: 15.6 % (ref 10–15)
GFR SERPL CREATININE-BSD FRML MDRD: >90 ML/MIN/1.73M2
GLUCOSE BLD-MCNC: 170 MG/DL (ref 70–99)
HBA1C MFR BLD: 6.8 % (ref 0–5.6)
HCT VFR BLD AUTO: 39.7 % (ref 35–47)
HGB BLD-MCNC: 13.3 G/DL (ref 11.7–15.7)
LYMPHOCYTES # BLD AUTO: 0.5 10E3/UL (ref 0.8–5.3)
LYMPHOCYTES NFR BLD AUTO: 17 %
MCH RBC QN AUTO: 29.3 PG (ref 26.5–33)
MCHC RBC AUTO-ENTMCNC: 33.5 G/DL (ref 31.5–36.5)
MCV RBC AUTO: 87 FL (ref 78–100)
MONOCYTES # BLD AUTO: 0.4 10E3/UL (ref 0–1.3)
MONOCYTES NFR BLD AUTO: 13 %
NEUTROPHILS # BLD AUTO: 1.8 10E3/UL (ref 1.6–8.3)
NEUTROPHILS NFR BLD AUTO: 58 %
PLATELET # BLD AUTO: 138 10E3/UL (ref 150–450)
POTASSIUM BLD-SCNC: 3.3 MMOL/L (ref 3.4–5.3)
PROT SERPL-MCNC: 6.7 G/DL (ref 6.8–8.8)
RBC # BLD AUTO: 4.54 10E6/UL (ref 3.8–5.2)
SODIUM SERPL-SCNC: 140 MMOL/L (ref 133–144)
T4 FREE SERPL-MCNC: 0.83 NG/DL (ref 0.76–1.46)
TOTAL PROTEIN SERUM FOR ELP: 6 G/DL (ref 6.4–8.3)
TSH SERPL DL<=0.005 MIU/L-ACNC: 12.1 MU/L (ref 0.4–4)
WBC # BLD AUTO: 3.1 10E3/UL (ref 4–11)

## 2022-07-11 PROCEDURE — 90834 PSYTX W PT 45 MINUTES: CPT | Performed by: STUDENT IN AN ORGANIZED HEALTH CARE EDUCATION/TRAINING PROGRAM

## 2022-07-11 PROCEDURE — 90677 PCV20 VACCINE IM: CPT | Performed by: NURSE PRACTITIONER

## 2022-07-11 PROCEDURE — 36415 COLL VENOUS BLD VENIPUNCTURE: CPT | Performed by: NURSE PRACTITIONER

## 2022-07-11 PROCEDURE — 83036 HEMOGLOBIN GLYCOSYLATED A1C: CPT | Performed by: NURSE PRACTITIONER

## 2022-07-11 PROCEDURE — 83521 IG LIGHT CHAINS FREE EACH: CPT | Performed by: NURSE PRACTITIONER

## 2022-07-11 PROCEDURE — 80307 DRUG TEST PRSMV CHEM ANLYZR: CPT | Performed by: NURSE PRACTITIONER

## 2022-07-11 PROCEDURE — 90471 IMMUNIZATION ADMIN: CPT | Performed by: NURSE PRACTITIONER

## 2022-07-11 PROCEDURE — 84165 PROTEIN E-PHORESIS SERUM: CPT | Performed by: PATHOLOGY

## 2022-07-11 PROCEDURE — 84439 ASSAY OF FREE THYROXINE: CPT | Performed by: NURSE PRACTITIONER

## 2022-07-11 PROCEDURE — 91305 COVID-19,PF,PFIZER (12+ YRS): CPT | Performed by: NURSE PRACTITIONER

## 2022-07-11 PROCEDURE — 0054A COVID-19,PF,PFIZER (12+ YRS): CPT | Performed by: NURSE PRACTITIONER

## 2022-07-11 PROCEDURE — 99214 OFFICE O/P EST MOD 30 MIN: CPT | Mod: 25 | Performed by: NURSE PRACTITIONER

## 2022-07-11 PROCEDURE — 82784 ASSAY IGA/IGD/IGG/IGM EACH: CPT | Performed by: NURSE PRACTITIONER

## 2022-07-11 PROCEDURE — 86334 IMMUNOFIX E-PHORESIS SERUM: CPT | Performed by: PATHOLOGY

## 2022-07-11 PROCEDURE — 80050 GENERAL HEALTH PANEL: CPT | Performed by: NURSE PRACTITIONER

## 2022-07-11 RX ORDER — INSULIN GLARGINE 100 [IU]/ML
30 INJECTION, SOLUTION SUBCUTANEOUS AT BEDTIME
Qty: 30 ML | Refills: 3 | Status: SHIPPED | OUTPATIENT
Start: 2022-07-11 | End: 2022-12-21

## 2022-07-11 RX ORDER — ACYCLOVIR 400 MG/1
TABLET ORAL
Qty: 60 TABLET | Refills: 11 | Status: SHIPPED | OUTPATIENT
Start: 2022-07-11 | End: 2023-06-26

## 2022-07-11 RX ORDER — METFORMIN HCL 500 MG
1000 TABLET, EXTENDED RELEASE 24 HR ORAL
Qty: 180 TABLET | Refills: 3 | Status: SHIPPED | OUTPATIENT
Start: 2022-07-11 | End: 2023-01-31 | Stop reason: SINTOL

## 2022-07-11 RX ORDER — LOPERAMIDE HCL 2 MG
CAPSULE ORAL
Qty: 270 CAPSULE | Refills: 11 | Status: SHIPPED | OUTPATIENT
Start: 2022-07-11 | End: 2024-03-04

## 2022-07-11 RX ORDER — LEVOTHYROXINE SODIUM 175 UG/1
175 TABLET ORAL
Qty: 90 TABLET | Refills: 3 | Status: SHIPPED | OUTPATIENT
Start: 2022-07-11 | End: 2022-07-19

## 2022-07-11 NOTE — PATIENT INSTRUCTIONS
Please follow up with the [facility] pharmacist soon.  No need to take Trulicity at this time.  Prescriptions sent for loperamide, metformin, synthroid, lantus, acyclovir, and jardiance.  Follow up in the clinic in 3 months to re-check your A1C. Today it was 6.8  Look for a gel insert for supportive footwear to wear at work.  Booster was done for COVID today as well as pneumococcal vaccine.

## 2022-07-11 NOTE — PROGRESS NOTES
Assessment & Plan     Encounter for long-term (current) use of medications  Patient is aware of medications she is taking and would like assistance from [facility] pharmacy team to review all medications and organizational strategies.  - Med Therapy Management Referral    Diarrhea, unspecified type  Diarrhea has been ongoing for over 2 years. Metformin holiday was trialed and diarrhea persisted. Recent GI workup has found no cause for her symptoms. GI instructed her to take up to 16 tabs loperamide daily. She currently takes 8-10 tabs with no relief. She is incontinent of stool and this interferes with her work and enjoyment of life. Counseled to follow up with GI.   - loperamide (IMODIUM) 2 MG capsule; Take 4 mg (two capsules) by mouth at onset of loose stools, followed by 2 mg (one capsule) after each loose stool. Maximum: 16 mg (8 capsules) daily  Dispense: 270 capsule; Refill: 11    Type 2 diabetes mellitus with diabetic polyneuropathy, with long-term current use of insulin (H)  Patient has not checked her blood glucose at home for several months. Last blood glucose in June 2022 was 186. Remote monitoring was recommended however her insurance does not cover this. She had taken trulicity for appx 1 year until stopping in March 2022 due to needing to be seen in the clinic to review her condition. She is in the clinic today to follow up on diabetes management. Diabetic foot exam shows numbness in plantar surfaces of bilateral feet and dorsal aspect of toes bilaterally. She complains of pain in bilateral heels radiating to arches. She will purchase insoles for tennis shoes to wear at work for relief. She declined podiatry referral at this time.  Plan: HgB A1C is 6.8 today. Has been taking metformin 1,000 mg daily and Lantus 30 units daily. She is taking Jardiance as prescribed at 25 mg daily. Will continue with this plan. Stop Trulicity. (AVS that she has says stop Jardiance, and this is incorrect. She was called  and notified of the error, she verbalized understanding and AVS changed).   Will follow up with [facility] pharmacist soon. Will follow up in clinic for recheck of labs in 3 months.  - OPTOMETRY REFERRAL; Future  - Med Therapy Management Referral  - Comprehensive metabolic panel (BMP + Alb, Alk Phos, ALT, AST, Total. Bili, TP)  - Pneumococcal 20 Valent Conjugate (Prevnar 20)  - insulin glargine (LANTUS SOLOSTAR) 100 UNIT/ML pen; Inject 30 Units Subcutaneous At Bedtime  Dispense: 30 mL; Refill: 3  - empagliflozin (JARDIANCE) 25 MG TABS tablet; Take 1 tablet (25 mg) by mouth daily  Dispense: 90 tablet; Refill: 3  - metFORMIN (GLUCOPHAGE XR) 500 MG 24 hr tablet; Take 2 tablets (1,000 mg) by mouth daily (with dinner)  Dispense: 180 tablet; Refill: 3    Class 2 severe obesity due to excess calories with serious comorbidity and body mass index (BMI) of 37.0 to 37.9 in adult (H)  Patient is seeking assistance with weight loss today. Referral placed to weight management program. She will call to make appointment.   - Comprehensive Weight Management    Drug-induced polyneuropathy (H)  Taking Lyrica for polyneuropathy symptoms which is helpful for her. She denies taking any illicit drugs or drugs not prescribed to her. Due for urine drug screen.  - [id number] - Urine Drug Confirmation Panel (Comprehensive)      Multiple myeloma in remission (H)  Will release labs ordered by oncology to be drawn today. Stable.   - CBC with platelets differential  - Protein electrophoresis  - Immunoglobulins A G and M  - Protein Immunofixation Serum  - Kappa and lambda light chain  - acyclovir (ZOVIRAX) 400 MG tablet; TAKE ONE TABLET BY MOUTH EVERY TWELVE HOURS  Dispense: 60 tablet; Refill: 11    Thyroid goiter  Patient is reporting increased hair loss, fatigue, and diarrhea. Last TSH 2.94 October 2021  - TSH with free T4 reflex  - levothyroxine (SYNTHROID/LEVOTHROID) 175 MCG tablet; Take 1 tablet (175 mcg) by mouth every morning (before breakfast)   "Dispense: 90 tablet; Refill: 3    Chronic kidney disease, stage 1  - Comprehensive metabolic panel (BMP + Alb, Alk Phos, ALT, AST, Total. Bili, TP)    Review of the result(s) of each unique test - a1c  Ordering of each unique test  Prescription drug management     BMI:   Estimated body mass index is 37.66 kg/m  as calculated from the following:    Height as of this encounter: 1.753 m (5' 9\").    Weight as of this encounter: 115.7 kg (255 lb).   Weight management plan: Patient referred to endocrine and/or weight management specialty    FUTURE APPOINTMENTS:       - Make appointment with weight     Return in about 3 months (around 10/11/2022) for diabetes check with [facility] or PCP of her choice.    Aurora Mejia, Nurse Practitioner Student    I very much appreciated the opportunity to see and assess this patient in the clinic with the NP student.  I agree with the above note which summarizes my findings and current recommendations. I have reviewed all diagnostics noted and performed the physical exam and the medical decision making. Changes were made in the body of the note to achieve one comprehensive document.    Delmi Gross, FALGUNI CNP  [facility] KINDRA Max is a 66 year old accompanied by her none presenting for the following health issues:  Diabetes      Diabetes Follow-up      How often are you checking your blood sugar? Not at all    What concerns do you have today about your diabetes? None and Other: Not able to keep up with blood glucose monitoring, have not refilled trulicity since it      Do you have any of these symptoms? (Select all that apply)  Numbness in feet and Burning in feet    Have you had a diabetic eye exam in the last 12 months? No        BP Readings from Last 2 Encounters:   22 112/82   22 115/54     Hemoglobin A1C POCT (%)   Date Value   2021 7.6 (H)   2020 8.8 (H)     Hemoglobin A1C (%)   Date Value " "  07/11/2022 6.8 (H)   12/29/2021 9.0 (H)     LDL Cholesterol Calculated (mg/dL)   Date Value   02/02/2022 29   01/27/2021 46       Review of Systems   CONSTITUTIONAL:NEGATIVE for fever, chills, change in weight  INTEGUMENTARY/SKIN: NEGATIVE for worrisome rashes, moles or lesions. Reports increased hair loss.  RESP:NEGATIVE for significant cough or SOB  CV: NEGATIVE for chest pain, palpitations or peripheral edema  GI: NEGATIVE for nausea, abdominal pain, heartburn, or change in bowel habits, abdominal pain generalized and diarrhea  MUSCULOSKELETAL: POSITIVE  for bilateral foot pain plantar surface, bilateral heel pain, and right knee pain  NEURO: POSITIVE for paresthesias bilateral feet   ENDOCRINE: NEGATIVE for temperature intolerance, skin/hair changes  PSYCHIATRIC: NEGATIVE for changes in mood or affect, fatigue and stress, cares for quadriplegic son and continues to work      Objective    /82   Pulse 64   Temp 98.5  F (36.9  C) (Oral)   Resp 14   Ht 1.753 m (5' 9\")   Wt 115.7 kg (255 lb)   LMP  (Exact Date)   SpO2 96%   Breastfeeding No   BMI 37.66 kg/m    Body mass index is 37.66 kg/m .  Physical Exam   GENERAL: healthy, alert and no distress  NECK: no adenopathy, no asymmetry, masses, or scars and thyroid normal to palpation  RESP: lungs clear to auscultation - no rales, rhonchi or wheezes  CV: regular rate and rhythm, normal S1 S2, no S3 or S4, no murmur, click or rub, no peripheral edema and peripheral pulses strong  ABDOMEN: soft, nontender, no hepatosplenomegaly, no masses and bowel sounds normal  MS: no gross musculoskeletal defects noted, no edema  SKIN: no suspicious lesions or rashes. Thinning and brittle hair on scalp.  NEURO: Normal strength and tone, mentation intact and speech normal  Diabetic foot exam: normal DP and PT pulses, no trophic changes or ulcerative lesions, normal sensory exam and reduced sensation at plantar surface of bilateral feet and toes, dorsal aspect of toes. " Normal sensation at dorsum bilateral feet.    Results for orders placed or performed in visit on 07/11/22 (from the past 24 hour(s))   HEMOGLOBIN A1C   Result Value Ref Range    Hemoglobin A1C 6.8 (H) 0.0 - 5.6 %   [id number] - Urine Drug Confirmation Panel (Comprehensive)    Narrative    The following orders were created for panel order [id number] - Urine Drug Confirmation Panel (Comprehensive).  Procedure                               Abnormality         Status                     ---------                               -----------         ------                     Urine Drug Confirmation ...[[id number]]                      In process                 Urine Creatinine for Angelo...[[id number]]                      In process                 Cannabinoids qualitative...[[id number]]                      In process                   Please view results for these tests on the individual orders.   CBC with platelets differential    Narrative    The following orders were created for panel order CBC with platelets differential.  Procedure                               Abnormality         Status                     ---------                               -----------         ------                     CBC with platelets and d...[[id number]]  Abnormal            Final result                 Please view results for these tests on the individual orders.   Protein electrophoresis    Narrative    The following orders were created for panel order Protein electrophoresis.  Procedure                               Abnormality         Status                     ---------                               -----------         ------                     Total Protein, Serum for...[[id number]]                      In process                 Protein Electrophoresis,...[[id number]]                      In process                   Please view results for these tests on the individual orders.   CBC with platelets and differential   Result Value Ref Range     WBC Count 3.1 (L) 4.0 - 11.0 10e3/uL    RBC Count 4.54 3.80 - 5.20 10e6/uL    Hemoglobin 13.3 11.7 - 15.7 g/dL    Hematocrit 39.7 35.0 - 47.0 %    MCV 87 78 - 100 fL    MCH 29.3 26.5 - 33.0 pg    MCHC 33.5 31.5 - 36.5 g/dL    RDW 15.6 (H) 10.0 - 15.0 %    Platelet Count 138 (L) 150 - 450 10e3/uL    % Neutrophils 58 %    % Lymphocytes 17 %    % Monocytes 13 %    % Eosinophils 10 %    % Basophils 1 %    Absolute Neutrophils 1.8 1.6 - 8.3 10e3/uL    Absolute Lymphocytes 0.5 (L) 0.8 - 5.3 10e3/uL    Absolute Monocytes 0.4 0.0 - 1.3 10e3/uL    Absolute Eosinophils 0.3 0.0 - 0.7 10e3/uL    Absolute Basophils 0.0 0.0 - 0.2 10e3/uL                 .  ..

## 2022-07-11 NOTE — PROGRESS NOTES
[facility]                                     Progress Note    Patient Name: Winter Loya  Date: 07/11/2022         Service Type: Individual      Session Start Time: 2.30  Session End Time: 3.21     Session Length: 51 mns    Session #: 08    Attendees: Client attended alone    Service Modality:  In-person    DATA  Interactive Complexity: No  Crisis: No        Progress Since Last Session (Related to Symptoms / Goals / Homework):   Symptoms: Worsening as pt reported struggling with care for sick son    Homework: Achieved / completed to satisfaction Review what pt  has control over that she can change with current relationship with son that makes her feel safe and happy and what she has done so far.      Episode of Care Goals: Minimal progress - ACTION (Actively working towards change); Intervened by reinforcing change plan / affirming steps taken     Current / Ongoing Stressors and Concerns:  Pt reported physical and emotional  exhaustion associated with caring for son who is terminally ill. Pt reported  difficulty with boundaries;  living with youngest son who is in his 30s, having a full time job but reluctant to pay his rents and bills.  Pt reported sharing her car with son who sometimes uses it in a way that she get irritated and uncomfortable. Pt reported worried about son's ability to be on his own without her help.        Treatment Objective(s) Addressed in This Session:        Patient will identify specific areas of cognitive distortion and challenge irrational thoughts with reality      Intervention:    MI Intervention: Expressed Empathy/Understanding, Supported Autonomy, Collaboration, Evocation, Permission to raise concern or advise, Open-ended questions and Reflections: simple and complex      Discuss the importance of clear communication and healthy boundaries in filial  relationships and couched patient that healthy boundaries are important because they enhance  self-care and  self-respect, help in communicating individual  needs in a relationship, create time and space for positive interactions, and set limits in a relationship in a way that is healthy,  empowering  each partner to make healthy choices,  feelings and taking responsibility for individual actions. Couched pt to maintain healthy boundaries with son to as a means to  mitigate current distressful feelings      The following assessments were completed by patient for this visit: Pt did not complete any assessment.       ASSESSMENT: Current Emotional / Mental Status (status of significant symptoms):   Risk status (Self / Other harm or suicidal ideation)   Patient denies current fears or concerns for personal safety.   Patient denies current or recent suicidal ideation or behaviors.   Patient denies current or recent homicidal ideation or behaviors.   Patient denies current or recent self injurious behavior or ideation.   Patient denies other safety concerns.   Patient reports there has been no change in risk factors since their last session.     Patient reports there has been no change in protective factors since their last session.     Recommended that patient call 911 or go to the local ED should there be a change in any of these risk factors.     Appearance:   Appropriate    Eye Contact:   Good    Psychomotor Behavior: Normal    Attitude:   Cooperative  Interested Friendly   Orientation:   Person Place Time Situation   Speech    Rate / Production: Normal/ Responsive    Volume:  Normal    Mood:    Anxious  Depressed  Anhedonia   Affect:    Worrisome    Thought Content:  Clear    Thought Form:  Coherent    Insight:    Fair      Medication Review:   No changes to current psychiatric medication(s)     Medication Compliance:   Yes     Changes in Health Issues:   None reported     Chemical Use Review:   Substance Use: Chemical use reviewed, no active concerns identified      Tobacco Use: No current tobacco use.       Diagnosis:    296.32 (F33.1) Major Depressive Disorder, Recurrent Episode, Moderate _ and With anxious distress    Collateral Reports Completed:   Not Applicable    PLAN: (Patient Tasks / Therapist Tasks / Other)    Create healthy boundaries with son and reflect on ways to mitigate current codependency.    Mj Cline [facility]           ----- Service Performed and Documented by [facility]------  This note was reviewed and clinical supervision by AMY Ayala [facility]    7/12/2022   ______________________________________________________________________    Individual Treatment Plan    Patient's Name: Winter Loya  YOB: 1955    Date of Creation: 04/18/2022  Date Treatment Plan Last Reviewed/Revised: 04/18/2022    DSM5 Diagnoses: 296.32 (F33.1) Major Depressive Disorder, Recurrent Episode, Moderate _ and With anxious distress  Psychosocial / Contextual Factors:   PROMIS (reviewed every 90 days):     Referral / Collaboration:  Referral to another professional/service is not indicated at this time..    Anticipated number of session for this episode of care: 15-25  Anticipation frequency of session: Every other week  Anticipated Duration of each session: 38-52 minutes  Treatment plan will be reviewed in 90 days or when goals have been changed.       MeasurableTreatment Goal(s) related to diagnosis / functional impairment(s)    Goal 1: Patient will identify specific triggers to feelings of depression and improve coping with depression by  90 %.    I will know I've met my goal when     Objective #A (Patient Action)    Patient will identify and practice 3 outdoor things that brings her ross daily and repeat that every day.  Status: Continued - Date(s): 07/18/2022    Intervention(s)  Therapist will provide psychoeducation, behavioral activation, and cognitive restructuring.)    Objective #B  Patient will identify specific areas of cognitive distortion and challenge irrational thoughts with reality   Status:  Continued - Date(s): 07/18/2022       Intervention(s)  Therapist will teach patient how negative thoughts can lead to negative feelings and actions and ways to reframe thoughts in a more positive way leading to more positive feelings and helpful behaviors    Objective #C  Patient will learn relaxation techniques to manage anxiety.  Status: Continued - Date(s):  07/18/2022    Intervention(s)  Therapist will teach patient thought stopping, deep breathing exercises, mindful meditation, and creative visualization.  Patient has reviewed and agreed to the above plan.      CHERISE Castellon  April 18, 2022

## 2022-07-12 LAB
ALBUMIN SERPL ELPH-MCNC: 4 G/DL (ref 3.7–5.1)
ALPHA1 GLOB SERPL ELPH-MCNC: 0.3 G/DL (ref 0.2–0.4)
ALPHA2 GLOB SERPL ELPH-MCNC: 0.5 G/DL (ref 0.5–0.9)
B-GLOBULIN SERPL ELPH-MCNC: 0.7 G/DL (ref 0.6–1)
GAMMA GLOB SERPL ELPH-MCNC: 0.5 G/DL (ref 0.7–1.6)
IGA SERPL-MCNC: 100 MG/DL (ref 84–499)
IGG SERPL-MCNC: 547 MG/DL (ref 610–1616)
IGM SERPL-MCNC: 26 MG/DL (ref 35–242)
KAPPA LC FREE SER-MCNC: 1.92 MG/DL (ref 0.33–1.94)
KAPPA LC FREE/LAMBDA FREE SER NEPH: 1.56 {RATIO} (ref 0.26–1.65)
LAMBDA LC FREE SERPL-MCNC: 1.23 MG/DL (ref 0.57–2.63)
M PROTEIN SERPL ELPH-MCNC: 0.1 G/DL
PROT PATTERN SERPL ELPH-IMP: ABNORMAL
PROT PATTERN SERPL IFE-IMP: NORMAL

## 2022-07-13 LAB — PREGABALIN UR QL CFM: PRESENT

## 2022-07-15 DIAGNOSIS — E04.9 THYROID GOITER: Primary | ICD-10-CM

## 2022-07-15 DIAGNOSIS — F32.9 MAJOR DEPRESSIVE DISORDER WITH CURRENT ACTIVE EPISODE, UNSPECIFIED DEPRESSION EPISODE SEVERITY, UNSPECIFIED WHETHER RECURRENT: ICD-10-CM

## 2022-07-15 NOTE — RESULT ENCOUNTER NOTE
Dear Winter,     Your urine is as expected.     Your thyroid is a under corrected. Please return to taking your levothyroxine first thing in the morning with a full glass of water 30-60 minutes before other foods or medications. Plan for a recheck in 3 months, schedule a lab only appointment.     Your potassium is a little low. Eat a diet rich in potassium. Some examples are: dried figs, seaweed, nuts, avocados, lima beans, spinach, tomato, broccoli, winter squash, beets, carrots, cauliflower, potatoes, bananas, cantaloupe, kiwis, oranges, and mangos.     Please let us know if you have any questions or concerns.    Regards,  FALGUNI Duenas CNP

## 2022-07-18 NOTE — TELEPHONE ENCOUNTER
Routing refill request to provider for review/approval because:  Drug not on the [facility] refill protocol   PHQ-9 score:    PHQ 6/20/2022   PHQ-9 Total Score 16   Q9: Thoughts of better off dead/self-harm past 2 weeks Not at all

## 2022-07-19 ENCOUNTER — VIRTUAL VISIT (OUTPATIENT)
Dept: ENDOCRINOLOGY | Facility: CLINIC | Age: 67
End: 2022-07-19
Payer: MEDICAID

## 2022-07-19 ENCOUNTER — TELEPHONE (OUTPATIENT)
Dept: ENDOCRINOLOGY | Facility: CLINIC | Age: 67
End: 2022-07-19

## 2022-07-19 VITALS — BODY MASS INDEX: 37.03 KG/M2 | WEIGHT: 250 LBS | HEIGHT: 69 IN

## 2022-07-19 DIAGNOSIS — E04.9 THYROID GOITER: ICD-10-CM

## 2022-07-19 DIAGNOSIS — E66.01 CLASS 2 SEVERE OBESITY DUE TO EXCESS CALORIES WITH SERIOUS COMORBIDITY AND BODY MASS INDEX (BMI) OF 37.0 TO 37.9 IN ADULT (H): ICD-10-CM

## 2022-07-19 DIAGNOSIS — E03.9 HYPOTHYROIDISM, UNSPECIFIED TYPE: Primary | ICD-10-CM

## 2022-07-19 DIAGNOSIS — E66.812 CLASS 2 SEVERE OBESITY DUE TO EXCESS CALORIES WITH SERIOUS COMORBIDITY AND BODY MASS INDEX (BMI) OF 37.0 TO 37.9 IN ADULT (H): ICD-10-CM

## 2022-07-19 PROCEDURE — 99215 OFFICE O/P EST HI 40 MIN: CPT | Mod: 95 | Performed by: INTERNAL MEDICINE

## 2022-07-19 RX ORDER — LEVOTHYROXINE SODIUM 200 UG/1
200 TABLET ORAL
Qty: 30 TABLET | Refills: 3 | Status: SHIPPED | OUTPATIENT
Start: 2022-07-19 | End: 2022-12-05

## 2022-07-19 RX ORDER — SERTRALINE HYDROCHLORIDE 100 MG/1
100 TABLET, FILM COATED ORAL DAILY
Qty: 90 TABLET | Refills: 0 | Status: SHIPPED | OUTPATIENT
Start: 2022-07-19 | End: 2022-10-12

## 2022-07-19 ASSESSMENT — PAIN SCALES - GENERAL: PAINLEVEL: MODERATE PAIN (4)

## 2022-07-19 NOTE — TELEPHONE ENCOUNTER
Patient no show for today's video visit. Sent text with link to join. Called pt, no answer. LVM. Sent [employer] message, no response. Closed video after 15 mins. Provided pt with number to reschedule.     SAMMY Lamas, RD, LD  Clinic #: [phone]
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
## 2022-07-19 NOTE — PATIENT INSTRUCTIONS
"Nice to talk with you today in virtual clinic    Please increase your levothyroxine from 175 to 188 mcg every day & recheck TSH, FT4 labs in 2 months    In 2 months, please go to any [facility] lab. Follow standard safety precautions for COVID-19, practice social isolation, hand hygiene, do not touch your face, nose, or mouth, wear mask if have to go out in public to be respectful of community.  Please contact us to schedule at any of our [facility] lab locations. Call [phone]), select option 1.    Thyroid hormone replacement doses may change with significant body weight changes, so if this occurs, you will need thyroid labs repeated    For food cravings, please start taking Naltrexone 50 mg tabs daily, Take it one to two hours prior to worst cravings. Start with 1/2 tab and if tolerated, on 3rd day, increase to full 50 mg tab daily.    Lyrica is associated with weight gain so you may want to consider going off of it if possible    1,200 calorie meal plan to lose 1 lbs weekly without exercise (based on REE calc of 1,764)  Ht 1.753 m (5' 9\")   Wt 113.4 kg (250 lb)   BMI 36.92 kg/m      Use meal replacements such as Christine's meals, Lean Cuisines, Healthy Choice, Smart Ones, Weight Watchers Meals, and Slim Fast and Glucerna shakes and supplement with fresh fruits and vegetables    Penzey's spices can make your meals taste good    Check out Freshly, Purple Carrot, Home , Green , Blue Apron, Hungry Root     Please drink a lot of water daily. Most people typically need about 2 liters of water daily and more if they are exercising, have a large weight, or have a fever or illness.  Please keep a food journal of what you eat, calories in what you eat  Consider using applications for smart phones such as NewsHuntPal  Focus on wet volumetrics, meaning, eat more foods that are high in water and fiber such as fruits and vegetables in order to get that full feeling. These are also good " for your overall health as well.  Check out Dr. Renetta Neville from [facility] - she has cookbooks with low calorie volumetric recipes  Try Cooking Light recipes for low calorie meal preparation and planning    You may want to purchase a reclining exercise bike for home use to limit impact on weight bearing joints to prevent pain and allow you to do cardio fitness to reduce your risk of death due to cardiovascular disease and to promote weight loss.    For exercises, check out [email or web address]    Interested in working with a health ?  Health coaches work with you to improve your overall health and wellbeing.  They look at the whole person, and may involve discussion of different areas of life, including, but not limited to the four pillars of health (sleep, exercise, nutrition, and stress management). Discuss with your care team if you would like to start working a health .     Health Coaching-3 Pack:      $99 for three health coaching visits (self pay; insurance does not cover health coaching)    Visits are done via phone at this time    Coaching is a partnership between the  and the client; Coaches do not prescribe or diagnose    Coaching helps inspire the client to reach his/her personal goals   - To schedule your first health  visit, call  [phone]  - To find out more about health coaching, contact Health  Kathryn at [email or web address]    Please consider health psychologist to discuss how mood, depression and/or anxiety impact your eating.    Psychological Providers    Check with your insurance to see if the psychologist is covered, if not, ask your insurance company for a referral.    [facility]   Linda Shah, PhD, LP   [phone]  Corrie Rice, PhD, LP  [phone]  Domenica Willoughby, PhD                 [phone]    Rodney Nichols, [place],LP             [phone]    [place]  Baylee Sanon, PhD, LP  [phone]      [facility]  Health Service:           [phone]  We send a referral and patient is notified.    [facility]:  [phone]  Christine Jordan PsyD, [facility]  Chelsie Lynn PSyD, LP         [phone]    [facility]   To schedule, please call member services on the back of your card.    LIZZIE Johnson PsyD,LP,ABPP [phone]    [place]  Alexy Alvarez MA, [phone]    [place]  Heike Ramirez PsyD, [phone]    [place]  Addie Kirk, PsyD, [phone]  (fibromyalgia issues)    ROSA Cortes  [phone]    [place]  Sara Diaz,PsyD,LP  [phone]  Jevon Carr, PhD, LP  [phone]  Jevon Gentile, PhD, LP             [phone]    [place]  Sushil Knowles , [facility], LMFT [phone]    [place]  Mikala Phillips, PsyD, [phone]    [place]  Nela Montemayor, PsyD, LP   [phone]     [place]      Outreach [phone]   Lasohn Yap MA, LP  - ext. 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 Ainsley Cole, MD, MPH  Endocrinologist

## 2022-07-19 NOTE — PROGRESS NOTES
Winter is a 66 year old who is being evaluated via a billable video visit.      How would you like to obtain your AVS? MyChart  If the video visit is dropped, the invitation should be resent by:  [phone]  Will anyone else be joining your video visit? No     During this virtual visit the patient is located in [place], patient verifies this as the location during the entirety of this visit.     Video-Visit Details    Type of service:  Video Visit    Video see note    Originating Location (pt. Location): Home    Distant Location (provider location):  [facility] [place]     Platform used for Video Visit: Little Pim

## 2022-07-19 NOTE — NURSING NOTE
"(   Chief Complaint   Patient presents with     New Patient     New MWM, BMI is 36    )    ( Weight: 113.4 kg (250 lb) (Pt reported) )  ( Height: 175.3 cm (5' 9\") )  ( BMI (Calculated): 36.92 )  (   )  (   )  (   )  (   )  (   )  (   )    (   )  (   )  (   )  (   )  (   )  (   )  (   )    (   Patient Active Problem List   Diagnosis     Multiple myeloma in remission (H)     Type 2 diabetes mellitus with diabetic polyneuropathy, with long-term current use of insulin (H)     Thyroid goiter     Allergic rhinitis     NA (generalized anxiety disorder)     History of endometrial ablation     Hyperlipidemia     Major depressive disorder, recurrent episode, moderate (H)     Osteoarthrosis involving lower leg     Type 2 diabetes mellitus with hyperglycemia (H)     DAILY (obstructive sleep apnea)- moderate (AHI 17)     Hypothyroidism, postoperative      Class 2 severe obesity due to excess calories with serious comorbidity and body mass index (BMI) of 37.0 to 37.9 in adult (H)     Tremor     History of peripheral stem cell transplant (H)     History of MRI of cervical spine 6/2020     H/O magnetic resonance imaging of lumbar spine 2020     Major depressive disorder, recurrent episode, severe (H)     Abnormal EMG 2021     Sensory polyneuropathy     Axonal neuropathy     Drug-induced polyneuropathy (H)     Pulmonary hypertension (H)     Thrombocytopenia, unspecified (H)     Exocrine pancreatic insufficiency     Chronic kidney disease, stage 1     Diarrhea, unspecified type     Encounter for long-term (current) use of medications    )  (   Current Outpatient Medications   Medication Sig Dispense Refill     acyclovir (ZOVIRAX) 400 MG tablet TAKE ONE TABLET BY MOUTH EVERY TWELVE HOURS 60 tablet 11     aspirin (ASA) 325 MG EC tablet Take 1 tablet (325 mg) by mouth daily 90 tablet 3     atorvastatin (LIPITOR) 20 MG tablet Take 1 tablet (20 mg) by mouth At Bedtime 90 tablet 3     empagliflozin (JARDIANCE) 25 MG TABS tablet Take 1 " tablet (25 mg) by mouth daily 90 tablet 3     insulin glargine (LANTUS SOLOSTAR) 100 UNIT/ML pen Inject 30 Units Subcutaneous At Bedtime 30 mL 3     insulin pen needle (FIFTY50 PEN NEEDLES) 32G X 4 MM miscellaneous Use one pen needle daily or as directed. 100 each 2     LENalidomide (REVLIMID) 10 MG CAPS capsule Take 1 capsule (10 mg) by mouth daily for 28 days 28 capsule 0     levothyroxine (SYNTHROID/LEVOTHROID) 175 MCG tablet Take 1 tablet (175 mcg) by mouth every morning (before breakfast) 90 tablet 3     loperamide (IMODIUM) 2 MG capsule Take 4 mg (two capsules) by mouth at onset of loose stools, followed by 2 mg (one capsule) after each loose stool. Maximum: 16 mg (8 capsules) daily 270 capsule 11     losartan (COZAAR) 25 MG tablet Take 1 tablet (25 mg) by mouth daily 90 tablet 2     metFORMIN (GLUCOPHAGE XR) 500 MG 24 hr tablet Take 2 tablets (1,000 mg) by mouth daily (with dinner) 180 tablet 3     pregabalin (LYRICA) 100 MG capsule TAKE ONE CAPSULE BY MOUTH THREE TIMES DAILY 270 capsule 3     sertraline (ZOLOFT) 100 MG tablet Take 1 tablet (100 mg) by mouth daily 90 tablet 3     albuterol (PROAIR HFA) 108 (90 Base) MCG/ACT inhaler Inhale 2 puffs into the lungs every 4 hours as needed for shortness of breath / dyspnea (Patient not taking: No sig reported) 18 g 0     blood glucose (NO BRAND SPECIFIED) test strip Use to test blood sugar two times daily or as directed. (Patient not taking: No sig reported) 200 strip 3     blood glucose monitoring (NO BRAND SPECIFIED) meter device kit Use to test blood sugar two times daily or as directed. (Patient not taking: No sig reported) 1 kit 3     Continuous Blood Gluc Sensor (FREESTYLE SEGUNDO 2 SENSOR) MISC 1 each every 14 days 1 each every 14 days. Change every 14 days. (Patient not taking: No sig reported) 6 each 3     LENalidomide (REVLIMID) 10 MG CAPS capsule Take 1 capsule (10 mg) by mouth daily for 28 days 28 capsule 0     propranolol ER (INDERAL LA) 60 MG 24 hr  capsule Take 1 capsule (60 mg) by mouth daily (Patient not taking: Reported on 7/19/2022) 90 capsule 3    )  ( Diabetes Eval:    )    ( Pain Eval:  Moderate Pain (4) )    ( Wound Eval:       )    (   History   Smoking Status     Former Smoker     Packs/day: 1.00     Types: Cigarettes     Quit date: 2005   Smokeless Tobacco     Never Used    )    ( Signed By:  JOSE Mayberry; July 19, 2022; 12:10 PM )
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
## 2022-07-21 ENCOUNTER — VIRTUAL VISIT (OUTPATIENT)
Dept: ENDOCRINOLOGY | Facility: CLINIC | Age: 67
End: 2022-07-21
Payer: MEDICAID

## 2022-07-21 DIAGNOSIS — E11.9 TYPE 2 DIABETES MELLITUS WITHOUT COMPLICATION, WITHOUT LONG-TERM CURRENT USE OF INSULIN (H): ICD-10-CM

## 2022-07-21 DIAGNOSIS — E66.812 CLASS 2 SEVERE OBESITY DUE TO EXCESS CALORIES WITH SERIOUS COMORBIDITY AND BODY MASS INDEX (BMI) OF 37.0 TO 37.9 IN ADULT (H): ICD-10-CM

## 2022-07-21 DIAGNOSIS — Z71.3 NUTRITIONAL COUNSELING: Primary | ICD-10-CM

## 2022-07-21 DIAGNOSIS — E66.01 CLASS 2 SEVERE OBESITY DUE TO EXCESS CALORIES WITH SERIOUS COMORBIDITY AND BODY MASS INDEX (BMI) OF 37.0 TO 37.9 IN ADULT (H): ICD-10-CM

## 2022-07-21 PROCEDURE — 97802 MEDICAL NUTRITION INDIV IN: CPT | Performed by: DIETITIAN, REGISTERED

## 2022-07-21 NOTE — PATIENT INSTRUCTIONS
Hi Winter!    Follow-up with RD in 1 month    Thank you,    Maria D Quintana, RD, LD  If you would like to schedule or reschedule an appointment with the RD, please call [phone]    Nutrition Goals  1) Follow 1400 calorie/day plan   -Use MyFitness Pal to track   -[email or web address]  2) Avoid snacking as able. If snack is needed use lean protein and/or fruit/vegetable. Examples:   - 2 cup popcorn   - 1 cup mixed berries   - 15 almonds, walnuts, cashews   - carrot/celery sticks and 2 tbsp low-fat ranch   - 1 hard boiled egg   - Part-skim mozzarella cheese stick   - Low-fat, low-sugar greek yogurt with 1/2 cup berries   - Med apple or pear   - sliced bell peppers with 1/2 cup salsa   - 1/2 cup roasted chickpeas   - sliced cucumbers with vinegar    100 calorie sweets: Smart Sweets, Fiber One desserts, Fit and Active 100 calorie snack sweets at [employer]; Nabisco 100 calorie pre-portioned cookies, sugar-free pudding, sugar-free jello.    Snack Recipes:  - Banana and creamy PB dip (https://www.diabetesfoodhub.org/recipes/sweet-peanut-buttery-dip.html?home-category_id=23)  - Roasted chickpeas (https://www.diabetesfoodhub.org/recipes/roasted-and-spiced-chickpeas.html?home-category_id=23)  - Lemon Raspberry rafa seed pudding (https://www.diabetesfoodhub.org/recipes/lemon-raspberry- rafa-seed-pudding.html?home-category_id=23)  - Black bean hummus with carrot and celery sticks (https://www.diabetesfoodhub.org/recipes/black-bean-hummus.html?home-category_id=23)  - Greek yogurt chocolate mouse (https://www.diabetesfoodhub.org/recipes/greek-yogurt-chocolate-mousse.html?home-category_id=23)   - Broccoli Cheese Bites  (https://www.diabetesfoodhub.org/recipes/broccoli-cheese-bites.html?home-category_id=20)  - Chicken Satay with peanut sauce  (https://www.diabetesfoodhub.org/recipes/blueberry-almond-chicken-salad-lettuce-wraps.html?home-category_id=20)  - Deviled  "Eggs  (https://www.diabetesfoodhub.org/recipes/devilled-eggs.html?home-category_id=20)    3) 9\" Plate method (1/2 plate non-starchy vegetables/fruit, 1/4 plate lean protein, 1/4 plate whole grain starch - no more than 1/2 cup carb/meal)  4) Eat 3 meals per day  5) Consume 60-90 g protein per day  6) Avoid calorie-containing beverages  7) Drink 48-64 ounces of fluid per day  8) Increase activity as able    The Plate Method  Http://www.[email or web address]    Protein Sources   http://Explore Engage/[email or web address]     Carbohydrates  [email or web address]://[email or web address]     Mindful Eating  http://Explore Engage/172941.pdf     Summary of Volumetrics Eating Plan  [email or web address]://[email or web address]     Interested in working with a health ? Health coaches work with you to improve your overall health and wellbeing. They look at the whole person, and may involve discussion of different areas of life, including, but not limited to the four pillars of health (sleep, exercise, nutrition, and stress management). Discuss with your care team if you would like to start working a health .    Health Coaching-3 Pack:    $99 for three health coaching visits    Visits may be done in person or via phone    Coaching is a partnership between the  and the client; Coaches do not prescribe or diagnose    Coaching helps inspire the client to reach his/her personal goals    COMPREHENSIVE WEIGHT MANAGEMENT PROGRAM  VIRTUAL SUPPORT GROUPS    For Support Group Information:      We offer support groups for patients who are working on weight loss and considering, preparing for or have had weight loss surgery.   There is no cost for this opportunity.  You are invited to attend the?Virtual Support Groups?provided by any of the following locations:    [facility] via Insmed Teams with Lucia Barrios RN  2.   [place] via GiveLoop with Gabriel Spaulding, PhD, LP  3.   [place] via Insmed Teams with Kathryn Monroy RN  4.   [facility] via " "Microsoft Teams with Kathryn [facility]    The following Support Group information can also be found on our website:  [email or web address]      [facility] Weight Loss Surgery Support Group    [facility] Weight Loss Surgery Support Group  The support group is a patient-lead forum that meets monthly to share experiences, encouragement and education. It is open to those who have had weight loss surgery, are scheduled for surgery, and those who are considering surgery.   WHEN: This group meets on the 3rd Wednesday of each month from 5:00PM - 6:00PM virtually using Microsoft Teams.   FACILITATOR: Led by Lucia Bariros RD, LD, RN, the program's Clinical Coordinator.   TO REGISTER: Please contact the clinic via AiMeiWei or call the nurse line directly at [phone] to inform our staff that you would like an invite sent to you and to let us know the email you would like the invite sent to. Prior to the meeting, a link with directions on how to join the meeting will be sent to you.    2022 Meetings  January 19: \"Let's Talk\" a time for the group to share.  February 16: \"Let's Talk\" a time for the group to share.  March 16: Guest Speakers: Psychologists, Effie Galindo, PhD,LP and Anamika Ventura, PsAng,  April 20: Guest Speaker: Health , Larisa Ruggiero, [facility],CHES, CPT  May 18: Guest Speaker: DietitianHe, ZAIRA, LP  Lindsey 15: \"Let's Talk\" a time for the group to share.  July 20: \"Let's Talk\" a time for the group to share.  August 17: TBA  September 21: TBA  October 19: Guest Speaker: Dr Juan Jose Salazar MD Pulmonologist and Sleep Medicine Physician, \"Getting a Good Night's Sleep\".  November 16: TBA  December 21: TBA    [facility] Support Groups    Connections: Bariatric Care Support Group?  This is open to all [facility] (and those external to this program) pre- and post- operative " "bariatric surgery patients as well as their support system.   WHEN: This group meets the 2nd Tuesday of each month from 6:30 PM - 8:00 PM virtually using Microsoft Teams.   FACILITATOR: Led by Gabriel Spaulding, Ph.D who is a Licensed Psychologist with the [facility] Comprehensive Weight Management Program.   TO REGISTER: Please send an email to Gabriel Spaulding, Ph.D., LP at?[email or web address]?if you would like an invitation to the group and to learn about using Microsoft Teams.    2022 Meetings  January 11: Yarely Figueroa, PharmD, Pharmacy Resident at [facility], \"Medications and Bariatric Surgery\".  February 8: Open Forum  March 8  April 12  May 10  Lindsey 14    Connections: Post-Operative Bariatric Surgery Support Group  This is a support group for [facility] bariatric patients (and those external to [facility]) who have had bariatric surgery and are at least 3 months post-surgery.  WHEN: This support group meets the 4th Wednesday of the month from 11:00 AM - 12:00 PM virtually using Microsoft Teams.   FACILITATOR: Led by Certified Bariatric Nurse, Kathryn Monroy RN.   TO REGISTER: Please send an email to Kathryn at [email or web address] if you would like an invitation to the group and to learn about using Microsoft Teams.    2022 Meetings  January 26  February 23  March 23  April 27  May 25  Lindsey 22    [facility] Healthy Lifestyle Virtual Support Group    Healthy Lifestyle Virtual Support Group?  This is 60 minutes of small group guided discussion, support and resources. All are welcome who want a healthy lifestyle.  WHEN: This group meets monthly on a Friday from 12:30 PM - 1:30 PM virtually using Microsoft Teams.   FACILITATOR: Led by National Board Certified Health and , Kathryn Cordova [facility].   TO REGISTER: Please send an email to Kathryn at?[email or web address] to receive monthly invites to the group or if you have any " "questions about having a health .  Prior to the meeting, a link with directions on how to join the meeting will be sent to you.    2022 Meetings  January 21: Ester Bardales MS, RN, CIC, CBN, \"Healthy Habits\"  February 25: Open Forum  March 18: \"Setting Limits and Boundaries\"  April 29: Jammie Aguilar RD, \"Meal Planning Made Easy\"  May 20: Open Forum  June: To be determined                "

## 2022-07-21 NOTE — PROGRESS NOTES
"Winter Loya is a 66 year old female who is being evaluated via a billable video visit.      The patient has been notified of following:     \"This video visit will be conducted via a call between you and your physician/provider. We have found that certain health care needs can be provided without the need for an in-person physical exam.  This service lets us provide the care you need with a video conversation.  If a prescription is necessary we can send it directly to your pharmacy.  If lab work is needed we can place an order for that and you can then stop by our lab to have the test done at a later time.    Video visits are billed at different rates depending on your insurance coverage.  Please reach out to your insurance provider with any questions.    If during the course of the call the physician/provider feels a video visit is not appropriate, you will not be charged for this service.\"    Patient has given verbal consent for Video visit? Yes  How would you like to obtain your AVS? MyChart  If you are dropped from the video visit, the video invite should be resent to: Send to e-mail at: [email or web address]  Will anyone else be joining your video visit? No  {If patient encounters technical issues they should call [phone]      Video-Visit Details    Type of service:  Video Visit    Video Start Time: 1:30 PM  Video End Time: 1:45 PM    Originating Location (pt. Location): Home    Distant Location (provider location):  [facility] [place]     Platform used for Video Visit: Weemba    During this virtual visit the patient is located in [place], patient verifies this as the location during the entirety of this visit.     New Weight Management Nutrition Consultation    Winter Loya is a 66 year old female presents today for new weight management nutrition consultation.  Patient referred by Dr. Cole on July 21, 2022.    Patient with Co-morbidities of obesity including:  Type II DM " "yes  Renal Failure no  Sleep apnea yes  Hypertension yes   Dyslipidemia yes  Joint pain no  Back pain no  GERD no     Anthropometrics:  Estimated body mass index is 36.92 kg/m  as calculated from the following:    Height as of 7/19/22: 1.753 m (5' 9\").    Weight as of 7/19/22: 113.4 kg (250 lb).     Current weight: 250 lbs    Medications for Weight Loss:  Naltrexone     NUTRITION HISTORY  Food allergies: none  Food intolerances: none  Supplements: MVI  Previous method    Pt reports weight has fluctuated throughout life. Increased in marriage and pregnancies. Pt diagnosed with cancer and lost weight after stem cell transplant.   Currently struggles with diarrhea daily. Takes imodium that seems to help.     Lives with son currently who cooks dinner - meat and veggies. Craves sweets and emotional eating.    Older son is currently in the hospital with leukemia and this is the main cause of her emotional eating.    Recent food recall:  Breakfast: yogurt  Lunch: salad  Dinner: varies  Snacks: ice cream  Beverages: water    Physical Activity:  none    Nutrition Prescription  Recommended energy/nutrient modification.    Nutrition Diagnosis  Obesity r/t long history of positive energy balance aeb BMI >30.    Nutrition Intervention  Materials/education provided on hypocaloric diet for weight loss. Discussed 1400 calorie/day diet, Volumetric eating to help satiety level on fewer calories; portion control and healthy food choices (Plate Method and Volumetrics handouts), 100 calorie snack choices, meal and snack planning and websites, sample meal plans     Patient demonstrates understanding.    Expected Engagement: good    Nutrition Goals  1) Follow 1400 calorie/day plan   -Use MyFitness Pal to track   -www.eatthismuch.com  2) Avoid snacking as able. If snack is needed use lean protein and/or fruit/vegetable. Examples:   - 2 cup popcorn   - 1 cup mixed berries   - 15 almonds, walnuts, cashews   - carrot/celery sticks and 2 tbsp " "low-fat ranch   - 1 hard boiled egg   - Part-skim mozzarella cheese stick   - Low-fat, low-sugar greek yogurt with 1/2 cup berries   - Med apple or pear   - sliced bell peppers with 1/2 cup salsa   - 1/2 cup roasted chickpeas   - sliced cucumbers with vinegar    100 calorie sweets: Smart Sweets, Fiber One desserts, Fit and Active 100 calorie snack sweets at [employer]; [employer] 100 calorie pre-portioned cookies, sugar-free pudding, sugar-free jello.    Snack Recipes:  - Banana and creamy PB dip (https://www.diabetesfoodhub.org/recipes/sweet-peanut-buttery-dip.html?home-category_id=23)  - Roasted chickpeas (https://www.diabetesfoodhub.org/recipes/roasted-and-spiced-chickpeas.html?home-category_id=23)  - Lemon Raspberry rafa seed pudding (https://www.diabetesfoodhub.org/recipes/lemon-raspberry- rafa-seed-pudding.html?home-category_id=23)  - Black bean hummus with carrot and celery sticks (https://www.diabetesfoodhub.org/recipes/black-bean-hummus.html?home-category_id=23)  - Greek yogurt chocolate mouse (https://www.diabetesfoodhub.org/recipes/greek-yogurt-chocolate-mousse.html?home-category_id=23)   - Broccoli Cheese Bites  (https://www.diabetesfoodhub.org/recipes/broccoli-cheese-bites.html?home-category_id=20)  - Chicken Satay with peanut sauce  (https://www.diabetesfoodhub.org/recipes/blueberry-almond-chicken-salad-lettuce-wraps.html?home-category_id=20)  - Deviled Eggs  (https://www.diabetesfoodhub.org/recipes/devilled-eggs.html?home-category_id=20)    3) 9\" Plate method (1/2 plate non-starchy vegetables/fruit, 1/4 plate lean protein, 1/4 plate whole grain starch - no more than 1/2 cup carb/meal)  4) Eat 3 meals per day  5) Consume 60-90 g protein per day  6) Avoid calorie-containing beverages  7) Drink 48-64 ounces of fluid per day  8) Increase activity as able    The Plate Method  Http://[email or web address].[email or web address]    Protein Sources   [email or web address]://Visual Threat/[email or web address]     Carbohydrates  http://[email or web address] "     Mindful Eating  [email or web address]://[email or web address]     Summary of Volumetrics Eating Plan  [email or web address]://[email or web address]     Follow-Up:  1 month, PRN    Time spent with patient: 15 minutes.  SADIA BEARD RD, LD

## 2022-07-21 NOTE — LETTER
"7/21/2022       RE: Winter Loya  [address]  [facility] [address]     Dear Colleague,    Thank you for referring your patient, Winter Loya, to the [facility] [place] [facility]. Please see a copy of my visit note below.    Winter Loya is a 66 year old female who is being evaluated via a billable video visit.      The patient has been notified of following:     \"This video visit will be conducted via a call between you and your physician/provider. We have found that certain health care needs can be provided without the need for an in-person physical exam.  This service lets us provide the care you need with a video conversation.  If a prescription is necessary we can send it directly to your pharmacy.  If lab work is needed we can place an order for that and you can then stop by our lab to have the test done at a later time.    Video visits are billed at different rates depending on your insurance coverage.  Please reach out to your insurance provider with any questions.    If during the course of the call the physician/provider feels a video visit is not appropriate, you will not be charged for this service.\"    Patient has given verbal consent for Video visit? Yes  How would you like to obtain your AVS? MyChart  If you are dropped from the video visit, the video invite should be resent to: Send to e-mail at: [email or web address]  Will anyone else be joining your video visit? No  {If patient encounters technical issues they should call [phone]      Video-Visit Details    Type of service:  Video Visit    Video Start Time: 1:30 PM  Video End Time: 1:45 PM    Originating Location (pt. Location): Home    Distant Location (provider location):  [facility] [place]     Platform used for Video Visit: Rebyoo    During this virtual visit the patient is located in [place], patient verifies this as " "the location during the entirety of this visit.     New Weight Management Nutrition Consultation    Winter Loya is a 66 year old female presents today for new weight management nutrition consultation.  Patient referred by Dr. Cole on July 21, 2022.    Patient with Co-morbidities of obesity including:  Type II DM yes  Renal Failure no  Sleep apnea yes  Hypertension yes   Dyslipidemia yes  Joint pain no  Back pain no  GERD no     Anthropometrics:  Estimated body mass index is 36.92 kg/m  as calculated from the following:    Height as of 7/19/22: 1.753 m (5' 9\").    Weight as of 7/19/22: 113.4 kg (250 lb).     Current weight: 250 lbs    Medications for Weight Loss:  Naltrexone     NUTRITION HISTORY  Food allergies: none  Food intolerances: none  Supplements: MVI  Previous method    Pt reports weight has fluctuated throughout life. Increased in marriage and pregnancies. Pt diagnosed with cancer and lost weight after stem cell transplant.   Currently struggles with diarrhea daily. Takes imodium that seems to help.     Lives with son currently who cooks dinner - meat and veggies. Craves sweets and emotional eating.    Older son is currently in the hospital with leukemia and this is the main cause of her emotional eating.    Recent food recall:  Breakfast: yogurt  Lunch: salad  Dinner: varies  Snacks: ice cream  Beverages: water    Physical Activity:  none    Nutrition Prescription  Recommended energy/nutrient modification.    Nutrition Diagnosis  Obesity r/t long history of positive energy balance aeb BMI >30.    Nutrition Intervention  Materials/education provided on hypocaloric diet for weight loss. Discussed 1400 calorie/day diet, Volumetric eating to help satiety level on fewer calories; portion control and healthy food choices (Plate Method and Volumetrics handouts), 100 calorie snack choices, meal and snack planning and websites, sample meal plans     Patient demonstrates understanding.    Expected Engagement: " "good    Nutrition Goals  1) Follow 1400 calorie/day plan   -Use Relify Pal to track   -www.eatthismuch.com  2) Avoid snacking as able. If snack is needed use lean protein and/or fruit/vegetable. Examples:   - 2 cup popcorn   - 1 cup mixed berries   - 15 almonds, walnuts, cashews   - carrot/celery sticks and 2 tbsp low-fat ranch   - 1 hard boiled egg   - Part-skim mozzarella cheese stick   - Low-fat, low-sugar greek yogurt with 1/2 cup berries   - Med apple or pear   - sliced bell peppers with 1/2 cup salsa   - 1/2 cup roasted chickpeas   - sliced cucumbers with vinegar    100 calorie sweets: Smart Sweets, Fiber One desserts, Fit and Active 100 calorie snack sweets at [employer]; [employer] 100 calorie pre-portioned cookies, sugar-free pudding, sugar-free jello.    Snack Recipes:  - Banana and creamy PB dip (https://www.diabetesfoodhub.org/recipes/sweet-peanut-buttery-dip.html?home-category_id=23)  - Roasted chickpeas (https://www.diabetesfoodhub.org/recipes/roasted-and-spiced-chickpeas.html?home-category_id=23)  - Lemon Raspberry rafa seed pudding (https://www.diabetesfoodhub.org/recipes/lemon-raspberry- rafa-seed-pudding.html?home-category_id=23)  - Black bean hummus with carrot and celery sticks (https://www.diabetesfoodhub.org/recipes/black-bean-hummus.html?home-category_id=23)  - Greek yogurt chocolate mouse (https://www.diabetesfoodhub.org/recipes/greek-yogurt-chocolate-mousse.html?home-category_id=23)   - Broccoli Cheese Bites  (https://www.diabetesfoodhub.org/recipes/broccoli-cheese-bites.html?home-category_id=20)  - Chicken Satay with peanut sauce  (https://www.diabetesfoodhub.org/recipes/blueberry-almond-chicken-salad-lettuce-wraps.html?home-category_id=20)  - Deviled Eggs  (https://www.diabetesfoodhub.org/recipes/devilled-eggs.html?home-category_id=20)    3) 9\" Plate method (1/2 plate non-starchy vegetables/fruit, 1/4 plate lean protein, 1/4 plate whole grain starch - no more than 1/2 cup carb/meal)  4) Eat 3 " meals per day  5) Consume 60-90 g protein per day  6) Avoid calorie-containing beverages  7) Drink 48-64 ounces of fluid per day  8) Increase activity as able    The Plate Method  Http://www.[email or web address]    Protein Sources   [email or web address]://[email or web address]     Carbohydrates  http://[email or web address]     Mindful Eating  [email or web address]://[email or web address]     Summary of Volumetrics Eating Plan  [email or web address]://[email or web address]     Follow-Up:  1 month, PRN    Time spent with patient: 15 minutes.  SADIA BEARD RD, LD

## 2022-07-25 ENCOUNTER — MYC MEDICAL ADVICE (OUTPATIENT)
Dept: FAMILY MEDICINE | Facility: CLINIC | Age: 67
End: 2022-07-25

## 2022-07-25 ENCOUNTER — OFFICE VISIT (OUTPATIENT)
Dept: PSYCHOLOGY | Facility: CLINIC | Age: 67
End: 2022-07-25
Payer: MEDICAID

## 2022-07-25 DIAGNOSIS — F33.1 MAJOR DEPRESSIVE DISORDER, RECURRENT EPISODE, MODERATE WITH ATYPICAL FEATURES (H): Primary | ICD-10-CM

## 2022-07-25 DIAGNOSIS — F41.1 GAD (GENERALIZED ANXIETY DISORDER): ICD-10-CM

## 2022-07-25 PROCEDURE — 90834 PSYTX W PT 45 MINUTES: CPT | Performed by: STUDENT IN AN ORGANIZED HEALTH CARE EDUCATION/TRAINING PROGRAM

## 2022-07-25 ASSESSMENT — ANXIETY QUESTIONNAIRES
7. FEELING AFRAID AS IF SOMETHING AWFUL MIGHT HAPPEN: NEARLY EVERY DAY
1. FEELING NERVOUS, ANXIOUS, OR ON EDGE: NEARLY EVERY DAY
5. BEING SO RESTLESS THAT IT IS HARD TO SIT STILL: SEVERAL DAYS
GAD7 TOTAL SCORE: 16
2. NOT BEING ABLE TO STOP OR CONTROL WORRYING: NEARLY EVERY DAY
4. TROUBLE RELAXING: MORE THAN HALF THE DAYS
6. BECOMING EASILY ANNOYED OR IRRITABLE: SEVERAL DAYS
3. WORRYING TOO MUCH ABOUT DIFFERENT THINGS: NEARLY EVERY DAY
GAD7 TOTAL SCORE: 16

## 2022-07-25 ASSESSMENT — PATIENT HEALTH QUESTIONNAIRE - PHQ9: SUM OF ALL RESPONSES TO PHQ QUESTIONS 1-9: 18

## 2022-07-25 NOTE — PROGRESS NOTES
[facility]                                     Progress Note    Patient Name: Winter Loya  Date: 07/25/2022         Service Type: Individual      Session Start Time: 2.30  Session End Time: 3.20     Session Length: 50 mns    Session #: 09    Attendees: Client attended alone    Service Modality:  In-person    DATA  Interactive Complexity: No  Crisis: No        Progress Since Last Session (Related to Symptoms / Goals / Homework):   Symptoms: Worsening as evident by current phq9 and gad7 scores.    Homework: Achieved / completed to satisfaction       Episode of Care Goals: Minimal progress - ACTION (Actively working towards change); Intervened by reinforcing change plan / affirming steps taken     Current / Ongoing Stressors and Concerns:    Feeling overwhelmed and exhausted providing care for son that is terminally sick with leukemia. Reported that son is currently at an ICU.     Treatment Objective(s) Addressed in This Session:               learn and practice relaxation techniques to manage current feelings of exhaustion.     Intervention:    MI Intervention: Expressed Empathy/Understanding, Supported Autonomy, Collaboration, Evocation, Permission to raise concern or advise, Open-ended questions and Reflections: simple and complex                   Explore care giving role and responsibilities, explore other support systerms that pt can connect with to support. Review self care activities to help self regulate and mitigate feelings of exhaustion.         The following assessments were completed by patient for this visit: Pt did not complete any assessment.  PHQ 6/6/2022 6/20/2022 7/25/2022   PHQ-9 Total Score 16 16 18   Q9: Thoughts of better off dead/self-harm past 2 weeks Not at all Not at all Not at all     NA-7 SCORE 6/6/2022 6/20/2022 7/25/2022   Total Score 13 12 16        ASSESSMENT: Current Emotional / Mental Status (status of significant symptoms):   Risk status (Self / Other harm  or suicidal ideation)   Patient denies current fears or concerns for personal safety.   Patient denies current or recent suicidal ideation or behaviors.   Patient denies current or recent homicidal ideation or behaviors.   Patient denies current or recent self injurious behavior or ideation.   Patient denies other safety concerns.   Patient reports there has been no change in risk factors since their last session.     Patient reports there has been no change in protective factors since their last session.     Recommended that patient call 911 or go to the local ED should there be a change in any of these risk factors.     Appearance:   Appropriate    Eye Contact:   Good    Psychomotor Behavior: Normal    Attitude:   Cooperative  Interested Friendly   Orientation:   Person Place Time Situation   Speech    Rate / Production: Normal/ Responsive    Volume:  Normal    Mood:    Anxious  Depressed  Anhedonia   Affect:    Worrisome    Thought Content:  Clear    Thought Form:  Coherent    Insight:    Fair      Medication Review:   No changes to current psychiatric medication(s)     Medication Compliance:   Yes     Changes in Health Issues:   None reported     Chemical Use Review:   Substance Use: Chemical use reviewed, no active concerns identified      Tobacco Use: No current tobacco use.      Diagnosis:    296.32 (F33.1) Major Depressive Disorder, Recurrent Episode, Moderate _ and With anxious distress    Collateral Reports Completed:   Not Applicable    PLAN: (Patient Tasks / Therapist Tasks / Other)    Identify and practice daily self care activities    CHERISE Castellon        7/25/2022           ----- Service Performed and Documented by CHERISE------  This note was reviewed and clinical supervision by AMY Ayala [facility]    7/26/2022   ______________________________________________________________________    Individual Treatment Plan    Patient's Name: Winter Loya  YOB: 1955    Date of Creation:  04/18/2022  Date Treatment Plan Last Reviewed/Revised: 07/25/2022    DSM5 Diagnoses: 296.32 (F33.1) Major Depressive Disorder, Recurrent Episode, Moderate _ and With anxious distress  Psychosocial / Contextual Factors:   PROMIS (reviewed every 90 days):     Referral / Collaboration:  Referral to another professional/service is not indicated at this time..    Anticipated number of session for this episode of care: 15-25  Anticipation frequency of session: Every other week  Anticipated Duration of each session: 38-52 minutes  Treatment plan will be reviewed in 90 days or when goals have been changed.       MeasurableTreatment Goal(s) related to diagnosis / functional impairment(s)    Goal 1: Patient will identify specific triggers to feelings of depression and improve coping with depression by  90 %.    I will know I've met my goal when     Objective #A (Patient Action)    Patient will identify and practice 3 outdoor things that brings her ross daily and repeat that every day.  Status: Continued - Date(s): 10/25/2022    Intervention(s)  Therapist will provide psychoeducation, behavioral activation, and cognitive restructuring.)    Objective #B  Patient will identify specific areas of cognitive distortion and challenge irrational thoughts with reality   Status: Continued - Date(s): 10/25/2022       Intervention(s)  Therapist will teach patient how negative thoughts can lead to negative feelings and actions and ways to reframe thoughts in a more positive way leading to more positive feelings and helpful behaviors    Objective #C  Patient will learn relaxation techniques to manage anxiety.  Status: Continued - Date(s):  10/25/2022    Intervention(s)  Therapist will teach patient thought stopping, deep breathing exercises, mindful meditation, and creative visualization.  Patient has reviewed and agreed to the above plan.      CHERISE Castellon  April 18, 2022

## 2022-08-01 ENCOUNTER — OFFICE VISIT (OUTPATIENT)
Dept: OPHTHALMOLOGY | Facility: CLINIC | Age: 67
End: 2022-08-01
Payer: MEDICAID

## 2022-08-01 ENCOUNTER — LAB (OUTPATIENT)
Dept: LAB | Facility: CLINIC | Age: 67
End: 2022-08-01

## 2022-08-01 DIAGNOSIS — H40.003 GLAUCOMA SUSPECT OF BOTH EYES: ICD-10-CM

## 2022-08-01 DIAGNOSIS — H52.4 PRESBYOPIA: ICD-10-CM

## 2022-08-01 DIAGNOSIS — Z01.01 ENCOUNTER FOR EXAMINATION OF EYES AND VISION WITH ABNORMAL FINDINGS: ICD-10-CM

## 2022-08-01 DIAGNOSIS — Z79.4 TYPE 2 DIABETES MELLITUS WITH DIABETIC POLYNEUROPATHY, WITH LONG-TERM CURRENT USE OF INSULIN (H): Primary | ICD-10-CM

## 2022-08-01 DIAGNOSIS — E11.42 TYPE 2 DIABETES MELLITUS WITH DIABETIC POLYNEUROPATHY, WITH LONG-TERM CURRENT USE OF INSULIN (H): Primary | ICD-10-CM

## 2022-08-01 DIAGNOSIS — Z98.890 HISTORY OF LASER PHOTOCOAGULATION OF RETINA: ICD-10-CM

## 2022-08-01 DIAGNOSIS — H25.813 COMBINED FORMS OF AGE-RELATED CATARACT OF BOTH EYES: ICD-10-CM

## 2022-08-01 DIAGNOSIS — C90.01 MULTIPLE MYELOMA IN REMISSION (H): ICD-10-CM

## 2022-08-01 LAB
BASOPHILS # BLD AUTO: 0.1 10E3/UL (ref 0–0.2)
BASOPHILS NFR BLD AUTO: 2 %
EOSINOPHIL # BLD AUTO: 0.4 10E3/UL (ref 0–0.7)
EOSINOPHIL NFR BLD AUTO: 12 %
ERYTHROCYTE [DISTWIDTH] IN BLOOD BY AUTOMATED COUNT: 15.4 % (ref 10–15)
HCT VFR BLD AUTO: 41.2 % (ref 35–47)
HGB BLD-MCNC: 13.6 G/DL (ref 11.7–15.7)
LYMPHOCYTES # BLD AUTO: 0.7 10E3/UL (ref 0.8–5.3)
LYMPHOCYTES NFR BLD AUTO: 20 %
MCH RBC QN AUTO: 29.4 PG (ref 26.5–33)
MCHC RBC AUTO-ENTMCNC: 33 G/DL (ref 31.5–36.5)
MCV RBC AUTO: 89 FL (ref 78–100)
MONOCYTES # BLD AUTO: 0.5 10E3/UL (ref 0–1.3)
MONOCYTES NFR BLD AUTO: 15 %
NEUTROPHILS # BLD AUTO: 1.7 10E3/UL (ref 1.6–8.3)
NEUTROPHILS NFR BLD AUTO: 51 %
PLATELET # BLD AUTO: 131 10E3/UL (ref 150–450)
RBC # BLD AUTO: 4.62 10E6/UL (ref 3.8–5.2)
WBC # BLD AUTO: 3.4 10E3/UL (ref 4–11)

## 2022-08-01 PROCEDURE — 92014 COMPRE OPH EXAM EST PT 1/>: CPT | Performed by: OPHTHALMOLOGY

## 2022-08-01 PROCEDURE — 86334 IMMUNOFIX E-PHORESIS SERUM: CPT

## 2022-08-01 PROCEDURE — 84155 ASSAY OF PROTEIN SERUM: CPT

## 2022-08-01 PROCEDURE — 83521 IG LIGHT CHAINS FREE EACH: CPT

## 2022-08-01 PROCEDURE — 82784 ASSAY IGA/IGD/IGG/IGM EACH: CPT

## 2022-08-01 PROCEDURE — 80053 COMPREHEN METABOLIC PANEL: CPT

## 2022-08-01 PROCEDURE — 84165 PROTEIN E-PHORESIS SERUM: CPT

## 2022-08-01 PROCEDURE — 85025 COMPLETE CBC W/AUTO DIFF WBC: CPT

## 2022-08-01 PROCEDURE — 92015 DETERMINE REFRACTIVE STATE: CPT | Performed by: OPHTHALMOLOGY

## 2022-08-01 PROCEDURE — 36415 COLL VENOUS BLD VENIPUNCTURE: CPT

## 2022-08-01 ASSESSMENT — CUP TO DISC RATIO
OS_RATIO: 0.7
OD_RATIO: 0.7

## 2022-08-01 ASSESSMENT — SLIT LAMP EXAM - LIDS
COMMENTS: 1+ DERMATOCHALASIS, 1+ MEIBOMIAN GLAND DYSFUNCTION
COMMENTS: 1+ DERMATOCHALASIS, 1+ MEIBOMIAN GLAND DYSFUNCTION

## 2022-08-01 ASSESSMENT — REFRACTION_WEARINGRX
OS_AXIS: 035
OD_SPHERE: -2.50
OS_SPHERE: -0.25
OD_AXIS: 135
OS_ADD: +2.50
SPECS_TYPE: PAL
OD_CYLINDER: +0.75
OS_CYLINDER: +1.00
OD_ADD: +2.50

## 2022-08-01 ASSESSMENT — VISUAL ACUITY
OD_CC: 20/20
OS_CC+: -1
CORRECTION_TYPE: GLASSES
METHOD: SNELLEN - LINEAR
OD_CC+: -2
OS_CC: 20/20

## 2022-08-01 ASSESSMENT — CONF VISUAL FIELD
METHOD: COUNTING FINGERS
OD_NORMAL: 1
OS_NORMAL: 1

## 2022-08-01 ASSESSMENT — REFRACTION_MANIFEST
OD_ADD: +2.50
OS_CYLINDER: +0.75
OD_AXIS: 145
OD_SPHERE: -2.00
OS_ADD: +2.50
OS_AXIS: 030
OS_SPHERE: PLANO
OD_CYLINDER: +0.75

## 2022-08-01 ASSESSMENT — EXTERNAL EXAM - LEFT EYE: OS_EXAM: NORMAL

## 2022-08-01 ASSESSMENT — TONOMETRY
OD_IOP_MMHG: 16
IOP_METHOD: APPLANATION
OS_IOP_MMHG: 14

## 2022-08-01 ASSESSMENT — EXTERNAL EXAM - RIGHT EYE: OD_EXAM: NORMAL

## 2022-08-01 NOTE — PROGRESS NOTES
" Current Eye Medications:  none     Subjective: Dilated Diabetic Exam - vision overall unchanged. Dependent on progressive gls more so now. When living in [place] - told she had glaucoma on eye drops. PGF - glaucoma.    Hx laser left eye 1985 due to fluid leakage.     Type II DM - insulin dependent  Last Blood Sugar - unsure of last reading.     Lab Results   Component Value Date    A1C 6.8 07/11/2022    A1C 9.0 12/29/2021    A1C 7.8 08/27/2021    A1C 7.8 07/26/2021    A1C 7.6 03/08/2021    A1C 8.8 12/07/2020    A1C 9.1 08/21/2020    A1C 6.7 03/05/2020        Objective:  See Ophthalmology Exam.       Assessment:  Baseline eye exam in patient with diabetes.  No active diabetic retinopathy.      ICD-10-CM    1. Type 2 diabetes mellitus with diabetic polyneuropathy, with long-term current use of insulin (H)  E11.42     Z79.4    2. Combined forms of age-related cataract, mild, of both eyes  H25.813    3. Glaucoma suspect of both eyes  H40.003    4. History of laser photocoagulation of retina, os (od?)  Z98.890    5. Encounter for examination of eyes and vision with abnormal findings  Z01.01    6. Presbyopia  H52.4         Plan:  Continue observation with regard to glaucoma suspect status.   Glasses prescription given - optional  Use artificial tears up to four times a day (like Refresh Optive, Systane Balance, TheraTears, or generic artificial tears are ok. Avoid \"get the red out\" drops).   Possible posterior vitreous detachment (sudden onset large floater and/or flashing lights) both eyes discussed.   Will obtain an OCT today - I will call if any concerns   Call in April 2023 for an appointment in August 2023 for Complete Exam    Dr. Murguia [phone]        "

## 2022-08-01 NOTE — LETTER
"    8/1/2022         RE: Winter Loya  [address]  [place] [address]        Dear Colleague,    Thank you for referring your patient, Winter Loya, to the [facility]. Please see a copy of my visit note below.     Current Eye Medications:  none     Subjective: Dilated Diabetic Exam - vision overall unchanged. Dependent on progressive gls more so now. When living in [place] - told she had glaucoma on eye drops. PGF - glaucoma.    Hx laser left eye 1985 due to fluid leakage.     Type II DM - insulin dependent  Last Blood Sugar - unsure of last reading.     Lab Results   Component Value Date    A1C 6.8 07/11/2022    A1C 9.0 12/29/2021    A1C 7.8 08/27/2021    A1C 7.8 07/26/2021    A1C 7.6 03/08/2021    A1C 8.8 12/07/2020    A1C 9.1 08/21/2020    A1C 6.7 03/05/2020        Objective:  See Ophthalmology Exam.       Assessment:  Baseline eye exam in patient with diabetes.  No active diabetic retinopathy.      ICD-10-CM    1. Type 2 diabetes mellitus with diabetic polyneuropathy, with long-term current use of insulin (H)  E11.42     Z79.4    2. Combined forms of age-related cataract, mild, of both eyes  H25.813    3. Glaucoma suspect of both eyes  H40.003    4. History of laser photocoagulation of retina, os (od?)  Z98.890    5. Encounter for examination of eyes and vision with abnormal findings  Z01.01    6. Presbyopia  H52.4         Plan:  Continue observation with regard to glaucoma suspect status.   Glasses prescription given - optional  Use artificial tears up to four times a day (like Refresh Optive, Systane Balance, TheraTears, or generic artificial tears are ok. Avoid \"get the red out\" drops).   Possible posterior vitreous detachment (sudden onset large floater and/or flashing lights) both eyes discussed.   Will obtain an OCT today - I will call if any concerns   Call in April 2023 for an appointment in August 2023 for Complete Exam    Dr. Murguia [phone]            Again, " thank you for allowing me to participate in the care of your patient.        Sincerely,        Lion Murguia MD

## 2022-08-01 NOTE — PATIENT INSTRUCTIONS
"Continue observation with regard to glaucoma suspect status.   Glasses prescription given - optional  Use artificial tears up to four times a day (like Refresh Optive, Systane Balance, TheraTears, or generic artificial tears are ok. Avoid \"get the red out\" drops).   Possible posterior vitreous detachment (sudden onset large floater and/or flashing lights) both eyes discussed.   Will obtain an OCT today - I will call if any concerns   Call in April 2023 for an appointment in August 2023 for Complete Exam    Dr. Murguia [phone]   "

## 2022-08-02 LAB
ALBUMIN SERPL-MCNC: 4.1 G/DL (ref 3.4–5)
ALP SERPL-CCNC: 113 U/L (ref 40–150)
ALT SERPL W P-5'-P-CCNC: 34 U/L (ref 0–50)
ANION GAP SERPL CALCULATED.3IONS-SCNC: 5 MMOL/L (ref 3–14)
AST SERPL W P-5'-P-CCNC: 11 U/L (ref 0–45)
BILIRUB SERPL-MCNC: 1.7 MG/DL (ref 0.2–1.3)
BUN SERPL-MCNC: 16 MG/DL (ref 7–30)
CALCIUM SERPL-MCNC: 9.1 MG/DL (ref 8.5–10.1)
CHLORIDE BLD-SCNC: 106 MMOL/L (ref 94–109)
CO2 SERPL-SCNC: 29 MMOL/L (ref 20–32)
CREAT SERPL-MCNC: 0.81 MG/DL (ref 0.52–1.04)
GFR SERPL CREATININE-BSD FRML MDRD: 81 ML/MIN/1.73M2
GLUCOSE BLD-MCNC: 165 MG/DL (ref 70–99)
POTASSIUM BLD-SCNC: 3.8 MMOL/L (ref 3.4–5.3)
PROT SERPL-MCNC: 7 G/DL (ref 6.8–8.8)
SODIUM SERPL-SCNC: 140 MMOL/L (ref 133–144)
TOTAL PROTEIN SERUM FOR ELP: 6.5 G/DL (ref 6.4–8.3)

## 2022-08-03 LAB
ALBUMIN SERPL ELPH-MCNC: 4.3 G/DL (ref 3.7–5.1)
ALPHA1 GLOB SERPL ELPH-MCNC: 0.3 G/DL (ref 0.2–0.4)
ALPHA2 GLOB SERPL ELPH-MCNC: 0.5 G/DL (ref 0.5–0.9)
B-GLOBULIN SERPL ELPH-MCNC: 0.7 G/DL (ref 0.6–1)
GAMMA GLOB SERPL ELPH-MCNC: 0.6 G/DL (ref 0.7–1.6)
IGA SERPL-MCNC: 133 MG/DL (ref 84–499)
IGG SERPL-MCNC: 622 MG/DL (ref 610–1616)
IGM SERPL-MCNC: 37 MG/DL (ref 35–242)
KAPPA LC FREE SER-MCNC: 2.31 MG/DL (ref 0.33–1.94)
KAPPA LC FREE/LAMBDA FREE SER NEPH: 1.47 {RATIO} (ref 0.26–1.65)
LAMBDA LC FREE SERPL-MCNC: 1.57 MG/DL (ref 0.57–2.63)
M PROTEIN SERPL ELPH-MCNC: 0 G/DL
PROT PATTERN SERPL ELPH-IMP: ABNORMAL
PROT PATTERN SERPL IFE-IMP: NORMAL

## 2022-08-04 ENCOUNTER — TELEPHONE (OUTPATIENT)
Dept: ONCOLOGY | Facility: CLINIC | Age: 67
End: 2022-08-04

## 2022-08-04 DIAGNOSIS — C90.01 MULTIPLE MYELOMA IN REMISSION (H): Primary | ICD-10-CM

## 2022-08-04 RX ORDER — LENALIDOMIDE 10 MG/1
10 CAPSULE ORAL DAILY
Qty: 28 CAPSULE | Refills: 0 | Status: SHIPPED | OUTPATIENT
Start: 2022-08-04 | End: 2022-08-10

## 2022-08-04 NOTE — TELEPHONE ENCOUNTER
Oral Chemotherapy Monitoring Program    Medication: Revlimid  Rx:  10 mg PO daily for a 28 day cycle  Auth #: [id number]  Risk Category: Adult female NOT of reproductive capacity    Routine survey questions reviewed.    Mary Franklin  Oncology Pharmacy Liaison II  [email or web address]  Phone: [phone]  Fax: [phone]

## 2022-08-08 ENCOUNTER — OFFICE VISIT (OUTPATIENT)
Dept: PSYCHOLOGY | Facility: CLINIC | Age: 67
End: 2022-08-08
Payer: MEDICAID

## 2022-08-08 DIAGNOSIS — F33.1 MAJOR DEPRESSIVE DISORDER, RECURRENT EPISODE, MODERATE WITH ANXIOUS DISTRESS (H): Primary | ICD-10-CM

## 2022-08-08 PROCEDURE — 90834 PSYTX W PT 45 MINUTES: CPT | Performed by: STUDENT IN AN ORGANIZED HEALTH CARE EDUCATION/TRAINING PROGRAM

## 2022-08-08 NOTE — PROGRESS NOTES
[facility]                                     Progress Note    Patient Name: Winter Loya  Date: 2022         Service Type: Individual      Session Start Time: 2.30  Session End Time: 3.21     Session Length: 51 mns    Session #: 10    Attendees: Client attended alone    Service Modality:  In-person    DATA  Interactive Complexity: No  Crisis: No        Progress Since Last Session (Related to Symptoms / Goals / Homework):   Symptoms: Worsening as evident by current phq9 and gad7 scores.    Homework: Achieved / completed to satisfaction       Episode of Care Goals: Minimal progress - ACTION (Actively working towards change); Intervened by reinforcing change plan / affirming steps taken     Current / Ongoing Stressors and Concerns:    Pt reported that her son passed away last Wednesday after being sick for months with cancer and she is struggling with the grieving process because he wanted to end the suffering but also because she is grieving the loss with thoughts of aspects of a stressful filial relationship that she had with her son when he was alive and active.     Treatment Objective(s) Addressed in This Session:         Identify 3 ways to process and recover from current loss.     Intervention:   Reflect on patient's physical presence and psychological detachment from the reported loss and the ambiguity of the emotions expressed in session. Review relationship pattens between patient and the  and explore aspects of the relationship that creates the ambiguity in current emotional expression. Provided a handout to patient on ambiguous loss and couched patient to read.                  The following assessments were completed by patient for this visit: Pt did not complete any assessment.     ASSESSMENT: Current Emotional / Mental Status (status of significant symptoms):   Risk status (Self / Other harm or suicidal ideation)   Patient denies current fears or concerns for  personal safety.   Patient denies current or recent suicidal ideation or behaviors.   Patient denies current or recent homicidal ideation or behaviors.   Patient denies current or recent self injurious behavior or ideation.   Patient denies other safety concerns.   Patient reports there has been no change in risk factors since their last session.     Patient reports there has been no change in protective factors since their last session.     Recommended that patient call 911 or go to the local ED should there be a change in any of these risk factors.     Appearance:   Appropriate    Eye Contact:   Good    Psychomotor Behavior: Normal    Attitude:   Cooperative  Interested Friendly   Orientation:   Person Place Time Situation   Speech    Rate / Production: Normal/ Responsive    Volume:  Normal    Mood:    Anxious  Depressed  Anhedonia   Affect:    Worrisome    Thought Content:  Clear    Thought Form:  Coherent    Insight:    Fair      Medication Review:   No changes to current psychiatric medication(s)     Medication Compliance:   Yes     Changes in Health Issues:   None reported     Chemical Use Review:   Substance Use: Chemical use reviewed, no active concerns identified      Tobacco Use: No current tobacco use.      Diagnosis:    296.32 (F33.1) Major Depressive Disorder, Recurrent Episode, Moderate _ and With anxious distress    Collateral Reports Completed:   Not Applicable    PLAN: (Patient Tasks / Therapist Tasks / Other)    Read provided information on Dr Arleth Gonzalez' concept on ambiguous loss    Mj Cline REYNALDO                       ----- Service Performed and Documented by [facility]------  This note was reviewed and clinical supervision by AMY Ayala [facility]    8/9/2022     ______________________________________________________________________    Individual Treatment Plan    Patient's Name: Winter Loya  YOB: 1955    Date of Creation: 04/18/2022  Date Treatment Plan Last  Reviewed/Revised: 07/25/2022    DSM5 Diagnoses: 296.32 (F33.1) Major Depressive Disorder, Recurrent Episode, Moderate _ and With anxious distress  Psychosocial / Contextual Factors:   PROMIS (reviewed every 90 days):     Referral / Collaboration:  Referral to another professional/service is not indicated at this time..    Anticipated number of session for this episode of care: 15-25  Anticipation frequency of session: Every other week  Anticipated Duration of each session: 38-52 minutes  Treatment plan will be reviewed in 90 days or when goals have been changed.       MeasurableTreatment Goal(s) related to diagnosis / functional impairment(s)    Goal 1: Patient will identify specific triggers to feelings of depression and improve coping with depression by  90 %.    I will know I've met my goal when     Objective #A (Patient Action)    Patient will identify and practice 3 outdoor things that brings her ross daily and repeat that every day.  Status: Continued - Date(s): 10/25/2022    Intervention(s)  Therapist will provide psychoeducation, behavioral activation, and cognitive restructuring.)    Objective #B  Patient will identify specific areas of cognitive distortion and challenge irrational thoughts with reality   Status: Continued - Date(s): 10/25/2022       Intervention(s)  Therapist will teach patient how negative thoughts can lead to negative feelings and actions and ways to reframe thoughts in a more positive way leading to more positive feelings and helpful behaviors    Objective #C  Patient will learn relaxation techniques to manage anxiety.  Status: Continued - Date(s):  10/25/2022    Intervention(s)  Therapist will teach patient thought stopping, deep breathing exercises, mindful meditation, and creative visualization.  Patient has reviewed and agreed to the above plan.      CHERISE Castellon  April 18, 2022

## 2022-08-10 ENCOUNTER — VIRTUAL VISIT (OUTPATIENT)
Dept: PHARMACY | Facility: CLINIC | Age: 67
End: 2022-08-10
Payer: MEDICAID

## 2022-08-10 DIAGNOSIS — F41.1 GAD (GENERALIZED ANXIETY DISORDER): ICD-10-CM

## 2022-08-10 DIAGNOSIS — R25.1 TREMOR: ICD-10-CM

## 2022-08-10 DIAGNOSIS — R03.0 ELEVATED BLOOD PRESSURE READING WITHOUT DIAGNOSIS OF HYPERTENSION: ICD-10-CM

## 2022-08-10 DIAGNOSIS — E89.0 POSTOPERATIVE HYPOTHYROIDISM: ICD-10-CM

## 2022-08-10 DIAGNOSIS — C90.01 MULTIPLE MYELOMA IN REMISSION (H): ICD-10-CM

## 2022-08-10 DIAGNOSIS — E78.5 HYPERLIPIDEMIA, UNSPECIFIED HYPERLIPIDEMIA TYPE: ICD-10-CM

## 2022-08-10 DIAGNOSIS — F33.2 SEVERE EPISODE OF RECURRENT MAJOR DEPRESSIVE DISORDER, WITHOUT PSYCHOTIC FEATURES (H): ICD-10-CM

## 2022-08-10 DIAGNOSIS — Z79.4 TYPE 2 DIABETES MELLITUS WITH DIABETIC POLYNEUROPATHY, WITH LONG-TERM CURRENT USE OF INSULIN (H): Primary | ICD-10-CM

## 2022-08-10 DIAGNOSIS — E11.42 TYPE 2 DIABETES MELLITUS WITH DIABETIC POLYNEUROPATHY, WITH LONG-TERM CURRENT USE OF INSULIN (H): Primary | ICD-10-CM

## 2022-08-10 DIAGNOSIS — G62.89 AXONAL NEUROPATHY: ICD-10-CM

## 2022-08-10 PROCEDURE — 99607 MTMS BY PHARM ADDL 15 MIN: CPT | Performed by: PHARMACIST

## 2022-08-10 PROCEDURE — 99605 MTMS BY PHARM NP 15 MIN: CPT | Performed by: PHARMACIST

## 2022-08-10 RX ORDER — ASPIRIN 81 MG/1
81 TABLET ORAL DAILY
COMMUNITY

## 2022-08-10 NOTE — PATIENT INSTRUCTIONS
"Recommendations from today's MTM visit:                                                         1. We can talk more about a trial off the metformin next time, I am very sorry for the loss of your son.     Follow-up: Return in about 4 weeks (around 9/7/2022) for Medication Therapy Management.    It was great speaking with you today.  I value your experience and would be very thankful for your time in providing feedback in our clinic survey. In the next few days, you may receive an email or text message from [employer] with a link to a survey related to your  clinical pharmacist.\"     To schedule another MTM appointment, please call the clinic directly or you may call the MTM scheduling line at [phone] or toll-free at [phone].     My Clinical Pharmacist's contact information:                                                      Please feel free to contact me with any questions or concerns you have.      Vonnie Corrigan, PharmD  Medication Therapy Management Pharmacist  [phone]   "

## 2022-08-10 NOTE — Clinical Note
CE DAWSON note, thanks!  Vonnie Corrigan, PharmD Medication Therapy Management Pharmacist [phone]

## 2022-08-10 NOTE — PROGRESS NOTES
Medication Therapy Management (MTM) Encounter    ASSESSMENT:                            Medication Adherence/Access: No issues identified    Type 2 Diabetes: Patient is meeting A1c goal of < 7%, may benefit from trial off metformin due to diarrhea, however Winter prefers to wait on this, with the recent passing of her son she's afraid she wouldn't do it right.     Diarrhea: see above    Hypothyroidism: Plan in place.     Multiple myeloma: Plan in place.     Hyperlipidemia: Stable. May benefit from recheck of FLP at some point.     Hypertension: Stable. Blood pressure at goal < 140/90.    Shakes/tremors: Stable.     Neuropathy: Stable.     Depression/Anxiety: No concerns.    PLAN:                            1. We can talk more about a trial off the metformin next time, I am very sorry for the loss of your son.     Addendum:  Vonnie,    Just wanted to let you know that we have trialed off of the metformin several times in the past couple of years and her diarrhea does not improve but her A1c worsens when off of it. Thanks so much!    Delmi Gross, APRN, CNP  Follow-up: Return in about 4 weeks (around 9/7/2022) for Medication Therapy Management.    SUBJECTIVE/OBJECTIVE:                          Winter Loya is a 66 year old female called for an initial visit. She was referred to me from Delmi Gross for diabetes.      Reason for visit: med review.    Allergies/ADRs: Reviewed in chart  Past Medical History: Reviewed in chart  Tobacco: She reports that she quit smoking about 17 years ago. Her smoking use included cigarettes. She smoked 1.00 pack per day. She has never used smokeless tobacco.  Alcohol: Less than 1 beverage / month  Caffeine: 1 diet coke/day and 2 cups coffee/day    Medication Adherence/Access:   Patient uses medication pouches (no longer spilling meds)  Patient takes medications 2 time(s) per day.   Per patient, misses medication 0 times per week.   The patient fills medications at [facility]: NO,  fills medications at [facility] and [facility] for Revlimid.    Type 2 Diabetes:    Metformin XR 1000 mg with dinner  Lantus 30 units at bedtime   Jardiance 25 mg daily     Has been on Trulicity in the past, tolerated well.   Patient is experiencing the following side effects: diarrhea - nobody knows why she has this. She thinks in might have started when she started metformin.   Blood sugar monitoring: never, just isn't organized. Ranges (patient reported): n/a  Symptoms of low blood sugar? none  Symptoms of high blood sugar? none  Eye exam: up to date  Foot exam: up to date  Diet/Exercise: did not discuss in detail   Aspirin: Taking 81mg daily for primary prevention   Statin: Yes: atorvastatin   ACEi/ARB: Yes: losartan.   Urine Albumin:   Lab Results   Component Value Date    UMALCR 75.28 (H) 02/02/2022      Lab Results   Component Value Date    A1C 6.8 07/11/2022    A1C 9.0 12/29/2021    A1C 7.8 08/27/2021    A1C 7.8 07/26/2021    A1C 7.6 03/08/2021    A1C 8.8 12/07/2020    A1C 9.1 08/21/2020    A1C 6.7 03/05/2020     Diarrhea:   Loperamide 4 mg twice daily     This has the only thing that has really helped.     Hypothyroidism:  levothyroxine 200 mcg daily when she gets up, 4:30 am. Eats breakfast 6-8 am, takes other meds 5 or 5:15 am.     Dose recently adjusted by Endocrinology via the Weight Management Program.   Patient is having the following symptoms: previously had had some weight gain.  TSH   Date Value Ref Range Status   07/11/2022 12.10 (H) 0.40 - 4.00 mU/L Final   01/27/2021 2.08 0.40 - 4.00 mU/L Final     Multiple myeloma:    Revlimid 10 mg daily   Acyclovir 400 mg twice daily (Shingles prophylaxis), also helps prevent her cold sores    She had a stem cell transplant in either 2018 or 2019, has been in remission since. She'd prefer to be off the Revlimid, however oncology prefers she remain on this. Tolerates it well.     Hyperlipidemia:   Atorvastatin 20 mg daily (she's taking this better now)    Patient  reports no significant myalgias or other side effects.  Recent Labs   Lab Test 22  0857 21  1705   CHOL 118 135   HDL 44* 48*   LDL 29 46   TRIG 223* 207*     Hypertension:   Losartan 25 mg daily     Patient does not self-monitor blood pressure.  Patient reports no current medication side effects.  BP Readings from Last 3 Encounters:   22 112/82   22 115/54   22 110/68     Shakes/tremors:    propranolol ER 60 mg daily    Follows with neurologist. Her dad  of Parkinson's disease.   States this/these are effective. Denies side effects.     Neuropathy:   Lyrica 100 mg two times daily (doesn't bring pills with her to work/lunch)    Gabapentin was not helpful for her, but the Lyrica has helped a lot. She feels it also helps her depression. Denies side effects.     Depression/Anxiety:   sertraline 100 mg daily     Patient reports that depression symptoms are improved. Her son just passed away from leukemia last week so it's been a rough week.   PHQ-9 SCORE 2022   PHQ-9 Total Score 16 16 18     Today's Vitals: There were no vitals taken for this visit.  ----------------      I spent 36 minutes with this patient today. All changes were made via collaborative practice agreement with FALGUNI Duenas CNP. A copy of the visit note was provided to the patient's provider(s).    The patient was sent via [employer] a summary of these recommendations.     Vonnie Corrigan, PharmD  Medication Therapy Management Pharmacist  [phone]    Telemedicine Visit Details  Type of service:  Telephone visit  Start Time: 2:17 PM  End Time: 2:53 PM  Originating Location (patient location): Home  Distant Location (provider location):  [facility]     Medication Therapy Recommendations  No medication therapy recommendations to display

## 2022-08-22 ENCOUNTER — OFFICE VISIT (OUTPATIENT)
Dept: PSYCHOLOGY | Facility: CLINIC | Age: 67
End: 2022-08-22
Payer: MEDICAID

## 2022-08-22 DIAGNOSIS — F33.1 MAJOR DEPRESSIVE DISORDER, RECURRENT EPISODE, MODERATE WITH ANXIOUS DISTRESS (H): Primary | ICD-10-CM

## 2022-08-22 DIAGNOSIS — F41.1 GAD (GENERALIZED ANXIETY DISORDER): ICD-10-CM

## 2022-08-22 PROCEDURE — 90834 PSYTX W PT 45 MINUTES: CPT | Performed by: STUDENT IN AN ORGANIZED HEALTH CARE EDUCATION/TRAINING PROGRAM

## 2022-08-22 ASSESSMENT — ANXIETY QUESTIONNAIRES
3. WORRYING TOO MUCH ABOUT DIFFERENT THINGS: MORE THAN HALF THE DAYS
2. NOT BEING ABLE TO STOP OR CONTROL WORRYING: MORE THAN HALF THE DAYS
6. BECOMING EASILY ANNOYED OR IRRITABLE: MORE THAN HALF THE DAYS
1. FEELING NERVOUS, ANXIOUS, OR ON EDGE: MORE THAN HALF THE DAYS
GAD7 TOTAL SCORE: 10
5. BEING SO RESTLESS THAT IT IS HARD TO SIT STILL: NOT AT ALL
GAD7 TOTAL SCORE: 10
7. FEELING AFRAID AS IF SOMETHING AWFUL MIGHT HAPPEN: MORE THAN HALF THE DAYS
4. TROUBLE RELAXING: NOT AT ALL

## 2022-08-22 ASSESSMENT — PATIENT HEALTH QUESTIONNAIRE - PHQ9: SUM OF ALL RESPONSES TO PHQ QUESTIONS 1-9: 10

## 2022-08-22 NOTE — PROGRESS NOTES
[facility]                                     Progress Note    Patient Name: Winter Loya  Date: 2022         Service Type: Individual      Session Start Time: 2.30  Session End Time: 3.20     Session Length: 50 mns    Session #: 11    Attendees: Client attended alone    Service Modality:  In-person    DATA  Interactive Complexity: No  Crisis: No        Progress Since Last Session (Related to Symptoms / Goals / Homework):   Symptoms: Improving as evident by current phq9 and celeste 7 scores    Homework: Achieved / completed to satisfaction Review pt's reading of Dr Arleth Gonzalez' concept on ambiguous loss      Episode of Care Goals: Minimal progress - ACTION (Actively working towards change); Intervened by reinforcing change plan / affirming steps taken     Current / Ongoing Stressors and Concerns:    Pt reported struggling with holding spaces for conflicting emotions about sons death as she is constantly reflecting on the negative relationship they had in the last days of his life. Pt reported her care giving services where always unappreciated and sometimes she get negative calls condemning her for the poor housekeeping she did at his home when she was there caring for him.     Treatment Objective(s) Addressed in This Session:         Identify 3 ways to process and recover from current loss.     Intervention:         Work with patient today to  explore conflicting feelings associated with their loss and normalized pts feelings as a natural part of grieving. Walk patient through an imagery exercise to allow them explore different aspects of their relationship with the  specifically the positives and the negatives using imagery rescripting  and in vivo deep breathing exercise. Couched patient that the negative aspects of the relationship have in part  contributed to the beautiful tapestry of who they are today and holding both the negative ( guilt, disappointment, anger etc) and  the positives ( love, gratitude,norture, forgiveness etc) can facilitate the healing and recovery process and help them grow into a new life.               The following assessments were completed by patient for this visit: Pt did not complete any assessment.    PHQ 6/20/2022 7/25/2022 8/22/2022   PHQ-9 Total Score 16 18 10   Q9: Thoughts of better off dead/self-harm past 2 weeks Not at all Not at all Not at all     NA-7 SCORE 6/20/2022 7/25/2022 8/22/2022   Total Score 12 16 10              ASSESSMENT: Current Emotional / Mental Status (status of significant symptoms):   Risk status (Self / Other harm or suicidal ideation)   Patient denies current fears or concerns for personal safety.   Patient denies current or recent suicidal ideation or behaviors.   Patient denies current or recent homicidal ideation or behaviors.   Patient denies current or recent self injurious behavior or ideation.   Patient denies other safety concerns.   Patient reports there has been no change in risk factors since their last session.     Patient reports there has been no change in protective factors since their last session.     Recommended that patient call 911 or go to the local ED should there be a change in any of these risk factors.     Appearance:   Appropriate    Eye Contact:   Good    Psychomotor Behavior: Normal    Attitude:   Cooperative  Interested Friendly   Orientation:   Person Place Time Situation   Speech    Rate / Production: Normal/ Responsive    Volume:  Normal    Mood:    Depressed  Anhedonia Grieving   Affect:    Worrisome    Thought Content:  Clear    Thought Form:  Coherent    Insight:    Fair      Medication Review:   No changes to current psychiatric medication(s)     Medication Compliance:   Yes     Changes in Health Issues:   None reported     Chemical Use Review:   Substance Use: Chemical use reviewed, no active concerns identified      Tobacco Use: No current tobacco use.      Diagnosis:    296.32 (F33.1)  "Major Depressive Disorder, Recurrent Episode, Moderate _ and With anxious distress    Collateral Reports Completed:   Not Applicable    PLAN: (Patient Tasks / Therapist Tasks / Other)    Read provided handout  on \" When our child has  from a long term illness\"    CHERISE Castellon                   ----- Service Performed and Documented by REYNALDO------  This note was reviewed and clinical supervision by AMY [facility]ala [facility]    2022     ______________________________________________________________________    Individual Treatment Plan    Patient's Name: Winter Loya  YOB: 1955    Date of Creation: 2022  Date Treatment Plan Last Reviewed/Revised: 2022    DSM5 Diagnoses: 296.32 (F33.1) Major Depressive Disorder, Recurrent Episode, Moderate _ and With anxious distress  Psychosocial / Contextual Factors:   PROMIS (reviewed every 90 days):     Referral / Collaboration:  Referral to another professional/service is not indicated at this time..    Anticipated number of session for this episode of care: 15-25  Anticipation frequency of session: Every other week  Anticipated Duration of each session: 38-52 minutes  Treatment plan will be reviewed in 90 days or when goals have been changed.       MeasurableTreatment Goal(s) related to diagnosis / functional impairment(s)    Goal 1: Patient will identify specific triggers to feelings of depression and improve coping with depression by  90 %.    I will know I've met my goal when     Objective #A (Patient Action)    Patient will identify and practice 3 outdoor things that brings her ross daily and repeat that every day.  Status: Continued - Date(s): 10/25/2022    Intervention(s)  Therapist will provide psychoeducation, behavioral activation, and cognitive restructuring.)    Objective #B  Patient will identify specific areas of cognitive distortion and challenge irrational thoughts with reality   Status: Continued - Date(s): 10/25/2022   "     Intervention(s)  Therapist will teach patient how negative thoughts can lead to negative feelings and actions and ways to reframe thoughts in a more positive way leading to more positive feelings and helpful behaviors    Objective #C  Patient will learn relaxation techniques to manage anxiety.  Status: Continued - Date(s):  10/25/2022    Intervention(s)  Therapist will teach patient thought stopping, deep breathing exercises, mindful meditation, and creative visualization.  Patient has reviewed and agreed to the above plan.      CHERISE Castellon  April 18, 2022

## 2022-08-31 NOTE — PROGRESS NOTES
ONCOLOGY/HEMATOLOGY PROGRESS NOTE  Sep 1, 2022    Reason for Visit: IgA Kappa Multiple Myeloma    Oncology HPI:   Winter Loya is a 66 year old woman with IgA Kappa Multiple Myeloma. In 2018 she had anemia and was fatigued. She was found to have IgA kappa monocloncal antibody with M-spike of 0.17. IgA elevated. Skeletal survey was unremarkable and UPEP had minimal protein (156 mg/m2). PET 2018 showed bone marrow consistent with hypermetabolic plasma cell myeloma. M spike was 0.17. She started RVD and Zometa. On 2019 she had an autologous transplant.      Her bone marrow bx from 2019 was sent here for review. It showed marrow cellularity of 60%, with decreased trilineage hematopoiesis and 15% plasma cells as well as peripheral blood with slight anemia. 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 She is on revlimid maintenance and zometa from her prior oncologist in [place]. On 19 her K/L ratio is 1.1, M spike is 0.04.     Interval history:   Winter presents alone today.  Her son  earlier this month so she is dealing with the grief from that.  She doesn't really feel like she has processed much of it yet, but she is taking a week away at a cabin next week to spend some time processing.  She notes increased neck pain for the past 6 months.  It comes and goes - some days it feels okay and other days she doesn't feel like she can lift up her head. Denies radicular pain in her arms.  Occasional headaches that accompany the neck pain. Thinks she has plantar fascitis - gets a lot of bilateral foot pain the morning after doing a lot of walking.  She has been eating more recently - intended to participate in a weight loss program this month but ended up not doing it after her son's death. She has been having diarrhea, states they are working on getting her off metformin to  see if that helps. Denies fevers, chills, night sweats, unexplained weight changes, dizziness, vision or hearing changes, new lumps or bumps, chest pain, shortness of breath, cough, abdominal pain, nausea, vomiting, changes to bladder, swelling of extremities, bleeding issues, or rash.      ROS: 10 point ROS neg other than the symptoms noted above in the HPI.      Past Medical History:   Diagnosis Date     Abnormal EMG 2021 5/25/2021    Interpretation: This is an abnormal study, demonstrating electrophysiologic evidence of a length-dependent axonal sensorimotor polyneuropathy. Asymmetry of peroneal compound muscle action potential amplitudes is a finding of uncertain significance.        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   I have personally reviewed the examination and initial interpretation and I agree with the findings.   ANNE GOODMAN MD     History of MRI of cervical spine 6/2020 3/15/2021    Impression: Multilevel cervical spondylosis, most pronounced at the level of C4-5 and C5-6 with moderate to severe spinal canal stenosis and moderate spinal canal stenosis at C6-7. No abnormal cord signal. No significant neural foraminal narrowing at any level.   I have personally reviewed the examination and initial interpretation and I agree with the findings.   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 (Patient not taking: No sig reported) 200 strip 3     blood glucose monitoring (NO BRAND SPECIFIED) meter device kit Use to test blood sugar two times daily or as directed. (Patient not taking: No sig reported) 1 kit 3     empagliflozin (JARDIANCE) 25 MG TABS tablet Take 1 tablet (25 mg) by mouth daily 90 tablet 3     insulin glargine (LANTUS SOLOSTAR) 100 UNIT/ML pen Inject 30 Units Subcutaneous At Bedtime 30 mL 3     insulin pen needle (FIFTY50 PEN NEEDLES) 32G X 4 MM miscellaneous Use one pen needle daily or as directed. 100 each 2     LENalidomide (REVLIMID) 10 MG CAPS capsule Take 1 capsule (10 mg) by mouth daily for 28 days 28 capsule 0     levothyroxine (SYNTHROID/LEVOTHROID) 200 MCG tablet Take 1 tablet (200 mcg) by mouth every morning (before breakfast) 30 tablet 3     loperamide (IMODIUM) 2 MG capsule Take 4 mg (two capsules) by mouth at onset of loose stools, followed by 2 mg (one capsule) after each loose stool. Maximum: 16 mg (8 capsules) daily 270 capsule 11     losartan (COZAAR) 25 MG tablet Take 1 tablet (25 mg) by mouth daily 90 tablet 2     metFORMIN (GLUCOPHAGE XR) 500 MG 24 hr tablet Take 2 tablets (1,000 mg) by mouth daily (with dinner) 180 tablet 3     pregabalin (LYRICA) 100 MG capsule TAKE ONE CAPSULE BY MOUTH THREE TIMES DAILY (Patient taking differently: Take 100 mg by mouth 2 times daily) 270 capsule 3     propranolol ER (INDERAL LA) 60 MG 24 hr capsule Take 1 capsule (60 mg) by mouth daily (Patient not taking: Reported on 8/10/2022) 90 capsule 3      sertraline (ZOLOFT) 100 MG tablet Take 1 tablet (100 mg) by mouth daily. 90 tablet 0        No Known Allergies    Pulse 51   Temp 97.2  F (36.2  C) (Oral)   Resp 16   Wt 117.5 kg (259 lb 1.6 oz)   SpO2 98%   BMI 38.26 kg/m    Gen: tearful at times, alert, pleasant, NAD  HEENT: NC/AT,EOMI w/ PERRL, anicteric sclera. OP clear. MMM.   Neck: Supple, no LAD, full ROM  CV: normal S1,S2 with RRR no m/r/g  Resp: lungs CTA bilaterally with adequate air movement to bases. No wheezes or crackles  Abd: soft NTND no organomegaly or masses. BS normoactive.   Ext: warm and well perfused, no edema or cyanosis; 5/5 grasp strength bilaterally; 5/5 shoulder flexion and extension strength  Skin: no concerning lesions or rashes  Neuro: A&Ox4, no lateralizing sx. Grossly nonfocal.  Psych: appropriate, reactive      Labs:     Blood Counts       Recent Labs   Lab Test 09/01/22  1522 08/01/22  1625 07/11/22  0824 06/01/22  1350 05/09/22  0730   HGB 13.5 13.6 13.3 13.3 14.8   HCT 41.3 41.2 39.7 40.0 43.9   WBC 3.3* 3.4* 3.1* 3.8* 2.5*   ANEUTAUTO 1.6 1.7 1.8 2.3 1.6   ALYMPAUTO 0.8 0.7* 0.5* 0.7* 0.4*   AMONOAUTO 0.4 0.5 0.4 0.4 0.4   AEOSAUTO 0.4 0.4 0.3 0.3 0.0   ABSBASO 0.1 0.1 0.0 0.0 0.0   NRBCMAN 0.0  --   --  0.0 0.0   * 131* 138* 129* 113*       ABO/RH    No lab results found.      Chemistries     Basic Panel  Recent Labs   Lab Test 09/01/22  1522 08/01/22  1625 07/11/22  0824    140 140   POTASSIUM 4.0 3.8 3.3*   CHLORIDE 104 106 107   CO2 25 29 24   BUN 13.3 16 10   CR 0.75 0.81 0.67   * 165* 170*        Calcium, Magnesium, Phosphorus  Recent Labs   Lab Test 09/01/22  1522 08/01/22  1625 07/11/22  0824   ASHLEY 9.0 9.1 8.4*        LFTs  Recent Labs   Lab Test 09/01/22  1522 08/01/22  1625 07/11/22  0824   BILITOTAL 1.1 1.7* 1.1   ALKPHOS 115* 113 120   AST 23 11 29   ALT 30 34 33   ALBUMIN 4.3 4.1 3.7       LDH  No lab results found.    B2-Microglobulin  Recent Labs   Lab Test 02/18/20  0849   [id number]  1.6           Immunoglobulins     Recent Labs   Lab Test 08/01/22  1625 07/11/22  0824 06/01/22  1350 02/02/22  0857 11/29/21  1347 02/18/20  0849    547* 529* 667 684 488*       Recent Labs   Lab Test 08/01/22  1625 07/11/22  0824 06/01/22  1350 02/02/22  0857 11/29/21  1347 02/18/20  0849    100 98 134 153 72*       Recent Labs   Lab Test 08/01/22  1625 07/11/22  0824 06/01/22  1350 02/02/22  0857 11/29/21  1347 02/18/20  0849   IGM 37 26* 28* 30* 33* 39         Monocloncal Protein Studies     M spike    Recent Labs   Lab Test 08/01/22  1625 07/11/22  0824 06/01/22  1350 02/02/22  0857 12/29/21  1429 11/29/21  1347   ELPM 0.0 0.1* 0.0 0.0 0.0 0.0       [place] FLC    Recent Labs   Lab Test 08/01/22  1625 07/11/22  0824 06/01/22  1350 02/02/22  0857 11/29/21  1347   KFLCA 2.31* 1.92 1.80 1.85 2.17*       [place] FLC    Recent Labs   Lab Test 08/01/22  1625 07/11/22  0824 06/01/22  1350 02/02/22  0857 11/29/21  1347   LFLCA 1.57 1.23 1.33 1.54 1.64       FLC Ratio    Recent Labs   Lab Test 08/01/22  1625 07/11/22  0824 06/01/22  1350 02/02/22  0857 11/29/21  1347   KLRA 1.47 1.56 1.35 1.20 1.32       Assessment/plan:   Winter Loya is a 66 year old woman with standard risk IgG kappa multiple myeloma, 3 years post auto-transplant, currently on lenalidomide maintenance.    Myeloma-specific labs are currently pending.  She had a monoclonal peak of 0.1 two months ago, but was back to 0.0 last month. Plan to continue maintenance lenalidomide indefinitely while tolerated until evidence of disease progression.  Continue acyclovir and aspirin.  She received her post-transplant vaccines in 2020 and is up to date on COVID vaccinations.    Neuropathy is likely due to combination of bortezomb (during induction) and DM2.  Discussed with her that if she has worsening balance or mobility she should reach out to us; she does not think this is currently an issue but will let us know.    She has chronic diarrhea which  predates her lenalidomide, currently controlled with imodium.  Suggested adding metamucil which she will plan to do. She is also trying to transition off of her metformin to see if that helps.    Neck pain has been present for about 6 months.  MR spine from 2020 shows significant cervical spondylosis with moderate to severe spinal canal stenosis.  Discussed that PT will likely be the best place to start, but that this is best managed through her PCP.  She will reach out to her PCP for further direction.    Monthly labs in [place].  RTC in 3 months with Dr. Zhu.    36 minutes spent on the date of the encounter doing chart review, review of test results, patient visit and documentation     FALGUNI Olivas CNP  [facility]  [address] [place] [address] [phone]

## 2022-09-01 ENCOUNTER — ONCOLOGY VISIT (OUTPATIENT)
Dept: ONCOLOGY | Facility: CLINIC | Age: 67
End: 2022-09-01
Attending: STUDENT IN AN ORGANIZED HEALTH CARE EDUCATION/TRAINING PROGRAM
Payer: MEDICAID

## 2022-09-01 ENCOUNTER — APPOINTMENT (OUTPATIENT)
Dept: LAB | Facility: CLINIC | Age: 67
End: 2022-09-01
Attending: STUDENT IN AN ORGANIZED HEALTH CARE EDUCATION/TRAINING PROGRAM
Payer: MEDICAID

## 2022-09-01 VITALS
HEART RATE: 51 BPM | OXYGEN SATURATION: 98 % | WEIGHT: 259.1 LBS | TEMPERATURE: 97.2 F | DIASTOLIC BLOOD PRESSURE: 65 MMHG | RESPIRATION RATE: 16 BRPM | BODY MASS INDEX: 38.26 KG/M2 | SYSTOLIC BLOOD PRESSURE: 139 MMHG

## 2022-09-01 DIAGNOSIS — E66.01 CLASS 2 SEVERE OBESITY DUE TO EXCESS CALORIES WITH SERIOUS COMORBIDITY AND BODY MASS INDEX (BMI) OF 37.0 TO 37.9 IN ADULT (H): ICD-10-CM

## 2022-09-01 DIAGNOSIS — C90.01 MULTIPLE MYELOMA IN REMISSION (H): Primary | ICD-10-CM

## 2022-09-01 DIAGNOSIS — E66.812 CLASS 2 SEVERE OBESITY DUE TO EXCESS CALORIES WITH SERIOUS COMORBIDITY AND BODY MASS INDEX (BMI) OF 37.0 TO 37.9 IN ADULT (H): ICD-10-CM

## 2022-09-01 DIAGNOSIS — E04.9 THYROID GOITER: ICD-10-CM

## 2022-09-01 DIAGNOSIS — E03.9 HYPOTHYROIDISM, UNSPECIFIED TYPE: ICD-10-CM

## 2022-09-01 LAB
ALBUMIN SERPL BCG-MCNC: 4.3 G/DL (ref 3.5–5.2)
ALP SERPL-CCNC: 115 U/L (ref 35–104)
ALT SERPL W P-5'-P-CCNC: 30 U/L (ref 10–35)
ANION GAP SERPL CALCULATED.3IONS-SCNC: 13 MMOL/L (ref 7–15)
AST SERPL W P-5'-P-CCNC: 23 U/L (ref 10–35)
BASOPHILS # BLD AUTO: 0.1 10E3/UL (ref 0–0.2)
BASOPHILS NFR BLD AUTO: 2 %
BILIRUB SERPL-MCNC: 1.1 MG/DL
BUN SERPL-MCNC: 13.3 MG/DL (ref 8–23)
CALCIUM SERPL-MCNC: 9 MG/DL (ref 8.8–10.2)
CHLORIDE SERPL-SCNC: 104 MMOL/L (ref 98–107)
CREAT SERPL-MCNC: 0.75 MG/DL (ref 0.51–0.95)
DEPRECATED HCO3 PLAS-SCNC: 25 MMOL/L (ref 22–29)
EOSINOPHIL # BLD AUTO: 0.4 10E3/UL (ref 0–0.7)
EOSINOPHIL NFR BLD AUTO: 12 %
ERYTHROCYTE [DISTWIDTH] IN BLOOD BY AUTOMATED COUNT: 14.6 % (ref 10–15)
GFR SERPL CREATININE-BSD FRML MDRD: 88 ML/MIN/1.73M2
GLUCOSE SERPL-MCNC: 184 MG/DL (ref 70–99)
HCT VFR BLD AUTO: 41.3 % (ref 35–47)
HGB BLD-MCNC: 13.5 G/DL (ref 11.7–15.7)
IMM GRANULOCYTES # BLD: 0 10E3/UL
IMM GRANULOCYTES NFR BLD: 0 %
LYMPHOCYTES # BLD AUTO: 0.8 10E3/UL (ref 0.8–5.3)
LYMPHOCYTES NFR BLD AUTO: 25 %
MCH RBC QN AUTO: 28.7 PG (ref 26.5–33)
MCHC RBC AUTO-ENTMCNC: 32.7 G/DL (ref 31.5–36.5)
MCV RBC AUTO: 88 FL (ref 78–100)
MONOCYTES # BLD AUTO: 0.4 10E3/UL (ref 0–1.3)
MONOCYTES NFR BLD AUTO: 12 %
NEUTROPHILS # BLD AUTO: 1.6 10E3/UL (ref 1.6–8.3)
NEUTROPHILS NFR BLD AUTO: 49 %
NRBC # BLD AUTO: 0 10E3/UL
NRBC BLD AUTO-RTO: 0 /100
PLATELET # BLD AUTO: 127 10E3/UL (ref 150–450)
POTASSIUM SERPL-SCNC: 4 MMOL/L (ref 3.4–5.3)
PROT SERPL-MCNC: 6.5 G/DL (ref 6.4–8.3)
RBC # BLD AUTO: 4.71 10E6/UL (ref 3.8–5.2)
SODIUM SERPL-SCNC: 142 MMOL/L (ref 136–145)
T4 FREE SERPL-MCNC: 1.54 NG/DL (ref 0.9–1.7)
TOTAL PROTEIN SERUM FOR ELP: 6.3 G/DL (ref 6.4–8.3)
TSH SERPL DL<=0.005 MIU/L-ACNC: 3.14 UIU/ML (ref 0.3–4.2)
WBC # BLD AUTO: 3.3 10E3/UL (ref 4–11)

## 2022-09-01 PROCEDURE — 80053 COMPREHEN METABOLIC PANEL: CPT | Performed by: REGISTERED NURSE

## 2022-09-01 PROCEDURE — 84443 ASSAY THYROID STIM HORMONE: CPT | Performed by: REGISTERED NURSE

## 2022-09-01 PROCEDURE — 84155 ASSAY OF PROTEIN SERUM: CPT | Mod: 91 | Performed by: REGISTERED NURSE

## 2022-09-01 PROCEDURE — 84439 ASSAY OF FREE THYROXINE: CPT | Performed by: REGISTERED NURSE

## 2022-09-01 PROCEDURE — 85004 AUTOMATED DIFF WBC COUNT: CPT | Performed by: REGISTERED NURSE

## 2022-09-01 PROCEDURE — 82040 ASSAY OF SERUM ALBUMIN: CPT | Performed by: REGISTERED NURSE

## 2022-09-01 PROCEDURE — 36415 COLL VENOUS BLD VENIPUNCTURE: CPT | Performed by: REGISTERED NURSE

## 2022-09-01 PROCEDURE — 84165 PROTEIN E-PHORESIS SERUM: CPT | Mod: TC | Performed by: PATHOLOGY

## 2022-09-01 PROCEDURE — 82784 ASSAY IGA/IGD/IGG/IGM EACH: CPT | Performed by: REGISTERED NURSE

## 2022-09-01 PROCEDURE — 83521 IG LIGHT CHAINS FREE EACH: CPT | Performed by: REGISTERED NURSE

## 2022-09-01 PROCEDURE — G0463 HOSPITAL OUTPT CLINIC VISIT: HCPCS

## 2022-09-01 PROCEDURE — 99214 OFFICE O/P EST MOD 30 MIN: CPT | Performed by: REGISTERED NURSE

## 2022-09-01 PROCEDURE — 86334 IMMUNOFIX E-PHORESIS SERUM: CPT | Performed by: PATHOLOGY

## 2022-09-01 ASSESSMENT — PAIN SCALES - GENERAL: PAINLEVEL: NO PAIN (0)

## 2022-09-01 NOTE — NURSING NOTE
Chief Complaint   Patient presents with     Blood Draw     Labs drawn via  by RN in lab.  VS taken       Labs collected from venipuncture by RN. Vitals taken. Checked in for appointment(s).    Adriana Barron RN

## 2022-09-01 NOTE — LETTER
2022         RE: Winter Loya  [address]  [place] [address]        Dear Colleague,    Thank you for referring your patient, Winter Loya, to the [facility]. Please see a copy of my visit note below.    ONCOLOGY/HEMATOLOGY PROGRESS NOTE  Sep 1, 2022    Reason for Visit: IgA Kappa Multiple Myeloma    Oncology HPI:   Winter Loya is a 66 year old woman with IgA Kappa Multiple Myeloma. In 2018 she had anemia and was fatigued. She was found to have IgA kappa monocloncal antibody with M-spike of 0.17. IgA elevated. Skeletal survey was unremarkable and UPEP had minimal protein (156 mg/m2). PET 2018 showed bone marrow consistent with hypermetabolic plasma cell myeloma. M spike was 0.17. She started RVD and Zometa. On 2019 she had an autologous transplant.      Her bone marrow bx from 2019 was sent here for review. It showed marrow cellularity of 60%, with decreased trilineage hematopoiesis and 15% plasma cells as well as peripheral blood with slight anemia. 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 She is on revlimid maintenance and zometa from her prior oncologist in [place]. On 19 her K/L ratio is 1.1, M spike is 0.04.     Interval history:   Winter presents alone today.  Her son  earlier this month so she is dealing with the grief from that.  She doesn't really feel like she has processed much of it yet, but she is taking a week away at a cabin next week to spend some time processing.  She notes increased neck pain for the past 6 months.  It comes and goes - some days it feels okay and other days she doesn't feel like she can lift up her head. Denies radicular pain in her arms.  Occasional headaches that accompany the neck pain. Thinks she has plantar fascitis - gets a lot of bilateral foot pain the morning after doing  a lot of walking.  She has been eating more recently - intended to participate in a weight loss program this month but ended up not doing it after her son's death. She has been having diarrhea, states they are working on getting her off metformin to see if that helps. Denies fevers, chills, night sweats, unexplained weight changes, dizziness, vision or hearing changes, new lumps or bumps, chest pain, shortness of breath, cough, abdominal pain, nausea, vomiting, changes to bladder, swelling of extremities, bleeding issues, or rash.      ROS: 10 point ROS neg other than the symptoms noted above in the HPI.      Past Medical History:   Diagnosis Date     Abnormal EMG 2021 5/25/2021    Interpretation: This is an abnormal study, demonstrating electrophysiologic evidence of a length-dependent axonal sensorimotor polyneuropathy. Asymmetry of peroneal compound muscle action potential amplitudes is a finding of uncertain significance.        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   I have personally reviewed the examination and initial interpretation and I agree with the findings.   ANNE GOODMAN MD     History of MRI of cervical spine 6/2020 3/15/2021    Impression: Multilevel cervical spondylosis, most pronounced at the level of C4-5 and C5-6 with moderate to severe spinal canal stenosis and moderate spinal canal stenosis at C6-7. No abnormal cord signal. No significant neural foraminal narrowing at any level.   I have personally reviewed the examination and initial interpretation and I agree with the findings.   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 (Patient not taking: No sig reported) 200 strip 3     blood glucose monitoring (NO BRAND SPECIFIED) meter device kit Use to test blood sugar two times daily or as directed. (Patient not taking: No sig reported) 1 kit 3     empagliflozin (JARDIANCE) 25 MG TABS tablet Take 1 tablet (25 mg) by mouth daily 90 tablet 3     insulin glargine (LANTUS SOLOSTAR) 100 UNIT/ML pen Inject 30 Units Subcutaneous At Bedtime 30 mL 3     insulin pen needle (FIFTY50 PEN NEEDLES) 32G X 4 MM miscellaneous Use one pen needle daily or as directed. 100 each 2     LENalidomide (REVLIMID) 10 MG CAPS capsule Take 1 capsule (10 mg) by mouth daily for 28 days 28 capsule 0     levothyroxine (SYNTHROID/LEVOTHROID) 200 MCG tablet Take 1 tablet (200 mcg) by mouth every morning (before breakfast) 30 tablet 3     loperamide (IMODIUM) 2 MG capsule Take 4 mg (two capsules) by mouth at onset of loose stools, followed by 2 mg (one capsule) after each loose stool. Maximum: 16 mg (8 capsules) daily 270 capsule 11     losartan (COZAAR) 25 MG tablet Take 1 tablet (25 mg) by mouth daily 90 tablet 2     metFORMIN (GLUCOPHAGE XR) 500 MG 24 hr tablet Take 2 tablets (1,000 mg) by mouth daily (with dinner) 180 tablet 3     pregabalin (LYRICA) 100 MG capsule TAKE ONE  CAPSULE BY MOUTH THREE TIMES DAILY (Patient taking differently: Take 100 mg by mouth 2 times daily) 270 capsule 3     propranolol ER (INDERAL LA) 60 MG 24 hr capsule Take 1 capsule (60 mg) by mouth daily (Patient not taking: Reported on 8/10/2022) 90 capsule 3     sertraline (ZOLOFT) 100 MG tablet Take 1 tablet (100 mg) by mouth daily. 90 tablet 0        No Known Allergies    Pulse 51   Temp 97.2  F (36.2  C) (Oral)   Resp 16   Wt 117.5 kg (259 lb 1.6 oz)   SpO2 98%   BMI 38.26 kg/m    Gen: tearful at times, alert, pleasant, NAD  HEENT: NC/AT,EOMI w/ PERRL, anicteric sclera. OP clear. MMM.   Neck: Supple, no LAD, full ROM  CV: normal S1,S2 with RRR no m/r/g  Resp: lungs CTA bilaterally with adequate air movement to bases. No wheezes or crackles  Abd: soft NTND no organomegaly or masses. BS normoactive.   Ext: warm and well perfused, no edema or cyanosis; 5/5 grasp strength bilaterally; 5/5 shoulder flexion and extension strength  Skin: no concerning lesions or rashes  Neuro: A&Ox4, no lateralizing sx. Grossly nonfocal.  Psych: appropriate, reactive      Labs:     Blood Counts       Recent Labs   Lab Test 09/01/22  1522 08/01/22  1625 07/11/22  0824 06/01/22  1350 05/09/22  0730   HGB 13.5 13.6 13.3 13.3 14.8   HCT 41.3 41.2 39.7 40.0 43.9   WBC 3.3* 3.4* 3.1* 3.8* 2.5*   ANEUTAUTO 1.6 1.7 1.8 2.3 1.6   ALYMPAUTO 0.8 0.7* 0.5* 0.7* 0.4*   AMONOAUTO 0.4 0.5 0.4 0.4 0.4   AEOSAUTO 0.4 0.4 0.3 0.3 0.0   ABSBASO 0.1 0.1 0.0 0.0 0.0   NRBCMAN 0.0  --   --  0.0 0.0   * 131* 138* 129* 113*       ABO/RH    No lab results found.      Chemistries     Basic Panel  Recent Labs   Lab Test 09/01/22  1522 08/01/22  1625 07/11/22  0824    140 140   POTASSIUM 4.0 3.8 3.3*   CHLORIDE 104 106 107   CO2 25 29 24   BUN 13.3 16 10   CR 0.75 0.81 0.67   * 165* 170*        Calcium, Magnesium, Phosphorus  Recent Labs   Lab Test 09/01/22  1522 08/01/22  1625 07/11/22  0824   ASHLEY 9.0 9.1 8.4*        LFTs  Recent Labs    Lab Test 09/01/22  1522 08/01/22  1625 07/11/22  0824   BILITOTAL 1.1 1.7* 1.1   ALKPHOS 115* 113 120   AST 23 11 29   ALT 30 34 33   ALBUMIN 4.3 4.1 3.7       LDH  No lab results found.    B2-Microglobulin  Recent Labs   Lab Test 02/18/20  0849   [id number] 1.6           Immunoglobulins     Recent Labs   Lab Test 08/01/22  1625 07/11/22  0824 06/01/22  1350 02/02/22  0857 11/29/21  1347 02/18/20  0849    547* 529* 667 684 488*       Recent Labs   Lab Test 08/01/22  1625 07/11/22  0824 06/01/22  1350 02/02/22  0857 11/29/21  1347 02/18/20  0849    100 98 134 153 72*       Recent Labs   Lab Test 08/01/22  1625 07/11/22  0824 06/01/22  1350 02/02/22  0857 11/29/21  1347 02/18/20  0849   IGM 37 26* 28* 30* 33* 39         Monocloncal Protein Studies     M spike    Recent Labs   Lab Test 08/01/22  1625 07/11/22  0824 06/01/22  1350 02/02/22  0857 12/29/21  1429 11/29/21  1347   ELPM 0.0 0.1* 0.0 0.0 0.0 0.0       Cardwell FLC    Recent Labs   Lab Test 08/01/22  1625 07/11/22  0824 06/01/22  1350 02/02/22  0857 11/29/21  1347   KFLCA 2.31* 1.92 1.80 1.85 2.17*       Lambda FLC    Recent Labs   Lab Test 08/01/22  1625 07/11/22  0824 06/01/22  1350 02/02/22  0857 11/29/21  1347   LFLCA 1.57 1.23 1.33 1.54 1.64       FLC Ratio    Recent Labs   Lab Test 08/01/22  1625 07/11/22  0824 06/01/22  1350 02/02/22  0857 11/29/21  1347   KLRA 1.47 1.56 1.35 1.20 1.32       Assessment/plan:   Winter Loya is a 66 year old woman with standard risk IgG kappa multiple myeloma, 3 years post auto-transplant, currently on lenalidomide maintenance.    Myeloma-specific labs are currently pending.  She had a monoclonal peak of 0.1 two months ago, but was back to 0.0 last month. Plan to continue maintenance lenalidomide indefinitely while tolerated until evidence of disease progression.  Continue acyclovir and aspirin.  She received her post-transplant vaccines in 2020 and is up to date on COVID vaccinations.    Neuropathy is likely  due to combination of bortezomb (during induction) and DM2.  Discussed with her that if she has worsening balance or mobility she should reach out to us; she does not think this is currently an issue but will let us know.    She has chronic diarrhea which predates her lenalidomide, currently controlled with imodium.  Suggested adding metamucil which she will plan to do. She is also trying to transition off of her metformin to see if that helps.    Neck pain has been present for about 6 months.  MR spine from 2020 shows significant cervical spondylosis with moderate to severe spinal canal stenosis.  Discussed that PT will likely be the best place to start, but that this is best managed through her PCP.  She will reach out to her PCP for further direction.    Monthly labs in [place].  RTC in 3 months with Dr. Zhu.    36 minutes spent on the date of the encounter doing chart review, review of test results, patient visit and documentation         Again, thank you for allowing me to participate in the care of your patient.      Sincerely,    FALGUNI Olivas CNP

## 2022-09-01 NOTE — NURSING NOTE
"Oncology Rooming Note    September 1, 2022 3:48 PM   Winter Loya is a 66 year old female who presents for:    Chief Complaint   Patient presents with     Blood Draw     Labs drawn via  by RN in lab.  VS taken     Oncology Clinic Visit     UM RETURN - MULTIPLE MYELOMA     Initial Vitals: /65   Pulse 51   Temp 97.2  F (36.2  C) (Oral)   Resp 16   Wt 117.5 kg (259 lb 1.6 oz)   SpO2 98%   BMI 38.26 kg/m   Estimated body mass index is 38.26 kg/m  as calculated from the following:    Height as of 7/19/22: 1.753 m (5' 9\").    Weight as of this encounter: 117.5 kg (259 lb 1.6 oz). Body surface area is 2.39 meters squared.  No Pain (0) Comment: Data Unavailable   No LMP recorded. Patient is postmenopausal.  Allergies reviewed: Yes  Medications reviewed: Yes    Medications: Medication refills not needed today.  Pharmacy name entered into [employer]:    [facility] - [place], [place] - [address] [address] [facility] [employer] - [place], [place] - [address] [place] - [address] [place] [address] [facility]ACY [id number] - [place], [place] - [address] [place] [address]    Clinical concerns: No new concerns. Iza was notified.      Maximo Stewart LPN            "

## 2022-09-02 LAB
ALBUMIN SERPL ELPH-MCNC: 4.2 G/DL (ref 3.7–5.1)
ALPHA1 GLOB SERPL ELPH-MCNC: 0.3 G/DL (ref 0.2–0.4)
ALPHA2 GLOB SERPL ELPH-MCNC: 0.5 G/DL (ref 0.5–0.9)
B-GLOBULIN SERPL ELPH-MCNC: 0.7 G/DL (ref 0.6–1)
GAMMA GLOB SERPL ELPH-MCNC: 0.6 G/DL (ref 0.7–1.6)
IGA SERPL-MCNC: 122 MG/DL (ref 84–499)
IGG SERPL-MCNC: 597 MG/DL (ref 610–1616)
IGM SERPL-MCNC: 34 MG/DL (ref 35–242)
KAPPA LC FREE SER-MCNC: 2.12 MG/DL (ref 0.33–1.94)
KAPPA LC FREE/LAMBDA FREE SER NEPH: 1.3 {RATIO} (ref 0.26–1.65)
LAMBDA LC FREE SERPL-MCNC: 1.63 MG/DL (ref 0.57–2.63)
M PROTEIN SERPL ELPH-MCNC: 0 G/DL
PROT PATTERN SERPL ELPH-IMP: ABNORMAL
PROT PATTERN SERPL IFE-IMP: NORMAL

## 2022-09-02 PROCEDURE — 84165 PROTEIN E-PHORESIS SERUM: CPT | Mod: 26 | Performed by: PATHOLOGY

## 2022-09-02 PROCEDURE — 86334 IMMUNOFIX E-PHORESIS SERUM: CPT | Mod: 26 | Performed by: PATHOLOGY

## 2022-09-05 DIAGNOSIS — C90.01 MULTIPLE MYELOMA IN REMISSION (H): Primary | ICD-10-CM

## 2022-09-09 ENCOUNTER — TELEPHONE (OUTPATIENT)
Dept: ONCOLOGY | Facility: CLINIC | Age: 67
End: 2022-09-09

## 2022-09-09 DIAGNOSIS — C90.01 MULTIPLE MYELOMA IN REMISSION (H): Primary | ICD-10-CM

## 2022-09-09 RX ORDER — LENALIDOMIDE 10 MG/1
10 CAPSULE ORAL DAILY
Qty: 28 CAPSULE | Refills: 0 | Status: SHIPPED | OUTPATIENT
Start: 2022-09-09 | End: 2022-11-15

## 2022-09-09 NOTE — TELEPHONE ENCOUNTER
Oral Chemotherapy Monitoring Program    Medication: Revlimid  Rx:  10 mg PO daily for a 28 day cycle    Auth #: [id number]  Risk Category: Adult female NOT of reproductive capacity    Routine survey questions reviewed.    Thank you,    Mary Franklin  Oncology Pharmacy Liaison LUCINDA [email or web address]  Phone: [phone]  Fax: [phone]

## 2022-09-19 ENCOUNTER — VIRTUAL VISIT (OUTPATIENT)
Dept: PHARMACY | Facility: CLINIC | Age: 67
End: 2022-09-19
Payer: MEDICAID

## 2022-09-19 DIAGNOSIS — Z79.4 TYPE 2 DIABETES MELLITUS WITH DIABETIC POLYNEUROPATHY, WITH LONG-TERM CURRENT USE OF INSULIN (H): Primary | ICD-10-CM

## 2022-09-19 DIAGNOSIS — F33.2 SEVERE EPISODE OF RECURRENT MAJOR DEPRESSIVE DISORDER, WITHOUT PSYCHOTIC FEATURES (H): ICD-10-CM

## 2022-09-19 DIAGNOSIS — C90.01 MULTIPLE MYELOMA IN REMISSION (H): ICD-10-CM

## 2022-09-19 DIAGNOSIS — E78.5 HYPERLIPIDEMIA, UNSPECIFIED HYPERLIPIDEMIA TYPE: ICD-10-CM

## 2022-09-19 DIAGNOSIS — R25.1 TREMOR: ICD-10-CM

## 2022-09-19 DIAGNOSIS — E89.0 POSTOPERATIVE HYPOTHYROIDISM: ICD-10-CM

## 2022-09-19 DIAGNOSIS — E11.42 TYPE 2 DIABETES MELLITUS WITH DIABETIC POLYNEUROPATHY, WITH LONG-TERM CURRENT USE OF INSULIN (H): Primary | ICD-10-CM

## 2022-09-19 DIAGNOSIS — R03.0 ELEVATED BLOOD PRESSURE READING WITHOUT DIAGNOSIS OF HYPERTENSION: ICD-10-CM

## 2022-09-19 DIAGNOSIS — G62.89 AXONAL NEUROPATHY: ICD-10-CM

## 2022-09-19 DIAGNOSIS — F41.1 GAD (GENERALIZED ANXIETY DISORDER): ICD-10-CM

## 2022-09-19 PROCEDURE — 99607 MTMS BY PHARM ADDL 15 MIN: CPT | Performed by: PHARMACIST

## 2022-09-19 PROCEDURE — 99606 MTMS BY PHARM EST 15 MIN: CPT | Performed by: PHARMACIST

## 2022-09-19 NOTE — PATIENT INSTRUCTIONS
"Recommendations from today's MTM visit:                                                         1. Think about moving your atorvastatin to morning (it will work just as well).    2. Establish care with a new provider, if you are able to start checking blood sugar every morning and two hours after supper that would be great! Looks like AccuCheck Guide is covered under your insurance, once you establish with a new provider we can get that send to the pharmacy for you.     Follow-up: Return in about 4 weeks (around 10/17/2022) for Medication Therapy Management.    It was great speaking with you today.  I value your experience and would be very thankful for your time in providing feedback in our clinic survey. In the next few days, you may receive an email or text message from [employer] with a link to a survey related to your  clinical pharmacist.\"     To schedule another MTM appointment, please call the clinic directly or you may call the MTM scheduling line at [phone] or toll-free at [phone].     My Clinical Pharmacist's contact information:                                                      Please feel free to contact me with any questions or concerns you have.      Vonnie Corrigan, PharmD  Medication Therapy Management Pharmacist  [phone]   "

## 2022-09-19 NOTE — PROGRESS NOTES
Medication Therapy Management (MTM) Encounter    ASSESSMENT:                            Medication Adherence/Access: No issues identified    Type 2 Diabetes: Patient is meeting A1c goal of < 7%, may benefit from checking blood sugar again, needs to establish care with new provider.     Diarrhea: stable    Hypothyroidism: Stable. TSH is WNL.    Multiple myeloma: Plan in place.     Hyperlipidemia: May benefit from moving atorvastatin to morning for adherence.    Hypertension: Stable. Blood pressure at goal < 140/90.    Shakes/tremors: Stable.     Neuropathy: Stable.     Depression/Anxiety: No concerns.    PLAN:                            1. Think about moving your atorvastatin to morning (it will work just as well).    2. Establish care with a new provider, if you are able to start checking blood sugar every morning and two hours after supper that would be great! Looks like AccuCheck Guide is covered under your insurance, once you establish with a new provider we can get that send to the pharmacy for you.     Follow-up: Return in about 4 weeks (around 10/17/2022) for Medication Therapy Management.    SUBJECTIVE/OBJECTIVE:                          Winter Loya is a 66 year old female called for a follow-up visit.  Today's visit is a follow-up MTM visit from 8/10/22.     Reason for visit: diabetes follow-up.    Allergies/ADRs: Reviewed in chart  Past Medical History: Reviewed in chart  Tobacco: She reports that she quit smoking about 17 years ago. Her smoking use included cigarettes. She smoked 1.00 pack per day. She has never used smokeless tobacco.  Alcohol: Less than 1 beverage / month  Caffeine: 1 diet coke/day and 2 cups coffee/day    Medication Adherence/Access:    - sometimes forgetting evening medications  The patient fills medications at [place]: NO, fills medications at [facility] and [facility] for Revlimid.    Type 2 Diabetes:    Metformin XR one tablet with dinner - she doesn't remember what mg hers are  Lantus 30  units at bedtime   Jardiance 25 mg daily     Has been on Trulicity in the past, tolerated well.   Patient is experiencing the following side effects: diarrhea - nobody knows why she has this. She has done a trial off of metformin with Delmi Gross without improvement in diarrhea.   Blood sugar monitoring: never, but she'd like to start again Ranges (patient reported): n/a  Symptoms of low blood sugar? none  Symptoms of high blood sugar? none  Eye exam: up to date  Foot exam: up to date  Diet/Exercise: did not discuss in detail   Aspirin: Taking 81mg daily for primary prevention   Statin: Yes: atorvastatin   ACEi/ARB: Yes: losartan.   Urine Albumin:   Lab Results   Component Value Date    UMALCR 75.28 (H) 02/02/2022     Lab Results   Component Value Date    A1C 6.8 07/11/2022    A1C 9.0 12/29/2021    A1C 7.8 08/27/2021    A1C 7.8 07/26/2021    A1C 7.6 03/08/2021    A1C 8.8 12/07/2020    A1C 9.1 08/21/2020    A1C 6.7 03/05/2020     Diarrhea:   Loperamide 4 mg twice daily     This has the only thing that has really helped. This is well controlled right now and really good. But if she forgets one of these she can usually tell right away.     Hypothyroidism:  levothyroxine 200 mcg daily when she gets up, 4:30 am. Eats breakfast 6-8 am, takes other meds 5 or 5:15 am.     Dose recently adjusted by Endocrinology via the Weight Management Program.   Patient is having the following symptoms: has had some weight loss upfront, she can't quit eating, they might consider naltrexone.  TSH   Date Value Ref Range Status   09/01/2022 3.14 0.30 - 4.20 uIU/mL Final   07/11/2022 12.10 (H) 0.40 - 4.00 mU/L Final   01/27/2021 2.08 0.40 - 4.00 mU/L Final     Multiple myeloma:    Revlimid 10 mg daily   Acyclovir 400 mg twice daily (Shingles prophylaxis), also helps prevent her cold sores    She had a stem cell transplant in either 2018 or 2019, has been in remission since. She'd prefer to be off the Revlimid, however oncology prefers  she remain on this. Tolerates it well.     Hyperlipidemia:   Atorvastatin 20 mg daily at bedtime - sometimes forgets    Patient reports no significant myalgias or other side effects.  Recent Labs   Lab Test 22  0857 21  1705   CHOL 118 135   HDL 44* 48*   LDL 29 46   TRIG 223* 207*     Hypertension:   Losartan 25 mg daily     Patient does not self-monitor blood pressure.  Patient reports no current medication side effects.  BP Readings from Last 3 Encounters:   22 139/65   22 112/82   22 115/54     Shakes/tremors:    propranolol ER 60 mg daily    Follows with neurologist. Her dad  of Parkinson's disease.   States this/these are effective. Denies side effects.     Neuropathy:   Lyrica 100 mg two times daily (doesn't bring pills with her to work/lunch)    Gabapentin was not helpful for her, but the Lyrica has helped a lot. She feels it also helps her depression. Denies side effects.     Depression/Anxiety:   sertraline 100 mg daily     Patient reports that depression symptoms are improved.   PHQ 2022   PHQ-9 Total Score 16 18 10   Q9: Thoughts of better off dead/self-harm past 2 weeks Not at all Not at all Not at all     Today's Vitals: There were no vitals taken for this visit.  ----------------      I spent 34 minutes with this patient today. All changes were made via collaborative practice agreement with FALGUNI Duenas CNP. A copy of the visit note was provided to the patient's provider(s).    The patient was sent via [employer] a summary of these recommendations.     Vonnie Corrigan, PharmD  Medication Therapy Management Pharmacist  [phone]    Telemedicine Visit Details  Type of service:  Telephone visit  Start Time: 2:03 PM  End Time: 2:37 PM  Originating Location (patient location): Home  Distant Location (provider location):  [facility]     Medication Therapy Recommendations  No medication therapy recommendations to  display

## 2022-09-19 NOTE — Clinical Note
CE DAWSON note, thanks!  Vonnie Corrigan, PharmD Medication Therapy Management Pharmacist [phone]

## 2022-09-26 ENCOUNTER — OFFICE VISIT (OUTPATIENT)
Dept: PSYCHOLOGY | Facility: CLINIC | Age: 67
End: 2022-09-26
Payer: MEDICAID

## 2022-09-26 DIAGNOSIS — F33.1 MAJOR DEPRESSIVE DISORDER, RECURRENT EPISODE, MODERATE WITH ATYPICAL FEATURES (H): Primary | ICD-10-CM

## 2022-09-26 DIAGNOSIS — F41.1 GAD (GENERALIZED ANXIETY DISORDER): ICD-10-CM

## 2022-09-26 PROCEDURE — 90834 PSYTX W PT 45 MINUTES: CPT | Performed by: STUDENT IN AN ORGANIZED HEALTH CARE EDUCATION/TRAINING PROGRAM

## 2022-09-26 NOTE — PROGRESS NOTES
"[facility]                                     Progress Note    Patient Name: Winter Loya  Date: 2022         Service Type: Individual      Session Start Time: 2.30  Session End Time: 3.18     Session Length: 48 mns    Session #: 12    Attendees: Client attended alone    Service Modality:  In-person    DATA  Interactive Complexity: No  Crisis: No        Progress Since Last Session (Related to Symptoms / Goals / Homework):   Symptoms: pt reported there has been no change in syptoms from previous session    Homework: Achieved / completed to satisfaction Review pt's reading on  \" When our child has  from a long term illness\" and processed feelings and thoughts about assigned reading.      Episode of Care Goals: Minimal progress - ACTION (Actively working towards change); Intervened by reinforcing change plan / affirming steps taken     Current / Ongoing Stressors and Concerns:   Pt reported recurrent of anger and frustration with her 35 year old son that is still living with her and more  with her self for raising \"3 kids that are drug addicts\" and attribuited their addiction to  failure in her parenting abilities.     Treatment Objective(s) Addressed in This Session:               Identify negative self-talk and behaviors: challenge core beliefs, myths, and actions     Intervention:     Discussion today with pt on self-compassion as an awareness of our own pain and suffering, and understanding that this is a hard, but normal human experience and that it also involve directing feelings of kindness and care towards ourselves, Work with pt in understanding that self-compassion can bring great benefits for our mental health and well-being. Particularly, it can activate our soothe system, and help us manage difficult emotions like anxiety, anger, and depression.               ASSESSMENT: Current Emotional / Mental Status (status of significant symptoms):   Risk status (Self / Other harm " or suicidal ideation)   Patient denies current fears or concerns for personal safety.   Patient denies current or recent suicidal ideation or behaviors.   Patient denies current or recent homicidal ideation or behaviors.   Patient denies current or recent self injurious behavior or ideation.   Patient denies other safety concerns.   Patient reports there has been no change in risk factors since their last session.     Patient reports there has been no change in protective factors since their last session.     Recommended that patient call 911 or go to the local ED should there be a change in any of these risk factors.     Appearance:   Appropriate    Eye Contact:   Good    Psychomotor Behavior: Normal    Attitude:   Cooperative  Interested Friendly   Orientation:   Person Place Time Situation   Speech    Rate / Production: Normal/ Responsive    Volume:  Normal    Mood:    Depressed  Anhedonia Grieving   Affect:    Worrisome    Thought Content:  Clear    Thought Form:  Coherent    Insight:    Fair      Medication Review:   No changes to current psychiatric medication(s)     Medication Compliance:   Yes     Changes in Health Issues:   None reported     Chemical Use Review:   Substance Use: Chemical use reviewed, no active concerns identified      Tobacco Use: No current tobacco use.      Diagnosis:    296.32 (F33.1) Major Depressive Disorder, Recurrent Episode, Moderate _ and With anxious distress    Collateral Reports Completed:   Not Applicable    PLAN: (Patient Tasks / Therapist Tasks / Other)    Read provided handout on self compassion    CHERISE Castellon                   ----- Service Performed and Documented by CHERISE------  This note was reviewed and clinical supervision by AMY Ayala [facility]    9/27/2022   ______________________________________________________________________    Individual Treatment Plan    Patient's Name: Winter Loya  YOB: 1955    Date of Creation: 04/18/2022  Date  Treatment Plan Last Reviewed/Revised: 07/25/2022    DSM5 Diagnoses: 296.32 (F33.1) Major Depressive Disorder, Recurrent Episode, Moderate _ and With anxious distress  Psychosocial / Contextual Factors:   PROMIS (reviewed every 90 days):     Referral / Collaboration:  Referral to another professional/service is not indicated at this time..    Anticipated number of session for this episode of care: 15-25  Anticipation frequency of session: Every other week  Anticipated Duration of each session: 38-52 minutes  Treatment plan will be reviewed in 90 days or when goals have been changed.       MeasurableTreatment Goal(s) related to diagnosis / functional impairment(s)    Goal 1: Patient will identify specific triggers to feelings of depression and improve coping with depression by  90 %.    I will know I've met my goal when     Objective #A (Patient Action)    Patient will identify and practice 3 outdoor things that brings her ross daily and repeat that every day.  Status: Continued - Date(s): 10/25/2022    Intervention(s)  Therapist will provide psychoeducation, behavioral activation, and cognitive restructuring.)    Objective #B  Patient will identify specific areas of cognitive distortion and challenge irrational thoughts with reality   Status: Continued - Date(s): 10/25/2022       Intervention(s)  Therapist will teach patient how negative thoughts can lead to negative feelings and actions and ways to reframe thoughts in a more positive way leading to more positive feelings and helpful behaviors    Objective #C  Patient will learn and practice relaxation techniques to manage anxiety.  Status: Continued - Date(s):  10/25/2022    Intervention(s)  Therapist will teach patient thought stopping, deep breathing exercises, mindful meditation, and creative visualization.  Patient has reviewed and agreed to the above plan.      CHERISE Castellon  April 18, 2022

## 2022-09-27 PROBLEM — M72.2 BILATERAL PLANTAR FASCIITIS: Status: ACTIVE | Noted: 2022-09-27

## 2022-09-28 ENCOUNTER — LAB (OUTPATIENT)
Dept: LAB | Facility: CLINIC | Age: 67
End: 2022-09-28
Payer: MEDICAID

## 2022-09-28 DIAGNOSIS — C90.01 MULTIPLE MYELOMA IN REMISSION (H): ICD-10-CM

## 2022-09-28 DIAGNOSIS — E89.0 POSTOPERATIVE HYPOTHYROIDISM: Chronic | ICD-10-CM

## 2022-09-28 DIAGNOSIS — Z79.4 TYPE 2 DIABETES MELLITUS WITH DIABETIC POLYNEUROPATHY, WITH LONG-TERM CURRENT USE OF INSULIN (H): ICD-10-CM

## 2022-09-28 DIAGNOSIS — E11.42 TYPE 2 DIABETES MELLITUS WITH DIABETIC POLYNEUROPATHY, WITH LONG-TERM CURRENT USE OF INSULIN (H): ICD-10-CM

## 2022-09-28 LAB
BASOPHILS # BLD AUTO: 0 10E3/UL (ref 0–0.2)
BASOPHILS NFR BLD AUTO: 1 %
EOSINOPHIL # BLD AUTO: 0.2 10E3/UL (ref 0–0.7)
EOSINOPHIL NFR BLD AUTO: 7 %
ERYTHROCYTE [DISTWIDTH] IN BLOOD BY AUTOMATED COUNT: 15.2 % (ref 10–15)
HBA1C MFR BLD: 7.6 % (ref 0–5.6)
HCT VFR BLD AUTO: 40.2 % (ref 35–47)
HGB BLD-MCNC: 13.5 G/DL (ref 11.7–15.7)
LYMPHOCYTES # BLD AUTO: 0.6 10E3/UL (ref 0.8–5.3)
LYMPHOCYTES NFR BLD AUTO: 21 %
MCH RBC QN AUTO: 28.9 PG (ref 26.5–33)
MCHC RBC AUTO-ENTMCNC: 33.6 G/DL (ref 31.5–36.5)
MCV RBC AUTO: 86 FL (ref 78–100)
MONOCYTES # BLD AUTO: 0.2 10E3/UL (ref 0–1.3)
MONOCYTES NFR BLD AUTO: 8 %
NEUTROPHILS # BLD AUTO: 2 10E3/UL (ref 1.6–8.3)
NEUTROPHILS NFR BLD AUTO: 64 %
PLATELET # BLD AUTO: 124 10E3/UL (ref 150–450)
RBC # BLD AUTO: 4.67 10E6/UL (ref 3.8–5.2)
TOTAL PROTEIN SERUM FOR ELP: 6.1 G/DL (ref 6.4–8.3)
WBC # BLD AUTO: 3.1 10E3/UL (ref 4–11)

## 2022-09-28 PROCEDURE — 83036 HEMOGLOBIN GLYCOSYLATED A1C: CPT

## 2022-09-28 PROCEDURE — 36415 COLL VENOUS BLD VENIPUNCTURE: CPT

## 2022-09-28 PROCEDURE — 80050 GENERAL HEALTH PANEL: CPT

## 2022-09-28 PROCEDURE — 86334 IMMUNOFIX E-PHORESIS SERUM: CPT | Performed by: PATHOLOGY

## 2022-09-28 PROCEDURE — 84439 ASSAY OF FREE THYROXINE: CPT

## 2022-09-28 PROCEDURE — 83521 IG LIGHT CHAINS FREE EACH: CPT

## 2022-09-28 PROCEDURE — 82784 ASSAY IGA/IGD/IGG/IGM EACH: CPT

## 2022-09-28 PROCEDURE — 84165 PROTEIN E-PHORESIS SERUM: CPT

## 2022-09-29 LAB
ALBUMIN SERPL-MCNC: 3.9 G/DL (ref 3.4–5)
ALP SERPL-CCNC: 105 U/L (ref 40–150)
ALT SERPL W P-5'-P-CCNC: 29 U/L (ref 0–50)
ANION GAP SERPL CALCULATED.3IONS-SCNC: 5 MMOL/L (ref 3–14)
AST SERPL W P-5'-P-CCNC: 14 U/L (ref 0–45)
BILIRUB SERPL-MCNC: 2.3 MG/DL (ref 0.2–1.3)
BUN SERPL-MCNC: 14 MG/DL (ref 7–30)
CALCIUM SERPL-MCNC: 8.5 MG/DL (ref 8.5–10.1)
CHLORIDE BLD-SCNC: 107 MMOL/L (ref 94–109)
CO2 SERPL-SCNC: 27 MMOL/L (ref 20–32)
CREAT SERPL-MCNC: 0.77 MG/DL (ref 0.52–1.04)
GFR SERPL CREATININE-BSD FRML MDRD: 86 ML/MIN/1.73M2
GLUCOSE BLD-MCNC: 108 MG/DL (ref 70–99)
IGA SERPL-MCNC: 120 MG/DL (ref 84–499)
IGG SERPL-MCNC: 569 MG/DL (ref 610–1616)
IGM SERPL-MCNC: 35 MG/DL (ref 35–242)
KAPPA LC FREE SER-MCNC: 2.21 MG/DL (ref 0.33–1.94)
KAPPA LC FREE/LAMBDA FREE SER NEPH: 1.57 {RATIO} (ref 0.26–1.65)
LAMBDA LC FREE SERPL-MCNC: 1.41 MG/DL (ref 0.57–2.63)
POTASSIUM BLD-SCNC: 3.6 MMOL/L (ref 3.4–5.3)
PROT SERPL-MCNC: 6.6 G/DL (ref 6.8–8.8)
SODIUM SERPL-SCNC: 139 MMOL/L (ref 133–144)
T4 FREE SERPL-MCNC: 1.16 NG/DL (ref 0.76–1.46)
TSH SERPL DL<=0.005 MIU/L-ACNC: 4.68 MU/L (ref 0.4–4)

## 2022-09-30 LAB
ALBUMIN SERPL ELPH-MCNC: 4.1 G/DL (ref 3.7–5.1)
ALPHA1 GLOB SERPL ELPH-MCNC: 0.3 G/DL (ref 0.2–0.4)
ALPHA2 GLOB SERPL ELPH-MCNC: 0.5 G/DL (ref 0.5–0.9)
B-GLOBULIN SERPL ELPH-MCNC: 0.7 G/DL (ref 0.6–1)
GAMMA GLOB SERPL ELPH-MCNC: 0.6 G/DL (ref 0.7–1.6)
M PROTEIN SERPL ELPH-MCNC: 0 G/DL
PROT PATTERN SERPL ELPH-IMP: ABNORMAL
PROT PATTERN SERPL IFE-IMP: NORMAL

## 2022-10-02 PROBLEM — H25.813 COMBINED FORMS OF AGE-RELATED CATARACT OF BOTH EYES: Status: ACTIVE | Noted: 2022-10-02

## 2022-10-02 PROBLEM — Z98.890 HISTORY OF LASER PHOTOCOAGULATION OF RETINA: Status: ACTIVE | Noted: 2022-10-02

## 2022-10-02 PROBLEM — H40.003 GLAUCOMA SUSPECT OF BOTH EYES: Status: ACTIVE | Noted: 2022-10-02

## 2022-10-02 ASSESSMENT — PACHYMETRY
EXAM_DATE: 6/16/2021
OD_CT(UM): 567
OS_CT(UM): 570

## 2022-10-03 ENCOUNTER — TELEPHONE (OUTPATIENT)
Dept: FAMILY MEDICINE | Facility: CLINIC | Age: 67
End: 2022-10-03

## 2022-10-03 NOTE — TELEPHONE ENCOUNTER
Called patient. Left voice message to return call at [phone].    Meri Tatum RN  [facility]

## 2022-10-03 NOTE — TELEPHONE ENCOUNTER
"I received a request from pharmacy asking specific brand for     Blood glucose monitor  Test strips  Lancets    Can you call patient and ask what she is using, what her insurance covers? (Apparently I can't write the prescription for \"whatever insurance covers\"...)     Montse Bucio PA-C   "

## 2022-10-04 ENCOUNTER — TELEPHONE (OUTPATIENT)
Dept: FAMILY MEDICINE | Facility: CLINIC | Age: 67
End: 2022-10-04

## 2022-10-04 NOTE — TELEPHONE ENCOUNTER
Medicare Part B Requires specific brand ordered and directions.     The original RX was for Blood Glucose Calibration normal in Vitro Liquid (No Brand Specified).

## 2022-10-04 NOTE — TELEPHONE ENCOUNTER
Routed to provider for clarification of prescription. Please see below message.     Meghna Rivera RN, BSN   [facility]

## 2022-10-05 NOTE — TELEPHONE ENCOUNTER
Spoke with patient. Patient uses Accucheck. Writer also spoke with pharmacy. Prescription needs to be for Accucheck Guide for test strips and device and Accucheck Soft clix for lancets.     Diagnosis code, quantity with refills needed for Medicare B

## 2022-10-05 NOTE — TELEPHONE ENCOUNTER
Left message on answering machine for patient to call back to the nurse at [phone].    Margaret Coleman RN  [facility]

## 2022-10-06 ENCOUNTER — TELEPHONE (OUTPATIENT)
Dept: ONCOLOGY | Facility: CLINIC | Age: 67
End: 2022-10-06

## 2022-10-06 DIAGNOSIS — C90.01 MULTIPLE MYELOMA IN REMISSION (H): Primary | ICD-10-CM

## 2022-10-06 RX ORDER — LENALIDOMIDE 10 MG/1
10 CAPSULE ORAL DAILY
Qty: 28 CAPSULE | Refills: 0 | Status: SHIPPED | OUTPATIENT
Start: 2022-10-06 | End: 2022-11-15

## 2022-10-06 NOTE — TELEPHONE ENCOUNTER
Oral Chemotherapy Monitoring Program    Medication: Revlimid  Rx:  10 mg PO daily for a 28 day cycle  Auth #: [id number]  Risk Category: Adult female NOT of reproductive capacity    Routine survey questions reviewed.    Thank you,    Mary Franklin  Oncology Pharmacy Liaison II  [email or web address]  Phone: [phone]  Fax: [phone]

## 2022-10-07 ENCOUNTER — THERAPY VISIT (OUTPATIENT)
Dept: PHYSICAL THERAPY | Facility: CLINIC | Age: 67
End: 2022-10-07
Payer: MEDICAID

## 2022-10-07 DIAGNOSIS — R29.898 COMPLAINTS OF LEG WEAKNESS: ICD-10-CM

## 2022-10-07 DIAGNOSIS — M72.2 BILATERAL PLANTAR FASCIITIS: ICD-10-CM

## 2022-10-07 PROCEDURE — 97530 THERAPEUTIC ACTIVITIES: CPT | Mod: GP | Performed by: PHYSICAL THERAPIST

## 2022-10-07 PROCEDURE — 97110 THERAPEUTIC EXERCISES: CPT | Mod: GP | Performed by: PHYSICAL THERAPIST

## 2022-10-07 PROCEDURE — 97161 PT EVAL LOW COMPLEX 20 MIN: CPT | Mod: GP | Performed by: PHYSICAL THERAPIST

## 2022-10-07 NOTE — PROGRESS NOTES
Physical Therapy Initial Evaluation  Subjective:  The history is provided by the patient.   Therapist Generated HPI Evaluation  Problem details: Has had pain in both feet since had a stem cell transplant in 2018. After transplant had drop foot and weakness is legs. Having a lot of heel pain. No injury. Thinks it could be neuropathy. Wearing compression socks at night and they seem to help. Legs are getting weaker because not as active right now. Gets pain in bottom of both feet. Pain is usually worse in the mornings. Going from sitting to standing is difficult. .         Type of problem:  Bilateral feet.    This is a chronic condition.  Condition occurred with:  Insidious onset.  Where condition occurred: for unknown reasons.  Patient reports pain:  Longitudinal arch.  and is constant.  Pain radiates to:  Foot. Pain is worse in the A.M..  Since onset symptoms are gradually worsening.  Associated symptoms:  Loss of strength. Symptoms are exacerbated by ascending stairs, descending stairs, standing and walking        Restrictions due to condition include:  Working in normal job without restrictions.  Barriers include:  None as reported by patient.    Patient Health History  Winter Loya being seen for Feet pain / Knee weakness.     Problem began: 10/7/2018.   Problem occurred: Stem cell transplant   Pain is reported as 2/10 on pain scale.    Pertinent medical history includes: anemia, cancer, depression, diabetes, hepatitis, high blood pressure, history of fractures, incontinence, mental illness, numbness/tingling, overweight, smoking and thyroid problems.   Red flags:  Calf pain-swelling-warmth, changes in bowel and bladder habits, cold/hot extremity, foot drop and significant weakness.     Surgeries include:  Cancer surgery. Other surgery history details: Stem cell transplant.    Current medications:  Anti-depressants, high blood pressure medication and thyroid medication.    Current occupation is Chemical  dependence counselor.   Primary job tasks include:  Computer work, lifting/carrying, prolonged sitting and driving.                                    Objective:    Gait:  Ambulates with reduced stride length and antalgic gait. Reduced heel strike and toe off. Several tries needed to stand from chair in waiting room.              Ankle/Foot Evaluation  ROM:    AROM:    Dorsiflexion:  Left:   5  Right:   -12  Plantarflexion:  Left:  60    Right:  68  Inversion:  Left:  15     Right:  34  Eversion:  34     Right:  27        Strength:    Dorsiflexion:  Left: 5/5     Pain:   Right: 5/5   Pain:  Plantarflexion: Left: 5/5   Pain:   Right: 5/5  Pain:  Inversion:Left: 4-/5  Pain:     Right: 5/5  Pain:  Eversion:Left: 5/5  Pain:  Right: 5/5  Pain:                  LIGAMENT TESTING: normal                PALPATION: Palpation of ankle: Hip flexion - 4/5 B, Hip abduction - 4-/5 B, Hip extension - 4-/5 B, Knee extension - 4/5 B, Knee flexion - 4/5 B.    EDEMA: Edema ankle: Mild to moderate edema of lower legs and ankle/feet.                                                              General     ROS    Assessment/Plan:    Patient is a 66 year old female with both sides foot and lower extremity weakness complaints.    Patient has the following significant findings with corresponding treatment plan.                Diagnosis 1:  Bilateral foot pain  Pain -  US, electric stimulation, manual therapy, splint/taping/bracing/orthotics, self management, education and home program  Decreased ROM/flexibility - manual therapy, therapeutic exercise, therapeutic activity and home program  Decreased joint mobility - manual therapy, therapeutic exercise, therapeutic activity and home program  Decreased strength - therapeutic exercise, therapeutic activities and home program  Decreased function - therapeutic activities and home program  Diagnosis 2:  Balance deficits   Decreased strength - therapeutic exercise, therapeutic activities and home  program  Impaired balance - neuro re-education, gait training, therapeutic activities, adaptive equipment/assistive device and home program  Impaired gait - gait training, assistive devices and home program  Decreased function - therapeutic activities, home program and functional performance testing    Therapy Evaluation Codes:     Cumulative Therapy Evaluation is: Low complexity.    Previous and current functional limitations:  (See Goal Flow Sheet for this information)    Short term and Long term goals: (See Goal Flow Sheet for this information)     Communication ability:  Patient appears to be able to clearly communicate and understand verbal and written communication and follow directions correctly.  Treatment Explanation - The following has been discussed with the patient:   RX ordered/plan of care  Anticipated outcomes  Possible risks and side effects  This patient would benefit from PT intervention to resume normal activities.   Rehab potential is good.    Frequency:  1 X week, once daily  Duration:  for 8 weeks  Discharge Plan:  Achieve all LTG.  Independent in home treatment program.  Reach maximal therapeutic benefit.    Please refer to the daily flowsheet for treatment today, total treatment time and time spent performing 1:1 timed codes.

## 2022-10-07 NOTE — PROGRESS NOTES
[facility]    OUTPATIENT Physical Therapy ORTHOPEDIC EVALUATION  PLAN OF TREATMENT FOR OUTPATIENT REHABILITATION  (COMPLETE FOR INITIAL CLAIMS ONLY)  Patient's Last Name, First Name, M.I.  YOB: 1955  Winter Loya    Provider s Name:  [facility]   Medical Record No.  [id number]   Start of Care Date:  10/07/22   Onset Date:   09/27/22 (Date of order)   Treatment Diagnosis:  Bilateral plantar fasciitis / Balance deficits Medical Diagnosis:     Bilateral plantar fasciitis  Complaints of leg weakness       Goals:     10/07/22 0500   Body Part   Goals listed below are for Feet   Goal #1   Goal #1 standing   Previous Functional Level No restrictions   Current Functional Level Minutes patient can stand   Performance level 10 before having to sit due to pain   STG Target Performance Minutes patient will be able to stand   Performance level 15 minutes   Rationale for meal preparation   Due date 10/28/22   LTG Target Performance Minutes patient will be able to stand   Performance Level 20   Rationale   (to wait for coworkers before leaving work)   Due date 12/02/22   Goal #2   Goal #2 balance   Previous Functional Level No issues    Current Functional Level   (Falls several times per year)   STG Target Performance Increase;Serna Balance score to   Performance level 40/56   Rationale for safe household ambulation;for safe work ambulation;for safe community ambulation   Due date 10/28/22   LTG Target Performance Increase;Serna Balance score to   Performance Level 46/56 or greater   Rationale for safe household ambulation;for safe work ambulation;for safe community ambulation   Due date 12/02/22       Therapy Frequency:  1 time per week  Predicted Duration of Therapy Intervention:  8 weeks    Yasmani Nichols PT                 I CERTIFY THE NEED FOR THESE SERVICES FURNISHED  UNDER        THIS PLAN OF TREATMENT AND WHILE UNDER MY CARE     (Physician attestation of this document indicates review and certification of the therapy plan).                     Certification Date From:  10/07/22   Certification Date To:  12/02/22    Referring Provider:  Montse Bucio    Initial Assessment        See Epic Evaluation SOC Date: 10/07/22

## 2022-10-11 DIAGNOSIS — F32.9 MAJOR DEPRESSIVE DISORDER WITH CURRENT ACTIVE EPISODE, UNSPECIFIED DEPRESSION EPISODE SEVERITY, UNSPECIFIED WHETHER RECURRENT: ICD-10-CM

## 2022-10-12 RX ORDER — SERTRALINE HYDROCHLORIDE 100 MG/1
100 TABLET, FILM COATED ORAL DAILY
Qty: 90 TABLET | Refills: 0 | Status: SHIPPED | OUTPATIENT
Start: 2022-10-12 | End: 2023-01-20

## 2022-10-15 ENCOUNTER — OFFICE VISIT (OUTPATIENT)
Dept: URGENT CARE | Facility: URGENT CARE | Age: 67
End: 2022-10-15
Payer: MEDICAID

## 2022-10-15 VITALS
HEART RATE: 66 BPM | OXYGEN SATURATION: 96 % | BODY MASS INDEX: 38.16 KG/M2 | DIASTOLIC BLOOD PRESSURE: 62 MMHG | SYSTOLIC BLOOD PRESSURE: 112 MMHG | TEMPERATURE: 100.1 F | WEIGHT: 258.4 LBS

## 2022-10-15 DIAGNOSIS — R51.9 ACUTE NONINTRACTABLE HEADACHE, UNSPECIFIED HEADACHE TYPE: ICD-10-CM

## 2022-10-15 DIAGNOSIS — I27.20 PULMONARY HYPERTENSION (H): ICD-10-CM

## 2022-10-15 DIAGNOSIS — C90.01 MULTIPLE MYELOMA IN REMISSION (H): ICD-10-CM

## 2022-10-15 DIAGNOSIS — N10 PYELONEPHRITIS, ACUTE: Primary | ICD-10-CM

## 2022-10-15 DIAGNOSIS — E11.42 TYPE 2 DIABETES MELLITUS WITH DIABETIC POLYNEUROPATHY, UNSPECIFIED WHETHER LONG TERM INSULIN USE (H): ICD-10-CM

## 2022-10-15 DIAGNOSIS — D69.6 THROMBOCYTOPENIA (H): ICD-10-CM

## 2022-10-15 DIAGNOSIS — R35.0 URINARY FREQUENCY: ICD-10-CM

## 2022-10-15 DIAGNOSIS — R50.9 FEVER, UNSPECIFIED FEVER CAUSE: ICD-10-CM

## 2022-10-15 DIAGNOSIS — R11.0 NAUSEA: ICD-10-CM

## 2022-10-15 LAB
ALBUMIN UR-MCNC: ABNORMAL MG/DL
APPEARANCE UR: ABNORMAL
BACTERIA #/AREA URNS HPF: ABNORMAL /HPF
BASOPHILS # BLD AUTO: 0.1 10E3/UL (ref 0–0.2)
BASOPHILS NFR BLD AUTO: 1 %
BILIRUB UR QL STRIP: NEGATIVE
COLOR UR AUTO: YELLOW
EOSINOPHIL # BLD AUTO: 0 10E3/UL (ref 0–0.7)
EOSINOPHIL NFR BLD AUTO: 1 %
ERYTHROCYTE [DISTWIDTH] IN BLOOD BY AUTOMATED COUNT: 15.2 % (ref 10–15)
GLUCOSE BLD-MCNC: 117 MG/DL (ref 60–99)
GLUCOSE UR STRIP-MCNC: >=1000 MG/DL
HCT VFR BLD AUTO: 40.5 % (ref 35–47)
HGB BLD-MCNC: 13.3 G/DL (ref 11.7–15.7)
HGB UR QL STRIP: ABNORMAL
IMM GRANULOCYTES # BLD: 0 10E3/UL
IMM GRANULOCYTES NFR BLD: 1 %
KETONES UR STRIP-MCNC: ABNORMAL MG/DL
LEUKOCYTE ESTERASE UR QL STRIP: NEGATIVE
LYMPHOCYTES # BLD AUTO: 0.3 10E3/UL (ref 0.8–5.3)
LYMPHOCYTES NFR BLD AUTO: 7 %
MCH RBC QN AUTO: 28.4 PG (ref 26.5–33)
MCHC RBC AUTO-ENTMCNC: 32.8 G/DL (ref 31.5–36.5)
MCV RBC AUTO: 87 FL (ref 78–100)
MONOCYTES # BLD AUTO: 0.3 10E3/UL (ref 0–1.3)
MONOCYTES NFR BLD AUTO: 8 %
NEUTROPHILS # BLD AUTO: 3.2 10E3/UL (ref 1.6–8.3)
NEUTROPHILS NFR BLD AUTO: 82 %
NITRATE UR QL: NEGATIVE
NRBC # BLD AUTO: 0 10E3/UL
NRBC BLD AUTO-RTO: 0 /100
PH UR STRIP: 6 [PH] (ref 5–7)
PLATELET # BLD AUTO: 104 10E3/UL (ref 150–450)
RBC # BLD AUTO: 4.68 10E6/UL (ref 3.8–5.2)
RBC #/AREA URNS AUTO: ABNORMAL /HPF
SP GR UR STRIP: 1.01 (ref 1–1.03)
SQUAMOUS #/AREA URNS AUTO: ABNORMAL /LPF
UROBILINOGEN UR STRIP-ACNC: 0.2 E.U./DL
WBC # BLD AUTO: 3.8 10E3/UL (ref 4–11)
WBC #/AREA URNS AUTO: ABNORMAL /HPF
YEAST #/AREA URNS HPF: ABNORMAL /HPF

## 2022-10-15 PROCEDURE — 81001 URINALYSIS AUTO W/SCOPE: CPT | Performed by: PHYSICIAN ASSISTANT

## 2022-10-15 PROCEDURE — 82947 ASSAY GLUCOSE BLOOD QUANT: CPT | Performed by: PHYSICIAN ASSISTANT

## 2022-10-15 PROCEDURE — 36415 COLL VENOUS BLD VENIPUNCTURE: CPT | Performed by: PHYSICIAN ASSISTANT

## 2022-10-15 PROCEDURE — 99214 OFFICE O/P EST MOD 30 MIN: CPT | Performed by: PHYSICIAN ASSISTANT

## 2022-10-15 PROCEDURE — 85025 COMPLETE CBC W/AUTO DIFF WBC: CPT | Performed by: PHYSICIAN ASSISTANT

## 2022-10-15 PROCEDURE — 87086 URINE CULTURE/COLONY COUNT: CPT | Performed by: PHYSICIAN ASSISTANT

## 2022-10-15 NOTE — PROGRESS NOTES
Chief Complaint   Patient presents with     Dizziness     Has become worse         Results for orders placed or performed in visit on 10/15/22   UA Macro with Reflex to Micro and Culture - lab collect     Status: Abnormal    Specimen: Urine, Midstream   Result Value Ref Range    Color Urine Yellow Colorless, Straw, Light Yellow, Yellow    Appearance Urine Slightly Cloudy (A) Clear    Glucose Urine >=1000 (A) Negative mg/dL    Bilirubin Urine Negative Negative    Ketones Urine Trace (A) Negative mg/dL    Specific Gravity Urine 1.015 1.003 - 1.035    Blood Urine Trace (A) Negative    pH Urine 6.0 5.0 - 7.0    Protein Albumin Urine Trace (A) Negative mg/dL    Urobilinogen Urine 0.2 0.2, 1.0 E.U./dL    Nitrite Urine Negative Negative    Leukocyte Esterase Urine Negative Negative   Urine Microscopic     Status: Abnormal   Result Value Ref Range    Bacteria Urine Moderate (A) None Seen /HPF    RBC Urine 2-5 (A) 0-2 /HPF /HPF    WBC Urine 10-25 (A) 0-5 /HPF /HPF    Squamous Epithelials Urine Moderate (A) None Seen /LPF    Budding Yeast Urine Moderate (A) None Seen /HPF   Glucose, whole blood     Status: Abnormal   Result Value Ref Range    Glucose Whole Blood 117 (H) 60 - 99 mg/dL   CBC with platelets and differential     Status: None (In process)    Narrative    The following orders were created for panel order CBC with platelets and differential.  Procedure                               Abnormality         Status                     ---------                               -----------         ------                     CBC with platelets and d...[[id number]]                      In process                   Please view results for these tests on the individual orders.           ASSESSMENT:     ICD-10-CM    1. Pyelonephritis, acute  N10       2. Fever, unspecified fever cause  R50.9 UA Macro with Reflex to Micro and Culture - lab collect     CBC with platelets and differential     UA Macro with Reflex to Micro and Culture  - lab collect     CBC with platelets and differential     Urine Microscopic     Urine Culture     Glucose, whole blood     Glucose, whole blood      3. Acute nonintractable headache, unspecified headache type  R51.9 UA Macro with Reflex to Micro and Culture - lab collect     CBC with platelets and differential     UA Macro with Reflex to Micro and Culture - lab collect     CBC with platelets and differential     Urine Microscopic     Urine Culture     Glucose, whole blood     Glucose, whole blood      4. Urinary frequency  R35.0 UA Macro with Reflex to Micro and Culture - lab collect     CBC with platelets and differential     UA Macro with Reflex to Micro and Culture - lab collect     CBC with platelets and differential     Urine Microscopic     Urine Culture     Glucose, whole blood     Glucose, whole blood      5. Nausea  R11.0 UA Macro with Reflex to Micro and Culture - lab collect     CBC with platelets and differential     UA Macro with Reflex to Micro and Culture - lab collect     CBC with platelets and differential     Urine Microscopic     Urine Culture     Glucose, whole blood     Glucose, whole blood      6. Thrombocytopenia (H)  D69.6       7. Type 2 diabetes mellitus with diabetic polyneuropathy, unspecified whether long term insulin use (H)  E11.42 Glucose, whole blood     Glucose, whole blood      8. Multiple myeloma in remission (H)  C90.01       9. Pulmonary hypertension (H)  I27.20               PLAN:66-year-old female with multiple comorbidities including diabetes, multiple myeloma, thrombocytopenia, pulmonary hypertension, obstructive sleep apnea presents with increased leg weakness, dizziness, urinary frequency, headache, and nausea for the past 10 days, worse x 2 days.  Found to be febrile here today.  White count 3.76.  Platelets 92, normally runs in the 120s to 130s.  Whole blood glucose 117.  UA with moderate bacteria and 10-25 WBCs and trace ketones and protein.  Suspect acute  pyelonephritis.  With multiple comorbidities I think she should be evaluated and treated in the ER.  Unable to obtain a basic panel in timely fashion or do any IV therapies.  To ER for further evaluation and treatment.    I have discussed clinical findings with patient.  All questions are answered, patient indicates understanding of these issues and is in agreement with plan.   Patient care instructions are discussed/given at the end of visit.       Lindsey Madrigal PA-C      SUBJECTIVE:  66-year-old female with history of diabetes mellitus, multiple myeloma in remission, obstructive sleep apnea, postoperative hypothyroidism, pulmonary hypertension presents for weakness, headache, urinary frequency for the past few days.  Has history of legs getting weaker for which she sees physical therapy for strengthening and also for treatment of Planter fasciitis but states it is getting worse.  Has had more urinary frequency than usual.  Some nausea, no vomiting.  Last night she got out of bed to use the bathroom and felt really weak and fell.  She did not hit her head.  She also has a history of chronic diarrhea.  No chest pain or shortness of breath.  Feels lightheaded with walking.  Febrile here today, not aware of this.  No cough or sore throat.      No Known Allergies    Past Medical History:   Diagnosis Date     Abnormal EMG 2021 5/25/2021    Interpretation: This is an abnormal study, demonstrating electrophysiologic evidence of a length-dependent axonal sensorimotor polyneuropathy. Asymmetry of peroneal compound muscle action potential amplitudes is a finding of uncertain significance.        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   I have personally reviewed the examination and initial interpretation and I agree with the findings.   ANNE GOODMAN MD     History of MRI of cervical spine 6/2020 3/15/2021    Impression: Multilevel cervical spondylosis, most pronounced at the level of C4-5 and C5-6 with moderate to severe spinal canal stenosis and moderate spinal canal stenosis at C6-7. No abnormal cord signal. No significant neural foraminal narrowing at any level.   I have personally reviewed the examination and initial interpretation and I agree with the findings.   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  aspirin 81 MG EC tablet, Take 81 mg by mouth daily  atorvastatin (LIPITOR) 20 MG tablet, Take 1 tablet (20 mg) by mouth At Bedtime  blood glucose (NO BRAND SPECIFIED) test strip, Use to test blood sugar 3 times daily or as directed. To accompany: Blood Glucose Monitor Brands: per insurance.  blood glucose calibration (NO BRAND SPECIFIED) solution, To accompany: Blood Glucose Monitor Brands: per insurance.  blood glucose monitoring (NO BRAND SPECIFIED) meter device kit, Use to test blood sugar 3 times daily or as directed. Preferred blood glucose meter OR supplies to accompany: Blood Glucose Monitor Brands: per insurance.  empagliflozin (JARDIANCE) 25 MG TABS tablet, Take 1 tablet (25 mg) by mouth daily  insulin glargine (LANTUS SOLOSTAR) 100 UNIT/ML pen, Inject 30 Units Subcutaneous At Bedtime  insulin pen needle  (FIFTY50 PEN NEEDLES) 32G X 4 MM miscellaneous, Use one pen needle daily or as directed.  LENalidomide (REVLIMID) 10 MG CAPS capsule, Take 1 capsule (10 mg) by mouth daily for 28 days  levothyroxine (SYNTHROID/LEVOTHROID) 200 MCG tablet, Take 1 tablet (200 mcg) by mouth every morning (before breakfast)  loperamide (IMODIUM) 2 MG capsule, Take 4 mg (two capsules) by mouth at onset of loose stools, followed by 2 mg (one capsule) after each loose stool. Maximum: 16 mg (8 capsules) daily  losartan (COZAAR) 25 MG tablet, Take 1 tablet (25 mg) by mouth daily  metFORMIN (GLUCOPHAGE XR) 500 MG 24 hr tablet, Take 2 tablets (1,000 mg) by mouth daily (with dinner)  pregabalin (LYRICA) 100 MG capsule, TAKE ONE CAPSULE BY MOUTH THREE TIMES DAILY (Patient taking differently: Take 100 mg by mouth 2 times daily)  propranolol ER (INDERAL LA) 60 MG 24 hr capsule, Take 1 capsule (60 mg) by mouth daily  sertraline (ZOLOFT) 100 MG tablet, Take 1 tablet (100 mg) by mouth daily  thin (NO BRAND SPECIFIED) lancets, Use with lanceting device. To accompany: Blood Glucose Monitor Brands: per insurance.  LENalidomide (REVLIMID) 10 MG CAPS capsule, Take 1 capsule (10 mg) by mouth daily for 28 days    No current facility-administered medications on file prior to visit.      Social History     Tobacco Use     Smoking status: Former     Packs/day: 1.00     Types: Cigarettes     Quit date:      Years since quittin.7     Smokeless tobacco: Never   Substance Use Topics     Alcohol use: Yes     Comment: occasional       ROS:  CONSTITUTIONAL: Positive as above   EYES: Negative for eye problems.  ENT: Neg for ST.  RESP:Neg for SOB.  CV: Negative for chest pains.  GI: Negative for vomiting.  MUSCULOSKELETAL:  Negative for significant muscle or joint pains.  NEUROLOGIC: As above .  SKIN: Negative for rash.  PSYCH: Normal mentation for age.    OBJECTIVE:  /62   Pulse 66   Temp 100.1  F (37.8  C) (Tympanic)   Wt 117.2 kg (258 lb 6.4 oz)    SpO2 96%   BMI 38.16 kg/m    GENERAL APPEARANCE: Healthy, alert and no distress.  EYES:Conjunctiva/sclera clear.  EARS: No cerumen.   Ear canals w/o erythema.  TM's intact w/o erythema.    THROAT: No erythema w/o tonsillar enlargement . No exudates.  NECK: Supple, nontender, no lymphadenopathy.  RESP: Lungs clear to auscultation - no rales, rhonchi or wheezes  CV: Regular rate and rhythm, normal S1 S2, no murmur noted.  NEURO: Awake, alert    SKIN: No rashes  Abdomen-soft, nontender.  No CVA tenderness    Lindsey Madrigal PA-C

## 2022-10-15 NOTE — PATIENT INSTRUCTIONS
66-year-old female with multiple comorbidities including diabetes, multiple myeloma, thrombocytopenia, pulmonary hypertension, obstructive sleep apnea presents with increased leg weakness, dizziness, urinary frequency, headache, and nausea for the past 10 days, worse x 2 days.  Found to be febrile here today.  White count 3.76.  Platelets 92, normally runs in the 120s to 130s.  Whole blood glucose 117.  UA with moderate bacteria and 10-25 WBCs and trace ketones and protein.  Suspect acute pyelonephritis.  With multiple comorbidities I think she should be evaluated and treated in the ER.  Unable to obtain a basic panel in timely fashion or do any IV therapies.  To ER for further evaluation and treatment.

## 2022-10-17 LAB — BACTERIA UR CULT: NORMAL

## 2022-10-20 ENCOUNTER — APPOINTMENT (OUTPATIENT)
Dept: OPTOMETRY | Facility: CLINIC | Age: 67
End: 2022-10-20
Payer: MEDICAID

## 2022-10-20 PROCEDURE — 92342 FIT SPECTACLES MULTIFOCAL: CPT | Performed by: OPTOMETRIST

## 2022-10-24 ENCOUNTER — OFFICE VISIT (OUTPATIENT)
Dept: PSYCHOLOGY | Facility: CLINIC | Age: 67
End: 2022-10-24
Payer: MEDICAID

## 2022-10-24 DIAGNOSIS — F41.1 GAD (GENERALIZED ANXIETY DISORDER): Primary | ICD-10-CM

## 2022-10-24 PROCEDURE — 90834 PSYTX W PT 45 MINUTES: CPT | Performed by: STUDENT IN AN ORGANIZED HEALTH CARE EDUCATION/TRAINING PROGRAM

## 2022-10-24 ASSESSMENT — ANXIETY QUESTIONNAIRES
5. BEING SO RESTLESS THAT IT IS HARD TO SIT STILL: SEVERAL DAYS
3. WORRYING TOO MUCH ABOUT DIFFERENT THINGS: MORE THAN HALF THE DAYS
7. FEELING AFRAID AS IF SOMETHING AWFUL MIGHT HAPPEN: MORE THAN HALF THE DAYS
6. BECOMING EASILY ANNOYED OR IRRITABLE: SEVERAL DAYS
4. TROUBLE RELAXING: SEVERAL DAYS
GAD7 TOTAL SCORE: 11
1. FEELING NERVOUS, ANXIOUS, OR ON EDGE: MORE THAN HALF THE DAYS
GAD7 TOTAL SCORE: 11
2. NOT BEING ABLE TO STOP OR CONTROL WORRYING: MORE THAN HALF THE DAYS

## 2022-10-24 ASSESSMENT — PATIENT HEALTH QUESTIONNAIRE - PHQ9: SUM OF ALL RESPONSES TO PHQ QUESTIONS 1-9: 13
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
## 2022-10-26 ENCOUNTER — LAB (OUTPATIENT)
Dept: LAB | Facility: CLINIC | Age: 67
End: 2022-10-26
Payer: MEDICAID

## 2022-10-26 DIAGNOSIS — C90.01 MULTIPLE MYELOMA IN REMISSION (H): ICD-10-CM

## 2022-10-26 LAB
ALBUMIN SERPL-MCNC: 4.1 G/DL (ref 3.4–5)
ALP SERPL-CCNC: 141 U/L (ref 40–150)
ALT SERPL W P-5'-P-CCNC: 49 U/L (ref 0–50)
ANION GAP SERPL CALCULATED.3IONS-SCNC: 7 MMOL/L (ref 3–14)
AST SERPL W P-5'-P-CCNC: 20 U/L (ref 0–45)
BASOPHILS # BLD AUTO: 0 10E3/UL (ref 0–0.2)
BASOPHILS NFR BLD AUTO: 1 %
BILIRUB SERPL-MCNC: 1.4 MG/DL (ref 0.2–1.3)
BUN SERPL-MCNC: 15 MG/DL (ref 7–30)
CALCIUM SERPL-MCNC: 9.4 MG/DL (ref 8.5–10.1)
CHLORIDE BLD-SCNC: 106 MMOL/L (ref 94–109)
CO2 SERPL-SCNC: 27 MMOL/L (ref 20–32)
CREAT SERPL-MCNC: 0.66 MG/DL (ref 0.52–1.04)
EOSINOPHIL # BLD AUTO: 0.2 10E3/UL (ref 0–0.7)
EOSINOPHIL NFR BLD AUTO: 6 %
ERYTHROCYTE [DISTWIDTH] IN BLOOD BY AUTOMATED COUNT: 15.3 % (ref 10–15)
GFR SERPL CREATININE-BSD FRML MDRD: >90 ML/MIN/1.73M2
GLUCOSE BLD-MCNC: 146 MG/DL (ref 70–99)
HCT VFR BLD AUTO: 41.4 % (ref 35–47)
HGB BLD-MCNC: 14 G/DL (ref 11.7–15.7)
LYMPHOCYTES # BLD AUTO: 0.9 10E3/UL (ref 0.8–5.3)
LYMPHOCYTES NFR BLD AUTO: 25 %
MCH RBC QN AUTO: 29 PG (ref 26.5–33)
MCHC RBC AUTO-ENTMCNC: 33.8 G/DL (ref 31.5–36.5)
MCV RBC AUTO: 86 FL (ref 78–100)
MONOCYTES # BLD AUTO: 0.2 10E3/UL (ref 0–1.3)
MONOCYTES NFR BLD AUTO: 7 %
NEUTROPHILS # BLD AUTO: 2.1 10E3/UL (ref 1.6–8.3)
NEUTROPHILS NFR BLD AUTO: 61 %
PLATELET # BLD AUTO: 171 10E3/UL (ref 150–450)
POTASSIUM BLD-SCNC: 4.2 MMOL/L (ref 3.4–5.3)
PROT SERPL-MCNC: 7.2 G/DL (ref 6.8–8.8)
RBC # BLD AUTO: 4.83 10E6/UL (ref 3.8–5.2)
SODIUM SERPL-SCNC: 140 MMOL/L (ref 133–144)
TOTAL PROTEIN SERUM FOR ELP: 6.5 G/DL (ref 6.4–8.3)
WBC # BLD AUTO: 3.4 10E3/UL (ref 4–11)

## 2022-10-26 PROCEDURE — 83521 IG LIGHT CHAINS FREE EACH: CPT

## 2022-10-26 PROCEDURE — 86334 IMMUNOFIX E-PHORESIS SERUM: CPT

## 2022-10-26 PROCEDURE — 85025 COMPLETE CBC W/AUTO DIFF WBC: CPT

## 2022-10-26 PROCEDURE — 36415 COLL VENOUS BLD VENIPUNCTURE: CPT

## 2022-10-26 PROCEDURE — 82784 ASSAY IGA/IGD/IGG/IGM EACH: CPT

## 2022-10-26 PROCEDURE — 80053 COMPREHEN METABOLIC PANEL: CPT

## 2022-10-26 PROCEDURE — 84165 PROTEIN E-PHORESIS SERUM: CPT

## 2022-10-26 PROCEDURE — 84155 ASSAY OF PROTEIN SERUM: CPT | Mod: 59

## 2022-10-27 LAB
ALBUMIN SERPL ELPH-MCNC: 4.2 G/DL (ref 3.7–5.1)
ALPHA1 GLOB SERPL ELPH-MCNC: 0.3 G/DL (ref 0.2–0.4)
ALPHA2 GLOB SERPL ELPH-MCNC: 0.6 G/DL (ref 0.5–0.9)
B-GLOBULIN SERPL ELPH-MCNC: 0.8 G/DL (ref 0.6–1)
GAMMA GLOB SERPL ELPH-MCNC: 0.7 G/DL (ref 0.7–1.6)
IGA SERPL-MCNC: 131 MG/DL (ref 84–499)
IGG SERPL-MCNC: 652 MG/DL (ref 610–1616)
IGM SERPL-MCNC: 52 MG/DL (ref 35–242)
KAPPA LC FREE SER-MCNC: 4.97 MG/DL (ref 0.33–1.94)
KAPPA LC FREE/LAMBDA FREE SER NEPH: 3.17 {RATIO} (ref 0.26–1.65)
LAMBDA LC FREE SERPL-MCNC: 1.57 MG/DL (ref 0.57–2.63)
M PROTEIN SERPL ELPH-MCNC: 0.1 G/DL
PROT PATTERN SERPL ELPH-IMP: ABNORMAL
PROT PATTERN SERPL IFE-IMP: NORMAL

## 2022-11-03 DIAGNOSIS — Z79.4 TYPE 2 DIABETES MELLITUS WITH HYPERGLYCEMIA, WITH LONG-TERM CURRENT USE OF INSULIN (H): ICD-10-CM

## 2022-11-03 DIAGNOSIS — E11.65 TYPE 2 DIABETES MELLITUS WITH HYPERGLYCEMIA, WITH LONG-TERM CURRENT USE OF INSULIN (H): ICD-10-CM

## 2022-11-04 DIAGNOSIS — C90.01 MULTIPLE MYELOMA IN REMISSION (H): Primary | ICD-10-CM

## 2022-11-04 RX ORDER — LENALIDOMIDE 10 MG/1
10 CAPSULE ORAL DAILY
Qty: 28 CAPSULE | Refills: 0 | Status: SHIPPED | OUTPATIENT
Start: 2022-11-04 | End: 2023-02-02

## 2022-11-07 ENCOUNTER — THERAPY VISIT (OUTPATIENT)
Dept: PHYSICAL THERAPY | Facility: CLINIC | Age: 67
End: 2022-11-07
Payer: MEDICAID

## 2022-11-07 ENCOUNTER — TELEPHONE (OUTPATIENT)
Dept: ONCOLOGY | Facility: CLINIC | Age: 67
End: 2022-11-07

## 2022-11-07 DIAGNOSIS — M72.2 BILATERAL PLANTAR FASCIITIS: Primary | ICD-10-CM

## 2022-11-07 PROCEDURE — 97110 THERAPEUTIC EXERCISES: CPT | Mod: GP | Performed by: PHYSICAL THERAPIST

## 2022-11-07 PROCEDURE — 97140 MANUAL THERAPY 1/> REGIONS: CPT | Mod: GP | Performed by: PHYSICAL THERAPIST

## 2022-11-07 RX ORDER — LOSARTAN POTASSIUM 25 MG/1
25 TABLET ORAL DAILY
Qty: 90 TABLET | Refills: 0 | Status: SHIPPED | OUTPATIENT
Start: 2022-11-07 | End: 2023-02-02

## 2022-11-07 NOTE — TELEPHONE ENCOUNTER
Oral Chemotherapy Monitoring Program    Medication: Revlimid  Rx:  10mg PO daily on days 1 - 28 of 28 day  cycle #28    Auth #: [id number]    Risk Category: Adult female NOT of reproductive capacity    Routine survey questions reviewed.    Thank you,    Kelle Milton  Oncology/Transplant Float [email or web address]  Phone:[phone]  Fax:[phone]

## 2022-11-15 ENCOUNTER — OFFICE VISIT (OUTPATIENT)
Dept: FAMILY MEDICINE | Facility: CLINIC | Age: 67
End: 2022-11-15
Payer: MEDICAID

## 2022-11-15 VITALS
DIASTOLIC BLOOD PRESSURE: 68 MMHG | SYSTOLIC BLOOD PRESSURE: 143 MMHG | HEART RATE: 57 BPM | TEMPERATURE: 98 F | BODY MASS INDEX: 37.36 KG/M2 | OXYGEN SATURATION: 96 % | WEIGHT: 253 LBS

## 2022-11-15 DIAGNOSIS — Z12.31 VISIT FOR SCREENING MAMMOGRAM: ICD-10-CM

## 2022-11-15 DIAGNOSIS — N39.46 MIXED INCONTINENCE URGE AND STRESS (MALE)(FEMALE): Primary | ICD-10-CM

## 2022-11-15 DIAGNOSIS — C90.01 MULTIPLE MYELOMA IN REMISSION (H): ICD-10-CM

## 2022-11-15 LAB
ALBUMIN UR-MCNC: NEGATIVE MG/DL
APPEARANCE UR: CLEAR
BACTERIA #/AREA URNS HPF: ABNORMAL /HPF
BASOPHILS # BLD AUTO: 0 10E3/UL (ref 0–0.2)
BASOPHILS NFR BLD AUTO: 1 %
BILIRUB UR QL STRIP: NEGATIVE
COLOR UR AUTO: YELLOW
EOSINOPHIL # BLD AUTO: 0.4 10E3/UL (ref 0–0.7)
EOSINOPHIL NFR BLD AUTO: 13 %
ERYTHROCYTE [DISTWIDTH] IN BLOOD BY AUTOMATED COUNT: 15.2 % (ref 10–15)
GLUCOSE UR STRIP-MCNC: >=1000 MG/DL
HCT VFR BLD AUTO: 42.3 % (ref 35–47)
HGB BLD-MCNC: 14.1 G/DL (ref 11.7–15.7)
HGB UR QL STRIP: ABNORMAL
KETONES UR STRIP-MCNC: NEGATIVE MG/DL
LEUKOCYTE ESTERASE UR QL STRIP: NEGATIVE
LYMPHOCYTES # BLD AUTO: 0.7 10E3/UL (ref 0.8–5.3)
LYMPHOCYTES NFR BLD AUTO: 21 %
MCH RBC QN AUTO: 29 PG (ref 26.5–33)
MCHC RBC AUTO-ENTMCNC: 33.3 G/DL (ref 31.5–36.5)
MCV RBC AUTO: 87 FL (ref 78–100)
MONOCYTES # BLD AUTO: 0.3 10E3/UL (ref 0–1.3)
MONOCYTES NFR BLD AUTO: 10 %
NEUTROPHILS # BLD AUTO: 1.7 10E3/UL (ref 1.6–8.3)
NEUTROPHILS NFR BLD AUTO: 55 %
NITRATE UR QL: NEGATIVE
PH UR STRIP: 6 [PH] (ref 5–7)
PLATELET # BLD AUTO: 106 10E3/UL (ref 150–450)
RBC # BLD AUTO: 4.87 10E6/UL (ref 3.8–5.2)
RBC #/AREA URNS AUTO: ABNORMAL /HPF
SP GR UR STRIP: 1.02 (ref 1–1.03)
SQUAMOUS #/AREA URNS AUTO: ABNORMAL /LPF
TOTAL PROTEIN SERUM FOR ELP: 6.2 G/DL (ref 6.4–8.3)
UROBILINOGEN UR STRIP-ACNC: 0.2 E.U./DL
WBC # BLD AUTO: 3.2 10E3/UL (ref 4–11)
WBC #/AREA URNS AUTO: ABNORMAL /HPF
YEAST #/AREA URNS HPF: ABNORMAL /HPF

## 2022-11-15 PROCEDURE — 36415 COLL VENOUS BLD VENIPUNCTURE: CPT | Performed by: PHYSICIAN ASSISTANT

## 2022-11-15 PROCEDURE — 90471 IMMUNIZATION ADMIN: CPT | Performed by: PHYSICIAN ASSISTANT

## 2022-11-15 PROCEDURE — 90682 RIV4 VACC RECOMBINANT DNA IM: CPT | Performed by: PHYSICIAN ASSISTANT

## 2022-11-15 PROCEDURE — 84165 PROTEIN E-PHORESIS SERUM: CPT

## 2022-11-15 PROCEDURE — 99214 OFFICE O/P EST MOD 30 MIN: CPT | Mod: 25 | Performed by: PHYSICIAN ASSISTANT

## 2022-11-15 PROCEDURE — 85025 COMPLETE CBC W/AUTO DIFF WBC: CPT | Performed by: PHYSICIAN ASSISTANT

## 2022-11-15 PROCEDURE — 81001 URINALYSIS AUTO W/SCOPE: CPT | Performed by: PHYSICIAN ASSISTANT

## 2022-11-15 PROCEDURE — 0124A COVID-19,PF,PFIZER BOOSTER BIVALENT: CPT | Performed by: PHYSICIAN ASSISTANT

## 2022-11-15 PROCEDURE — 91312 COVID-19,PF,PFIZER BOOSTER BIVALENT: CPT | Performed by: PHYSICIAN ASSISTANT

## 2022-11-15 PROCEDURE — 84155 ASSAY OF PROTEIN SERUM: CPT | Performed by: PHYSICIAN ASSISTANT

## 2022-11-15 NOTE — PROGRESS NOTES
Assessment & Plan     Mixed incontinence urge and stress (male)(female)  Persistent incontinence. Would like her to see urology as well as pelvic floor therapy. UA today to rule out infection.  - UA with Microscopic reflex to Culture - lab collect; Future  - CBC with platelets and differential; Future  - Physical Therapy Referral; Future  - Adult Urology  Referral; Future  - UA with Microscopic reflex to Culture - lab collect  - CBC with platelets and differential  - Urine Microscopic    Multiple myeloma in remission (H)  Recheck.  - Protein electrophoresis; Future  - Protein electrophoresis    Visit for screening mammogram  Screen.  - MA SCREENING DIGITAL BILAT - Future  (s+30); Future      30 minutes spent on the date of the encounter doing chart review, history and exam, documentation and further activities per the note     MED REC REQUIRED  Post Medication Reconciliation Status:  Discharge medications reconciled, continue medications without change        Return in about 2 months (around 1/15/2023).    DAMARIS Almeida [facility] KINDRA Max is a 66 year old, presenting for the following health issues:  Hospital F/U      \[facility]\""       Hospital Follow-up Visit:    Hospital/Nursing Home/IP Rehab Facility: [facility]  Date of Admission: 10/15/22  Date of Discharge: 10/19/22  Reason(s) for Admission: weakness    Was your hospitalization related to COVID-19? No   Problems taking medications regularly:  None  Medication changes since discharge: None  Problems adhering to non-medication therapy:  None      Fell in the bathroom, couldn't get back up. Felt really weak.     Incontinence. New issue. When going from sitting to standing, loses quite a bit of urine. Wearing pads, diaper. This has been going on for about 2 months. Not happening with sleeping.     Summary of hospitalization:  CareEverywhere information obtained and reviewed  Diagnostic Tests/Treatments reviewed.   Follow up needed: has a follow up with oncology 12/20/22    Other Healthcare Providers Involved in Patient s Care:         None  Update since discharge: improved.         Plan of care communicated with patient         Review of Systems   Constitutional, HEENT, cardiovascular, pulmonary, gi and gu systems are negative, except as otherwise noted.      Objective    BP (!) 143/68 (BP Location: Right arm, Patient Position: Sitting, Cuff Size: Adult Large)   Pulse 57   Temp 98  F (36.7  C) (Oral)   Wt 114.8 kg (253 lb)   SpO2 96%   BMI 37.36 kg/m    Body mass index is 37.36 kg/m .  Physical Exam   GENERAL: healthy, alert and no distress  NECK: no adenopathy, no asymmetry, masses, or scars and thyroid normal to palpation  RESP: lungs clear to auscultation - no rales, rhonchi or wheezes  CV: regular rate and rhythm, normal S1 S2, no S3 or S4, no murmur, click or rub, no peripheral edema and peripheral pulses strong  ABDOMEN: soft, nontender, no hepatosplenomegaly, no masses and bowel sounds normal  MS: no gross musculoskeletal defects noted, no edema

## 2022-11-15 NOTE — PATIENT INSTRUCTIONS
Urology will call to schedule  Physical therapy will call to schedule    Please follow up with oncology.    Schedule follow up with pharmacist - Vonnie Corrigan

## 2022-11-16 ENCOUNTER — MYC MEDICAL ADVICE (OUTPATIENT)
Dept: FAMILY MEDICINE | Facility: CLINIC | Age: 67
End: 2022-11-16

## 2022-11-16 LAB
ALBUMIN SERPL ELPH-MCNC: 4.1 G/DL (ref 3.7–5.1)
ALPHA1 GLOB SERPL ELPH-MCNC: 0.3 G/DL (ref 0.2–0.4)
ALPHA2 GLOB SERPL ELPH-MCNC: 0.5 G/DL (ref 0.5–0.9)
B-GLOBULIN SERPL ELPH-MCNC: 0.7 G/DL (ref 0.6–1)
GAMMA GLOB SERPL ELPH-MCNC: 0.7 G/DL (ref 0.7–1.6)
M PROTEIN SERPL ELPH-MCNC: 0.1 G/DL
PROT PATTERN SERPL ELPH-IMP: ABNORMAL

## 2022-11-19 ENCOUNTER — HEALTH MAINTENANCE LETTER (OUTPATIENT)
Age: 67
End: 2022-11-19

## 2022-11-21 DIAGNOSIS — E66.01 CLASS 2 SEVERE OBESITY DUE TO EXCESS CALORIES WITH SERIOUS COMORBIDITY AND BODY MASS INDEX (BMI) OF 37.0 TO 37.9 IN ADULT (H): ICD-10-CM

## 2022-11-21 DIAGNOSIS — E66.812 CLASS 2 SEVERE OBESITY DUE TO EXCESS CALORIES WITH SERIOUS COMORBIDITY AND BODY MASS INDEX (BMI) OF 37.0 TO 37.9 IN ADULT (H): ICD-10-CM

## 2022-11-21 DIAGNOSIS — E04.9 THYROID GOITER: ICD-10-CM

## 2022-11-21 DIAGNOSIS — E03.9 HYPOTHYROIDISM, UNSPECIFIED TYPE: ICD-10-CM

## 2022-11-27 RX ORDER — LEVOTHYROXINE SODIUM 200 UG/1
200 TABLET ORAL
Qty: 30 TABLET | Refills: 0 | OUTPATIENT
Start: 2022-11-27

## 2022-11-27 NOTE — TELEPHONE ENCOUNTER
Receieved refill request for levothyroxine . Patient needs appointment scheduled prior to any refills. Clinic Coordinator notified and will follow up with the patient as appropriate. The pharmacy has been notified that the medication will not be refilled prior to an appointment being scheduled.    Lv:  7/19/22    Nv: none

## 2022-11-28 ENCOUNTER — MYC MEDICAL ADVICE (OUTPATIENT)
Dept: SURGERY | Facility: CLINIC | Age: 67
End: 2022-11-28

## 2022-11-28 ENCOUNTER — LAB (OUTPATIENT)
Dept: LAB | Facility: CLINIC | Age: 67
End: 2022-11-28
Payer: MEDICAID

## 2022-11-28 ENCOUNTER — OFFICE VISIT (OUTPATIENT)
Dept: PSYCHOLOGY | Facility: CLINIC | Age: 67
End: 2022-11-28
Payer: MEDICAID

## 2022-11-28 DIAGNOSIS — C90.01 MULTIPLE MYELOMA IN REMISSION (H): ICD-10-CM

## 2022-11-28 DIAGNOSIS — E66.01 CLASS 2 SEVERE OBESITY DUE TO EXCESS CALORIES WITH SERIOUS COMORBIDITY AND BODY MASS INDEX (BMI) OF 37.0 TO 37.9 IN ADULT (H): ICD-10-CM

## 2022-11-28 DIAGNOSIS — F33.1 MAJOR DEPRESSIVE DISORDER, RECURRENT EPISODE, MODERATE WITH ANXIOUS DISTRESS (H): Primary | ICD-10-CM

## 2022-11-28 DIAGNOSIS — E03.9 HYPOTHYROIDISM, UNSPECIFIED TYPE: ICD-10-CM

## 2022-11-28 DIAGNOSIS — E66.812 CLASS 2 SEVERE OBESITY DUE TO EXCESS CALORIES WITH SERIOUS COMORBIDITY AND BODY MASS INDEX (BMI) OF 37.0 TO 37.9 IN ADULT (H): ICD-10-CM

## 2022-11-28 DIAGNOSIS — E04.9 THYROID GOITER: ICD-10-CM

## 2022-11-28 LAB
BASOPHILS # BLD AUTO: 0.1 10E3/UL (ref 0–0.2)
BASOPHILS NFR BLD AUTO: 2 %
EOSINOPHIL # BLD AUTO: 0.3 10E3/UL (ref 0–0.7)
EOSINOPHIL NFR BLD AUTO: 9 %
ERYTHROCYTE [DISTWIDTH] IN BLOOD BY AUTOMATED COUNT: 15.5 % (ref 10–15)
HCT VFR BLD AUTO: 44.6 % (ref 35–47)
HGB BLD-MCNC: 14.7 G/DL (ref 11.7–15.7)
LYMPHOCYTES # BLD AUTO: 0.9 10E3/UL (ref 0.8–5.3)
LYMPHOCYTES NFR BLD AUTO: 26 %
MCH RBC QN AUTO: 28.8 PG (ref 26.5–33)
MCHC RBC AUTO-ENTMCNC: 33 G/DL (ref 31.5–36.5)
MCV RBC AUTO: 88 FL (ref 78–100)
MONOCYTES # BLD AUTO: 0.3 10E3/UL (ref 0–1.3)
MONOCYTES NFR BLD AUTO: 9 %
NEUTROPHILS # BLD AUTO: 1.9 10E3/UL (ref 1.6–8.3)
NEUTROPHILS NFR BLD AUTO: 54 %
PLATELET # BLD AUTO: 140 10E3/UL (ref 150–450)
RBC # BLD AUTO: 5.1 10E6/UL (ref 3.8–5.2)
TOTAL PROTEIN SERUM FOR ELP: 6.5 G/DL (ref 6.4–8.3)
WBC # BLD AUTO: 3.5 10E3/UL (ref 4–11)

## 2022-11-28 PROCEDURE — 84155 ASSAY OF PROTEIN SERUM: CPT

## 2022-11-28 PROCEDURE — 84165 PROTEIN E-PHORESIS SERUM: CPT

## 2022-11-28 PROCEDURE — 90834 PSYTX W PT 45 MINUTES: CPT | Performed by: STUDENT IN AN ORGANIZED HEALTH CARE EDUCATION/TRAINING PROGRAM

## 2022-11-28 PROCEDURE — 80053 COMPREHEN METABOLIC PANEL: CPT

## 2022-11-28 PROCEDURE — 36415 COLL VENOUS BLD VENIPUNCTURE: CPT

## 2022-11-28 PROCEDURE — 82784 ASSAY IGA/IGD/IGG/IGM EACH: CPT

## 2022-11-28 PROCEDURE — 83521 IG LIGHT CHAINS FREE EACH: CPT

## 2022-11-28 PROCEDURE — 85025 COMPLETE CBC W/AUTO DIFF WBC: CPT

## 2022-11-28 PROCEDURE — 86334 IMMUNOFIX E-PHORESIS SERUM: CPT

## 2022-11-28 ASSESSMENT — ANXIETY QUESTIONNAIRES
5. BEING SO RESTLESS THAT IT IS HARD TO SIT STILL: NOT AT ALL
3. WORRYING TOO MUCH ABOUT DIFFERENT THINGS: MORE THAN HALF THE DAYS
1. FEELING NERVOUS, ANXIOUS, OR ON EDGE: SEVERAL DAYS
6. BECOMING EASILY ANNOYED OR IRRITABLE: NOT AT ALL
GAD7 TOTAL SCORE: 10
4. TROUBLE RELAXING: MORE THAN HALF THE DAYS
7. FEELING AFRAID AS IF SOMETHING AWFUL MIGHT HAPPEN: NEARLY EVERY DAY
GAD7 TOTAL SCORE: 10
2. NOT BEING ABLE TO STOP OR CONTROL WORRYING: MORE THAN HALF THE DAYS

## 2022-11-28 ASSESSMENT — PATIENT HEALTH QUESTIONNAIRE - PHQ9: SUM OF ALL RESPONSES TO PHQ QUESTIONS 1-9: 18

## 2022-11-28 NOTE — PROGRESS NOTES
[facility]                                     Progress Note    Patient Name: Winter Loya  Date: 11/28/2022         Service Type: Individual      Session Start Time: 2.30  Session End Time: 3.20     Session Length: 50 mns    Session #: 14    Attendees: Client attended alone    Service Modality:  In-person    DATA  Interactive Complexity: No  Crisis: No        Progress Since Last Session (Related to Symptoms / Goals / Homework):   Symptoms: Worsening depression as evident by current phq9 and gad7 scores    Homework: Achieved / completed to satisfaction       Episode of Care Goals: Minimal progress - ACTION (Actively working towards change); Intervened by reinforcing change plan / affirming steps taken     Current / Ongoing Stressors and Concerns:   Ongoing stressors: Financial stress, stressful filial relationship/ Difficulty with asertivness   Current: Pt reported struggling with assertively communicating thoughts and feelings to son and feels controled by him but feels stuck on ways to break the vicious circlee of unhealthy relationship pattens.                   Treatment Objective(s) Addressed in This Session:               Identify negative self-talk and behaviors: challenge core beliefs, myths, and actions     Intervention:     Discussion today on codependency as an unhealthy relationship in which your mood, happiness, and identity are defined by the other person and connect concept to patient s presenting problem. Couched patient that this vicious [place] of functioning is unsustainable and encouraged pt to break the [place] by exploring other aspects of their identity. Encourage patient to explore and engage in things that bring them ross as an individual and create heathy boundaries of pro-dependence which allows them to show love to their son while simultaneously developing and maintaining healthy boundaries.    NA-7 SCORE 8/22/2022 10/24/2022 11/28/2022   Total Score 10 11 10        PHQ 8/22/2022 10/24/2022 11/28/2022   PHQ-9 Total Score 10 13 18   Q9: Thoughts of better off dead/self-harm past 2 weeks Not at all Not at all Not at all          ASSESSMENT: Current Emotional / Mental Status (status of significant symptoms):   Risk status (Self / Other harm or suicidal ideation)   Patient denies current fears or concerns for personal safety.   Patient denies current or recent suicidal ideation or behaviors.   Patient denies current or recent homicidal ideation or behaviors.   Patient denies current or recent self injurious behavior or ideation.   Patient denies other safety concerns.   Patient reports there has been no change in risk factors since their last session.     Patient reports there has been no change in protective factors since their last session.     Recommended that patient call 911 or go to the local ED should there be a change in any of these risk factors.     Appearance:   Appropriate    Eye Contact:   Good    Psychomotor Behavior: Normal    Attitude:   Cooperative  Interested Friendly   Orientation:   Person Place Time Situation   Speech    Rate / Production: Normal/ Responsive    Volume:  Normal    Mood:    Depressed  Anhedonia Grieving   Affect:    Worrisome    Thought Content:  Clear    Thought Form:  Coherent    Insight:    Fair      Medication Review:   No changes to current psychiatric medication(s)     Medication Compliance:   Yes     Changes in Health Issues:   None reported     Chemical Use Review:   Substance Use: Chemical use reviewed, no active concerns identified      Tobacco Use: No current tobacco use.      Diagnosis:    296.32 (F33.1) Major Depressive Disorder, Recurrent Episode, Moderate _ and With anxious distress    Collateral Reports Completed:   Not Applicable    PLAN: (Patient Tasks / Therapist Tasks / Other)    Create heathy boundaries of pro-dependence with son and explore ways to break the emotional codependency.    Mj Cline LGSW                    ----- Service Performed and Documented by [facility]------  This note was reviewed and clinical supervision by AMY Ayala [facility]    11/30/2022   ______________________________________________________________________    Individual Treatment Plan    Patient's Name: Winter Loya  YOB: 1955    Date of Creation: 04/18/2022  Date Treatment Plan Last Reviewed/Revised: 07/25/2022    DSM5 Diagnoses: 296.32 (F33.1) Major Depressive Disorder, Recurrent Episode, Moderate _ and With anxious distress  Psychosocial / Contextual Factors:   PROMIS (reviewed every 90 days):     Referral / Collaboration:  Referral to another professional/service is not indicated at this time..    Anticipated number of session for this episode of care: 15-25  Anticipation frequency of session: Every other week  Anticipated Duration of each session: 38-52 minutes  Treatment plan will be reviewed in 90 days or when goals have been changed.       MeasurableTreatment Goal(s) related to diagnosis / functional impairment(s)    Goal 1: Patient will identify specific triggers to feelings of depression and improve coping with depression by  90 %.    I will know I've met my goal when     Objective #A (Patient Action)    Patient will identify and practice 3 outdoor things that brings her ross daily and repeat that every day.  Status: Continued - Date(s):  01/24/2023    Intervention(s)  Therapist will provide psychoeducation, behavioral activation, and cognitive restructuring.)    Objective #B  Patient will identify specific areas of cognitive distortion and challenge irrational thoughts with reality   Status: Continued - Date(s):    01/24/2023       Intervention(s)  Therapist will teach patient how negative thoughts can lead to negative feelings and actions and ways to reframe thoughts in a more positive way leading to more positive feelings and helpful behaviors    Objective #C  Patient will learn and practice relaxation techniques to  manage anxiety.  Status: Continued - Date(s):    01/24/2023    Intervention(s)  Therapist will teach patient thought stopping, deep breathing exercises, mindful meditation, and creative visualization.  Patient has reviewed and agreed to the above plan.      CHERISE Castellon  April 18, 2022

## 2022-11-28 NOTE — TELEPHONE ENCOUNTER
Refill denied at this time. Patient needs appointment with weight management clinic. [employer] message sent to patient to schedule appointment.

## 2022-11-29 LAB
ALBUMIN SERPL ELPH-MCNC: 4.3 G/DL (ref 3.7–5.1)
ALBUMIN SERPL-MCNC: 4 G/DL (ref 3.4–5)
ALP SERPL-CCNC: 139 U/L (ref 40–150)
ALPHA1 GLOB SERPL ELPH-MCNC: 0.3 G/DL (ref 0.2–0.4)
ALPHA2 GLOB SERPL ELPH-MCNC: 0.5 G/DL (ref 0.5–0.9)
ALT SERPL W P-5'-P-CCNC: 49 U/L (ref 0–50)
ANION GAP SERPL CALCULATED.3IONS-SCNC: 4 MMOL/L (ref 3–14)
AST SERPL W P-5'-P-CCNC: 21 U/L (ref 0–45)
B-GLOBULIN SERPL ELPH-MCNC: 0.7 G/DL (ref 0.6–1)
BILIRUB SERPL-MCNC: 1.5 MG/DL (ref 0.2–1.3)
BUN SERPL-MCNC: 18 MG/DL (ref 7–30)
CALCIUM SERPL-MCNC: 9.1 MG/DL (ref 8.5–10.1)
CHLORIDE BLD-SCNC: 103 MMOL/L (ref 94–109)
CO2 SERPL-SCNC: 29 MMOL/L (ref 20–32)
CREAT SERPL-MCNC: 0.74 MG/DL (ref 0.52–1.04)
GAMMA GLOB SERPL ELPH-MCNC: 0.7 G/DL (ref 0.7–1.6)
GFR SERPL CREATININE-BSD FRML MDRD: 90 ML/MIN/1.73M2
GLUCOSE BLD-MCNC: 199 MG/DL (ref 70–99)
M PROTEIN SERPL ELPH-MCNC: 0 G/DL
POTASSIUM BLD-SCNC: 4.4 MMOL/L (ref 3.4–5.3)
PROT PATTERN SERPL ELPH-IMP: NORMAL
PROT SERPL-MCNC: 7.2 G/DL (ref 6.8–8.8)
SODIUM SERPL-SCNC: 136 MMOL/L (ref 133–144)

## 2022-11-30 LAB
IGA SERPL-MCNC: 149 MG/DL (ref 84–499)
IGG SERPL-MCNC: 732 MG/DL (ref 610–1616)
IGM SERPL-MCNC: 58 MG/DL (ref 35–242)
KAPPA LC FREE SER-MCNC: 2.63 MG/DL (ref 0.33–1.94)
KAPPA LC FREE/LAMBDA FREE SER NEPH: 1.81 {RATIO} (ref 0.26–1.65)
LAMBDA LC FREE SERPL-MCNC: 1.45 MG/DL (ref 0.57–2.63)
PROT PATTERN SERPL IFE-IMP: NORMAL

## 2022-11-30 RX ORDER — LEVOTHYROXINE SODIUM 200 UG/1
200 TABLET ORAL
Qty: 30 TABLET | Refills: 0 | OUTPATIENT
Start: 2022-11-30

## 2022-12-01 NOTE — TELEPHONE ENCOUNTER
Recieved refill request for Levothyroxine Sodium Oral Tablet 200 MCG . Patient needs appointment scheduled prior to any refills. Clinic Coordinator notified and will follow up with the patient as appropriate. The pharmacy has been notified that the medication will not be refilled prior to an appointment being scheduled.

## 2022-12-02 DIAGNOSIS — C90.01 MULTIPLE MYELOMA IN REMISSION (H): Primary | ICD-10-CM

## 2022-12-02 RX ORDER — LENALIDOMIDE 10 MG/1
10 CAPSULE ORAL DAILY
Qty: 28 CAPSULE | Refills: 0 | Status: SHIPPED | OUTPATIENT
Start: 2022-12-02 | End: 2023-02-02

## 2022-12-04 DIAGNOSIS — E04.9 THYROID GOITER: ICD-10-CM

## 2022-12-04 DIAGNOSIS — E66.812 CLASS 2 SEVERE OBESITY DUE TO EXCESS CALORIES WITH SERIOUS COMORBIDITY AND BODY MASS INDEX (BMI) OF 37.0 TO 37.9 IN ADULT (H): ICD-10-CM

## 2022-12-04 DIAGNOSIS — E66.01 CLASS 2 SEVERE OBESITY DUE TO EXCESS CALORIES WITH SERIOUS COMORBIDITY AND BODY MASS INDEX (BMI) OF 37.0 TO 37.9 IN ADULT (H): ICD-10-CM

## 2022-12-04 DIAGNOSIS — E03.9 HYPOTHYROIDISM, UNSPECIFIED TYPE: ICD-10-CM

## 2022-12-05 RX ORDER — LEVOTHYROXINE SODIUM 200 UG/1
200 TABLET ORAL
Qty: 30 TABLET | Refills: 0 | Status: SHIPPED | OUTPATIENT
Start: 2022-12-05 | End: 2022-12-21

## 2022-12-05 NOTE — TELEPHONE ENCOUNTER
levothyroxine (SYNTHROID/LEVOTHROID) 200 MCG tablet 30 tablet 3 7/19/2022         Last Written Prescription Date:  7-  Last Fill Quantity: 30,   # refills: 3  Last Office Visit : 7-  Future Office visit:  12-9-2022    Kathleen M Doege RN

## 2022-12-06 DIAGNOSIS — E04.9 THYROID GOITER: ICD-10-CM

## 2022-12-06 DIAGNOSIS — E66.01 CLASS 2 SEVERE OBESITY DUE TO EXCESS CALORIES WITH SERIOUS COMORBIDITY AND BODY MASS INDEX (BMI) OF 37.0 TO 37.9 IN ADULT (H): ICD-10-CM

## 2022-12-06 DIAGNOSIS — R25.1 TREMOR: ICD-10-CM

## 2022-12-06 DIAGNOSIS — E03.9 HYPOTHYROIDISM, UNSPECIFIED TYPE: ICD-10-CM

## 2022-12-06 DIAGNOSIS — E66.812 CLASS 2 SEVERE OBESITY DUE TO EXCESS CALORIES WITH SERIOUS COMORBIDITY AND BODY MASS INDEX (BMI) OF 37.0 TO 37.9 IN ADULT (H): ICD-10-CM

## 2022-12-06 NOTE — CONFIDENTIAL NOTE
Rx Authorization:    Requested Medication/ Dose: Propranolol ER 60MG    Date last refill ordered: 9/27/21    Quantity ordered: 90 caps    # refills: 3    Date of last clinic visit with ordering provider: 9/27/21    Date of next clinic visit with ordering provider: F/U 1 year    All pertinent protocol data (lab date/result):     Include pertinent information from patients message:

## 2022-12-06 NOTE — TELEPHONE ENCOUNTER
Will send a 1 month supply to be signed with a note stating no more refills until an appointment is made.

## 2022-12-07 ENCOUNTER — TELEPHONE (OUTPATIENT)
Dept: ONCOLOGY | Facility: CLINIC | Age: 67
End: 2022-12-07

## 2022-12-07 RX ORDER — LEVOTHYROXINE SODIUM 200 UG/1
200 TABLET ORAL
Qty: 30 TABLET | Refills: 0 | OUTPATIENT
Start: 2022-12-07

## 2022-12-07 RX ORDER — PROPRANOLOL HCL 60 MG
60 CAPSULE, EXTENDED RELEASE 24HR ORAL DAILY
Qty: 30 CAPSULE | Refills: 0 | Status: SHIPPED | OUTPATIENT
Start: 2022-12-07 | End: 2022-12-16

## 2022-12-07 NOTE — TELEPHONE ENCOUNTER
Oral Chemotherapy Monitoring Program    Medication: Revlimid  Rx:  10 mg PO daily for a 28 day cycle  Auth #: [id number]  Risk Category: Adult female NOT of reproductive capacity    Routine survey questions reviewed.    Thank you,    Mary Franklin  Oncology Pharmacy Liaison LUCINDA [email or web address]  Phone: [phone]  Fax: [phone]

## 2022-12-09 ENCOUNTER — VIRTUAL VISIT (OUTPATIENT)
Dept: ENDOCRINOLOGY | Facility: CLINIC | Age: 67
End: 2022-12-09
Payer: MEDICARE

## 2022-12-09 VITALS — BODY MASS INDEX: 37.47 KG/M2 | HEIGHT: 69 IN | WEIGHT: 253 LBS

## 2022-12-09 DIAGNOSIS — N39.46 MIXED INCONTINENCE: Primary | ICD-10-CM

## 2022-12-09 DIAGNOSIS — E66.812 CLASS 2 SEVERE OBESITY DUE TO EXCESS CALORIES WITH SERIOUS COMORBIDITY AND BODY MASS INDEX (BMI) OF 37.0 TO 37.9 IN ADULT (H): Primary | ICD-10-CM

## 2022-12-09 DIAGNOSIS — E11.9 TYPE 2 DIABETES MELLITUS WITHOUT COMPLICATION, WITHOUT LONG-TERM CURRENT USE OF INSULIN (H): ICD-10-CM

## 2022-12-09 DIAGNOSIS — E66.01 CLASS 2 SEVERE OBESITY DUE TO EXCESS CALORIES WITH SERIOUS COMORBIDITY AND BODY MASS INDEX (BMI) OF 37.0 TO 37.9 IN ADULT (H): Primary | ICD-10-CM

## 2022-12-09 PROCEDURE — 99214 OFFICE O/P EST MOD 30 MIN: CPT | Mod: 95 | Performed by: PEDIATRICS

## 2022-12-09 ASSESSMENT — PAIN SCALES - GENERAL: PAINLEVEL: NO PAIN (0)

## 2022-12-09 NOTE — PROGRESS NOTES
Winter is a 66 year old who is being evaluated via a billable video visit.      How would you like to obtain your AVS? MyChart  If the video visit is dropped, the invitation should be resent by: Text to cell phone: [phone]  Will anyone else be joining your video visit? No  During this virtual visit the patient is located in [place], patient verifies this as the location during the entirety of this visit.       Return Medical Weight Management Note     Winter Loya  MRN:  [id number]  :  1955  TOMASA:  2022    Dear Montse Bucio PA-C,    I had the pleasure of seeing your patient Winter Loya. She is a 66 year old female who I am continuing to see for treatment of obesity related to:       2022   I have the following health issues associated with obesity: Type II Diabetes, High Blood Pressure, High Cholesterol, Sleep Apnea, Cancer   I have the following symptoms associated with obesity: Knee Pain, Depression, Fatigue, Hip Pain     INTERVAL HISTORY:  Winter Loya was last seen in the weight management clinic by my colleague Dr. Ainsley Cole on 2022. Unfortunately, her son diet from leukemia in August, and she has not been doing well since that time. She continues to have issues with food cravings.    As for her history of diabetes, she is followed for this by Montse Bucio and pharmacy. She is currently on lantus 30 units daily (which she takes at night), as well as empaglozin. She was also previously on metformin, however, is not currently taking this. She has had issues with ongoing diarrhea for the last year, and is currently on Imodium which has been helping. Of note, in the past she was on Trulicity for type 2 diabetes, however, is no longer on this and not sure if this contributed to diarrhea when she was on this. She has no history of pancreatitis. She does not regularly check her glucoses, however, does have a glucometer and testing supplies.     Dietary Recall:  Breakfast: will often  "have oatmeal  Lunch: options including a sandwich or salad  Dinner: options including roast, beans, vegetables  Snacks: generally has around 2 snacks a day  Drinks: does not generally have drinks with calories; mostly drinks water; will sometimes have Crystal Light or a diet soda    Physical activity has overall been limited due to pain from neuropathy, and she also had plantar fasciitis (this is now improved).      CURRENT WEIGHT:   253 lbs 0 oz    Initial weight was 250 pounds on 7/19/2022. This was also her last clinic weight  Weight today is 253 pounds  Weight change since last seen on 7/19/2022 is up 3 pounds  Total weight change is 3 pounds    Changes and Difficulties 12/9/2022   I have made the following changes to my diet since my last visit: No changes   With regards to my diet, I am still struggling with: Sugar cravings   I have made the following changes to my activity/exercise since my last visit: Trying to walk more   With regards to my activity/exercise, I am still struggling with: Nothing       VITALS:  Ht 1.753 m (5' 9\")   Wt 114.8 kg (253 lb)   BMI 37.36 kg/m       GENERAL: Healthy, alert and no distress  EYES: Eyes grossly normal to inspection.   HENT: Normal cephalic/atraumatic.   RESP: No audible wheeze, cough.  MS: No gross musculoskeletal defects noted.  Normal range of motion.  SKIN: Visible skin clear. No significant rash, abnormal pigmentation or lesions.  NEURO: Cranial nerves grossly intact.  Mentation and speech appropriate for age.  PSYCH: Mentation appears normal, affect normal/bright, judgement and insight intact, normal speech and appearance well-groomed.      MEDICATIONS:   Current Outpatient Medications   Medication Sig Dispense Refill     acyclovir (ZOVIRAX) 400 MG tablet TAKE ONE TABLET BY MOUTH EVERY TWELVE HOURS 60 tablet 11     alcohol swab prep pads Use to swab area of injection/sowmya as directed. 100 each 3     aspirin 81 MG EC tablet Take 81 mg by mouth daily       " atorvastatin (LIPITOR) 20 MG tablet Take 1 tablet (20 mg) by mouth At Bedtime 90 tablet 0     blood glucose (NO BRAND SPECIFIED) test strip Use to test blood sugar 3 times daily or as directed. To accompany: Blood Glucose Monitor Brands: per insurance. 100 strip 6     blood glucose calibration (NO BRAND SPECIFIED) solution To accompany: Blood Glucose Monitor Brands: per insurance. 4 each 0     blood glucose monitoring (NO BRAND SPECIFIED) meter device kit Use to test blood sugar 3 times daily or as directed. Preferred blood glucose meter OR supplies to accompany: Blood Glucose Monitor Brands: per insurance. 1 kit 0     empagliflozin (JARDIANCE) 25 MG TABS tablet Take 1 tablet (25 mg) by mouth daily 90 tablet 3     insulin glargine (LANTUS SOLOSTAR) 100 UNIT/ML pen Inject 30 Units Subcutaneous At Bedtime 30 mL 3     insulin pen needle (FIFTY50 PEN NEEDLES) 32G X 4 MM miscellaneous Use one pen needle daily or as directed. 100 each 2     LENalidomide (REVLIMID) 10 MG CAPS capsule Take 1 capsule (10 mg) by mouth daily for 28 days 28 capsule 0     levothyroxine (SYNTHROID/LEVOTHROID) 200 MCG tablet Take 1 tablet (200 mcg) by mouth every morning (before breakfast) 30 tablet 0     loperamide (IMODIUM) 2 MG capsule Take 4 mg (two capsules) by mouth at onset of loose stools, followed by 2 mg (one capsule) after each loose stool. Maximum: 16 mg (8 capsules) daily 270 capsule 11     losartan (COZAAR) 25 MG tablet Take 1 tablet (25 mg) by mouth daily 90 tablet 0     pregabalin (LYRICA) 100 MG capsule TAKE ONE CAPSULE BY MOUTH THREE TIMES DAILY (Patient taking differently: Take 100 mg by mouth 2 times daily) 270 capsule 3     propranolol ER (INDERAL LA) 60 MG 24 hr capsule Take 1 capsule (60 mg) by mouth daily 30 capsule 0     sertraline (ZOLOFT) 100 MG tablet Take 1 tablet (100 mg) by mouth daily 90 tablet 0     thin (NO BRAND SPECIFIED) lancets Use with lanceting device. To accompany: Blood Glucose Monitor Brands: per  insurance. 200 each 6     LENalidomide (REVLIMID) 10 MG CAPS capsule Take 1 capsule (10 mg) by mouth daily for 28 days 28 capsule 0     metFORMIN (GLUCOPHAGE XR) 500 MG 24 hr tablet Take 2 tablets (1,000 mg) by mouth daily (with dinner) (Patient not taking: Reported on 11/15/2022) 180 tablet 3       Weight Loss Medication History Reviewed With Patient 12/9/2022   Which weight loss medications are you currently taking on a regular basis?  None   Are you having any side effects from the weight loss medication that we have prescribed you? No     LABS:  Component      Latest Ref Rng & Units 9/28/2022 11/28/2022   Sodium      133 - 144 mmol/L 139 136   Potassium      3.4 - 5.3 mmol/L 3.6 4.4   Chloride      94 - 109 mmol/L 107 103   Carbon Dioxide      20 - 32 mmol/L 27 29   Anion Gap      3 - 14 mmol/L 5 4   Urea Nitrogen      7 - 30 mg/dL 14 18   Creatinine      0.52 - 1.04 mg/dL 0.77 0.74   Calcium      8.5 - 10.1 mg/dL 8.5 9.1   Glucose      70 - 99 mg/dL 108 (H) 199 (H)   Alkaline Phosphatase      40 - 150 U/L 105 139   AST      0 - 45 U/L 14 21   ALT      0 - 50 U/L 29 49   Protein Total      6.8 - 8.8 g/dL 6.6 (L) 7.2   Albumin      3.4 - 5.0 g/dL 3.9 4.0   Bilirubin Total      0.2 - 1.3 mg/dL 2.3 (H) 1.5 (H)   GFR Estimate      >60 mL/min/1.73m2 86 90   Hemoglobin A1C      0.0 - 5.6 % 7.6 (H)    TSH      0.40 - 4.00 mU/L 4.68 (H)    T4 Free      0.76 - 1.46 ng/dL 1.16      ASSESSMENT:   Winter Loya is a 66 year old female with a history of mature onset class 2 obesity (defined as BMI 35-40 kg/m2) with current health consequences including type 2 diabetes. At her initial appointment in 7/2022, she was not started on additional anti-obesity pharmacotherapy. That said, given her current BMI, in conjunction with higher degrees of hunger, low satiety, and presence of type 2 diabetes, we discussed various anti-obesity pharmacotherapy options that could be considered. Given history of type 2 diabetes, I believe that  she may benefit from initiation of semaglutide (ozempic). We discussed this at length today. While those on GLP-1 agonists as a side effect may experience GI upset, and she does have a history of issues with diarrhea, this is overall currently well controlled, and therefore I believe that we should consider this. That said, prior to starting, I would first like to touch base with her primary care provider (Bala Bucio), as we may also want to lower her dose of long acting insulin prior to initiating.     Additional plans and goals, made through shared decision making, as outlined below.       PLAN:   Patient Instructions   1. Food Goals:  a. Will try to have a vegetable with lunch and dinner everyday.     2. Activity Goals:   a. Will look into a [employer] membership  b. Will increase walking as able to   c. Continue physical therapy     3. Medications:   a. A medication option that we could consider starting would be ozempic (semaglutide). I am going to give you the information on this medication today. I first want to touch base with Montse [place] to get her thoughts given your history, and consideration for lowering the lantus dose further. We will let you know.  b. For now, recommend starting to check your sugars at least in the morning      MEDICATION STARTED AT THIS APPOINTMENT    We are starting a GLP-1 (Glucagon-like Peptide-1) medication. Examples of this medication include Byetta, Bydureon, Ozempic, or Victoza, etc. The medication chosen will depend on insurance coverage, and can be changed to the medication that is covered under your plan. These medications work the same, in that they can help you lose weight and control your blood sugar. One of the ways it works is by slowing down the rate that food leaves your stomach. You feel maza and will eat less.  It also helps regulate hormones that can help improve your blood sugars.    Usually short acting insulins or oral agents are stopped or decreased  by the doctor at the appointment. Low blood sugars are rare but can happen if patients are on insulin or other oral agents. If you notice consistent low sugars or high sugars, your medication may need to be adjusted after your appointment. If this is the case, please call RN and provide her your blood sugar record from the last 3-4 days. The RN will get in touch with the doctor and call you back/[employer] message with recommendations. We tolerate high sugars for a bit, so if sugars are running 180-200, this is ok. As weight starts dropping the blood sugars should too. If readings are consistently over 200 for 1-2 weeks, then you should call the doctor/nurse.    Types of GLP-1 medications include:    Victoza: Starting dose is 0.6 mg for a week, then 1.2 mg for a week, and then 1.8 mg thereafter (if prescribed by doctor). This medication is given daily. Pen needles need to be ordered also.    Ozempic: Starting dose is 0.25 mg once weekly for 4 weeks, and then increase to 0.5 mg weekly. If after 4 weeks of being on 0.5 mg weekly dosing and still wanting additional weight loss and/or blood sugar benefits, check with your doctor to see if they want to increase the dose to 1 mg weekly. Pen needles come in the Ozempic pen box.    Trulicity: Starting dose is 0.75 mg once weekly.  If after 1-3 months of being on 0.75 mg weekly and still wanting additional weight loss and/or blood sugar benefits, check with your doctor to see if they want to increase the dose to 1.5 mg weekly.    Bydureon: Starting dose is 2 mg and is given weekly. The patient should pick one day a week and give the medication on that day. Pen needles need to be ordered also.    Byetta: Starting dose is 5 mcg twice daily. This is given for the first month. Check with the doctor after a month to see if they want the dose increased to 10 mcg BID. Pen needles need to be ordered also.     Side effects of GLP- Medications include: The most common side effects are  all GI related and consist of: nausea, constipation, diarrhea, burping, or gassiness. Patients are advised to eat slowly and less, and nausea typically passes if people can stick it out.     The risk of pancreatitis (inflammation of the pancreas) has been associated with this type of medication, but is very rare.  If you have had pancreatitis in the past, this medication may not be for you. Please let us know about any past history of pancreas problems.      Symptoms of pancreatitis include: Pain in your upper stomach area which  may travel to your back and be worse after eating. Your stomach area may be tender to the touch.  You may have vomiting or nausea and/or have a fever. If you should develop any of these symptoms, stop the medication and contact your primary care doctor. They will do a blood test to check for pancreatitis.       There is a small chance you may have some low blood sugar after taking the medication.   The signs of low blood sugar are:  o Weakness  o Shaky   o Hungry  o Sweating  o Confusion      See below for ways to treat low blood sugar without adding in lots of extra calories.      Treating Low Blood Sugar    If you have symptoms of low blood sugar (sweating, shaking, dizzy, confused) eat 15 grams of carbs and wait 15 minutes:      Glucose Tabs are best for sugars under 70 -  Dex4 or BD Glucose tablets are good, you will need to take 3-4 of these to equal 15 grams.     One small box of raisins    4 oz fruit juice box or   cup fruit juice    1 small apple    1 small banana      cup canned fruit in water      English muffin or a slice of bread with jelly     1 low fat frozen waffle with sugar-free syrup      cup cottage cheese with   cup frozen or fresh blueberries    1 cup skim or low-fat milk      cup whole grain cereal    4-6 crackers such as Triscuits    This medication is usually covered by insurance for patients with a diagnosis of Type 2 Diabetes. Depending on insurance coverage, the  medication may be changed to a different formulary, but they all work in the same way. Sometimes a prior authorization is required, which may take up to 1-2 weeks for an insurance company to make a decision if they will cover the medication. Please be patient, you will be notified either way.     Contact the nurse via [employer] or call [phone] if you have any questions or concerns. (Do not stop taking it if you don't think it's working. For some people it works without them knowing it.)     In order to get refills of this or any medication we prescribe you must be seen in the medical weight mgmt clinic every 2-4 months.          FOLLOW-UP:    6 weeks.    Sincerely,    Mj Bowman MD    Video-Visit Details    Video Start Time: 12:20 PM    Type of service:  Video Visit    Video End Time:12:44 PM    Originating Location (pt. Location): Home    Distant Location (provider location):  On-site    Platform used for Video Visit: Radha     I spent 30 minutes of total time, before, during, and after the visit reviewing previous labs and records, examining the patient, answering their questions, formulating and discussing the plan of care, reviewing resulted labs, and writing the visit note.

## 2022-12-09 NOTE — LETTER
2022       RE: Winter Loya  [address]  [facility] [address]     Dear Colleague,    Thank you for referring your patient, Winter Loya, to the [facility] [place] at [facility]. Please see a copy of my visit note below.    Winter is a 66 year old who is being evaluated via a billable video visit.      How would you like to obtain your AVS? MyChart  If the video visit is dropped, the invitation should be resent by: Text to cell phone: [phone]  Will anyone else be joining your video visit? No  During this virtual visit the patient is located in [place], patient verifies this as the location during the entirety of this visit.       Return Medical Weight Management Note     Winter Loya  MRN:  [id number]  :  1955  TOMASA:  2022    Dear Montse Bucio PA-C,    I had the pleasure of seeing your patient Winter Loya. She is a 66 year old female who I am continuing to see for treatment of obesity related to:       2022   I have the following health issues associated with obesity: Type II Diabetes, High Blood Pressure, High Cholesterol, Sleep Apnea, Cancer   I have the following symptoms associated with obesity: Knee Pain, Depression, Fatigue, Hip Pain     INTERVAL HISTORY:  Winter Loya was last seen in the weight management clinic by my colleague Dr. Ainsley Cole on 2022. Unfortunately, her son diet from leukemia in August, and she has not been doing well since that time. She continues to have issues with food cravings.    As for her history of diabetes, she is followed for this by Montse Bucio and pharmacy. She is currently on lantus 30 units daily (which she takes at night), as well as empaglozin. She was also previously on metformin, however, is not currently taking this. She has had issues with ongoing diarrhea for the last year, and is currently on Imodium which has been helping. Of note, in the  "past she was on Trulicity for type 2 diabetes, however, is no longer on this and not sure if this contributed to diarrhea when she was on this. She has no history of pancreatitis. She does not regularly check her glucoses, however, does have a glucometer and testing supplies.     Dietary Recall:  Breakfast: will often have oatmeal  Lunch: options including a sandwich or salad  Dinner: options including roast, beans, vegetables  Snacks: generally has around 2 snacks a day  Drinks: does not generally have drinks with calories; mostly drinks water; will sometimes have Crystal Light or a diet soda    Physical activity has overall been limited due to pain from neuropathy, and she also had plantar fasciitis (this is now improved).      CURRENT WEIGHT:   253 lbs 0 oz    Initial weight was 250 pounds on 7/19/2022. This was also her last clinic weight  Weight today is 253 pounds  Weight change since last seen on 7/19/2022 is up 3 pounds  Total weight change is 3 pounds    Changes and Difficulties 12/9/2022   I have made the following changes to my diet since my last visit: No changes   With regards to my diet, I am still struggling with: Sugar cravings   I have made the following changes to my activity/exercise since my last visit: Trying to walk more   With regards to my activity/exercise, I am still struggling with: Nothing       VITALS:  Ht 1.753 m (5' 9\")   Wt 114.8 kg (253 lb)   BMI 37.36 kg/m       GENERAL: Healthy, alert and no distress  EYES: Eyes grossly normal to inspection.   HENT: Normal cephalic/atraumatic.   RESP: No audible wheeze, cough.  MS: No gross musculoskeletal defects noted.  Normal range of motion.  SKIN: Visible skin clear. No significant rash, abnormal pigmentation or lesions.  NEURO: Cranial nerves grossly intact.  Mentation and speech appropriate for age.  PSYCH: Mentation appears normal, affect normal/bright, judgement and insight intact, normal speech and appearance " well-groomed.      MEDICATIONS:   Current Outpatient Medications   Medication Sig Dispense Refill     acyclovir (ZOVIRAX) 400 MG tablet TAKE ONE TABLET BY MOUTH EVERY TWELVE HOURS 60 tablet 11     alcohol swab prep pads Use to swab area of injection/sowmya as directed. 100 each 3     aspirin 81 MG EC tablet Take 81 mg by mouth daily       atorvastatin (LIPITOR) 20 MG tablet Take 1 tablet (20 mg) by mouth At Bedtime 90 tablet 0     blood glucose (NO BRAND SPECIFIED) test strip Use to test blood sugar 3 times daily or as directed. To accompany: Blood Glucose Monitor Brands: per insurance. 100 strip 6     blood glucose calibration (NO BRAND SPECIFIED) solution To accompany: Blood Glucose Monitor Brands: per insurance. 4 each 0     blood glucose monitoring (NO BRAND SPECIFIED) meter device kit Use to test blood sugar 3 times daily or as directed. Preferred blood glucose meter OR supplies to accompany: Blood Glucose Monitor Brands: per insurance. 1 kit 0     empagliflozin (JARDIANCE) 25 MG TABS tablet Take 1 tablet (25 mg) by mouth daily 90 tablet 3     insulin glargine (LANTUS SOLOSTAR) 100 UNIT/ML pen Inject 30 Units Subcutaneous At Bedtime 30 mL 3     insulin pen needle (FIFTY50 PEN NEEDLES) 32G X 4 MM miscellaneous Use one pen needle daily or as directed. 100 each 2     LENalidomide (REVLIMID) 10 MG CAPS capsule Take 1 capsule (10 mg) by mouth daily for 28 days 28 capsule 0     levothyroxine (SYNTHROID/LEVOTHROID) 200 MCG tablet Take 1 tablet (200 mcg) by mouth every morning (before breakfast) 30 tablet 0     loperamide (IMODIUM) 2 MG capsule Take 4 mg (two capsules) by mouth at onset of loose stools, followed by 2 mg (one capsule) after each loose stool. Maximum: 16 mg (8 capsules) daily 270 capsule 11     losartan (COZAAR) 25 MG tablet Take 1 tablet (25 mg) by mouth daily 90 tablet 0     pregabalin (LYRICA) 100 MG capsule TAKE ONE CAPSULE BY MOUTH THREE TIMES DAILY (Patient taking differently: Take 100 mg by mouth 2  times daily) 270 capsule 3     propranolol ER (INDERAL LA) 60 MG 24 hr capsule Take 1 capsule (60 mg) by mouth daily 30 capsule 0     sertraline (ZOLOFT) 100 MG tablet Take 1 tablet (100 mg) by mouth daily 90 tablet 0     thin (NO BRAND SPECIFIED) lancets Use with lanceting device. To accompany: Blood Glucose Monitor Brands: per insurance. 200 each 6     LENalidomide (REVLIMID) 10 MG CAPS capsule Take 1 capsule (10 mg) by mouth daily for 28 days 28 capsule 0     metFORMIN (GLUCOPHAGE XR) 500 MG 24 hr tablet Take 2 tablets (1,000 mg) by mouth daily (with dinner) (Patient not taking: Reported on 11/15/2022) 180 tablet 3       Weight Loss Medication History Reviewed With Patient 12/9/2022   Which weight loss medications are you currently taking on a regular basis?  None   Are you having any side effects from the weight loss medication that we have prescribed you? No     LABS:  Component      Latest Ref Rng & Units 9/28/2022 11/28/2022   Sodium      133 - 144 mmol/L 139 136   Potassium      3.4 - 5.3 mmol/L 3.6 4.4   Chloride      94 - 109 mmol/L 107 103   Carbon Dioxide      20 - 32 mmol/L 27 29   Anion Gap      3 - 14 mmol/L 5 4   Urea Nitrogen      7 - 30 mg/dL 14 18   Creatinine      0.52 - 1.04 mg/dL 0.77 0.74   Calcium      8.5 - 10.1 mg/dL 8.5 9.1   Glucose      70 - 99 mg/dL 108 (H) 199 (H)   Alkaline Phosphatase      40 - 150 U/L 105 139   AST      0 - 45 U/L 14 21   ALT      0 - 50 U/L 29 49   Protein Total      6.8 - 8.8 g/dL 6.6 (L) 7.2   Albumin      3.4 - 5.0 g/dL 3.9 4.0   Bilirubin Total      0.2 - 1.3 mg/dL 2.3 (H) 1.5 (H)   GFR Estimate      >60 mL/min/1.73m2 86 90   Hemoglobin A1C      0.0 - 5.6 % 7.6 (H)    TSH      0.40 - 4.00 mU/L 4.68 (H)    T4 Free      0.76 - 1.46 ng/dL 1.16      ASSESSMENT:   Winter Loya is a 66 year old female with a history of mature onset class 2 obesity (defined as BMI 35-40 kg/m2) with current health consequences including type 2 diabetes. At her initial appointment in  7/2022, she was not started on additional anti-obesity pharmacotherapy. That said, given her current BMI, in conjunction with higher degrees of hunger, low satiety, and presence of type 2 diabetes, we discussed various anti-obesity pharmacotherapy options that could be considered. Given history of type 2 diabetes, I believe that she may benefit from initiation of semaglutide (ozempic). We discussed this at length today. While those on GLP-1 agonists as a side effect may experience GI upset, and she does have a history of issues with diarrhea, this is overall currently well controlled, and therefore I believe that we should consider this. That said, prior to starting, I would first like to touch base with her primary care provider (Bala Bucio), as we may also want to lower her dose of long acting insulin prior to initiating.     Additional plans and goals, made through shared decision making, as outlined below.       PLAN:   Patient Instructions   1. Food Goals:  a. Will try to have a vegetable with lunch and dinner everyday.     2. Activity Goals:   a. Will look into a [employer] membership  b. Will increase walking as able to   c. Continue physical therapy     3. Medications:   a. A medication option that we could consider starting would be ozempic (semaglutide). I am going to give you the information on this medication today. I first want to touch base with Montse Fortunato to get her thoughts given your history, and consideration for lowering the lantus dose further. We will let you know.  b. For now, recommend starting to check your sugars at least in the morning      MEDICATION STARTED AT THIS APPOINTMENT    We are starting a GLP-1 (Glucagon-like Peptide-1) medication. Examples of this medication include Byetta, Bydureon, Ozempic, or Victoza, etc. The medication chosen will depend on insurance coverage, and can be changed to the medication that is covered under your plan. These medications work the same, in  that they can help you lose weight and control your blood sugar. One of the ways it works is by slowing down the rate that food leaves your stomach. You feel maza and will eat less.  It also helps regulate hormones that can help improve your blood sugars.    Usually short acting insulins or oral agents are stopped or decreased by the doctor at the appointment. Low blood sugars are rare but can happen if patients are on insulin or other oral agents. If you notice consistent low sugars or high sugars, your medication may need to be adjusted after your appointment. If this is the case, please call RN and provide her your blood sugar record from the last 3-4 days. The RN will get in touch with the doctor and call you back/[employer] message with recommendations. We tolerate high sugars for a bit, so if sugars are running 180-200, this is ok. As weight starts dropping the blood sugars should too. If readings are consistently over 200 for 1-2 weeks, then you should call the doctor/nurse.    Types of GLP-1 medications include:    Victoza: Starting dose is 0.6 mg for a week, then 1.2 mg for a week, and then 1.8 mg thereafter (if prescribed by doctor). This medication is given daily. Pen needles need to be ordered also.    Ozempic: Starting dose is 0.25 mg once weekly for 4 weeks, and then increase to 0.5 mg weekly. If after 4 weeks of being on 0.5 mg weekly dosing and still wanting additional weight loss and/or blood sugar benefits, check with your doctor to see if they want to increase the dose to 1 mg weekly. Pen needles come in the Ozempic pen box.    Trulicity: Starting dose is 0.75 mg once weekly.  If after 1-3 months of being on 0.75 mg weekly and still wanting additional weight loss and/or blood sugar benefits, check with your doctor to see if they want to increase the dose to 1.5 mg weekly.    Bydureon: Starting dose is 2 mg and is given weekly. The patient should pick one day a week and give the medication on that  day. Pen needles need to be ordered also.    Byetta: Starting dose is 5 mcg twice daily. This is given for the first month. Check with the doctor after a month to see if they want the dose increased to 10 mcg BID. Pen needles need to be ordered also.     Side effects of GLP- Medications include: The most common side effects are all GI related and consist of: nausea, constipation, diarrhea, burping, or gassiness. Patients are advised to eat slowly and less, and nausea typically passes if people can stick it out.     The risk of pancreatitis (inflammation of the pancreas) has been associated with this type of medication, but is very rare.  If you have had pancreatitis in the past, this medication may not be for you. Please let us know about any past history of pancreas problems.      Symptoms of pancreatitis include: Pain in your upper stomach area which  may travel to your back and be worse after eating. Your stomach area may be tender to the touch.  You may have vomiting or nausea and/or have a fever. If you should develop any of these symptoms, stop the medication and contact your primary care doctor. They will do a blood test to check for pancreatitis.       There is a small chance you may have some low blood sugar after taking the medication.   The signs of low blood sugar are:  o Weakness  o Shaky   o Hungry  o Sweating  o Confusion      See below for ways to treat low blood sugar without adding in lots of extra calories.      Treating Low Blood Sugar    If you have symptoms of low blood sugar (sweating, shaking, dizzy, confused) eat 15 grams of carbs and wait 15 minutes:      Glucose Tabs are best for sugars under 70 -  Dex4 or BD Glucose tablets are good, you will need to take 3-4 of these to equal 15 grams.     One small box of raisins    4 oz fruit juice box or   cup fruit juice    1 small apple    1 small banana      cup canned fruit in water      English muffin or a slice of bread with jelly     1 low fat  frozen waffle with sugar-free syrup      cup cottage cheese with   cup frozen or fresh blueberries    1 cup skim or low-fat milk      cup whole grain cereal    4-6 crackers such as Triscuits    This medication is usually covered by insurance for patients with a diagnosis of Type 2 Diabetes. Depending on insurance coverage, the medication may be changed to a different formulary, but they all work in the same way. Sometimes a prior authorization is required, which may take up to 1-2 weeks for an insurance company to make a decision if they will cover the medication. Please be patient, you will be notified either way.     Contact the nurse via [employer] or call [phone] if you have any questions or concerns. (Do not stop taking it if you don't think it's working. For some people it works without them knowing it.)     In order to get refills of this or any medication we prescribe you must be seen in the medical weight mgmt clinic every 2-4 months.          FOLLOW-UP:    6 weeks.    Sincerely,    Mj Bowman MD    Video-Visit Details    Video Start Time: 12:20 PM    Type of service:  Video Visit    Video End Time:12:44 PM    Originating Location (pt. Location): Home    Distant Location (provider location):  On-site    Platform used for Video Visit: Radha     I spent 30 minutes of total time, before, during, and after the visit reviewing previous labs and records, examining the patient, answering their questions, formulating and discussing the plan of care, reviewing resulted labs, and writing the visit note.

## 2022-12-09 NOTE — PATIENT INSTRUCTIONS
Food Goals:  Will try to have a vegetable with lunch and dinner everyday.     Activity Goals:   Will look into a [employer] membership  Will increase walking as able to   Continue physical therapy     Medications:   A medication option that we could consider starting would be ozempic (semaglutide). I am going to give you the information on this medication today. I first want to touch base with Montse Bucio to get her thoughts given your history, and consideration for lowering the lantus dose further. We will let you know.  For now, recommend starting to check your sugars at least in the morning      MEDICATION STARTED AT THIS APPOINTMENT    We are starting a GLP-1 (Glucagon-like Peptide-1) medication. Examples of this medication include Byetta, Bydureon, Ozempic, or Victoza, etc. The medication chosen will depend on insurance coverage, and can be changed to the medication that is covered under your plan. These medications work the same, in that they can help you lose weight and control your blood sugar. One of the ways it works is by slowing down the rate that food leaves your stomach. You feel maza and will eat less.  It also helps regulate hormones that can help improve your blood sugars.    Usually short acting insulins or oral agents are stopped or decreased by the doctor at the appointment. Low blood sugars are rare but can happen if patients are on insulin or other oral agents. If you notice consistent low sugars or high sugars, your medication may need to be adjusted after your appointment. If this is the case, please call RN and provide her your blood sugar record from the last 3-4 days. The RN will get in touch with the doctor and call you back/[employer] message with recommendations. We tolerate high sugars for a bit, so if sugars are running 180-200, this is ok. As weight starts dropping the blood sugars should too. If readings are consistently over 200 for 1-2 weeks, then you should call the  doctor/nurse.    Types of GLP-1 medications include:    Victoza: Starting dose is 0.6 mg for a week, then 1.2 mg for a week, and then 1.8 mg thereafter (if prescribed by doctor). This medication is given daily. Pen needles need to be ordered also.    Ozempic: Starting dose is 0.25 mg once weekly for 4 weeks, and then increase to 0.5 mg weekly. If after 4 weeks of being on 0.5 mg weekly dosing and still wanting additional weight loss and/or blood sugar benefits, check with your doctor to see if they want to increase the dose to 1 mg weekly. Pen needles come in the Ozempic pen box.    Trulicity: Starting dose is 0.75 mg once weekly.  If after 1-3 months of being on 0.75 mg weekly and still wanting additional weight loss and/or blood sugar benefits, check with your doctor to see if they want to increase the dose to 1.5 mg weekly.    Bydureon: Starting dose is 2 mg and is given weekly. The patient should pick one day a week and give the medication on that day. Pen needles need to be ordered also.    Byetta: Starting dose is 5 mcg twice daily. This is given for the first month. Check with the doctor after a month to see if they want the dose increased to 10 mcg BID. Pen needles need to be ordered also.     Side effects of GLP- Medications include: The most common side effects are all GI related and consist of: nausea, constipation, diarrhea, burping, or gassiness. Patients are advised to eat slowly and less, and nausea typically passes if people can stick it out.     The risk of pancreatitis (inflammation of the pancreas) has been associated with this type of medication, but is very rare.  If you have had pancreatitis in the past, this medication may not be for you. Please let us know about any past history of pancreas problems.      Symptoms of pancreatitis include: Pain in your upper stomach area which  may travel to your back and be worse after eating. Your stomach area may be tender to the touch.  You may have  vomiting or nausea and/or have a fever. If you should develop any of these symptoms, stop the medication and contact your primary care doctor. They will do a blood test to check for pancreatitis.       There is a small chance you may have some low blood sugar after taking the medication.   The signs of low blood sugar are:  Weakness  Shaky   Hungry  Sweating  Confusion      See below for ways to treat low blood sugar without adding in lots of extra calories.      Treating Low Blood Sugar    If you have symptoms of low blood sugar (sweating, shaking, dizzy, confused) eat 15 grams of carbs and wait 15 minutes:    Glucose Tabs are best for sugars under 70 -  Dex4 or BD Glucose tablets are good, you will need to take 3-4 of these to equal 15 grams.   One small box of raisins  4 oz fruit juice box or   cup fruit juice  1 small apple  1 small banana    cup canned fruit in water    English muffin or a slice of bread with jelly   1 low fat frozen waffle with sugar-free syrup    cup cottage cheese with   cup frozen or fresh blueberries  1 cup skim or low-fat milk    cup whole grain cereal  4-6 crackers such as Triscuits    This medication is usually covered by insurance for patients with a diagnosis of Type 2 Diabetes. Depending on insurance coverage, the medication may be changed to a different formulary, but they all work in the same way. Sometimes a prior authorization is required, which may take up to 1-2 weeks for an insurance company to make a decision if they will cover the medication. Please be patient, you will be notified either way.     Contact the nurse via [employer] or call [phone] if you have any questions or concerns. (Do not stop taking it if you don't think it's working. For some people it works without them knowing it.)     In order to get refills of this or any medication we prescribe you must be seen in the medical weight mgmt clinic every 2-4 months.

## 2022-12-09 NOTE — NURSING NOTE
"(   Chief Complaint   Patient presents with     RECHECK     Return MWM    )    ( Weight: 114.8 kg (253 lb) (Patient reported) )  ( Height: 175.3 cm (5' 9\") )  ( BMI (Calculated): 37.36 )  (   )  (   )  (   )  (   )  (   )  (   )    (   )  (   )  (   )  (   )  (   )  (   )  (   )    (   Patient Active Problem List   Diagnosis     Multiple myeloma in remission (H)     Type 2 diabetes mellitus with diabetic polyneuropathy, with long-term current use of insulin (H)     Thyroid goiter     Allergic rhinitis     NA (generalized anxiety disorder)     History of endometrial ablation     Hyperlipidemia     Major depressive disorder, recurrent episode, moderate (H)     Osteoarthrosis involving lower leg     Type 2 diabetes mellitus with hyperglycemia (H)     DAILY (obstructive sleep apnea)- moderate (AHI 17)     Hypothyroidism, postoperative      Class 2 severe obesity due to excess calories with serious comorbidity and body mass index (BMI) of 37.0 to 37.9 in adult (H)     Tremor     History of peripheral stem cell transplant (H)     History of MRI of cervical spine 6/2020     H/O magnetic resonance imaging of lumbar spine 2020     Major depressive disorder, recurrent episode, severe (H)     Abnormal EMG 2021     Sensory polyneuropathy     Axonal neuropathy     Drug-induced polyneuropathy (H)     Pulmonary hypertension (H)     Thrombocytopenia, unspecified (H)     Exocrine pancreatic insufficiency     Chronic kidney disease, stage 1     Diarrhea, unspecified type     Encounter for long-term (current) use of medications     Bilateral plantar fasciitis     Combined forms of age-related cataract, mild, of both eyes     History of laser photocoagulation of retina, os (od?)     Glaucoma suspect of both eyes    )  (   Current Outpatient Medications   Medication Sig Dispense Refill     acyclovir (ZOVIRAX) 400 MG tablet TAKE ONE TABLET BY MOUTH EVERY TWELVE HOURS 60 tablet 11     alcohol swab prep pads Use to swab area of " injection/sowmya as directed. 100 each 3     aspirin 81 MG EC tablet Take 81 mg by mouth daily       atorvastatin (LIPITOR) 20 MG tablet Take 1 tablet (20 mg) by mouth At Bedtime 90 tablet 0     blood glucose (NO BRAND SPECIFIED) test strip Use to test blood sugar 3 times daily or as directed. To accompany: Blood Glucose Monitor Brands: per insurance. 100 strip 6     blood glucose calibration (NO BRAND SPECIFIED) solution To accompany: Blood Glucose Monitor Brands: per insurance. 4 each 0     blood glucose monitoring (NO BRAND SPECIFIED) meter device kit Use to test blood sugar 3 times daily or as directed. Preferred blood glucose meter OR supplies to accompany: Blood Glucose Monitor Brands: per insurance. 1 kit 0     empagliflozin (JARDIANCE) 25 MG TABS tablet Take 1 tablet (25 mg) by mouth daily 90 tablet 3     insulin glargine (LANTUS SOLOSTAR) 100 UNIT/ML pen Inject 30 Units Subcutaneous At Bedtime 30 mL 3     insulin pen needle (FIFTY50 PEN NEEDLES) 32G X 4 MM miscellaneous Use one pen needle daily or as directed. 100 each 2     LENalidomide (REVLIMID) 10 MG CAPS capsule Take 1 capsule (10 mg) by mouth daily for 28 days 28 capsule 0     levothyroxine (SYNTHROID/LEVOTHROID) 200 MCG tablet Take 1 tablet (200 mcg) by mouth every morning (before breakfast) 30 tablet 0     loperamide (IMODIUM) 2 MG capsule Take 4 mg (two capsules) by mouth at onset of loose stools, followed by 2 mg (one capsule) after each loose stool. Maximum: 16 mg (8 capsules) daily 270 capsule 11     losartan (COZAAR) 25 MG tablet Take 1 tablet (25 mg) by mouth daily 90 tablet 0     pregabalin (LYRICA) 100 MG capsule TAKE ONE CAPSULE BY MOUTH THREE TIMES DAILY (Patient taking differently: Take 100 mg by mouth 2 times daily) 270 capsule 3     propranolol ER (INDERAL LA) 60 MG 24 hr capsule Take 1 capsule (60 mg) by mouth daily 30 capsule 0     sertraline (ZOLOFT) 100 MG tablet Take 1 tablet (100 mg) by mouth daily 90 tablet 0     thin (NO BRAND  SPECIFIED) lancets Use with lanceting device. To accompany: Blood Glucose Monitor Brands: per insurance. 200 each 6     LENalidomide (REVLIMID) 10 MG CAPS capsule Take 1 capsule (10 mg) by mouth daily for 28 days 28 capsule 0     metFORMIN (GLUCOPHAGE XR) 500 MG 24 hr tablet Take 2 tablets (1,000 mg) by mouth daily (with dinner) (Patient not taking: Reported on 11/15/2022) 180 tablet 3    )  ( Diabetes Eval:    )    ( Pain Eval:  No Pain (0) )    ( Wound Eval:       )    (   History   Smoking Status     Former     Packs/day: 1.00     Types: Cigarettes     Quit date: 2005   Smokeless Tobacco     Never    )    ( Signed By:  Angela Farley, EMT; December 9, 2022; 11:38 AM )

## 2022-12-15 DIAGNOSIS — R25.1 TREMOR: ICD-10-CM

## 2022-12-15 NOTE — TELEPHONE ENCOUNTER
[facility] Call Center    Phone Message    May a detailed message be left on voicemail: yes     Reason for Call: Medication Refill Request    Has the patient contacted the pharmacy for the refill? Yes   Name of medication being requested: propranolol ER (INDERAL LA) 60 MG 24 hr capsule  Provider who prescribed the medication: Dr. Santoyo   Pharmacy: [facility][id number] - [address]    Date medication is needed: ASAP    Action Taken: Message routed to:  [facility] neurology    Travel Screening: Not Applicable

## 2022-12-16 RX ORDER — PROPRANOLOL HCL 60 MG
60 CAPSULE, EXTENDED RELEASE 24HR ORAL DAILY
Qty: 30 CAPSULE | Refills: 3 | Status: SHIPPED | OUTPATIENT
Start: 2022-12-16 | End: 2023-04-14

## 2022-12-17 ENCOUNTER — TELEPHONE (OUTPATIENT)
Dept: ENDOCRINOLOGY | Facility: CLINIC | Age: 67
End: 2022-12-17

## 2022-12-17 DIAGNOSIS — E11.9 TYPE 2 DIABETES MELLITUS WITHOUT COMPLICATION, WITHOUT LONG-TERM CURRENT USE OF INSULIN (H): Primary | ICD-10-CM

## 2022-12-17 NOTE — TELEPHONE ENCOUNTER
[place] back Winter's primary care provider. Given history of type 2 diabetes, will plan to start Ozempic. Just sent her a Zarfo message explaining this. To summarize:    1. She currently takes lantus 30 units daily and should continue for now.    2. Will order ozempic, starting at 0.25 mg weekly for the first 4 weeks, and then increasing to 0.5 mg weekly thereafter. When she is actually able to access the ozempic and ready to start this, after her first injection she can decrease her lantus dose from 30 units daily to 22 units daily (~25% reduction). She should check fasting glucoses in the morning (before breakfast), and ideally should check at least one additional time a day (2 hours after lunch or dinner). Her DM care will continue to be followed by her primary care provider and [facility] pharmacist.    Mj Bowman MD

## 2022-12-20 ENCOUNTER — TELEPHONE (OUTPATIENT)
Dept: ENDOCRINOLOGY | Facility: CLINIC | Age: 67
End: 2022-12-20

## 2022-12-20 DIAGNOSIS — E66.812 CLASS 2 SEVERE OBESITY DUE TO EXCESS CALORIES WITH SERIOUS COMORBIDITY AND BODY MASS INDEX (BMI) OF 37.0 TO 37.9 IN ADULT (H): ICD-10-CM

## 2022-12-20 DIAGNOSIS — E66.01 CLASS 2 SEVERE OBESITY DUE TO EXCESS CALORIES WITH SERIOUS COMORBIDITY AND BODY MASS INDEX (BMI) OF 37.0 TO 37.9 IN ADULT (H): ICD-10-CM

## 2022-12-20 DIAGNOSIS — E03.9 HYPOTHYROIDISM, UNSPECIFIED TYPE: ICD-10-CM

## 2022-12-20 DIAGNOSIS — E04.9 THYROID GOITER: ICD-10-CM

## 2022-12-20 RX ORDER — LEVOTHYROXINE SODIUM 200 UG/1
200 TABLET ORAL
Qty: 30 TABLET | Refills: 0 | OUTPATIENT
Start: 2022-12-20

## 2022-12-20 NOTE — TELEPHONE ENCOUNTER
Prior Authorization Retail Medication Request    Medication/Dose: Ozempic  ICD code (if different than what is on RX):  Type 2 diabetes mellitus without complication, without long-term current use of insulin (H) [E11.9]  - Primary     Previously Tried and Failed:  Metformin, Trulicity, history of diet and exercise  Rationale:  I had the pleasure of seeing your patient Winter Loya. She is a 66 year old female who I am continuing to see for treatment of obesity related to: Type II Diabetes, High Blood Pressure, High Cholesterol, Sleep Apnea, Cancer.     As for her history of diabetes, she is followed for this by Montse Bucio and pharmacy. She is currently on lantus 30 units daily (which she takes at night), as well as empaglozin. She was also previously on metformin, however, is not currently taking this. She has had issues with ongoing diarrhea for the last year, and is currently on Imodium which has been helping. Of note, in the past she was on Trulicity for type 2 diabetes, however, is no longer on this and not sure if this contributed to diarrhea when she was on this. She has no history of pancreatitis. She does not regularly check her glucoses, however, does have a glucometer and testing supplies.     CURRENT WEIGHT:   253 lbs 0 oz     Initial weight was 250 pounds on 7/19/2022. This was also her last clinic weight  Weight today is 253 pounds  Weight change since last seen on 7/19/2022 is up 3 pounds  Total weight change is 3 pounds      Insurance Name:    Insurance ID:        Pharmacy Information (if different than what is on RX)  Name:  [id number] - [place] [address]  Phone:  [phone]

## 2022-12-21 ENCOUNTER — TELEPHONE (OUTPATIENT)
Dept: ENDOCRINOLOGY | Facility: CLINIC | Age: 67
End: 2022-12-21

## 2022-12-21 ENCOUNTER — VIRTUAL VISIT (OUTPATIENT)
Dept: PHARMACY | Facility: CLINIC | Age: 67
End: 2022-12-21
Payer: MEDICAID

## 2022-12-21 DIAGNOSIS — E03.9 HYPOTHYROIDISM, UNSPECIFIED TYPE: ICD-10-CM

## 2022-12-21 DIAGNOSIS — E66.01 CLASS 2 SEVERE OBESITY DUE TO EXCESS CALORIES WITH SERIOUS COMORBIDITY AND BODY MASS INDEX (BMI) OF 37.0 TO 37.9 IN ADULT (H): ICD-10-CM

## 2022-12-21 DIAGNOSIS — Z79.4 TYPE 2 DIABETES MELLITUS WITH DIABETIC POLYNEUROPATHY, WITH LONG-TERM CURRENT USE OF INSULIN (H): Primary | ICD-10-CM

## 2022-12-21 DIAGNOSIS — C90.01 MULTIPLE MYELOMA IN REMISSION (H): ICD-10-CM

## 2022-12-21 DIAGNOSIS — E66.812 CLASS 2 SEVERE OBESITY DUE TO EXCESS CALORIES WITH SERIOUS COMORBIDITY AND BODY MASS INDEX (BMI) OF 37.0 TO 37.9 IN ADULT (H): ICD-10-CM

## 2022-12-21 DIAGNOSIS — E11.42 TYPE 2 DIABETES MELLITUS WITH DIABETIC POLYNEUROPATHY, WITH LONG-TERM CURRENT USE OF INSULIN (H): Primary | ICD-10-CM

## 2022-12-21 PROCEDURE — 99606 MTMS BY PHARM EST 15 MIN: CPT | Mod: 93 | Performed by: PHARMACIST

## 2022-12-21 PROCEDURE — 99607 MTMS BY PHARM ADDL 15 MIN: CPT | Mod: 93 | Performed by: PHARMACIST

## 2022-12-21 RX ORDER — LEVOTHYROXINE SODIUM 200 UG/1
200 TABLET ORAL
Qty: 90 TABLET | Refills: 0 | Status: SHIPPED | OUTPATIENT
Start: 2022-12-21 | End: 2023-02-02

## 2022-12-21 RX ORDER — LEVOTHYROXINE SODIUM 200 UG/1
200 TABLET ORAL
Qty: 30 TABLET | Refills: 0 | OUTPATIENT
Start: 2022-12-21

## 2022-12-21 RX ORDER — INSULIN GLARGINE 100 [IU]/ML
22 INJECTION, SOLUTION SUBCUTANEOUS AT BEDTIME
Qty: 30 ML | Refills: 1 | Status: SHIPPED | OUTPATIENT
Start: 2022-12-21 | End: 2023-02-02

## 2022-12-21 NOTE — PROGRESS NOTES
Medication Therapy Management (MTM) Encounter    ASSESSMENT:                            Medication Adherence/Access: No issues identified    Type 2 Diabetes/Weight loss: Patient is meeting A1c goal of < 7%, may benefit from checking blood sugar again, as instructed by Weight Management. For weight loss may also benefit from starting Ozempic as planned (education provided), and reducing Lantus to 22 units, as recommended by Weight Management (Ok to continue Ozempic 0.25 mg weekly beyond a month if stomach symptoms are not stable at that time).     Diarrhea: stable, will monitor.    Hypothyroidism: Needs refill, prescription sent. TSH will need to be rechecked ~6-8 weeks (early February).     Multiple myeloma: Plan in place.     PLAN:                            1. Sent refill over for levothyroxine 200 mcg daily.    2. Start Ozempic 0.25 mg weekly.    3. Decrease Lantus dose from 30 units to 22 units daily.     4. Check blood sugar every morning, fasting.    Follow-up: Return in about 2 weeks (around 1/4/2023) for Medication Therapy Management.    SUBJECTIVE/OBJECTIVE:                          Winter Loya is a 66 year old female called for a follow-up visit.  Today's visit is a follow-up MTM visit from 9/19/22.     Reason for visit: med review, refill levothyroxine.    Allergies/ADRs: Reviewed in chart  Past Medical History: Reviewed in chart  Tobacco: She reports that she quit smoking about 17 years ago. Her smoking use included cigarettes. She smoked an average of 1 pack per day. She has never used smokeless tobacco.  Alcohol: Less than 1 beverage / month  Caffeine: 1 diet coke/day and 2 cups coffee/day    Medication Adherence/Access:    - sometimes forgetting evening medications  The patient fills medications at [place]: NO, fills medications at [facility] and [facility] for Revlimid.    Type 2 Diabetes/Weight loss:    Lantus 30 units at bedtime   Jardiance 25 mg daily     Following with Dr. Mj Justice in Weight  Management. She stopped the metformin in the hospital and decided not restart it. She thinks there is an improvement in diarrhea off of it. She picked up Ozempic yesterday, it is covered.  Has been on Trulicity in the past, tolerated well.   Patient is experiencing the following side effects: diarrhea - nobody knows why she has this. She has done a trial off of metformin with Delmi Gross without improvement in diarrhea.   Blood sugar monitoring: never, but she'd like to start again Ranges (patient reported): n/a  Symptoms of low blood sugar? none  Symptoms of high blood sugar? none  Eye exam: up to date  Foot exam: up to date  Diet/Exercise: did not discuss in detail   Aspirin: Taking 81mg daily for primary prevention   Statin: Yes: atorvastatin   ACEi/ARB: Yes: losartan.   Urine Albumin:   Lab Results   Component Value Date    UMALCR 75.28 (H) 02/02/2022     Lab Results   Component Value Date    A1C 7.6 09/28/2022    A1C 6.8 07/11/2022    A1C 9.0 12/29/2021    A1C 7.8 08/27/2021    A1C 7.8 07/26/2021    A1C 7.6 03/08/2021    A1C 8.8 12/07/2020    A1C 9.1 08/21/2020    A1C 6.7 03/05/2020     Diarrhea:   Loperamide 4 mg twice daily     This has the only thing that has really helped. This is well controlled right now and really good. But if she forgets one of these she can usually tell right away.     Hypothyroidism:  She hasn't had any of her thyroid medicine in 3 weeks, prescription was getting denied (had been on levothyroxine 200 mcg daily) she made an appointment and everything.   Patient is having the following symptoms: feeling cold  TSH   Date Value Ref Range Status   09/28/2022 4.68 (H) 0.40 - 4.00 mU/L Final   01/27/2021 2.08 0.40 - 4.00 mU/L Final     Multiple myeloma:    Revlimid 10 mg daily   Acyclovir 400 mg twice daily (Shingles prophylaxis), also helps prevent her cold sores    She had a stem cell transplant in either 2018 or 2019, has been in remission since. She'd prefer to be off the Revlimid,  however oncology prefers she remain on this. Tolerates it well.     Today's Vitals: There were no vitals taken for this visit.  ----------------      I spent 21 minutes with this patient today. All changes were made via collaborative practice agreement with Montse Bucio PA-C. A copy of the visit note was provided to the patient's provider(s).    A summary of these recommendations was sent via [employer].    Vonnie Corrigan, PharmD  Medication Therapy Management Pharmacist  [phone]    Telemedicine Visit Details  Type of service:  Telephone visit  Start Time: 2:40 PM  End Time: 3:01 PM  Originating Location (pt. Location): Home      Distant Location (provider location):  Off-site  Provider has received verbal consent for a visit from the patient? Yes     Medication Therapy Recommendations  Hypothyroidism, unspecified type    Current Medication: levothyroxine (SYNTHROID/LEVOTHROID) 200 MCG tablet   Rationale: Medication product not available - Adherence - Adherence   Recommendation: Provide Adherence Intervention   Status: Patient Agreed - Adherence/Education         Type 2 diabetes mellitus with diabetic polyneuropathy, with long-term current use of insulin (H)    Current Medication: insulin glargine (LANTUS SOLOSTAR) 100 UNIT/ML pen   Rationale: Does not understand instructions - Adherence - Adherence   Recommendation: Provide Education   Status: Patient Agreed - Adherence/Education

## 2022-12-21 NOTE — Clinical Note
CE DAWSON note, thanks!  Vonnie Corrigan, PharmD Medication Therapy Management Pharmacist [phone]

## 2022-12-23 NOTE — PATIENT INSTRUCTIONS
"Recommendations from today's MTM visit:                                                         1. Sent refill over for levothyroxine 200 mcg daily.    2. Start Ozempic 0.25 mg weekly.    3. Decrease Lantus dose from 30 units to 22 units daily.     4. Check blood sugar every morning, fasting.    Follow-up: Return in about 2 weeks (around 1/4/2023) for Medication Therapy Management.    It was great speaking with you today.  I value your experience and would be very thankful for your time in providing feedback in our clinic survey. In the next few days, you may receive an email or text message from [employer] with a link to a survey related to your  clinical pharmacist.\"     To schedule another MTM appointment, please call the clinic directly or you may call the MTM scheduling line at [phone] or toll-free at [phone].     My Clinical Pharmacist's contact information:                                                      Please feel free to contact me with any questions or concerns you have.      Vonnie Corrigan, PharmD  Medication Therapy Management Pharmacist  [phone]   "

## 2022-12-30 DIAGNOSIS — C90.01 MULTIPLE MYELOMA IN REMISSION (H): Primary | ICD-10-CM

## 2022-12-30 RX ORDER — LENALIDOMIDE 10 MG/1
10 CAPSULE ORAL DAILY
Qty: 28 CAPSULE | Refills: 0 | Status: SHIPPED | OUTPATIENT
Start: 2022-12-30 | End: 2023-02-02

## 2023-01-02 ENCOUNTER — OFFICE VISIT (OUTPATIENT)
Dept: PSYCHOLOGY | Facility: CLINIC | Age: 68
End: 2023-01-02
Payer: MEDICAID

## 2023-01-02 DIAGNOSIS — F33.1 MAJOR DEPRESSIVE DISORDER, RECURRENT EPISODE, MODERATE WITH ANXIOUS DISTRESS (H): Primary | ICD-10-CM

## 2023-01-02 PROCEDURE — 90834 PSYTX W PT 45 MINUTES: CPT | Performed by: STUDENT IN AN ORGANIZED HEALTH CARE EDUCATION/TRAINING PROGRAM

## 2023-01-02 NOTE — PROGRESS NOTES
[facility]                                     Progress Note    Patient Name: Winter Loya  Date: 1/02/2023         Service Type: Individual      Session Start Time: 3.30  Session End Time: 4.15     Session Length: 45 mns    Session #: 15    Attendees: Client attended alone    Service Modality:  In-person    DATA  Interactive Complexity: No  Crisis: No        Progress Since Last Session (Related to Symptoms / Goals / Homework):   Symptoms: Worsening depression as evident by current phq9 and gad7 scores    Homework: Achieved / completed to satisfaction       Episode of Care Goals: Minimal progress - ACTION (Actively working towards change); Intervened by reinforcing change plan / affirming steps taken     Current / Ongoing Stressors and Concerns:   Ongoing stressors: Financial stress, stressful filial relationship/ Difficulty with asertivness   Current: Pt reported struggling with assertively communicating thoughts and feelings to son and feels controled by him but feels stuck on ways to break the vicious [place] of unhealthy relationship pattens.  Pt reported son was indoors all day on [place] and did not go out and she also felt down and sad all day.                   Treatment Objective(s) Addressed in This Session:               Identify negative self-talk and behaviors: challenge core beliefs, myths, and actions     Intervention:     Continue discussion today on codependency as an unhealthy relationship in which your mood, happiness, and identity are defined by the other person and connect concept to patient s presenting problem. Couched patient that this vicious [place] of functioning is unsustainable and encouraged pt to break the [place] by exploring other aspects of their identity. Encourage patient to explore and engage in things that bring them ross as an individual and create heathy boundaries of pro-dependence which allows them to show love to their son while simultaneously  developing and maintaining healthy boundaries.         ASSESSMENT: Current Emotional / Mental Status (status of significant symptoms):   Risk status (Self / Other harm or suicidal ideation)   Patient denies current fears or concerns for personal safety.   Patient denies current or recent suicidal ideation or behaviors.   Patient denies current or recent homicidal ideation or behaviors.   Patient denies current or recent self injurious behavior or ideation.   Patient denies other safety concerns.   Patient reports there has been no change in risk factors since their last session.     Patient reports there has been no change in protective factors since their last session.     Recommended that patient call 911 or go to the local ED should there be a change in any of these risk factors.     Appearance:   Appropriate    Eye Contact:   Good    Psychomotor Behavior: Normal    Attitude:   Cooperative  Interested Friendly   Orientation:   Person Place Time Situation   Speech    Rate / Production: Normal/ Responsive    Volume:  Normal    Mood:    Depressed  Anhedonia Grieving   Affect:    Worrisome    Thought Content:  Clear    Thought Form:  Coherent    Insight:    Fair      Medication Review:   No changes to current psychiatric medication(s)     Medication Compliance:   Yes     Changes in Health Issues:   None reported     Chemical Use Review:   Substance Use: Chemical use reviewed, no active concerns identified      Tobacco Use: No current tobacco use.      Diagnosis:    296.32 (F33.1) Major Depressive Disorder, Recurrent Episode, Moderate _ and With anxious distress    Collateral Reports Completed:   Not Applicable    PLAN: (Patient Tasks / Therapist Tasks / Other)    Continue working on  heathy boundaries of pro-dependence with son and explore ways to break emotional codependency.    CHERISE Castellon                   ----- Service Performed and Documented by CHERISE------  This note was reviewed and clinical supervision by  Yasmani Arroyo, MSBONG [facility]    1/5/2023   ______________________________________________________________________    Individual Treatment Plan    Patient's Name: Winter Loya  YOB: 1955    Date of Creation: 04/18/2022  Date Treatment Plan Last Reviewed/Revised: 07/25/2022    DSM5 Diagnoses: 296.32 (F33.1) Major Depressive Disorder, Recurrent Episode, Moderate _ and With anxious distress  Psychosocial / Contextual Factors:   PROMIS (reviewed every 90 days):     Referral / Collaboration:  Referral to another professional/service is not indicated at this time..    Anticipated number of session for this episode of care: 15-25  Anticipation frequency of session: Every other week  Anticipated Duration of each session: 38-52 minutes  Treatment plan will be reviewed in 90 days or when goals have been changed.       MeasurableTreatment Goal(s) related to diagnosis / functional impairment(s)    Goal 1: Patient will identify specific triggers to feelings of depression and improve coping with depression by  90 %.    I will know I've met my goal when     Objective #A (Patient Action)    Patient will identify and practice 3 outdoor things that brings her ross daily and repeat that every day.  Status: Continued - Date(s):  01/24/2023    Intervention(s)  Therapist will provide psychoeducation, behavioral activation, and cognitive restructuring.)    Objective #B  Patient will identify specific areas of cognitive distortion and challenge irrational thoughts with reality   Status: Continued - Date(s):    01/24/2023       Intervention(s)  Therapist will teach patient how negative thoughts can lead to negative feelings and actions and ways to reframe thoughts in a more positive way leading to more positive feelings and helpful behaviors    Objective #C  Patient will learn and practice relaxation techniques to manage anxiety.  Status: Continued - Date(s):    01/24/2023    Intervention(s)  Therapist will teach patient  thought stopping, deep breathing exercises, mindful meditation, and creative visualization.  Patient has reviewed and agreed to the above plan.      CHERISE Castellon  April 18, 2022

## 2023-01-03 NOTE — PROGRESS NOTES
Subjective:  HPI  Physical Exam                    Objective:  System    Physical Exam    General     ROS    Assessment/Plan:    DISCHARGE REPORT    Progress reporting period is from 10/7/2022 to 11/7/2022.     SUBJECTIVE  Subjective: Improved.  Tries to get the exercises in.   Current Pain level: 1/10   Initial Pain level: 2/10   Changes in function: Yes, see goal flow sheet for change in function   Adverse reactions: None;   ,     Patient has failed to return to therapy so current objective findings are unknown.  The subjective and objective information are from the last SOAP note on this patient.    OBJECTIVE  Objective: tender bilat gastroc      ASSESSMENT/PLAN  Updated problem list and treatment planDiagnosis 1:  Bilateral foot pain  Pain -  US, electric stimulation, manual therapy, splint/taping/bracing/orthotics, self management, education and home program  Decreased ROM/flexibility - manual therapy, therapeutic exercise, therapeutic activity and home program  Decreased joint mobility - manual therapy, therapeutic exercise, therapeutic activity and home program  Decreased strength - therapeutic exercise, therapeutic activities and home program  Decreased function - therapeutic activities and home program  Diagnosis 2:  Balance deficits   Decreased strength - therapeutic exercise, therapeutic activities and home program  Impaired balance - neuro re-education, gait training, therapeutic activities, adaptive equipment/assistive device and home program  Impaired gait - gait training, assistive devices and home program  Decreased function - therapeutic activities, home program and functional performance testing:   STG/LTGs have been met or progress has been made towards goals:  unknown  Assessment of Progress: The patient has not returned to therapy. Current status is unknown.  Self Management Plans:  Patient has been instructed in a home treatment program.    Winter continues to require the following intervention  to meet STG and LTG's: PT intervention is no longer required to meet STG/LTG.  The patient failed to complete scheduled/ordered appointments so current information is unknown.  We will discharge this patient from PT.    Recommendations:  Cancelled last visit and did not reschedule.  Discharge pt from PT.     Please refer to the daily flowsheet for treatment today, total treatment time and time spent performing 1:1 timed codes.

## 2023-01-06 ENCOUNTER — TELEPHONE (OUTPATIENT)
Dept: ONCOLOGY | Facility: CLINIC | Age: 68
End: 2023-01-06

## 2023-01-06 NOTE — TELEPHONE ENCOUNTER
Oral Chemotherapy Monitoring Program    Medication: Revlimid  Rx:  10 mg PO daily for a 28 day cycle  Auth #: [id number]  Risk Category: Adult female NOT of reproductive capacity      Routine survey questions reviewed.    *Thank you,    Mary Franklin  Oncology Pharmacy Liaison [facility] [email or web address]  Phone: [phone]  Fax: [phone]

## 2023-01-16 ENCOUNTER — OFFICE VISIT (OUTPATIENT)
Dept: PSYCHOLOGY | Facility: CLINIC | Age: 68
End: 2023-01-16
Payer: MEDICAID

## 2023-01-16 DIAGNOSIS — F33.1 MAJOR DEPRESSIVE DISORDER, RECURRENT EPISODE, MODERATE (H): Primary | Chronic | ICD-10-CM

## 2023-01-16 PROCEDURE — 90834 PSYTX W PT 45 MINUTES: CPT | Performed by: STUDENT IN AN ORGANIZED HEALTH CARE EDUCATION/TRAINING PROGRAM

## 2023-01-17 NOTE — PROGRESS NOTES
[facility]                                     Progress Note    Patient Name: Winter Loya  Date: 1/16/2023         Service Type: Individual      Session Start Time: 2.30  Session End Time: 3.18     Session Length: 48 mns    Session #: 16    Attendees: Client attended alone    Service Modality:  In-person    DATA  Interactive Complexity: No  Crisis: No      Progress Since Last Session (Related to Symptoms / Goals / Homework):   Symptoms: Worsening depression as evident by current phq9 and gad7 scores    Homework: Achieved / completed to satisfaction       Episode of Care Goals: Minimal progress - ACTION (Actively working towards change); Intervened by reinforcing change plan / affirming steps taken     Current / Ongoing Stressors and Concerns:   Ongoing stressors: Financial stress, stressful filial relationship/ Difficulty with asertivness   Current: 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                    Treatment Objective(s) Addressed in This Session:              Patient will learn and practice coping and management strategies daily to mitigate compassion fatigue.     Intervention:   Discussion today on compassion fatigue as the physical, emotional, and psychological impact of helping others. Discussed common futures like reduced feelings of empathy and sensitivity, feeling overwhelmed and exhausted by work demands, feeling detached, numb, and emotionally disconnected and loss of interest in activities you used to enjoy and neglect of your own self-care. Explore ways to mitigate compassion fatigue like focusing on your breath and slowing down your breathing rate. Establishing a good self-care routine that includes eating healthy, getting more exercise, and getting enough sleep and taking time off from work to rest and rejuvenate.       ASSESSMENT: Current Emotional / Mental Status  (status of significant symptoms):   Risk status (Self / Other harm or suicidal ideation)   Patient denies current fears or concerns for personal safety.   Patient denies current or recent suicidal ideation or behaviors.   Patient denies current or recent homicidal ideation or behaviors.   Patient denies current or recent self injurious behavior or ideation.   Patient denies other safety concerns.   Patient reports there has been no change in risk factors since their last session.     Patient reports there has been no change in protective factors since their last session.     Recommended that patient call 911 or go to the local ED should there be a change in any of these risk factors.     Appearance:   Appropriate    Eye Contact:   Good    Psychomotor Behavior: Normal    Attitude:   Cooperative  Interested Friendly   Orientation:   Person Place Time Situation   Speech    Rate / Production: Normal/ Responsive    Volume:  Normal    Mood:    Depressed  Anhedonia Grieving   Affect:    Worrisome    Thought Content:  Clear    Thought Form:  Coherent    Insight:    Fair      Medication Review:   No changes to current psychiatric medication(s)     Medication Compliance:   Yes     Changes in Health Issues:   None reported     Chemical Use Review:   Substance Use: Chemical use reviewed, no active concerns identified      Tobacco Use: No current tobacco use.      Diagnosis:    296.32 (F33.1) Major Depressive Disorder, Recurrent Episode, Moderate _ and With anxious distress    Collateral Reports Completed:   Not Applicable    PLAN: (Patient Tasks / Therapist Tasks / Other)    Utilize coping and management strategies learned in session to mitigate compassion fatigue    CHERISE Castellon                   ----- Service Performed and Documented by CHERISE------  This note was reviewed and clinical supervision by AMY Ayala [facility]    1/18/2023  "    ______________________________________________________________________    Individual Treatment Plan    Patient's Name: Winter Loya  YOB: 1955    Date of Creation: 04/18/2022  Date Treatment Plan Last Reviewed/Revised: 07/25/2022    DSM5 Diagnoses: 296.32 (F33.1) Major Depressive Disorder, Recurrent Episode, Moderate _ and With anxious distress  Psychosocial / Contextual Factors:   PROMIS (reviewed every 90 days):     Referral / Collaboration:  Referral to another professional/service is not indicated at this time..    Anticipated number of session for this episode of care: 15-25  Anticipation frequency of session: Every other week  Anticipated Duration of each session: 38-52 minutes  Treatment plan will be reviewed in 90 days or when goals have been changed.       MeasurableTreatment Goal(s) related to diagnosis / functional impairment(s)    Goal 1: Patient will identify and address specific triggers to feelings of depression and improve coping with depression by  90 %.    I will know I've met my goal when I can comfortably manage the daily stressors I have and be less depressed\"    Objective #A (Patient Action)    Patient will identify and practice 3 outdoor things that brings her ross daily and repeat that every day.  Status: Continued - Date(s):  01/24/2023    Intervention(s)  Therapist will provide psychoeducation, behavioral activation, and cognitive restructuring.)    Objective #B  Patient will identify specific areas of cognitive distortion and challenge irrational thoughts with reality   Status: Continued - Date(s):    01/24/2023       Intervention(s)  Therapist will teach patient how negative thoughts can lead to negative feelings and actions and ways to reframe thoughts in a more positive way leading to more positive feelings and helpful behaviors    Objective #C  Patient will learn and practice relaxation techniques to manage anxiety.  Status: Continued - Date(s):    " 01/24/2023    Intervention(s)  Therapist will teach patient thought stopping, deep breathing exercises, mindful meditation, and creative visualization.  Patient has reviewed and agreed to the above plan.      CHERISE Castellon  April 18, 2022

## 2023-01-20 ENCOUNTER — PATIENT OUTREACH (OUTPATIENT)
Dept: ONCOLOGY | Facility: CLINIC | Age: 68
End: 2023-01-20
Payer: MEDICARE

## 2023-01-20 DIAGNOSIS — E11.42 TYPE 2 DIABETES MELLITUS WITH DIABETIC POLYNEUROPATHY, WITH LONG-TERM CURRENT USE OF INSULIN (H): ICD-10-CM

## 2023-01-20 DIAGNOSIS — F32.9 MAJOR DEPRESSIVE DISORDER WITH CURRENT ACTIVE EPISODE, UNSPECIFIED DEPRESSION EPISODE SEVERITY, UNSPECIFIED WHETHER RECURRENT: ICD-10-CM

## 2023-01-20 DIAGNOSIS — Z79.4 TYPE 2 DIABETES MELLITUS WITH DIABETIC POLYNEUROPATHY, WITH LONG-TERM CURRENT USE OF INSULIN (H): ICD-10-CM

## 2023-01-20 RX ORDER — SERTRALINE HYDROCHLORIDE 100 MG/1
100 TABLET, FILM COATED ORAL DAILY
Qty: 90 TABLET | Refills: 0 | Status: SHIPPED | OUTPATIENT
Start: 2023-01-20 | End: 2023-04-13

## 2023-01-20 RX ORDER — ATORVASTATIN CALCIUM 20 MG/1
20 TABLET, FILM COATED ORAL AT BEDTIME
Qty: 90 TABLET | Refills: 0 | Status: SHIPPED | OUTPATIENT
Start: 2023-01-20 | End: 2023-06-26

## 2023-01-20 NOTE — PROGRESS NOTES
[facility]: Cancer Care                                                                                          Winter calls stating that she had forgotten her appointment with Dr. Zhu.  She has been rescheduled for February 28, 2023.   I informed her if she has any symptoms or concerns prior to that visit we can get her scheduled with an SKYLER.  She will call [facility] and get an appointment scheduled for labs.    Winter said she has gone back to work but needs to review process for retiring and getting back on disability.  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    I will ask SW to contact her to help with her questions regarding disability    Signature:  Nakia Gunn RN

## 2023-01-23 ENCOUNTER — PATIENT OUTREACH (OUTPATIENT)
Dept: CARE COORDINATION | Facility: CLINIC | Age: 68
End: 2023-01-23
Payer: MEDICARE

## 2023-01-23 NOTE — PROGRESS NOTES
Social Work Telephone Message Note  [facility] [place] [facility]    Patient Name:  Winter Loya  /Age:  1955 (67 year old)    Referral Source: [facility]  Reason for Referral:  Disability Questions    Contacted patient via telephone on 23. Messages were left on patient's voicemail. Will await patient's return phone call and will provide assistance at that time.    AMY Young,CHERISE  Hematology/Oncology Social Worker  Phone:[phone] Pager: [phone]

## 2023-01-28 DIAGNOSIS — C90.01 MULTIPLE MYELOMA IN REMISSION (H): Primary | ICD-10-CM

## 2023-01-31 ENCOUNTER — VIRTUAL VISIT (OUTPATIENT)
Dept: ENDOCRINOLOGY | Facility: CLINIC | Age: 68
End: 2023-01-31
Payer: MEDICARE

## 2023-01-31 VITALS — HEIGHT: 69 IN | WEIGHT: 254 LBS | BODY MASS INDEX: 37.62 KG/M2

## 2023-01-31 DIAGNOSIS — C90.01 MULTIPLE MYELOMA IN REMISSION (H): Primary | ICD-10-CM

## 2023-01-31 DIAGNOSIS — E66.01 CLASS 2 SEVERE OBESITY DUE TO EXCESS CALORIES WITH SERIOUS COMORBIDITY AND BODY MASS INDEX (BMI) OF 37.0 TO 37.9 IN ADULT (H): ICD-10-CM

## 2023-01-31 DIAGNOSIS — E11.9 TYPE 2 DIABETES MELLITUS WITHOUT COMPLICATION, WITHOUT LONG-TERM CURRENT USE OF INSULIN (H): Primary | ICD-10-CM

## 2023-01-31 DIAGNOSIS — E66.812 CLASS 2 SEVERE OBESITY DUE TO EXCESS CALORIES WITH SERIOUS COMORBIDITY AND BODY MASS INDEX (BMI) OF 37.0 TO 37.9 IN ADULT (H): ICD-10-CM

## 2023-01-31 PROCEDURE — 99214 OFFICE O/P EST MOD 30 MIN: CPT | Mod: 95 | Performed by: INTERNAL MEDICINE

## 2023-01-31 RX ORDER — LENALIDOMIDE 10 MG/1
10 CAPSULE ORAL DAILY
Qty: 28 CAPSULE | Refills: 0 | Status: SHIPPED | OUTPATIENT
Start: 2023-01-31 | End: 2023-04-10

## 2023-01-31 NOTE — NURSING NOTE
Chief Complaint   Patient presents with     Follow Up     Return weight management.         Vitals:    01/31/23 1224   Weight: 254 lb       Body mass index is 37.51 kg/m .      Rai Bledsoe, EMT  Sur[facility] Clinic

## 2023-01-31 NOTE — PROGRESS NOTES
Virtual Visit Check-In    During this virtual visit the patient is located in [place], patient verifies this as the location during the entirety of this visit.     Winter is a 67 year old who is being evaluated via a billable video visit.      How would you like to obtain your AVS? MyChart  If the video visit is dropped, the invitation should be resent by: Text to cell phone: [phone]  Will anyone else be joining your video visit? No        Video-Visit Details    Type of service:  Video Visit     Originating Location (pt. Location): Home    Distant Location (provider location):  Off-site  Platform used for Video Visit: Radha Bledsoe NREMT

## 2023-01-31 NOTE — LETTER
2023       RE: Winter Loya  [address]  [place] [address]     Dear Colleague,    Thank you for referring your patient, Winter Loya, to the [facility] [place] at [facility]. Please see a copy of my visit note below.        Return Medical Weight Management Note     Winter Loya  MRN:  [id number]  :  1955  TOMASA:  2023    Dear Montse Bucio PA-C,    I had the pleasure of seeing your patient Winter Loya. She is a 67 year old female who I am continuing to see for treatment of obesity related to: type 2 diabetes, multiple myeloma, hypertension, hyperlipidemia, DAILY       2022   I have the following health issues associated with obesity: Type II Diabetes, High Blood Pressure, High Cholesterol, Sleep Apnea, Cancer   I have the following symptoms associated with obesity: Knee Pain, Depression, Fatigue, Hip Pain     INTERVAL HISTORY:  Last seen Dr. Mj Bowman 2022     Unfortunately, her son diet from leukemia in August, and she has not been doing well since that time. She continues to have issues with food cravings.    At the last visit, she was started on Ozempic injection. Her current dose is 0.5 mg weekly. She tolerates medication well. She is now taking Lantus 22 units daily as well as Jardiance 25 mg daily. She checked BG twice a day and reports the numbers around 150-200s. No hypoglycemia. She was also previously on metformin, however, is not currently taking this due to ongoing diarrhea for the last year, and is currently on Imodium which has been helping.  She has no history of pancreatitis.     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   Weight today is 253 pounds  Weight change since last seen on 12/9/2022 is up 1 pounds  Total weight change is 3 pounds    Changes and Difficulties 1/31/2023   I have made the following changes to my diet since my last visit: More vegetables   With regards to my diet, I am still struggling with: Ajay forstevenr   I have made the following changes to my activity/exercise since my last visit: None yet   With regards to my activity/exercise, I am still struggling with: Rajwinder       VITALS:  Ht 1.753 m (5' 9\")   Wt 115.2 kg (254 lb)   BMI 37.51 kg/m     GENERAL: Healthy, alert and no distress  EYES: Eyes grossly normal to inspection.   RESP: No audible wheeze, cough.  MS: No gross musculoskeletal defects noted.  Normal range of motion.  NEURO: Cranial nerves grossly intact.  Mentation and speech appropriate for age.  PSYCH: Mentation appears normal, affect normal/bright, judgement and insight intact, normal speech and appearance well-groomed.      MEDICATIONS:   Current Outpatient Medications   Medication Sig Dispense Refill     acyclovir (ZOVIRAX) 400 MG tablet TAKE ONE TABLET BY MOUTH EVERY TWELVE HOURS 60 tablet 11     alcohol swab prep pads Use to swab area of injection/sowmya as directed. 100 each 3     aspirin 81 MG EC tablet Take 81 mg by mouth daily       atorvastatin (LIPITOR) 20 MG tablet Take 1 tablet (20 mg) by mouth At Bedtime. 90 tablet 0     blood glucose (NO BRAND SPECIFIED) test strip Use to test blood sugar 3 times daily or as directed. To accompany: Blood Glucose Monitor Brands: per insurance. 100 " strip 6     blood glucose calibration (NO BRAND SPECIFIED) solution To accompany: Blood Glucose Monitor Brands: per insurance. 4 each 0     blood glucose monitoring (NO BRAND SPECIFIED) meter device kit Use to test blood sugar 3 times daily or as directed. Preferred blood glucose meter OR supplies to accompany: Blood Glucose Monitor Brands: per insurance. 1 kit 0     empagliflozin (JARDIANCE) 25 MG TABS tablet Take 1 tablet (25 mg) by mouth daily 90 tablet 3     insulin glargine (LANTUS SOLOSTAR) 100 UNIT/ML pen Inject 22 Units Subcutaneous At Bedtime 30 mL 1     insulin pen needle (FIFTY50 PEN NEEDLES) 32G X 4 MM miscellaneous Use one pen needle daily or as directed. 100 each 2     LENalidomide (REVLIMID) 10 MG CAPS capsule Take 1 capsule (10 mg) by mouth daily for 28 days 28 capsule 0     LENalidomide (REVLIMID) 10 MG CAPS capsule Take 1 capsule (10 mg) by mouth daily for 28 days 28 capsule 0     LENalidomide (REVLIMID) 10 MG CAPS capsule Take 1 capsule (10 mg) by mouth daily for 28 days 28 capsule 0     LENalidomide (REVLIMID) 10 MG CAPS capsule Take 1 capsule (10 mg) by mouth daily for 28 days 28 capsule 0     levothyroxine (SYNTHROID/LEVOTHROID) 200 MCG tablet Take 1 tablet (200 mcg) by mouth every morning (before breakfast) 90 tablet 0     loperamide (IMODIUM) 2 MG capsule Take 4 mg (two capsules) by mouth at onset of loose stools, followed by 2 mg (one capsule) after each loose stool. Maximum: 16 mg (8 capsules) daily 270 capsule 11     losartan (COZAAR) 25 MG tablet Take 1 tablet (25 mg) by mouth daily 90 tablet 0     metFORMIN (GLUCOPHAGE XR) 500 MG 24 hr tablet Take 2 tablets (1,000 mg) by mouth daily (with dinner) (Patient not taking: Reported on 11/15/2022) 180 tablet 3     pregabalin (LYRICA) 100 MG capsule TAKE ONE CAPSULE BY MOUTH THREE TIMES DAILY (Patient taking differently: Take 100 mg by mouth 2 times daily) 270 capsule 3     propranolol ER (INDERAL LA) 60 MG 24 hr capsule Take 1 capsule (60 mg)  by mouth daily 30 capsule 3     semaglutide (OZEMPIC) 2 MG/1.5ML SOPN pen Inject 0.25 mg subcutaneously once weekly for 4 weeks then 0.5 mg once weekly. 1.5 mL 3     sertraline (ZOLOFT) 100 MG tablet Take 1 tablet (100 mg) by mouth daily 90 tablet 0     thin (NO BRAND SPECIFIED) lancets Use with lanceting device. To accompany: Blood Glucose Monitor Brands: per insurance. 200 each 6       Weight Loss Medication History Reviewed With Patient 1/31/2023   Which weight loss medications are you currently taking on a regular basis?  Ozempic   Are you having any side effects from the weight loss medication that we have prescribed you? No   If you are having side effects please describe: No     LABS:  Lab Results   Component Value Date    A1C 7.6 09/28/2022    A1C 6.8 07/11/2022    A1C 9.0 12/29/2021    A1C 7.8 08/27/2021    A1C 7.8 07/26/2021    A1C 7.6 03/08/2021    A1C 8.8 12/07/2020    A1C 9.1 08/21/2020    A1C 6.7 03/05/2020     ENDO DIABETES Latest Ref Rng & Units 11/28/2022   ALT 0 - 50 U/L 49   AST 0 - 45 U/L 21     ENDO DIABETES Latest Ref Rng & Units 11/28/2022   ALT 0 - 50 U/L 49   AST 0 - 45 U/L 21       ASSESSMENT:   Winter Loya is a 67 year old female with a history of mature onset class 2 obesity (defined as BMI 35-40 kg/m2) with current health consequences including type 2 diabetes.     At her initial appointment in 7/2022, she was not started on additional anti-obesity pharmacotherapy. That said, given her current BMI, in conjunction with higher degrees of hunger, low satiety    Was started Ozempic at the last visit -- currently on 0.5 mg weekly -- no side effects  Tried to eat good  Still cannot exercise much due to neuropathy    PLAN:   Increase Ozempic 1.0 mg weekly  Continue Lantus 22 units daily  Continue Jardiance 25 mg daily     FOLLOW-UP:    See Lauren Bloch, PharmD in 6 weeks  See Dr.Tasma ARREOLA in 3 month(s)      Joined the call at 1/31/2023, 12:34:54 pm.  Left the call at 1/31/2023, 12:49:01  pm.  You were on the call for 14 minutes 7 seconds .    External notes/medical records independently reviewed, labs and imaging independently reviewed, medical management and tests to be discussed/communicated to patient.    Time: I spent 30 minutes spent on the date of the encounter preparing to see patient (including chart review and preparation), obtaining and or reviewing additional medical history, performing a physical exam and evaluation, documenting clinical information in the electronic health record, independently interpreting results, communicating results to the patient and coordinating care.    Sincerely,    Park Vargas MD, MS  Division of Diabetes and Endocrinology  Department of Medicine

## 2023-01-31 NOTE — PATIENT INSTRUCTIONS
- increase Ozempic to 1.0 mg weekly  - keep blood glucose data  - continue insulin Lantus and Jardiance  - see PharmD at PCP clinic next week    See Lauren Bloch, PharmD in 6 weeks  See Dr.Tasma ARREOLA in 3 month(s)    If you have any questions, please do not hesitate to call Weight management clinic at [phone] or [phone].  If you need to fax, please fax to [phone].    Sincerely,    Park Vargas MD  Endocrinology

## 2023-01-31 NOTE — PROGRESS NOTES
Return Medical Weight Management Note     Winter Loya  MRN:  [id number]  :  1955  TOMASA:  2023    Dear Montse Bucio PA-C,    I had the pleasure of seeing your patient Winter Loya. She is a 67 year old female who I am continuing to see for treatment of obesity related to: type 2 diabetes, multiple myeloma, hypertension, hyperlipidemia, DAILY       2022   I have the following health issues associated with obesity: Type II Diabetes, High Blood Pressure, High Cholesterol, Sleep Apnea, Cancer   I have the following symptoms associated with obesity: Knee Pain, Depression, Fatigue, Hip Pain     INTERVAL HISTORY:  Last seen Dr. Mj Bowman 2022     Unfortunately, her son diet from leukemia in August, and she has not been doing well since that time. She continues to have issues with food cravings.    At the last visit, she was started on Ozempic injection. Her current dose is 0.5 mg weekly. She tolerates medication well. She is now taking Lantus 22 units daily as well as Jardiance 25 mg daily. She checked BG twice a day and reports the numbers around 150-200s. No hypoglycemia. She was also previously on metformin, however, is not currently taking this due to ongoing diarrhea for the last year, and is currently on Imodium which has been helping.  She has no history of pancreatitis.     Current diabetes regimen  Lantus 22 units daily  Jardiance 25 mg daily  Ozempic 0.5 mg weekly     Not taking metformin due to diarrhea    BG checking -- checked twice a day 150-200, no low BG    Dietary Recall:  Breakfast: oatmeal or canned fruit  Lunch: sandwich or salad  Dinner: roast, beans, vegetables  Snacks: something sweet more than she should  Drinks: does not generally have drinks with calories; mostly drinks water; sometimes have Crystal Light or a diet soda a couple times per week    Physical activity has overall been limited due to pain from neuropathy    Job -- methadone counselor -- plan to retire  "soon    CURRENT WEIGHT:   254 lbs 0 oz    Initial weight was 250 pounds on 7/19/2022.   Weight today is 253 pounds  Weight change since last seen on 12/9/2022 is up 1 pounds  Total weight change is 3 pounds    Changes and Difficulties 1/31/2023   I have made the following changes to my diet since my last visit: More vegetables   With regards to my diet, I am still struggling with: Ajay reynolds   I have made the following changes to my activity/exercise since my last visit: None yet   With regards to my activity/exercise, I am still struggling with: Rajwinder       VITALS:  Ht 1.753 m (5' 9\")   Wt 115.2 kg (254 lb)   BMI 37.51 kg/m     GENERAL: Healthy, alert and no distress  EYES: Eyes grossly normal to inspection.   RESP: No audible wheeze, cough.  MS: No gross musculoskeletal defects noted.  Normal range of motion.  NEURO: Cranial nerves grossly intact.  Mentation and speech appropriate for age.  PSYCH: Mentation appears normal, affect normal/bright, judgement and insight intact, normal speech and appearance well-groomed.      MEDICATIONS:   Current Outpatient Medications   Medication Sig Dispense Refill     acyclovir (ZOVIRAX) 400 MG tablet TAKE ONE TABLET BY MOUTH EVERY TWELVE HOURS 60 tablet 11     alcohol swab prep pads Use to swab area of injection/sowmya as directed. 100 each 3     aspirin 81 MG EC tablet Take 81 mg by mouth daily       atorvastatin (LIPITOR) 20 MG tablet Take 1 tablet (20 mg) by mouth At Bedtime. 90 tablet 0     blood glucose (NO BRAND SPECIFIED) test strip Use to test blood sugar 3 times daily or as directed. To accompany: Blood Glucose Monitor Brands: per insurance. 100 strip 6     blood glucose calibration (NO BRAND SPECIFIED) solution To accompany: Blood Glucose Monitor Brands: per insurance. 4 each 0     blood glucose monitoring (NO BRAND SPECIFIED) meter device kit Use to test blood sugar 3 times daily or as directed. Preferred blood glucose meter OR supplies to accompany: Blood " Glucose Monitor Brands: per insurance. 1 kit 0     empagliflozin (JARDIANCE) 25 MG TABS tablet Take 1 tablet (25 mg) by mouth daily 90 tablet 3     insulin glargine (LANTUS SOLOSTAR) 100 UNIT/ML pen Inject 22 Units Subcutaneous At Bedtime 30 mL 1     insulin pen needle (FIFTY50 PEN NEEDLES) 32G X 4 MM miscellaneous Use one pen needle daily or as directed. 100 each 2     LENalidomide (REVLIMID) 10 MG CAPS capsule Take 1 capsule (10 mg) by mouth daily for 28 days 28 capsule 0     LENalidomide (REVLIMID) 10 MG CAPS capsule Take 1 capsule (10 mg) by mouth daily for 28 days 28 capsule 0     LENalidomide (REVLIMID) 10 MG CAPS capsule Take 1 capsule (10 mg) by mouth daily for 28 days 28 capsule 0     LENalidomide (REVLIMID) 10 MG CAPS capsule Take 1 capsule (10 mg) by mouth daily for 28 days 28 capsule 0     levothyroxine (SYNTHROID/LEVOTHROID) 200 MCG tablet Take 1 tablet (200 mcg) by mouth every morning (before breakfast) 90 tablet 0     loperamide (IMODIUM) 2 MG capsule Take 4 mg (two capsules) by mouth at onset of loose stools, followed by 2 mg (one capsule) after each loose stool. Maximum: 16 mg (8 capsules) daily 270 capsule 11     losartan (COZAAR) 25 MG tablet Take 1 tablet (25 mg) by mouth daily 90 tablet 0     metFORMIN (GLUCOPHAGE XR) 500 MG 24 hr tablet Take 2 tablets (1,000 mg) by mouth daily (with dinner) (Patient not taking: Reported on 11/15/2022) 180 tablet 3     pregabalin (LYRICA) 100 MG capsule TAKE ONE CAPSULE BY MOUTH THREE TIMES DAILY (Patient taking differently: Take 100 mg by mouth 2 times daily) 270 capsule 3     propranolol ER (INDERAL LA) 60 MG 24 hr capsule Take 1 capsule (60 mg) by mouth daily 30 capsule 3     semaglutide (OZEMPIC) 2 MG/1.5ML SOPN pen Inject 0.25 mg subcutaneously once weekly for 4 weeks then 0.5 mg once weekly. 1.5 mL 3     sertraline (ZOLOFT) 100 MG tablet Take 1 tablet (100 mg) by mouth daily 90 tablet 0     thin (NO BRAND SPECIFIED) lancets Use with lanceting device. To  accompany: Blood Glucose Monitor Brands: per insurance. 200 each 6       Weight Loss Medication History Reviewed With Patient 1/31/2023   Which weight loss medications are you currently taking on a regular basis?  Ozempic   Are you having any side effects from the weight loss medication that we have prescribed you? No   If you are having side effects please describe: No     LABS:  Lab Results   Component Value Date    A1C 7.6 09/28/2022    A1C 6.8 07/11/2022    A1C 9.0 12/29/2021    A1C 7.8 08/27/2021    A1C 7.8 07/26/2021    A1C 7.6 03/08/2021    A1C 8.8 12/07/2020    A1C 9.1 08/21/2020    A1C 6.7 03/05/2020     ENDO DIABETES Latest Ref Rng & Units 11/28/2022   ALT 0 - 50 U/L 49   AST 0 - 45 U/L 21     ENDO DIABETES Latest Ref Rng & Units 11/28/2022   ALT 0 - 50 U/L 49   AST 0 - 45 U/L 21       ASSESSMENT:   Winter Loya is a 67 year old female with a history of mature onset class 2 obesity (defined as BMI 35-40 kg/m2) with current health consequences including type 2 diabetes.     At her initial appointment in 7/2022, she was not started on additional anti-obesity pharmacotherapy. That said, given her current BMI, in conjunction with higher degrees of hunger, low satiety    Was started Ozempic at the last visit -- currently on 0.5 mg weekly -- no side effects  Tried to eat good  Still cannot exercise much due to neuropathy    PLAN:   Increase Ozempic 1.0 mg weekly  Continue Lantus 22 units daily  Continue Jardiance 25 mg daily     FOLLOW-UP:    See Lauren Bloch, PharmD in 6 weeks  See Dr.Tasma ARREOLA in 3 month(s)      Joined the call at 1/31/2023, 12:34:54 pm.  Left the call at 1/31/2023, 12:49:01 pm.  You were on the call for 14 minutes 7 seconds .    External notes/medical records independently reviewed, labs and imaging independently reviewed, medical management and tests to be discussed/communicated to patient.    Time: I spent 30 minutes spent on the date of the encounter preparing to see patient (including  chart review and preparation), obtaining and or reviewing additional medical history, performing a physical exam and evaluation, documenting clinical information in the electronic health record, independently interpreting results, communicating results to the patient and coordinating care.    Sincerely,    Park Vargas MD, MS  Division of Diabetes and Endocrinology  Department of Medicine

## 2023-02-02 ENCOUNTER — OFFICE VISIT (OUTPATIENT)
Dept: FAMILY MEDICINE | Facility: CLINIC | Age: 68
End: 2023-02-02
Payer: MEDICARE

## 2023-02-02 ENCOUNTER — TELEPHONE (OUTPATIENT)
Dept: ONCOLOGY | Facility: CLINIC | Age: 68
End: 2023-02-02

## 2023-02-02 VITALS
DIASTOLIC BLOOD PRESSURE: 65 MMHG | TEMPERATURE: 98.5 F | OXYGEN SATURATION: 96 % | HEART RATE: 66 BPM | BODY MASS INDEX: 37.78 KG/M2 | SYSTOLIC BLOOD PRESSURE: 106 MMHG | WEIGHT: 255.8 LBS

## 2023-02-02 DIAGNOSIS — F33.2 SEVERE EPISODE OF RECURRENT MAJOR DEPRESSIVE DISORDER, WITHOUT PSYCHOTIC FEATURES (H): ICD-10-CM

## 2023-02-02 DIAGNOSIS — R30.0 DYSURIA: ICD-10-CM

## 2023-02-02 DIAGNOSIS — Z79.4 TYPE 2 DIABETES MELLITUS WITH DIABETIC POLYNEUROPATHY, WITH LONG-TERM CURRENT USE OF INSULIN (H): Primary | ICD-10-CM

## 2023-02-02 DIAGNOSIS — E11.65 TYPE 2 DIABETES MELLITUS WITH HYPERGLYCEMIA, WITH LONG-TERM CURRENT USE OF INSULIN (H): ICD-10-CM

## 2023-02-02 DIAGNOSIS — Z13.220 SCREENING FOR HYPERLIPIDEMIA: ICD-10-CM

## 2023-02-02 DIAGNOSIS — Z94.84 STEM CELLS TRANSPLANT STATUS (H): ICD-10-CM

## 2023-02-02 DIAGNOSIS — D69.6 THROMBOCYTOPENIA (H): ICD-10-CM

## 2023-02-02 DIAGNOSIS — I27.20 PULMONARY HYPERTENSION (H): ICD-10-CM

## 2023-02-02 DIAGNOSIS — N18.1 CHRONIC KIDNEY DISEASE, STAGE 1: ICD-10-CM

## 2023-02-02 DIAGNOSIS — C90.01 MULTIPLE MYELOMA IN REMISSION (H): ICD-10-CM

## 2023-02-02 DIAGNOSIS — G62.0 DRUG-INDUCED POLYNEUROPATHY (H): ICD-10-CM

## 2023-02-02 DIAGNOSIS — E66.812 CLASS 2 SEVERE OBESITY DUE TO EXCESS CALORIES WITH SERIOUS COMORBIDITY AND BODY MASS INDEX (BMI) OF 37.0 TO 37.9 IN ADULT (H): ICD-10-CM

## 2023-02-02 DIAGNOSIS — E66.01 CLASS 2 SEVERE OBESITY DUE TO EXCESS CALORIES WITH SERIOUS COMORBIDITY AND BODY MASS INDEX (BMI) OF 37.0 TO 37.9 IN ADULT (H): ICD-10-CM

## 2023-02-02 DIAGNOSIS — E03.9 HYPOTHYROIDISM, UNSPECIFIED TYPE: ICD-10-CM

## 2023-02-02 DIAGNOSIS — Z79.4 TYPE 2 DIABETES MELLITUS WITH HYPERGLYCEMIA, WITH LONG-TERM CURRENT USE OF INSULIN (H): ICD-10-CM

## 2023-02-02 DIAGNOSIS — N39.46 MIXED INCONTINENCE URGE AND STRESS (MALE)(FEMALE): ICD-10-CM

## 2023-02-02 DIAGNOSIS — E11.42 TYPE 2 DIABETES MELLITUS WITH DIABETIC POLYNEUROPATHY, WITH LONG-TERM CURRENT USE OF INSULIN (H): Primary | ICD-10-CM

## 2023-02-02 LAB
ALBUMIN SERPL-MCNC: 4 G/DL (ref 3.4–5)
ALBUMIN UR-MCNC: NEGATIVE MG/DL
ALP SERPL-CCNC: 175 U/L (ref 40–150)
ALT SERPL W P-5'-P-CCNC: 42 U/L (ref 0–50)
ANION GAP SERPL CALCULATED.3IONS-SCNC: 5 MMOL/L (ref 3–14)
APPEARANCE UR: ABNORMAL
AST SERPL W P-5'-P-CCNC: 15 U/L (ref 0–45)
BACTERIA #/AREA URNS HPF: ABNORMAL /HPF
BASOPHILS # BLD AUTO: 0.1 10E3/UL (ref 0–0.2)
BASOPHILS NFR BLD AUTO: 1 %
BILIRUB SERPL-MCNC: 1.6 MG/DL (ref 0.2–1.3)
BILIRUB UR QL STRIP: NEGATIVE
BUN SERPL-MCNC: 12 MG/DL (ref 7–30)
CALCIUM SERPL-MCNC: 8.8 MG/DL (ref 8.5–10.1)
CHLORIDE BLD-SCNC: 108 MMOL/L (ref 94–109)
CHOLEST SERPL-MCNC: 133 MG/DL
CO2 SERPL-SCNC: 29 MMOL/L (ref 20–32)
COLOR UR AUTO: YELLOW
CREAT SERPL-MCNC: 0.71 MG/DL (ref 0.52–1.04)
CREAT UR-MCNC: 54 MG/DL
EOSINOPHIL # BLD AUTO: 0.3 10E3/UL (ref 0–0.7)
EOSINOPHIL NFR BLD AUTO: 8 %
ERYTHROCYTE [DISTWIDTH] IN BLOOD BY AUTOMATED COUNT: 14.9 % (ref 10–15)
FASTING STATUS PATIENT QL REPORTED: YES
GFR SERPL CREATININE-BSD FRML MDRD: >90 ML/MIN/1.73M2
GLUCOSE BLD-MCNC: 150 MG/DL (ref 70–99)
GLUCOSE UR STRIP-MCNC: >=1000 MG/DL
HBA1C MFR BLD: 8.2 % (ref 0–5.6)
HCT VFR BLD AUTO: 41.7 % (ref 35–47)
HDLC SERPL-MCNC: 49 MG/DL
HGB BLD-MCNC: 13.9 G/DL (ref 11.7–15.7)
HGB UR QL STRIP: ABNORMAL
KETONES UR STRIP-MCNC: NEGATIVE MG/DL
LDLC SERPL CALC-MCNC: 33 MG/DL
LEUKOCYTE ESTERASE UR QL STRIP: NEGATIVE
LYMPHOCYTES # BLD AUTO: 0.7 10E3/UL (ref 0.8–5.3)
LYMPHOCYTES NFR BLD AUTO: 18 %
MCH RBC QN AUTO: 29.4 PG (ref 26.5–33)
MCHC RBC AUTO-ENTMCNC: 33.3 G/DL (ref 31.5–36.5)
MCV RBC AUTO: 88 FL (ref 78–100)
MICROALBUMIN UR-MCNC: 101 MG/L
MICROALBUMIN/CREAT UR: 187.04 MG/G CR (ref 0–25)
MONOCYTES # BLD AUTO: 0.4 10E3/UL (ref 0–1.3)
MONOCYTES NFR BLD AUTO: 11 %
NEUTROPHILS # BLD AUTO: 2.3 10E3/UL (ref 1.6–8.3)
NEUTROPHILS NFR BLD AUTO: 61 %
NITRATE UR QL: NEGATIVE
NONHDLC SERPL-MCNC: 84 MG/DL
PH UR STRIP: 5.5 [PH] (ref 5–7)
PLATELET # BLD AUTO: 121 10E3/UL (ref 150–450)
POTASSIUM BLD-SCNC: 4.1 MMOL/L (ref 3.4–5.3)
PROT SERPL-MCNC: 7.1 G/DL (ref 6.8–8.8)
RBC # BLD AUTO: 4.72 10E6/UL (ref 3.8–5.2)
RBC #/AREA URNS AUTO: ABNORMAL /HPF
SODIUM SERPL-SCNC: 142 MMOL/L (ref 133–144)
SP GR UR STRIP: 1.01 (ref 1–1.03)
SQUAMOUS #/AREA URNS AUTO: ABNORMAL /LPF
TOTAL PROTEIN SERUM FOR ELP: 6.2 G/DL (ref 6.4–8.3)
TRIGL SERPL-MCNC: 254 MG/DL
UROBILINOGEN UR STRIP-ACNC: 0.2 E.U./DL
WBC # BLD AUTO: 3.8 10E3/UL (ref 4–11)
WBC #/AREA URNS AUTO: ABNORMAL /HPF
YEAST #/AREA URNS HPF: ABNORMAL /HPF

## 2023-02-02 PROCEDURE — 96127 BRIEF EMOTIONAL/BEHAV ASSMT: CPT | Performed by: PHYSICIAN ASSISTANT

## 2023-02-02 PROCEDURE — 82570 ASSAY OF URINE CREATININE: CPT | Performed by: PHYSICIAN ASSISTANT

## 2023-02-02 PROCEDURE — 80053 COMPREHEN METABOLIC PANEL: CPT | Performed by: PHYSICIAN ASSISTANT

## 2023-02-02 PROCEDURE — 36415 COLL VENOUS BLD VENIPUNCTURE: CPT | Performed by: PHYSICIAN ASSISTANT

## 2023-02-02 PROCEDURE — 99215 OFFICE O/P EST HI 40 MIN: CPT | Performed by: PHYSICIAN ASSISTANT

## 2023-02-02 PROCEDURE — 82043 UR ALBUMIN QUANTITATIVE: CPT | Performed by: PHYSICIAN ASSISTANT

## 2023-02-02 PROCEDURE — 81001 URINALYSIS AUTO W/SCOPE: CPT | Performed by: PHYSICIAN ASSISTANT

## 2023-02-02 PROCEDURE — 84165 PROTEIN E-PHORESIS SERUM: CPT

## 2023-02-02 PROCEDURE — 84155 ASSAY OF PROTEIN SERUM: CPT | Mod: 59 | Performed by: PHYSICIAN ASSISTANT

## 2023-02-02 PROCEDURE — 83036 HEMOGLOBIN GLYCOSYLATED A1C: CPT | Performed by: PHYSICIAN ASSISTANT

## 2023-02-02 PROCEDURE — 85025 COMPLETE CBC W/AUTO DIFF WBC: CPT | Performed by: PHYSICIAN ASSISTANT

## 2023-02-02 PROCEDURE — 80061 LIPID PANEL: CPT | Performed by: PHYSICIAN ASSISTANT

## 2023-02-02 PROCEDURE — 87086 URINE CULTURE/COLONY COUNT: CPT | Performed by: PHYSICIAN ASSISTANT

## 2023-02-02 RX ORDER — INSULIN GLARGINE 100 [IU]/ML
22 INJECTION, SOLUTION SUBCUTANEOUS AT BEDTIME
Qty: 30 ML | Refills: 1 | Status: SHIPPED | OUTPATIENT
Start: 2023-02-02 | End: 2023-05-19

## 2023-02-02 RX ORDER — LEVOTHYROXINE SODIUM 200 UG/1
200 TABLET ORAL
Qty: 90 TABLET | Refills: 0 | Status: SHIPPED | OUTPATIENT
Start: 2023-02-02 | End: 2023-05-24

## 2023-02-02 RX ORDER — LOSARTAN POTASSIUM 25 MG/1
25 TABLET ORAL DAILY
Qty: 90 TABLET | Refills: 0 | Status: SHIPPED | OUTPATIENT
Start: 2023-02-02 | End: 2023-05-24

## 2023-02-02 RX ORDER — PEN NEEDLE, DIABETIC 32GX 5/32"
NEEDLE, DISPOSABLE MISCELLANEOUS
Qty: 100 EACH | Refills: 2 | Status: SHIPPED | OUTPATIENT
Start: 2023-02-02

## 2023-02-02 ASSESSMENT — ANXIETY QUESTIONNAIRES
GAD7 TOTAL SCORE: 7
7. FEELING AFRAID AS IF SOMETHING AWFUL MIGHT HAPPEN: SEVERAL DAYS
8. IF YOU CHECKED OFF ANY PROBLEMS, HOW DIFFICULT HAVE THESE MADE IT FOR YOU TO DO YOUR WORK, TAKE CARE OF THINGS AT HOME, OR GET ALONG WITH OTHER PEOPLE?: SOMEWHAT DIFFICULT
4. TROUBLE RELAXING: SEVERAL DAYS
3. WORRYING TOO MUCH ABOUT DIFFERENT THINGS: SEVERAL DAYS
IF YOU CHECKED OFF ANY PROBLEMS ON THIS QUESTIONNAIRE, HOW DIFFICULT HAVE THESE PROBLEMS MADE IT FOR YOU TO DO YOUR WORK, TAKE CARE OF THINGS AT HOME, OR GET ALONG WITH OTHER PEOPLE: SOMEWHAT DIFFICULT
7. FEELING AFRAID AS IF SOMETHING AWFUL MIGHT HAPPEN: SEVERAL DAYS
2. NOT BEING ABLE TO STOP OR CONTROL WORRYING: SEVERAL DAYS
1. FEELING NERVOUS, ANXIOUS, OR ON EDGE: SEVERAL DAYS
GAD7 TOTAL SCORE: 7
6. BECOMING EASILY ANNOYED OR IRRITABLE: SEVERAL DAYS
GAD7 TOTAL SCORE: 7
5. BEING SO RESTLESS THAT IT IS HARD TO SIT STILL: SEVERAL DAYS

## 2023-02-02 ASSESSMENT — PATIENT HEALTH QUESTIONNAIRE - PHQ9
SUM OF ALL RESPONSES TO PHQ QUESTIONS 1-9: 9
SUM OF ALL RESPONSES TO PHQ QUESTIONS 1-9: 9
10. IF YOU CHECKED OFF ANY PROBLEMS, HOW DIFFICULT HAVE THESE PROBLEMS MADE IT FOR YOU TO DO YOUR WORK, TAKE CARE OF THINGS AT HOME, OR GET ALONG WITH OTHER PEOPLE: SOMEWHAT DIFFICULT

## 2023-02-02 ASSESSMENT — ENCOUNTER SYMPTOMS: NERVOUS/ANXIOUS: 1

## 2023-02-02 NOTE — TELEPHONE ENCOUNTER
Oral Chemotherapy Monitoring Program   Medication: Revlimid  Rx: 10mg PO daily on days 1 through 28 of 28 day cycle   Auth #: [id number]   Risk Category: Adult Female not of Reproductive potential  Routine survey questions reviewed.   Rx to be Escribed to [facility]

## 2023-02-02 NOTE — PROGRESS NOTES
Assessment & Plan     Type 2 diabetes mellitus with diabetic polyneuropathy, with long-term current use of insulin (H)  Type 2 diabetes mellitus with hyperglycemia, with long-term current use of insulin (H)  Class 2 severe obesity due to excess calories with serious comorbidity and body mass index (BMI) of 37.0 to 37.9 in adult (H)  Doing well. No concerns. Recheck A1c today.  - HEMOGLOBIN A1C; Future  - insulin glargine (LANTUS SOLOSTAR) 100 UNIT/ML pen; Inject 22 Units Subcutaneous At Bedtime  - insulin pen needle (FIFTY50 PEN NEEDLES) 32G X 4 MM miscellaneous; Use one pen needle daily or as directed.  - losartan (COZAAR) 25 MG tablet; Take 1 tablet (25 mg) by mouth daily    Chronic kidney disease, stage 1  Recheck.  - Albumin Random Urine Quantitative with Creat Ratio; Future  - Albumin Random Urine Quantitative with Creat Ratio    Screening for hyperlipidemia  Screen.  - Lipid panel reflex to direct LDL Non-fasting; Future  - Lipid panel reflex to direct LDL Non-fasting      Hypothyroidism, unspecified type  Refilled.   - levothyroxine (SYNTHROID/LEVOTHROID) 200 MCG tablet; Take 1 tablet (200 mcg) by mouth every morning (before breakfast)    Mixed incontinence urge and stress (male)(female)  New referral. Patient missed appointment.   - Adult Urology  Referral; Future    Dysuria  Check urine.   - UA with Microscopic reflex to Culture - lab collect; Future  - UA with Microscopic reflex to Culture - lab collect  - Urine Microscopic  - Urine Culture    Multiple myeloma in remission (H)  Stem cells transplant status (H)  Thrombocytopenia (H)   Drug-induced polyneuropathy (H)  Labs per oncology for multiple myeloma, thrombocytopenia.   - CBC with platelets differential  - Protein electrophoresis  - Comprehensive metabolic panel    Pulmonary hypertension (H)  Stable.     Severe episode of recurrent major depressive disorder, without psychotic features (H)  Mood low today. Patient feels safe in her home. No  "plan of self harm. PHQ9 is 9. Thinks correction may help with this. Looking forward to doing some gardening this summer.       45 minutes spent on the date of the encounter doing chart review, history and exam, documentation and further activities per the note       BMI:   Estimated body mass index is 37.78 kg/m  as calculated from the following:    Height as of 1/31/23: 1.753 m (5' 9\").    Weight as of this encounter: 116 kg (255 lb 12.8 oz).   Weight management plan: Discussed healthy diet and exercise guidelines    Depression Screening Follow Up    PHQ 2/2/2023   PHQ-9 Total Score 9   Q9: Thoughts of better off dead/self-harm past 2 weeks Several days   F/U: Thoughts of suicide or self-harm No   F/U: Safety concerns No                 Follow Up      Follow Up Actions Taken  Referred patient back to mental health provider    Discussed the following ways the patient can remain in a safe environment:  be around others      Return in about 6 months (around 8/2/2023) for Routine preventive.    Montse Bucio PA-C  [facility] KINDRA Max is a 67 year old, presenting for the following health issues:  Diabetes, Depression, and Anxiety      Anxiety    History of Present Illness       Mental Health Follow-up:  Patient presents to follow-up on Depression & Anxiety.Patient's depression since last visit has been:  Better  The patient is not having other symptoms associated with depression.  Patient's anxiety since last visit has been:  Better  The patient is not having other symptoms associated with anxiety.  Any significant life events: job concerns, grief or loss and other  Patient is not feeling anxious or having panic attacks.  Patient has no concerns about alcohol or drug use.    Diabetes:   She presents for follow up of diabetes.  She is not checking blood glucose. She is concerned about other. She is having numbness in feet, burning in feet and excessive thirst.         Hypertension: " She presents for follow up of hypertension.  She does not check blood pressure  regularly outside of the clinic. Outside blood pressures have been over 140/90. She follows a low salt diet.     Hypothyroidism:     Since last visit, patient describes the following symptoms::  Dry skin, Fatigue, Hair loss and Loose stools     Today's PHQ-9         PHQ-9 Total Score: 9    PHQ-9 Q9 Thoughts of better off dead/self-harm past 2 weeks :   Several days  Thoughts of suicide or self harm: (P) No  Self-harm Plan:     Self-harm Action:       Safety concerns for self or others: (P) No    How difficult have these problems made it for you to do your work, take care of things at home, or get along with other people: Somewhat difficult  Today's NA-7 Score: 7     Recently thinking of retiring - meeting with boss next week.     Persistent incontinence. Wearing diapers, pads.     Feeling overwhelmed. Has missed some appointments.         Acute Illness  Acute illness concerns: sore throat  Onset/Duration: 1 week ago  Symptoms:  Fever: No  Chills/Sweats: No  Headache (location?): No  Sinus Pressure: YES  Conjunctivitis:  No  Ear Pain: YES: both  Rhinorrhea: yes  Congestion: YES  Sore Throat: YES  Cough: YES  Wheeze: No  Decreased Appetite: No  Nausea: No  Vomiting: No  Diarrhea: YES  Dysuria/Freq.: YES off and on  Dysuria or Hematuria: No  Fatigue/Achiness: YES  Sick/Strep Exposure: No  Therapies tried and outcome: robitussin cough      Review of Systems   Psychiatric/Behavioral: The patient is nervous/anxious.             Objective    /65 (BP Location: Right arm, Patient Position: Sitting, Cuff Size: Adult Large)   Pulse 66   Temp 98.5  F (36.9  C) (Oral)   Wt 116 kg (255 lb 12.8 oz)   SpO2 96%   BMI 37.78 kg/m    Body mass index is 37.78 kg/m .  Physical Exam   GENERAL: healthy, alert and no distress  EYES: Eyes grossly normal to inspection, PERRL and conjunctivae and sclerae normal  HENT: ear canals and TM's normal, nose  and mouth without ulcers or lesions  NECK: no adenopathy, no asymmetry, masses, or scars and thyroid normal to palpation  RESP: lungs clear to auscultation - no rales, rhonchi or wheezes  CV: regular rate and rhythm, normal S1 S2, no S3 or S4, no murmur, click or rub, no peripheral edema and peripheral pulses strong  ABDOMEN: soft, nontender, no hepatosplenomegaly, no masses and bowel sounds normal  MS: no gross musculoskeletal defects noted, no edema

## 2023-02-03 LAB
ALBUMIN SERPL ELPH-MCNC: 4 G/DL (ref 3.7–5.1)
ALPHA1 GLOB SERPL ELPH-MCNC: 0.3 G/DL (ref 0.2–0.4)
ALPHA2 GLOB SERPL ELPH-MCNC: 0.6 G/DL (ref 0.5–0.9)
B-GLOBULIN SERPL ELPH-MCNC: 0.7 G/DL (ref 0.6–1)
BACTERIA UR CULT: NORMAL
GAMMA GLOB SERPL ELPH-MCNC: 0.6 G/DL (ref 0.7–1.6)
M PROTEIN SERPL ELPH-MCNC: 0 G/DL
PROT PATTERN SERPL ELPH-IMP: ABNORMAL

## 2023-02-08 ENCOUNTER — VIRTUAL VISIT (OUTPATIENT)
Dept: PHARMACY | Facility: CLINIC | Age: 68
End: 2023-02-08
Payer: MEDICAID

## 2023-02-08 DIAGNOSIS — C90.01 MULTIPLE MYELOMA IN REMISSION (H): ICD-10-CM

## 2023-02-08 DIAGNOSIS — Z79.4 TYPE 2 DIABETES MELLITUS WITH DIABETIC POLYNEUROPATHY, WITH LONG-TERM CURRENT USE OF INSULIN (H): Primary | ICD-10-CM

## 2023-02-08 DIAGNOSIS — F33.1 MAJOR DEPRESSIVE DISORDER, RECURRENT EPISODE, MODERATE (H): ICD-10-CM

## 2023-02-08 DIAGNOSIS — E66.812 CLASS 2 SEVERE OBESITY DUE TO EXCESS CALORIES WITH SERIOUS COMORBIDITY AND BODY MASS INDEX (BMI) OF 37.0 TO 37.9 IN ADULT (H): ICD-10-CM

## 2023-02-08 DIAGNOSIS — E66.01 CLASS 2 SEVERE OBESITY DUE TO EXCESS CALORIES WITH SERIOUS COMORBIDITY AND BODY MASS INDEX (BMI) OF 37.0 TO 37.9 IN ADULT (H): ICD-10-CM

## 2023-02-08 DIAGNOSIS — E03.9 HYPOTHYROIDISM, UNSPECIFIED TYPE: ICD-10-CM

## 2023-02-08 DIAGNOSIS — F41.1 GAD (GENERALIZED ANXIETY DISORDER): ICD-10-CM

## 2023-02-08 DIAGNOSIS — E11.42 TYPE 2 DIABETES MELLITUS WITH DIABETIC POLYNEUROPATHY, WITH LONG-TERM CURRENT USE OF INSULIN (H): Primary | ICD-10-CM

## 2023-02-08 PROCEDURE — 99207 PR NO CHARGE LOS: CPT | Mod: 93 | Performed by: PHARMACIST

## 2023-02-08 NOTE — Clinical Note
CE DAWSON note, thanks!  Vonnie Corrigan, PharmD Medication Therapy Management Pharmacist [phone]

## 2023-02-08 NOTE — PROGRESS NOTES
Medication Therapy Management (MTM) Encounter    ASSESSMENT:                            Medication Adherence/Access: No issues identified    Type 2 Diabetes/Weight loss: Patient is meeting not A1c goal of < 7%, may benefit from checking blood sugar fasting and post-prandial. Will monitor recent Ozempic dose increase.    Diarrhea: stable, will monitor.    Hypothyroidism: May benefit from rechecking TSH now that she's been on current dose again for 6-8 weeks. Discussed beneficial to wait 1 hour before eating or 4 hours after eating (bedtime), she prefers to continue morning dose and will work on this.    Multiple myeloma: Plan in place.     Moods: Would like to ensure TSH is stable first. Patient would benefit from increase in sertraline, will consider in future.    PLAN:                            1. Goal blood sugars we are looking for to get your A1C less than 7% are between  mg/dL right away in the morning or before meals, and less than 180 mg/dL. 2-hours after a meal.    2. Recheck thyroid lab.     Follow-up: Return in about 4 weeks (around 3/8/2023) for Medication Therapy Management.    SUBJECTIVE/OBJECTIVE:                          Winter Loya is a 67 year old female called for a follow-up visit.  Today's visit is a follow-up MTM visit from 12/21/22.     Reason for visit: diabetes.     Allergies/ADRs: Reviewed in chart  Past Medical History: Reviewed in chart  Tobacco: She reports that she quit smoking about 18 years ago. Her smoking use included cigarettes. She smoked an average of 1 pack per day. She has never used smokeless tobacco.  Alcohol: Less than 1 beverage / month  Caffeine: 1 diet coke/day and 2 cups coffee/day    Medication Adherence/Access:    - sometimes forgetting evening medications  The patient fills medications at [place]: NO, fills medications at [facility] and [facility] for Revlimid.    Type 2 Diabetes/Weight loss:    Lantus 22 units at bedtime   Ozempic 1 mg weekly (Tuesdays) - increased 1  week ago  Jardiance 25 mg daily     Following with Weight Management, food cravings have reduced, but this week her stress levels are up so high it's been awful, but she feels like she can get a hold of it now.   Has been on Trulicity in the past, tolerated well.   Patient is experiencing the following side effects: diarrhea - nobody knows why she has this. She has done a trial off of metformin with Delmi Gross without improvement in diarrhea. Diarrhea is not worse on Ozempic 1 mg.  Blood sugar monitoring:   Fasting: ~180   Symptoms of low blood sugar? none  Symptoms of high blood sugar? none  Eye exam: up to date  Foot exam: up to date  Diet/Exercise: her eating is awful. She eats a lot of bad stuff, sugar. She's been trying to do a vegetable in the afternoon and at night, working really hard on this. She's had horrible cravings for Baby Dawna bars, she's trying to work on that.  Trying to quit eating after supper.  Aspirin: Taking 81mg daily for primary prevention   Statin: Yes: atorvastatin   ACEi/ARB: Yes: losartan.   Urine Albumin:   Lab Results   Component Value Date    UMALCR 187.04 (H) 02/02/2023     Lab Results   Component Value Date    A1C 8.2 02/02/2023    A1C 7.6 09/28/2022    A1C 6.8 07/11/2022    A1C 9.0 12/29/2021    A1C 7.8 08/27/2021    A1C 7.6 03/08/2021    A1C 8.8 12/07/2020    A1C 9.1 08/21/2020    A1C 6.7 03/05/2020     Diarrhea:   Loperamide 4 mg twice daily     This has been the only thing that has really helped. This is well controlled right now and really good. But if she forgets one of these she can usually tell right away.     Hypothyroidism:  Levothyroxine 200 mcg once daily - restarted ~7 weeks ago (previously had been out of it for 3 weeks). Sometimes can only wait a half hour after taking, she wonders when the most optimal time is.   Patient is having the following symptoms: none  TSH   Date Value Ref Range Status   09/28/2022 4.68 (H) 0.40 - 4.00 mU/L Final   01/27/2021 2.08 0.40  - 4.00 mU/L Final     Multiple myeloma:    Revlimid 10 mg daily   Acyclovir 400 mg twice daily (Shingles prophylaxis), also helps prevent her cold sores    She had a stem cell transplant in either 2018 or 2019, has been in remission since. She'd prefer to be off the Revlimid, however oncology prefers she remain on this. Tolerates it well.     Mood:    Sertraline 100 mg daily     She's decided she's going to finally retire. She's had some worsened depression, hard cases at work, she's been missing doctor appointments herself. It's too much and she needs to focus on herself and her own health.  BP Readings from Last 3 Encounters:   02/02/23 106/65   11/15/22 (!) 143/68   10/15/22 112/62     Today's Vitals: There were no vitals taken for this visit.  ----------------      I spent 28 minutes with this patient today. All changes were made via collaborative practice agreement with Montse Bucio PA-C. A copy of the visit note was provided to the patient's provider(s).    A summary of these recommendations was sent via [email or web address].    Vonnie Corrigan, PharmD  Medication Therapy Management Pharmacist  [phone]    Telemedicine Visit Details  Type of service:  Telephone visit  Start Time: 2:06 PM  End Time: 2:34 PM     Medication Therapy Recommendations  Type 2 diabetes mellitus with diabetic polyneuropathy, with long-term current use of insulin (H)    Current Medication: blood glucose monitoring (NO BRAND SPECIFIED) meter device kit   Rationale: Does not understand instructions - Adherence - Adherence   Recommendation: Provide Education   Status: Patient Agreed - Adherence/Education

## 2023-02-09 NOTE — PATIENT INSTRUCTIONS
"Recommendations from today's MTM visit:                                                           1. Goal blood sugars we are looking for to get your A1C less than 7% are between  mg/dL right away in the morning or before meals, and less than 180 mg/dL. 2-hours after a meal.    2. Recheck thyroid lab.     Follow-up: Return in about 4 weeks (around 3/8/2023) for Medication Therapy Management.    It was great speaking with you today.  I value your experience and would be very thankful for your time in providing feedback in our clinic survey. In the next few days, you may receive an email or text message from [employer] with a link to a survey related to your  clinical pharmacist.\"     To schedule another MTM appointment, please call the clinic directly or you may call the MTM scheduling line at [phone] or toll-free at [phone].     My Clinical Pharmacist's contact information:                                                      Please feel free to contact me with any questions or concerns you have.      Vonnie Corrigan, PharmD  Medication Therapy Management Pharmacist  [phone]     "

## 2023-02-13 ENCOUNTER — OFFICE VISIT (OUTPATIENT)
Dept: PSYCHOLOGY | Facility: CLINIC | Age: 68
End: 2023-02-13
Payer: MEDICAID

## 2023-02-13 DIAGNOSIS — F33.9 MAJOR DEPRESSIVE DISORDER, RECURRENT EPISODE WITH ANXIOUS DISTRESS (H): Primary | ICD-10-CM

## 2023-02-13 PROCEDURE — 90834 PSYTX W PT 45 MINUTES: CPT | Performed by: STUDENT IN AN ORGANIZED HEALTH CARE EDUCATION/TRAINING PROGRAM

## 2023-02-13 NOTE — PROGRESS NOTES
[facility]                                     Progress Note    Patient Name: Winter Loya  Date: 2/13/2023         Service Type: Individual      Session Start Time: 2.30  Session End Time: 3.15     Session Length: 45 mns    Session #: 17    Attendees: Client attended alone    Service Modality:  In-person    DATA  Interactive Complexity: No  Crisis: No      Progress Since Last Session (Related to Symptoms / Goals / Homework):   Symptoms: Pt reported no change from previous session    Homework: Achieved / completed to satisfaction       Episode of Care Goals: Minimal progress - ACTION (Actively working towards change); Intervened by reinforcing change plan / affirming steps taken     Current / Ongoing Stressors and Concerns:   Ongoing stressors: Financial stress, stressful filial relationship/ Difficulty with asertivness   Current: 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                    Treatment Objective(s) Addressed in This Session:              Patient will learn and practice coping and management strategies daily to mitigate compassion fatigue.     Intervention:   Continue discussion today on compassion fatigue as the physical, emotional, and psychological impact of helping others. Discussed common futures like reduced feelings of empathy and sensitivity, feeling overwhelmed and exhausted by work demands, feeling detached, numb, and emotionally disconnected and loss of interest in activities you used to enjoy and neglect of your own self-care. Explore ways to mitigate compassion fatigue like focusing on your breath and slowing down your breathing rate. Establishing a good self-care routine that includes eating healthy, getting more exercise, and getting enough sleep and taking time off from work to rest and rejuvenate.       ASSESSMENT: Current Emotional / Mental Status (status of  significant symptoms):   Risk status (Self / Other harm or suicidal ideation)   Patient denies current fears or concerns for personal safety.   Patient denies current or recent suicidal ideation or behaviors.   Patient denies current or recent homicidal ideation or behaviors.   Patient denies current or recent self injurious behavior or ideation.   Patient denies other safety concerns.   Patient reports there has been no change in risk factors since their last session.     Patient reports there has been no change in protective factors since their last session.     Recommended that patient call 911 or go to the local ED should there be a change in any of these risk factors.     Appearance:   Appropriate    Eye Contact:   Good    Psychomotor Behavior: Normal    Attitude:   Cooperative  Interested Friendly   Orientation:   Person Place Time Situation   Speech    Rate / Production: Normal/ Responsive    Volume:  Normal    Mood:    Depressed  Anhedonia Grieving   Affect:    Worrisome    Thought Content:  Clear    Thought Form:  Coherent    Insight:    Fair      Medication Review:   No changes to current psychiatric medication(s)     Medication Compliance:   Yes     Changes in Health Issues:   None reported     Chemical Use Review:   Substance Use: Chemical use reviewed, no active concerns identified      Tobacco Use: No current tobacco use.      Diagnosis:    296.32 (F33.1) Major Depressive Disorder, Recurrent Episode, Moderate _ and With anxious distress    Collateral Reports Completed:   Not Applicable    PLAN: (Patient Tasks / Therapist Tasks / Other)    Utilize coping and management strategies learned in session to mitigate compassion fatigue    CHERISE Castellon                   ----- Service Performed and Documented by CHERISE------  This note was reviewed and clinical supervision by AMY Ayala [facility]    2/14/2023     ______________________________________________________________________    Individual  "Treatment Plan    Patient's Name: Winter Loya  YOB: 1955    Date of Creation: 04/18/2022  Date Treatment Plan Last Reviewed/Revised: 02/13/2023    DSM5 Diagnoses: 296.32 (F33.1) Major Depressive Disorder, Recurrent Episode, Moderate _ and With anxious distress  Psychosocial / Contextual Factors:   PROMIS (reviewed every 90 days):     Referral / Collaboration:  Referral to another professional/service is not indicated at this time..    Anticipated number of session for this episode of care: 15-25  Anticipation frequency of session: Every other week  Anticipated Duration of each session: 38-52 minutes  Treatment plan will be reviewed in 90 days or when goals have been changed.       MeasurableTreatment Goal(s) related to diagnosis / functional impairment(s)    Goal 1: Patient will identify and address specific triggers to feelings of depression and improve coping with depression by  90 % in the next three months.    I will know I've met my goal when I can comfortably manage the daily stressors I have and be less depressed\"    Objective #A (Patient Action)    Patient will take 15-20 minutes daily walks and spend 1 hour daily completing household chorees.  Status: Continued - Date(s):  05/13/2023    Intervention(s)  Therapist will provide psychoeducation, behavioral activation, and cognitive restructuring.)    Objective #B  Patient will identify specific areas of cognitive distortion and challenge irrational thoughts with reality   Status: Continued - Date(s):     05/13/2023       Intervention(s)  Therapist will teach patient how negative thoughts can lead to negative feelings and actions and ways to reframe thoughts in a more positive way leading to more positive feelings and helpful behaviors    Objective #C  Patient will learn and practice relaxation techniques to manage anxiety.  Status: Continued - Date(s):    05/13/2023    Intervention(s)  Therapist will teach patient thought stopping, deep " breathing exercises, mindful meditation, and creative visualization.  Patient has reviewed and agreed to the above plan.      CHERISE Castellon  April 18, 2022

## 2023-02-22 ENCOUNTER — LAB (OUTPATIENT)
Dept: LAB | Facility: CLINIC | Age: 68
End: 2023-02-22
Payer: MEDICARE

## 2023-02-22 DIAGNOSIS — E03.9 HYPOTHYROIDISM, UNSPECIFIED TYPE: ICD-10-CM

## 2023-02-22 DIAGNOSIS — C90.01 MULTIPLE MYELOMA IN REMISSION (H): ICD-10-CM

## 2023-02-22 LAB
ALBUMIN SERPL-MCNC: 4 G/DL (ref 3.4–5)
ALP SERPL-CCNC: 132 U/L (ref 40–150)
ALT SERPL W P-5'-P-CCNC: 30 U/L (ref 0–50)
ANION GAP SERPL CALCULATED.3IONS-SCNC: 6 MMOL/L (ref 3–14)
AST SERPL W P-5'-P-CCNC: 15 U/L (ref 0–45)
BASOPHILS # BLD AUTO: 0 10E3/UL (ref 0–0.2)
BASOPHILS NFR BLD AUTO: 1 %
BILIRUB SERPL-MCNC: 1.4 MG/DL (ref 0.2–1.3)
BUN SERPL-MCNC: 14 MG/DL (ref 7–30)
CALCIUM SERPL-MCNC: 9.3 MG/DL (ref 8.5–10.1)
CHLORIDE BLD-SCNC: 104 MMOL/L (ref 94–109)
CO2 SERPL-SCNC: 25 MMOL/L (ref 20–32)
CREAT SERPL-MCNC: 0.6 MG/DL (ref 0.52–1.04)
EOSINOPHIL # BLD AUTO: 0.3 10E3/UL (ref 0–0.7)
EOSINOPHIL NFR BLD AUTO: 8 %
ERYTHROCYTE [DISTWIDTH] IN BLOOD BY AUTOMATED COUNT: 15 % (ref 10–15)
GFR SERPL CREATININE-BSD FRML MDRD: >90 ML/MIN/1.73M2
GLUCOSE BLD-MCNC: 153 MG/DL (ref 70–99)
HCT VFR BLD AUTO: 40.4 % (ref 35–47)
HGB BLD-MCNC: 13.6 G/DL (ref 11.7–15.7)
LYMPHOCYTES # BLD AUTO: 0.8 10E3/UL (ref 0.8–5.3)
LYMPHOCYTES NFR BLD AUTO: 23 %
MCH RBC QN AUTO: 29.6 PG (ref 26.5–33)
MCHC RBC AUTO-ENTMCNC: 33.7 G/DL (ref 31.5–36.5)
MCV RBC AUTO: 88 FL (ref 78–100)
MONOCYTES # BLD AUTO: 0.5 10E3/UL (ref 0–1.3)
MONOCYTES NFR BLD AUTO: 14 %
NEUTROPHILS # BLD AUTO: 1.9 10E3/UL (ref 1.6–8.3)
NEUTROPHILS NFR BLD AUTO: 55 %
PLATELET # BLD AUTO: 132 10E3/UL (ref 150–450)
POTASSIUM BLD-SCNC: 3.6 MMOL/L (ref 3.4–5.3)
PROT SERPL-MCNC: 7.1 G/DL (ref 6.8–8.8)
RBC # BLD AUTO: 4.6 10E6/UL (ref 3.8–5.2)
SODIUM SERPL-SCNC: 135 MMOL/L (ref 133–144)
T4 FREE SERPL-MCNC: 1.18 NG/DL (ref 0.76–1.46)
TOTAL PROTEIN SERUM FOR ELP: 6.4 G/DL (ref 6.4–8.3)
TSH SERPL DL<=0.005 MIU/L-ACNC: 4.41 MU/L (ref 0.4–4)
WBC # BLD AUTO: 3.4 10E3/UL (ref 4–11)

## 2023-02-22 PROCEDURE — 84439 ASSAY OF FREE THYROXINE: CPT

## 2023-02-22 PROCEDURE — 84165 PROTEIN E-PHORESIS SERUM: CPT

## 2023-02-22 PROCEDURE — 80050 GENERAL HEALTH PANEL: CPT

## 2023-02-22 PROCEDURE — 36415 COLL VENOUS BLD VENIPUNCTURE: CPT

## 2023-02-23 LAB
ALBUMIN SERPL ELPH-MCNC: 4.2 G/DL (ref 3.7–5.1)
ALPHA1 GLOB SERPL ELPH-MCNC: 0.3 G/DL (ref 0.2–0.4)
ALPHA2 GLOB SERPL ELPH-MCNC: 0.6 G/DL (ref 0.5–0.9)
B-GLOBULIN SERPL ELPH-MCNC: 0.7 G/DL (ref 0.6–1)
GAMMA GLOB SERPL ELPH-MCNC: 0.6 G/DL (ref 0.7–1.6)
M PROTEIN SERPL ELPH-MCNC: 0 G/DL
PROT PATTERN SERPL ELPH-IMP: ABNORMAL

## 2023-02-28 ENCOUNTER — TELEPHONE (OUTPATIENT)
Dept: FAMILY MEDICINE | Facility: CLINIC | Age: 68
End: 2023-02-28
Payer: MEDICARE

## 2023-02-28 DIAGNOSIS — C90.01 MULTIPLE MYELOMA IN REMISSION (H): Primary | ICD-10-CM

## 2023-02-28 RX ORDER — LENALIDOMIDE 10 MG/1
10 CAPSULE ORAL DAILY
Qty: 28 CAPSULE | Refills: 0 | Status: SHIPPED | OUTPATIENT
Start: 2023-02-28 | End: 2023-04-10

## 2023-02-28 NOTE — TELEPHONE ENCOUNTER
Patient Quality Outreach    Patient is due for the following:   Breast Cancer Screening - Mammogram  Physical Annual Wellness Visit    Next Steps:   Schedule a Annual Wellness Visit, mammogram, Dexa Scan    Type of outreach:    Sent [employer] message.      Questions for provider review:    None     Dahiana Louie [facility]  Chart routed to none.

## 2023-03-08 ENCOUNTER — TELEPHONE (OUTPATIENT)
Dept: ONCOLOGY | Facility: CLINIC | Age: 68
End: 2023-03-08
Payer: MEDICARE

## 2023-03-08 NOTE — TELEPHONE ENCOUNTER
Oral Chemotherapy Monitoring Program    Medication: Revlimid  Rx:  10 mg PO daily for a 28 day cycle    Auth #: [id number]  Risk Category: Adult female NOT of reproductive capacity      Routine survey questions reviewed.    Thank you,    Mary Franklin  Oncology Pharmacy Liaison LUCINDA [email or web address]  Phone: [phone]  Fax: [phone]

## 2023-03-15 NOTE — TELEPHONE ENCOUNTER
Patient Quality Outreach    Patient is due for the following:   Breast Cancer Screening - Mammogram  Physical Annual Wellness Visit    Next Steps:   Schedule a Annual Wellness Visit , mammogram, Dexa Scan    Type of outreach:    Sent [employer] message. Patient read [employer] message on 02/28/2023.    Next Steps:  Reach out within 90 days via [employer].    Max number of attempts reached: Yes. Will try again in 90 days if patient still on fail list.    Questions for provider review:    None     Dahiana Louie CMA  Chart routed to none.

## 2023-03-17 ENCOUNTER — VIRTUAL VISIT (OUTPATIENT)
Dept: PHARMACY | Facility: CLINIC | Age: 68
End: 2023-03-17
Attending: INTERNAL MEDICINE
Payer: MEDICAID

## 2023-03-17 DIAGNOSIS — E11.42 TYPE 2 DIABETES MELLITUS WITH DIABETIC POLYNEUROPATHY, WITH LONG-TERM CURRENT USE OF INSULIN (H): Primary | ICD-10-CM

## 2023-03-17 DIAGNOSIS — C90.01 MULTIPLE MYELOMA IN REMISSION (H): ICD-10-CM

## 2023-03-17 DIAGNOSIS — Z79.4 TYPE 2 DIABETES MELLITUS WITH DIABETIC POLYNEUROPATHY, WITH LONG-TERM CURRENT USE OF INSULIN (H): Primary | ICD-10-CM

## 2023-03-17 DIAGNOSIS — E03.9 HYPOTHYROIDISM, UNSPECIFIED TYPE: ICD-10-CM

## 2023-03-17 DIAGNOSIS — E66.812 CLASS 2 SEVERE OBESITY DUE TO EXCESS CALORIES WITH SERIOUS COMORBIDITY AND BODY MASS INDEX (BMI) OF 37.0 TO 37.9 IN ADULT (H): ICD-10-CM

## 2023-03-17 DIAGNOSIS — F33.1 MAJOR DEPRESSIVE DISORDER, RECURRENT EPISODE, MODERATE (H): ICD-10-CM

## 2023-03-17 DIAGNOSIS — E66.01 CLASS 2 SEVERE OBESITY DUE TO EXCESS CALORIES WITH SERIOUS COMORBIDITY AND BODY MASS INDEX (BMI) OF 37.0 TO 37.9 IN ADULT (H): ICD-10-CM

## 2023-03-17 DIAGNOSIS — F41.1 GAD (GENERALIZED ANXIETY DISORDER): ICD-10-CM

## 2023-03-17 PROCEDURE — 99207 PR NO CHARGE LOS: CPT | Mod: 93 | Performed by: PHARMACIST

## 2023-03-17 NOTE — PROGRESS NOTES
ONCOLOGY/HEMATOLOGY PROGRESS NOTE  Mar 20, 2023    Reason for Visit: IgA Kappa Multiple Myeloma    Oncology HPI:   Winter Loya is a 67 year old woman with IgA Kappa Multiple Myeloma. In 2018 she had anemia and was fatigued. She was found to have IgA kappa monocloncal antibody with M-spike of 0.17. IgA elevated. Skeletal survey was unremarkable and UPEP had minimal protein (156 mg/m2). PET 11/2018 showed bone marrow consistent with hypermetabolic plasma cell myeloma. M spike was 0.17. She started RVD and Zometa. On 4/2019 she had an autologous transplant.      Her bone marrow bx from September 2019 was sent here for review. It showed marrow cellularity of 60%, with decreased trilineage hematopoiesis and 15% plasma cells as well as peripheral blood with slight anemia. 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 She is on revlimid maintenance and zometa from her prior oncologist in [place]. On 12/6/19 her K/L ratio is 1.1, M spike is 0.04.     Currently on Revlimid maintenance.    Interval history:   Winter presents alone today.  She quit her job/retired in February. Traveled to [place] for 2 weeks to visit her brother - felt healthier in [place], and specifically notes that her diarrhea seemed to improve while she was there.  After her return, her chronic diarrhea and urgency issues returned.  She continues to have significant neuropathy in both of her feet.  She also dropped a 2 liter bottle of soda on her 5th toe yesterday and states she has a lot of bruising in the area.  Continues to process the grief of her son's death in August 2022.  Denies fevers, chills, night sweats, unexplained weight changes, dizziness, vision or hearing changes, new lumps or bumps, chest pain, shortness of breath, cough, abdominal pain, nausea, vomiting, changes to bladder, swelling of extremities, bleeding  issues, or rash.      ROS: 10 point ROS neg other than the symptoms noted above in the HPI.      Past Medical History:   Diagnosis Date     Abnormal EMG 2021 5/25/2021    Interpretation: This is an abnormal study, demonstrating electrophysiologic evidence of a length-dependent axonal sensorimotor polyneuropathy. Asymmetry of peroneal compound muscle action potential amplitudes is a finding of uncertain significance.        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   I have personally reviewed the examination and initial interpretation and I agree with the findings.   ANNE GOODMAN MD     History of MRI of cervical spine 6/2020 3/15/2021    Impression: Multilevel cervical spondylosis, most pronounced at the level of C4-5 and C5-6 with moderate to severe spinal canal stenosis and moderate spinal canal stenosis at C6-7. No abnormal cord signal. No significant neural foraminal narrowing at any level.   I have personally reviewed the examination and initial interpretation and I agree with the findings.   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 100 each 3     aspirin 81 MG EC tablet Take 81 mg by mouth daily       atorvastatin (LIPITOR) 20 MG tablet Take 1 tablet (20 mg) by mouth At Bedtime. 90 tablet 0     blood glucose (NO BRAND SPECIFIED) test strip Use to test blood sugar 3 times daily or as directed. To accompany: Blood Glucose Monitor Brands: per insurance. 100 strip 6     blood glucose calibration (NO BRAND SPECIFIED) solution To accompany: Blood Glucose Monitor Brands: per insurance. 4 each 0     blood glucose monitoring (NO BRAND SPECIFIED) meter device kit Use to test blood sugar 3 times daily or as directed. Preferred blood glucose meter OR supplies to accompany: Blood Glucose Monitor Brands: per insurance. 1 kit 0     empagliflozin (JARDIANCE) 25 MG TABS tablet Take 1 tablet (25 mg) by mouth daily 90 tablet 3     insulin glargine (LANTUS SOLOSTAR) 100 UNIT/ML pen Inject 22 Units Subcutaneous At Bedtime 30 mL 1     insulin pen needle (FIFTY50 PEN NEEDLES) 32G X 4 MM miscellaneous Use one pen needle daily or as directed. 100 each 2     LENalidomide (REVLIMID) 10 MG CAPS capsule Take 1 capsule (10 mg) by mouth daily for 28 days 28 capsule 0     levothyroxine (SYNTHROID/LEVOTHROID) 200 MCG tablet Take 1 tablet (200 mcg) by mouth every morning (before breakfast) 90 tablet 0     loperamide (IMODIUM) 2 MG capsule Take 4 mg (two capsules) by mouth at onset of loose stools, followed by 2 mg (one capsule) after each loose stool. Maximum: 16 mg (8 capsules) daily 270 capsule 11     losartan (COZAAR) 25 MG tablet Take 1 tablet (25 mg) by mouth daily 90 tablet 0     pregabalin (LYRICA) 100 MG capsule TAKE ONE CAPSULE BY MOUTH THREE TIMES DAILY (Patient taking differently: Take 100 mg by mouth 2 times daily) 270 capsule 3     Semaglutide, 1 MG/DOSE, (OZEMPIC) 4 MG/3ML pen Inject 1 mg Subcutaneous every 7 days 9 mL 3     sertraline (ZOLOFT) 100 MG tablet Take 1 tablet (100 mg) by mouth daily 90 tablet 0     thin (NO BRAND SPECIFIED) lancets Use with  lanceting device. To accompany: Blood Glucose Monitor Brands: per insurance. 200 each 6     LENalidomide (REVLIMID) 10 MG CAPS capsule Take 1 capsule (10 mg) by mouth daily for 28 days 28 capsule 0     propranolol ER (INDERAL LA) 60 MG 24 hr capsule Take 1 capsule (60 mg) by mouth daily (Patient not taking: Reported on 3/20/2023) 30 capsule 3        No Known Allergies    /72   Pulse 74   Temp 97.8  F (36.6  C) (Oral)   Resp 16   Wt 113.6 kg (250 lb 6.4 oz)   SpO2 96%   BMI 36.98 kg/m    Gen:alert, pleasant, NAD  HEENT: NC/AT,EOMI w/ PERRL, anicteric sclera. OP clear. MMM.   Neck: Supple, no LAD, full ROM  CV: normal S1,S2 with RRR no m/r/g  Resp: lungs CTA bilaterally with adequate air movement to bases. No wheezes or crackles  Abd: soft NTND no organomegaly or masses. BS normoactive.   Ext: warm and well perfused, no edema or cyanosis  Skin: no concerning lesions or rashes  Neuro: A&Ox4, no lateralizing sx. Grossly nonfocal.  Psych: appropriate, reactive      Labs:     Blood Counts       Recent Labs   Lab Test 03/20/23  1411 02/22/23  0939 02/02/23  0958 10/26/22  1443 10/15/22  1332 09/28/22  1449 09/01/22  1522   HGB 14.9 13.6 13.9   < > 13.3   < > 13.5   HCT 43.9 40.4 41.7   < > 40.5   < > 41.3   WBC 4.0 3.4* 3.8*   < > 3.8*   < > 3.3*   ANEUTAUTO 2.7 1.9 2.3   < > 3.2   < > 1.6   ALYMPAUTO 0.6* 0.8 0.7*   < > 0.3*   < > 0.8   AMONOAUTO 0.3 0.5 0.4   < > 0.3   < > 0.4   AEOSAUTO 0.2 0.3 0.3   < > 0.0   < > 0.4   ABSBASO 0.1 0.0 0.1   < > 0.1   < > 0.1   NRBCMAN 0.0  --   --   --  0.0  --  0.0    132* 121*   < > 104*   < > 127*    < > = values in this interval not displayed.       ABO/RH    No lab results found.      Chemistries     Basic Panel  Recent Labs   Lab Test 03/20/23  1411 02/22/23  0939 02/02/23  0958    135 142   POTASSIUM 3.9 3.6 4.1   CHLORIDE 105 104 108   CO2 26 25 29   BUN 14.9 14 12   CR 0.72 0.60 0.71   * 153* 150*        Calcium, Magnesium, Phosphorus  Recent  Labs   Lab Test 03/20/23  1411 02/22/23  0939 02/02/23  0958   ASHLEY 9.6 9.3 8.8        LFTs  Recent Labs   Lab Test 03/20/23  1411 02/22/23  0939 02/02/23  0958   BILITOTAL 2.0* 1.4* 1.6*   ALKPHOS 144* 132 175*   AST 18 15 15   ALT 29 30 42   ALBUMIN 4.7 4.0 4.0       LDH  No lab results found.    B2-Microglobulin  Recent Labs   Lab Test 02/18/20  0849   [id number] 1.6           Immunoglobulins     Recent Labs   Lab Test 11/28/22  1558 10/26/22  1443 09/28/22  1449 09/01/22  1522 08/01/22  1625 07/11/22  0824    652 569* 597* 622 547*       Recent Labs   Lab Test 11/28/22  1558 10/26/22  1443 09/28/22  1449 09/01/22  1522 08/01/22  1625 07/11/22  0824    131 120 122 133 100       Recent Labs   Lab Test 11/28/22  1558 10/26/22  1443 09/28/22  1449 09/01/22  1522 08/01/22  1625 07/11/22  0824   IGM 58 52 35 34* 37 26*         Monocloncal Protein Studies     M spike    Recent Labs   Lab Test 02/22/23  0939 02/02/23  0958 11/28/22  1558 11/15/22  0959 10/26/22  1443 09/28/22  1449   ELPM 0.0 0.0 0.0 0.1* 0.1* 0.0       Kappa FLC    Recent Labs   Lab Test 11/28/22  1558 10/26/22  1443 09/28/22  1449 09/01/22  1522 08/01/22  1625 07/11/22  0824   KFLCA 2.63* 4.97* 2.21* 2.12* 2.31* 1.92       Lambda FLC    Recent Labs   Lab Test 11/28/22  1558 10/26/22  1443 09/28/22  1449 09/01/22  1522 08/01/22  1625 07/11/22  0824   LFLCA 1.45 1.57 1.41 1.63 1.57 1.23       FLC Ratio    Recent Labs   Lab Test 11/28/22  1558 10/26/22  1443 09/28/22  1449 09/01/22  1522 08/01/22  1625 07/11/22  0824   KLRA 1.81* 3.17* 1.57 1.30 1.47 1.56       Assessment/plan:   Winter Loya is a 67 year old woman with standard risk IgG kappa multiple myeloma, s/p post auto-transplant in April 2019,  currently on lenalidomide maintenance.    Myeloma-specific labs are currently pending.  Plan to continue maintenance lenalidomide indefinitely while tolerated until evidence of disease progression.  Continue acyclovir and aspirin.  She received  her post-transplant vaccines in 2020 and is up to date on COVID vaccinations.    Interested in discussing coming off maintenance therapy as she is concerned her Revlimid may be contributing to her neuropathy - encouraged her to discuss this with Dr. Zhu at her next visit. She is agreeable to continuing the Revlimid maintenance for now.    Neuropathy is likely due to combination of bortezomb (during induction) and DM2.     She has chronic diarrhea which predates her lenalidomide, currently controlled with imodium.  Added metamucil for a while but then stopped, intends to add this back in at some point.  Note that her bilirubin has also been chronically elevated, she is not certain if she has ever been given an explanation as to why. No acute changes today.  Encouraged her to follow-up with her PCP regarding her diarrhea and elevated bilirubin as it is not clearly related to her Revlimid therapy.    Monthly labs in [facility] in 3 months with Dr. Zhu.    44 minutes spent on the date of the encounter doing chart review, review of test results, patient visit and documentation     FALGUNI Fink CNP  [facility]  [address] [place] [id number]  [phone]

## 2023-03-17 NOTE — PROGRESS NOTES
Medication Therapy Management (MTM) Encounter    ASSESSMENT:                            Medication Adherence/Access: No issues identified    Type 2 Diabetes/Weight loss: Patient is meeting not A1c goal of < 7%, fasting blood sugar is not at goal of  mg/dL. Need more post-prandial readings to see if at goal < 180 mg/dL. May benefit from continuing current therapy as she is tolerating well and blood sugar have improved in general.     Diarrhea: stable, will monitor.    Hypothyroidism:  Stable. TSH is just slightly above goal, but appropriate to maintain current dose.     Multiple myeloma: Plan in place.     Mood: Stable.     PLAN:                            1. Continue working on checking blood sugar two hours after supper.    2. Goal blood sugars we are looking for to get your A1C less than 7% are between  mg/dL right away in the morning or before meals, and less than 180 mg/dL. 2-hours after a meal.     Follow-up: Return in about 4 weeks (around 4/14/2023) for Medication Therapy Management.    SUBJECTIVE/OBJECTIVE:                          Winter Loya is a 67 year old female called for a follow-up visit.  Today's visit is a follow-up MTM visit from 2/8/23.     Reason for visit: diabetes follow-up, retired now    Allergies/ADRs: Reviewed in chart  Past Medical History: Reviewed in chart  Tobacco: She reports that she quit smoking about 18 years ago. Her smoking use included cigarettes. She smoked an average of 1 pack per day. She has never used smokeless tobacco.  Alcohol: Less than 1 beverage / month  Caffeine: 1 diet coke/day and 2 cups coffee/day    Medication Adherence/Access:    - sometimes forgetting evening medications  The patient fills medications at [facility]: NO, fills medications at [facility] and [facility] for Revlimid.    Type 2 Diabetes/Weight loss:    Lantus 22 units at bedtime   Ozempic 1 mg weekly (Tuesdays)   Jardiance 25 mg daily     She gets a little nausea sometimes, but not terrible. She got  constipation when she was in [place], just got back. Has been off imodium two days and she might restart it as things are turning back to diarrhea. She was eating protein bars at her brother's in [place], she thinks that contributed to the constipation. She's going to try some at home, no more than one a day.     Following with Weight Management as well, she plans to schedule follow-up     Medication History:  Has been on Trulicity in the past, tolerated well.   Patient is experiencing the following side effects: diarrhea - nobody knows why she has this. She has done a trial off of metformin with Delmi Gross without improvement in diarrhea. Diarrhea is not worse on Ozempic 1 mg.  Blood sugar monitoring:   Fastin, 130, 150, lowest 111 (down from 180s)  2 hours post-prandial: she'll be checking more of these now that's she's home again, 154 last Tuesday  Symptoms of low blood sugar? none  Symptoms of high blood sugar? none  Eye exam: up to date  Foot exam: up to date  Diet/Exercise: Trying to quit the overnight snacking.  When she doesn't snack overnight, her blood sugar is much better the following morning.   Aspirin: Taking 81mg daily for primary prevention   Statin: Yes: atorvastatin   ACEi/ARB: Yes: losartan.   Urine Albumin:   Lab Results   Component Value Date    UMALCR 187.04 (H) 2023     Lab Results   Component Value Date    A1C 8.2 2023    A1C 7.6 2022    A1C 6.8 2022    A1C 9.0 2021    A1C 7.8 2021    A1C 7.6 2021    A1C 8.8 2020    A1C 9.1 2020    A1C 6.7 2020     Diarrhea:   Loperamide 4 mg twice daily     This has been the only thing that has really helped - other than the protein bars, as above. This is well controlled right now and really good. But if she forgets one of these she can usually tell right away.     Hypothyroidism:  Levothyroxine 200 mcg once daily    Patient is having the following symptoms: none  TSH   Date Value Ref  Range Status   02/22/2023 4.41 (H) 0.40 - 4.00 mU/L Final   01/27/2021 2.08 0.40 - 4.00 mU/L Final     Multiple myeloma:    Revlimid 10 mg daily   Acyclovir 400 mg twice daily (Shingles prophylaxis), also helps prevent her cold sores    She had a stem cell transplant in either 2018 or 2019, has been in remission since. She'd prefer to be off the Revlimid, however oncology prefers she remain on this. Tolerates it well.     Mood:    Sertraline 100 mg daily     She's been doing better mood-wise. Recently retired and visited her brother in [place].   Denies side effects.   BP Readings from Last 3 Encounters:   02/02/23 106/65   11/15/22 (!) 143/68   10/15/22 112/62     Today's Vitals: There were no vitals taken for this visit.  ----------------      I spent 20 minutes with this patient today. All changes were made via collaborative practice agreement with Montse Bucio PA-C. A copy of the visit note was provided to the patient's provider(s).    A summary of these recommendations was sent via [employer].    Vonnie Corrigan, PharmD  Medication Therapy Management Pharmacist  [phone]    Telemedicine Visit Details  Type of service:  Telephone visit  Start Time: 3:32 PM  End Time: 3:52 PM     Medication Therapy Recommendations  Hypothyroidism, unspecified type    Current Medication: levothyroxine (SYNTHROID/LEVOTHROID) 200 MCG tablet   Rationale: Medication requires monitoring - Needs additional monitoring   Recommendation: Order Lab   Status: Accepted per CPA         Type 2 diabetes mellitus with diabetic polyneuropathy, with long-term current use of insulin (H)    Current Medication: blood glucose (NO BRAND SPECIFIED) test strip   Rationale: Patient forgets to take - Adherence - Adherence   Recommendation: Provide Adherence Intervention   Status: Patient Agreed - Adherence/Education

## 2023-03-17 NOTE — Clinical Note
CE DAWSON note, thanks!  Vonnie Corrigan, PharmD Medication Therapy Management Pharmacist [phone]

## 2023-03-17 NOTE — PATIENT INSTRUCTIONS
"Recommendations from today's MTM visit:                                                         1. Continue working on checking blood sugar two hours after supper.    2. Goal blood sugars we are looking for to get your A1C less than 7% are between  mg/dL right away in the morning or before meals, and less than 180 mg/dL. 2-hours after a meal.     Follow-up: Return in about 4 weeks (around 4/14/2023) for Medication Therapy Management.    It was great speaking with you today.  I value your experience and would be very thankful for your time in providing feedback in our clinic survey. In the next few days, you may receive an email or text message from [email or web address] with a link to a survey related to your  clinical pharmacist.\"     To schedule another MTM appointment, please call the clinic directly or you may call the MTM scheduling line at [phone] or toll-free at [phone].     My Clinical Pharmacist's contact information:                                                      Please feel free to contact me with any questions or concerns you have.      Vonnie Corrigan, PharmD  Medication Therapy Management Pharmacist  [phone]   "

## 2023-03-20 ENCOUNTER — ONCOLOGY VISIT (OUTPATIENT)
Dept: ONCOLOGY | Facility: CLINIC | Age: 68
End: 2023-03-20
Attending: STUDENT IN AN ORGANIZED HEALTH CARE EDUCATION/TRAINING PROGRAM
Payer: MEDICARE

## 2023-03-20 ENCOUNTER — APPOINTMENT (OUTPATIENT)
Dept: LAB | Facility: CLINIC | Age: 68
End: 2023-03-20
Attending: STUDENT IN AN ORGANIZED HEALTH CARE EDUCATION/TRAINING PROGRAM
Payer: MEDICARE

## 2023-03-20 VITALS
TEMPERATURE: 97.8 F | BODY MASS INDEX: 36.98 KG/M2 | HEART RATE: 74 BPM | DIASTOLIC BLOOD PRESSURE: 72 MMHG | SYSTOLIC BLOOD PRESSURE: 113 MMHG | OXYGEN SATURATION: 96 % | RESPIRATION RATE: 16 BRPM | WEIGHT: 250.4 LBS

## 2023-03-20 DIAGNOSIS — G62.9 NEUROPATHY: ICD-10-CM

## 2023-03-20 DIAGNOSIS — R19.7 DIARRHEA, UNSPECIFIED TYPE: ICD-10-CM

## 2023-03-20 DIAGNOSIS — C90.01 MULTIPLE MYELOMA IN REMISSION (H): Primary | ICD-10-CM

## 2023-03-20 LAB
ALBUMIN SERPL BCG-MCNC: 4.7 G/DL (ref 3.5–5.2)
ALP SERPL-CCNC: 144 U/L (ref 35–104)
ALT SERPL W P-5'-P-CCNC: 29 U/L (ref 10–35)
ANION GAP SERPL CALCULATED.3IONS-SCNC: 10 MMOL/L (ref 7–15)
AST SERPL W P-5'-P-CCNC: 18 U/L (ref 10–35)
BASOPHILS # BLD AUTO: 0.1 10E3/UL (ref 0–0.2)
BASOPHILS NFR BLD AUTO: 2 %
BILIRUB SERPL-MCNC: 2 MG/DL
BUN SERPL-MCNC: 14.9 MG/DL (ref 8–23)
CALCIUM SERPL-MCNC: 9.6 MG/DL (ref 8.8–10.2)
CHLORIDE SERPL-SCNC: 105 MMOL/L (ref 98–107)
CREAT SERPL-MCNC: 0.72 MG/DL (ref 0.51–0.95)
DEPRECATED HCO3 PLAS-SCNC: 26 MMOL/L (ref 22–29)
EOSINOPHIL # BLD AUTO: 0.2 10E3/UL (ref 0–0.7)
EOSINOPHIL NFR BLD AUTO: 6 %
ERYTHROCYTE [DISTWIDTH] IN BLOOD BY AUTOMATED COUNT: 14.8 % (ref 10–15)
GFR SERPL CREATININE-BSD FRML MDRD: >90 ML/MIN/1.73M2
GLUCOSE SERPL-MCNC: 234 MG/DL (ref 70–99)
HCT VFR BLD AUTO: 43.9 % (ref 35–47)
HGB BLD-MCNC: 14.9 G/DL (ref 11.7–15.7)
IMM GRANULOCYTES # BLD: 0 10E3/UL
IMM GRANULOCYTES NFR BLD: 0 %
LYMPHOCYTES # BLD AUTO: 0.6 10E3/UL (ref 0.8–5.3)
LYMPHOCYTES NFR BLD AUTO: 16 %
MCH RBC QN AUTO: 29.4 PG (ref 26.5–33)
MCHC RBC AUTO-ENTMCNC: 33.9 G/DL (ref 31.5–36.5)
MCV RBC AUTO: 87 FL (ref 78–100)
MONOCYTES # BLD AUTO: 0.3 10E3/UL (ref 0–1.3)
MONOCYTES NFR BLD AUTO: 9 %
NEUTROPHILS # BLD AUTO: 2.7 10E3/UL (ref 1.6–8.3)
NEUTROPHILS NFR BLD AUTO: 67 %
NRBC # BLD AUTO: 0 10E3/UL
NRBC BLD AUTO-RTO: 0 /100
PLATELET # BLD AUTO: 150 10E3/UL (ref 150–450)
POTASSIUM SERPL-SCNC: 3.9 MMOL/L (ref 3.4–5.3)
PROT SERPL-MCNC: 7.2 G/DL (ref 6.4–8.3)
RBC # BLD AUTO: 5.07 10E6/UL (ref 3.8–5.2)
SODIUM SERPL-SCNC: 141 MMOL/L (ref 136–145)
TOTAL PROTEIN SERUM FOR ELP: 6.7 G/DL (ref 6.4–8.3)
WBC # BLD AUTO: 4 10E3/UL (ref 4–11)

## 2023-03-20 PROCEDURE — 84165 PROTEIN E-PHORESIS SERUM: CPT | Mod: TC | Performed by: PATHOLOGY

## 2023-03-20 PROCEDURE — 86334 IMMUNOFIX E-PHORESIS SERUM: CPT | Mod: 26

## 2023-03-20 PROCEDURE — 86334 IMMUNOFIX E-PHORESIS SERUM: CPT | Performed by: PATHOLOGY

## 2023-03-20 PROCEDURE — 84165 PROTEIN E-PHORESIS SERUM: CPT | Mod: 26

## 2023-03-20 PROCEDURE — 84155 ASSAY OF PROTEIN SERUM: CPT | Performed by: REGISTERED NURSE

## 2023-03-20 PROCEDURE — G0463 HOSPITAL OUTPT CLINIC VISIT: HCPCS | Performed by: REGISTERED NURSE

## 2023-03-20 PROCEDURE — 36415 COLL VENOUS BLD VENIPUNCTURE: CPT | Performed by: REGISTERED NURSE

## 2023-03-20 PROCEDURE — 99215 OFFICE O/P EST HI 40 MIN: CPT | Performed by: REGISTERED NURSE

## 2023-03-20 PROCEDURE — 83521 IG LIGHT CHAINS FREE EACH: CPT | Performed by: REGISTERED NURSE

## 2023-03-20 PROCEDURE — 82784 ASSAY IGA/IGD/IGG/IGM EACH: CPT | Performed by: REGISTERED NURSE

## 2023-03-20 PROCEDURE — 80053 COMPREHEN METABOLIC PANEL: CPT | Performed by: REGISTERED NURSE

## 2023-03-20 PROCEDURE — 85025 COMPLETE CBC W/AUTO DIFF WBC: CPT | Performed by: REGISTERED NURSE

## 2023-03-20 ASSESSMENT — PAIN SCALES - GENERAL: PAINLEVEL: MILD PAIN (2)

## 2023-03-20 NOTE — LETTER
3/20/2023         RE: Winter Loya  [address]  [facility] [address]        Dear Colleague,    Thank you for referring your patient, Winter Loya, to the [facility]. Please see a copy of my visit note below.    ONCOLOGY/HEMATOLOGY PROGRESS NOTE  Mar 20, 2023    Reason for Visit: IgA Kappa Multiple Myeloma    Oncology HPI:   Winter Loya is a 67 year old woman with IgA Kappa Multiple Myeloma. In 2018 she had anemia and was fatigued. She was found to have IgA kappa monocloncal antibody with M-spike of 0.17. IgA elevated. Skeletal survey was unremarkable and UPEP had minimal protein (156 mg/m2). PET 11/2018 showed bone marrow consistent with hypermetabolic plasma cell myeloma. M spike was 0.17. She started RVD and Zometa. On 4/2019 she had an autologous transplant.      Her bone marrow bx from September 2019 was sent here for review. It showed marrow cellularity of 60%, with decreased trilineage hematopoiesis and 15% plasma cells as well as peripheral blood with slight anemia. 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 She is on revlimid maintenance and zometa from her prior oncologist in [place]. On 12/6/19 her K/L ratio is 1.1, M spike is 0.04.     Currently on Revlimid maintenance.    Interval history:   Winter presents alone today.  She quit her job/retired in February. Traveled to [place] for 2 weeks to visit her brother - felt healthier in [place], and specifically notes that her diarrhea seemed to improve while she was there.  After her return, her chronic diarrhea and urgency issues returned.  She continues to have significant neuropathy in both of her feet.  She also dropped a 2 liter bottle of soda on her 5th toe yesterday and states she has a lot of bruising in the area.  Continues to process the grief of her son's death in August 2022.   Denies fevers, chills, night sweats, unexplained weight changes, dizziness, vision or hearing changes, new lumps or bumps, chest pain, shortness of breath, cough, abdominal pain, nausea, vomiting, changes to bladder, swelling of extremities, bleeding issues, or rash.      ROS: 10 point ROS neg other than the symptoms noted above in the HPI.      Past Medical History:   Diagnosis Date     Abnormal EMG 2021 5/25/2021    Interpretation: This is an abnormal study, demonstrating electrophysiologic evidence of a length-dependent axonal sensorimotor polyneuropathy. Asymmetry of peroneal compound muscle action potential amplitudes is a finding of uncertain significance.        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   I have personally reviewed the examination and initial interpretation and I agree with the findings.   ANNE GOODMAN MD     History of MRI of cervical spine 6/2020 3/15/2021    Impression: Multilevel cervical spondylosis, most pronounced at the level of C4-5 and C5-6 with moderate to severe spinal canal stenosis and moderate spinal canal stenosis at C6-7. No abnormal cord signal. No significant neural foraminal narrowing at any level.   I have personally reviewed the examination and initial interpretation and I agree with the findings.   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     Sleep apnea     no c-pap use     Status post complete thyroidectomy 8/26/2020     Thyroid goiter 8/5/2020      Tremor 12/9/2020        Current Outpatient Medications   Medication Sig Dispense Refill     acyclovir (ZOVIRAX) 400 MG tablet TAKE ONE TABLET BY MOUTH EVERY TWELVE HOURS 60 tablet 11     alcohol swab prep pads Use to swab area of injection/sowmya as directed. 100 each 3     aspirin 81 MG EC tablet Take 81 mg by mouth daily       atorvastatin (LIPITOR) 20 MG tablet Take 1 tablet (20 mg) by mouth At Bedtime. 90 tablet 0     blood glucose (NO BRAND SPECIFIED) test strip Use to test blood sugar 3 times daily or as directed. To accompany: Blood Glucose Monitor Brands: per insurance. 100 strip 6     blood glucose calibration (NO BRAND SPECIFIED) solution To accompany: Blood Glucose Monitor Brands: per insurance. 4 each 0     blood glucose monitoring (NO BRAND SPECIFIED) meter device kit Use to test blood sugar 3 times daily or as directed. Preferred blood glucose meter OR supplies to accompany: Blood Glucose Monitor Brands: per insurance. 1 kit 0     empagliflozin (JARDIANCE) 25 MG TABS tablet Take 1 tablet (25 mg) by mouth daily 90 tablet 3     insulin glargine (LANTUS SOLOSTAR) 100 UNIT/ML pen Inject 22 Units Subcutaneous At Bedtime 30 mL 1     insulin pen needle (FIFTY50 PEN NEEDLES) 32G X 4 MM miscellaneous Use one pen needle daily or as directed. 100 each 2     LENalidomide (REVLIMID) 10 MG CAPS capsule Take 1 capsule (10 mg) by mouth daily for 28 days 28 capsule 0     levothyroxine (SYNTHROID/LEVOTHROID) 200 MCG tablet Take 1 tablet (200 mcg) by mouth every morning (before breakfast) 90 tablet 0     loperamide (IMODIUM) 2 MG capsule Take 4 mg (two capsules) by mouth at onset of loose stools, followed by 2 mg (one capsule) after each loose stool. Maximum: 16 mg (8 capsules) daily 270 capsule 11     losartan (COZAAR) 25 MG tablet Take 1 tablet (25 mg) by mouth daily 90 tablet 0     pregabalin (LYRICA) 100 MG capsule TAKE ONE CAPSULE BY MOUTH THREE TIMES DAILY (Patient taking differently: Take 100 mg by mouth 2 times  daily) 270 capsule 3     Semaglutide, 1 MG/DOSE, (OZEMPIC) 4 MG/3ML pen Inject 1 mg Subcutaneous every 7 days 9 mL 3     sertraline (ZOLOFT) 100 MG tablet Take 1 tablet (100 mg) by mouth daily 90 tablet 0     thin (NO BRAND SPECIFIED) lancets Use with lanceting device. To accompany: Blood Glucose Monitor Brands: per insurance. 200 each 6     LENalidomide (REVLIMID) 10 MG CAPS capsule Take 1 capsule (10 mg) by mouth daily for 28 days 28 capsule 0     propranolol ER (INDERAL LA) 60 MG 24 hr capsule Take 1 capsule (60 mg) by mouth daily (Patient not taking: Reported on 3/20/2023) 30 capsule 3        No Known Allergies    /72   Pulse 74   Temp 97.8  F (36.6  C) (Oral)   Resp 16   Wt 113.6 kg (250 lb 6.4 oz)   SpO2 96%   BMI 36.98 kg/m    Gen:alert, pleasant, NAD  HEENT: NC/AT,EOMI w/ PERRL, anicteric sclera. OP clear. MMM.   Neck: Supple, no LAD, full ROM  CV: normal S1,S2 with RRR no m/r/g  Resp: lungs CTA bilaterally with adequate air movement to bases. No wheezes or crackles  Abd: soft NTND no organomegaly or masses. BS normoactive.   Ext: warm and well perfused, no edema or cyanosis  Skin: no concerning lesions or rashes  Neuro: A&Ox4, no lateralizing sx. Grossly nonfocal.  Psych: appropriate, reactive      Labs:     Blood Counts       Recent Labs   Lab Test 03/20/23  1411 02/22/23  0939 02/02/23  0958 10/26/22  1443 10/15/22  1332 09/28/22  1449 09/01/22  1522   HGB 14.9 13.6 13.9   < > 13.3   < > 13.5   HCT 43.9 40.4 41.7   < > 40.5   < > 41.3   WBC 4.0 3.4* 3.8*   < > 3.8*   < > 3.3*   ANEUTAUTO 2.7 1.9 2.3   < > 3.2   < > 1.6   ALYMPAUTO 0.6* 0.8 0.7*   < > 0.3*   < > 0.8   AMONOAUTO 0.3 0.5 0.4   < > 0.3   < > 0.4   AEOSAUTO 0.2 0.3 0.3   < > 0.0   < > 0.4   ABSBASO 0.1 0.0 0.1   < > 0.1   < > 0.1   NRBCMAN 0.0  --   --   --  0.0  --  0.0    132* 121*   < > 104*   < > 127*    < > = values in this interval not displayed.       ABO/RH    No lab results found.      Chemistries     Basic  Panel  Recent Labs   Lab Test 03/20/23  1411 02/22/23  0939 02/02/23  0958    135 142   POTASSIUM 3.9 3.6 4.1   CHLORIDE 105 104 108   CO2 26 25 29   BUN 14.9 14 12   CR 0.72 0.60 0.71   * 153* 150*        Calcium, Magnesium, Phosphorus  Recent Labs   Lab Test 03/20/23  1411 02/22/23  0939 02/02/23  0958   ASHLEY 9.6 9.3 8.8        LFTs  Recent Labs   Lab Test 03/20/23  1411 02/22/23  0939 02/02/23  0958   BILITOTAL 2.0* 1.4* 1.6*   ALKPHOS 144* 132 175*   AST 18 15 15   ALT 29 30 42   ALBUMIN 4.7 4.0 4.0       LDH  No lab results found.    B2-Microglobulin  Recent Labs   Lab Test 02/18/20  0849   [id number] 1.6           Immunoglobulins     Recent Labs   Lab Test 11/28/22  1558 10/26/22  1443 09/28/22  1449 09/01/22  1522 08/01/22  1625 07/11/22  0824    652 569* 597* 622 547*       Recent Labs   Lab Test 11/28/22  1558 10/26/22  1443 09/28/22  1449 09/01/22  1522 08/01/22  1625 07/11/22  0824    131 120 122 133 100       Recent Labs   Lab Test 11/28/22  1558 10/26/22  1443 09/28/22  1449 09/01/22  1522 08/01/22  1625 07/11/22  0824   IGM 58 52 35 34* 37 26*         Monocloncal Protein Studies     M spike    Recent Labs   Lab Test 02/22/23  0939 02/02/23  0958 11/28/22  1558 11/15/22  0959 10/26/22  1443 09/28/22  1449   ELPM 0.0 0.0 0.0 0.1* 0.1* 0.0       Kappa FLC    Recent Labs   Lab Test 11/28/22  1558 10/26/22  1443 09/28/22  1449 09/01/22  1522 08/01/22  1625 07/11/22  0824   KFLCA 2.63* 4.97* 2.21* 2.12* 2.31* 1.92       Lambda FLC    Recent Labs   Lab Test 11/28/22  1558 10/26/22  1443 09/28/22  1449 09/01/22  1522 08/01/22  1625 07/11/22  0824   LFLCA 1.45 1.57 1.41 1.63 1.57 1.23       FLC Ratio    Recent Labs   Lab Test 11/28/22  1558 10/26/22  1443 09/28/22  1449 09/01/22  1522 08/01/22  1625 07/11/22  0824   KLRA 1.81* 3.17* 1.57 1.30 1.47 1.56       Assessment/plan:   Winter Loya is a 67 year old woman with standard risk IgG kappa multiple myeloma, s/p post auto-transplant in  April 2019,  currently on lenalidomide maintenance.    Myeloma-specific labs are currently pending.  Plan to continue maintenance lenalidomide indefinitely while tolerated until evidence of disease progression.  Continue acyclovir and aspirin.  She received her post-transplant vaccines in 2020 and is up to date on COVID vaccinations.    Interested in discussing coming off maintenance therapy as she is concerned her Revlimid may be contributing to her neuropathy - encouraged her to discuss this with Dr. Zhu at her next visit. She is agreeable to continuing the Revlimid maintenance for now.    Neuropathy is likely due to combination of bortezomb (during induction) and DM2.     She has chronic diarrhea which predates her lenalidomide, currently controlled with imodium.  Added metamucil for a while but then stopped, intends to add this back in at some point.  Note that her bilirubin has also been chronically elevated, she is not certain if she has ever been given an explanation as to why. No acute changes today.  Encouraged her to follow-up with her PCP regarding her diarrhea and elevated bilirubin as it is not clearly related to her Revlimid therapy.    Monthly labs in [facility] in 3 months with Dr. Zhu.    44 minutes spent on the date of the encounter doing chart review, review of test results, patient visit and documentation     FALGUNI Fink CNP  [facility]  [address] [place] [id number] [phone]

## 2023-03-20 NOTE — NURSING NOTE
"Oncology Rooming Note    March 20, 2023 2:26 PM   Winter Loya is a 67 year old female who presents for:    Chief Complaint   Patient presents with     Blood Draw     Vpt blood draw by lab RN.  Vitals taken and appointment arrived     Oncology Clinic Visit     Multiple Myeloma     Initial Vitals: /72   Pulse 74   Temp 97.8  F (36.6  C) (Oral)   Resp 16   Wt 113.6 kg (250 lb 6.4 oz)   SpO2 96%   BMI 36.98 kg/m   Estimated body mass index is 36.98 kg/m  as calculated from the following:    Height as of 1/31/23: 1.753 m (5' 9\").    Weight as of this encounter: 113.6 kg (250 lb 6.4 oz). Body surface area is 2.35 meters squared.  Mild Pain (2) Comment: Broken toe (possibly)   No LMP recorded. Patient is postmenopausal.  Allergies reviewed: Yes  Medications reviewed: Yes    Medications: Medication refills not needed today.  Pharmacy name entered into [employer]: [id number] - [place], [place] - [address] [place] [address]    Clinical concerns:        Marianne Hernandez CMA              "

## 2023-03-20 NOTE — NURSING NOTE
Chief Complaint   Patient presents with     Blood Draw     Vpt blood draw by lab RN.  Vitals taken and appointment arrived     Cathie Lee RN

## 2023-03-21 LAB
ALBUMIN SERPL ELPH-MCNC: 4.4 G/DL (ref 3.7–5.1)
ALPHA1 GLOB SERPL ELPH-MCNC: 0.3 G/DL (ref 0.2–0.4)
ALPHA2 GLOB SERPL ELPH-MCNC: 0.6 G/DL (ref 0.5–0.9)
B-GLOBULIN SERPL ELPH-MCNC: 0.8 G/DL (ref 0.6–1)
GAMMA GLOB SERPL ELPH-MCNC: 0.7 G/DL (ref 0.7–1.6)
IGA SERPL-MCNC: 157 MG/DL (ref 84–499)
IGG SERPL-MCNC: 661 MG/DL (ref 610–1616)
IGM SERPL-MCNC: 48 MG/DL (ref 35–242)
KAPPA LC FREE SER-MCNC: 3.41 MG/DL (ref 0.33–1.94)
KAPPA LC FREE/LAMBDA FREE SER NEPH: 2.01 {RATIO} (ref 0.26–1.65)
LAMBDA LC FREE SERPL-MCNC: 1.7 MG/DL (ref 0.57–2.63)
M PROTEIN SERPL ELPH-MCNC: 0 G/DL
PROT PATTERN SERPL ELPH-IMP: NORMAL
PROT PATTERN SERPL IFE-IMP: NORMAL

## 2023-03-22 PROBLEM — R53.1 WEAKNESS: Status: ACTIVE | Noted: 2022-10-15

## 2023-03-22 RX ORDER — LENALIDOMIDE 10 MG/1
10 CAPSULE ORAL EVERY EVENING
COMMUNITY
Start: 2022-10-06 | End: 2023-04-10

## 2023-03-22 NOTE — PROGRESS NOTES
"    Diagnosis/Summary/Recommendations:    PATIENT: Winter Loya  67 year old female     : 1955    TOMASA: 2023    MRN: [id number]    [address]   [place] [id number]  [email or web address]  [phone] (M)   [phone] (H)   Peter Deleon son  [phone]     iphone today     Assessment:  (R25.1) Tremor  (primary encounter diagnosis)  worsening and affecting her ability to use a keyboard and mouse  Duration - 5 years or more.  Father had tremor/parkinson  Not a big drinker - not clear if alcohol helps her tremor  Smell perception is okay  She has sleep apnea  She cut out caffeine and there were no changes in her tremors  She is right handed and has more right than left handed tremor.   She was told she has ET as she is \"not stooped over\"  Her tremors are present when she is using her hands and she has a vocal and head tremor as well.      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 She has not worked with OT    Exercise/Therapy performed/offered       Cognitive/Driving    has had some memory/cognitive issues - seen on 10/22/2020 by Dr. Mcgrath for neuropsychological evaluation: there were mild frontal and possibly left temporal changes and recommended to be evaluated again in one year. She has not had a brain mri. She has mood issues that could benefit from psychotherapy as anxiety may be aggravating her " memory.      cognitive re-evaluation with Dr Mcgrath 10/2021  Order placed    Mood   Mood is good per her report.  Living with son basil - health  .   No counselor      Stress/anxiety issues  - still on sertraline and consider counsellor  Referral placed.     Sertraline zoloft 100mg    Hallucinations/delusions       Sleep   Dr. Juan Jose Salazar  Denies RBD features  [facility] sleep    Bladder/Renal/Prostate/Gyn/Other  Has changes in urinary frequency/urgency if blood sugars are out of control  2-4/noc getting up to urinate  Has not seen a urologist.   Seen by Ob-gyn a year ago    GI/Constipation/GERD   Calcium antacid 500mg tums - not taking    ENDO/Lipid/DM/Bone density/Thyroid  recent A1c was 8.8 and is working with pharmacist Coco Antoine. She may benefit from seeing nutrition and possibly endocrinology in addition to her pcp     Type 2 Diabetes with neuropathy     Atorvastatin lipitor 20mg at bedtime            Calcitriol rocaltrol 0.25mcg - not taking   Dulaglutide trulicity 0.75mg/0.5ml pen weekly     Empagliflozin jardiance 10mg daily  Exenatide ER Bydureon - not using  Lantus 30 units at bedtime  Levothyroxine synthroid/levothyroid 175 mcg daily  Metformin 500mg 1 tabs  twice daily     Has not seen dietician            Seeing Coco Antoine, Pharm D  DOes not have an endocrinologist  Has not seen nutrition  A1c was 8.8 on 12/7/2020     Diabetes - better control of her diabetes - A1c was 7.6 and most recently was 7.8  Had been working with  Coco Antoine, Pharmacist  Has someone new and has an appointment with them this week  Mika Gross October      yane Mares oncologist    [facility]      Hypothyroidism;      s/p thyroidectomy for multinodular goiter    Cardio/heart/Hyper or Hypotensive   blood pressure is stable initially after stem cell therapy but has gone up at times  States she is trying to lose weight to help her diabetes and blood  pressure.   Her chart has been 160/88; encouraged her to get a home blood pressure cuff.      Losartan cozaar 25mg daily     Blood pressure was good today - she is on propranolol LA 60mg  Discussed getting a blood pressure cuff to monitor her blood pressure and heart rate.     Vision/Dry Eyes/Cataracts/Glaucoma/Macular    on treatment for glaucoma  Latanoprost xalatan 0.005% ophthalmic solution    Heme/Anticoagulation/Antiplatelet/Anemia/Other  Multiple myeloma - in remision  IgA Kappa Multiple Myeloma. Presented 2018 with anemia and fatigue. Skeletal survey was unremarkable and UPEP had minimal protein (156 mg/m2). PET 11/2018 showed bone marrow consistent with hypermetabolic plasma cell myeloma. M spike was 0.17. She started RVD and Zometa. 4/2019 she had an autologous transplant. She was on revlimid maintenance and zometa from her prior oncologist in [place]. Zometa through 12/2020 to complete a total of 2 years of therapy  Mele Janakiram     Aspirin 325mg    ENT/Resp  Loratadine claritin 10mg - not taking     Former smoker - quit 15 years prior was 1 ppd  Quit 2005    Skin/Cancer/Seborrhea/other      Musculoskeletal/Pain/Headache  Acetaminophen tylenol 325mg - as needed  Acyclovir zovirax 400mg twice daily  Meloxicam mobic 15mg  - not taking  Oxycodone roxicodone - not taking  Pregabalin lyrica 100mg 3/day for neuropathy and helping her mood as well.   Tizanidine zanaflex 4mg - not taking   Tramadol ultram 50mg - not taking     Consideration for EMG and consultation with general neurology for neuropathy, carpal tunnel syndrome and cervical radiculopathy.      EMG:  This is an abnormal study, demonstrating electrophysiologic evidence of a length-dependent axonal sensorimotor polyneuropathy. Asymmetry of peroneal compound muscle action potential amplitudes is a finding of uncertain significance.      IMPRESSION:    1.  Sensory predominant polyneuropathy.  2.  Electrodiagnostic evidence of axonal neuropathy.  3.   IgA kappa myeloma, which appears in remission.  4.  Type 2 diabetes.  5.  Essential tremor     Her neuropathy may well have been precipitated by chemotherapy she received prior to and around the time of her bone marrow transplant.  I particularly note that Velcade (bortezomib) has a high incidence of painful sensory neuropathy.  She is no longer receiving this agent.  I doubt the Revlimid is a factor.  She also has type 2 diabetes.  This is likely contributing to her neuropathy as well.     PLAN:  I did discuss the above with the patient.  She tells me that there has been some improvement.  Otherwise, she is relatively stable at this point.     The plan now is just to monitor her course and she is open to this.  I have recommended a neurologic followup in 6 months.  She will be seeing a new neurologist at that followup visit as I am retiring soon.  I did discuss this with her as well.     IN summary  - essential tremor  - neuropathy  - other medical issues  -diabetes      Acetaminophen tylenol 325mg as needed     Impression: Multilevel cervical spondylosis, most pronounced at the  level of C4-5 and C5-6 with moderate to severe spinal canal stenosis  and moderate spinal canal stenosis at C6-7. No abnormal cord signal.  No significant neural foraminal narrowing at any level.     I have personally reviewed the examination and initial interpretation  and I agree with the findings.     ANNE GOODMAN MD    Other:  Lenalidomide revlimid 10mg daiy  Nonformulary revlimid - as above   NOnformulary zometa - not getting    Medications                 Acetaminophen tylenol 325mg prn           Acyclovir zovirax 400mg 1   1       Aspirin 81mg 1           Atorvastatin lipitor 20mg      1       Calcium antacid 500mg tums off           Empagliflozin jardiance 10mg 1           Ibuprofen advil/motrin 200mg prn       Insulin glargine lantus     22 units       Latanoprost xalatan 0.005% ophthalmic  off       Lenalidomide revlimid 10mg   1           Levothyroxine synthroid/levothyroid 200 mcg 1           Loperamide imodium 2mg prn       Losartan cozaar 25mg  1           Metformin glucophage 500mg  off       Pregabalin lyrica 100mg   2/d chemo NP 1  1       Propranolol ER inderal LA 60mg  off           SEmaglutide ozempic  weekly       Sertraline zoloft 100mg 1           Tolterodine ER Detrol LA 4mg 24hr 1                                                                Has ongoing blood sugar issues and will work on getting better control. a1c was 8.2  - date 2/2/2023     Her tremors are well managed and she denies problems with her blood pressure/heart rate being too low or having side effects. Renewed her propranolol.      She has an iphone        Plan:    Wondering about the revilimid for multiple myeloma     Diabetes  ozempic  lantus  jardiance  Will need another A1c to see if better after starting the ozempic as had an A1c of 8.2    Diarrhea   Using imodium  Off metformin  Had tests for this   No clear explanation  Encouraged her to go dairy free    Foot pain - dropped something on her foot   Podiatry referral for her right foot    Refilled the propranolol ER inderal LA 60 for her tremor    Return in one year    Last visit 9/2021    Father had parkinson   Mother on her mother side had parkinson   She has features of essential tremor today and does not have parkinsonian features.     Coding statement:   Medical Decision Making:  #  Chronic progressive medical conditions addressed  - see above --   Review and/or interpretation of unique test or documentation from a provider outside of neurology no   Independent historian provided additional details  no  Prescription drug management and review of potential side effects and/or monitoring for side effects  -- see above ---  Health impacted by social determinants of health  no    I have reviewed the note as documented above.  This accurately captures the substance of my conversation with the patient  and total time spent preparing for visit, executing visit and completing visit on the day of the visit:  20 minutes.  The portion of this total time included face to face time 15, omites     Gabriel Santoyo MD     ______________________________________    Last visit date and details:             ______________________________________      Patient was asked about 14 Review of systems including changes in vision (dry eyes, double vision), hearing, heart, lungs, musculoskeletal, depression, anxiety, snoring, RBD, insomnia, urinary frequency, urinary urgency, constipation, swallowing problems, hematological, ID, allergies, skin problems: seborrhea, endocrinological: thyroid, diabetes, cholesterol; balance, weight changes, and other neurological problems and these were not significant at this time except for   Patient Active Problem List   Diagnosis     Multiple myeloma in remission (H)     Type 2 diabetes mellitus with diabetic polyneuropathy, with long-term current use of insulin (H)     Thyroid goiter     Allergic rhinitis     NA (generalized anxiety disorder)     History of endometrial ablation     Hyperlipidemia     Osteoarthrosis involving lower leg     DAILY (obstructive sleep apnea)- moderate (AHI 17)     Hypothyroidism, postoperative      Class 2 severe obesity due to excess calories with serious comorbidity and body mass index (BMI) of 37.0 to 37.9 in adult (H)     Tremor     History of peripheral stem cell transplant (H)     History of MRI of cervical spine 6/2020     H/O magnetic resonance imaging of lumbar spine 2020     Major depressive disorder, recurrent episode, severe (H)     Abnormal EMG 2021     Sensory polyneuropathy     Axonal neuropathy     Drug-induced polyneuropathy (H)     Pulmonary hypertension (H)     Thrombocytopenia, unspecified (H)     Exocrine pancreatic insufficiency     Chronic kidney disease, stage 1     Diarrhea, unspecified type     Encounter for long-term (current) use of medications      Bilateral plantar fasciitis     Combined forms of age-related cataract, mild, of both eyes     History of laser photocoagulation of retina, os (od?)     Glaucoma suspect of both eyes     Weakness        No Known Allergies  Past Surgical History:   Procedure Laterality Date     ARTHROSCOPY KNEE Left 02/2014     ARTHROSCOPY KNEE Right 12/2010    KNEE ARTHROSCOPY Dec 2010  Right     CHOLECYSTECTOMY  2002     COLONOSCOPY N/A 07/23/2020    Procedure: COLONOSCOPY;  Surgeon: Za Denis MD;  Location: [facility] OR     COLONOSCOPY N/A 2/25/2022    Procedure: COLONOSCOPY with biopsies;  Surgeon: Jose Bangura MD;  Location: [facility] OR     ENDOSCOPIC ULTRASOUND UPPER GASTROINTESTINAL TRACT (GI) N/A 2/25/2022    Procedure: ENDOSCOPIC ULTRASOUND, ESOPHAGOSCOPY with biopsies / UPPER GASTROINTESTINAL TRACT (GI);  Surgeon: Jose Bangura MD;  Location: [facility] OR     EYE SURGERY  1985    lasersx-spot behind eye started leaking     THYROIDECTOMY N/A 08/26/2020    Procedure: THYROIDECTOMY, TOTAL;  Surgeon: Tiff Burgos MD;  Location: [facility] OR     TONSILLECTOMY  2003     TUBAL LIGATION  1986     Past Medical History:   Diagnosis Date     Abnormal EMG 2021 5/25/2021    Interpretation: This is an abnormal study, demonstrating electrophysiologic evidence of a length-dependent axonal sensorimotor polyneuropathy. Asymmetry of peroneal compound muscle action potential amplitudes is a finding of uncertain significance.        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   I have personally reviewed the examination and initial interpretation and I agree with the findings.   ANNE GOODMAN MD     History of MRI of  cervical spine 2020 3/15/2021    Impression: Multilevel cervical spondylosis, most pronounced at the level of C4-5 and C5-6 with moderate to severe spinal canal stenosis and moderate spinal canal stenosis at C6-7. No abnormal cord signal. No significant neural foraminal narrowing at any level.   I have personally reviewed the examination and initial interpretation and I agree with the findings.   ANNE GOODMAN MD     History of peripheral stem cell transplant (H) 2020     History of smoking      Hyperlipidemia      Multiple myeloma in remission (H)      Nonsenile cataract      Obesity      Sensory polyneuropathy 2021     Sleep apnea     no c-pap use     Status post complete thyroidectomy 2020     Thyroid goiter 2020     Tremor 2020     Social History     Socioeconomic History     Marital status:      Spouse name: Not on file     Number of children: 3     Years of education: Not on file     Highest education level: Not on file   Occupational History     Occupation: alcohol and drug counselor   Tobacco Use     Smoking status: Former     Packs/day: 1.00     Types: Cigarettes     Quit date:      Years since quittin.2     Smokeless tobacco: Never   Vaping Use     Vaping Use: Never used   Substance and Sexual Activity     Alcohol use: Yes     Comment: occasional     Drug use: Never     Sexual activity: Not Currently     Partners: Male   Other Topics Concern     Not on file   Social History Narrative     Not on file     Social Determinants of Health     Financial Resource Strain: Not on file   Food Insecurity: Not on file   Transportation Needs: Not on file   Physical Activity: Not on file   Stress: Not on file   Social Connections: Not on file   Intimate Partner Violence: Unknown     Fear of Current or Ex-Partner: No     Emotionally Abused: No     Physically Abused: Not on file     Sexually Abused: Not on file   Housing Stability: Not on file       Drug and lactation database  from the [place] [employer]:  http://toxnet.nlm.nih.gov/cgi-bin/sis/htmlgen?LACT      B/P: Data Unavailable, T: Data Unavailable, P: Data Unavailable, R: Data Unavailable 0 lbs 0 oz  not currently breastfeeding., There is no height or weight on file to calculate BMI.  Medications and Vitals not listed above were documented in the cart and reviewed by me.     Current Outpatient Medications   Medication Sig Dispense Refill     LENalidomide (REVLIMID) 10 MG CAPS capsule Take 10 mg by mouth every evening       acyclovir (ZOVIRAX) 400 MG tablet TAKE ONE TABLET BY MOUTH EVERY TWELVE HOURS 60 tablet 11     alcohol swab prep pads Use to swab area of injection/sowmya as directed. 100 each 3     aspirin 81 MG EC tablet Take 81 mg by mouth daily       atorvastatin (LIPITOR) 20 MG tablet Take 1 tablet (20 mg) by mouth At Bedtime. 90 tablet 0     blood glucose (NO BRAND SPECIFIED) test strip Use to test blood sugar 3 times daily or as directed. To accompany: Blood Glucose Monitor Brands: per insurance. 100 strip 6     blood glucose calibration (NO BRAND SPECIFIED) solution To accompany: Blood Glucose Monitor Brands: per insurance. 4 each 0     blood glucose monitoring (NO BRAND SPECIFIED) meter device kit Use to test blood sugar 3 times daily or as directed. Preferred blood glucose meter OR supplies to accompany: Blood Glucose Monitor Brands: per insurance. 1 kit 0     empagliflozin (JARDIANCE) 25 MG TABS tablet Take 1 tablet (25 mg) by mouth daily 90 tablet 3     insulin glargine (LANTUS SOLOSTAR) 100 UNIT/ML pen Inject 22 Units Subcutaneous At Bedtime 30 mL 1     insulin pen needle (FIFTY50 PEN NEEDLES) 32G X 4 MM miscellaneous Use one pen needle daily or as directed. 100 each 2     LENalidomide (REVLIMID) 10 MG CAPS capsule Take 1 capsule (10 mg) by mouth daily for 28 days 28 capsule 0     LENalidomide (REVLIMID) 10 MG CAPS capsule Take 1 capsule (10 mg) by mouth daily for 28 days 28 capsule 0      levothyroxine (SYNTHROID/LEVOTHROID) 200 MCG tablet Take 1 tablet (200 mcg) by mouth every morning (before breakfast) 90 tablet 0     loperamide (IMODIUM) 2 MG capsule Take 4 mg (two capsules) by mouth at onset of loose stools, followed by 2 mg (one capsule) after each loose stool. Maximum: 16 mg (8 capsules) daily 270 capsule 11     losartan (COZAAR) 25 MG tablet Take 1 tablet (25 mg) by mouth daily 90 tablet 0     pregabalin (LYRICA) 100 MG capsule TAKE ONE CAPSULE BY MOUTH THREE TIMES DAILY (Patient taking differently: Take 100 mg by mouth 2 times daily) 270 capsule 3     propranolol ER (INDERAL LA) 60 MG 24 hr capsule Take 1 capsule (60 mg) by mouth daily (Patient not taking: Reported on 3/20/2023) 30 capsule 3     Semaglutide, 1 MG/DOSE, (OZEMPIC) 4 MG/3ML pen Inject 1 mg Subcutaneous every 7 days 9 mL 3     sertraline (ZOLOFT) 100 MG tablet Take 1 tablet (100 mg) by mouth daily 90 tablet 0     thin (NO BRAND SPECIFIED) lancets Use with lanceting device. To accompany: Blood Glucose Monitor Brands: per insurance. 200 each 6         Gabriel Santoyo MD

## 2023-03-27 ENCOUNTER — OFFICE VISIT (OUTPATIENT)
Dept: PSYCHOLOGY | Facility: CLINIC | Age: 68
End: 2023-03-27
Payer: MEDICARE

## 2023-03-27 DIAGNOSIS — F33.1 MDD (MAJOR DEPRESSIVE DISORDER), RECURRENT EPISODE, MODERATE (H): Primary | ICD-10-CM

## 2023-03-27 DIAGNOSIS — F41.1 GAD (GENERALIZED ANXIETY DISORDER): ICD-10-CM

## 2023-03-27 PROCEDURE — 90834 PSYTX W PT 45 MINUTES: CPT | Performed by: STUDENT IN AN ORGANIZED HEALTH CARE EDUCATION/TRAINING PROGRAM

## 2023-03-27 NOTE — PROGRESS NOTES
[facility]                                     Progress Note    Patient Name: Winter Loya  Date: 3/27/2023         Service Type: Individual      Session Start Time: 2.30  Session End Time: 3.15     Session Length: 45 mns    Session #: 18    Attendees: Client attended alone    Service Modality:  In-person    DATA  Interactive Complexity: No  Crisis: No      Progress Since Last Session (Related to Symptoms / Goals / Homework):   Symptoms: Pt reported no change from previous session    Homework: Achieved / completed to satisfaction       Episode of Care Goals: Minimal progress - ACTION (Actively working towards change); Intervened by reinforcing change plan / affirming steps taken     Current / Ongoing Stressors and Concerns:   Ongoing stressors: Financial stress, stressful filial relationship/ Difficulty with assertiveness     Current: Pt reported she submitted her resignation and went on [employer] as her work was becoming very challenging and overwhelming. Pt reported she has been home now and struggling with boredom  with little or nothing to do and look forward to daily.                   Treatment Objective(s) Addressed in This Session:    Identify and apply for two volunteering opportunities in the next two weeks       Intervention:     Discussion today on working and the job as a social anchorage for the maintenance of a satisfactory self and explore how to gain similar feeling through volunteering.  Couched patient that volunteering can provide a healthy boost to their self-confidence, self-esteem, and life satisfaction and that helping others provides an opportunity to be social, connect with community, enhancing a natural sense of accomplishment. Provider explain to pt that these feelings stimulate the brain and wards off feelings of anxiety, depression, and loneliness. Encourage pt to identify volunteering opportunities and apply to serve with them.       ASSESSMENT: Current  Emotional / Mental Status (status of significant symptoms):   Risk status (Self / Other harm or suicidal ideation)   Patient denies current fears or concerns for personal safety.   Patient denies current or recent suicidal ideation or behaviors.   Patient denies current or recent homicidal ideation or behaviors.   Patient denies current or recent self injurious behavior or ideation.   Patient denies other safety concerns.   Patient reports there has been no change in risk factors since their last session.     Patient reports there has been no change in protective factors since their last session.     Recommended that patient call 911 or go to the local ED should there be a change in any of these risk factors.     Appearance:   Appropriate    Eye Contact:   Good    Psychomotor Behavior: Normal    Attitude:   Cooperative  Interested Friendly   Orientation:   Person Place Time Situation   Speech    Rate / Production: Normal/ Responsive    Volume:  Normal    Mood:    Depressed  Anhedonia Grieving   Affect:    Worrisome    Thought Content:  Clear    Thought Form:  Coherent    Insight:    Fair      Medication Review:   No changes to current psychiatric medication(s)     Medication Compliance:   Yes     Changes in Health Issues:   None reported     Chemical Use Review:   Substance Use: Chemical use reviewed, no active concerns identified      Tobacco Use: No current tobacco use.      Diagnosis:    296.32 (F33.1) Major Depressive Disorder, Recurrent Episode, Moderate _ and With anxious distress    Collateral Reports Completed:   Not Applicable    PLAN: (Patient Tasks / Therapist Tasks / Other)    Identify and apply for volunteering opportunities.    CHERISE Castellon                     ----- Service Performed and Documented by CHERISE------  This note was reviewed and clinical supervision by AMY Ayala [facility]    3/28/2023   ______________________________________________________________________    Individual Treatment  "Plan    Patient's Name: Winter Loya  YOB: 1955    Date of Creation: 04/18/2022  Date Treatment Plan Last Reviewed/Revised: 02/13/2023    DSM5 Diagnoses: 296.32 (F33.1) Major Depressive Disorder, Recurrent Episode, Moderate _ and With anxious distress  Psychosocial / Contextual Factors:   PROMIS (reviewed every 90 days):     Referral / Collaboration:  Referral to another professional/service is not indicated at this time..    Anticipated number of session for this episode of care: 15-25  Anticipation frequency of session: Every other week  Anticipated Duration of each session: 38-52 minutes  Treatment plan will be reviewed in 90 days or when goals have been changed.       MeasurableTreatment Goal(s) related to diagnosis / functional impairment(s)    Goal 1: Patient will identify and address specific triggers to feelings of depression and improve coping with depression by  90 % in the next three months.    I will know I've met my goal when I can comfortably manage the daily stressors I have and be less depressed\"    Objective #A (Patient Action)    Patient will take 15-20 minutes daily walks and spend 1 hour daily completing household chorees.  Status: Continued - Date(s):  05/13/2023    Intervention(s)  Therapist will provide psychoeducation, behavioral activation, and cognitive restructuring.)    Objective #B  Patient will identify specific areas of cognitive distortion and challenge irrational thoughts with reality   Status: Continued - Date(s):     05/13/2023       Intervention(s)  Therapist will teach patient how negative thoughts can lead to negative feelings and actions and ways to reframe thoughts in a more positive way leading to more positive feelings and helpful behaviors    Objective #C  Patient will learn and practice relaxation techniques to manage anxiety.  Status: Continued - Date(s):    05/13/2023    Intervention(s)  Therapist will teach patient thought stopping, deep breathing " exercises, mindful meditation, and creative visualization.  Patient has reviewed and agreed to the above plan.      CHERISE Castellon  April 18, 2022

## 2023-03-29 ENCOUNTER — OFFICE VISIT (OUTPATIENT)
Dept: UROLOGY | Facility: CLINIC | Age: 68
End: 2023-03-29
Attending: PHYSICIAN ASSISTANT
Payer: MEDICARE

## 2023-03-29 VITALS — OXYGEN SATURATION: 95 % | HEART RATE: 68 BPM | SYSTOLIC BLOOD PRESSURE: 108 MMHG | DIASTOLIC BLOOD PRESSURE: 68 MMHG

## 2023-03-29 DIAGNOSIS — N39.46 MIXED INCONTINENCE URGE AND STRESS (MALE)(FEMALE): ICD-10-CM

## 2023-03-29 LAB
ALBUMIN UR-MCNC: NEGATIVE MG/DL
APPEARANCE UR: CLEAR
BILIRUB UR QL STRIP: NEGATIVE
COLOR UR AUTO: YELLOW
GLUCOSE UR STRIP-MCNC: >=1000 MG/DL
HGB UR QL STRIP: ABNORMAL
KETONES UR STRIP-MCNC: NEGATIVE MG/DL
LEUKOCYTE ESTERASE UR QL STRIP: NEGATIVE
NITRATE UR QL: NEGATIVE
PH UR STRIP: 6 [PH] (ref 5–7)
RBC #/AREA URNS AUTO: ABNORMAL /HPF
SP GR UR STRIP: 1.01 (ref 1–1.03)
SQUAMOUS #/AREA URNS AUTO: ABNORMAL /LPF
UROBILINOGEN UR STRIP-ACNC: 0.2 E.U./DL
WBC #/AREA URNS AUTO: ABNORMAL /HPF
YEAST #/AREA URNS HPF: ABNORMAL /HPF

## 2023-03-29 PROCEDURE — 52000 CYSTOURETHROSCOPY: CPT | Performed by: UROLOGY

## 2023-03-29 PROCEDURE — 81001 URINALYSIS AUTO W/SCOPE: CPT | Performed by: UROLOGY

## 2023-03-29 PROCEDURE — 99205 OFFICE O/P NEW HI 60 MIN: CPT | Mod: 25 | Performed by: UROLOGY

## 2023-03-29 RX ORDER — TOLTERODINE 4 MG/1
4 CAPSULE, EXTENDED RELEASE ORAL DAILY
Qty: 60 CAPSULE | Refills: 0 | Status: SHIPPED | OUTPATIENT
Start: 2023-03-29 | End: 2023-04-25

## 2023-03-29 NOTE — PROGRESS NOTES
Winter Loya is a 67 year old female seen in consultation for incontinence. Consult from Montse Bucio      Pt reports many yr hx progressive mixed UI, urge > stress, wears 2-3 adult diappers per day, 2 per nite.    Denies dysuria, gross hematuria, daytime frequency; noc x 3.    Denies prior  eval, hx bladder surgery, use of bladder meds, success with Kegel's.    Hx 3 vag deliveries, no hyster, not on HRT.     Some constipation; toast or cereal for breakfast, sugar yesterday morning was 126.    Drinks 1 caf coffee, 1 tea, 1 soda, 5 [place] per day.    DM for 25 yrs; states that she has not been taking good care of herself but has recently 'retired' to begin to do so.      Reviewed PMH in detail including DM on insulin, anxiety, adjustment disorder w depressed mood, depression, glaucoma, hx peripheral stem cell transplant, multiple myeloma in remissino, goiter, severe obesity, axonal neuropathy, sensory polyneuropathy, pulmonary HTN, diarrhea, exocrine pancreatic insufficiency, plantar fasciitis, weakness      Past Medical History:   Diagnosis Date     Abnormal EMG 2021 5/25/2021    Interpretation: This is an abnormal study, demonstrating electrophysiologic evidence of a length-dependent axonal sensorimotor polyneuropathy. Asymmetry of peroneal compound muscle action potential amplitudes is a finding of uncertain significance.        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   I have personally reviewed the examination and initial interpretation and I agree with the findings.   ANNE GOODMAN MD     History of MRI of cervical spine 6/2020 3/15/2021    Impression: Multilevel cervical  spondylosis, most pronounced at the level of C4-5 and C5-6 with moderate to severe spinal canal stenosis and moderate spinal canal stenosis at C6-7. No abnormal cord signal. No significant neural foraminal narrowing at any level.   I have personally reviewed the examination and initial interpretation and I agree with the findings.   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     Sleep apnea     no c-pap use     Status post complete thyroidectomy 8/26/2020     Thyroid goiter 8/5/2020     Tremor 12/9/2020       Past Surgical History:   Procedure Laterality Date     ARTHROSCOPY KNEE Left 02/2014     ARTHROSCOPY KNEE Right 12/2010    KNEE ARTHROSCOPY Dec 2010  Right     CHOLECYSTECTOMY  2002     COLONOSCOPY N/A 07/23/2020    Procedure: COLONOSCOPY;  Surgeon: Za Denis MD;  Location: [facility]     COLONOSCOPY N/A 2/25/2022    Procedure: COLONOSCOPY with biopsies;  Surgeon: Jose Bangura MD;  Location: [facility] OR     ENDOSCOPIC ULTRASOUND UPPER GASTROINTESTINAL TRACT (GI) N/A 2/25/2022    Procedure: ENDOSCOPIC ULTRASOUND, ESOPHAGOSCOPY with biopsies / UPPER GASTROINTESTINAL TRACT (GI);  Surgeon: Jose Bangura MD;  Location: [facility] OR     EYE SURGERY  1985    lasersx-spot behind eye started leaking     THYROIDECTOMY N/A 08/26/2020    Procedure: THYROIDECTOMY, TOTAL;  Surgeon: Tiff Burgos MD;  Location: [facility] OR     TONSILLECTOMY  2003     TUBAL LIGATION  1986       Social History     Socioeconomic History     Marital status:      Spouse name: Not on file     Number of children: 3     Years of education: Not on file     Highest education level: Not on file   Occupational History     Occupation: alcohol and drug counselor   Tobacco Use     Smoking status: Former     Packs/day: 1.00     Types: Cigarettes     Quit  date:      Years since quittin.2     Smokeless tobacco: Never   Vaping Use     Vaping Use: Never used   Substance and Sexual Activity     Alcohol use: Yes     Comment: occasional     Drug use: Never     Sexual activity: Not Currently     Partners: Male   Other Topics Concern     Not on file   Social History Narrative     Not on file     Social Determinants of Health     Financial Resource Strain: Not on file   Food Insecurity: Not on file   Transportation Needs: Not on file   Physical Activity: Not on file   Stress: Not on file   Social Connections: Not on file   Intimate Partner Violence: Unknown     Fear of Current or Ex-Partner: No     Emotionally Abused: No     Physically Abused: Not on file     Sexually Abused: Not on file   Housing Stability: Not on file       Current Outpatient Medications   Medication Sig Dispense Refill     acyclovir (ZOVIRAX) 400 MG tablet TAKE ONE TABLET BY MOUTH EVERY TWELVE HOURS 60 tablet 11     alcohol swab prep pads Use to swab area of injection/sowmya as directed. 100 each 3     aspirin 81 MG EC tablet Take 81 mg by mouth daily       atorvastatin (LIPITOR) 20 MG tablet Take 1 tablet (20 mg) by mouth At Bedtime. 90 tablet 0     blood glucose (NO BRAND SPECIFIED) test strip Use to test blood sugar 3 times daily or as directed. To accompany: Blood Glucose Monitor Brands: per insurance. 100 strip 6     blood glucose calibration (NO BRAND SPECIFIED) solution To accompany: Blood Glucose Monitor Brands: per insurance. 4 each 0     blood glucose monitoring (NO BRAND SPECIFIED) meter device kit Use to test blood sugar 3 times daily or as directed. Preferred blood glucose meter OR supplies to accompany: Blood Glucose Monitor Brands: per insurance. 1 kit 0     empagliflozin (JARDIANCE) 25 MG TABS tablet Take 1 tablet (25 mg) by mouth daily 90 tablet 3     insulin glargine (LANTUS SOLOSTAR) 100 UNIT/ML pen Inject 22 Units Subcutaneous At Bedtime 30 mL 1     insulin pen needle (FIFTY50  "PEN NEEDLES) 32G X 4 MM miscellaneous Use one pen needle daily or as directed. 100 each 2     LENalidomide (REVLIMID) 10 MG CAPS capsule Take 10 mg by mouth every evening       levothyroxine (SYNTHROID/LEVOTHROID) 200 MCG tablet Take 1 tablet (200 mcg) by mouth every morning (before breakfast) 90 tablet 0     loperamide (IMODIUM) 2 MG capsule Take 4 mg (two capsules) by mouth at onset of loose stools, followed by 2 mg (one capsule) after each loose stool. Maximum: 16 mg (8 capsules) daily 270 capsule 11     losartan (COZAAR) 25 MG tablet Take 1 tablet (25 mg) by mouth daily 90 tablet 0     pregabalin (LYRICA) 100 MG capsule TAKE ONE CAPSULE BY MOUTH THREE TIMES DAILY (Patient taking differently: Take 100 mg by mouth 2 times daily) 270 capsule 3     Semaglutide, 1 MG/DOSE, (OZEMPIC) 4 MG/3ML pen Inject 1 mg Subcutaneous every 7 days 9 mL 3     sertraline (ZOLOFT) 100 MG tablet Take 1 tablet (100 mg) by mouth daily 90 tablet 0     thin (NO BRAND SPECIFIED) lancets Use with lanceting device. To accompany: Blood Glucose Monitor Brands: per insurance. 200 each 6     LENalidomide (REVLIMID) 10 MG CAPS capsule Take 1 capsule (10 mg) by mouth daily for 28 days 28 capsule 0     LENalidomide (REVLIMID) 10 MG CAPS capsule Take 1 capsule (10 mg) by mouth daily for 28 days 28 capsule 0     propranolol ER (INDERAL LA) 60 MG 24 hr capsule Take 1 capsule (60 mg) by mouth daily (Patient not taking: Reported on 3/20/2023) 30 capsule 3       Physical Exam:  5'9\"   250#   GENL: NAD.    ABD: Obese, soft, non-tender, no masses.    EG: Poorly-estrogenized, no masses.    VAGINA: Poorly-estrogenized, no masses.    BN HYPERMOBILITY: Mild.    CYSTOCELE: Grade 1.    APICAL PROLAPSE: Mild.    RECTOCELE: Mild.    BIMANUAL: No mass or tenderness.    Cysto:    (Informed consent obtained. Pause for cause performed)   Sterile prep.    17 Fr scope inserted through urethra. Systematic examination w 70 degree lens.   PVR: 10 cc   MUCOSA: Normal without " lesion   ORIFICES: Normal location and morphology   CAPACITY: 500 cc; no pain with filling   Scope withdrawn without untoward effect.    Valsalva:   Mild hypermobility, no leakage noted.    (Pt tolerated procedure without difficulty).                        Today:    Results for orders placed or performed in visit on 03/29/23   UA reflex to Microscopic     Status: Abnormal   Result Value Ref Range    Color Urine Yellow Colorless, Straw, Light Yellow, Yellow    Appearance Urine Clear Clear    Glucose Urine >=1000 (A) Negative mg/dL    Bilirubin Urine Negative Negative    Ketones Urine Negative Negative mg/dL    Specific Gravity Urine 1.015 1.003 - 1.035    Blood Urine Trace (A) Negative    pH Urine 6.0 5.0 - 7.0    Protein Albumin Urine Negative Negative mg/dL    Urobilinogen Urine 0.2 0.2, 1.0 E.U./dL    Nitrite Urine Negative Negative    Leukocyte Esterase Urine Negative Negative         IMP:  1. OAB wet; large fluids, DM with suboptimal control  2. DM sequellae including glucosuria, sensory neuropathy  3. Morbid obesity, other medical issues      PLAN:  1. Discussed situation with patient in detail.  2. Stressed importance of sugar control on bladder control  3. Consider trial Detrol LA4; discussed rationale, mech of action, etc; pt elects trial  4. Consider some moderation of fluids; pt expresses understanding  5. RTC 2 mos to review progress, repeat UA  6. Carefully set realistic expectations  7. Total time spent in reviewing patient records, discussing history, performing exam, discussing diagnosis, outlining treatment plan and documentation: 60 minutes

## 2023-04-10 ENCOUNTER — TELEPHONE (OUTPATIENT)
Dept: ONCOLOGY | Facility: CLINIC | Age: 68
End: 2023-04-10
Payer: MEDICARE

## 2023-04-10 ENCOUNTER — OFFICE VISIT (OUTPATIENT)
Dept: PSYCHOLOGY | Facility: CLINIC | Age: 68
End: 2023-04-10
Payer: MEDICARE

## 2023-04-10 DIAGNOSIS — F33.9 MAJOR DEPRESSIVE DISORDER, RECURRENT EPISODE WITH ANXIOUS DISTRESS (H): Primary | ICD-10-CM

## 2023-04-10 DIAGNOSIS — C90.01 MULTIPLE MYELOMA IN REMISSION (H): Primary | ICD-10-CM

## 2023-04-10 PROCEDURE — 90834 PSYTX W PT 45 MINUTES: CPT | Performed by: STUDENT IN AN ORGANIZED HEALTH CARE EDUCATION/TRAINING PROGRAM

## 2023-04-10 RX ORDER — LENALIDOMIDE 10 MG/1
10 CAPSULE ORAL DAILY
Qty: 28 CAPSULE | Refills: 0 | Status: SHIPPED | OUTPATIENT
Start: 2023-04-10 | End: 2023-08-02

## 2023-04-10 NOTE — TELEPHONE ENCOUNTER
Oral Chemotherapy Monitoring Program    Medication: Revlimid  Rx:  10 mg PO daily for 28 days   Auth #: [id number]  Risk Category: Adult female NOT of reproductive capacity    Routine survey questions reviewed.    Thank you,    Mary Franklin  Oncology Pharmacy Liaison LUCINDA [email or web address]  Phone: [phone]  Fax: [phone]

## 2023-04-10 NOTE — PROGRESS NOTES
[facility]                                     Progress Note    Patient Name: Winter Loya  Date: 4/10/2023         Service Type: Individual      Session Start Time: 2.30  Session End Time: 3.15     Session Length: 45 mns    Session #: 19    Attendees: Client attended alone    Service Modality:  In-person    DATA  Interactive Complexity: No  Crisis: No      Progress Since Last Session (Related to Symptoms / Goals / Homework):   Symptoms: Pt reported no change from previous session    Homework: Achieved / completed to satisfaction       Episode of Care Goals: Minimal progress - ACTION (Actively working towards change); Intervened by reinforcing change plan / affirming steps taken     Current / Ongoing Stressors and Concerns:   Ongoing stressors: Financial stress, stressful filial relationship/ Difficulty with assertiveness     Current: Pt reported they have been struggling with guilt and regrets with he way they treated their ex- at the son's .                   Treatment Objective(s) Addressed in This Session:    Discuss feelings of  guilt and shame and use thought diary daily to write down thoughts associated with aversive feelings.       Intervention:    IFS: Worked with patient to explore and invite injured parts of shame and guilt associated with stressful relationship with ex-partner and explore how these exiles have adversely affected  patient. Worked with patient to re-harmonize their internal state and restore self-leadership.           ASSESSMENT: Current Emotional / Mental Status (status of significant symptoms):   Risk status (Self / Other harm or suicidal ideation)   Patient denies current fears or concerns for personal safety.   Patient denies current or recent suicidal ideation or behaviors.   Patient denies current or recent homicidal ideation or behaviors.   Patient denies current or recent self injurious behavior or ideation.   Patient denies other safety  concerns.   Patient reports there has been no change in risk factors since their last session.     Patient reports there has been no change in protective factors since their last session.     Recommended that patient call 911 or go to the local ED should there be a change in any of these risk factors.     Appearance:   Appropriate    Eye Contact:   Good    Psychomotor Behavior: Normal    Attitude:   Cooperative  Interested Friendly   Orientation:   Person Place Time Situation   Speech    Rate / Production: Normal/ Responsive    Volume:  Normal    Mood:    Anxious  Depressed    Affect:    Worrisome    Thought Content:  Clear    Thought Form:  Coherent    Insight:    Good      Medication Review:   No changes to current psychiatric medication(s)     Medication Compliance:   Yes     Changes in Health Issues:   None reported     Chemical Use Review:   Substance Use: Chemical use reviewed, no active concerns identified      Tobacco Use: No current tobacco use.      Diagnosis:    296.32 (F33.1) Major Depressive Disorder, Recurrent Episode, Moderate _ and With anxious distress    Collateral Reports Completed:   Not Applicable    PLAN: (Patient Tasks / Therapist Tasks / Other)    Write down what you will like to tell ex- to repair the harm created at the  of  son and process with therapy next session.    Mj Cline [facility]                     ----- Service Performed and Documented by REYNALDO------  This note was reviewed and clinical supervision by AMY Ayala [facility]    2023   ______________________________________________________________________    Individual Treatment Plan    Patient's Name: Winter Loya  YOB: 1955    Date of Creation: 2022  Date Treatment Plan Last Reviewed/Revised: 2023    DSM5 Diagnoses: 296.32 (F33.1) Major Depressive Disorder, Recurrent Episode, Moderate _ and With anxious distress  Psychosocial / Contextual Factors:   PROMIS (reviewed every  "90 days):     Referral / Collaboration:  Referral to another professional/service is not indicated at this time..    Anticipated number of session for this episode of care: 15-25  Anticipation frequency of session: Every other week  Anticipated Duration of each session: 38-52 minutes  Treatment plan will be reviewed in 90 days or when goals have been changed.       MeasurableTreatment Goal(s) related to diagnosis / functional impairment(s)    Goal 1: Patient will identify and address specific triggers to feelings of depression and improve coping with depression by  90 % in the next three months.    I will know I've met my goal when I can comfortably manage the daily stressors I have and be less depressed\"    Objective #A (Patient Action)    Patient will take 15-20 minutes daily walks and spend 1 hour daily completing household chorees.  Status: Continued - Date(s):  05/13/2023    Intervention(s)  Therapist will provide psychoeducation, behavioral activation, and cognitive restructuring.)    Objective #B  Patient will identify specific areas of cognitive distortion and challenge irrational thoughts with reality   Status: Continued - Date(s):     05/13/2023       Intervention(s)  Therapist will teach patient how negative thoughts can lead to negative feelings and actions and ways to reframe thoughts in a more positive way leading to more positive feelings and helpful behaviors    Objective #C  Patient will learn and practice relaxation techniques to manage anxiety.  Status: Continued - Date(s):    05/13/2023    Intervention(s)  Therapist will teach patient thought stopping, deep breathing exercises, mindful meditation, and creative visualization.  Patient has reviewed and agreed to the above plan.      CHERISE Castellon  April 18, 2022  "

## 2023-04-11 ENCOUNTER — PATIENT OUTREACH (OUTPATIENT)
Dept: CARE COORDINATION | Facility: CLINIC | Age: 68
End: 2023-04-11
Payer: MEDICARE

## 2023-04-11 DIAGNOSIS — F32.9 MAJOR DEPRESSIVE DISORDER WITH CURRENT ACTIVE EPISODE, UNSPECIFIED DEPRESSION EPISODE SEVERITY, UNSPECIFIED WHETHER RECURRENT: ICD-10-CM

## 2023-04-11 NOTE — PROGRESS NOTES
Oncology Distress Screening Follow-up  Clinical Social Work  [facility]    Identified Concern and Score From Distress Screening:     You can also ask to be contacted by one of our Oncology Supportive Care professionals.    9. If you want to be contacted by one of our professionals, I can send a message to them right now.  Oncology Social Worker  Pt would like to discuss some concerns with medicare and other concerns.           Date of Distress Screening:  3-20-23       Data:  Pt is a 67 year old female with a diagnosis of multiple myeloma.  At time of last visit, Patient scored positive on distress screen.  called Patient today with intention of introducing them to psychosocial services and support, and following up on elevated distress.        Intervention/Education Provided:  SW attempted to contact pt via phone today and was unable to connect with pt.  SW left message for pt to call regular [facility] SW back, Oli Gwendolyn, [phone].       Follow-up Required:   will remain available as needed.    Diego Castro, Casual SW, for [facility],   [phone]    Oncology Distress Screening    Row Name 03/20/23 1430 03/20/23 1427      How concerned do you feel regarding the following common issues people with cancer face. Please answer with a number from 0-10 where 0=not concerned and 10=very concerned. PT response of >=8  will result in outreach from Support Team.   1. How concerned are you about your ability to eat?  -- 0      2. How concerned are you about unintended weight loss or your current weight?  -- 0      3. How concerned are you about feeling depressed or very sad?  -- 0      4. How concerned are you about feeling anxious or very scared?  -- 0      5. Do you struggle with the loss of meaning and ross in your life?  -- Not at all      6. How concerned are you about work and home life issues that may be affected by your cancer?  -- 0      7. How concerned are you about  knowing what resources are available to help you?  -- 0      8. Do you currently have what you would describe as Jewish or spiritual struggles?             -- Not at all      You can also ask to be contacted by one of our Oncology Supportive Care professionals.    9. If you want to be contacted by one of our professionals, I can send a message to them right now.  Oncology Social Worker  Pt would like to discuss some concerns with medicare and other concerns. --

## 2023-04-13 RX ORDER — SERTRALINE HYDROCHLORIDE 100 MG/1
100 TABLET, FILM COATED ORAL DAILY
Qty: 90 TABLET | Refills: 0 | Status: SHIPPED | OUTPATIENT
Start: 2023-04-13 | End: 2023-06-26

## 2023-04-14 ENCOUNTER — OFFICE VISIT (OUTPATIENT)
Dept: NEUROLOGY | Facility: CLINIC | Age: 68
End: 2023-04-14
Payer: MEDICARE

## 2023-04-14 ENCOUNTER — VIRTUAL VISIT (OUTPATIENT)
Dept: PHARMACY | Facility: CLINIC | Age: 68
End: 2023-04-14
Payer: MEDICAID

## 2023-04-14 VITALS
HEART RATE: 78 BPM | SYSTOLIC BLOOD PRESSURE: 99 MMHG | DIASTOLIC BLOOD PRESSURE: 68 MMHG | BODY MASS INDEX: 37.14 KG/M2 | WEIGHT: 251.5 LBS | OXYGEN SATURATION: 95 %

## 2023-04-14 DIAGNOSIS — E66.01 CLASS 2 SEVERE OBESITY DUE TO EXCESS CALORIES WITH SERIOUS COMORBIDITY AND BODY MASS INDEX (BMI) OF 37.0 TO 37.9 IN ADULT (H): ICD-10-CM

## 2023-04-14 DIAGNOSIS — R25.1 TREMOR: Primary | ICD-10-CM

## 2023-04-14 DIAGNOSIS — F41.1 GAD (GENERALIZED ANXIETY DISORDER): ICD-10-CM

## 2023-04-14 DIAGNOSIS — E66.812 CLASS 2 SEVERE OBESITY DUE TO EXCESS CALORIES WITH SERIOUS COMORBIDITY AND BODY MASS INDEX (BMI) OF 37.0 TO 37.9 IN ADULT (H): ICD-10-CM

## 2023-04-14 DIAGNOSIS — E11.42 TYPE 2 DIABETES MELLITUS WITH DIABETIC POLYNEUROPATHY, WITH LONG-TERM CURRENT USE OF INSULIN (H): Primary | ICD-10-CM

## 2023-04-14 DIAGNOSIS — E03.9 HYPOTHYROIDISM, UNSPECIFIED TYPE: ICD-10-CM

## 2023-04-14 DIAGNOSIS — S92.501A CLOSED FRACTURE OF PHALANX OF RIGHT FIFTH TOE, INITIAL ENCOUNTER: ICD-10-CM

## 2023-04-14 DIAGNOSIS — F33.1 MAJOR DEPRESSIVE DISORDER, RECURRENT EPISODE, MODERATE (H): ICD-10-CM

## 2023-04-14 DIAGNOSIS — Z79.4 TYPE 2 DIABETES MELLITUS WITH DIABETIC POLYNEUROPATHY, WITH LONG-TERM CURRENT USE OF INSULIN (H): Primary | ICD-10-CM

## 2023-04-14 DIAGNOSIS — C90.01 MULTIPLE MYELOMA IN REMISSION (H): ICD-10-CM

## 2023-04-14 PROCEDURE — 99207 PR NO CHARGE LOS: CPT | Mod: 93 | Performed by: PHARMACIST

## 2023-04-14 PROCEDURE — 99213 OFFICE O/P EST LOW 20 MIN: CPT | Performed by: PSYCHIATRY & NEUROLOGY

## 2023-04-14 RX ORDER — PROPRANOLOL HCL 60 MG
60 CAPSULE, EXTENDED RELEASE 24HR ORAL DAILY
Qty: 90 CAPSULE | Refills: 3 | Status: SHIPPED | OUTPATIENT
Start: 2023-04-14 | End: 2024-04-16

## 2023-04-14 ASSESSMENT — PAIN SCALES - GENERAL: PAINLEVEL: MILD PAIN (2)

## 2023-04-14 NOTE — PROGRESS NOTES
Medication Therapy Management (MTM) Encounter    ASSESSMENT:                            Medication Adherence/Access: No issues identified    Type 2 Diabetes/Weight loss: Patient is meeting not A1c goal of < 7%, fasting blood sugar is not at goal of  mg/dL.  Anticipate improved readings the longer she is on Ozempic 2 mg dose.  May benefit from freestyle анна 2, if covered.    Diarrhea: stable, will monitor.    Hypothyroidism:  Stable. TSH is just slightly above goal, but appropriate to maintain current dose.     Multiple myeloma: Plan in place.     Mood: Stable.     PLAN:                            1. Sent prescriptions for Freestyle Анна 2 to [facility], phone: [phone].     2. Continue Ozempic 2 mg weekly.    Follow-up: Return in about 3 weeks (around 5/5/2023) for Medication Therapy Management.    SUBJECTIVE/OBJECTIVE:                          Winter Loya is a 67 year old female called for a follow-up visit.  Today's visit is a follow-up MTM visit from 3/17/23.     Reason for visit: diabetes follow-up.    Allergies/ADRs: Reviewed in chart  Past Medical History: Reviewed in chart  Tobacco: She reports that she quit smoking about 18 years ago. Her smoking use included cigarettes. She smoked an average of 1 pack per day. She has never used smokeless tobacco.  Alcohol: Less than 1 beverage / month  Caffeine: 1 diet coke/day and 2 cups coffee/day    Medication Adherence/Access:    - sometimes forgetting evening medications  The patient fills medications at [facility]: NO, fills medications at [facility] and [facility] for Revlimid.    Type 2 Diabetes/Weight loss:    Lantus 22 units at bedtime   Ozempic 2 mg weekly (Tuesdays x 1 dose)  Jardiance 25 mg daily     Stomach is a little more upset since Tuesday, compared to 1 mg dose, but not too different.    Has had some issues with ice cream at night, but working on that. Cravings have been much improved as of late. Hasn't looked into weight loss classes  yet, but plans to and has the info.   Patient is experiencing the following side effects: diarrhea - nobody knows why she has this. She has done a trial off of metformin with Delmi Gross without improvement in diarrhea, has since stopped this. Diarrhea is not worse on Ozempic 2 mg so far.  Medication History:  Has been on Trulicity in the past, tolerated well. Metformin as above.   Blood sugar monitoring:   Fastin, 198 (fell asleep without taking Lantus previous night), 179, 128, 143, 154, 142  2 hours post-prandial: 168, 154, 143, 125 sometimes she doesn't check because she eats something else and doesn't want to know.  Prefers to keep her Lantus at bedtime because she is used to that.  Symptoms of low blood sugar? none  Symptoms of high blood sugar? none  Eye exam: up to date  Foot exam: up to date  Diet/Exercise: Trying to quit the overnight snacking.  When she doesn't snack overnight, her blood sugar is much better the following morning.   Aspirin: Taking 81mg daily for primary prevention   Statin: Yes: atorvastatin   ACEi/ARB: Yes: losartan.   Urine Albumin:   Lab Results   Component Value Date    UMALCR 187.04 (H) 2023     Lab Results   Component Value Date    A1C 8.2 2023    A1C 7.6 2022    A1C 6.8 2022    A1C 9.0 2021    A1C 7.8 2021    A1C 7.6 2021    A1C 8.8 2020    A1C 9.1 2020    A1C 6.7 2020     Diarrhea:   Loperamide 4 mg twice daily     This has been the only thing that has really helped - other than the protein bars, as above. This is well controlled right now and really good. But if she forgets one of these she can usually tell right away.     Hypothyroidism:  Levothyroxine 200 mcg once daily    Patient is having the following symptoms: none  TSH   Date Value Ref Range Status   2023 4.41 (H) 0.40 - 4.00 mU/L Final   2021 2.08 0.40 - 4.00 mU/L Final     Multiple myeloma:    Revlimid 10 mg daily   Acyclovir 400 mg twice  daily (Shingles prophylaxis), also helps prevent her cold sores    She had a stem cell transplant in either 2018 or 2019, has been in remission since. She'd prefer to be off the Revlimid, however oncology prefers she remain on this. Tolerates it well, although likely may also be a source of her chronic diarrhea.    Mood:    Sertraline 100 mg daily     She's been doing better mood-wise. Recently retired and visited her brother in [place].   Denies side effects.   BP Readings from Last 3 Encounters:   03/29/23 108/68   03/20/23 113/72   02/02/23 106/65      Today's Vitals: There were no vitals taken for this visit.  ----------------      I spent 22 minutes with this patient today. All changes were made via collaborative practice agreement with Montse Bucio PA-C. A copy of the visit note was provided to the patient's provider(s).    A summary of these recommendations was sent via [employer].    Vonnie Corrigan, PharmD  Medication Therapy Management Pharmacist  [phone]    Telemedicine Visit Details  Type of service:  Telephone visit  Start Time: 8:36 AM  End Time: 8:58 AM     Medication Therapy Recommendations  No medication therapy recommendations to display

## 2023-04-14 NOTE — PATIENT INSTRUCTIONS
"Recommendations from today's MTM visit:                                                         1. Sent prescriptions for Freestyle Анна 2 to [facility], phone: [phone].     2. Continue Ozempic 2 mg weekly.    Follow-up: Return in about 3 weeks (around 5/5/2023) for Medication Therapy Management.    It was great speaking with you today.  I value your experience and would be very thankful for your time in providing feedback in our clinic survey. In the next few days, you may receive an email or text message from [employer] with a link to a survey related to your  clinical pharmacist.\"     To schedule another MTM appointment, please call the clinic directly or you may call the MTM scheduling line at [phone] or toll-free at [phone].     My Clinical Pharmacist's contact information:                                                      Please feel free to contact me with any questions or concerns you have.      Vonnie Corrigan, PharmD  Medication Therapy Management Pharmacist  [phone]   "

## 2023-04-14 NOTE — Clinical Note
CE DAWSON note, thanks!  Vonnie Corrigan, PharmD Medication Therapy Management Pharmacist [phone]

## 2023-04-14 NOTE — PATIENT INSTRUCTIONS
Medications                 Acetaminophen tylenol 325mg prn           Acyclovir zovirax 400mg 1   1       Aspirin 81mg 1           Atorvastatin lipitor 20mg      1       Calcium antacid 500mg tums off           Empagliflozin jardiance 10mg 1           Ibuprofen advil/motrin 200mg prn       Insulin glargine lantus     22 units       Latanoprost xalatan 0.005% ophthalmic  off       Lenalidomide revlimid 10mg  1           Levothyroxine synthroid/levothyroid 200 mcg 1           Loperamide imodium 2mg prn       Losartan cozaar 25mg  1           Metformin glucophage 500mg  off       Pregabalin lyrica 100mg   2/d chemo NP 1  1       Propranolol ER inderal LA 60mg  off           SEmaglutide ozempic  weekly       Sertraline zoloft 100mg 1           Tolterodine ER Detrol LA 4mg 24hr 1                                                                Has ongoing blood sugar issues and will work on getting better control. a1c was 8.2  - date 2/2/2023     Her tremors are well managed and she denies problems with her blood pressure/heart rate being too low or having side effects. Renewed her propranolol.      She has an iphone        Plan:    Wondering about the revilimid for multiple myeloma     Diabetes  ozempic  lantus  jardiance  Will need another A1c to see if better after starting the ozempic as had an A1c of 8.2    Diarrhea   Using imodium  Off metformin  Had tests for this   No clear explanation  Encouraged her to go dairy free    Foot pain - dropped something on her foot   Podiatry referral for her right foot    Refilled the propranolol ER inderal LA 60 for her tremor    Return in one year    Last visit 9/2021    Father had parkinson   Mother on her mother side had parkinson   She has features of essential tremor today and does not have parkinsonian features.

## 2023-04-14 NOTE — LETTER
"2023       RE: Winter Loya  [address]  [place] [address]     Dear Colleague,    Thank you for referring your patient, Winter Loya, to the [facility] [place] at [facility]. Please see a copy of my visit note below.        Diagnosis/Summary/Recommendations:    PATIENT: Winter Loya  67 year old female     : 1955    TOMASA: 2023    MRN: [id number]    [address]   [place] [address]  [email or web address]  [phone] (M)   [phone] (H)   Peter Deleon son  [phone]     iphone today     Assessment:  (R25.1) Tremor  (primary encounter diagnosis)  worsening and affecting her ability to use a keyboard and mouse  Duration - 5 years or more.  Father had tremor/parkinson  Not a big drinker - not clear if alcohol helps her tremor  Smell perception is okay  She has sleep apnea  She cut out caffeine and there were no changes in her tremors  She is right handed and has more right than left handed tremor.   She was told she has ET as she is \"not stooped over\"  Her tremors are present when she is using her hands and she has a vocal and head tremor as well.      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 She has not worked with OT    Exercise/Therapy performed/offered       Cognitive/Driving    has had some memory/cognitive issues " - seen on 10/22/2020 by Dr. Mcgrath for neuropsychological evaluation: there were mild frontal and possibly left temporal changes and recommended to be evaluated again in one year. She has not had a brain mri. She has mood issues that could benefit from psychotherapy as anxiety may be aggravating her memory.      cognitive re-evaluation with Dr Mcgrath 10/2021  Order placed    Mood   Mood is good per her report.  Living with son basil - health  .   No counselor      Stress/anxiety issues  - still on sertraline and consider counsellor  Referral placed.     Sertraline zoloft 100mg    Hallucinations/delusions       Sleep   Dr. Juan Jose Salazar  Denies RBD features  [facility] sleep    Bladder/Renal/Prostate/Gyn/Other  Has changes in urinary frequency/urgency if blood sugars are out of control  2-4/noc getting up to urinate  Has not seen a urologist.   Seen by Ob-gyn a year ago    GI/Constipation/GERD   Calcium antacid 500mg tums - not taking    ENDO/Lipid/DM/Bone density/Thyroid  recent A1c was 8.8 and is working with pharmacist Coco Antoine. She may benefit from seeing nutrition and possibly endocrinology in addition to her pcp     Type 2 Diabetes with neuropathy     Atorvastatin lipitor 20mg at bedtime            Calcitriol rocaltrol 0.25mcg - not taking   Dulaglutide trulicity 0.75mg/0.5ml pen weekly     Empagliflozin jardiance 10mg daily  Exenatide ER Bydureon - not using  Lantus 30 units at bedtime  Levothyroxine synthroid/levothyroid 175 mcg daily  Metformin 500mg 1 tabs  twice daily     Has not seen dietician            Seeing Coco Antoine, Pharm D  DOes not have an endocrinologist  Has not seen nutrition  A1c was 8.8 on 12/7/2020     Diabetes - better control of her diabetes - A1c was 7.6 and most recently was 7.8  Had been working with  Coco Antoine, Pharmacist  Has someone new and has an appointment with them this week  Mika Gross October      yane  [facility] oncologist    [facility]      Hypothyroidism;      s/p thyroidectomy for multinodular goiter    Cardio/heart/Hyper or Hypotensive   blood pressure is stable initially after stem cell therapy but has gone up at times  States she is trying to lose weight to help her diabetes and blood pressure.   Her chart has been 160/88; encouraged her to get a home blood pressure cuff.      Losartan cozaar 25mg daily     Blood pressure was good today - she is on propranolol LA 60mg  Discussed getting a blood pressure cuff to monitor her blood pressure and heart rate.     Vision/Dry Eyes/Cataracts/Glaucoma/Macular    on treatment for glaucoma  Latanoprost xalatan 0.005% ophthalmic solution    Heme/Anticoagulation/Antiplatelet/Anemia/Other  Multiple myeloma - in remision  IgA Kappa Multiple Myeloma. Presented 2018 with anemia and fatigue. Skeletal survey was unremarkable and UPEP had minimal protein (156 mg/m2). PET 11/2018 showed bone marrow consistent with hypermetabolic plasma cell myeloma. M spike was 0.17. She started RVD and Zometa. 4/2019 she had an autologous transplant. She was on revlimid maintenance and zometa from her prior oncologist in [place]. Zometa through 12/2020 to complete a total of 2 years of therapy  Mele Janakiram     Aspirin 325mg    ENT/Resp  Loratadine claritin 10mg - not taking     Former smoker - quit 15 years prior was 1 ppd  Quit 2005    Skin/Cancer/Seborrhea/other      Musculoskeletal/Pain/Headache  Acetaminophen tylenol 325mg - as needed  Acyclovir zovirax 400mg twice daily  Meloxicam mobic 15mg  - not taking  Oxycodone roxicodone - not taking  Pregabalin lyrica 100mg 3/day for neuropathy and helping her mood as well.   Tizanidine zanaflex 4mg - not taking   Tramadol ultram 50mg - not taking     Consideration for EMG and consultation with general neurology for neuropathy, carpal tunnel syndrome and cervical radiculopathy.      EMG:  This is an abnormal study, demonstrating  electrophysiologic evidence of a length-dependent axonal sensorimotor polyneuropathy. Asymmetry of peroneal compound muscle action potential amplitudes is a finding of uncertain significance.      IMPRESSION:    1.  Sensory predominant polyneuropathy.  2.  Electrodiagnostic evidence of axonal neuropathy.  3.  IgA kappa myeloma, which appears in remission.  4.  Type 2 diabetes.  5.  Essential tremor     Her neuropathy may well have been precipitated by chemotherapy she received prior to and around the time of her bone marrow transplant.  I particularly note that Velcade (bortezomib) has a high incidence of painful sensory neuropathy.  She is no longer receiving this agent.  I doubt the Revlimid is a factor.  She also has type 2 diabetes.  This is likely contributing to her neuropathy as well.     PLAN:  I did discuss the above with the patient.  She tells me that there has been some improvement.  Otherwise, she is relatively stable at this point.     The plan now is just to monitor her course and she is open to this.  I have recommended a neurologic followup in 6 months.  She will be seeing a new neurologist at that followup visit as I am retiring soon.  I did discuss this with her as well.     IN summary  - essential tremor  - neuropathy  - other medical issues  -diabetes      Acetaminophen tylenol 325mg as needed     Impression: Multilevel cervical spondylosis, most pronounced at the  level of C4-5 and C5-6 with moderate to severe spinal canal stenosis  and moderate spinal canal stenosis at C6-7. No abnormal cord signal.  No significant neural foraminal narrowing at any level.     I have personally reviewed the examination and initial interpretation  and I agree with the findings.     ANNE GOODMAN MD    Other:  Lenalidomide revlimid 10mg daiy  Nonformulary revlimid - as above   NOnformulary zometa - not getting    Medications                 Acetaminophen tylenol 325mg prn           Acyclovir zovirax 400mg 1   1        Aspirin 81mg 1           Atorvastatin lipitor 20mg      1       Calcium antacid 500mg tums off           Empagliflozin jardiance 10mg 1           Ibuprofen advil/motrin 200mg prn       Insulin glargine lantus     22 units       Latanoprost xalatan 0.005% ophthalmic  off       Lenalidomide revlimid 10mg  1           Levothyroxine synthroid/levothyroid 200 mcg 1           Loperamide imodium 2mg prn       Losartan cozaar 25mg  1           Metformin glucophage 500mg  off       Pregabalin lyrica 100mg   2/d chemo NP 1  1       Propranolol ER inderal LA 60mg  off           SEmaglutide ozempic  weekly       Sertraline zoloft 100mg 1           Tolterodine ER Detrol LA 4mg 24hr 1                                                                Has ongoing blood sugar issues and will work on getting better control. a1c was 8.2  - date 2/2/2023     Her tremors are well managed and she denies problems with her blood pressure/heart rate being too low or having side effects. Renewed her propranolol.      She has an [employer]        Plan:    Wondering about the revilimid for multiple myeloma     Diabetes  ozempic  lantus  jardiance  Will need another A1c to see if better after starting the ozempic as had an A1c of 8.2    Diarrhea   Using imodium  Off metformin  Had tests for this   No clear explanation  Encouraged her to go dairy free    Foot pain - dropped something on her foot   Podiatry referral for her right foot    Refilled the propranolol ER inderal LA 60 for her tremor    Return in one year    Last visit 9/2021    Father had parkinson   Mother on her mother side had parkinson   She has features of essential tremor today and does not have parkinsonian features.     Coding statement:   Medical Decision Making:  #  Chronic progressive medical conditions addressed  - see above --   Review and/or interpretation of unique test or documentation from a provider outside of neurology no   Independent historian provided additional  details  no  Prescription drug management and review of potential side effects and/or monitoring for side effects  -- see above ---  Health impacted by social determinants of health  no    I have reviewed the note as documented above.  This accurately captures the substance of my conversation with the patient and total time spent preparing for visit, executing visit and completing visit on the day of the visit:  20 minutes.  The portion of this total time included face to face time 15, omites     Gabriel Santoyo MD     ______________________________________    Last visit date and details:             ______________________________________      Patient was asked about 14 Review of systems including changes in vision (dry eyes, double vision), hearing, heart, lungs, musculoskeletal, depression, anxiety, snoring, RBD, insomnia, urinary frequency, urinary urgency, constipation, swallowing problems, hematological, ID, allergies, skin problems: seborrhea, endocrinological: thyroid, diabetes, cholesterol; balance, weight changes, and other neurological problems and these were not significant at this time except for   Patient Active Problem List   Diagnosis    Multiple myeloma in remission (H)    Type 2 diabetes mellitus with diabetic polyneuropathy, with long-term current use of insulin (H)    Thyroid goiter    Allergic rhinitis    NA (generalized anxiety disorder)    History of endometrial ablation    Hyperlipidemia    Osteoarthrosis involving lower leg    DAILY (obstructive sleep apnea)- moderate (AHI 17)    Hypothyroidism, postoperative     Class 2 severe obesity due to excess calories with serious comorbidity and body mass index (BMI) of 37.0 to 37.9 in adult (H)    Tremor    History of peripheral stem cell transplant (H)    History of MRI of cervical spine 6/2020    H/O magnetic resonance imaging of lumbar spine 2020    Major depressive disorder, recurrent episode, severe (H)    Abnormal EMG 2021    Sensory polyneuropathy     Axonal neuropathy    Drug-induced polyneuropathy (H)    Pulmonary hypertension (H)    Thrombocytopenia, unspecified (H)    Exocrine pancreatic insufficiency    Chronic kidney disease, stage 1    Diarrhea, unspecified type    Encounter for long-term (current) use of medications    Bilateral plantar fasciitis    Combined forms of age-related cataract, mild, of both eyes    History of laser photocoagulation of retina, os (od?)    Glaucoma suspect of both eyes    Weakness        No Known Allergies  Past Surgical History:   Procedure Laterality Date    ARTHROSCOPY KNEE Left 02/2014    ARTHROSCOPY KNEE Right 12/2010    KNEE ARTHROSCOPY Dec 2010  Right    CHOLECYSTECTOMY  2002    COLONOSCOPY N/A 07/23/2020    Procedure: COLONOSCOPY;  Surgeon: Za Denis MD;  Location: [facility] OR    COLONOSCOPY N/A 2/25/2022    Procedure: COLONOSCOPY with biopsies;  Surgeon: Jose Bangura MD;  Location: [facility] OR    ENDOSCOPIC ULTRASOUND UPPER GASTROINTESTINAL TRACT (GI) N/A 2/25/2022    Procedure: ENDOSCOPIC ULTRASOUND, ESOPHAGOSCOPY with biopsies / UPPER GASTROINTESTINAL TRACT (GI);  Surgeon: Jose Bangura MD;  Location: [facility] OR    EYE SURGERY  1985    lasersx-spot behind eye started leaking    THYROIDECTOMY N/A 08/26/2020    Procedure: THYROIDECTOMY, TOTAL;  Surgeon: Tiff Burgos MD;  Location: [facility] OR    TONSILLECTOMY  2003    TUBAL LIGATION  1986     Past Medical History:   Diagnosis Date    Abnormal EMG 2021 5/25/2021    Interpretation: This is an abnormal study, demonstrating electrophysiologic evidence of a length-dependent axonal sensorimotor polyneuropathy. Asymmetry of peroneal compound muscle action potential amplitudes is a finding of uncertain significance.       Adjustment disorder with mixed anxiety and depressed mood 3/16/2013    Anxiety     Axonal neuropathy 9/27/2021    Depression     Diabetes (H)     Glaucoma (increased eye pressure)     H/O magnetic resonance imaging of lumbar spine 2020 3/15/2021     IMPRESSION: Multilevel mild lumbar spondylosis, most pronounced at the level of L4-5 and L5-S1 as detailed above.   I have personally reviewed the examination and initial interpretation and I agree with the findings.   ANNE GOODMAN MD    History of MRI of cervical spine 2020 3/15/2021    Impression: Multilevel cervical spondylosis, most pronounced at the level of C4-5 and C5-6 with moderate to severe spinal canal stenosis and moderate spinal canal stenosis at C6-7. No abnormal cord signal. No significant neural foraminal narrowing at any level.   I have personally reviewed the examination and initial interpretation and I agree with the findings.   ANNE GOODMAN MD    History of peripheral stem cell transplant (H) 2020    History of smoking     Hyperlipidemia     Multiple myeloma in remission (H)     Nonsenile cataract     Obesity     Sensory polyneuropathy 2021    Sleep apnea     no c-pap use    Status post complete thyroidectomy 2020    Thyroid goiter 2020    Tremor 2020     Social History     Socioeconomic History    Marital status:      Spouse name: Not on file    Number of children: 3    Years of education: Not on file    Highest education level: Not on file   Occupational History    Occupation: alcohol and drug counselor   Tobacco Use    Smoking status: Former     Packs/day: 1.00     Types: Cigarettes     Quit date:      Years since quittin.2    Smokeless tobacco: Never   Vaping Use    Vaping Use: Never used   Substance and Sexual Activity    Alcohol use: Yes     Comment: occasional    Drug use: Never    Sexual activity: Not Currently     Partners: Male   Other Topics Concern    Not on file   Social History Narrative    Not on file     Social Determinants of Health     Financial Resource Strain: Not on file   Food Insecurity: Not on file   Transportation Needs: Not on file   Physical Activity: Not on file   Stress: Not on file   Social Connections: Not on file    Intimate Partner Violence: Unknown    Fear of Current or Ex-Partner: No    Emotionally Abused: No    Physically Abused: Not on file    Sexually Abused: Not on file   Housing Stability: Not on file       Drug and lactation database from the [place] National Library of Medicine:  http://toxnet.nlm.nih.gov/cgi-bin/sis/htmlgen?LACT      B/P: Data Unavailable, T: Data Unavailable, P: Data Unavailable, R: Data Unavailable 0 lbs 0 oz  not currently breastfeeding., There is no height or weight on file to calculate BMI.  Medications and Vitals not listed above were documented in the cart and reviewed by me.     Current Outpatient Medications   Medication Sig Dispense Refill    LENalidomide (REVLIMID) 10 MG CAPS capsule Take 10 mg by mouth every evening      acyclovir (ZOVIRAX) 400 MG tablet TAKE ONE TABLET BY MOUTH EVERY TWELVE HOURS 60 tablet 11    alcohol swab prep pads Use to swab area of injection/sowmya as directed. 100 each 3    aspirin 81 MG EC tablet Take 81 mg by mouth daily      atorvastatin (LIPITOR) 20 MG tablet Take 1 tablet (20 mg) by mouth At Bedtime. 90 tablet 0    blood glucose (NO BRAND SPECIFIED) test strip Use to test blood sugar 3 times daily or as directed. To accompany: Blood Glucose Monitor Brands: per insurance. 100 strip 6    blood glucose calibration (NO BRAND SPECIFIED) solution To accompany: Blood Glucose Monitor Brands: per insurance. 4 each 0    blood glucose monitoring (NO BRAND SPECIFIED) meter device kit Use to test blood sugar 3 times daily or as directed. Preferred blood glucose meter OR supplies to accompany: Blood Glucose Monitor Brands: per insurance. 1 kit 0    empagliflozin (JARDIANCE) 25 MG TABS tablet Take 1 tablet (25 mg) by mouth daily 90 tablet 3    insulin glargine (LANTUS SOLOSTAR) 100 UNIT/ML pen Inject 22 Units Subcutaneous At Bedtime 30 mL 1    insulin pen needle (FIFTY50 PEN NEEDLES) 32G X 4 MM miscellaneous Use one pen needle daily or as directed. 100 each 2     LENalidomide (REVLIMID) 10 MG CAPS capsule Take 1 capsule (10 mg) by mouth daily for 28 days 28 capsule 0    LENalidomide (REVLIMID) 10 MG CAPS capsule Take 1 capsule (10 mg) by mouth daily for 28 days 28 capsule 0    levothyroxine (SYNTHROID/LEVOTHROID) 200 MCG tablet Take 1 tablet (200 mcg) by mouth every morning (before breakfast) 90 tablet 0    loperamide (IMODIUM) 2 MG capsule Take 4 mg (two capsules) by mouth at onset of loose stools, followed by 2 mg (one capsule) after each loose stool. Maximum: 16 mg (8 capsules) daily 270 capsule 11    losartan (COZAAR) 25 MG tablet Take 1 tablet (25 mg) by mouth daily 90 tablet 0    pregabalin (LYRICA) 100 MG capsule TAKE ONE CAPSULE BY MOUTH THREE TIMES DAILY (Patient taking differently: Take 100 mg by mouth 2 times daily) 270 capsule 3    propranolol ER (INDERAL LA) 60 MG 24 hr capsule Take 1 capsule (60 mg) by mouth daily (Patient not taking: Reported on 3/20/2023) 30 capsule 3    Semaglutide, 1 MG/DOSE, (OZEMPIC) 4 MG/3ML pen Inject 1 mg Subcutaneous every 7 days 9 mL 3    sertraline (ZOLOFT) 100 MG tablet Take 1 tablet (100 mg) by mouth daily 90 tablet 0    thin (NO BRAND SPECIFIED) lancets Use with lanceting device. To accompany: Blood Glucose Monitor Brands: per insurance. 200 each 6         Gabriel Santoyo MD

## 2023-04-15 ENCOUNTER — HEALTH MAINTENANCE LETTER (OUTPATIENT)
Age: 68
End: 2023-04-15

## 2023-04-19 ENCOUNTER — TELEPHONE (OUTPATIENT)
Dept: ENDOCRINOLOGY | Facility: CLINIC | Age: 68
End: 2023-04-19
Payer: MEDICARE

## 2023-04-19 DIAGNOSIS — Z79.4 TYPE 2 DIABETES MELLITUS WITH DIABETIC POLYNEUROPATHY, WITH LONG-TERM CURRENT USE OF INSULIN (H): ICD-10-CM

## 2023-04-19 DIAGNOSIS — E11.42 TYPE 2 DIABETES MELLITUS WITH DIABETIC POLYNEUROPATHY, WITH LONG-TERM CURRENT USE OF INSULIN (H): ICD-10-CM

## 2023-04-19 NOTE — TELEPHONE ENCOUNTER
PRESCRIPTIONS MUST BE WRITTEN THIS WAY TO MAKE THEM MEDICARE COMPLIANT    FREESTYLE SEGUNDO 2 READER  SIG: Use to read blood sugars as  s instructions.  QTY: 1   REFILLS: 0    FREESTYLE SEGUNDO 2 SENSORS  SIG: Change every 14 days.  QTY: 2  REFILLS: 5    -Prescriptions must be written after the clinical note date and will only be able to be used for 6 months from the date of the clinical notes. All prescriptions must be from same provider who patient had visit with. (We will be requesting new clinical notes and prescriptions every 6 months to meet Medicare Guidelines.)    Please contact us at [phone] (this number is for clinics only) with any questions. Please only give [phone] to patients.    [facility]   [address] [place] [address]  Phone # [phone]  Fax # [phone]

## 2023-04-20 ENCOUNTER — TELEPHONE (OUTPATIENT)
Dept: UROLOGY | Facility: CLINIC | Age: 68
End: 2023-04-20

## 2023-04-20 NOTE — TELEPHONE ENCOUNTER
[facility], AprilDENIA     AS    4/20/23 10:13 AM  Note  Needs prior auth please advise.  April St Jany CMA 4/20/2023 10:12 AM                AS    4/20/23 10:11 AM   [facility] AprilDENIA pended an order   March 31, 2023  María Lara RN  to Winter PATEL      3/31/23  3:29 PM  Good afternoon Winter,  Tolterodine is not covered by your insurance plan. Please call your insurance carrier and ask for the formulary alternatives to this medication. Once we receive this information from you, we can prescribe something different.     Thank you    Last read by Winter Loya at 11:55 AM on 4/5/2023.

## 2023-04-21 ENCOUNTER — TELEPHONE (OUTPATIENT)
Dept: ENDOCRINOLOGY | Facility: CLINIC | Age: 68
End: 2023-04-21

## 2023-04-21 DIAGNOSIS — Z79.4 TYPE 2 DIABETES MELLITUS WITH DIABETIC POLYNEUROPATHY, WITH LONG-TERM CURRENT USE OF INSULIN (H): ICD-10-CM

## 2023-04-21 DIAGNOSIS — E11.42 TYPE 2 DIABETES MELLITUS WITH DIABETIC POLYNEUROPATHY, WITH LONG-TERM CURRENT USE OF INSULIN (H): ICD-10-CM

## 2023-04-21 NOTE — TELEPHONE ENCOUNTER
We are requesting the  Freestyle sonny 2 reader jeremias can we please get this sent over please.

## 2023-04-24 ENCOUNTER — OFFICE VISIT (OUTPATIENT)
Dept: PSYCHOLOGY | Facility: CLINIC | Age: 68
End: 2023-04-24
Payer: MEDICARE

## 2023-04-24 DIAGNOSIS — F41.1 GAD (GENERALIZED ANXIETY DISORDER): ICD-10-CM

## 2023-04-24 DIAGNOSIS — F33.1 MDD (MAJOR DEPRESSIVE DISORDER), RECURRENT EPISODE, MODERATE (H): Primary | ICD-10-CM

## 2023-04-24 PROCEDURE — 90834 PSYTX W PT 45 MINUTES: CPT | Performed by: STUDENT IN AN ORGANIZED HEALTH CARE EDUCATION/TRAINING PROGRAM

## 2023-04-24 NOTE — PROGRESS NOTES
[facility]                                     Progress Note    Patient Name: Winter Loya  Date: 2023         Service Type: Individual      Session Start Time: 2.30  Session End Time: 3.15     Session Length: 45 mns    Session #: 20    Attendees: Client attended alone    Service Modality:  In-person    DATA  Interactive Complexity: No  Crisis: No      Progress Since Last Session (Related to Symptoms / Goals / Homework):   Symptoms: Pt reported no change from previous session    Homework: Achieved / completed to satisfaction       Episode of Care Goals: Minimal progress - ACTION (Actively working towards change); Intervened by reinforcing change plan / affirming steps taken     Current / Ongoing Stressors and Concerns:   Ongoing stressors: Financial stress, stressful filial relationship/ Difficulty with assertiveness     Current: Pt reported they are still  struggling with guilt and regrets with way they treated their ex- at the son's  and tried writing their their feelings but been having difficulty doing  so.                   Treatment Objective(s) Addressed in This Session:    Discuss feelings of  guilt and shame and use thought diary daily to write down thoughts associated with aversive feelings.       Intervention:    IFS: Continue working  with patient to explore and invite injured parts of shame and guilt associated with stressful relationship with ex-partner and explore how these exiles have adversely  affected patient. Worked with patient to explore ways to re-harmonize their internal state and restore self-leadership.       ASSESSMENT: Current Emotional / Mental Status (status of significant symptoms):   Risk status (Self / Other harm or suicidal ideation)   Patient denies current fears or concerns for personal safety.   Patient denies current or recent suicidal ideation or behaviors.   Patient denies current or recent homicidal ideation or behaviors.   Patient  denies current or recent self injurious behavior or ideation.   Patient denies other safety concerns.   Patient reports there has been no change in risk factors since their last session.     Patient reports there has been no change in protective factors since their last session.     Recommended that patient call 911 or go to the local ED should there be a change in any of these risk factors.     Appearance:   Appropriate    Eye Contact:   Good    Psychomotor Behavior: Normal    Attitude:   Cooperative  Interested Friendly   Orientation:   Person Place Time Situation   Speech    Rate / Production: Normal/ Responsive    Volume:  Normal    Mood:    Anxious  Depressed    Affect:    Worrisome    Thought Content:  Clear    Thought Form:  Coherent    Insight:    Good      Medication Review:   No changes to current psychiatric medication(s)     Medication Compliance:   Yes     Changes in Health Issues:   None reported     Chemical Use Review:   Substance Use: Chemical use reviewed, no active concerns identified      Tobacco Use: No current tobacco use.      Diagnosis:    296.32 (F33.1) Major Depressive Disorder, Recurrent Episode, Moderate _ and With anxious distress    Collateral Reports Completed:   Not Applicable    PLAN: (Patient Tasks / Therapist Tasks / Other)    Write down what you will like to tell ex- to repair the harm created at the  of  son and process with therapy next session.    Mj Cline [facility]                   ----- Service Performed and Documented by [facility]------  This note was reviewed and clinical supervision by AMY Ayala [facility]    2023   ______________________________________________________________________    Individual Treatment Plan    Patient's Name: Winter Loya  YOB: 1955    Date of Creation: 2022  Date Treatment Plan Last Reviewed/Revised: 2023    DSM5 Diagnoses: 296.32 (F33.1) Major Depressive Disorder, Recurrent Episode, Moderate  "_ and With anxious distress  Psychosocial / Contextual Factors:   PROMIS (reviewed every 90 days):     Referral / Collaboration:  Referral to another professional/service is not indicated at this time..    Anticipated number of session for this episode of care: 15-25  Anticipation frequency of session: Every other week  Anticipated Duration of each session: 38-52 minutes  Treatment plan will be reviewed in 90 days or when goals have been changed.       MeasurableTreatment Goal(s) related to diagnosis / functional impairment(s)    Goal 1: Patient will identify and address specific triggers to feelings of depression and improve coping with depression by  90 % in the next three months.    I will know I've met my goal when I can comfortably manage the daily stressors I have and be less depressed\"    Objective #A (Patient Action)    Patient will take 15-20 minutes daily walks and spend 1 hour daily completing household chorees.  Status: Continued - Date(s):  05/13/2023    Intervention(s)  Therapist will provide psychoeducation, behavioral activation, and cognitive restructuring.)    Objective #B  Patient will identify specific areas of cognitive distortion and challenge irrational thoughts with reality   Status: Continued - Date(s):     05/13/2023       Intervention(s)  Therapist will teach patient how negative thoughts can lead to negative feelings and actions and ways to reframe thoughts in a more positive way leading to more positive feelings and helpful behaviors    Objective #C  Patient will learn and practice relaxation techniques to manage anxiety.  Status: Continued - Date(s):    05/13/2023    Intervention(s)  Therapist will teach patient thought stopping, deep breathing exercises, mindful meditation, and creative visualization.  Patient has reviewed and agreed to the above plan.      CHERISE Castellon  April 18, 2022  "

## 2023-04-24 NOTE — TELEPHONE ENCOUNTER
Central Prior Authorization Team   Phone: [phone]      PA Initiation    Medication: tolterodine ER (DETROL LA) 4 MG 24 hr capsule  Insurance Company: [employer] - Phone [phone] Fax [phone]  Pharmacy Filling the Rx: [id number] - [address]  Filling Pharmacy Phone: [phone]  Filling Pharmacy Fax:    Start Date: 4/24/2023

## 2023-04-25 NOTE — TELEPHONE ENCOUNTER
PRIOR AUTHORIZATION DENIED    Medication: tolterodine ER (DETROL LA) 4 MG 24 hr capsule    Denial Date: 4/25/2023    Denial Rational:           Appeal Information:

## 2023-04-27 ENCOUNTER — TELEPHONE (OUTPATIENT)
Dept: NEUROLOGY | Facility: CLINIC | Age: 68
End: 2023-04-27
Payer: MEDICARE

## 2023-04-27 NOTE — TELEPHONE ENCOUNTER
Patient Contacted to schedule the following:    Appointment type: Return Movement Disorder  Provider: Dr. Santoyo  Return date: Around 4/14/2024  Specialty phone number: [phone]  Additional appointment(s) needed: N/A  Additonal Notes: N/A    Spoke with patient, scheduled for 4/16/2024 at 8am.     Rene Segundo on 4/27/2023 at 5:21 PM

## 2023-04-28 ENCOUNTER — TELEPHONE (OUTPATIENT)
Dept: ENDOCRINOLOGY | Facility: CLINIC | Age: 68
End: 2023-04-28
Payer: MEDICARE

## 2023-04-28 DIAGNOSIS — Z79.4 TYPE 2 DIABETES MELLITUS WITH DIABETIC POLYNEUROPATHY, WITH LONG-TERM CURRENT USE OF INSULIN (H): Primary | ICD-10-CM

## 2023-04-28 DIAGNOSIS — E11.42 TYPE 2 DIABETES MELLITUS WITH DIABETIC POLYNEUROPATHY, WITH LONG-TERM CURRENT USE OF INSULIN (H): Primary | ICD-10-CM

## 2023-04-28 NOTE — TELEPHONE ENCOUNTER
Health Call Center    Phone Message    May a detailed message be left on voicemail: no     Reason for Call: Medication Refill Request    Has the patient contacted the pharmacy for the refill? Yes   Name of medication being requested: Medicare  Per pharmacy they need a Medicare  Compliant order for Freestyle Анна 2 [place] RX (signed by Dr. Vargas to be compliant)       Provider who prescribed the medication: Dr. Nick       Pharmacy: [facility] - [place] [address]     Date medication is needed: asap            Action Taken: Other: ENDO    Travel Screening: Not Applicable

## 2023-05-01 ENCOUNTER — OFFICE VISIT (OUTPATIENT)
Dept: PSYCHOLOGY | Facility: CLINIC | Age: 68
End: 2023-05-01
Payer: COMMERCIAL

## 2023-05-01 DIAGNOSIS — F33.1 MAJOR DEPRESSIVE DISORDER, RECURRENT EPISODE, MODERATE WITH ANXIOUS DISTRESS (H): Primary | ICD-10-CM

## 2023-05-01 PROCEDURE — 90834 PSYTX W PT 45 MINUTES: CPT | Performed by: STUDENT IN AN ORGANIZED HEALTH CARE EDUCATION/TRAINING PROGRAM

## 2023-05-01 NOTE — PROGRESS NOTES
[facility]                                     Progress Note    Patient Name: Winter Loya  Date: 05/01/2023         Service Type: Individual      Session Start Time: 10.00  Session End Time: 10.45     Session Length: 45 mns    Session #: 21    Attendees: Client attended alone    Service Modality:  In-person    DATA  Interactive Complexity: No  Crisis: No      Progress Since Last Session (Related to Symptoms / Goals / Homework):   Symptoms: Pt reported no change from previous session    Homework: Achieved / completed to satisfaction       Episode of Care Goals: Minimal progress - ACTION (Actively working towards change); Intervened by reinforcing change plan / affirming steps taken     Current / Ongoing Stressors and Concerns:   Ongoing stressors: Financial stress, stressful filial relationship/ Difficulty with assertiveness     Current: Pt reported frustration and resentment for 37 year old son who is living with her and unable to contribute to the upkeep of the house and utilities bills.                    Treatment Objective(s) Addressed in This Session:    Client will use one boundary management skill (i.e., direct communication, saying no to a request) by next session.   Patient will identify specific areas of cognitive distortion and challenge irrational thoughts with reality.        Intervention:   Discussed  the importance of clear communication and healthy boundaries in filial relationships and couched patient that healthy boundaries are important because they enhance  self-care and self-respect, help in communicating individual  needs in a relationship, create time and space for positive interactions, and set limits in a relationship in a way that is healthy,  empowering  each partner to make healthy choices,  feelings and taking responsibility for individual actions. Couched patient to discuss with son and set appropriate healthy boundaries.           ASSESSMENT: Current  Emotional / Mental Status (status of significant symptoms):   Risk status (Self / Other harm or suicidal ideation)   Patient denies current fears or concerns for personal safety.   Patient denies current or recent suicidal ideation or behaviors.   Patient denies current or recent homicidal ideation or behaviors.   Patient denies current or recent self injurious behavior or ideation.   Patient denies other safety concerns.   Patient reports there has been no change in risk factors since their last session.     Patient reports there has been no change in protective factors since their last session.     Recommended that patient call 911 or go to the local ED should there be a change in any of these risk factors.     Appearance:   Appropriate    Eye Contact:   Good    Psychomotor Behavior: Normal    Attitude:   Cooperative  Interested Friendly   Orientation:   Person Place Time Situation   Speech    Rate / Production: Normal/ Responsive    Volume:  Normal    Mood:    Anxious  Depressed    Affect:    Worrisome    Thought Content:  Clear    Thought Form:  Coherent    Insight:    Good      Medication Review:   No changes to current psychiatric medication(s)     Medication Compliance:   Yes     Changes in Health Issues:   None reported     Chemical Use Review:   Substance Use: Chemical use reviewed, no active concerns identified      Tobacco Use: No current tobacco use.      Diagnosis:    296.32 (F33.1) Major Depressive Disorder, Recurrent Episode, Moderate _ and With anxious distress    Collateral Reports Completed:   Not Applicable    PLAN: (Patient Tasks / Therapist Tasks / Other)    Write down what you will like to do to establish healthy boundaries with son specifically with the use of your care and process next session    CHERISE Castellon                   ----- Service Performed and Documented by CHERISE------  This note was reviewed and clinical supervision by AMY Ayala [facility]    5/1/2023  "  ______________________________________________________________________    Individual Treatment Plan    Patient's Name: Winter Loya  YOB: 1955    Date of Creation: 04/18/2022  Date Treatment Plan Last Reviewed/Revised: 02/13/2023    DSM5 Diagnoses: 296.32 (F33.1) Major Depressive Disorder, Recurrent Episode, Moderate _ and With anxious distress  Psychosocial / Contextual Factors:   PROMIS (reviewed every 90 days):     Referral / Collaboration:  Referral to another professional/service is not indicated at this time..    Anticipated number of session for this episode of care: 15-25  Anticipation frequency of session: Every other week  Anticipated Duration of each session: 38-52 minutes  Treatment plan will be reviewed in 90 days or when goals have been changed.       MeasurableTreatment Goal(s) related to diagnosis / functional impairment(s)    Goal 1: Patient will identify and address specific triggers to feelings of depression and improve coping with depression by  90 % in the next three months.    I will know I've met my goal when I can comfortably manage the daily stressors I have and be less depressed\"    Objective #A (Patient Action)    Patient will take 15-20 minutes daily walks and spend 1 hour daily completing household chorees.  Status: Continued - Date(s):  05/13/2023    Intervention(s)  Therapist will provide psychoeducation, behavioral activation, and cognitive restructuring.)    Objective #B  Patient will identify specific areas of cognitive distortion and challenge irrational thoughts with reality.   Status: Continued - Date(s):     05/13/2023       Intervention(s)  Therapist will teach patient how negative thoughts can lead to negative feelings and actions and ways to reframe thoughts in a more positive way leading to more positive feelings and helpful behaviors    Objective #C  Patient will learn and practice relaxation techniques to manage anxiety.  Status: Continued - Date(s):    " 05/13/2023    Intervention(s)  Therapist will teach patient thought stopping, deep breathing exercises, mindful meditation, and creative visualization.  Patient has reviewed and agreed to the above plan.      CHERISE Castellon  April 18, 2022

## 2023-05-02 NOTE — TELEPHONE ENCOUNTER
Please have  sign on the refill RX Continuous Blood Gluc  (FREESTYLE SEGUNDO 2 READER) MARCIA . Only the MD signature to be compliant. Please sign and send Refill back asap. Thank you!

## 2023-05-02 NOTE — TELEPHONE ENCOUNTER
RE    Matilde Campos routed conversation to [facility] 2 minutes ago (1:45 PM)     Matilde Campos 2 minutes ago (1:45 PM)     JL  Please have  sign on the refill RX Continuous Blood Gluc  (FREESTYLE АННА 2 READER) MARCIA . Only the MD signature to be compliant. Please sign and send Refill back asap. Thank you!         Note      TB  [facility] Call Center     Phone Message     May a detailed message be left on voicemail: no      Reason for Call: Medication Refill Request    Has the patient contacted the pharmacy for the refill? Yes   Name of medication being requested: Medicare  Per pharmacy they need a Medicare  Compliant order for Freestyle Анна 2 [place] RX (signed by Dr. Vargas to be compliant)         Provider who prescribed the medication: Dr. Nick         Pharmacy: [facility] - [facility] [address]      Date medication is needed: asap                          Action Taken: Other: ENDO     Travel Screening: Not Applicable

## 2023-05-04 ENCOUNTER — TELEPHONE (OUTPATIENT)
Dept: ENDOCRINOLOGY | Facility: CLINIC | Age: 68
End: 2023-05-04
Payer: COMMERCIAL

## 2023-05-04 DIAGNOSIS — E11.42 TYPE 2 DIABETES MELLITUS WITH DIABETIC POLYNEUROPATHY, WITH LONG-TERM CURRENT USE OF INSULIN (H): ICD-10-CM

## 2023-05-04 DIAGNOSIS — Z79.4 TYPE 2 DIABETES MELLITUS WITH DIABETIC POLYNEUROPATHY, WITH LONG-TERM CURRENT USE OF INSULIN (H): ICD-10-CM

## 2023-05-09 ENCOUNTER — TELEPHONE (OUTPATIENT)
Dept: ONCOLOGY | Facility: CLINIC | Age: 68
End: 2023-05-09
Payer: COMMERCIAL

## 2023-05-09 DIAGNOSIS — C90.01 MULTIPLE MYELOMA IN REMISSION (H): Primary | ICD-10-CM

## 2023-05-09 RX ORDER — LENALIDOMIDE 10 MG/1
10 CAPSULE ORAL DAILY
Qty: 28 CAPSULE | Refills: 0 | Status: SHIPPED | OUTPATIENT
Start: 2023-05-09 | End: 2023-05-09

## 2023-05-09 RX ORDER — LENALIDOMIDE 10 MG/1
10 CAPSULE ORAL DAILY
Qty: 28 CAPSULE | Refills: 0 | Status: SHIPPED | OUTPATIENT
Start: 2023-05-09 | End: 2023-08-02

## 2023-05-09 NOTE — TELEPHONE ENCOUNTER
Prior Authorization Approval    Authorization Effective Date: 4/9/2023  Authorization Expiration Date: 5/8/2026  Medication: Revlimid PA Approved  Approved Dose/Quantity: 28 per 28 DS  Reference #: [id number]   Insurance Company: [employer] - Phone [phone] Fax [phone]  Expected CoPay: $0     CoPay Card Available:      Foundation Assistance Needed:    Which Pharmacy is filling the prescription (Not needed for infusion/clinic administered): [facility] - [place] [address]  Pharmacy Notified: Yes  Patient Notified: Yes          Thank you,    Mary Franklin  Oncology Pharmacy Liaison II  [email or web address]  Phone: [phone]  Fax: [phone]

## 2023-05-09 NOTE — TELEPHONE ENCOUNTER
Oral Chemotherapy Monitoring Program    Medication: Revlimid  Rx:  10 mg PO daily for a 28 day cycle  Auth #: [id number]  Risk Category: Adult female NOT of reproductive capacity    Routine survey questions reviewed.    Thank you,    Mary Franklin  Oncology Pharmacy Liaison LUCINDA [email or web address]  Phone: [phone]  Fax: [phone]

## 2023-05-15 ENCOUNTER — OFFICE VISIT (OUTPATIENT)
Dept: PSYCHOLOGY | Facility: CLINIC | Age: 68
End: 2023-05-15
Payer: COMMERCIAL

## 2023-05-15 DIAGNOSIS — F33.1 MDD (MAJOR DEPRESSIVE DISORDER), RECURRENT EPISODE, MODERATE (H): Primary | ICD-10-CM

## 2023-05-15 PROCEDURE — 90834 PSYTX W PT 45 MINUTES: CPT | Performed by: STUDENT IN AN ORGANIZED HEALTH CARE EDUCATION/TRAINING PROGRAM

## 2023-05-15 NOTE — PROGRESS NOTES
[facility]                                     Progress Note    Patient Name: Winter Loya  Date: 05/15/2023         Service Type: Individual      Session Start Time: 09.00  Session End Time: 9.45     Session Length: 45 mns    Session #: 22    Attendees: Client attended alone    Service Modality:  In-person    DATA  Interactive Complexity: No  Crisis: No      Progress Since Last Session (Related to Symptoms / Goals / Homework):   Symptoms: Pt reported no change from previous session    Homework: Achieved / completed to satisfaction       Episode of Care Goals: Minimal progress - ACTION (Actively working towards change); Intervened by reinforcing change plan / affirming steps taken     Current / Ongoing Stressors and Concerns:   Ongoing stressors: Financial stress, stressful filial relationship/ Difficulty with assertiveness     Current: Pt reported continuous frustration and resentment for 37 year old son who is living with her and unable to contribute to the upkeep of the house and utilities bills. Patient reported son has just been fired from current job and that has exercibated his dependency on her for assistance and she is depressed, worried and financially stressed caring for his own needs and son's needs                   Treatment Objective(s) Addressed in This Session:    Client will use one boundary management skill (i.e., direct communication, saying no to a request) by next session.   Patient will identify specific areas of cognitive distortion and challenge irrational thoughts with reality.        Intervention:   Continue discussion on  the importance of clear communication and healthy boundaries in filial relationships and couched patient that healthy boundaries are important because they enhance  self-care and self-respect, help in communicating individual  needs in a relationship, create time and space for positive interactions, and set limits in a relationship in a way that  is healthy,  empowering  each partner to make healthy choices,  feelings and taking responsibility for individual actions. Couched that they cannot change what they are willing to tolerate.          ASSESSMENT: Current Emotional / Mental Status (status of significant symptoms):   Risk status (Self / Other harm or suicidal ideation)   Patient denies current fears or concerns for personal safety.   Patient denies current or recent suicidal ideation or behaviors.   Patient denies current or recent homicidal ideation or behaviors.   Patient denies current or recent self injurious behavior or ideation.   Patient denies other safety concerns.   Patient reports there has been no change in risk factors since their last session.     Patient reports there has been no change in protective factors since their last session.     Recommended that patient call 911 or go to the local ED should there be a change in any of these risk factors.     Appearance:   Appropriate    Eye Contact:   Good    Psychomotor Behavior: Normal    Attitude:   Cooperative  Interested Friendly   Orientation:   Person Place Time Situation   Speech    Rate / Production: Normal/ Responsive    Volume:  Normal    Mood:    Anxious  Depressed    Affect:    Worrisome    Thought Content:  Clear    Thought Form:  Coherent    Insight:    Good      Medication Review:   No changes to current psychiatric medication(s)     Medication Compliance:   Yes     Changes in Health Issues:   None reported     Chemical Use Review:   Substance Use: Chemical use reviewed, no active concerns identified      Tobacco Use: No current tobacco use.      Diagnosis:    296.32 (F33.1) Major Depressive Disorder, Recurrent Episode, Moderate _ and With anxious distress    Collateral Reports Completed:   Not Applicable    PLAN: (Patient Tasks / Therapist Tasks / Other)    Use assertive communication skill learn in session and communicate feelings and thought about 36 year old son's  "pervasive dependency on mum and plans to be alone without him living with you.    Mj Cline, [facility]                   ----- Service Performed and Documented by [facility]------  This note was reviewed and clinical supervision by AMY Ayala [facility]    5/16/2023   ______________________________________________________________________    Individual Treatment Plan    Patient's Name: Winter Loya  YOB: 1955    Date of Creation: 04/18/2022  Date Treatment Plan Last Reviewed/Revised: 02/13/2023    DSM5 Diagnoses: 296.32 (F33.1) Major Depressive Disorder, Recurrent Episode, Moderate _ and With anxious distress  Psychosocial / Contextual Factors:   PROMIS (reviewed every 90 days):     Referral / Collaboration:  Referral to another professional/service is not indicated at this time..    Anticipated number of session for this episode of care: 15-25  Anticipation frequency of session: Every other week  Anticipated Duration of each session: 38-52 minutes  Treatment plan will be reviewed in 90 days or when goals have been changed.       MeasurableTreatment Goal(s) related to diagnosis / functional impairment(s)    Goal 1: Patient will identify and address specific triggers to feelings of depression and improve coping with depression by  90 % in the next three months.    I will know I've met my goal when I can comfortably manage the daily stressors I have and be less depressed\"    Objective #A (Patient Action)    Patient will take 15-20 minutes daily walks and spend 1 hour daily completing household chorees.  Status: Continued - Date(s):  05/13/2023    Intervention(s)  Therapist will provide psychoeducation, behavioral activation, and cognitive restructuring.)    Objective #B  Patient will identify specific areas of cognitive distortion and challenge irrational thoughts with reality.   Status: Continued - Date(s):     05/13/2023       Intervention(s)  Therapist will teach patient how negative thoughts can lead to " negative feelings and actions and ways to reframe thoughts in a more positive way leading to more positive feelings and helpful behaviors    Objective #C  Patient will learn and practice relaxation techniques to manage anxiety.  Status: Continued - Date(s):    05/13/2023    Intervention(s)  Therapist will teach patient thought stopping, deep breathing exercises, mindful meditation, and creative visualization.  Patient has reviewed and agreed to the above plan.      CHERISE Castellon  April 18, 2022

## 2023-05-16 ENCOUNTER — PATIENT OUTREACH (OUTPATIENT)
Dept: GERIATRIC MEDICINE | Facility: CLINIC | Age: 68
End: 2023-05-16
Payer: COMMERCIAL

## 2023-05-16 NOTE — LETTER
May 16, 2023    [place]  [address]  [facility] [address]      Dear Piedad,    Welcome to [facility] health program. My name is Marti Cevallos RN. I am your [facility] care coordinator. You're eligible for care coordination through your [facility] Product.    As your care coordinator, we ll:  Meet to go over your care coordination benefits  Talk about your physical and mental health care needs   Review your preventative care needs  Create a plan that meets your needs with the services you choose    What happens next?  I ll call you soon to introduce myself and tell you more about my role. We ll then plan time to go over your health and safety needs. Our goal is to keep you as healthy and independent as possible.    [facility] combines the benefits you may already have receive from Medical Assistance, Medicare and the Prescription Drug Coverage Program. Soon you'll receive a new member identification (ID) card from [facility]. Use this card whenever you get health services.    The [facility] care coordination program is voluntary and offered to you at no cost. If you wish to stop being in the care coordination program or have questions, call me at [phone]. If you reach my voicemail, leave a message and your phone number. TTY users, call the [place] Relay at 947 or [phone] (qvmhbz-jd-lwtzot relay service).    Sincerely,    Marti Cevallos RN    E-mail: [email or web address]  Phone: [phone]    Care Manager  [facility] (Lists of hospitals in the [place]) is a health plan that contracts with both Medicare and the [place] Medical Assistance (Medicaid) program to provide benefits of both programs to enrollees. Enrollment in [facility] depends on contract renewal.    [id number] accepted   [id number]         [id number] (07/2022)

## 2023-05-16 NOTE — PROGRESS NOTES
[facility] Care Coordination Contact    Member became effective with  [facility] on 5[facility]1/2023 [facility]  Previous Health Plan: MA/Fee For Service  Previous Care System: N/A  Previous care coordinators name and number: Gabriel Paul Type: N/A  Last MMIS Entry: Date 12/03/21 and Type 50 NT LOCATE  MMIS visit date (and type) if different from above: N/A  Services Listed in MMIS: Last entry in MMIS was from 12/03/21   UTF received: No UTF to request  Address/Phone discrepancy: N/A  [facility] mailed.    Dayna Baez  Case Management Specialist   [facility]  [phone]

## 2023-05-17 ENCOUNTER — PATIENT OUTREACH (OUTPATIENT)
Dept: GERIATRIC MEDICINE | Facility: CLINIC | Age: 68
End: 2023-05-17
Payer: COMMERCIAL

## 2023-05-17 NOTE — PROGRESS NOTES
5/17/23 CC received member as new [facility] and called and spoke with her explaining the [facility] program and the care coordinators role. She did agree to a home visit. 5/23 at 1PM. Winter states she is independent and does everything herself.   Corrina Cevallos RN BA N  [facility] Case Management  [phone]

## 2023-05-18 ENCOUNTER — PATIENT OUTREACH (OUTPATIENT)
Dept: GERIATRIC MEDICINE | Facility: CLINIC | Age: 68
End: 2023-05-18
Payer: COMMERCIAL

## 2023-05-19 ENCOUNTER — VIRTUAL VISIT (OUTPATIENT)
Dept: PHARMACY | Facility: CLINIC | Age: 68
End: 2023-05-19
Payer: MEDICAID

## 2023-05-19 DIAGNOSIS — C90.01 MULTIPLE MYELOMA IN REMISSION (H): ICD-10-CM

## 2023-05-19 DIAGNOSIS — E11.42 TYPE 2 DIABETES MELLITUS WITH DIABETIC POLYNEUROPATHY, WITH LONG-TERM CURRENT USE OF INSULIN (H): Primary | ICD-10-CM

## 2023-05-19 DIAGNOSIS — Z79.4 TYPE 2 DIABETES MELLITUS WITH DIABETIC POLYNEUROPATHY, WITH LONG-TERM CURRENT USE OF INSULIN (H): Primary | ICD-10-CM

## 2023-05-19 DIAGNOSIS — R19.7 DIARRHEA, UNSPECIFIED TYPE: ICD-10-CM

## 2023-05-19 PROCEDURE — 99207 PR NO CHARGE LOS: CPT | Performed by: PHARMACIST

## 2023-05-19 RX ORDER — INSULIN GLARGINE 100 [IU]/ML
24 INJECTION, SOLUTION SUBCUTANEOUS AT BEDTIME
Qty: 30 ML | Refills: 1 | Status: SHIPPED | OUTPATIENT
Start: 2023-05-19 | End: 2023-07-28

## 2023-05-19 NOTE — Clinical Note
CE DAWSON note, thanks!  Vonnie Corrigan, PharmD Medication Therapy Management Pharmacist [phone]

## 2023-05-19 NOTE — PROGRESS NOTES
Medication Therapy Management (MTM) Encounter    ASSESSMENT:                            Medication Adherence/Access: No issues identified    Type 2 Diabetes/Weight loss:  Patient is meeting not A1c goal of < 7%, fasting blood sugar is not at goal of  mg/dL.  May benefit from increasing Lantus slightly, addition of анна 2 may also help with blood sugar control.    Diarrhea: No change, maintain current therapy.    Multiple myeloma: Stable per patient.    PLAN:                            1. Increase Lantus to 24 units once daily.    2. Sent email invitation so Vonnie can see blood sugar readings from your Анна 2 at next visit.     Follow-up: Return in about 6 weeks (around 6/30/2023) for Medication Therapy Management.    SUBJECTIVE/OBJECTIVE:                          Winter Loya is a 67 year old female called for a follow-up visit.  Today's visit is a follow-up MTM visit from 4/14/23.   Reason for visit: diabetes follow-up.    Allergies/ADRs: Reviewed in chart  Past Medical History: Reviewed in chart  Tobacco: She reports that she quit smoking about 18 years ago. Her smoking use included cigarettes. She smoked an average of 1 pack per day. She has never used smokeless tobacco.  Alcohol: Less than 1 beverage / month  Caffeine: 1 diet coke/day and 2 cups coffee/day    Medication Adherence/Access:    - sometimes forgetting evening medications  The patient fills medications at [facility]: NO, fills medications at [facility] for Revlimid.    Type 2 Diabetes/Weight loss:    Lantus 22 units at bedtime   Ozempic 2 mg weekly (Tuesdays x 1 month)  Jardiance 25 mg daily     She was able to set up Анна 2 on her arm today. Doing well on the Ozempic.  Plans to see Montse soon to ensure she's able to continue to get the анна.  Patient is experiencing the following side effects: diarrhea - nobody knows why she has this. She has done a trial off of metformin with Delmi Gross without improvement in diarrhea, has since  stopped this. Diarrhea is not worse on Ozempic 2 mg so far.  Medication History:  Has been on Trulicity in the past, tolerated well. Metformin as above.   Blood sugar monitoring:   Fastins   2 hours before meal (vs 2 hours after): 191  Symptoms of low blood sugar? none  Symptoms of high blood sugar? none  Eye exam: up to date  Foot exam: up to date  Diet/Exercise: Trying to quit the overnight snacking.  When she doesn't snack overnight, her blood sugar is much better the following morning.   Aspirin: Taking 81mg daily for primary prevention   Statin: Yes: atorvastatin   ACEi/ARB: Yes: losartan.   Urine Albumin:   Lab Results   Component Value Date    UMALCR 187.04 (H) 2023     Lab Results   Component Value Date    A1C 8.2 2023    A1C 7.6 2022    A1C 6.8 2022    A1C 9.0 2021    A1C 7.8 2021    A1C 7.6 2021    A1C 8.8 2020    A1C 9.1 2020    A1C 6.7 2020     Diarrhea:   Loperamide 4 mg twice daily     This has been the only thing that has really helped - other than the protein bars, as above. This is well controlled right now and really good. But if she forgets one of these she can usually tell right away.     Multiple myeloma:    Revlimid 10 mg daily   Acyclovir 400 mg twice daily (Shingles prophylaxis), also helps prevent her cold sores    She had a stem cell transplant in either 2018 or 2019, has been in remission since. She'd prefer to be off the Revlimid, however oncology prefers she remain on this. Tolerates it well, although likely may also be a source of her chronic diarrhea.    Today's Vitals: There were no vitals taken for this visit.  ----------------      I spent 18 minutes with this patient today. All changes were made via collaborative practice agreement with Montse Bucio PA-C. A copy of the visit note was provided to the patient's provider(s).    A summary of these recommendations was sent via [employer].    Vonnie Rust,  PharmD  Medication Therapy Management Pharmacist  [phone]    Telemedicine Visit Details  Type of service:  Telephone visit  Start Time: 1:08 PM  End Time: 1:26 PM     Medication Therapy Recommendations  Type 2 diabetes mellitus with diabetic polyneuropathy, with long-term current use of insulin (H)    Current Medication: insulin glargine (LANTUS SOLOSTAR) 100 UNIT/ML pen (Discontinued)   Rationale: Dose too low - Dosage too low - Effectiveness   Recommendation: Increase Dose   Status: Accepted per CPA

## 2023-05-19 NOTE — PATIENT INSTRUCTIONS
"Recommendations from today's MTM visit:                                                         1. Increase Lantus to 24 units once daily.    2. Sent email invitation so Vonnie can see blood sugar readings from your Анна 2 at next visit.     Follow-up: Return in about 6 weeks (around 6/30/2023) for Medication Therapy Management.    It was great speaking with you today.  I value your experience and would be very thankful for your time in providing feedback in our clinic survey. In the next few days, you may receive an email or text message from [employer] with a link to a survey related to your  clinical pharmacist.\"     To schedule another MTM appointment, please call the clinic directly or you may call the MTM scheduling line at [phone] or toll-free at [phone].     My Clinical Pharmacist's contact information:                                                      Please feel free to contact me with any questions or concerns you have.      Vonnie Corrigan, PharmD  Medication Therapy Management Pharmacist  [phone]     "

## 2023-05-22 ENCOUNTER — LAB (OUTPATIENT)
Dept: LAB | Facility: CLINIC | Age: 68
End: 2023-05-22
Payer: COMMERCIAL

## 2023-05-22 DIAGNOSIS — C90.01 MULTIPLE MYELOMA IN REMISSION (H): ICD-10-CM

## 2023-05-22 DIAGNOSIS — Z79.4 TYPE 2 DIABETES MELLITUS WITH DIABETIC POLYNEUROPATHY, WITH LONG-TERM CURRENT USE OF INSULIN (H): ICD-10-CM

## 2023-05-22 DIAGNOSIS — E11.42 TYPE 2 DIABETES MELLITUS WITH DIABETIC POLYNEUROPATHY, WITH LONG-TERM CURRENT USE OF INSULIN (H): ICD-10-CM

## 2023-05-22 LAB
ALBUMIN SERPL BCG-MCNC: 4.5 G/DL (ref 3.5–5.2)
ALP SERPL-CCNC: 103 U/L (ref 35–104)
ALT SERPL W P-5'-P-CCNC: 31 U/L (ref 10–35)
ANION GAP SERPL CALCULATED.3IONS-SCNC: 12 MMOL/L (ref 7–15)
AST SERPL W P-5'-P-CCNC: 21 U/L (ref 10–35)
BASOPHILS # BLD AUTO: 0 10E3/UL (ref 0–0.2)
BASOPHILS NFR BLD AUTO: 1 %
BILIRUB SERPL-MCNC: 1.8 MG/DL
BUN SERPL-MCNC: 9.2 MG/DL (ref 8–23)
CALCIUM SERPL-MCNC: 9.4 MG/DL (ref 8.8–10.2)
CHLORIDE SERPL-SCNC: 105 MMOL/L (ref 98–107)
CREAT SERPL-MCNC: 0.62 MG/DL (ref 0.51–0.95)
DEPRECATED HCO3 PLAS-SCNC: 24 MMOL/L (ref 22–29)
EOSINOPHIL # BLD AUTO: 0.3 10E3/UL (ref 0–0.7)
EOSINOPHIL NFR BLD AUTO: 9 %
ERYTHROCYTE [DISTWIDTH] IN BLOOD BY AUTOMATED COUNT: 14.4 % (ref 10–15)
GFR SERPL CREATININE-BSD FRML MDRD: >90 ML/MIN/1.73M2
GLUCOSE SERPL-MCNC: 140 MG/DL (ref 70–99)
HBA1C MFR BLD: 6.6 % (ref 0–5.6)
HCT VFR BLD AUTO: 42.2 % (ref 35–47)
HGB BLD-MCNC: 14.3 G/DL (ref 11.7–15.7)
LYMPHOCYTES # BLD AUTO: 0.7 10E3/UL (ref 0.8–5.3)
LYMPHOCYTES NFR BLD AUTO: 20 %
MCH RBC QN AUTO: 29.3 PG (ref 26.5–33)
MCHC RBC AUTO-ENTMCNC: 33.9 G/DL (ref 31.5–36.5)
MCV RBC AUTO: 87 FL (ref 78–100)
MONOCYTES # BLD AUTO: 0.4 10E3/UL (ref 0–1.3)
MONOCYTES NFR BLD AUTO: 12 %
NEUTROPHILS # BLD AUTO: 1.9 10E3/UL (ref 1.6–8.3)
NEUTROPHILS NFR BLD AUTO: 58 %
PLATELET # BLD AUTO: 128 10E3/UL (ref 150–450)
POTASSIUM SERPL-SCNC: 4.1 MMOL/L (ref 3.4–5.3)
PROT SERPL-MCNC: 6.4 G/DL (ref 6.4–8.3)
RBC # BLD AUTO: 4.88 10E6/UL (ref 3.8–5.2)
SODIUM SERPL-SCNC: 141 MMOL/L (ref 136–145)
TOTAL PROTEIN SERUM FOR ELP: 6.1 G/DL (ref 6.4–8.3)
WBC # BLD AUTO: 3.2 10E3/UL (ref 4–11)

## 2023-05-22 PROCEDURE — 36415 COLL VENOUS BLD VENIPUNCTURE: CPT

## 2023-05-22 PROCEDURE — 83036 HEMOGLOBIN GLYCOSYLATED A1C: CPT

## 2023-05-22 PROCEDURE — 84155 ASSAY OF PROTEIN SERUM: CPT

## 2023-05-22 PROCEDURE — 84165 PROTEIN E-PHORESIS SERUM: CPT

## 2023-05-22 PROCEDURE — 85025 COMPLETE CBC W/AUTO DIFF WBC: CPT

## 2023-05-22 PROCEDURE — 80053 COMPREHEN METABOLIC PANEL: CPT

## 2023-05-23 ENCOUNTER — PATIENT OUTREACH (OUTPATIENT)
Dept: GERIATRIC MEDICINE | Facility: CLINIC | Age: 68
End: 2023-05-23
Payer: COMMERCIAL

## 2023-05-23 LAB
ALBUMIN SERPL ELPH-MCNC: 4 G/DL (ref 3.7–5.1)
ALPHA1 GLOB SERPL ELPH-MCNC: 0.3 G/DL (ref 0.2–0.4)
ALPHA2 GLOB SERPL ELPH-MCNC: 0.5 G/DL (ref 0.5–0.9)
B-GLOBULIN SERPL ELPH-MCNC: 0.7 G/DL (ref 0.6–1)
GAMMA GLOB SERPL ELPH-MCNC: 0.6 G/DL (ref 0.7–1.6)
M PROTEIN SERPL ELPH-MCNC: 0 G/DL
PROT PATTERN SERPL ELPH-IMP: ABNORMAL

## 2023-05-23 SDOH — ECONOMIC STABILITY: INCOME INSECURITY: IN THE LAST 12 MONTHS, WAS THERE A TIME WHEN YOU WERE NOT ABLE TO PAY THE MORTGAGE OR RENT ON TIME?: NO

## 2023-05-23 SDOH — ECONOMIC STABILITY: TRANSPORTATION INSECURITY
IN THE PAST 12 MONTHS, HAS THE LACK OF TRANSPORTATION KEPT YOU FROM MEDICAL APPOINTMENTS OR FROM GETTING MEDICATIONS?: NO

## 2023-05-23 SDOH — ECONOMIC STABILITY: TRANSPORTATION INSECURITY
IN THE PAST 12 MONTHS, HAS LACK OF TRANSPORTATION KEPT YOU FROM MEETINGS, WORK, OR FROM GETTING THINGS NEEDED FOR DAILY LIVING?: NO

## 2023-05-23 SDOH — ECONOMIC STABILITY: FOOD INSECURITY: WITHIN THE PAST 12 MONTHS, YOU WORRIED THAT YOUR FOOD WOULD RUN OUT BEFORE YOU GOT MONEY TO BUY MORE.: NEVER TRUE

## 2023-05-23 SDOH — ECONOMIC STABILITY: FOOD INSECURITY: WITHIN THE PAST 12 MONTHS, THE FOOD YOU BOUGHT JUST DIDN'T LAST AND YOU DIDN'T HAVE MONEY TO GET MORE.: NEVER TRUE

## 2023-05-23 SDOH — HEALTH STABILITY: PHYSICAL HEALTH: ON AVERAGE, HOW MANY DAYS PER WEEK DO YOU ENGAGE IN MODERATE TO STRENUOUS EXERCISE (LIKE A BRISK WALK)?: 0 DAYS

## 2023-05-23 SDOH — HEALTH STABILITY: PHYSICAL HEALTH: ON AVERAGE, HOW MANY MINUTES DO YOU ENGAGE IN EXERCISE AT THIS LEVEL?: 0 MIN

## 2023-05-23 ASSESSMENT — LIFESTYLE VARIABLES
HOW OFTEN DO YOU HAVE SIX OR MORE DRINKS ON ONE OCCASION: NEVER
HOW MANY STANDARD DRINKS CONTAINING ALCOHOL DO YOU HAVE ON A TYPICAL DAY: PATIENT DOES NOT DRINK
HOW OFTEN DO YOU HAVE A DRINK CONTAINING ALCOHOL: NEVER
SKIP TO QUESTIONS 9-10: 1
AUDIT-C TOTAL SCORE: 0

## 2023-05-23 ASSESSMENT — SOCIAL DETERMINANTS OF HEALTH (SDOH)
DO YOU BELONG TO ANY CLUBS OR ORGANIZATIONS SUCH AS CHURCH GROUPS UNIONS, FRATERNAL OR ATHLETIC GROUPS, OR SCHOOL GROUPS?: NO
IN A TYPICAL WEEK, HOW MANY TIMES DO YOU TALK ON THE PHONE WITH FAMILY, FRIENDS, OR NEIGHBORS?: ONCE A WEEK
HOW OFTEN DO YOU GET TOGETHER WITH FRIENDS OR RELATIVES?: ONCE A WEEK
HOW HARD IS IT FOR YOU TO PAY FOR THE VERY BASICS LIKE FOOD, HOUSING, MEDICAL CARE, AND HEATING?: NOT VERY HARD

## 2023-05-23 ASSESSMENT — ACTIVITIES OF DAILY LIVING (ADL): DEPENDENT_IADLS:: INDEPENDENT
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
## 2023-05-24 ENCOUNTER — PATIENT OUTREACH (OUTPATIENT)
Dept: GERIATRIC MEDICINE | Facility: CLINIC | Age: 68
End: 2023-05-24
Payer: COMMERCIAL

## 2023-05-24 DIAGNOSIS — Z79.4 TYPE 2 DIABETES MELLITUS WITH HYPERGLYCEMIA, WITH LONG-TERM CURRENT USE OF INSULIN (H): ICD-10-CM

## 2023-05-24 DIAGNOSIS — E03.9 HYPOTHYROIDISM, UNSPECIFIED TYPE: ICD-10-CM

## 2023-05-24 DIAGNOSIS — E11.65 TYPE 2 DIABETES MELLITUS WITH HYPERGLYCEMIA, WITH LONG-TERM CURRENT USE OF INSULIN (H): ICD-10-CM

## 2023-05-24 RX ORDER — LEVOTHYROXINE SODIUM 200 UG/1
200 TABLET ORAL
Qty: 90 TABLET | Refills: 0 | Status: SHIPPED | OUTPATIENT
Start: 2023-05-24 | End: 2023-06-26

## 2023-05-24 RX ORDER — LOSARTAN POTASSIUM 25 MG/1
TABLET ORAL
Qty: 90 TABLET | Refills: 0 | Status: SHIPPED | OUTPATIENT
Start: 2023-05-24 | End: 2023-06-26

## 2023-05-24 NOTE — LETTER
May 24, 2023      [address] [address]  [address]  [place] [address]      Dear Piedad:    At [facility], we re dedicated to improving your health and wellness. Enclosed is the Care Plan developed with you on 5/23/2023. Please review the Care Plan carefully.    As a reminder, during your visit we talked about:  Ways to manage your physical and mental health  Using health care to maintain and improve your health   Your preventive care needs     Remember to contact your care coordinator if you:  Are hospitalized, or plan to be hospitalized   Have a fall    Have a change in your physical or mental health  Need help finding support or services    If you have questions, or don t agree with your Care Plan, call me at [phone]. You can also call me if your needs change. TTY users, call the [place] Relay at (105) or 1-356.558.6509 (wggxiq-rw-lkdhlm relay service).    Sincerely,    Marti Cevallos RN    E-mail: [email or web address]  Phone: [phone]    Care Manager  [facility] (Lists of hospitals in the [place]) is a health plan that contracts with both Medicare and the [place] Medical Assistance (Medicaid) program to provide benefits of both programs to enrollees. Enrollment in [facility] depends on contract renewal.    [id number] accepted    [id number] (07/2022)

## 2023-05-24 NOTE — PROGRESS NOTES
5/24/23 CC sent an order to [facility] for incontinent products. Pullups large 3 per day and maxi pads large 3 per day.  CC received this email back from [facility]:  Sure, I'll get that set up. If someone is getting pads in a given month, insurance will only cover 60 pull ups for them in that month. We can get around this by sending 180 pads and 60 pull ups this month, no pads and 120 pull ups next month, and so on. It's a little complicated but it'll average out to 90 of each per month.  Thanks,  Deangelo COOMBS called Winter and MAGED with above information. If she has questions CC left her phone number.  Corrina Cevallos RN BA N  [facility] Case Management  [phone]

## 2023-05-24 NOTE — PROGRESS NOTES
[facility] Care Coordination Contact    Received after visit chart from care coordinator.  Completed following tasks: Mailed copy of care plan to client, Updated services in Database and Mailed Safe Medication Disposal    CC ordered pull-ups & maxi pads through [facility].  Per CC, mailed HCD.    Dayna Baez  Case Management Specialist   [facility]  [phone]

## 2023-05-25 ENCOUNTER — MEDICAL CORRESPONDENCE (OUTPATIENT)
Dept: HEALTH INFORMATION MANAGEMENT | Facility: CLINIC | Age: 68
End: 2023-05-25
Payer: COMMERCIAL

## 2023-05-25 DIAGNOSIS — E11.42 TYPE 2 DIABETES MELLITUS WITH DIABETIC POLYNEUROPATHY, WITH LONG-TERM CURRENT USE OF INSULIN (H): ICD-10-CM

## 2023-05-25 DIAGNOSIS — Z79.4 TYPE 2 DIABETES MELLITUS WITH DIABETIC POLYNEUROPATHY, WITH LONG-TERM CURRENT USE OF INSULIN (H): ICD-10-CM

## 2023-05-25 RX ORDER — PREGABALIN 100 MG/1
100 CAPSULE ORAL 2 TIMES DAILY
Qty: 180 CAPSULE | Refills: 3 | Status: SHIPPED | OUTPATIENT
Start: 2023-05-25 | End: 2024-03-14

## 2023-05-25 NOTE — TELEPHONE ENCOUNTER
Pending Prescriptions:                       Disp   Refills    pregabalin (LYRICA) 100 MG capsule        270 ca*3            Sig: TAKE ONE CAPSULE BY MOUTH THREE TIMES DAILY

## 2023-06-06 ENCOUNTER — TELEPHONE (OUTPATIENT)
Dept: ONCOLOGY | Facility: CLINIC | Age: 68
End: 2023-06-06
Payer: COMMERCIAL

## 2023-06-06 DIAGNOSIS — C90.01 MULTIPLE MYELOMA IN REMISSION (H): Primary | ICD-10-CM

## 2023-06-06 RX ORDER — LENALIDOMIDE 10 MG/1
10 CAPSULE ORAL DAILY
Qty: 28 CAPSULE | Refills: 0 | Status: SHIPPED | OUTPATIENT
Start: 2023-06-06 | End: 2023-08-02

## 2023-06-06 NOTE — TELEPHONE ENCOUNTER
Oral Chemotherapy Monitoring Program    Medication: Revlimid  Rx:  10 mg PO daily for a 28 day cycle  Auth #: [id number]  Risk Category: Adult female NOT of reproductive capacity    Routine survey questions reviewed.    Thank you,    Mary Franklin  Oncology Pharmacy Liaison LUCINDA [email or web address]  Phone: [phone]  Fax: [phone]

## 2023-06-15 NOTE — PROGRESS NOTES
Per [facility] Pull-ups (large) & maxi pads have been delivered. CC notified.    Dayna Baez  Case Management Specialist   [facility]  [phone]

## 2023-06-19 ENCOUNTER — LAB (OUTPATIENT)
Dept: LAB | Facility: CLINIC | Age: 68
End: 2023-06-19
Payer: COMMERCIAL

## 2023-06-19 DIAGNOSIS — C90.01 MULTIPLE MYELOMA IN REMISSION (H): ICD-10-CM

## 2023-06-19 LAB
ALBUMIN SERPL BCG-MCNC: 4.4 G/DL (ref 3.5–5.2)
ALP SERPL-CCNC: 119 U/L (ref 35–104)
ALT SERPL W P-5'-P-CCNC: 36 U/L (ref 0–50)
ANION GAP SERPL CALCULATED.3IONS-SCNC: 17 MMOL/L (ref 7–15)
AST SERPL W P-5'-P-CCNC: 23 U/L (ref 0–45)
BASOPHILS # BLD AUTO: 0.1 10E3/UL (ref 0–0.2)
BASOPHILS NFR BLD AUTO: 2 %
BILIRUB SERPL-MCNC: 1.5 MG/DL
BUN SERPL-MCNC: 13.6 MG/DL (ref 8–23)
CALCIUM SERPL-MCNC: 9.1 MG/DL (ref 8.8–10.2)
CHLORIDE SERPL-SCNC: 105 MMOL/L (ref 98–107)
CREAT SERPL-MCNC: 0.6 MG/DL (ref 0.51–0.95)
DEPRECATED HCO3 PLAS-SCNC: 19 MMOL/L (ref 22–29)
EOSINOPHIL # BLD AUTO: 0.3 10E3/UL (ref 0–0.7)
EOSINOPHIL NFR BLD AUTO: 7 %
ERYTHROCYTE [DISTWIDTH] IN BLOOD BY AUTOMATED COUNT: 14.6 % (ref 10–15)
GFR SERPL CREATININE-BSD FRML MDRD: >90 ML/MIN/1.73M2
GLUCOSE SERPL-MCNC: 163 MG/DL (ref 70–99)
HCT VFR BLD AUTO: 42.8 % (ref 35–47)
HGB BLD-MCNC: 14.2 G/DL (ref 11.7–15.7)
IMM GRANULOCYTES # BLD: 0 10E3/UL
IMM GRANULOCYTES NFR BLD: 1 %
LYMPHOCYTES # BLD AUTO: 0.6 10E3/UL (ref 0.8–5.3)
LYMPHOCYTES NFR BLD AUTO: 16 %
MCH RBC QN AUTO: 29 PG (ref 26.5–33)
MCHC RBC AUTO-ENTMCNC: 33.2 G/DL (ref 31.5–36.5)
MCV RBC AUTO: 87 FL (ref 78–100)
MONOCYTES # BLD AUTO: 0.4 10E3/UL (ref 0–1.3)
MONOCYTES NFR BLD AUTO: 10 %
NEUTROPHILS # BLD AUTO: 2.4 10E3/UL (ref 1.6–8.3)
NEUTROPHILS NFR BLD AUTO: 64 %
NRBC # BLD AUTO: 0 10E3/UL
NRBC BLD AUTO-RTO: 0 /100
PLATELET # BLD AUTO: 125 10E3/UL (ref 150–450)
POTASSIUM SERPL-SCNC: 4.2 MMOL/L (ref 3.4–5.3)
PROT SERPL-MCNC: 6.6 G/DL (ref 6.4–8.3)
RBC # BLD AUTO: 4.9 10E6/UL (ref 3.8–5.2)
SODIUM SERPL-SCNC: 141 MMOL/L (ref 136–145)
TOTAL PROTEIN SERUM FOR ELP: 6.4 G/DL (ref 6.4–8.3)
WBC # BLD AUTO: 3.7 10E3/UL (ref 4–11)

## 2023-06-19 PROCEDURE — 84155 ASSAY OF PROTEIN SERUM: CPT | Mod: 59

## 2023-06-19 PROCEDURE — 36415 COLL VENOUS BLD VENIPUNCTURE: CPT

## 2023-06-19 PROCEDURE — 85025 COMPLETE CBC W/AUTO DIFF WBC: CPT

## 2023-06-19 PROCEDURE — 84165 PROTEIN E-PHORESIS SERUM: CPT

## 2023-06-19 PROCEDURE — 86334 IMMUNOFIX E-PHORESIS SERUM: CPT

## 2023-06-19 PROCEDURE — 82784 ASSAY IGA/IGD/IGG/IGM EACH: CPT

## 2023-06-19 PROCEDURE — 80053 COMPREHEN METABOLIC PANEL: CPT

## 2023-06-19 PROCEDURE — 83521 IG LIGHT CHAINS FREE EACH: CPT

## 2023-06-20 ENCOUNTER — ONCOLOGY VISIT (OUTPATIENT)
Dept: ONCOLOGY | Facility: CLINIC | Age: 68
End: 2023-06-20
Attending: STUDENT IN AN ORGANIZED HEALTH CARE EDUCATION/TRAINING PROGRAM
Payer: COMMERCIAL

## 2023-06-20 VITALS
TEMPERATURE: 98 F | SYSTOLIC BLOOD PRESSURE: 105 MMHG | HEART RATE: 74 BPM | RESPIRATION RATE: 12 BRPM | DIASTOLIC BLOOD PRESSURE: 70 MMHG | OXYGEN SATURATION: 100 % | WEIGHT: 252 LBS | BODY MASS INDEX: 37.21 KG/M2

## 2023-06-20 DIAGNOSIS — C90.01 MULTIPLE MYELOMA IN REMISSION (H): Primary | Chronic | ICD-10-CM

## 2023-06-20 LAB
ALBUMIN SERPL ELPH-MCNC: 4.2 G/DL (ref 3.7–5.1)
ALPHA1 GLOB SERPL ELPH-MCNC: 0.3 G/DL (ref 0.2–0.4)
ALPHA2 GLOB SERPL ELPH-MCNC: 0.6 G/DL (ref 0.5–0.9)
B-GLOBULIN SERPL ELPH-MCNC: 0.7 G/DL (ref 0.6–1)
GAMMA GLOB SERPL ELPH-MCNC: 0.6 G/DL (ref 0.7–1.6)
IGA SERPL-MCNC: 135 MG/DL (ref 84–499)
IGG SERPL-MCNC: 592 MG/DL (ref 610–1616)
IGM SERPL-MCNC: 38 MG/DL (ref 35–242)
KAPPA LC FREE SER-MCNC: 2.63 MG/DL (ref 0.33–1.94)
KAPPA LC FREE/LAMBDA FREE SER NEPH: 1.58 {RATIO} (ref 0.26–1.65)
LAMBDA LC FREE SERPL-MCNC: 1.66 MG/DL (ref 0.57–2.63)
M PROTEIN SERPL ELPH-MCNC: 0.1 G/DL
PROT PATTERN SERPL ELPH-IMP: ABNORMAL
PROT PATTERN SERPL IFE-IMP: NORMAL

## 2023-06-20 PROCEDURE — 99213 OFFICE O/P EST LOW 20 MIN: CPT | Performed by: STUDENT IN AN ORGANIZED HEALTH CARE EDUCATION/TRAINING PROGRAM

## 2023-06-20 PROCEDURE — G0463 HOSPITAL OUTPT CLINIC VISIT: HCPCS | Performed by: STUDENT IN AN ORGANIZED HEALTH CARE EDUCATION/TRAINING PROGRAM

## 2023-06-20 ASSESSMENT — PAIN SCALES - GENERAL: PAINLEVEL: NO PAIN (0)

## 2023-06-20 NOTE — PROGRESS NOTES
ONCOLOGY/HEMATOLOGY PROGRESS NOTE  Jun 20, 2023    Reason for Visit: IgA Kappa Multiple Myeloma    Oncology HPI:   Winter Loya is a 67 year old woman with IgA Kappa Multiple Myeloma. In 2018 she had anemia and was fatigued. She was found to have IgA kappa monocloncal antibody with M-spike of 0.17. IgA elevated. Skeletal survey was unremarkable and UPEP had minimal protein (156 mg/m2). PET 11/2018 showed bone marrow consistent with hypermetabolic plasma cell myeloma. M spike was 0.17. She started RVD and Zometa. On 4/2019 she had an autologous transplant.      Her bone marrow bx from September 2019 was sent here for review. It showed marrow cellularity of 60%, with decreased trilineage hematopoiesis and 15% plasma cells as well as peripheral blood with slight anemia. 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 She is on revlimid maintenance and zometa from her prior oncologist in [place]. On 12/6/19 her K/L ratio is 1.1, M spike is 0.04.     Currently on Revlimid maintenance.    Interval history:   Winter is having fatigue with revlimid.  She also notes her blood sugar monitor not working well for DM2 as it keeps falling off. No new bone pains.       Comprehensive ROS neg other than the symptoms noted above in the HPI.      Past Medical History:   Diagnosis Date     Abnormal EMG 2021 5/25/2021    Interpretation: This is an abnormal study, demonstrating electrophysiologic evidence of a length-dependent axonal sensorimotor polyneuropathy. Asymmetry of peroneal compound muscle action potential amplitudes is a finding of uncertain significance.        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   I have personally reviewed the examination and initial interpretation and I agree with the findings.   ANNE GOODMAN MD     History of MRI of cervical spine 6/2020 3/15/2021    Impression: Multilevel cervical spondylosis, most pronounced at the level of C4-5 and C5-6 with moderate to severe spinal canal stenosis and moderate spinal canal stenosis at C6-7. No abnormal cord signal. No significant neural foraminal narrowing at any level.   I have personally reviewed the examination and initial interpretation and I agree with the findings.   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 100 each 3     aspirin 81 MG EC tablet Take 81 mg by mouth daily       atorvastatin (LIPITOR) 20 MG tablet Take 1 tablet (20 mg) by mouth At Bedtime. 90 tablet 0     blood glucose (NO BRAND SPECIFIED) test strip Use to test blood sugar 3 times daily or as directed. To accompany: Blood Glucose Monitor Brands: per insurance. 100 strip 6     blood glucose calibration (NO BRAND SPECIFIED) solution To accompany: Blood Glucose Monitor Brands: per insurance. 4 each 0     blood glucose monitoring (NO BRAND SPECIFIED) meter device kit Use to test blood sugar 3 times daily or as directed. Preferred blood glucose meter OR supplies to accompany: Blood Glucose Monitor  Brands: per insurance. 1 kit 0     Continuous Blood Gluc  (FREESTYLE SEGUNDO 2 READER) MARCIA Use to read blood sugars as per 's instructions. 1 each 0     Continuous Blood Gluc Sensor (FREESTYLE SEGUNDO 2 SENSOR) [facility] 1 each every 14 days Use 1 sensor every 14 days. Use to read blood sugars per 's instructions. 2 each 5     empagliflozin (JARDIANCE) 25 MG TABS tablet Take 1 tablet (25 mg) by mouth daily 90 tablet 3     insulin glargine (LANTUS SOLOSTAR) 100 UNIT/ML pen Inject 24 Units Subcutaneous At Bedtime Place on file 30 mL 1     insulin pen needle (FIFTY50 PEN NEEDLES) 32G X 4 MM miscellaneous Use one pen needle daily or as directed. 100 each 2     LENalidomide (REVLIMID) 10 MG CAPS capsule Take 1 capsule (10 mg) by mouth daily for 28 days 28 capsule 0     LENalidomide (REVLIMID) 10 MG CAPS capsule Take 1 capsule (10 mg) by mouth daily 28 capsule 0     levothyroxine (SYNTHROID/LEVOTHROID) 200 MCG tablet Take 1 tablet (200 mcg) by mouth every morning (before breakfast) 90 tablet 0     loperamide (IMODIUM) 2 MG capsule Take 4 mg (two capsules) by mouth at onset of loose stools, followed by 2 mg (one capsule) after each loose stool. Maximum: 16 mg (8 capsules) daily 270 capsule 11     losartan (COZAAR) 25 MG tablet TAKE ONE TABLET BY MOUTH ONE TIME DAILY 90 tablet 0     pregabalin (LYRICA) 100 MG capsule Take 1 capsule (100 mg) by mouth 2 times daily 180 capsule 3     propranolol ER (INDERAL LA) 60 MG 24 hr capsule Take 1 capsule (60 mg) by mouth daily 90 capsule 3     Semaglutide, 2 MG/DOSE, (OZEMPIC, 2 MG/DOSE,) 8 MG/3ML pen Inject 2 mg Subcutaneous every 7 days 9 mL 3     sertraline (ZOLOFT) 100 MG tablet Take 1 tablet (100 mg) by mouth daily 90 tablet 0     thin (NO BRAND SPECIFIED) lancets Use with lanceting device. To accompany: Blood Glucose Monitor Brands: per insurance. 200 each 6     LENalidomide (REVLIMID) 10 MG CAPS capsule Take 1 capsule (10 mg) by mouth daily for 28 days 28  capsule 0     tolterodine ER (DETROL LA) 4 MG 24 hr capsule Take 1 capsule (4 mg) by mouth daily (Patient not taking: Reported on 6/20/2023) 60 capsule 0        No Known Allergies    /70   Pulse 74   Temp 98  F (36.7  C) (Oral)   Resp 12   Wt 114.3 kg (252 lb)   SpO2 100%   BMI 37.21 kg/m    Constitutional: NAD  Eyes: no scleral icterus  ENT: no oral lesions  Lymph: no cervical, supraclavicular, axillary LAD  Pulm: CTAB  CV: RRR, no m/r/g  GI: bowel sounds present, soft and nontender to palpation  MSK: No edema in the extremities  Neuro: alert, conversant, normal gait  Psych: appropriate mood and affect      Labs:     Blood Counts       Recent Labs   Lab Test 06/19/23  1054 05/22/23  1018 03/20/23  1411 10/26/22  1443 10/15/22  1332   HGB 14.2 14.3 14.9   < > 13.3   HCT 42.8 42.2 43.9   < > 40.5   WBC 3.7* 3.2* 4.0   < > 3.8*   ANEUTAUTO 2.4 1.9 2.7   < > 3.2   ALYMPAUTO 0.6* 0.7* 0.6*   < > 0.3*   AMONOAUTO 0.4 0.4 0.3   < > 0.3   AEOSAUTO 0.3 0.3 0.2   < > 0.0   ABSBASO 0.1 0.0 0.1   < > 0.1   NRBCMAN 0.0  --  0.0  --  0.0   * 128* 150   < > 104*    < > = values in this interval not displayed.       ABO/RH    No lab results found.      Chemistries     Basic Panel  Recent Labs   Lab Test 06/19/23  1039 05/22/23  1018 03/20/23  1411    141 141   POTASSIUM 4.2 4.1 3.9   CHLORIDE 105 105 105   CO2 19* 24 26   BUN 13.6 9.2 14.9   CR 0.60 0.62 0.72   * 140* 234*        Calcium, Magnesium, Phosphorus  Recent Labs   Lab Test 06/19/23  1039 05/22/23  1018 03/20/23  1411   ASHLEY 9.1 9.4 9.6        LFTs  Recent Labs   Lab Test 06/19/23  1039 05/22/23  1018 03/20/23  1411   BILITOTAL 1.5* 1.8* 2.0*   ALKPHOS 119* 103 144*   AST 23 21 18   ALT 36 31 29   ALBUMIN 4.4 4.5 4.7       LDH  No lab results found.    B2-Microglobulin  Recent Labs   Lab Test 02/18/20  0849   [id number] 1.6           Immunoglobulins     Recent Labs   Lab Test 06/19/23  1049 03/20/23  1411 11/28/22  1558 10/26/22  1443  09/28/22  1449 09/01/22  1522   * 661 732 652 569* 597*       Recent Labs   Lab Test 06/19/23  1049 03/20/23  1411 11/28/22  1558 10/26/22  1443 09/28/22  1449 09/01/22  1522    157 149 131 120 122       Recent Labs   Lab Test 06/19/23  1049 03/20/23  1411 11/28/22  1558 10/26/22  1443 09/28/22  1449 09/01/22  1522   IGM 38 48 58 52 35 34*         Monocloncal Protein Studies     M spike    Recent Labs   Lab Test 06/19/23  1103 05/22/23  1018 03/20/23  1411 02/22/23  0939 02/02/23  0958 11/28/22  1558   ELPM 0.1* 0.0 0.0 0.0 0.0 0.0       [place] FLC    Recent Labs   Lab Test 06/19/23  1049 03/20/23  1411 11/28/22  1558 10/26/22  1443 09/28/22  1449 09/01/22  1522   KFLCA 2.63* 3.41* 2.63* 4.97* 2.21* 2.12*       Lambda FLC    Recent Labs   Lab Test 06/19/23  1049 03/20/23  1411 11/28/22  1558 10/26/22  1443 09/28/22  1449 09/01/22  1522   LFLCA 1.66 1.70 1.45 1.57 1.41 1.63       FLC Ratio    Recent Labs   Lab Test 06/19/23  1049 03/20/23  1411 11/28/22  1558 10/26/22  1443 09/28/22  1449 09/01/22  1522   KLRA 1.58 2.01* 1.81* 3.17* 1.57 1.30       Assessment/plan:   Winter Loya is a 67 year old woman with standard risk IgG kappa multiple myeloma, s/p post auto-transplant in April 2019,  currently on lenalidomide maintenance. She has an M spike of 0.1 this month which is new. I informed her that we will reassess in 4 weeks and determine if she is beginning to experience relapse. We briefly discussed that if she relapses in the near future that she would likely be a candidate for Monumental-3.  For now I will not dose reduce revlimid as I am concerned it would speed relapse.     Brit Zhu MD PhD

## 2023-06-20 NOTE — NURSING NOTE
"Oncology Rooming Note    June 20, 2023 2:52 PM   Winter Loya is a 67 year old female who presents for:    Chief Complaint   Patient presents with     Oncology Clinic Visit     Multiple myeloma      Initial Vitals: /70   Pulse 74   Temp 98  F (36.7  C) (Oral)   Resp 12   Wt 114.3 kg (252 lb)   SpO2 100%   BMI 37.21 kg/m   Estimated body mass index is 37.21 kg/m  as calculated from the following:    Height as of 1/31/23: 1.753 m (5' 9\").    Weight as of this encounter: 114.3 kg (252 lb). Body surface area is 2.36 meters squared.  No Pain (0) Comment: Data Unavailable   No LMP recorded. Patient is postmenopausal.  Allergies reviewed: Yes  Medications reviewed: Yes    Medications: Medication refills not needed today.  Pharmacy name entered into [employer]:    [id number] [address] [facility] - [place], [place] - [address]    Clinical concerns:  none      Mikala Singleton            "

## 2023-06-20 NOTE — LETTER
6/20/2023         RE: Winter Loya  [address]  [facility] [address]        Dear Colleague,    Thank you for referring your patient, Winter Loya, to the [facility]. Please see a copy of my visit note below.    ONCOLOGY/HEMATOLOGY PROGRESS NOTE  Jun 20, 2023    Reason for Visit: IgA Kappa Multiple Myeloma    Oncology HPI:   Winter Loya is a 67 year old woman with IgA Kappa Multiple Myeloma. In 2018 she had anemia and was fatigued. She was found to have IgA kappa monocloncal antibody with M-spike of 0.17. IgA elevated. Skeletal survey was unremarkable and UPEP had minimal protein (156 mg/m2). PET 11/2018 showed bone marrow consistent with hypermetabolic plasma cell myeloma. M spike was 0.17. She started RVD and Zometa. On 4/2019 she had an autologous transplant.      Her bone marrow bx from September 2019 was sent here for review. It showed marrow cellularity of 60%, with decreased trilineage hematopoiesis and 15% plasma cells as well as peripheral blood with slight anemia. 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 She is on revlimid maintenance and zometa from her prior oncologist in [place]. On 12/6/19 her K/L ratio is 1.1, M spike is 0.04.     Currently on Revlimid maintenance.    Interval history:   Winter is having fatigue with revlimid.  She also notes her blood sugar monitor not working well for DM2 as it keeps falling off. No new bone pains.       Comprehensive ROS neg other than the symptoms noted above in the HPI.      Past Medical History:   Diagnosis Date    Abnormal EMG 2021 5/25/2021    Interpretation: This is an abnormal study, demonstrating electrophysiologic evidence of a length-dependent axonal sensorimotor polyneuropathy. Asymmetry of peroneal compound muscle action potential amplitudes is a finding of uncertain significance.        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   I have personally reviewed the examination and initial interpretation and I agree with the findings.   ANNE GOODMAN MD    History of MRI of cervical spine 6/2020 3/15/2021    Impression: Multilevel cervical spondylosis, most pronounced at the level of C4-5 and C5-6 with moderate to severe spinal canal stenosis and moderate spinal canal stenosis at C6-7. No abnormal cord signal. No significant neural foraminal narrowing at any level.   I have personally reviewed the examination and initial interpretation and I agree with the findings.   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 100 each 3    aspirin 81 MG EC tablet Take 81 mg by mouth daily      atorvastatin (LIPITOR) 20 MG tablet Take 1 tablet (20 mg) by mouth At Bedtime. 90 tablet 0    blood glucose (NO BRAND SPECIFIED) test strip Use to test blood sugar 3 times daily or as directed. To accompany: Blood Glucose Monitor Brands: per insurance. 100 strip 6    blood glucose calibration (NO BRAND SPECIFIED) solution To accompany: Blood Glucose Monitor Brands:  per insurance. 4 each 0    blood glucose monitoring (NO BRAND SPECIFIED) meter device kit Use to test blood sugar 3 times daily or as directed. Preferred blood glucose meter OR supplies to accompany: Blood Glucose Monitor Brands: per insurance. 1 kit 0    Continuous Blood Gluc  (FREESTYLE SEGUNDO 2 READER) MARCIA Use to read blood sugars as per 's instructions. 1 each 0    Continuous Blood Gluc Sensor (FREESTYLE SEGUNDO 2 SENSOR) MISC 1 each every 14 days Use 1 sensor every 14 days. Use to read blood sugars per 's instructions. 2 each 5    empagliflozin (JARDIANCE) 25 MG TABS tablet Take 1 tablet (25 mg) by mouth daily 90 tablet 3    insulin glargine (LANTUS SOLOSTAR) 100 UNIT/ML pen Inject 24 Units Subcutaneous At Bedtime Place on file 30 mL 1    insulin pen needle (FIFTY50 PEN NEEDLES) 32G X 4 MM miscellaneous Use one pen needle daily or as directed. 100 each 2    LENalidomide (REVLIMID) 10 MG CAPS capsule Take 1 capsule (10 mg) by mouth daily for 28 days 28 capsule 0    LENalidomide (REVLIMID) 10 MG CAPS capsule Take 1 capsule (10 mg) by mouth daily 28 capsule 0    levothyroxine (SYNTHROID/LEVOTHROID) 200 MCG tablet Take 1 tablet (200 mcg) by mouth every morning (before breakfast) 90 tablet 0    loperamide (IMODIUM) 2 MG capsule Take 4 mg (two capsules) by mouth at onset of loose stools, followed by 2 mg (one capsule) after each loose stool. Maximum: 16 mg (8 capsules) daily 270 capsule 11    losartan (COZAAR) 25 MG tablet TAKE ONE TABLET BY MOUTH ONE TIME DAILY 90 tablet 0    pregabalin (LYRICA) 100 MG capsule Take 1 capsule (100 mg) by mouth 2 times daily 180 capsule 3    propranolol ER (INDERAL LA) 60 MG 24 hr capsule Take 1 capsule (60 mg) by mouth daily 90 capsule 3    Semaglutide, 2 MG/DOSE, (OZEMPIC, 2 MG/DOSE,) 8 MG/3ML pen Inject 2 mg Subcutaneous every 7 days 9 mL 3    sertraline (ZOLOFT) 100 MG tablet Take 1 tablet (100 mg) by mouth daily 90 tablet 0    thin (NO BRAND  SPECIFIED) lancets Use with lanceting device. To accompany: Blood Glucose Monitor Brands: per insurance. 200 each 6    LENalidomide (REVLIMID) 10 MG CAPS capsule Take 1 capsule (10 mg) by mouth daily for 28 days 28 capsule 0    tolterodine ER (DETROL LA) 4 MG 24 hr capsule Take 1 capsule (4 mg) by mouth daily (Patient not taking: Reported on 6/20/2023) 60 capsule 0        No Known Allergies    /70   Pulse 74   Temp 98  F (36.7  C) (Oral)   Resp 12   Wt 114.3 kg (252 lb)   SpO2 100%   BMI 37.21 kg/m    Constitutional: NAD  Eyes: no scleral icterus  ENT: no oral lesions  Lymph: no cervical, supraclavicular, axillary LAD  Pulm: CTAB  CV: RRR, no m/r/g  GI: bowel sounds present, soft and nontender to palpation  MSK: No edema in the extremities  Neuro: alert, conversant, normal gait  Psych: appropriate mood and affect      Labs:     Blood Counts       Recent Labs   Lab Test 06/19/23  1054 05/22/23  1018 03/20/23  1411 10/26/22  1443 10/15/22  1332   HGB 14.2 14.3 14.9   < > 13.3   HCT 42.8 42.2 43.9   < > 40.5   WBC 3.7* 3.2* 4.0   < > 3.8*   ANEUTAUTO 2.4 1.9 2.7   < > 3.2   ALYMPAUTO 0.6* 0.7* 0.6*   < > 0.3*   AMONOAUTO 0.4 0.4 0.3   < > 0.3   AEOSAUTO 0.3 0.3 0.2   < > 0.0   ABSBASO 0.1 0.0 0.1   < > 0.1   NRBCMAN 0.0  --  0.0  --  0.0   * 128* 150   < > 104*    < > = values in this interval not displayed.       ABO/RH    No lab results found.      Chemistries     Basic Panel  Recent Labs   Lab Test 06/19/23  1039 05/22/23  1018 03/20/23  1411    141 141   POTASSIUM 4.2 4.1 3.9   CHLORIDE 105 105 105   CO2 19* 24 26   BUN 13.6 9.2 14.9   CR 0.60 0.62 0.72   * 140* 234*        Calcium, Magnesium, Phosphorus  Recent Labs   Lab Test 06/19/23  1039 05/22/23  1018 03/20/23  1411   ASHLEY 9.1 9.4 9.6        LFTs  Recent Labs   Lab Test 06/19/23  1039 05/22/23  1018 03/20/23  1411   BILITOTAL 1.5* 1.8* 2.0*   ALKPHOS 119* 103 144*   AST 23 21 18   ALT 36 31 29   ALBUMIN 4.4 4.5 4.7       LDH  No  lab results found.    B2-Microglobulin  Recent Labs   Lab Test 02/18/20  0849   [id number] 1.6           Immunoglobulins     Recent Labs   Lab Test 06/19/23  1049 03/20/23  1411 11/28/22  1558 10/26/22  1443 09/28/22  1449 09/01/22  1522   * 661 732 652 569* 597*       Recent Labs   Lab Test 06/19/23  1049 03/20/23  1411 11/28/22  1558 10/26/22  1443 09/28/22  1449 09/01/22  1522    157 149 131 120 122       Recent Labs   Lab Test 06/19/23  1049 03/20/23  1411 11/28/22  1558 10/26/22  1443 09/28/22  1449 09/01/22  1522   IGM 38 48 58 52 35 34*         Monocloncal Protein Studies     M spike    Recent Labs   Lab Test 06/19/23  1103 05/22/23  1018 03/20/23  1411 02/22/23  0939 02/02/23  0958 11/28/22  1558   ELPM 0.1* 0.0 0.0 0.0 0.0 0.0       [place] FLC    Recent Labs   Lab Test 06/19/23  1049 03/20/23  1411 11/28/22  1558 10/26/22  1443 09/28/22  1449 09/01/22  1522   KFLCA 2.63* 3.41* 2.63* 4.97* 2.21* 2.12*       Lambda FLC    Recent Labs   Lab Test 06/19/23  1049 03/20/23  1411 11/28/22  1558 10/26/22  1443 09/28/22  1449 09/01/22  1522   LFLCA 1.66 1.70 1.45 1.57 1.41 1.63       FLC Ratio    Recent Labs   Lab Test 06/19/23  1049 03/20/23  1411 11/28/22  1558 10/26/22  1443 09/28/22  1449 09/01/22  1522   KLRA 1.58 2.01* 1.81* 3.17* 1.57 1.30       Assessment/plan:   Winetr Loya is a 67 year old woman with standard risk IgG kappa multiple myeloma, s/p post auto-transplant in April 2019,  currently on lenalidomide maintenance. She has an M spike of 0.1 this month which is new. I informed her that we will reassess in 4 weeks and determine if she is beginning to experience relapse. We briefly discussed that if she relapses in the near future that she would likely be a candidate for Monumental-3.  For now I will not dose reduce revlimid as I am concerned it would speed relapse.     Brit Zhu MD PhD

## 2023-06-26 ENCOUNTER — OFFICE VISIT (OUTPATIENT)
Dept: PSYCHOLOGY | Facility: CLINIC | Age: 68
End: 2023-06-26
Payer: COMMERCIAL

## 2023-06-26 ENCOUNTER — OFFICE VISIT (OUTPATIENT)
Dept: FAMILY MEDICINE | Facility: CLINIC | Age: 68
End: 2023-06-26
Payer: COMMERCIAL

## 2023-06-26 VITALS
WEIGHT: 249.2 LBS | HEART RATE: 69 BPM | OXYGEN SATURATION: 97 % | SYSTOLIC BLOOD PRESSURE: 101 MMHG | BODY MASS INDEX: 36.8 KG/M2 | DIASTOLIC BLOOD PRESSURE: 69 MMHG

## 2023-06-26 DIAGNOSIS — K52.9 CHRONIC DIARRHEA: ICD-10-CM

## 2023-06-26 DIAGNOSIS — F32.9 MAJOR DEPRESSIVE DISORDER WITH CURRENT ACTIVE EPISODE, UNSPECIFIED DEPRESSION EPISODE SEVERITY, UNSPECIFIED WHETHER RECURRENT: ICD-10-CM

## 2023-06-26 DIAGNOSIS — Z79.4 TYPE 2 DIABETES MELLITUS WITH DIABETIC POLYNEUROPATHY, WITH LONG-TERM CURRENT USE OF INSULIN (H): Primary | ICD-10-CM

## 2023-06-26 DIAGNOSIS — C90.01 MULTIPLE MYELOMA IN REMISSION (H): ICD-10-CM

## 2023-06-26 DIAGNOSIS — E03.9 HYPOTHYROIDISM, UNSPECIFIED TYPE: ICD-10-CM

## 2023-06-26 DIAGNOSIS — E11.42 TYPE 2 DIABETES MELLITUS WITH DIABETIC POLYNEUROPATHY, WITH LONG-TERM CURRENT USE OF INSULIN (H): Primary | ICD-10-CM

## 2023-06-26 DIAGNOSIS — F33.1 MAJOR DEPRESSIVE DISORDER, RECURRENT EPISODE, MODERATE WITH ANXIOUS DISTRESS (H): Primary | ICD-10-CM

## 2023-06-26 DIAGNOSIS — Z78.0 ASYMPTOMATIC MENOPAUSAL STATE: ICD-10-CM

## 2023-06-26 PROCEDURE — 99214 OFFICE O/P EST MOD 30 MIN: CPT | Performed by: PHYSICIAN ASSISTANT

## 2023-06-26 PROCEDURE — 90834 PSYTX W PT 45 MINUTES: CPT | Performed by: STUDENT IN AN ORGANIZED HEALTH CARE EDUCATION/TRAINING PROGRAM

## 2023-06-26 RX ORDER — SERTRALINE HYDROCHLORIDE 100 MG/1
100 TABLET, FILM COATED ORAL DAILY
Qty: 90 TABLET | Refills: 3 | Status: SHIPPED | OUTPATIENT
Start: 2023-06-26 | End: 2024-03-14

## 2023-06-26 RX ORDER — ACYCLOVIR 400 MG/1
TABLET ORAL
Qty: 180 TABLET | Refills: 3 | Status: SHIPPED | OUTPATIENT
Start: 2023-06-26 | End: 2024-08-14

## 2023-06-26 RX ORDER — ATORVASTATIN CALCIUM 20 MG/1
20 TABLET, FILM COATED ORAL AT BEDTIME
Qty: 90 TABLET | Refills: 3 | Status: SHIPPED | OUTPATIENT
Start: 2023-06-26 | End: 2024-03-14

## 2023-06-26 RX ORDER — LOSARTAN POTASSIUM 25 MG/1
25 TABLET ORAL DAILY
Qty: 90 TABLET | Refills: 3 | Status: SHIPPED | OUTPATIENT
Start: 2023-06-26 | End: 2024-03-14

## 2023-06-26 RX ORDER — LEVOTHYROXINE SODIUM 200 UG/1
200 TABLET ORAL
Qty: 90 TABLET | Refills: 3 | Status: SHIPPED | OUTPATIENT
Start: 2023-06-26 | End: 2024-03-14

## 2023-06-26 ASSESSMENT — ANXIETY QUESTIONNAIRES
2. NOT BEING ABLE TO STOP OR CONTROL WORRYING: NEARLY EVERY DAY
4. TROUBLE RELAXING: SEVERAL DAYS
GAD7 TOTAL SCORE: 15
7. FEELING AFRAID AS IF SOMETHING AWFUL MIGHT HAPPEN: NEARLY EVERY DAY
6. BECOMING EASILY ANNOYED OR IRRITABLE: SEVERAL DAYS
GAD7 TOTAL SCORE: 15
1. FEELING NERVOUS, ANXIOUS, OR ON EDGE: NEARLY EVERY DAY
GAD7 TOTAL SCORE: 15
7. FEELING AFRAID AS IF SOMETHING AWFUL MIGHT HAPPEN: NEARLY EVERY DAY
3. WORRYING TOO MUCH ABOUT DIFFERENT THINGS: NEARLY EVERY DAY
8. IF YOU CHECKED OFF ANY PROBLEMS, HOW DIFFICULT HAVE THESE MADE IT FOR YOU TO DO YOUR WORK, TAKE CARE OF THINGS AT HOME, OR GET ALONG WITH OTHER PEOPLE?: SOMEWHAT DIFFICULT
5. BEING SO RESTLESS THAT IT IS HARD TO SIT STILL: SEVERAL DAYS
IF YOU CHECKED OFF ANY PROBLEMS ON THIS QUESTIONNAIRE, HOW DIFFICULT HAVE THESE PROBLEMS MADE IT FOR YOU TO DO YOUR WORK, TAKE CARE OF THINGS AT HOME, OR GET ALONG WITH OTHER PEOPLE: SOMEWHAT DIFFICULT

## 2023-06-26 ASSESSMENT — PATIENT HEALTH QUESTIONNAIRE - PHQ9
SUM OF ALL RESPONSES TO PHQ QUESTIONS 1-9: 20
10. IF YOU CHECKED OFF ANY PROBLEMS, HOW DIFFICULT HAVE THESE PROBLEMS MADE IT FOR YOU TO DO YOUR WORK, TAKE CARE OF THINGS AT HOME, OR GET ALONG WITH OTHER PEOPLE: SOMEWHAT DIFFICULT
SUM OF ALL RESPONSES TO PHQ QUESTIONS 1-9: 20
10. IF YOU CHECKED OFF ANY PROBLEMS, HOW DIFFICULT HAVE THESE PROBLEMS MADE IT FOR YOU TO DO YOUR WORK, TAKE CARE OF THINGS AT HOME, OR GET ALONG WITH OTHER PEOPLE: SOMEWHAT DIFFICULT

## 2023-06-26 NOTE — PATIENT INSTRUCTIONS
Crisis Text Line  http://www.crisistextline.org     The Crisis Text Line serves anyone, in any type of crisis, providing access to free, 24/7 support and information via the medium people already use and trust:     Here's how it works:  Text [phone] from anywhere in the [place], anytime, about any type of crisis.  A live, trained Crisis Counselor receives the text and responds quickly.  The volunteer Crisis Counselor will help you move from a 'hot moment to a cool moment'

## 2023-06-26 NOTE — PROGRESS NOTES
Assessment & Plan     Type 2 diabetes mellitus with diabetic polyneuropathy, with long-term current use of insulin (H)  Refilled. Stable.   - losartan (COZAAR) 25 MG tablet; Take 1 tablet (25 mg) by mouth daily  - empagliflozin (JARDIANCE) 25 MG TABS tablet; Take 1 tablet (25 mg) by mouth daily  - atorvastatin (LIPITOR) 20 MG tablet; Take 1 tablet (20 mg) by mouth At Bedtime    Major depressive disorder with current active episode, unspecified depression episode severity, unspecified whether recurrent  Refilled.   - sertraline (ZOLOFT) 100 MG tablet; Take 1 tablet (100 mg) by mouth daily    Hypothyroidism, unspecified type  Refilled.   - levothyroxine (SYNTHROID/LEVOTHROID) 200 MCG tablet; Take 1 tablet (200 mcg) by mouth every morning (before breakfast)    Multiple myeloma in remission (H)  Refilled.   - acyclovir (ZOVIRAX) 400 MG tablet; TAKE ONE TABLET BY MOUTH EVERY TWELVE HOURS    Chronic diarrhea  Can consider seeing [facility]. Referral placed.   - Adult GI  Referral - Consult Only; Future    Asymptomatic menopausal state  Due for dexa.   - DEXA HIP/PELVIS/SPINE - Future; Future             Depression Screening Follow Up        6/26/2023     7:39 AM   PHQ   PHQ-9 Total Score 20    20   Q9: Thoughts of better off dead/self-harm past 2 weeks More than half the days    More than half the days   F/U: Thoughts of suicide or self-harm No    No   F/U: Safety concerns No    No         Follow Up      Follow Up Actions Taken  Crisis resource information provided in the After Visit Summary    Discussed the following ways the patient can remain in a safe environment:  be around others      Montse Bucio PA-C  [facility] Max is a 67 year old, presenting for the following health issues:  Diabetes        6/26/2023     7:52 AM   Additional Questions   Roomed by yanni     History of Present Illness       Mental Health Follow-up:  Patient presents to follow-up on Depression &  Anxiety.Patient's depression since last visit has been:  Worse  The patient is having other symptoms associated with depression.  Patient's anxiety since last visit has been:  Worse  The patient is having other symptoms associated with anxiety.  Any significant life events: relationship concerns, financial concerns, grief or loss, health concerns and other  Patient is not feeling anxious or having panic attacks.  Patient has no concerns about alcohol or drug use.    Diabetes:   She presents for follow up of diabetes.  She is checking home blood glucose with a continuous glucose monitor.  She checks blood glucose after meals.  Blood glucose is sometimes over 200 and never under 70. She is aware of hypoglycemia symptoms including weakness and lethargy. She is concerned about other. She is having numbness in feet, excessive thirst and weight gain.         She eats 2-3 servings of fruits and vegetables daily.She consumes 0 sweetened beverage(s) daily.She exercises with enough effort to increase her heart rate 20 to 29 minutes per day.  She exercises with enough effort to increase her heart rate 3 or less days per week.   She is taking medications regularly.    Today's PHQ-9         PHQ-9 Total Score: 20    PHQ-9 Q9 Thoughts of better off dead/self-harm past 2 weeks :   More than half the days  Thoughts of suicide or self harm: (P) No  Self-harm Plan:     Self-harm Action:       Safety concerns for self or others: (P) No    How difficult have these problems made it for you to do your work, take care of things at home, or get along with other people: Somewhat difficult  Today's NA-7 Score: 15     Continued diarrhea.     Has a lot of depression. Difficulty with son.         11/28/2022     2:00 PM 2/2/2023     8:42 AM 6/26/2023     7:39 AM   PHQ   PHQ-9 Total Score 18 9 20    20   Q9: Thoughts of better off dead/self-harm past 2 weeks Not at all Several days More than half the days    More than half the days   F/U:  Thoughts of suicide or self-harm  No No    No   F/U: Safety concerns  No No    No                 Review of Systems   Constitutional, HEENT, cardiovascular, pulmonary, gi and gu systems are negative, except as otherwise noted.      Objective    /69 (BP Location: Right arm, Patient Position: Sitting, Cuff Size: Adult Large)   Pulse 69   Wt 113 kg (249 lb 3.2 oz)   SpO2 97%   BMI 36.80 kg/m    Body mass index is 36.8 kg/m .  Physical Exam   GENERAL: healthy, alert and no distress  NECK: no adenopathy, no asymmetry, masses, or scars and thyroid normal to palpation  RESP: lungs clear to auscultation - no rales, rhonchi or wheezes  CV: regular rate and rhythm, normal S1 S2, no S3 or S4, no murmur, click or rub, no peripheral edema and peripheral pulses strong  ABDOMEN: soft, nontender, no hepatosplenomegaly, no masses and bowel sounds normal  MS: no gross musculoskeletal defects noted, no edema

## 2023-06-26 NOTE — PROGRESS NOTES
[facility]                                     Progress Note    Patient Name: Winter Loya  Date: 06/26/2023         Service Type: Individual      Session Start Time: 09.00  Session End Time: 9.45     Session Length: 45 mns    Session #: 24    Attendees: Client attended alone    Service Modality:  In-person    DATA  Interactive Complexity: No  Crisis: No      Progress Since Last Session (Related to Symptoms / Goals / Homework):   Symptoms: Pt reported no change from previous session    Homework: Achieved / completed to satisfaction       Episode of Care Goals: Minimal progress - ACTION (Actively working towards change); Intervened by reinforcing change plan / affirming steps taken     Current / Ongoing Stressors and Concerns:   Ongoing stressors: Financial stress, stressful filial relationship/ Difficulty with assertiveness     Current: Pt reported continuous frustration and resentment for 37 year old son who is living with her and unwilling to contribute for rent and utility. Patient reported feeling stuck and in a vicious [place] of  living and cannot move out because of her financial limitations. Patient reported feeling scared to address current problems with son because of his reactivity.                   Treatment Objective(s) Addressed in This Session:    Client will use one boundary management skill (i.e., direct communication, saying no to a request) by next session.   Patient will identify specific areas of cognitive distortion and challenge irrational thoughts with reality.      Intervention:     MI Intervention: Expressed Empathy/Understanding, Supported Autonomy, Collaboration, Evocation, Permission to raise concern or advise, Open-ended questions and Reflections: simple and complex                               Discussed ways to assertively communicate thoughts and feelings. Encouraged patient to created healthy boundaries with son.           11/28/2022     2:00 PM 2/2/2023      8:42 AM 6/26/2023     7:39 AM   PHQ   PHQ-9 Total Score 18 9 20    20   Q9: Thoughts of better off dead/self-harm past 2 weeks Not at all Several days More than half the days    More than half the days   F/U: Thoughts of suicide or self-harm  No No    No   F/U: Safety concerns  No No    No           ASSESSMENT: Current Emotional / Mental Status (status of significant symptoms):   Risk status (Self / Other harm or suicidal ideation)   Patient denies current fears or concerns for personal safety.   Patient denies current or recent suicidal ideation or behaviors.   Patient denies current or recent homicidal ideation or behaviors.   Patient denies current or recent self injurious behavior or ideation.   Patient denies other safety concerns.   Patient reports there has been no change in risk factors since their last session.     Patient reports there has been no change in protective factors since their last session.     Recommended that patient call 911 or go to the local ED should there be a change in any of these risk factors.     Appearance:   Appropriate    Eye Contact:   Good    Psychomotor Behavior: Normal    Attitude:   Cooperative  Interested Friendly   Orientation:   Person Place Time Situation   Speech    Rate / Production: Normal/ Responsive    Volume:  Normal    Mood:    Anxious  Depressed    Affect:    Worrisome    Thought Content:  Clear    Thought Form:  Coherent    Insight:    Good      Medication Review:   No changes to current psychiatric medication(s)     Medication Compliance:   Yes     Changes in Health Issues:   None reported     Chemical Use Review:   Substance Use: Chemical use reviewed, no active concerns identified      Tobacco Use: No current tobacco use.      Diagnosis:    296.32 (F33.1) Major Depressive Disorder, Recurrent Episode, Moderate _ and With anxious distress    Collateral Reports Completed:   Not Applicable    PLAN: (Patient Tasks / Therapist Tasks / Other)    Create healthy  "boundaries with son and assertively communicate  concerns, fears, and preferences to daily.     Mj Cline [facility]                   ----- Service Performed and Documented by [facility]------  This note was reviewed and clinical supervision by AMY Ayala [facility]    6/27/2023   ______________________________________________________________________    Individual Treatment Plan    Patient's Name: Winter Loya  YOB: 1955    Date of Creation: 04/18/2022  Date Treatment Plan Last Reviewed/Revised: 02/13/2023    DSM5 Diagnoses: 296.32 (F33.1) Major Depressive Disorder, Recurrent Episode, Moderate _ and With anxious distress  Psychosocial / Contextual Factors:   PROMIS (reviewed every 90 days):     Referral / Collaboration:  Referral to another professional/service is not indicated at this time..    Anticipated number of session for this episode of care: 15-25  Anticipation frequency of session: Every other week  Anticipated Duration of each session: 38-52 minutes  Treatment plan will be reviewed in 90 days or when goals have been changed.       MeasurableTreatment Goal(s) related to diagnosis / functional impairment(s)    Goal 1: Patient will identify and address specific triggers to feelings of depression and improve coping with depression by  90 % in the next three months.    I will know I've met my goal when I can comfortably manage the daily stressors I have and be less depressed\"    Objective #A (Patient Action)     Patient will  openly communicate  concerns, fears, and preferences to son daily   Status: Continued - Date(s):  09/26/2023  Intervention(s)  Therapist will provide psychoeducation, behavioral activation, and cognitive restructuring.    Objective #A (Patient Action)    Patient will take 15-20 minutes daily walks and spend 1 hour daily completing household chores.  Status: Continued - Date(s):  09/26/2023  Intervention(s)  Therapist will provide psychoeducation, behavioral activation, and " cognitive restructuring.    Objective #B  Patient will identify specific areas of cognitive distortion and challenge irrational thoughts with reality.   Status: Continued - Date(s):     09/26/2023  Intervention(s)   Therapist will provide psychoeducation, behavioral activation, and cognitive restructuring.      Objective #C  Patient will learn and practice relaxation techniques to manage anxiety.  Status: Continued - Date(s):    09/26/2023  Intervention(s)   Therapist will provide psychoeducation, behavioral activation, and cognitive restructuring.    Patient has reviewed and agreed to the above plan.      CHERISE Castellon  April 18, 2022  Answers for HPI/ROS submitted by the patient on 6/26/2023  If you checked off any problems, how difficult have these problems made it for you to do your work, take care of things at home, or get along with other people?: Somewhat difficult  PHQ9 TOTAL SCORE: 20

## 2023-06-27 DIAGNOSIS — E11.42 TYPE 2 DIABETES MELLITUS WITH DIABETIC POLYNEUROPATHY, WITH LONG-TERM CURRENT USE OF INSULIN (H): ICD-10-CM

## 2023-06-27 DIAGNOSIS — Z79.4 TYPE 2 DIABETES MELLITUS WITH DIABETIC POLYNEUROPATHY, WITH LONG-TERM CURRENT USE OF INSULIN (H): ICD-10-CM

## 2023-06-27 RX ORDER — SEMAGLUTIDE 2.68 MG/ML
2 INJECTION, SOLUTION SUBCUTANEOUS
Qty: 9 ML | Refills: 3 | Status: CANCELLED | OUTPATIENT
Start: 2023-06-27

## 2023-06-27 NOTE — TELEPHONE ENCOUNTER
This medication was prescribed by Anjelica Corrigan [facility] under Montse Bucio for one year in March 2023.

## 2023-06-28 ENCOUNTER — PATIENT OUTREACH (OUTPATIENT)
Dept: GERIATRIC MEDICINE | Facility: CLINIC | Age: 68
End: 2023-06-28
Payer: COMMERCIAL

## 2023-06-28 NOTE — PROGRESS NOTES
6/28/23 CC received an email from Winter Salgado know of any eating issues sippory groups,    How can i find out about possible help with gym membersips.    I also dont know what to do to communicate with group who sent incondtinence dupplies.  How do i let them know what i need/dont need?    Thanks            CC responded:  Eating issues support groups would depend on what the issue is. If its overeating there is Overeaters Anonymous you can find support groups online.     For Gym Memberships [facility] has the [employer] fitness program. I have included the information on how to start with One [employer].    What would you like changed with the incontinent products that were delivered? I can help with that or if you want to contact [facility] directly to discuss what they have available the number is [phone]    Corrina Cevallos RN BA N  [facility] Case Management  [phone]

## 2023-06-30 ENCOUNTER — VIRTUAL VISIT (OUTPATIENT)
Dept: PHARMACY | Facility: CLINIC | Age: 68
End: 2023-06-30
Payer: COMMERCIAL

## 2023-06-30 DIAGNOSIS — F33.1 MAJOR DEPRESSIVE DISORDER, RECURRENT EPISODE, MODERATE (H): ICD-10-CM

## 2023-06-30 DIAGNOSIS — Z79.4 TYPE 2 DIABETES MELLITUS WITH DIABETIC POLYNEUROPATHY, WITH LONG-TERM CURRENT USE OF INSULIN (H): Primary | ICD-10-CM

## 2023-06-30 DIAGNOSIS — E11.42 TYPE 2 DIABETES MELLITUS WITH DIABETIC POLYNEUROPATHY, WITH LONG-TERM CURRENT USE OF INSULIN (H): ICD-10-CM

## 2023-06-30 DIAGNOSIS — Z79.4 TYPE 2 DIABETES MELLITUS WITH DIABETIC POLYNEUROPATHY, WITH LONG-TERM CURRENT USE OF INSULIN (H): ICD-10-CM

## 2023-06-30 DIAGNOSIS — E11.42 TYPE 2 DIABETES MELLITUS WITH DIABETIC POLYNEUROPATHY, WITH LONG-TERM CURRENT USE OF INSULIN (H): Primary | ICD-10-CM

## 2023-06-30 PROCEDURE — 99607 MTMS BY PHARM ADDL 15 MIN: CPT | Performed by: PHARMACIST

## 2023-06-30 PROCEDURE — 99605 MTMS BY PHARM NP 15 MIN: CPT | Performed by: PHARMACIST

## 2023-06-30 RX ORDER — SEMAGLUTIDE 2.68 MG/ML
2 INJECTION, SOLUTION SUBCUTANEOUS
Qty: 9 ML | Refills: 3 | Status: SHIPPED | OUTPATIENT
Start: 2023-06-30 | End: 2024-02-21

## 2023-06-30 NOTE — Clinical Note
CE DAWSON note, thanks!  Vonnie Corrigan, PharmD Medication Therapy Management Pharmacist [phone]

## 2023-06-30 NOTE — PROGRESS NOTES
Medication Therapy Management (MTM) Encounter    ASSESSMENT:                            Medication Adherence/Access: No issues identified    Type 2 Diabetes/Weight loss:  Stable. Patient is meeting A1c goal of < 7%. Discussed portion sizes and ways to help анна stick.    Depression: Could consider increasing sertaline dose in future.    PLAN:                            Some options to help the Freestyle Анна sensor adhere better include:    1. Tegaderm film over the sensor (will have to cut hole to allow center of Анна sensor to be exposed to air).   2. Skin Tac - rub this on the skin before placing sensor (use alcohol and dry area before using).  3. GrifGrips - can place over sensor    May be able to find some of these options at a pharmacy, options 2 and 3 may be easier to find online.    Follow-up: Return in about 4 weeks (around 7/28/2023) for Medication Therapy Management.    SUBJECTIVE/OBJECTIVE:                          Winter Loya is a 67 year old female called for a follow-up visit.  Today's visit is a follow-up MTM visit from 5/19/23.     Reason for visit: diabetes follow-up.    Allergies/ADRs: Reviewed in chart  Past Medical History: Reviewed in chart  Tobacco: She reports that she quit smoking about 18 years ago. Her smoking use included cigarettes. She smoked an average of 1 pack per day. She has never used smokeless tobacco.  Alcohol: Less than 1 beverage / month  Caffeine: 1 diet coke/day and 2 cups coffee/day    Medication Adherence/Access:    - sometimes forgetting evening medications  The patient fills medications at [facility]: NO, fills medications at [facility] and [facility] for Revlimid.    Type 2 Diabetes/Weight loss:    Lantus 24 units at bedtime   Ozempic 2 mg weekly (Tuesdays)  Jardiance 25 mg daily     Still sick to her stomach sometimes, but manageable, hasn't thrown up in over a month, was only a few times that she did. She also does have some heartburn, unsure if it's related to Ozempic or not,  she notes she doesn't have the best eating habits.   Анна 2 keeps falling off - she'll contact  for replacement, also received some covers today in the mail to try.     Patient is experiencing the following side effects: as above, diarrhea - nobody knows why she has this. She has done a trial off of metformin with Delmi Englishine without improvement in diarrhea, has since stopped this. Diarrhea is not worse on Ozempic 2 mg so far.  Medication History:  Has been on Trulicity in the past, tolerated well. Metformin as above.   Blood sugar monitorins, unless she has ice cream  2 hours before meal (vs 2 hours after): 191  Symptoms of low blood sugar? none  Symptoms of high blood sugar? none  Eye exam: up to date  Foot exam: up to date  Diet/Exercise: Looking into getting into a gym, likes to walk, bike and swim.  Aspirin: Taking 81mg daily for primary prevention   Statin: Yes: atorvastatin   ACEi/ARB: Yes: losartan.   Urine Albumin:   Lab Results   Component Value Date    UMALCR 187.04 (H) 2023     Lab Results   Component Value Date    A1C 6.6 2023    A1C 8.2 2023    A1C 7.6 2022    A1C 6.8 2022    A1C 9.0 2021    A1C 7.6 2021    A1C 8.8 2020    A1C 9.1 2020    A1C 6.7 2020     Wt Readings from Last 5 Encounters:   23 249 lb 3.2 oz (113 kg)   23 252 lb (114.3 kg)   23 251 lb 8 oz (114.1 kg)   23 250 lb 6.4 oz (113.6 kg)   23 255 lb 12.8 oz (116 kg)     Not taking tolterodine ER - when blood sugar got better her urination wasn't as big of a problem. She does notice that when she doesn't eat well, her increased urination is worse.    Depression:  Sertraline 100 mg once daily.     She notes her depression has been worse, and when it's worse, her eating habits are worse. She's not interested in a dose adjustment today, just wants to wait a bit. Still looking into a support group. She has enjoyed getting out into the  garden.  Patient is not experiencing side effects.      11/28/2022     2:00 PM 2/2/2023     8:42 AM 6/26/2023     7:39 AM   PHQ-9 SCORE   PHQ-9 Total Score MyChart  9 (Mild depression) 20 (Severe depression)   PHQ-9 Total Score 18 9 20    20         11/28/2022     2:00 PM 2/2/2023     8:47 AM 6/26/2023     7:45 AM   NA-7 SCORE   Total Score  7 (mild anxiety) 15 (severe anxiety)   Total Score 10 7 15     Today's Vitals: There were no vitals taken for this visit.  ----------------      I spent 23 minutes with this patient today. All changes were made via collaborative practice agreement with Montse Bucio PA-C. A copy of the visit note was provided to the patient's provider(s).    A summary of these recommendations was sent via [employer].    Vonnie Corrigan, PharmD  Medication Therapy Management Pharmacist  [phone]      Telemedicine Visit Details  Type of service:  Telephone visit  Start Time: 8:03 AM  End Time: 8:26 am     Medication Therapy Recommendations  No medication therapy recommendations to display

## 2023-06-30 NOTE — LETTER
_  Medication List        Prepared on: 07/07/2023     Bring your Medication List when you go to the doctor, hospital, or   emergency room. And, share it with your family or caregivers.     Note any changes to how you take your medications.  Cross out medications when you no longer use them.    Medication How I take it Why I use it Prescriber   acyclovir (ZOVIRAX) 400 MG tablet TAKE ONE TABLET BY MOUTH EVERY TWELVE HOURS Multiple Myeloma in Remission (H) Montse Bucio PA-C   alcohol swab prep pads Use to swab area of injection/sowmya as directed. Type 2 diabetes mellitus with diabetic polyneuropathy, with long-term current use of insulin (H) Montse Bucio PA-C   aspirin 81 MG EC tablet Take 81 mg by mouth daily  General Health Patient Reported   atorvastatin (LIPITOR) 20 MG tablet Take 1 tablet (20 mg) by mouth At Bedtime Type 2 diabetes mellitus with diabetic polyneuropathy, with long-term current use of insulin (H) Montse Bucio PA-C   blood glucose (NO BRAND SPECIFIED) test strip Use to test blood sugar 3 times daily or as directed. To accompany: Blood Glucose Monitor Brands: per insurance. Type 2 diabetes mellitus with diabetic polyneuropathy, with long-term current use of insulin (H) Montse Bucio PA-C   blood glucose calibration (NO BRAND SPECIFIED) solution To accompany: Blood Glucose Monitor Brands: per insurance. Type 2 diabetes mellitus with diabetic polyneuropathy, with long-term current use of insulin (H) Montse Bucio PA-C   blood glucose monitoring (NO BRAND SPECIFIED) meter device kit Use to test blood sugar 3 times daily or as directed. Preferred blood glucose meter OR supplies to accompany: Blood Glucose Monitor Brands: per insurance. Type 2 diabetes mellitus with diabetic polyneuropathy, with long-term current use of insulin (H) Montse Bucio PA-C   Continuous Blood Gluc  (FREESTYLE SEGUNDO 2 READER) MARCIA Use to read blood sugars as per 's instructions. Type 2 diabetes  mellitus with diabetic polyneuropathy, with long-term current use of insulin (H) Park Vargas MD   Continuous Blood Gluc Sensor (FREESTYLE SEGUNDO 2 SENSOR) MISC 1 each every 14 days Use 1 sensor every 14 days. Use to read blood sugars per 's instructions. Type 2 diabetes mellitus with diabetic polyneuropathy, with long-term current use of insulin (H) Park Vargas MD   empagliflozin (JARDIANCE) 25 MG TABS tablet Take 1 tablet (25 mg) by mouth daily Type 2 diabetes mellitus with diabetic polyneuropathy, with long-term current use of insulin (H) Montse Bucio PA-C   insulin glargine (LANTUS SOLOSTAR) 100 UNIT/ML pen Inject 24 Units Subcutaneous At Bedtime Place on file Type 2 diabetes mellitus with diabetic polyneuropathy, with long-term current use of insulin (H) Montse Bucio PA-C   insulin pen needle (FIFTY50 PEN NEEDLES) 32G X 4 MM miscellaneous Use one pen needle daily or as directed. Type 2 diabetes mellitus with diabetic polyneuropathy, with long-term current use of insulin (H) Montse Bucio PA-C   LENalidomide (REVLIMID) 10 MG CAPS capsule Take 1 capsule (10 mg) by mouth daily for 28 days Multiple Myeloma in Remission (H) Brit Zhu MD   LENalidomide (REVLIMID) 10 MG CAPS capsule Take 1 capsule (10 mg) by mouth daily Multiple Myeloma in Remission (H) Brit Zhu MD   LENalidomide (REVLIMID) 10 MG CAPS capsule Take 1 capsule (10 mg) by mouth daily for 28 days Multiple Myeloma in Remission (H) Brit Zhu MD   LENalidomide (REVLIMID) 10 MG CAPS capsule Take 1 capsule (10 mg) by mouth daily for 28 days Multiple Myeloma in Remission (H) Brit Zhu MD   levothyroxine (SYNTHROID/LEVOTHROID) 200 MCG tablet Take 1 tablet (200 mcg) by mouth every morning (before breakfast) Hypothyroidism, unspecified type Montse Bucio PA-C   loperamide (IMODIUM) 2 MG capsule Take 4 mg (two capsules) by mouth at onset of loose stools, followed by 2 mg (one capsule)  after each loose stool. Maximum: 16 mg (8 capsules) daily Diarrhea, unspecified type FALGUNI Mederos CNP   losartan (COZAAR) 25 MG tablet Take 1 tablet (25 mg) by mouth daily Type 2 diabetes mellitus with diabetic polyneuropathy, with long-term current use of insulin (H) Montse Bucio PA-C   pregabalin (LYRICA) 100 MG capsule Take 1 capsule (100 mg) by mouth 2 times daily Type 2 diabetes mellitus with diabetic polyneuropathy, with long-term current use of insulin (H) Montse Bucio PA-C   propranolol ER (INDERAL LA) 60 MG 24 hr capsule Take 1 capsule (60 mg) by mouth daily Tremor Gabriel Santoyo MD   Semaglutide, 2 MG/DOSE, (OZEMPIC, 2 MG/DOSE,) 8 MG/3ML pen Inject 2 mg Subcutaneous every 7 days Type 2 diabetes mellitus with diabetic polyneuropathy, with long-term current use of insulin (H) Montse Bucio PA-C   sertraline (ZOLOFT) 100 MG tablet Take 1 tablet (100 mg) by mouth daily Major depressive disorder with current active episode, unspecified depression episode severity, unspecified whether recurrent Montse Bucio PA-C   thin (NO BRAND SPECIFIED) lancets Use with lanceting device. To accompany: Blood Glucose Monitor Brands: per insurance. Type 2 diabetes mellitus with diabetic polyneuropathy, with long-term current use of insulin (H) Montse Bucio PA-C         Add new medications, over-the-counter drugs, herbals, vitamins, or  minerals in the blank rows below.    Medication How I take it Why I use it Prescriber                                      Allergies:      No Known Allergies        Side effects I have had:               Other Information:              My notes and questions:

## 2023-06-30 NOTE — LETTER
"Recommended To-Do List      Prepared on: 07/07/2023       You can get the best results from your medications by completing the items on this \"To-Do List.\"      Bring your To-Do List when you go to your doctor. And, share it with your family or caregivers.    My To-Do List:  What we talked about: What I should do:   The importance of taking your medication as intended               What we talked about: What I should do:    What my medicines are for, how to know if my medicines are working, made sure my medicines are safe for me and reviewed how to take my medicines.      Take my medicines every day                  "

## 2023-06-30 NOTE — LETTER
July 7, 2023  Winter Loya  [address]  [facility] [address]    Dear Ms. Loya, [facility]     Thank you for talking with me on Jun 30, 2023 about your health and medications. As a follow-up to our conversation, I have included two documents:      1. Your Recommended To-Do List has steps you should take to get the best results from your medications.  2. Your Medication List will help you keep track of your medications and how to take them.    If you want to talk about these documents, please call Anjelica Corrigan RPH at phone: [phone], Monday-Friday 8-4:30pm.    I look forward to working with you and your doctors to make sure your medications work well for you.    Sincerely,  Anjelica Corrigan RPH  [facility] Pharmacist, [facility]

## 2023-06-30 NOTE — PATIENT INSTRUCTIONS
"Recommendations from today's MTM visit:                                                         Some options to help the Freestyle Анна sensor adhere better include:    Tegaderm film over the sensor (will have to cut hole to allow center of Анна sensor to be exposed to air).   Skin Tac - rub this on the skin before placing sensor (use alcohol and dry area before using).  GrifGrips - can place over sensor    May be able to find some of these options at a pharmacy, options 2 and 3 may be easier to find online.    Follow-up: Return in about 4 weeks (around 7/28/2023) for Medication Therapy Management.    It was great speaking with you today.  I value your experience and would be very thankful for your time in providing feedback in our clinic survey. In the next few days, you may receive an email or text message from [employer] with a link to a survey related to your  clinical pharmacist.\"     To schedule another MTM appointment, please call the clinic directly or you may call the MTM scheduling line at [phone] or toll-free at [phone].     My Clinical Pharmacist's contact information:                                                      Please feel free to contact me with any questions or concerns you have.      Vonnie Corrigan, PharmD  Medication Therapy Management Pharmacist  [phone]     "

## 2023-07-01 DIAGNOSIS — C90.01 MULTIPLE MYELOMA IN REMISSION (H): Primary | ICD-10-CM

## 2023-07-05 ENCOUNTER — TELEPHONE (OUTPATIENT)
Dept: ONCOLOGY | Facility: CLINIC | Age: 68
End: 2023-07-05
Payer: COMMERCIAL

## 2023-07-05 DIAGNOSIS — C90.01 MULTIPLE MYELOMA IN REMISSION (H): Primary | ICD-10-CM

## 2023-07-05 RX ORDER — LENALIDOMIDE 10 MG/1
10 CAPSULE ORAL DAILY
Qty: 28 CAPSULE | Refills: 0 | Status: SHIPPED | OUTPATIENT
Start: 2023-07-05 | End: 2023-08-02

## 2023-07-05 NOTE — TELEPHONE ENCOUNTER
Oral Chemotherapy Monitoring Program    Medication: Revlimid  Rx:  10 mg PO daily for a 28 day cycle  Auth #: [id number]  Risk Category: Adult female NOT of reproductive capacity    Routine survey questions reviewed.    Thank you,    Mary Franklin  Oncology Pharmacy Liaison LUCINDA [email or web address]  Phone: [phone]  Fax: [phone]

## 2023-07-10 ENCOUNTER — OFFICE VISIT (OUTPATIENT)
Dept: PSYCHOLOGY | Facility: CLINIC | Age: 68
End: 2023-07-10
Payer: COMMERCIAL

## 2023-07-10 DIAGNOSIS — F33.1 MAJOR DEPRESSIVE DISORDER, RECURRENT EPISODE, MODERATE WITH ANXIOUS DISTRESS (H): Primary | ICD-10-CM

## 2023-07-10 PROCEDURE — 90834 PSYTX W PT 45 MINUTES: CPT | Performed by: STUDENT IN AN ORGANIZED HEALTH CARE EDUCATION/TRAINING PROGRAM

## 2023-07-10 NOTE — PROGRESS NOTES
[facility]                                     Progress Note    Patient Name: Winter Loya  Date: 07/10/2023         Service Type: Individual      Session Start Time: 09.00  Session End Time: 9.45     Session Length: 45 mns    Session #: 25    Attendees: Client attended alone    Service Modality:  In-person    DATA  Interactive Complexity: No  Crisis: No      Progress Since Last Session (Related to Symptoms / Goals / Homework):   Symptoms: Pt reported no change from previous session    Homework: Achieved / completed to satisfaction       Episode of Care Goals: Minimal progress - ACTION (Actively working towards change); Intervened by reinforcing change plan / affirming steps taken     Current / Ongoing Stressors and Concerns:   Ongoing stressors: Financial stress, stressful filial relationship/ Difficulty with assertiveness     Current: Pt report se is still struggling to address current stressful filial relationship with 37 year old son living with her and that she has been suspended from work again barely a few days after getting a  new job and patient reported son blames his managers and co-workers always as the cause of the problems. Patient reported she does not understand what is wrong and how to address son's recurrent  problems and their financial and living situation.                   Treatment Objective(s) Addressed in This Session:    Client will use one boundary management skill (i.e., direct communication, saying no to a request) by next session.   Patient will identify specific areas of cognitive distortion and challenge irrational thoughts with reality.      Intervention:     Discuss 'Locus of control' as the control a person feels they have in their own behavior and distinguish internal locus from external locus of control. Couched pt that pple with high internal locus of control perceive themselves as having a great deal of personal control over their behavior and are  therefore more likely to take responsibility for the way they behave as oppose to those with external locus who perceive their problems to be entirely created by others leaving no room for behavior change. Model process in session and connect concept to pt's problem and thought processes.            11/28/2022     2:00 PM 2/2/2023     8:42 AM 6/26/2023     7:39 AM   PHQ   PHQ-9 Total Score 18 9 20    20   Q9: Thoughts of better off dead/self-harm past 2 weeks Not at all Several days More than half the days    More than half the days   F/U: Thoughts of suicide or self-harm  No No    No   F/U: Safety concerns  No No    No           ASSESSMENT: Current Emotional / Mental Status (status of significant symptoms):   Risk status (Self / Other harm or suicidal ideation)   Patient denies current fears or concerns for personal safety.   Patient denies current or recent suicidal ideation or behaviors.   Patient denies current or recent homicidal ideation or behaviors.   Patient denies current or recent self injurious behavior or ideation.   Patient denies other safety concerns.   Patient reports there has been no change in risk factors since their last session.     Patient reports there has been no change in protective factors since their last session.     Recommended that patient call 911 or go to the local ED should there be a change in any of these risk factors.     Appearance:   Appropriate    Eye Contact:   Good    Psychomotor Behavior: Normal    Attitude:   Cooperative  Interested Friendly   Orientation:   Person Place Time Situation   Speech    Rate / Production: Normal/ Responsive    Volume:  Normal    Mood:    Anxious  Depressed    Affect:    Worrisome    Thought Content:  Clear    Thought Form:  Coherent    Insight:    Good      Medication Review:   No changes to current psychiatric medication(s)     Medication Compliance:   Yes     Changes in Health Issues:   None reported     Chemical Use Review:   Substance Use:  "Chemical use reviewed, no active concerns identified      Tobacco Use: No current tobacco use.      Diagnosis:    296.32 (F33.1) Major Depressive Disorder, Recurrent Episode, Moderate _ and With anxious distress    Collateral Reports Completed:   Not Applicable    PLAN: (Patient Tasks / Therapist Tasks / Other)    Create healthy boundaries with son and assertively communicate  concerns, fears, and preferences to daily.     Mj Cline [facility]            ----- Service Performed and Documented by [facility]------  This note was reviewed and clinical supervision by AMY Ayala [facility]    7/10/2023            _____________________________________________________________    Individual Treatment Plan    Patient's Name: Winter Loya  YOB: 1955    Date of Creation: 04/18/2022  Date Treatment Plan Last Reviewed/Revised: 02/13/2023    DSM5 Diagnoses: 296.32 (F33.1) Major Depressive Disorder, Recurrent Episode, Moderate _ and With anxious distress  Psychosocial / Contextual Factors:   PROMIS (reviewed every 90 days):     Referral / Collaboration:  Referral to another professional/service is not indicated at this time..    Anticipated number of session for this episode of care: 15-25  Anticipation frequency of session: Every other week  Anticipated Duration of each session: 38-52 minutes  Treatment plan will be reviewed in 90 days or when goals have been changed.       MeasurableTreatment Goal(s) related to diagnosis / functional impairment(s)    Goal 1: Patient will identify and address specific triggers to feelings of depression and improve coping with depression by  90 % in the next three months.    I will know I've met my goal when I can comfortably manage the daily stressors I have and be less depressed\"    Objective #A (Patient Action)     Patient will  openly communicate  concerns, fears, and preferences to son daily   Status: Continued - Date(s):  09/26/2023  Intervention(s)  Therapist will provide " psychoeducation, behavioral activation, and cognitive restructuring.    Objective #A (Patient Action)    Patient will take 15-20 minutes daily walks and spend 1 hour daily completing household chores.  Status: Continued - Date(s):  09/26/2023  Intervention(s)  Therapist will provide psychoeducation, behavioral activation, and cognitive restructuring.    Objective #B  Patient will identify specific areas of cognitive distortion and challenge irrational thoughts with reality.   Status: Continued - Date(s):     09/26/2023  Intervention(s)   Therapist will provide psychoeducation, behavioral activation, and cognitive restructuring.      Objective #C  Patient will learn and practice relaxation techniques to manage anxiety.  Status: Continued - Date(s):    09/26/2023  Intervention(s)   Therapist will provide psychoeducation, behavioral activation, and cognitive restructuring.    Patient has reviewed and agreed to the above plan.      CHERISE Castellon  April 18, 2022  Answers for HPI/ROS submitted by the patient on 6/26/2023  If you checked off any problems, how difficult have these problems made it for you to do your work, take care of things at home, or get along with other people?: Somewhat difficult  PHQ9 TOTAL SCORE: 20

## 2023-07-17 ENCOUNTER — OFFICE VISIT (OUTPATIENT)
Dept: PSYCHOLOGY | Facility: CLINIC | Age: 68
End: 2023-07-17
Payer: COMMERCIAL

## 2023-07-17 DIAGNOSIS — F33.2 MAJOR DEPRESSIVE DISORDER, RECURRENT EPISODE, SEVERE WITH ANXIOUS DISTRESS (H): Primary | ICD-10-CM

## 2023-07-17 PROCEDURE — 90834 PSYTX W PT 45 MINUTES: CPT | Performed by: STUDENT IN AN ORGANIZED HEALTH CARE EDUCATION/TRAINING PROGRAM

## 2023-07-17 NOTE — PROGRESS NOTES
[facility]                                     Progress Note    Patient Name: Winter Loya  Date: 07/17/2023         Service Type: Individual      Session Start Time: 10.00  Session End Time: 10.45     Session Length: 45 mns    Session #: 26    Attendees: Client attended alone    Service Modality:  In-person    DATA  Interactive Complexity: No  Crisis: No      Progress Since Last Session (Related to Symptoms / Goals / Homework):   Symptoms: Pt reported no change from previous session    Homework: Achieved / completed to satisfaction       Episode of Care Goals: Minimal progress - ACTION (Actively working towards change); Intervened by reinforcing change plan / affirming steps taken     Current / Ongoing Stressors and Concerns:   Ongoing stressors: Financial stress, stressful filial relationship/ Difficulty with assertiveness     Current: Pt reported her son has been fired again and this is the 3rd job in a role that he has been fired and patient reported she has been very depressed and scared to talk to him because he always will  turn around and accused her and everyone else for his problems and she does'nt know why he thinks and behaves the way he does.                   Treatment Objective(s) Addressed in This Session:    Client will use one boundary management skill (i.e., direct communication, saying no to a request) by next session.   Patient will identify specific areas of cognitive distortion and challenge irrational thoughts with reality.      Intervention:     Continue discussion today on  'Locus of control' as the control a person feels they have in their own behavior and distinguish internal locus from external locus of control. Couched pt that pple with high internal locus of control perceive themselves as having a great deal of personal control over their behavior and are therefore more likely to take responsibility for the way they behave as oppose to those with external locus  who perceive their problems to be entirely created by others leaving no room for behavior change. Model process in session and and help patient gain insight into son's problems adversely impacting patient mood.         ASSESSMENT: Current Emotional / Mental Status (status of significant symptoms):   Risk status (Self / Other harm or suicidal ideation)   Patient denies current fears or concerns for personal safety.   Patient denies current or recent suicidal ideation or behaviors.   Patient denies current or recent homicidal ideation or behaviors.   Patient denies current or recent self injurious behavior or ideation.   Patient denies other safety concerns.   Patient reports there has been no change in risk factors since their last session.     Patient reports there has been no change in protective factors since their last session.     Recommended that patient call 911 or go to the local ED should there be a change in any of these risk factors.     Appearance:   Appropriate    Eye Contact:   Good    Psychomotor Behavior: Normal    Attitude:   Cooperative  Interested Friendly   Orientation:   Person Place Time Situation   Speech    Rate / Production: Normal/ Responsive    Volume:  Normal    Mood:    Anxious  Depressed    Affect:    Worrisome    Thought Content:  Clear    Thought Form:  Coherent    Insight:    Good      Medication Review:   No changes to current psychiatric medication(s)     Medication Compliance:   Yes     Changes in Health Issues:   None reported     Chemical Use Review:   Substance Use: Chemical use reviewed, no active concerns identified      Tobacco Use: No current tobacco use.      Diagnosis:    296.32 (F33.1) Major Depressive Disorder, Recurrent Episode, Moderate _ and With anxious distress    Collateral Reports Completed:   Not Applicable    PLAN: (Patient Tasks / Therapist Tasks / Other)    Create healthy boundaries with son and assertively communicate  concerns, fears, and preferences to  "daily.     Mj Cline [facility]            ----- Service Performed and Documented by [facility]------  This note was reviewed and clinical supervision by AMY Ayala [facility]    7/17/2023            _____________________________________________________________    Individual Treatment Plan    Patient's Name: Winter Loya  YOB: 1955    Date of Creation: 04/18/2022  Date Treatment Plan Last Reviewed/Revised: 05/13/2023    DSM5 Diagnoses: 296.32 (F33.1) Major Depressive Disorder, Recurrent Episode, Moderate _ and With anxious distress  Psychosocial / Contextual Factors:   PROMIS (reviewed every 90 days):     Referral / Collaboration:  Referral to another professional/service is not indicated at this time..    Anticipated number of session for this episode of care: 15-25  Anticipation frequency of session: Every other week  Anticipated Duration of each session: 38-52 minutes  Treatment plan will be reviewed in 90 days or when goals have been changed.       MeasurableTreatment Goal(s) related to diagnosis / functional impairment(s)    Goal 1: Patient will identify and address specific triggers to feelings of depression and improve coping with depression by  90 % in the next three months.    I will know I've met my goal when I can comfortably manage the daily stressors I have and be less depressed\"    Objective #A (Patient Action)     Patient will  openly communicate  concerns, fears, and preferences to son daily   Status: Continued - Date(s):  09/26/2023  Intervention(s)  Therapist will provide psychoeducation, behavioral activation, and cognitive restructuring.    Objective #A (Patient Action)    Patient will take 15-20 minutes daily walks and spend 1 hour daily completing household chores.  Status: Continued - Date(s):  09/26/2023  Intervention(s)  Therapist will provide psychoeducation, behavioral activation, and cognitive restructuring.    Objective #B  Patient will identify specific areas of cognitive " distortion and challenge irrational thoughts with reality.   Status: Continued - Date(s):     09/26/2023  Intervention(s)   Therapist will provide psychoeducation, behavioral activation, and cognitive restructuring.      Objective #C  Patient will learn and practice relaxation techniques to manage anxiety.  Status: Continued - Date(s):    09/26/2023  Intervention(s)   Therapist will provide psychoeducation, behavioral activation, and cognitive restructuring.    Patient has reviewed and agreed to the above plan.      CHERISE Castellon  April 18, 2022  Answers for HPI/ROS submitted by the patient on 6/26/2023  If you checked off any problems, how difficult have these problems made it for you to do your work, take care of things at home, or get along with other people?: Somewhat difficult  PHQ9 TOTAL SCORE: 20

## 2023-07-24 ENCOUNTER — LAB (OUTPATIENT)
Dept: LAB | Facility: CLINIC | Age: 68
End: 2023-07-24
Payer: COMMERCIAL

## 2023-07-24 ENCOUNTER — OFFICE VISIT (OUTPATIENT)
Dept: PSYCHOLOGY | Facility: CLINIC | Age: 68
End: 2023-07-24
Payer: COMMERCIAL

## 2023-07-24 DIAGNOSIS — F33.1 MODERATE EPISODE OF RECURRENT MAJOR DEPRESSIVE DISORDER (H): Primary | ICD-10-CM

## 2023-07-24 DIAGNOSIS — E80.6 DISORDER OF BILIRUBIN EXCRETION: ICD-10-CM

## 2023-07-24 DIAGNOSIS — E80.6 DISORDER OF BILIRUBIN EXCRETION: Primary | ICD-10-CM

## 2023-07-24 DIAGNOSIS — C90.01 MULTIPLE MYELOMA IN REMISSION (H): ICD-10-CM

## 2023-07-24 LAB
ALBUMIN SERPL BCG-MCNC: 4.6 G/DL (ref 3.5–5.2)
ALP SERPL-CCNC: 119 U/L (ref 35–104)
ALT SERPL W P-5'-P-CCNC: 35 U/L (ref 0–50)
ANION GAP SERPL CALCULATED.3IONS-SCNC: 13 MMOL/L (ref 7–15)
AST SERPL W P-5'-P-CCNC: 27 U/L (ref 0–45)
BASOPHILS # BLD AUTO: 0.1 10E3/UL (ref 0–0.2)
BASOPHILS NFR BLD AUTO: 2 %
BILIRUB DIRECT SERPL-MCNC: 0.45 MG/DL (ref 0–0.3)
BILIRUB SERPL-MCNC: 2.4 MG/DL
BUN SERPL-MCNC: 11.1 MG/DL (ref 8–23)
CALCIUM SERPL-MCNC: 9.3 MG/DL (ref 8.8–10.2)
CHLORIDE SERPL-SCNC: 105 MMOL/L (ref 98–107)
CREAT SERPL-MCNC: 0.66 MG/DL (ref 0.51–0.95)
DEPRECATED HCO3 PLAS-SCNC: 24 MMOL/L (ref 22–29)
EOSINOPHIL # BLD AUTO: 0.2 10E3/UL (ref 0–0.7)
EOSINOPHIL NFR BLD AUTO: 7 %
ERYTHROCYTE [DISTWIDTH] IN BLOOD BY AUTOMATED COUNT: 14.9 % (ref 10–15)
GFR SERPL CREATININE-BSD FRML MDRD: >90 ML/MIN/1.73M2
GGT SERPL-CCNC: 48 U/L (ref 5–36)
GLUCOSE SERPL-MCNC: 147 MG/DL (ref 70–99)
HCT VFR BLD AUTO: 43.3 % (ref 35–47)
HGB BLD-MCNC: 14.5 G/DL (ref 11.7–15.7)
IMM GRANULOCYTES # BLD: 0 10E3/UL
IMM GRANULOCYTES NFR BLD: 1 %
LYMPHOCYTES # BLD AUTO: 0.6 10E3/UL (ref 0.8–5.3)
LYMPHOCYTES NFR BLD AUTO: 19 %
MCH RBC QN AUTO: 29.4 PG (ref 26.5–33)
MCHC RBC AUTO-ENTMCNC: 33.5 G/DL (ref 31.5–36.5)
MCV RBC AUTO: 88 FL (ref 78–100)
MONOCYTES # BLD AUTO: 0.4 10E3/UL (ref 0–1.3)
MONOCYTES NFR BLD AUTO: 11 %
NEUTROPHILS # BLD AUTO: 2 10E3/UL (ref 1.6–8.3)
NEUTROPHILS NFR BLD AUTO: 60 %
NRBC # BLD AUTO: 0 10E3/UL
NRBC BLD AUTO-RTO: 0 /100
PLATELET # BLD AUTO: 128 10E3/UL (ref 150–450)
POTASSIUM SERPL-SCNC: 3.8 MMOL/L (ref 3.4–5.3)
PROT SERPL-MCNC: 6.9 G/DL (ref 6.4–8.3)
RBC # BLD AUTO: 4.94 10E6/UL (ref 3.8–5.2)
SODIUM SERPL-SCNC: 142 MMOL/L (ref 136–145)
TOTAL PROTEIN SERUM FOR ELP: 6.5 G/DL (ref 6.4–8.3)
WBC # BLD AUTO: 3.3 10E3/UL (ref 4–11)

## 2023-07-24 PROCEDURE — 84165 PROTEIN E-PHORESIS SERUM: CPT

## 2023-07-24 PROCEDURE — 90834 PSYTX W PT 45 MINUTES: CPT | Performed by: STUDENT IN AN ORGANIZED HEALTH CARE EDUCATION/TRAINING PROGRAM

## 2023-07-24 PROCEDURE — 86334 IMMUNOFIX E-PHORESIS SERUM: CPT

## 2023-07-24 PROCEDURE — 84155 ASSAY OF PROTEIN SERUM: CPT | Mod: 59

## 2023-07-24 PROCEDURE — 83521 IG LIGHT CHAINS FREE EACH: CPT

## 2023-07-24 PROCEDURE — 82784 ASSAY IGA/IGD/IGG/IGM EACH: CPT

## 2023-07-24 PROCEDURE — 80053 COMPREHEN METABOLIC PANEL: CPT

## 2023-07-24 PROCEDURE — 36415 COLL VENOUS BLD VENIPUNCTURE: CPT

## 2023-07-24 PROCEDURE — 85025 COMPLETE CBC W/AUTO DIFF WBC: CPT

## 2023-07-24 PROCEDURE — 82977 ASSAY OF GGT: CPT

## 2023-07-24 PROCEDURE — 82248 BILIRUBIN DIRECT: CPT

## 2023-07-24 NOTE — PROGRESS NOTES
[facility]                                     Progress Note    Patient Name: Winter Loya  Date: 07/24/2023         Service Type: Individual      Session Start Time: 9.00  Session End Time: 9.45     Session Length: 45 mns    Session #: 27    Attendees: Client attended alone    Service Modality:  In-person    DATA  Interactive Complexity: No  Crisis: No      Progress Since Last Session (Related to Symptoms / Goals / Homework):   Symptoms:  Pt reported no change from previous session    Homework: Achieved / completed to satisfaction       Episode of Care Goals: Minimal progress - ACTION (Actively working towards change); Intervened by reinforcing change plan / affirming steps taken     Current / Ongoing Stressors and Concerns:   Ongoing stressors: Financial stress, stressful filial relationship/ Difficulty with assertiveness    Current: Pt reported she has been having difficulty getting over the divorce to her  that happened more than 20 years ago and and still doesn't understand why she lost him but felt she must have been not good enough for him and the other lady that he moved out to was better. Patient reported the break up has adversely affected her self image over the years that she has stayed single.                   Treatment Objective(s) Addressed in This Session:    Client will use one boundary management skill (i.e., direct communication, saying no to a request) by next session.   Patient will identify specific areas of cognitive distortion and challenge irrational thoughts with reality.      Intervention:     CBT: Discussed minimizing today as a cognitive distortion characterized by the tendency to reframe events to reduce their significance. Discussed how persistent minimization can make us appear avoidant and unconfident. Explore the causes of this negative thought process and explore ways to make changes to these schema. Discussed positive affirmations as one of the  "ways and model process to help patient shift  mindset, as in \"I appreciate my self\", and . . . \"I can achieve goals and I deserve success\".          ASSESSMENT: Current Emotional / Mental Status (status of significant symptoms):   Risk status (Self / Other harm or suicidal ideation)   Patient denies current fears or concerns for personal safety.   Patient denies current or recent suicidal ideation or behaviors.   Patient denies current or recent homicidal ideation or behaviors.   Patient denies current or recent self injurious behavior or ideation.   Patient denies other safety concerns.   Patient reports there has been no change in risk factors since their last session.     Patient reports there has been no change in protective factors since their last session.     Recommended that patient call 911 or go to the local ED should there be a change in any of these risk factors.     Appearance:   Appropriate    Eye Contact:   Good    Psychomotor Behavior: Normal    Attitude:   Cooperative  Interested Friendly   Orientation:   Person Place Time Situation   Speech    Rate / Production: Normal/ Responsive    Volume:  Normal    Mood:    Anxious  Depressed    Affect:    Worrisome    Thought Content:  Clear    Thought Form:  Coherent    Insight:    Good      Medication Review:   No changes to current psychiatric medication(s)     Medication Compliance:   Yes     Changes in Health Issues:   None reported     Chemical Use Review:   Substance Use: Chemical use reviewed, no active concerns identified      Tobacco Use: No current tobacco use.      Diagnosis:    296.32 (F33.1) Major Depressive Disorder, Recurrent Episode, Moderate _ and With anxious distress    Collateral Reports Completed:   Not Applicable    PLAN: (Patient Tasks / Therapist Tasks / Other)    Create healthy boundaries with son and assertively communicate  concerns, fears, and preferences to daily.   Utilize skill learned in session to recognize and mitigate " "minimization.    Mj Cline [facility]            ----- Service Performed and Documented by [facility]------  This note was reviewed and clinical supervision by AMY Ayala [facility]    7/24/2023   _____________________________________________________________    Individual Treatment Plan    Patient's Name: Winter Loya  YOB: 1955    Date of Creation: 04/18/2022  Date Treatment Plan Last Reviewed/Revised: 05/13/2023    DSM5 Diagnoses: 296.32 (F33.1) Major Depressive Disorder, Recurrent Episode, Moderate _ and With anxious distress  Psychosocial / Contextual Factors:   PROMIS (reviewed every 90 days):     Referral / Collaboration:  Referral to another professional/service is not indicated at this time..    Anticipated number of session for this episode of care: 15-25  Anticipation frequency of session: Every other week  Anticipated Duration of each session: 38-52 minutes  Treatment plan will be reviewed in 90 days or when goals have been changed.       MeasurableTreatment Goal(s) related to diagnosis / functional impairment(s)    Goal 1: Patient will identify and address specific triggers to feelings of depression and improve coping with depression by  90 % in the next three months.    I will know I've met my goal when I can comfortably manage the daily stressors I have and be less depressed\"    Objective #A (Patient Action)     Patient will  openly communicate  concerns, fears, and preferences to son daily   Status: Continued - Date(s):  09/26/2023  Intervention(s)  Therapist will provide psychoeducation, behavioral activation, and cognitive restructuring.    Objective #A (Patient Action)    Patient will take 15-20 minutes daily walks and spend 1 hour daily completing household chores.  Status: Continued - Date(s):  09/26/2023  Intervention(s)  Therapist will provide psychoeducation, behavioral activation, and cognitive restructuring.    Objective #B  Patient will identify specific areas of cognitive " distortion and challenge irrational thoughts with reality.   Status: Continued - Date(s):     09/26/2023  Intervention(s)   Therapist will provide psychoeducation, behavioral activation, and cognitive restructuring.      Objective #C  Patient will learn and practice relaxation techniques to manage anxiety.  Status: Continued - Date(s):    09/26/2023  Intervention(s)   Therapist will provide psychoeducation, behavioral activation, and cognitive restructuring.    Patient has reviewed and agreed to the above plan.      CHERISE Castellon  April 18, 2022  Answers for HPI/ROS submitted by the patient on 6/26/2023  If you checked off any problems, how difficult have these problems made it for you to do your work, take care of things at home, or get along with other people?: Somewhat difficult  PHQ9 TOTAL SCORE: 20

## 2023-07-24 NOTE — PROGRESS NOTES
Winter's bilirubin is always elevated slightly but this week it is up to 2.4. Will get fractionated bili and GGT

## 2023-07-25 LAB
ALBUMIN SERPL ELPH-MCNC: 4.3 G/DL (ref 3.7–5.1)
ALPHA1 GLOB SERPL ELPH-MCNC: 0.3 G/DL (ref 0.2–0.4)
ALPHA2 GLOB SERPL ELPH-MCNC: 0.6 G/DL (ref 0.5–0.9)
B-GLOBULIN SERPL ELPH-MCNC: 0.8 G/DL (ref 0.6–1)
GAMMA GLOB SERPL ELPH-MCNC: 0.6 G/DL (ref 0.7–1.6)
IGA SERPL-MCNC: 131 MG/DL (ref 84–499)
IGG SERPL-MCNC: 583 MG/DL (ref 610–1616)
IGM SERPL-MCNC: 47 MG/DL (ref 35–242)
KAPPA LC FREE SER-MCNC: 2.58 MG/DL (ref 0.33–1.94)
KAPPA LC FREE/LAMBDA FREE SER NEPH: 1.4 {RATIO} (ref 0.26–1.65)
LAMBDA LC FREE SERPL-MCNC: 1.84 MG/DL (ref 0.57–2.63)
M PROTEIN SERPL ELPH-MCNC: 0 G/DL
PROT PATTERN SERPL ELPH-IMP: ABNORMAL
PROT PATTERN SERPL IFE-IMP: NORMAL

## 2023-07-28 ENCOUNTER — VIRTUAL VISIT (OUTPATIENT)
Dept: PHARMACY | Facility: CLINIC | Age: 68
End: 2023-07-28
Payer: COMMERCIAL

## 2023-07-28 DIAGNOSIS — E11.42 TYPE 2 DIABETES MELLITUS WITH DIABETIC POLYNEUROPATHY, WITH LONG-TERM CURRENT USE OF INSULIN (H): ICD-10-CM

## 2023-07-28 DIAGNOSIS — Z79.4 TYPE 2 DIABETES MELLITUS WITH DIABETIC POLYNEUROPATHY, WITH LONG-TERM CURRENT USE OF INSULIN (H): ICD-10-CM

## 2023-07-28 PROCEDURE — 99606 MTMS BY PHARM EST 15 MIN: CPT | Performed by: PHARMACIST

## 2023-07-28 RX ORDER — INSULIN GLARGINE 100 [IU]/ML
24 INJECTION, SOLUTION SUBCUTANEOUS EVERY MORNING
Qty: 30 ML | Refills: 1 | Status: SHIPPED | OUTPATIENT
Start: 2023-07-28 | End: 2024-03-04

## 2023-07-28 NOTE — PROGRESS NOTES
Medication Therapy Management (MTM) Encounter    ASSESSMENT:                            Medication Adherence/Access: No issues identified    Type 2 Diabetes/Weight loss:  Stable. Patient is meeting A1c goal of < 7%.  Discussed okay to have a sweet, but limit to serving size of a fist.    PLAN:                            1. Continue current medications.    Follow-up: Return in about 5 weeks (around 9/1/2023) for Medication Therapy Management.    SUBJECTIVE/OBJECTIVE:                          Winter Loya is a 67 year old female called for a follow-up visit from 6/30/23.       Reason for visit: diabetes follow-up.    Allergies/ADRs: Reviewed in chart  Past Medical History: Reviewed in chart  Tobacco: She reports that she quit smoking about 18 years ago. Her smoking use included cigarettes. She smoked an average of 1 pack per day. She has never used smokeless tobacco.  Alcohol: Less than 1 beverage / month  Caffeine: 1 diet coke/day and 2 cups coffee/day    Medication Adherence/Access:    - sometimes forgetting evening medications  The patient fills medications at [place]: NO, fills medications at [facility] and [facility] for Revlimid.    Type 2 Diabetes/Weight loss:    Lantus 24 units every morning  Ozempic 2 mg weekly (Tuesdays)  Jardiance 25 mg daily     Анна 2 has been really helpful for knowing what foods do for her blood sugar, she used to not be aware of this. She did get a craving for sugar one time and ate too many fig newtons, she got sick afterward. She feels she needs some accountability with this, might sign up for a healthy weight educational program. Has traditionally been an overnight eater, has only done that once this week.  Sometimes gets depressed over the eating stuff, but helpful to learn and talk about it when we check in.  Patient is experiencing the following side effects: diarrhea - nobody knows why she has this. She has done a trial off of metformin with Delmi Gross without improvement in  diarrhea, has since stopped this. Diarrhea is not worse on Ozempic 2 mg.  Medication History:  Has been on Trulicity in the past, tolerated well. Metformin as above.   Blood sugar monitorins  2 hours before meal (vs 2 hours after): 150  Symptoms of low blood sugar? none  Symptoms of high blood sugar? none  Eye exam: up to date  Foot exam: up to date  Aspirin: Taking 81mg daily for primary prevention   Statin: Yes: atorvastatin   ACEi/ARB: Yes: losartan.   Urine Albumin:   Lab Results   Component Value Date    UMALCR 187.04 (H) 2023     Lab Results   Component Value Date    A1C 6.6 2023    A1C 8.2 2023    A1C 7.6 2022    A1C 6.8 2022    A1C 9.0 2021    A1C 7.6 2021    A1C 8.8 2020    A1C 9.1 2020    A1C 6.7 2020     Wt Readings from Last 5 Encounters:   23 249 lb 3.2 oz (113 kg)   23 252 lb (114.3 kg)   23 251 lb 8 oz (114.1 kg)   23 250 lb 6.4 oz (113.6 kg)   23 255 lb 12.8 oz (116 kg)     Today's Vitals: There were no vitals taken for this visit.  ----------------      I spent 25 minutes with this patient today. All changes were made via collaborative practice agreement with Montse Bucio PA-C. A copy of the visit note was provided to the patient's provider(s).    A summary of these recommendations was sent via [employer].    Vonnie Corrigan, PharmD  Medication Therapy Management Pharmacist  [phone]      Telemedicine Visit Details  Type of service:  Telephone visit  Start Time: 8:35 AM  End Time: 9:00 AM     Medication Therapy Recommendations  No medication therapy recommendations to display

## 2023-07-28 NOTE — RESULT ENCOUNTER NOTE
Coronavirus (COVID-19) Notification    Your result for COVID-19 is Negative  Letter sent that will serve as a formal notice for your employer Relayed the provider's recommendation to the patient's mother via phone. Laly Ortega gets labs done at \[facility]\"". I offered to mail the paper orders but the mother prefers to go to the  in [facility] and get them printed.

## 2023-07-28 NOTE — Clinical Note
CE DAWSON note, thanks!  Vonnie Corrigan, PharmD Medication Therapy Management Pharmacist [phone]

## 2023-07-28 NOTE — PATIENT INSTRUCTIONS
"Recommendations from today's MTM visit:                                                         1. Continue current medications.    Follow-up: Return in about 5 weeks (around 9/1/2023) for Medication Therapy Management.    It was great speaking with you today.  I value your experience and would be very thankful for your time in providing feedback in our clinic survey. In the next few days, you may receive an email or text message from [employer] with a link to a survey related to your  clinical pharmacist.\"     To schedule another MTM appointment, please call the clinic directly or you may call the MTM scheduling line at [phone] or toll-free at [phone].     My Clinical Pharmacist's contact information:                                                      Please feel free to contact me with any questions or concerns you have.      Vonnie Corrigan, PharmD  Medication Therapy Management Pharmacist  [phone]     "

## 2023-07-30 DIAGNOSIS — E80.6 DISORDER OF BILIRUBIN EXCRETION: Primary | ICD-10-CM

## 2023-07-31 ENCOUNTER — PATIENT OUTREACH (OUTPATIENT)
Dept: ONCOLOGY | Facility: CLINIC | Age: 68
End: 2023-07-31
Payer: COMMERCIAL

## 2023-07-31 ENCOUNTER — TELEPHONE (OUTPATIENT)
Dept: ENDOCRINOLOGY | Facility: CLINIC | Age: 68
End: 2023-07-31
Payer: COMMERCIAL

## 2023-07-31 NOTE — TELEPHONE ENCOUNTER
Please route determinations to the Pharm Diabetes pool ([id number]).      Thank you!    Diabetes Care Services Team   [facility]  [address] [place] [address]

## 2023-07-31 NOTE — PROGRESS NOTES
[facility]: Cancer Care                                                                                          Dr. Zhu asked me to contact Winter to let her know that her bilirubin and GGT were elevated and that a referral was placed to hepatology.  Winter will anticipate a call from their clinic to schedule an appointment    Signature:  Nakia Gunn RN

## 2023-08-02 ENCOUNTER — ANCILLARY PROCEDURE (OUTPATIENT)
Dept: MAMMOGRAPHY | Facility: CLINIC | Age: 68
End: 2023-08-02
Attending: PHYSICIAN ASSISTANT
Payer: COMMERCIAL

## 2023-08-02 DIAGNOSIS — Z12.31 VISIT FOR SCREENING MAMMOGRAM: ICD-10-CM

## 2023-08-02 DIAGNOSIS — C90.01 MULTIPLE MYELOMA IN REMISSION (H): Primary | ICD-10-CM

## 2023-08-02 PROCEDURE — 77067 SCR MAMMO BI INCL CAD: CPT | Mod: TC | Performed by: RADIOLOGY

## 2023-08-02 RX ORDER — LENALIDOMIDE 10 MG/1
10 CAPSULE ORAL DAILY
Qty: 28 CAPSULE | Refills: 0 | Status: SHIPPED | OUTPATIENT
Start: 2023-08-02 | End: 2023-09-06

## 2023-08-02 NOTE — TELEPHONE ENCOUNTER
PA Initiation    Medication: FREESTYLE SEGUNDO 2 SENSOR [facility]  Insurance Company: [employer] - Phone [phone] Fax [phone]  Pharmacy Filling the Rx: [facility] - [place], [place] - [facility] [address]  Filling Pharmacy Phone: [phone]  Filling Pharmacy Fax: [phone]  Start Date: 8/2/2023

## 2023-08-02 NOTE — TELEPHONE ENCOUNTER
Prior Authorization Approval    Medication: FREESTYLE SEGUNDO 2 SENSOR MISC  Authorization Effective Date: 7/3/2023  Authorization Expiration Date: 8/1/2026  Approved Dose/Quantity:   Reference #:     Insurance Company: [employer] - Phone [phone] Fax [phone]  Expected CoPay:       CoPay Card Available:      Financial Assistance Needed:   Which Pharmacy is filling the prescription: [facility] - [place] [address] [address]  Pharmacy Notified: Yes  Patient Notified: Yes **Instructed pharmacy to notify patient when script is ready to /ship.**

## 2023-08-04 ENCOUNTER — TELEPHONE (OUTPATIENT)
Dept: ONCOLOGY | Facility: CLINIC | Age: 68
End: 2023-08-04
Payer: COMMERCIAL

## 2023-08-04 NOTE — TELEPHONE ENCOUNTER
Oral Chemotherapy Monitoring Program    Medication: Revlimid  Rx:  10 mg PO daily for a 28 day cycle    Auth #: [id number]  Risk Category: Adult female NOT of reproductive capacity    Routine survey questions reviewed.    Thank you,    Mary Franklin  Oncology Pharmacy Liaison LUCINDA [email or web address]  Phone: [phone]  Fax: [phone]

## 2023-08-07 ENCOUNTER — OFFICE VISIT (OUTPATIENT)
Dept: PSYCHOLOGY | Facility: CLINIC | Age: 68
End: 2023-08-07
Payer: COMMERCIAL

## 2023-08-07 DIAGNOSIS — F33.1 MODERATE EPISODE OF RECURRENT MAJOR DEPRESSIVE DISORDER (H): Primary | ICD-10-CM

## 2023-08-07 PROCEDURE — 90834 PSYTX W PT 45 MINUTES: CPT | Performed by: STUDENT IN AN ORGANIZED HEALTH CARE EDUCATION/TRAINING PROGRAM

## 2023-08-07 NOTE — PROGRESS NOTES
[facility]                                     Progress Note    Patient Name: Winter Loya  Date: 08/07/2023         Service Type: Individual      Session Start Time: 2.30.  Session End Time: 3.15     Session Length: 45 mns    Session #: 28    Attendees: Client attended alone    Service Modality:  In-person    DATA  Interactive Complexity: No  Crisis: No      Progress Since Last Session (Related to Symptoms / Goals / Homework):   Symptoms:  Pt reported no change from previous session    Homework: Achieved / completed to satisfaction       Episode of Care Goals: Minimal progress - ACTION (Actively working towards change); Intervened by reinforcing change plan / affirming steps taken     Current / Ongoing Stressors and Concerns:   Ongoing stressors: Financial stress, stressful filial relationship/ Difficulty with assertiveness    Current: Pt reported the anniversary of son's death was last week and she had difficulty revisiting the memory and holding spaces for the sadness that she experienced after his death.                   Treatment Objective(s) Addressed in This Session:    Patient will address secondary losses associated with son's  death and process in session     Intervention:       MI Intervention: Expressed Empathy/Understanding, Supported Autonomy, Collaboration, Evocation, Permission to raise concern or advise, Open-ended questions, and Reflections: simple and complex        ASSESSMENT: Current Emotional / Mental Status (status of significant symptoms):   Risk status (Self / Other harm or suicidal ideation)   Patient denies current fears or concerns for personal safety.   Patient denies current or recent suicidal ideation or behaviors.   Patient denies current or recent homicidal ideation or behaviors.   Patient denies current or recent self injurious behavior or ideation.   Patient denies other safety concerns.   Patient reports there has been no change in risk factors since  their last session.     Patient reports there has been no change in protective factors since their last session.     Recommended that patient call 911 or go to the local ED should there be a change in any of these risk factors.     Appearance:   Appropriate    Eye Contact:   Good    Psychomotor Behavior: Normal    Attitude:   Cooperative  Interested Friendly   Orientation:   Person Place Time Situation   Speech    Rate / Production: Normal/ Responsive    Volume:  Normal    Mood:    Anxious  Depressed    Affect:    Worrisome    Thought Content:  Clear    Thought Form:  Coherent    Insight:    Good      Medication Review:   No changes to current psychiatric medication(s)     Medication Compliance:   Yes     Changes in Health Issues:   None reported     Chemical Use Review:   Substance Use: Chemical use reviewed, no active concerns identified      Tobacco Use: No current tobacco use.      Diagnosis:    296.32 (F33.1) Major Depressive Disorder, Recurrent Episode, Moderate _ and With anxious distress    Collateral Reports Completed:   Not Applicable    PLAN: (Patient Tasks / Therapist Tasks / Other)    Take daily walks    CHERISE Castellon            ----- Service Performed and Documented by CHERISE------  This note was reviewed and clinical supervision by AMY Ayala [facility]    8/8/2023   _____________________________________________________________    Individual Treatment Plan    Patient's Name: Winter Loya  YOB: 1955    Date of Creation: 04/18/2022  Date Treatment Plan Last Reviewed/Revised: 05/13/2023    DSM5 Diagnoses: 296.32 (F33.1) Major Depressive Disorder, Recurrent Episode, Moderate _ and With anxious distress  Psychosocial / Contextual Factors:   PROMIS (reviewed every 90 days):     Referral / Collaboration:  Referral to another professional/service is not indicated at this time..    Anticipated number of session for this episode of care: 15-25  Anticipation frequency of session: Every  "other week  Anticipated Duration of each session: 38-52 minutes  Treatment plan will be reviewed in 90 days or when goals have been changed.       MeasurableTreatment Goal(s) related to diagnosis / functional impairment(s)    Goal 1: Patient will identify and address specific triggers to feelings of depression and improve coping with depression by  90 % in the next three months.    I will know I've met my goal when I can comfortably manage the daily stressors I have and be less depressed\"    Objective #A (Patient Action)     Patient will  openly communicate  concerns, fears, and preferences to son daily   Status: Continued - Date(s):  09/26/2023  Intervention(s)  Therapist will provide psychoeducation, behavioral activation, and cognitive restructuring.    Objective #A (Patient Action)    Patient will take 15-20 minutes daily walks and spend 1 hour daily completing household chores.  Status: Continued - Date(s):  09/26/2023  Intervention(s)  Therapist will provide psychoeducation, behavioral activation, and cognitive restructuring.    Objective #B  Patient will identify specific areas of cognitive distortion and challenge irrational thoughts with reality.   Status: Continued - Date(s):     09/26/2023  Intervention(s)   Therapist will provide psychoeducation, behavioral activation, and cognitive restructuring.      Objective #C  Patient will learn and practice relaxation techniques to manage anxiety.  Status: Continued - Date(s):    09/26/2023  Intervention(s)   Therapist will provide psychoeducation, behavioral activation, and cognitive restructuring.    Patient has reviewed and agreed to the above plan.      CHERISE Castellon  April 18, 2022  Answers for HPI/ROS submitted by the patient on 6/26/2023  If you checked off any problems, how difficult have these problems made it for you to do your work, take care of things at home, or get along with other people?: Somewhat difficult  PHQ9 TOTAL SCORE: 20  "

## 2023-08-15 NOTE — CONFIDENTIAL NOTE
DIAGNOSIS:    Disorder of bilirubin excretion   Alkaline phosphatase elevation      Appt Date:  10.16.2023    NOTES STATUS DETAILS   OFFICE NOTE from referring provider Internal 07.30.2023 Brit Zhu MD    OFFICE NOTES from other specialists     DISCHARGE SUMMARY from hospital     MEDICATION LIST Internal    LIVER BIOSPY (IF APPLICABLE)      PATHOLOGY REPORTS      IMAGING     ENDOSCOPY (IF AVAILABLE)     COLONOSCOPY (IF AVAILABLE) Internal 02.25.2022   ULTRASOUND LIVER     CT OF ABDOMEN     MRI OF LIVER     FIBROSCAN, US ELASTOGRAPHY, FIBROSIS SCAN, MR ELASTOGRAPHY     LABS     HEPATIC PANEL (LIVER PANEL)     BASIC METABOLIC PANEL Care Everywhere 10.18.2022   COMPLETE METABOLIC PANEL Internal 07.24.2023   COMPLETE BLOOD COUNT (CBC) Internal 07.24.2023   INTERNATIONAL NORMALIZED RATIO (INR)     HEPATITIS C ANTIBODY     HEPATITIS C VIRAL LOAD/PCR     HEPATITIS C GENOTYPE     HEPATITIS B SURFACE ANTIGEN     HEPATITIS B SURFACE ANTIBODY     HEPATITIS B DNA QUANT LEVEL     HEPATITIS B CORE ANTIBODY

## 2023-08-21 ENCOUNTER — OFFICE VISIT (OUTPATIENT)
Dept: PSYCHOLOGY | Facility: CLINIC | Age: 68
End: 2023-08-21
Payer: COMMERCIAL

## 2023-08-21 DIAGNOSIS — F32.1 CURRENT MODERATE EPISODE OF MAJOR DEPRESSIVE DISORDER, UNSPECIFIED WHETHER RECURRENT (H): Primary | ICD-10-CM

## 2023-08-21 PROCEDURE — 90834 PSYTX W PT 45 MINUTES: CPT | Performed by: STUDENT IN AN ORGANIZED HEALTH CARE EDUCATION/TRAINING PROGRAM

## 2023-08-21 NOTE — PROGRESS NOTES
[facility]                                     Progress Note    Patient Name: Winter Loya  Date: 2023         Service Type: Individual      Session Start Time: 11.00  Session End Time: .48     Session Length:38-52 mns    Session #: 29    Attendees: Client attended alone    Service Modality:  In-person  Location: [facility] [place].    DATA  Interactive Complexity: No  Crisis: No      Progress Since Last Session (Related to Symptoms / Goals / Homework):   Symptoms:  Pt reported no change from previous session    Homework: Achieved / completed to satisfaction       Episode of Care Goals: Minimal progress - ACTION (Actively working towards change); Intervened by reinforcing change plan / affirming steps taken     Current / Ongoing Stressors and Concerns:   Ongoing stressors: Financial stress, stressful filial relationship/ Difficulty with assertiveness    Current: Patient reported financial stress and tat she has been struggling to get her groceries and that relationship with her  son's wife has been passove since she stopped giving her money and that has affected her mood. 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 Explained the importance of being in community and having an activity that is fulfilling in managing depression.       ASSESSMENT: Current Emotional / Mental Status (status of significant  symptoms):   Risk status (Self / Other harm or suicidal ideation)   Patient denies current fears or concerns for personal safety.   Patient denies current or recent suicidal ideation or behaviors.   Patient denies current or recent homicidal ideation or behaviors.   Patient denies current or recent self injurious behavior or ideation.   Patient denies other safety concerns.   Patient reports there has been no change in risk factors since their last session.     Patient reports there has been no change in protective factors since their last session.     Recommended that patient call 911 or go to the local ED should there be a change in any of these risk factors.     Appearance:   Appropriate    Eye Contact:   Good    Psychomotor Behavior: Normal    Attitude:   Cooperative  Interested Friendly   Orientation:   Person Place Time Situation   Speech    Rate / Production: Normal/ Responsive    Volume:  Normal    Mood:    Anxious  Depressed    Affect:    Worrisome    Thought Content:  Clear    Thought Form:  Coherent    Insight:    Good      Medication Review:   No changes to current psychiatric medication(s)     Medication Compliance:   Yes     Changes in Health Issues:   None reported     Chemical Use Review:   Substance Use: Chemical use reviewed, no active concerns identified      Tobacco Use: No current tobacco use.      Diagnosis:    296.32 (F33.1) Major Depressive Disorder, Recurrent Episode, Moderate _ and With anxious distress    Collateral Reports Completed:   Not Applicable    PLAN: (Patient Tasks / Therapist Tasks / Other)    Take daily walks, increase outdoor physical activities.    CHERISE Castellon            ----- Service Performed and Documented by CHERISE------  This note was reviewed and clinical supervision by AMY Ayala [facility]    8/21/2023     _____________________________________________________________    Individual Treatment Plan    Patient's Name: Winter Loya  Date Of  "Birth: 1955    Date of Creation: 04/18/2022  Date Treatment Plan Last Reviewed/Revised: 05/13/2023    DSM5 Diagnoses: 296.32 (F33.1) Major Depressive Disorder, Recurrent Episode, Moderate _ and With anxious distress  Psychosocial / Contextual Factors:   PROMIS (reviewed every 90 days):     Referral / Collaboration:  Referral to another professional/service is not indicated at this time..    Anticipated number of session for this episode of care: 15-25  Anticipation frequency of session: Every other week  Anticipated Duration of each session: 38-52 minutes  Treatment plan will be reviewed in 90 days or when goals have been changed.       MeasurableTreatment Goal(s) related to diagnosis / functional impairment(s)    Goal 1: Patient will identify and address specific triggers to feelings of depression and improve coping with depression by  90 % in the next three months.    I will know I've met my goal when I can comfortably manage the daily stressors I have and be less depressed\"    Objective #A (Patient Action)     Patient will  openly communicate  concerns, fears, and preferences to son daily   Status: Continued - Date(s):  09/26/2023  Intervention(s)  Therapist will provide psychoeducation, behavioral activation, and cognitive restructuring.    Objective #A (Patient Action)    Patient will take 15-20 minutes daily walks and spend 1 hour daily completing household chores.  Status: Continued - Date(s):  09/26/2023  Intervention(s)  Therapist will provide psychoeducation, behavioral activation, and cognitive restructuring.    Objective #B  Patient will identify specific areas of cognitive distortion and challenge irrational thoughts with reality.   Status: Continued - Date(s):     09/26/2023  Intervention(s)   Therapist will provide psychoeducation, behavioral activation, and cognitive restructuring.      Objective #C  Patient will learn and practice relaxation techniques to manage anxiety.  Status: Continued - " Date(s):    09/26/2023  Intervention(s)   Therapist will provide psychoeducation, behavioral activation, and cognitive restructuring.    Patient has reviewed and agreed to the above plan.      CHERISE Castellon  April 18, 2022  Answers for HPI/ROS submitted by the patient on 6/26/2023  If you checked off any problems, how difficult have these problems made it for you to do your work, take care of things at home, or get along with other people?: Somewhat difficult  PHQ9 TOTAL SCORE: 20

## 2023-08-24 ENCOUNTER — OFFICE VISIT (OUTPATIENT)
Dept: OPTOMETRY | Facility: CLINIC | Age: 68
End: 2023-08-24
Payer: COMMERCIAL

## 2023-08-24 DIAGNOSIS — H25.813 COMBINED FORMS OF AGE-RELATED CATARACT OF BOTH EYES: ICD-10-CM

## 2023-08-24 DIAGNOSIS — H40.003 GLAUCOMA SUSPECT OF BOTH EYES: ICD-10-CM

## 2023-08-24 DIAGNOSIS — Z98.890 HISTORY OF LASER PHOTOCOAGULATION OF RETINA: ICD-10-CM

## 2023-08-24 DIAGNOSIS — H52.223 REGULAR ASTIGMATISM OF BOTH EYES: ICD-10-CM

## 2023-08-24 DIAGNOSIS — E11.9 TYPE 2 DIABETES MELLITUS WITHOUT RETINOPATHY (H): ICD-10-CM

## 2023-08-24 DIAGNOSIS — H52.11 MYOPIA, RIGHT: ICD-10-CM

## 2023-08-24 DIAGNOSIS — Z01.01 ENCOUNTER FOR EXAMINATION OF EYES AND VISION WITH ABNORMAL FINDINGS: Primary | ICD-10-CM

## 2023-08-24 DIAGNOSIS — H52.4 PRESBYOPIA: ICD-10-CM

## 2023-08-24 PROCEDURE — 92015 DETERMINE REFRACTIVE STATE: CPT | Performed by: OPTOMETRIST

## 2023-08-24 PROCEDURE — 92014 COMPRE OPH EXAM EST PT 1/>: CPT | Performed by: OPTOMETRIST

## 2023-08-24 ASSESSMENT — REFRACTION_MANIFEST
OS_SPHERE: PLANO
OD_AXIS: 131
OS_SPHERE: +0.50
OS_AXIS: 038
OD_CYLINDER: +1.00
OS_AXIS: 015
OD_AXIS: 115
OD_SPHERE: -2.75
OD_ADD: +2.50
OS_CYLINDER: +1.00
OD_SPHERE: -2.00
OS_CYLINDER: +1.00
OD_CYLINDER: +0.75
OS_ADD: +2.50

## 2023-08-24 ASSESSMENT — VISUAL ACUITY
OD_SC+: -1
OD_SC: 20/30
OD_CC: J1+
OS_CC: J1+
OS_SC: 20/20
METHOD: SNELLEN - LINEAR
CORRECTION_TYPE: GLASSES

## 2023-08-24 ASSESSMENT — CONF VISUAL FIELD
OD_INFERIOR_NASAL_RESTRICTION: 0
OS_SUPERIOR_TEMPORAL_RESTRICTION: 0
OS_NORMAL: 1
OS_SUPERIOR_NASAL_RESTRICTION: 0
METHOD: COUNTING FINGERS
OD_INFERIOR_TEMPORAL_RESTRICTION: 0
OS_INFERIOR_NASAL_RESTRICTION: 0
OD_SUPERIOR_TEMPORAL_RESTRICTION: 0
OD_SUPERIOR_NASAL_RESTRICTION: 0
OD_NORMAL: 1
OS_INFERIOR_TEMPORAL_RESTRICTION: 0

## 2023-08-24 ASSESSMENT — EXTERNAL EXAM - RIGHT EYE: OD_EXAM: NORMAL

## 2023-08-24 ASSESSMENT — KERATOMETRY
OS_AXISANGLE2_DEGREES: 149
OS_AXISANGLE_DEGREES: 059
OD_AXISANGLE_DEGREES: 093
OS_K2POWER_DIOPTERS: 43.50
OD_K2POWER_DIOPTERS: 43.50
OD_AXISANGLE2_DEGREES: 003
OD_K1POWER_DIOPTERS: 42.50
OS_K1POWER_DIOPTERS: 43.00

## 2023-08-24 ASSESSMENT — CUP TO DISC RATIO
OD_RATIO: 0.75
OS_RATIO: 0.6

## 2023-08-24 ASSESSMENT — PACHYMETRY
OS_CT(UM): 570
OD_CT(UM): 567
EXAM_DATE: 6/16/2021

## 2023-08-24 ASSESSMENT — REFRACTION_WEARINGRX
OD_CYLINDER: +0.75
OD_AXIS: 135
OS_ADD: +2.50
OD_SPHERE: -2.50
OS_AXIS: 035
SPECS_TYPE: PAL
OS_CYLINDER: +1.00
OD_ADD: +2.50
OS_SPHERE: -0.25

## 2023-08-24 ASSESSMENT — TONOMETRY
OD_IOP_MMHG: 20
IOP_METHOD: APPLANATION
OS_IOP_MMHG: 19

## 2023-08-24 ASSESSMENT — EXTERNAL EXAM - LEFT EYE: OS_EXAM: NORMAL

## 2023-08-24 NOTE — PROGRESS NOTES
Chief Complaint   Patient presents with    Diabetic Eye Exam        Chief Complaint(s) and History of Present Illness(es)       Diabetic Eye Exam              Diabetes Type: Type 2 and on insulin    Duration: years                   Lab Results   Component Value Date    A1C 6.6 05/22/2023    A1C 8.2 02/02/2023    A1C 7.6 09/28/2022    A1C 6.8 07/11/2022    A1C 9.0 12/29/2021    A1C 7.6 03/08/2021    A1C 8.8 12/07/2020    A1C 9.1 08/21/2020    A1C 6.7 03/05/2020          Last Eye Exam: Aug 2022  Dilated Previously: Yes, side effects of dilation explained today    What are you currently using to see?  Glasses - rarely wears them around the house. She does wear them when driving & using computer.     Distance Vision Acuity: Noticed gradual change in both eyes    Near Vision Acuity: Satisfied with vision while reading  with glasses    Eye Comfort: good  Do you use eye drops? : No  Occupation or Hobbies: retired    Jewels Louie, ISMA        Medical, surgical and family histories reviewed and updated 8/24/2023.       OBJECTIVE: See Ophthalmology exam    ASSESSMENT:    ICD-10-CM    1. Encounter for examination of eyes and vision with abnormal findings  Z01.01       2. Type 2 diabetes mellitus without retinopathy (H)  E11.9       3. Combined forms of age-related cataract, mild, of both eyes  H25.813       4. Glaucoma suspect of both eyes  H40.003       5. History of laser photocoagulation of retina, os (od?)  Z98.890       6. Myopia, right  H52.11       7. Regular astigmatism of both eyes  H52.223       8. Presbyopia  H52.4           PLAN:    Winter Loya aware  eye exam results will be sent to Montse Bucio  Patient Instructions   You have the start of mild cataracts.  You may notice some blurred vision or glare with night driving.  It is important that you wear good sunglasses to protect your eyes from the ultraviolet light from the sun.     Patient educated on importance of good blood sugar control.  Letter sent to  primary care provider with diabetic eye exam report.     Recommend repeat glaucoma testing with Dr. Murguia. Last tested August 2022.     Updated glasses prescription provided today. Optional to fill.    The effects of the dilating drops last for 4- 6 hours.  You will be more sensitive to light and vision will be blurry up close.  Mydriatic sunglasses were given if needed.     Loyd Deluna, OD  [facility]  [address] [place] [address]. [place]  [place]  [address]    [phone]

## 2023-08-24 NOTE — PATIENT INSTRUCTIONS
You have the start of mild cataracts.  You may notice some blurred vision or glare with night driving.  It is important that you wear good sunglasses to protect your eyes from the ultraviolet light from the sun.     Patient educated on importance of good blood sugar control.  Letter sent to primary care provider with diabetic eye exam report.     Recommend repeat glaucoma testing with Dr. Murguia. Last tested August 2022.     Updated glasses prescription provided today. Optional to fill.    The effects of the dilating drops last for 4- 6 hours.  You will be more sensitive to light and vision will be blurry up close.  Mydriatic sunglasses were given if needed.     Loyd Deluna, OD  [facility]  [address] [place] [address]. [place]  [id number]    [phone]

## 2023-08-24 NOTE — LETTER
8/24/2023         RE: Winter Loya  [address]  [facility] [address]        Dear Colleague,    Thank you for referring your patient, Winter Loya, to the [facility]. Please see a copy of my visit note below.    Chief Complaint   Patient presents with     Diabetic Eye Exam        Chief Complaint(s) and History of Present Illness(es)       Diabetic Eye Exam              Diabetes Type: Type 2 and on insulin    Duration: years                   Lab Results   Component Value Date    A1C 6.6 05/22/2023    A1C 8.2 02/02/2023    A1C 7.6 09/28/2022    A1C 6.8 07/11/2022    A1C 9.0 12/29/2021    A1C 7.6 03/08/2021    A1C 8.8 12/07/2020    A1C 9.1 08/21/2020    A1C 6.7 03/05/2020          Last Eye Exam: Aug 2022  Dilated Previously: Yes, side effects of dilation explained today    What are you currently using to see?  Glasses - rarely wears them around the house. She does wear them when driving & using computer.     Distance Vision Acuity: Noticed gradual change in both eyes    Near Vision Acuity: Satisfied with vision while reading  with glasses    Eye Comfort: good  Do you use eye drops? : No  Occupation or Hobbies: retired    [employer], ISMA        Medical, surgical and family histories reviewed and updated 8/24/2023.       OBJECTIVE: See Ophthalmology exam    ASSESSMENT:    ICD-10-CM    1. Encounter for examination of eyes and vision with abnormal findings  Z01.01       2. Type 2 diabetes mellitus without retinopathy (H)  E11.9       3. Combined forms of age-related cataract, mild, of both eyes  H25.813       4. Glaucoma suspect of both eyes  H40.003       5. History of laser photocoagulation of retina, os (od?)  Z98.890       6. Myopia, right  H52.11       7. Regular astigmatism of both eyes  H52.223       8. Presbyopia  H52.4           PLAN:    Winter Loya aware  eye exam results will be sent to [facility]  Patient Instructions   You have the start of mild cataracts.  You may  notice some blurred vision or glare with night driving.  It is important that you wear good sunglasses to protect your eyes from the ultraviolet light from the sun.     Patient educated on importance of good blood sugar control.  Letter sent to primary care provider with diabetic eye exam report.     Recommend repeat glaucoma testing with Dr. Murguia. Last tested August 2022.     Updated glasses prescription provided today. Optional to fill.    The effects of the dilating drops last for 4- 6 hours.  You will be more sensitive to light and vision will be blurry up close.  Mydriatic sunglasses were given if needed.     Loyd Deluna OD  [address] [place]  [address]    [phone]           Again, thank you for allowing me to participate in the care of your patient.        Sincerely,        Loyd Deluna OD

## 2023-09-01 ENCOUNTER — TRANSFERRED RECORDS (OUTPATIENT)
Dept: HEALTH INFORMATION MANAGEMENT | Facility: CLINIC | Age: 68
End: 2023-09-01

## 2023-09-01 LAB
ALT SERPL-CCNC: 27 IU/L (ref 0–32)
AST SERPL-CCNC: 18 IU/L (ref 0–40)

## 2023-09-06 ENCOUNTER — TELEPHONE (OUTPATIENT)
Dept: ONCOLOGY | Facility: CLINIC | Age: 68
End: 2023-09-06
Payer: MEDICARE

## 2023-09-06 DIAGNOSIS — C90.01 MULTIPLE MYELOMA IN REMISSION (H): Primary | ICD-10-CM

## 2023-09-06 RX ORDER — LENALIDOMIDE 10 MG/1
10 CAPSULE ORAL DAILY
Qty: 28 CAPSULE | Refills: 0 | Status: SHIPPED | OUTPATIENT
Start: 2023-09-06 | End: 2023-11-29

## 2023-09-10 ENCOUNTER — HEALTH MAINTENANCE LETTER (OUTPATIENT)
Age: 68
End: 2023-09-10

## 2023-09-12 ENCOUNTER — TELEPHONE (OUTPATIENT)
Dept: FAMILY MEDICINE | Facility: CLINIC | Age: 68
End: 2023-09-12
Payer: MEDICARE

## 2023-09-12 NOTE — TELEPHONE ENCOUNTER
[facility]    Fax: [phone]    [place]: [phone]    [place]: [phone]

## 2023-09-14 NOTE — TELEPHONE ENCOUNTER
Central Prior Authorization Team   Phone: [phone]    PA Initiation    Medication: Ozempic (2mg/dose) 8mg/3ml sopn  Insurance Company: [employer] - Phone [phone] Fax [phone]  Pharmacy Filling the Rx: [facility] - [place], [place] - [address]  Filling Pharmacy Phone: [phone]  Filling Pharmacy Fax:    Start Date: 9/14/2023

## 2023-09-14 NOTE — TELEPHONE ENCOUNTER
Prior Authorization Approval    Authorization Effective Date: 8/15/2023  Authorization Expiration Date: 9/13/2026  Medication: Ozempic (2mg/dose) 8mg/3ml sopn  Approved Dose/Quantity:    Reference #: [id number]   Insurance Company: [employer] - Phone [phone] Fax [phone]  Expected CoPay:       CoPay Card Available:      Foundation Assistance Needed:    Which Pharmacy is filling the prescription (Not needed for infusion/clinic administered): [facility] - [facility] [address]   Pharmacy Notified: Yes  Patient Notified: Yes  **Instructed pharmacy to notify patient when script is ready to /ship.**

## 2023-09-21 ENCOUNTER — LAB (OUTPATIENT)
Dept: LAB | Facility: CLINIC | Age: 68
End: 2023-09-21
Payer: COMMERCIAL

## 2023-09-21 DIAGNOSIS — E11.42 TYPE 2 DIABETES MELLITUS WITH DIABETIC POLYNEUROPATHY, WITH LONG-TERM CURRENT USE OF INSULIN (H): Primary | ICD-10-CM

## 2023-09-21 DIAGNOSIS — C90.01 MULTIPLE MYELOMA IN REMISSION (H): ICD-10-CM

## 2023-09-21 DIAGNOSIS — Z79.4 TYPE 2 DIABETES MELLITUS WITH DIABETIC POLYNEUROPATHY, WITH LONG-TERM CURRENT USE OF INSULIN (H): Primary | ICD-10-CM

## 2023-09-21 LAB
ALBUMIN SERPL BCG-MCNC: 4.5 G/DL (ref 3.5–5.2)
ALP SERPL-CCNC: 120 U/L (ref 35–104)
ALT SERPL W P-5'-P-CCNC: 29 U/L (ref 0–50)
ANION GAP SERPL CALCULATED.3IONS-SCNC: 13 MMOL/L (ref 7–15)
AST SERPL W P-5'-P-CCNC: 20 U/L (ref 0–45)
BASOPHILS # BLD AUTO: 0.1 10E3/UL (ref 0–0.2)
BASOPHILS NFR BLD AUTO: 1 %
BILIRUB SERPL-MCNC: 1.7 MG/DL
BUN SERPL-MCNC: 12.6 MG/DL (ref 8–23)
CALCIUM SERPL-MCNC: 9 MG/DL (ref 8.8–10.2)
CHLORIDE SERPL-SCNC: 105 MMOL/L (ref 98–107)
CREAT SERPL-MCNC: 0.72 MG/DL (ref 0.51–0.95)
DEPRECATED HCO3 PLAS-SCNC: 23 MMOL/L (ref 22–29)
EGFRCR SERPLBLD CKD-EPI 2021: >90 ML/MIN/1.73M2
EOSINOPHIL # BLD AUTO: 0.3 10E3/UL (ref 0–0.7)
EOSINOPHIL NFR BLD AUTO: 8 %
ERYTHROCYTE [DISTWIDTH] IN BLOOD BY AUTOMATED COUNT: 13.8 % (ref 10–15)
GLUCOSE SERPL-MCNC: 167 MG/DL (ref 70–99)
HBA1C MFR BLD: 7.2 % (ref 0–5.6)
HCT VFR BLD AUTO: 45.5 % (ref 35–47)
HGB BLD-MCNC: 15.1 G/DL (ref 11.7–15.7)
IMM GRANULOCYTES # BLD: 0 10E3/UL
IMM GRANULOCYTES NFR BLD: 0 %
LYMPHOCYTES # BLD AUTO: 0.8 10E3/UL (ref 0.8–5.3)
LYMPHOCYTES NFR BLD AUTO: 18 %
MCH RBC QN AUTO: 29.1 PG (ref 26.5–33)
MCHC RBC AUTO-ENTMCNC: 33.2 G/DL (ref 31.5–36.5)
MCV RBC AUTO: 88 FL (ref 78–100)
MONOCYTES # BLD AUTO: 0.4 10E3/UL (ref 0–1.3)
MONOCYTES NFR BLD AUTO: 10 %
NEUTROPHILS # BLD AUTO: 2.8 10E3/UL (ref 1.6–8.3)
NEUTROPHILS NFR BLD AUTO: 63 %
PLATELET # BLD AUTO: 132 10E3/UL (ref 150–450)
POTASSIUM SERPL-SCNC: 4.5 MMOL/L (ref 3.4–5.3)
PROT SERPL-MCNC: 7 G/DL (ref 6.4–8.3)
RBC # BLD AUTO: 5.19 10E6/UL (ref 3.8–5.2)
SODIUM SERPL-SCNC: 141 MMOL/L (ref 136–145)
TOTAL PROTEIN SERUM FOR ELP: 6.7 G/DL (ref 6.4–8.3)
WBC # BLD AUTO: 4.4 10E3/UL (ref 4–11)

## 2023-09-21 PROCEDURE — 83036 HEMOGLOBIN GLYCOSYLATED A1C: CPT

## 2023-09-21 PROCEDURE — 84155 ASSAY OF PROTEIN SERUM: CPT | Mod: 59

## 2023-09-21 PROCEDURE — 84165 PROTEIN E-PHORESIS SERUM: CPT

## 2023-09-21 PROCEDURE — 36415 COLL VENOUS BLD VENIPUNCTURE: CPT

## 2023-09-21 PROCEDURE — 85025 COMPLETE CBC W/AUTO DIFF WBC: CPT

## 2023-09-21 PROCEDURE — 80053 COMPREHEN METABOLIC PANEL: CPT

## 2023-09-22 ENCOUNTER — OFFICE VISIT (OUTPATIENT)
Dept: AUDIOLOGY | Facility: CLINIC | Age: 68
End: 2023-09-22
Payer: COMMERCIAL

## 2023-09-22 DIAGNOSIS — H90.3 ASYMMETRICAL SENSORINEURAL HEARING LOSS: Primary | ICD-10-CM

## 2023-09-22 LAB
ALBUMIN SERPL ELPH-MCNC: 4.4 G/DL (ref 3.7–5.1)
ALPHA1 GLOB SERPL ELPH-MCNC: 0.3 G/DL (ref 0.2–0.4)
ALPHA2 GLOB SERPL ELPH-MCNC: 0.6 G/DL (ref 0.5–0.9)
B-GLOBULIN SERPL ELPH-MCNC: 0.8 G/DL (ref 0.6–1)
GAMMA GLOB SERPL ELPH-MCNC: 0.6 G/DL (ref 0.7–1.6)
M PROTEIN SERPL ELPH-MCNC: 0 G/DL
PROT PATTERN SERPL ELPH-IMP: ABNORMAL

## 2023-09-22 PROCEDURE — 92591 PR HEARING AID EXAM BINAURAL: CPT

## 2023-09-22 PROCEDURE — 92550 TYMPANOMETRY & REFLEX THRESH: CPT

## 2023-09-22 PROCEDURE — 92557 COMPREHENSIVE HEARING TEST: CPT

## 2023-09-22 NOTE — PROGRESS NOTES
AUDIOLOGY REPORT    SUBJECTIVE:  Winter Loya is a 67 year old female who was seen in the Audiology Clinic at the [facility] for audiologic evaluation, referred self. The patient reports a gradual decline in hearing and is interested in amplification. The patient denies room-spinning dizziness, bilateral tinnitus, bilateral otalgia, bilateral drainage, bilateral aural fullness, history of ear surgeries and family history of hearing loss. Winter also denies history of noise exposure. The patient notes difficulty with communication in a variety of listening situations. She was not accompanied to today's appointment. Winetr has never worn hearing aids     OBJECTIVE:  Abuse Screening:  Do you feel unsafe at home or work/school? No  Do you feel threatened by someone? No  Does anyone try to keep you from having contact with others, or doing things outside of your home? No  Physical signs of abuse present? No     Fall Risk Screen:  1. Have you fallen two or more times in the past year? Yes   *Patient does have multiple myeloma where she has received chemotherapy and stem cell transplants. She attributes her balance issues to this   2. Have you fallen and had an injury in the past year? No    Otoscopic exam indicates ears are clear of cerumen bilaterally     Pure Tone Thresholds assessed using conventional audiometry with good  reliability from 250-8000 Hz bilaterally using insert earphones and circumaural headphones     RIGHT:  normal at 250 Hz sloping to moderate-severe sensorineural hearing loss    LEFT:    normal sloping to moderate-severe sensorineural hearing loss  *asymmetry noted from 500-4000 Hz with the right being worse than the left    Tympanogram:    RIGHT: normal eardrum mobility    LEFT:   normal eardrum mobility    Reflexes (reported by stimulus ear):  RIGHT: Ipsilateral is present at normal levels  RIGHT: Contralateral is present at normal levels  LEFT:   Ipsilateral is present at normal  levels  LEFT:   Contralateral is present at normal levels      Speech Reception Threshold:    RIGHT: 45 dB HL    LEFT:   35 dB HL  Word Recognition Score:     RIGHT: 100% at 80 dB HL using NU-6 recorded word list.    LEFT:   100% at 75 dB HL using NU-6 recorded word list.      ASSESSMENT:  Asymmetrical sensorineural hearing loss. Today s results were discussed with the patient in detail.       Hearing aid consult to follow    AUDIOLOGY REPORT    OBJECTIVE:  Patient is a hearing aid candidate. Patient would like to move forward with a hearing aid evaluation today. Therefore, the patient was presented with different options for amplification to help aid in communication. Discussed styles, levels of technology and monaural vs. binaural fitting.     The hearing aid(s) mutually chosen were:  Binaural: Phonak Audeo L70-R MARISELA (#[id number])  COLOR: P5  BATTERY SIZE: rechargeable  EARMOLD/TIPS: medium closed (patient is not interested in custom earmolds)  CANAL/ LENGTH: 2M      ASSESSMENT:   Reviewed purchase information and warranty information with patient. The 45 day trial period was explained to patient. The patient was given a copy of the [place] Department of Health consumer brochure on purchasing hearing instruments. Patient risk factors have been provided to the patient in writing prior to the sale of the hearing aid per FDA regulation. The risk factors are also available in the User Instructional Booklet to be presented on the day of the hearing aid fitting. Hearing aid(s) ordered. Hearing aid evaluation completed.    PLAN: Winter is scheduled to return in 2-3 weeks for a hearing aid fitting and programming. Purchase agreement will be completed on that date. Please contact this clinic with any questions or concerns.  *Winter is aware that she will need medical clearance before hearing aid fitting. It was highly recommended she follow-up with ENT regarding asymmetry between the ears.     Domenica Brennan  Au.D.CCC-BAM  Licensed Audiologist  [place] #[id number]     9/22/2023

## 2023-09-25 ENCOUNTER — OFFICE VISIT (OUTPATIENT)
Dept: PSYCHOLOGY | Facility: CLINIC | Age: 68
End: 2023-09-25
Payer: COMMERCIAL

## 2023-09-25 DIAGNOSIS — F32.1 CURRENT MODERATE EPISODE OF MAJOR DEPRESSIVE DISORDER, UNSPECIFIED WHETHER RECURRENT (H): Primary | ICD-10-CM

## 2023-09-25 PROCEDURE — 90834 PSYTX W PT 45 MINUTES: CPT | Performed by: STUDENT IN AN ORGANIZED HEALTH CARE EDUCATION/TRAINING PROGRAM
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
## 2023-09-27 ENCOUNTER — ONCOLOGY VISIT (OUTPATIENT)
Dept: ONCOLOGY | Facility: CLINIC | Age: 68
End: 2023-09-27
Attending: STUDENT IN AN ORGANIZED HEALTH CARE EDUCATION/TRAINING PROGRAM
Payer: COMMERCIAL

## 2023-09-27 VITALS
BODY MASS INDEX: 36.82 KG/M2 | RESPIRATION RATE: 16 BRPM | SYSTOLIC BLOOD PRESSURE: 115 MMHG | OXYGEN SATURATION: 96 % | WEIGHT: 249.3 LBS | TEMPERATURE: 98.6 F | DIASTOLIC BLOOD PRESSURE: 76 MMHG | HEART RATE: 73 BPM

## 2023-09-27 DIAGNOSIS — C90.01 MULTIPLE MYELOMA IN REMISSION (H): Primary | ICD-10-CM

## 2023-09-27 DIAGNOSIS — R19.7 DIARRHEA, UNSPECIFIED TYPE: ICD-10-CM

## 2023-09-27 DIAGNOSIS — E80.6 DISORDER OF BILIRUBIN EXCRETION: ICD-10-CM

## 2023-09-27 PROCEDURE — 99215 OFFICE O/P EST HI 40 MIN: CPT | Performed by: NURSE PRACTITIONER

## 2023-09-27 PROCEDURE — G0463 HOSPITAL OUTPT CLINIC VISIT: HCPCS | Performed by: NURSE PRACTITIONER

## 2023-09-27 ASSESSMENT — PAIN SCALES - GENERAL: PAINLEVEL: NO PAIN (0)

## 2023-09-27 NOTE — PROGRESS NOTES
[facility]    Hematology/Oncology Clinic Note    Sep 27, 2023    Reason for Visit: IgA Kappa Multiple Myeloma    Oncology HPI:   Winter Loya is a 67 year old woman with IgA Kappa Multiple Myeloma. In 2018 she had anemia and was fatigued. She was found to have IgA kappa monocloncal antibody with M-spike of 0.17. IgA elevated. Skeletal survey was unremarkable and UPEP had minimal protein (156 mg/m2). PET 2018 showed bone marrow consistent with hypermetabolic plasma cell myeloma. M spike was 0.17. She started RVD and Zometa. On 2019 she had an autologous transplant.      Her bone marrow bx from 2019 was sent here for review. It showed marrow cellularity of 60%, with decreased trilineage hematopoiesis and 15% plasma cells as well as peripheral blood with slight anemia. 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 She is on revlimid maintenance and zometa from her prior oncologist in [place]. On 19 her K/L ratio is 1.1, M spike is 0.04.     Currently on Revlimid maintenance.    Interval history:   Was last seen in 23 by Dr. Zhu.   On Revlimid yet takes at night.  Has up to 3 loose/watery 3 x day for which she uses imodium. Eating stimulates bowels and states they are foul smelling as is flatus.  She has seen GI and has been worked up.  All studies were negative and felt to be due to Revlimid.  Always fatigued   Denies pain  Legs/feet neuropathy stable  Psychologically- doing ok.  Seeing therapist.  Still grieving over her son who  in 2022 from ALL (Jeffrey Deleon, who was a patient here). Has a son with addition issues.  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  She is keeping up with her health maintenance.    Past Medical History:   Diagnosis Date    Abnormal EMG 2021    Interpretation:  This is an abnormal study, demonstrating electrophysiologic evidence of a length-dependent axonal sensorimotor polyneuropathy. Asymmetry of peroneal compound muscle action potential amplitudes is a finding of uncertain significance.       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   I have personally reviewed the examination and initial interpretation and I agree with the findings.   ANNE GOODMAN MD    History of MRI of cervical spine 6/2020 3/15/2021    Impression: Multilevel cervical spondylosis, most pronounced at the level of C4-5 and C5-6 with moderate to severe spinal canal stenosis and moderate spinal canal stenosis at C6-7. No abnormal cord signal. No significant neural foraminal narrowing at any level.   I have personally reviewed the examination and initial interpretation and I agree with the findings.   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    Sleep apnea     no c-pap use    Status post complete thyroidectomy 8/26/2020    Thyroid goiter 8/5/2020    Tremor 12/9/2020        Current Outpatient Medications   Medication Sig Dispense Refill    acyclovir (ZOVIRAX) 400 MG tablet TAKE ONE TABLET BY MOUTH EVERY TWELVE HOURS 180 tablet 3    aspirin 81 MG EC tablet Take 81 mg by mouth daily      atorvastatin (LIPITOR) 20 MG tablet Take 1 tablet (20 mg) by mouth At Bedtime 90 tablet 3    empagliflozin (JARDIANCE) 25 MG TABS tablet Take 1 tablet (25 mg) by mouth daily 90 tablet 3    insulin glargine (LANTUS  SOLOSTAR) 100 UNIT/ML pen Inject 24 Units Subcutaneous every morning Place on file 30 mL 1    LENalidomide (REVLIMID) 10 MG CAPS capsule Take 1 capsule (10 mg) by mouth daily for 28 days 28 capsule 0    levothyroxine (SYNTHROID/LEVOTHROID) 200 MCG tablet Take 1 tablet (200 mcg) by mouth every morning (before breakfast) 90 tablet 3    losartan (COZAAR) 25 MG tablet Take 1 tablet (25 mg) by mouth daily 90 tablet 3    pregabalin (LYRICA) 100 MG capsule Take 1 capsule (100 mg) by mouth 2 times daily 180 capsule 3    propranolol ER (INDERAL LA) 60 MG 24 hr capsule Take 1 capsule (60 mg) by mouth daily 90 capsule 3    Semaglutide, 2 MG/DOSE, (OZEMPIC, 2 MG/DOSE,) 8 MG/3ML pen Inject 2 mg Subcutaneous every 7 days 9 mL 3    sertraline (ZOLOFT) 100 MG tablet Take 1 tablet (100 mg) by mouth daily 90 tablet 3    alcohol swab prep pads Use to swab area of injection/sowmya as directed. 100 each 3    blood glucose (NO BRAND SPECIFIED) test strip Use to test blood sugar 3 times daily or as directed. To accompany: Blood Glucose Monitor Brands: per insurance. 100 strip 6    blood glucose calibration (NO BRAND SPECIFIED) solution To accompany: Blood Glucose Monitor Brands: per insurance. 4 each 0    blood glucose monitoring (NO BRAND SPECIFIED) meter device kit Use to test blood sugar 3 times daily or as directed. Preferred blood glucose meter OR supplies to accompany: Blood Glucose Monitor Brands: per insurance. 1 kit 0    Continuous Blood Gluc  (FREESTYLE SEGUNDO 2 READER) MARCIA Use to read blood sugars as per 's instructions. 1 each 0    Continuous Blood Gluc Sensor (FREESTYLE SEGUNDO 2 SENSOR) [facility] 1 each every 14 days Use 1 sensor every 14 days. Use to read blood sugars per 's instructions. 2 each 5    insulin pen needle (FIFTY50 PEN NEEDLES) 32G X 4 MM miscellaneous Use one pen needle daily or as directed. 100 each 2    loperamide (IMODIUM) 2 MG capsule Take 4 mg (two capsules) by mouth at onset of  loose stools, followed by 2 mg (one capsule) after each loose stool. Maximum: 16 mg (8 capsules) daily 270 capsule 11    thin (NO BRAND SPECIFIED) lancets Use with lanceting device. To accompany: Blood Glucose Monitor Brands: per insurance. (Patient not taking: Reported on 6/26/2023) 200 each 6        No Known Allergies    /76 (BP Location: Left arm, Patient Position: Sitting, Cuff Size: Adult Large)   Pulse 73   Temp 98.6  F (37  C) (Oral)   Resp 16   Wt 113.1 kg (249 lb 4.8 oz)   SpO2 96%   BMI 36.82 kg/m      Wt Readings from Last 5 Encounters:   09/27/23 113.1 kg (249 lb 4.8 oz)   06/26/23 113 kg (249 lb 3.2 oz)   06/20/23 114.3 kg (252 lb)   04/14/23 114.1 kg (251 lb 8 oz)   03/20/23 113.6 kg (250 lb 6.4 oz)     Constitutional: NAD  Eyes: no scleral icterus  ENT: no oral lesions  Lymph: no cervical, supraclavicular, axillary LAD  Pulm: CTAB  CV: RRR, no m/r/g  GI: bowel sounds present, soft and nontender to palpation  MSK: No edema in the extremities  Neuro: alert, conversant, normal gait  Psych: appropriate mood and affect    Blood Counts       Recent Labs   Lab Test 09/21/23  0954 07/24/23  1009 06/19/23  1054 05/22/23  1018 03/20/23  1411   HGB 15.1 14.5 14.2   < > 14.9   HCT 45.5 43.3 42.8   < > 43.9   WBC 4.4 3.3* 3.7*   < > 4.0   ANEUTAUTO 2.8 2.0 2.4   < > 2.7   ALYMPAUTO 0.8 0.6* 0.6*   < > 0.6*   AMONOAUTO 0.4 0.4 0.4   < > 0.3   AEOSAUTO 0.3 0.2 0.3   < > 0.2   ABSBASO 0.1 0.1 0.1   < > 0.1   NRBCMAN  --  0.0 0.0  --  0.0   * 128* 125*   < > 150    < > = values in this interval not displayed.       ABO/RH    No lab results found.      Chemistries     Basic Panel  Recent Labs   Lab Test 09/21/23  0954 07/24/23  1009 06/19/23  1039    142 141   POTASSIUM 4.5 3.8 4.2   CHLORIDE 105 105 105   CO2 23 24 19*   BUN 12.6 11.1 13.6   CR 0.72 0.66 0.60   * 147* 163*        Calcium, Magnesium, Phosphorus  Recent Labs   Lab Test 09/21/23  0954 07/24/23  1009 06/19/23  1039   SAHLEY  9.0 9.3 9.1        LFTs  Recent Labs   Lab Test 09/21/23  0954 07/24/23  1009 06/19/23  1039   BILITOTAL 1.7* 2.4* 1.5*   ALKPHOS 120* 119* 119*   AST 20 27 23   ALT 29 35 36   ALBUMIN 4.5 4.6 4.4       LDH  No lab results found.    B2-Microglobulin  Recent Labs   Lab Test 02/18/20  0849   [id number] 1.6     Immunoglobulins     Recent Labs   Lab Test 07/24/23  1009 06/19/23  1049 03/20/23  1411 11/28/22  1558 10/26/22  1443 09/28/22  1449   * 592* 661 732 652 569*       Recent Labs   Lab Test 07/24/23  1009 06/19/23  1049 03/20/23  1411 11/28/22  1558 10/26/22  1443 09/28/22  1449    135 157 149 131 120       Recent Labs   Lab Test 07/24/23  1009 06/19/23  1049 03/20/23  1411 11/28/22  1558 10/26/22  1443 09/28/22  1449   IGM 47 38 48 58 52 35     Monocloncal Protein Studies     M spike  Recent Labs   Lab Test 09/21/23  0954 07/24/23  1009 06/19/23  1103 05/22/23  1018 03/20/23  1411 02/22/23  0939   ELPM 0.0 0.0 0.1* 0.0 0.0 0.0       [place] FLC    Recent Labs   Lab Test 07/24/23  1009 06/19/23  1049 03/20/23  1411 11/28/22  1558 10/26/22  1443 09/28/22  1449   KFLCA 2.58* 2.63* 3.41* 2.63* 4.97* 2.21*       Lambda FLC  Recent Labs   Lab Test 07/24/23  1009 06/19/23  1049 03/20/23  1411 11/28/22  1558 10/26/22  1443 09/28/22  1449   LFLCA 1.84 1.66 1.70 1.45 1.57 1.41       FLC Ratio  Recent Labs   Lab Test 07/24/23  1009 06/19/23  1049 03/20/23  1411 11/28/22  1558 10/26/22  1443 09/28/22  1449   KLRA 1.40 1.58 2.01* 1.81* 3.17* 1.57       Assessment/Plan:   Winter Loya is a 67 year old woman with standard risk IgG kappa multiple myeloma, s/p post auto-transplant in April 2019, currently on lenalidomide maintenance.     In June 2023 she had a monoclonal peak of 0.1 which resolved.  She has had repeat evaluations which does not reveal a monoclonal peak.   We will continue to monitor her labs monthly ans follow up with her every three months.  Winter did ask how much longer she would need to be on Revlimid  maintenance therapy.  I informed her that   We should continue to monitor her  myeloma labs the next few months and if her Mspike remains 0, bring up the topic of dose reduce as well as evaluate for MRD.      Dr. Zhu did discuss with Winter that if she relapses, she would be a candidate for Monumental-3.      Of note, her bilirubin has been consistently elevated since September 2022, primarily indirect. No anemia and alk phos elevation.  Dr. Zhu placed a GI consult for further evaluation.    Plan:  Monthly CBC, CMP, myeloma labs  Follow up in 3 months with SKYLER (Hyun and if not available, Merari)    45 minutes spent on the date of the encounter doing chart review, review of outside records, review of test results, interpretation of tests, patient visit, and documentation     Merari MONTES DE OCA, ANP-BC, AOCNP  [facility] [address] [address] [phone] (pager)

## 2023-09-27 NOTE — LETTER
2023         RE: Winter Loya  [address]  [place] [address]        Dear Colleague,    Thank you for referring your patient, Winter Loya, to the [facility]. Please see a copy of my visit note below.    [facility]    Hematology/Oncology Clinic Note    Sep 27, 2023    Reason for Visit: IgA Kappa Multiple Myeloma    Oncology HPI:   Winter Loya is a 67 year old woman with IgA Kappa Multiple Myeloma. In 2018 she had anemia and was fatigued. She was found to have IgA kappa monocloncal antibody with M-spike of 0.17. IgA elevated. Skeletal survey was unremarkable and UPEP had minimal protein (156 mg/m2). PET 2018 showed bone marrow consistent with hypermetabolic plasma cell myeloma. M spike was 0.17. She started RVD and Zometa. On 2019 she had an autologous transplant.      Her bone marrow bx from 2019 was sent here for review. It showed marrow cellularity of 60%, with decreased trilineage hematopoiesis and 15% plasma cells as well as peripheral blood with slight anemia. 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 She is on revlimid maintenance and zometa from her prior oncologist in [place]. On 19 her K/L ratio is 1.1, M spike is 0.04.     Currently on Revlimid maintenance.    Interval history:   Was last seen in 23 by Dr. Zhu.   On Revlimid yet takes at night.  Has up to 3 loose/watery 3 x day for which she uses imodium. Eating stimulates bowels and states they are foul smelling as is flatus.  She has seen GI and has been worked up.  All studies were negative and felt to be due to Revlimid.  Always fatigued   Denies pain  Legs/feet neuropathy stable  Psychologically- doing ok.  Seeing therapist.  Still grieving over her son who  in 2022 from ALL (Jeffrey Deleon, who was  a patient here). Has a son with addition issues.  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  She is keeping up with her health maintenance.    Past Medical History:   Diagnosis Date    Abnormal EMG 2021 5/25/2021    Interpretation: This is an abnormal study, demonstrating electrophysiologic evidence of a length-dependent axonal sensorimotor polyneuropathy. Asymmetry of peroneal compound muscle action potential amplitudes is a finding of uncertain significance.       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   I have personally reviewed the examination and initial interpretation and I agree with the findings.   ANNE GOODMAN MD    History of MRI of cervical spine 6/2020 3/15/2021    Impression: Multilevel cervical spondylosis, most pronounced at the level of C4-5 and C5-6 with moderate to severe spinal canal stenosis and moderate spinal canal stenosis at C6-7. No abnormal cord signal. No significant neural foraminal narrowing at any level.   I have personally reviewed the examination and initial interpretation and I agree with the findings.   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    Sleep apnea     no c-pap use    Status post complete thyroidectomy 8/26/2020    Thyroid goiter 8/5/2020    Tremor 12/9/2020        Current Outpatient Medications   Medication Sig Dispense Refill    acyclovir (ZOVIRAX) 400 MG tablet TAKE ONE TABLET BY MOUTH EVERY TWELVE HOURS 180 tablet 3    aspirin 81 MG EC tablet  Take 81 mg by mouth daily      atorvastatin (LIPITOR) 20 MG tablet Take 1 tablet (20 mg) by mouth At Bedtime 90 tablet 3    empagliflozin (JARDIANCE) 25 MG TABS tablet Take 1 tablet (25 mg) by mouth daily 90 tablet 3    insulin glargine (LANTUS SOLOSTAR) 100 UNIT/ML pen Inject 24 Units Subcutaneous every morning Place on file 30 mL 1    LENalidomide (REVLIMID) 10 MG CAPS capsule Take 1 capsule (10 mg) by mouth daily for 28 days 28 capsule 0    levothyroxine (SYNTHROID/LEVOTHROID) 200 MCG tablet Take 1 tablet (200 mcg) by mouth every morning (before breakfast) 90 tablet 3    losartan (COZAAR) 25 MG tablet Take 1 tablet (25 mg) by mouth daily 90 tablet 3    pregabalin (LYRICA) 100 MG capsule Take 1 capsule (100 mg) by mouth 2 times daily 180 capsule 3    propranolol ER (INDERAL LA) 60 MG 24 hr capsule Take 1 capsule (60 mg) by mouth daily 90 capsule 3    Semaglutide, 2 MG/DOSE, (OZEMPIC, 2 MG/DOSE,) 8 MG/3ML pen Inject 2 mg Subcutaneous every 7 days 9 mL 3    sertraline (ZOLOFT) 100 MG tablet Take 1 tablet (100 mg) by mouth daily 90 tablet 3    alcohol swab prep pads Use to swab area of injection/sowmya as directed. 100 each 3    blood glucose (NO BRAND SPECIFIED) test strip Use to test blood sugar 3 times daily or as directed. To accompany: Blood Glucose Monitor Brands: per insurance. 100 strip 6    blood glucose calibration (NO BRAND SPECIFIED) solution To accompany: Blood Glucose Monitor Brands: per insurance. 4 each 0    blood glucose monitoring (NO BRAND SPECIFIED) meter device kit Use to test blood sugar 3 times daily or as directed. Preferred blood glucose meter OR supplies to accompany: Blood Glucose Monitor Brands: per insurance. 1 kit 0    Continuous Blood Gluc  (FREESTYLE SEGUNDO 2 READER) MARCIA Use to read blood sugars as per 's instructions. 1 each 0    Continuous Blood Gluc Sensor (FREESTYLE SEGUNDO 2 SENSOR) [facility] 1 each every 14 days Use 1 sensor every 14 days. Use to read blood sugars per  's instructions. 2 each 5    insulin pen needle (FIFTY50 PEN NEEDLES) 32G X 4 MM miscellaneous Use one pen needle daily or as directed. 100 each 2    loperamide (IMODIUM) 2 MG capsule Take 4 mg (two capsules) by mouth at onset of loose stools, followed by 2 mg (one capsule) after each loose stool. Maximum: 16 mg (8 capsules) daily 270 capsule 11    thin (NO BRAND SPECIFIED) lancets Use with lanceting device. To accompany: Blood Glucose Monitor Brands: per insurance. (Patient not taking: Reported on 6/26/2023) 200 each 6        No Known Allergies    /76 (BP Location: Left arm, Patient Position: Sitting, Cuff Size: Adult Large)   Pulse 73   Temp 98.6  F (37  C) (Oral)   Resp 16   Wt 113.1 kg (249 lb 4.8 oz)   SpO2 96%   BMI 36.82 kg/m      Wt Readings from Last 5 Encounters:   09/27/23 113.1 kg (249 lb 4.8 oz)   06/26/23 113 kg (249 lb 3.2 oz)   06/20/23 114.3 kg (252 lb)   04/14/23 114.1 kg (251 lb 8 oz)   03/20/23 113.6 kg (250 lb 6.4 oz)     Constitutional: NAD  Eyes: no scleral icterus  ENT: no oral lesions  Lymph: no cervical, supraclavicular, axillary LAD  Pulm: CTAB  CV: RRR, no m/r/g  GI: bowel sounds present, soft and nontender to palpation  MSK: No edema in the extremities  Neuro: alert, conversant, normal gait  Psych: appropriate mood and affect    Blood Counts       Recent Labs   Lab Test 09/21/23  0954 07/24/23  1009 06/19/23  1054 05/22/23  1018 03/20/23  1411   HGB 15.1 14.5 14.2   < > 14.9   HCT 45.5 43.3 42.8   < > 43.9   WBC 4.4 3.3* 3.7*   < > 4.0   ANEUTAUTO 2.8 2.0 2.4   < > 2.7   ALYMPAUTO 0.8 0.6* 0.6*   < > 0.6*   AMONOAUTO 0.4 0.4 0.4   < > 0.3   AEOSAUTO 0.3 0.2 0.3   < > 0.2   ABSBASO 0.1 0.1 0.1   < > 0.1   NRBCMAN  --  0.0 0.0  --  0.0   * 128* 125*   < > 150    < > = values in this interval not displayed.       ABO/RH    No lab results found.      Chemistries     Basic Panel  Recent Labs   Lab Test 09/21/23  0954 07/24/23  1009 06/19/23  1039    142  141   POTASSIUM 4.5 3.8 4.2   CHLORIDE 105 105 105   CO2 23 24 19*   BUN 12.6 11.1 13.6   CR 0.72 0.66 0.60   * 147* 163*        Calcium, Magnesium, Phosphorus  Recent Labs   Lab Test 09/21/23  0954 07/24/23  1009 06/19/23  1039   ASHLEY 9.0 9.3 9.1        LFTs  Recent Labs   Lab Test 09/21/23  0954 07/24/23  1009 06/19/23  1039   BILITOTAL 1.7* 2.4* 1.5*   ALKPHOS 120* 119* 119*   AST 20 27 23   ALT 29 35 36   ALBUMIN 4.5 4.6 4.4       LDH  No lab results found.    B2-Microglobulin  Recent Labs   Lab Test 02/18/20  0849   [id number] 1.6     Immunoglobulins     Recent Labs   Lab Test 07/24/23  1009 06/19/23  1049 03/20/23  1411 11/28/22  1558 10/26/22  1443 09/28/22  1449   * 592* 661 732 652 569*       Recent Labs   Lab Test 07/24/23  1009 06/19/23  1049 03/20/23  1411 11/28/22  1558 10/26/22  1443 09/28/22  1449    135 157 149 131 120       Recent Labs   Lab Test 07/24/23  1009 06/19/23  1049 03/20/23  1411 11/28/22  1558 10/26/22  1443 09/28/22  1449   IGM 47 38 48 58 52 35     Monocloncal Protein Studies     M spike  Recent Labs   Lab Test 09/21/23  0954 07/24/23  1009 06/19/23  1103 05/22/23  1018 03/20/23  1411 02/22/23  0939   ELPM 0.0 0.0 0.1* 0.0 0.0 0.0       [place] FLC    Recent Labs   Lab Test 07/24/23  1009 06/19/23  1049 03/20/23  1411 11/28/22  1558 10/26/22  1443 09/28/22  1449   KFLCA 2.58* 2.63* 3.41* 2.63* 4.97* 2.21*       Lambda FLC  Recent Labs   Lab Test 07/24/23  1009 06/19/23  1049 03/20/23  1411 11/28/22  1558 10/26/22  1443 09/28/22  1449   LFLCA 1.84 1.66 1.70 1.45 1.57 1.41       FLC Ratio  Recent Labs   Lab Test 07/24/23  1009 06/19/23  1049 03/20/23  1411 11/28/22  1558 10/26/22  1443 09/28/22  1449   KLRA 1.40 1.58 2.01* 1.81* 3.17* 1.57       Assessment/Plan:   Winter Loya is a 67 year old woman with standard risk IgG kappa multiple myeloma, s/p post auto-transplant in April 2019, currently on lenalidomide maintenance.     In June 2023 she had a monoclonal peak of 0.1  which resolved.  She has had repeat evaluations which does not reveal a monoclonal peak.   We will continue to monitor her labs monthly ans follow up with her every three months.  Winter did ask how much longer she would need to be on Revlimid maintenance therapy.  I informed her that   We should continue to monitor her  myeloma labs the next few months and if her Mspike remains 0, bring up the topic of dose reduce as well as evaluate for MRD.      Dr. Zhu did discuss with Winter that if she relapses, she would be a candidate for Monumental-3.      Of note, her bilirubin has been consistently elevated since September 2022, primarily indirect. No anemia and alk phos elevation.  Dr. Zhu placed a GI consult for further evaluation.    Plan:  Monthly CBC, CMP, myeloma labs  Follow up in 3 months with SKYLER (Hyun and if not available, Merari)    45 minutes spent on the date of the encounter doing chart review, review of outside records, review of test results, interpretation of tests, patient visit, and documentation     Merari MONTES DE OCA, ANP-BC, AOCNP  [facility]  [address] [place] [id number] [phone] (pager)

## 2023-09-27 NOTE — NURSING NOTE
"Oncology Rooming Note    September 27, 2023 11:07 AM   Winter Loya is a 67 year old female who presents for:    Chief Complaint   Patient presents with    Oncology Clinic Visit     Multiple Myeloma     Initial Vitals: /76 (BP Location: Left arm, Patient Position: Sitting, Cuff Size: Adult Large)   Pulse 73   Temp 98.6  F (37  C) (Oral)   Resp 16   Wt 113.1 kg (249 lb 4.8 oz)   SpO2 96%   BMI 36.82 kg/m   Estimated body mass index is 36.82 kg/m  as calculated from the following:    Height as of 1/31/23: 1.753 m (5' 9\").    Weight as of this encounter: 113.1 kg (249 lb 4.8 oz). Body surface area is 2.35 meters squared.  No Pain (0) Comment: Data Unavailable   No LMP recorded. Patient is postmenopausal.  Allergies reviewed: Yes  Medications reviewed: Yes    Medications: Medication refills not needed today.  Pharmacy name entered into [facility]:    [id number] - [place], [place] - [address] [place] [address] [facility] - [place], [place] - [address]    Clinical concerns: None       Maribel Nuno LPN  9/27/2023              "

## 2023-10-04 ENCOUNTER — TELEPHONE (OUTPATIENT)
Dept: ONCOLOGY | Facility: CLINIC | Age: 68
End: 2023-10-04
Payer: MEDICARE

## 2023-10-04 DIAGNOSIS — C90.01 MULTIPLE MYELOMA IN REMISSION (H): Primary | ICD-10-CM

## 2023-10-04 RX ORDER — LENALIDOMIDE 10 MG/1
10 CAPSULE ORAL DAILY
Qty: 28 CAPSULE | Refills: 0 | Status: SHIPPED | OUTPATIENT
Start: 2023-10-04 | End: 2023-11-29

## 2023-10-04 NOTE — TELEPHONE ENCOUNTER
Oral Chemotherapy Monitoring Program    Medication: Revlimid  Rx:  10 mg PO daily for a 28 day supply    Auth #: [id number]  Risk Category: Adult female NOT of reproductive capacity    Routine survey questions reviewed.    Thank you,    Mary Franklin  Oncology Pharmacy Liaison LUCINDA [email or web address]  Phone: [phone]  Fax: [phone]

## 2023-10-16 ENCOUNTER — OFFICE VISIT (OUTPATIENT)
Dept: GASTROENTEROLOGY | Facility: CLINIC | Age: 68
End: 2023-10-16
Attending: STUDENT IN AN ORGANIZED HEALTH CARE EDUCATION/TRAINING PROGRAM
Payer: COMMERCIAL

## 2023-10-16 ENCOUNTER — LAB (OUTPATIENT)
Dept: LAB | Facility: CLINIC | Age: 68
End: 2023-10-16
Payer: COMMERCIAL

## 2023-10-16 ENCOUNTER — PRE VISIT (OUTPATIENT)
Dept: GASTROENTEROLOGY | Facility: CLINIC | Age: 68
End: 2023-10-16
Payer: MEDICARE

## 2023-10-16 VITALS
BODY MASS INDEX: 37.1 KG/M2 | DIASTOLIC BLOOD PRESSURE: 63 MMHG | HEART RATE: 67 BPM | SYSTOLIC BLOOD PRESSURE: 95 MMHG | WEIGHT: 251.2 LBS | OXYGEN SATURATION: 97 %

## 2023-10-16 DIAGNOSIS — R79.89 ABNORMAL LIVER FUNCTION TESTS: ICD-10-CM

## 2023-10-16 DIAGNOSIS — C90.01 MULTIPLE MYELOMA IN REMISSION (H): ICD-10-CM

## 2023-10-16 DIAGNOSIS — R79.89 ABNORMAL LIVER FUNCTION TESTS: Primary | ICD-10-CM

## 2023-10-16 DIAGNOSIS — E80.6 DISORDER OF BILIRUBIN EXCRETION: ICD-10-CM

## 2023-10-16 PROBLEM — R53.1 WEAKNESS: Status: RESOLVED | Noted: 2022-10-15 | Resolved: 2023-10-16

## 2023-10-16 LAB
ALBUMIN SERPL BCG-MCNC: 4.6 G/DL (ref 3.5–5.2)
ALP SERPL-CCNC: 112 U/L (ref 35–104)
ALT SERPL W P-5'-P-CCNC: 31 U/L (ref 0–50)
ANION GAP SERPL CALCULATED.3IONS-SCNC: 10 MMOL/L (ref 7–15)
AST SERPL W P-5'-P-CCNC: 27 U/L (ref 0–45)
BASO+EOS+MONOS # BLD AUTO: ABNORMAL 10*3/UL
BASO+EOS+MONOS NFR BLD AUTO: ABNORMAL %
BASOPHILS # BLD AUTO: 0.1 10E3/UL (ref 0–0.2)
BASOPHILS NFR BLD AUTO: 2 %
BILIRUB SERPL-MCNC: 1.6 MG/DL
BUN SERPL-MCNC: 14.4 MG/DL (ref 8–23)
CALCIUM SERPL-MCNC: 9.2 MG/DL (ref 8.8–10.2)
CHLORIDE SERPL-SCNC: 104 MMOL/L (ref 98–107)
CREAT SERPL-MCNC: 0.74 MG/DL (ref 0.51–0.95)
DEPRECATED HCO3 PLAS-SCNC: 26 MMOL/L (ref 22–29)
EGFRCR SERPLBLD CKD-EPI 2021: 89 ML/MIN/1.73M2
EOSINOPHIL # BLD AUTO: 0.3 10E3/UL (ref 0–0.7)
EOSINOPHIL NFR BLD AUTO: 7 %
ERYTHROCYTE [DISTWIDTH] IN BLOOD BY AUTOMATED COUNT: 14 % (ref 10–15)
FERRITIN SERPL-MCNC: 248 NG/ML (ref 11–328)
GLUCOSE SERPL-MCNC: 139 MG/DL (ref 70–99)
HAV IGG SER QL IA: REACTIVE
HBV CORE AB SERPL QL IA: NONREACTIVE
HBV SURFACE AB SERPL IA-ACNC: 14.17 M[IU]/ML
HBV SURFACE AB SERPL IA-ACNC: REACTIVE M[IU]/ML
HBV SURFACE AG SERPL QL IA: NONREACTIVE
HCT VFR BLD AUTO: 44.3 % (ref 35–47)
HGB BLD-MCNC: 14.7 G/DL (ref 11.7–15.7)
IGM SERPL-MCNC: 48 MG/DL (ref 35–242)
IMM GRANULOCYTES # BLD: 0 10E3/UL
IMM GRANULOCYTES NFR BLD: 0 %
INR PPP: 1.02 (ref 0.85–1.15)
IRON BINDING CAPACITY (ROCHE): 351 UG/DL (ref 240–430)
IRON SATN MFR SERPL: 24 % (ref 15–46)
IRON SERPL-MCNC: 84 UG/DL (ref 37–145)
LYMPHOCYTES # BLD AUTO: 0.8 10E3/UL (ref 0.8–5.3)
LYMPHOCYTES NFR BLD AUTO: 21 %
MCH RBC QN AUTO: 29.4 PG (ref 26.5–33)
MCHC RBC AUTO-ENTMCNC: 33.2 G/DL (ref 31.5–36.5)
MCV RBC AUTO: 89 FL (ref 78–100)
MONOCYTES # BLD AUTO: 0.4 10E3/UL (ref 0–1.3)
MONOCYTES NFR BLD AUTO: 10 %
NEUTROPHILS # BLD AUTO: 2.2 10E3/UL (ref 1.6–8.3)
NEUTROPHILS NFR BLD AUTO: 60 %
NRBC # BLD AUTO: 0 10E3/UL
NRBC BLD AUTO-RTO: 0 /100
PLATELET # BLD AUTO: 132 10E3/UL (ref 150–450)
POTASSIUM SERPL-SCNC: 4.2 MMOL/L (ref 3.4–5.3)
PROT SERPL-MCNC: 6.9 G/DL (ref 6.4–8.3)
RBC # BLD AUTO: 5 10E6/UL (ref 3.8–5.2)
SODIUM SERPL-SCNC: 140 MMOL/L (ref 135–145)
TOTAL PROTEIN SERUM FOR ELP: 6.6 G/DL (ref 6.4–8.3)
WBC # BLD AUTO: 3.7 10E3/UL (ref 4–11)

## 2023-10-16 PROCEDURE — 84165 PROTEIN E-PHORESIS SERUM: CPT | Mod: 26 | Performed by: PATHOLOGY

## 2023-10-16 PROCEDURE — 85025 COMPLETE CBC W/AUTO DIFF WBC: CPT | Performed by: PATHOLOGY

## 2023-10-16 PROCEDURE — 87340 HEPATITIS B SURFACE AG IA: CPT | Performed by: INTERNAL MEDICINE

## 2023-10-16 PROCEDURE — 99000 SPECIMEN HANDLING OFFICE-LAB: CPT | Performed by: PATHOLOGY

## 2023-10-16 PROCEDURE — 36415 COLL VENOUS BLD VENIPUNCTURE: CPT | Performed by: PATHOLOGY

## 2023-10-16 PROCEDURE — 84165 PROTEIN E-PHORESIS SERUM: CPT | Mod: TC | Performed by: PATHOLOGY

## 2023-10-16 PROCEDURE — 83550 IRON BINDING TEST: CPT | Performed by: PATHOLOGY

## 2023-10-16 PROCEDURE — 86706 HEP B SURFACE ANTIBODY: CPT | Performed by: INTERNAL MEDICINE

## 2023-10-16 PROCEDURE — 99214 OFFICE O/P EST MOD 30 MIN: CPT | Performed by: INTERNAL MEDICINE

## 2023-10-16 PROCEDURE — 84155 ASSAY OF PROTEIN SERUM: CPT | Performed by: STUDENT IN AN ORGANIZED HEALTH CARE EDUCATION/TRAINING PROGRAM

## 2023-10-16 PROCEDURE — 82784 ASSAY IGA/IGD/IGG/IGM EACH: CPT | Performed by: INTERNAL MEDICINE

## 2023-10-16 PROCEDURE — 86704 HEP B CORE ANTIBODY TOTAL: CPT | Performed by: INTERNAL MEDICINE

## 2023-10-16 PROCEDURE — 80053 COMPREHEN METABOLIC PANEL: CPT | Performed by: PATHOLOGY

## 2023-10-16 PROCEDURE — 86381 MITOCHONDRIAL ANTIBODY EACH: CPT | Performed by: INTERNAL MEDICINE

## 2023-10-16 PROCEDURE — 85610 PROTHROMBIN TIME: CPT | Performed by: PATHOLOGY

## 2023-10-16 PROCEDURE — 83540 ASSAY OF IRON: CPT | Performed by: PATHOLOGY

## 2023-10-16 PROCEDURE — G0463 HOSPITAL OUTPT CLINIC VISIT: HCPCS | Performed by: INTERNAL MEDICINE

## 2023-10-16 PROCEDURE — 82728 ASSAY OF FERRITIN: CPT | Performed by: PATHOLOGY

## 2023-10-16 PROCEDURE — 86708 HEPATITIS A ANTIBODY: CPT | Performed by: INTERNAL MEDICINE

## 2023-10-16 ASSESSMENT — PAIN SCALES - GENERAL: PAINLEVEL: NO PAIN (0)

## 2023-10-16 NOTE — NURSING NOTE
Chief Complaint   Patient presents with    Consult     Complete RM. Disorder of bilirubin excretion, Alkaline phosphatase elevation, and Dr. Birt Zhu, per patient. Records in [facility], per patient.     BP 95/63 (BP Location: Left arm, Patient Position: Sitting, Cuff Size: Adult Large)   Pulse 67   Wt 113.9 kg (251 lb 3.2 oz)   SpO2 97%   BMI 37.10 kg/m    Mikala Perez [facility]

## 2023-10-16 NOTE — PROGRESS NOTES
[facility] Hepatology    New Patient Visit    Referring provider:  Brit Corina Zhu    67 year old female    Chief complaint:  Abnormal liver function tests    HPI:  Patient presents to clinic for further evaluation management of abnormal liver function tests.  Liver function tests have been abnormal for several years.  Testing for hepatitis C has returned negative in the past.    Patient is well today.  She has no symptoms related to liver disease.    Patient denies jaundice, abdominal distension, lower extremity edema, lethargy or confusion.    No history of melena, hematemesis or hematochezia.    Patient denies fevers, sweats, chills    Patient denies any recent weight loss.  Appetite is normal.    History of obesity, DAILY, type 2 diabetes with neuropathy, hyperlipidemia and hypertension noted.  No known history of CAD, CVA or PAD.    Patient rarely drinks alcohol.  At most, she drinks alcohol once to twice per year.  No history of AUD or DUI.    Patient is an ex-smoker of 20 years.  She smoked for a total of 29 years at up to 1 pack of cigarettes per day.    Patient denies any history of recreational or illicit drug use including marijuana, IN or IV drugs.    Medical hx Surgical hx   Past Medical History:   Diagnosis Date     Abnormal EMG 2021 5/25/2021    Interpretation: This is an abnormal study, demonstrating electrophysiologic evidence of a length-dependent axonal sensorimotor polyneuropathy. Asymmetry of peroneal compound muscle action potential amplitudes is a finding of uncertain significance.        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   I have personally reviewed the  examination and initial interpretation and I agree with the findings.   ANNE GOODMAN MD     History of MRI of cervical spine 6/2020 3/15/2021    Impression: Multilevel cervical spondylosis, most pronounced at the level of C4-5 and C5-6 with moderate to severe spinal canal stenosis and moderate spinal canal stenosis at C6-7. No abnormal cord signal. No significant neural foraminal narrowing at any level.   I have personally reviewed the examination and initial interpretation and I agree with the findings.   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     Sleep apnea     no c-pap use     Status post complete thyroidectomy 8/26/2020     Thyroid goiter 8/5/2020     Tremor 12/9/2020      Past Surgical History:   Procedure Laterality Date     ARTHROSCOPY KNEE Left 02/2014     ARTHROSCOPY KNEE Right 12/2010    KNEE ARTHROSCOPY Dec 2010  Right     CHOLECYSTECTOMY  2002     COLONOSCOPY N/A 07/23/2020    Procedure: COLONOSCOPY;  Surgeon: Za Denis MD;  [facility]     COLONOSCOPY N/A 2/25/2022    Procedure: COLONOSCOPY with biopsies;  Surgeon: Jose Bangura MD;  Location: [facility] OR     ENDOSCOPIC ULTRASOUND UPPER GASTROINTESTINAL TRACT (GI) N/A 2/25/2022    Procedure: ENDOSCOPIC ULTRASOUND, ESOPHAGOSCOPY with biopsies / UPPER GASTROINTESTINAL TRACT (GI);  Surgeon: Jose Bangura MD;  Location: [facility] OR     EYE SURGERY  1985    lasersx-spot behind eye started leaking     THYROIDECTOMY N/A 08/26/2020    Procedure: THYROIDECTOMY, TOTAL;  Surgeon: Tiff Burgos MD;  Location: [facility] OR     TONSILLECTOMY  2003     TUBAL LIGATION  1986          Medications  Current Outpatient Medications   Medication Sig Dispense Refill     acyclovir (ZOVIRAX) 400 MG tablet TAKE ONE TABLET BY MOUTH EVERY TWELVE HOURS 180 tablet 3      alcohol swab prep pads Use to swab area of injection/sowmya as directed. 100 each 3     aspirin 81 MG EC tablet Take 81 mg by mouth daily       atorvastatin (LIPITOR) 20 MG tablet Take 1 tablet (20 mg) by mouth At Bedtime 90 tablet 3     blood glucose (NO BRAND SPECIFIED) test strip Use to test blood sugar 3 times daily or as directed. To accompany: Blood Glucose Monitor Brands: per insurance. 100 strip 6     blood glucose calibration (NO BRAND SPECIFIED) solution To accompany: Blood Glucose Monitor Brands: per insurance. 4 each 0     blood glucose monitoring (NO BRAND SPECIFIED) meter device kit Use to test blood sugar 3 times daily or as directed. Preferred blood glucose meter OR supplies to accompany: Blood Glucose Monitor Brands: per insurance. 1 kit 0     Continuous Blood Gluc  (FREESTYLE SEGUNDO 2 READER) MARCIA Use to read blood sugars as per 's instructions. 1 each 0     Continuous Blood Gluc Sensor (FREESTYLE SEGUNDO 2 SENSOR) MISC 1 each every 14 days Use 1 sensor every 14 days. Use to read blood sugars per 's instructions. 2 each 5     empagliflozin (JARDIANCE) 25 MG TABS tablet Take 1 tablet (25 mg) by mouth daily 90 tablet 3     insulin glargine (LANTUS SOLOSTAR) 100 UNIT/ML pen Inject 24 Units Subcutaneous every morning Place on file 30 mL 1     insulin pen needle (FIFTY50 PEN NEEDLES) 32G X 4 MM miscellaneous Use one pen needle daily or as directed. 100 each 2     LENalidomide (REVLIMID) 10 MG CAPS capsule Take 1 capsule (10 mg) by mouth daily for 28 days 28 capsule 0     levothyroxine (SYNTHROID/LEVOTHROID) 200 MCG tablet Take 1 tablet (200 mcg) by mouth every morning (before breakfast) 90 tablet 3     loperamide (IMODIUM) 2 MG capsule Take 4 mg (two capsules) by mouth at onset of loose stools, followed by 2 mg (one capsule) after each loose stool. Maximum: 16 mg (8 capsules) daily 270 capsule 11     losartan (COZAAR) 25 MG tablet Take 1 tablet (25 mg) by mouth daily 90  tablet 3     pregabalin (LYRICA) 100 MG capsule Take 1 capsule (100 mg) by mouth 2 times daily 180 capsule 3     propranolol ER (INDERAL LA) 60 MG 24 hr capsule Take 1 capsule (60 mg) by mouth daily 90 capsule 3     Semaglutide, 2 MG/DOSE, (OZEMPIC, 2 MG/DOSE,) 8 MG/3ML pen Inject 2 mg Subcutaneous every 7 days 9 mL 3     sertraline (ZOLOFT) 100 MG tablet Take 1 tablet (100 mg) by mouth daily 90 tablet 3     LENalidomide (REVLIMID) 10 MG CAPS capsule Take 1 capsule (10 mg) by mouth daily for 28 days 28 capsule 0     thin (NO BRAND SPECIFIED) lancets Use with lanceting device. To accompany: Blood Glucose Monitor Brands: per insurance. (Patient not taking: Reported on 2023) 200 each 6       Allergies  No Known Allergies    Family hx Social hx   Family History   Problem Relation Age of Onset     Chronic Obstructive Pulmonary Disease Mother      Substance Abuse Mother      Alcoholism Mother      Diabetes Mother      Parkinsonism Father         bed bound, stiffness. no dementia or hallucinatinos     Diabetes Brother      Arrhythmia Brother      Diabetes Brother      Gastrointestinal Disease Brother      Glaucoma Paternal Grandfather      Leukemia Son      Macular Degeneration No family hx of      Colon Cancer No family hx of      Liver Disease No family hx of       Social History     Tobacco Use     Smoking status: Former     Packs/day: 1     Types: Cigarettes     Quit date:      Years since quittin.8     Smokeless tobacco: Never   Vaping Use     Vaping Use: Never used   Substance Use Topics     Alcohol use: Not Currently     Comment: occasional     Drug use: Never     Patient lives in [place] with her son.  She is .  She works part-time as an LADC.     Review of systems  A 10-point review of systems was negative.    Examination  BP 95/63 (BP Location: Left arm, Patient Position: Sitting, Cuff Size: Adult Large)   Pulse 67   Wt 113.9 kg (251 lb 3.2 oz)   SpO2 97%   BMI 37.10 kg/m    Body mass  "index is 37.1 kg/m .    Gen- well, NAD, A+Ox3, normal color  Eye- EOMI  ENT- MMM, normal oropharynx  Lym- no palpable lymphadenopathy  CVS- S1, S2 normal, no added sounds, RRR  RS- CTA  Abd- obese, soft, non-tender, no ascites or organomegaly on palpation or percussion, BS+  Extr- pulses good, no FAITH  MS- hands normal- no clubbing  Neuro- A+Ox3, no asterixis  Skin- no rash or jaundice  Psych- normal mood    Laboratory  Lab Results   Component Value Date     09/21/2023     06/07/2021    POTASSIUM 4.5 09/21/2023    POTASSIUM 3.6 02/22/2023    POTASSIUM 3.7 06/07/2021    CHLORIDE 105 09/21/2023    CHLORIDE 104 02/22/2023    CHLORIDE 106 06/07/2021    CO2 23 09/21/2023    CO2 25 02/22/2023    CO2 28 06/07/2021    BUN 12.6 09/21/2023    BUN 14 02/22/2023    BUN 10 06/07/2021    CR 0.72 09/21/2023    CR 0.77 06/07/2021       Lab Results   Component Value Date    BILITOTAL 1.7 09/21/2023    BILITOTAL 1.3 06/07/2021    ALT 29 09/21/2023    ALT 43 06/07/2021    AST 20 09/21/2023    AST 21 06/07/2021    ALKPHOS 120 09/21/2023    ALKPHOS 137 06/07/2021       Lab Results   Component Value Date    ALBUMIN 4.5 09/21/2023    ALBUMIN 4.0 02/22/2023    ALBUMIN 4.2 06/07/2021    PROTTOTAL 7.0 09/21/2023    PROTTOTAL 7.1 06/07/2021        Lab Results   Component Value Date    WBC 4.4 09/21/2023    WBC 3.4 06/07/2021    HGB 15.1 09/21/2023    HGB 13.3 06/07/2021    MCV 88 09/21/2023    MCV 87 06/07/2021     09/21/2023     06/07/2021       No results found for: \"INR\"      Radiology  Nil recent    Assessment  67 year old female with metabolic syndrome who presents for further evaluation and management of abnormal liver function tests most likely secondary to MASLD (NAFLD).  Will rule out other etiologies of chronic liver disease including viral hepatitis and hemochromatosis.  Low clinical suspicion for cirrhosis-thrombocytopenia is not reliable due to patient's history of BMT.  Will obtain abdominal ultrasound " with elastography to rule out advanced hepatic fibrosis.      We reviewed the pathophysiology, natural history, complications and management of MASLD, which is primarily weight loss with diet and exercise as well as risk factor modification.    Plan  1.  Check CMP, INR, CBC  2.  Check HBV SAg, HBV SAb, HBV CAb, iron studies  3.  Abd U/S with elastography  4.  Weight loss with diet and exercise  5.  Continue atorvastatin  6.  Follow-up TBD    Kojo Ann MD  Hepatology  [facility]

## 2023-10-16 NOTE — LETTER
10/16/2023         RE: Winter Loya  [address]  [facility] [address]        Dear Colleague,    Thank you for referring your patient, Winter Loya, to the [facility] [place]. Please see a copy of my visit note below.    [facility] Hepatology    New Patient Visit    Referring provider:  Brit Zhu    67 year old female    Chief complaint:  Abnormal liver function tests    HPI:  Patient presents to clinic for further evaluation management of abnormal liver function tests.  Liver function tests have been abnormal for several years.  Testing for hepatitis C has returned negative in the past.    Patient is well today.  She has no symptoms related to liver disease.    Patient denies jaundice, abdominal distension, lower extremity edema, lethargy or confusion.    No history of melena, hematemesis or hematochezia.    Patient denies fevers, sweats, chills    Patient denies any recent weight loss.  Appetite is normal.    History of obesity, DAILY, type 2 diabetes with neuropathy, hyperlipidemia and hypertension noted.  No known history of CAD, CVA or PAD.    Patient rarely drinks alcohol.  At most, she drinks alcohol once to twice per year.  No history of AUD or DUI.    Patient is an ex-smoker of 20 years.  She smoked for a total of 29 years at up to 1 pack of cigarettes per day.    Patient denies any history of recreational or illicit drug use including marijuana, IN or IV drugs.    Medical hx Surgical hx   Past Medical History:   Diagnosis Date    Abnormal EMG 2021 5/25/2021    Interpretation: This is an abnormal study, demonstrating electrophysiologic evidence of a length-dependent axonal sensorimotor polyneuropathy. Asymmetry of peroneal compound muscle action potential amplitudes is a finding of uncertain significance.       Adjustment disorder with mixed anxiety and depressed mood 3/16/2013    Anxiety     Axonal neuropathy 9/27/2021    Depression     Diabetes (H)      Glaucoma (increased eye pressure)     H/O magnetic resonance imaging of lumbar spine 2020 3/15/2021    IMPRESSION: Multilevel mild lumbar spondylosis, most pronounced at the level of L4-5 and L5-S1 as detailed above.   I have personally reviewed the examination and initial interpretation and I agree with the findings.   ANNE GOODMAN MD    History of MRI of cervical spine 6/2020 3/15/2021    Impression: Multilevel cervical spondylosis, most pronounced at the level of C4-5 and C5-6 with moderate to severe spinal canal stenosis and moderate spinal canal stenosis at C6-7. No abnormal cord signal. No significant neural foraminal narrowing at any level.   I have personally reviewed the examination and initial interpretation and I agree with the findings.   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    Sleep apnea     no c-pap use    Status post complete thyroidectomy 8/26/2020    Thyroid goiter 8/5/2020    Tremor 12/9/2020      Past Surgical History:   Procedure Laterality Date    ARTHROSCOPY KNEE Left 02/2014    ARTHROSCOPY KNEE Right 12/2010    KNEE ARTHROSCOPY Dec 2010  Right    CHOLECYSTECTOMY  2002    COLONOSCOPY N/A 07/23/2020    Procedure: COLONOSCOPY;  Surgeon: Za Denis MD;  Location:  OR    COLONOSCOPY N/A 2/25/2022    Procedure: COLONOSCOPY with biopsies;  Surgeon: Jose Bangura MD;  Location: [facility] OR    ENDOSCOPIC ULTRASOUND UPPER GASTROINTESTINAL TRACT (GI) N/A 2/25/2022    Procedure: ENDOSCOPIC ULTRASOUND, ESOPHAGOSCOPY with biopsies / UPPER GASTROINTESTINAL TRACT (GI);  Surgeon: Jose Bangura MD;  Location: [facility] OR    EYE SURGERY  1985    lasersx-spot behind eye started leaking    THYROIDECTOMY N/A 08/26/2020    Procedure: THYROIDECTOMY, TOTAL;  Surgeon: Tiff Burgos MD;  Location: [facility] OR     TONSILLECTOMY  2003    TUBAL LIGATION  1986          Medications  Current Outpatient Medications   Medication Sig Dispense Refill    acyclovir (ZOVIRAX) 400 MG tablet TAKE ONE TABLET BY MOUTH EVERY TWELVE HOURS 180 tablet 3    alcohol swab prep pads Use to swab area of injection/sowmya as directed. 100 each 3    aspirin 81 MG EC tablet Take 81 mg by mouth daily      atorvastatin (LIPITOR) 20 MG tablet Take 1 tablet (20 mg) by mouth At Bedtime 90 tablet 3    blood glucose (NO BRAND SPECIFIED) test strip Use to test blood sugar 3 times daily or as directed. To accompany: Blood Glucose Monitor Brands: per insurance. 100 strip 6    blood glucose calibration (NO BRAND SPECIFIED) solution To accompany: Blood Glucose Monitor Brands: per insurance. 4 each 0    blood glucose monitoring (NO BRAND SPECIFIED) meter device kit Use to test blood sugar 3 times daily or as directed. Preferred blood glucose meter OR supplies to accompany: Blood Glucose Monitor Brands: per insurance. 1 kit 0    Continuous Blood Gluc  (FREESTYLE SEGUNDO 2 READER) MARCIA Use to read blood sugars as per 's instructions. 1 each 0    Continuous Blood Gluc Sensor (FREESTYLE SEGUNDO 2 SENSOR) [facility] 1 each every 14 days Use 1 sensor every 14 days. Use to read blood sugars per 's instructions. 2 each 5    empagliflozin (JARDIANCE) 25 MG TABS tablet Take 1 tablet (25 mg) by mouth daily 90 tablet 3    insulin glargine (LANTUS SOLOSTAR) 100 UNIT/ML pen Inject 24 Units Subcutaneous every morning Place on file 30 mL 1    insulin pen needle (FIFTY50 PEN NEEDLES) 32G X 4 MM miscellaneous Use one pen needle daily or as directed. 100 each 2    LENalidomide (REVLIMID) 10 MG CAPS capsule Take 1 capsule (10 mg) by mouth daily for 28 days 28 capsule 0    levothyroxine (SYNTHROID/LEVOTHROID) 200 MCG tablet Take 1 tablet (200 mcg) by mouth every morning (before breakfast) 90 tablet 3    loperamide (IMODIUM) 2 MG capsule Take 4 mg (two capsules) by  mouth at onset of loose stools, followed by 2 mg (one capsule) after each loose stool. Maximum: 16 mg (8 capsules) daily 270 capsule 11    losartan (COZAAR) 25 MG tablet Take 1 tablet (25 mg) by mouth daily 90 tablet 3    pregabalin (LYRICA) 100 MG capsule Take 1 capsule (100 mg) by mouth 2 times daily 180 capsule 3    propranolol ER (INDERAL LA) 60 MG 24 hr capsule Take 1 capsule (60 mg) by mouth daily 90 capsule 3    Semaglutide, 2 MG/DOSE, (OZEMPIC, 2 MG/DOSE,) 8 MG/3ML pen Inject 2 mg Subcutaneous every 7 days 9 mL 3    sertraline (ZOLOFT) 100 MG tablet Take 1 tablet (100 mg) by mouth daily 90 tablet 3    LENalidomide (REVLIMID) 10 MG CAPS capsule Take 1 capsule (10 mg) by mouth daily for 28 days 28 capsule 0    thin (NO BRAND SPECIFIED) lancets Use with lanceting device. To accompany: Blood Glucose Monitor Brands: per insurance. (Patient not taking: Reported on 2023) 200 each 6       Allergies  No Known Allergies    Family hx Social hx   Family History   Problem Relation Age of Onset    Chronic Obstructive Pulmonary Disease Mother     Substance Abuse Mother     Alcoholism Mother     Diabetes Mother     Parkinsonism Father         bed bound, stiffness. no dementia or hallucinatinos    Diabetes Brother     Arrhythmia Brother     Diabetes Brother     Gastrointestinal Disease Brother     Glaucoma Paternal Grandfather     Leukemia Son     Macular Degeneration No family hx of     Colon Cancer No family hx of     Liver Disease No family hx of       Social History     Tobacco Use    Smoking status: Former     Packs/day: 1     Types: Cigarettes     Quit date:      Years since quittin.8    Smokeless tobacco: Never   Vaping Use    Vaping Use: Never used   Substance Use Topics    Alcohol use: Not Currently     Comment: occasional    Drug use: Never     Patient lives in [place] with her son.  She is .  She works part-time as an LADC.     Review of systems  A 10-point review of systems was  "negative.    Examination  BP 95/63 (BP Location: Left arm, Patient Position: Sitting, Cuff Size: Adult Large)   Pulse 67   Wt 113.9 kg (251 lb 3.2 oz)   SpO2 97%   BMI 37.10 kg/m    Body mass index is 37.1 kg/m .    Gen- well, NAD, A+Ox3, normal color  Eye- EOMI  ENT- MMM, normal oropharynx  Lym- no palpable lymphadenopathy  CVS- S1, S2 normal, no added sounds, RRR  RS- CTA  Abd- obese, soft, non-tender, no ascites or organomegaly on palpation or percussion, BS+  Extr- pulses good, no FAITH  MS- hands normal- no clubbing  Neuro- A+Ox3, no asterixis  Skin- no rash or jaundice  Psych- normal mood    Laboratory  Lab Results   Component Value Date     09/21/2023     06/07/2021    POTASSIUM 4.5 09/21/2023    POTASSIUM 3.6 02/22/2023    POTASSIUM 3.7 06/07/2021    CHLORIDE 105 09/21/2023    CHLORIDE 104 02/22/2023    CHLORIDE 106 06/07/2021    CO2 23 09/21/2023    CO2 25 02/22/2023    CO2 28 06/07/2021    BUN 12.6 09/21/2023    BUN 14 02/22/2023    BUN 10 06/07/2021    CR 0.72 09/21/2023    CR 0.77 06/07/2021       Lab Results   Component Value Date    BILITOTAL 1.7 09/21/2023    BILITOTAL 1.3 06/07/2021    ALT 29 09/21/2023    ALT 43 06/07/2021    AST 20 09/21/2023    AST 21 06/07/2021    ALKPHOS 120 09/21/2023    ALKPHOS 137 06/07/2021       Lab Results   Component Value Date    ALBUMIN 4.5 09/21/2023    ALBUMIN 4.0 02/22/2023    ALBUMIN 4.2 06/07/2021    PROTTOTAL 7.0 09/21/2023    PROTTOTAL 7.1 06/07/2021        Lab Results   Component Value Date    WBC 4.4 09/21/2023    WBC 3.4 06/07/2021    HGB 15.1 09/21/2023    HGB 13.3 06/07/2021    MCV 88 09/21/2023    MCV 87 06/07/2021     09/21/2023     06/07/2021       No results found for: \"INR\"      Radiology  Nil recent    Assessment  67 year old female with metabolic syndrome who presents for further evaluation and management of abnormal liver function tests most likely secondary to MASLD (NAFLD).  Will rule out other etiologies of chronic " liver disease including viral hepatitis and hemochromatosis.  Low clinical suspicion for cirrhosis-thrombocytopenia is not reliable due to patient's history of BMT.  Will obtain abdominal ultrasound with elastography to rule out advanced hepatic fibrosis.      We reviewed the pathophysiology, natural history, complications and management of MASLD, which is primarily weight loss with diet and exercise as well as risk factor modification.    Plan  1.  Check CMP, INR, CBC  2.  Check HBV SAg, HBV SAb, HBV CAb, iron studies  3.  Abd U/S with elastography  4.  Weight loss with diet and exercise  5.  Continue atorvastatin  6.  Follow-up TBD    Kojo Ann MD  Hepatology  [facility]

## 2023-10-17 LAB
ALBUMIN SERPL ELPH-MCNC: 4.4 G/DL (ref 3.7–5.1)
ALPHA1 GLOB SERPL ELPH-MCNC: 0.2 G/DL (ref 0.2–0.4)
ALPHA2 GLOB SERPL ELPH-MCNC: 0.6 G/DL (ref 0.5–0.9)
B-GLOBULIN SERPL ELPH-MCNC: 0.8 G/DL (ref 0.6–1)
GAMMA GLOB SERPL ELPH-MCNC: 0.6 G/DL (ref 0.7–1.6)
M PROTEIN SERPL ELPH-MCNC: 0 G/DL
MITOCHONDRIA M2 IGG SER-ACNC: 1.5 U/ML
PROT PATTERN SERPL ELPH-IMP: ABNORMAL

## 2023-10-18 DIAGNOSIS — C90.01 MULTIPLE MYELOMA IN REMISSION (H): Primary | ICD-10-CM

## 2023-10-20 ENCOUNTER — PATIENT OUTREACH (OUTPATIENT)
Dept: GERIATRIC MEDICINE | Facility: CLINIC | Age: 68
End: 2023-10-20

## 2023-10-20 NOTE — PROGRESS NOTES
10/20/23 CC received a notice from Winter that she wants her incontinent supplies put on hold, she has too many.  CC notified [facility]. Winter was notified to let CC know once she needs to restart them.  Corrina WOOD N  [facility] Case Management  [phone]

## 2023-10-23 ENCOUNTER — OFFICE VISIT (OUTPATIENT)
Dept: PSYCHOLOGY | Facility: CLINIC | Age: 68
End: 2023-10-23
Payer: COMMERCIAL

## 2023-10-23 DIAGNOSIS — F32.1 CURRENT MODERATE EPISODE OF MAJOR DEPRESSIVE DISORDER, UNSPECIFIED WHETHER RECURRENT (H): Primary | ICD-10-CM

## 2023-10-23 PROCEDURE — 90834 PSYTX W PT 45 MINUTES: CPT | Performed by: STUDENT IN AN ORGANIZED HEALTH CARE EDUCATION/TRAINING PROGRAM

## 2023-10-23 NOTE — PROGRESS NOTES
[facility]                                     Progress Note    Patient Name: Winter Loya  Date: 10/23/2023         Service Type: Individual      Session Start Time: 11.00  Session End Time: 11.45     Session Length:38-52 mns    Session #: 30    Attendees: Client attended alone    Service Modality:  In-person  Location: [facility].    DATA  Interactive Complexity: No  Crisis: No      Progress Since Last Session (Related to Symptoms / Goals / Homework):   Symptoms:  Pt reported no change from previous session    Homework: Achieved / completed to satisfaction       Episode of Care Goals: Minimal progress - ACTION (Actively working towards change); Intervened by reinforcing change plan / affirming steps taken     Current / Ongoing Stressors and Concerns:   Ongoing stressors: Financial stress, stressful filial relationship/ Difficulty with assertiveness    Current: Today, patient reported relationship with son continue be a major stressor and she is unable to find a way out of it. Reported she can live a alone abut struggling emotional to separate self from son because she feels he is having some mental health needs and will not be able to live on his own.                   Treatment Objective(s) Addressed in This Session:    Patient will address secondary losses associated with son's  death and process in session   Patient will  openly communicate  concerns, fears, and preferences to partner daily.     Intervention:       MI Intervention: Expressed Empathy/Understanding, Supported Autonomy, Collaboration, Evocation, Permission to raise concern or advise, Open-ended questions, Reflections: simple and complex, and Rolled with resistance: Emphasized patient autonomy, Simple reflection, Complex reflection, and Double-sided reflection: (sustain talk) you can be her mother and care for him and yet set healthy physical and emotional boundaries with him.  (change talk)        ASSESSMENT: Current Emotional / Mental Status (status of significant symptoms):   Risk status (Self / Other harm or suicidal ideation)   Patient denies current fears or concerns for personal safety.   Patient denies current or recent suicidal ideation or behaviors.   Patient denies current or recent homicidal ideation or behaviors.   Patient denies current or recent self injurious behavior or ideation.   Patient denies other safety concerns.   Patient reports there has been no change in risk factors since their last session.     Patient reports there has been no change in protective factors since their last session.     Recommended that patient call 911 or go to the local ED should there be a change in any of these risk factors.     Appearance:   Appropriate    Eye Contact:   Good    Psychomotor Behavior: Normal    Attitude:   Cooperative  Interested Friendly   Orientation:   Person Place Time Situation   Speech    Rate / Production: Normal/ Responsive    Volume:  Normal    Mood:    Anxious  Depressed    Affect:    Worrisome    Thought Content:  Clear    Thought Form:  Coherent    Insight:    Good      Medication Review:   No changes to current psychiatric medication(s)     Medication Compliance:   Yes     Changes in Health Issues:   None reported     Chemical Use Review:   Substance Use: Chemical use reviewed, no active concerns identified      Tobacco Use: No current tobacco use.      Diagnosis:    296.32 (F33.1) Major Depressive Disorder, Recurrent Episode, Moderate _ and With anxious distress    Collateral Reports Completed:   Not Applicable    PLAN: (Patient Tasks / Therapist Tasks / Other)    Take daily walks, increase outdoor physical activities.  Talk with son about current feelings with personal safety and insecurity leaving with him.    CHERISE Castellon              ----- Service Performed and Documented by CHERISE------  This note was reviewed and clinical supervision by Yasmani Arroyo  "MSW LICSW    10/25/2023   _____________________________________________________________    Individual Treatment Plan    Patient's Name: Winter Loya  YOB: 1955    Date of Creation: 04/18/2022  Date Treatment Plan Last Reviewed/Revised: 09/25/2023    DSM5 Diagnoses: 296.32 (F33.1) Major Depressive Disorder, Recurrent Episode, Moderate _ and With anxious distress  Psychosocial / Contextual Factors:   PROMIS (reviewed every 90 days):     Referral / Collaboration:  Referral to another professional/service is not indicated at this time..    Anticipated number of session for this episode of care: 15-25  Anticipation frequency of session: Every other week  Anticipated Duration of each session: 38-52 minutes  Treatment plan will be reviewed in 90 days or when goals have been changed.       MeasurableTreatment Goal(s) related to diagnosis / functional impairment(s)    Goal 1: Patient will identify and address specific triggers to feelings of depression and improve coping with depression by  90 % in the next three months.    I will know I've met my goal when I can comfortably manage the daily stressors I have and be less depressed\"    Objective #A (Patient Action)     Patient will  openly communicate  concerns, fears, and preferences to son daily   Status: Continued - Date(s):  11/27/2023  Intervention(s)  Therapist will provide psychoeducation, behavioral activation, and cognitive restructuring.    Objective #A (Patient Action)    Patient will take 15-20 minutes daily walks and spend 1 hour daily completing household chores.  Status: Continued - Date(s): 11/27/2023  Intervention(s)  Therapist will provide psychoeducation, behavioral activation, and cognitive restructuring.    Objective #B  Patient will identify specific areas of cognitive distortion and challenge irrational thoughts with reality.   Status: Continued - Date(s):     11/27/2023  Intervention(s)   Therapist will provide psychoeducation, " behavioral activation, and cognitive restructuring.      Objective #C  Patient will learn and practice relaxation techniques to manage anxiety.  Status: Continued - Date(s):    11/27/2023  Intervention(s)   Therapist will provide psychoeducation, behavioral activation, and cognitive restructuring.    Patient has reviewed and agreed to the above plan.      CHERISE Castellon  April 18, 2022  Answers for HPI/ROS submitted by the patient on 6/26/2023  If you checked off any problems, how difficult have these problems made it for you to do your work, take care of things at home, or get along with other people?: Somewhat difficult  PHQ9 TOTAL SCORE: 20

## 2023-10-25 ENCOUNTER — ANCILLARY PROCEDURE (OUTPATIENT)
Dept: ULTRASOUND IMAGING | Facility: CLINIC | Age: 68
End: 2023-10-25
Attending: INTERNAL MEDICINE
Payer: COMMERCIAL

## 2023-10-25 DIAGNOSIS — R79.89 ABNORMAL LIVER FUNCTION TESTS: ICD-10-CM

## 2023-10-25 PROCEDURE — 76981 USE PARENCHYMA: CPT | Performed by: STUDENT IN AN ORGANIZED HEALTH CARE EDUCATION/TRAINING PROGRAM

## 2023-11-02 ENCOUNTER — OFFICE VISIT (OUTPATIENT)
Dept: FAMILY MEDICINE | Facility: CLINIC | Age: 68
End: 2023-11-02
Payer: COMMERCIAL

## 2023-11-02 ENCOUNTER — ANCILLARY PROCEDURE (OUTPATIENT)
Dept: GENERAL RADIOLOGY | Facility: CLINIC | Age: 68
End: 2023-11-02
Attending: PHYSICIAN ASSISTANT
Payer: COMMERCIAL

## 2023-11-02 VITALS
WEIGHT: 253.2 LBS | BODY MASS INDEX: 37.5 KG/M2 | HEART RATE: 73 BPM | OXYGEN SATURATION: 97 % | SYSTOLIC BLOOD PRESSURE: 119 MMHG | HEIGHT: 69 IN | TEMPERATURE: 98.1 F | DIASTOLIC BLOOD PRESSURE: 73 MMHG

## 2023-11-02 DIAGNOSIS — L30.9 ECZEMA, UNSPECIFIED TYPE: ICD-10-CM

## 2023-11-02 DIAGNOSIS — M17.0 PRIMARY OSTEOARTHRITIS OF BOTH KNEES: ICD-10-CM

## 2023-11-02 DIAGNOSIS — E66.01 CLASS 2 SEVERE OBESITY DUE TO EXCESS CALORIES WITH SERIOUS COMORBIDITY AND BODY MASS INDEX (BMI) OF 37.0 TO 37.9 IN ADULT (H): ICD-10-CM

## 2023-11-02 DIAGNOSIS — G89.29 CHRONIC PAIN OF BOTH KNEES: Primary | ICD-10-CM

## 2023-11-02 DIAGNOSIS — E66.812 CLASS 2 SEVERE OBESITY DUE TO EXCESS CALORIES WITH SERIOUS COMORBIDITY AND BODY MASS INDEX (BMI) OF 37.0 TO 37.9 IN ADULT (H): ICD-10-CM

## 2023-11-02 DIAGNOSIS — M25.561 CHRONIC PAIN OF BOTH KNEES: Primary | ICD-10-CM

## 2023-11-02 DIAGNOSIS — M25.561 CHRONIC PAIN OF BOTH KNEES: ICD-10-CM

## 2023-11-02 DIAGNOSIS — G89.29 CHRONIC PAIN OF BOTH KNEES: ICD-10-CM

## 2023-11-02 DIAGNOSIS — M25.562 CHRONIC PAIN OF BOTH KNEES: ICD-10-CM
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
## 2023-11-02 DIAGNOSIS — M25.562 CHRONIC PAIN OF BOTH KNEES: Primary | ICD-10-CM

## 2023-11-02 PROCEDURE — G0008 ADMIN INFLUENZA VIRUS VAC: HCPCS | Performed by: PHYSICIAN ASSISTANT

## 2023-11-02 PROCEDURE — 90480 ADMN SARSCOV2 VAC 1/ONLY CMP: CPT | Performed by: PHYSICIAN ASSISTANT

## 2023-11-02 PROCEDURE — 99214 OFFICE O/P EST MOD 30 MIN: CPT | Mod: 25 | Performed by: PHYSICIAN ASSISTANT

## 2023-11-02 PROCEDURE — 90662 IIV NO PRSV INCREASED AG IM: CPT | Performed by: PHYSICIAN ASSISTANT

## 2023-11-02 PROCEDURE — 91320 SARSCV2 VAC 30MCG TRS-SUC IM: CPT | Performed by: PHYSICIAN ASSISTANT

## 2023-11-02 PROCEDURE — 73562 X-RAY EXAM OF KNEE 3: CPT | Mod: TC | Performed by: RADIOLOGY

## 2023-11-02 RX ORDER — TRIAMCINOLONE ACETONIDE 1 MG/G
CREAM TOPICAL 2 TIMES DAILY
Qty: 60 G | Refills: 3 | Status: SHIPPED | OUTPATIENT
Start: 2023-11-02 | End: 2024-03-14

## 2023-11-02 RX ORDER — RESPIRATORY SYNCYTIAL VIRUS VACCINE 120MCG/0.5
0.5 KIT INTRAMUSCULAR ONCE
Qty: 1 EACH | Refills: 0 | Status: CANCELLED | OUTPATIENT
Start: 2023-11-02 | End: 2023-11-02

## 2023-11-02 ASSESSMENT — PATIENT HEALTH QUESTIONNAIRE - PHQ9
10. IF YOU CHECKED OFF ANY PROBLEMS, HOW DIFFICULT HAVE THESE PROBLEMS MADE IT FOR YOU TO DO YOUR WORK, TAKE CARE OF THINGS AT HOME, OR GET ALONG WITH OTHER PEOPLE: NOT DIFFICULT AT ALL
SUM OF ALL RESPONSES TO PHQ QUESTIONS 1-9: 12
SUM OF ALL RESPONSES TO PHQ QUESTIONS 1-9: 12

## 2023-11-02 NOTE — COMMUNITY RESOURCES LIST (ENGLISH)
11/02/2023   [facility] - Outpatient Clinics  N/A  For additional resource needs, please contact your health insurance member services or your primary care team.  Phone: [phone]   Email: N/A   Address: [address] [place] [id number]   Hours: N/A        Food and Nutrition       Food pantry  1  [facility] Distance: 0.82 miles      Pick   [address] [place] [address]  Language: English  Hours: Tue 4:00 PM - 6:00 PM  Fees: Free   Phone: (481) 467-8342 Email: office@[facility] Center[facility] Website: http://Kofikafe.[facility]cted[facility]     2  [facility] Distance: 1.05 miles      In-Person   [address] [place] [address]  Language: English  Hours: Tue 10:00 AM - 11:30 AM , Wed 5:00 PM - 6:30 PM , Fri 10:00 AM - 11:30 AM  Fees: Free   Phone: (963) 742-5080 Email: info@[facility].org Website: http://www.[facility].org/     SNAP application assistance  3  [employer] [place] Distance: 11.02 miles      Phone/Virtual   [address] [address]  Language: English, Hmong, Saudi Arabian, Omani, Rwandan  Hours: Mon - Fri 8:30 AM - 4:30 PM  Fees: Free   Phone: (859) 100-3123 Email: helpline@[employer].org Website: https://www.hungersolutions.org/programs/[place]-food-helpline/     4  [facility] ([facility]) Distance: 2.09 miles      In-Person, Phone/Virtual   [address] [place] [address]  Language: German, Nepali, English, Gujarati, Gena, Japanese, Czech, Swedish, Croatian, Kinyarwanda  Hours: Mon - Wed 9:00 AM - 5:00 PM , Thu 12:00 PM - 6:00 PM , Fri 9:00 AM - 5:00 PM , Sun 10:30 AM - 2:00 PM Appt. Only  Fees: Free   Phone: (434) 424-1403 Email: info@[employer].org Website: https://www.Tenlegsw.org/     Soup kitchen or free meals  5  [facility] Distance: 0.96 miles      [address] [address]  Language: English  Hours: Tue 5:00 PM - 6:30 PM   Fees: Free   Phone: (647) 899-5179 Email: admin@[employer] Website: http://www.[employer]/     6  [facility] Meal Distance: 1 miles      [address] [address]  Language: English  Hours: Sat 11:30 AM - 12:30 PM  Fees: Free   Phone: (335) 618-6674 Email: tangela@[facility]-io Website: http://www.[facility] Hospital[facility]-io/          Important Numbers & Websites       [place] [address] 211itedway.org  Poison Control   (983) 446-8528 [place]  Suicide and Crisis Lifeline   [address] [place], [place] [address]line.org  Childhelp [place] Child Abuse Hotline   117.639.8560 Childhelphotline.org  [place] Sexual Assault Hotline   (863) 330-8116 (HOPE) [facility] Runaway Safeline   (317) 581-8090 (RUNAWAY) [facility].org  Pregnancy & Postpartum Support [place]   Call/text 072-475-0197 Ppsupport[place].org  Substance Abuse National Helpline ([facility]   070-570-HELP (9059) Findtreatment.gov  Emergency Services   911

## 2023-11-02 NOTE — PROGRESS NOTES
"  Assessment & Plan     Chronic pain of both knees  Suggest xrays today. Consider physical therapy for strengthening, but could also consider steroid injections for symptom relief. She is agreeable.   - XR Knee Bilateral 3 Views; Future    Eczema, unspecified type  Suggest a trial of steroid cream on eczema on right thigh and right forearm.   - triamcinolone (KENALOG) 0.1 % external cream; Apply topically 2 times daily    Class 2 severe obesity due to excess calories with serious comorbidity and body mass index (BMI) of 37.0 to 37.9 in adult (H)  Wanting to work on weight loss. Consider bariatric surgery - wants to consult.   - Adult Comprehensive Weight Management  Referral; Future    Fall, initial encounter  Fell yesterday while outside. Abrasions on right knee, pain in right ribs. No shortness of breath. Appears to be related to hurrying out of the way from a truck - not related to balance, strength concerns.              BMI:   Estimated body mass index is 37.37 kg/m  as calculated from the following:    Height as of this encounter: 1.753 m (5' 9.02\").    Weight as of this encounter: 114.9 kg (253 lb 3.2 oz).   Weight management plan: Discussed healthy diet and exercise guidelines        Montse Bucio PA-C  [facility] KINDRA Max is a 67 year old, presenting for the following health issues:  Knee Pain (Knees pain ongoing for years,  Hx of knees surgery about 10-12 yrs ago, would like to start PT if possible to help with walking) and Derm Problem (Will get dry patches on skin, itchy )        11/2/2023    10:48 AM   Additional Questions   Roomed by An CHARLIE.         11/2/2023    10:48 AM   Patient Reported Additional Medications   Patient reports taking the following new medications Taking: Augmentin, Ibuprofen 600mg, Tylenol #3       History of Present Illness       Reason for visit:  Knees pain and dry skin    She eats 0-1 servings of fruits and vegetables daily.She " "consumes 0 sweetened beverage(s) daily.She exercises with enough effort to increase her heart rate 9 or less minutes per day.  She exercises with enough effort to increase her heart rate 3 or less days per week.   She is taking medications regularly.         Concern - Derm Problems  Onset: ongoing 1 yrs   Description: dry patches,itchy   Intensity: moderate  Progression of Symptoms:  same  Accompanying Signs & Symptoms: dry and itchy  Previous history of similar problem: n/a  Precipitating factors:        Worsened by: n/a  Alleviating factors:        Improved by: n/a  Therapies tried and outcome: None    Spot on right thigh, right forearm        Pain History:  When did you first notice your pain? Ongoing for years   Have you seen anyone else for your pain? No  How has your pain affected your ability to work? Not applicable  Where in your body do you have pain? Musculoskeletal problem/pain  Onset/Duration: ongoing for years  Description  Location: knee - bilateral  Joint Swelling: YES  Redness: No  Pain: YES  Warmth: YES  Intensity:  7/10 when its is at its worse   Progression of Symptoms:  same  Accompanying signs and symptoms:   Fevers: No  Numbness/tingling/weakness: No  History  Trauma to the area: No  Recent illness:  No  Previous similar problem: No  Previous evaluation:  YES- Hx of Knees surgery about 10-12 yrs ago  Precipitating or alleviating factors:  Aggravating factors include: walking  Therapies tried and outcome: heat, ice, and Ibuprofen    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 Ended up doing a lot of walking. [facility]   R > L     Had a fall yesterday morning - was crossing the street, thought garbage truck was stopping but it wasn't. Turned and ran back to the curb and fell forward. Fell onto right knee. Broke glasses. Right rib pain.             Review of Systems   Constitutional, HEENT, cardiovascular, pulmonary, gi and gu systems are " "negative, except as otherwise noted.      Objective    /73   Pulse 73   Temp 98.1  F (36.7  C) (Oral)   Ht 1.753 m (5' 9.02\")   Wt 114.9 kg (253 lb 3.2 oz)   SpO2 97%   BMI 37.37 kg/m    Body mass index is 37.37 kg/m .  Physical Exam   GENERAL: healthy, alert and no distress  MS: no gross musculoskeletal defects noted, no edema. Bilateral knee exam reveals flexion, extension ROM WNL. Strength 4+/5 knee flexion and extension. Neg varus, valgus, McMurrays. Pain with patellar motion bilaterally. No effusion.  SKIN: no suspicious lesions or rashes. Right knee with several abrasions - no active bleeding, no surrounding erythema. Right thigh with 3 cm oval dry erythematous patch.                      "

## 2023-11-05 DIAGNOSIS — C90.01 MULTIPLE MYELOMA IN REMISSION (H): Primary | ICD-10-CM

## 2023-11-06 ENCOUNTER — TELEPHONE (OUTPATIENT)
Dept: ONCOLOGY | Facility: CLINIC | Age: 68
End: 2023-11-06
Payer: MEDICARE

## 2023-11-06 DIAGNOSIS — C90.01 MULTIPLE MYELOMA IN REMISSION (H): Primary | ICD-10-CM

## 2023-11-06 RX ORDER — LENALIDOMIDE 10 MG/1
10 CAPSULE ORAL DAILY
Qty: 28 CAPSULE | Refills: 0 | Status: SHIPPED | OUTPATIENT
Start: 2023-11-06 | End: 2023-11-29

## 2023-11-06 NOTE — TELEPHONE ENCOUNTER
Oral Chemotherapy Monitoring Program    Medication: Revlimid  Rx:  10 mg PO daily for a 28 day cycle    Auth #: [id number]  Risk Category: Adult female NOT of reproductive capacity    Routine survey questions reviewed.    Thank you,    Mary Franklin  Oncology Pharmacy Liaison LUCINDA [email or web address]  Phone: [phone]  Fax: [phone]

## 2023-11-10 ENCOUNTER — TELEPHONE (OUTPATIENT)
Dept: AUDIOLOGY | Facility: CLINIC | Age: 68
End: 2023-11-10

## 2023-11-10 NOTE — TELEPHONE ENCOUNTER
Patient no-showed for hearing aid fitting appointment and follow-up. Hearing aids will be returned to  for credit    Lee Ernandez, CCC-A  Licensed Audiologist  [place] #[id number]      11/10/23

## 2023-11-13 ENCOUNTER — VIRTUAL VISIT (OUTPATIENT)
Dept: PHARMACY | Facility: CLINIC | Age: 68
End: 2023-11-13
Payer: COMMERCIAL

## 2023-11-13 DIAGNOSIS — Z79.4 TYPE 2 DIABETES MELLITUS WITH DIABETIC POLYNEUROPATHY, WITH LONG-TERM CURRENT USE OF INSULIN (H): Primary | ICD-10-CM

## 2023-11-13 DIAGNOSIS — E11.42 TYPE 2 DIABETES MELLITUS WITH DIABETIC POLYNEUROPATHY, WITH LONG-TERM CURRENT USE OF INSULIN (H): Primary | ICD-10-CM

## 2023-11-13 DIAGNOSIS — E66.01 CLASS 2 SEVERE OBESITY DUE TO EXCESS CALORIES WITH SERIOUS COMORBIDITY AND BODY MASS INDEX (BMI) OF 37.0 TO 37.9 IN ADULT (H): ICD-10-CM

## 2023-11-13 DIAGNOSIS — E66.812 CLASS 2 SEVERE OBESITY DUE TO EXCESS CALORIES WITH SERIOUS COMORBIDITY AND BODY MASS INDEX (BMI) OF 37.0 TO 37.9 IN ADULT (H): ICD-10-CM

## 2023-11-13 PROCEDURE — 99606 MTMS BY PHARM EST 15 MIN: CPT | Mod: 93 | Performed by: PHARMACIST

## 2023-11-13 NOTE — PATIENT INSTRUCTIONS
"Recommendations from today's MTM visit:                                                         1. Continue current medications and we can touch base on blood sugar next time. Feel better!     Follow-up: Return in about 2 weeks (around 11/27/2023) for Medication Therapy Management.    It was great speaking with you today.  I value your experience and would be very thankful for your time in providing feedback in our clinic survey. In the next few days, you may receive an email or text message from [employer] with a link to a survey related to your  clinical pharmacist.\"     To schedule another MTM appointment, please call the clinic directly or you may call the MTM scheduling line at [phone] or toll-free at [phone].     My Clinical Pharmacist's contact information:                                                      Please feel free to contact me with any questions or concerns you have.      Vonnie Corrigan, PharmD  Medication Therapy Management Pharmacist  [phone]     "

## 2023-11-13 NOTE — PROGRESS NOTES
Medication Therapy Management (MTM) Encounter    ASSESSMENT:                            Medication Adherence/Access: No issues identified    Type 2 Diabetes/Weight loss:  Patient is not meeting A1c goal of < 7%, but close.       PLAN:                            1. Continue current medications and we can touch base on blood sugar next time. Feel better!     Follow-up: Return in about 2 weeks (around 11/27/2023) for Medication Therapy Management.    SUBJECTIVE/OBJECTIVE:                          Winter Loya is a 67 year old female called for a follow-up visit from 7/28/23.       Reason for visit: med review - bad last month, had a couple wisdom teeth out and dry sockets, just a few days ago started seeing some improvement. A few weeks ago, she was almost hit by a garbage truck and she thinks she broke her ribs that continued to get worse, but started getting better the last couple days too.     Allergies/ADRs: Reviewed in chart  Past Medical History: Reviewed in chart  Tobacco: She reports that she quit smoking about 18 years ago. Her smoking use included cigarettes. She smoked an average of 1 pack per day. She has never used smokeless tobacco.  Alcohol: Less than 1 beverage / month  Caffeine: 1 diet coke/day and 2 cups coffee/day    Medication Adherence/Access:    - sometimes forgetting evening medications  The patient fills medications at [place]: NO, fills medications at [facility] and [facility] for Revlimid.    Diabetes   Lantus 24 units every morning  Ozempic 2 mg weekly (Tuesdays)  Jardiance 25 mg daily   Aspirin: Taking 81mg daily for primary prevention     Has had a rough month, as above, hasn't been keeping track of things as well. She'd like to reschedule for a couple weeks out after she's able to get herself back together. She plans to make a cabbage soup, her neighbor gave her a lot of vegetables.   Patient is experiencing the following side effects: diarrhea - nobody knows why she has this. Diarrhea is not worse  on Ozempic 2 mg.  Medication History:  Has been on Trulicity in the past, tolerated well.   She has done a trial off of metformin with Delmi Renato in the past without improvement in diarrhea, has since stopped this.     Blood sugar monitoring: hasn't been checking the last two weeks, did put a new sensor on now  2 hours before meal (vs 2 hours after): n/a     Eye exam is up to date  Foot exam: due  Urine Albumin:   Lab Results   Component Value Date    UMALCR 187.04 (H) 02/02/2023      Lab Results   Component Value Date    A1C 7.2 (H) 09/21/2023     Today's Vitals: There were no vitals taken for this visit.  ----------------      I spent 16 minutes with this patient today. All changes were made via collaborative practice agreement with Montse Bucio PA-C. A copy of the visit note was provided to the patient's provider(s).    A summary of these recommendations was sent via [employer].    Vonnie Corrigan, PharmD  Medication Therapy Management Pharmacist  [phone]    Telemedicine Visit Details  Type of service:  Telephone visit  Start Time: 2:38 PM  End Time:  2:54 PM     Medication Therapy Recommendations  No medication therapy recommendations to display

## 2023-11-13 NOTE — Clinical Note
CE DAWSON note, thanks!  Vonnie Corrigan, PharmD Medication Therapy Management Pharmacist [phone]

## 2023-11-20 ENCOUNTER — LAB (OUTPATIENT)
Dept: LAB | Facility: CLINIC | Age: 68
End: 2023-11-20
Payer: COMMERCIAL

## 2023-11-20 ENCOUNTER — PATIENT OUTREACH (OUTPATIENT)
Dept: GERIATRIC MEDICINE | Facility: CLINIC | Age: 68
End: 2023-11-20

## 2023-11-20 DIAGNOSIS — C90.01 MULTIPLE MYELOMA IN REMISSION (H): ICD-10-CM

## 2023-11-20 LAB
ALBUMIN SERPL BCG-MCNC: 4.6 G/DL (ref 3.5–5.2)
ALP SERPL-CCNC: 178 U/L (ref 40–150)
ALT SERPL W P-5'-P-CCNC: 28 U/L (ref 0–50)
ANION GAP SERPL CALCULATED.3IONS-SCNC: 13 MMOL/L (ref 7–15)
AST SERPL W P-5'-P-CCNC: 19 U/L (ref 0–45)
BASOPHILS # BLD AUTO: 0.1 10E3/UL (ref 0–0.2)
BASOPHILS NFR BLD AUTO: 2 %
BILIRUB SERPL-MCNC: 1.4 MG/DL
BUN SERPL-MCNC: 17.3 MG/DL (ref 8–23)
CALCIUM SERPL-MCNC: 10.4 MG/DL (ref 8.8–10.2)
CHLORIDE SERPL-SCNC: 101 MMOL/L (ref 98–107)
CREAT SERPL-MCNC: 0.7 MG/DL (ref 0.51–0.95)
DEPRECATED HCO3 PLAS-SCNC: 25 MMOL/L (ref 22–29)
EGFRCR SERPLBLD CKD-EPI 2021: >90 ML/MIN/1.73M2
EOSINOPHIL # BLD AUTO: 0.2 10E3/UL (ref 0–0.7)
EOSINOPHIL NFR BLD AUTO: 5 %
ERYTHROCYTE [DISTWIDTH] IN BLOOD BY AUTOMATED COUNT: 14.1 % (ref 10–15)
GLUCOSE SERPL-MCNC: 150 MG/DL (ref 70–99)
HCT VFR BLD AUTO: 44.1 % (ref 35–47)
HGB BLD-MCNC: 14.7 G/DL (ref 11.7–15.7)
IMM GRANULOCYTES # BLD: 0 10E3/UL
IMM GRANULOCYTES NFR BLD: 0 %
LYMPHOCYTES # BLD AUTO: 0.8 10E3/UL (ref 0.8–5.3)
LYMPHOCYTES NFR BLD AUTO: 26 %
MCH RBC QN AUTO: 29.1 PG (ref 26.5–33)
MCHC RBC AUTO-ENTMCNC: 33.3 G/DL (ref 31.5–36.5)
MCV RBC AUTO: 87 FL (ref 78–100)
MONOCYTES # BLD AUTO: 0.3 10E3/UL (ref 0–1.3)
MONOCYTES NFR BLD AUTO: 10 %
NEUTROPHILS # BLD AUTO: 1.7 10E3/UL (ref 1.6–8.3)
NEUTROPHILS NFR BLD AUTO: 57 %
PLATELET # BLD AUTO: 146 10E3/UL (ref 150–450)
POTASSIUM SERPL-SCNC: 4.2 MMOL/L (ref 3.4–5.3)
PROT SERPL-MCNC: 7.3 G/DL (ref 6.4–8.3)
RBC # BLD AUTO: 5.05 10E6/UL (ref 3.8–5.2)
SODIUM SERPL-SCNC: 139 MMOL/L (ref 135–145)
TOTAL PROTEIN SERUM FOR ELP: 6.9 G/DL (ref 6.4–8.3)
WBC # BLD AUTO: 3.1 10E3/UL (ref 4–11)

## 2023-11-20 PROCEDURE — 82784 ASSAY IGA/IGD/IGG/IGM EACH: CPT

## 2023-11-20 PROCEDURE — 84155 ASSAY OF PROTEIN SERUM: CPT | Mod: 59

## 2023-11-20 PROCEDURE — 83521 IG LIGHT CHAINS FREE EACH: CPT

## 2023-11-20 PROCEDURE — 86334 IMMUNOFIX E-PHORESIS SERUM: CPT | Performed by: PATHOLOGY

## 2023-11-20 PROCEDURE — 84165 PROTEIN E-PHORESIS SERUM: CPT | Performed by: PATHOLOGY

## 2023-11-20 PROCEDURE — 85025 COMPLETE CBC W/AUTO DIFF WBC: CPT

## 2023-11-20 PROCEDURE — 80053 COMPREHEN METABOLIC PANEL: CPT

## 2023-11-20 PROCEDURE — 36415 COLL VENOUS BLD VENIPUNCTURE: CPT

## 2023-11-20 NOTE — PROGRESS NOTES
6 month follow up call. No answer,[facility]  Corrina NOBLES  [facility] Coordinator  [phone]

## 2023-11-21 ENCOUNTER — PATIENT OUTREACH (OUTPATIENT)
Dept: GERIATRIC MEDICINE | Facility: CLINIC | Age: 68
End: 2023-11-21

## 2023-11-21 ENCOUNTER — THERAPY VISIT (OUTPATIENT)
Dept: PHYSICAL THERAPY | Facility: CLINIC | Age: 68
End: 2023-11-21
Payer: COMMERCIAL

## 2023-11-21 DIAGNOSIS — M25.561 CHRONIC PAIN OF BOTH KNEES: ICD-10-CM

## 2023-11-21 DIAGNOSIS — M25.561 BILATERAL CHRONIC KNEE PAIN: Primary | ICD-10-CM

## 2023-11-21 DIAGNOSIS — M17.0 PRIMARY OSTEOARTHRITIS OF BOTH KNEES: ICD-10-CM

## 2023-11-21 DIAGNOSIS — M25.562 BILATERAL CHRONIC KNEE PAIN: Primary | ICD-10-CM

## 2023-11-21 DIAGNOSIS — G89.29 CHRONIC PAIN OF BOTH KNEES: ICD-10-CM

## 2023-11-21 DIAGNOSIS — G89.29 BILATERAL CHRONIC KNEE PAIN: Primary | ICD-10-CM

## 2023-11-21 DIAGNOSIS — M25.562 CHRONIC PAIN OF BOTH KNEES: ICD-10-CM

## 2023-11-21 LAB
ALBUMIN SERPL ELPH-MCNC: 4.6 G/DL (ref 3.7–5.1)
ALPHA1 GLOB SERPL ELPH-MCNC: 0.3 G/DL (ref 0.2–0.4)
ALPHA2 GLOB SERPL ELPH-MCNC: 0.6 G/DL (ref 0.5–0.9)
B-GLOBULIN SERPL ELPH-MCNC: 0.8 G/DL (ref 0.6–1)
GAMMA GLOB SERPL ELPH-MCNC: 0.7 G/DL (ref 0.7–1.6)
IGA SERPL-MCNC: 164 MG/DL (ref 84–499)
IGG SERPL-MCNC: 687 MG/DL (ref 610–1616)
IGM SERPL-MCNC: 48 MG/DL (ref 35–242)
KAPPA LC FREE SER-MCNC: 2.58 MG/DL (ref 0.33–1.94)
KAPPA LC FREE/LAMBDA FREE SER NEPH: 1.55 {RATIO} (ref 0.26–1.65)
LAMBDA LC FREE SERPL-MCNC: 1.66 MG/DL (ref 0.57–2.63)
M PROTEIN SERPL ELPH-MCNC: 0.1 G/DL
PROT PATTERN SERPL ELPH-IMP: ABNORMAL
PROT PATTERN SERPL IFE-IMP: NORMAL

## 2023-11-21 PROCEDURE — 97161 PT EVAL LOW COMPLEX 20 MIN: CPT | Mod: GP | Performed by: PHYSICAL THERAPIST

## 2023-11-21 PROCEDURE — 97110 THERAPEUTIC EXERCISES: CPT | Mod: GP | Performed by: PHYSICAL THERAPIST

## 2023-11-21 ASSESSMENT — ACTIVITIES OF DAILY LIVING (ADL)
HOW_WOULD_YOU_RATE_THE_OVERALL_FUNCTION_OF_YOUR_KNEE_DURING_YOUR_USUAL_DAILY_ACTIVITIES?: ABNORMAL
PAIN: THE SYMPTOM AFFECTS MY ACTIVITY MODERATELY
KNEE_ACTIVITY_OF_DAILY_LIVING_SUM: 2
HOW_WOULD_YOU_RATE_THE_CURRENT_FUNCTION_OF_YOUR_KNEE_DURING_YOUR_USUAL_DAILY_ACTIVITIES_ON_A_SCALE_FROM_0_TO_100_WITH_100_BEING_YOUR_LEVEL_OF_KNEE_FUNCTION_PRIOR_TO_YOUR_INJURY_AND_0_BEING_THE_INABILITY_TO_PERFORM_ANY_OF_YOUR_USUAL_DAILY_ACTIVITIES?: 25

## 2023-11-21 NOTE — PROGRESS NOTES
"Per CC, mailed client an \"Unable to Contact\" letter.    Dayna Baez  Case Management Specialist   [facility]  [phone]    "

## 2023-11-21 NOTE — LETTER
November 21, 2023      PIEDAD RABAGO JENNIELAWRENCE  [address]  [facility] [address]        Dear Piedad:     I m your care coordinator. I ve been unable to reach you by phone. I am writing to ask you or an authorized representative to call me at [phone]. If you reach my voicemail, please leave a message with your daytime telephone number. . Include a date and time that I can call you. If you are hearing impaired, call the [place] Relay at 160 or [phone] (vktpef-qh-sxhsmi relay service).     The reason I am trying to reach you is:    [] To schedule an assessment  [x] For your six (6)-month check-in  [] Other:      Please call me as soon as you receive this letter. I look forward to speaking with you.    Sincerely,    Marti Cevallos RN    E-mail: [email or web address]  Phone: [phone]    Care Manager  [facility] ([facility] D-SNP) is a health plan that contracts with both Medicare and the [place] Medical Assistance (Medicaid) program to provide benefits of both programs to enrollees. Enrollment in [facility] depends on contract renewal.    [id number] accepted  [id number]                                                                         A (08/2022)

## 2023-11-21 NOTE — PROGRESS NOTES
11[facility]21/23 [facility] for the 6 month follow up.  Corrina Cevallos RN PHN  [facility] Coordinator  [phone]

## 2023-11-21 NOTE — PROGRESS NOTES
PHYSICAL THERAPY EVALUATION  Type of Visit: Evaluation    See electronic medical record for Abuse and Falls Screening details.    Subjective Has had knee pain for years. Had knee surgeries 10-12 years ago (menisectomies). That helped with pain. Recently worked at the [place] [employer] which increased knee pain. Lots of walking and standing. Icing helps. Sometimes uses a cane. Going up and down stairs is painful. Gets pain mostly in the back of the knee.      Presenting condition or subjective complaint: knee walking  Date of onset: 11/05/23 (Date of order)    Relevant medical history: Anemia; Arthritis; Bladder or bowel problems; Cancer; Depression; Diabetes; Foot drop; Hearing problems; Hepatitis; Menopause; Mental Illness; Numbness or tingling in perianal area; Overweight; Sleep disorder like apnea; Smoking; Vision problems   Dates & types of surgery: knee 2009    Prior diagnostic imaging/testing results: X-ray     Prior therapy history for the same diagnosis, illness or injury:        Prior Level of Function  Transfers: Independent  Ambulation: Independent  ADL: Independent  IADL:     Living Environment  Social support: With family members   Type of home: Apartment/condo   Stairs to enter the home:         Ramp: No   Stairs inside the home: Yes 12 Is there a railing: Yes   Help at home: None  Equipment owned: Straight Cane     Employment: No    Hobbies/Interests:      Patient goals for therapy: walk without pain    Pain assessment:      Objective   KNEE EVALUATION  PAIN: Pain Level at Rest: 0/10  Pain Level with Use: 7/10  INTEGUMENTARY (edema, incisions):  Mild swelling of knee joints B  POSTURE:   GAIT:  Weightbearing Status: WBAT  Assistive Device(s): None  Gait Deviations: Stride length decreased  BALANCE/PROPRIOCEPTION:   WEIGHTBEARING ALIGNMENT:   NON-WEIGHTBEARING ALIGNMENT:   ROM:   (Degrees) Left AROM Left PROM  Right AROM Right PROM   Knee Flexion -2  124    Knee Extension -5  121      STRENGTH:   Pain: - none  + mild ++ moderate +++ severe  Strength Scale: 0-5/5 Left Right   Knee Flexion 4+ 4+   Knee Extension 4+ 4+   Quad Set       FLEXIBILITY: Decreased gastroc L, Decreased soleus L, Decreased gastroc R, Decreased soleus R  SPECIAL TESTS:     Negative SLR B     Left Right   Apley's (Meniscus) Negative  Negative    Michele's (Meniscus)     Brigitte's (ITB/TFL)     Patellar Apprehension Test     Patella Tracking     Ligamentous Stability     Anterior Drawer (ACL) Negative,   Negative,     Posterior Drawer (PCL) Negative,   Negative,     Prone Dial Test at 30 Deg and 90 Deg (PCL/PLC)     Valgus Stress Testing at 0 Deg and 30 Deg Negative,   Negative,     Varus Stress Testing at 0 Deg and 30 Deg  Negative,   Negative,       FUNCTIONAL TESTS:   PALPATION: WNL Hypertonicity of gastrocnemius B    Assessment & Plan   CLINICAL IMPRESSIONS  Medical Diagnosis: Chronic pain of both knees / Primary osteoarthritis of both knees    Treatment Diagnosis: Chronic bilateral knee pain   Impression/Assessment: Patient is a 67 year old female with bilateral knee complaints.  The following significant findings have been identified: Pain, Decreased ROM/flexibility, Decreased joint mobility, Decreased strength, Impaired balance, Impaired gait, and Decreased activity tolerance. These impairments interfere with their ability to perform self care tasks, recreational activities, household mobility, and community mobility as compared to previous level of function.     Clinical Decision Making (Complexity):  Clinical Presentation: Stable/Uncomplicated  Clinical Presentation Rationale: based on medical and personal factors listed in PT evaluation  Clinical Decision Making (Complexity): Low complexity    PLAN OF CARE  Treatment Interventions:  Interventions: Gait Training, Manual Therapy, Neuromuscular Re-education, Therapeutic Activity, Therapeutic Exercise, Self-Care/Home Management    Long Term Goals     PT Goal 1  Goal Identifier: Balance  Goal  Description: Pt will improve balance and score a 46/56 or higher on the Serna Balance Scale to improve safety at home and in the community.  Target Date: 01/16/24  PT Goal 2  Goal Identifier: Stairs  Goal Description: Pt will be able to ascend and descend 1 flight of stairs with a handrail assist safely with 1-2/10.  Target Date: 01/16/24      Frequency of Treatment: 1 time per week  Duration of Treatment: 8 weeks    Recommended Referrals to Other Professionals:   Education Assessment:   Learner/Method: Patient;No Barriers to Learning    Risks and benefits of evaluation/treatment have been explained.   Patient/Family/caregiver agrees with Plan of Care.     Evaluation Time:     PT Eval, Low Complexity Minutes (93942): 20       Signing Clinician: CHINTAN [facility]                                                                                   OUTPATIENT PHYSICAL THERAPY      PLAN OF TREATMENT FOR OUTPATIENT REHABILITATION   Patient's Last Name, First Name, Winter Andino YOB: 1955   Provider's Name   [facility]   Medical Record No.  [id number]     Onset Date: 11/05/23 (Date of order)  Start of Care Date: 11/21/23     Medical Diagnosis:  Chronic pain of both knees / Primary osteoarthritis of both knees      PT Treatment Diagnosis:  Chronic bilateral knee pain Plan of Treatment  Frequency/Duration: 1 time per week/ 8 weeks    Certification date from 11/21/23 to 01/16/24         See note for plan of treatment details and functional goals     Yasmani Nichols, PT                         I CERTIFY THE NEED FOR THESE SERVICES FURNISHED UNDER        THIS PLAN OF TREATMENT AND WHILE UNDER MY CARE     (Physician attestation of this document indicates review and certification of the therapy plan).              Referring Provider:  Montse Bucio PA-C    Initial Assessment  See Epic Evaluation- Start of Care Date:  11/21/23

## 2023-11-25 DIAGNOSIS — C90.01 MULTIPLE MYELOMA IN REMISSION (H): Primary | ICD-10-CM

## 2023-11-27 ENCOUNTER — OFFICE VISIT (OUTPATIENT)
Dept: PSYCHOLOGY | Facility: CLINIC | Age: 68
End: 2023-11-27
Payer: COMMERCIAL

## 2023-11-27 ENCOUNTER — OFFICE VISIT (OUTPATIENT)
Dept: OPHTHALMOLOGY | Facility: CLINIC | Age: 68
End: 2023-11-27
Payer: COMMERCIAL

## 2023-11-27 DIAGNOSIS — H40.003 GLAUCOMA SUSPECT OF BOTH EYES: Primary | ICD-10-CM

## 2023-11-27 DIAGNOSIS — F32.1 CURRENT MODERATE EPISODE OF MAJOR DEPRESSIVE DISORDER, UNSPECIFIED WHETHER RECURRENT (H): Primary | ICD-10-CM

## 2023-11-27 PROCEDURE — 90834 PSYTX W PT 45 MINUTES: CPT | Performed by: STUDENT IN AN ORGANIZED HEALTH CARE EDUCATION/TRAINING PROGRAM

## 2023-11-27 PROCEDURE — 92083 EXTENDED VISUAL FIELD XM: CPT | Performed by: OPHTHALMOLOGY

## 2023-11-27 PROCEDURE — 92012 INTRM OPH EXAM EST PATIENT: CPT | Performed by: OPHTHALMOLOGY

## 2023-11-27 PROCEDURE — 92133 CPTRZD OPH DX IMG PST SGM ON: CPT | Performed by: OPHTHALMOLOGY

## 2023-11-27 ASSESSMENT — VISUAL ACUITY
OD_CC: 20/20
METHOD: SNELLEN - LINEAR
OS_CC+: -2
CORRECTION_TYPE: GLASSES
OS_CC: 20/20

## 2023-11-27 ASSESSMENT — REFRACTION_WEARINGRX
OD_SPHERE: -2.50
OD_AXIS: 135
OD_ADD: +2.50
OS_SPHERE: -0.25
SPECS_TYPE: PAL
OS_CYLINDER: +1.00
OS_AXIS: 035
OS_ADD: +2.50
OD_CYLINDER: +0.75

## 2023-11-27 ASSESSMENT — TONOMETRY
OS_IOP_MMHG: 19
OD_IOP_MMHG: 19
IOP_METHOD: APPLANATION

## 2023-11-27 ASSESSMENT — EXTERNAL EXAM - LEFT EYE: OS_EXAM: NORMAL

## 2023-11-27 ASSESSMENT — SLIT LAMP EXAM - LIDS
COMMENTS: 2+ DERMATOCHALASIS, 1+ MEIBOMIAN GLAND DYSFUNCTION
COMMENTS: 2+ DERMATOCHALASIS, 1+ MEIBOMIAN GLAND DYSFUNCTION

## 2023-11-27 ASSESSMENT — EXTERNAL EXAM - RIGHT EYE: OD_EXAM: NORMAL

## 2023-11-27 NOTE — PROGRESS NOTES
" Current Eye Medications:  None     Subjective:  3 month check for OCT, HVF, IOP check.     Objective:  See Ophthalmology Exam.       Assessment:  Mostly stable intraocular pressures, glaucoma OCT, and Ibrahim Visual Field both eyes in patient who is a glaucoma suspect.      Plan:  Continue observation with regard to glaucoma suspect status.    May use artificial tears up to four times a day (like Refresh Optive, Systane Balance, or TheraTears. Avoid \"get the red out\" drops and generic artifical tears).     Call in April 2024 for an appointment in August 2024 for Complete Exam     Dr. Murguia [phone]        "

## 2023-11-27 NOTE — PROGRESS NOTES
[facility]                                     Progress Note    Patient Name: Winter Loya  Date: 11/27/2023         Service Type: Individual      Session Start Time: 09.00  Session End Time: 09.45     Session Length:38-52 mns    Session #: 31    Attendees: Client attended alone    Service Modality:  In-person  Location: [facility] [place].    DATA  Interactive Complexity: No  Crisis: No      Progress Since Last Session (Related to Symptoms / Goals / Homework):   Symptoms:  Pt reported no change from previous session    Homework: Achieved / completed to satisfaction       Episode of Care Goals: Minimal progress - ACTION (Actively working towards change); Intervened by reinforcing change plan / affirming steps taken     Current / Ongoing Stressors and Concerns:   Ongoing stressors: Financial stress, stressful filial relationship/ Difficulty with assertiveness    Current: Today, patient reported relationship with son continue be a major stressor and she is unable to find a way out of it. Patient reported she has difficulty telling him how she feels and the changes she want to see as they continue to share the same apartment together. Patient reported discussion about financial obligations relating to bills, food and rents have been the most challenging as she continue to shoulder most of the responsibilities.                   Treatment Objective(s) Addressed in This Session:    Patient will address secondary losses associated with son's  death and process in session   Patient will assertively  communicate  concerns, fears, and preferences to son daily.     Intervention:        DBT: Utilized dbt DEAR MAN objective effectiveness skill today with patient to help them learn how to effectively describe current problem, express their feelings about the problem using  I  statement and assertively state what changes she wants and point out the benefits to everyone  concern. Couched patient to be mindful, confident, and open to negotiate and  compromise. Model process in session and provided handout.        ASSESSMENT: Current Emotional / Mental Status (status of significant symptoms):   Risk status (Self / Other harm or suicidal ideation)   Patient denies current fears or concerns for personal safety.   Patient denies current or recent suicidal ideation or behaviors.   Patient denies current or recent homicidal ideation or behaviors.   Patient denies current or recent self injurious behavior or ideation.   Patient denies other safety concerns.   Patient reports there has been no change in risk factors since their last session.     Patient reports there has been no change in protective factors since their last session.     Recommended that patient call 911 or go to the local ED should there be a change in any of these risk factors.     Appearance:   Appropriate    Eye Contact:   Good    Psychomotor Behavior: Normal    Attitude:   Cooperative  Interested Friendly   Orientation:   Person Place Time Situation   Speech    Rate / Production: Normal/ Responsive    Volume:  Normal    Mood:    Anxious  Depressed    Affect:    Worrisome    Thought Content:  Clear    Thought Form:  Coherent    Insight:    Good      Medication Review:   No changes to current psychiatric medication(s)     Medication Compliance:   Yes     Changes in Health Issues:   None reported     Chemical Use Review:   Substance Use: Chemical use reviewed, no active concerns identified      Tobacco Use: No current tobacco use.      Diagnosis:    296.32 (F33.1) Major Depressive Disorder, Recurrent Episode, Moderate _ and With anxious distress    Collateral Reports Completed:   Not Applicable    PLAN: (Patient Tasks / Therapist Tasks / Other)    Take daily walks, increase outdoor physical activities.  Talk with son about current feelings with personal safety and insecurity leaving with him. Utilize  DBT skill DEAR NADIYA  "learned in session in communication with son.    Mj Cline [facility]              ----- Service Performed and Documented by [facility]------  This note was reviewed and clinical supervision by AMY Ayala [facility]    11/28/2023   _____________________________________________________________    Individual Treatment Plan    Patient's Name: Winter Loya  YOB: 1955    Date of Creation: 04/18/2022  Date Treatment Plan Last Reviewed/Revised: 11/27/2023    DSM5 Diagnoses: 296.32 (F33.1) Major Depressive Disorder, Recurrent Episode, Moderate _ and With anxious distress  Psychosocial / Contextual Factors:   PROMIS (reviewed every 90 days):     Referral / Collaboration:  Referral to another professional/service is not indicated at this time..    Anticipated number of session for this episode of care: 15-25  Anticipation frequency of session: Every other week  Anticipated Duration of each session: 38-52 minutes  Treatment plan will be reviewed in 90 days or when goals have been changed.       MeasurableTreatment Goal(s) related to diagnosis / functional impairment(s)    Goal 1: Patient will identify and address specific triggers to feelings of depression and improve coping with depression by  90 % in the next three months.    I will know I've met my goal when I can comfortably manage the daily stressors I have and be less depressed\"    Objective #A (Patient Action)     Patient will  openly communicate  concerns, fears, and preferences to son daily   Status: Continued - Date(s):  02/28/2024  Intervention(s)  Therapist will provide psychoeducation, behavioral activation, and cognitive restructuring.    Objective #A (Patient Action)    Patient will take 15-20 minutes daily walks and spend 1 hour daily completing household chores.  Status: Continued - Date(s):02/28/2024  Intervention(s)  Therapist will provide psychoeducation, behavioral activation, and cognitive restructuring.    Objective #B  Patient will identify " specific areas of cognitive distortion and challenge irrational thoughts with reality.   Status: Continued - Date(s):     02/28/2024  Intervention(s)   Therapist will provide psychoeducation, behavioral activation, and cognitive restructuring.      Objective #C  Patient will learn and practice relaxation techniques to manage anxiety.  Status: Continued - Date(s):    02/28/2024  Intervention(s)   Therapist will provide psychoeducation, behavioral activation, and cognitive restructuring.    Patient has reviewed and agreed to the above plan.      CHERISE Castellon  April 18, 2022  Answers for HPI/ROS submitted by the patient on 6/26/2023  If you checked off any problems, how difficult have these problems made it for you to do your work, take care of things at home, or get along with other people?: Somewhat difficult  PHQ9 TOTAL SCORE: 20

## 2023-11-27 NOTE — LETTER
"    11/27/2023         RE: Winter Loya  [address]  [place] [address]        Dear Colleague,    Thank you for referring your patient, Winter Loya, to the [facility]. Please see a copy of my visit note below.     Current Eye Medications:  None     Subjective:  3 month check for OCT, HVF, IOP check.     Objective:  See Ophthalmology Exam.       Assessment:  Mostly stable intraocular pressures, glaucoma OCT, and Ibrahim Visual Field both eyes in patient who is a glaucoma suspect.      Plan:  Continue observation with regard to glaucoma suspect status.    May use artificial tears up to four times a day (like Refresh Optive, Systane Balance, or TheraTears. Avoid \"get the red out\" drops and generic artifical tears).     Call in April 2024 for an appointment in August 2024 for Complete Exam     Dr. Murguia [phone]        Again, thank you for allowing me to participate in the care of your patient.        Sincerely,        Lion Murguia MD  "

## 2023-11-27 NOTE — PATIENT INSTRUCTIONS
"Continue observation with regard to glaucoma suspect status.    May use artificial tears up to four times a day (like Refresh Optive, Systane Balance, or TheraTears. Avoid \"get the red out\" drops and generic artifical tears).     Call in April 2024 for an appointment in August 2024 for Complete Exam     Dr. Murguia [phone]   "

## 2023-11-29 ENCOUNTER — ANCILLARY PROCEDURE (OUTPATIENT)
Dept: GENERAL RADIOLOGY | Facility: CLINIC | Age: 68
End: 2023-11-29
Attending: STUDENT IN AN ORGANIZED HEALTH CARE EDUCATION/TRAINING PROGRAM
Payer: COMMERCIAL

## 2023-11-29 DIAGNOSIS — C90.01 MULTIPLE MYELOMA IN REMISSION (H): Primary | ICD-10-CM

## 2023-11-29 DIAGNOSIS — C90.01 MULTIPLE MYELOMA IN REMISSION (H): ICD-10-CM

## 2023-11-29 PROCEDURE — 77075 RADEX OSSEOUS SURVEY COMPL: CPT | Mod: TC | Performed by: RADIOLOGY

## 2023-11-29 RX ORDER — LENALIDOMIDE 10 MG/1
10 CAPSULE ORAL DAILY
Qty: 28 CAPSULE | Refills: 0 | Status: SHIPPED | OUTPATIENT
Start: 2023-11-29 | End: 2024-01-02

## 2023-12-01 ENCOUNTER — THERAPY VISIT (OUTPATIENT)
Dept: PHYSICAL THERAPY | Facility: CLINIC | Age: 68
End: 2023-12-01
Payer: COMMERCIAL

## 2023-12-01 DIAGNOSIS — G89.29 BILATERAL CHRONIC KNEE PAIN: Primary | ICD-10-CM

## 2023-12-01 DIAGNOSIS — M25.562 BILATERAL CHRONIC KNEE PAIN: Primary | ICD-10-CM

## 2023-12-01 DIAGNOSIS — M25.561 BILATERAL CHRONIC KNEE PAIN: Primary | ICD-10-CM

## 2023-12-01 PROCEDURE — 97110 THERAPEUTIC EXERCISES: CPT | Mod: GP | Performed by: PHYSICAL THERAPIST

## 2023-12-01 PROCEDURE — 97140 MANUAL THERAPY 1/> REGIONS: CPT | Mod: GP | Performed by: PHYSICAL THERAPIST

## 2023-12-04 ENCOUNTER — OFFICE VISIT (OUTPATIENT)
Dept: PSYCHOLOGY | Facility: CLINIC | Age: 68
End: 2023-12-04
Payer: COMMERCIAL

## 2023-12-04 ENCOUNTER — TELEPHONE (OUTPATIENT)
Dept: ONCOLOGY | Facility: CLINIC | Age: 68
End: 2023-12-04
Payer: MEDICARE

## 2023-12-04 DIAGNOSIS — F32.1 CURRENT MODERATE EPISODE OF MAJOR DEPRESSIVE DISORDER, UNSPECIFIED WHETHER RECURRENT (H): Primary | ICD-10-CM

## 2023-12-04 PROCEDURE — 90834 PSYTX W PT 45 MINUTES: CPT | Performed by: STUDENT IN AN ORGANIZED HEALTH CARE EDUCATION/TRAINING PROGRAM

## 2023-12-04 NOTE — PROGRESS NOTES
[facility]                                     Progress Note    Patient Name: Winter Loya  Date: 12/04/2023      Service Type: Individual      Session Start Time: 10.00  Session End Time: 10.45     Session Length:38-52 mns    Session #: 32    Attendees: Client attended alone    Service Modality:  In-person  Location: [facility] [place].    DATA  Interactive Complexity: No  Crisis: No      Progress Since Last Session (Related to Symptoms / Goals / Homework):   Symptoms:  Pt reported no change from previous session    Homework: Achieved / completed to satisfaction       Episode of Care Goals: Minimal progress - ACTION (Actively working towards change); Intervened by reinforcing change plan / affirming steps taken     Current / Ongoing Stressors and Concerns:   Ongoing stressors: Financial stress, stressful filial relationship/ Difficulty with assertiveness    Current: Today, patient reported relationship with son continue be a major stressor and that she is more worried about her social security and health insurance benefits as she got a $500 cut from her last two pay checks. Patient noted she is currently stressed financial and have been thinking about going back to work but worried that she will not be able to work for long due to her current age and health problems. Patient reported she wants to maintain current part time job but is considering not doing it again for fear it may affect her health insurance coverage.                    Treatment Objective(s) Addressed in This Session:    Patient will address secondary losses associated with son's  death and process in session   Patient will assertively  communicate  concerns, fears, and preferences to son daily.     Intervention:     MI Intervention: Expressed Empathy/Understanding, Supported Autonomy, Collaboration, Evocation, Permission to raise concern or advise, Open-ended questions, and Reflections:  simple and complex      ASSESSMENT: Current Emotional / Mental Status (status of significant symptoms):   Risk status (Self / Other harm or suicidal ideation)   Patient denies current fears or concerns for personal safety.   Patient denies current or recent suicidal ideation or behaviors.   Patient denies current or recent homicidal ideation or behaviors.   Patient denies current or recent self injurious behavior or ideation.   Patient denies other safety concerns.   Patient reports there has been no change in risk factors since their last session.     Patient reports there has been no change in protective factors since their last session.     Recommended that patient call 911 or go to the local ED should there be a change in any of these risk factors.     Appearance:   Appropriate    Eye Contact:   Good    Psychomotor Behavior: Normal    Attitude:   Cooperative  Interested Friendly   Orientation:   Person Place Time Situation   Speech    Rate / Production: Normal/ Responsive    Volume:  Normal    Mood:    Anxious  Depressed    Affect:    Worrisome    Thought Content:  Clear    Thought Form:  Coherent    Insight:    Good      Medication Review:   No changes to current psychiatric medication(s)     Medication Compliance:   Yes     Changes in Health Issues:   None reported     Chemical Use Review:   Substance Use: Chemical use reviewed, no active concerns identified      Tobacco Use: No current tobacco use.      Diagnosis:    296.32 (F33.1) Major Depressive Disorder, Recurrent Episode, Moderate _ and With anxious distress    Collateral Reports Completed:   Not Applicable    PLAN: (Patient Tasks / Therapist Tasks / Other)    Take daily walks, increase outdoor physical activities.  Continue talking to son about  feelings about  personal safety and insecurity leaving with him. Utilize  DBT skill DEAR MAN learned last session.     CHERISE Castellon            ----- Service Performed and Documented by CHERISE------  This note  "was reviewed and clinical supervision by AMY Ayala [facility]    12/6/2023     _____________________________________________________________    Individual Treatment Plan    Patient's Name: Winter Loya  YOB: 1955    Date of Creation: 04/18/2022  Date Treatment Plan Last Reviewed/Revised: 11/27/2023    DSM5 Diagnoses: 296.32 (F33.1) Major Depressive Disorder, Recurrent Episode, Moderate _ and With anxious distress  Psychosocial / Contextual Factors:   PROMIS (reviewed every 90 days):     Referral / Collaboration:  Referral to another professional/service is not indicated at this time..    Anticipated number of session for this episode of care: 15-25  Anticipation frequency of session: Every other week  Anticipated Duration of each session: 38-52 minutes  Treatment plan will be reviewed in 90 days or when goals have been changed.       MeasurableTreatment Goal(s) related to diagnosis / functional impairment(s)    Goal 1: Patient will identify and address specific triggers to feelings of depression and improve coping with depression by  90 % in the next three months.    I will know I've met my goal when I can comfortably manage the daily stressors I have and be less depressed\"    Objective #A (Patient Action)     Patient will  openly communicate  concerns, fears, and preferences to son daily   Status: Continued - Date(s):  02/28/2024  Intervention(s)  Therapist will provide psychoeducation, behavioral activation, and cognitive restructuring.    Objective #A (Patient Action)    Patient will take 15-20 minutes daily walks and spend 1 hour daily completing household chores.  Status: Continued - Date(s):02/28/2024  Intervention(s)  Therapist will provide psychoeducation, behavioral activation, and cognitive restructuring.    Objective #B  Patient will identify specific areas of cognitive distortion and challenge irrational thoughts with reality.   Status: Continued - Date(s):     " 02/28/2024  Intervention(s)   Therapist will provide psychoeducation, behavioral activation, and cognitive restructuring.      Objective #C  Patient will learn and practice relaxation techniques to manage anxiety.  Status: Continued - Date(s):    02/28/2024  Intervention(s)   Therapist will provide psychoeducation, behavioral activation, and cognitive restructuring.    Patient has reviewed and agreed to the above plan.      CHERISE Castellon  April 18, 2022  Answers for HPI/ROS submitted by the patient on 6/26/2023  If you checked off any problems, how difficult have these problems made it for you to do your work, take care of things at home, or get along with other people?: Somewhat difficult  PHQ9 TOTAL SCORE: 20

## 2023-12-04 NOTE — TELEPHONE ENCOUNTER
Oral Chemotherapy Monitoring Program    Medication: Revlimid  Rx:  10 mg PO daily for a 28 day cycle    Auth #: [id number]  Risk Category: Adult female NOT of reproductive capacity    Routine survey questions reviewed.    Thank you,    Mary Franklin  Oncology Pharmacy Liaison LUCINDA [email or web address]  Phone: [phone]  Fax: [phone]

## 2023-12-10 ASSESSMENT — PACHYMETRY
OS_CT(UM): 570
OD_CT(UM): 567
EXAM_DATE: 6/16/2021

## 2023-12-12 ENCOUNTER — THERAPY VISIT (OUTPATIENT)
Dept: PHYSICAL THERAPY | Facility: CLINIC | Age: 68
End: 2023-12-12
Payer: COMMERCIAL

## 2023-12-12 DIAGNOSIS — M25.561 BILATERAL CHRONIC KNEE PAIN: Primary | ICD-10-CM

## 2023-12-12 DIAGNOSIS — G89.29 BILATERAL CHRONIC KNEE PAIN: Primary | ICD-10-CM

## 2023-12-12 DIAGNOSIS — M25.562 BILATERAL CHRONIC KNEE PAIN: Primary | ICD-10-CM

## 2023-12-12 PROCEDURE — 97110 THERAPEUTIC EXERCISES: CPT | Mod: GP | Performed by: PHYSICAL THERAPIST

## 2023-12-12 PROCEDURE — 97140 MANUAL THERAPY 1/> REGIONS: CPT | Mod: GP | Performed by: PHYSICAL THERAPIST

## 2023-12-18 DIAGNOSIS — E11.42 TYPE 2 DIABETES MELLITUS WITH DIABETIC POLYNEUROPATHY, WITH LONG-TERM CURRENT USE OF INSULIN (H): ICD-10-CM

## 2023-12-18 DIAGNOSIS — Z79.4 TYPE 2 DIABETES MELLITUS WITH DIABETIC POLYNEUROPATHY, WITH LONG-TERM CURRENT USE OF INSULIN (H): ICD-10-CM

## 2023-12-19 NOTE — PROGRESS NOTES
"ONCOLOGY/HEMATOLOGY PROGRESS NOTE  Dec 20, 2023    Reason for Visit: IgA Kappa Multiple Myeloma    Oncology HPI:   Winter Loya is a 67 year old woman with IgA Kappa Multiple Myeloma. In 2018 she had anemia and was fatigued. She was found to have IgA kappa monocloncal antibody with M-spike of 0.17. IgA elevated. Skeletal survey was unremarkable and UPEP had minimal protein (156 mg/m2). PET 11/2018 showed bone marrow consistent with hypermetabolic plasma cell myeloma. M spike was 0.17. She started RVD and Zometa. On 4/2019 she had an autologous transplant.      Her bone marrow bx from September 2019 was sent here for review. It showed marrow cellularity of 60%, with decreased trilineage hematopoiesis and 15% plasma cells as well as peripheral blood with slight anemia. 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 She is on revlimid maintenance and zometa from her prior oncologist in [place]. On 12/6/19 her K/L ratio is 1.1, M spike is 0.04.     Currently on Revlimid maintenance.    Interval history:   - Started penicillin yesterday for concerns about a dental infection, has follow-up with an oral surgeon arranged to get x-rays  - Continues to have diarrhea, stopped taking Imodium because some family members told her it was bad to take it every day  - Had some right rib pain after a traumatic fall about 2 months ago; pain lingered for quite some time but is now starting to resolve.  She asked about it when she had her bone survey done and states they told he verbally that they \"did not see anything\" on the ribs  - Denies any other new pains    Comprehensive ROS neg other than the symptoms noted above in the HPI.      Past Medical History:   Diagnosis Date    Abnormal EMG 2021 5/25/2021    Interpretation: This is an abnormal study, demonstrating electrophysiologic evidence of a " length-dependent axonal sensorimotor polyneuropathy. Asymmetry of peroneal compound muscle action potential amplitudes is a finding of uncertain significance.       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   I have personally reviewed the examination and initial interpretation and I agree with the findings.   ANNE GOODMAN MD    History of MRI of cervical spine 6/2020 3/15/2021    Impression: Multilevel cervical spondylosis, most pronounced at the level of C4-5 and C5-6 with moderate to severe spinal canal stenosis and moderate spinal canal stenosis at C6-7. No abnormal cord signal. No significant neural foraminal narrowing at any level.   I have personally reviewed the examination and initial interpretation and I agree with the findings.   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    Sleep apnea     no c-pap use    Status post complete thyroidectomy 8/26/2020    Thyroid goiter 8/5/2020    Tremor 12/9/2020        Current Outpatient Medications   Medication Sig Dispense Refill    loperamide (IMODIUM A-D) 2 MG tablet Take 1 tablet (2 mg) by mouth daily as needed for diarrhea Start Imodium 2 pills with first loose stool and then 1 pill with each loose stool after, aiming for 1-2 stools per day. Max of 8 pills a day. 180 tablet 3    penicillin V (VEETID) 500 MG tablet Take 500 mg by mouth 4 times daily      acyclovir (ZOVIRAX) 400 MG tablet TAKE ONE TABLET BY MOUTH EVERY TWELVE HOURS 180 tablet 3    alcohol swab prep  pads Use to swab area of injection/sowmya as directed. 100 each 3    aspirin 81 MG EC tablet Take 81 mg by mouth daily      atorvastatin (LIPITOR) 20 MG tablet Take 1 tablet (20 mg) by mouth At Bedtime 90 tablet 3    blood glucose (NO BRAND SPECIFIED) test strip Use to test blood sugar 3 times daily or as directed. To accompany: Blood Glucose Monitor Brands: per insurance. 100 strip 6    blood glucose calibration (NO BRAND SPECIFIED) solution To accompany: Blood Glucose Monitor Brands: per insurance. 4 each 0    blood glucose monitoring (NO BRAND SPECIFIED) meter device kit Use to test blood sugar 3 times daily or as directed. Preferred blood glucose meter OR supplies to accompany: Blood Glucose Monitor Brands: per insurance. 1 kit 0    Continuous Blood Gluc  (FREESTYLE SEGUNDO 2 READER) MARCIA Use to read blood sugars as per 's instructions. 1 each 0    Continuous Blood Gluc Sensor (FREESTYLE SEGUNDO 2 SENSOR) MISC 1 each every 14 days Use 1 sensor every 14 days. Use to read blood sugars per 's instructions. 2 each 5    empagliflozin (JARDIANCE) 25 MG TABS tablet Take 1 tablet (25 mg) by mouth daily 90 tablet 3    insulin glargine (LANTUS SOLOSTAR) 100 UNIT/ML pen Inject 24 Units Subcutaneous every morning Place on file 30 mL 1    insulin pen needle (FIFTY50 PEN NEEDLES) 32G X 4 MM miscellaneous Use one pen needle daily or as directed. 100 each 2    LENalidomide (REVLIMID) 10 MG CAPS capsule Take 1 capsule (10 mg) by mouth daily for 28 days 28 capsule 0    levothyroxine (SYNTHROID/LEVOTHROID) 200 MCG tablet Take 1 tablet (200 mcg) by mouth every morning (before breakfast) 90 tablet 3    loperamide (IMODIUM) 2 MG capsule Take 4 mg (two capsules) by mouth at onset of loose stools, followed by 2 mg (one capsule) after each loose stool. Maximum: 16 mg (8 capsules) daily 270 capsule 11    losartan (COZAAR) 25 MG tablet Take 1 tablet (25 mg) by mouth daily 90 tablet 3    pregabalin (LYRICA) 100  MG capsule Take 1 capsule (100 mg) by mouth 2 times daily 180 capsule 3    propranolol ER (INDERAL LA) 60 MG 24 hr capsule Take 1 capsule (60 mg) by mouth daily 90 capsule 3    Semaglutide, 2 MG/DOSE, (OZEMPIC, 2 MG/DOSE,) 8 MG/3ML pen Inject 2 mg Subcutaneous every 7 days 9 mL 3    sertraline (ZOLOFT) 100 MG tablet Take 1 tablet (100 mg) by mouth daily 90 tablet 3    thin (NO BRAND SPECIFIED) lancets Use with lanceting device. To accompany: Blood Glucose Monitor Brands: per insurance. (Patient not taking: Reported on 6/26/2023) 200 each 6    triamcinolone (KENALOG) 0.1 % external cream Apply topically 2 times daily 60 g 3        No Known Allergies    /68   Pulse 68   Temp 98.2  F (36.8  C) (Oral)   Resp 16   Wt 114.3 kg (251 lb 14.4 oz)   SpO2 95%   BMI 37.18 kg/m    Constitutional: NAD  Eyes: no scleral icterus  ENT: no oral lesions  Lymph: no cervical, supraclavicular, axillary LAD  Pulm: CTAB  CV: RRR, no m/r/g  GI: bowel sounds present, soft and nontender to palpation  MSK: No edema in the extremities  Neuro: alert, conversant, normal gait  Psych: appropriate mood and affect      Labs:     Blood Counts       Recent Labs   Lab Test 12/20/23  1026 11/20/23  1127 10/16/23  1020 09/21/23  0954 07/24/23  1009   HGB 14.5 14.7 14.7   < > 14.5   HCT 42.3 44.1 44.3   < > 43.3   WBC 3.0* 3.1* 3.7*   < > 3.3*   ANEUTAUTO 1.8 1.7 2.2   < > 2.0   ALYMPAUTO 0.6* 0.8 0.8   < > 0.6*   AMONOAUTO 0.3 0.3 0.4   < > 0.4   AEOSAUTO 0.3 0.2 0.3   < > 0.2   ABSBASO 0.1 0.1 0.1   < > 0.1   NRBCMAN 0.0  --  0.0  --  0.0   * 146* 132*   < > 128*    < > = values in this interval not displayed.       ABO/RH    No lab results found.      Chemistries     Basic Panel  Recent Labs   Lab Test 12/20/23  1026 11/20/23  1127 10/16/23  1020    139 140   POTASSIUM 4.3 4.2 4.2   CHLORIDE 104 101 104   CO2 28 25 26   BUN 12.8 17.3 14.4   CR 0.78 0.70 0.74   * 150* 139*        Calcium, Magnesium, Phosphorus  Recent Labs    Lab Test 12/20/23  1026 11/20/23  1127 10/16/23  1020   ASHLEY 9.9 10.4* 9.2        LFTs  Recent Labs   Lab Test 12/20/23  1026 11/20/23  1127 10/16/23  1020   BILITOTAL 1.9* 1.4* 1.6*   ALKPHOS 123 178* 112*   AST 17 19 27   ALT 29 28 31   ALBUMIN 4.4 4.6 4.6       LDH  No lab results found.    B2-Microglobulin  Recent Labs   Lab Test 02/18/20  0849   [id number] 1.6           Immunoglobulins     Recent Labs   Lab Test 11/20/23  1127 07/24/23  1009 06/19/23  1049 03/20/23  1411 11/28/22  1558 10/26/22  1443    583* 592* 661 732 652       Recent Labs   Lab Test 11/20/23  1127 07/24/23  1009 06/19/23  1049 03/20/23  1411 11/28/22  1558 10/26/22  1443    131 135 157 149 131       Recent Labs   Lab Test 11/20/23  1127 10/16/23  1020 07/24/23  1009 06/19/23  1049 03/20/23  1411 11/28/22  1558   IGM 48 48 47 38 48 58         Monocloncal Protein Studies     M spike    Recent Labs   Lab Test 11/20/23  1127 10/16/23  1020 09/21/23  0954 07/24/23  1009 06/19/23  1103 05/22/23  1018   ELPM 0.1* 0.0 0.0 0.0 0.1* 0.0       [place] FLC    Recent Labs   Lab Test 11/20/23  1127 07/24/23  1009 06/19/23  1049 03/20/23  1411 11/28/22  1558 10/26/22  1443   KFLCA 2.58* 2.58* 2.63* 3.41* 2.63* 4.97*       Lambda FLC    Recent Labs   Lab Test 11/20/23  1127 07/24/23  1009 06/19/23  1049 03/20/23  1411 11/28/22  1558 10/26/22  1443   LFLCA 1.66 1.84 1.66 1.70 1.45 1.57       FLC Ratio    Recent Labs   Lab Test 11/20/23  1127 07/24/23  1009 06/19/23  1049 03/20/23  1411 11/28/22  1558 10/26/22  1443   KLRA 1.55 1.40 1.58 2.01* 1.81* 3.17*       Assessment/plan:   Winter Loya is a 67 year old woman with standard risk IgG kappa multiple myeloma, s/p post auto-transplant in April 2019,  currently on lenalidomide maintenance. She had an M spike of 0.1 in Nov which was new, today's myeloma labs are pending. Previously briefly discussed that if she relapses in the near future that she would likely be a candidate for Monumental-3.  Bone  survey 11/29/23 shows no definite lytic bone lesions.    Persistently elevated bilirubin led to hepatology consult in October.  Determination was that this is most likely fatty liver disease.  Recommendation was to continue to follow with PCP for management of obesity, DM2, and HTN.     Recommended she take Imodium to help manage her diarrhea.  Okay to take consistently.  Recommend 2 tabs in the morning, and then 1 additional tab with each loose stool during the day, up to maximum of 8 tabs daily.    Follow-up with me in 1 month, virtual visit okay.  Labs in [place] the day prior.    32 minutes spent on the date of the encounter doing chart review, review of test results, patient visit, and documentation     FALGUNI Fink [facility]  [address] [place] [address] [phone]

## 2023-12-20 ENCOUNTER — ONCOLOGY VISIT (OUTPATIENT)
Dept: ONCOLOGY | Facility: CLINIC | Age: 68
End: 2023-12-20
Attending: REGISTERED NURSE
Payer: COMMERCIAL

## 2023-12-20 ENCOUNTER — APPOINTMENT (OUTPATIENT)
Dept: LAB | Facility: CLINIC | Age: 68
End: 2023-12-20
Attending: REGISTERED NURSE
Payer: COMMERCIAL

## 2023-12-20 VITALS
BODY MASS INDEX: 37.18 KG/M2 | WEIGHT: 251.9 LBS | TEMPERATURE: 98.2 F | DIASTOLIC BLOOD PRESSURE: 68 MMHG | SYSTOLIC BLOOD PRESSURE: 135 MMHG | OXYGEN SATURATION: 95 % | RESPIRATION RATE: 16 BRPM | HEART RATE: 68 BPM

## 2023-12-20 DIAGNOSIS — R19.7 DIARRHEA, UNSPECIFIED TYPE: ICD-10-CM

## 2023-12-20 DIAGNOSIS — C90.01 MULTIPLE MYELOMA IN REMISSION (H): Primary | ICD-10-CM

## 2023-12-20 LAB
ALBUMIN SERPL BCG-MCNC: 4.4 G/DL (ref 3.5–5.2)
ALP SERPL-CCNC: 123 U/L (ref 40–150)
ALT SERPL W P-5'-P-CCNC: 29 U/L (ref 0–50)
ANION GAP SERPL CALCULATED.3IONS-SCNC: 9 MMOL/L (ref 7–15)
AST SERPL W P-5'-P-CCNC: 17 U/L (ref 0–45)
BASOPHILS # BLD AUTO: 0.1 10E3/UL (ref 0–0.2)
BASOPHILS NFR BLD AUTO: 2 %
BILIRUB SERPL-MCNC: 1.9 MG/DL
BUN SERPL-MCNC: 12.8 MG/DL (ref 8–23)
CALCIUM SERPL-MCNC: 9.9 MG/DL (ref 8.8–10.2)
CHLORIDE SERPL-SCNC: 104 MMOL/L (ref 98–107)
CREAT SERPL-MCNC: 0.78 MG/DL (ref 0.51–0.95)
DEPRECATED HCO3 PLAS-SCNC: 28 MMOL/L (ref 22–29)
EGFRCR SERPLBLD CKD-EPI 2021: 84 ML/MIN/1.73M2
EOSINOPHIL # BLD AUTO: 0.3 10E3/UL (ref 0–0.7)
EOSINOPHIL NFR BLD AUTO: 9 %
ERYTHROCYTE [DISTWIDTH] IN BLOOD BY AUTOMATED COUNT: 14.1 % (ref 10–15)
GLUCOSE SERPL-MCNC: 142 MG/DL (ref 70–99)
HCT VFR BLD AUTO: 42.3 % (ref 35–47)
HGB BLD-MCNC: 14.5 G/DL (ref 11.7–15.7)
IMM GRANULOCYTES # BLD: 0 10E3/UL
IMM GRANULOCYTES NFR BLD: 0 %
LYMPHOCYTES # BLD AUTO: 0.6 10E3/UL (ref 0.8–5.3)
LYMPHOCYTES NFR BLD AUTO: 19 %
MCH RBC QN AUTO: 29.5 PG (ref 26.5–33)
MCHC RBC AUTO-ENTMCNC: 34.3 G/DL (ref 31.5–36.5)
MCV RBC AUTO: 86 FL (ref 78–100)
MONOCYTES # BLD AUTO: 0.3 10E3/UL (ref 0–1.3)
MONOCYTES NFR BLD AUTO: 11 %
NEUTROPHILS # BLD AUTO: 1.8 10E3/UL (ref 1.6–8.3)
NEUTROPHILS NFR BLD AUTO: 59 %
NRBC # BLD AUTO: 0 10E3/UL
NRBC BLD AUTO-RTO: 0 /100
PLATELET # BLD AUTO: 112 10E3/UL (ref 150–450)
POTASSIUM SERPL-SCNC: 4.3 MMOL/L (ref 3.4–5.3)
PROT SERPL-MCNC: 6.7 G/DL (ref 6.4–8.3)
RBC # BLD AUTO: 4.92 10E6/UL (ref 3.8–5.2)
SODIUM SERPL-SCNC: 141 MMOL/L (ref 135–145)
TOTAL PROTEIN SERUM FOR ELP: 6.3 G/DL (ref 6.4–8.3)
WBC # BLD AUTO: 3 10E3/UL (ref 4–11)

## 2023-12-20 PROCEDURE — 83521 IG LIGHT CHAINS FREE EACH: CPT | Performed by: REGISTERED NURSE

## 2023-12-20 PROCEDURE — 84165 PROTEIN E-PHORESIS SERUM: CPT | Mod: 26 | Performed by: PATHOLOGY

## 2023-12-20 PROCEDURE — 82310 ASSAY OF CALCIUM: CPT | Performed by: REGISTERED NURSE

## 2023-12-20 PROCEDURE — 84165 PROTEIN E-PHORESIS SERUM: CPT | Mod: TC | Performed by: PATHOLOGY

## 2023-12-20 PROCEDURE — 84155 ASSAY OF PROTEIN SERUM: CPT | Mod: 91 | Performed by: REGISTERED NURSE

## 2023-12-20 PROCEDURE — 82784 ASSAY IGA/IGD/IGG/IGM EACH: CPT | Performed by: REGISTERED NURSE

## 2023-12-20 PROCEDURE — 86334 IMMUNOFIX E-PHORESIS SERUM: CPT | Mod: 26 | Performed by: PATHOLOGY

## 2023-12-20 PROCEDURE — 36415 COLL VENOUS BLD VENIPUNCTURE: CPT | Performed by: REGISTERED NURSE

## 2023-12-20 PROCEDURE — 99214 OFFICE O/P EST MOD 30 MIN: CPT | Performed by: REGISTERED NURSE

## 2023-12-20 PROCEDURE — 86334 IMMUNOFIX E-PHORESIS SERUM: CPT | Performed by: PATHOLOGY

## 2023-12-20 PROCEDURE — 82374 ASSAY BLOOD CARBON DIOXIDE: CPT | Performed by: REGISTERED NURSE

## 2023-12-20 PROCEDURE — 85025 COMPLETE CBC W/AUTO DIFF WBC: CPT | Performed by: REGISTERED NURSE

## 2023-12-20 PROCEDURE — G0463 HOSPITAL OUTPT CLINIC VISIT: HCPCS | Performed by: REGISTERED NURSE

## 2023-12-20 RX ORDER — LOPERAMIDE HYDROCHLORIDE 2 MG/1
2 TABLET ORAL DAILY PRN
Qty: 180 TABLET | Refills: 3 | Status: SHIPPED | OUTPATIENT
Start: 2023-12-20

## 2023-12-20 RX ORDER — PENICILLIN V POTASSIUM 500 MG/1
500 TABLET, FILM COATED ORAL 4 TIMES DAILY
COMMUNITY
Start: 2023-12-19 | End: 2024-02-21

## 2023-12-20 ASSESSMENT — PAIN SCALES - GENERAL: PAINLEVEL: NO PAIN (0)

## 2023-12-20 NOTE — TELEPHONE ENCOUNTER
Refill denied at this time. Patient needs appointment with Dr. Nick. Claudia message sent to patient to schedule appointment.

## 2023-12-20 NOTE — NURSING NOTE
"Oncology Rooming Note    December 20, 2023 10:36 AM   Winter Loya is a 67 year old female who presents for:    Chief Complaint   Patient presents with    Blood Draw     Labs drawn via  by RN in lab.  VS taken     Initial Vitals: /68   Pulse 68   Temp 98.2  F (36.8  C) (Oral)   Resp 16   Wt 114.3 kg (251 lb 14.4 oz)   SpO2 95%   BMI 37.18 kg/m   Estimated body mass index is 37.18 kg/m  as calculated from the following:    Height as of 11/2/23: 1.753 m (5' 9.02\").    Weight as of this encounter: 114.3 kg (251 lb 14.4 oz). Body surface area is 2.36 meters squared.  No Pain (0) Comment: Data Unavailable   No LMP recorded. Patient is postmenopausal.  Allergies reviewed: Yes  Medications reviewed: Yes    Medications: MEDICATION REFILLS NEEDED TODAY. Provider was notified.  Pharmacy name entered into [facility]:    [id number] [address] [facility] - [place], [place] - [address]    Frailty Screening:   Is the patient here for a new oncology consult visit in cancer care? 2. No      Clinical concerns: None       Jesenia Lawrence CMA              "

## 2023-12-20 NOTE — LETTER
"    12/20/2023         RE: Winter Loya  [address]  [place] [address]        Dear Colleague,    Thank you for referring your patient, Winter Loya, to the [facility]. Please see a copy of my visit note below.    ONCOLOGY/HEMATOLOGY PROGRESS NOTE  Dec 20, 2023    Reason for Visit: IgA Kappa Multiple Myeloma    Oncology HPI:   Winter Loya is a 67 year old woman with IgA Kappa Multiple Myeloma. In 2018 she had anemia and was fatigued. She was found to have IgA kappa monocloncal antibody with M-spike of 0.17. IgA elevated. Skeletal survey was unremarkable and UPEP had minimal protein (156 mg/m2). PET 11/2018 showed bone marrow consistent with hypermetabolic plasma cell myeloma. M spike was 0.17. She started RVD and Zometa. On 4/2019 she had an autologous transplant.      Her bone marrow bx from September 2019 was sent here for review. It showed marrow cellularity of 60%, with decreased trilineage hematopoiesis and 15% plasma cells as well as peripheral blood with slight anemia. 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 She is on revlimid maintenance and zometa from her prior oncologist in [place]. On 12/6/19 her K/L ratio is 1.1, M spike is 0.04.     Currently on Revlimid maintenance.    Interval history:   - Started penicillin yesterday for concerns about a dental infection, has follow-up with an oral surgeon arranged to get x-rays  - Continues to have diarrhea, stopped taking Imodium because some family members told her it was bad to take it every day  - Had some right rib pain after a traumatic fall about 2 months ago; pain lingered for quite some time but is now starting to resolve.  She asked about it when she had her bone survey done and states they told he verbally that they \"did not see anything\" on the ribs  - Denies any other new " pains    Comprehensive ROS neg other than the symptoms noted above in the HPI.      Past Medical History:   Diagnosis Date    Abnormal EMG 2021 5/25/2021    Interpretation: This is an abnormal study, demonstrating electrophysiologic evidence of a length-dependent axonal sensorimotor polyneuropathy. Asymmetry of peroneal compound muscle action potential amplitudes is a finding of uncertain significance.       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   I have personally reviewed the examination and initial interpretation and I agree with the findings.   ANNE GOODMAN MD    History of MRI of cervical spine 6/2020 3/15/2021    Impression: Multilevel cervical spondylosis, most pronounced at the level of C4-5 and C5-6 with moderate to severe spinal canal stenosis and moderate spinal canal stenosis at C6-7. No abnormal cord signal. No significant neural foraminal narrowing at any level.   I have personally reviewed the examination and initial interpretation and I agree with the findings.   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    Sleep apnea     no c-pap use    Status post complete thyroidectomy 8/26/2020    Thyroid goiter 8/5/2020    Tremor 12/9/2020        Current Outpatient Medications   Medication Sig Dispense Refill    loperamide (IMODIUM A-D) 2 MG tablet Take 1 tablet (2 mg) by mouth daily as needed for diarrhea Start Imodium 2 pills with first loose stool and then 1 pill with each loose stool after, aiming for  1-2 stools per day. Max of 8 pills a day. 180 tablet 3    penicillin V (VEETID) 500 MG tablet Take 500 mg by mouth 4 times daily      acyclovir (ZOVIRAX) 400 MG tablet TAKE ONE TABLET BY MOUTH EVERY TWELVE HOURS 180 tablet 3    alcohol swab prep pads Use to swab area of injection/sowmya as directed. 100 each 3    aspirin 81 MG EC tablet Take 81 mg by mouth daily      atorvastatin (LIPITOR) 20 MG tablet Take 1 tablet (20 mg) by mouth At Bedtime 90 tablet 3    blood glucose (NO BRAND SPECIFIED) test strip Use to test blood sugar 3 times daily or as directed. To accompany: Blood Glucose Monitor Brands: per insurance. 100 strip 6    blood glucose calibration (NO BRAND SPECIFIED) solution To accompany: Blood Glucose Monitor Brands: per insurance. 4 each 0    blood glucose monitoring (NO BRAND SPECIFIED) meter device kit Use to test blood sugar 3 times daily or as directed. Preferred blood glucose meter OR supplies to accompany: Blood Glucose Monitor Brands: per insurance. 1 kit 0    Continuous Blood Gluc  (FREESTYLE SEGUNDO 2 READER) MARCIA Use to read blood sugars as per 's instructions. 1 each 0    Continuous Blood Gluc Sensor (FREESTYLE SEGUNDO 2 SENSOR) [facility] 1 each every 14 days Use 1 sensor every 14 days. Use to read blood sugars per 's instructions. 2 each 5    empagliflozin (JARDIANCE) 25 MG TABS tablet Take 1 tablet (25 mg) by mouth daily 90 tablet 3    insulin glargine (LANTUS SOLOSTAR) 100 UNIT/ML pen Inject 24 Units Subcutaneous every morning Place on file 30 mL 1    insulin pen needle (FIFTY50 PEN NEEDLES) 32G X 4 MM miscellaneous Use one pen needle daily or as directed. 100 each 2    LENalidomide (REVLIMID) 10 MG CAPS capsule Take 1 capsule (10 mg) by mouth daily for 28 days 28 capsule 0    levothyroxine (SYNTHROID/LEVOTHROID) 200 MCG tablet Take 1 tablet (200 mcg) by mouth every morning (before breakfast) 90 tablet 3    loperamide (IMODIUM) 2 MG capsule Take 4 mg (two capsules) by  mouth at onset of loose stools, followed by 2 mg (one capsule) after each loose stool. Maximum: 16 mg (8 capsules) daily 270 capsule 11    losartan (COZAAR) 25 MG tablet Take 1 tablet (25 mg) by mouth daily 90 tablet 3    pregabalin (LYRICA) 100 MG capsule Take 1 capsule (100 mg) by mouth 2 times daily 180 capsule 3    propranolol ER (INDERAL LA) 60 MG 24 hr capsule Take 1 capsule (60 mg) by mouth daily 90 capsule 3    Semaglutide, 2 MG/DOSE, (OZEMPIC, 2 MG/DOSE,) 8 MG/3ML pen Inject 2 mg Subcutaneous every 7 days 9 mL 3    sertraline (ZOLOFT) 100 MG tablet Take 1 tablet (100 mg) by mouth daily 90 tablet 3    thin (NO BRAND SPECIFIED) lancets Use with lanceting device. To accompany: Blood Glucose Monitor Brands: per insurance. (Patient not taking: Reported on 6/26/2023) 200 each 6    triamcinolone (KENALOG) 0.1 % external cream Apply topically 2 times daily 60 g 3        No Known Allergies    /68   Pulse 68   Temp 98.2  F (36.8  C) (Oral)   Resp 16   Wt 114.3 kg (251 lb 14.4 oz)   SpO2 95%   BMI 37.18 kg/m    Constitutional: NAD  Eyes: no scleral icterus  ENT: no oral lesions  Lymph: no cervical, supraclavicular, axillary LAD  Pulm: CTAB  CV: RRR, no m/r/g  GI: bowel sounds present, soft and nontender to palpation  MSK: No edema in the extremities  Neuro: alert, conversant, normal gait  Psych: appropriate mood and affect      Labs:     Blood Counts       Recent Labs   Lab Test 12/20/23  1026 11/20/23  1127 10/16/23  1020 09/21/23  0954 07/24/23  1009   HGB 14.5 14.7 14.7   < > 14.5   HCT 42.3 44.1 44.3   < > 43.3   WBC 3.0* 3.1* 3.7*   < > 3.3*   ANEUTAUTO 1.8 1.7 2.2   < > 2.0   ALYMPAUTO 0.6* 0.8 0.8   < > 0.6*   AMONOAUTO 0.3 0.3 0.4   < > 0.4   AEOSAUTO 0.3 0.2 0.3   < > 0.2   ABSBASO 0.1 0.1 0.1   < > 0.1   NRBCMAN 0.0  --  0.0  --  0.0   * 146* 132*   < > 128*    < > = values in this interval not displayed.       ABO/RH    No lab results found.      Chemistries     Basic Panel  Recent Labs    Lab Test 12/20/23  1026 11/20/23  1127 10/16/23  1020    139 140   POTASSIUM 4.3 4.2 4.2   CHLORIDE 104 101 104   CO2 28 25 26   BUN 12.8 17.3 14.4   CR 0.78 0.70 0.74   * 150* 139*        Calcium, Magnesium, Phosphorus  Recent Labs   Lab Test 12/20/23  1026 11/20/23  1127 10/16/23  1020   ASHLEY 9.9 10.4* 9.2        LFTs  Recent Labs   Lab Test 12/20/23  1026 11/20/23  1127 10/16/23  1020   BILITOTAL 1.9* 1.4* 1.6*   ALKPHOS 123 178* 112*   AST 17 19 27   ALT 29 28 31   ALBUMIN 4.4 4.6 4.6       LDH  No lab results found.    B2-Microglobulin  Recent Labs   Lab Test 02/18/20  0849   [id number] 1.6           Immunoglobulins     Recent Labs   Lab Test 11/20/23  1127 07/24/23  1009 06/19/23  1049 03/20/23  1411 11/28/22  1558 10/26/22  1443    583* 592* 661 732 652       Recent Labs   Lab Test 11/20/23  1127 07/24/23  1009 06/19/23  1049 03/20/23  1411 11/28/22  1558 10/26/22  1443    131 135 157 149 131       Recent Labs   Lab Test 11/20/23  1127 10/16/23  1020 07/24/23  1009 06/19/23  1049 03/20/23  1411 11/28/22  1558   IGM 48 48 47 38 48 58         Monocloncal Protein Studies     M spike    Recent Labs   Lab Test 11/20/23  1127 10/16/23  1020 09/21/23  0954 07/24/23  1009 06/19/23  1103 05/22/23  1018   ELPM 0.1* 0.0 0.0 0.0 0.1* 0.0       [place] FLC    Recent Labs   Lab Test 11/20/23  1127 07/24/23  1009 06/19/23  1049 03/20/23  1411 11/28/22  1558 10/26/22  1443   KFLCA 2.58* 2.58* 2.63* 3.41* 2.63* 4.97*       Lambda FLC    Recent Labs   Lab Test 11/20/23  1127 07/24/23  1009 06/19/23  1049 03/20/23  1411 11/28/22  1558 10/26/22  1443   LFLCA 1.66 1.84 1.66 1.70 1.45 1.57       FLC Ratio    Recent Labs   Lab Test 11/20/23  1127 07/24/23  1009 06/19/23  1049 03/20/23  1411 11/28/22  1558 10/26/22  1443   KLRA 1.55 1.40 1.58 2.01* 1.81* 3.17*       Assessment/plan:   Winter Loya is a 67 year old woman with standard risk IgG kappa multiple myeloma, s/p post auto-transplant in April 2019,   currently on lenalidomide maintenance. She had an M spike of 0.1 in Nov which was new, today's myeloma labs are pending. Previously briefly discussed that if she relapses in the near future that she would likely be a candidate for Monumental-3.  Bone survey 11/29/23 shows no definite lytic bone lesions.    Persistently elevated bilirubin led to hepatology consult in October.  Determination was that this is most likely fatty liver disease.  Recommendation was to continue to follow with PCP for management of obesity, DM2, and HTN.     Recommended she take Imodium to help manage her diarrhea.  Okay to take consistently.  Recommend 2 tabs in the morning, and then 1 additional tab with each loose stool during the day, up to maximum of 8 tabs daily.    Follow-up with me in 1 month, virtual visit okay.  Labs in [place] the day prior.    32 minutes spent on the date of the encounter doing chart review, review of test results, patient visit, and documentation     FALGUNI Fink CNP  [facility]  [address] [place], [place] [address] [phone]

## 2023-12-21 LAB
ALBUMIN SERPL ELPH-MCNC: 4.2 G/DL (ref 3.7–5.1)
ALPHA1 GLOB SERPL ELPH-MCNC: 0.2 G/DL (ref 0.2–0.4)
ALPHA2 GLOB SERPL ELPH-MCNC: 0.5 G/DL (ref 0.5–0.9)
B-GLOBULIN SERPL ELPH-MCNC: 0.7 G/DL (ref 0.6–1)
GAMMA GLOB SERPL ELPH-MCNC: 0.6 G/DL (ref 0.7–1.6)
IGA SERPL-MCNC: 139 MG/DL (ref 84–499)
IGG SERPL-MCNC: 590 MG/DL (ref 610–1616)
IGM SERPL-MCNC: 41 MG/DL (ref 35–242)
KAPPA LC FREE SER-MCNC: 2.34 MG/DL (ref 0.33–1.94)
KAPPA LC FREE/LAMBDA FREE SER NEPH: 1.5 {RATIO} (ref 0.26–1.65)
LAMBDA LC FREE SERPL-MCNC: 1.56 MG/DL (ref 0.57–2.63)
M PROTEIN SERPL ELPH-MCNC: 0 G/DL
PROT PATTERN SERPL ELPH-IMP: ABNORMAL
PROT PATTERN SERPL IFE-IMP: NORMAL

## 2024-01-02 DIAGNOSIS — C90.01 MULTIPLE MYELOMA IN REMISSION (H): Primary | ICD-10-CM

## 2024-01-02 RX ORDER — LENALIDOMIDE 10 MG/1
10 CAPSULE ORAL DAILY
Qty: 28 CAPSULE | Refills: 0 | Status: SHIPPED | OUTPATIENT
Start: 2024-01-02 | End: 2024-02-21

## 2024-01-05 ENCOUNTER — TELEPHONE (OUTPATIENT)
Dept: ONCOLOGY | Facility: CLINIC | Age: 69
End: 2024-01-05
Payer: COMMERCIAL

## 2024-01-05 NOTE — TELEPHONE ENCOUNTER
Oral Chemotherapy Monitoring Program    Medication: Revlimid  Rx:  10 mg PO daily for a 28 day cycle    Auth #:[id number]   Risk Category: Adult female NOT of reproductive capacity    Routine survey questions reviewed.    Thank you,    Mary Franklin  Oncology Pharmacy Liaison [email or web address] [email or web address]  Phone: [phone]  Fax: [phone]

## 2024-01-20 NOTE — PROGRESS NOTES
Chief Complaint - asymmetric hearing loss    History of Present Illness - Winter Loya is a 68 year old female who presents to me today with asymmetric hearing loss in the right ear. She has noticed hearing loss for 4 years. She has never had hearing aids. No chronic ear disease or ear surgery.  With regards to recreational, , and work-related noise exposure she denies this. She had a stem cell transplant for MM. She doesn't recall it worsening suddenly.     She had dental work right side, has some blisters.     Tests personally reviewed today for this visit:   1.) audiogram was performed 9/22/23 as part of the evaluation and personally reviewed. The audiogram showed a significant asymmetric right low to high frequency sensorineural hearing loss.    2.) tympanograms were performed 9/22/23 and were normal Type A curves, with normal canal volume and middle ear pressure.   3.) MRI brain ordered today.    Past Medical History -   Patient Active Problem List   Diagnosis    Multiple myeloma in remission (H)    Type 2 diabetes mellitus with diabetic polyneuropathy, with long-term current use of insulin (H)    Thyroid goiter    Allergic rhinitis    NA (generalized anxiety disorder)    History of endometrial ablation    Hyperlipidemia    Osteoarthrosis involving lower leg    DAILY (obstructive sleep apnea)- moderate (AHI 17)    Hypothyroidism, postoperative     Class 2 severe obesity due to excess calories with serious comorbidity and body mass index (BMI) of 37.0 to 37.9 in adult (H)    Tremor    History of peripheral stem cell transplant (H)    History of MRI of cervical spine 6/2020    H/O magnetic resonance imaging of lumbar spine 2020    Major depressive disorder, recurrent episode, severe (H)    Abnormal EMG 2021    Sensory polyneuropathy    Axonal neuropathy    Drug-induced polyneuropathy (H24)    Pulmonary hypertension (H)    Thrombocytopenia, unspecified (H24)    Exocrine pancreatic insufficiency    Chronic  kidney disease, stage 1    Diarrhea, unspecified type    Encounter for long-term (current) use of medications    Bilateral plantar fasciitis    Combined forms of age-related cataract, mild, of both eyes    History of laser photocoagulation of retina, os (od?)    Glaucoma suspect of both eyes    Bilateral chronic knee pain       Current Medications -   Current Outpatient Medications:     acyclovir (ZOVIRAX) 400 MG tablet, TAKE ONE TABLET BY MOUTH EVERY TWELVE HOURS, Disp: 180 tablet, Rfl: 3    alcohol swab prep pads, Use to swab area of injection/sowmya as directed., Disp: 100 each, Rfl: 3    aspirin 81 MG EC tablet, Take 81 mg by mouth daily, Disp: , Rfl:     atorvastatin (LIPITOR) 20 MG tablet, Take 1 tablet (20 mg) by mouth At Bedtime, Disp: 90 tablet, Rfl: 3    blood glucose (NO BRAND SPECIFIED) test strip, Use to test blood sugar 3 times daily or as directed. To accompany: Blood Glucose Monitor Brands: per insurance., Disp: 100 strip, Rfl: 6    blood glucose calibration (NO BRAND SPECIFIED) solution, To accompany: Blood Glucose Monitor Brands: per insurance., Disp: 4 each, Rfl: 0    blood glucose monitoring (NO BRAND SPECIFIED) meter device kit, Use to test blood sugar 3 times daily or as directed. Preferred blood glucose meter OR supplies to accompany: Blood Glucose Monitor Brands: per insurance., Disp: 1 kit, Rfl: 0    Continuous Blood Gluc  (FREESTYLE SEGUNDO 2 READER) MARCIA, Use to read blood sugars as per 's instructions., Disp: 1 each, Rfl: 0    Continuous Blood Gluc Sensor (FREESTYLE SEGUNDO 2 SENSOR) MISC, 1 each every 14 days Use 1 sensor every 14 days. Use to read blood sugars per 's instructions., Disp: 2 each, Rfl: 5    empagliflozin (JARDIANCE) 25 MG TABS tablet, Take 1 tablet (25 mg) by mouth daily, Disp: 90 tablet, Rfl: 3    insulin glargine (LANTUS SOLOSTAR) 100 UNIT/ML pen, Inject 24 Units Subcutaneous every morning Place on file, Disp: 30 mL, Rfl: 1    insulin pen  needle (FIFTY50 PEN NEEDLES) 32G X 4 MM miscellaneous, Use one pen needle daily or as directed., Disp: 100 each, Rfl: 2    LENalidomide (REVLIMID) 10 MG CAPS capsule, Take 1 capsule (10 mg) by mouth daily for 28 days, Disp: 28 capsule, Rfl: 0    levothyroxine (SYNTHROID/LEVOTHROID) 200 MCG tablet, Take 1 tablet (200 mcg) by mouth every morning (before breakfast), Disp: 90 tablet, Rfl: 3    loperamide (IMODIUM A-D) 2 MG tablet, Take 1 tablet (2 mg) by mouth daily as needed for diarrhea Start Imodium 2 pills with first loose stool and then 1 pill with each loose stool after, aiming for 1-2 stools per day. Max of 8 pills a day., Disp: 180 tablet, Rfl: 3    loperamide (IMODIUM) 2 MG capsule, Take 4 mg (two capsules) by mouth at onset of loose stools, followed by 2 mg (one capsule) after each loose stool. Maximum: 16 mg (8 capsules) daily, Disp: 270 capsule, Rfl: 11    losartan (COZAAR) 25 MG tablet, Take 1 tablet (25 mg) by mouth daily, Disp: 90 tablet, Rfl: 3    penicillin V (VEETID) 500 MG tablet, Take 500 mg by mouth 4 times daily, Disp: , Rfl:     pregabalin (LYRICA) 100 MG capsule, Take 1 capsule (100 mg) by mouth 2 times daily, Disp: 180 capsule, Rfl: 3    propranolol ER (INDERAL LA) 60 MG 24 hr capsule, Take 1 capsule (60 mg) by mouth daily, Disp: 90 capsule, Rfl: 3    Semaglutide, 2 MG/DOSE, (OZEMPIC, 2 MG/DOSE,) 8 MG/3ML pen, Inject 2 mg Subcutaneous every 7 days, Disp: 9 mL, Rfl: 3    sertraline (ZOLOFT) 100 MG tablet, Take 1 tablet (100 mg) by mouth daily, Disp: 90 tablet, Rfl: 3    thin (NO BRAND SPECIFIED) lancets, Use with lanceting device. To accompany: Blood Glucose Monitor Brands: per insurance. (Patient not taking: Reported on 6/26/2023), Disp: 200 each, Rfl: 6    triamcinolone (KENALOG) 0.1 % external cream, Apply topically 2 times daily, Disp: 60 g, Rfl: 3    Allergies - No Known Allergies    Social History -   Social History     Socioeconomic History    Marital status:     Number of  children: 3   Occupational History    Occupation: alcohol and drug counselor   Tobacco Use    Smoking status: Former     Packs/day: 1     Types: Cigarettes     Quit date: 2005     Years since quittin.0    Smokeless tobacco: Never   Vaping Use    Vaping Use: Never used   Substance and Sexual Activity    Alcohol use: Not Currently     Comment: occasional    Drug use: Never    Sexual activity: Not Currently     Partners: Male     Social Determinants of Health     Financial Resource Strain: Low Risk  (2023)    Financial Resource Strain     Within the past 12 months, have you or your family members you live with been unable to get utilities (heat, electricity) when it was really needed?: No   Food Insecurity: High Risk (2023)    Food Insecurity     Within the past 12 months, did you worry that your food would run out before you got money to buy more?: Yes     Within the past 12 months, did the food you bought just not last and you didn t have money to get more?: Yes   Transportation Needs: Low Risk  (2023)    Transportation Needs     Within the past 12 months, has lack of transportation kept you from medical appointments, getting your medicines, non-medical meetings or appointments, work, or from getting things that you need?: No   Physical Activity: Inactive (2023)    Exercise Vital Sign     Days of Exercise per Week: 0 days     Minutes of Exercise per Session: 0 min   Stress: Stress Concern Present (2023)    [place] [place] [employer] - Occupational Stress Questionnaire     Feeling of Stress : To some extent   Social Connections: Unknown (2023)    Social Connection and Isolation Panel [NHANES]     Frequency of Communication with Friends and Family: Once a week     Frequency of Social Gatherings with Friends and Family: Once a week     Active Member of Clubs or Organizations: No   Interpersonal Safety: Not At Risk (2023)    Humiliation, Afraid, Rape, and Kick  questionnaire     Fear of Current or Ex-Partner: No     Emotionally Abused: No     Physically Abused: No     Sexually Abused: No   Housing Stability: Low Risk  (11/2/2023)    Housing Stability     Do you have housing? : Yes     Are you worried about losing your housing?: No       Family History -   Family History   Problem Relation Age of Onset    Chronic Obstructive Pulmonary Disease Mother     Substance Abuse Mother     Alcoholism Mother     Diabetes Mother     Parkinsonism Father         bed bound, stiffness. no dementia or hallucinatinos    Diabetes Brother     Arrhythmia Brother     Diabetes Brother     Gastrointestinal Disease Brother     Glaucoma Paternal Grandfather     Leukemia Son     Macular Degeneration No family hx of     Colon Cancer No family hx of     Liver Disease No family hx of        Physical Exam  General - The patient is in no distress.  Alert and oriented to person and place, answers questions and cooperates with examination appropriately.   Voice and Breathing - The patient was breathing comfortably without the use of accessory muscles. There was no wheezing, stridor, or stertor.  The patients voice was clear and strong.  Ears - The auricles are normal. The tympanic membranes are normal in appearance, bony landmarks are intact.  No retraction, perforation, or masses.  No fluid or purulence was seen in the external canal or the middle ear. No evidence of infection of the middle ear or external canal, cerumen was normal in appearance.  Eyes - Extraocular movements intact.  Sclera were not icteric or injected.  Mouth - Examination of the oral cavity showed two small areas of granulation at the retromolar trigone area. No bleeding. No pus. No other lesions noted.   Neck - Soft, non-tender. Palpation of the occipital, submental, submandibular, internal jugular chain, and supraclavicular nodes did not demonstrate any abnormal lymph nodes or masses. Parotid glands had no masses. Palpation of the  thyroid was soft and smooth, with no nodules or goiter appreciated.  The trachea was mobile and midline.  Neurological - Cranial nerves 2 through 12 were grossly intact. House-Brackmann grade 1 out of 6 bilaterally.       Assessment and Plan -     ICD-10-CM    1. SNHL (sensory-neural hearing loss), asymmetrical  H90.3 MR Brain w/o & w Contrast      2. Multiple myeloma in remission (H)  C90.01 MR Brain w/o & w Contrast          Winter Loya is a 68 year old female who presents to me today with asymmetric sensorineural hearing loss. The etiology of this is uncertain, but could be viral, vascular, or possibly neoplastic. I recommend an MRI brain to rule-out retrocochlear pathology. I recommend a hearing aid consultation and hearing aids. I'll call with MRI brain results.         Colton Leone MD  Otolaryngology  [facility]

## 2024-01-22 ENCOUNTER — LAB (OUTPATIENT)
Dept: LAB | Facility: CLINIC | Age: 69
End: 2024-01-22
Payer: COMMERCIAL

## 2024-01-22 DIAGNOSIS — C90.01 MULTIPLE MYELOMA IN REMISSION (H): ICD-10-CM

## 2024-01-22 DIAGNOSIS — Z79.4 TYPE 2 DIABETES MELLITUS WITH DIABETIC POLYNEUROPATHY, WITH LONG-TERM CURRENT USE OF INSULIN (H): Primary | ICD-10-CM

## 2024-01-22 DIAGNOSIS — E11.42 TYPE 2 DIABETES MELLITUS WITH DIABETIC POLYNEUROPATHY, WITH LONG-TERM CURRENT USE OF INSULIN (H): Primary | ICD-10-CM

## 2024-01-22 DIAGNOSIS — N18.1 CHRONIC KIDNEY DISEASE, STAGE 1: ICD-10-CM

## 2024-01-22 DIAGNOSIS — E89.0 POSTOPERATIVE HYPOTHYROIDISM: ICD-10-CM

## 2024-01-22 LAB
ALBUMIN SERPL BCG-MCNC: 4.3 G/DL (ref 3.5–5.2)
ALP SERPL-CCNC: 122 U/L (ref 40–150)
ALT SERPL W P-5'-P-CCNC: 44 U/L (ref 0–50)
ANION GAP SERPL CALCULATED.3IONS-SCNC: 13 MMOL/L (ref 7–15)
AST SERPL W P-5'-P-CCNC: 23 U/L (ref 0–45)
BASOPHILS # BLD AUTO: 0.1 10E3/UL (ref 0–0.2)
BASOPHILS NFR BLD AUTO: 2 %
BILIRUB SERPL-MCNC: 1.2 MG/DL
BUN SERPL-MCNC: 13.9 MG/DL (ref 8–23)
CALCIUM SERPL-MCNC: 9.5 MG/DL (ref 8.8–10.2)
CHLORIDE SERPL-SCNC: 102 MMOL/L (ref 98–107)
CHOLEST SERPL-MCNC: 213 MG/DL
CREAT SERPL-MCNC: 0.63 MG/DL (ref 0.51–0.95)
CREAT UR-MCNC: 40.1 MG/DL
DEPRECATED HCO3 PLAS-SCNC: 24 MMOL/L (ref 22–29)
EGFRCR SERPLBLD CKD-EPI 2021: >90 ML/MIN/1.73M2
EOSINOPHIL # BLD AUTO: 0.2 10E3/UL (ref 0–0.7)
EOSINOPHIL NFR BLD AUTO: 8 %
ERYTHROCYTE [DISTWIDTH] IN BLOOD BY AUTOMATED COUNT: 14.1 % (ref 10–15)
FASTING STATUS PATIENT QL REPORTED: YES
GLUCOSE SERPL-MCNC: 258 MG/DL (ref 70–99)
HBA1C MFR BLD: 8.1 % (ref 0–5.6)
HCT VFR BLD AUTO: 42.2 % (ref 35–47)
HDLC SERPL-MCNC: 44 MG/DL
HGB BLD-MCNC: 14 G/DL (ref 11.7–15.7)
IMM GRANULOCYTES # BLD: 0 10E3/UL
IMM GRANULOCYTES NFR BLD: 1 %
LDLC SERPL CALC-MCNC: ABNORMAL MG/DL
LDLC SERPL DIRECT ASSAY-MCNC: 33 MG/DL
LYMPHOCYTES # BLD AUTO: 0.6 10E3/UL (ref 0.8–5.3)
LYMPHOCYTES NFR BLD AUTO: 22 %
MCH RBC QN AUTO: 29 PG (ref 26.5–33)
MCHC RBC AUTO-ENTMCNC: 33.2 G/DL (ref 31.5–36.5)
MCV RBC AUTO: 87 FL (ref 78–100)
MICROALBUMIN UR-MCNC: 38.9 MG/L
MICROALBUMIN/CREAT UR: 97.01 MG/G CR (ref 0–25)
MONOCYTES # BLD AUTO: 0.3 10E3/UL (ref 0–1.3)
MONOCYTES NFR BLD AUTO: 11 %
NEUTROPHILS # BLD AUTO: 1.5 10E3/UL (ref 1.6–8.3)
NEUTROPHILS NFR BLD AUTO: 56 %
NONHDLC SERPL-MCNC: 169 MG/DL
NRBC # BLD AUTO: 0 10E3/UL
NRBC BLD AUTO-RTO: 0 /100
PLATELET # BLD AUTO: 125 10E3/UL (ref 150–450)
POTASSIUM SERPL-SCNC: 4.5 MMOL/L (ref 3.4–5.3)
PROT SERPL-MCNC: 6.5 G/DL (ref 6.4–8.3)
RBC # BLD AUTO: 4.83 10E6/UL (ref 3.8–5.2)
SODIUM SERPL-SCNC: 139 MMOL/L (ref 135–145)
TOTAL PROTEIN SERUM FOR ELP: 6.2 G/DL (ref 6.4–8.3)
TRIGL SERPL-MCNC: 769 MG/DL
TSH SERPL DL<=0.005 MIU/L-ACNC: 3 UIU/ML (ref 0.3–4.2)
WBC # BLD AUTO: 2.7 10E3/UL (ref 4–11)

## 2024-01-22 PROCEDURE — 83521 IG LIGHT CHAINS FREE EACH: CPT

## 2024-01-22 PROCEDURE — 80061 LIPID PANEL: CPT

## 2024-01-22 PROCEDURE — 82784 ASSAY IGA/IGD/IGG/IGM EACH: CPT

## 2024-01-22 PROCEDURE — 82043 UR ALBUMIN QUANTITATIVE: CPT

## 2024-01-22 PROCEDURE — 86334 IMMUNOFIX E-PHORESIS SERUM: CPT | Performed by: PATHOLOGY

## 2024-01-22 PROCEDURE — 36415 COLL VENOUS BLD VENIPUNCTURE: CPT

## 2024-01-22 PROCEDURE — 84165 PROTEIN E-PHORESIS SERUM: CPT | Performed by: PATHOLOGY

## 2024-01-22 PROCEDURE — 80053 COMPREHEN METABOLIC PANEL: CPT

## 2024-01-22 PROCEDURE — 84155 ASSAY OF PROTEIN SERUM: CPT | Mod: 59

## 2024-01-22 PROCEDURE — 83036 HEMOGLOBIN GLYCOSYLATED A1C: CPT

## 2024-01-22 PROCEDURE — 83721 ASSAY OF BLOOD LIPOPROTEIN: CPT | Mod: 59

## 2024-01-22 PROCEDURE — 85025 COMPLETE CBC W/AUTO DIFF WBC: CPT

## 2024-01-22 PROCEDURE — 84443 ASSAY THYROID STIM HORMONE: CPT

## 2024-01-22 PROCEDURE — 82570 ASSAY OF URINE CREATININE: CPT

## 2024-01-23 ENCOUNTER — OFFICE VISIT (OUTPATIENT)
Dept: OTOLARYNGOLOGY | Facility: CLINIC | Age: 69
End: 2024-01-23
Payer: COMMERCIAL

## 2024-01-23 VITALS
HEART RATE: 61 BPM | DIASTOLIC BLOOD PRESSURE: 54 MMHG | RESPIRATION RATE: 18 BRPM | SYSTOLIC BLOOD PRESSURE: 114 MMHG | OXYGEN SATURATION: 93 %

## 2024-01-23 DIAGNOSIS — C90.01 MULTIPLE MYELOMA IN REMISSION (H): Chronic | ICD-10-CM

## 2024-01-23 DIAGNOSIS — H90.3 SNHL (SENSORY-NEURAL HEARING LOSS), ASYMMETRICAL: Primary | ICD-10-CM

## 2024-01-23 LAB
ALBUMIN SERPL ELPH-MCNC: 4.2 G/DL (ref 3.7–5.1)
ALPHA1 GLOB SERPL ELPH-MCNC: 0.2 G/DL (ref 0.2–0.4)
ALPHA2 GLOB SERPL ELPH-MCNC: 0.5 G/DL (ref 0.5–0.9)
B-GLOBULIN SERPL ELPH-MCNC: 0.7 G/DL (ref 0.6–1)
GAMMA GLOB SERPL ELPH-MCNC: 0.6 G/DL (ref 0.7–1.6)
IGA SERPL-MCNC: 130 MG/DL (ref 84–499)
IGG SERPL-MCNC: 602 MG/DL (ref 610–1616)
IGM SERPL-MCNC: 42 MG/DL (ref 35–242)
KAPPA LC FREE SER-MCNC: 2.65 MG/DL (ref 0.33–1.94)
KAPPA LC FREE/LAMBDA FREE SER NEPH: 1.52 {RATIO} (ref 0.26–1.65)
LAMBDA LC FREE SERPL-MCNC: 1.74 MG/DL (ref 0.57–2.63)
LOCATION OF TASK: ABNORMAL
M PROTEIN SERPL ELPH-MCNC: 0 G/DL
PROT PATTERN SERPL ELPH-IMP: ABNORMAL
PROT PATTERN SERPL IFE-IMP: NORMAL

## 2024-01-23 PROCEDURE — 99204 OFFICE O/P NEW MOD 45 MIN: CPT | Performed by: OTOLARYNGOLOGY

## 2024-01-23 NOTE — LETTER
1/23/2024         RE: Winter Loya  [address]  [place] [address]        Dear Colleague,    Thank you for referring your patient, Winter Loya, to the [facility]. Please see a copy of my visit note below.    Chief Complaint - asymmetric hearing loss    History of Present Illness - Winter Loya is a 68 year old female who presents to me today with asymmetric hearing loss in the right ear. She has noticed hearing loss for 4 years. She has never had hearing aids. No chronic ear disease or ear surgery.  With regards to recreational, , and work-related noise exposure she denies this. She had a stem cell transplant for MM. She doesn't recall it worsening suddenly.     She had dental work right side, has some blisters.     Tests personally reviewed today for this visit:   1.) audiogram was performed 9/22/23 as part of the evaluation and personally reviewed. The audiogram showed a significant asymmetric right low to high frequency sensorineural hearing loss.    2.) tympanograms were performed 9/22/23 and were normal Type A curves, with normal canal volume and middle ear pressure.   3.) MRI brain ordered today.    Past Medical History -   Patient Active Problem List   Diagnosis     Multiple myeloma in remission (H)     Type 2 diabetes mellitus with diabetic polyneuropathy, with long-term current use of insulin (H)     Thyroid goiter     Allergic rhinitis     NA (generalized anxiety disorder)     History of endometrial ablation     Hyperlipidemia     Osteoarthrosis involving lower leg     DAILY (obstructive sleep apnea)- moderate (AHI 17)     Hypothyroidism, postoperative      Class 2 severe obesity due to excess calories with serious comorbidity and body mass index (BMI) of 37.0 to 37.9 in adult (H)     Tremor     History of peripheral stem cell transplant (H)     History of MRI of cervical spine 6/2020     H/O magnetic resonance imaging of lumbar spine 2020     Major depressive  disorder, recurrent episode, severe (H)     Abnormal EMG 2021     Sensory polyneuropathy     Axonal neuropathy     Drug-induced polyneuropathy (H24)     Pulmonary hypertension (H)     Thrombocytopenia, unspecified (H24)     Exocrine pancreatic insufficiency     Chronic kidney disease, stage 1     Diarrhea, unspecified type     Encounter for long-term (current) use of medications     Bilateral plantar fasciitis     Combined forms of age-related cataract, mild, of both eyes     History of laser photocoagulation of retina, os (od?)     Glaucoma suspect of both eyes     Bilateral chronic knee pain       Current Medications -   Current Outpatient Medications:      acyclovir (ZOVIRAX) 400 MG tablet, TAKE ONE TABLET BY MOUTH EVERY TWELVE HOURS, Disp: 180 tablet, Rfl: 3     alcohol swab prep pads, Use to swab area of injection/sowmya as directed., Disp: 100 each, Rfl: 3     aspirin 81 MG EC tablet, Take 81 mg by mouth daily, Disp: , Rfl:      atorvastatin (LIPITOR) 20 MG tablet, Take 1 tablet (20 mg) by mouth At Bedtime, Disp: 90 tablet, Rfl: 3     blood glucose (NO BRAND SPECIFIED) test strip, Use to test blood sugar 3 times daily or as directed. To accompany: Blood Glucose Monitor Brands: per insurance., Disp: 100 strip, Rfl: 6     blood glucose calibration (NO BRAND SPECIFIED) solution, To accompany: Blood Glucose Monitor Brands: per insurance., Disp: 4 each, Rfl: 0     blood glucose monitoring (NO BRAND SPECIFIED) meter device kit, Use to test blood sugar 3 times daily or as directed. Preferred blood glucose meter OR supplies to accompany: Blood Glucose Monitor Brands: per insurance., Disp: 1 kit, Rfl: 0     Continuous Blood Gluc  (FREESTYLE SEGUNDO 2 READER) MARCIA, Use to read blood sugars as per 's instructions., Disp: 1 each, Rfl: 0     Continuous Blood Gluc Sensor (FREESTYLE SEGUNDO 2 SENSOR) MISC, 1 each every 14 days Use 1 sensor every 14 days. Use to read blood sugars per 's  instructions., Disp: 2 each, Rfl: 5     empagliflozin (JARDIANCE) 25 MG TABS tablet, Take 1 tablet (25 mg) by mouth daily, Disp: 90 tablet, Rfl: 3     insulin glargine (LANTUS SOLOSTAR) 100 UNIT/ML pen, Inject 24 Units Subcutaneous every morning Place on file, Disp: 30 mL, Rfl: 1     insulin pen needle (FIFTY50 PEN NEEDLES) 32G X 4 MM miscellaneous, Use one pen needle daily or as directed., Disp: 100 each, Rfl: 2     LENalidomide (REVLIMID) 10 MG CAPS capsule, Take 1 capsule (10 mg) by mouth daily for 28 days, Disp: 28 capsule, Rfl: 0     levothyroxine (SYNTHROID/LEVOTHROID) 200 MCG tablet, Take 1 tablet (200 mcg) by mouth every morning (before breakfast), Disp: 90 tablet, Rfl: 3     loperamide (IMODIUM A-D) 2 MG tablet, Take 1 tablet (2 mg) by mouth daily as needed for diarrhea Start Imodium 2 pills with first loose stool and then 1 pill with each loose stool after, aiming for 1-2 stools per day. Max of 8 pills a day., Disp: 180 tablet, Rfl: 3     loperamide (IMODIUM) 2 MG capsule, Take 4 mg (two capsules) by mouth at onset of loose stools, followed by 2 mg (one capsule) after each loose stool. Maximum: 16 mg (8 capsules) daily, Disp: 270 capsule, Rfl: 11     losartan (COZAAR) 25 MG tablet, Take 1 tablet (25 mg) by mouth daily, Disp: 90 tablet, Rfl: 3     penicillin V (VEETID) 500 MG tablet, Take 500 mg by mouth 4 times daily, Disp: , Rfl:      pregabalin (LYRICA) 100 MG capsule, Take 1 capsule (100 mg) by mouth 2 times daily, Disp: 180 capsule, Rfl: 3     propranolol ER (INDERAL LA) 60 MG 24 hr capsule, Take 1 capsule (60 mg) by mouth daily, Disp: 90 capsule, Rfl: 3     Semaglutide, 2 MG/DOSE, (OZEMPIC, 2 MG/DOSE,) 8 MG/3ML pen, Inject 2 mg Subcutaneous every 7 days, Disp: 9 mL, Rfl: 3     sertraline (ZOLOFT) 100 MG tablet, Take 1 tablet (100 mg) by mouth daily, Disp: 90 tablet, Rfl: 3     thin (NO BRAND SPECIFIED) lancets, Use with lanceting device. To accompany: Blood Glucose Monitor Brands: per insurance.  (Patient not taking: Reported on 2023), Disp: 200 each, Rfl: 6     triamcinolone (KENALOG) 0.1 % external cream, Apply topically 2 times daily, Disp: 60 g, Rfl: 3    Allergies - No Known Allergies    Social History -   Social History     Socioeconomic History     Marital status:      Number of children: 3   Occupational History     Occupation: alcohol and drug counselor   Tobacco Use     Smoking status: Former     Packs/day: 1     Types: Cigarettes     Quit date:      Years since quittin.0     Smokeless tobacco: Never   Vaping Use     Vaping Use: Never used   Substance and Sexual Activity     Alcohol use: Not Currently     Comment: occasional     Drug use: Never     Sexual activity: Not Currently     Partners: Male     Social Determinants of Health     Financial Resource Strain: Low Risk  (2023)    Financial Resource Strain      Within the past 12 months, have you or your family members you live with been unable to get utilities (heat, electricity) when it was really needed?: No   Food Insecurity: High Risk (2023)    Food Insecurity      Within the past 12 months, did you worry that your food would run out before you got money to buy more?: Yes      Within the past 12 months, did the food you bought just not last and you didn t have money to get more?: Yes   Transportation Needs: Low Risk  (2023)    Transportation Needs      Within the past 12 months, has lack of transportation kept you from medical appointments, getting your medicines, non-medical meetings or appointments, work, or from getting things that you need?: No   Physical Activity: Inactive (2023)    Exercise Vital Sign      Days of Exercise per Week: 0 days      Minutes of Exercise per Session: 0 min   Stress: Stress Concern Present (2023)    [employer] [place] [employer] - Occupational Stress Questionnaire      Feeling of Stress : To some extent   Social Connections: Unknown (2023)    Social  Connection and Isolation Panel [NHANES]      Frequency of Communication with Friends and Family: Once a week      Frequency of Social Gatherings with Friends and Family: Once a week      Active Member of Clubs or Organizations: No   Interpersonal Safety: Not At Risk (6/20/2023)    Humiliation, Afraid, Rape, and Kick questionnaire      Fear of Current or Ex-Partner: No      Emotionally Abused: No      Physically Abused: No      Sexually Abused: No   Housing Stability: Low Risk  (11/2/2023)    Housing Stability      Do you have housing? : Yes      Are you worried about losing your housing?: No       Family History -   Family History   Problem Relation Age of Onset     Chronic Obstructive Pulmonary Disease Mother      Substance Abuse Mother      Alcoholism Mother      Diabetes Mother      Parkinsonism Father         bed bound, stiffness. no dementia or hallucinatinos     Diabetes Brother      Arrhythmia Brother      Diabetes Brother      Gastrointestinal Disease Brother      Glaucoma Paternal Grandfather      Leukemia Son      Macular Degeneration No family hx of      Colon Cancer No family hx of      Liver Disease No family hx of        Physical Exam  General - The patient is in no distress.  Alert and oriented to person and place, answers questions and cooperates with examination appropriately.   Voice and Breathing - The patient was breathing comfortably without the use of accessory muscles. There was no wheezing, stridor, or stertor.  The patients voice was clear and strong.  Ears - The auricles are normal. The tympanic membranes are normal in appearance, bony landmarks are intact.  No retraction, perforation, or masses.  No fluid or purulence was seen in the external canal or the middle ear. No evidence of infection of the middle ear or external canal, cerumen was normal in appearance.  Eyes - Extraocular movements intact.  Sclera were not icteric or injected.  Mouth - Examination of the oral cavity showed two  small areas of granulation at the retromolar trigone area. No bleeding. No pus. No other lesions noted.   Neck - Soft, non-tender. Palpation of the occipital, submental, submandibular, internal jugular chain, and supraclavicular nodes did not demonstrate any abnormal lymph nodes or masses. Parotid glands had no masses. Palpation of the thyroid was soft and smooth, with no nodules or goiter appreciated.  The trachea was mobile and midline.  Neurological - Cranial nerves 2 through 12 were grossly intact. House-Brackmann grade 1 out of 6 bilaterally.       Assessment and Plan -     ICD-10-CM    1. SNHL (sensory-neural hearing loss), asymmetrical  H90.3 MR Brain w/o & w Contrast      2. Multiple myeloma in remission (H)  C90.01 MR Brain w/o & w Contrast          Winter Loya is a 68 year old female who presents to me today with asymmetric sensorineural hearing loss. The etiology of this is uncertain, but could be viral, vascular, or possibly neoplastic. I recommend an MRI brain to rule-out retrocochlear pathology. I recommend a hearing aid consultation and hearing aids. I'll call with MRI brain results.         Colton Leone MD  Otolaryngology  [facility]         Again, thank you for allowing me to participate in the care of your patient.        Sincerely,        Colton Leone MD

## 2024-01-24 ENCOUNTER — OFFICE VISIT (OUTPATIENT)
Dept: AUDIOLOGY | Facility: CLINIC | Age: 69
End: 2024-01-24
Payer: COMMERCIAL

## 2024-01-24 ENCOUNTER — VIRTUAL VISIT (OUTPATIENT)
Dept: ONCOLOGY | Facility: CLINIC | Age: 69
End: 2024-01-24
Attending: STUDENT IN AN ORGANIZED HEALTH CARE EDUCATION/TRAINING PROGRAM
Payer: COMMERCIAL

## 2024-01-24 VITALS — BODY MASS INDEX: 37.03 KG/M2 | WEIGHT: 250 LBS | HEIGHT: 69 IN

## 2024-01-24 DIAGNOSIS — C90.01 MULTIPLE MYELOMA IN REMISSION (H): Primary | ICD-10-CM

## 2024-01-24 DIAGNOSIS — R19.7 DIARRHEA, UNSPECIFIED TYPE: ICD-10-CM

## 2024-01-24 DIAGNOSIS — H90.3 BILATERAL SENSORINEURAL HEARING LOSS: Primary | ICD-10-CM

## 2024-01-24 PROCEDURE — V5261 HEARING AID, DIGIT, BIN, BTE: HCPCS | Mod: NU

## 2024-01-24 PROCEDURE — V5020 CONFORMITY EVALUATION: HCPCS | Mod: LT

## 2024-01-24 PROCEDURE — 99214 OFFICE O/P EST MOD 30 MIN: CPT | Mod: 95 | Performed by: REGISTERED NURSE

## 2024-01-24 PROCEDURE — V5011 HEARING AID FITTING/CHECKING: HCPCS | Mod: RT

## 2024-01-24 PROCEDURE — V5011 HEARING AID FITTING/CHECKING: HCPCS | Mod: LT

## 2024-01-24 PROCEDURE — V5160 DISPENSING FEE BINAURAL: HCPCS

## 2024-01-24 PROCEDURE — V5020 CONFORMITY EVALUATION: HCPCS | Mod: RT

## 2024-01-24 PROCEDURE — 92593 PR HEARING AID CHECK, BINAURAL: CPT

## 2024-01-24 ASSESSMENT — PAIN SCALES - GENERAL: PAINLEVEL: NO PAIN (0)

## 2024-01-24 NOTE — PROGRESS NOTES
Virtual Visit Details    Type of service:  Video Visit   Video Start Time: 11:27 AM  Video End Time:11:43 AM    Originating Location (pt. Location): Home    Distant Location (provider location):  Off-site  Platform used for Video Visit: [facility]      ONCOLOGY/HEMATOLOGY PROGRESS NOTE  Jan 24, 2024    Reason for Visit: IgA Kappa Multiple Myeloma    Oncology HPI:   Winter Loya is a 68 year old woman with IgA Kappa Multiple Myeloma. In 2018 she had anemia and was fatigued. She was found to have IgA kappa monocloncal antibody with M-spike of 0.17. IgA elevated. Skeletal survey was unremarkable and UPEP had minimal protein (156 mg/m2). PET 11/2018 showed bone marrow consistent with hypermetabolic plasma cell myeloma. M spike was 0.17. She started RVD and Zometa. On 4/2019 she had an autologous transplant.      Her bone marrow bx from September 2019 was sent here for review. It showed marrow cellularity of 60%, with decreased trilineage hematopoiesis and 15% plasma cells as well as peripheral blood with slight anemia. 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 She is on revlimid maintenance and zometa from her prior oncologist in [place]. On 12/6/19 her K/L ratio is 1.1, M spike is 0.04.     Currently on Revlimid maintenance.    Interval history:   - Had some dental work done, blisters have returned, she is going to follow-up with them  - Continues to have diarrhea, has been taking Imodium every morning with some improvement, but states it can still be really bad some days; no longer taking the metamucil that was recommended to her when she saw GI in 2022  - Denies any other new pains  - Denies any recent illnesses  - In the middle of a work-up for hearing loss, has to get a brain MRI in order to have her insurance pay for the consult    Comprehensive ROS neg other than the symptoms noted  above in the HPI.      Past Medical History:   Diagnosis Date    Abnormal EMG 2021 5/25/2021    Interpretation: This is an abnormal study, demonstrating electrophysiologic evidence of a length-dependent axonal sensorimotor polyneuropathy. Asymmetry of peroneal compound muscle action potential amplitudes is a finding of uncertain significance.       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   I have personally reviewed the examination and initial interpretation and I agree with the findings.   ANNE GOODMAN MD    History of MRI of cervical spine 6/2020 3/15/2021    Impression: Multilevel cervical spondylosis, most pronounced at the level of C4-5 and C5-6 with moderate to severe spinal canal stenosis and moderate spinal canal stenosis at C6-7. No abnormal cord signal. No significant neural foraminal narrowing at any level.   I have personally reviewed the examination and initial interpretation and I agree with the findings.   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 100 each 3    aspirin 81 MG EC tablet Take 81 mg by mouth daily      atorvastatin (LIPITOR)  20 MG tablet Take 1 tablet (20 mg) by mouth At Bedtime 90 tablet 3    blood glucose (NO BRAND SPECIFIED) test strip Use to test blood sugar 3 times daily or as directed. To accompany: Blood Glucose Monitor Brands: per insurance. 100 strip 6    blood glucose calibration (NO BRAND SPECIFIED) solution To accompany: Blood Glucose Monitor Brands: per insurance. 4 each 0    blood glucose monitoring (NO BRAND SPECIFIED) meter device kit Use to test blood sugar 3 times daily or as directed. Preferred blood glucose meter OR supplies to accompany: Blood Glucose Monitor Brands: per insurance. 1 kit 0    Continuous Blood Gluc  (FREESTYLE SEGUNDO 2 READER) MARCIA Use to read blood sugars as per 's instructions. 1 each 0    Continuous Blood Gluc Sensor (FREESTYLE SEGUNDO 2 SENSOR) MISC 1 each every 14 days Use 1 sensor every 14 days. Use to read blood sugars per 's instructions. 2 each 5    empagliflozin (JARDIANCE) 25 MG TABS tablet Take 1 tablet (25 mg) by mouth daily 90 tablet 3    insulin glargine (LANTUS SOLOSTAR) 100 UNIT/ML pen Inject 24 Units Subcutaneous every morning Place on file 30 mL 1    insulin pen needle (FIFTY50 PEN NEEDLES) 32G X 4 MM miscellaneous Use one pen needle daily or as directed. 100 each 2    LENalidomide (REVLIMID) 10 MG CAPS capsule Take 1 capsule (10 mg) by mouth daily for 28 days 28 capsule 0    levothyroxine (SYNTHROID/LEVOTHROID) 200 MCG tablet Take 1 tablet (200 mcg) by mouth every morning (before breakfast) 90 tablet 3    loperamide (IMODIUM A-D) 2 MG tablet Take 1 tablet (2 mg) by mouth daily as needed for diarrhea Start Imodium 2 pills with first loose stool and then 1 pill with each loose stool after, aiming for 1-2 stools per day. Max of 8 pills a day. 180 tablet 3    loperamide (IMODIUM) 2 MG capsule Take 4 mg (two capsules) by mouth at onset of loose stools, followed by 2 mg (one capsule) after each loose stool. Maximum: 16 mg (8 capsules) daily 270 capsule 11     losartan (COZAAR) 25 MG tablet Take 1 tablet (25 mg) by mouth daily 90 tablet 3    penicillin V (VEETID) 500 MG tablet Take 500 mg by mouth 4 times daily      pregabalin (LYRICA) 100 MG capsule Take 1 capsule (100 mg) by mouth 2 times daily 180 capsule 3    propranolol ER (INDERAL LA) 60 MG 24 hr capsule Take 1 capsule (60 mg) by mouth daily 90 capsule 3    Semaglutide, 2 MG/DOSE, (OZEMPIC, 2 MG/DOSE,) 8 MG/3ML pen Inject 2 mg Subcutaneous every 7 days 9 mL 3    sertraline (ZOLOFT) 100 MG tablet Take 1 tablet (100 mg) by mouth daily 90 tablet 3    thin (NO BRAND SPECIFIED) lancets Use with lanceting device. To accompany: Blood Glucose Monitor Brands: per insurance. (Patient not taking: Reported on 6/26/2023) 200 each 6    triamcinolone (KENALOG) 0.1 % external cream Apply topically 2 times daily 60 g 3        No Known Allergies    Video physical exam  General: Patient appears well in no acute distress.   Skin: No visualized rash or lesions on visualized skin  Eyes: EOMI, no erythema, sclera icterus or discharge noted  Resp: Appears to be breathing comfortably without accessory muscle usage, speaking in full sentences, no cough  MSK: Appears to have normal range of motion based on visualized movements  Neurologic: No apparent tremors, facial movements symmetric  Psych: affect bright, alert and oriented        Labs:     Blood Counts       Recent Labs   Lab Test 01/22/24  1017 12/20/23  1026 11/20/23  1127 10/16/23  1020   HGB 14.0 14.5 14.7 14.7   HCT 42.2 42.3 44.1 44.3   WBC 2.7* 3.0* 3.1* 3.7*   ANEUTAUTO 1.5* 1.8 1.7 2.2   ALYMPAUTO 0.6* 0.6* 0.8 0.8   AMONOAUTO 0.3 0.3 0.3 0.4   AEOSAUTO 0.2 0.3 0.2 0.3   ABSBASO 0.1 0.1 0.1 0.1   NRBCMAN 0.0 0.0  --  0.0   * 112* 146* 132*       ABO/RH    No lab results found.      Chemistries     Basic Panel  Recent Labs   Lab Test 01/22/24  1017 12/20/23  1026 11/20/23  1127    141 139   POTASSIUM 4.5 4.3 4.2   CHLORIDE 102 104 101   CO2 24 28 25   BUN 13.9 12.8  17.3   CR 0.63 0.78 0.70   * 142* 150*        Calcium, Magnesium, Phosphorus  Recent Labs   Lab Test 01/22/24  1017 12/20/23  1026 11/20/23  1127   ASHLEY 9.5 9.9 10.4*        LFTs  Recent Labs   Lab Test 01/22/24  1017 12/20/23  1026 11/20/23  1127   BILITOTAL 1.2 1.9* 1.4*   ALKPHOS 122 123 178*   AST 23 17 19   ALT 44 29 28   ALBUMIN 4.3 4.4 4.6       LDH  No lab results found.    B2-Microglobulin  Recent Labs   Lab Test 02/18/20  0849   UULT3IUAL 1.6           Immunoglobulins     Recent Labs   Lab Test 01/22/24  1017 12/20/23  1026 11/20/23  1127 07/24/23  1009 06/19/23  1049 03/20/23  1411   * 590* 687 583* 592* 661       Recent Labs   Lab Test 01/22/24  1017 12/20/23  1026 11/20/23  1127 07/24/23  1009 06/19/23  1049 03/20/23  1411    139 164 131 135 157       Recent Labs   Lab Test 01/22/24  1017 12/20/23  1026 11/20/23  1127 10/16/23  1020 07/24/23  1009 06/19/23  1049   IGM 42 41 48 48 47 38         Monocloncal Protein Studies     M spike    Recent Labs   Lab Test 01/22/24  1017 12/20/23  1026 11/20/23  1127 10/16/23  1020 09/21/23  0954 07/24/23  1009   ELPM 0.0 0.0 0.1* 0.0 0.0 0.0       [place] FLC    Recent Labs   Lab Test 01/22/24  1017 12/20/23  1026 11/20/23  1127 07/24/23  1009 06/19/23  1049 03/20/23  1411   KFLCA 2.65* 2.34* 2.58* 2.58* 2.63* 3.41*       Lambda FLC    Recent Labs   Lab Test 01/22/24  1017 12/20/23  1026 11/20/23  1127 07/24/23  1009 06/19/23  1049 03/20/23  1411   LFLCA 1.74 1.56 1.66 1.84 1.66 1.70       FLC Ratio    Recent Labs   Lab Test 01/22/24  1017 12/20/23  1026 11/20/23  1127 07/24/23  1009 06/19/23  1049 03/20/23  1411   KLRA 1.52 1.50 1.55 1.40 1.58 2.01*       Assessment/plan:   Winter Loya is a 68 year old woman with standard risk IgG kappa multiple myeloma, s/p post auto-transplant in April 2019,  currently on lenalidomide maintenance. She had an M spike of 0.1 in Nov which was new, today's myeloma labs are pending. Previously briefly discussed that  if she relapses in the near future that she would likely be a candidate for Monumental-3.  Bone survey 11/29/23 shows no definite lytic bone lesions.    Persistently elevated bilirubin led to hepatology consult in October 2023.  Determination was that this is most likely fatty liver disease.  Recommendation was to continue to follow with PCP for management of obesity, DM2, and HTN. Bilirubin is WNL today 01/24/24     Recommended she continue taking Imodium to help manage her diarrhea, also encouraged her to add the Metamucil back in.  If diarrhea hasn't improved at all by next month, would recommend follow-up with the GI team she saw in 2022.    Follow-up with me in 1 month, virtual visit okay.  Labs in [place] the day prior.    32 minutes spent on the date of the encounter doing chart review, review of test results, patient visit, and documentation     FALGUNI Fink CNP  [facility]  [address] [place] [id number] [phone]

## 2024-01-24 NOTE — NURSING NOTE
Is the patient currently in the state of [place]? YES    Visit mode:VIDEO    If the visit is dropped, the patient can be reconnected by: VIDEO VISIT: Text to cell phone:   Telephone Information:   Mobile [phone]       Will anyone else be joining the visit? NO  (If patient encounters technical issues they should call [phone]    How would you like to obtain your AVS? MyChart    Are changes needed to the allergy or medication list? Pt declined med review    Reason for visit: CARYN NGUYEN

## 2024-01-24 NOTE — LETTER
1/24/2024         RE: Winter Loya  [address]  [facility] [address]        Dear Colleague,    Thank you for referring your patient, Winter Loya, to the [facility]. Please see a copy of my visit note below.    Virtual Visit Details    Type of service:  Video Visit   Video Start Time: 11:27 AM  Video End Time:11:43 AM    Originating Location (pt. Location): Home    Distant Location (provider location):  Off-site  Platform used for Video Visit: [facility]      ONCOLOGY/HEMATOLOGY PROGRESS NOTE  Jan 24, 2024    Reason for Visit: IgA Kappa Multiple Myeloma    Oncology HPI:   Winter Loya is a 68 year old woman with IgA Kappa Multiple Myeloma. In 2018 she had anemia and was fatigued. She was found to have IgA kappa monocloncal antibody with M-spike of 0.17. IgA elevated. Skeletal survey was unremarkable and UPEP had minimal protein (156 mg/m2). PET 11/2018 showed bone marrow consistent with hypermetabolic plasma cell myeloma. M spike was 0.17. She started RVD and Zometa. On 4/2019 she had an autologous transplant.      Her bone marrow bx from September 2019 was sent here for review. It showed marrow cellularity of 60%, with decreased trilineage hematopoiesis and 15% plasma cells as well as peripheral blood with slight anemia. 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 She is on revlimid maintenance and zometa from her prior oncologist in [place]. On 12/6/19 her K/L ratio is 1.1, M spike is 0.04.     Currently on Revlimid maintenance.    Interval history:   - Had some dental work done, blisters have returned, she is going to follow-up with them  - Continues to have diarrhea, has been taking Imodium every morning with some improvement, but states it can still be really bad some days; no longer taking the metamucil that was recommended to her when she saw  GI in 2022  - Denies any other new pains  - Denies any recent illnesses  - In the middle of a work-up for hearing loss, has to get a brain MRI in order to have her insurance pay for the consult    Comprehensive ROS neg other than the symptoms noted above in the HPI.      Past Medical History:   Diagnosis Date    Abnormal EMG 2021 5/25/2021    Interpretation: This is an abnormal study, demonstrating electrophysiologic evidence of a length-dependent axonal sensorimotor polyneuropathy. Asymmetry of peroneal compound muscle action potential amplitudes is a finding of uncertain significance.       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   I have personally reviewed the examination and initial interpretation and I agree with the findings.   ANNE GOODMAN MD    History of MRI of cervical spine 6/2020 3/15/2021    Impression: Multilevel cervical spondylosis, most pronounced at the level of C4-5 and C5-6 with moderate to severe spinal canal stenosis and moderate spinal canal stenosis at C6-7. No abnormal cord signal. No significant neural foraminal narrowing at any level.   I have personally reviewed the examination and initial interpretation and I agree with the findings.   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 100 each 3    aspirin 81 MG EC tablet Take 81 mg by mouth daily      atorvastatin (LIPITOR) 20 MG tablet Take 1 tablet (20 mg) by mouth At Bedtime 90 tablet 3    blood glucose (NO BRAND SPECIFIED) test strip Use to test blood sugar 3 times daily or as directed. To accompany: Blood Glucose Monitor Brands: per insurance. 100 strip 6    blood glucose calibration (NO BRAND SPECIFIED) solution To accompany: Blood Glucose Monitor Brands: per insurance. 4 each 0    blood glucose monitoring (NO BRAND SPECIFIED) meter device kit Use to test blood sugar 3 times daily or as directed. Preferred blood glucose meter OR supplies to accompany: Blood Glucose Monitor Brands: per insurance. 1 kit 0    Continuous Blood Gluc  (FREESTYLE SEGUNDO 2 READER) MARCIA Use to read blood sugars as per 's instructions. 1 each 0    Continuous Blood Gluc Sensor (FREESTYLE SEGUNDO 2 SENSOR) MISC 1 each every 14 days Use 1 sensor every 14 days. Use to read blood sugars per 's instructions. 2 each 5    empagliflozin (JARDIANCE) 25 MG TABS tablet Take 1 tablet (25 mg) by mouth daily 90 tablet 3    insulin glargine (LANTUS SOLOSTAR) 100 UNIT/ML pen Inject 24 Units Subcutaneous every morning Place on file 30 mL 1    insulin pen needle (FIFTY50 PEN NEEDLES) 32G X 4 MM miscellaneous Use one pen needle daily or as directed. 100 each 2    LENalidomide (REVLIMID) 10 MG CAPS capsule Take 1 capsule (10 mg) by mouth daily for 28 days 28 capsule 0    levothyroxine (SYNTHROID/LEVOTHROID) 200 MCG tablet Take 1 tablet (200 mcg) by mouth every morning (before breakfast) 90 tablet 3    loperamide (IMODIUM A-D) 2 MG tablet Take 1 tablet (2 mg) by mouth daily as needed for diarrhea Start Imodium 2 pills with first loose stool and then 1 pill with each loose stool after, aiming for 1-2 stools  per day. Max of 8 pills a day. 180 tablet 3    loperamide (IMODIUM) 2 MG capsule Take 4 mg (two capsules) by mouth at onset of loose stools, followed by 2 mg (one capsule) after each loose stool. Maximum: 16 mg (8 capsules) daily 270 capsule 11    losartan (COZAAR) 25 MG tablet Take 1 tablet (25 mg) by mouth daily 90 tablet 3    penicillin V (VEETID) 500 MG tablet Take 500 mg by mouth 4 times daily      pregabalin (LYRICA) 100 MG capsule Take 1 capsule (100 mg) by mouth 2 times daily 180 capsule 3    propranolol ER (INDERAL LA) 60 MG 24 hr capsule Take 1 capsule (60 mg) by mouth daily 90 capsule 3    Semaglutide, 2 MG/DOSE, (OZEMPIC, 2 MG/DOSE,) 8 MG/3ML pen Inject 2 mg Subcutaneous every 7 days 9 mL 3    sertraline (ZOLOFT) 100 MG tablet Take 1 tablet (100 mg) by mouth daily 90 tablet 3    thin (NO BRAND SPECIFIED) lancets Use with lanceting device. To accompany: Blood Glucose Monitor Brands: per insurance. (Patient not taking: Reported on 6/26/2023) 200 each 6    triamcinolone (KENALOG) 0.1 % external cream Apply topically 2 times daily 60 g 3        No Known Allergies    Video physical exam  General: Patient appears well in no acute distress.   Skin: No visualized rash or lesions on visualized skin  Eyes: EOMI, no erythema, sclera icterus or discharge noted  Resp: Appears to be breathing comfortably without accessory muscle usage, speaking in full sentences, no cough  MSK: Appears to have normal range of motion based on visualized movements  Neurologic: No apparent tremors, facial movements symmetric  Psych: affect bright, alert and oriented        Labs:     Blood Counts       Recent Labs   Lab Test 01/22/24  1017 12/20/23  1026 11/20/23  1127 10/16/23  1020   HGB 14.0 14.5 14.7 14.7   HCT 42.2 42.3 44.1 44.3   WBC 2.7* 3.0* 3.1* 3.7*   ANEUTAUTO 1.5* 1.8 1.7 2.2   ALYMPAUTO 0.6* 0.6* 0.8 0.8   AMONOAUTO 0.3 0.3 0.3 0.4   AEOSAUTO 0.2 0.3 0.2 0.3   ABSBASO 0.1 0.1 0.1 0.1   NRBCMAN 0.0 0.0  --  0.0   *  112* 146* 132*       ABO/RH    No lab results found.      Chemistries     Basic Panel  Recent Labs   Lab Test 01/22/24  1017 12/20/23  1026 11/20/23  1127    141 139   POTASSIUM 4.5 4.3 4.2   CHLORIDE 102 104 101   CO2 24 28 25   BUN 13.9 12.8 17.3   CR 0.63 0.78 0.70   * 142* 150*        Calcium, Magnesium, Phosphorus  Recent Labs   Lab Test 01/22/24  1017 12/20/23  1026 11/20/23  1127   ASHLEY 9.5 9.9 10.4*        LFTs  Recent Labs   Lab Test 01/22/24  1017 12/20/23  1026 11/20/23  1127   BILITOTAL 1.2 1.9* 1.4*   ALKPHOS 122 123 178*   AST 23 17 19   ALT 44 29 28   ALBUMIN 4.3 4.4 4.6       LDH  No lab results found.    B2-Microglobulin  Recent Labs   Lab Test 02/18/20  0849   [id number] 1.6           Immunoglobulins     Recent Labs   Lab Test 01/22/24  1017 12/20/23  1026 11/20/23  1127 07/24/23  1009 06/19/23  1049 03/20/23  1411   * 590* 687 583* 592* 661       Recent Labs   Lab Test 01/22/24  1017 12/20/23  1026 11/20/23  1127 07/24/23  1009 06/19/23  1049 03/20/23  1411    139 164 131 135 157       Recent Labs   Lab Test 01/22/24  1017 12/20/23  1026 11/20/23  1127 10/16/23  1020 07/24/23  1009 06/19/23  1049   IGM 42 41 48 48 47 38         Monocloncal Protein Studies     M spike    Recent Labs   Lab Test 01/22/24  1017 12/20/23  1026 11/20/23  1127 10/16/23  1020 09/21/23  0954 07/24/23  1009   ELPM 0.0 0.0 0.1* 0.0 0.0 0.0       [place] FLC    Recent Labs   Lab Test 01/22/24  1017 12/20/23  1026 11/20/23  1127 07/24/23  1009 06/19/23  1049 03/20/23  1411   KFLCA 2.65* 2.34* 2.58* 2.58* 2.63* 3.41*       Lambda FLC    Recent Labs   Lab Test 01/22/24  1017 12/20/23  1026 11/20/23  1127 07/24/23  1009 06/19/23  1049 03/20/23  1411   LFLCA 1.74 1.56 1.66 1.84 1.66 1.70       FLC Ratio    Recent Labs   Lab Test 01/22/24  1017 12/20/23  1026 11/20/23  1127 07/24/23  1009 06/19/23  1049 03/20/23  1411   KLRA 1.52 1.50 1.55 1.40 1.58 2.01*       Assessment/plan:   Winter Loya is a 68 year old  woman with standard risk IgG kappa multiple myeloma, s/p post auto-transplant in April 2019,  currently on lenalidomide maintenance. She had an M spike of 0.1 in Nov which was new, today's myeloma labs are pending. Previously briefly discussed that if she relapses in the near future that she would likely be a candidate for Monumental-3.  Bone survey 11/29/23 shows no definite lytic bone lesions.    Persistently elevated bilirubin led to hepatology consult in October 2023.  Determination was that this is most likely fatty liver disease.  Recommendation was to continue to follow with PCP for management of obesity, DM2, and HTN. Bilirubin is WNL today 01/24/24     Recommended she continue taking Imodium to help manage her diarrhea, also encouraged her to add the Metamucil back in.  If diarrhea hasn't improved at all by next month, would recommend follow-up with the GI team she saw in 2022.    Follow-up with me in 1 month, virtual visit okay.  Labs in [place] the day prior.    32 minutes spent on the date of the encounter doing chart review, review of test results, patient visit, and documentation     FALGUNI Fink [facility]  [address], [place] [id number] [phone]

## 2024-01-24 NOTE — PROGRESS NOTES
AUDIOLOGY REPORT    SUBJECTIVE: Winter Loya, a 68 year old female, was seen in the Audiology Clinic at [facility] today for a   hearing aid fitting. Previous results have revealed a bilateral sensorineural hearing loss. The patient was given medical clearance to pursue amplification by Colton Leone MD.      OBJECTIVE:  Prior to fitting, a hearing aid check was performed to ensure device functionality. The hearing aid conformity evaluation was completed.The hearing aids were placed and they provided a good fit. Real-ear-probe-microphone measurements were completed on the [employer] system and were a good match to NAL-NL2 target with soft sounds audible, moderate sounds comfortable, and loud sounds below discomfort. UCLs are verified through maximum power output measures and demonstrate appropriate limiting of loud inputs. Ms. Loya was oriented to proper hearing aid use, care, cleaning (no water, dry brush), batteries (size rechargeable, insertion/removal, toxicity, low-battery signal), aid insertion/removal, user booklet, warranty information, storage cases, and other hearing aid details. The patient confirmed understanding of hearing aid use and care, and showed proper insertion of hearing aid and batteries while in the office today. Ms. Loya reported good volume and sound quality today.    EAR(S) FIT:    MA HEARING AID MAKE: Right: Phonak Audeo L70-R AMRISELA; Left: Phonak Audeo L70-R MARISELA    MA HEARING AID MODEL #: Right: [id number]; Left: [id number]  HEARING AID STYLE: Right: MARISELA; Left: MARISELA  DOME SIZE: Right:  medium closed; Left::  medium closed   LENGTH: Right:  2M; Left:  2M  SERIAL NUMBERS: Right: [id number]; Left: [id number]  WARRANTY END DATE: Right: 2/8/2027; Left:: 2/8/2027       ASSESSMENT: Binaural hearing aid fitting completed today. Verification measures were performed. The 45 day trial period was explained to patient, and they expressed understanding. Ms. Loya signed  the Hearing Aid Purchase Agreement and was given a copy, as well as details on her hearing aids. Patient was counseled that exact out of pocket amounts cannot be determined for hearing aid claims being sent to insurance. Any insurance coverage information presented to the patient is an estimate only, and is not a guarantee of payment. Patient has been advised to check with their own insurance.    PLAN: Ms. Loya will return for follow-up in 2-3 weeks for a hearing aid review appointment. Please call this clinic with questions regarding today s appointment.    Lee Jackson, [facility]  Licensed Audiologist  [place] #[id number]      01/24/24

## 2024-01-25 DIAGNOSIS — Z79.4 TYPE 2 DIABETES MELLITUS WITH DIABETIC POLYNEUROPATHY, WITH LONG-TERM CURRENT USE OF INSULIN (H): Primary | ICD-10-CM

## 2024-01-25 DIAGNOSIS — E11.42 TYPE 2 DIABETES MELLITUS WITH DIABETIC POLYNEUROPATHY, WITH LONG-TERM CURRENT USE OF INSULIN (H): Primary | ICD-10-CM

## 2024-01-30 ENCOUNTER — TELEPHONE (OUTPATIENT)
Dept: ONCOLOGY | Facility: CLINIC | Age: 69
End: 2024-01-30

## 2024-01-30 NOTE — PROGRESS NOTES
CLINICAL NUTRITION SERVICES- ONCOLOGY DISTRESS SCREENING     Identified Concern and Score From Distress Screenin. How concerned are you about your ability to eat? :  0  2. How concerned are you about unintended weight loss or your current weight? : 9     Date of Distress Screenin/24     Findings: Phone call with Winter. Patient declined nutrition visit at this time.      Follow-up Required: As needed.     Kathryn Liu RD, LD  [phone]

## 2024-01-31 ENCOUNTER — HOSPITAL ENCOUNTER (OUTPATIENT)
Dept: MRI IMAGING | Facility: CLINIC | Age: 69
Discharge: HOME | End: 2024-01-31
Attending: OTOLARYNGOLOGY | Admitting: OTOLARYNGOLOGY
Payer: COMMERCIAL

## 2024-01-31 DIAGNOSIS — H90.3 SNHL (SENSORY-NEURAL HEARING LOSS), ASYMMETRICAL: ICD-10-CM

## 2024-01-31 DIAGNOSIS — C90.01 MULTIPLE MYELOMA IN REMISSION (H): Chronic | ICD-10-CM

## 2024-01-31 PROCEDURE — A9585 GADOBUTROL INJECTION: HCPCS | Performed by: OTOLARYNGOLOGY

## 2024-01-31 PROCEDURE — 255N000002 HC RX 255 OP 636: Performed by: OTOLARYNGOLOGY

## 2024-01-31 PROCEDURE — 70553 MRI BRAIN STEM W/O & W/DYE: CPT

## 2024-01-31 RX ORDER — GADOBUTROL 604.72 MG/ML
10 INJECTION INTRAVENOUS ONCE
Status: COMPLETED | OUTPATIENT
Start: 2024-01-31 | End: 2024-01-31

## 2024-01-31 RX ADMIN — GADOBUTROL 10 ML: 604.72 INJECTION INTRAVENOUS at 19:21

## 2024-02-05 ENCOUNTER — TELEPHONE (OUTPATIENT)
Dept: PHARMACY | Facility: CLINIC | Age: 69
End: 2024-02-05
Payer: COMMERCIAL

## 2024-02-05 ENCOUNTER — TELEPHONE (OUTPATIENT)
Dept: ONCOLOGY | Facility: CLINIC | Age: 69
End: 2024-02-05
Payer: COMMERCIAL

## 2024-02-05 DIAGNOSIS — C90.01 MULTIPLE MYELOMA IN REMISSION (H): Primary | ICD-10-CM

## 2024-02-05 DIAGNOSIS — G93.9 BRAIN LESION: ICD-10-CM

## 2024-02-05 RX ORDER — LENALIDOMIDE 10 MG/1
10 CAPSULE ORAL DAILY
Qty: 28 CAPSULE | Refills: 0 | Status: SHIPPED | OUTPATIENT
Start: 2024-02-05 | End: 2024-04-01

## 2024-02-05 NOTE — TELEPHONE ENCOUNTER
Oral Chemotherapy Monitoring Program    Medication: Revlimid  Rx:  10 mg PO daily for a 28 day cycle    Auth #: [id number]  Risk Category: Adult female NOT of reproductive capacity    Routine survey questions reviewed.    Thank you,    Mary Franklin  Oncology Pharmacy Liaison LUCINDA [email or web address]  Phone: [phone]  Fax: [phone]

## 2024-02-05 NOTE — TELEPHONE ENCOUNTER
Called to schedule MTM appt, no answer, LVM.     Vonnie Corrigan, PharmD  Medication Therapy Management Pharmacist  [phone]  [facility]: [phone]

## 2024-02-05 NOTE — PROGRESS NOTES
Referral to neurosurgery given MRI brain findings of a 1.5 cm T2 hyperintensity of the temporal lobe.

## 2024-02-06 ENCOUNTER — TELEPHONE (OUTPATIENT)
Dept: FAMILY MEDICINE | Facility: CLINIC | Age: 69
End: 2024-02-06
Payer: COMMERCIAL

## 2024-02-06 DIAGNOSIS — C90.01 MULTIPLE MYELOMA IN REMISSION (H): Primary | ICD-10-CM

## 2024-02-06 NOTE — TELEPHONE ENCOUNTER
MTM referral from: [facility] visit (referral by provider)    MTM referral outreach attempt #2 on February 6, 2024 at 9:41 AM      Outcome: Patient not reachable after several attempts, will route to MTM Pharmacist/Provider as an FYI.  MT scheduling number is .  Thank you for the referral.    Use [facility] part d map  for the carrier/Plan on the flowsheet      [employer] Message Sent    Bambi Sharpe - [facility]

## 2024-02-07 PROBLEM — M25.562 BILATERAL CHRONIC KNEE PAIN: Status: RESOLVED | Noted: 2023-11-21 | Resolved: 2024-02-07

## 2024-02-07 PROBLEM — G89.29 BILATERAL CHRONIC KNEE PAIN: Status: RESOLVED | Noted: 2023-11-21 | Resolved: 2024-02-07

## 2024-02-07 PROBLEM — M25.561 BILATERAL CHRONIC KNEE PAIN: Status: RESOLVED | Noted: 2023-11-21 | Resolved: 2024-02-07

## 2024-02-07 NOTE — PROGRESS NOTES
DISCHARGE  Reason for Discharge: Patient has failed to schedule further appointments.    Equipment Issued: None    Discharge Plan: Patient to continue home program.    Referring Provider:  Montse Bucio       12/12/23 0500   Appointment Info   Signing clinician's name / credentials Tahir Nichols DPT   Total/Authorized Visits 8 (POC) (E&T)   Visits Used 3   Medical Diagnosis Chronic pain of both knees / Primary osteoarthritis of both knees   PT Tx Diagnosis Chronic bilateral knee pain   Precautions/Limitations Thrombocytopenia / Polyneuropathy / DM 2   Other pertinent information NEXT: Check Step ups and progresss / Mini squats / Mini lunges / Joint mobs   Quick Adds Certification   Progress Note/Certification   Start of Care Date 11/21/23   Onset of illness/injury or Date of Surgery 11/05/23  (Date of order)   Therapy Frequency 1 time per week   Predicted Duration 8 weeks   Certification date from 11/21/23   Certification date to 01/16/24   Progress Note Due Date 01/16/24   Progress Note Completed Date 11/21/23   GOALS   PT Goals 2   PT Goal 1   Goal Identifier Balance   Goal Description Pt will improve balance and score a 46/56 or higher on the Serna Balance Scale to improve safety at home and in the community.   Target Date 01/16/24   PT Goal 2   Goal Identifier Stairs   Goal Description Pt will be able to ascend and descend 1 flight of stairs with a handrail assist safely with 1-2/10.   Target Date 01/16/24   Subjective Report   Subjective Report Knees are a little better. Stairs are still difficult to do.   Objective Measures   Objective Measures Objective Measure 1   Objective Measure 1   Objective Measure Palpation   Details Hypomobility of tibiofemoral joint and tibiofibular joint   Treatment Interventions (PT)   Interventions Therapeutic Procedure/Exercise;Manual Therapy   Therapeutic Procedure/Exercise   Therapeutic Procedures: strength, endurance, ROM, flexibillity minutes (78973) 24   Therapeutic  Procedures Ther Proc 2;Ther Proc 3;Ther Proc 4;Ther Proc 5;Ther Proc 6   Ther Proc 1 Monster walks x8 lengths with YTB   Ther Proc 2 Step ups x15 B on 4 inch step and x15 B on 6 inch step   Ther Proc 3 Education to perform all exercises in a pain free range   Ther Proc 5 Seated hamstring stretch 2x30 second holds B   Ther Proc 6 Standing marching x15 B with UE assist   Manual Therapy   Manual Therapy: Mobilization, MFR, MLD, friction massage minutes (46546) 14   Manual Therapy Manual Therapy 2;Manual Therapy 3   Manual Therapy 1 AP tibiofibular joint mobilizations grades 1-3 to improve motion and reduce pain   Manual Therapy 2 AP tibiofemoral joint mobilizations grades 1-3 to improve motion and reduce pain   Patient Response/Progress Improved motion   Manual Therapy 3 Patellar mobilizations to improve motion   Education   Learner/Method Patient;No Barriers to Learning   Plan   Home program PTRx (phone)   Total Session Time   Timed Code Treatment Minutes 38   Total Treatment Time (sum of timed and untimed services) 38

## 2024-02-09 ENCOUNTER — OFFICE VISIT (OUTPATIENT)
Dept: AUDIOLOGY | Facility: CLINIC | Age: 69
End: 2024-02-09
Payer: COMMERCIAL

## 2024-02-09 DIAGNOSIS — H90.3 BILATERAL SENSORINEURAL HEARING LOSS: Primary | ICD-10-CM

## 2024-02-09 PROCEDURE — V5299 HEARING SERVICE: HCPCS | Performed by: AUDIOLOGIST

## 2024-02-09 NOTE — PROGRESS NOTES
AUDIOLOGY REPORT    SUBJECTIVE:Winter Loya is a 68 year old female who was seen in the Audiology Clinic at the [facility] on 2/9/2024  for a follow-up check regarding the fitting of new hearing aids. Previous results have revealed bilateral sensorineural hearing loss.  The patient has been seen previously in this clinic and was fit with binaural Phonak Audeo L70-R hearing aids on 1/24/2024.  Winter reports that she has been doing well with the hearing aids and has no concerns about sound or charging. She would like to go over care and maintenance again. She is not interested in pairing the hearing aids to her phone at this time but notes that she has not been hearing very will  with the phone.     OBJECTIVE:   The International Outcome Inventory-Hearing Aids (IOI-HA) was administered today.The patient s responses to the 7 questions can be compared to normative data relative to how others are performing with their hearing aids, as well as focusing audiologic care and counseling.This patient s Quality of Life score (Question 7) was 5, which is above normative average.     Based on patient report, no programming changes were made. Discussed how to hear better on the phone, such as making sure the phone speaker is lined up with the hearing aid microphones at the top of the ear. Discussed the option to pair the hearing aids with the patient's phone.    Reviewed 45 day trial period, care, cleaning (wax guards, brush), batteries (rechargeable,  use), aid insertion/removal, volume adjustment (if applicable), user booklet, warranty information, storage cases, and other hearing aid details.     No charge visit today (in warranty hearing aid check).    ASSESSMENT: A follow-up appointment for hearing aid fitting was completed today. IOI-HA administered today. Changes to hearing aid was completed as outlined above.     PLAN:Winter will return for follow-up as needed, or at least every 9-12 months for  cleaning and assessment of hearing aid.  The patient was counseled on the availability of hearing aid drop off services and problems that are appropriate for a drop off versus an in-person hearing aid check. Please call this clinic with any questions regarding today s appointment.    Lee Hammond, CCC-A  [place] Licensed Audiologist #[id number]  2/9/2024

## 2024-02-09 NOTE — CONFIDENTIAL NOTE
NEUROSURGERY- NEW PREVISIT PLANNING       Record Status/Location     Referring Provider Referral Colton Leone MD    Diagnosis Referral C90.01 (ICD-10-CM) - Multiple myeloma in remission (H)   G93.9 (ICD-10-CM) - Brain lesion      MRI (HEAD, NECK, SPINE) Pacs Brain 1/31/24 [facility]   CT Na    X-ray Na    INJECTION Na    PHYSICAL THERAPY Na    SURGERY Na

## 2024-02-14 ENCOUNTER — TELEPHONE (OUTPATIENT)
Dept: NEUROSURGERY | Facility: CLINIC | Age: 69
End: 2024-02-14
Payer: COMMERCIAL

## 2024-02-14 NOTE — TELEPHONE ENCOUNTER
Called patient to confirm appointment with Tae Menendez MD in our [place] location on 2/15/24.

## 2024-02-15 ENCOUNTER — PRE VISIT (OUTPATIENT)
Dept: NEUROSURGERY | Facility: CLINIC | Age: 69
End: 2024-02-15

## 2024-02-15 ENCOUNTER — OFFICE VISIT (OUTPATIENT)
Dept: NEUROSURGERY | Facility: CLINIC | Age: 69
End: 2024-02-15
Attending: OTOLARYNGOLOGY
Payer: COMMERCIAL

## 2024-02-15 VITALS
WEIGHT: 250 LBS | DIASTOLIC BLOOD PRESSURE: 72 MMHG | SYSTOLIC BLOOD PRESSURE: 110 MMHG | BODY MASS INDEX: 37.03 KG/M2 | HEIGHT: 69 IN | OXYGEN SATURATION: 99 % | HEART RATE: 54 BPM

## 2024-02-15 DIAGNOSIS — C90.01 MULTIPLE MYELOMA IN REMISSION (H): ICD-10-CM

## 2024-02-15 DIAGNOSIS — G93.9 BRAIN LESION: ICD-10-CM

## 2024-02-15 PROCEDURE — 99243 OFF/OP CNSLTJ NEW/EST LOW 30: CPT | Performed by: NEUROLOGICAL SURGERY

## 2024-02-15 ASSESSMENT — PAIN SCALES - GENERAL: PAINLEVEL: NO PAIN (0)

## 2024-02-15 NOTE — PATIENT INSTRUCTIONS
Ordered a repeat brain MRI w/wo contrast in 3 months. They will call to schedule. If the don't call within 2 days, call [place]: [phone]  [phone]       [place]: [phone]     You should schedule this within one week. We will call you with the results. If you don't hear from us 7 days after the scan, please call us.    [facility] -[place]  [facility] - [address] [address]  Telephone:  [phone]        Fax:  [phone]

## 2024-02-15 NOTE — PROGRESS NOTES
"Winter Loya is a 68 year old female who presents for:  Chief Complaint   Patient presents with    Consult For     Multiple myeloma in remission   Brain lesion           Initial Vitals:  /72   Pulse 54   Ht 1.753 m (5' 9\")   Wt 113.4 kg (250 lb)   SpO2 99%   BMI 36.92 kg/m   Estimated body mass index is 36.92 kg/m  as calculated from the following:    Height as of this encounter: 1.753 m (5' 9\").    Weight as of this encounter: 113.4 kg (250 lb).. Body surface area is 2.35 meters squared. BP completed using cuff size: X-large  No Pain (0)    Nursing Comments:     Crystal Yi MA    "

## 2024-02-15 NOTE — LETTER
2/15/2024         RE: Winter Loya  [address]  [place] [address]        Dear Colleague,    Thank you for referring your patient, Winter Loya, to the [facility]UROLOGICAL [facility]IC [facility]. Please see a copy of my visit note below.    I was asked by Dr. Leone to see this patient in consultation    68 year old female with 1.5 cm right anterior temporal T2 hyper-intense lesion.  Has been worked up for hearing issues, and had MR Brain for evaluation of asymmetric hearing loss.  Denies headache and seizures.  Notes memory loss.  History of Myeloma s/p bone marrow transplant at [facility], currently on maintenance treatment.  MR Brain, personally reviewed, with non-enhancing, non-specific 1.5 cm T2 hyper-intense right temporal lesion, as well as microvascular change and a small chronic right frontal infarct.       Past Medical History:   Diagnosis Date     Abnormal EMG 2021 5/25/2021    Interpretation: This is an abnormal study, demonstrating electrophysiologic evidence of a length-dependent axonal sensorimotor polyneuropathy. Asymmetry of peroneal compound muscle action potential amplitudes is a finding of uncertain significance.        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   I have personally reviewed the examination and initial interpretation and I agree with the findings.   ANNE GOODMAN MD     History of MRI of cervical spine 6/2020 3/15/2021    Impression: Multilevel cervical spondylosis, most pronounced at the level of C4-5 and C5-6 with moderate to severe spinal canal stenosis and moderate spinal canal stenosis at C6-7. No abnormal cord signal. No significant neural  foraminal narrowing at any level.   I have personally reviewed the examination and initial interpretation and I agree with the findings.   ANNE GOODMAN MD     History of peripheral stem cell transplant (H) 2020     History of smoking      Hyperlipidemia      Multiple myeloma in remission (H)      Nonsenile cataract      Obesity      Sensory polyneuropathy 2021     Sleep apnea     no c-pap use     Status post complete thyroidectomy 2020     Thyroid goiter 2020     Tremor 2020     Past Surgical History:   Procedure Laterality Date     ARTHROSCOPY KNEE Left 2014     ARTHROSCOPY KNEE Right 2010    KNEE ARTHROSCOPY Dec 2010  Right     CHOLECYSTECTOMY       COLONOSCOPY N/A 2020    Procedure: COLONOSCOPY;  Surgeon: Za Denis MD;  Location: [facility] OR     COLONOSCOPY N/A 2022    Procedure: COLONOSCOPY with biopsies;  Surgeon: Jose Bangura MD;  Location: [facility] OR     ENDOSCOPIC ULTRASOUND UPPER GASTROINTESTINAL TRACT (GI) N/A 2022    Procedure: ENDOSCOPIC ULTRASOUND, ESOPHAGOSCOPY with biopsies / UPPER GASTROINTESTINAL TRACT (GI);  Surgeon: Jose Bangura MD;  Location: [facility] OR     EYE SURGERY      lasersx-spot behind eye started leaking     THYROIDECTOMY N/A 2020    Procedure: THYROIDECTOMY, TOTAL;  Surgeon: Tiff Burgos MD;  Location: [facility] OR     TONSILLECTOMY       TUBAL LIGATION       Social History     Socioeconomic History     Marital status:      Spouse name: Not on file     Number of children: 3     Years of education: Not on file     Highest education level: Not on file   Occupational History     Occupation: alcohol and drug counselor   Tobacco Use     Smoking status: Former     Packs/day: 1     Types: Cigarettes     Quit date:      Years since quittin.1     Smokeless tobacco: Never   Vaping Use     Vaping Use: Never used   Substance and Sexual Activity     Alcohol use: Not Currently     Comment: occasional      Drug use: Never     Sexual activity: Not Currently     Partners: Male   Other Topics Concern     Not on file   Social History Narrative     Not on file     Social Determinants of Health     Financial Resource Strain: Low Risk  (11/2/2023)    Financial Resource Strain      Within the past 12 months, have you or your family members you live with been unable to get utilities (heat, electricity) when it was really needed?: No   Food Insecurity: High Risk (11/2/2023)    Food Insecurity      Within the past 12 months, did you worry that your food would run out before you got money to buy more?: Yes      Within the past 12 months, did the food you bought just not last and you didn t have money to get more?: Yes   Transportation Needs: Low Risk  (11/2/2023)    Transportation Needs      Within the past 12 months, has lack of transportation kept you from medical appointments, getting your medicines, non-medical meetings or appointments, work, or from getting things that you need?: No   Physical Activity: Inactive (5/23/2023)    Exercise Vital Sign      Days of Exercise per Week: 0 days      Minutes of Exercise per Session: 0 min   Stress: Stress Concern Present (5/23/2023)    [place] [employer] - Occupational Stress Questionnaire      Feeling of Stress : To some extent   Social Connections: Unknown (5/23/2023)    Social Connection and Isolation Panel [NHANES]      Frequency of Communication with Friends and Family: Once a week      Frequency of Social Gatherings with Friends and Family: Once a week      Attends Christian Services: Not on file      Active Member of Clubs or Organizations: No      Attends Club or Organization Meetings: Not on file      Marital Status: Not on file   Interpersonal Safety: Not At Risk (6/20/2023)    Humiliation, Afraid, Rape, and Kick questionnaire      Fear of Current or Ex-Partner: No      Emotionally Abused: No      Physically Abused: No      Sexually Abused: No   Housing  "Stability: Low Risk  (11/2/2023)    Housing Stability      Do you have housing? : Yes      Are you worried about losing your housing?: No     Family History   Problem Relation Age of Onset     Chronic Obstructive Pulmonary Disease Mother      Substance Abuse Mother      Alcoholism Mother      Diabetes Mother      Parkinsonism Father         bed bound, stiffness. no dementia or hallucinatinos     Diabetes Brother      Arrhythmia Brother      Diabetes Brother      Gastrointestinal Disease Brother      Glaucoma Paternal Grandfather      Leukemia Son      Macular Degeneration No family hx of      Colon Cancer No family hx of      Liver Disease No family hx of         ROS: 10 point ROS neg other than the symptoms noted above in the HPI.    Physical Exam  /72   Pulse 54   Ht 1.753 m (5' 9\")   Wt 113.4 kg (250 lb)   SpO2 99%   BMI 36.92 kg/m    HEENT:  Normocephalic, atraumatic.  PERRLA.  EOM s intact.  Visual fields full to gross exam  Neck:  Supple, non-tender, without lymphadenopathy.  Heart:  No peripheral edema  Lungs:  No SOB  Abdomen:  Non-distended.   Skin:  Warm and dry.  Extremities:  No edema, cyanosis or clubbing.  Psychiatric:  No apparent distress  Musculoskeletal:  Normal bulk and tone    NEUROLOGICAL EXAMINATION:     Mental status:  Alert and Oriented x 3, speech is fluent.  Cranial nerves:  II-XII intact.   Motor:    Shoulder Abduction:  Right:  5/5   Left:  5/5  Biceps:                      Right:  5/5   Left:  5/5  Triceps:                     Right:  5/5   Left:  5/5  Wrist Extensors:       Right:  5/5   Left:  5/5  Wrist Flexors:           Right:  5/5   Left:  5/5  interosseus :            Right:  5/5   Left:  5/5  Hip Flexion:                Right: 5/5  Left:  5/5  Quadriceps:             Right:  5/5  Left:  5/5  Hamstrings:             Right:  5/5  Left:  5/5  Gastroc Soleus:        Right:  5/5  Left:  5/5  Tib/Ant:                      Right:  5/5  Left:  5/5  EHL:                     " Right:  5/5  Left:  5/5  Sensation:  Intact  Reflexes:  Negative Babinski.  Negative Clonus.  Negative Matthews's.  Coordination:  Smooth finger to nose testing.   Negative pronator drift.  Smooth tandem walking.    A/P:  68 year old female with 1.5 cm right anterior temporal T2 hyper-intense lesion    I had a discussion with the patient, reviewing the history, symptoms, and imaging  Reviewed differential for lesion, including low grade glioma, scar tissue from prior injury, or related to microvascular disease  Plan for repeat MRI in 3 months for surveillance          Again, thank you for allowing me to participate in the care of your patient.        Sincerely,        Tae Menendez MD

## 2024-02-15 NOTE — PROGRESS NOTES
I was asked by Dr. Leone to see this patient in consultation    68 year old female with 1.5 cm right anterior temporal T2 hyper-intense lesion.  Has been worked up for hearing issues, and had MR Brain for evaluation of asymmetric hearing loss.  Denies headache and seizures.  Notes memory loss.  History of Myeloma s/p bone marrow transplant at [facility], currently on maintenance treatment.  MR Brain, personally reviewed, with non-enhancing, non-specific 1.5 cm T2 hyper-intense right temporal lesion, as well as microvascular change and a small chronic right frontal infarct.       Past Medical History:   Diagnosis Date    Abnormal EMG 2021 5/25/2021    Interpretation: This is an abnormal study, demonstrating electrophysiologic evidence of a length-dependent axonal sensorimotor polyneuropathy. Asymmetry of peroneal compound muscle action potential amplitudes is a finding of uncertain significance.       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   I have personally reviewed the examination and initial interpretation and I agree with the findings.   ANNE GOODMAN MD    History of MRI of cervical spine 6/2020 3/15/2021    Impression: Multilevel cervical spondylosis, most pronounced at the level of C4-5 and C5-6 with moderate to severe spinal canal stenosis and moderate spinal canal stenosis at C6-7. No abnormal cord signal. No significant neural foraminal narrowing at any level.   I have personally reviewed the examination and initial interpretation and I agree with the findings.   ANNE GOODMAN MD    History of peripheral stem cell transplant (H) 12/9/2020    History of smoking     Hyperlipidemia      Multiple myeloma in remission (H)     Nonsenile cataract     Obesity     Sensory polyneuropathy 2021    Sleep apnea     no c-pap use    Status post complete thyroidectomy 2020    Thyroid goiter 2020    Tremor 2020     Past Surgical History:   Procedure Laterality Date    ARTHROSCOPY KNEE Left 2014    ARTHROSCOPY KNEE Right 2010    KNEE ARTHROSCOPY Dec 2010  Right    CHOLECYSTECTOMY  2002    COLONOSCOPY N/A 2020    Procedure: COLONOSCOPY;  Surgeon: Za Denis MD;  Location: [facility]    COLONOSCOPY N/A 2022    Procedure: COLONOSCOPY with biopsies;  Surgeon: Jose Bangura MD;  Location: [facility] OR    ENDOSCOPIC ULTRASOUND UPPER GASTROINTESTINAL TRACT (GI) N/A 2022    Procedure: ENDOSCOPIC ULTRASOUND, ESOPHAGOSCOPY with biopsies / UPPER GASTROINTESTINAL TRACT (GI);  Surgeon: Jose Bangura MD;  Location: [facility] OR    EYE SURGERY      lasersx-spot behind eye started leaking    THYROIDECTOMY N/A 2020    Procedure: THYROIDECTOMY, TOTAL;  Surgeon: Tiff Burgos MD;  Location: [facility] OR    TONSILLECTOMY      TUBAL LIGATION       Social History     Socioeconomic History    Marital status:      Spouse name: Not on file    Number of children: 3    Years of education: Not on file    Highest education level: Not on file   Occupational History    Occupation: alcohol and drug counselor   Tobacco Use    Smoking status: Former     Packs/day: 1     Types: Cigarettes     Quit date:      Years since quittin.1    Smokeless tobacco: Never   Vaping Use    Vaping Use: Never used   Substance and Sexual Activity    Alcohol use: Not Currently     Comment: occasional    Drug use: Never    Sexual activity: Not Currently     Partners: Male   Other Topics Concern    Not on file   Social History Narrative    Not on file     Social Determinants of Health     Financial Resource Strain: Low Risk  (2023)    Financial Resource Strain     Within the past 12  months, have you or your family members you live with been unable to get utilities (heat, electricity) when it was really needed?: No   Food Insecurity: High Risk (11/2/2023)    Food Insecurity     Within the past 12 months, did you worry that your food would run out before you got money to buy more?: Yes     Within the past 12 months, did the food you bought just not last and you didn t have money to get more?: Yes   Transportation Needs: Low Risk  (11/2/2023)    Transportation Needs     Within the past 12 months, has lack of transportation kept you from medical appointments, getting your medicines, non-medical meetings or appointments, work, or from getting things that you need?: No   Physical Activity: Inactive (5/23/2023)    Exercise Vital Sign     Days of Exercise per Week: 0 days     Minutes of Exercise per Session: 0 min   Stress: Stress Concern Present (5/23/2023)    Citizen of [place] of Occupational Health - Occupational Stress Questionnaire     Feeling of Stress : To some extent   Social Connections: Unknown (5/23/2023)    Social Connection and Isolation Panel [NHANES]     Frequency of Communication with Friends and Family: Once a week     Frequency of Social Gatherings with Friends and Family: Once a week     Attends Anabaptism Services: Not on file     Active Member of Clubs or Organizations: No     Attends Club or Organization Meetings: Not on file     Marital Status: Not on file   Interpersonal Safety: Not At Risk (6/20/2023)    Humiliation, Afraid, Rape, and Kick questionnaire     Fear of Current or Ex-Partner: No     Emotionally Abused: No     Physically Abused: No     Sexually Abused: No   Housing Stability: Low Risk  (11/2/2023)    Housing Stability     Do you have housing? : Yes     Are you worried about losing your housing?: No     Family History   Problem Relation Age of Onset    Chronic Obstructive Pulmonary Disease Mother     Substance Abuse Mother     Alcoholism Mother     Diabetes Mother      "Parkinsonism Father         bed bound, stiffness. no dementia or hallucinatinos    Diabetes Brother     Arrhythmia Brother     Diabetes Brother     Gastrointestinal Disease Brother     Glaucoma Paternal Grandfather     Leukemia Son     Macular Degeneration No family hx of     Colon Cancer No family hx of     Liver Disease No family hx of         ROS: 10 point ROS neg other than the symptoms noted above in the HPI.    Physical Exam  /72   Pulse 54   Ht 1.753 m (5' 9\")   Wt 113.4 kg (250 lb)   SpO2 99%   BMI 36.92 kg/m    HEENT:  Normocephalic, atraumatic.  PERRLA.  EOM s intact.  Visual fields full to gross exam  Neck:  Supple, non-tender, without lymphadenopathy.  Heart:  No peripheral edema  Lungs:  No SOB  Abdomen:  Non-distended.   Skin:  Warm and dry.  Extremities:  No edema, cyanosis or clubbing.  Psychiatric:  No apparent distress  Musculoskeletal:  Normal bulk and tone    NEUROLOGICAL EXAMINATION:     Mental status:  Alert and Oriented x 3, speech is fluent.  Cranial nerves:  II-XII intact.   Motor:    Shoulder Abduction:  Right:  5/5   Left:  5/5  Biceps:                      Right:  5/5   Left:  5/5  Triceps:                     Right:  5/5   Left:  5/5  Wrist Extensors:       Right:  5/5   Left:  5/5  Wrist Flexors:           Right:  5/5   Left:  5/5  interosseus :            Right:  5/5   Left:  5/5  Hip Flexion:                Right: 5/5  Left:  5/5  Quadriceps:             Right:  5/5  Left:  5/5  Hamstrings:             Right:  5/5  Left:  5/5  Gastroc Soleus:        Right:  5/5  Left:  5/5  Tib/Ant:                      Right:  5/5  Left:  5/5  EHL:                     Right:  5/5  Left:  5/5  Sensation:  Intact  Reflexes:  Negative Babinski.  Negative Clonus.  Negative Matthews's.  Coordination:  Smooth finger to nose testing.   Negative pronator drift.  Smooth tandem walking.    A/P:  68 year old female with 1.5 cm right anterior temporal T2 hyper-intense lesion    I had a discussion with " the patient, reviewing the history, symptoms, and imaging  Reviewed differential for lesion, including low grade glioma, scar tissue from prior injury, or related to microvascular disease  Plan for repeat MRI in 3 months for surveillance

## 2024-02-20 ENCOUNTER — LAB (OUTPATIENT)
Dept: LAB | Facility: CLINIC | Age: 69
End: 2024-02-20
Payer: COMMERCIAL

## 2024-02-20 DIAGNOSIS — C90.01 MULTIPLE MYELOMA IN REMISSION (H): ICD-10-CM

## 2024-02-20 LAB
ALBUMIN SERPL BCG-MCNC: 4.3 G/DL (ref 3.5–5.2)
ALP SERPL-CCNC: 122 U/L (ref 40–150)
ALT SERPL W P-5'-P-CCNC: ABNORMAL U/L
ANION GAP SERPL CALCULATED.3IONS-SCNC: 17 MMOL/L (ref 7–15)
AST SERPL W P-5'-P-CCNC: ABNORMAL U/L
BASOPHILS # BLD AUTO: 0.1 10E3/UL (ref 0–0.2)
BASOPHILS NFR BLD AUTO: 3 %
BILIRUB SERPL-MCNC: 0.9 MG/DL
BUN SERPL-MCNC: 13.6 MG/DL (ref 8–23)
CALCIUM SERPL-MCNC: 8.9 MG/DL (ref 8.8–10.2)
CHLORIDE SERPL-SCNC: 101 MMOL/L (ref 98–107)
CREAT SERPL-MCNC: 0.72 MG/DL (ref 0.51–0.95)
DEPRECATED HCO3 PLAS-SCNC: 19 MMOL/L (ref 22–29)
EGFRCR SERPLBLD CKD-EPI 2021: >90 ML/MIN/1.73M2
EOSINOPHIL # BLD AUTO: 0.2 10E3/UL (ref 0–0.7)
EOSINOPHIL NFR BLD AUTO: 7 %
ERYTHROCYTE [DISTWIDTH] IN BLOOD BY AUTOMATED COUNT: 13.9 % (ref 10–15)
GLUCOSE SERPL-MCNC: 298 MG/DL (ref 70–99)
HCT VFR BLD AUTO: 44.2 % (ref 35–47)
HGB BLD-MCNC: 14.5 G/DL (ref 11.7–15.7)
IMM GRANULOCYTES # BLD: 0 10E3/UL
IMM GRANULOCYTES NFR BLD: 1 %
LYMPHOCYTES # BLD AUTO: 0.5 10E3/UL (ref 0.8–5.3)
LYMPHOCYTES NFR BLD AUTO: 17 %
MCH RBC QN AUTO: 29.3 PG (ref 26.5–33)
MCHC RBC AUTO-ENTMCNC: 32.8 G/DL (ref 31.5–36.5)
MCV RBC AUTO: 89 FL (ref 78–100)
MONOCYTES # BLD AUTO: 0.3 10E3/UL (ref 0–1.3)
MONOCYTES NFR BLD AUTO: 10 %
NEUTROPHILS # BLD AUTO: 1.8 10E3/UL (ref 1.6–8.3)
NEUTROPHILS NFR BLD AUTO: 62 %
NRBC # BLD AUTO: 0 10E3/UL
NRBC BLD AUTO-RTO: 0 /100
PLATELET # BLD AUTO: 107 10E3/UL (ref 150–450)
POTASSIUM SERPL-SCNC: 4.2 MMOL/L (ref 3.4–5.3)
PROT SERPL-MCNC: 6.4 G/DL (ref 6.4–8.3)
RBC # BLD AUTO: 4.95 10E6/UL (ref 3.8–5.2)
SODIUM SERPL-SCNC: 137 MMOL/L (ref 135–145)
TOTAL PROTEIN SERUM FOR ELP: 6.2 G/DL (ref 6.4–8.3)
WBC # BLD AUTO: 2.8 10E3/UL (ref 4–11)

## 2024-02-20 PROCEDURE — 80048 BASIC METABOLIC PNL TOTAL CA: CPT

## 2024-02-20 PROCEDURE — 84155 ASSAY OF PROTEIN SERUM: CPT

## 2024-02-20 PROCEDURE — 82040 ASSAY OF SERUM ALBUMIN: CPT

## 2024-02-20 PROCEDURE — 85025 COMPLETE CBC W/AUTO DIFF WBC: CPT

## 2024-02-20 PROCEDURE — 84155 ASSAY OF PROTEIN SERUM: CPT | Mod: 59

## 2024-02-20 PROCEDURE — 36415 COLL VENOUS BLD VENIPUNCTURE: CPT

## 2024-02-20 PROCEDURE — 82247 BILIRUBIN TOTAL: CPT

## 2024-02-20 PROCEDURE — 86334 IMMUNOFIX E-PHORESIS SERUM: CPT | Performed by: PATHOLOGY

## 2024-02-20 PROCEDURE — 83521 IG LIGHT CHAINS FREE EACH: CPT

## 2024-02-20 PROCEDURE — 84165 PROTEIN E-PHORESIS SERUM: CPT | Performed by: PATHOLOGY

## 2024-02-20 PROCEDURE — 82784 ASSAY IGA/IGD/IGG/IGM EACH: CPT

## 2024-02-20 PROCEDURE — 84075 ASSAY ALKALINE PHOSPHATASE: CPT

## 2024-02-20 NOTE — PROGRESS NOTES
Virtual Visit Details    Type of service:  Video Visit   Video Start Time: 8:04 AM  Video End Time:8:16 AM    Originating Location (pt. Location): Home    Distant Location (provider location):  Off-site  Platform used for Video Visit: [facility]      ONCOLOGY/HEMATOLOGY PROGRESS NOTE  Feb 21, 2024    Reason for Visit: IgA Kappa Multiple Myeloma    Oncology HPI:   Winter Loya is a 68 year old woman with IgA Kappa Multiple Myeloma. In 2018 she had anemia and was fatigued. She was found to have IgA kappa monocloncal antibody with M-spike of 0.17. IgA elevated. Skeletal survey was unremarkable and UPEP had minimal protein (156 mg/m2). PET 11/2018 showed bone marrow consistent with hypermetabolic plasma cell myeloma. M spike was 0.17. She started RVD and Zometa. On 4/2019 she had an autologous transplant.      Her bone marrow bx from September 2019 was sent here for review. It showed marrow cellularity of 60%, with decreased trilineage hematopoiesis and 15% plasma cells as well as peripheral blood with slight anemia. 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 She is on revlimid maintenance and zometa from her prior oncologist in [place]. On 12/6/19 her K/L ratio is 1.1, M spike is 0.04.     Currently on Revlimid maintenance.    Interval history:   - Diarrhea has gotten better by taking Imodium consistently every morning (2 tabs each morning)  - Continuing to deal with blisters in her mouth after some dental work she had done; she completed a course of antibiotics; she had another procedure done by the oral surgeon and the blisters recurred again.  She and her dentist have been trying to get back in touch with the oral surgeon but he hasn't returned their call.  - She got hearing aides and that has improved her hearing significantly  - Denies any other new pains  - Denies any recent  illnesses      Comprehensive ROS neg other than the symptoms noted above in the HPI.      Past Medical History:   Diagnosis Date    Abnormal EMG 2021 5/25/2021    Interpretation: This is an abnormal study, demonstrating electrophysiologic evidence of a length-dependent axonal sensorimotor polyneuropathy. Asymmetry of peroneal compound muscle action potential amplitudes is a finding of uncertain significance.       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   I have personally reviewed the examination and initial interpretation and I agree with the findings.   ANNE GOODMAN MD    History of MRI of cervical spine 6/2020 3/15/2021    Impression: Multilevel cervical spondylosis, most pronounced at the level of C4-5 and C5-6 with moderate to severe spinal canal stenosis and moderate spinal canal stenosis at C6-7. No abnormal cord signal. No significant neural foraminal narrowing at any level.   I have personally reviewed the examination and initial interpretation and I agree with the findings.   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 100 each 3    aspirin 81  MG EC tablet Take 81 mg by mouth daily      atorvastatin (LIPITOR) 20 MG tablet Take 1 tablet (20 mg) by mouth At Bedtime 90 tablet 3    blood glucose (NO BRAND SPECIFIED) test strip Use to test blood sugar 3 times daily or as directed. To accompany: Blood Glucose Monitor Brands: per insurance. 100 strip 6    blood glucose calibration (NO BRAND SPECIFIED) solution To accompany: Blood Glucose Monitor Brands: per insurance. 4 each 0    blood glucose monitoring (NO BRAND SPECIFIED) meter device kit Use to test blood sugar 3 times daily or as directed. Preferred blood glucose meter OR supplies to accompany: Blood Glucose Monitor Brands: per insurance. 1 kit 0    Continuous Blood Gluc  (FREESTYLE SEGUNDO 2 READER) MARCIA Use to read blood sugars as per 's instructions. 1 each 0    Continuous Blood Gluc Sensor (FREESTYLE SEGUNDO 2 SENSOR) MISC 1 each every 14 days Use 1 sensor every 14 days. Use to read blood sugars per 's instructions. 2 each 5    empagliflozin (JARDIANCE) 25 MG TABS tablet Take 1 tablet (25 mg) by mouth daily 90 tablet 3    insulin glargine (LANTUS SOLOSTAR) 100 UNIT/ML pen Inject 24 Units Subcutaneous every morning Place on file 30 mL 1    insulin pen needle (FIFTY50 PEN NEEDLES) 32G X 4 MM miscellaneous Use one pen needle daily or as directed. 100 each 2    LENalidomide (REVLIMID) 10 MG CAPS capsule Take 1 capsule (10 mg) by mouth daily 28 capsule 0    levothyroxine (SYNTHROID/LEVOTHROID) 200 MCG tablet Take 1 tablet (200 mcg) by mouth every morning (before breakfast) 90 tablet 3    loperamide (IMODIUM A-D) 2 MG tablet Take 1 tablet (2 mg) by mouth daily as needed for diarrhea Start Imodium 2 pills with first loose stool and then 1 pill with each loose stool after, aiming for 1-2 stools per day. Max of 8 pills a day. 180 tablet 3    loperamide (IMODIUM) 2 MG capsule Take 4 mg (two capsules) by mouth at onset of loose stools, followed by 2 mg (one capsule) after each loose  stool. Maximum: 16 mg (8 capsules) daily 270 capsule 11    losartan (COZAAR) 25 MG tablet Take 1 tablet (25 mg) by mouth daily 90 tablet 3    pregabalin (LYRICA) 100 MG capsule Take 1 capsule (100 mg) by mouth 2 times daily 180 capsule 3    propranolol ER (INDERAL LA) 60 MG 24 hr capsule Take 1 capsule (60 mg) by mouth daily 90 capsule 3    sertraline (ZOLOFT) 100 MG tablet Take 1 tablet (100 mg) by mouth daily 90 tablet 3    triamcinolone (KENALOG) 0.1 % external cream Apply topically 2 times daily 60 g 3    thin (NO BRAND SPECIFIED) lancets Use with lanceting device. To accompany: Blood Glucose Monitor Brands: per insurance. (Patient not taking: Reported on 6/26/2023) 200 each 6        No Known Allergies    Video physical exam  General: Patient appears well in no acute distress.   Skin: No visualized rash or lesions on visualized skin  Eyes: EOMI, no erythema, sclera icterus or discharge noted  Resp: Appears to be breathing comfortably without accessory muscle usage, speaking in full sentences, no cough  MSK: Appears to have normal range of motion based on visualized movements  Neurologic: No apparent tremors, facial movements symmetric  Psych: affect bright, alert and oriented        Labs:     Blood Counts       Recent Labs   Lab Test 02/20/24  1026 01/22/24  1017 12/20/23  1026   HGB 14.5 14.0 14.5   HCT 44.2 42.2 42.3   WBC 2.8* 2.7* 3.0*   ANEUTAUTO 1.8 1.5* 1.8   ALYMPAUTO 0.5* 0.6* 0.6*   AMONOAUTO 0.3 0.3 0.3   AEOSAUTO 0.2 0.2 0.3   ABSBASO 0.1 0.1 0.1   NRBCMAN 0.0 0.0 0.0   * 125* 112*       ABO/RH    No lab results found.      Chemistries     Basic Panel  Recent Labs   Lab Test 02/20/24  1026 01/22/24  1017 12/20/23  1026    139 141   POTASSIUM 4.2 4.5 4.3   CHLORIDE 101 102 104   CO2 19* 24 28   BUN 13.6 13.9 12.8   CR 0.72 0.63 0.78   * 258* 142*        Calcium, Magnesium, Phosphorus  Recent Labs   Lab Test 02/20/24  1026 01/22/24  1017 12/20/23  1026   ASHLEY 8.9 9.5 9.9         LFTs  Recent Labs   Lab Test 02/20/24  1026 01/22/24  1017 12/20/23  1026 11/20/23  1127   BILITOTAL 0.9 1.2 1.9* 1.4*   ALKPHOS 122 122 123 178*   AST  --  23 17 19   ALT  --  44 29 28   ALBUMIN 4.3 4.3 4.4 4.6       LDH  No lab results found.    B2-Microglobulin  Recent Labs   Lab Test 02/18/20  0849   [id number] 1.6           Immunoglobulins     Recent Labs   Lab Test 01/22/24  1017 12/20/23  1026 11/20/23  1127 07/24/23  1009 06/19/23  1049 03/20/23  1411   * 590* 687 583* 592* 661       Recent Labs   Lab Test 01/22/24  1017 12/20/23  1026 11/20/23  1127 07/24/23  1009 06/19/23  1049 03/20/23  1411    139 164 131 135 157       Recent Labs   Lab Test 01/22/24  1017 12/20/23  1026 11/20/23  1127 10/16/23  1020 07/24/23  1009 06/19/23  1049   IGM 42 41 48 48 47 38         Monocloncal Protein Studies     M spike    Recent Labs   Lab Test 01/22/24  1017 12/20/23  1026 11/20/23  1127 10/16/23  1020 09/21/23  0954 07/24/23  1009   ELPM 0.0 0.0 0.1* 0.0 0.0 0.0       [place] FLC    Recent Labs   Lab Test 01/22/24  1017 12/20/23  1026 11/20/23  1127 07/24/23  1009 06/19/23  1049 03/20/23  1411   KFLCA 2.65* 2.34* 2.58* 2.58* 2.63* 3.41*       [place] FLC    Recent Labs   Lab Test 01/22/24  1017 12/20/23  1026 11/20/23  1127 07/24/23  1009 06/19/23  1049 03/20/23  1411   LFLCA 1.74 1.56 1.66 1.84 1.66 1.70       FLC Ratio    Recent Labs   Lab Test 01/22/24  1017 12/20/23  1026 11/20/23  1127 07/24/23  1009 06/19/23  1049 03/20/23  1411   KLRA 1.52 1.50 1.55 1.40 1.58 2.01*       Assessment/plan:   Winter Loya is a 68 year old woman with standard risk IgG kappa multiple myeloma, s/p post auto-transplant in April 2019,  currently on lenalidomide maintenance. She had an M spike of 0.1 in Nov which was new, today's myeloma labs are pending. Previously briefly discussed that if she relapses in the near future that she would likely be a candidate for [place]-3.  Bone survey 11/29/23 shows no definite lytic bone  lesions.  She had a brain MRI performed recently during a work-up for hearing loss, and there was a right anterior temporal lesion, so Dr. Zhu would like to repeat a PET, currently scheduled 3/15/24.  She was seen by neurology 2/15/24 for the new lesion and they plan to repeat MRI in 3 months for surveillance.    Persistently elevated bilirubin led to hepatology consult in October 2023.  Determination was that this is most likely fatty liver disease.  Recommendation was to continue to follow with PCP for management of obesity, DM2, and HTN. Bilirubin is WNL today 01/24/24     Diarrhea has improved by taking Imodium consistently in the mornings. Recommend she continue this.    Follow-up with Dr. Zhu in 1 month, then monthly with me (virtual okay).  Labs in [place] the day prior.    28 minutes spent on the date of the encounter doing chart review, review of test results, patient visit, and documentation     FALGUNI Fink [facility]  [address] [place] [address], [place] [address] [phone]

## 2024-02-21 ENCOUNTER — VIRTUAL VISIT (OUTPATIENT)
Dept: ONCOLOGY | Facility: CLINIC | Age: 69
End: 2024-02-21
Attending: STUDENT IN AN ORGANIZED HEALTH CARE EDUCATION/TRAINING PROGRAM
Payer: COMMERCIAL

## 2024-02-21 VITALS — WEIGHT: 250 LBS | HEIGHT: 69 IN | BODY MASS INDEX: 37.03 KG/M2

## 2024-02-21 DIAGNOSIS — C90.01 MULTIPLE MYELOMA IN REMISSION (H): Primary | ICD-10-CM

## 2024-02-21 DIAGNOSIS — R19.7 DIARRHEA, UNSPECIFIED TYPE: ICD-10-CM

## 2024-02-21 LAB
ALBUMIN SERPL ELPH-MCNC: 4.3 G/DL (ref 3.7–5.1)
ALPHA1 GLOB SERPL ELPH-MCNC: 0.2 G/DL (ref 0.2–0.4)
ALPHA2 GLOB SERPL ELPH-MCNC: 0.5 G/DL (ref 0.5–0.9)
B-GLOBULIN SERPL ELPH-MCNC: 0.6 G/DL (ref 0.6–1)
GAMMA GLOB SERPL ELPH-MCNC: 0.5 G/DL (ref 0.7–1.6)
IGA SERPL-MCNC: 131 MG/DL (ref 84–499)
IGG SERPL-MCNC: 495 MG/DL (ref 610–1616)
IGM SERPL-MCNC: <10 MG/DL (ref 35–242)
KAPPA LC FREE SER-MCNC: 2.45 MG/DL (ref 0.33–1.94)
KAPPA LC FREE/LAMBDA FREE SER NEPH: 1.53 {RATIO} (ref 0.26–1.65)
LAMBDA LC FREE SERPL-MCNC: 1.6 MG/DL (ref 0.57–2.63)
LOCATION OF TASK: ABNORMAL
LOCATION OF TASK: NORMAL
M PROTEIN SERPL ELPH-MCNC: 0 G/DL
PROT PATTERN SERPL ELPH-IMP: ABNORMAL
PROT PATTERN SERPL IFE-IMP: NORMAL

## 2024-02-21 PROCEDURE — 99213 OFFICE O/P EST LOW 20 MIN: CPT | Mod: 95 | Performed by: REGISTERED NURSE

## 2024-02-21 NOTE — LETTER
2/21/2024         RE: Winter Loya  [address]  [facility] [address]        Dear Colleague,    Thank you for referring your patient, Winter Loya, to the [facility]. Please see a copy of my visit note below.    Virtual Visit Details    Type of service:  Video Visit   Video Start Time: 8:04 AM  Video End Time:8:16 AM    Originating Location (pt. Location): Home    Distant Location (provider location):  Off-site  Platform used for Video Visit: [facility]      ONCOLOGY/HEMATOLOGY PROGRESS NOTE  Feb 21, 2024    Reason for Visit: IgA Kappa Multiple Myeloma    Oncology HPI:   Winter Loya is a 68 year old woman with IgA Kappa Multiple Myeloma. In 2018 she had anemia and was fatigued. She was found to have IgA kappa monocloncal antibody with M-spike of 0.17. IgA elevated. Skeletal survey was unremarkable and UPEP had minimal protein (156 mg/m2). PET 11/2018 showed bone marrow consistent with hypermetabolic plasma cell myeloma. M spike was 0.17. She started RVD and Zometa. On 4/2019 she had an autologous transplant.      Her bone marrow bx from September 2019 was sent here for review. It showed marrow cellularity of 60%, with decreased trilineage hematopoiesis and 15% plasma cells as well as peripheral blood with slight anemia. 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 She is on revlimid maintenance and zometa from her prior oncologist in [place]. On 12/6/19 her K/L ratio is 1.1, M spike is 0.04.     Currently on Revlimid maintenance.    Interval history:   - Diarrhea has gotten better by taking Imodium consistently every morning (2 tabs each morning)  - Continuing to deal with blisters in her mouth after some dental work she had done; she completed a course of antibiotics; she had another procedure done by the oral surgeon and the blisters recurred  again.  She and her dentist have been trying to get back in touch with the oral surgeon but he hasn't returned their call.  - She got hearing aides and that has improved her hearing significantly  - Denies any other new pains  - Denies any recent illnesses      Comprehensive ROS neg other than the symptoms noted above in the HPI.      Past Medical History:   Diagnosis Date    Abnormal EMG 2021 5/25/2021    Interpretation: This is an abnormal study, demonstrating electrophysiologic evidence of a length-dependent axonal sensorimotor polyneuropathy. Asymmetry of peroneal compound muscle action potential amplitudes is a finding of uncertain significance.       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   I have personally reviewed the examination and initial interpretation and I agree with the findings.   ANNE GOODMAN MD    History of MRI of cervical spine 6/2020 3/15/2021    Impression: Multilevel cervical spondylosis, most pronounced at the level of C4-5 and C5-6 with moderate to severe spinal canal stenosis and moderate spinal canal stenosis at C6-7. No abnormal cord signal. No significant neural foraminal narrowing at any level.   I have personally reviewed the examination and initial interpretation and I agree with the findings.   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    Sleep apnea     no c-pap use    Status post complete thyroidectomy 8/26/2020    Thyroid goiter 8/5/2020    Tremor 12/9/2020        Current  Outpatient Medications   Medication Sig Dispense Refill    acyclovir (ZOVIRAX) 400 MG tablet TAKE ONE TABLET BY MOUTH EVERY TWELVE HOURS 180 tablet 3    alcohol swab prep pads Use to swab area of injection/sowmya as directed. 100 each 3    aspirin 81 MG EC tablet Take 81 mg by mouth daily      atorvastatin (LIPITOR) 20 MG tablet Take 1 tablet (20 mg) by mouth At Bedtime 90 tablet 3    blood glucose (NO BRAND SPECIFIED) test strip Use to test blood sugar 3 times daily or as directed. To accompany: Blood Glucose Monitor Brands: per insurance. 100 strip 6    blood glucose calibration (NO BRAND SPECIFIED) solution To accompany: Blood Glucose Monitor Brands: per insurance. 4 each 0    blood glucose monitoring (NO BRAND SPECIFIED) meter device kit Use to test blood sugar 3 times daily or as directed. Preferred blood glucose meter OR supplies to accompany: Blood Glucose Monitor Brands: per insurance. 1 kit 0    Continuous Blood Gluc  (FREESTYLE SEGUNDO 2 READER) MARCIA Use to read blood sugars as per 's instructions. 1 each 0    Continuous Blood Gluc Sensor (FREESTYLE SEGUNDO 2 SENSOR) [facility] 1 each every 14 days Use 1 sensor every 14 days. Use to read blood sugars per 's instructions. 2 each 5    empagliflozin (JARDIANCE) 25 MG TABS tablet Take 1 tablet (25 mg) by mouth daily 90 tablet 3    insulin glargine (LANTUS SOLOSTAR) 100 UNIT/ML pen Inject 24 Units Subcutaneous every morning Place on file 30 mL 1    insulin pen needle (FIFTY50 PEN NEEDLES) 32G X 4 MM miscellaneous Use one pen needle daily or as directed. 100 each 2    LENalidomide (REVLIMID) 10 MG CAPS capsule Take 1 capsule (10 mg) by mouth daily 28 capsule 0    levothyroxine (SYNTHROID/LEVOTHROID) 200 MCG tablet Take 1 tablet (200 mcg) by mouth every morning (before breakfast) 90 tablet 3    loperamide (IMODIUM A-D) 2 MG tablet Take 1 tablet (2 mg) by mouth daily as needed for diarrhea Start Imodium 2 pills with first loose stool and then  1 pill with each loose stool after, aiming for 1-2 stools per day. Max of 8 pills a day. 180 tablet 3    loperamide (IMODIUM) 2 MG capsule Take 4 mg (two capsules) by mouth at onset of loose stools, followed by 2 mg (one capsule) after each loose stool. Maximum: 16 mg (8 capsules) daily 270 capsule 11    losartan (COZAAR) 25 MG tablet Take 1 tablet (25 mg) by mouth daily 90 tablet 3    pregabalin (LYRICA) 100 MG capsule Take 1 capsule (100 mg) by mouth 2 times daily 180 capsule 3    propranolol ER (INDERAL LA) 60 MG 24 hr capsule Take 1 capsule (60 mg) by mouth daily 90 capsule 3    sertraline (ZOLOFT) 100 MG tablet Take 1 tablet (100 mg) by mouth daily 90 tablet 3    triamcinolone (KENALOG) 0.1 % external cream Apply topically 2 times daily 60 g 3    thin (NO BRAND SPECIFIED) lancets Use with lanceting device. To accompany: Blood Glucose Monitor Brands: per insurance. (Patient not taking: Reported on 6/26/2023) 200 each 6        No Known Allergies    Video physical exam  General: Patient appears well in no acute distress.   Skin: No visualized rash or lesions on visualized skin  Eyes: EOMI, no erythema, sclera icterus or discharge noted  Resp: Appears to be breathing comfortably without accessory muscle usage, speaking in full sentences, no cough  MSK: Appears to have normal range of motion based on visualized movements  Neurologic: No apparent tremors, facial movements symmetric  Psych: affect bright, alert and oriented        Labs:     Blood Counts       Recent Labs   Lab Test 02/20/24  1026 01/22/24  1017 12/20/23  1026   HGB 14.5 14.0 14.5   HCT 44.2 42.2 42.3   WBC 2.8* 2.7* 3.0*   ANEUTAUTO 1.8 1.5* 1.8   ALYMPAUTO 0.5* 0.6* 0.6*   AMONOAUTO 0.3 0.3 0.3   AEOSAUTO 0.2 0.2 0.3   ABSBASO 0.1 0.1 0.1   NRBCMAN 0.0 0.0 0.0   * 125* 112*       ABO/RH    No lab results found.      Chemistries     Basic Panel  Recent Labs   Lab Test 02/20/24  1026 01/22/24  1017 12/20/23  1026    139 141   POTASSIUM 4.2  4.5 4.3   CHLORIDE 101 102 104   CO2 19* 24 28   BUN 13.6 13.9 12.8   CR 0.72 0.63 0.78   * 258* 142*        Calcium, Magnesium, Phosphorus  Recent Labs   Lab Test 02/20/24  1026 01/22/24  1017 12/20/23  1026   ASHLEY 8.9 9.5 9.9        LFTs  Recent Labs   Lab Test 02/20/24  1026 01/22/24  1017 12/20/23  1026 11/20/23  1127   BILITOTAL 0.9 1.2 1.9* 1.4*   ALKPHOS 122 122 123 178*   AST  --  23 17 19   ALT  --  44 29 28   ALBUMIN 4.3 4.3 4.4 4.6       LDH  No lab results found.    B2-Microglobulin  Recent Labs   Lab Test 02/18/20  0849   [id number] 1.6           Immunoglobulins     Recent Labs   Lab Test 01/22/24  1017 12/20/23  1026 11/20/23  1127 07/24/23  1009 06/19/23  1049 03/20/23  1411   * 590* 687 583* 592* 661       Recent Labs   Lab Test 01/22/24  1017 12/20/23  1026 11/20/23  1127 07/24/23  1009 06/19/23  1049 03/20/23  1411    139 164 131 135 157       Recent Labs   Lab Test 01/22/24  1017 12/20/23  1026 11/20/23  1127 10/16/23  1020 07/24/23  1009 06/19/23  1049   IGM 42 41 48 48 47 38         Monocloncal Protein Studies     M spike    Recent Labs   Lab Test 01/22/24  1017 12/20/23  1026 11/20/23  1127 10/16/23  1020 09/21/23  0954 07/24/23  1009   ELPM 0.0 0.0 0.1* 0.0 0.0 0.0       [place] FLC    Recent Labs   Lab Test 01/22/24  1017 12/20/23  1026 11/20/23  1127 07/24/23  1009 06/19/23  1049 03/20/23  1411   KFLCA 2.65* 2.34* 2.58* 2.58* 2.63* 3.41*       Lambda FLC    Recent Labs   Lab Test 01/22/24  1017 12/20/23  1026 11/20/23  1127 07/24/23  1009 06/19/23  1049 03/20/23  1411   LFLCA 1.74 1.56 1.66 1.84 1.66 1.70       FLC Ratio    Recent Labs   Lab Test 01/22/24  1017 12/20/23  1026 11/20/23  1127 07/24/23  1009 06/19/23  1049 03/20/23  1411   KLRA 1.52 1.50 1.55 1.40 1.58 2.01*       Assessment/plan:   Winter Loya is a 68 year old woman with standard risk IgG kappa multiple myeloma, s/p post auto-transplant in April 2019,  currently on lenalidomide maintenance. She had an M spike  of 0.1 in Nov which was new, today's myeloma labs are pending. Previously briefly discussed that if she relapses in the near future that she would likely be a candidate for [place]-3.  Bone survey 11/29/23 shows no definite lytic bone lesions.  She had a brain MRI performed recently during a work-up for hearing loss, and there was a right anterior temporal lesion, so Dr. Zhu would like to repeat a PET, currently scheduled 3/15/24.  She was seen by neurology 2/15/24 for the new lesion and they plan to repeat MRI in 3 months for surveillance.    Persistently elevated bilirubin led to hepatology consult in October 2023.  Determination was that this is most likely fatty liver disease.  Recommendation was to continue to follow with PCP for management of obesity, DM2, and HTN. Bilirubin is WNL today 01/24/24     Diarrhea has improved by taking Imodium consistently in the mornings. Recommend she continue this.    Follow-up with Dr. Zhu in 1 month, then monthly with me (virtual okay).  Labs in [place] the day prior.    28 minutes spent on the date of the encounter doing chart review, review of test results, patient visit, and documentation     FALGUNI Fink CNP  [facility]  [address] [place] [address] [phone]

## 2024-02-21 NOTE — NURSING NOTE
Is the patient currently in the state of [place]? YES    Visit mode:VIDEO    If the visit is dropped, the patient can be reconnected by: TELEPHONE VISIT: Phone number: [phone]    Will anyone else be joining the visit? NO  (If patient encounters technical issues they should call [phone]    How would you like to obtain your AVS? MyChart    Are changes needed to the allergy or medication list? No    Reason for visit: FRANCESECK    Lauryn NGUYEN

## 2024-02-26 ENCOUNTER — TELEPHONE (OUTPATIENT)
Dept: FAMILY MEDICINE | Facility: CLINIC | Age: 69
End: 2024-02-26
Payer: COMMERCIAL

## 2024-02-26 NOTE — TELEPHONE ENCOUNTER
Patient Quality Outreach    Patient is due for the following:   Physical Annual Wellness Visit      Topic Date Due    Polio Vaccine (3 of 3 - Adult catch-up series) 02/19/2021       Next Steps:   Patient has upcoming appointment, these items will be addressed at that time.    Type of outreach:    none      Questions for provider review:    None           An Jacy, [facility]  Chart routed to none

## 2024-03-04 ENCOUNTER — VIRTUAL VISIT (OUTPATIENT)
Dept: PHARMACY | Facility: CLINIC | Age: 69
End: 2024-03-04
Payer: COMMERCIAL

## 2024-03-04 ENCOUNTER — TELEPHONE (OUTPATIENT)
Dept: ONCOLOGY | Facility: CLINIC | Age: 69
End: 2024-03-04
Payer: COMMERCIAL

## 2024-03-04 DIAGNOSIS — J30.9 ALLERGIC RHINITIS, UNSPECIFIED SEASONALITY, UNSPECIFIED TRIGGER: Chronic | ICD-10-CM

## 2024-03-04 DIAGNOSIS — C90.01 MULTIPLE MYELOMA IN REMISSION (H): ICD-10-CM

## 2024-03-04 DIAGNOSIS — E66.812 CLASS 2 SEVERE OBESITY DUE TO EXCESS CALORIES WITH SERIOUS COMORBIDITY AND BODY MASS INDEX (BMI) OF 37.0 TO 37.9 IN ADULT (H): ICD-10-CM

## 2024-03-04 DIAGNOSIS — G62.0 DRUG-INDUCED POLYNEUROPATHY (H): ICD-10-CM

## 2024-03-04 DIAGNOSIS — F41.1 GAD (GENERALIZED ANXIETY DISORDER): ICD-10-CM

## 2024-03-04 DIAGNOSIS — E11.42 TYPE 2 DIABETES MELLITUS WITH DIABETIC POLYNEUROPATHY, WITH LONG-TERM CURRENT USE OF INSULIN (H): Primary | ICD-10-CM

## 2024-03-04 DIAGNOSIS — R19.7 DIARRHEA, UNSPECIFIED TYPE: ICD-10-CM

## 2024-03-04 DIAGNOSIS — I27.20 PULMONARY HYPERTENSION (H): ICD-10-CM

## 2024-03-04 DIAGNOSIS — Z79.4 TYPE 2 DIABETES MELLITUS WITH DIABETIC POLYNEUROPATHY, WITH LONG-TERM CURRENT USE OF INSULIN (H): Primary | ICD-10-CM

## 2024-03-04 DIAGNOSIS — E66.01 CLASS 2 SEVERE OBESITY DUE TO EXCESS CALORIES WITH SERIOUS COMORBIDITY AND BODY MASS INDEX (BMI) OF 37.0 TO 37.9 IN ADULT (H): ICD-10-CM

## 2024-03-04 DIAGNOSIS — E89.0 POSTOPERATIVE HYPOTHYROIDISM: ICD-10-CM

## 2024-03-04 DIAGNOSIS — C90.01 MULTIPLE MYELOMA IN REMISSION (H): Primary | ICD-10-CM

## 2024-03-04 DIAGNOSIS — F33.2 SEVERE EPISODE OF RECURRENT MAJOR DEPRESSIVE DISORDER, WITHOUT PSYCHOTIC FEATURES (H): ICD-10-CM

## 2024-03-04 DIAGNOSIS — R03.0 ELEVATED BLOOD PRESSURE READING WITHOUT DIAGNOSIS OF HYPERTENSION: ICD-10-CM

## 2024-03-04 DIAGNOSIS — E78.5 HYPERLIPIDEMIA, UNSPECIFIED HYPERLIPIDEMIA TYPE: ICD-10-CM

## 2024-03-04 PROCEDURE — 99607 MTMS BY PHARM ADDL 15 MIN: CPT | Mod: 93 | Performed by: PHARMACIST

## 2024-03-04 PROCEDURE — 99605 MTMS BY PHARM NP 15 MIN: CPT | Mod: 93 | Performed by: PHARMACIST

## 2024-03-04 RX ORDER — INSULIN GLARGINE 100 [IU]/ML
28 INJECTION, SOLUTION SUBCUTANEOUS EVERY MORNING
Qty: 45 ML | Refills: 1 | Status: SHIPPED | OUTPATIENT
Start: 2024-03-04 | End: 2024-04-29

## 2024-03-04 RX ORDER — LENALIDOMIDE 10 MG/1
10 CAPSULE ORAL DAILY
Qty: 28 CAPSULE | Refills: 0 | Status: SHIPPED | OUTPATIENT
Start: 2024-03-04 | End: 2024-04-01

## 2024-03-04 RX ORDER — BLOOD-GLUCOSE SENSOR
1 EACH MISCELLANEOUS
Qty: 6 EACH | Refills: 5 | Status: SHIPPED | OUTPATIENT
Start: 2024-03-04

## 2024-03-04 NOTE — LETTER
"Recommended To-Do List      Prepared on: 03/04/2024       You can get the best results from your medications by completing the items on this \"To-Do List.\"      Bring your To-Do List when you go to your doctor. And, share it with your family or caregivers.      What we talked about: What I should do:   A new medication that may be of benefit to you    Start taking FreeStyle Анна 3 Sensor          What we talked about: What I should do:   Your medication dosage being too low    Increase your dosage of Lantus SoloStar          What we talked about: What I should do:                     "

## 2024-03-04 NOTE — LETTER
_  Medication List        Prepared on: 03/04/2024     Bring your Medication List when you go to the doctor, hospital, or   emergency room. And, share it with your family or caregivers.     Note any changes to how you take your medications.  Cross out medications when you no longer use them.    Medication How I take it Why I use it Prescriber   acyclovir (ZOVIRAX) 400 MG tablet TAKE ONE TABLET BY MOUTH EVERY TWELVE HOURS Multiple Myeloma in Remission (H) Montse Bucio PA-C   alcohol swab prep pads Use to swab area of injection/sowmya as directed. Type 2 diabetes mellitus with diabetic polyneuropathy, with long-term current use of insulin (H) Montse Bucio PA-C   aspirin 81 MG EC tablet Take 81 mg by mouth daily  General Health   Patient Reported   atorvastatin (LIPITOR) 20 MG tablet Take 1 tablet (20 mg) by mouth At Bedtime Type 2 diabetes mellitus with diabetic polyneuropathy, with long-term current use of insulin (H) Montse Bucio PA-C   blood glucose (NO BRAND SPECIFIED) test strip Use to test blood sugar 3 times daily or as directed. To accompany: Blood Glucose Monitor Brands: per insurance. Type 2 diabetes mellitus with diabetic polyneuropathy, with long-term current use of insulin (H) Montse Bucio PA-C   blood glucose calibration (NO BRAND SPECIFIED) solution To accompany: Blood Glucose Monitor Brands: per insurance. Type 2 diabetes mellitus with diabetic polyneuropathy, with long-term current use of insulin (H) Montse Bucio PA-C   blood glucose monitoring (NO BRAND SPECIFIED) meter device kit Use to test blood sugar 3 times daily or as directed. Preferred blood glucose meter OR supplies to accompany: Blood Glucose Monitor Brands: per insurance. Type 2 diabetes mellitus with diabetic polyneuropathy, with long-term current use of insulin (H) Montse Bucio PA-C   Continuous Blood Gluc Sensor (FREESTYLE SEGUNDO 3 SENSOR) MISC 1 each every 14 days Type 2 diabetes mellitus with diabetic polyneuropathy,  with long-term current use of insulin (H) Montse Bucio PA-C   empagliflozin (JARDIANCE) 25 MG TABS tablet Take 1 tablet (25 mg) by mouth daily Type 2 diabetes mellitus with diabetic polyneuropathy, with long-term current use of insulin (H) Montse Bucio PA-C   insulin glargine (LANTUS SOLOSTAR) 100 UNIT/ML pen Inject 28 Units Subcutaneous every morning Increase dose by 2 units every three days until fasting blood sugar are  mg/dL. Max 40 units/day Type 2 diabetes mellitus with diabetic polyneuropathy, with long-term current use of insulin (H) Montse Bucio PA-C   insulin pen needle (FIFTY50 PEN NEEDLES) 32G X 4 MM miscellaneous Use one pen needle daily or as directed. Type 2 diabetes mellitus with diabetic polyneuropathy, with long-term current use of insulin (H) Montse Bucio PA-C   LENalidomide (REVLIMID) 10 MG CAPS capsule Take 1 capsule (10 mg) by mouth daily Multiple Myeloma in Remission (H) Brit Zhu MD   LENalidomide (REVLIMID) 10 MG CAPS capsule Take 1 capsule (10 mg) by mouth daily for 28 days Multiple Myeloma in Remission (H) Brit Zhu MD   levothyroxine (SYNTHROID/LEVOTHROID) 200 MCG tablet Take 1 tablet (200 mcg) by mouth every morning (before breakfast) Hypothyroidism, unspecified type Montse Bucio PA-C   loperamide (IMODIUM A-D) 2 MG tablet Take 1 tablet (2 mg) by mouth daily as needed for diarrhea Start Imodium 2 pills with first loose stool and then 1 pill with each loose stool after, aiming for 1-2 stools per day. Max of 8 pills a day. Diarrhea, unspecified type Hyun FALGUNI Denney CNP   losartan (COZAAR) 25 MG tablet Take 1 tablet (25 mg) by mouth daily Type 2 diabetes mellitus with diabetic polyneuropathy, with long-term current use of insulin (H) Montse Bucio PA-C   pregabalin (LYRICA) 100 MG capsule Take 1 capsule (100 mg) by mouth 2 times daily Type 2 diabetes mellitus with diabetic polyneuropathy, with long-term current use of insulin (H) Montse RAMIREZ  DAMARIS Bucio   propranolol ER (INDERAL LA) 60 MG 24 hr capsule Take 1 capsule (60 mg) by mouth daily Tremor Gabriel Santoyo MD   semaglutide (OZEMPIC) 2 MG/3ML pen Inject 0.25 mg Subcutaneous every 7 days Place on file. DO NOT restart until cholesterol is rechecked and triglycerides come down. Type 2 diabetes mellitus with diabetic polyneuropathy, with long-term current use of insulin (H) Montse uBcio PA-C   sertraline (ZOLOFT) 100 MG tablet Take 1 tablet (100 mg) by mouth daily Major depressive disorder with current active episode, unspecified depression episode severity, unspecified whether recurrent Montse Bucio PA-C   thin (NO BRAND SPECIFIED) lancets Use with lanceting device. To accompany: Blood Glucose Monitor Brands: per insurance. Type 2 diabetes mellitus with diabetic polyneuropathy, with long-term current use of insulin (H) Montse Bucio PA-C   triamcinolone (KENALOG) 0.1 % external cream Apply topically 2 times daily Eczema, unspecified type Montse Bucio PA-C         Add new medications, over-the-counter drugs, herbals, vitamins, or  minerals in the blank rows below.    Medication How I take it Why I use it Prescriber                                      Allergies:      No Known Allergies        Side effects I have had:               Other Information:              My notes and questions:

## 2024-03-04 NOTE — Clinical Note
Mikey Willard - with triglycerides > 500 mg/dL, may benefit from rechecking with you at upcoming appointment, if < 500 at that time she can restart Ozempic at 0.25 mg weekly dose.  Thanks! Vonnie Corrigan, PharmD Medication Therapy Management Pharmacist [phone]

## 2024-03-04 NOTE — PROGRESS NOTES
Medication Therapy Management (MTM) Encounter    ASSESSMENT:                            Medication Adherence/Access: No issues identified    Diabetes /Type 2 Diabetes/Obesity:   Patient is not meeting A1c goal of < 7%.  Self monitoring of blood glucose is not at goal of fasting  mg/dL and post prandial < 180 mg/dL. May benefit from restarting Ozempic - need to restart from beginning, as well as titrating up on Lantus.     Hypertension/pulmonary hypertension: Stable. Blood pressure is at goal < 140/90.    Hyperlipidemia: triglyceride are elevated and > 500 mg/dL, which increases risk for pancreatitis. Ozempic, separately, may have also have risk of pancreatitis, may benefit from holding off on restarting Ozempic until rechecks cholesterol with Montse in two weeks.     Difficulty breathing/Runny nose:  Advised she be seen for shortness of breath.    Diarrhea: Stable.    Hypothyroidism: Stable. TSH is within normal limits.    Multiple myeloma:  Stable per patient.    Spot on brain:  Plan in place    Mood:  Discussed can decrease sertraline dose, or consider alternative, or trial tapering off sertraline, however she prefers to continue current therapy.    Impacted tooth:  Plan in place.    Familial ticks: Stable per patient.    Nerve pain:  Stable.    Dry spots:  Stable.     PLAN:                            Recommend seeing Montse about the possible yeast infection and difficulty breathing.  Do NOT restart Ozempic -we need recheck cholesterol when you see Montse first and see if triglycerides have come down.  Sent prescription for Анна 3 sensors - use new Анна 3 walker on your phone with this.  Increase Lantus to 28 units daily. Every three days increase dose by 2 units until fasting blood sugar are  mg/dL. Max 40 units per day.    Follow-up: Return in about 4 weeks (around 4/1/2024) for Medication Therapy Management.    SUBJECTIVE/OBJECTIVE:                          Winter Loya is a 67 year old female called  for a follow-up visit from 11/13/23.       Reason for visit: med review.    Allergies/ADRs: Reviewed in chart  Past Medical History: Reviewed in chart  Tobacco: She reports that she quit smoking about 19 years ago. Her smoking use included cigarettes. She smoked an average of 1 pack per day. She has never used smokeless tobacco.  Alcohol: none  Caffeine: 1 diet coke/day and 2 cups coffee/day    Medication Adherence/Access:   Uses pill boxes.  Misses medications on occasion, but is usually really good about them  The patient fills medications at [facility]: NO, fills medications at [facility] and [facility] for Revlimid.    Diabetes /Type 2 Diabetes/Obesity:  Lantus 24 units every morning  Ozempic 2 mg weekly (Tuesdays)-not taking the last couple months, see below  Jardiance 25 mg daily   Aspirin: Taking 81mg daily for primary prevention (possible secondary prevention)    Not doing the things she should be, she's not on the Ozempic, she was told she needed to see another doctor (per chart Dr. Nick) to get a refill. She wasn't sure if she would have insurance at the time.  She's really struggling with the food, she feels like she's so overwhelmed and that she's killing herself. Patient is experiencing the following side effects: constant urination-blood sugar also up, does struggle with constant yeast infections     Blood sugar monitoring: hasn't been checking, Анна was falling off, even with plastic piece to go over that, also needs to see provider for refill.   Fasting: ~240, finger prick   Medication History:  Has been on Trulicity in the past, tolerated well.   She has done a trial off of metformin with Delmi Gross in the past without improvement in diarrhea, has since stopped this.      Eye exam is up to date  Foot exam: due  Urine Albumin:   Lab Results   Component Value Date    UMALCR 97.01 (H) 01/22/2024      Lab Results   Component Value Date    A1C 8.1 (H) 01/22/2024     Hypertension /pulmonary  hypertension:  Losartan 25 mg daily   Propranolol ER 60 mg (as below)    An ENT provider thought she might have had a stroke in the past, she otherwise hasn't been told she's had one.   Patient reports no current medication side effects  Patient does not self-monitor blood pressure.       BP Readings from Last 3 Encounters:   02/15/24 110/72   01/23/24 114/54   12/20/23 135/68     Pulse Readings from Last 3 Encounters:   02/15/24 54   01/23/24 61   12/20/23 68     Hyperlipidemia   atorvastatin 20mg daily    Patient reports no significant myalgias or other side effects.     Recent Labs   Lab Test 01/22/24  1017 02/02/23  0958   CHOL 213* 133   HDL 44* 49*   LDL 33 33   TRIG 769* 254*     Difficulty breathing/Runny nose:    Also notes she's having trouble breathing at times - hasn't been seen for this. She's partially concerned she's allergic to her cat. Also has runny nose.     Diarrhea:   Loperamide 4 mg every morning    This has been better with her taking this regularly.      Hypothyroidism   Levothyroxine 200 mcg once daily    Patient is having the following symptoms: none      TSH   Date Value Ref Range Status   01/22/2024 3.00 0.30 - 4.20 uIU/mL Final   02/22/2023 4.41 (H) 0.40 - 4.00 mU/L Final   01/27/2021 2.08 0.40 - 4.00 mU/L Final     Multiple myeloma:    Revlimid 10 mg daily   Acyclovir 400 mg twice daily (Shingles prophylaxis), also helps prevent her cold sores    She had a stem cell transplant in either 2018 or 2019, has been in remission since. She'd prefer to be off the Revlimid, however oncology prefers she remain on this. Tolerates it well, although likely may also be a source of her chronic diarrhea.    Spot on brain:    A spot was found, non-cancerous, but they want her to follow-up for this.    Mood:    Sertraline 100 mg daily    It's working, she's been on antidepressants since her mid-30s. Recently had two year anniversary of her son's death - she couldn't cry. She thought about going off going  this so she could feel, sometimes gets tired of feeling flat, but she does not want to make any changes right now. She notes she ran out of it one time in the past, she noticed within 3 days, started crying.     Impacted tooth:    Blister, infection - she had been given a prescriptions for penicillin and Augmentin -took Augmentin in December, recnetly took penicillin when it seemed to flare up, seems like infection is staying down. Has dental appointment with her regular dentis in two days.      Familial ticks:    Propranolol ER 60 mg daily     She thinks she has parkinson's her dad had that and her shakes look like his, but neurology has not diagnosed her with this.      Nerve pain:    Lyrica 100 mg twice daily     States this/these are effective. Denies side effects. She feels this also helps as an antidepressant for her.      Dry spots:    Trimacinolone cream as needed, she's trying without and using cetaphil for now which is working.    Today's Vitals: There were no vitals taken for this visit.  ----------------      I spent 49 minutes with this patient today. All changes were made via collaborative practice agreement with Montse Bucio PA-C. A copy of the visit note was provided to the patient's provider(s).    A summary of these recommendations was sent via [employer].    Vonnie Corrigan, PharmD  Medication Therapy Management Pharmacist  [phone]    Telemedicine Visit Details  Type of service:  Telephone visit  Start Time: 2:01 PM  End Time: 2:50 PM     Medication Therapy Recommendations  No medication therapy recommendations to display

## 2024-03-04 NOTE — TELEPHONE ENCOUNTER
Oral Chemotherapy Monitoring Program    Medication: Revlimid  Rx:  10 mg PO daily for a 28 day supply    Auth #: [id number]  Risk Category: Adult female NOT of reproductive capacity    Routine survey questions reviewed.    Thank you,    Mary Franklin  Oncology Pharmacy Liaison LUCINDA [email or web address]  Phone: [phone]  Fax: [phone]

## 2024-03-04 NOTE — LETTER
March 4, 2024  Winter Loya  [address]  [place] [place] [facility] [address]    Dear Ms. Loya, [facility]     Thank you for talking with me on Mar 4, 2024 about your health and medications. As a follow-up to our conversation, I have included two documents:      Your Recommended To-Do List has steps you should take to get the best results from your medications.  Your Medication List will help you keep track of your medications and how to take them.    If you want to talk about these documents, please call Anjelica Corrigan RPH at phone: [phone], Monday-Friday 8-4:30pm.    I look forward to working with you and your doctors to make sure your medications work well for you.    Sincerely,  Anjelica Corrigan RPH  [facility] Pharmacist, [facility]

## 2024-03-04 NOTE — PATIENT INSTRUCTIONS
"Recommendations from today's MTM visit:                                                         Recommend seeing Montse about the possible yeast infection and difficulty breathing.  Do NOT restart Ozempic -we need recheck cholesterol when you see Montse first and see if triglycerides have come down.  Sent prescription for Анна 3 sensors - use new Анна 3 walker on your phone with this.  Increase Lantus to 28 units daily. Every three days increase dose by 2 units until fasting blood sugar are  mg/dL. Max 40 units per day.    Follow-up: Return in about 4 weeks (around 4/1/2024) for Medication Therapy Management.    It was great speaking with you today.  I value your experience and would be very thankful for your time in providing feedback in our clinic survey. In the next few days, you may receive an email or text message from [email or web address] with a link to a survey related to your  clinical pharmacist.\"     To schedule another MTM appointment, please call the clinic directly or you may call the MTM scheduling line at [phone] or toll-free at [phone].     My Clinical Pharmacist's contact information:                                                      Please feel free to contact me with any questions or concerns you have.      Vonnie Corrigan, PharmD  Medication Therapy Management Pharmacist  [phone]     "

## 2024-03-14 ENCOUNTER — OFFICE VISIT (OUTPATIENT)
Dept: FAMILY MEDICINE | Facility: CLINIC | Age: 69
End: 2024-03-14
Payer: COMMERCIAL

## 2024-03-14 VITALS
WEIGHT: 257.2 LBS | DIASTOLIC BLOOD PRESSURE: 61 MMHG | SYSTOLIC BLOOD PRESSURE: 112 MMHG | HEIGHT: 69 IN | BODY MASS INDEX: 38.09 KG/M2 | TEMPERATURE: 97.9 F | OXYGEN SATURATION: 98 % | RESPIRATION RATE: 14 BRPM | HEART RATE: 58 BPM

## 2024-03-14 DIAGNOSIS — B37.31 CANDIDIASIS OF VAGINA: ICD-10-CM

## 2024-03-14 DIAGNOSIS — F32.9 MAJOR DEPRESSIVE DISORDER WITH CURRENT ACTIVE EPISODE, UNSPECIFIED DEPRESSION EPISODE SEVERITY, UNSPECIFIED WHETHER RECURRENT: ICD-10-CM

## 2024-03-14 DIAGNOSIS — H92.01 RIGHT EAR PAIN: ICD-10-CM

## 2024-03-14 DIAGNOSIS — E66.812 CLASS 2 SEVERE OBESITY DUE TO EXCESS CALORIES WITH SERIOUS COMORBIDITY AND BODY MASS INDEX (BMI) OF 37.0 TO 37.9 IN ADULT (H): ICD-10-CM

## 2024-03-14 DIAGNOSIS — E11.65 TYPE 2 DIABETES MELLITUS WITH HYPERGLYCEMIA, WITH LONG-TERM CURRENT USE OF INSULIN (H): ICD-10-CM

## 2024-03-14 DIAGNOSIS — M17.11 PRIMARY OSTEOARTHRITIS OF RIGHT KNEE: ICD-10-CM

## 2024-03-14 DIAGNOSIS — Z79.4 TYPE 2 DIABETES MELLITUS WITH DIABETIC POLYNEUROPATHY, WITH LONG-TERM CURRENT USE OF INSULIN (H): ICD-10-CM

## 2024-03-14 DIAGNOSIS — E11.42 TYPE 2 DIABETES MELLITUS WITH DIABETIC POLYNEUROPATHY, WITH LONG-TERM CURRENT USE OF INSULIN (H): ICD-10-CM

## 2024-03-14 DIAGNOSIS — C90.01 MULTIPLE MYELOMA IN REMISSION (H): ICD-10-CM

## 2024-03-14 DIAGNOSIS — E66.01 CLASS 2 SEVERE OBESITY DUE TO EXCESS CALORIES WITH SERIOUS COMORBIDITY AND BODY MASS INDEX (BMI) OF 37.0 TO 37.9 IN ADULT (H): ICD-10-CM

## 2024-03-14 DIAGNOSIS — Z00.00 ENCOUNTER FOR MEDICARE ANNUAL WELLNESS EXAM: Primary | ICD-10-CM

## 2024-03-14 DIAGNOSIS — E03.9 HYPOTHYROIDISM, UNSPECIFIED TYPE: ICD-10-CM

## 2024-03-14 DIAGNOSIS — Z79.4 TYPE 2 DIABETES MELLITUS WITH HYPERGLYCEMIA, WITH LONG-TERM CURRENT USE OF INSULIN (H): ICD-10-CM

## 2024-03-14 DIAGNOSIS — R06.09 OTHER FORM OF DYSPNEA: ICD-10-CM

## 2024-03-14 LAB
BASOPHILS # BLD AUTO: 0.1 10E3/UL (ref 0–0.2)
BASOPHILS # BLD MANUAL: 0.1 10E3/UL (ref 0–0.2)
BASOPHILS NFR BLD AUTO: 3 %
BASOPHILS NFR BLD MANUAL: 4 %
EOSINOPHIL # BLD AUTO: 0.3 10E3/UL (ref 0–0.7)
EOSINOPHIL # BLD MANUAL: 0.3 10E3/UL (ref 0–0.7)
EOSINOPHIL NFR BLD AUTO: 8 %
EOSINOPHIL NFR BLD MANUAL: 10 %
ERYTHROCYTE [DISTWIDTH] IN BLOOD BY AUTOMATED COUNT: 14.1 % (ref 10–15)
HCT VFR BLD AUTO: 43.5 % (ref 35–47)
HGB BLD-MCNC: 14.6 G/DL (ref 11.7–15.7)
IMM GRANULOCYTES # BLD: 0 10E3/UL
IMM GRANULOCYTES NFR BLD: 0 %
LYMPHOCYTES # BLD AUTO: 0.6 10E3/UL (ref 0.8–5.3)
LYMPHOCYTES # BLD MANUAL: 0.7 10E3/UL (ref 0.8–5.3)
LYMPHOCYTES NFR BLD AUTO: 19 %
LYMPHOCYTES NFR BLD MANUAL: 24 %
MCH RBC QN AUTO: 28.8 PG (ref 26.5–33)
MCHC RBC AUTO-ENTMCNC: 33.6 G/DL (ref 31.5–36.5)
MCV RBC AUTO: 86 FL (ref 78–100)
MONOCYTES # BLD AUTO: 0.4 10E3/UL (ref 0–1.3)
MONOCYTES # BLD MANUAL: 0.2 10E3/UL (ref 0–1.3)
MONOCYTES NFR BLD AUTO: 12 %
MONOCYTES NFR BLD MANUAL: 6 %
NEUTROPHILS # BLD AUTO: 1.8 10E3/UL (ref 1.6–8.3)
NEUTROPHILS # BLD MANUAL: 1.7 10E3/UL (ref 1.6–8.3)
NEUTROPHILS NFR BLD AUTO: 59 %
NEUTROPHILS NFR BLD MANUAL: 56 %
PLAT MORPH BLD: ABNORMAL
PLATELET # BLD AUTO: 114 10E3/UL (ref 150–450)
RBC # BLD AUTO: 5.07 10E6/UL (ref 3.8–5.2)
RBC MORPH BLD: ABNORMAL
WBC # BLD AUTO: 3.1 10E3/UL (ref 4–11)

## 2024-03-14 PROCEDURE — 83521 IG LIGHT CHAINS FREE EACH: CPT | Performed by: PHYSICIAN ASSISTANT

## 2024-03-14 PROCEDURE — 20610 DRAIN/INJ JOINT/BURSA W/O US: CPT | Performed by: PHYSICIAN ASSISTANT

## 2024-03-14 PROCEDURE — 82784 ASSAY IGA/IGD/IGG/IGM EACH: CPT | Performed by: PHYSICIAN ASSISTANT

## 2024-03-14 PROCEDURE — 80061 LIPID PANEL: CPT | Performed by: PHYSICIAN ASSISTANT

## 2024-03-14 PROCEDURE — 80053 COMPREHEN METABOLIC PANEL: CPT | Performed by: PHYSICIAN ASSISTANT

## 2024-03-14 PROCEDURE — 36415 COLL VENOUS BLD VENIPUNCTURE: CPT | Performed by: PHYSICIAN ASSISTANT

## 2024-03-14 PROCEDURE — 84155 ASSAY OF PROTEIN SERUM: CPT | Mod: 59 | Performed by: PHYSICIAN ASSISTANT

## 2024-03-14 PROCEDURE — 99397 PER PM REEVAL EST PAT 65+ YR: CPT | Mod: 25 | Performed by: PHYSICIAN ASSISTANT

## 2024-03-14 PROCEDURE — 99214 OFFICE O/P EST MOD 30 MIN: CPT | Mod: 25 | Performed by: PHYSICIAN ASSISTANT

## 2024-03-14 PROCEDURE — 84165 PROTEIN E-PHORESIS SERUM: CPT | Performed by: PATHOLOGY

## 2024-03-14 PROCEDURE — 86334 IMMUNOFIX E-PHORESIS SERUM: CPT | Performed by: PATHOLOGY

## 2024-03-14 PROCEDURE — 85025 COMPLETE CBC W/AUTO DIFF WBC: CPT | Performed by: PHYSICIAN ASSISTANT

## 2024-03-14 RX ORDER — FLUCONAZOLE 150 MG/1
150 TABLET ORAL ONCE
Qty: 1 TABLET | Refills: 3 | Status: SHIPPED | OUTPATIENT
Start: 2024-03-14 | End: 2024-03-14

## 2024-03-14 RX ORDER — LEVOTHYROXINE SODIUM 200 UG/1
200 TABLET ORAL
Qty: 90 TABLET | Refills: 3 | Status: SHIPPED | OUTPATIENT
Start: 2024-03-14

## 2024-03-14 RX ORDER — ATORVASTATIN CALCIUM 20 MG/1
20 TABLET, FILM COATED ORAL AT BEDTIME
Qty: 90 TABLET | Refills: 3 | Status: SHIPPED | OUTPATIENT
Start: 2024-03-14

## 2024-03-14 RX ORDER — LOSARTAN POTASSIUM 25 MG/1
25 TABLET ORAL DAILY
Qty: 90 TABLET | Refills: 3 | Status: SHIPPED | OUTPATIENT
Start: 2024-03-14

## 2024-03-14 RX ORDER — SERTRALINE HYDROCHLORIDE 100 MG/1
100 TABLET, FILM COATED ORAL DAILY
Qty: 90 TABLET | Refills: 3 | Status: SHIPPED | OUTPATIENT
Start: 2024-03-14

## 2024-03-14 RX ORDER — PREGABALIN 100 MG/1
100 CAPSULE ORAL 2 TIMES DAILY
Qty: 180 CAPSULE | Refills: 3 | Status: SHIPPED | OUTPATIENT
Start: 2024-03-14

## 2024-03-14 RX ORDER — NEOMYCIN SULFATE, POLYMYXIN B SULFATE, HYDROCORTISONE 3.5; 10000; 1 MG/ML; [USP'U]/ML; MG/ML
3 SOLUTION/ DROPS AURICULAR (OTIC) 4 TIMES DAILY
Qty: 10 ML | Refills: 0 | Status: SHIPPED | OUTPATIENT
Start: 2024-03-14 | End: 2024-10-02

## 2024-03-14 RX ORDER — RESPIRATORY SYNCYTIAL VIRUS VACCINE 120MCG/0.5
0.5 KIT INTRAMUSCULAR ONCE
Qty: 1 EACH | Refills: 0 | Status: CANCELLED | OUTPATIENT
Start: 2024-03-14 | End: 2024-03-14

## 2024-03-14 RX ORDER — METHYLPREDNISOLONE ACETATE 80 MG/ML
80 INJECTION, SUSPENSION INTRA-ARTICULAR; INTRALESIONAL; INTRAMUSCULAR; SOFT TISSUE ONCE
Status: COMPLETED | OUTPATIENT
Start: 2024-03-14 | End: 2024-03-14

## 2024-03-14 RX ADMIN — METHYLPREDNISOLONE ACETATE 80 MG: 80 INJECTION, SUSPENSION INTRA-ARTICULAR; INTRALESIONAL; INTRAMUSCULAR; SOFT TISSUE at 13:15

## 2024-03-14 SDOH — HEALTH STABILITY: PHYSICAL HEALTH: ON AVERAGE, HOW MANY DAYS PER WEEK DO YOU ENGAGE IN MODERATE TO STRENUOUS EXERCISE (LIKE A BRISK WALK)?: 3 DAYS

## 2024-03-14 SDOH — HEALTH STABILITY: PHYSICAL HEALTH: ON AVERAGE, HOW MANY MINUTES DO YOU ENGAGE IN EXERCISE AT THIS LEVEL?: 40 MIN

## 2024-03-14 ASSESSMENT — ANXIETY QUESTIONNAIRES
5. BEING SO RESTLESS THAT IT IS HARD TO SIT STILL: SEVERAL DAYS
1. FEELING NERVOUS, ANXIOUS, OR ON EDGE: MORE THAN HALF THE DAYS
7. FEELING AFRAID AS IF SOMETHING AWFUL MIGHT HAPPEN: MORE THAN HALF THE DAYS
GAD7 TOTAL SCORE: 11
3. WORRYING TOO MUCH ABOUT DIFFERENT THINGS: MORE THAN HALF THE DAYS
IF YOU CHECKED OFF ANY PROBLEMS ON THIS QUESTIONNAIRE, HOW DIFFICULT HAVE THESE PROBLEMS MADE IT FOR YOU TO DO YOUR WORK, TAKE CARE OF THINGS AT HOME, OR GET ALONG WITH OTHER PEOPLE: SOMEWHAT DIFFICULT
3. WORRYING TOO MUCH ABOUT DIFFERENT THINGS: MORE THAN HALF THE DAYS
1. FEELING NERVOUS, ANXIOUS, OR ON EDGE: MORE THAN HALF THE DAYS
4. TROUBLE RELAXING: SEVERAL DAYS
5. BEING SO RESTLESS THAT IT IS HARD TO SIT STILL: SEVERAL DAYS
4. TROUBLE RELAXING: SEVERAL DAYS
8. IF YOU CHECKED OFF ANY PROBLEMS, HOW DIFFICULT HAVE THESE MADE IT FOR YOU TO DO YOUR WORK, TAKE CARE OF THINGS AT HOME, OR GET ALONG WITH OTHER PEOPLE?: SOMEWHAT DIFFICULT
IF YOU CHECKED OFF ANY PROBLEMS ON THIS QUESTIONNAIRE, HOW DIFFICULT HAVE THESE PROBLEMS MADE IT FOR YOU TO DO YOUR WORK, TAKE CARE OF THINGS AT HOME, OR GET ALONG WITH OTHER PEOPLE: SOMEWHAT DIFFICULT
6. BECOMING EASILY ANNOYED OR IRRITABLE: SEVERAL DAYS
GAD7 TOTAL SCORE: 11
7. FEELING AFRAID AS IF SOMETHING AWFUL MIGHT HAPPEN: MORE THAN HALF THE DAYS
2. NOT BEING ABLE TO STOP OR CONTROL WORRYING: MORE THAN HALF THE DAYS
7. FEELING AFRAID AS IF SOMETHING AWFUL MIGHT HAPPEN: MORE THAN HALF THE DAYS
GAD7 TOTAL SCORE: 11
6. BECOMING EASILY ANNOYED OR IRRITABLE: SEVERAL DAYS
2. NOT BEING ABLE TO STOP OR CONTROL WORRYING: MORE THAN HALF THE DAYS
GAD7 TOTAL SCORE: 11

## 2024-03-14 ASSESSMENT — ENCOUNTER SYMPTOMS
FREQUENCY: 1
COLOR CHANGE: 0
NUMBNESS: 1
DYSURIA: 0
JOINT SWELLING: 1
WHEEZING: 0
HEMATURIA: 0
PHOTOPHOBIA: 0
ABDOMINAL PAIN: 0
DIZZINESS: 0
FATIGUE: 0
SHORTNESS OF BREATH: 1
COUGH: 1
FEVER: 0
SORE THROAT: 0
CHILLS: 0
PALPITATIONS: 0
ARTHRALGIAS: 1
DIARRHEA: 1
MYALGIAS: 0
CONSTIPATION: 0
HEADACHES: 0
RHINORRHEA: 1

## 2024-03-14 ASSESSMENT — PATIENT HEALTH QUESTIONNAIRE - PHQ9
SUM OF ALL RESPONSES TO PHQ QUESTIONS 1-9: 18
SUM OF ALL RESPONSES TO PHQ QUESTIONS 1-9: 18
10. IF YOU CHECKED OFF ANY PROBLEMS, HOW DIFFICULT HAVE THESE PROBLEMS MADE IT FOR YOU TO DO YOUR WORK, TAKE CARE OF THINGS AT HOME, OR GET ALONG WITH OTHER PEOPLE: VERY DIFFICULT
SUM OF ALL RESPONSES TO PHQ QUESTIONS 1-9: 18

## 2024-03-14 ASSESSMENT — SOCIAL DETERMINANTS OF HEALTH (SDOH): HOW OFTEN DO YOU GET TOGETHER WITH FRIENDS OR RELATIVES?: ONCE A WEEK

## 2024-03-14 NOTE — PATIENT INSTRUCTIONS
Preventive Care Advice   This is general advice given by our system to help you stay healthy. However, your care team may have specific advice just for you. Please talk to your care team about your preventive care needs.  Nutrition  Eat 5 or more servings of fruits and vegetables each day.  Try wheat bread, brown rice and whole grain pasta (instead of white bread, rice, and pasta).  Get enough calcium and vitamin D. Check the label on foods and aim for 100% of the RDA (recommended daily allowance).  Lifestyle  Exercise at least 150 minutes each week   (30 minutes a day, 5 days a week).  Do muscle strengthening activities 2 days a week. These help control your weight and prevent disease.  No smoking.  Wear sunscreen to prevent skin cancer.  Have a dental exam and cleaning every 6 months.  Yearly exams  See your health care team every year to talk about:  Any changes in your health.  Any medicines your care team has prescribed.  Preventive care, family planning, and ways to prevent chronic diseases.  Shots (vaccines)   HPV shots (up to age 26), if you've never had them before.  Hepatitis B shots (up to age 59), if you've never had them before.  COVID-19 shot: Get this shot when it's due.  Flu shot: Get a flu shot every year.  Tetanus shot: Get a tetanus shot every 10 years.  Pneumococcal, hepatitis A, and RSV shots: Ask your care team if you need these based on your risk.  Shingles shot (for age 50 and up).  General health tests  Diabetes screening:  Starting at age 35, Get screened for diabetes at least every 3 years.  If you are younger than age 35, ask your care team if you should be screened for diabetes.  Cholesterol test: At age 39, start having a cholesterol test every 5 years, or more often if advised.  Bone density scan (DEXA): At age 50, ask your care team if you should have this scan for osteoporosis (brittle bones).  Hepatitis C: Get tested at least once in your life.  STIs (sexually transmitted  infections)  Before age 24: Ask your care team if you should be screened for STIs.  After age 24: Get screened for STIs if you're at risk. You are at risk for STIs (including HIV) if:  You are sexually active with more than one person.  You don't use condoms every time.  You or a partner was diagnosed with a sexually transmitted infection.  If you are at risk for HIV, ask about PrEP medicine to prevent HIV.  Get tested for HIV at least once in your life, whether you are at risk for HIV or not.  Cancer screening tests  Cervical cancer screening: If you have a cervix, begin getting regular cervical cancer screening tests at age 21. Most people who have regular screenings with normal results can stop after age 65. Talk about this with your provider.  Breast cancer scan (mammogram): If you've ever had breasts, begin having regular mammograms starting at age 40. This is a scan to check for breast cancer.  Colon cancer screening: It is important to start screening for colon cancer at age 45.  Have a colonoscopy test every 10 years (or more often if you're at risk) Or, ask your provider about stool tests like a FIT test every year or Cologuard test every 3 years.  To learn more about your testing options, visit: [email or web address].  For help making a decision, visit: https://bit.ly/ls36372.  Prostate cancer screening test: If you have a prostate and are age 55 to 69, ask your provider if you would benefit from a yearly prostate cancer screening test.  Lung cancer screening: If you are a current or former smoker age 50 to 80, ask your care team if ongoing lung cancer screenings are right for you.  For informational purposes only. Not to replace the advice of your health care provider. Copyright   2023 [employer]. All rights reserved. Clinically reviewed by the [facility]. [employer] 082925 - REV 01/24.    Preventing Falls: Care Instructions  Injuries and health  problems such as trouble walking or poor eyesight can increase your risk of falling. So can some medicines. But there are things you can do to help prevent falls. You can exercise to get stronger. You can also arrange your home to make it safer.    Talk to your doctor about the medicines you take. Ask if any of them increase the risk of falls and whether they can be changed or stopped.   Try to exercise regularly. It can help improve your strength and balance. This can help lower your risk of falling.     Practice fall safety and prevention.    Wear low-heeled shoes that fit well and give your feet good support. Talk to your doctor if you have foot problems that make this hard.  Carry a cellphone or wear a medical alert device that you can use to call for help.  Use stepladders instead of chairs to reach high objects. Don't climb if you're at risk for falls. Ask for help, if needed.  Wear the correct eyeglasses, if you need them.    Make your home safer.    Remove rugs, cords, clutter, and furniture from walkways.  Keep your house well lit. Use night-lights in hallways and bathrooms.  Install and use sturdy handrails on stairways.  Wear nonskid footwear, even inside. Don't walk barefoot or in socks without shoes.    Be safe outside.    Use handrails, curb cuts, and ramps whenever possible.  Keep your hands free by using a shoulder bag or backpack.  Try to walk in well-lit areas. Watch out for uneven ground, changes in pavement, and debris.  Be careful in the winter. Walk on the grass or gravel when sidewalks are slippery. Use de-icer on steps and walkways. Add non-slip devices to shoes.    Put grab bars and nonskid mats in your shower or tub and near the toilet. Try to use a shower chair or bath bench when bathing.   Get into a tub or shower by putting in your weaker leg first. Get out with your strong side first. Have a phone or medical alert device in the bathroom with you.   Where can you learn more?  Go to  "https://www.[employer].net/patiented  Enter G117 in the search box to learn more about \"Preventing Falls: Care Instructions.\"  Current as of: July 17, 2023               Content Version: 14.0    3511-6646 [employer].   Care instructions adapted under license by your healthcare professional. If you have questions about a medical condition or this instruction, always ask your healthcare professional. [employer] disclaims any warranty or liability for your use of this information.      Hearing Loss: Care Instructions  Overview     Hearing loss is a sudden or slow decrease in how well you hear. It can range from slight to profound. Permanent hearing loss can occur with aging. It also can happen when you are exposed long-term to loud noise. Examples include listening to loud music, riding motorcycles, or being around other loud machines.  Hearing loss can affect your work and home life. It can make you feel lonely or depressed. You may feel that you have lost your independence. But hearing aids and other devices can help you hear better and feel connected to others.  Follow-up care is a key part of your treatment and safety. Be sure to make and go to all appointments, and call your doctor if you are having problems. It's also a good idea to know your test results and keep a list of the medicines you take.  How can you care for yourself at home?  Avoid loud noises whenever possible. This helps keep your hearing from getting worse.  Always wear hearing protection around loud noises.  Wear a hearing aid as directed.  A professional can help you pick a hearing aid that will work best for you.  You can also get hearing aids over the counter for mild to moderate hearing loss.  Have hearing tests as your doctor suggests. They can show whether your hearing has changed. Your hearing aid may need to be adjusted.  Use other devices as needed. These may include:  Telephone amplifiers and hearing aids that " "can connect to a television, stereo, radio, or microphone.  Devices that use lights or vibrations. These alert you to the doorbell, a ringing telephone, or a baby monitor.  Television closed-captioning. This shows the words at the bottom of the screen. Most new TVs can do this.  TTY (text telephone). This lets you type messages back and forth on the telephone instead of talking or listening. These devices are also called TDD. When messages are typed on the keyboard, they are sent over the phone line to a receiving TTY. The message is shown on a monitor.  Use text messaging, social media, and email if it is hard for you to communicate by telephone.  Try to learn a listening technique called speechreading. It is not lipreading. You pay attention to people's gestures, expressions, posture, and tone of voice. These clues can help you understand what a person is saying. Face the person you are talking to, and have them face you. Make sure the lighting is good. You need to see the other person's face clearly.  Think about counseling if you need help to adjust to your hearing loss.  When should you call for help?  Watch closely for changes in your health, and be sure to contact your doctor if:    You think your hearing is getting worse.     You have new symptoms, such as dizziness or nausea.   Where can you learn more?  Go to https://www.[employer].net/patiented  Enter R798 in the search box to learn more about \"Hearing Loss: Care Instructions.\"  Current as of: September 27, 2023               Content Version: 14.0    1309-8801 [employer].   Care instructions adapted under license by your healthcare professional. If you have questions about a medical condition or this instruction, always ask your healthcare professional. [employer] disclaims any warranty or liability for your use of this information.      Learning About Stress  What is stress?     Stress is your body's response to a hard " situation. Your body can have a physical, emotional, or mental response. Stress is a fact of life for most people, and it affects everyone differently. What causes stress for you may not be stressful for someone else.  A lot of things can cause stress. You may feel stress when you go on a job interview, take a test, or run a race. This kind of short-term stress is normal and even useful. It can help you if you need to work hard or react quickly. For example, stress can help you finish an important job on time.  Long-term stress is caused by ongoing stressful situations or events. Examples of long-term stress include long-term health problems, ongoing problems at work, or conflicts in your family. Long-term stress can harm your health.  How does stress affect your health?  When you are stressed, your body responds as though you are in danger. It makes hormones that speed up your heart, make you breathe faster, and give you a burst of energy. This is called the fight-or-flight stress response. If the stress is over quickly, your body goes back to normal and no harm is done.  But if stress happens too often or lasts too long, it can have bad effects. Long-term stress can make you more likely to get sick, and it can make symptoms of some diseases worse. If you tense up when you are stressed, you may develop neck, shoulder, or low back pain. Stress is linked to high blood pressure and heart disease.  Stress also harms your emotional health. It can make you wilhelm, tense, or depressed. Your relationships may suffer, and you may not do well at work or school.  What can you do to manage stress?  You can try these things to help manage stress:   Do something active. Exercise or activity can help reduce stress. Walking is a great way to get started. Even everyday activities such as housecleaning or yard work can help.  Try yoga or alen chi. These techniques combine exercise and meditation. You may need some training at first to  learn them.  Do something you enjoy. For example, listen to music or go to a movie. Practice your hobby or do volunteer work.  Meditate. This can help you relax, because you are not worrying about what happened before or what may happen in the future.  Do guided imagery. Imagine yourself in any setting that helps you feel calm. You can use online videos, books, or a teacher to guide you.  Do breathing exercises. For example:  From a standing position, bend forward from the waist with your knees slightly bent. Let your arms dangle close to the floor.  Breathe in slowly and deeply as you return to a standing position. Roll up slowly and lift your head last.  Hold your breath for just a few seconds in the standing position.  Breathe out slowly and bend forward from the waist.  Let your feelings out. Talk, laugh, cry, and express anger when you need to. Talking with supportive friends or family, a counselor, or a [employer]ngel leader about your feelings is a healthy way to relieve stress. Avoid discussing your feelings with people who make you feel worse.  Write. It may help to write about things that are bothering you. This helps you find out how much stress you feel and what is causing it. When you know this, you can find better ways to cope.  What can you do to prevent stress?  You might try some of these things to help prevent stress:  Manage your time. This helps you find time to do the things you want and need to do.  Get enough sleep. Your body recovers from the stresses of the day while you are sleeping.  Get support. Your family, friends, and community can make a difference in how you experience stress.  Limit your news feed. Avoid or limit time on social media or news that may make you feel stressed.  Do something active. Exercise or activity can help reduce stress. Walking is a great way to get started.  Where can you learn more?  Go to https://www.healthwise.net/patiented  Enter N032 in the search box to learn more  "about \"Learning About Stress.\"  Current as of: October 24, 2023               Content Version: 14.0    4647-2152 [email or web address].   Care instructions adapted under license by your healthcare professional. If you have questions about a medical condition or this instruction, always ask your healthcare professional. [email or web address] disclaims any warranty or liability for your use of this information.      Learning About Sleeping Well  What does sleeping well mean?     Sleeping well means getting enough sleep to feel good and stay healthy. How much sleep is enough varies among people.  The number of hours you sleep and how you feel when you wake up are both important. If you do not feel refreshed, you probably need more sleep. Another sign of not getting enough sleep is feeling tired during the day.  Experts recommend that adults get at least 7 or more hours of sleep per day. Children and older adults need more sleep.  Why is getting enough sleep important?  Getting enough quality sleep is a basic part of good health. When your sleep suffers, your physical health, mood, and your thoughts can suffer too. You may find yourself feeling more grumpy or stressed. Not getting enough sleep also can lead to serious problems, including injury, accidents, anxiety, and depression.  What might cause poor sleeping?  Many things can cause sleep problems, including:  Changes to your sleep schedule.  Stress. Stress can be caused by fear about a single event, such as giving a speech. Or you may have ongoing stress, such as worry about work or school.  Depression, anxiety, and other mental or emotional conditions.  Changes in your sleep habits or surroundings. This includes changes that happen where you sleep, such as noise, light, or sleeping in a different bed. It also includes changes in your sleep pattern, such as having jet lag or working a late shift.  Health problems, such as pain, breathing problems, and restless " "legs syndrome.  Lack of regular exercise.  Using alcohol, nicotine, or caffeine before bed.  How can you help yourself?  Here are some tips that may help you sleep more soundly and wake up feeling more refreshed.  Your sleeping area   Use your bedroom only for sleeping and sex. A bit of light reading may help you fall asleep. But if it doesn't, do your reading elsewhere in the house. Try not to use your TV, computer, smartphone, or tablet while you are in bed.  Be sure your bed is big enough to stretch out comfortably, especially if you have a sleep partner.  Keep your bedroom quiet, dark, and cool. Use curtains, blinds, or a sleep mask to block out light. To block out noise, use earplugs, soothing music, or a \"white noise\" machine.  Your evening and bedtime routine   Create a relaxing bedtime routine. You might want to take a warm shower or bath, or listen to soothing music.  Go to bed at the same time every night. And get up at the same time every morning, even if you feel tired.  What to avoid   Limit caffeine (coffee, tea, caffeinated sodas) during the day, and don't have any for at least 6 hours before bedtime.  Avoid drinking alcohol before bedtime. Alcohol can cause you to wake up more often during the night.  Try not to smoke or use tobacco, especially in the evening. Nicotine can keep you awake.  Limit naps during the day, especially close to bedtime.  Avoid lying in bed awake for too long. If you can't fall asleep or if you wake up in the middle of the night and can't get back to sleep within about 20 minutes, get out of bed and go to another room until you feel sleepy.  Avoid taking medicine right before bed that may keep you awake or make you feel hyper or energized. Your doctor can tell you if your medicine may do this and if you can take it earlier in the day.  If you can't sleep   Imagine yourself in a peaceful, pleasant scene. Focus on the details and feelings of being in a place that is " "relaxing.  Get up and do a quiet or boring activity until you feel sleepy.  Avoid drinking any liquids before going to bed to help prevent waking up often to use the bathroom.  Where can you learn more?  Go to https://www.[employer].net/patiented  Enter J942 in the search box to learn more about \"Learning About Sleeping Well.\"  Current as of: July 10, 2023               Content Version: 14.0    9220-7846 [employer].   Care instructions adapted under license by your healthcare professional. If you have questions about a medical condition or this instruction, always ask your healthcare professional. [employer] disclaims any warranty or liability for your use of this information.      Bladder Training: Care Instructions  Your Care Instructions     Bladder training is used to treat urge incontinence and stress incontinence. Urge incontinence means that the need to urinate comes on so fast that you can't get to a toilet in time. Stress incontinence means that you leak urine because of pressure on your bladder. For example, it may happen when you laugh, cough, or lift something heavy.  Bladder training can increase how long you can wait before you have to urinate. It can also help your bladder hold more urine. And it can give you better control over the urge to urinate.  It is important to remember that bladder training takes a few weeks to a few months to make a difference. You may not see results right away, but don't give up.  Follow-up care is a key part of your treatment and safety. Be sure to make and go to all appointments, and call your doctor if you are having problems. It's also a good idea to know your test results and keep a list of the medicines you take.  How can you care for yourself at home?  Work with your doctor to come up with a bladder training program that is right for you. You may use one or more of the following methods.  Delayed urination  In the beginning, try to keep " "from urinating for 5 minutes after you first feel the need to go.  While you wait, take deep, slow breaths to relax. Kegel exercises can also help you delay the need to go to the bathroom.  After some practice, when you can easily wait 5 minutes to urinate, try to wait 10 minutes before you urinate.  Slowly increase the waiting period until you are able to control when you have to urinate.  Scheduled urination  Empty your bladder when you first wake up in the morning.  Schedule times throughout the day when you will urinate.  Start by going to the bathroom every hour, even if you don't need to go.  Slowly increase the time between trips to the bathroom.  When you have found a schedule that works well for you, keep doing it.  If you wake up during the night and have to urinate, do it. Apply your schedule to waking hours only.  Kegel exercises  These tighten and strengthen pelvic muscles, which can help you control the flow of urine. (If doing these exercises causes pain, stop doing them and talk with your doctor.) To do Kegel exercises:  Squeeze your muscles as if you were trying not to pass gas. Or squeeze your muscles as if you were stopping the flow of urine. Your belly, legs, and buttocks shouldn't move.  Hold the squeeze for 3 seconds, then relax for 5 to 10 seconds.  Start with 3 seconds, then add 1 second each week until you are able to squeeze for 10 seconds.  Repeat the exercise 10 times a session. Do 3 to 8 sessions a day.  When should you call for help?  Watch closely for changes in your health, and be sure to contact your doctor if:    Your incontinence is getting worse.     You do not get better as expected.   Where can you learn more?  Go to https://www.healthVigilant Biosciences.net/patiented  Enter V684 in the search box to learn more about \"Bladder Training: Care Instructions.\"  Current as of: November 15, 2023               Content Version: 14.0    2937-5753 [employer].   Care instructions adapted " under license by your healthcare professional. If you have questions about a medical condition or this instruction, always ask your healthcare professional. [employer] disclaims any warranty or liability for your use of this information.      Learning About Depression Screening  What is depression screening?  Depression screening is a way to see if you have depression symptoms. It may be done by a doctor or counselor. It's often part of a routine checkup. That's because your mental health is just as important as your physical health.  Depression is a mental health condition that affects how you feel, think, and act. You may:  Have less energy.  Lose interest in your daily activities.  Feel sad and grouchy for a long time.  Depression is very common. It affects people of all ages.  Many things can lead to depression. Some people become depressed after they have a stroke or find out they have a major illness like cancer or heart disease. The death of a loved one or a breakup may lead to depression. It can run in families. Most experts believe that a combination of inherited genes and stressful life events can cause it.  What happens during screening?  You may be asked to fill out a form about your depression symptoms. You and the doctor will discuss your answers. The doctor may ask you more questions to learn more about how you think, act, and feel.  What happens after screening?  If you have symptoms of depression, your doctor will talk to you about your options.  Doctors usually treat depression with medicines or counseling. Often, combining the two works best. Many people don't get help because they think that they'll get over the depression on their own. But people with depression may not get better unless they get treatment.  The cause of depression is not well understood. There may be many factors involved. But if you have depression, it's not your fault.  A serious symptom of depression is  "thinking about death or suicide. If you or someone you care about talks about this or about feeling hopeless, get help right away.  It's important to know that depression can be treated. Medicine, counseling, and self-care may help.  Where can you learn more?  Go to https://www.[employer].net/patiented  Enter T185 in the search box to learn more about \"Learning About Depression Screening.\"  Current as of: June 24, 2023               Content Version: 14.0    5644-1570 [employer].   Care instructions adapted under license by your healthcare professional. If you have questions about a medical condition or this instruction, always ask your healthcare professional. [employer] disclaims any warranty or liability for your use of this information.      "

## 2024-03-14 NOTE — PROGRESS NOTES
Preventive Care Visit  [facility] KINDRA Bucio PA-C, Family Medicine  Mar 14, 2024      Assessment & Plan     Encounter for Medicare annual wellness exam  Completed full history and physical    Type 2 diabetes mellitus with diabetic polyneuropathy, with long-term current use of insulin (H)  Type 2 diabetes mellitus with hyperglycemia, with long-term current use of insulin (H)  Class 2 severe obesity due to excess calories with serious comorbidity and body mass index (BMI) of 37.0 to 37.9 in adult (H)  Discussed the current state of the patient's diabetes and vascular health. Continuing these medications at the current dose due to positive patient response and no major side effects. Emphasized the importance of eating a clean diet and exercising daily. Patient's diet should be centered around 4-5 servings of fruits and vegetables, lean protein, and whole grains. Patient should try and exercise 150 minutes per week at moderate intensity. Checking cholesterol levels to monitor the current status and hopefully see a decrease in triglycerides and overall cholesterol. Referral to nutrition to help the patient find economical ways to maintain a healthy diet.   - atorvastatin (LIPITOR) 20 MG tablet; Take 1 tablet (20 mg) by mouth at bedtime  - blood glucose (NO BRAND SPECIFIED) test strip; Use to test blood sugar 3 times daily or as directed. To accompany: Blood Glucose Monitor Brands: per insurance.  - empagliflozin (JARDIANCE) 25 MG TABS tablet; Take 1 tablet (25 mg) by mouth daily  - losartan (COZAAR) 25 MG tablet; Take 1 tablet (25 mg) by mouth daily  - pregabalin (LYRICA) 100 MG capsule; Take 1 capsule (100 mg) by mouth 2 times daily  - Lipid panel reflex to direct LDL Fasting; Future  - Nutrition Referral; Future  - Lipid panel reflex to direct LDL Fasting    Primary osteoarthritis of right knee  Injected methylprednisone into the right knee to help with chronic osteoarthritis. Informed the  patient that she needs to be diligent in eating a healthy diet and monitoring blood sugars over the next 48 hours. Discussed that the patient can come back in two weeks for injection in her left knee. See procedure note below.   - methylPREDNISolone (DEPO-Medrol) injection 80 mg  - Large Joint/Bursa injection and/or drainage (Shoulder, Knee)    Other form of dyspnea  Ordered CT chest without contrast to evaluate the patient's ongoing episodes of shortness of breath. Patient had lung cancer screening done in 2021 with benign nodules noted. Repeating procedure to monitor any changes and to find an isolated cause of her symptoms.   - CT Chest w/o Contrast; Future    Right ear pain  Prescribed cortisporin to help alleviate the symptom of right ear pain that the patient is having. Place 3 drops in the ear 4 times daily for relief.   - neomycin-polymyxin-hydrocortisone (CORTISPORIN) 3.5-56541-9 otic solution; Place 3 drops in ear(s) 4 times daily    Candidiasis of vagina  Prescribed diflucan as patient has been treating recurrent yeast infection with over the counter medications. Take one tablet per infection. There are multiple refills, so the patient can treat on a as needed basis.   - fluconazole (DIFLUCAN) 150 MG tablet; Take 1 tablet (150 mg) by mouth once for 1 dose    Hypothyroidism, unspecified type  Continuing levothyroxine on current dose due to positive patient response and no major side effects.  TSH has been monitored and is steady within target ranges.   - levothyroxine (SYNTHROID/LEVOTHROID) 200 MCG tablet; Take 1 tablet (200 mcg) by mouth every morning (before breakfast)    Major depressive disorder with current active episode, unspecified depression episode severity, unspecified whether recurrent  Continuing sertraline at current dose to help combat symptoms of depression. Patient states that this medication has had a positive impact and no side effects noted.  - sertraline (ZOLOFT) 100 MG tablet; Take 1  tablet (100 mg) by mouth daily    Multiple myeloma in remission (H)  Checking labs to monitor the current state of the patient's health. Want to ensure that labs are within target ranges.   - CBC with platelets differential  - Comprehensive metabolic panel  - Protein Immunofixation Serum  - Kappa and lambda light chain  - Immunoglobulins A G and M  - Protein electrophoresis                  Depression Screening Follow Up        3/14/2024    11:26 AM   PHQ   PHQ-9 Total Score 18   Q9: Thoughts of better off dead/self-harm past 2 weeks Several days   F/U: Thoughts of suicide or self-harm No   F/U: Safety concerns No                     Follow Up Actions Taken  Patient declined referral.    Discussed the following ways the patient can remain in a safe environment:  be around others  Counseling  Appropriate preventive services were discussed with this patient, including applicable screening as appropriate for fall prevention, nutrition, physical activity, Tobacco-use cessation, weight loss and cognition.  Checklist reviewing preventive services available has been given to the patient.  Reviewed patient's diet, addressing concerns and/or questions.   She is at risk for lack of exercise and has been provided with information to increase physical activity for the benefit of her well-being.   The patient was instructed to see the dentist every 6 months.   Discussed possible causes of fatigue. The patient was provided with written information regarding signs of hearing loss.   Information on urinary incontinence and treatment options given to patient.   The patient's PHQ-9 score is consistent with moderate depression. She was provided with information regarding depression.           Ariadna Max is a 68 year old, presenting for the following:  Wellness Visit        3/14/2024    11:43 AM   Additional Questions   Roomed by An CHARLIE.         3/14/2024    11:43 AM   Patient Reported Additional Medications   Patient reports  taking the following new medications none         Health Care Directive  Patient does not have a Health Care Directive or Living Will: Discussed advance care planning with patient; however, patient declined at this time.    HPI  Bilateral knee pain-ongoing for years  Had prior surgeries.   Trouble getting up and down stairs, standing up  Pain gets worse throughout the day  Discussed injections at prior appointments  Takes ibuprofen occasionally, helps  Right knee worse    Having trouble breathing, catching breath when laying down  Started 6 months ago, happens 2-4 times daily  Once diagnosed with asthma, never used inhaler  Son is a smoker, personal history of 30 years (1PPD)  Not sure if its worse with exertion  Problems with fragrances and cats in the past    R ear pain, worse when using hearing pains    Wants referral to nutritionist              3/14/2024   General Health   How would you rate your overall physical health? (!) POOR   Feel stress (tense, anxious, or unable to sleep) To some extent   (!) STRESS CONCERN      3/14/2024   Nutrition   Diet: Regular (no restrictions)         3/14/2024   Exercise   Days per week of moderate/strenous exercise 3 days   Average minutes spent exercising at this level 40 min         3/14/2024   Social Factors   Frequency of gathering with friends or relatives Once a week   Worry food won't last until get money to buy more Yes   Food not last or not have enough money for food? Yes   Do you have housing?  Yes   Are you worried about losing your housing? No   Lack of transportation? No   Unable to get utilities (heat,electricity)? No   (!) FOOD SECURITY CONCERN PRESENT      3/14/2024   Activities of Daily Living- Home Safety   Needs help with the following daily activites None of the above   Safety concerns in the home None of the above         3/14/2024   Dental   Dentist two times every year? (!) NO         3/14/2024   Hearing Screening   Hearing concerns? (!) I FEEL THAT  PEOPLE ARE MUMBLING OR NOT SPEAKING CLEARLY.    (!) I NEED TO ASK PEOPLE TO SPEAK UP OR REPEAT THEMSELVES.    (!) IT'S HARD TO FOLLOW A CONVERSATION IN A NOISY RESTAURANT OR CROWDED ROOM.    (!) TROUBLE UNDESTANDING A SPEAKER IN A PUBLIC MEETING OR [facility] SERVICE.    (!) TROUBLE FOLLOWING DIALOGUE IN THE THEATHER.    (!) TROUBLE UNDERSTANDING SOFT OR WHISPERED SPEECH.    (!) TROUBLE UNDERSTANDING SPEECH ON THE TELEPHONE         3/14/2024   Driving Risk Screening   Patient/family members have concerns about driving No         3/14/2024   General Alertness/Fatigue Screening   Have you been more tired than usual lately? (!) YES         3/14/2024   Urinary Incontinence Screening   Bothered by leaking urine in past 6 months Yes         3/14/2024   TB Screening   Were you born outside of [place]?  No       Today's PHQ-9 Score:       3/14/2024    11:26 AM   PHQ-9 SCORE   PHQ-9 Total Score MyChart 18 (Moderately severe depression)   PHQ-9 Total Score 18         3/14/2024   Substance Use   Alcohol more than 3/day or more than 7/wk No   Do you have a current opioid prescription? No   How severe/bad is pain from 1 to 10? 5/10   Do you use any other substances recreationally? No     Social History     Tobacco Use    Smoking status: Former     Packs/day: 1     Types: Cigarettes     Quit date:      Years since quittin.2    Smokeless tobacco: Never   Vaping Use    Vaping Use: Never used   Substance Use Topics    Alcohol use: Not Currently     Comment: occasional    Drug use: Never           2023   LAST FHS-7 RESULTS   1st degree relative breast or ovarian cancer No   Any relative bilateral breast cancer No   Any male have breast cancer No   Any ONE woman have BOTH breast AND ovarian cancer No   Any woman with breast cancer before 50yrs No   2 or more relatives with breast AND/OR ovarian cancer No   2 or more relatives with breast AND/OR bowel cancer No            ASCVD Risk   The 10-year ASCVD risk score  (Jae GARCIA, et al., 2019) is: 10.2%    Values used to calculate the score:      Age: 68 years      Sex: Female      Is Non- : No      Diabetic: Yes      Tobacco smoker: No      Systolic Blood Pressure: 112 mmHg      Is BP treated: No      HDL Cholesterol: 44 mg/dL      Total Cholesterol: 213 mg/dL            Reviewed and updated as needed this visit by Provider                      Current providers sharing in care for this patient include:  Patient Care Team:  Montse Bucio PA-C as PCP - General (Family Medicine)  Brit Zhu MD as Referring Physician (Student in organized health care education/training program)  Gabrielle Edwards MD as Resident  Sarika Bonilla MD as MD (Ophthalmology)  Coco Antoine RP as Pharmacist (Pharmacist Clinician- Clinical Pharmacy Specialist)  Jose Bangura MD as MD (Gastroenterology)  Anjelica Corrigan RP as Pharmacist (Pharmacist Ambulatory Care)  Montse Bucio PA-C as Assigned PCP  Nakia Gunn, RN as Specialty Care Coordinator (Hematology & Oncology)  Mj Bowman MD as Assigned Pediatric Specialist Provider  Anjelica Corrigan MAXWELL as Assigned MTM Pharmacist  Loyd Alaniz MD as MD (Urology)  Corrina Cevallos, RN as Lead Care Coordinator (Primary Care - CC)  Park Vargas MD as Assigned Endocrinology Provider  Domenica Orosco AuD as Audiologist (Audiology)  Brit Zhu MD as MD (Hematology & Oncology)  Kojo Pat MD as MD (Gastroenterology)  Merari Camarena APRN CNP as Nurse Practitioner (Hematology & Oncology)  Merari Camarena APRN CNP as Nurse Practitioner (Hematology & Oncology)  Kojo Pat MD as Assigned Gastroenterology Provider  Myhre, Grace C, [facility] as Pharmacist  Hyun Denney APRN CNP as Assigned Cancer Care Provider  Colton Leone MD as Assigned Surgical Provider  Tae Menendez MD as MD (Neurological Surgery)  Tae Menendez MD as Assigned Neuroscience  Provider    The following health maintenance items are reviewed in Epic and correct as of today:  Health Maintenance   Topic Date Due    DEXA  Never done    RSV VACCINE (Pregnancy & 60+) (1 - 1-dose 60+ series) Never done    IPV IMMUNIZATION (3 of 3 - Adult catch-up series) 02/19/2021    DIABETIC FOOT EXAM  07/11/2023    URINE DRUG SCREEN  07/11/2023    PAP FOLLOW-UP  01/27/2024    HPV FOLLOW-UP  01/27/2024    A1C  04/22/2024    ANNUAL REVIEW OF HM ORDERS  06/26/2024    PHQ-9  09/14/2024    EYE EXAM  11/27/2024    LIPID  01/22/2025    MICROALBUMIN  01/22/2025    TSH W/FREE T4 REFLEX  01/22/2025    BMP  02/20/2025    CBC W/DIFFERENTIAL  02/20/2025    MEDICARE ANNUAL WELLNESS VISIT  03/14/2025    FALL RISK ASSESSMENT  03/14/2025    MAMMO SCREENING  08/02/2025    ADVANCE CARE PLANNING  01/29/2026    DTAP/TDAP/TD IMMUNIZATION (7 - Td or Tdap) 08/19/2030    COLORECTAL CANCER SCREENING  02/25/2032    HEPATITIS C SCREENING  Completed    DEPRESSION ACTION PLAN  Completed    INFLUENZA VACCINE  Completed    Pneumococcal Vaccine: 65+ Years  Completed    URINALYSIS  Completed    ZOSTER IMMUNIZATION  Completed    COVID-19 Vaccine  Completed    HPV IMMUNIZATION  Aged Out    MENINGITIS IMMUNIZATION  Aged Out    RSV MONOCLONAL ANTIBODY  Aged Out    PAP  Discontinued     Review of Systems   Constitutional:  Negative for chills, fatigue and fever.   HENT:  Positive for ear pain, rhinorrhea and sneezing. Negative for sore throat.         Right ear pain    Eyes:  Negative for photophobia and visual disturbance.   Respiratory:  Positive for cough and shortness of breath. Negative for wheezing.         Shortness of breath on exertion   Cardiovascular:  Negative for chest pain, palpitations and leg swelling.   Gastrointestinal:  Positive for diarrhea. Negative for abdominal pain and constipation.   Genitourinary:  Positive for frequency. Negative for dysuria, hematuria and urgency.   Musculoskeletal:  Positive for arthralgias and joint  "swelling. Negative for myalgias.        Bilateral knee pain and swelling   Skin:  Negative for color change, pallor and rash.   Neurological:  Positive for numbness. Negative for dizziness and headaches.        Chronic peripheral neuropathy            Objective    Exam  /61   Pulse 58   Temp 97.9  F (36.6  C) (Oral)   Resp 14   Ht 1.742 m (5' 8.58\")   Wt 116.7 kg (257 lb 3.2 oz)   SpO2 98%   BMI 38.45 kg/m     Estimated body mass index is 38.45 kg/m  as calculated from the following:    Height as of this encounter: 1.742 m (5' 8.58\").    Weight as of this encounter: 116.7 kg (257 lb 3.2 oz).    Physical Exam  Constitutional:       General: She is not in acute distress.     Appearance: Normal appearance. She is obese. She is not ill-appearing, toxic-appearing or diaphoretic.   HENT:      Head: Normocephalic and atraumatic.      Right Ear: Tympanic membrane, ear canal and external ear normal. There is no impacted cerumen.      Left Ear: Tympanic membrane, ear canal and external ear normal. There is no impacted cerumen.      Nose: Nose normal. No congestion or rhinorrhea.      Mouth/Throat:      Mouth: Mucous membranes are moist.      Pharynx: Oropharynx is clear. No oropharyngeal exudate or posterior oropharyngeal erythema.   Eyes:      General: No scleral icterus.        Right eye: No discharge.         Left eye: No discharge.      Conjunctiva/sclera: Conjunctivae normal.      Pupils: Pupils are equal, round, and reactive to light.   Cardiovascular:      Rate and Rhythm: Normal rate and regular rhythm.      Pulses: Normal pulses.      Heart sounds: Normal heart sounds. No murmur heard.     No friction rub. No gallop.   Pulmonary:      Effort: Pulmonary effort is normal. No respiratory distress.      Breath sounds: Normal breath sounds. No stridor. No wheezing, rhonchi or rales.   Abdominal:      General: Abdomen is flat. There is no distension.      Palpations: Abdomen is soft. There is no mass.      " Tenderness: There is no abdominal tenderness. There is no guarding or rebound.      Hernia: No hernia is present.   Musculoskeletal:         General: Swelling and tenderness present. No deformity or signs of injury. Normal range of motion.      Cervical back: Normal range of motion. No rigidity or tenderness.      Right lower leg: No edema.      Left lower leg: No edema.      Comments: Bilateral knee pain and swelling. Chronic  No acute changes   Lymphadenopathy:      Cervical: No cervical adenopathy.   Skin:     General: Skin is warm and dry.      Coloration: Skin is not jaundiced or pale.      Findings: No bruising or erythema.   Neurological:      General: No focal deficit present.      Mental Status: She is alert and oriented to person, place, and time. Mental status is at baseline.      Sensory: No sensory deficit.      Motor: No weakness.      Coordination: Coordination normal.   Psychiatric:         Mood and Affect: Mood normal.         Behavior: Behavior normal.         Thought Content: Thought content normal.         Judgment: Judgment normal.       PROCEDURE:   In sterile fashion, the right knee was placed in a flexed position and the anterior knee cleaned with alcohol Then 80mg kenalog and 1cc of epinephrine was injected into the right knee using a lateral approach.  pt tolerated procedure well.             3/14/2024   Mini Cog   Clock Draw Score 0 Abnormal   3 Item Recall 3 objects recalled   Mini Cog Total Score 3            Donaldo PEÑAS  Signed Electronically by: Montse Bucio PA-C    Answers submitted by the patient for this visit:  Patient Health Questionnaire (Submitted on 3/14/2024)  If you checked off any problems, how difficult have these problems made it for you to do your work, take care of things at home, or get along with other people?: Very difficult  PHQ9 TOTAL SCORE: 18  NA-7 (Submitted on 3/14/2024)  NA 7 TOTAL SCORE: 11

## 2024-03-14 NOTE — NURSING NOTE
Clinic Administered Medication Documentation      Injection Documentation     Injection was administered by provider (please see MAR for given by information). Please see MAR and medication order for additional information.     Site:  Right Knee   Medication Used: methylprednisone     Expiration Date:  01/2025  Dahiana Louie CMA

## 2024-03-14 NOTE — COMMUNITY RESOURCES LIST (ENGLISH)
March 14, 2024           YOUR PERSONALIZED LIST OF SERVICES & PROGRAMS           NAVIGATION    Eligibility Screening      [facility] [place] - SNAP (formerly food stamps) Screening and Application help  Phone: [phone]) [phone]  Website: https://www.Fermentalg.org/programs/[place]-food-helpline/  Language: English  Hours: Mon 10:00 AM - 5:00 PM Tue 10:00 AM - 5:00 PM Wed 10:00 AM - 5:00 PM Thu 10:00 AM - 5:00 PM Fri 10:00 AM - 5:00 PM  Fee: Free  Accessibility: Ada accessible, Blind accommodation, Deaf or hard of hearing, Translation services      [facility] [employer] HelpLine  Phone: (171) [phone]  Website: https://www.Fermentalg.org/find-help/  Language: English, Taiwanese  Hours: Mon 10:00 AM - 5:00 PM Tue 10:00 AM - 5:00 PM Wed 10:00 AM - 5:00 PM Thu 10:00 AM - 5:00 PM Fri 10:00 AM - 5:00 PM  Fee: Free  Accessibility: [place] accessible, Blind accommodation, Deaf or hard of hearing, Translation services      [employer] - Stephanie Rowland  Phone: [phone]  Email: [email or web address]  Website: https://www.Bufys.Fab'entech/  Language: English  Hours: Tue 9:00 AM - 5:00 PM Wed 9:00 AM - 3:00 PM Thu 9:00 AM - 5:00 PM  Fee: Free  Accessibility: [place] accessible, Blind accommodation, Deaf or hard of hearing        ASSISTANCE    Nutrition Benefits      [employer] [place] - SNAP (formerly food stamps) Screening and Application help  Phone: [phone]) [phone]  Website: https://www.Fermentalg.org/programs/[place]-food-helpline/  Language: English  Hours: Mon 10:00 AM - 5:00 PM Tue 10:00 AM - 5:00 PM Wed 10:00 AM - 5:00 PM Thu 10:00 AM - 5:00 PM Fri 10:00 AM - 5:00 PM  Fee: Free  Accessibility: Ada accessible, Blind accommodation, Deaf or hard of hearing, Translation services      [facility] [employer] HelpLine  Phone: [phone]) [phone]  Website: https://www.hungersolutions.org/find-help/  Language: English, Taiwanese  Hours: Mon 10:00 AM - 5:00 PM Tue 10:00  AM - 5:00 PM Wed 10:00 AM - 5:00 PM Thu 10:00 AM - 5:00 PM Fri 10:00 AM - 5:00 PM  Fee: Free  Accessibility: Ada accessible, Blind accommodation, Deaf or hard of hearing, Translation services      [employer] [place] [employer]  Phone: (172) [phone]  Website: https://www.Edgar Onlineorg/programs/market-morgan/  Language: English  Hours: Mon 10:00 AM - 5:00 PM Tue 10:00 AM - 5:00 PM Wed 10:00 AM - 5:00 PM Thu 10:00 AM - 5:00 PM Fri 10:00 AM - 5:00 PM  Fee: Self pay    Pantry      [facility] [employer]  [address] [place] [address] (Distance: 0.8 miles)  Phone: [phone]) [phone]  Website: http://Cupoint  Language: English  Fee: Free      [employer] [place] [employer]   Phone: (788) 670-9342  Website: https://www.Feebbo/find-help/  Language: English  Hours: Mon 10:00 AM - 5:00 PM Tue 10:00 AM - 5:00 PM Wed 10:00 AM - 5:00 PM Thu 10:00 AM - 5:00 PM Fri 10:00 AM - 5:00 PM  Fee: Free  Accessibility: Ada accessible, Blind accommodation, Deaf or hard of hearing, Translation services      [facility]  Phone: [phone]) [phone]  Website: https://www.advocateforLaiyaoyao.All Copy Products  Language: English, Uzbek  Hours: Mon 8:00 AM - 5:00 PM Tue 8:00 AM - 5:00 PM Wed 8:00 AM - 5:00 PM Thu 8:00 AM - 5:00 PM Fri 8:00 AM - 5:00 PM  Fee: Free  Accessibility: Ada accessible               IMPORTANT NUMBERS & WEBSITES        Emergency Services  911  .   United Way  211 http://211unitedway.org  .   Poison Control  (569) 962-5024 http://[place]poison.org http://wisconsinpoison.org  .     Suicide and Crisis Lifeline  988 http://988lifeline.org  .   Childhelp National Child Abuse Hotline  888.549.8968 http://Childhelphotline.org   .   National Sexual Assault Hotline  (983) 780-8575 (HOPE) http://Rainn.org   .     National Runaway Safeline  (415) 531-6944 (RUNAWAY) http://VIPerksWannyi.org  .   Pregnancy & Postpartum Support  Call/text 014-904-8559  [place]:  http://ppsupportmn.org  [place]: http://13th Lab.com/wi  .   Substance Abuse National Helpline ([facility])  988-969-HELP (3956) http://Findtreatment.gov   .                DISCLAIMER: [employer] does not endorse any service providers mentioned in this resource list. [employer] does not guarantee that the services mentioned in this resource list will be available to you or will improve your health or wellness.    [facility]

## 2024-03-15 DIAGNOSIS — E11.42 TYPE 2 DIABETES MELLITUS WITH DIABETIC POLYNEUROPATHY, WITH LONG-TERM CURRENT USE OF INSULIN (H): ICD-10-CM

## 2024-03-15 DIAGNOSIS — Z79.4 TYPE 2 DIABETES MELLITUS WITH DIABETIC POLYNEUROPATHY, WITH LONG-TERM CURRENT USE OF INSULIN (H): ICD-10-CM

## 2024-03-15 LAB
ALBUMIN SERPL BCG-MCNC: 4.4 G/DL (ref 3.5–5.2)
ALBUMIN SERPL ELPH-MCNC: 4.5 G/DL (ref 3.7–5.1)
ALP SERPL-CCNC: 118 U/L (ref 40–150)
ALPHA1 GLOB SERPL ELPH-MCNC: 0.2 G/DL (ref 0.2–0.4)
ALPHA2 GLOB SERPL ELPH-MCNC: 0.6 G/DL (ref 0.5–0.9)
ALT SERPL W P-5'-P-CCNC: 38 U/L (ref 0–50)
ANION GAP SERPL CALCULATED.3IONS-SCNC: 10 MMOL/L (ref 7–15)
AST SERPL W P-5'-P-CCNC: 24 U/L (ref 0–45)
B-GLOBULIN SERPL ELPH-MCNC: 0.8 G/DL (ref 0.6–1)
BILIRUB SERPL-MCNC: 1.6 MG/DL
BUN SERPL-MCNC: 18 MG/DL (ref 8–23)
CALCIUM SERPL-MCNC: 9.3 MG/DL (ref 8.8–10.2)
CHLORIDE SERPL-SCNC: 103 MMOL/L (ref 98–107)
CHOLEST SERPL-MCNC: 164 MG/DL
CREAT SERPL-MCNC: 0.75 MG/DL (ref 0.51–0.95)
DEPRECATED HCO3 PLAS-SCNC: 25 MMOL/L (ref 22–29)
EGFRCR SERPLBLD CKD-EPI 2021: 87 ML/MIN/1.73M2
FASTING STATUS PATIENT QL REPORTED: NO
GAMMA GLOB SERPL ELPH-MCNC: 0.6 G/DL (ref 0.7–1.6)
GLUCOSE SERPL-MCNC: 199 MG/DL (ref 70–99)
HDLC SERPL-MCNC: 52 MG/DL
IGA SERPL-MCNC: 136 MG/DL (ref 84–499)
IGG SERPL-MCNC: 558 MG/DL (ref 610–1616)
IGM SERPL-MCNC: 41 MG/DL (ref 35–242)
KAPPA LC FREE SER-MCNC: 2.79 MG/DL (ref 0.33–1.94)
KAPPA LC FREE/LAMBDA FREE SER NEPH: 1.81 {RATIO} (ref 0.26–1.65)
LAMBDA LC FREE SERPL-MCNC: 1.54 MG/DL (ref 0.57–2.63)
LDLC SERPL CALC-MCNC: 34 MG/DL
LOCATION OF TASK: ABNORMAL
LOCATION OF TASK: NORMAL
M PROTEIN SERPL ELPH-MCNC: 0 G/DL
NONHDLC SERPL-MCNC: 112 MG/DL
POTASSIUM SERPL-SCNC: 4.3 MMOL/L (ref 3.4–5.3)
PROT PATTERN SERPL ELPH-IMP: ABNORMAL
PROT PATTERN SERPL IFE-IMP: NORMAL
PROT SERPL-MCNC: 6.8 G/DL (ref 6.4–8.3)
SODIUM SERPL-SCNC: 138 MMOL/L (ref 135–145)
TOTAL PROTEIN SERUM FOR ELP: 6.7 G/DL (ref 6.4–8.3)
TRIGL SERPL-MCNC: 389 MG/DL

## 2024-03-15 NOTE — TELEPHONE ENCOUNTER
PRESCRIPTIONS MUST BE WRITTEN THIS WAY TO MAKE THEM MEDICARE COMPLIANT    FREESTTopicmarks SEGUNDO 3 READER  SIG: Use to read blood sugars as  s instructions.  QTY: 1   REFILLS: 0    -Prescriptions must be written after the clinical note date and will only be able to be used for 6 months from the date of the clinical notes. All prescriptions must be from same provider who patient had visit with. (We will be requesting new clinical notes and prescriptions every 6 months to meet Medicare Guidelines.)    Please contact us at [phone] (this number is for clinics only) with any questions. Please only give [phone] to patients.    [facility]   [address] [place] [address]  Phone # [phone]  Fax # [phone]

## 2024-03-17 NOTE — PROGRESS NOTES
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 (LIPITOR) 20 MG tablet Take 1 tablet (20 mg) by mouth at bedtime 90 tablet 3    blood glucose (NO BRAND SPECIFIED) test strip Use to test blood sugar 3 times daily or as directed. To accompany: Blood Glucose Monitor Brands: per insurance. 100 strip 6    blood glucose calibration (NO BRAND SPECIFIED) solution To accompany: Blood Glucose Monitor Brands: per insurance. 4 each 0    blood glucose monitoring (NO BRAND SPECIFIED) meter device kit Use to test blood sugar 3 times daily or as directed. Preferred blood glucose meter OR supplies to accompany: Blood Glucose Monitor Brands: per insurance. 1 kit 0    Continuous Blood Gluc Sensor (FREESTYLE SEGUNDO 3 SENSOR) MISC 1 each every 14 days 6 each 5    empagliflozin (JARDIANCE) 25 MG TABS tablet Take 1 tablet (25 mg) by mouth daily 90 tablet 3    insulin glargine (LANTUS SOLOSTAR) 100 UNIT/ML pen Inject 28 Units Subcutaneous every morning Increase dose by 2 units every three days until fasting blood sugar are  mg/dL. Max 40 units/day 45 mL 1    insulin pen needle (FIFTY50 PEN NEEDLES) 32G X 4 MM miscellaneous Use one pen needle daily or as directed. 100 each 2     LENalidomide (REVLIMID) 10 MG CAPS capsule Take 1 capsule (10 mg) by mouth daily for 28 days 28 capsule 0    LENalidomide (REVLIMID) 10 MG CAPS capsule Take 1 capsule (10 mg) by mouth daily (Patient not taking: Reported on 3/14/2024) 28 capsule 0    levothyroxine (SYNTHROID/LEVOTHROID) 200 MCG tablet Take 1 tablet (200 mcg) by mouth every morning (before breakfast) 90 tablet 3    loperamide (IMODIUM A-D) 2 MG tablet Take 1 tablet (2 mg) by mouth daily as needed for diarrhea Start Imodium 2 pills with first loose stool and then 1 pill with each loose stool after, aiming for 1-2 stools per day. Max of 8 pills a day. 180 tablet 3    losartan (COZAAR) 25 MG tablet Take 1 tablet (25 mg) by mouth daily 90 tablet 3    neomycin-polymyxin-hydrocortisone (CORTISPORIN) 3.5-94034-4 otic solution Place 3 drops in ear(s) 4 times daily 10 mL 0    pregabalin (LYRICA) 100 MG capsule Take 1 capsule (100 mg) by mouth 2 times daily 180 capsule 3    propranolol ER (INDERAL LA) 60 MG 24 hr capsule Take 1 capsule (60 mg) by mouth daily 90 capsule 3    semaglutide (OZEMPIC) 2 MG/3ML pen Inject 0.25 mg Subcutaneous every 7 days Place on file. DO NOT restart until cholesterol is rechecked and triglycerides come down. (Patient not taking: Reported on 3/14/2024) 3 mL 1    sertraline (ZOLOFT) 100 MG tablet Take 1 tablet (100 mg) by mouth daily 90 tablet 3    thin (NO BRAND SPECIFIED) lancets Use with lanceting device. To accompany: Blood Glucose Monitor Brands: per insurance. (Patient not taking: Reported on 6/26/2023) 200 each 6         Gabriel Santoyo MD

## 2024-03-18 RX ORDER — KETOROLAC TROMETHAMINE 30 MG/ML
INJECTION, SOLUTION INTRAMUSCULAR; INTRAVENOUS
Status: CANCELLED | OUTPATIENT
Start: 2024-03-18

## 2024-03-19 ENCOUNTER — ONCOLOGY VISIT (OUTPATIENT)
Dept: ONCOLOGY | Facility: CLINIC | Age: 69
End: 2024-03-19
Attending: STUDENT IN AN ORGANIZED HEALTH CARE EDUCATION/TRAINING PROGRAM
Payer: COMMERCIAL

## 2024-03-19 ENCOUNTER — PATIENT OUTREACH (OUTPATIENT)
Dept: GERIATRIC MEDICINE | Facility: CLINIC | Age: 69
End: 2024-03-19
Payer: COMMERCIAL

## 2024-03-19 VITALS
BODY MASS INDEX: 37.37 KG/M2 | WEIGHT: 250 LBS | HEART RATE: 59 BPM | DIASTOLIC BLOOD PRESSURE: 66 MMHG | SYSTOLIC BLOOD PRESSURE: 120 MMHG | OXYGEN SATURATION: 96 % | TEMPERATURE: 97.6 F | RESPIRATION RATE: 16 BRPM

## 2024-03-19 DIAGNOSIS — C90.01 MULTIPLE MYELOMA IN REMISSION (H): Primary | ICD-10-CM

## 2024-03-19 DIAGNOSIS — K59.1 FUNCTIONAL DIARRHEA: ICD-10-CM

## 2024-03-19 PROCEDURE — 99214 OFFICE O/P EST MOD 30 MIN: CPT | Performed by: STUDENT IN AN ORGANIZED HEALTH CARE EDUCATION/TRAINING PROGRAM

## 2024-03-19 PROCEDURE — G0463 HOSPITAL OUTPT CLINIC VISIT: HCPCS | Performed by: STUDENT IN AN ORGANIZED HEALTH CARE EDUCATION/TRAINING PROGRAM

## 2024-03-19 RX ORDER — CHOLESTYRAMINE 4 G/9G
1 POWDER, FOR SUSPENSION ORAL
Qty: 90 PACKET | Refills: 4 | Status: SHIPPED | OUTPATIENT
Start: 2024-03-19

## 2024-03-19 RX ORDER — KETOROLAC TROMETHAMINE 30 MG/ML
1 INJECTION, SOLUTION INTRAMUSCULAR; INTRAVENOUS ONCE
Qty: 1 EACH | Refills: 0 | Status: SHIPPED | OUTPATIENT
Start: 2024-03-19 | End: 2024-03-19

## 2024-03-19 RX ORDER — BLOOD-GLUCOSE SENSOR
1 EACH MISCELLANEOUS
Qty: 6 EACH | Refills: 5 | Status: SHIPPED | OUTPATIENT
Start: 2024-03-19

## 2024-03-19 ASSESSMENT — PAIN SCALES - GENERAL: PAINLEVEL: NO PAIN (0)

## 2024-03-19 NOTE — LETTER
3/19/2024         RE: Winter Loya  [address]  [facility] [address]        Dear Colleague,    Thank you for referring your patient, Winter Loya, to the [facility]. Please see a copy of my visit note below.        ONCOLOGY/HEMATOLOGY PROGRESS NOTE  Mar 19, 2024    Reason for Visit: IgA Kappa Multiple Myeloma    Oncology HPI:   Winter Loya is a 68 year old woman with IgA Kappa Multiple Myeloma. In 2018 she had anemia and was fatigued. She was found to have IgA kappa monocloncal antibody with M-spike of 0.17. IgA elevated. Skeletal survey was unremarkable and UPEP had minimal protein (156 mg/m2). PET 11/2018 showed bone marrow consistent with hypermetabolic plasma cell myeloma. M spike was 0.17. She started RVD and Zometa. On 4/2019 she had an autologous transplant.      Her bone marrow bx from September 2019 was sent here for review. It showed marrow cellularity of 60%, with decreased trilineage hematopoiesis and 15% plasma cells as well as peripheral blood with slight anemia. 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 She is on revlimid maintenance and zometa from her prior oncologist in [place]. On 12/6/19 her K/L ratio is 1.1, M spike is 0.04.     Currently on Revlimid maintenance.      Interval history:   - Diarrhea was under control but she ate some foods that didn't agree with her. Immodium is helping but she has to take 4 tabs per day.   - knee pain both sides. Recently got cortisone shots which are helping.   - she wasn't able to get her PET because of taking long acting insulin in the am. This is rescheduled    Comprehensive ROS neg other than the symptoms noted above in the HPI.      Past Medical History:   Diagnosis Date    Abnormal EMG 2021 5/25/2021    Interpretation: This is an abnormal study, demonstrating electrophysiologic  evidence of a length-dependent axonal sensorimotor polyneuropathy. Asymmetry of peroneal compound muscle action potential amplitudes is a finding of uncertain significance.       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   I have personally reviewed the examination and initial interpretation and I agree with the findings.   ANNE GOODMAN MD    History of MRI of cervical spine 6/2020 3/15/2021    Impression: Multilevel cervical spondylosis, most pronounced at the level of C4-5 and C5-6 with moderate to severe spinal canal stenosis and moderate spinal canal stenosis at C6-7. No abnormal cord signal. No significant neural foraminal narrowing at any level.   I have personally reviewed the examination and initial interpretation and I agree with the findings.   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 100 each 3    aspirin 81 MG EC tablet Take 81 mg by mouth daily      atorvastatin (LIPITOR) 20 MG tablet Take 1 tablet (20 mg) by mouth at bedtime 90 tablet 3    blood glucose (NO BRAND SPECIFIED) test strip Use to test blood sugar 3 times daily or as directed. To  accompany: Blood Glucose Monitor Brands: per insurance. 100 strip 6    blood glucose calibration (NO BRAND SPECIFIED) solution To accompany: Blood Glucose Monitor Brands: per insurance. 4 each 0    blood glucose monitoring (NO BRAND SPECIFIED) meter device kit Use to test blood sugar 3 times daily or as directed. Preferred blood glucose meter OR supplies to accompany: Blood Glucose Monitor Brands: per insurance. 1 kit 0    Continuous Blood Gluc  (FREESTYLE SEGUNDO 2 READER) MARCIA Use to read blood sugars as per 's instructions. 1 each 0    Continuous Blood Gluc Sensor (FREESTYLE SEGUNDO 3 SENSOR) MISC 1 each every 14 days 6 each 5    empagliflozin (JARDIANCE) 25 MG TABS tablet Take 1 tablet (25 mg) by mouth daily 90 tablet 3    insulin glargine (LANTUS SOLOSTAR) 100 UNIT/ML pen Inject 28 Units Subcutaneous every morning Increase dose by 2 units every three days until fasting blood sugar are  mg/dL. Max 40 units/day 45 mL 1    insulin pen needle (FIFTY50 PEN NEEDLES) 32G X 4 MM miscellaneous Use one pen needle daily or as directed. 100 each 2    LENalidomide (REVLIMID) 10 MG CAPS capsule Take 1 capsule (10 mg) by mouth daily for 28 days 28 capsule 0    LENalidomide (REVLIMID) 10 MG CAPS capsule Take 1 capsule (10 mg) by mouth daily 28 capsule 0    levothyroxine (SYNTHROID/LEVOTHROID) 200 MCG tablet Take 1 tablet (200 mcg) by mouth every morning (before breakfast) 90 tablet 3    loperamide (IMODIUM A-D) 2 MG tablet Take 1 tablet (2 mg) by mouth daily as needed for diarrhea Start Imodium 2 pills with first loose stool and then 1 pill with each loose stool after, aiming for 1-2 stools per day. Max of 8 pills a day. 180 tablet 3    losartan (COZAAR) 25 MG tablet Take 1 tablet (25 mg) by mouth daily 90 tablet 3    neomycin-polymyxin-hydrocortisone (CORTISPORIN) 3.5-00562-5 otic solution Place 3 drops in ear(s) 4 times daily 10 mL 0    pregabalin (LYRICA) 100 MG capsule Take 1 capsule (100 mg) by mouth  2 times daily 180 capsule 3    propranolol ER (INDERAL LA) 60 MG 24 hr capsule Take 1 capsule (60 mg) by mouth daily 90 capsule 3    sertraline (ZOLOFT) 100 MG tablet Take 1 tablet (100 mg) by mouth daily 90 tablet 3       /66   Pulse 59   Temp 97.6  F (36.4  C)   Resp 16   Wt 113.4 kg (250 lb)   SpO2 96%   BMI 37.37 kg/m    Constitutional: NAD  Eyes: no scleral icterus  ENT: small area of erythema on buccal mucosa where she has some irritation.   Lymph: no cervical, supraclavicular LAD  Pulm: CTAB  CV: RRR, no m/r/g  GI: bowel sounds present, soft and nontender to palpation  MSK: No edema in the extremities  Neuro: alert, conversant, normal gait  Psych: appropriate mood and affect        Labs:     Blood Counts       Recent Labs   Lab Test 03/14/24  1305 02/20/24  1026 01/22/24  1017 12/20/23  1026   HGB 14.6 14.5 14.0 14.5   HCT 43.5 44.2 42.2 42.3   WBC 3.1* 2.8* 2.7* 3.0*   ANEUTAUTO 1.8 1.8 1.5* 1.8   ALYMPAUTO 0.6* 0.5* 0.6* 0.6*   AMONOAUTO 0.4 0.3 0.3 0.3   AEOSAUTO 0.3 0.2 0.2 0.3   ABSBASO 0.1 0.1 0.1 0.1   NRBCMAN  --  0.0 0.0 0.0   * 107* 125* 112*       ABO/RH    No lab results found.      Chemistries     Basic Panel  Recent Labs   Lab Test 03/14/24  1305 02/20/24  1026 01/22/24  1017    137 139   POTASSIUM 4.3 4.2 4.5   CHLORIDE 103 101 102   CO2 25 19* 24   BUN 18.0 13.6 13.9   CR 0.75 0.72 0.63   * 298* 258*        Calcium, Magnesium, Phosphorus  Recent Labs   Lab Test 03/14/24  1305 02/20/24  1026 01/22/24  1017   ASHLEY 9.3 8.9 9.5        LFTs  Recent Labs   Lab Test 03/14/24  1305 02/20/24  1026 01/22/24  1017 12/20/23  1026   BILITOTAL 1.6* 0.9 1.2 1.9*   ALKPHOS 118 122 122 123   AST 24  --  23 17   ALT 38  --  44 29   ALBUMIN 4.4 4.3 4.3 4.4       LDH  No lab results found.    B2-Microglobulin  Recent Labs   Lab Test 02/18/20  0849   [id number] 1.6           Immunoglobulins     Recent Labs   Lab Test 03/14/24  1305 02/20/24  1026 01/22/24  1017 12/20/23  1026  11/20/23  1127 07/24/23  1009   * 495* 602* 590* 687 583*       Recent Labs   Lab Test 03/14/24  1305 02/20/24  1026 01/22/24  1017 12/20/23  1026 11/20/23  1127 07/24/23  1009    131 130 139 164 131       Recent Labs   Lab Test 03/14/24  1305 02/20/24  1026 01/22/24  1017 12/20/23  1026 11/20/23  1127 10/16/23  1020   IGM 41 <10* 42 41 48 48         Monocloncal Protein Studies     M spike    Recent Labs   Lab Test 03/14/24  1305 02/20/24  1026 01/22/24  1017 12/20/23  1026 11/20/23  1127 10/16/23  1020   ELPM 0.0 0.0 0.0 0.0 0.1* 0.0       Godley FLC    Recent Labs   Lab Test 03/14/24  1305 02/20/24  1026 01/22/24  1017 12/20/23  1026 11/20/23  1127 07/24/23  1009   KFLCA 2.79* 2.45* 2.65* 2.34* 2.58* 2.58*       Lambda FLC    Recent Labs   Lab Test 03/14/24  1305 02/20/24  1026 01/22/24  1017 12/20/23  1026 11/20/23  1127 07/24/23  1009   LFLCA 1.54 1.60 1.74 1.56 1.66 1.84       FLC Ratio    Recent Labs   Lab Test 03/14/24  1305 02/20/24  1026 01/22/24  1017 12/20/23  1026 11/20/23  1127 07/24/23  1009   KLRA 1.81* 1.53 1.52 1.50 1.55 1.40       Assessment/plan:   Winter Loya is a 68 year old woman with standard risk IgG kappa multiple myeloma, s/p post auto-transplant in April 2019,  currently on lenalidomide maintenance. She remains in remission by FLC, EBONIE, SPEP. ASA 81 mg for dvt ppx      Persistently elevated bilirubin led to hepatology consult in October 2023.  Determination was that this is most likely fatty liver disease.  Recommendation was to continue to follow with PCP for management of obesity, DM2, and HTN. Bilirubin is WNL today 01/24/24     Diarrhea continues to be an issue despite immodium. Will try cholestyramine with meals TID.     Get PET and then I will call with results.       Brit Zhu MD PhD

## 2024-03-19 NOTE — PROGRESS NOTES
ONCOLOGY/HEMATOLOGY PROGRESS NOTE  Mar 19, 2024    Reason for Visit: IgA Kappa Multiple Myeloma    Oncology HPI:   Winter Loya is a 68 year old woman with IgA Kappa Multiple Myeloma. In 2018 she had anemia and was fatigued. She was found to have IgA kappa monocloncal antibody with M-spike of 0.17. IgA elevated. Skeletal survey was unremarkable and UPEP had minimal protein (156 mg/m2). PET 11/2018 showed bone marrow consistent with hypermetabolic plasma cell myeloma. M spike was 0.17. She started RVD and Zometa. On 4/2019 she had an autologous transplant.      Her bone marrow bx from September 2019 was sent here for review. It showed marrow cellularity of 60%, with decreased trilineage hematopoiesis and 15% plasma cells as well as peripheral blood with slight anemia. 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 She is on revlimid maintenance and zometa from her prior oncologist in [place]. On 12/6/19 her K/L ratio is 1.1, M spike is 0.04.     Currently on Revlimid maintenance.      Interval history:   - Diarrhea was under control but she ate some foods that didn't agree with her. Immodium is helping but she has to take 4 tabs per day.   - knee pain both sides. Recently got cortisone shots which are helping.   - she wasn't able to get her PET because of taking long acting insulin in the am. This is rescheduled    Comprehensive ROS neg other than the symptoms noted above in the HPI.      Past Medical History:   Diagnosis Date    Abnormal EMG 2021 5/25/2021    Interpretation: This is an abnormal study, demonstrating electrophysiologic evidence of a length-dependent axonal sensorimotor polyneuropathy. Asymmetry of peroneal compound muscle action potential amplitudes is a finding of uncertain significance.       Adjustment disorder with mixed anxiety and depressed mood 3/16/2013     Anxiety     Axonal neuropathy 9/27/2021    Depression     Diabetes (H)     Glaucoma (increased eye pressure)     H/O magnetic resonance imaging of lumbar spine 2020 3/15/2021    IMPRESSION: Multilevel mild lumbar spondylosis, most pronounced at the level of L4-5 and L5-S1 as detailed above.   I have personally reviewed the examination and initial interpretation and I agree with the findings.   ANNE GOODMAN MD    History of MRI of cervical spine 6/2020 3/15/2021    Impression: Multilevel cervical spondylosis, most pronounced at the level of C4-5 and C5-6 with moderate to severe spinal canal stenosis and moderate spinal canal stenosis at C6-7. No abnormal cord signal. No significant neural foraminal narrowing at any level.   I have personally reviewed the examination and initial interpretation and I agree with the findings.   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 100 each 3    aspirin 81 MG EC tablet Take 81 mg by mouth daily      atorvastatin (LIPITOR) 20 MG tablet Take 1 tablet (20 mg) by mouth at bedtime 90 tablet 3    blood glucose (NO BRAND SPECIFIED) test strip Use to test blood sugar 3 times daily or as directed. To accompany: Blood Glucose Monitor Brands: per insurance. 100 strip 6    blood glucose calibration (NO BRAND SPECIFIED) solution To accompany: Blood Glucose Monitor Brands: per insurance. 4 each 0    blood glucose monitoring (NO BRAND SPECIFIED) meter  device kit Use to test blood sugar 3 times daily or as directed. Preferred blood glucose meter OR supplies to accompany: Blood Glucose Monitor Brands: per insurance. 1 kit 0    Continuous Blood Gluc  (FREESTYLE SEGUNDO 2 READER) MARCIA Use to read blood sugars as per 's instructions. 1 each 0    Continuous Blood Gluc Sensor (FREESTYLE SEGUNDO 3 SENSOR) MISC 1 each every 14 days 6 each 5    empagliflozin (JARDIANCE) 25 MG TABS tablet Take 1 tablet (25 mg) by mouth daily 90 tablet 3    insulin glargine (LANTUS SOLOSTAR) 100 UNIT/ML pen Inject 28 Units Subcutaneous every morning Increase dose by 2 units every three days until fasting blood sugar are  mg/dL. Max 40 units/day 45 mL 1    insulin pen needle (FIFTY50 PEN NEEDLES) 32G X 4 MM miscellaneous Use one pen needle daily or as directed. 100 each 2    LENalidomide (REVLIMID) 10 MG CAPS capsule Take 1 capsule (10 mg) by mouth daily for 28 days 28 capsule 0    LENalidomide (REVLIMID) 10 MG CAPS capsule Take 1 capsule (10 mg) by mouth daily 28 capsule 0    levothyroxine (SYNTHROID/LEVOTHROID) 200 MCG tablet Take 1 tablet (200 mcg) by mouth every morning (before breakfast) 90 tablet 3    loperamide (IMODIUM A-D) 2 MG tablet Take 1 tablet (2 mg) by mouth daily as needed for diarrhea Start Imodium 2 pills with first loose stool and then 1 pill with each loose stool after, aiming for 1-2 stools per day. Max of 8 pills a day. 180 tablet 3    losartan (COZAAR) 25 MG tablet Take 1 tablet (25 mg) by mouth daily 90 tablet 3    neomycin-polymyxin-hydrocortisone (CORTISPORIN) 3.5-82075-3 otic solution Place 3 drops in ear(s) 4 times daily 10 mL 0    pregabalin (LYRICA) 100 MG capsule Take 1 capsule (100 mg) by mouth 2 times daily 180 capsule 3    propranolol ER (INDERAL LA) 60 MG 24 hr capsule Take 1 capsule (60 mg) by mouth daily 90 capsule 3    sertraline (ZOLOFT) 100 MG tablet Take 1 tablet (100 mg) by mouth daily 90 tablet 3       /66   Pulse 59    Temp 97.6  F (36.4  C)   Resp 16   Wt 113.4 kg (250 lb)   SpO2 96%   BMI 37.37 kg/m    Constitutional: NAD  Eyes: no scleral icterus  ENT: small area of erythema on buccal mucosa where she has some irritation.   Lymph: no cervical, supraclavicular LAD  Pulm: CTAB  CV: RRR, no m/r/g  GI: bowel sounds present, soft and nontender to palpation  MSK: No edema in the extremities  Neuro: alert, conversant, normal gait  Psych: appropriate mood and affect        Labs:     Blood Counts       Recent Labs   Lab Test 03/14/24  1305 02/20/24  1026 01/22/24  1017 12/20/23  1026   HGB 14.6 14.5 14.0 14.5   HCT 43.5 44.2 42.2 42.3   WBC 3.1* 2.8* 2.7* 3.0*   ANEUTAUTO 1.8 1.8 1.5* 1.8   ALYMPAUTO 0.6* 0.5* 0.6* 0.6*   AMONOAUTO 0.4 0.3 0.3 0.3   AEOSAUTO 0.3 0.2 0.2 0.3   ABSBASO 0.1 0.1 0.1 0.1   NRBCMAN  --  0.0 0.0 0.0   * 107* 125* 112*       ABO/RH    No lab results found.      Chemistries     Basic Panel  Recent Labs   Lab Test 03/14/24  1305 02/20/24  1026 01/22/24  1017    137 139   POTASSIUM 4.3 4.2 4.5   CHLORIDE 103 101 102   CO2 25 19* 24   BUN 18.0 13.6 13.9   CR 0.75 0.72 0.63   * 298* 258*        Calcium, Magnesium, Phosphorus  Recent Labs   Lab Test 03/14/24  1305 02/20/24  1026 01/22/24  1017   ASHLEY 9.3 8.9 9.5        LFTs  Recent Labs   Lab Test 03/14/24  1305 02/20/24  1026 01/22/24  1017 12/20/23  1026   BILITOTAL 1.6* 0.9 1.2 1.9*   ALKPHOS 118 122 122 123   AST 24  --  23 17   ALT 38  --  44 29   ALBUMIN 4.4 4.3 4.3 4.4       LDH  No lab results found.    B2-Microglobulin  Recent Labs   Lab Test 02/18/20  0849   [id number] 1.6           Immunoglobulins     Recent Labs   Lab Test 03/14/24  1305 02/20/24  1026 01/22/24  1017 12/20/23  1026 11/20/23  1127 07/24/23  1009   * 495* 602* 590* 687 583*       Recent Labs   Lab Test 03/14/24  1305 02/20/24  1026 01/22/24  1017 12/20/23  1026 11/20/23  1127 07/24/23  1009    131 130 139 164 131       Recent Labs   Lab Test  03/14/24  1305 02/20/24  1026 01/22/24  1017 12/20/23  1026 11/20/23  1127 10/16/23  1020   IGM 41 <10* 42 41 48 48         Monocloncal Protein Studies     M spike    Recent Labs   Lab Test 03/14/24  1305 02/20/24  1026 01/22/24  1017 12/20/23  1026 11/20/23  1127 10/16/23  1020   ELPM 0.0 0.0 0.0 0.0 0.1* 0.0       [place]crete FLC    Recent Labs   Lab Test 03/14/24  1305 02/20/24  1026 01/22/24  1017 12/20/23  1026 11/20/23  1127 07/24/23  1009   KFLCA 2.79* 2.45* 2.65* 2.34* 2.58* 2.58*       Lambda FLC    Recent Labs   Lab Test 03/14/24  1305 02/20/24  1026 01/22/24  1017 12/20/23  1026 11/20/23  1127 07/24/23  1009   LFLCA 1.54 1.60 1.74 1.56 1.66 1.84       FLC Ratio    Recent Labs   Lab Test 03/14/24  1305 02/20/24  1026 01/22/24  1017 12/20/23  1026 11/20/23  1127 07/24/23  1009   KLRA 1.81* 1.53 1.52 1.50 1.55 1.40       Assessment/plan:   Winter Loya is a 68 year old woman with standard risk IgG kappa multiple myeloma, s/p post auto-transplant in April 2019,  currently on lenalidomide maintenance. She remains in remission by FLC, EBONIE, SPEP. ASA 81 mg for dvt ppx      Persistently elevated bilirubin led to hepatology consult in October 2023.  Determination was that this is most likely fatty liver disease.  Recommendation was to continue to follow with PCP for management of obesity, DM2, and HTN. Bilirubin is WNL today 01/24/24     Diarrhea continues to be an issue despite immodium. Will try cholestyramine with meals TID.     Get PET and then I will call with results.       Brit Zhu MD PhD

## 2024-03-19 NOTE — TELEPHONE ENCOUNTER
Received call from pharmacy staff- request was for Freestyle Анна 3, not 2 (as sent). Requesting new rx be sent.    Norma Manzo, LIYAHN RN  [facility] [place]

## 2024-03-19 NOTE — NURSING NOTE
"Oncology Rooming Note    March 19, 2024 1:31 PM   Winter Loya is a 68 year old female who presents for:    Chief Complaint   Patient presents with    Oncology Clinic Visit     Multiple myeloma in remission      Initial Vitals: /66   Pulse 59   Temp 97.6  F (36.4  C)   Resp 16   Wt 113.4 kg (250 lb)   SpO2 96%   BMI 37.37 kg/m   Estimated body mass index is 37.37 kg/m  as calculated from the following:    Height as of 3/14/24: 1.742 m (5' 8.58\").    Weight as of this encounter: 113.4 kg (250 lb). Body surface area is 2.34 meters squared.  No Pain (0) Comment: Data Unavailable   No LMP recorded. Patient is postmenopausal.  Allergies reviewed: Yes  Medications reviewed: Yes    Medications: Medication refills not needed today.  Pharmacy name entered into [employer]:    [id number] [address] [facility] - [place], [place] - [address]    Frailty Screening:   Is the patient here for a new oncology consult visit in cancer care? 2. No      Clinical concerns: no other complaints      Julien Hardwick"

## 2024-03-19 NOTE — PROGRESS NOTES
[facility] Six-Month Telephone Assessment    6 month telephone assessment completed on 3/16/24. Winter emailed CC and asked her to call. CC did her 6 month follow up during the call. Winter is in [place] and will return some time in April. CC let Winter know her annual assessment is due in April and CC will contact her in April to schedule for when she returns to [place].    ER visits: No  Hospitalizations: No  TCU stays: No  Significant health status changes: no  Falls/Injuries: No  ADL/IADL changes: No  Changes in services: No    Caregiver Assessment follow up:  NA    Goals: See POC in chart for goal progress documentation.      Will see member in 6 months for an annual health risk assessment.   Encouraged member to call CC with any questions or concerns in the meantime.   Corrina Cevallos RN PHN  [facility] Care Coordinator  [phone]

## 2024-03-25 ENCOUNTER — HOSPITAL ENCOUNTER (OUTPATIENT)
Dept: PET IMAGING | Facility: HOSPITAL | Age: 69
Discharge: HOME | End: 2024-03-25
Attending: STUDENT IN AN ORGANIZED HEALTH CARE EDUCATION/TRAINING PROGRAM
Payer: COMMERCIAL

## 2024-03-25 DIAGNOSIS — C90.01 MULTIPLE MYELOMA IN REMISSION (H): ICD-10-CM

## 2024-03-25 LAB — GLUCOSE BLDC GLUCOMTR-MCNC: 175 MG/DL (ref 70–99)

## 2024-03-25 PROCEDURE — 343N000001 HC RX 343: Performed by: STUDENT IN AN ORGANIZED HEALTH CARE EDUCATION/TRAINING PROGRAM

## 2024-03-25 PROCEDURE — 78816 PET IMAGE W/CT FULL BODY: CPT | Mod: PS

## 2024-03-25 PROCEDURE — 82962 GLUCOSE BLOOD TEST: CPT

## 2024-03-25 PROCEDURE — 999N000248 HC STATISTIC IV INSERT WITH US BY RN

## 2024-03-25 PROCEDURE — 70460 CT HEAD/BRAIN W/DYE: CPT

## 2024-03-25 PROCEDURE — 250N000011 HC RX IP 250 OP 636: Performed by: STUDENT IN AN ORGANIZED HEALTH CARE EDUCATION/TRAINING PROGRAM

## 2024-03-25 PROCEDURE — A9552 F18 FDG: HCPCS | Performed by: STUDENT IN AN ORGANIZED HEALTH CARE EDUCATION/TRAINING PROGRAM

## 2024-03-25 RX ORDER — IOPAMIDOL 755 MG/ML
100 INJECTION, SOLUTION INTRAVASCULAR ONCE
Status: COMPLETED | OUTPATIENT
Start: 2024-03-25 | End: 2024-03-25

## 2024-03-25 RX ADMIN — FLUDEOXYGLUCOSE F-18 16.9 MILLICURIE: 500 INJECTION, SOLUTION INTRAVENOUS at 11:58

## 2024-03-25 RX ADMIN — IOPAMIDOL 100 ML: 755 INJECTION, SOLUTION INTRAVENOUS at 13:00

## 2024-04-01 ENCOUNTER — TELEPHONE (OUTPATIENT)
Dept: ONCOLOGY | Facility: CLINIC | Age: 69
End: 2024-04-01
Payer: COMMERCIAL

## 2024-04-01 ENCOUNTER — VIRTUAL VISIT (OUTPATIENT)
Dept: PHARMACY | Facility: CLINIC | Age: 69
End: 2024-04-01
Payer: COMMERCIAL

## 2024-04-01 DIAGNOSIS — I27.20 PULMONARY HYPERTENSION (H): ICD-10-CM

## 2024-04-01 DIAGNOSIS — Z79.4 TYPE 2 DIABETES MELLITUS WITH DIABETIC POLYNEUROPATHY, WITH LONG-TERM CURRENT USE OF INSULIN (H): Primary | ICD-10-CM

## 2024-04-01 DIAGNOSIS — R19.7 DIARRHEA, UNSPECIFIED TYPE: ICD-10-CM

## 2024-04-01 DIAGNOSIS — C90.01 MULTIPLE MYELOMA IN REMISSION (H): Primary | ICD-10-CM

## 2024-04-01 DIAGNOSIS — E66.812 CLASS 2 SEVERE OBESITY DUE TO EXCESS CALORIES WITH SERIOUS COMORBIDITY AND BODY MASS INDEX (BMI) OF 37.0 TO 37.9 IN ADULT (H): ICD-10-CM

## 2024-04-01 DIAGNOSIS — E66.01 CLASS 2 SEVERE OBESITY DUE TO EXCESS CALORIES WITH SERIOUS COMORBIDITY AND BODY MASS INDEX (BMI) OF 37.0 TO 37.9 IN ADULT (H): ICD-10-CM

## 2024-04-01 DIAGNOSIS — R03.0 ELEVATED BLOOD PRESSURE READING WITHOUT DIAGNOSIS OF HYPERTENSION: ICD-10-CM

## 2024-04-01 DIAGNOSIS — E78.5 HYPERLIPIDEMIA, UNSPECIFIED HYPERLIPIDEMIA TYPE: ICD-10-CM

## 2024-04-01 DIAGNOSIS — E11.42 TYPE 2 DIABETES MELLITUS WITH DIABETIC POLYNEUROPATHY, WITH LONG-TERM CURRENT USE OF INSULIN (H): Primary | ICD-10-CM

## 2024-04-01 PROCEDURE — 99606 MTMS BY PHARM EST 15 MIN: CPT | Mod: 93 | Performed by: PHARMACIST

## 2024-04-01 RX ORDER — LENALIDOMIDE 10 MG/1
10 CAPSULE ORAL DAILY
Qty: 28 CAPSULE | Refills: 0 | Status: SHIPPED | OUTPATIENT
Start: 2024-04-01 | End: 2024-05-20

## 2024-04-01 NOTE — Clinical Note
CE DAWSON note, thanks!  Vonnie Corrigan, PharmD Medication Therapy Management Pharmacist [phone]

## 2024-04-01 NOTE — TELEPHONE ENCOUNTER
Oral Chemotherapy Monitoring Program    Medication: Revlimid  Rx:  10 mg PO daily for a 28 day cycle    Auth #: [id number]  Risk Category: Adult female NOT of reproductive capacity    Routine survey questions reviewed.    Thank you,    Mary Franklin  Oncology Pharmacy Liaison LUCINDA [email or web address]  Phone: [phone]  Fax: [phone]

## 2024-04-01 NOTE — PROGRESS NOTES
Medication Therapy Management (MTM) Encounter    ASSESSMENT:                            Medication Adherence/Access: No issues identified    Diabetes   /Type 2 Diabetes/Obesity:Patient is not meeting A1c goal of < 7%.  Self monitoring of blood glucose is not at goal of fasting  mg/dL and post prandial < 180 mg/dL. Blood sugar is not at goal of time in target  mg/dL > 70%. May benefit from restarting Ozempic - ok to restart this now that triglyceride are < 500 mg/dL, however Winter prefers to continue working on diet and lifestyle changes. If needed, discussed can add mealtime insulin or restart Ozempic.     Hypertension/pulmonary hypertension: Stable. Blood pressure is at goal < 140/90.    Hyperlipidemia: triglyceride now < 500 mg/dL, Ozempic, separately, may have also have risk of pancreatitis, however we discussed this risk is much lower when triglyceride are lower.    Diarrhea: May benefit from starting cholestyramine as planned, may also help with cholesterol.    Cat bite/scratch:  Recommend she be seen.    PLAN:                            I recommend being seen for the cat scratch.   Ok to start cholestyramine.     Follow-up: Return in about 4 weeks (around 4/29/2024) for Medication Therapy Management.    SUBJECTIVE/OBJECTIVE:                          Winter Loya is a 68 year old female called for a follow-up visit.       Reason for visit: diabetes follow-up. Is in [place] currently.     Allergies/ADRs: Reviewed in chart  Past Medical History: Reviewed in chart  Tobacco: She reports that she quit smoking about 19 years ago. Her smoking use included cigarettes. She smoked an average of 1 pack per day. She has never used smokeless tobacco.  Alcohol: none  Caffeine: 1 diet coke/day and 2 cups coffee/day    Medication Adherence/Access:   Uses pill boxes.  Misses medications on occasion, but is usually really good about them  The patient fills medications at [facility]: NO, fills medications at [facility] and  [facility] for Revlimid.    Diabetes   /Type 2 Diabetes/Obesity:  Lantus 38 units every morning (has been titrating up)  Jardiance 25 mg daily   Aspirin: Taking 81mg daily for primary prevention (possible secondary prevention)    Hadn't been taking Ozempic - did not restart as triglycerides are elevated, did have these rechecked. Does note she's doing better at controlling her eating, appetite has gone down even without Ozempic.   Reports not having troublewith yeast infections anymore, this has gotten better.  Medication History:  Has been on Trulicity in the past, tolerated well.   She has done a trial off of metformin with Delmi Englishine in the past without improvement in diarrhea, has since stopped this.   Blood sugar monitoring: Анна 3     Current diabetes symptoms: none     Eye exam is up to date  Foot exam: due  Urine Albumin:   Lab Results   Component Value Date    UMALCR 97.01 (H) 01/22/2024      Lab Results   Component Value Date    A1C 8.1 (H) 01/22/2024     Hypertension /pulmonary hypertension:  Losartan 25 mg daily   Propranolol ER 60 mg (also for familial ticks)    An ENT provider thought she might have had a stroke in the past, she otherwise hasn't been told she's had one.   Patient reports no current medication side effects  Patient does not self-monitor blood pressure.       BP Readings from Last 3 Encounters:   03/19/24 120/66   03/14/24 112/61   02/15/24 110/72     Pulse Readings from Last 3 Encounters:   03/19/24 59   03/14/24 58   02/15/24 54     Hyperlipidemia   atorvastatin 20mg daily    Patient reports no significant myalgias or other side effects.     Recent Labs   Lab Test 03/14/24  1305 01/22/24  1017   CHOL 164 213*   HDL 52 44*   LDL 34 33   TRIG 389* 769*     Diarrhea:   Loperamide 4 mg every morning  Cholestyramine 4 g three times daily -was prescribed by oncology, has questions about this before starting    This has been better with her taking this regularly.      Cat bite/scratch:     Feet are swollen, hands are stiff and swollen. The foot that the cat scratched is worse than the other one, wonders if she should be seen.     Today's Vitals: There were no vitals taken for this visit.  ----------------      I spent 17 minutes with this patient today. All changes were made via collaborative practice agreement with Montse Bucio PA-C. A copy of the visit note was provided to the patient's provider(s).    A summary of these recommendations was sent via [employer].    Vonnie Corrigan, Evelin  Medication Therapy Management Pharmacist      Telemedicine Visit Details  Type of service:  Telephone visit  Start Time: 1:01 PM  End Time: 1:18 PM     Medication Therapy Recommendations  No medication therapy recommendations to display

## 2024-04-01 NOTE — PATIENT INSTRUCTIONS
"Recommendations from today's MTM visit:                                                         I recommend being seen for the cat scratch.   Ok to start cholestyramine.     Follow-up: Return in about 4 weeks (around 4/29/2024) for Medication Therapy Management.    It was great speaking with you today.  I value your experience and would be very thankful for your time in providing feedback in our clinic survey. In the next few days, you may receive an email or text message from [employer] with a link to a survey related to your  clinical pharmacist.\"     To schedule another MTM appointment, please call the clinic directly or you may call the MTM scheduling line at [phone] or toll-free at [phone].     My Clinical Pharmacist's contact information:                                                      Please feel free to contact me with any questions or concerns you have.      Vonnie Corrigan, PharmD  Medication Therapy Management Pharmacist     "

## 2024-04-02 ENCOUNTER — PATIENT OUTREACH (OUTPATIENT)
Dept: GERIATRIC MEDICINE | Facility: CLINIC | Age: 69
End: 2024-04-02
Payer: COMMERCIAL

## 2024-04-02 NOTE — PROGRESS NOTES
CORIN spoke with Winter and scheduled her home visit for April 23 at 2PM. She is in [place] until the 15th.  Corrina Cevallos RN N  [facility] Coordinator  [phone]

## 2024-04-16 ENCOUNTER — LAB (OUTPATIENT)
Dept: LAB | Facility: CLINIC | Age: 69
End: 2024-04-16
Attending: STUDENT IN AN ORGANIZED HEALTH CARE EDUCATION/TRAINING PROGRAM
Payer: COMMERCIAL

## 2024-04-16 ENCOUNTER — OFFICE VISIT (OUTPATIENT)
Dept: NEUROLOGY | Facility: CLINIC | Age: 69
End: 2024-04-16
Payer: COMMERCIAL

## 2024-04-16 VITALS
OXYGEN SATURATION: 99 % | HEART RATE: 50 BPM | SYSTOLIC BLOOD PRESSURE: 130 MMHG | HEIGHT: 68 IN | DIASTOLIC BLOOD PRESSURE: 75 MMHG | BODY MASS INDEX: 38.65 KG/M2 | WEIGHT: 255 LBS

## 2024-04-16 DIAGNOSIS — R25.1 TREMOR: Primary | ICD-10-CM

## 2024-04-16 DIAGNOSIS — C90.01 MULTIPLE MYELOMA IN REMISSION (H): ICD-10-CM

## 2024-04-16 DIAGNOSIS — R41.3 MEMORY LOSS: ICD-10-CM

## 2024-04-16 LAB
ALBUMIN SERPL BCG-MCNC: 4.4 G/DL (ref 3.5–5.2)
ALP SERPL-CCNC: 118 U/L (ref 40–150)
ALT SERPL W P-5'-P-CCNC: 36 U/L (ref 0–50)
ANION GAP SERPL CALCULATED.3IONS-SCNC: 11 MMOL/L (ref 7–15)
AST SERPL W P-5'-P-CCNC: 20 U/L (ref 0–45)
BASOPHILS # BLD AUTO: 0.1 10E3/UL (ref 0–0.2)
BASOPHILS NFR BLD AUTO: 2 %
BILIRUB SERPL-MCNC: 1.9 MG/DL
BUN SERPL-MCNC: 14.1 MG/DL (ref 8–23)
CALCIUM SERPL-MCNC: 9.1 MG/DL (ref 8.8–10.2)
CHLORIDE SERPL-SCNC: 105 MMOL/L (ref 98–107)
CREAT SERPL-MCNC: 0.65 MG/DL (ref 0.51–0.95)
DEPRECATED HCO3 PLAS-SCNC: 24 MMOL/L (ref 22–29)
EGFRCR SERPLBLD CKD-EPI 2021: >90 ML/MIN/1.73M2
EOSINOPHIL # BLD AUTO: 0.3 10E3/UL (ref 0–0.7)
EOSINOPHIL NFR BLD AUTO: 9 %
ERYTHROCYTE [DISTWIDTH] IN BLOOD BY AUTOMATED COUNT: 15 % (ref 10–15)
GLUCOSE SERPL-MCNC: 192 MG/DL (ref 70–99)
HCT VFR BLD AUTO: 42.7 % (ref 35–47)
HGB BLD-MCNC: 14.1 G/DL (ref 11.7–15.7)
IMM GRANULOCYTES # BLD: 0 10E3/UL
IMM GRANULOCYTES NFR BLD: 0 %
LYMPHOCYTES # BLD AUTO: 0.6 10E3/UL (ref 0.8–5.3)
LYMPHOCYTES NFR BLD AUTO: 21 %
MCH RBC QN AUTO: 28.5 PG (ref 26.5–33)
MCHC RBC AUTO-ENTMCNC: 33 G/DL (ref 31.5–36.5)
MCV RBC AUTO: 86 FL (ref 78–100)
MONOCYTES # BLD AUTO: 0.4 10E3/UL (ref 0–1.3)
MONOCYTES NFR BLD AUTO: 13 %
NEUTROPHILS # BLD AUTO: 1.6 10E3/UL (ref 1.6–8.3)
NEUTROPHILS NFR BLD AUTO: 55 %
NRBC # BLD AUTO: 0 10E3/UL
NRBC BLD AUTO-RTO: 0 /100
PLATELET # BLD AUTO: 99 10E3/UL (ref 150–450)
POTASSIUM SERPL-SCNC: 4 MMOL/L (ref 3.4–5.3)
PROT SERPL-MCNC: 6.8 G/DL (ref 6.4–8.3)
RBC # BLD AUTO: 4.94 10E6/UL (ref 3.8–5.2)
SODIUM SERPL-SCNC: 140 MMOL/L (ref 135–145)
WBC # BLD AUTO: 2.9 10E3/UL (ref 4–11)

## 2024-04-16 PROCEDURE — 82784 ASSAY IGA/IGD/IGG/IGM EACH: CPT

## 2024-04-16 PROCEDURE — 86334 IMMUNOFIX E-PHORESIS SERUM: CPT | Mod: 26 | Performed by: PATHOLOGY

## 2024-04-16 PROCEDURE — 82040 ASSAY OF SERUM ALBUMIN: CPT

## 2024-04-16 PROCEDURE — 83521 IG LIGHT CHAINS FREE EACH: CPT

## 2024-04-16 PROCEDURE — 84165 PROTEIN E-PHORESIS SERUM: CPT | Mod: 26 | Performed by: PATHOLOGY

## 2024-04-16 PROCEDURE — 36415 COLL VENOUS BLD VENIPUNCTURE: CPT

## 2024-04-16 PROCEDURE — 99215 OFFICE O/P EST HI 40 MIN: CPT | Performed by: PSYCHIATRY & NEUROLOGY

## 2024-04-16 PROCEDURE — 84165 PROTEIN E-PHORESIS SERUM: CPT | Mod: TC | Performed by: PATHOLOGY

## 2024-04-16 PROCEDURE — 86334 IMMUNOFIX E-PHORESIS SERUM: CPT | Performed by: PATHOLOGY

## 2024-04-16 PROCEDURE — 84155 ASSAY OF PROTEIN SERUM: CPT | Mod: 91

## 2024-04-16 PROCEDURE — 85025 COMPLETE CBC W/AUTO DIFF WBC: CPT

## 2024-04-16 RX ORDER — PROPRANOLOL HCL 60 MG
60 CAPSULE, EXTENDED RELEASE 24HR ORAL DAILY
Qty: 90 CAPSULE | Refills: 3 | Status: SHIPPED | OUTPATIENT
Start: 2024-04-16

## 2024-04-16 ASSESSMENT — PAIN SCALES - GENERAL: PAINLEVEL: MODERATE PAIN (5)

## 2024-04-16 NOTE — PATIENT INSTRUCTIONS
Medications                 Acetaminophen tylenol 325mg prn           Acyclovir zovirax 400mg 1   1       Aspirin 81mg 1           Atorvastatin lipitor 20mg      1       Calcium antacid 500mg tums off           Cholestyramine questran 4 gram  1 1 1     Empagliflozin jardiance 10mg 1           Ibuprofen advil/motrin 200mg ??           Insulin glargine lantus     22 units       Latanoprost xalatan 0.005%   off           Lenalidomide revlimid 10mg  1           Levothyroxine synthroid 200 mcg 1           Loperamide imodium 2mg prn           Losartan cozaar 25mg  1           Metformin glucophage 500mg  off           Pregabalin lyrica 100mg   2/d chemo  1   1       Propranolol ER inderal LA 60mg  1           SEmaglutide ozempic  off           Sertraline zoloft 100mg 1           Tolterodine ER Detrol LA 4mg 24hr off                                                                     Plan:    Loperamide imodium 2mg  Diarrhea has been an issue  Started Questran for bowels Cholestyramine questran 4 gram   Loperamide imodium 2mg    Pregabalin lyrica 100mg 2/day from Montse Fortunato   Has leg symptoms/neuropathy     off treatment for glaucoma  Not using drops    Acyclovir zovirax 400mg     has had some memory/cognitive issues - seen on 10/22/2020 by Dr. Mcgrath for neuropsychological evaluation: there were mild frontal and possibly left temporal changes and recommended to be evaluated again in one year. Consider repeat cognitive testing.     Aspirin 81mg daily     2-4/noc getting up to urinate - this varies with blood sugar  Not taking medication for this.      Ulixacaltamide - praxis study   Ulixacaltamide is a differentiated and highly selective small molecule inhibitor of T-type calcium channels designed to block abnormal neuronal burst firing in the Anxanajri-Sblbrqb-Dvnquxps (CTC) circuit correlated with tremor activity.  Tremors are no different; no worse. Wondering about participating in the trial of this medication -  would need to make sure it is safe in regards to drug interactions with her other medications as well as she is able to tolerate it with her ongoing health issues, blood pressure, etc. She is also dealing with multiple myeloma that is being managed but quiescent on her PET scan    Propranolol ER inderal LA 60mg for tremor and blood pressure  Sh odalys has not been on topiramate  It can help with weight loss but has a risk of renal stones and memory changes.   Losartan cozaar 25mg   Blood pressure was good today     Sertraline zoloft 100mg  Stress  Living with son  States eating as part of coping  Went to [place] to think about moving down there  Gets social security.     Atorvastatin lipitor 20mg   Cholestyramine questran 4 gram   Empagliflozin jardiance 10mg  Insulin glargine lantus  Off metformin  Blood sugars have been running a bit high  8.1 A1c was 1/22/2024  Went off ozempic   Cholesterol was highish in the past  Triglyceride is lower now at 389 as of 3/14/2024  1 hour after eating had sugar craving with ozempic     Lenalidomide revlimid 10mg   Multiple myeloma  SPEP gamma globulin was low 0.6  Immunoglobulin G 558 low on 3/14/2024  [place] free light was 2.79 (slight elevation) on 3/14/2024  Kappa/lambda ration was 1.81 and slightly high  Lambda free light was normal.   Immunofixation was normal - No monoclonal protein seen on immunofixation.     Dr. Brit Guardado visit 3/19/2024  Oncology HPI:   Winter Loya is a 68 year old woman with IgA Kappa Multiple Myeloma. In 2018 she had anemia and was fatigued. She was found to have IgA kappa monocloncal antibody with M-spike of 0.17. IgA elevated. Skeletal survey was unremarkable and UPEP had minimal protein (156 mg/m2). PET 11/2018 showed bone marrow consistent with hypermetabolic plasma cell myeloma. M spike was 0.17. She started RVD and Zometa. On 4/2019 she had an autologous transplant.      Her bone marrow bx from September 2019 was sent here for review. It showed  marrow cellularity of 60%, with decreased trilineage hematopoiesis and 15% plasma cells as well as peripheral blood with slight anemia. 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 She is on revlimid maintenance and zometa from her prior oncologist in [place]. On 12/6/19 her K/L ratio is 1.1, M spike is 0.04.      Currently on Revlimid maintenance.    Winter Loya is a 68 year old woman with standard risk IgG kappa multiple myeloma, s/p post auto-transplant in April 2019,  currently on lenalidomide maintenance. She remains in remission by FLC, EBONIE, SPEP. ASA 81 mg for dvt ppx      Persistently elevated bilirubin led to hepatology consult in October 2023.  Determination was that this is most likely fatty liver disease.  Recommendation was to continue to follow with PCP for management of obesity, DM2, and HTN. Bilirubin is WNL today 01/24/24      Diarrhea continues to be an issue despite immodium. Will try cholestyramine with meals TID.      Get PET and then I will call with results.       Brit Zhu MD PhD     PET scan 3/25/2024 was lupis - No metabolic evidence of active neoplasm.   Head CT scan 3/25/2024 No metabolic evidence of active neoplasm.     1/31/2024 brain MRI   1.  No acute abnormality.  2.  Internal auditory canals within normal limits. No evidence of vestibular schwannoma.  3.  Nonspecific T2/T1 hyperintense signal within the right petrous apex, presumably inflammatory. Temporal bone CT could be performed for further characterization.  4.  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  5.  Ovoid area of nonspecific T2 hypertense signal within the right anterior temporal lobe. This could represent focal gliosis related to old insult, however low-grade parenchymal lesion cannot be excluded. Recommend continued imaging surveillance. This   report could be addended if any  previous head imaging is made available for comparison.  6.  Marrow signal changes consistent with history of multiple myeloma.         11/29/2023 skeletal survey  No definite lytic lesion is identified. Stable sclerotic  foci in the right sacrum and posterior left ilium. No acute fracture  or malalignment. Degenerative changes throughout the visualized spine.  Osteopenia. Atherosclerosis. Cholecystectomy clips.    IN SUMMARY  May need repeat brain mri - think this was previously ordered by her heme/onc and she will need to review this.   Repeat cognitive testing at some point  Discussion about research medication from praxis for tremor - information above  Pharmacy consultation to discuss topiramate  Needs work on better her diabetes control as her A1c was 8.1   Return back

## 2024-04-16 NOTE — LETTER
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 reviewed by me.     Current Outpatient Medications   Medication Sig Dispense Refill     acyclovir (ZOVIRAX) 400 MG tablet TAKE ONE TABLET BY MOUTH EVERY TWELVE HOURS 180 tablet 3     alcohol swab prep pads Use to swab area of injection/sowmya as directed. 100 each 3     aspirin 81 MG EC tablet Take 81 mg by mouth daily       atorvastatin (LIPITOR) 20 MG tablet Take 1 tablet (20 mg) by mouth at bedtime 90 tablet 3     blood glucose (NO BRAND SPECIFIED) test strip Use to test blood sugar 3 times daily or as directed. To accompany: Blood Glucose Monitor Brands: per insurance. 100 strip 6     blood glucose calibration (NO BRAND SPECIFIED) solution To accompany: Blood Glucose Monitor Brands: per insurance. 4 each 0     blood glucose monitoring (NO BRAND SPECIFIED) meter device kit Use to test blood sugar 3 times daily or as directed. Preferred blood glucose meter OR supplies to accompany: Blood Glucose Monitor Brands: per insurance. 1 kit 0     Continuous Blood Gluc Sensor (FREESTYLE SEGUNDO 3 SENSOR) MISC 1 each every 14 days 6 each 5     empagliflozin (JARDIANCE) 25 MG TABS  tablet Take 1 tablet (25 mg) by mouth daily 90 tablet 3     insulin glargine (LANTUS SOLOSTAR) 100 UNIT/ML pen Inject 28 Units Subcutaneous every morning Increase dose by 2 units every three days until fasting blood sugar are  mg/dL. Max 40 units/day 45 mL 1     insulin pen needle (FIFTY50 PEN NEEDLES) 32G X 4 MM miscellaneous Use one pen needle daily or as directed. 100 each 2     LENalidomide (REVLIMID) 10 MG CAPS capsule Take 1 capsule (10 mg) by mouth daily for 28 days 28 capsule 0     LENalidomide (REVLIMID) 10 MG CAPS capsule Take 1 capsule (10 mg) by mouth daily (Patient not taking: Reported on 3/14/2024) 28 capsule 0     levothyroxine (SYNTHROID/LEVOTHROID) 200 MCG tablet Take 1 tablet (200 mcg) by mouth every morning (before breakfast) 90 tablet 3     loperamide (IMODIUM A-D) 2 MG tablet Take 1 tablet (2 mg) by mouth daily as needed for diarrhea Start Imodium 2 pills with first loose stool and then 1 pill with each loose stool after, aiming for 1-2 stools per day. Max of 8 pills a day. 180 tablet 3     losartan (COZAAR) 25 MG tablet Take 1 tablet (25 mg) by mouth daily 90 tablet 3     neomycin-polymyxin-hydrocortisone (CORTISPORIN) 3.5-70438-9 otic solution Place 3 drops in ear(s) 4 times daily 10 mL 0     pregabalin (LYRICA) 100 MG capsule Take 1 capsule (100 mg) by mouth 2 times daily 180 capsule 3     propranolol ER (INDERAL LA) 60 MG 24 hr capsule Take 1 capsule (60 mg) by mouth daily 90 capsule 3     semaglutide (OZEMPIC) 2 MG/3ML pen Inject 0.25 mg Subcutaneous every 7 days Place on file. DO NOT restart until cholesterol is rechecked and triglycerides come down. (Patient not taking: Reported on 3/14/2024) 3 mL 1     sertraline (ZOLOFT) 100 MG tablet Take 1 tablet (100 mg) by mouth daily 90 tablet 3     thin (NO BRAND SPECIFIED) lancets Use with lanceting device. To accompany: Blood Glucose Monitor Brands: per insurance. (Patient not taking: Reported on 6/26/2023) 200 each          Gabriel Santoyo  MD

## 2024-04-16 NOTE — NURSING NOTE
Chief Complaint   Patient presents with    Labs Only     Venipuncture VAT, labs drawn     There were no vitals taken for this visit.  Raghu Escudero RN on 4/16/2024 at 9:12 AM

## 2024-04-17 ENCOUNTER — VIRTUAL VISIT (OUTPATIENT)
Dept: ONCOLOGY | Facility: CLINIC | Age: 69
End: 2024-04-17
Attending: STUDENT IN AN ORGANIZED HEALTH CARE EDUCATION/TRAINING PROGRAM
Payer: COMMERCIAL

## 2024-04-17 VITALS
BODY MASS INDEX: 38.65 KG/M2 | SYSTOLIC BLOOD PRESSURE: 130 MMHG | DIASTOLIC BLOOD PRESSURE: 75 MMHG | HEIGHT: 68 IN | WEIGHT: 255 LBS

## 2024-04-17 DIAGNOSIS — C90.01 MULTIPLE MYELOMA IN REMISSION (H): ICD-10-CM

## 2024-04-17 LAB
ALBUMIN SERPL ELPH-MCNC: 4.2 G/DL (ref 3.7–5.1)
ALPHA1 GLOB SERPL ELPH-MCNC: 0.3 G/DL (ref 0.2–0.4)
ALPHA2 GLOB SERPL ELPH-MCNC: 0.5 G/DL (ref 0.5–0.9)
B-GLOBULIN SERPL ELPH-MCNC: 0.7 G/DL (ref 0.6–1)
GAMMA GLOB SERPL ELPH-MCNC: 0.5 G/DL (ref 0.7–1.6)
IGA SERPL-MCNC: 107 MG/DL (ref 84–499)
IGG SERPL-MCNC: 464 MG/DL (ref 610–1616)
IGM SERPL-MCNC: 34 MG/DL (ref 35–242)
KAPPA LC FREE SER-MCNC: 1.57 MG/DL (ref 0.33–1.94)
KAPPA LC FREE/LAMBDA FREE SER NEPH: 1.21 {RATIO} (ref 0.26–1.65)
LAMBDA LC FREE SERPL-MCNC: 1.3 MG/DL (ref 0.57–2.63)
M PROTEIN SERPL ELPH-MCNC: 0 G/DL
PATH REPORT.COMMENTS IMP SPEC: ABNORMAL
PATH REPORT.COMMENTS IMP SPEC: NORMAL
PROT PATTERN SERPL ELPH-IMP: ABNORMAL
PROT PATTERN SERPL IFE-IMP: NORMAL
TOTAL PROTEIN SERUM FOR ELP: 6.2 G/DL (ref 6.4–8.3)

## 2024-04-17 PROCEDURE — 99213 OFFICE O/P EST LOW 20 MIN: CPT | Mod: 95 | Performed by: REGISTERED NURSE

## 2024-04-17 RX ORDER — LENALIDOMIDE 10 MG/1
10 CAPSULE ORAL DAILY
Qty: 28 CAPSULE | Refills: 0 | Status: CANCELLED | OUTPATIENT
Start: 2024-04-17

## 2024-04-17 ASSESSMENT — PAIN SCALES - GENERAL: PAINLEVEL: MODERATE PAIN (4)

## 2024-04-17 NOTE — NURSING NOTE
Is the patient currently in the state of [place]? YES    Visit mode:VIDEO    If the visit is dropped, the patient can be reconnected by: VIDEO VISIT: Text to cell phone:   Telephone Information:   Mobile [phone]       Will anyone else be joining the visit? NO  (If patient encounters technical issues they should call [phone])    How would you like to obtain your AVS? MyChart    Are changes needed to the allergy or medication list? Pt stated no changes to allergies and Pt stated no med changes    Are refills needed on medications prescribed by this physician? YES    Pt states she needs a refill a Revlimid/Lenalidomide.     Please review distress screening. Pt states she would like to be in contact with a Dietitian, Psychologist, and Spiritual Care.     Reason for visit: RECHECK (Return [facility]CSL)    Kathrin NGUYEN

## 2024-04-17 NOTE — LETTER
4/17/2024         RE: Winter Loya  [address]  [place] [address]        Dear Colleague,    Thank you for referring your patient, Winter Loya, to the [facility]. Please see a copy of my visit note below.    Virtual Visit Details    Type of service:  Video Visit   Video Start Time: 2:48 PM  Video End Time:3:10 PM    Originating Location (pt. Location): Home    Distant Location (provider location):  Off-site  Platform used for Video Visit: [facility]          ONCOLOGY/HEMATOLOGY PROGRESS NOTE  Apr 17, 2024    Reason for Visit: IgA Kappa Multiple Myeloma    Oncology HPI:   Winter Loya is a 68 year old woman with IgA Kappa Multiple Myeloma. In 2018 she had anemia and was fatigued. She was found to have IgA kappa monocloncal antibody with M-spike of 0.17. IgA elevated. Skeletal survey was unremarkable and UPEP had minimal protein (156 mg/m2). PET 11/2018 showed bone marrow consistent with hypermetabolic plasma cell myeloma. M spike was 0.17. She started RVD and Zometa. On 4/2019 she had an autologous transplant.      Her bone marrow bx from September 2019 was sent here for review. It showed marrow cellularity of 60%, with decreased trilineage hematopoiesis and 15% plasma cells as well as peripheral blood with slight anemia. 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 She is on revlimid maintenance and zometa from her prior oncologist in [place]. On 12/6/19 her K/L ratio is 1.1, M spike is 0.04.     Currently on Revlimid maintenance.      Interval history:   - Having a hard day, fighting with her son  - Went to [place], couldn't find a place to sit to put her shoes back on at [facility], walked a ways in her stocking feet and had a flare of her plantar fasciitis  - She stepped on one of her cats and got scratched - area has been slightly swollen and red,  but symptoms have been improving the past couple of days  - A couple days ago started getting shooting neuropathy pains in her left shin, the pain has improved significantly today  -  Diarrhea continues intermittently, Imodium is helping, takes 2 every morning  - Denies new bony pain  - Requesting nutrition referral  - Requesting spiritual care - has a  through [facility], will seek referral through them  - Was previously connected with therapy, felt like she was manipulating the therapist to talk about what she wanted to talk about so didn't feel like a good fit, then her insurance coverage changed so she stopped going    Comprehensive ROS neg other than the symptoms noted above in the HPI.      Past Medical History:   Diagnosis Date    Abnormal EMG 2021 5/25/2021    Interpretation: This is an abnormal study, demonstrating electrophysiologic evidence of a length-dependent axonal sensorimotor polyneuropathy. Asymmetry of peroneal compound muscle action potential amplitudes is a finding of uncertain significance.       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   I have personally reviewed the examination and initial interpretation and I agree with the findings.   ANNE GOODMAN MD    History of MRI of cervical spine 6/2020 3/15/2021    Impression: Multilevel cervical spondylosis, most pronounced at the level of C4-5 and C5-6 with moderate to severe spinal canal stenosis and moderate spinal canal stenosis at C6-7. No abnormal cord signal. No significant neural foraminal narrowing at any level.   I have personally reviewed the examination and initial interpretation and I agree with the findings.    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 100 each 3    aspirin 81 MG EC tablet Take 81 mg by mouth daily      atorvastatin (LIPITOR) 20 MG tablet Take 1 tablet (20 mg) by mouth at bedtime 90 tablet 3    blood glucose (NO BRAND SPECIFIED) test strip Use to test blood sugar 3 times daily or as directed. To accompany: Blood Glucose Monitor Brands: per insurance. 100 strip 6    blood glucose calibration (NO BRAND SPECIFIED) solution To accompany: Blood Glucose Monitor Brands: per insurance. 4 each 0    blood glucose monitoring (NO BRAND SPECIFIED) meter device kit Use to test blood sugar 3 times daily or as directed. Preferred blood glucose meter OR supplies to accompany: Blood Glucose Monitor Brands: per insurance. 1 kit 0    cholestyramine (QUESTRAN) 4 g packet Take 1 packet (4 g) by mouth 3 times daily (with meals) 90 packet 4    Continuous Blood Gluc  (FREESTYLE SEGUNDO 2 READER) MARCIA Use to read blood sugars as per 's instructions. 1 each 0    Continuous Blood Gluc Sensor (FREESTYLE SEGUNDO 3 SENSOR) MISC 1 each every 14 days Use 1 sensor every 14 days. Use to read blood sugars per 's instructions. 6 each 5    Continuous Blood Gluc Sensor (FREESTYLE SEGUNDO 3 SENSOR) MISC 1 each every 14 days 6 each 5    empagliflozin (JARDIANCE) 25 MG TABS tablet Take 1 tablet (25 mg) by mouth daily 90 tablet 3    insulin glargine (LANTUS SOLOSTAR) 100 UNIT/ML  pen Inject 28 Units Subcutaneous every morning Increase dose by 2 units every three days until fasting blood sugar are  mg/dL. Max 40 units/day (Patient taking differently: Inject 38 Units Subcutaneous every morning Increase dose by 2 units every three days until fasting blood sugar are  mg/dL. Max 40 units/day) 45 mL 1    insulin pen needle (FIFTY50 PEN NEEDLES) 32G X 4 MM miscellaneous Use one pen needle daily or as directed. 100 each 2    LENalidomide (REVLIMID) 10 MG CAPS capsule Take 1 capsule (10 mg) by mouth daily for 28 days 28 capsule 0    levothyroxine (SYNTHROID/LEVOTHROID) 200 MCG tablet Take 1 tablet (200 mcg) by mouth every morning (before breakfast) 90 tablet 3    loperamide (IMODIUM A-D) 2 MG tablet Take 1 tablet (2 mg) by mouth daily as needed for diarrhea Start Imodium 2 pills with first loose stool and then 1 pill with each loose stool after, aiming for 1-2 stools per day. Max of 8 pills a day. 180 tablet 3    losartan (COZAAR) 25 MG tablet Take 1 tablet (25 mg) by mouth daily 90 tablet 3    neomycin-polymyxin-hydrocortisone (CORTISPORIN) 3.5-86076-0 otic solution Place 3 drops in ear(s) 4 times daily 10 mL 0    pregabalin (LYRICA) 100 MG capsule Take 1 capsule (100 mg) by mouth 2 times daily 180 capsule 3    propranolol ER (INDERAL LA) 60 MG 24 hr capsule Take 1 capsule (60 mg) by mouth daily 90 capsule 3    sertraline (ZOLOFT) 100 MG tablet Take 1 tablet (100 mg) by mouth daily 90 tablet 3       Video physical exam  General: Patient appears well in no acute distress.   Skin: No visualized rash or lesions on visualized skin  Eyes: EOMI, no erythema, sclera icterus or discharge noted  Resp: Appears to be breathing comfortably without accessory muscle usage, speaking in full sentences, no cough  MSK: Appears to have normal range of motion based on visualized movements  Neurologic: No apparent tremors, facial movements symmetric  Psych: affect bright, alert and oriented          Labs:      Blood Counts       Recent Labs   Lab Test 04/16/24  0912 03/14/24  1305 02/20/24  1026 01/22/24  1017   HGB 14.1 14.6 14.5 14.0   HCT 42.7 43.5 44.2 42.2   WBC 2.9* 3.1* 2.8* 2.7*   ANEUTAUTO 1.6 1.8 1.8 1.5*   ALYMPAUTO 0.6* 0.6* 0.5* 0.6*   AMONOAUTO 0.4 0.4 0.3 0.3   AEOSAUTO 0.3 0.3 0.2 0.2   ABSBASO 0.1 0.1 0.1 0.1   NRBCMAN 0.0  --  0.0 0.0   PLT 99* 114* 107* 125*       ABO/RH    No lab results found.      Chemistries     Basic Panel  Recent Labs   Lab Test 04/16/24  0912 03/25/24  1134 03/14/24  1305 02/20/24  1026     --  138 137   POTASSIUM 4.0  --  4.3 4.2   CHLORIDE 105  --  103 101   CO2 24  --  25 19*   BUN 14.1  --  18.0 13.6   CR 0.65  --  0.75 0.72   * 175* 199* 298*        Calcium, Magnesium, Phosphorus  Recent Labs   Lab Test 04/16/24  0912 03/14/24  1305 02/20/24  1026   ASHLEY 9.1 9.3 8.9        LFTs  Recent Labs   Lab Test 04/16/24  0912 03/14/24  1305 02/20/24  1026 01/22/24  1017   BILITOTAL 1.9* 1.6* 0.9 1.2   ALKPHOS 118 118 122 122   AST 20 24  --  23   ALT 36 38  --  44   ALBUMIN 4.4 4.4 4.3 4.3       LDH  No lab results found.    B2-Microglobulin  Recent Labs   Lab Test 02/18/20  0849   [id number] 1.6           Immunoglobulins     Recent Labs   Lab Test 04/16/24  0912 03/14/24  1305 02/20/24  1026 01/22/24  1017 12/20/23  1026 11/20/23  1127   * 558* 495* 602* 590* 687       Recent Labs   Lab Test 04/16/24  0912 03/14/24  1305 02/20/24  1026 01/22/24  1017 12/20/23  1026 11/20/23  1127    136 131 130 139 164       Recent Labs   Lab Test 04/16/24  0912 03/14/24  1305 02/20/24  1026 01/22/24  1017 12/20/23  1026 11/20/23  1127   IGM 34* 41 <10* 42 41 48         Monocloncal Protein Studies     M spike    Recent Labs   Lab Test 04/16/24  0912 03/14/24  1305 02/20/24  1026 01/22/24  1017 12/20/23  1026 11/20/23  1127   ELPM 0.0 0.0 0.0 0.0 0.0 0.1*       [place] FLC    Recent Labs   Lab Test 04/16/24  0912 03/14/24  1305 02/20/24  1026 01/22/24  1017 12/20/23  1026  11/20/23  1127   KFLCA 1.57 2.79* 2.45* 2.65* 2.34* 2.58*       [place] FLC    Recent Labs   Lab Test 04/16/24  0912 03/14/24  1305 02/20/24  1026 01/22/24  1017 12/20/23  1026 11/20/23  1127   LFLCA 1.30 1.54 1.60 1.74 1.56 1.66       FLC Ratio    Recent Labs   Lab Test 04/16/24  0912 03/14/24  1305 02/20/24  1026 01/22/24  1017 12/20/23  1026 11/20/23  1127   KLRA 1.21 1.81* 1.53 1.52 1.50 1.55       Assessment/plan:   Winter Loya is a 68 year old woman with standard risk IgG kappa multiple myeloma, s/p post auto-transplant in April 2019,  currently on lenalidomide maintenance. She remains in remission by FLC, EBONIE, SPEP. ASA 81 mg for dvt ppx      Persistently elevated bilirubin led to hepatology consult in October 2023.  Determination was that this is most likely fatty liver disease.  Recommendation was to continue to follow with PCP for management of obesity, DM2, and HTN.    Placed nutrition referral based off of today's distress screening.    Discussed therapy and spiritual care - encouraged her to connect with a different therapist at the same site where she was previously receiving therapy to see if it is a better fit.  She also has an upcoming appointment with her [facility] , she is going to ask them if they have additional therapy or spiritual care resources to point her to.    Diarrhea continues to be an issue despite immodium. Will try cholestyramine with meals TID.     Continue monthly labs and video visits. Myeloma labs are stable.    25 minutes spent on the date of the encounter doing chart review, review of test results, interpretation of tests, patient visit, and documentation     FALGUNI Fink [facility]  [address] [place] [id number] [phone]
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
## 2024-04-18 ENCOUNTER — PATIENT OUTREACH (OUTPATIENT)
Dept: ONCOLOGY | Facility: HOSPITAL | Age: 69
End: 2024-04-18
Payer: COMMERCIAL

## 2024-04-18 ENCOUNTER — OFFICE VISIT (OUTPATIENT)
Dept: INTERNAL MEDICINE | Facility: CLINIC | Age: 69
End: 2024-04-18
Payer: COMMERCIAL

## 2024-04-18 VITALS
OXYGEN SATURATION: 97 % | DIASTOLIC BLOOD PRESSURE: 66 MMHG | BODY MASS INDEX: 37.47 KG/M2 | WEIGHT: 253 LBS | TEMPERATURE: 98.1 F | SYSTOLIC BLOOD PRESSURE: 115 MMHG | HEIGHT: 69 IN | HEART RATE: 51 BPM

## 2024-04-18 DIAGNOSIS — E89.0 POSTOPERATIVE HYPOTHYROIDISM: Chronic | ICD-10-CM
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
## 2024-04-18 DIAGNOSIS — E11.42 TYPE 2 DIABETES MELLITUS WITH DIABETIC POLYNEUROPATHY, WITH LONG-TERM CURRENT USE OF INSULIN (H): ICD-10-CM

## 2024-04-18 DIAGNOSIS — F33.2 SEVERE EPISODE OF RECURRENT MAJOR DEPRESSIVE DISORDER, WITHOUT PSYCHOTIC FEATURES (H): ICD-10-CM

## 2024-04-18 DIAGNOSIS — Z79.4 TYPE 2 DIABETES MELLITUS WITH DIABETIC POLYNEUROPATHY, WITH LONG-TERM CURRENT USE OF INSULIN (H): ICD-10-CM

## 2024-04-18 PROCEDURE — 99214 OFFICE O/P EST MOD 30 MIN: CPT

## 2024-04-18 PROCEDURE — 96127 BRIEF EMOTIONAL/BEHAV ASSMT: CPT

## 2024-04-18 RX ORDER — MUPIROCIN 20 MG/G
OINTMENT TOPICAL 3 TIMES DAILY
Qty: 15 G | Refills: 1 | Status: SHIPPED | OUTPATIENT
Start: 2024-04-18 | End: 2024-04-18

## 2024-04-18 RX ORDER — MUPIROCIN 20 MG/G
OINTMENT TOPICAL 3 TIMES DAILY
Qty: 15 G | Refills: 1 | Status: SHIPPED | OUTPATIENT
Start: 2024-04-18

## 2024-04-18 ASSESSMENT — PATIENT HEALTH QUESTIONNAIRE - PHQ9
10. IF YOU CHECKED OFF ANY PROBLEMS, HOW DIFFICULT HAVE THESE PROBLEMS MADE IT FOR YOU TO DO YOUR WORK, TAKE CARE OF THINGS AT HOME, OR GET ALONG WITH OTHER PEOPLE: VERY DIFFICULT
SUM OF ALL RESPONSES TO PHQ QUESTIONS 1-9: 20
SUM OF ALL RESPONSES TO PHQ QUESTIONS 1-9: 20

## 2024-04-18 ASSESSMENT — COLUMBIA-SUICIDE SEVERITY RATING SCALE - C-SSRS
1. WITHIN THE PAST MONTH, HAVE YOU WISHED YOU WERE DEAD OR WISHED YOU COULD GO TO SLEEP AND NOT WAKE UP?: YES
6. HAVE YOU EVER DONE ANYTHING, STARTED TO DO ANYTHING, OR PREPARED TO DO ANYTHING TO END YOUR LIFE?: NO
2. IN THE PAST MONTH, HAVE YOU ACTUALLY HAD ANY THOUGHTS OF KILLING YOURSELF?: NO

## 2024-04-18 NOTE — PROGRESS NOTES
Assessment & Plan     (W55.03XA) Cat scratch  (primary encounter diagnosis)  Comment: Patient seen in clinic today for concern with scratch on lower left leg she got from cat. Noted no redness, no warmth, no signs of cellulitis at this visit. Discussed medication management and gave information on cellulitis to be aware if symptoms change.  Plan: mupirocin (BACTROBAN) 2 % external ointment,         DISCONTINUED: mupirocin (BACTROBAN) 2 %         external ointment        Prescription sent to pharmacy    (E11.42,  Z79.4) Type 2 diabetes mellitus with diabetic polyneuropathy, with long-term current use of insulin (H)  Comment: Chronic last A1C 8.1.   Plan: Continue current plan of care    (E89.0) Hypothyroidism, postoperative   Comment: Chronic, stable last TSH 3.00  Plan: Continue with current plan of care    (F33.2) Severe episode of recurrent major depressive disorder, without psychotic features (H)  Comment: Chronic. Patient denies suicidal ideation, self harm or wanting to harm others. Feels Lyrica is helping. Did get in contact with therapist and has appointment in future    Plan: Patient will continue with current plan of care and is going to start seeing a therapist.         Depression Screening Follow Up              4/18/2024     2:57 PM   C-SSRS (Brief [place])   Within the last month, have you wished you were dead or wished you could go to sleep and not wake up? Yes   Within the last month, have you had any actual thoughts of killing yourself? No   Within the last month, have you ever done anything, started to do anything, or prepared to do anything to end your life? No         Follow Up Actions Taken  Crisis resource information provided in the After Visit Summary    Discussed the following ways the patient can remain in a safe environment:   Patient environment is safe.         Ariadna Max is a 68 year old, presenting for the following health issues:  Musculoskeletal Problem (Sore on her left  "ankle)        4/18/2024     2:38 PM   Additional Questions   Roomed by Annabelle WOODY     History of Present Illness       Reason for visit:  Leg sore/pain  Symptom onset:  1-2 weeks ago    She eats 2-3 servings of fruits and vegetables daily.She consumes 0 sweetened beverage(s) daily.She exercises with enough effort to increase her heart rate 20 to 29 minutes per day.  She exercises with enough effort to increase her heart rate 4 days per week.   She is taking medications regularly.   :[id number]}            Review of Systems  Constitutional, HEENT, cardiovascular, pulmonary, gi and gu systems are negative, except as otherwise noted.      Objective    /66 (BP Location: Left arm, Patient Position: Sitting, Cuff Size: Adult Regular)   Pulse 51   Temp 98.1  F (36.7  C) (Oral)   Ht 1.753 m (5' 9\")   Wt 114.8 kg (253 lb)   SpO2 97%   BMI 37.36 kg/m    Body mass index is 37.36 kg/m .  Physical Exam  Constitutional:       Appearance: Normal appearance.   HENT:      Head: Normocephalic.      Nose: Nose normal.   Eyes:      General:         Right eye: No discharge.         Left eye: No discharge.      Conjunctiva/sclera: Conjunctivae normal.      Pupils: Pupils are equal, round, and reactive to light.   Pulmonary:      Effort: Pulmonary effort is normal. No respiratory distress.      Breath sounds: Normal breath sounds. No stridor.   Musculoskeletal:         General: Normal range of motion.   Skin:     General: Skin is warm.      Coloration: Skin is not jaundiced or pale.      Findings: Lesion present.             Comments: Noted scratches to lower left leg and on foot approximately 1 cm in length and approximately 0.2 mm wide.   Neurological:      General: No focal deficit present.      Mental Status: She is alert and oriented to person, place, and time.   Psychiatric:         Mood and Affect: Mood normal.         Behavior: Behavior normal.         Thought Content: Thought content normal.         Judgment: Judgment " normal.            Signed Electronically by: FALGUNI Morgan CNP

## 2024-04-18 NOTE — PROGRESS NOTES
Phoned pt per request and pt is interested in a therapy appointment. Will ask scheduling team to call.    Oncology Distress Screening    Row Name 04/17/24 3710   How concerned do you feel regarding the following common issues people with cancer face. Please answer with a number from 0-10 where 0=not concerned and 10=very concerned. PT response of >=8  will result in outreach from Support Team.   1. How concerned are you about your ability to eat? 0   2. How concerned are you about unintended weight loss or your current weight? 8 Abnormal   current weight   3. How concerned are you about feeling depressed or very sad? 8 Abnormal    4. How concerned are you about feeling anxious or very scared? 8 Abnormal    5. Do you struggle with the loss of meaning and ross in your life? A great deal Abnormal    6. How concerned are you about work and home life issues that may be affected by your cancer? 6   7. How concerned are you about knowing what resources are available to help you? 6   8. Do you currently have what you would describe as Congregational or spiritual struggles? Somewhat   You can also ask to be contacted by one of our Oncology Supportive Care professionals.   9. If you want to be contacted by one of our professionals, I can send a message to them right now. Oncology Dietitian;Oncology Psychologist;Oncology Spiritual Care

## 2024-04-23 ENCOUNTER — PATIENT OUTREACH (OUTPATIENT)
Dept: GERIATRIC MEDICINE | Facility: CLINIC | Age: 69
End: 2024-04-23
Payer: COMMERCIAL

## 2024-04-23 NOTE — PROGRESS NOTES
4/23/24 CC went to members home for our scheduled annual assessment today at 2PM. Member lives in an  that has no outside monitor to call member from [facility]. It is inside the door and the door was locked. CC called member 2 times to let her know CC was at the door for our scheduled meeting and no answer, CC left a voice mail twice asking her to let CC in the front door for our meeting. There was no return call from member nor did anyone come to the door. After about 20 minutes CC left.  CC called Winter again an hour later still no answer. CC [facility] asking Winter to call CC to let her know if she just forgot todays appointment and we can reschedule or maybe she does not want a home visit. If she could call CC back and let her know either way.  Corrina Cevallos RN N  [facility] Care Coordinator  [phone]

## 2024-04-24 ENCOUNTER — TELEPHONE (OUTPATIENT)
Dept: NEUROLOGY | Facility: CLINIC | Age: 69
End: 2024-04-24
Payer: COMMERCIAL

## 2024-04-24 ENCOUNTER — PATIENT OUTREACH (OUTPATIENT)
Dept: GERIATRIC MEDICINE | Facility: CLINIC | Age: 69
End: 2024-04-24
Payer: COMMERCIAL

## 2024-04-24 NOTE — PROGRESS NOTES
4/24/24 CC received a call from Winter and she had forgotten about our appointment yesterday. We rescheduled the visit for 4/29 at 1PM.  Corrina Cevallos RN N  [facility] Coordinator  [phone]

## 2024-04-24 NOTE — TELEPHONE ENCOUNTER
MTM referral from: [facility] visit (referral by provider)    MTM referral outreach attempt #2 on April 24, 2024 at 11:23 AM      Outcome: Patient not reachable after several attempts, will route to MTM Pharmacist/Provider as an FYI.  MT scheduling number is .  Thank you for the referral.    Use hbc for the carrier/Plan on the flowsheet      REQQI Message Sent    Yarely Antonio CPhT  MT      Patient called back and scheduled visit.

## 2024-04-29 ENCOUNTER — VIRTUAL VISIT (OUTPATIENT)
Dept: PHARMACY | Facility: CLINIC | Age: 69
End: 2024-04-29
Payer: COMMERCIAL

## 2024-04-29 ENCOUNTER — PATIENT OUTREACH (OUTPATIENT)
Dept: GERIATRIC MEDICINE | Facility: CLINIC | Age: 69
End: 2024-04-29
Payer: COMMERCIAL

## 2024-04-29 DIAGNOSIS — C90.01 MULTIPLE MYELOMA IN REMISSION (H): Primary | ICD-10-CM

## 2024-04-29 DIAGNOSIS — E78.5 HYPERLIPIDEMIA, UNSPECIFIED HYPERLIPIDEMIA TYPE: ICD-10-CM

## 2024-04-29 DIAGNOSIS — E11.42 TYPE 2 DIABETES MELLITUS WITH DIABETIC POLYNEUROPATHY, WITH LONG-TERM CURRENT USE OF INSULIN (H): Primary | ICD-10-CM

## 2024-04-29 DIAGNOSIS — Z79.4 TYPE 2 DIABETES MELLITUS WITH DIABETIC POLYNEUROPATHY, WITH LONG-TERM CURRENT USE OF INSULIN (H): Primary | ICD-10-CM

## 2024-04-29 DIAGNOSIS — R19.7 DIARRHEA, UNSPECIFIED TYPE: ICD-10-CM

## 2024-04-29 PROCEDURE — 99606 MTMS BY PHARM EST 15 MIN: CPT | Mod: 93 | Performed by: PHARMACIST

## 2024-04-29 PROCEDURE — 99607 MTMS BY PHARM ADDL 15 MIN: CPT | Mod: 93 | Performed by: PHARMACIST

## 2024-04-29 RX ORDER — INSULIN GLARGINE 100 [IU]/ML
40 INJECTION, SOLUTION SUBCUTANEOUS EVERY MORNING
Qty: 45 ML | Refills: 1 | Status: SHIPPED | OUTPATIENT
Start: 2024-04-29

## 2024-04-29 RX ORDER — LENALIDOMIDE 10 MG/1
10 CAPSULE ORAL DAILY
Qty: 28 CAPSULE | Refills: 0 | Status: SHIPPED | OUTPATIENT
Start: 2024-04-29 | End: 2024-06-21

## 2024-04-29 SDOH — HEALTH STABILITY: PHYSICAL HEALTH: ON AVERAGE, HOW MANY DAYS PER WEEK DO YOU ENGAGE IN MODERATE TO STRENUOUS EXERCISE (LIKE A BRISK WALK)?: 0 DAYS

## 2024-04-29 SDOH — HEALTH STABILITY: PHYSICAL HEALTH: ON AVERAGE, HOW MANY MINUTES DO YOU ENGAGE IN EXERCISE AT THIS LEVEL?: 0 MIN

## 2024-04-29 ASSESSMENT — SOCIAL DETERMINANTS OF HEALTH (SDOH)
IN A TYPICAL WEEK, HOW MANY TIMES DO YOU TALK ON THE PHONE WITH FAMILY, FRIENDS, OR NEIGHBORS?: ONCE A WEEK
HOW OFTEN DO YOU GET TOGETHER WITH FRIENDS OR RELATIVES?: ONCE A WEEK
DO YOU BELONG TO ANY CLUBS OR ORGANIZATIONS SUCH AS CHURCH GROUPS UNIONS, FRATERNAL OR ATHLETIC GROUPS, OR SCHOOL GROUPS?: NO

## 2024-04-29 ASSESSMENT — LIFESTYLE VARIABLES
SKIP TO QUESTIONS 9-10: 1
HOW MANY STANDARD DRINKS CONTAINING ALCOHOL DO YOU HAVE ON A TYPICAL DAY: PATIENT DOES NOT DRINK
HOW OFTEN DO YOU HAVE SIX OR MORE DRINKS ON ONE OCCASION: NEVER
AUDIT-C TOTAL SCORE: 0
HOW OFTEN DO YOU HAVE A DRINK CONTAINING ALCOHOL: NEVER

## 2024-04-29 ASSESSMENT — ACTIVITIES OF DAILY LIVING (ADL): DEPENDENT_IADLS:: INDEPENDENT
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
## 2024-04-29 NOTE — COMMUNITY RESOURCES LIST (ENGLISH)
April 29, 2024           YOUR PERSONALIZED LIST OF SERVICES & PROGRAMS           & RECREATION    [facility] - Sports clubs and recreational activities  [address] [place] [address] (Distance: 4.5 miles)  Language: English, Kittitian  Fee: Free  Accessibility: Translation services      [facility] - Adult Enrichment  Phone: [phone]  Website: https://[employer]/adults-seniors/adult-enrichment/  Language: English  Hours: Mon 7:30 AM - 4:00 PM Tue 7:30 AM - 4:00 PM Wed 7:30 AM - 4:00 PM Thu 7:30 AM - 4:00 PM Fri 7:30 AM - 4:00 PM      [employer] - LITTLE LEAGUE BASEBALL AND SOFTBALL  Website: http://www.littleleague.org    Classes/Groups      [employer] (Pricing Assistant) - Elders [facility]pite [facility] and Elders Day Services  [facility] [place] [address] (Distance: 5.1 miles)  Phone: [phone]  Website: https://www.MVP Vault/ADS.html  Language: English  Fee: Free  Accessibility: Ada accessible, Translation services  Transportation Options: Free transportation      [employer] (Pricing Assistant) - [employer]  [address] [place] [address] (Distance: 5.1 miles)  Phone: [phone]  Website: https://www.M[employer]ault/TaiChi.html  Language: English  Fee: Free  Accessibility: Ada accessible, Translation services  Transportation Options: Free transportation      [facility] - Adult Enrichment  Phone: [phone]  Website: https://[employer]/adults-seniors/adult-enrichment/  Language: English  Hours: Mon 7:30 AM - 4:00 PM Tue 7:30 AM - 4:00 PM Wed 7:30 AM - 4:00 PM Thu 7:30 AM - 4:00 PM Fri 7:30 AM - 4:00 PM               IMPORTANT NUMBERS & WEBSITES        Emergency Services  911  .   United Way  211 http://211unitedway.org  .   Poison Control  (264) 411-6080 http://[place]poison.org http://wisconsinpoison.org  .     Suicide and Crisis Lifeline  988 http://988[facility].org  .   [facility]  National Child Abuse Hotline  780.275.7297 http://Childhelphotline.org   .   National Sexual Assault Hotline  (401) 213-8390 (HOPE) http://Rainn.org   .     National Runaway Safeline  (120) 672-6585 (RUNAWAY) http://Kidos.Politapoll  .   Pregnancy & Postpartum Support  Call/text 149-268-2166  [place]: http://ppsupportmn.org  [place]: http://psichapters.com/wi  .   Substance Abuse National Helpline ([facility])  487-858-HELP (7373) http://Findtreatment.gov   .                DISCLAIMER: These resources have been generated via the [employer] Platform. [employer] does not endorse any service providers mentioned in this resource list. [employer] does not guarantee that the services mentioned in this resource list will be available to you or will improve your health or wellness.    [facility]

## 2024-04-29 NOTE — Clinical Note
CE DAWSON note, thanks!  Vonnie Corrigan, PharmD Medication Therapy Management Pharmacist [phone]

## 2024-04-29 NOTE — PATIENT INSTRUCTIONS
"Recommendations from today's MTM visit:                                                         Restart Ozempic 0.25 mg weekly x 1 month, then increase to 0.5 mg weekly.   Continue Lantus 40 units daily.   Schedule appointment with diabetes educator    Follow-up: Return in about 5 weeks (around 6/3/2024) for Medication Therapy Management.    It was great speaking with you today.  I value your experience and would be very thankful for your time in providing feedback in our clinic survey. In the next few days, you may receive an email or text message from [employer] with a link to a survey related to your  clinical pharmacist.\"     To schedule another MTM appointment, please call the clinic directly or you may call the MTM scheduling line at [phone] or toll-free at [phone].     My Clinical Pharmacist's contact information:                                                      Please feel free to contact me with any questions or concerns you have.      Vonnie Corrigan, PharmD  Medication Therapy Management Pharmacist     "

## 2024-04-29 NOTE — PROGRESS NOTES
Medication Therapy Management (MTM) Encounter    ASSESSMENT:                            Medication Adherence/Access: No issues identified    Diabetes   /Type 2 Diabetes/Obesity:Diabetes   /Type 2 Diabetes/Obesity:Patient is not meeting A1c goal of < 7%.  Self monitoring of blood glucose is not at goal of fasting  mg/dL and post prandial < 180 mg/dL. Blood sugar is not at goal of time in target  mg/dL > 70%. May benefit from restarting Ozempic - ok to restart this now that triglyceride are < 500 mg/dL.    Hyperlipidemia: triglyceride now < 500 mg/dL, Ozempic, separately, may have also have risk of pancreatitis, however, as previously discussed, this risk is much lower when triglyceride are lower.     Diarrhea: May benefit from starting cholestyramine as planned, may also help with cholesterol.    Cat bite/scratch:  resolved.      PLAN:                            Restart Ozempic 0.25 mg weekly x 1 month, then increase to 0.5 mg weekly.   Continue Lantus 40 units daily.   Schedule appointment with diabetes educator    Follow-up: Return in about 5 weeks (around 6/3/2024) for Medication Therapy Management.    SUBJECTIVE/OBJECTIVE:                          Winter Loya is a 68 year old female called for a follow-up visit.       Reason for visit: diabetes follow-up, med review.    Allergies/ADRs: Reviewed in chart  Past Medical History: Reviewed in chart  Tobacco: She reports that she quit smoking about 19 years ago. Her smoking use included cigarettes. She has never used smokeless tobacco.  Alcohol: none  Caffeine: 1 diet coke/day and 2 cups coffee/day    Medication Adherence/Access:   Uses pill boxes.  Misses medications on occasion, but is usually really good about them  The patient fills medications at [facility]: NO, fills medications at [facility] and [facility] for Revlimid.    Diabetes   /Type 2 Diabetes/Obesity:  Lantus 40 units every morning (has been titrating up)  Jardiance 25 mg daily   Aspirin: Taking 81mg  daily for primary prevention (possible secondary prevention)    Hadn't been taking Ozempic - did not restart as triglycerides are elevated, did have these rechecked. Does note she's doing better at controlling her eating, appetite has gone down even without Ozempic.   Reports not having troublewith yeast infections anymore, this has gotten better.  Medication History:  Has been on Trulicity in the past, tolerated well.   She has done a trial off of metformin with Delmi Gross in the past without improvement in diarrhea, has since stopped this.   Blood sugar monitoring: [facility] 3   Diet/Exercise:  1 pm - first meal of the day, varies - usually leftovers from night before, sometimes cereal but trying to get away from this.  4-6 pm supper: hot dogs, hamburgers, french fries, she has things to make salads and enjoys salads, going to try fajitas tonight -plans to see diabetes education for more ideas. 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    Current diabetes symptoms: none     Eye exam is up to date  Foot exam: due  Urine Albumin:   Lab Results   Component Value Date    UMALCR 97.01 (H) 01/22/2024      Lab Results   Component Value Date    A1C 8.1 (H) 01/22/2024     Hyperlipidemia   atorvastatin 20mg daily    Patient reports no significant myalgias or other side effects.     Recent Labs   Lab Test 03/14/24  1305 01/22/24  1017   CHOL 164 213*   HDL 52 44*   LDL 34 33   TRIG 389* 769*     Diarrhea:   Loperamide 4 mg every morning  Cholestyramine 4 g three times daily -was prescribed by oncology, she did try it, didn't like the taste and texture and ended up stopping, she thinks she hasn't given it a fair chance - going to try it in a smoothie next, she likes these.    Continued issue for her, it's been much better in general.     Cat bite/scratch:  treated with topical antibiotics, was seen for this. Has healed up.    Today's Vitals: There were  no vitals taken for this visit.  ----------------      I spent 21 minutes with this patient today. All changes were made via collaborative practice agreement with Montse Bucio PA-C. A copy of the visit note was provided to the patient's provider(s).    A summary of these recommendations was sent via [employer].    Vonnie Corrigan, ShelleyD  Medication Therapy Management Pharmacist    Telemedicine Visit Details  Type of service:  Telephone visit  Start Time: 3:33 PM  End Time: 3:54 PM     Medication Therapy Recommendations  Type 2 diabetes mellitus with diabetic polyneuropathy, with long-term current use of insulin (H)    Current Medication: insulin glargine (LANTUS SOLOSTAR) 100 UNIT/ML pen   Rationale: Synergistic therapy - Needs additional medication therapy - Indication   Recommendation: Start Medication - Ozempic (0.25 or 0.5 MG/DOSE) 2 MG/1.5ML Sopn   Status: Accepted per CPA

## 2024-04-30 ENCOUNTER — PATIENT OUTREACH (OUTPATIENT)
Dept: GERIATRIC MEDICINE | Facility: CLINIC | Age: 69
End: 2024-04-30
Payer: COMMERCIAL

## 2024-04-30 NOTE — PROGRESS NOTES
[facility] Care Coordination Contact      CHW, per CC Corrina Cevallos followed up w/ Mbr on housing. Lvm to rtn call.      ALEXIS Robertson  [facility]  [phone]

## 2024-05-01 ENCOUNTER — PATIENT OUTREACH (OUTPATIENT)
Dept: GERIATRIC MEDICINE | Facility: CLINIC | Age: 69
End: 2024-05-01
Payer: COMMERCIAL

## 2024-05-01 ENCOUNTER — TELEPHONE (OUTPATIENT)
Dept: ONCOLOGY | Facility: CLINIC | Age: 69
End: 2024-05-01
Payer: COMMERCIAL

## 2024-05-01 NOTE — PROGRESS NOTES
[facility] Care Coordination Contact    Received after visit chart from care coordinator.  Completed following tasks: Mailed copy of care plan/support plan to member, Mailed [place] Choices signature sheet pages 3-4, and Mailed Safe Medication Disposal     Dayna Baez  Case Management Specialist   [facility]  [phone]

## 2024-05-01 NOTE — TELEPHONE ENCOUNTER
Oral Chemotherapy Monitoring Program    Medication: Revlimid  Rx:  10 mg PO daily for a 28 day cycle    Auth #: [id number]  Risk Category: Adult female NOT of reproductive capacity    Routine survey questions reviewed.    Thank you,    Mary Franklin  Oncology Pharmacy Liaison LUCINDA [email or web address]  Phone: [phone]  Fax: [phone]

## 2024-05-01 NOTE — LETTER
May 1, 2024      PIEDAD LO  [address]  [place] [facility] [address]      Dear Piedad:    At [facility], we re dedicated to improving your health and wellness. Enclosed is the Care Plan developed with you on 4/29/2024. Please review the Care Plan carefully.    As a reminder, during your visit we talked about:  Ways to manage your physical and mental health  Using health care to maintain and improve your health   Your preventive care needs     Remember to contact your care coordinator if you:  Are hospitalized, or plan to be hospitalized   Have a fall    Have a change in your physical or mental health  Need help finding support or services    If you have questions, or don t agree with your Care Plan, call me at [phone]. You can also call me if your needs change. TTY users, call the [place] Relay at (657) or 1-775.401.3273 (sfalyb-xx-anvptk relay service).    Sincerely,    Marti Cevallos RN    E-mail: [email or web address]  Phone: [phone]    Care Manager  [facility] ([facility]) is a health plan that contracts with both Medicare and the [place] Medical Assistance (Medicaid) program to provide benefits of both programs to enrollees. Enrollment in [facility] depends on contract renewal.    [id number] accepted    [id number] (07/2022)

## 2024-05-02 ENCOUNTER — PATIENT OUTREACH (OUTPATIENT)
Dept: GERIATRIC MEDICINE | Facility: CLINIC | Age: 69
End: 2024-05-02
Payer: COMMERCIAL

## 2024-05-02 NOTE — PROGRESS NOTES
[facility] Care Coordination Contact       W, received rtn call from Mbr she is interested in exploring housing options and may go back to work. CHW, will send Mbr Housing Link info via e-mail [email or web address].   Mbr, will follow up after review if she has questions. Debby also requested assistance w/ incontinence products CHW sent E-mail to CORIN Cevallos on this matter to follow up on.       ALEXIS Robertson  [facility]  [phone]

## 2024-05-03 ENCOUNTER — PATIENT OUTREACH (OUTPATIENT)
Dept: GERIATRIC MEDICINE | Facility: CLINIC | Age: 69
End: 2024-05-03
Payer: COMMERCIAL

## 2024-05-03 NOTE — PROGRESS NOTES
CORIN spoke with Winter about restarting her pullups. She would like large not extra large. CORIN sent the info to [facility].  Corrina Cevallos RN N  [facility] Coordinator  [phone]

## 2024-05-14 ENCOUNTER — LAB (OUTPATIENT)
Dept: LAB | Facility: CLINIC | Age: 69
End: 2024-05-14
Payer: COMMERCIAL

## 2024-05-14 DIAGNOSIS — C90.01 MULTIPLE MYELOMA IN REMISSION (H): ICD-10-CM

## 2024-05-14 DIAGNOSIS — Z79.4 TYPE 2 DIABETES MELLITUS WITH DIABETIC POLYNEUROPATHY, WITH LONG-TERM CURRENT USE OF INSULIN (H): Primary | ICD-10-CM

## 2024-05-14 DIAGNOSIS — E11.42 TYPE 2 DIABETES MELLITUS WITH DIABETIC POLYNEUROPATHY, WITH LONG-TERM CURRENT USE OF INSULIN (H): Primary | ICD-10-CM

## 2024-05-14 LAB
ALBUMIN SERPL BCG-MCNC: 4.4 G/DL (ref 3.5–5.2)
ALP SERPL-CCNC: 118 U/L (ref 40–150)
ALT SERPL W P-5'-P-CCNC: 30 U/L (ref 0–50)
ANION GAP SERPL CALCULATED.3IONS-SCNC: 11 MMOL/L (ref 7–15)
AST SERPL W P-5'-P-CCNC: 17 U/L (ref 0–45)
BASOPHILS # BLD AUTO: 0.1 10E3/UL (ref 0–0.2)
BASOPHILS NFR BLD AUTO: 2 %
BILIRUB SERPL-MCNC: 1.8 MG/DL
BUN SERPL-MCNC: 13.8 MG/DL (ref 8–23)
CALCIUM SERPL-MCNC: 9.1 MG/DL (ref 8.8–10.2)
CHLORIDE SERPL-SCNC: 104 MMOL/L (ref 98–107)
CREAT SERPL-MCNC: 0.71 MG/DL (ref 0.51–0.95)
DEPRECATED HCO3 PLAS-SCNC: 24 MMOL/L (ref 22–29)
EGFRCR SERPLBLD CKD-EPI 2021: >90 ML/MIN/1.73M2
EOSINOPHIL # BLD AUTO: 0.2 10E3/UL (ref 0–0.7)
EOSINOPHIL NFR BLD AUTO: 8 %
ERYTHROCYTE [DISTWIDTH] IN BLOOD BY AUTOMATED COUNT: 14.7 % (ref 10–15)
GLUCOSE SERPL-MCNC: 178 MG/DL (ref 70–99)
HBA1C MFR BLD: 8.2 % (ref 0–5.6)
HCT VFR BLD AUTO: 43.6 % (ref 35–47)
HGB BLD-MCNC: 14.4 G/DL (ref 11.7–15.7)
IMM GRANULOCYTES # BLD: 0 10E3/UL
IMM GRANULOCYTES NFR BLD: 0 %
LYMPHOCYTES # BLD AUTO: 0.6 10E3/UL (ref 0.8–5.3)
LYMPHOCYTES NFR BLD AUTO: 19 %
MCH RBC QN AUTO: 28.7 PG (ref 26.5–33)
MCHC RBC AUTO-ENTMCNC: 33 G/DL (ref 31.5–36.5)
MCV RBC AUTO: 87 FL (ref 78–100)
MONOCYTES # BLD AUTO: 0.4 10E3/UL (ref 0–1.3)
MONOCYTES NFR BLD AUTO: 13 %
NEUTROPHILS # BLD AUTO: 1.8 10E3/UL (ref 1.6–8.3)
NEUTROPHILS NFR BLD AUTO: 59 %
NRBC # BLD AUTO: 0 10E3/UL
NRBC BLD AUTO-RTO: 0 /100
PLATELET # BLD AUTO: 101 10E3/UL (ref 150–450)
POTASSIUM SERPL-SCNC: 4.5 MMOL/L (ref 3.4–5.3)
PROT SERPL-MCNC: 6.4 G/DL (ref 6.4–8.3)
RBC # BLD AUTO: 5.01 10E6/UL (ref 3.8–5.2)
SODIUM SERPL-SCNC: 139 MMOL/L (ref 135–145)
TOTAL PROTEIN SERUM FOR ELP: 6.2 G/DL (ref 6.4–8.3)
WBC # BLD AUTO: 3.1 10E3/UL (ref 4–11)

## 2024-05-14 PROCEDURE — 36415 COLL VENOUS BLD VENIPUNCTURE: CPT

## 2024-05-14 PROCEDURE — 85025 COMPLETE CBC W/AUTO DIFF WBC: CPT

## 2024-05-14 PROCEDURE — 83036 HEMOGLOBIN GLYCOSYLATED A1C: CPT

## 2024-05-14 PROCEDURE — 84155 ASSAY OF PROTEIN SERUM: CPT | Mod: 59

## 2024-05-14 PROCEDURE — 84165 PROTEIN E-PHORESIS SERUM: CPT | Performed by: PATHOLOGY

## 2024-05-14 PROCEDURE — 80053 COMPREHEN METABOLIC PANEL: CPT

## 2024-05-15 ENCOUNTER — ANCILLARY PROCEDURE (OUTPATIENT)
Dept: MRI IMAGING | Facility: CLINIC | Age: 69
End: 2024-05-15
Attending: NEUROLOGICAL SURGERY
Payer: COMMERCIAL

## 2024-05-15 ENCOUNTER — MYC MEDICAL ADVICE (OUTPATIENT)
Dept: NEUROSURGERY | Facility: CLINIC | Age: 69
End: 2024-05-15

## 2024-05-15 ENCOUNTER — VIRTUAL VISIT (OUTPATIENT)
Dept: ONCOLOGY | Facility: CLINIC | Age: 69
End: 2024-05-15
Attending: STUDENT IN AN ORGANIZED HEALTH CARE EDUCATION/TRAINING PROGRAM
Payer: COMMERCIAL

## 2024-05-15 VITALS — WEIGHT: 250 LBS | HEIGHT: 69 IN | BODY MASS INDEX: 37.03 KG/M2

## 2024-05-15 DIAGNOSIS — G93.9 BRAIN LESION: ICD-10-CM

## 2024-05-15 DIAGNOSIS — C90.01 MULTIPLE MYELOMA IN REMISSION (H): Primary | ICD-10-CM

## 2024-05-15 DIAGNOSIS — R19.7 DIARRHEA, UNSPECIFIED TYPE: ICD-10-CM

## 2024-05-15 DIAGNOSIS — C90.01 MULTIPLE MYELOMA IN REMISSION (H): ICD-10-CM

## 2024-05-15 LAB
ALBUMIN SERPL ELPH-MCNC: 4.3 G/DL (ref 3.7–5.1)
ALPHA1 GLOB SERPL ELPH-MCNC: 0.2 G/DL (ref 0.2–0.4)
ALPHA2 GLOB SERPL ELPH-MCNC: 0.5 G/DL (ref 0.5–0.9)
B-GLOBULIN SERPL ELPH-MCNC: 0.7 G/DL (ref 0.6–1)
GAMMA GLOB SERPL ELPH-MCNC: 0.5 G/DL (ref 0.7–1.6)
M PROTEIN SERPL ELPH-MCNC: 0 G/DL
PATH REPORT.COMMENTS IMP SPEC: ABNORMAL
PROT PATTERN SERPL ELPH-IMP: ABNORMAL

## 2024-05-15 PROCEDURE — 70553 MRI BRAIN STEM W/O & W/DYE: CPT | Mod: TC | Performed by: RADIOLOGY

## 2024-05-15 PROCEDURE — A9585 GADOBUTROL INJECTION: HCPCS | Performed by: RADIOLOGY

## 2024-05-15 PROCEDURE — 99214 OFFICE O/P EST MOD 30 MIN: CPT | Mod: 95 | Performed by: REGISTERED NURSE

## 2024-05-15 PROCEDURE — G2211 COMPLEX E/M VISIT ADD ON: HCPCS | Mod: 95 | Performed by: REGISTERED NURSE

## 2024-05-15 RX ORDER — GADOBUTROL 604.72 MG/ML
11 INJECTION INTRAVENOUS ONCE
Status: COMPLETED | OUTPATIENT
Start: 2024-05-15 | End: 2024-05-15

## 2024-05-15 RX ADMIN — GADOBUTROL 11 ML: 604.72 INJECTION INTRAVENOUS at 10:42

## 2024-05-15 ASSESSMENT — PAIN SCALES - GENERAL: PAINLEVEL: NO PAIN (0)

## 2024-05-15 NOTE — TELEPHONE ENCOUNTER
Dr. Menendez reviewed pts MRI, he states Right temporal lobe finding stable, repeat MRI in a year.    Pt updated. Reminder sent to RN pool.    Authored by Resident/PA/NP

## 2024-05-15 NOTE — PROGRESS NOTES
Virtual Visit Details    Type of service:  Video Visit   Video Start Time: 2:34 PM  Video End Time:2:49 PM    Originating Location (pt. Location): Home    Distant Location (provider location):  Off-site  Platform used for Video Visit: [facility]    ONCOLOGY/HEMATOLOGY PROGRESS NOTE  May 15, 2024    Reason for Visit: IgA Kappa Multiple Myeloma    Oncology HPI:   Winter Loya is a 68 year old woman with IgA Kappa Multiple Myeloma. In 2018 she had anemia and was fatigued. She was found to have IgA kappa monocloncal antibody with M-spike of 0.17. IgA elevated. Skeletal survey was unremarkable and UPEP had minimal protein (156 mg/m2). PET 11/2018 showed bone marrow consistent with hypermetabolic plasma cell myeloma. M spike was 0.17. She started RVD and Zometa. On 4/2019 she had an autologous transplant.      Her bone marrow bx from September 2019 was sent here for review. It showed marrow cellularity of 60%, with decreased trilineage hematopoiesis and 15% plasma cells as well as peripheral blood with slight anemia. 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 She is on revlimid maintenance and zometa from her prior oncologist in [place]. On 12/6/19 her K/L ratio is 1.1, M spike is 0.04.     Currently on Revlimid maintenance.      Interval history:   - Has felt tired since restarting Ozempic  - Cat scratch from last month has healed  - Diarrhea persists, she hasn't been taking cholesytramine regularly because it says to take three times daily with meals but she doesn't always eat three full meals  - Hasn't connected with therapy yet; someone called, but then she never heard anything more    Comprehensive ROS neg other than the symptoms noted above in the HPI.      Past Medical History:   Diagnosis Date    Abnormal EMG 2021 5/25/2021    Interpretation: This is an abnormal study, demonstrating  electrophysiologic evidence of a length-dependent axonal sensorimotor polyneuropathy. Asymmetry of peroneal compound muscle action potential amplitudes is a finding of uncertain significance.       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   I have personally reviewed the examination and initial interpretation and I agree with the findings.   ANNE GOODMAN MD    History of MRI of cervical spine 6/2020 3/15/2021    Impression: Multilevel cervical spondylosis, most pronounced at the level of C4-5 and C5-6 with moderate to severe spinal canal stenosis and moderate spinal canal stenosis at C6-7. No abnormal cord signal. No significant neural foraminal narrowing at any level.   I have personally reviewed the examination and initial interpretation and I agree with the findings.   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 100 each 3    aspirin 81 MG EC tablet Take 81 mg by mouth daily      atorvastatin (LIPITOR) 20 MG tablet Take 1 tablet (20 mg) by mouth at bedtime 90 tablet 3    Continuous Blood Gluc  (FREESTYLE SEGUNDO 2 READER) MARCIA Use to read blood  sugars as per 's instructions. 1 each 0    Continuous Blood Gluc Sensor (FREESTYLE SEGUNDO 3 SENSOR) MISC 1 each every 14 days Use 1 sensor every 14 days. Use to read blood sugars per 's instructions. 6 each 5    Continuous Blood Gluc Sensor (FREESTYLE SEGUNDO 3 SENSOR) MISC 1 each every 14 days 6 each 5    empagliflozin (JARDIANCE) 25 MG TABS tablet Take 1 tablet (25 mg) by mouth daily 90 tablet 3    insulin glargine (LANTUS SOLOSTAR) 100 UNIT/ML pen Inject 40 Units Subcutaneous every morning Increase dose by 2 units every three days until fasting blood sugar are  mg/dL. Max 40 units/day 45 mL 1    insulin pen needle (FIFTY50 PEN NEEDLES) 32G X 4 MM miscellaneous Use one pen needle daily or as directed. 100 each 2    LENalidomide (REVLIMID) 10 MG CAPS capsule Take 1 capsule (10 mg) by mouth daily 28 capsule 0    levothyroxine (SYNTHROID/LEVOTHROID) 200 MCG tablet Take 1 tablet (200 mcg) by mouth every morning (before breakfast) 90 tablet 3    loperamide (IMODIUM A-D) 2 MG tablet Take 1 tablet (2 mg) by mouth daily as needed for diarrhea Start Imodium 2 pills with first loose stool and then 1 pill with each loose stool after, aiming for 1-2 stools per day. Max of 8 pills a day. 180 tablet 3    losartan (COZAAR) 25 MG tablet Take 1 tablet (25 mg) by mouth daily 90 tablet 3    mupirocin (BACTROBAN) 2 % external ointment Apply topically 3 times daily 15 g 1    neomycin-polymyxin-hydrocortisone (CORTISPORIN) 3.5-37861-8 otic solution Place 3 drops in ear(s) 4 times daily 10 mL 0    pregabalin (LYRICA) 100 MG capsule Take 1 capsule (100 mg) by mouth 2 times daily 180 capsule 3    propranolol ER (INDERAL LA) 60 MG 24 hr capsule Take 1 capsule (60 mg) by mouth daily 90 capsule 3    semaglutide (OZEMPIC) 2 MG/3ML pen Inject 0.25 mg Subcutaneous every 7 days X 4 weeks, then increase to 0.5 mg weekly. 3 mL 1    sertraline (ZOLOFT) 100 MG tablet Take 1 tablet (100 mg) by mouth daily 90 tablet 3     blood glucose (NO BRAND SPECIFIED) test strip Use to test blood sugar 3 times daily or as directed. To accompany: Blood Glucose Monitor Brands: per insurance. (Patient not taking: Reported on 4/18/2024) 100 strip 6    blood glucose calibration (NO BRAND SPECIFIED) solution To accompany: Blood Glucose Monitor Brands: per insurance. (Patient not taking: Reported on 4/18/2024) 4 each 0    blood glucose monitoring (NO BRAND SPECIFIED) meter device kit Use to test blood sugar 3 times daily or as directed. Preferred blood glucose meter OR supplies to accompany: Blood Glucose Monitor Brands: per insurance. (Patient not taking: Reported on 4/18/2024) 1 kit 0    cholestyramine (QUESTRAN) 4 g packet Take 1 packet (4 g) by mouth 3 times daily (with meals) (Patient not taking: Reported on 4/18/2024) 90 packet 4    LENalidomide (REVLIMID) 10 MG CAPS capsule Take 1 capsule (10 mg) by mouth daily for 28 days 28 capsule 0       Video physical exam  General: Patient appears well in no acute distress.   Skin: No visualized rash or lesions on visualized skin  Eyes: EOMI, no erythema, sclera icterus or discharge noted  Resp: Appears to be breathing comfortably without accessory muscle usage, speaking in full sentences, no cough  MSK: Appears to have normal range of motion based on visualized movements  Neurologic: No apparent tremors, facial movements symmetric  Psych: affect bright, alert and oriented          Labs:     Blood Counts       Recent Labs   Lab Test 05/14/24  1023 04/16/24  0912 03/14/24  1305 02/20/24  1026   HGB 14.4 14.1 14.6 14.5   HCT 43.6 42.7 43.5 44.2   WBC 3.1* 2.9* 3.1* 2.8*   ANEUTAUTO 1.8 1.6 1.8 1.8   ALYMPAUTO 0.6* 0.6* 0.6* 0.5*   AMONOAUTO 0.4 0.4 0.4 0.3   AEOSAUTO 0.2 0.3 0.3 0.2   ABSBASO 0.1 0.1 0.1 0.1   NRBCMAN 0.0 0.0  --  0.0   * 99* 114* 107*       ABO/RH    No lab results found.      Chemistries     Basic Panel  Recent Labs   Lab Test 05/14/24  1023 04/16/24  0912 03/25/24  1134  03/14/24  1305    140  --  138   POTASSIUM 4.5 4.0  --  4.3   CHLORIDE 104 105  --  103   CO2 24 24  --  25   BUN 13.8 14.1  --  18.0   CR 0.71 0.65  --  0.75   * 192* 175* 199*        Calcium, Magnesium, Phosphorus  Recent Labs   Lab Test 05/14/24  1023 04/16/24  0912 03/14/24  1305   ASHLEY 9.1 9.1 9.3        LFTs  Recent Labs   Lab Test 05/14/24  1023 04/16/24  0912 03/14/24  1305   BILITOTAL 1.8* 1.9* 1.6*   ALKPHOS 118 118 118   AST 17 20 24   ALT 30 36 38   ALBUMIN 4.4 4.4 4.4       LDH  No lab results found.    B2-Microglobulin  Recent Labs   Lab Test 02/18/20  0849   [id number] 1.6           Immunoglobulins     Recent Labs   Lab Test 04/16/24  0912 03/14/24  1305 02/20/24  1026 01/22/24  1017 12/20/23  1026 11/20/23  1127   * 558* 495* 602* 590* 687       Recent Labs   Lab Test 04/16/24  0912 03/14/24  1305 02/20/24  1026 01/22/24  1017 12/20/23  1026 11/20/23  1127    136 131 130 139 164       Recent Labs   Lab Test 04/16/24  0912 03/14/24  1305 02/20/24  1026 01/22/24  1017 12/20/23  1026 11/20/23  1127   IGM 34* 41 <10* 42 41 48         Monocloncal Protein Studies     M spike    Recent Labs   Lab Test 05/14/24  1023 04/16/24  0912 03/14/24  1305 02/20/24  1026 01/22/24  1017 12/20/23  1026   ELPM 0.0 0.0 0.0 0.0 0.0 0.0       [place] FLC    Recent Labs   Lab Test 04/16/24  0912 03/14/24  1305 02/20/24  1026 01/22/24  1017 12/20/23  1026 11/20/23  1127   KFLCA 1.57 2.79* 2.45* 2.65* 2.34* 2.58*       Lambda FLC    Recent Labs   Lab Test 04/16/24  0912 03/14/24  1305 02/20/24  1026 01/22/24  1017 12/20/23  1026 11/20/23  1127   LFLCA 1.30 1.54 1.60 1.74 1.56 1.66       FLC Ratio    Recent Labs   Lab Test 04/16/24  0912 03/14/24  1305 02/20/24  1026 01/22/24  1017 12/20/23  1026 11/20/23  1127   KLRA 1.21 1.81* 1.53 1.52 1.50 1.55       Assessment/plan:   Winter Loya is a 68 year old woman with standard risk IgG kappa multiple myeloma, s/p post auto-transplant in April 2019,  currently  "on lenalidomide maintenance. She remains in remission by FLC, EBONIE, SPEP. ASA 81 mg for dvt ppx      Persistently elevated bilirubin led to hepatology consult in October 2023.  Determination was that this is most likely fatty liver disease.  Recommendation was to continue to follow with PCP for management of obesity, DM2, and HTN.    Still interested in getting connected with therapy but this hasn't been set up yet - I will follow-up with that team.    Diarrhea continues to be an issue despite immodium. Started cholestyramine with meals TID in April but she hasn't been taking consistently.  Clarified today that she should take it with whatever 2 or 3 biggest snacks or meals she takes during the day, doesn't always have to be what she would consider a \"full meal\" if she is grazing throughout the day.    Continue monthly labs and video visits. Myeloma labs are stable.    20 minutes spent on the date of the encounter doing chart review, review of test results, interpretation of tests, patient visit, and documentation     FALGUNI Fink [facility]  [address] [place] [address] [id number] [phone]    "

## 2024-05-15 NOTE — LETTER
5/15/2024         RE: Winter Loya  [address]  [facility] [address]        Dear Colleague,    Thank you for referring your patient, Winter Loya, to the [facility]. Please see a copy of my visit note below.    Virtual Visit Details    Type of service:  Video Visit   Video Start Time: 2:34 PM  Video End Time:2:49 PM    Originating Location (pt. Location): Home    Distant Location (provider location):  Off-site  Platform used for Video Visit: [facility]    ONCOLOGY/HEMATOLOGY PROGRESS NOTE  May 15, 2024    Reason for Visit: IgA Kappa Multiple Myeloma    Oncology HPI:   Winter Loya is a 68 year old woman with IgA Kappa Multiple Myeloma. In 2018 she had anemia and was fatigued. She was found to have IgA kappa monocloncal antibody with M-spike of 0.17. IgA elevated. Skeletal survey was unremarkable and UPEP had minimal protein (156 mg/m2). PET 11/2018 showed bone marrow consistent with hypermetabolic plasma cell myeloma. M spike was 0.17. She started RVD and Zometa. On 4/2019 she had an autologous transplant.      Her bone marrow bx from September 2019 was sent here for review. It showed marrow cellularity of 60%, with decreased trilineage hematopoiesis and 15% plasma cells as well as peripheral blood with slight anemia. 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 She is on revlimid maintenance and zometa from her prior oncologist in [place]. On 12/6/19 her K/L ratio is 1.1, M spike is 0.04.     Currently on Revlimid maintenance.      Interval history:   - Has felt tired since restarting Ozempic  - Cat scratch from last month has healed  - Diarrhea persists, she hasn't been taking cholesytramine regularly because it says to take three times daily with meals but she doesn't always eat three full meals  - Hasn't connected with therapy yet; someone  called, but then she never heard anything more    Comprehensive ROS neg other than the symptoms noted above in the HPI.      Past Medical History:   Diagnosis Date    Abnormal EMG 2021 5/25/2021    Interpretation: This is an abnormal study, demonstrating electrophysiologic evidence of a length-dependent axonal sensorimotor polyneuropathy. Asymmetry of peroneal compound muscle action potential amplitudes is a finding of uncertain significance.       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   I have personally reviewed the examination and initial interpretation and I agree with the findings.   ANNE GOODMAN MD    History of MRI of cervical spine 6/2020 3/15/2021    Impression: Multilevel cervical spondylosis, most pronounced at the level of C4-5 and C5-6 with moderate to severe spinal canal stenosis and moderate spinal canal stenosis at C6-7. No abnormal cord signal. No significant neural foraminal narrowing at any level.   I have personally reviewed the examination and initial interpretation and I agree with the findings.   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 100 each 3    aspirin 81 MG EC tablet Take 81 mg by mouth daily      atorvastatin (LIPITOR) 20 MG tablet Take 1 tablet (20 mg) by mouth at bedtime 90 tablet 3    Continuous Blood Gluc  (FREESTYLE SEGUNDO 2 READER) MARCIA Use to read blood sugars as per 's instructions. 1 each 0    Continuous Blood Gluc Sensor (FREESTYLE SEGUNDO 3 SENSOR) MISC 1 each every 14 days Use 1 sensor every 14 days. Use to read blood sugars per 's instructions. 6 each 5    Continuous Blood Gluc Sensor (FREESTYLE SEGUNDO 3 SENSOR) MISC 1 each every 14 days 6 each 5    empagliflozin (JARDIANCE) 25 MG TABS tablet Take 1 tablet (25 mg) by mouth daily 90 tablet 3    insulin glargine (LANTUS SOLOSTAR) 100 UNIT/ML pen Inject 40 Units Subcutaneous every morning Increase dose by 2 units every three days until fasting blood sugar are  mg/dL. Max 40 units/day 45 mL 1    insulin pen needle (FIFTY50 PEN NEEDLES) 32G X 4 MM miscellaneous Use one pen needle daily or as directed. 100 each 2    LENalidomide (REVLIMID) 10 MG CAPS capsule Take 1 capsule (10 mg) by mouth daily 28 capsule 0    levothyroxine (SYNTHROID/LEVOTHROID) 200 MCG tablet Take 1 tablet (200 mcg) by mouth every morning (before breakfast) 90 tablet 3    loperamide (IMODIUM A-D) 2 MG tablet Take 1 tablet (2 mg) by mouth daily as needed for diarrhea Start Imodium 2 pills with first loose stool and then 1 pill with each loose stool after, aiming for 1-2 stools per day. Max of 8 pills a day. 180 tablet 3    losartan (COZAAR) 25 MG tablet Take 1 tablet (25 mg) by mouth daily 90 tablet 3    mupirocin (BACTROBAN) 2 % external ointment Apply topically 3 times daily 15 g 1    neomycin-polymyxin-hydrocortisone (CORTISPORIN) 3.5-00325-3 otic solution Place 3 drops in ear(s) 4 times daily 10 mL 0    pregabalin (LYRICA) 100 MG capsule Take 1 capsule (100 mg) by mouth 2 times daily 180 capsule 3    propranolol ER (INDERAL LA) 60 MG 24 hr capsule Take 1 capsule (60  mg) by mouth daily 90 capsule 3    semaglutide (OZEMPIC) 2 MG/3ML pen Inject 0.25 mg Subcutaneous every 7 days X 4 weeks, then increase to 0.5 mg weekly. 3 mL 1    sertraline (ZOLOFT) 100 MG tablet Take 1 tablet (100 mg) by mouth daily 90 tablet 3    blood glucose (NO BRAND SPECIFIED) test strip Use to test blood sugar 3 times daily or as directed. To accompany: Blood Glucose Monitor Brands: per insurance. (Patient not taking: Reported on 4/18/2024) 100 strip 6    blood glucose calibration (NO BRAND SPECIFIED) solution To accompany: Blood Glucose Monitor Brands: per insurance. (Patient not taking: Reported on 4/18/2024) 4 each 0    blood glucose monitoring (NO BRAND SPECIFIED) meter device kit Use to test blood sugar 3 times daily or as directed. Preferred blood glucose meter OR supplies to accompany: Blood Glucose Monitor Brands: per insurance. (Patient not taking: Reported on 4/18/2024) 1 kit 0    cholestyramine (QUESTRAN) 4 g packet Take 1 packet (4 g) by mouth 3 times daily (with meals) (Patient not taking: Reported on 4/18/2024) 90 packet 4    LENalidomide (REVLIMID) 10 MG CAPS capsule Take 1 capsule (10 mg) by mouth daily for 28 days 28 capsule 0       Video physical exam  General: Patient appears well in no acute distress.   Skin: No visualized rash or lesions on visualized skin  Eyes: EOMI, no erythema, sclera icterus or discharge noted  Resp: Appears to be breathing comfortably without accessory muscle usage, speaking in full sentences, no cough  MSK: Appears to have normal range of motion based on visualized movements  Neurologic: No apparent tremors, facial movements symmetric  Psych: affect bright, alert and oriented          Labs:     Blood Counts       Recent Labs   Lab Test 05/14/24  1023 04/16/24  0912 03/14/24  1305 02/20/24  1026   HGB 14.4 14.1 14.6 14.5   HCT 43.6 42.7 43.5 44.2   WBC 3.1* 2.9* 3.1* 2.8*   ANEUTAUTO 1.8 1.6 1.8 1.8   ALYMPAUTO 0.6* 0.6* 0.6* 0.5*   AMONOAUTO 0.4 0.4 0.4 0.3    AEOSAUTO 0.2 0.3 0.3 0.2   ABSBASO 0.1 0.1 0.1 0.1   NRBCMAN 0.0 0.0  --  0.0   * 99* 114* 107*       ABO/RH    No lab results found.      Chemistries     Basic Panel  Recent Labs   Lab Test 05/14/24  1023 04/16/24  0912 03/25/24  1134 03/14/24  1305    140  --  138   POTASSIUM 4.5 4.0  --  4.3   CHLORIDE 104 105  --  103   CO2 24 24  --  25   BUN 13.8 14.1  --  18.0   CR 0.71 0.65  --  0.75   * 192* 175* 199*        Calcium, Magnesium, Phosphorus  Recent Labs   Lab Test 05/14/24  1023 04/16/24  0912 03/14/24  1305   ASHLEY 9.1 9.1 9.3        LFTs  Recent Labs   Lab Test 05/14/24  1023 04/16/24  0912 03/14/24  1305   BILITOTAL 1.8* 1.9* 1.6*   ALKPHOS 118 118 118   AST 17 20 24   ALT 30 36 38   ALBUMIN 4.4 4.4 4.4       LDH  No lab results found.    B2-Microglobulin  Recent Labs   Lab Test 02/18/20  0849   [id number] 1.6           Immunoglobulins     Recent Labs   Lab Test 04/16/24  0912 03/14/24  1305 02/20/24  1026 01/22/24  1017 12/20/23  1026 11/20/23  1127   * 558* 495* 602* 590* 687       Recent Labs   Lab Test 04/16/24  0912 03/14/24  1305 02/20/24  1026 01/22/24  1017 12/20/23  1026 11/20/23  1127    136 131 130 139 164       Recent Labs   Lab Test 04/16/24  0912 03/14/24  1305 02/20/24  1026 01/22/24  1017 12/20/23  1026 11/20/23  1127   IGM 34* 41 <10* 42 41 48         Monocloncal Protein Studies     M spike    Recent Labs   Lab Test 05/14/24  1023 04/16/24  0912 03/14/24  1305 02/20/24  1026 01/22/24  1017 12/20/23  1026   ELPM 0.0 0.0 0.0 0.0 0.0 0.0       [place] FLC    Recent Labs   Lab Test 04/16/24  0912 03/14/24  1305 02/20/24  1026 01/22/24  1017 12/20/23  1026 11/20/23  1127   KFLCA 1.57 2.79* 2.45* 2.65* 2.34* 2.58*       [place] FLC    Recent Labs   Lab Test 04/16/24  0912 03/14/24  1305 02/20/24  1026 01/22/24  1017 12/20/23  1026 11/20/23  1127   LFLCA 1.30 1.54 1.60 1.74 1.56 1.66       FLC Ratio    Recent Labs   Lab Test 04/16/24  0912 03/14/24  1305  "02/20/24  1026 01/22/24  1017 12/20/23  1026 11/20/23  1127   KLRA 1.21 1.81* 1.53 1.52 1.50 1.55       Assessment/plan:   Winter Loya is a 68 year old woman with standard risk IgG kappa multiple myeloma, s/p post auto-transplant in April 2019,  currently on lenalidomide maintenance. She remains in remission by FLC, EBONIE, SPEP. ASA 81 mg for dvt ppx      Persistently elevated bilirubin led to hepatology consult in October 2023.  Determination was that this is most likely fatty liver disease.  Recommendation was to continue to follow with PCP for management of obesity, DM2, and HTN.    Still interested in getting connected with therapy but this hasn't been set up yet - I will follow-up with that team.    Diarrhea continues to be an issue despite immodium. Started cholestyramine with meals TID in April but she hasn't been taking consistently.  Clarified today that she should take it with whatever 2 or 3 biggest snacks or meals she takes during the day, doesn't always have to be what she would consider a \"full meal\" if she is grazing throughout the day.    Continue monthly labs and video visits. Myeloma labs are stable.    20 minutes spent on the date of the encounter doing chart review, review of test results, interpretation of tests, patient visit, and documentation     FALGUNI Fink [facility]  [address] [place] [address] [id number] [phone]  "

## 2024-05-15 NOTE — NURSING NOTE
Is the patient currently in the state of [place]? YES    Visit mode:VIDEO    If the visit is dropped, the patient can be reconnected by: VIDEO VISIT: Text to cell phone:   Telephone Information:   Mobile [phone]       Will anyone else be joining the visit? NO  (If patient encounters technical issues they should call [phone] :[phone])    How would you like to obtain your AVS? MyChart    Are changes needed to the allergy or medication list? No, Pt stated no changes to allergies, and Pt stated no med changes    Are refills needed on medications prescribed by this physician?     Reason for visit: CARYN GUARDADOF

## 2024-05-17 ENCOUNTER — VIRTUAL VISIT (OUTPATIENT)
Dept: NEUROLOGY | Facility: CLINIC | Age: 69
End: 2024-05-17
Attending: PSYCHIATRY & NEUROLOGY
Payer: COMMERCIAL

## 2024-05-17 DIAGNOSIS — R25.1 TREMOR: Primary | ICD-10-CM

## 2024-05-17 NOTE — Clinical Note
"5/17/2024       RE: Winter Loya  [address]  [facility] [address]     Dear Colleague,    Thank you for referring your patient, Winter Loya, to the [facility] MULTIPLE SCLEROSIS CLINIC [place] at [facility]. Please see a copy of my visit note below.    Medication Therapy Management (MTM) Encounter    ASSESSMENT:                            Medication Adherence/Access: {adherencechoices:313222}    ***:   ***    PLAN:                            ***    Follow-up: {followuptest2:057182}    SUBJECTIVE/OBJECTIVE:                          Winter Loya is a 68 year old female called for an initial visit. She was referred to me from Dr. Santoyo, Neurology.      Reason for visit: med review for tremore.    Allergies/ADRs: Reviewed in chart  Past Medical History: Reviewed in chart  Tobacco: She reports that she quit smoking about 19 years ago. Her smoking use included cigarettes. She has never used smokeless tobacco.  Alcohol: {ALCOHOL CONSUMPTION HX:899156}  {Social and Goals:931197}  Medication Adherence/Access: {fumedadherence:709756}    {MTM SUBJECTIVE (Optional):576303}        Tremor:   ***    Worries t      Today's Vitals: There were no vitals taken for this visit.  ----------------  {THOMAS?:202622}    I spent {mtm total time 3:846233} with this patient today{MTMpartdbillingquestion:800207}. { :081324}. A copy of the visit note was provided to the patient's provider(s).    A summary of these recommendations {GIVEN/NOT GIVEN:998386}.    ***    Telemedicine Visit Details  Type of service:  {telemedvisitmtm:460650::\"Telephone visit\"}  Start Time: {video/phone visit start time:152948}  End Time: {video/phone visit end time:152948}     Medication Therapy Recommendations  No medication therapy recommendations to display     Medication Therapy Management (MTM) Encounter    ASSESSMENT:                            Medication Adherence/Access: No issues " "identified    Tremor:   Feeling stable.    PLAN:                            ***    Follow-up: {followuptest2:036890}    SUBJECTIVE/OBJECTIVE:                          Winter Loya is a 68 year old female called for an initial visit. She was referred to me from Dr. Santoyo, Neurology.      Reason for visit: med review for tremore.    Allergies/ADRs: Reviewed in chart  Past Medical History: Reviewed in chart  Tobacco: She reports that she quit smoking about 19 years ago. Her smoking use included cigarettes. She has never used smokeless tobacco.  Alcohol: not currently using    Medication Adherence/Access: no issues reported      Tremor:   Propranolol ER 60mg daily    Worries that she has Parkinson's Disease, as he father had. Dr. Santoyo's notes indicate no Parkinsonian features, which was discussed with her today.  Has been taking propranolol for the last several years, which she does think has been helpful. Still does have tremor inconsistently, most bothersome when eating. Has noticed more tremor in her voice in the last year.   Thinks her tremor is less severe in the last month. Less head shaking and thinks vocal tremor might be improved as well. Hasn't had as much trouble with eating, especially. Has used a larger, weighted fork, which is helpful. She retired last February 2023, but continued working occasionally. She has not been working at all in the last 4 months.    Describes having a \"slow brain\" and \"loses words\"    Heart rate is quite low, which has prevented propranolol increases.    BP Readings from Last 3 Encounters:   04/18/24 115/66   04/17/24 130/75   04/16/24 130/75     Pulse Readings from Last 3 Encounters:   04/18/24 51   04/16/24 50   03/19/24 59             worsening and affecting her ability to use a keyboard and mouse  Duration - 5 years or more.  Father had tremor/parkinson  Not a big drinker - not clear if alcohol helps her tremor  Smell perception is okay  She has sleep apnea  She cut out " "caffeine and there were no changes in her tremors  She is right handed and has more right than left handed tremor.   She was told she has ET as she is \"not stooped over\"  Her tremors are present when she is using her hands and she has a vocal and head tremor as well.      OT to help with tremor      Today's Vitals: There were no vitals taken for this visit.  ----------------      I spent {[facility] total time 3:105190} with this patient today{[facility]:594302}. { :511145}. A copy of the visit note was provided to the patient's provider(s).    A summary of these recommendations {GIVEN/NOT GIVEN:327600}.    ***    Telemedicine Visit Details  Type of service:  {telemedvisitR[facility] Hospital:247515::\"Telephone visit\"}  Start Time: {video/phone visit start time:152948}  End Time: {video/phone visit end time:152948}     Medication Therapy Recommendations  No medication therapy recommendations to display       Again, thank you for allowing me to participate in the care of your patient.      Sincerely,    Leona Rothman, PharmD    "

## 2024-05-17 NOTE — Clinical Note
Received pt at 1900. A/O x4. Tele Rhythm V paced. on NC 4L, O2 sats 95%. breath sounds diminished. Bed alarm on and wheels locked. No CO of chest pain or shortness of breath. Last BM was 8/30. Pt incontinet of urine. Pt is bedbound. Chest tube placement 8/31. 2units of RBC transfused for HG 5.9. Reviewed plan of care and patient is able to verbalize understanding.    Problem: Diabetes/Glucose Control  Goal: Glucose maintained within prescribed range  Description: INTERVENTIONS:  - Monitor Blood Glucose as ordered  - Assess for signs and symptoms of hyperglycemia and hypoglycemia  - Administer ordered medications to maintain glucose within target range  - Assess barriers to adequate nutritional intake and initiate nutrition consult as needed  - Instruct patient on self management of diabetes  8/31/2022 0824 by Ana María Neri RN  Outcome: Progressing  8/31/2022 0821 by Ana María Neri RN  Outcome: Progressing     Problem: Patient/Family Goals  Goal: Patient/Family Long Term Goal  Description: Patient's Long Term Goal: pt goes home    Interventions:  - med regimen  - ambulate  - See additional Care Plan goals for specific interventions  8/31/2022 0824 by Ana María Neri RN  Outcome: Progressing  8/31/2022 0821 by Ana María Neri RN  Outcome: Progressing  Goal: Patient/Family Short Term Goal  Description: Patient's Short Term Goal: no pain    Interventions:   - pain meds  -reposition  - See additional Care Plan goals for specific interventions  8/31/2022 0824 by Ana María Neri RN  Outcome: Progressing  8/31/2022 0821 by Ana María Neri RN  Outcome: Progressing Tremor seems better in the last month, thinks less stress. Wants to continue current plan .

## 2024-05-17 NOTE — Clinical Note
Luis Miguel Shankar, Devan price that I met with this patient today to review her tremor and she plans to continue taking propranolol for now.  She was a bit tearful and feeling overwhelmed with her health, so wanted to let you know when you see her in a few weeks.  May be a sertraline increase would give her a little more support?  We did not discuss that today so I would defer to you on this change. Let me know if you have any questions. Leona

## 2024-05-17 NOTE — PROGRESS NOTES
"Medication Therapy Management (MTM) Encounter    ASSESSMENT:                            Medication Adherence/Access: No issues identified    Tremor:   Feeling stable. Encouraged use of weighted utensils. Reviewed potential option of trying topiramate, including associated cognitive impairment, brain fog, etc, which she already struggles with at times.  Will plan to continue current medication for now and she will reach out if needed.    PLAN:                            Continue current medication.    Follow-up: 6 months or sooner if needed    SUBJECTIVE/OBJECTIVE:                          Winter Loya is a 68 year old female called for an initial visit. She was referred to me from Dr. Santoyo, Neurology.      Reason for visit: med review for tremore.    Allergies/ADRs: Reviewed in chart  Past Medical History: Reviewed in chart  Tobacco: She reports that she quit smoking about 19 years ago. Her smoking use included cigarettes. She has never used smokeless tobacco.  Alcohol: not currently using    Medication Adherence/Access: no issues reported    Tremor:   Propranolol ER 60mg daily    Worries that she has Parkinson's Disease, as he father had. Dr. Santoyo's notes indicate no Parkinsonian features, which was discussed with her today.  Has been taking propranolol for the last several years, which she does think has been helpful. Still does have tremor inconsistently, most bothersome when eating with a fork. Has noticed more tremor in her voice in the last year.   Thinks her tremor is less severe in the last month. Less head shaking and thinks vocal tremor might be improved as well. Hasn't had as much trouble with eating, especially. Has used a larger, weighted fork, which is helpful. She retired last February 2023, but continued working occasionally. She has not been working at all in the last 4 months and wonders if less stress is improving tremor.  Overall feels that symptoms are stable.    Describes having a \"slow brain\" " "and \"loses words\" at times.   Heart rate is quite low, which has likely prevented propranolol increases in the past.    ----------------      I spent 25 minutes with this patient today. A copy of the visit note was provided to the patient's provider(s).    A summary of these recommendations was sent via clinic portal.    Leona Rothman PharmD  Medication Therapy Management Pharmacist  [facility]    Telemedicine Visit Details  Type of service:  Telephone visit  Start Time:  2:30pm  End Time:  2:55pm     Medication Therapy Recommendations  No medication therapy recommendations to display   "

## 2024-05-17 NOTE — PATIENT INSTRUCTIONS
"Recommendations from today's MTM visit:                                                         Continue current medication.      Follow-up: 6 months or sooner if needed    It was great speaking with you today.  I value your experience and would be very thankful for your time in providing feedback in our clinic survey. In the next few days, you may receive an email or text message from [employer] with a link to a survey related to your  clinical pharmacist.\"     To schedule another MTM appointment, please call the clinic directly or you may call the MTM scheduling line at [phone].    My Clinical Pharmacist's contact information:                                                      Please feel free to contact me with any questions or concerns you have.      Leona Rothman, PharmD  Medication Therapy Management Pharmacist  [facility]    "

## 2024-05-20 DIAGNOSIS — C90.01 MULTIPLE MYELOMA IN REMISSION (H): Primary | ICD-10-CM

## 2024-05-20 RX ORDER — LENALIDOMIDE 10 MG/1
10 CAPSULE ORAL DAILY
Qty: 28 CAPSULE | Refills: 0 | Status: SHIPPED | OUTPATIENT
Start: 2024-05-20 | End: 2024-07-19

## 2024-05-21 ENCOUNTER — MEDICAL CORRESPONDENCE (OUTPATIENT)
Dept: HEALTH INFORMATION MANAGEMENT | Facility: CLINIC | Age: 69
End: 2024-05-21
Payer: COMMERCIAL

## 2024-05-24 ENCOUNTER — TELEPHONE (OUTPATIENT)
Dept: ONCOLOGY | Facility: CLINIC | Age: 69
End: 2024-05-24
Payer: COMMERCIAL

## 2024-05-24 NOTE — TELEPHONE ENCOUNTER
Oral Chemotherapy Monitoring Program    Medication: Revlimid  Rx:  10 mg PO daily for a 28 day cycle    Auth #: [id number]  Risk Category: Adult female NOT of reproductive capacity    Routine survey questions reviewed.    Thank you,    Mary Franklin  Oncology Pharmacy Liaison LUCINDA [email or web address]  Phone: [phone]  Fax: [phone]

## 2024-05-28 ENCOUNTER — NURSE TRIAGE (OUTPATIENT)
Dept: FAMILY MEDICINE | Facility: CLINIC | Age: 69
End: 2024-05-28

## 2024-05-28 NOTE — TELEPHONE ENCOUNTER
Oncology Nurse Triage - Constipation  Situation-   Winter  reporting constipation     Background  Treating Provider:  Brit Zhu MD  Date of last office visit: 5/15/24 with Hyun Denney CNP  Is patient on active treatment? (if yes, drug and date of last treatment): Yes: daily  Recent surgery (if yes - when) : NO  Narcotic medication use: No    Assessment  Date of last bowel movement: today, 10 minutes ago.   It was horrible. Clogs the toilet. Has been having hard stool every two days.   Pt has had diarrhea previously.  Is wondering about adding a stool softener.  Onset/Duration of symptoms: since taking cholestyramine TID for 1.5 weeks. Reports that she puts it in her tea. Now very constipated.  Bowel Characteristics:   Color: Brown  Consistency:  Very hard, has to strain to pass it. Has to use fingers to dig it out.   Difficult passing stool:  Yes, patient reports having to strain in order to have bowel movement.   Passing gas: Yes at night for the last two nights has been passing gas  Associated symptoms: No distention, bloating, or abdominal cramping. No blood in stool or toilet. No nausea/vomiting, fever or chills.  What have ate and drank in the last 24 hours: Eating and drinking well; 4-5 glasses of water per day.  Have you taken anything to treat the constipation? No  Has been taking cholestyramine and imodium d/t diarrhea. Stopped the imodium two days ago.    Recommendations:  Informed pt that this writer will reach out to Hyun and then call her back with advice about adjusting medications. Will hold off on advising the following until consulting with Hyun:  -Senna 1-4 tablets twice daily as needed  -Miralax once daily. Hold if loose stools.    Advised:   -Drink 8-10 glasses of clear liquid daily  -Exercise regularly, increase high fiber foods (caution if on opioids)  -Drink hot beverages    Report fever, chills, inability to pass gas, severe abdominal pain, swelling, nausea/vomiting, bleeding.    Routed to  Hyun Denney CNP and Sylvia Gunn RNCC

## 2024-05-28 NOTE — TELEPHONE ENCOUNTER
Per Hyun: Stop the cholesytramine for now and see how the next couple of days go.  If still constipated by Thurs/Fri, let us know and we can slowly add in stool softeners.     1156: Called pt and advised per Hyun. She will continue to hold the imodium and the cholesytramine and call back if still constipated by Thursday or Friday.  Pt voiced understanding and is in agreement with this plan.

## 2024-06-03 ENCOUNTER — VIRTUAL VISIT (OUTPATIENT)
Dept: PHARMACY | Facility: CLINIC | Age: 69
End: 2024-06-03
Payer: COMMERCIAL

## 2024-06-03 DIAGNOSIS — R19.7 DIARRHEA, UNSPECIFIED TYPE: ICD-10-CM

## 2024-06-03 DIAGNOSIS — E11.42 TYPE 2 DIABETES MELLITUS WITH DIABETIC POLYNEUROPATHY, WITH LONG-TERM CURRENT USE OF INSULIN (H): Primary | ICD-10-CM

## 2024-06-03 DIAGNOSIS — Z79.4 TYPE 2 DIABETES MELLITUS WITH DIABETIC POLYNEUROPATHY, WITH LONG-TERM CURRENT USE OF INSULIN (H): Primary | ICD-10-CM

## 2024-06-03 PROCEDURE — 99606 MTMS BY PHARM EST 15 MIN: CPT | Mod: 93 | Performed by: PHARMACIST

## 2024-06-03 NOTE — PROGRESS NOTES
Medication Therapy Management (MTM) Encounter    ASSESSMENT:                            Medication Adherence/Access: No issues identified    Diabetes: Patient's A1c is not at goal of < 7%. Time in target range is at goal of > 70%. Will keep ozempic at same dose and reevaluate need for increased dose at next visit.     Diarrhea: If diarrhea returns, could consider increasing Ozempic, or can discuss cholestyramine with oncology.    PLAN:                            Reach out to oncologist about instructions for use on cholestyramine if diarrhea comes back  Continue taking Ozempic 0.25 mg weekly    Follow-up: Follow-up in about 6 weeks     SUBJECTIVE/OBJECTIVE:                          Winter Loya is a 68 year old female called for a follow-up visit.       Reason for visit: Diabetes management.    Allergies/ADRs: Reviewed in chart  Past Medical History: Reviewed in chart  Tobacco: She reports that she quit smoking about 19 years ago. Her smoking use included cigarettes. She has never used smokeless tobacco.    Medication Adherence/Access: no issues reported    Diabetes /Type 2 Diabetes/Obesity:  Lantus 40 units every morning   Jardiance 25 mg daily   Aspirin: Taking 81mg daily for primary prevention (possible secondary prevention)  Ozempic 0.25 mg once weekly    Diet/Exercise: She usually does not have an appetite in the morning, so she waits and gets her breakfast later. She reports this works out well if her blood sugars are high in the morning.     Blood sugar monitoring: Анна 3 - Time in target 76%    Patient reports she has started taking Ozempic, she is wondering if this caused her constipation. This constipation happened while she was taking her cholestyramine.     Eye exam is up to date  Foot exam: due  Lab Results   Component Value Date    A1C 8.2 05/14/2024    A1C 8.1 01/22/2024    A1C 7.2 09/21/2023          Diarrhea:   Loperamide 4 mg every morning as needed  Cholestyramine 4 g three times daily -  discontinued by oncologist    Patient reports when she put cholestyramine in hot tea, it worked better for her taste wise. She was doing this and got very constipated and could not have a bowel movement so it was discontinued. Following discontinuation she had diarrhea for 2 days, but has since resolved.   She is wondering if she could take the cholestyramine just in the morning and will talk to oncologist about this.     Today's Vitals: There were no vitals taken for this visit.  ----------------      I spent 12 minutes with this patient today. All changes were made via collaborative practice agreement with Montse Bucio PA-C. A copy of the visit note was provided to the patient's provider(s).    A summary of these recommendations was sent via [employer].    Briana Tsang, Pharm D    Vonnie Corrigan, PharmD  Medication Therapy Management Pharmacist    Telemedicine Visit Details  Type of service:  Telephone visit  Start Time:  3:33 PM  End Time:  3:45 PM     Medication Therapy Recommendations  No medication therapy recommendations to display

## 2024-06-03 NOTE — Clinical Note
CE DAWSON note, thanks!  Vonnie Corrigan, PharmD Medication Therapy Management Pharmacist [phone]

## 2024-06-03 NOTE — PATIENT INSTRUCTIONS
"Recommendations from today's MTM visit:                                                         Reach out to oncologist about instructions for use on cholestyramine if diarrhea comes back  Continue taking Ozempic 0.25 mg weekly    Follow-up: Return in about 6 weeks (around 7/15/2024) for Medication Therapy Management.    It was great speaking with you today.  I value your experience and would be very thankful for your time in providing feedback in our clinic survey. In the next few days, you may receive an email or text message from [employer] with a link to a survey related to your  clinical pharmacist.\"     To schedule another MTM appointment, please call the clinic directly or you may call the MTM scheduling line at [phone] or toll-free at [phone].     My Clinical Pharmacist's contact information:                                                      Please feel free to contact me with any questions or concerns you have.      Vonnie Corrigan, PharmD  Medication Therapy Management Pharmacist     "

## 2024-06-11 ENCOUNTER — LAB (OUTPATIENT)
Dept: LAB | Facility: CLINIC | Age: 69
End: 2024-06-11
Payer: COMMERCIAL

## 2024-06-11 DIAGNOSIS — C90.01 MULTIPLE MYELOMA IN REMISSION (H): ICD-10-CM

## 2024-06-11 LAB
BASOPHILS # BLD AUTO: 0.1 10E3/UL (ref 0–0.2)
BASOPHILS NFR BLD AUTO: 2 %
EOSINOPHIL # BLD AUTO: 0.2 10E3/UL (ref 0–0.7)
EOSINOPHIL NFR BLD AUTO: 8 %
ERYTHROCYTE [DISTWIDTH] IN BLOOD BY AUTOMATED COUNT: 14.6 % (ref 10–15)
HCT VFR BLD AUTO: 43.1 % (ref 35–47)
HGB BLD-MCNC: 14.3 G/DL (ref 11.7–15.7)
IMM GRANULOCYTES # BLD: 0 10E3/UL
IMM GRANULOCYTES NFR BLD: 0 %
LYMPHOCYTES # BLD AUTO: 0.7 10E3/UL (ref 0.8–5.3)
LYMPHOCYTES NFR BLD AUTO: 23 %
MCH RBC QN AUTO: 29.3 PG (ref 26.5–33)
MCHC RBC AUTO-ENTMCNC: 33.2 G/DL (ref 31.5–36.5)
MCV RBC AUTO: 88 FL (ref 78–100)
MONOCYTES # BLD AUTO: 0.4 10E3/UL (ref 0–1.3)
MONOCYTES NFR BLD AUTO: 11 %
NEUTROPHILS # BLD AUTO: 1.8 10E3/UL (ref 1.6–8.3)
NEUTROPHILS NFR BLD AUTO: 56 %
NRBC # BLD AUTO: 0 10E3/UL
NRBC BLD AUTO-RTO: 0 /100
PLATELET # BLD AUTO: 108 10E3/UL (ref 150–450)
RBC # BLD AUTO: 4.88 10E6/UL (ref 3.8–5.2)
WBC # BLD AUTO: 3.2 10E3/UL (ref 4–11)

## 2024-06-11 PROCEDURE — 84165 PROTEIN E-PHORESIS SERUM: CPT | Performed by: PATHOLOGY

## 2024-06-11 PROCEDURE — 36415 COLL VENOUS BLD VENIPUNCTURE: CPT

## 2024-06-11 PROCEDURE — 80053 COMPREHEN METABOLIC PANEL: CPT

## 2024-06-11 PROCEDURE — 84155 ASSAY OF PROTEIN SERUM: CPT | Mod: 59

## 2024-06-11 PROCEDURE — 85025 COMPLETE CBC W/AUTO DIFF WBC: CPT

## 2024-06-12 ENCOUNTER — VIRTUAL VISIT (OUTPATIENT)
Dept: ONCOLOGY | Facility: CLINIC | Age: 69
End: 2024-06-12
Attending: STUDENT IN AN ORGANIZED HEALTH CARE EDUCATION/TRAINING PROGRAM
Payer: COMMERCIAL

## 2024-06-12 VITALS — WEIGHT: 250 LBS | HEIGHT: 69 IN | BODY MASS INDEX: 37.03 KG/M2

## 2024-06-12 DIAGNOSIS — C90.01 MULTIPLE MYELOMA IN REMISSION (H): Primary | ICD-10-CM

## 2024-06-12 DIAGNOSIS — R19.7 DIARRHEA, UNSPECIFIED TYPE: ICD-10-CM

## 2024-06-12 LAB
ALBUMIN SERPL BCG-MCNC: 4.6 G/DL (ref 3.5–5.2)
ALBUMIN SERPL ELPH-MCNC: 4.3 G/DL (ref 3.7–5.1)
ALP SERPL-CCNC: 115 U/L (ref 40–150)
ALPHA1 GLOB SERPL ELPH-MCNC: 0.2 G/DL (ref 0.2–0.4)
ALPHA2 GLOB SERPL ELPH-MCNC: 0.6 G/DL (ref 0.5–0.9)
ALT SERPL W P-5'-P-CCNC: 70 U/L (ref 0–50)
ANION GAP SERPL CALCULATED.3IONS-SCNC: 11 MMOL/L (ref 7–15)
AST SERPL W P-5'-P-CCNC: 26 U/L (ref 0–45)
B-GLOBULIN SERPL ELPH-MCNC: 0.7 G/DL (ref 0.6–1)
BILIRUB SERPL-MCNC: 1.4 MG/DL
BUN SERPL-MCNC: 15.2 MG/DL (ref 8–23)
CALCIUM SERPL-MCNC: 9 MG/DL (ref 8.8–10.2)
CHLORIDE SERPL-SCNC: 105 MMOL/L (ref 98–107)
CREAT SERPL-MCNC: 0.68 MG/DL (ref 0.51–0.95)
DEPRECATED HCO3 PLAS-SCNC: 23 MMOL/L (ref 22–29)
EGFRCR SERPLBLD CKD-EPI 2021: >90 ML/MIN/1.73M2
GAMMA GLOB SERPL ELPH-MCNC: 0.5 G/DL (ref 0.7–1.6)
GLUCOSE SERPL-MCNC: 194 MG/DL (ref 70–99)
M PROTEIN SERPL ELPH-MCNC: 0 G/DL
PATH REPORT.COMMENTS IMP SPEC: ABNORMAL
POTASSIUM SERPL-SCNC: 4.1 MMOL/L (ref 3.4–5.3)
PROT PATTERN SERPL ELPH-IMP: ABNORMAL
PROT SERPL-MCNC: 6.4 G/DL (ref 6.4–8.3)
SODIUM SERPL-SCNC: 139 MMOL/L (ref 135–145)
TOTAL PROTEIN SERUM FOR ELP: 6.2 G/DL (ref 6.4–8.3)

## 2024-06-12 PROCEDURE — 99213 OFFICE O/P EST LOW 20 MIN: CPT | Mod: 95 | Performed by: REGISTERED NURSE

## 2024-06-12 PROCEDURE — G2211 COMPLEX E/M VISIT ADD ON: HCPCS | Mod: 95 | Performed by: REGISTERED NURSE

## 2024-06-12 ASSESSMENT — PAIN SCALES - GENERAL: PAINLEVEL: NO PAIN (0)

## 2024-06-12 NOTE — NURSING NOTE
No other vitals to report today      Is the patient currently in the state of [place]? YES    Visit mode:VIDEO    If the visit is dropped, the patient can be reconnected by: VIDEO VISIT: Text to cell phone:   Telephone Information:   Mobile [phone]       Will anyone else be joining the visit? NO  (If patient encounters technical issues they should call [phone])    How would you like to obtain your AVS? MyChart    Are changes needed to the allergy or medication list? No    Patient denies any changes and states that all information remains accurate since last reviewed/verified.     Are refills needed on medications prescribed by this physician? NO    Reason for visit: CARYN Tuttle MA VVF

## 2024-06-12 NOTE — PROGRESS NOTES
"Virtual Visit Details    Type of service:  Video Visit   Video Start Time: 2:30 PM  Video End Time:2:41 PM    Originating Location (pt. Location): Home    Distant Location (provider location):  Off-site  Platform used for Video Visit: [facility]    ONCOLOGY/HEMATOLOGY PROGRESS NOTE  Jun 12, 2024    Reason for Visit: IgA Kappa Multiple Myeloma    Oncology HPI:   Winter Loya is a 68 year old woman with IgA Kappa Multiple Myeloma. In 2018 she had anemia and was fatigued. She was found to have IgA kappa monocloncal antibody with M-spike of 0.17. IgA elevated. Skeletal survey was unremarkable and UPEP had minimal protein (156 mg/m2). PET 11/2018 showed bone marrow consistent with hypermetabolic plasma cell myeloma. M spike was 0.17. She started RVD and Zometa. On 4/2019 she had an autologous transplant.      Her bone marrow bx from September 2019 was sent here for review. It showed marrow cellularity of 60%, with decreased trilineage hematopoiesis and 15% plasma cells as well as peripheral blood with slight anemia. 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 She is on revlimid maintenance and zometa from her prior oncologist in [place]. On 12/6/19 her K/L ratio is 1.1, M spike is 0.04.     Currently on Revlimid maintenance.      Interval history:   - Diarrhea persists, but \"not as bad as it used to be\" since starting cholestyramine. Not fully resolved.    - Hasn't connected with therapy yet; someone called, but then she never heard anything more  - Denies illnesses  - Denies new bony pain    Comprehensive ROS neg other than the symptoms noted above in the HPI.      Past Medical History:   Diagnosis Date    Abnormal EMG 2021 5/25/2021    Interpretation: This is an abnormal study, demonstrating electrophysiologic evidence of a length-dependent axonal sensorimotor polyneuropathy. Asymmetry of " peroneal compound muscle action potential amplitudes is a finding of uncertain significance.       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   I have personally reviewed the examination and initial interpretation and I agree with the findings.   ANNE GOODMAN MD    History of MRI of cervical spine 6/2020 3/15/2021    Impression: Multilevel cervical spondylosis, most pronounced at the level of C4-5 and C5-6 with moderate to severe spinal canal stenosis and moderate spinal canal stenosis at C6-7. No abnormal cord signal. No significant neural foraminal narrowing at any level.   I have personally reviewed the examination and initial interpretation and I agree with the findings.   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 100 each 3    aspirin 81 MG EC tablet Take 81 mg by mouth daily      atorvastatin (LIPITOR) 20 MG tablet Take 1 tablet (20 mg) by mouth at bedtime 90 tablet 3    blood glucose (NO BRAND SPECIFIED) test strip Use to test blood sugar 3 times daily or as directed. To accompany: Blood Glucose Monitor Brands: per insurance. (Patient not taking:  Reported on 4/18/2024) 100 strip 6    blood glucose calibration (NO BRAND SPECIFIED) solution To accompany: Blood Glucose Monitor Brands: per insurance. (Patient not taking: Reported on 4/18/2024) 4 each 0    blood glucose monitoring (NO BRAND SPECIFIED) meter device kit Use to test blood sugar 3 times daily or as directed. Preferred blood glucose meter OR supplies to accompany: Blood Glucose Monitor Brands: per insurance. (Patient not taking: Reported on 4/18/2024) 1 kit 0    cholestyramine (QUESTRAN) 4 g packet Take 1 packet (4 g) by mouth 3 times daily (with meals) (Patient not taking: Reported on 4/18/2024) 90 packet 4    Continuous Blood Gluc  (FREESTYLE SEGUNDO 2 READER) MARCIA Use to read blood sugars as per 's instructions. 1 each 0    Continuous Blood Gluc Sensor (FREESTYLE SEGUNDO 3 SENSOR) MISC 1 each every 14 days Use 1 sensor every 14 days. Use to read blood sugars per 's instructions. 6 each 5    Continuous Blood Gluc Sensor (FREESTYLE SEGUNDO 3 SENSOR) MISC 1 each every 14 days 6 each 5    empagliflozin (JARDIANCE) 25 MG TABS tablet Take 1 tablet (25 mg) by mouth daily 90 tablet 3    insulin glargine (LANTUS SOLOSTAR) 100 UNIT/ML pen Inject 40 Units Subcutaneous every morning Increase dose by 2 units every three days until fasting blood sugar are  mg/dL. Max 40 units/day 45 mL 1    insulin pen needle (FIFTY50 PEN NEEDLES) 32G X 4 MM miscellaneous Use one pen needle daily or as directed. 100 each 2    LENalidomide (REVLIMID) 10 MG CAPS capsule Take 1 capsule (10 mg) by mouth daily for 28 days 28 capsule 0    LENalidomide (REVLIMID) 10 MG CAPS capsule Take 1 capsule (10 mg) by mouth daily 28 capsule 0    levothyroxine (SYNTHROID/LEVOTHROID) 200 MCG tablet Take 1 tablet (200 mcg) by mouth every morning (before breakfast) 90 tablet 3    loperamide (IMODIUM A-D) 2 MG tablet Take 1 tablet (2 mg) by mouth daily as needed for diarrhea Start Imodium 2 pills with first loose stool  and then 1 pill with each loose stool after, aiming for 1-2 stools per day. Max of 8 pills a day. 180 tablet 3    losartan (COZAAR) 25 MG tablet Take 1 tablet (25 mg) by mouth daily 90 tablet 3    mupirocin (BACTROBAN) 2 % external ointment Apply topically 3 times daily 15 g 1    neomycin-polymyxin-hydrocortisone (CORTISPORIN) 3.5-04716-2 otic solution Place 3 drops in ear(s) 4 times daily 10 mL 0    pregabalin (LYRICA) 100 MG capsule Take 1 capsule (100 mg) by mouth 2 times daily 180 capsule 3    propranolol ER (INDERAL LA) 60 MG 24 hr capsule Take 1 capsule (60 mg) by mouth daily 90 capsule 3    semaglutide (OZEMPIC) 2 MG/3ML pen Inject 0.25 mg Subcutaneous every 7 days X 4 weeks, then increase to 0.5 mg weekly. 3 mL 1    sertraline (ZOLOFT) 100 MG tablet Take 1 tablet (100 mg) by mouth daily 90 tablet 3       Video physical exam  General: Patient appears well in no acute distress.   Skin: No visualized rash or lesions on visualized skin  Eyes: EOMI, no erythema, sclera icterus or discharge noted  Resp: Appears to be breathing comfortably without accessory muscle usage, speaking in full sentences, no cough  MSK: Appears to have normal range of motion based on visualized movements  Neurologic: No apparent tremors, facial movements symmetric  Psych: affect bright, alert and oriented          Labs:     Blood Counts       Recent Labs   Lab Test 06/11/24  1556 05/14/24  1023 04/16/24  0912   HGB 14.3 14.4 14.1   HCT 43.1 43.6 42.7   WBC 3.2* 3.1* 2.9*   ANEUTAUTO 1.8 1.8 1.6   ALYMPAUTO 0.7* 0.6* 0.6*   AMONOAUTO 0.4 0.4 0.4   AEOSAUTO 0.2 0.2 0.3   ABSBASO 0.1 0.1 0.1   NRBCMAN 0.0 0.0 0.0   * 101* 99*       ABO/RH    No lab results found.      Chemistries     Basic Panel  Recent Labs   Lab Test 05/14/24  1023 04/16/24  0912 03/25/24  1134 03/14/24  1305    140  --  138   POTASSIUM 4.5 4.0  --  4.3   CHLORIDE 104 105  --  103   CO2 24 24  --  25   BUN 13.8 14.1  --  18.0   CR 0.71 0.65  --  0.75   GLC  178* 192* 175* 199*        Calcium, Magnesium, Phosphorus  Recent Labs   Lab Test 05/14/24  1023 04/16/24  0912 03/14/24  1305   ASHLEY 9.1 9.1 9.3        LFTs  Recent Labs   Lab Test 05/14/24  1023 04/16/24  0912 03/14/24  1305   BILITOTAL 1.8* 1.9* 1.6*   ALKPHOS 118 118 118   AST 17 20 24   ALT 30 36 38   ALBUMIN 4.4 4.4 4.4       LDH  No lab results found.    B2-Microglobulin  Recent Labs   Lab Test 02/18/20  0849   PHEZ0NAFR 1.6           Immunoglobulins     Recent Labs   Lab Test 04/16/24  0912 03/14/24  1305 02/20/24  1026 01/22/24  1017 12/20/23  1026 11/20/23  1127   * 558* 495* 602* 590* 687       Recent Labs   Lab Test 04/16/24  0912 03/14/24  1305 02/20/24  1026 01/22/24  1017 12/20/23  1026 11/20/23  1127    136 131 130 139 164       Recent Labs   Lab Test 04/16/24  0912 03/14/24  1305 02/20/24  1026 01/22/24  1017 12/20/23  1026 11/20/23  1127   IGM 34* 41 <10* 42 41 48         Monocloncal Protein Studies     M spike    Recent Labs   Lab Test 06/11/24  1556 05/14/24  1023 04/16/24  0912 03/14/24  1305 02/20/24  1026 01/22/24  1017   ELPM 0.0 0.0 0.0 0.0 0.0 0.0       [place] FLC    Recent Labs   Lab Test 04/16/24  0912 03/14/24  1305 02/20/24  1026 01/22/24  1017 12/20/23  1026 11/20/23  1127   KFLCA 1.57 2.79* 2.45* 2.65* 2.34* 2.58*       [place] FLC    Recent Labs   Lab Test 04/16/24  0912 03/14/24  1305 02/20/24  1026 01/22/24  1017 12/20/23  1026 11/20/23  1127   LFLCA 1.30 1.54 1.60 1.74 1.56 1.66       FLC Ratio    Recent Labs   Lab Test 04/16/24  0912 03/14/24  1305 02/20/24  1026 01/22/24  1017 12/20/23  1026 11/20/23  1127   KLRA 1.21 1.81* 1.53 1.52 1.50 1.55       Assessment/plan:   Winter Loya is a 68 year old woman with standard risk IgG kappa multiple myeloma, s/p post auto-transplant in April 2019,  currently on lenalidomide maintenance. She remains in remission by FLC, EBONIE, SPEP. ASA 81 mg for dvt ppx      Persistently elevated bilirubin led to hepatology consult in October  2023.  Determination was that this is most likely fatty liver disease.  Recommendation was to continue to follow with PCP for management of obesity, DM2, and HTN.    Still interested in getting connected with therapy but this hasn't been set up yet - I will follow-up with that team.    Diarrhea continues to be an issue although improved after starting cholestyramine with meals TID.      Continue monthly labs and video visits. Myeloma labs are stable.  Unclear why her free light chains haven't been drawn the past two months.  I checked her orders and eliminated duplicates to try to limit confusion.  Will ensure these get checked next month.     20 minutes spent on the date of the encounter doing chart review, review of test results, interpretation of tests, patient visit, and documentation     The longitudinal plan of care for the diagnosis(es)/condition(s) as documented were addressed during this visit. Due to the added complexity in care, I will continue to support Winter in the subsequent management and with ongoing continuity of care.    FALGUNI Fink [facility]  [address] [place] [address]  [phone]

## 2024-06-12 NOTE — LETTER
"6/12/2024      Winter Loya  [address]  [facility] [address]      Dear Colleague,    Thank you for referring your patient, Winter Loya, to the [facility]. Please see a copy of my visit note below.    Virtual Visit Details    Type of service:  Video Visit   Video Start Time: 2:30 PM  Video End Time:2:41 PM    Originating Location (pt. Location): Home    Distant Location (provider location):  Off-site  Platform used for Video Visit: [facility]    ONCOLOGY/HEMATOLOGY PROGRESS NOTE  Jun 12, 2024    Reason for Visit: IgA Kappa Multiple Myeloma    Oncology HPI:   Winter Loya is a 68 year old woman with IgA Kappa Multiple Myeloma. In 2018 she had anemia and was fatigued. She was found to have IgA kappa monocloncal antibody with M-spike of 0.17. IgA elevated. Skeletal survey was unremarkable and UPEP had minimal protein (156 mg/m2). PET 11/2018 showed bone marrow consistent with hypermetabolic plasma cell myeloma. M spike was 0.17. She started RVD and Zometa. On 4/2019 she had an autologous transplant.      Her bone marrow bx from September 2019 was sent here for review. It showed marrow cellularity of 60%, with decreased trilineage hematopoiesis and 15% plasma cells as well as peripheral blood with slight anemia. 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 She is on revlimid maintenance and zometa from her prior oncologist in [place]. On 12/6/19 her K/L ratio is 1.1, M spike is 0.04.     Currently on Revlimid maintenance.      Interval history:   - Diarrhea persists, but \"not as bad as it used to be\" since starting cholestyramine. Not fully resolved.    - Hasn't connected with therapy yet; someone called, but then she never heard anything more  - Denies illnesses  - Denies new bony pain    Comprehensive ROS neg other than the symptoms noted above in the " HPI.      Past Medical History:   Diagnosis Date    Abnormal EMG 2021 5/25/2021    Interpretation: This is an abnormal study, demonstrating electrophysiologic evidence of a length-dependent axonal sensorimotor polyneuropathy. Asymmetry of peroneal compound muscle action potential amplitudes is a finding of uncertain significance.       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   I have personally reviewed the examination and initial interpretation and I agree with the findings.   ANNE GOODMAN MD    History of MRI of cervical spine 6/2020 3/15/2021    Impression: Multilevel cervical spondylosis, most pronounced at the level of C4-5 and C5-6 with moderate to severe spinal canal stenosis and moderate spinal canal stenosis at C6-7. No abnormal cord signal. No significant neural foraminal narrowing at any level.   I have personally reviewed the examination and initial interpretation and I agree with the findings.   NANE GOODMAN MD    History of peripheral stem cell transplant (H) 12/9/2020    History of smoking     Hyperlipidemia     Multiple myeloma in remission (H)     Nonsenile cataract     Obesity     Sensory polyneuropathy 9/27/2021    Sleep apnea     no c-pap use    Status post complete thyroidectomy 8/26/2020    Thyroid goiter 8/5/2020    Tremor 12/9/2020        Current Outpatient Medications   Medication Sig Dispense Refill    acyclovir (ZOVIRAX) 400 MG tablet TAKE ONE TABLET BY MOUTH EVERY TWELVE HOURS 180 tablet 3    alcohol swab prep pads Use to swab area of injection/sowmya as directed. 100 each 3    aspirin 81 MG EC tablet Take 81 mg by mouth daily      atorvastatin (LIPITOR) 20 MG tablet  Take 1 tablet (20 mg) by mouth at bedtime 90 tablet 3    blood glucose (NO BRAND SPECIFIED) test strip Use to test blood sugar 3 times daily or as directed. To accompany: Blood Glucose Monitor Brands: per insurance. (Patient not taking: Reported on 4/18/2024) 100 strip 6    blood glucose calibration (NO BRAND SPECIFIED) solution To accompany: Blood Glucose Monitor Brands: per insurance. (Patient not taking: Reported on 4/18/2024) 4 each 0    blood glucose monitoring (NO BRAND SPECIFIED) meter device kit Use to test blood sugar 3 times daily or as directed. Preferred blood glucose meter OR supplies to accompany: Blood Glucose Monitor Brands: per insurance. (Patient not taking: Reported on 4/18/2024) 1 kit 0    cholestyramine (QUESTRAN) 4 g packet Take 1 packet (4 g) by mouth 3 times daily (with meals) (Patient not taking: Reported on 4/18/2024) 90 packet 4    Continuous Blood Gluc  (FREESTYLE SEGUNDO 2 READER) MARCIA Use to read blood sugars as per 's instructions. 1 each 0    Continuous Blood Gluc Sensor (FREESTYLE SEGUNDO 3 SENSOR) MISC 1 each every 14 days Use 1 sensor every 14 days. Use to read blood sugars per 's instructions. 6 each 5    Continuous Blood Gluc Sensor (FREESTYLE SEGUNDO 3 SENSOR) MISC 1 each every 14 days 6 each 5    empagliflozin (JARDIANCE) 25 MG TABS tablet Take 1 tablet (25 mg) by mouth daily 90 tablet 3    insulin glargine (LANTUS SOLOSTAR) 100 UNIT/ML pen Inject 40 Units Subcutaneous every morning Increase dose by 2 units every three days until fasting blood sugar are  mg/dL. Max 40 units/day 45 mL 1    insulin pen needle (FIFTY50 PEN NEEDLES) 32G X 4 MM miscellaneous Use one pen needle daily or as directed. 100 each 2    LENalidomide (REVLIMID) 10 MG CAPS capsule Take 1 capsule (10 mg) by mouth daily for 28 days 28 capsule 0    LENalidomide (REVLIMID) 10 MG CAPS capsule Take 1 capsule (10 mg) by mouth daily 28 capsule 0    levothyroxine (SYNTHROID/LEVOTHROID)  200 MCG tablet Take 1 tablet (200 mcg) by mouth every morning (before breakfast) 90 tablet 3    loperamide (IMODIUM A-D) 2 MG tablet Take 1 tablet (2 mg) by mouth daily as needed for diarrhea Start Imodium 2 pills with first loose stool and then 1 pill with each loose stool after, aiming for 1-2 stools per day. Max of 8 pills a day. 180 tablet 3    losartan (COZAAR) 25 MG tablet Take 1 tablet (25 mg) by mouth daily 90 tablet 3    mupirocin (BACTROBAN) 2 % external ointment Apply topically 3 times daily 15 g 1    neomycin-polymyxin-hydrocortisone (CORTISPORIN) 3.5-91802-0 otic solution Place 3 drops in ear(s) 4 times daily 10 mL 0    pregabalin (LYRICA) 100 MG capsule Take 1 capsule (100 mg) by mouth 2 times daily 180 capsule 3    propranolol ER (INDERAL LA) 60 MG 24 hr capsule Take 1 capsule (60 mg) by mouth daily 90 capsule 3    semaglutide (OZEMPIC) 2 MG/3ML pen Inject 0.25 mg Subcutaneous every 7 days X 4 weeks, then increase to 0.5 mg weekly. 3 mL 1    sertraline (ZOLOFT) 100 MG tablet Take 1 tablet (100 mg) by mouth daily 90 tablet 3       Video physical exam  General: Patient appears well in no acute distress.   Skin: No visualized rash or lesions on visualized skin  Eyes: EOMI, no erythema, sclera icterus or discharge noted  Resp: Appears to be breathing comfortably without accessory muscle usage, speaking in full sentences, no cough  MSK: Appears to have normal range of motion based on visualized movements  Neurologic: No apparent tremors, facial movements symmetric  Psych: affect bright, alert and oriented          Labs:     Blood Counts       Recent Labs   Lab Test 06/11/24  1556 05/14/24  1023 04/16/24  0912   HGB 14.3 14.4 14.1   HCT 43.1 43.6 42.7   WBC 3.2* 3.1* 2.9*   ANEUTAUTO 1.8 1.8 1.6   ALYMPAUTO 0.7* 0.6* 0.6*   AMONOAUTO 0.4 0.4 0.4   AEOSAUTO 0.2 0.2 0.3   ABSBASO 0.1 0.1 0.1   NRBCMAN 0.0 0.0 0.0   * 101* 99*       ABO/RH    No lab results found.      Chemistries     Basic  Panel  Recent Labs   Lab Test 05/14/24  1023 04/16/24  0912 03/25/24  1134 03/14/24  1305    140  --  138   POTASSIUM 4.5 4.0  --  4.3   CHLORIDE 104 105  --  103   CO2 24 24  --  25   BUN 13.8 14.1  --  18.0   CR 0.71 0.65  --  0.75   * 192* 175* 199*        Calcium, Magnesium, Phosphorus  Recent Labs   Lab Test 05/14/24  1023 04/16/24  0912 03/14/24  1305   ASHLEY 9.1 9.1 9.3        LFTs  Recent Labs   Lab Test 05/14/24  1023 04/16/24  0912 03/14/24  1305   BILITOTAL 1.8* 1.9* 1.6*   ALKPHOS 118 118 118   AST 17 20 24   ALT 30 36 38   ALBUMIN 4.4 4.4 4.4       LDH  No lab results found.    B2-Microglobulin  Recent Labs   Lab Test 02/18/20  0849   [id number] 1.6           Immunoglobulins     Recent Labs   Lab Test 04/16/24  0912 03/14/24  1305 02/20/24  1026 01/22/24  1017 12/20/23  1026 11/20/23  1127   * 558* 495* 602* 590* 687       Recent Labs   Lab Test 04/16/24  0912 03/14/24  1305 02/20/24  1026 01/22/24  1017 12/20/23  1026 11/20/23  1127    136 131 130 139 164       Recent Labs   Lab Test 04/16/24  0912 03/14/24  1305 02/20/24  1026 01/22/24  1017 12/20/23  1026 11/20/23  1127   IGM 34* 41 <10* 42 41 48         Monocloncal Protein Studies     M spike    Recent Labs   Lab Test 06/11/24  1556 05/14/24  1023 04/16/24  0912 03/14/24  1305 02/20/24  1026 01/22/24  1017   ELPM 0.0 0.0 0.0 0.0 0.0 0.0       Neotsu FLC    Recent Labs   Lab Test 04/16/24  0912 03/14/24  1305 02/20/24  1026 01/22/24  1017 12/20/23  1026 11/20/23  1127   KFLCA 1.57 2.79* 2.45* 2.65* 2.34* 2.58*       Lambda FLC    Recent Labs   Lab Test 04/16/24  0912 03/14/24  1305 02/20/24  1026 01/22/24  1017 12/20/23  1026 11/20/23  1127   LFLCA 1.30 1.54 1.60 1.74 1.56 1.66       FLC Ratio    Recent Labs   Lab Test 04/16/24  0912 03/14/24  1305 02/20/24  1026 01/22/24  1017 12/20/23  1026 11/20/23  1127   KLRA 1.21 1.81* 1.53 1.52 1.50 1.55       Assessment/plan:   Winter Loya is a 68 year old woman with standard risk IgG  kappa multiple myeloma, s/p post auto-transplant in April 2019,  currently on lenalidomide maintenance. She remains in remission by FLC, EBONIE, SPEP. ASA 81 mg for dvt ppx      Persistently elevated bilirubin led to hepatology consult in October 2023.  Determination was that this is most likely fatty liver disease.  Recommendation was to continue to follow with PCP for management of obesity, DM2, and HTN.    Still interested in getting connected with therapy but this hasn't been set up yet - I will follow-up with that team.    Diarrhea continues to be an issue although improved after starting cholestyramine with meals TID.      Continue monthly labs and video visits. Myeloma labs are stable.  Unclear why her free light chains haven't been drawn the past two months.  I checked her orders and eliminated duplicates to try to limit confusion.  Will ensure these get checked next month.     20 minutes spent on the date of the encounter doing chart review, review of test results, interpretation of tests, patient visit, and documentation     The longitudinal plan of care for the diagnosis(es)/condition(s) as documented were addressed during this visit. Due to the added complexity in care, I will continue to support Winter in the subsequent management and with ongoing continuity of care.    FALGUNI Fink [facility]  [address] [place] [address] [phone]

## 2024-06-18 ENCOUNTER — VIRTUAL VISIT (OUTPATIENT)
Dept: ONCOLOGY | Facility: HOSPITAL | Age: 69
End: 2024-06-18
Attending: PSYCHOLOGIST
Payer: COMMERCIAL

## 2024-06-18 VITALS — HEIGHT: 69 IN | BODY MASS INDEX: 37.03 KG/M2 | WEIGHT: 250 LBS

## 2024-06-18 DIAGNOSIS — F40.00 AGORAPHOBIA, UNSPECIFIED: ICD-10-CM

## 2024-06-18 DIAGNOSIS — F33.1 MAJOR DEPRESSIVE DISORDER, RECURRENT EPISODE, MODERATE (H): Primary | ICD-10-CM

## 2024-06-18 PROCEDURE — 90837 PSYTX W PT 60 MINUTES: CPT | Mod: 95 | Performed by: PSYCHOLOGIST

## 2024-06-18 ASSESSMENT — PAIN SCALES - GENERAL: PAINLEVEL: NO PAIN (0)

## 2024-06-18 NOTE — NURSING NOTE
Is the patient currently in the state of [place]? YES    Visit mode:VIDEO    If the visit is dropped, the patient can be reconnected by: VIDEO VISIT: Text to cell phone:   Telephone Information:   Mobile [phone]       Will anyone else be joining the visit? NO  (If patient encounters technical issues they should call [phone]    How would you like to obtain your AVS? MyChart    Are changes needed to the allergy or medication list? No    Are refills needed on medications prescribed by this physician? NO    Reason for visit: Consult    Cristina NGUYEN

## 2024-06-18 NOTE — PROGRESS NOTES
Virtual Visit Details    Type of service:  Video Visit   Video Start Time:  1000  Video End Time: 1053    Originating Location (pt. Location): Home    Distant Location (provider location):  On-site  Platform used for Video Visit: [employer]
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
## 2024-06-21 ENCOUNTER — TELEPHONE (OUTPATIENT)
Dept: ONCOLOGY | Facility: CLINIC | Age: 69
End: 2024-06-21
Payer: COMMERCIAL

## 2024-06-21 DIAGNOSIS — C90.01 MULTIPLE MYELOMA IN REMISSION (H): Primary | ICD-10-CM

## 2024-06-21 RX ORDER — LENALIDOMIDE 10 MG/1
10 CAPSULE ORAL DAILY
Qty: 28 CAPSULE | Refills: 0 | Status: SHIPPED | OUTPATIENT
Start: 2024-06-21 | End: 2024-07-19

## 2024-06-21 NOTE — TELEPHONE ENCOUNTER
Oral Chemotherapy Monitoring Program    Medication: Revlimid  Rx:  10 mg PO daily for a 28 day cycle  Auth #: [id number]  Risk Category: Adult female NOT of reproductive capacity    Routine survey questions reviewed.    Thank you,    Mary Franklin  Oncology Pharmacy Liaison LUCINDA [email or web address]  Phone: [phone]  Fax: [phone]

## 2024-07-01 ENCOUNTER — VIRTUAL VISIT (OUTPATIENT)
Dept: ONCOLOGY | Facility: HOSPITAL | Age: 69
End: 2024-07-01
Payer: COMMERCIAL

## 2024-07-01 VITALS — WEIGHT: 250 LBS | HEIGHT: 69 IN | BODY MASS INDEX: 37.03 KG/M2

## 2024-07-01 DIAGNOSIS — F33.1 MAJOR DEPRESSIVE DISORDER, RECURRENT EPISODE, MODERATE (H): Primary | ICD-10-CM

## 2024-07-01 PROCEDURE — 90834 PSYTX W PT 45 MINUTES: CPT | Mod: 95 | Performed by: PSYCHOLOGIST

## 2024-07-01 ASSESSMENT — PAIN SCALES - GENERAL: PAINLEVEL: NO PAIN (0)
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
## 2024-07-01 NOTE — PROGRESS NOTES
Virtual Visit Details    Type of service:  Video Visit   Video Start Time:  0902  Video End Time: 0953    Originating Location (pt. Location): Home    Distant Location (provider location):  On-site  Platform used for Video Visit: DelmaWell

## 2024-07-01 NOTE — NURSING NOTE
Is the patient currently in the state of [place]? YES    Visit mode:VIDEO    If the visit is dropped, the patient can be reconnected by: VIDEO VISIT: Text to cell phone:   Telephone Information:   Mobile [phone]       Will anyone else be joining the visit? NO  (If patient encounters technical issues they should call [phone]    How would you like to obtain your AVS? MyChart    Are changes needed to the allergy or medication list? Pt stated no changes to allergies and Pt stated no med changes    Are refills needed on medications prescribed by this physician? NO    Reason for visit: CARYN NGUYEN

## 2024-07-08 ENCOUNTER — TELEPHONE (OUTPATIENT)
Dept: ONCOLOGY | Facility: HOSPITAL | Age: 69
End: 2024-07-08
Payer: COMMERCIAL

## 2024-07-11 ENCOUNTER — DOCUMENTATION ONLY (OUTPATIENT)
Dept: ONCOLOGY | Facility: CLINIC | Age: 69
End: 2024-07-11
Payer: COMMERCIAL

## 2024-07-11 DIAGNOSIS — C90.01 MULTIPLE MYELOMA IN REMISSION (H): Primary | ICD-10-CM

## 2024-07-11 NOTE — PROGRESS NOTES
Winter Loya has an upcoming lab appointment:    Future Appointments   Date Time Provider Department Center   7/15/2024  1:00 PM Anjelica Corrigan [facility]   7/16/2024  3:45 PM FZ LAB [facility]   7/17/2024  2:30 PM Hyun Denney APRN [facility]   8/6/2024 10:30 AM FZ LAB [facility]   8/7/2024  2:30 PM Hyun Denney APRN [facility]   3/28/2025  2:30 PM Montse Bucio PA-C [facility]   4/17/2025 12:10 PM Gabriel Santoyo MD [facility]     Patient is scheduled for the following lab(s): Says on their note they need orders from you too    There is no order available. Please review and place either future orders or HMPO (Review of Health Maintenance Protocol Orders), as appropriate.    Health Maintenance Due   Topic    ANNUAL REVIEW OF HM ORDERS      Myriam Stevens

## 2024-07-16 ENCOUNTER — LAB (OUTPATIENT)
Dept: LAB | Facility: CLINIC | Age: 69
End: 2024-07-16
Payer: COMMERCIAL

## 2024-07-16 DIAGNOSIS — C90.01 MULTIPLE MYELOMA IN REMISSION (H): ICD-10-CM

## 2024-07-16 PROCEDURE — 82784 ASSAY IGA/IGD/IGG/IGM EACH: CPT

## 2024-07-16 PROCEDURE — 80053 COMPREHEN METABOLIC PANEL: CPT

## 2024-07-16 PROCEDURE — 83521 IG LIGHT CHAINS FREE EACH: CPT

## 2024-07-16 PROCEDURE — 84165 PROTEIN E-PHORESIS SERUM: CPT | Performed by: PATHOLOGY

## 2024-07-16 PROCEDURE — 36415 COLL VENOUS BLD VENIPUNCTURE: CPT

## 2024-07-16 PROCEDURE — 84155 ASSAY OF PROTEIN SERUM: CPT

## 2024-07-16 PROCEDURE — 85025 COMPLETE CBC W/AUTO DIFF WBC: CPT

## 2024-07-16 PROCEDURE — 86334 IMMUNOFIX E-PHORESIS SERUM: CPT | Performed by: PATHOLOGY

## 2024-07-17 ENCOUNTER — VIRTUAL VISIT (OUTPATIENT)
Dept: ONCOLOGY | Facility: CLINIC | Age: 69
End: 2024-07-17
Attending: STUDENT IN AN ORGANIZED HEALTH CARE EDUCATION/TRAINING PROGRAM
Payer: COMMERCIAL

## 2024-07-17 VITALS — WEIGHT: 250 LBS | BODY MASS INDEX: 37.03 KG/M2 | HEIGHT: 69 IN

## 2024-07-17 DIAGNOSIS — R19.7 DIARRHEA, UNSPECIFIED TYPE: Primary | ICD-10-CM

## 2024-07-17 DIAGNOSIS — C90.01 MULTIPLE MYELOMA IN REMISSION (H): ICD-10-CM

## 2024-07-17 LAB
ALBUMIN SERPL BCG-MCNC: 4.5 G/DL (ref 3.5–5.2)
ALP SERPL-CCNC: 114 U/L (ref 40–150)
ALT SERPL W P-5'-P-CCNC: 44 U/L (ref 0–50)
ANION GAP SERPL CALCULATED.3IONS-SCNC: 10 MMOL/L (ref 7–15)
AST SERPL W P-5'-P-CCNC: 21 U/L (ref 0–45)
BASOPHILS # BLD AUTO: 0.1 10E3/UL (ref 0–0.2)
BASOPHILS NFR BLD AUTO: 2 %
BILIRUB SERPL-MCNC: 1.4 MG/DL
BUN SERPL-MCNC: 17.3 MG/DL (ref 8–23)
CALCIUM SERPL-MCNC: 9.4 MG/DL (ref 8.8–10.4)
CHLORIDE SERPL-SCNC: 101 MMOL/L (ref 98–107)
CREAT SERPL-MCNC: 0.66 MG/DL (ref 0.51–0.95)
EGFRCR SERPLBLD CKD-EPI 2021: >90 ML/MIN/1.73M2
EOSINOPHIL # BLD AUTO: 0.3 10E3/UL (ref 0–0.7)
EOSINOPHIL NFR BLD AUTO: 8 %
ERYTHROCYTE [DISTWIDTH] IN BLOOD BY AUTOMATED COUNT: 14.9 % (ref 10–15)
GLUCOSE SERPL-MCNC: 294 MG/DL (ref 70–99)
HCO3 SERPL-SCNC: 27 MMOL/L (ref 22–29)
HCT VFR BLD AUTO: 44.8 % (ref 35–47)
HGB BLD-MCNC: 14.7 G/DL (ref 11.7–15.7)
IMM GRANULOCYTES # BLD: 0 10E3/UL
IMM GRANULOCYTES NFR BLD: 1 %
LYMPHOCYTES # BLD AUTO: 0.7 10E3/UL (ref 0.8–5.3)
LYMPHOCYTES NFR BLD AUTO: 19 %
MCH RBC QN AUTO: 29.3 PG (ref 26.5–33)
MCHC RBC AUTO-ENTMCNC: 32.8 G/DL (ref 31.5–36.5)
MCV RBC AUTO: 89 FL (ref 78–100)
MONOCYTES # BLD AUTO: 0.3 10E3/UL (ref 0–1.3)
MONOCYTES NFR BLD AUTO: 10 %
NEUTROPHILS # BLD AUTO: 2.2 10E3/UL (ref 1.6–8.3)
NEUTROPHILS NFR BLD AUTO: 61 %
NRBC # BLD AUTO: 0 10E3/UL
NRBC BLD AUTO-RTO: 0 /100
PLATELET # BLD AUTO: 123 10E3/UL (ref 150–450)
POTASSIUM SERPL-SCNC: 4.2 MMOL/L (ref 3.4–5.3)
PROT SERPL-MCNC: 6.7 G/DL (ref 6.4–8.3)
RBC # BLD AUTO: 5.02 10E6/UL (ref 3.8–5.2)
SODIUM SERPL-SCNC: 138 MMOL/L (ref 135–145)
TOTAL PROTEIN SERUM FOR ELP: 6.5 G/DL (ref 6.4–8.3)
WBC # BLD AUTO: 3.6 10E3/UL (ref 4–11)

## 2024-07-17 PROCEDURE — 99213 OFFICE O/P EST LOW 20 MIN: CPT | Mod: 95 | Performed by: REGISTERED NURSE

## 2024-07-17 PROCEDURE — G2211 COMPLEX E/M VISIT ADD ON: HCPCS | Mod: 95 | Performed by: REGISTERED NURSE

## 2024-07-17 RX ORDER — LENALIDOMIDE 10 MG/1
10 CAPSULE ORAL DAILY
Qty: 28 CAPSULE | Refills: 0 | Status: CANCELLED | OUTPATIENT
Start: 2024-07-17

## 2024-07-17 ASSESSMENT — PAIN SCALES - GENERAL: PAINLEVEL: NO PAIN (0)

## 2024-07-17 NOTE — LETTER
"7/17/2024      Winter Loya  [address]  [place] [address]      Dear Colleague,    Thank you for referring your patient, Winter Loya, to the [facility]. Please see a copy of my visit note below.    Virtual Visit Details    Type of service:  Video Visit   Video Start Time: 2:29 PM  Video End Time:2:39 PM    Originating Location (pt. Location): Home    Distant Location (provider location):  Off-site  Platform used for Video Visit: [facility]      ONCOLOGY/HEMATOLOGY PROGRESS NOTE  Jul 17, 2024    Reason for Visit: IgA Kappa Multiple Myeloma    Oncology HPI:   Winter Loya is a 68 year old woman with IgA Kappa Multiple Myeloma. In 2018 she had anemia and was fatigued. She was found to have IgA kappa monocloncal antibody with M-spike of 0.17. IgA elevated. Skeletal survey was unremarkable and UPEP had minimal protein (156 mg/m2). PET 11/2018 showed bone marrow consistent with hypermetabolic plasma cell myeloma. M spike was 0.17. She started RVD and Zometa. On 4/2019 she had an autologous transplant.      Her bone marrow bx from September 2019 was sent here for review. It showed marrow cellularity of 60%, with decreased trilineage hematopoiesis and 15% plasma cells as well as peripheral blood with slight anemia. 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 She is on revlimid maintenance and zometa from her prior oncologist in [place]. On 12/6/19 her K/L ratio is 1.1, M spike is 0.04.     Currently on Revlimid maintenance.      Interval history:   - Feels \"okay\" overall  - Diarrhea is \"not bad;\" has had a few episodes yesterday and today but prior to that it had been a week since her last episode  - Denies new pain      Comprehensive ROS neg other than the symptoms noted above in the HPI.      Past Medical History:   Diagnosis Date     Abnormal EMG 2021 " 5/25/2021    Interpretation: This is an abnormal study, demonstrating electrophysiologic evidence of a length-dependent axonal sensorimotor polyneuropathy. Asymmetry of peroneal compound muscle action potential amplitudes is a finding of uncertain significance.        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   I have personally reviewed the examination and initial interpretation and I agree with the findings.   ANNE GOODMAN MD     History of MRI of cervical spine 6/2020 3/15/2021    Impression: Multilevel cervical spondylosis, most pronounced at the level of C4-5 and C5-6 with moderate to severe spinal canal stenosis and moderate spinal canal stenosis at C6-7. No abnormal cord signal. No significant neural foraminal narrowing at any level.   I have personally reviewed the examination and initial interpretation and I agree with the findings.   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 Use to read blood sugars per 's instructions. 6 each 5     empagliflozin (JARDIANCE) 25 MG TABS tablet Take 1 tablet (25 mg) by mouth daily 90 tablet 3     insulin glargine (LANTUS SOLOSTAR) 100 UNIT/ML pen Inject 40 Units Subcutaneous every morning Increase dose by 2 units every three days until fasting blood sugar are  mg/dL. Max 40 units/day 45 mL 1     insulin pen needle (FIFTY50 PEN NEEDLES) 32G X 4 MM miscellaneous Use one pen needle daily or as directed. 100 each 2     LENalidomide (REVLIMID) 10 MG CAPS capsule Take 1 capsule (10 mg) by mouth daily for 28 days 28 capsule 0     levothyroxine (SYNTHROID/LEVOTHROID) 200 MCG tablet Take 1 tablet (200 mcg) by mouth every morning (before breakfast) 90 tablet 3     losartan (COZAAR) 25 MG tablet Take 1 tablet (25 mg) by mouth daily 90 tablet 3     pregabalin (LYRICA) 100 MG capsule Take 1 capsule (100 mg) by mouth 2 times daily 180 capsule 3     propranolol ER (INDERAL LA) 60 MG 24 hr capsule Take 1 capsule (60 mg) by mouth daily 90 capsule 3     semaglutide (OZEMPIC) 2 MG/3ML pen Inject 0.25 mg Subcutaneous every 7 days X 4 weeks, then increase to 0.5 mg weekly. 3 mL 1     sertraline (ZOLOFT) 100 MG tablet Take 1 tablet (100 mg) by mouth daily 90 tablet 3     alcohol swab prep pads Use to swab area of injection/sowmya as directed. 100 each 3     blood glucose (NO BRAND SPECIFIED) test strip Use to test blood sugar 3 times daily or as directed. To accompany: Blood Glucose Monitor Brands: per insurance. (Patient not taking: Reported on 4/18/2024) 100 strip 6     blood glucose calibration (NO BRAND SPECIFIED) solution To accompany: Blood Glucose Monitor Brands: per insurance. (Patient not taking: Reported on 4/18/2024) 4 each 0     blood glucose monitoring (NO BRAND SPECIFIED) meter device kit Use to test blood sugar 3 times daily or as directed. Preferred blood glucose meter OR supplies to accompany: Blood Glucose  Monitor Brands: per insurance. (Patient not taking: Reported on 4/18/2024) 1 kit 0     cholestyramine (QUESTRAN) 4 g packet Take 1 packet (4 g) by mouth 3 times daily (with meals) (Patient not taking: Reported on 4/18/2024) 90 packet 4     Continuous Blood Gluc  (FREESTYLE SEGUNDO 2 READER) MARCIA Use to read blood sugars as per 's instructions. 1 each 0     Continuous Blood Gluc Sensor (FREESTYLE SEGUNDO 3 SENSOR) MISC 1 each every 14 days 6 each 5     LENalidomide (REVLIMID) 10 MG CAPS capsule Take 1 capsule (10 mg) by mouth daily for 28 days 28 capsule 0     loperamide (IMODIUM A-D) 2 MG tablet Take 1 tablet (2 mg) by mouth daily as needed for diarrhea Start Imodium 2 pills with first loose stool and then 1 pill with each loose stool after, aiming for 1-2 stools per day. Max of 8 pills a day. 180 tablet 3     mupirocin (BACTROBAN) 2 % external ointment Apply topically 3 times daily 15 g 1     neomycin-polymyxin-hydrocortisone (CORTISPORIN) 3.5-93340-3 otic solution Place 3 drops in ear(s) 4 times daily 10 mL 0       Video physical exam  General: Patient appears well in no acute distress.   Skin: No visualized rash or lesions on visualized skin  Eyes: EOMI, no erythema, sclera icterus or discharge noted  Resp: Appears to be breathing comfortably without accessory muscle usage, speaking in full sentences, no cough  MSK: Appears to have normal range of motion based on visualized movements  Neurologic: No apparent tremors, facial movements symmetric  Psych: affect bright, alert and oriented          Labs:     Blood Counts       Recent Labs   Lab Test 07/16/24  1606 06/11/24  1556 05/14/24  1023   HGB 14.7 14.3 14.4   HCT 44.8 43.1 43.6   WBC 3.6* 3.2* 3.1*   ANEUTAUTO 2.2 1.8 1.8   ALYMPAUTO 0.7* 0.7* 0.6*   AMONOAUTO 0.3 0.4 0.4   AEOSAUTO 0.3 0.2 0.2   ABSBASO 0.1 0.1 0.1   NRBCMAN 0.0 0.0 0.0   * 108* 101*       ABO/RH    No lab results found.      Chemistries     Basic Panel  Recent Labs    Lab Test 07/16/24  1606 06/11/24  1556 05/14/24  1023    139 139   POTASSIUM 4.2 4.1 4.5   CHLORIDE 101 105 104   CO2 27 23 24   BUN 17.3 15.2 13.8   CR 0.66 0.68 0.71   * 194* 178*        Calcium, Magnesium, Phosphorus  Recent Labs   Lab Test 07/16/24  1606 06/11/24  1556 05/14/24  1023   ASHLEY 9.4 9.0 9.1        LFTs  Recent Labs   Lab Test 07/16/24  1606 06/11/24  1556 05/14/24  1023   BILITOTAL 1.4* 1.4* 1.8*   ALKPHOS 114 115 118   AST 21 26 17   ALT 44 70* 30   ALBUMIN 4.5 4.6 4.4       LDH  No lab results found.    B2-Microglobulin  Recent Labs   Lab Test 02/18/20  0849   [id number] 1.6           Immunoglobulins     Recent Labs   Lab Test 04/16/24  0912 03/14/24  1305 02/20/24  1026 01/22/24  1017 12/20/23  1026 11/20/23  1127   * 558* 495* 602* 590* 687       Recent Labs   Lab Test 04/16/24  0912 03/14/24  1305 02/20/24  1026 01/22/24  1017 12/20/23  1026 11/20/23  1127    136 131 130 139 164       Recent Labs   Lab Test 04/16/24  0912 03/14/24  1305 02/20/24  1026 01/22/24  1017 12/20/23  1026 11/20/23  1127   IGM 34* 41 <10* 42 41 48         Monocloncal Protein Studies     M spike    Recent Labs   Lab Test 06/11/24  1556 05/14/24  1023 04/16/24  0912 03/14/24  1305 02/20/24  1026 01/22/24  1017   ELPM 0.0 0.0 0.0 0.0 0.0 0.0       [place] [facility]    Recent Labs   Lab Test 04/16/24  0912 03/14/24  1305 02/20/24  1026 01/22/24  1017 12/20/23  1026 11/20/23  1127   KFLCA 1.57 2.79* 2.45* 2.65* 2.34* 2.58*       [place] FLC    Recent Labs   Lab Test 04/16/24  0912 03/14/24  1305 02/20/24  1026 01/22/24  1017 12/20/23  1026 11/20/23  1127   LFLCA 1.30 1.54 1.60 1.74 1.56 1.66       FLC Ratio    Recent Labs   Lab Test 04/16/24  0912 03/14/24  1305 02/20/24  1026 01/22/24  1017 12/20/23  1026 11/20/23  1127   KLRA 1.21 1.81* 1.53 1.52 1.50 1.55       Assessment/plan:   Winter Loya is a 68 year old woman with standard risk IgG kappa multiple myeloma, s/p post auto-transplant in April 2019,   currently on lenalidomide maintenance. She remains in remission by FLC, EBONIE, SPEP. ASA 81 mg for dvt ppx    Persistently elevated bilirubin led to hepatology consult in October 2023.  Determination was that this is most likely fatty liver disease.  Recommendation was to continue to follow with PCP for management of obesity, DM2, and HTN.    Diarrhea improved after starting cholestyramine with meals TID.      Continue monthly labs and video visits. Myeloma labs from today are pending.     20 minutes spent on the date of the encounter doing chart review, review of test results, interpretation of tests, patient visit, and documentation     The longitudinal plan of care for the diagnosis(es)/condition(s) as documented were addressed during this visit. Due to the added complexity in care, I will continue to support Winter in the subsequent management and with ongoing continuity of care.    FALGUNI Fink CNP  [facility] [address] [address] [phone]      Again, thank you for allowing me to participate in the care of your patient.        Sincerely,        FALGUNI Ahn CNP

## 2024-07-17 NOTE — NURSING NOTE
Patient confirms medications and allergies are accurate via patients echeck in completion, and or denies any changes since last reviewed/verified.     Current patient location: [address]  [place] [address]    Is the patient currently in the state of [place]? YES    Visit mode:VIDEO    If the visit is dropped, the patient can be reconnected by: VIDEO VISIT: Text to cell phone:   Telephone Information:   Mobile [phone]       Will anyone else be joining the visit? NO  (If patient encounters technical issues they should call [phone]    How would you like to obtain your AVS? MyChart    Are changes needed to the allergy or medication list? No    Are refills needed on medications prescribed by this physician? NO    Reason for visit: RECHECK    Zoey GUARDADOF

## 2024-07-17 NOTE — PROGRESS NOTES
"Virtual Visit Details    Type of service:  Video Visit   Video Start Time: 2:29 PM  Video End Time:2:39 PM    Originating Location (pt. Location): Home    Distant Location (provider location):  Off-site  Platform used for Video Visit: [facility]      ONCOLOGY/HEMATOLOGY PROGRESS NOTE  Jul 17, 2024    Reason for Visit: IgA Kappa Multiple Myeloma    Oncology HPI:   Winter Loya is a 68 year old woman with IgA Kappa Multiple Myeloma. In 2018 she had anemia and was fatigued. She was found to have IgA kappa monocloncal antibody with M-spike of 0.17. IgA elevated. Skeletal survey was unremarkable and UPEP had minimal protein (156 mg/m2). PET 11/2018 showed bone marrow consistent with hypermetabolic plasma cell myeloma. M spike was 0.17. She started RVD and Zometa. On 4/2019 she had an autologous transplant.      Her bone marrow bx from September 2019 was sent here for review. It showed marrow cellularity of 60%, with decreased trilineage hematopoiesis and 15% plasma cells as well as peripheral blood with slight anemia. 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 She is on revlimid maintenance and zometa from her prior oncologist in [place]. On 12/6/19 her K/L ratio is 1.1, M spike is 0.04.     Currently on Revlimid maintenance.      Interval history:   - Feels \"okay\" overall  - Diarrhea is \"not bad;\" has had a few episodes yesterday and today but prior to that it had been a week since her last episode  - Denies new pain      Comprehensive ROS neg other than the symptoms noted above in the HPI.      Past Medical History:   Diagnosis Date    Abnormal EMG 2021 5/25/2021    Interpretation: This is an abnormal study, demonstrating electrophysiologic evidence of a length-dependent axonal sensorimotor polyneuropathy. Asymmetry of peroneal compound muscle action potential amplitudes is a finding of " uncertain significance.       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   I have personally reviewed the examination and initial interpretation and I agree with the findings.   ANNE GOODMAN MD    History of MRI of cervical spine 6/2020 3/15/2021    Impression: Multilevel cervical spondylosis, most pronounced at the level of C4-5 and C5-6 with moderate to severe spinal canal stenosis and moderate spinal canal stenosis at C6-7. No abnormal cord signal. No significant neural foraminal narrowing at any level.   I have personally reviewed the examination and initial interpretation and I agree with the findings.   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 Use to read blood sugars per 's instructions. 6 each 5    empagliflozin (JARDIANCE) 25 MG TABS tablet Take 1 tablet (25 mg) by mouth daily 90 tablet 3    insulin glargine (LANTUS SOLOSTAR) 100 UNIT/ML pen Inject 40  Units Subcutaneous every morning Increase dose by 2 units every three days until fasting blood sugar are  mg/dL. Max 40 units/day 45 mL 1    insulin pen needle (FIFTY50 PEN NEEDLES) 32G X 4 MM miscellaneous Use one pen needle daily or as directed. 100 each 2    LENalidomide (REVLIMID) 10 MG CAPS capsule Take 1 capsule (10 mg) by mouth daily for 28 days 28 capsule 0    levothyroxine (SYNTHROID/LEVOTHROID) 200 MCG tablet Take 1 tablet (200 mcg) by mouth every morning (before breakfast) 90 tablet 3    losartan (COZAAR) 25 MG tablet Take 1 tablet (25 mg) by mouth daily 90 tablet 3    pregabalin (LYRICA) 100 MG capsule Take 1 capsule (100 mg) by mouth 2 times daily 180 capsule 3    propranolol ER (INDERAL LA) 60 MG 24 hr capsule Take 1 capsule (60 mg) by mouth daily 90 capsule 3    semaglutide (OZEMPIC) 2 MG/3ML pen Inject 0.25 mg Subcutaneous every 7 days X 4 weeks, then increase to 0.5 mg weekly. 3 mL 1    sertraline (ZOLOFT) 100 MG tablet Take 1 tablet (100 mg) by mouth daily 90 tablet 3    alcohol swab prep pads Use to swab area of injection/sowmya as directed. 100 each 3    blood glucose (NO BRAND SPECIFIED) test strip Use to test blood sugar 3 times daily or as directed. To accompany: Blood Glucose Monitor Brands: per insurance. (Patient not taking: Reported on 4/18/2024) 100 strip 6    blood glucose calibration (NO BRAND SPECIFIED) solution To accompany: Blood Glucose Monitor Brands: per insurance. (Patient not taking: Reported on 4/18/2024) 4 each 0    blood glucose monitoring (NO BRAND SPECIFIED) meter device kit Use to test blood sugar 3 times daily or as directed. Preferred blood glucose meter OR supplies to accompany: Blood Glucose Monitor Brands: per insurance. (Patient not taking: Reported on 4/18/2024) 1 kit 0    cholestyramine (QUESTRAN) 4 g packet Take 1 packet (4 g) by mouth 3 times daily (with meals) (Patient not taking: Reported on 4/18/2024) 90 packet 4    Continuous Blood Gluc   (FREESTYLE SEGUNDO 2 READER) MARCIA Use to read blood sugars as per 's instructions. 1 each 0    Continuous Blood Gluc Sensor (FREESTYLE SEGUNDO 3 SENSOR) MISC 1 each every 14 days 6 each 5    LENalidomide (REVLIMID) 10 MG CAPS capsule Take 1 capsule (10 mg) by mouth daily for 28 days 28 capsule 0    loperamide (IMODIUM A-D) 2 MG tablet Take 1 tablet (2 mg) by mouth daily as needed for diarrhea Start Imodium 2 pills with first loose stool and then 1 pill with each loose stool after, aiming for 1-2 stools per day. Max of 8 pills a day. 180 tablet 3    mupirocin (BACTROBAN) 2 % external ointment Apply topically 3 times daily 15 g 1    neomycin-polymyxin-hydrocortisone (CORTISPORIN) 3.5-06388-9 otic solution Place 3 drops in ear(s) 4 times daily 10 mL 0       Video physical exam  General: Patient appears well in no acute distress.   Skin: No visualized rash or lesions on visualized skin  Eyes: EOMI, no erythema, sclera icterus or discharge noted  Resp: Appears to be breathing comfortably without accessory muscle usage, speaking in full sentences, no cough  MSK: Appears to have normal range of motion based on visualized movements  Neurologic: No apparent tremors, facial movements symmetric  Psych: affect bright, alert and oriented          Labs:     Blood Counts       Recent Labs   Lab Test 07/16/24  1606 06/11/24  1556 05/14/24  1023   HGB 14.7 14.3 14.4   HCT 44.8 43.1 43.6   WBC 3.6* 3.2* 3.1*   ANEUTAUTO 2.2 1.8 1.8   ALYMPAUTO 0.7* 0.7* 0.6*   AMONOAUTO 0.3 0.4 0.4   AEOSAUTO 0.3 0.2 0.2   ABSBASO 0.1 0.1 0.1   NRBCMAN 0.0 0.0 0.0   * 108* 101*       ABO/RH    No lab results found.      Chemistries     Basic Panel  Recent Labs   Lab Test 07/16/24  1606 06/11/24  1556 05/14/24  1023    139 139   POTASSIUM 4.2 4.1 4.5   CHLORIDE 101 105 104   CO2 27 23 24   BUN 17.3 15.2 13.8   CR 0.66 0.68 0.71   * 194* 178*        Calcium, Magnesium, Phosphorus  Recent Labs   Lab Test 07/16/24  1609  06/11/24  1556 05/14/24  1023   ASHLEY 9.4 9.0 9.1        LFTs  Recent Labs   Lab Test 07/16/24  1606 06/11/24  1556 05/14/24  1023   BILITOTAL 1.4* 1.4* 1.8*   ALKPHOS 114 115 118   AST 21 26 17   ALT 44 70* 30   ALBUMIN 4.5 4.6 4.4       LDH  No lab results found.    B2-Microglobulin  Recent Labs   Lab Test 02/18/20  0849   [id number] 1.6           Immunoglobulins     Recent Labs   Lab Test 04/16/24  0912 03/14/24  1305 02/20/24  1026 01/22/24  1017 12/20/23  1026 11/20/23  1127   * 558* 495* 602* 590* 687       Recent Labs   Lab Test 04/16/24  0912 03/14/24  1305 02/20/24  1026 01/22/24  1017 12/20/23  1026 11/20/23  1127    136 131 130 139 164       Recent Labs   Lab Test 04/16/24  0912 03/14/24  1305 02/20/24  1026 01/22/24  1017 12/20/23  1026 11/20/23  1127   IGM 34* 41 <10* 42 41 48         Monocloncal Protein Studies     M spike    Recent Labs   Lab Test 06/11/24  1556 05/14/24  1023 04/16/24  0912 03/14/24  1305 02/20/24  1026 01/22/24  1017   ELPM 0.0 0.0 0.0 0.0 0.0 0.0       [place] FLC    Recent Labs   Lab Test 04/16/24  0912 03/14/24  1305 02/20/24  1026 01/22/24  1017 12/20/23  1026 11/20/23  1127   KFLCA 1.57 2.79* 2.45* 2.65* 2.34* 2.58*       [place] FLC    Recent Labs   Lab Test 04/16/24  0912 03/14/24  1305 02/20/24  1026 01/22/24  1017 12/20/23  1026 11/20/23  1127   LFLCA 1.30 1.54 1.60 1.74 1.56 1.66       FLC Ratio    Recent Labs   Lab Test 04/16/24  0912 03/14/24  1305 02/20/24  1026 01/22/24  1017 12/20/23  1026 11/20/23  1127   KLRA 1.21 1.81* 1.53 1.52 1.50 1.55       Assessment/plan:   Winter Loya is a 68 year old woman with standard risk IgG kappa multiple myeloma, s/p post auto-transplant in April 2019,  currently on lenalidomide maintenance. She remains in remission by FLC, EBONIE, SPEP. ASA 81 mg for dvt ppx    Persistently elevated bilirubin led to hepatology consult in October 2023.  Determination was that this is most likely fatty liver disease.  Recommendation was to  continue to follow with PCP for management of obesity, DM2, and HTN.    Diarrhea improved after starting cholestyramine with meals TID.      Continue monthly labs and video visits. Myeloma labs from today are pending.     20 minutes spent on the date of the encounter doing chart review, review of test results, interpretation of tests, patient visit, and documentation     The longitudinal plan of care for the diagnosis(es)/condition(s) as documented were addressed during this visit. Due to the added complexity in care, I will continue to support Winter in the subsequent management and with ongoing continuity of care.    FALGUNI Fink [facility]  [address] [place], [place] [address] [phone]

## 2024-07-18 DIAGNOSIS — C90.01 MULTIPLE MYELOMA IN REMISSION (H): Primary | ICD-10-CM

## 2024-07-18 LAB
ALBUMIN SERPL ELPH-MCNC: 4.4 G/DL (ref 3.7–5.1)
ALPHA1 GLOB SERPL ELPH-MCNC: 0.3 G/DL (ref 0.2–0.4)
ALPHA2 GLOB SERPL ELPH-MCNC: 0.6 G/DL (ref 0.5–0.9)
B-GLOBULIN SERPL ELPH-MCNC: 0.7 G/DL (ref 0.6–1)
GAMMA GLOB SERPL ELPH-MCNC: 0.5 G/DL (ref 0.7–1.6)
IGA SERPL-MCNC: 108 MG/DL (ref 84–499)
IGG SERPL-MCNC: 474 MG/DL (ref 610–1616)
IGM SERPL-MCNC: 37 MG/DL (ref 35–242)
KAPPA LC FREE SER-MCNC: 1.85 MG/DL (ref 0.33–1.94)
KAPPA LC FREE/LAMBDA FREE SER NEPH: 1.36 {RATIO} (ref 0.26–1.65)
LAMBDA LC FREE SERPL-MCNC: 1.36 MG/DL (ref 0.57–2.63)
M PROTEIN SERPL ELPH-MCNC: 0 G/DL
PROT PATTERN SERPL ELPH-IMP: ABNORMAL
PROT PATTERN SERPL IFE-IMP: NORMAL

## 2024-07-19 DIAGNOSIS — C90.01 MULTIPLE MYELOMA IN REMISSION (H): Primary | ICD-10-CM

## 2024-07-19 RX ORDER — LENALIDOMIDE 10 MG/1
10 CAPSULE ORAL DAILY
Qty: 28 CAPSULE | Refills: 0 | Status: SHIPPED | OUTPATIENT
Start: 2024-07-19 | End: 2024-10-02

## 2024-07-22 ENCOUNTER — TELEPHONE (OUTPATIENT)
Dept: ONCOLOGY | Facility: CLINIC | Age: 69
End: 2024-07-22
Payer: COMMERCIAL

## 2024-07-22 NOTE — TELEPHONE ENCOUNTER
Oral Chemotherapy Monitoring Program    Medication: Revlimid  Rx:  10 mg PO daily for a 28 day cycle    Auth #: [id number]  Risk Category: Adult male    Routine survey questions reviewed.    Thank you,    bAdirahman Franklin  Oncology Pharmacy Liaison II  [email or web address]  Phone: [phone]  Fax: [phone]

## 2024-07-23 ENCOUNTER — PATIENT OUTREACH (OUTPATIENT)
Dept: GERIATRIC MEDICINE | Facility: CLINIC | Age: 69
End: 2024-07-23
Payer: COMMERCIAL

## 2024-07-23 NOTE — PROGRESS NOTES
CORIN received notice from Corrie Hernández that member became inactive with MA 5/31/24 and will end with [facility] 8/31/24.  CC called and spoke with Winter. She states that the reason she fell off was because she was not paying her bill with the [facility]. Her Financial worker told her she would stop 5/31/24 if she didn't pay it. Winter states she did pay it like on the first and had spoken to her financial worker and let her know.   CC suggested she talk with her financial worker again to see if she reinstated her. CC let Winter know that she will lose [facility] 8/31/24 if she does not get reinstated to MA.  Winter states she will call the [facility].  Winter also asked if CC can stop her maxi pads. She does not use them as much as her pull ups and she has gotten way too many. CC notified [facility] to continue the pullups and stop the maxi pads.  Corrina Cevallos RN N  [facility] Coordinator  [phone]

## 2024-07-29 ENCOUNTER — ONCOLOGY VISIT (OUTPATIENT)
Dept: ONCOLOGY | Facility: HOSPITAL | Age: 69
End: 2024-07-29
Attending: PSYCHOLOGIST
Payer: COMMERCIAL

## 2024-07-29 DIAGNOSIS — F40.00 AGORAPHOBIA, UNSPECIFIED: ICD-10-CM

## 2024-07-29 DIAGNOSIS — F33.1 MAJOR DEPRESSIVE DISORDER, RECURRENT EPISODE, MODERATE (H): Primary | ICD-10-CM

## 2024-07-29 PROCEDURE — 90832 PSYTX W PT 30 MINUTES: CPT | Performed by: PSYCHOLOGIST
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
## 2024-08-07 ENCOUNTER — DOCUMENTATION ONLY (OUTPATIENT)
Dept: ONCOLOGY | Facility: CLINIC | Age: 69
End: 2024-08-07

## 2024-08-07 ENCOUNTER — VIRTUAL VISIT (OUTPATIENT)
Dept: ONCOLOGY | Facility: CLINIC | Age: 69
End: 2024-08-07
Attending: STUDENT IN AN ORGANIZED HEALTH CARE EDUCATION/TRAINING PROGRAM
Payer: COMMERCIAL

## 2024-08-07 VITALS — WEIGHT: 250 LBS | HEIGHT: 69 IN | BODY MASS INDEX: 37.03 KG/M2

## 2024-08-07 DIAGNOSIS — C90.01 MULTIPLE MYELOMA IN REMISSION (H): Primary | ICD-10-CM

## 2024-08-07 DIAGNOSIS — R19.7 DIARRHEA, UNSPECIFIED TYPE: ICD-10-CM

## 2024-08-07 DIAGNOSIS — L85.3 DRY SKIN: ICD-10-CM

## 2024-08-07 PROCEDURE — G2211 COMPLEX E/M VISIT ADD ON: HCPCS | Mod: 95 | Performed by: REGISTERED NURSE

## 2024-08-07 PROCEDURE — 99213 OFFICE O/P EST LOW 20 MIN: CPT | Mod: 95 | Performed by: REGISTERED NURSE

## 2024-08-07 ASSESSMENT — PAIN SCALES - GENERAL: PAINLEVEL: NO PAIN (0)

## 2024-08-07 NOTE — NURSING NOTE
Is the patient currently in the state of [place]? YES    Visit mode:VIDEO    If the visit is dropped, the patient can be reconnected by: VIDEO VISIT: Send to e-mail at: [email or web address]    Will anyone else be joining the visit? NO  (If patient encounters technical issues they should call [phone]    How would you like to obtain your AVS? MyChart    Are changes needed to the allergy or medication list? No  Rooming Documentation:  Questionnaire(s) not pre-assigned      Reason for visit: Video Visit (Follow up)    Alice NGUYEN

## 2024-08-07 NOTE — LETTER
"8/7/2024      Winter Loya  [address]  [place] [facility] [place] [address]      Dear Colleague,    Thank you for referring your patient, Winter Loya, to the [facility]. Please see a copy of my visit note below.    Virtual Visit Details    Type of service:  Video Visit   Video Start Time: 2:36 PM  Video End Time:2:47 PM    Originating Location (pt. Location): Home    Distant Location (provider location):  Off-site  Platform used for Video Visit: [facility]      ONCOLOGY/HEMATOLOGY PROGRESS NOTE  Aug 7, 2024    Reason for Visit: IgA Kappa Multiple Myeloma    Oncology HPI:   Winter Loya is a 68 year old woman with IgA Kappa Multiple Myeloma. In 2018 she had anemia and was fatigued. She was found to have IgA kappa monocloncal antibody with M-spike of 0.17. IgA elevated. Skeletal survey was unremarkable and UPEP had minimal protein (156 mg/m2). PET 11/2018 showed bone marrow consistent with hypermetabolic plasma cell myeloma. M spike was 0.17. She started RVD and Zometa. On 4/2019 she had an autologous transplant.      Her bone marrow bx from September 2019 was sent here for review. It showed marrow cellularity of 60%, with decreased trilineage hematopoiesis and 15% plasma cells as well as peripheral blood with slight anemia. 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 She is on revlimid maintenance and zometa from her prior oncologist in [place]. On 12/6/19 her K/L ratio is 1.1, M spike is 0.04.     Currently on Revlimid maintenance.      Interval history:   - Feels \"okay\" overall  - Had some brief back and hip pain which has resolved  - Diarrhea is under good control, states her stools are normal  - Has dry patches of skin everywhere, will see her PCP later this month for further evaluation  - Concerned she might loose her insurance at the end of the month  - Denies " new pain      Comprehensive ROS neg other than the symptoms noted above in the HPI.      Past Medical History:   Diagnosis Date     Abnormal EMG 2021 5/25/2021    Interpretation: This is an abnormal study, demonstrating electrophysiologic evidence of a length-dependent axonal sensorimotor polyneuropathy. Asymmetry of peroneal compound muscle action potential amplitudes is a finding of uncertain significance.        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   I have personally reviewed the examination and initial interpretation and I agree with the findings.   ANNE GOODMAN MD     History of MRI of cervical spine 6/2020 3/15/2021    Impression: Multilevel cervical spondylosis, most pronounced at the level of C4-5 and C5-6 with moderate to severe spinal canal stenosis and moderate spinal canal stenosis at C6-7. No abnormal cord signal. No significant neural foraminal narrowing at any level.   I have personally reviewed the examination and initial interpretation and I agree with the findings.   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 100  each 3     aspirin 81 MG EC tablet Take 81 mg by mouth daily       atorvastatin (LIPITOR) 20 MG tablet Take 1 tablet (20 mg) by mouth at bedtime 90 tablet 3     blood glucose (NO BRAND SPECIFIED) test strip Use to test blood sugar 3 times daily or as directed. To accompany: Blood Glucose Monitor Brands: per insurance. (Patient not taking: Reported on 4/18/2024) 100 strip 6     blood glucose calibration (NO BRAND SPECIFIED) solution To accompany: Blood Glucose Monitor Brands: per insurance. (Patient not taking: Reported on 4/18/2024) 4 each 0     blood glucose monitoring (NO BRAND SPECIFIED) meter device kit Use to test blood sugar 3 times daily or as directed. Preferred blood glucose meter OR supplies to accompany: Blood Glucose Monitor Brands: per insurance. (Patient not taking: Reported on 4/18/2024) 1 kit 0     cholestyramine (QUESTRAN) 4 g packet Take 1 packet (4 g) by mouth 3 times daily (with meals) (Patient not taking: Reported on 4/18/2024) 90 packet 4     Continuous Blood Gluc  (FREESTYLE SEGUNDO 2 READER) MARCIA Use to read blood sugars as per 's instructions. 1 each 0     Continuous Blood Gluc Sensor (FREESTYLE SEGUNDO 3 SENSOR) MISC 1 each every 14 days Use 1 sensor every 14 days. Use to read blood sugars per 's instructions. 6 each 5     Continuous Blood Gluc Sensor (FREESTYLE SEGUNDO 3 SENSOR) MISC 1 each every 14 days 6 each 5     empagliflozin (JARDIANCE) 25 MG TABS tablet Take 1 tablet (25 mg) by mouth daily 90 tablet 3     insulin glargine (LANTUS SOLOSTAR) 100 UNIT/ML pen Inject 40 Units Subcutaneous every morning Increase dose by 2 units every three days until fasting blood sugar are  mg/dL. Max 40 units/day 45 mL 1     insulin pen needle (FIFTY50 PEN NEEDLES) 32G X 4 MM miscellaneous Use one pen needle daily or as directed. 100 each 2     LENalidomide (REVLIMID) 10 MG CAPS capsule Take 1 capsule (10 mg) by mouth daily 28 capsule 0     levothyroxine  (SYNTHROID/LEVOTHROID) 200 MCG tablet Take 1 tablet (200 mcg) by mouth every morning (before breakfast) 90 tablet 3     loperamide (IMODIUM A-D) 2 MG tablet Take 1 tablet (2 mg) by mouth daily as needed for diarrhea Start Imodium 2 pills with first loose stool and then 1 pill with each loose stool after, aiming for 1-2 stools per day. Max of 8 pills a day. 180 tablet 3     losartan (COZAAR) 25 MG tablet Take 1 tablet (25 mg) by mouth daily 90 tablet 3     mupirocin (BACTROBAN) 2 % external ointment Apply topically 3 times daily 15 g 1     neomycin-polymyxin-hydrocortisone (CORTISPORIN) 3.5-40306-1 otic solution Place 3 drops in ear(s) 4 times daily 10 mL 0     pregabalin (LYRICA) 100 MG capsule Take 1 capsule (100 mg) by mouth 2 times daily 180 capsule 3     propranolol ER (INDERAL LA) 60 MG 24 hr capsule Take 1 capsule (60 mg) by mouth daily 90 capsule 3     semaglutide (OZEMPIC) 2 MG/3ML pen Inject 0.25 mg Subcutaneous every 7 days X 4 weeks, then increase to 0.5 mg weekly. 3 mL 1     sertraline (ZOLOFT) 100 MG tablet Take 1 tablet (100 mg) by mouth daily 90 tablet 3       Video physical exam  General: Patient appears well in no acute distress.   Skin: No visualized rash or lesions on visualized skin  Eyes: EOMI, no erythema, sclera icterus or discharge noted  Resp: Appears to be breathing comfortably without accessory muscle usage, speaking in full sentences, no cough  MSK: Appears to have normal range of motion based on visualized movements  Neurologic: No apparent tremors, facial movements symmetric  Psych: affect bright, alert and oriented          Labs:     Blood Counts       Recent Labs   Lab Test 07/16/24  1606 06/11/24  1556 05/14/24  1023   HGB 14.7 14.3 14.4   HCT 44.8 43.1 43.6   WBC 3.6* 3.2* 3.1*   ANEUTAUTO 2.2 1.8 1.8   ALYMPAUTO 0.7* 0.7* 0.6*   AMONOAUTO 0.3 0.4 0.4   AEOSAUTO 0.3 0.2 0.2   ABSBASO 0.1 0.1 0.1   NRBCMAN 0.0 0.0 0.0   * 108* 101*       ABO/RH    No lab results  found.      Chemistries     Basic Panel  Recent Labs   Lab Test 07/16/24  1606 06/11/24  1556 05/14/24  1023    139 139   POTASSIUM 4.2 4.1 4.5   CHLORIDE 101 105 104   CO2 27 23 24   BUN 17.3 15.2 13.8   CR 0.66 0.68 0.71   * 194* 178*        Calcium, Magnesium, Phosphorus  Recent Labs   Lab Test 07/16/24  1606 06/11/24  1556 05/14/24  1023   ASHLEY 9.4 9.0 9.1        LFTs  Recent Labs   Lab Test 07/16/24  1606 06/11/24  1556 05/14/24  1023   BILITOTAL 1.4* 1.4* 1.8*   ALKPHOS 114 115 118   AST 21 26 17   ALT 44 70* 30   ALBUMIN 4.5 4.6 4.4       LDH  No lab results found.    B2-Microglobulin  Recent Labs   Lab Test 02/18/20  0849   [id number] 1.6           Immunoglobulins     Recent Labs   Lab Test 07/16/24  1606 04/16/24  0912 03/14/24  1305 02/20/24  1026 01/22/24  1017 12/20/23  1026   * 464* 558* 495* 602* 590*       Recent Labs   Lab Test 07/16/24  1606 04/16/24  0912 03/14/24  1305 02/20/24  1026 01/22/24  1017 12/20/23  1026    107 136 131 130 139       Recent Labs   Lab Test 07/16/24  1606 04/16/24  0912 03/14/24  1305 02/20/24  1026 01/22/24  1017 12/20/23  1026   IGM 37 34* 41 <10* 42 41         Monocloncal Protein Studies     M spike    Recent Labs   Lab Test 07/16/24  1606 06/11/24  1556 05/14/24  1023 04/16/24  0912 03/14/24  1305 02/20/24  1026   ELPM 0.0 0.0 0.0 0.0 0.0 0.0       Toast FLC    Recent Labs   Lab Test 07/16/24  1606 04/16/24  0912 03/14/24  1305 02/20/24  1026 01/22/24  1017 12/20/23  1026   KFLCA 1.85 1.57 2.79* 2.45* 2.65* 2.34*       Lambda FLC    Recent Labs   Lab Test 07/16/24  1606 04/16/24  0912 03/14/24  1305 02/20/24  1026 01/22/24  1017 12/20/23  1026   LFLCA 1.36 1.30 1.54 1.60 1.74 1.56       FLC Ratio    Recent Labs   Lab Test 07/16/24  1606 04/16/24  0912 03/14/24  1305 02/20/24  1026 01/22/24  1017 12/20/23  1026   KLRA 1.36 1.21 1.81* 1.53 1.52 1.50       Assessment/plan:   Winter Loya is a 68 year old woman with standard risk IgG kappa multiple  myeloma, s/p post auto-transplant in April 2019,  currently on lenalidomide maintenance. She remains in remission by FLC, EBONIE, SPEP. ASA 81 mg for dvt ppx    Persistently elevated bilirubin led to hepatology consult in October 2023.  Determination was that this is most likely fatty liver disease.  Recommendation was to continue to follow with PCP for management of obesity, DM2, and HTN.    Diarrhea improved after starting cholestyramine with meals TID.      I will place a social work to discuss concerns about losing her insurance at the end of the month.    She will see her PCP later this month to assess dry patches on her skin.    Continue monthly labs and video visits.    20 minutes spent on the date of the encounter doing chart review, review of test results, interpretation of tests, patient visit, and documentation     The longitudinal plan of care for the diagnosis(es)/condition(s) as documented were addressed during this visit. Due to the added complexity in care, I will continue to support Winter in the subsequent management and with ongoing continuity of care.    FALGUNI Fink CNP  [facility]  [address] [place] [id number] [phone]      Again, thank you for allowing me to participate in the care of your patient.        Sincerely,        FALGUNI Ahn CNP

## 2024-08-07 NOTE — PROGRESS NOTES
Winter Loya has an upcoming lab appointment:    Future Appointments   Date Time Provider Department Center   8/7/2024  2:30 PM SsentongViviana lynna, APRN [facility]   8/8/2024  1:30 PM FZ LAB [facility]   8/19/2024 12:00 PM Anjelica Corrigan [facility]   8/22/2024  8:00 AM Montse Bucio PA-C [facility]   9/3/2024 10:45 AM FZ LAB [facility]   9/4/2024 12:30 PM Ssentongo, Hyun, APRN [facility]   10/1/2024 10:30 AM FZ LAB [facility]   10/2/2024  2:30 PM Ssentongo, Hyun, APRN [facility]   10/29/2024 10:30 AM FZ LAB [facility]   10/30/2024  1:30 PM Ssentongo, Hyun, APRN [facility]   11/26/2024 10:30 AM FZ LAB [facility]   11/27/2024  2:30 PM Ssentongo, Hyun, APRN [facility]   12/23/2024 10:30 AM FZ LAB [facility]   12/24/2024 11:00 AM Ssentongo, Hyun, APRN [facility]   3/28/2025  2:30 PM Montse Bucio PA-C [facility]   4/17/2025 12:10 PM Gabriel Santoyo MD [facility]     Patient is scheduled for the following lab(s): Says on pt's note they need orders from you    There is no order available. Please review and place either future orders or HMPO (Review of Health Maintenance Protocol Orders), as appropriate.    Health Maintenance Due   Topic    ANNUAL REVIEW OF HM ORDERS     A1C Myriam Stevens

## 2024-08-07 NOTE — PROGRESS NOTES
"Virtual Visit Details    Type of service:  Video Visit   Video Start Time: 2:36 PM  Video End Time:2:47 PM    Originating Location (pt. Location): Home    Distant Location (provider location):  Off-site  Platform used for Video Visit: [facility]      ONCOLOGY/HEMATOLOGY PROGRESS NOTE  Aug 7, 2024    Reason for Visit: IgA Kappa Multiple Myeloma    Oncology HPI:   Winter Loya is a 68 year old woman with IgA Kappa Multiple Myeloma. In 2018 she had anemia and was fatigued. She was found to have IgA kappa monocloncal antibody with M-spike of 0.17. IgA elevated. Skeletal survey was unremarkable and UPEP had minimal protein (156 mg/m2). PET 11/2018 showed bone marrow consistent with hypermetabolic plasma cell myeloma. M spike was 0.17. She started RVD and Zometa. On 4/2019 she had an autologous transplant.      Her bone marrow bx from September 2019 was sent here for review. It showed marrow cellularity of 60%, with decreased trilineage hematopoiesis and 15% plasma cells as well as peripheral blood with slight anemia. 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 She is on revlimid maintenance and zometa from her prior oncologist in [place]. On 12/6/19 her K/L ratio is 1.1, M spike is 0.04.     Currently on Revlimid maintenance.      Interval history:   - Feels \"okay\" overall  - Had some brief back and hip pain which has resolved  - Diarrhea is under good control, states her stools are normal  - Has dry patches of skin everywhere, will see her PCP later this month for further evaluation  - Concerned she might loose her insurance at the end of the month  - Denies new pain      Comprehensive ROS neg other than the symptoms noted above in the HPI.      Past Medical History:   Diagnosis Date    Abnormal EMG 2021 5/25/2021    Interpretation: This is an abnormal study, demonstrating electrophysiologic " evidence of a length-dependent axonal sensorimotor polyneuropathy. Asymmetry of peroneal compound muscle action potential amplitudes is a finding of uncertain significance.       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   I have personally reviewed the examination and initial interpretation and I agree with the findings.   ANNE GOODMAN MD    History of MRI of cervical spine 6/2020 3/15/2021    Impression: Multilevel cervical spondylosis, most pronounced at the level of C4-5 and C5-6 with moderate to severe spinal canal stenosis and moderate spinal canal stenosis at C6-7. No abnormal cord signal. No significant neural foraminal narrowing at any level.   I have personally reviewed the examination and initial interpretation and I agree with the findings.   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 100 each 3    aspirin 81 MG EC tablet Take 81 mg by mouth daily      atorvastatin (LIPITOR) 20 MG tablet Take 1 tablet (20 mg) by mouth at bedtime 90 tablet 3    blood glucose (NO BRAND SPECIFIED) test strip Use to test blood sugar 3 times daily or as directed. To  accompany: Blood Glucose Monitor Brands: per insurance. (Patient not taking: Reported on 4/18/2024) 100 strip 6    blood glucose calibration (NO BRAND SPECIFIED) solution To accompany: Blood Glucose Monitor Brands: per insurance. (Patient not taking: Reported on 4/18/2024) 4 each 0    blood glucose monitoring (NO BRAND SPECIFIED) meter device kit Use to test blood sugar 3 times daily or as directed. Preferred blood glucose meter OR supplies to accompany: Blood Glucose Monitor Brands: per insurance. (Patient not taking: Reported on 4/18/2024) 1 kit 0    cholestyramine (QUESTRAN) 4 g packet Take 1 packet (4 g) by mouth 3 times daily (with meals) (Patient not taking: Reported on 4/18/2024) 90 packet 4    Continuous Blood Gluc  (FREESTYLE SEGUNDO 2 READER) MARCIA Use to read blood sugars as per 's instructions. 1 each 0    Continuous Blood Gluc Sensor (FREESTYLE SEGUNDO 3 SENSOR) MISC 1 each every 14 days Use 1 sensor every 14 days. Use to read blood sugars per 's instructions. 6 each 5    Continuous Blood Gluc Sensor (FREESTYLE SEGUNDO 3 SENSOR) MISC 1 each every 14 days 6 each 5    empagliflozin (JARDIANCE) 25 MG TABS tablet Take 1 tablet (25 mg) by mouth daily 90 tablet 3    insulin glargine (LANTUS SOLOSTAR) 100 UNIT/ML pen Inject 40 Units Subcutaneous every morning Increase dose by 2 units every three days until fasting blood sugar are  mg/dL. Max 40 units/day 45 mL 1    insulin pen needle (FIFTY50 PEN NEEDLES) 32G X 4 MM miscellaneous Use one pen needle daily or as directed. 100 each 2    LENalidomide (REVLIMID) 10 MG CAPS capsule Take 1 capsule (10 mg) by mouth daily 28 capsule 0    levothyroxine (SYNTHROID/LEVOTHROID) 200 MCG tablet Take 1 tablet (200 mcg) by mouth every morning (before breakfast) 90 tablet 3    loperamide (IMODIUM A-D) 2 MG tablet Take 1 tablet (2 mg) by mouth daily as needed for diarrhea Start Imodium 2 pills with first loose stool and then 1 pill with each loose  stool after, aiming for 1-2 stools per day. Max of 8 pills a day. 180 tablet 3    losartan (COZAAR) 25 MG tablet Take 1 tablet (25 mg) by mouth daily 90 tablet 3    mupirocin (BACTROBAN) 2 % external ointment Apply topically 3 times daily 15 g 1    neomycin-polymyxin-hydrocortisone (CORTISPORIN) 3.5-97493-0 otic solution Place 3 drops in ear(s) 4 times daily 10 mL 0    pregabalin (LYRICA) 100 MG capsule Take 1 capsule (100 mg) by mouth 2 times daily 180 capsule 3    propranolol ER (INDERAL LA) 60 MG 24 hr capsule Take 1 capsule (60 mg) by mouth daily 90 capsule 3    semaglutide (OZEMPIC) 2 MG/3ML pen Inject 0.25 mg Subcutaneous every 7 days X 4 weeks, then increase to 0.5 mg weekly. 3 mL 1    sertraline (ZOLOFT) 100 MG tablet Take 1 tablet (100 mg) by mouth daily 90 tablet 3       Video physical exam  General: Patient appears well in no acute distress.   Skin: No visualized rash or lesions on visualized skin  Eyes: EOMI, no erythema, sclera icterus or discharge noted  Resp: Appears to be breathing comfortably without accessory muscle usage, speaking in full sentences, no cough  MSK: Appears to have normal range of motion based on visualized movements  Neurologic: No apparent tremors, facial movements symmetric  Psych: affect bright, alert and oriented          Labs:     Blood Counts       Recent Labs   Lab Test 07/16/24  1606 06/11/24  1556 05/14/24  1023   HGB 14.7 14.3 14.4   HCT 44.8 43.1 43.6   WBC 3.6* 3.2* 3.1*   ANEUTAUTO 2.2 1.8 1.8   ALYMPAUTO 0.7* 0.7* 0.6*   AMONOAUTO 0.3 0.4 0.4   AEOSAUTO 0.3 0.2 0.2   ABSBASO 0.1 0.1 0.1   NRBCMAN 0.0 0.0 0.0   * 108* 101*       ABO/RH    No lab results found.      Chemistries     Basic Panel  Recent Labs   Lab Test 07/16/24  1606 06/11/24  1556 05/14/24  1023    139 139   POTASSIUM 4.2 4.1 4.5   CHLORIDE 101 105 104   CO2 27 23 24   BUN 17.3 15.2 13.8   CR 0.66 0.68 0.71   * 194* 178*        Calcium, Magnesium, Phosphorus  Recent Labs   Lab Test  07/16/24  1606 06/11/24  1556 05/14/24  1023   ASHLEY 9.4 9.0 9.1        LFTs  Recent Labs   Lab Test 07/16/24  1606 06/11/24  1556 05/14/24  1023   BILITOTAL 1.4* 1.4* 1.8*   ALKPHOS 114 115 118   AST 21 26 17   ALT 44 70* 30   ALBUMIN 4.5 4.6 4.4       LDH  No lab results found.    B2-Microglobulin  Recent Labs   Lab Test 02/18/20  0849   SHOX5IPCY 1.6           Immunoglobulins     Recent Labs   Lab Test 07/16/24  1606 04/16/24  0912 03/14/24  1305 02/20/24  1026 01/22/24  1017 12/20/23  1026   * 464* 558* 495* 602* 590*       Recent Labs   Lab Test 07/16/24  1606 04/16/24  0912 03/14/24  1305 02/20/24  1026 01/22/24  1017 12/20/23  1026    107 136 131 130 139       Recent Labs   Lab Test 07/16/24  1606 04/16/24  0912 03/14/24  1305 02/20/24  1026 01/22/24  1017 12/20/23  1026   IGM 37 34* 41 <10* 42 41         Monocloncal Protein Studies     M spike    Recent Labs   Lab Test 07/16/24  1606 06/11/24  1556 05/14/24  1023 04/16/24  0912 03/14/24  1305 02/20/24  1026   ELPM 0.0 0.0 0.0 0.0 0.0 0.0       Coward FLC    Recent Labs   Lab Test 07/16/24  1606 04/16/24  0912 03/14/24  1305 02/20/24  1026 01/22/24  1017 12/20/23  1026   KFLCA 1.85 1.57 2.79* 2.45* 2.65* 2.34*       Lambda FLC    Recent Labs   Lab Test 07/16/24  1606 04/16/24  0912 03/14/24  1305 02/20/24  1026 01/22/24  1017 12/20/23  1026   LFLCA 1.36 1.30 1.54 1.60 1.74 1.56       FLC Ratio    Recent Labs   Lab Test 07/16/24  1606 04/16/24  0912 03/14/24  1305 02/20/24  1026 01/22/24  1017 12/20/23  1026   KLRA 1.36 1.21 1.81* 1.53 1.52 1.50       Assessment/plan:   Winter Loya is a 68 year old woman with standard risk IgG kappa multiple myeloma, s/p post auto-transplant in April 2019,  currently on lenalidomide maintenance. She remains in remission by FLC, EBONIE, SPEP. ASA 81 mg for dvt ppx    Persistently elevated bilirubin led to hepatology consult in October 2023.  Determination was that this is most likely fatty liver disease.  Recommendation  was to continue to follow with PCP for management of obesity, DM2, and HTN.    Diarrhea improved after starting cholestyramine with meals TID.      I will place a social work to discuss concerns about losing her insurance at the end of the month.    She will see her PCP later this month to assess dry patches on her skin.    Continue monthly labs and video visits.    20 minutes spent on the date of the encounter doing chart review, review of test results, interpretation of tests, patient visit, and documentation     The longitudinal plan of care for the diagnosis(es)/condition(s) as documented were addressed during this visit. Due to the added complexity in care, I will continue to support Winter in the subsequent management and with ongoing continuity of care.    FALGUNI Fink [facility]  [address] [place] [id number] [phone]

## 2024-08-08 ENCOUNTER — LAB (OUTPATIENT)
Dept: LAB | Facility: CLINIC | Age: 69
End: 2024-08-08
Payer: COMMERCIAL

## 2024-08-08 DIAGNOSIS — C90.01 MULTIPLE MYELOMA IN REMISSION (H): ICD-10-CM

## 2024-08-08 LAB
ALBUMIN SERPL BCG-MCNC: 4.7 G/DL (ref 3.5–5.2)
ALP SERPL-CCNC: 125 U/L (ref 40–150)
ALT SERPL W P-5'-P-CCNC: 41 U/L (ref 0–50)
ANION GAP SERPL CALCULATED.3IONS-SCNC: 12 MMOL/L (ref 7–15)
AST SERPL W P-5'-P-CCNC: 32 U/L (ref 0–45)
BASOPHILS # BLD AUTO: 0.1 10E3/UL (ref 0–0.2)
BASOPHILS NFR BLD AUTO: 2 %
BILIRUB SERPL-MCNC: 2.4 MG/DL
BUN SERPL-MCNC: 14.8 MG/DL (ref 8–23)
CALCIUM SERPL-MCNC: 8.7 MG/DL (ref 8.8–10.4)
CHLORIDE SERPL-SCNC: 105 MMOL/L (ref 98–107)
CREAT SERPL-MCNC: 0.83 MG/DL (ref 0.51–0.95)
EGFRCR SERPLBLD CKD-EPI 2021: 77 ML/MIN/1.73M2
EOSINOPHIL # BLD AUTO: 0.2 10E3/UL (ref 0–0.7)
EOSINOPHIL NFR BLD AUTO: 7 %
ERYTHROCYTE [DISTWIDTH] IN BLOOD BY AUTOMATED COUNT: 14.2 % (ref 10–15)
GLUCOSE SERPL-MCNC: 160 MG/DL (ref 70–99)
HCO3 SERPL-SCNC: 23 MMOL/L (ref 22–29)
HCT VFR BLD AUTO: 44.8 % (ref 35–47)
HGB BLD-MCNC: 15.2 G/DL (ref 11.7–15.7)
IMM GRANULOCYTES # BLD: 0 10E3/UL
IMM GRANULOCYTES NFR BLD: 0 %
LYMPHOCYTES # BLD AUTO: 0.7 10E3/UL (ref 0.8–5.3)
LYMPHOCYTES NFR BLD AUTO: 20 %
MCH RBC QN AUTO: 30.1 PG (ref 26.5–33)
MCHC RBC AUTO-ENTMCNC: 33.9 G/DL (ref 31.5–36.5)
MCV RBC AUTO: 89 FL (ref 78–100)
MONOCYTES # BLD AUTO: 0.3 10E3/UL (ref 0–1.3)
MONOCYTES NFR BLD AUTO: 7 %
NEUTROPHILS # BLD AUTO: 2.3 10E3/UL (ref 1.6–8.3)
NEUTROPHILS NFR BLD AUTO: 64 %
PLATELET # BLD AUTO: 126 10E3/UL (ref 150–450)
POTASSIUM SERPL-SCNC: 4.2 MMOL/L (ref 3.4–5.3)
PROT SERPL-MCNC: 7 G/DL (ref 6.4–8.3)
RBC # BLD AUTO: 5.05 10E6/UL (ref 3.8–5.2)
SODIUM SERPL-SCNC: 140 MMOL/L (ref 135–145)
TOTAL PROTEIN SERUM FOR ELP: 6.4 G/DL (ref 6.4–8.3)
WBC # BLD AUTO: 3.6 10E3/UL (ref 4–11)

## 2024-08-08 PROCEDURE — 84165 PROTEIN E-PHORESIS SERUM: CPT | Performed by: PATHOLOGY

## 2024-08-08 PROCEDURE — 84155 ASSAY OF PROTEIN SERUM: CPT

## 2024-08-08 PROCEDURE — 36415 COLL VENOUS BLD VENIPUNCTURE: CPT

## 2024-08-08 PROCEDURE — 80053 COMPREHEN METABOLIC PANEL: CPT

## 2024-08-08 PROCEDURE — 85025 COMPLETE CBC W/AUTO DIFF WBC: CPT

## 2024-08-09 LAB
ALBUMIN SERPL ELPH-MCNC: 4.4 G/DL (ref 3.7–5.1)
ALPHA1 GLOB SERPL ELPH-MCNC: 0.2 G/DL (ref 0.2–0.4)
ALPHA2 GLOB SERPL ELPH-MCNC: 0.5 G/DL (ref 0.5–0.9)
B-GLOBULIN SERPL ELPH-MCNC: 0.7 G/DL (ref 0.6–1)
GAMMA GLOB SERPL ELPH-MCNC: 0.5 G/DL (ref 0.7–1.6)
M PROTEIN SERPL ELPH-MCNC: 0 G/DL
PROT PATTERN SERPL ELPH-IMP: ABNORMAL

## 2024-08-12 ENCOUNTER — PATIENT OUTREACH (OUTPATIENT)
Dept: CARE COORDINATION | Facility: CLINIC | Age: 69
End: 2024-08-12
Payer: MEDICAID

## 2024-08-12 NOTE — PROGRESS NOTES
Social Work - Follow-Up  [facility]    Data/Intervention:    Patient Name: Winter Loya Goes By: Winter CORTEZ/Age: 1955 (68 year old)    Reason for Follow-Up: Insurance    Collaborated With:    -Patient     Intervention/Education/Resources Provided:  Patient brought up concern during provider visit about their MA insurance at the end of this month. Patient reported that they completed a new MA application and turned it in last Thursday (24). Patient informed their financial worker through the [facility] and understands this can take a couple of weeks to process. If patient experiences a lapse in insurance SW encouraged patient to connect with the Billing Department who can provide assistance with a financial assistance application. Patient is also interested but hesitant to start working. They are not sure what is available to them. SW encouraged patient to connect with [facility] for assistance.    Assessment/Plan:  Patient knows to wait to hear back from [facility] and financial worker about MA application. Patient also connected with [employer] about possible Advantage plans in the event that they do not qualify for MA. Patient knows how to get in touch with SW as needed. Previously provided patient/family with writer's contact information and availability.      AMY Young,CHERISE  Hematology/Oncology Social Worker  Phone:[phone] Pager: [phone]

## 2024-08-14 DIAGNOSIS — C90.01 MULTIPLE MYELOMA IN REMISSION (H): ICD-10-CM

## 2024-08-14 RX ORDER — ACYCLOVIR 400 MG/1
TABLET ORAL
Qty: 180 TABLET | Refills: 3 | Status: SHIPPED | OUTPATIENT
Start: 2024-08-14

## 2024-08-19 ENCOUNTER — VIRTUAL VISIT (OUTPATIENT)
Dept: PHARMACY | Facility: CLINIC | Age: 69
End: 2024-08-19
Payer: COMMERCIAL

## 2024-08-19 DIAGNOSIS — Z78.9 TAKES DIETARY SUPPLEMENTS: ICD-10-CM

## 2024-08-19 DIAGNOSIS — Z79.4 TYPE 2 DIABETES MELLITUS WITH DIABETIC POLYNEUROPATHY, WITH LONG-TERM CURRENT USE OF INSULIN (H): Primary | ICD-10-CM

## 2024-08-19 DIAGNOSIS — R19.7 DIARRHEA, UNSPECIFIED TYPE: ICD-10-CM

## 2024-08-19 DIAGNOSIS — J30.9 ALLERGIC RHINITIS, UNSPECIFIED SEASONALITY, UNSPECIFIED TRIGGER: ICD-10-CM

## 2024-08-19 DIAGNOSIS — E11.42 TYPE 2 DIABETES MELLITUS WITH DIABETIC POLYNEUROPATHY, WITH LONG-TERM CURRENT USE OF INSULIN (H): Primary | ICD-10-CM

## 2024-08-19 PROCEDURE — 99606 MTMS BY PHARM EST 15 MIN: CPT | Mod: 93 | Performed by: PHARMACIST

## 2024-08-19 PROCEDURE — 99607 MTMS BY PHARM ADDL 15 MIN: CPT | Mod: 93 | Performed by: PHARMACIST

## 2024-08-19 RX ORDER — LORATADINE 10 MG/1
10 TABLET ORAL DAILY
COMMUNITY

## 2024-08-19 RX ORDER — FLUTICASONE PROPIONATE 50 MCG
2 SPRAY, SUSPENSION (ML) NASAL DAILY
COMMUNITY

## 2024-08-19 NOTE — Clinical Note
CE DAWSON note, thanks!  Vonnie Corrigan, PharmD Medication Therapy Management Pharmacist [phone]

## 2024-08-19 NOTE — PATIENT INSTRUCTIONS
"Recommendations from today's MTM visit:                                                         Ok to restart the cholestyramine once daily.   Increase Ozempic to 1 mg weekly.    Start Flonase 2 sprays in each nostril (takes 2 weeks for full benefit).     Follow-up: Return in about 4 weeks (around 9/16/2024) for Medication Therapy Management.    It was great speaking with you today.  I value your experience and would be very thankful for your time in providing feedback in our clinic survey. In the next few days, you may receive an email or text message from [employer] with a link to a survey related to your  clinical pharmacist.\"     To schedule another MTM appointment, please call the clinic directly or you may call the MTM scheduling line at [phone] or toll-free at [phone].     My Clinical Pharmacist's contact information:                                                      Please feel free to contact me with any questions or concerns you have.      Vonnie Corrigan, PharmD  Medication Therapy Management Pharmacist     "

## 2024-08-19 NOTE — PROGRESS NOTES
Medication Therapy Management (MTM) Encounter    ASSESSMENT:                            Medication Adherence/Access: No issues identified    Diabetes: Patient's A1c is not at goal of < 7%. Time in target range is not at goal of > 70%. Discussed shouldn't eliminate carbohydrates completely, may benefit from increasing Ozepmic.    Diarrhea: May benefit from restarting cholestyramine at low dose, as well as increasing Ozempic.    Supplements: Discussed hasn't had a B12 level, unclear if would need supplementation, no indication at this time however.       Allergic Rhinitis: May benefit from addition of Flonase.    PLAN:                            Ok to restart the cholestyramine once daily.   Increase Ozempic to 1 mg weekly.    Start Flonase 2 sprays in each nostril (takes 2 weeks for full benefit).     Follow-up: Return in about 4 weeks (around 9/16/2024) for Medication Therapy Management.    SUBJECTIVE/OBJECTIVE:                          Winter Loya is a 68 year old female seen for a follow-up visit.       Reason for visit: med review.    Allergies/ADRs: Reviewed in chart  Past Medical History: Reviewed in chart  Tobacco: She reports that she quit smoking about 19 years ago. Her smoking use included cigarettes. She has never used smokeless tobacco.  Alcohol: rare, ~3x/year    Medication Adherence/Access: She states she might loose her insurance, but has a couple people working with her on this. Has medicare. She has contact info for [employer].    Diabetes /Type 2 Diabetes/Obesity:  Lantus 40 units every morning   Jardiance 25 mg daily   Ozempic 0.5 mg weekly x 1 month  Aspirin: Taking 81mg daily for primary prevention (possible secondary prevention)    A little nausea, still has the diarrhea (has at baseline). She'd like to increase the Ozempic again.  Diet/Exercise: carbohydrates are her weakness, she states she shouldn't be eating bread or cereal.  Blood sugar monitoring: Анна 3     Patient reports she  has started taking Ozempic, she is wondering if this caused her constipation. This constipation happened while she was taking her cholestyramine.   Lab Results   Component Value Date    A1C 8.2 05/14/2024     Diarrhea:   Loperamide 4 mg every morning as needed  Cholestyramine 4 g three times daily was too much    Patient reports when she put cholestyramine in hot tea, it worked better for her taste wise. She was doing this and got very constipated and could not have a bowel movement so it was discontinued. The last couple of days the diarrhea has been really bad and she's thinking about restarting it just once a day maybe - she has discussed with Oncology and provider thought that would be alright to try. She'll start working [place] [employer] this week for 12 straight days, would like to not have the diarrhea.    Supplements:   Vitamin B12 - she's wondering if she could start this, it came on accident in her order for OTCs this month, [facility] brand she thinks.      Allergic Rhinitis:   Clairitin 10 mg daily - didn't help, took for 3-4 days.    Patient reports no current medication side effects.    Primary symptoms are runny nose and itchy/watery eyes.   Patient feels that current therapy is not effective.     Today's Vitals: There were no vitals taken for this visit.  ----------------      I spent 29 minutes with this patient today. All changes were made via collaborative practice agreement with Montse Bucio PA-C. A copy of the visit note was provided to the patient's provider(s).    A summary of these recommendations was sent via [employer] (P).    Vonnie Corrigan, ShelleyD  Medication Therapy Management Pharmacist    Telemedicine Visit Details  Type of service:  Telephone visit  Start Time: 12:00 PM  End Time: 12:29 PM     Medication Therapy Recommendations  Allergic rhinitis    Current Medication: loratadine (CLARITIN) 10 MG tablet   Rationale: Synergistic therapy - Needs additional medication therapy - Indication    Recommendation: Start Medication - FLONASE ALLERGY RELIEF NA   Status: Patient Agreed - Adherence/Education         Type 2 diabetes mellitus with diabetic polyneuropathy, with long-term current use of insulin (H)    Current Medication: semaglutide (OZEMPIC) 2 MG/3ML pen (Discontinued)   Rationale: Dose too low - Dosage too low - Effectiveness   Recommendation: Increase Dose   Status: Accepted per CPA

## 2024-08-21 ENCOUNTER — TELEPHONE (OUTPATIENT)
Dept: ONCOLOGY | Facility: CLINIC | Age: 69
End: 2024-08-21
Payer: MEDICAID

## 2024-08-21 DIAGNOSIS — C90.01 MULTIPLE MYELOMA IN REMISSION (H): Primary | ICD-10-CM

## 2024-08-21 RX ORDER — LENALIDOMIDE 10 MG/1
10 CAPSULE ORAL DAILY
Qty: 28 CAPSULE | Refills: 0 | Status: SHIPPED | OUTPATIENT
Start: 2024-08-21 | End: 2024-10-02

## 2024-08-21 NOTE — TELEPHONE ENCOUNTER
Oral Chemotherapy Monitoring Program    Medication: Revlimid  Rx:  10 mg PO daily for a 28 day cycle    Auth #: [id number]  Risk Category: Adult female NOT of reproductive capacity    Routine survey questions reviewed.    Thank you,    Mary Franklin  Oncology Pharmacy Liaison LUCINDA [email or web address]  Phone: [phone]  Fax: [phone]

## 2024-08-22 ENCOUNTER — DOCUMENTATION ONLY (OUTPATIENT)
Dept: ONCOLOGY | Facility: CLINIC | Age: 69
End: 2024-08-22

## 2024-08-22 ENCOUNTER — OFFICE VISIT (OUTPATIENT)
Dept: FAMILY MEDICINE | Facility: CLINIC | Age: 69
End: 2024-08-22
Payer: COMMERCIAL

## 2024-08-22 VITALS
BODY MASS INDEX: 37.53 KG/M2 | RESPIRATION RATE: 12 BRPM | HEART RATE: 60 BPM | HEIGHT: 69 IN | OXYGEN SATURATION: 98 % | SYSTOLIC BLOOD PRESSURE: 113 MMHG | DIASTOLIC BLOOD PRESSURE: 65 MMHG | WEIGHT: 253.4 LBS | TEMPERATURE: 97.9 F

## 2024-08-22 DIAGNOSIS — E11.42 TYPE 2 DIABETES MELLITUS WITH DIABETIC POLYNEUROPATHY, WITH LONG-TERM CURRENT USE OF INSULIN (H): Primary | ICD-10-CM

## 2024-08-22 DIAGNOSIS — L30.9 DERMATITIS: ICD-10-CM

## 2024-08-22 DIAGNOSIS — Z79.4 TYPE 2 DIABETES MELLITUS WITH DIABETIC POLYNEUROPATHY, WITH LONG-TERM CURRENT USE OF INSULIN (H): Primary | ICD-10-CM

## 2024-08-22 PROCEDURE — G2211 COMPLEX E/M VISIT ADD ON: HCPCS | Performed by: PHYSICIAN ASSISTANT

## 2024-08-22 PROCEDURE — 99214 OFFICE O/P EST MOD 30 MIN: CPT | Performed by: PHYSICIAN ASSISTANT

## 2024-08-22 RX ORDER — TRIAMCINOLONE ACETONIDE 5 MG/G
CREAM TOPICAL 2 TIMES DAILY
Qty: 60 G | Refills: 2 | Status: SHIPPED | OUTPATIENT
Start: 2024-08-22

## 2024-08-22 ASSESSMENT — ANXIETY QUESTIONNAIRES
1. FEELING NERVOUS, ANXIOUS, OR ON EDGE: NEARLY EVERY DAY
8. IF YOU CHECKED OFF ANY PROBLEMS, HOW DIFFICULT HAVE THESE MADE IT FOR YOU TO DO YOUR WORK, TAKE CARE OF THINGS AT HOME, OR GET ALONG WITH OTHER PEOPLE?: VERY DIFFICULT
6. BECOMING EASILY ANNOYED OR IRRITABLE: NOT AT ALL
7. FEELING AFRAID AS IF SOMETHING AWFUL MIGHT HAPPEN: NEARLY EVERY DAY
GAD7 TOTAL SCORE: 12
GAD7 TOTAL SCORE: 12
4. TROUBLE RELAXING: SEVERAL DAYS
3. WORRYING TOO MUCH ABOUT DIFFERENT THINGS: NEARLY EVERY DAY
2. NOT BEING ABLE TO STOP OR CONTROL WORRYING: MORE THAN HALF THE DAYS
7. FEELING AFRAID AS IF SOMETHING AWFUL MIGHT HAPPEN: NEARLY EVERY DAY
GAD7 TOTAL SCORE: 12
5. BEING SO RESTLESS THAT IT IS HARD TO SIT STILL: NOT AT ALL
IF YOU CHECKED OFF ANY PROBLEMS ON THIS QUESTIONNAIRE, HOW DIFFICULT HAVE THESE PROBLEMS MADE IT FOR YOU TO DO YOUR WORK, TAKE CARE OF THINGS AT HOME, OR GET ALONG WITH OTHER PEOPLE: VERY DIFFICULT

## 2024-08-22 ASSESSMENT — PATIENT HEALTH QUESTIONNAIRE - PHQ9
10. IF YOU CHECKED OFF ANY PROBLEMS, HOW DIFFICULT HAVE THESE PROBLEMS MADE IT FOR YOU TO DO YOUR WORK, TAKE CARE OF THINGS AT HOME, OR GET ALONG WITH OTHER PEOPLE: VERY DIFFICULT
SUM OF ALL RESPONSES TO PHQ QUESTIONS 1-9: 18
SUM OF ALL RESPONSES TO PHQ QUESTIONS 1-9: 18

## 2024-08-22 NOTE — PROGRESS NOTES
"  Assessment & Plan     Type 2 diabetes mellitus with diabetic polyneuropathy, with long-term current use of insulin (H)  Recheck at upcoming lab appt.  - HEMOGLOBIN A1C; Future    Dermatitis  Persistent itchy areas on arms and legs. Have been scratched and picked at so difficult to fully assess. Encouraged her to start allergy medication. And to start steroid cream. Follow up with dermatology if not improving.   - Adult Dermatology  Referral; Future  - triamcinolone (ARISTOCORT HP) 0.5 % external cream; Apply topically 2 times daily.          BMI  Estimated body mass index is 37.4 kg/m  as calculated from the following:    Height as of this encounter: 1.753 m (5' 9.02\").    Weight as of this encounter: 114.9 kg (253 lb 6.4 oz).   Weight management plan: Discussed healthy diet and exercise guidelines    Depression Screening Follow Up        8/22/2024     7:14 AM   PHQ   PHQ-9 Total Score 18   Q9: Thoughts of better off dead/self-harm past 2 weeks More than half the days   F/U: Thoughts of suicide or self-harm No   F/U: Safety concerns No                     Follow Up Actions Taken  Crisis resource information provided in the After Visit Summary    Discussed the following ways the patient can remain in a safe environment:  be around others  The longitudinal plan of care for the diagnosis(es)/condition(s) as documented were addressed during this visit. Due to the added complexity in care, I will continue to support Winter in the subsequent management and with ongoing continuity of care.          Subjective   Winter is a 68 year old, presenting for the following health issues:  Derm Problem (Dry patches all over body x ongoing and getting worse ), Diabetes, Hypertension, and Lipids      8/22/2024     7:34 AM   Additional Questions   Roomed by An V.         8/22/2024     7:34 AM   Patient Reported Additional Medications   Patient reports taking the following new medications none     History of Present Illness "       Reason for visit:  Skin marks   She is taking medications regularly.         Diabetes Follow-up    How often are you checking your blood sugar? Continuous glucose monitor  What time of day are you checking your blood sugars (select all that apply)?  Not applicable  Have you had any blood sugars above 200?  No  Have you had any blood sugars below 70?  No  What symptoms do you notice when your blood sugar is low?  Weak  What concerns do you have today about your diabetes? None   Do you have any of these symptoms? (Select all that apply)  Numbness in feet, Burning in feet, Redness, sores, or blisters on feet, Excessive thirst, and Blurry vision          Hyperlipidemia Follow-Up    Are you regularly taking any medication or supplement to lower your cholesterol?   Yes- taking Atorvastatin 20 mg daily  Are you having muscle aches or other side effects that you think could be caused by your cholesterol lowering medication?  No    Hypertension Follow-up    Do you check your blood pressure regularly outside of the clinic? No   Are you following a low salt diet? No  Are your blood pressures ever more than 140 on the top number (systolic) OR more   than 90 on the bottom number (diastolic), for example 140/90? No    BP Readings from Last 2 Encounters:   08/22/24 113/65   04/18/24 115/66     Hemoglobin A1C (%)   Date Value   05/14/2024 8.2 (H)   01/22/2024 8.1 (H)   03/08/2021 7.6 (H)   12/07/2020 8.8 (H)     LDL Cholesterol Calculated   Date Value   03/14/2024 34 mg/dL   01/22/2024      Comment:     Cannot estimate LDL when triglyceride exceeds 400 mg/dL   01/27/2021 46 mg/dL     LDL Cholesterol Direct (mg/dL)   Date Value   01/22/2024 33         Depression and Anxiety   How are you doing with your depression since your last visit? No change  How are you doing with your anxiety since your last visit?  No change  Are you having other symptoms that might be associated with depression or anxiety? Yes:  no energy and sad    Have you had a significant life event? Relationship Concerns, Job Concerns, and Health Concerns   Do you have any concerns with your use of alcohol or other drugs? No    Social History     Tobacco Use    Smoking status: Former     Current packs/day: 0.00     Types: Cigarettes     Quit date:      Years since quittin.6    Smokeless tobacco: Never   Vaping Use    Vaping status: Never Used   Substance Use Topics    Alcohol use: Not Currently     Comment: occasional    Drug use: Never         3/14/2024    11:26 AM 2024     2:35 PM 2024     7:14 AM   PHQ   PHQ-9 Total Score 18 20 18   Q9: Thoughts of better off dead/self-harm past 2 weeks Several days More than half the days More than half the days   F/U: Thoughts of suicide or self-harm No Yes No   F/U: Self harm-plan  No    F/U: Self-harm action  No    F/U: Safety concerns No No No         3/14/2024    11:11 AM 3/14/2024    11:27 AM 2024     7:15 AM   NA-7 SCORE   Total Score  11 (moderate anxiety) 12 (moderate anxiety)   Total Score 11 11 12               Follow Up Actions Taken  Crisis resource information provided in the After Visit Summary  Referred patient back to mental health provider     Discussed the following ways the patient can remain in a safe environment:  be around others  Suicide Assessment Five-step Evaluation and Treatment (SAFE-T)    Hypothyroidism Follow-up    Since last visit, patient describes the following symptoms: dry skin, constipation, loose stools, tremors, anxiety, depression, fatigue, and hair loss  How many servings of fruits and vegetables do you eat daily?  2-3  On average, how many sweetened beverages do you drink each day (Examples: soda, juice, sweet tea, etc.  Do NOT count diet or artificially sweetened beverages)?   0  How many days per week do you exercise enough to make your heart beat faster? 3 or less  How many minutes a day do you exercise enough to make your heart beat faster? 20 - 29  How many days  "per week do you miss taking your medication? 0            Objective    /65   Pulse 60   Temp 97.9  F (36.6  C) (Oral)   Resp 12   Ht 1.753 m (5' 9.02\")   Wt 114.9 kg (253 lb 6.4 oz)   SpO2 98%   BMI 37.40 kg/m    Body mass index is 37.4 kg/m .  Physical Exam   GENERAL: alert and no distress  SKIN: several scaly erythematous patches on arms, lower legs            Signed Electronically by: Montse Bucio PA-C    "

## 2024-08-22 NOTE — PROGRESS NOTES
Winter Loya has an upcoming lab appointment:    Future Appointments   Date Time Provider Department Center   9/3/2024 10:45 AM FZ LAB [facility]   9/3/2024  3:00 PM Ben Lizama LP [facility]   9/4/2024 12:30 PM Ssentongo, Hyun, APRN [facility]   9/18/2024 12:30 PM Anjelica Corrigan, [facility]   10/1/2024 10:30 AM FZ LAB [facility]   10/2/2024  2:30 PM Ssentongo, Hyun, APRN [facility]   10/29/2024 10:30 AM FZ LAB [facility]   10/30/2024  1:30 PM Ssentongo, Hyun, APRN [facility]   11/26/2024 10:30 AM FZ LAB [facility]   11/27/2024  2:30 PM Ssentongo, Hyun, APRN [facility]   12/23/2024 10:30 AM FZ LAB [facility]   12/24/2024 11:00 AM Ssentongo, Hyun, APRN [facility]   3/26/2025  3:15 PM Meghana Das PA-C [facility]   3/28/2025  2:30 PM Montse Bucio PA-C [facility]   4/17/2025 12:10 PM Gabriel Santoyo MD [facility]     Patient is scheduled for the following lab(s):     There is no order available. Please review and place either future orders or HMPO (Review of Health Maintenance Protocol Orders), as appropriate.    Health Maintenance Due   Topic    ANNUAL REVIEW OF HM ORDERS     A1C      Myriam Stevens

## 2024-08-25 ENCOUNTER — HEALTH MAINTENANCE LETTER (OUTPATIENT)
Age: 69
End: 2024-08-25

## 2024-09-03 ENCOUNTER — VIRTUAL VISIT (OUTPATIENT)
Dept: ONCOLOGY | Facility: HOSPITAL | Age: 69
End: 2024-09-03
Attending: PSYCHOLOGIST
Payer: MEDICAID

## 2024-09-03 VITALS — HEIGHT: 69 IN | BODY MASS INDEX: 37.03 KG/M2 | WEIGHT: 250 LBS

## 2024-09-03 DIAGNOSIS — F40.00 AGORAPHOBIA, UNSPECIFIED: ICD-10-CM

## 2024-09-03 DIAGNOSIS — F33.1 MAJOR DEPRESSIVE DISORDER, RECURRENT EPISODE, MODERATE (H): Primary | ICD-10-CM

## 2024-09-03 PROCEDURE — 90834 PSYTX W PT 45 MINUTES: CPT | Mod: 95 | Performed by: PSYCHOLOGIST

## 2024-09-03 ASSESSMENT — PAIN SCALES - GENERAL: PAINLEVEL: MODERATE PAIN (4)

## 2024-09-03 NOTE — NURSING NOTE
Current patient location: [address]  [facility] [address]    Is the patient currently in the state of [place]? YES    Visit mode:VIDEO    If the visit is dropped, the patient can be reconnected by: VIDEO VISIT: Text to cell phone:   Telephone Information:   Mobile [phone]       Will anyone else be joining the visit? NO  (If patient encounters technical issues they should call [phone]    How would you like to obtain your AVS? MyChart    Are changes needed to the allergy or medication list? Pt stated no changes to allergies and Pt stated no med changes    Are refills needed on medications prescribed by this physician? NO    Rooming Documentation:  Questionnaire(s) not done per department protocol Not applicable      Reason for visit: CARYN GUARDADOF

## 2024-09-03 NOTE — PROGRESS NOTES
[facility]- Psychotherapy                                     Progress Note     Patient Name: Winter Loya                      Date:   9/3/2024                                           Service Type: Individual                            Session Start Time:  150             Session End Time:    1553                Session Length:        46 mins     Attendees:     Client attended alone     Service Modality:  Video Visit:      Provider verified identity through the following two step process.  Patient provided:  Patient      Telemedicine Visit: The patient's condition can be safely assessed and treated via synchronous audio and visual telemedicine encounter.       Reason for Telemedicine Visit: Patient has requested telehealth visit     Originating Site (Patient Location): Patient's home     Distant Site (Provider Location): [facility] [place]     Consent:  The patient/guardian has verbally consented to: the potential risks and benefits of telemedicine (video visit) versus in person care; bill my insurance or make self-payment for services provided; and responsibility for payment of non-covered services.      Patient would like the video invitation sent by:  My Chart     Mode of Communication:  Video Conference via Amwell     Distant Location (Provider):  On-site     As the provider I attest to compliance with applicable laws and regulations related to telemedicine.     DATA  Interactive Complexity: No  Crisis: No                               Progress Since Last Session (Related to Symptoms / Goals / Homework):              Symptoms:  Pt reports she is not doing very good today. She reports she is exhausted.                 Pt reports she is tired and sad right now and overwhelmed.                  Pt reports variable mood and has a hx of MDD.                  Pt reports grief from the death of her son two years ago.                  Pt reports her sleep is  "adequate. She reports she is eating well.                  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 Pt thinks her foot being swollen is related to work.     Pt reports she has an apartment inspection in two days. She reports it is not ready.     Pt reports she is considering moving and needs to find a less expensive place. Pt reports she retired 2023. She is having trouble financially.  Pt reports her \"big goal\" is to get her finances in order.     Pt reports she may need to get her own place. She reports her son does not pay his part. Pt reports her other son has gone through a lot of jobs and pt is concerned.     Pt reports she has a few friends she goes out with, but generally wants to stay in doors and fears leaving.    Pt reports she has a brother who wants her to live with him in [place]. Pt reports she does not want to do that. Pts sister lives there too, but \"she hates me.\"                                         Social Hx  Pt reports she lives with one of her sons (38) who has \"tons of issues,\" He has job hopped, his 7th job in the past year. Pt describes him as emotionally abusive at times. He is a  who works on factory machines. He uses cannabis   regularly and has a hx of meth abuse.                  Pt rpeorts she lost a son two years ago who  from Leukemia at 38.                  Pt worked as a CD person for 13 years. She reports she was in [employer] for 35 years prior.                  Pt reports a son (44) who lives in [facility] [place] also. He is in the [employer]. He was in [place] X 4-5 tours.                  Pt reports all her sons have/had " depression.                  Pt is  since . She was  X 20 years. Pt reports he was cheating on her prior to the divorce.                  Pt has a social work degree with a CD minor.                  Pt reports she has a 15 year old grand daughter. Her  son's daughter. She lives in [place], [place].                  Treatment Objective(s) Addressed in This Session:          use at least 3 coping skills for anxiety management in the next 3 weeks  Decrease frequency and intensity of feeling down, depressed, hopeless.                  Intervention:              CBT: ,  Emotion Focused Therapy: ,  Solution Focused: ,     Assessments completed prior to visit:  none                    ASSESSMENT: Current Emotional / Mental Status (status of significant symptoms):              Risk status (Self / Other harm or suicidal ideation)              Patient denies current fears or concerns for personal safety.              Patient denies current or recent suicidal ideation or behaviors.              Patient denies current or recent homicidal ideation or behaviors.              Patient denies current or recent self injurious behavior or ideation.              Patient denies other safety concerns.              Patient reports there has been no change in risk factors since their last session.                Patient reports there has been no change in protective factors since their last session.                Recommended that patient call 911 or go to the local ED should there be a change in any of these risk factors.                 Appearance:                            Appropriate               Eye Contact:                           Good               Psychomotor Behavior:          Normal               Attitude:                                   Cooperative               Orientation:                             All              Speech                          Rate / Production:       Normal                            Volume:                       Normal               Mood:                                      Anxious  Depressed               Affect:                                      Appropriate               Thought Content:                    Clear               Thought Form:                        Coherent  Logical               Insight:                                     Good                  Medication Review:              No changes to current psychiatric medication(s)                 Medication Compliance:              Yes                 Changes in Health Issues:              Yes: Diabetes, Associated Psychological Distress                 Chemical Use Review:              Substance Use: Chemical use reviewed, no active concerns identified                  Tobacco Use: No current tobacco use.       Diagnosis:        Collateral Reports Completed:              Not Applicable     PLAN: (Patient Tasks / Therapist Tasks / Other)  Return in two weeks, utilize coping skills discussed.            Ben Lizama LP                                                           ______________________________________________________________________     Individual Treatment Plan     Patient's Name: Winter Loya                    YOB: 1955     Date of Creation: 6/18/2024     Date Treatment Plan Last Reviewed/Revised:      DSM5 Diagnoses: 296.32 (F33.1) Major Depressive Disorder, Recurrent Episode, Moderate _ or 300.22 (F40.00) Agoraphobia  Psychosocial / Contextual Factors: cancer dx  PROMIS (reviewed every 90 days):      Referral / Collaboration:  Referral to another professional/service is not indicated at this time..     Anticipated number of session for this episode of care: 9-12 sessions  Anticipation frequency of session: Biweekly  Anticipated Duration of each session: 53 or more minutes  Treatment plan will be reviewed in 90 days or when goals have been changed.         MeasurableTreatment Goal(s)  related to diagnosis / functional impairment(s)  Goal 1: Patient will improve depressive sx.      Objective #A (Patient Action)                          Patient will Decrease frequency and intensity of feeling down, depressed, hopeless.  Status: New - Date: 6/18/24       Intervention(s)  Therapist will teach emotional regulation skills.   .     Objective #B  Patient will Increase interest, engagement, and pleasure in doing things.  Status: New - Date: 6/18/24       Intervention(s)  Therapist will teach emotional regulation skills.   .        Goal 1: Patient will improve agorpahobia symptoms.        Objective #A (Patient Action)                          Patient will use at least 3 coping skills for anxiety management in the next 2 weeks.  Status: New - Date: 6/18/24      Intervention(s)  Therapist will teach emotional regulation skills. .     Objective #B  Patient will identify three distraction and diversion activities and use those activities to decrease level of anxiety  .  Status: New - Date: 6/8/24      Intervention(s)  Therapist will teach emotional regulation skills. , .     Patient has reviewed and agreed to the above plan.        Ben Lizama LP                    June 18, 2024

## 2024-09-03 NOTE — PROGRESS NOTES
Virtual Visit Details    Type of service:  Video Visit     Originating Location (pt. Location): Home    Distant Location (provider location):  On-site  Platform used for Video Visit: Radha

## 2024-09-04 ENCOUNTER — VIRTUAL VISIT (OUTPATIENT)
Dept: ONCOLOGY | Facility: CLINIC | Age: 69
End: 2024-09-04
Attending: STUDENT IN AN ORGANIZED HEALTH CARE EDUCATION/TRAINING PROGRAM
Payer: MEDICARE

## 2024-09-04 ENCOUNTER — PATIENT OUTREACH (OUTPATIENT)
Dept: GERIATRIC MEDICINE | Facility: CLINIC | Age: 69
End: 2024-09-04

## 2024-09-04 VITALS — BODY MASS INDEX: 37.03 KG/M2 | HEIGHT: 69 IN | WEIGHT: 250 LBS

## 2024-09-04 DIAGNOSIS — C90.01 MULTIPLE MYELOMA IN REMISSION (H): ICD-10-CM

## 2024-09-04 DIAGNOSIS — R60.0 EDEMA OF RIGHT FOOT: Primary | ICD-10-CM

## 2024-09-04 PROCEDURE — 99213 OFFICE O/P EST LOW 20 MIN: CPT | Mod: 95 | Performed by: REGISTERED NURSE

## 2024-09-04 PROCEDURE — G2211 COMPLEX E/M VISIT ADD ON: HCPCS | Mod: 95 | Performed by: REGISTERED NURSE

## 2024-09-04 ASSESSMENT — PAIN SCALES - GENERAL: PAINLEVEL: NO PAIN (0)

## 2024-09-04 NOTE — PROGRESS NOTES
"Virtual Visit Details    Type of service:  Video Visit   Video Start Time: 12:27 PM  Video End Time:12:40 PM    Originating Location (pt. Location): Home    Distant Location (provider location):  Off-site  Platform used for Video Visit: [facility]      ONCOLOGY/HEMATOLOGY PROGRESS NOTE  Sep 4, 2024    Reason for Visit: IgA Kappa Multiple Myeloma    Oncology HPI:   Winter Loya is a 68 year old woman with IgA Kappa Multiple Myeloma. In 2018 she had anemia and was fatigued. She was found to have IgA kappa monocloncal antibody with M-spike of 0.17. IgA elevated. Skeletal survey was unremarkable and UPEP had minimal protein (156 mg/m2). PET 11/2018 showed bone marrow consistent with hypermetabolic plasma cell myeloma. M spike was 0.17. She started RVD and Zometa. On 4/2019 she had an autologous transplant.      Her bone marrow bx from September 2019 was sent here for review. It showed marrow cellularity of 60%, with decreased trilineage hematopoiesis and 15% plasma cells as well as peripheral blood with slight anemia. 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 She is on revlimid maintenance and zometa from her prior oncologist in [place]. On 12/6/19 her K/L ratio is 1.1, M spike is 0.04.     Currently on Revlimid maintenance.      Interval history:   - Right foot has been swollen this past week.  [place]ed [place].  \"Feels like there is a bruise on the top of my foot when I press on it.\"  Swelling seems to get worse overnight, does not resolve with elevation.  - Denies new pain  - Has been taking her cholestyramine regularly and bowels are under good control  - Dry/itchy patches on her arms have improved after starting Claritin       Comprehensive ROS neg other than the symptoms noted above in the HPI.      Past Medical History: "   Diagnosis Date    Abnormal EMG 2021 5/25/2021    Interpretation: This is an abnormal study, demonstrating electrophysiologic evidence of a length-dependent axonal sensorimotor polyneuropathy. Asymmetry of peroneal compound muscle action potential amplitudes is a finding of uncertain significance.       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   I have personally reviewed the examination and initial interpretation and I agree with the findings.   ANNE GOODMAN MD    History of MRI of cervical spine 6/2020 3/15/2021    Impression: Multilevel cervical spondylosis, most pronounced at the level of C4-5 and C5-6 with moderate to severe spinal canal stenosis and moderate spinal canal stenosis at C6-7. No abnormal cord signal. No significant neural foraminal narrowing at any level.   I have personally reviewed the examination and initial interpretation and I agree with the findings.   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 100 each 3    aspirin 81 MG EC tablet Take 81 mg by mouth daily      atorvastatin (LIPITOR) 20 MG tablet Take 1 tablet (20 mg) by mouth at  bedtime 90 tablet 3    blood glucose (NO BRAND SPECIFIED) test strip Use to test blood sugar 3 times daily or as directed. To accompany: Blood Glucose Monitor Brands: per insurance. (Patient not taking: Reported on 4/18/2024) 100 strip 6    blood glucose calibration (NO BRAND SPECIFIED) solution To accompany: Blood Glucose Monitor Brands: per insurance. (Patient not taking: Reported on 4/18/2024) 4 each 0    blood glucose monitoring (NO BRAND SPECIFIED) meter device kit Use to test blood sugar 3 times daily or as directed. Preferred blood glucose meter OR supplies to accompany: Blood Glucose Monitor Brands: per insurance. (Patient not taking: Reported on 4/18/2024) 1 kit 0    cholestyramine (QUESTRAN) 4 g packet Take 1 packet (4 g) by mouth 3 times daily (with meals) (Patient taking differently: Take 1 packet by mouth daily as needed.) 90 packet 4    Continuous Blood Gluc  (FREESTYLE SEGUNDO 2 READER) MARCIA Use to read blood sugars as per 's instructions. 1 each 0    Continuous Blood Gluc Sensor (FREESTYLE SEGUNDO 3 SENSOR) MISC 1 each every 14 days Use 1 sensor every 14 days. Use to read blood sugars per 's instructions. 6 each 5    Continuous Blood Gluc Sensor (FREESTYLE SEGUNDO 3 SENSOR) MISC 1 each every 14 days 6 each 5    empagliflozin (JARDIANCE) 25 MG TABS tablet Take 1 tablet (25 mg) by mouth daily 90 tablet 3    fluticasone (FLONASE) 50 MCG/ACT nasal spray Spray 2 sprays into both nostrils daily (Patient not taking: Reported on 8/22/2024)      insulin glargine (LANTUS SOLOSTAR) 100 UNIT/ML pen Inject 40 Units Subcutaneous every morning Increase dose by 2 units every three days until fasting blood sugar are  mg/dL. Max 40 units/day 45 mL 1    insulin pen needle (FIFTY50 PEN NEEDLES) 32G X 4 MM miscellaneous Use one pen needle daily or as directed. 100 each 2    LENalidomide (REVLIMID) 10 MG CAPS capsule Take 1 capsule (10 mg) by mouth daily for 28 days (Patient not taking:  Reported on 8/22/2024) 28 capsule 0    LENalidomide (REVLIMID) 10 MG CAPS capsule Take 1 capsule (10 mg) by mouth daily 28 capsule 0    levothyroxine (SYNTHROID/LEVOTHROID) 200 MCG tablet Take 1 tablet (200 mcg) by mouth every morning (before breakfast) 90 tablet 3    loperamide (IMODIUM A-D) 2 MG tablet Take 1 tablet (2 mg) by mouth daily as needed for diarrhea Start Imodium 2 pills with first loose stool and then 1 pill with each loose stool after, aiming for 1-2 stools per day. Max of 8 pills a day. 180 tablet 3    loratadine (CLARITIN) 10 MG tablet Take 10 mg by mouth daily      losartan (COZAAR) 25 MG tablet Take 1 tablet (25 mg) by mouth daily 90 tablet 3    mupirocin (BACTROBAN) 2 % external ointment Apply topically 3 times daily 15 g 1    neomycin-polymyxin-hydrocortisone (CORTISPORIN) 3.5-38877-3 otic solution Place 3 drops in ear(s) 4 times daily (Patient not taking: Reported on 8/22/2024) 10 mL 0    pregabalin (LYRICA) 100 MG capsule Take 1 capsule (100 mg) by mouth 2 times daily 180 capsule 3    propranolol ER (INDERAL LA) 60 MG 24 hr capsule Take 1 capsule (60 mg) by mouth daily 90 capsule 3    Semaglutide, 1 MG/DOSE, (OZEMPIC) 4 MG/3ML pen Inject 1 mg subcutaneously every 7 days 3 mL 1    sertraline (ZOLOFT) 100 MG tablet Take 1 tablet (100 mg) by mouth daily 90 tablet 3    triamcinolone (ARISTOCORT HP) 0.5 % external cream Apply topically 2 times daily. 60 g 2       Video physical exam  General: Patient appears well in no acute distress.   Skin: No visualized rash or lesions on visualized skin  Eyes: EOMI, no erythema, sclera icterus or discharge noted  Resp: Appears to be breathing comfortably without accessory muscle usage, speaking in full sentences, no cough  MSK: Appears to have normal range of motion based on visualized movements  Neurologic: No apparent tremors, facial movements symmetric  Psych: affect bright, alert and oriented          Labs:     Blood Counts       Recent Labs   Lab Test  08/08/24  1211 07/16/24  1606 06/11/24  1556 05/14/24  1023   HGB 15.2 14.7 14.3 14.4   HCT 44.8 44.8 43.1 43.6   WBC 3.6* 3.6* 3.2* 3.1*   ANEUTAUTO 2.3 2.2 1.8 1.8   ALYMPAUTO 0.7* 0.7* 0.7* 0.6*   AMONOAUTO 0.3 0.3 0.4 0.4   AEOSAUTO 0.2 0.3 0.2 0.2   ABSBASO 0.1 0.1 0.1 0.1   NRBCMAN  --  0.0 0.0 0.0   * 123* 108* 101*       ABO/RH    No lab results found.      Chemistries     Basic Panel  Recent Labs   Lab Test 08/08/24  1211 07/16/24  1606 06/11/24  1556    138 139   POTASSIUM 4.2 4.2 4.1   CHLORIDE 105 101 105   CO2 23 27 23   BUN 14.8 17.3 15.2   CR 0.83 0.66 0.68   * 294* 194*        Calcium, Magnesium, Phosphorus  Recent Labs   Lab Test 08/08/24  1211 07/16/24  1606 06/11/24  1556   ASHLEY 8.7* 9.4 9.0        LFTs  Recent Labs   Lab Test 08/08/24  1211 07/16/24  1606 06/11/24  1556   BILITOTAL 2.4* 1.4* 1.4*   ALKPHOS 125 114 115   AST 32 21 26   ALT 41 44 70*   ALBUMIN 4.7 4.5 4.6       LDH  No lab results found.    B2-Microglobulin  Recent Labs   Lab Test 02/18/20  0849   [id number] 1.6           Immunoglobulins     Recent Labs   Lab Test 07/16/24  1606 04/16/24  0912 03/14/24  1305 02/20/24  1026 01/22/24  1017 12/20/23  1026   * 464* 558* 495* 602* 590*       Recent Labs   Lab Test 07/16/24  1606 04/16/24  0912 03/14/24  1305 02/20/24  1026 01/22/24  1017 12/20/23  1026    107 136 131 130 139       Recent Labs   Lab Test 07/16/24  1606 04/16/24  0912 03/14/24  1305 02/20/24  1026 01/22/24  1017 12/20/23  1026   IGM 37 34* 41 <10* 42 41         Monocloncal Protein Studies     M spike    Recent Labs   Lab Test 08/08/24  1211 07/16/24  1606 06/11/24  1556 05/14/24  1023 04/16/24  0912 03/14/24  1305   ELPM 0.0 0.0 0.0 0.0 0.0 0.0       [place] FLC    Recent Labs   Lab Test 07/16/24  1606 04/16/24  0912 03/14/24  1305 02/20/24  1026 01/22/24  1017 12/20/23  1026   KFLCA 1.85 1.57 2.79* 2.45* 2.65* 2.34*       Lambda FLC    Recent Labs   Lab Test 07/16/24  1606 04/16/24  0912  03/14/24  1305 02/20/24  1026 01/22/24  1017 12/20/23  1026   LFLCA 1.36 1.30 1.54 1.60 1.74 1.56       FLC Ratio    Recent Labs   Lab Test 07/16/24  1606 04/16/24  0912 03/14/24  1305 02/20/24  1026 01/22/24  1017 12/20/23  1026   KLRA 1.36 1.21 1.81* 1.53 1.52 1.50       Assessment/plan:   Winter Loya is a 68 year old woman with standard risk IgG kappa multiple myeloma, s/p post auto-transplant in April 2019,  currently on lenalidomide maintenance. She remains in remission by FLC, EBONIE, SPEP. ASA 81 mg for dvt ppx    Persistently elevated bilirubin led to hepatology consult in October 2023.  Determination was that this is most likely fatty liver disease.  Recommendation was to continue to follow with PCP for management of obesity, DM2, and HTN.    Diarrhea improved after starting cholestyramine with meals TID.      Will get right leg US to rule out DVT due to new right foot swelling.    Continue monthly labs and video visits.    20 minutes spent on the date of the encounter doing chart review, review of test results, interpretation of tests, patient visit, and documentation     The longitudinal plan of care for the diagnosis(es)/condition(s) as documented were addressed during this visit. Due to the added complexity in care, I will continue to support Winter in the subsequent management and with ongoing continuity of care.    FALGUNI Fink [facility]  [address] [place] [address]  [phone]

## 2024-09-04 NOTE — NURSING NOTE
Current patient location: [address]  [facility] [address]    Is the patient currently in the state of [place]? YES    Visit mode:VIDEO    If the visit is dropped, the patient can be reconnected by: VIDEO VISIT: Text to cell phone:   Telephone Information:   Mobile [phone]       Will anyone else be joining the visit? NO  (If patient encounters technical issues they should call [phone]    How would you like to obtain your AVS? MyChart    Are changes needed to the allergy or medication list? No    Are refills needed on medications prescribed by this physician? NO    Rooming Documentation:  Questionnaire(s) not done per department protocol      Reason for visit: CARYN NGUYEN

## 2024-09-04 NOTE — LETTER
"9/4/2024      Winter Loya  [address]  [facility] [address]      Dear Colleague,    Thank you for referring your patient, Winter Loya, to the [facility]. Please see a copy of my visit note below.    Virtual Visit Details    Type of service:  Video Visit   Video Start Time: 12:27 PM  Video End Time:12:40 PM    Originating Location (pt. Location): Home    Distant Location (provider location):  Off-site  Platform used for Video Visit: [facility]      ONCOLOGY/HEMATOLOGY PROGRESS NOTE  Sep 4, 2024    Reason for Visit: IgA Kappa Multiple Myeloma    Oncology HPI:   Winter Loya is a 68 year old woman with IgA Kappa Multiple Myeloma. In 2018 she had anemia and was fatigued. She was found to have IgA kappa monocloncal antibody with M-spike of 0.17. IgA elevated. Skeletal survey was unremarkable and UPEP had minimal protein (156 mg/m2). PET 11/2018 showed bone marrow consistent with hypermetabolic plasma cell myeloma. M spike was 0.17. She started RVD and Zometa. On 4/2019 she had an autologous transplant.      Her bone marrow bx from September 2019 was sent here for review. It showed marrow cellularity of 60%, with decreased trilineage hematopoiesis and 15% plasma cells as well as peripheral blood with slight anemia. 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 She is on revlimid maintenance and zometa from her prior oncologist in [place]. On 12/6/19 her K/L ratio is 1.1, M spike is 0.04.     Currently on Revlimid maintenance.      Interval history:   - Right foot has been swollen this past week.  [place]ed [place].  \"Feels like there is a bruise on the top of my foot when I press on it.\"  Swelling seems to get worse overnight, does not resolve with elevation.  - Denies new pain  - Has been taking her " cholestyramine regularly and bowels are under good control  - Dry/itchy patches on her arms have improved after starting Claritin       Comprehensive ROS neg other than the symptoms noted above in the HPI.      Past Medical History:   Diagnosis Date     Abnormal EMG 2021 5/25/2021    Interpretation: This is an abnormal study, demonstrating electrophysiologic evidence of a length-dependent axonal sensorimotor polyneuropathy. Asymmetry of peroneal compound muscle action potential amplitudes is a finding of uncertain significance.        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   I have personally reviewed the examination and initial interpretation and I agree with the findings.   ANNE GOODMAN MD     History of MRI of cervical spine 6/2020 3/15/2021    Impression: Multilevel cervical spondylosis, most pronounced at the level of C4-5 and C5-6 with moderate to severe spinal canal stenosis and moderate spinal canal stenosis at C6-7. No abnormal cord signal. No significant neural foraminal narrowing at any level.   I have personally reviewed the examination and initial interpretation and I agree with the findings.   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 100 each 3     aspirin 81 MG EC tablet Take 81 mg by mouth daily       atorvastatin (LIPITOR) 20 MG tablet Take 1 tablet (20 mg) by mouth at bedtime 90 tablet 3     blood glucose (NO BRAND SPECIFIED) test strip Use to test blood sugar 3 times daily or as directed. To accompany: Blood Glucose Monitor Brands: per insurance. (Patient not taking: Reported on 4/18/2024) 100 strip 6     blood glucose calibration (NO BRAND SPECIFIED) solution To accompany: Blood Glucose Monitor Brands: per insurance. (Patient not taking: Reported on 4/18/2024) 4 each 0     blood glucose monitoring (NO BRAND SPECIFIED) meter device kit Use to test blood sugar 3 times daily or as directed. Preferred blood glucose meter OR supplies to accompany: Blood Glucose Monitor Brands: per insurance. (Patient not taking: Reported on 4/18/2024) 1 kit 0     cholestyramine (QUESTRAN) 4 g packet Take 1 packet (4 g) by mouth 3 times daily (with meals) (Patient taking differently: Take 1 packet by mouth daily as needed.) 90 packet 4     Continuous Blood Gluc  (FREESTYLE SEGUNDO 2 READER) MARCIA Use to read blood sugars as per 's instructions. 1 each 0     Continuous Blood Gluc Sensor (FREESTYLE SEGUNDO 3 SENSOR) MISC 1 each every 14 days Use 1 sensor every 14 days. Use to read blood sugars per 's instructions. 6 each 5     Continuous Blood Gluc Sensor (FREESTYLE SEGUNDO 3 SENSOR) MISC 1 each every 14 days 6 each 5     empagliflozin (JARDIANCE) 25 MG TABS tablet Take 1 tablet (25 mg) by mouth daily 90 tablet 3     fluticasone (FLONASE) 50 MCG/ACT nasal spray Spray 2 sprays into both nostrils daily (Patient not taking: Reported on 8/22/2024)       insulin glargine (LANTUS SOLOSTAR) 100 UNIT/ML pen Inject 40 Units Subcutaneous every morning Increase dose by 2 units every three days until fasting blood sugar are  mg/dL.  Max 40 units/day 45 mL 1     insulin pen needle (FIFTY50 PEN NEEDLES) 32G X 4 MM miscellaneous Use one pen needle daily or as directed. 100 each 2     LENalidomide (REVLIMID) 10 MG CAPS capsule Take 1 capsule (10 mg) by mouth daily for 28 days (Patient not taking: Reported on 8/22/2024) 28 capsule 0     LENalidomide (REVLIMID) 10 MG CAPS capsule Take 1 capsule (10 mg) by mouth daily 28 capsule 0     levothyroxine (SYNTHROID/LEVOTHROID) 200 MCG tablet Take 1 tablet (200 mcg) by mouth every morning (before breakfast) 90 tablet 3     loperamide (IMODIUM A-D) 2 MG tablet Take 1 tablet (2 mg) by mouth daily as needed for diarrhea Start Imodium 2 pills with first loose stool and then 1 pill with each loose stool after, aiming for 1-2 stools per day. Max of 8 pills a day. 180 tablet 3     loratadine (CLARITIN) 10 MG tablet Take 10 mg by mouth daily       losartan (COZAAR) 25 MG tablet Take 1 tablet (25 mg) by mouth daily 90 tablet 3     mupirocin (BACTROBAN) 2 % external ointment Apply topically 3 times daily 15 g 1     neomycin-polymyxin-hydrocortisone (CORTISPORIN) 3.5-95243-9 otic solution Place 3 drops in ear(s) 4 times daily (Patient not taking: Reported on 8/22/2024) 10 mL 0     pregabalin (LYRICA) 100 MG capsule Take 1 capsule (100 mg) by mouth 2 times daily 180 capsule 3     propranolol ER (INDERAL LA) 60 MG 24 hr capsule Take 1 capsule (60 mg) by mouth daily 90 capsule 3     Semaglutide, 1 MG/DOSE, (OZEMPIC) 4 MG/3ML pen Inject 1 mg subcutaneously every 7 days 3 mL 1     sertraline (ZOLOFT) 100 MG tablet Take 1 tablet (100 mg) by mouth daily 90 tablet 3     triamcinolone (ARISTOCORT HP) 0.5 % external cream Apply topically 2 times daily. 60 g 2       Video physical exam  General: Patient appears well in no acute distress.   Skin: No visualized rash or lesions on visualized skin  Eyes: EOMI, no erythema, sclera icterus or discharge noted  Resp: Appears to be breathing comfortably without accessory muscle usage,  speaking in full sentences, no cough  MSK: Appears to have normal range of motion based on visualized movements  Neurologic: No apparent tremors, facial movements symmetric  Psych: affect bright, alert and oriented          Labs:     Blood Counts       Recent Labs   Lab Test 08/08/24  1211 07/16/24  1606 06/11/24  1556 05/14/24  1023   HGB 15.2 14.7 14.3 14.4   HCT 44.8 44.8 43.1 43.6   WBC 3.6* 3.6* 3.2* 3.1*   ANEUTAUTO 2.3 2.2 1.8 1.8   ALYMPAUTO 0.7* 0.7* 0.7* 0.6*   AMONOAUTO 0.3 0.3 0.4 0.4   AEOSAUTO 0.2 0.3 0.2 0.2   ABSBASO 0.1 0.1 0.1 0.1   NRBCMAN  --  0.0 0.0 0.0   * 123* 108* 101*       ABO/RH    No lab results found.      Chemistries     Basic Panel  Recent Labs   Lab Test 08/08/24  1211 07/16/24  1606 06/11/24  1556    138 139   POTASSIUM 4.2 4.2 4.1   CHLORIDE 105 101 105   CO2 23 27 23   BUN 14.8 17.3 15.2   CR 0.83 0.66 0.68   * 294* 194*        Calcium, Magnesium, Phosphorus  Recent Labs   Lab Test 08/08/24  1211 07/16/24  1606 06/11/24  1556   ASHLEY 8.7* 9.4 9.0        LFTs  Recent Labs   Lab Test 08/08/24  1211 07/16/24  1606 06/11/24  1556   BILITOTAL 2.4* 1.4* 1.4*   ALKPHOS 125 114 115   AST 32 21 26   ALT 41 44 70*   ALBUMIN 4.7 4.5 4.6       LDH  No lab results found.    B2-Microglobulin  Recent Labs   Lab Test 02/18/20  0849   [id number] 1.6           Immunoglobulins     Recent Labs   Lab Test 07/16/24  1606 04/16/24  0912 03/14/24  1305 02/20/24  1026 01/22/24  1017 12/20/23  1026   * 464* 558* 495* 602* 590*       Recent Labs   Lab Test 07/16/24  1606 04/16/24  0912 03/14/24  1305 02/20/24  1026 01/22/24  1017 12/20/23  1026    107 136 131 130 139       Recent Labs   Lab Test 07/16/24  1606 04/16/24  0912 03/14/24  1305 02/20/24  1026 01/22/24  1017 12/20/23  1026   IGM 37 34* 41 <10* 42 41         Monocloncal Protein Studies     M spike    Recent Labs   Lab Test 08/08/24  1211 07/16/24  1606 06/11/24  1556 05/14/24  1023 04/16/24  0912 03/14/24  1305    ELPM 0.0 0.0 0.0 0.0 0.0 0.0       [place] FLC    Recent Labs   Lab Test 07/16/24  1606 04/16/24  0912 03/14/24  1305 02/20/24  1026 01/22/24  1017 12/20/23  1026   KFLCA 1.85 1.57 2.79* 2.45* 2.65* 2.34*       Lambda FLC    Recent Labs   Lab Test 07/16/24  1606 04/16/24  0912 03/14/24  1305 02/20/24  1026 01/22/24  1017 12/20/23  1026   LFLCA 1.36 1.30 1.54 1.60 1.74 1.56       FLC Ratio    Recent Labs   Lab Test 07/16/24  1606 04/16/24  0912 03/14/24  1305 02/20/24  1026 01/22/24  1017 12/20/23  1026   KLRA 1.36 1.21 1.81* 1.53 1.52 1.50       Assessment/plan:   Winter Loya is a 68 year old woman with standard risk IgG kappa multiple myeloma, s/p post auto-transplant in April 2019,  currently on lenalidomide maintenance. She remains in remission by FLC, EBONIE, SPEP. ASA 81 mg for dvt ppx    Persistently elevated bilirubin led to hepatology consult in October 2023.  Determination was that this is most likely fatty liver disease.  Recommendation was to continue to follow with PCP for management of obesity, DM2, and HTN.    Diarrhea improved after starting cholestyramine with meals TID.      Will get right leg US to rule out DVT due to new right foot swelling.    Continue monthly labs and video visits.    20 minutes spent on the date of the encounter doing chart review, review of test results, interpretation of tests, patient visit, and documentation     The longitudinal plan of care for the diagnosis(es)/condition(s) as documented were addressed during this visit. Due to the added complexity in care, I will continue to support Winter in the subsequent management and with ongoing continuity of care.    FALGUNI Fink CNP  [facility] [address] [address]  [phone]      Again, thank you for allowing me to participate in the care of your patient.        Sincerely,        FALGUNI Ahn CNP

## 2024-09-04 NOTE — PROGRESS NOTES
No longer active with [facility] community case management effective 8/31/24.  Reason for community disenrollment: MA Term 5/31/24. UCARE term 8/31/24.    CC spoke with Winter and she does know her UCARE has ended. She did not get back on MA. She does have Medicare and did call [employer] [facility] but has not gotten any paperwork from them. She will call them again. She also did get paperwork from [facility] but has not had time to read through it yet. She states [place].  She is aware her Pullups will also stop and CC will no longer be her care coordinator.  CC will let [facility] know to stop her orders.  Corrina Cevallos RN PHN  [facility] Care Coordinator  [phone]

## 2024-09-05 ENCOUNTER — ANCILLARY PROCEDURE (OUTPATIENT)
Dept: ULTRASOUND IMAGING | Facility: CLINIC | Age: 69
End: 2024-09-05
Attending: REGISTERED NURSE
Payer: MEDICARE

## 2024-09-05 ENCOUNTER — LAB (OUTPATIENT)
Dept: LAB | Facility: CLINIC | Age: 69
End: 2024-09-05
Payer: MEDICARE

## 2024-09-05 DIAGNOSIS — Z79.4 TYPE 2 DIABETES MELLITUS WITH DIABETIC POLYNEUROPATHY, WITH LONG-TERM CURRENT USE OF INSULIN (H): ICD-10-CM

## 2024-09-05 DIAGNOSIS — R60.0 EDEMA OF RIGHT FOOT: ICD-10-CM

## 2024-09-05 DIAGNOSIS — E11.42 TYPE 2 DIABETES MELLITUS WITH DIABETIC POLYNEUROPATHY, WITH LONG-TERM CURRENT USE OF INSULIN (H): ICD-10-CM

## 2024-09-05 DIAGNOSIS — C90.01 MULTIPLE MYELOMA IN REMISSION (H): ICD-10-CM

## 2024-09-05 LAB
BASOPHILS # BLD AUTO: 0.1 10E3/UL (ref 0–0.2)
BASOPHILS NFR BLD AUTO: 2 %
EOSINOPHIL # BLD AUTO: 0.2 10E3/UL (ref 0–0.7)
EOSINOPHIL NFR BLD AUTO: 9 %
ERYTHROCYTE [DISTWIDTH] IN BLOOD BY AUTOMATED COUNT: 14.5 % (ref 10–15)
HBA1C MFR BLD: 6.9 % (ref 0–5.6)
HCT VFR BLD AUTO: 39.4 % (ref 35–47)
HGB BLD-MCNC: 12.9 G/DL (ref 11.7–15.7)
IMM GRANULOCYTES # BLD: 0 10E3/UL
IMM GRANULOCYTES NFR BLD: 0 %
LYMPHOCYTES # BLD AUTO: 0.5 10E3/UL (ref 0.8–5.3)
LYMPHOCYTES NFR BLD AUTO: 17 %
MCH RBC QN AUTO: 29.3 PG (ref 26.5–33)
MCHC RBC AUTO-ENTMCNC: 32.7 G/DL (ref 31.5–36.5)
MCV RBC AUTO: 89 FL (ref 78–100)
MONOCYTES # BLD AUTO: 0.3 10E3/UL (ref 0–1.3)
MONOCYTES NFR BLD AUTO: 11 %
NEUTROPHILS # BLD AUTO: 1.7 10E3/UL (ref 1.6–8.3)
NEUTROPHILS NFR BLD AUTO: 61 %
NRBC # BLD AUTO: 0 10E3/UL
NRBC BLD AUTO-RTO: 0 /100
PLATELET # BLD AUTO: 101 10E3/UL (ref 150–450)
RBC # BLD AUTO: 4.41 10E6/UL (ref 3.8–5.2)
WBC # BLD AUTO: 2.8 10E3/UL (ref 4–11)

## 2024-09-05 PROCEDURE — 84155 ASSAY OF PROTEIN SERUM: CPT

## 2024-09-05 PROCEDURE — 85025 COMPLETE CBC W/AUTO DIFF WBC: CPT

## 2024-09-05 PROCEDURE — 80053 COMPREHEN METABOLIC PANEL: CPT

## 2024-09-05 PROCEDURE — 84165 PROTEIN E-PHORESIS SERUM: CPT | Performed by: PATHOLOGY

## 2024-09-05 PROCEDURE — 93971 EXTREMITY STUDY: CPT | Mod: RT | Performed by: STUDENT IN AN ORGANIZED HEALTH CARE EDUCATION/TRAINING PROGRAM

## 2024-09-05 PROCEDURE — 83036 HEMOGLOBIN GLYCOSYLATED A1C: CPT

## 2024-09-05 PROCEDURE — 36415 COLL VENOUS BLD VENIPUNCTURE: CPT

## 2024-09-05 NOTE — RESULT ENCOUNTER NOTE
Mikey Max,    There is no clot in your foot or leg.  Hopefully your foot pain will improve as you rest.    Take care,  Hyun Denney, CNP

## 2024-09-06 LAB
ALBUMIN SERPL BCG-MCNC: 4.1 G/DL (ref 3.5–5.2)
ALBUMIN SERPL ELPH-MCNC: 3.8 G/DL (ref 3.7–5.1)
ALP SERPL-CCNC: 94 U/L (ref 40–150)
ALPHA1 GLOB SERPL ELPH-MCNC: 0.3 G/DL (ref 0.2–0.4)
ALPHA2 GLOB SERPL ELPH-MCNC: 0.5 G/DL (ref 0.5–0.9)
ALT SERPL W P-5'-P-CCNC: 29 U/L (ref 0–50)
ANION GAP SERPL CALCULATED.3IONS-SCNC: 9 MMOL/L (ref 7–15)
AST SERPL W P-5'-P-CCNC: 28 U/L (ref 0–45)
B-GLOBULIN SERPL ELPH-MCNC: 0.7 G/DL (ref 0.6–1)
BILIRUB SERPL-MCNC: 2 MG/DL
BUN SERPL-MCNC: 11.9 MG/DL (ref 8–23)
CALCIUM SERPL-MCNC: 8.3 MG/DL (ref 8.8–10.4)
CHLORIDE SERPL-SCNC: 107 MMOL/L (ref 98–107)
CREAT SERPL-MCNC: 0.76 MG/DL (ref 0.51–0.95)
EGFRCR SERPLBLD CKD-EPI 2021: 86 ML/MIN/1.73M2
GAMMA GLOB SERPL ELPH-MCNC: 0.4 G/DL (ref 0.7–1.6)
GLUCOSE SERPL-MCNC: 105 MG/DL (ref 70–99)
HCO3 SERPL-SCNC: 24 MMOL/L (ref 22–29)
M PROTEIN SERPL ELPH-MCNC: 0 G/DL
POTASSIUM SERPL-SCNC: 3.5 MMOL/L (ref 3.4–5.3)
PROT PATTERN SERPL ELPH-IMP: ABNORMAL
PROT SERPL-MCNC: 5.9 G/DL (ref 6.4–8.3)
SODIUM SERPL-SCNC: 140 MMOL/L (ref 135–145)
TOTAL PROTEIN SERUM FOR ELP: 5.7 G/DL (ref 6.4–8.3)

## 2024-09-18 ENCOUNTER — VIRTUAL VISIT (OUTPATIENT)
Dept: PHARMACY | Facility: CLINIC | Age: 69
End: 2024-09-18

## 2024-09-18 DIAGNOSIS — E89.0 POSTOPERATIVE HYPOTHYROIDISM: ICD-10-CM

## 2024-09-18 DIAGNOSIS — R19.7 DIARRHEA, UNSPECIFIED TYPE: ICD-10-CM

## 2024-09-18 DIAGNOSIS — E11.42 TYPE 2 DIABETES MELLITUS WITH DIABETIC POLYNEUROPATHY, WITH LONG-TERM CURRENT USE OF INSULIN (H): Primary | ICD-10-CM

## 2024-09-18 DIAGNOSIS — J30.9 ALLERGIC RHINITIS, UNSPECIFIED SEASONALITY, UNSPECIFIED TRIGGER: ICD-10-CM

## 2024-09-18 DIAGNOSIS — Z79.4 TYPE 2 DIABETES MELLITUS WITH DIABETIC POLYNEUROPATHY, WITH LONG-TERM CURRENT USE OF INSULIN (H): Primary | ICD-10-CM

## 2024-09-18 PROCEDURE — 99207 PR NO CHARGE LOS: CPT | Mod: 93 | Performed by: PHARMACIST

## 2024-09-18 NOTE — PATIENT INSTRUCTIONS
"Recommendations from today's MTM visit:                                                         Recheck thyroid level with next labs, or be seen sooner for the fatigue.     Follow-up: Return in about 4 weeks (around 10/16/2024) for Medication Therapy Management.    It was great speaking with you today.  I value your experience and would be very thankful for your time in providing feedback in our clinic survey. In the next few days, you may receive an email or text message from [employer] with a link to a survey related to your  clinical pharmacist.\"     To schedule another MTM appointment, please call the clinic directly or you may call the MTM scheduling line at [phone] or toll-free at [phone].     My Clinical Pharmacist's contact information:                                                      Please feel free to contact me with any questions or concerns you have.      Vonnie Corrigan, PharmD  Medication Therapy Management Pharmacist     "

## 2024-09-18 NOTE — Clinical Note
Mikey Purvis,  I met with Winter today for MTM, she notes new, marked fatigue, falling asleep at 6 pm.  I noticed her last labs she had done with you show low calcium, and I was wondering your thoughts as to if that could be related or if there is any other cause you might be aware of with her recent results and any recommendations.  Thanks! Vonnie Corrigan, PharmD Medication Therapy Management Pharmacist

## 2024-09-18 NOTE — PROGRESS NOTES
Medication Therapy Management (MTM) Encounter    ASSESSMENT:                            Medication Adherence/Access: No issues identified    Fatigue: Most recent lab results from oncology don't appear to have been reviewed yet - will sent note to oncology to see if this could be related at all - if she doesn't hear back in a couple days I recommended she call and discuss symptoms with oncology or see Montse.     Diabetes: Patient's A1c is not at goal of < 6.9. Time in target range is not at goal of > 70%. Due to fatigue, may benefit from maintaining Ozempic dose at this time.    Diarrhea: Stable.      Allergic Rhinitis: Stable.    Hypothyroidism: Could recheck TSH to see if related to fatigue.    PLAN:                            Recheck thyroid level with next labs, or be seen sooner for the fatigue.     Message sent to FALGUNI Ahn CNP in Oncology/Hematology.    Follow-up: Return in about 4 weeks (around 10/16/2024) for Medication Therapy Management.    SUBJECTIVE/OBJECTIVE:                          Winter Loya is a 68 year old female seen for a follow-up visit.       Reason for visit: med review. tired    Allergies/ADRs: Reviewed in chart  Past Medical History: Reviewed in chart  Tobacco: She reports that she quit smoking about 19 years ago. Her smoking use included cigarettes. She has never used smokeless tobacco.  Alcohol: rare, ~3x/year    Medication Adherence/Access: She states she might loose her insurance, but has a couple people working with her on this. Has medicare. She has contact info for [employer].    Fatigue    Did have a day of dizziness after a [place] day [place] the [place], but she's been so tired since, falling asleep at 6 pm, this is new.    Diabetes /Type 2 Diabetes/Obesity:  Lantus 40 units every morning   Jardiance 25 mg daily   Ozempic 1 mg weekly   Aspirin: Taking 81mg daily for primary prevention (possible secondary prevention)    More fatigue, see below.  A little nausea, still  has the diarrhea (has at baseline).   Diet/Exercise: carbohydrates are her weakness, she states she shouldn't be eating bread or cereal.  Blood sugar monitoring: Анна 3     Patient reports she has started taking Ozempic, she is wondering if this caused her constipation. This constipation happened while she was taking her cholestyramine.   Lab Results   Component Value Date    A1C 6.9 09/05/2024     Diarrhea:   Loperamide 4 mg every morning as needed  Cholestyramine 4 g every morning    Doing ok here, stable.   Medication History:  Cholestyramine three times daily was too much.      Allergic Rhinitis:   Clairitin 10 mg daily -first dose of new bottle made her tired  Patient reports no current medication side effects.    Primary symptoms are runny nose and itchy/watery eyes.   Patient feels that current therapy is not effective.     Hypothyroidism   Levothyroxine 200 mcg daily before breakfast  Patient is having the following symptoms: hypothyroidism -  fatigue  TSH   Date Value Ref Range Status   01/22/2024 3.00 0.30 - 4.20 uIU/mL Final   02/22/2023 4.41 (H) 0.40 - 4.00 mU/L Final   01/27/2021 2.08 0.40 - 4.00 mU/L Final     Today's Vitals: There were no vitals taken for this visit.  ----------------      I spent 17 minutes with this patient today. All changes were made via collaborative practice agreement with Montse Bucio PA-C. A copy of the visit note was provided to the patient's provider(s).    A summary of these recommendations was sent via [employer].    Vonnie Corrigan, ShelleyD  Medication Therapy Management Pharmacist      Telemedicine Visit Details  Type of service:  Telephone visit  Start Time: 12:38 PM  End Time: 12:55 PM     Medication Therapy Recommendations  No medication therapy recommendations to display

## 2024-09-19 ENCOUNTER — TELEPHONE (OUTPATIENT)
Dept: ONCOLOGY | Facility: CLINIC | Age: 69
End: 2024-09-19
Payer: MEDICAID

## 2024-09-19 DIAGNOSIS — E83.51 HYPOCALCEMIA: Primary | ICD-10-CM

## 2024-09-19 DIAGNOSIS — C90.01 MULTIPLE MYELOMA IN REMISSION (H): Primary | ICD-10-CM

## 2024-09-19 RX ORDER — CALCIUM CARBONATE 500(1250)
1 TABLET ORAL 2 TIMES DAILY
Qty: 180 TABLET | Refills: 3 | Status: SHIPPED | OUTPATIENT
Start: 2024-09-19

## 2024-09-19 RX ORDER — LENALIDOMIDE 10 MG/1
10 CAPSULE ORAL DAILY
Qty: 28 CAPSULE | Refills: 0 | Status: SHIPPED | OUTPATIENT
Start: 2024-09-19 | End: 2024-10-17

## 2024-09-19 NOTE — TELEPHONE ENCOUNTER
Oral Chemotherapy Monitoring Program    Medication: Revlimid  Rx:  10 mg PO daily for a 28 day supply    Auth #: [id number]  Risk Category: Adult female NOT of reproductive capacity    Routine survey questions reviewed.    Thank you,    Mary Franklin  Oncology Pharmacy Liaison LUCINDA [email or web address]  Phone: [phone]  Fax: [phone]

## 2024-09-20 ENCOUNTER — TELEPHONE (OUTPATIENT)
Dept: ONCOLOGY | Facility: CLINIC | Age: 69
End: 2024-09-20
Payer: MEDICAID

## 2024-09-20 NOTE — TELEPHONE ENCOUNTER
FREE DRUG APPLICATION INITIATED    Medication: REVLIMID 10 MG PO CAPS  Free Drug Program Name:  [facility]  Date Submitted: 9/20/2024 12:41 PM  Phone #: [phone]  Fax #: [phone]  Additional Information: emailed walker to Marko    Thank you,    Mary Franklin  Oncology Pharmacy Liaison II  [email or web address]  Phone: [phone]  Fax: [phone]

## 2024-10-01 ENCOUNTER — LAB (OUTPATIENT)
Dept: LAB | Facility: CLINIC | Age: 69
End: 2024-10-01
Attending: STUDENT IN AN ORGANIZED HEALTH CARE EDUCATION/TRAINING PROGRAM
Payer: COMMERCIAL

## 2024-10-01 DIAGNOSIS — C90.01 MULTIPLE MYELOMA IN REMISSION (H): ICD-10-CM

## 2024-10-01 DIAGNOSIS — E89.0 POSTOPERATIVE HYPOTHYROIDISM: ICD-10-CM

## 2024-10-01 LAB
BASOPHILS # BLD AUTO: 0.1 10E3/UL (ref 0–0.2)
BASOPHILS NFR BLD AUTO: 2 %
EOSINOPHIL # BLD AUTO: 0.3 10E3/UL (ref 0–0.7)
EOSINOPHIL NFR BLD AUTO: 8 %
ERYTHROCYTE [DISTWIDTH] IN BLOOD BY AUTOMATED COUNT: 14.1 % (ref 10–15)
HCT VFR BLD AUTO: 42.4 % (ref 35–47)
HGB BLD-MCNC: 14.1 G/DL (ref 11.7–15.7)
IMM GRANULOCYTES # BLD: 0 10E3/UL
IMM GRANULOCYTES NFR BLD: 0 %
LYMPHOCYTES # BLD AUTO: 0.8 10E3/UL (ref 0.8–5.3)
LYMPHOCYTES NFR BLD AUTO: 19 %
MCH RBC QN AUTO: 29.9 PG (ref 26.5–33)
MCHC RBC AUTO-ENTMCNC: 33.3 G/DL (ref 31.5–36.5)
MCV RBC AUTO: 90 FL (ref 78–100)
MONOCYTES # BLD AUTO: 0.3 10E3/UL (ref 0–1.3)
MONOCYTES NFR BLD AUTO: 8 %
NEUTROPHILS # BLD AUTO: 2.5 10E3/UL (ref 1.6–8.3)
NEUTROPHILS NFR BLD AUTO: 63 %
PLATELET # BLD AUTO: 125 10E3/UL (ref 150–450)
RBC # BLD AUTO: 4.72 10E6/UL (ref 3.8–5.2)
WBC # BLD AUTO: 4 10E3/UL (ref 4–11)

## 2024-10-01 PROCEDURE — 36415 COLL VENOUS BLD VENIPUNCTURE: CPT

## 2024-10-01 PROCEDURE — 84165 PROTEIN E-PHORESIS SERUM: CPT | Performed by: PATHOLOGY

## 2024-10-01 PROCEDURE — 84155 ASSAY OF PROTEIN SERUM: CPT

## 2024-10-01 PROCEDURE — 84443 ASSAY THYROID STIM HORMONE: CPT

## 2024-10-01 PROCEDURE — 80053 COMPREHEN METABOLIC PANEL: CPT

## 2024-10-01 PROCEDURE — 85025 COMPLETE CBC W/AUTO DIFF WBC: CPT

## 2024-10-01 PROCEDURE — 84439 ASSAY OF FREE THYROXINE: CPT

## 2024-10-02 ENCOUNTER — VIRTUAL VISIT (OUTPATIENT)
Dept: ONCOLOGY | Facility: CLINIC | Age: 69
End: 2024-10-02
Attending: STUDENT IN AN ORGANIZED HEALTH CARE EDUCATION/TRAINING PROGRAM
Payer: COMMERCIAL

## 2024-10-02 VITALS — HEIGHT: 69 IN | WEIGHT: 250 LBS | BODY MASS INDEX: 37.03 KG/M2

## 2024-10-02 DIAGNOSIS — E03.9 HYPOTHYROIDISM, UNSPECIFIED TYPE: Primary | ICD-10-CM

## 2024-10-02 DIAGNOSIS — C90.01 MULTIPLE MYELOMA IN REMISSION (H): Primary | ICD-10-CM

## 2024-10-02 LAB
ALBUMIN SERPL BCG-MCNC: 4.3 G/DL (ref 3.5–5.2)
ALBUMIN SERPL ELPH-MCNC: 4.3 G/DL (ref 3.7–5.1)
ALP SERPL-CCNC: 117 U/L (ref 40–150)
ALPHA1 GLOB SERPL ELPH-MCNC: 0.2 G/DL (ref 0.2–0.4)
ALPHA2 GLOB SERPL ELPH-MCNC: 0.5 G/DL (ref 0.5–0.9)
ALT SERPL W P-5'-P-CCNC: 38 U/L (ref 0–50)
ANION GAP SERPL CALCULATED.3IONS-SCNC: 12 MMOL/L (ref 7–15)
AST SERPL W P-5'-P-CCNC: 20 U/L (ref 0–45)
B-GLOBULIN SERPL ELPH-MCNC: 0.7 G/DL (ref 0.6–1)
BILIRUB SERPL-MCNC: 1 MG/DL
BUN SERPL-MCNC: 12.6 MG/DL (ref 8–23)
CALCIUM SERPL-MCNC: 9.5 MG/DL (ref 8.8–10.4)
CHLORIDE SERPL-SCNC: 105 MMOL/L (ref 98–107)
CREAT SERPL-MCNC: 0.77 MG/DL (ref 0.51–0.95)
EGFRCR SERPLBLD CKD-EPI 2021: 85 ML/MIN/1.73M2
GAMMA GLOB SERPL ELPH-MCNC: 0.5 G/DL (ref 0.7–1.6)
GLUCOSE SERPL-MCNC: 215 MG/DL (ref 70–99)
HCO3 SERPL-SCNC: 22 MMOL/L (ref 22–29)
LOCATION OF TASK: ABNORMAL
M PROTEIN SERPL ELPH-MCNC: 0 G/DL
POTASSIUM SERPL-SCNC: 4.3 MMOL/L (ref 3.4–5.3)
PROT PATTERN SERPL ELPH-IMP: ABNORMAL
PROT SERPL-MCNC: 6.4 G/DL (ref 6.4–8.3)
SODIUM SERPL-SCNC: 139 MMOL/L (ref 135–145)
T4 FREE SERPL-MCNC: 1.49 NG/DL (ref 0.9–1.7)
TOTAL PROTEIN SERUM FOR ELP: 6.2 G/DL (ref 6.4–8.3)
TSH SERPL DL<=0.005 MIU/L-ACNC: 4.27 UIU/ML (ref 0.3–4.2)

## 2024-10-02 PROCEDURE — G2211 COMPLEX E/M VISIT ADD ON: HCPCS | Mod: 95 | Performed by: REGISTERED NURSE

## 2024-10-02 PROCEDURE — 99213 OFFICE O/P EST LOW 20 MIN: CPT | Mod: 95 | Performed by: REGISTERED NURSE

## 2024-10-02 ASSESSMENT — PAIN SCALES - GENERAL: PAINLEVEL: NO PAIN (0)

## 2024-10-02 NOTE — NURSING NOTE
Current patient location: [address]  [facility] [address]    Is the patient currently in the state of [place]? YES    Visit mode:VIDEO    If the visit is dropped, the patient can be reconnected by: VIDEO VISIT: Text to cell phone:   Telephone Information:   Mobile [phone]       Will anyone else be joining the visit? NO  (If patient encounters technical issues they should call [phone]    Are changes needed to the allergy or medication list? Pt declined med review    Are refills needed on medications prescribed by this physician? NO    Rooming Documentation:  Pt declined    Reason for visit: CARYN NGUYEN

## 2024-10-02 NOTE — PROGRESS NOTES
Virtual Visit Details    Type of service:  Video Visit   Video Start Time: 2:29 PM  Video End Time:2:42 PM    Originating Location (pt. Location): Home    Distant Location (provider location):  Off-site  Platform used for Video Visit: [facility]      ONCOLOGY/HEMATOLOGY PROGRESS NOTE  Oct 2, 2024    Reason for Visit: IgA Kappa Multiple Myeloma    Oncology HPI:   Winter Loya is a 68 year old woman with IgA Kappa Multiple Myeloma. In 2018 she had anemia and was fatigued. She was found to have IgA kappa monocloncal antibody with M-spike of 0.17. IgA elevated. Skeletal survey was unremarkable and UPEP had minimal protein (156 mg/m2). PET 11/2018 showed bone marrow consistent with hypermetabolic plasma cell myeloma. M spike was 0.17. She started RVD and Zometa. On 4/2019 she had an autologous transplant.      Her bone marrow bx from September 2019 was sent here for review. It showed marrow cellularity of 60%, with decreased trilineage hematopoiesis and 15% plasma cells as well as peripheral blood with slight anemia. 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 She is on revlimid maintenance and zometa from her prior oncologist in [place]. On 12/6/19 her K/L ratio is 1.1, M spike is 0.04.     Currently on Revlimid maintenance.      Interval history:   - Right foot swelling from last month has resolved  - Denies new pain  - Has been taking her cholestyramine regularly and bowels are under good control  - Dry/itchy patches on her arms have improved after starting Claritin       Comprehensive ROS neg other than the symptoms noted above in the HPI.      Past Medical History:   Diagnosis Date    Abnormal EMG 2021 5/25/2021    Interpretation: This is an abnormal study, demonstrating electrophysiologic evidence of a length-dependent axonal sensorimotor polyneuropathy. Asymmetry of peroneal compound  muscle action potential amplitudes is a finding of uncertain significance.       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   I have personally reviewed the examination and initial interpretation and I agree with the findings.   ANNE GOODMAN MD    History of MRI of cervical spine 6/2020 3/15/2021    Impression: Multilevel cervical spondylosis, most pronounced at the level of C4-5 and C5-6 with moderate to severe spinal canal stenosis and moderate spinal canal stenosis at C6-7. No abnormal cord signal. No significant neural foraminal narrowing at any level.   I have personally reviewed the examination and initial interpretation and I agree with the findings.   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 100 each 3    aspirin 81 MG EC tablet Take 81 mg by mouth daily      atorvastatin (LIPITOR) 20 MG tablet Take 1 tablet (20 mg) by mouth at bedtime 90 tablet 3    blood glucose (NO BRAND SPECIFIED) test strip Use to test blood sugar 3 times daily or as directed. To accompany: Blood Glucose Monitor Brands: per insurance. (Patient not taking: Reported on 4/18/2024)  100 strip 6    blood glucose calibration (NO BRAND SPECIFIED) solution To accompany: Blood Glucose Monitor Brands: per insurance. (Patient not taking: Reported on 4/18/2024) 4 each 0    blood glucose monitoring (NO BRAND SPECIFIED) meter device kit Use to test blood sugar 3 times daily or as directed. Preferred blood glucose meter OR supplies to accompany: Blood Glucose Monitor Brands: per insurance. (Patient not taking: Reported on 4/18/2024) 1 kit 0    calcium carbonate (OS-ASHLEY) 500 MG tablet Take 1 tablet (500 mg) by mouth 2 times daily. 180 tablet 3    cholestyramine (QUESTRAN) 4 g packet Take 1 packet (4 g) by mouth 3 times daily (with meals) (Patient taking differently: Take 1 packet by mouth daily as needed.) 90 packet 4    Continuous Blood Gluc  (FREESTYLE SEGUNDO 2 READER) MARCIA Use to read blood sugars as per 's instructions. 1 each 0    Continuous Blood Gluc Sensor (FREESTYLE SEGUNDO 3 SENSOR) MISC 1 each every 14 days Use 1 sensor every 14 days. Use to read blood sugars per 's instructions. 6 each 5    Continuous Blood Gluc Sensor (FREESTYLE SEGUNDO 3 SENSOR) MISC 1 each every 14 days 6 each 5    empagliflozin (JARDIANCE) 25 MG TABS tablet Take 1 tablet (25 mg) by mouth daily 90 tablet 3    fluticasone (FLONASE) 50 MCG/ACT nasal spray Spray 2 sprays into both nostrils daily (Patient not taking: Reported on 8/22/2024)      insulin glargine (LANTUS SOLOSTAR) 100 UNIT/ML pen Inject 40 Units Subcutaneous every morning Increase dose by 2 units every three days until fasting blood sugar are  mg/dL. Max 40 units/day 45 mL 1    insulin pen needle (FIFTY50 PEN NEEDLES) 32G X 4 MM miscellaneous Use one pen needle daily or as directed. 100 each 2    LENalidomide (REVLIMID) 10 MG CAPS capsule Take 1 capsule (10 mg) by mouth daily for 28 days 28 capsule 0    LENalidomide (REVLIMID) 10 MG CAPS capsule Take 1 capsule (10 mg) by mouth daily for 28 days (Patient not taking: Reported on  8/22/2024) 28 capsule 0    LENalidomide (REVLIMID) 10 MG CAPS capsule Take 1 capsule (10 mg) by mouth daily 28 capsule 0    levothyroxine (SYNTHROID/LEVOTHROID) 200 MCG tablet Take 1 tablet (200 mcg) by mouth every morning (before breakfast) 90 tablet 3    loperamide (IMODIUM A-D) 2 MG tablet Take 1 tablet (2 mg) by mouth daily as needed for diarrhea Start Imodium 2 pills with first loose stool and then 1 pill with each loose stool after, aiming for 1-2 stools per day. Max of 8 pills a day. 180 tablet 3    loratadine (CLARITIN) 10 MG tablet Take 10 mg by mouth daily      losartan (COZAAR) 25 MG tablet Take 1 tablet (25 mg) by mouth daily 90 tablet 3    mupirocin (BACTROBAN) 2 % external ointment Apply topically 3 times daily 15 g 1    neomycin-polymyxin-hydrocortisone (CORTISPORIN) 3.5-31470-3 otic solution Place 3 drops in ear(s) 4 times daily (Patient not taking: Reported on 8/22/2024) 10 mL 0    pregabalin (LYRICA) 100 MG capsule Take 1 capsule (100 mg) by mouth 2 times daily 180 capsule 3    propranolol ER (INDERAL LA) 60 MG 24 hr capsule Take 1 capsule (60 mg) by mouth daily 90 capsule 3    Semaglutide, 1 MG/DOSE, (OZEMPIC) 4 MG/3ML pen Inject 1 mg subcutaneously every 7 days 3 mL 1    sertraline (ZOLOFT) 100 MG tablet Take 1 tablet (100 mg) by mouth daily 90 tablet 3    triamcinolone (ARISTOCORT HP) 0.5 % external cream Apply topically 2 times daily. 60 g 2       Video physical exam  General: Patient appears well in no acute distress.   Skin: No visualized rash or lesions on visualized skin  Eyes: EOMI, no erythema, sclera icterus or discharge noted  Resp: Appears to be breathing comfortably without accessory muscle usage, speaking in full sentences, no cough  MSK: Appears to have normal range of motion based on visualized movements  Neurologic: No apparent tremors, facial movements symmetric  Psych: affect bright, alert and oriented          Labs:     Blood Counts       Recent Labs   Lab Test 10/01/24  1352  09/05/24  1240 08/08/24  1211 07/16/24  1606 06/11/24  1556   HGB 14.1 12.9 15.2 14.7 14.3   HCT 42.4 39.4 44.8 44.8 43.1   WBC 4.0 2.8* 3.6* 3.6* 3.2*   ANEUTAUTO 2.5 1.7 2.3 2.2 1.8   ALYMPAUTO 0.8 0.5* 0.7* 0.7* 0.7*   AMONOAUTO 0.3 0.3 0.3 0.3 0.4   AEOSAUTO 0.3 0.2 0.2 0.3 0.2   ABSBASO 0.1 0.1 0.1 0.1 0.1   NRBCMAN  --  0.0  --  0.0 0.0   * 101* 126* 123* 108*       ABO/RH    No lab results found.      Chemistries     Basic Panel  Recent Labs   Lab Test 10/01/24  1352 09/05/24  1240 08/08/24  1211    140 140   POTASSIUM 4.3 3.5 4.2   CHLORIDE 105 107 105   CO2 22 24 23   BUN 12.6 11.9 14.8   CR 0.77 0.76 0.83   * 105* 160*        Calcium, Magnesium, Phosphorus  Recent Labs   Lab Test 10/01/24  1352 09/05/24  1240 08/08/24  1211   ASHLEY 9.5 8.3* 8.7*        LFTs  Recent Labs   Lab Test 10/01/24  1352 09/05/24  1240 08/08/24  1211   BILITOTAL 1.0 2.0* 2.4*   ALKPHOS 117 94 125   AST 20 28 32   ALT 38 29 41   ALBUMIN 4.3 4.1 4.7       LDH  No lab results found.    B2-Microglobulin  Recent Labs   Lab Test 02/18/20  0849   [id number] 1.6           Immunoglobulins     Recent Labs   Lab Test 07/16/24  1606 04/16/24  0912 03/14/24  1305 02/20/24  1026 01/22/24  1017 12/20/23  1026   * 464* 558* 495* 602* 590*       Recent Labs   Lab Test 07/16/24  1606 04/16/24  0912 03/14/24  1305 02/20/24  1026 01/22/24  1017 12/20/23  1026    107 136 131 130 139       Recent Labs   Lab Test 07/16/24  1606 04/16/24  0912 03/14/24  1305 02/20/24  1026 01/22/24  1017 12/20/23  1026   IGM 37 34* 41 <10* 42 41         Monocloncal Protein Studies     M spike    Recent Labs   Lab Test 10/01/24  1352 09/05/24  1240 08/08/24  1211 07/16/24  1606 06/11/24  1556 05/14/24  1023   ELPM 0.0 0.0 0.0 0.0 0.0 0.0       Boyden FLC    Recent Labs   Lab Test 07/16/24  1606 04/16/24  0912 03/14/24  1305 02/20/24  1026 01/22/24  1017 12/20/23  1026   KFLCA 1.85 1.57 2.79* 2.45* 2.65* 2.34*       Lambda FLC    Recent Labs    Lab Test 07/16/24  1606 04/16/24  0912 03/14/24  1305 02/20/24  1026 01/22/24  1017 12/20/23  1026   LFLCA 1.36 1.30 1.54 1.60 1.74 1.56       FLC Ratio    Recent Labs   Lab Test 07/16/24  1606 04/16/24  0912 03/14/24  1305 02/20/24  1026 01/22/24  1017 12/20/23  1026   KLRA 1.36 1.21 1.81* 1.53 1.52 1.50       Assessment/plan:   Winter Loya is a 68 year old woman with standard risk IgG kappa multiple myeloma, s/p post auto-transplant in April 2019,  currently on lenalidomide maintenance. She remains in remission by FLC, EBONIE, SPEP. ASA 81 mg for dvt ppx    Persistently elevated bilirubin led to hepatology consult in October 2023.  Determination was that this is most likely fatty liver disease.  Recommendation was to continue to follow with PCP for management of obesity, DM2, and HTN.    Diarrhea improved after starting cholestyramine with meals TID.      Continue monthly labs and video visits.    24 minutes spent on the date of the encounter doing chart review, review of test results, interpretation of tests, patient visit, and documentation     The longitudinal plan of care for the diagnosis(es)/condition(s) as documented were addressed during this visit. Due to the added complexity in care, I will continue to support Winter in the subsequent management and with ongoing continuity of care.    FALGUNI Fink [facility]  [address] [place] [address]  [phone]

## 2024-10-02 NOTE — LETTER
10/2/2024      Winter Loya  [address]  [place] [address]      Dear Colleague,    Thank you for referring your patient, Winter Loya, to the [facility]. Please see a copy of my visit note below.    Virtual Visit Details    Type of service:  Video Visit   Video Start Time: 2:29 PM  Video End Time:2:42 PM    Originating Location (pt. Location): Home    Distant Location (provider location):  Off-site  Platform used for Video Visit: [facility]      ONCOLOGY/HEMATOLOGY PROGRESS NOTE  Oct 2, 2024    Reason for Visit: IgA Kappa Multiple Myeloma    Oncology HPI:   Winter Loya is a 68 year old woman with IgA Kappa Multiple Myeloma. In 2018 she had anemia and was fatigued. She was found to have IgA kappa monocloncal antibody with M-spike of 0.17. IgA elevated. Skeletal survey was unremarkable and UPEP had minimal protein (156 mg/m2). PET 11/2018 showed bone marrow consistent with hypermetabolic plasma cell myeloma. M spike was 0.17. She started RVD and Zometa. On 4/2019 she had an autologous transplant.      Her bone marrow bx from September 2019 was sent here for review. It showed marrow cellularity of 60%, with decreased trilineage hematopoiesis and 15% plasma cells as well as peripheral blood with slight anemia. 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 She is on revlimid maintenance and zometa from her prior oncologist in [place]. On 12/6/19 her K/L ratio is 1.1, M spike is 0.04.     Currently on Revlimid maintenance.      Interval history:   - Right foot swelling from last month has resolved  - Denies new pain  - Has been taking her cholestyramine regularly and bowels are under good control  - Dry/itchy patches on her arms have improved after starting Claritin       Comprehensive ROS neg other than the symptoms noted above in the HPI.      Past  Medical History:   Diagnosis Date     Abnormal EMG 2021 5/25/2021    Interpretation: This is an abnormal study, demonstrating electrophysiologic evidence of a length-dependent axonal sensorimotor polyneuropathy. Asymmetry of peroneal compound muscle action potential amplitudes is a finding of uncertain significance.        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   I have personally reviewed the examination and initial interpretation and I agree with the findings.   ANNE GOODMAN MD     History of MRI of cervical spine 6/2020 3/15/2021    Impression: Multilevel cervical spondylosis, most pronounced at the level of C4-5 and C5-6 with moderate to severe spinal canal stenosis and moderate spinal canal stenosis at C6-7. No abnormal cord signal. No significant neural foraminal narrowing at any level.   I have personally reviewed the examination and initial interpretation and I agree with the findings.   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     Sleep apnea     no c-pap use     Status post complete thyroidectomy 8/26/2020     Thyroid goiter 8/5/2020     Tremor 12/9/2020        Current Outpatient Medications   Medication Sig Dispense Refill     acyclovir (ZOVIRAX) 400 MG tablet TAKE 1 TABLET BY MOUTH EVERY TWELVE HOURS 180 tablet 3     alcohol swab prep pads Use to swab area of injection/sowmya as directed. 100 each 3     aspirin 81 MG EC tablet Take 81 mg by mouth daily       atorvastatin (LIPITOR) 20 MG  tablet Take 1 tablet (20 mg) by mouth at bedtime 90 tablet 3     blood glucose (NO BRAND SPECIFIED) test strip Use to test blood sugar 3 times daily or as directed. To accompany: Blood Glucose Monitor Brands: per insurance. (Patient not taking: Reported on 4/18/2024) 100 strip 6     blood glucose calibration (NO BRAND SPECIFIED) solution To accompany: Blood Glucose Monitor Brands: per insurance. (Patient not taking: Reported on 4/18/2024) 4 each 0     blood glucose monitoring (NO BRAND SPECIFIED) meter device kit Use to test blood sugar 3 times daily or as directed. Preferred blood glucose meter OR supplies to accompany: Blood Glucose Monitor Brands: per insurance. (Patient not taking: Reported on 4/18/2024) 1 kit 0     calcium carbonate (OS-ASHLEY) 500 MG tablet Take 1 tablet (500 mg) by mouth 2 times daily. 180 tablet 3     cholestyramine (QUESTRAN) 4 g packet Take 1 packet (4 g) by mouth 3 times daily (with meals) (Patient taking differently: Take 1 packet by mouth daily as needed.) 90 packet 4     Continuous Blood Gluc  (FREESTYLE SEGUNDO 2 READER) MARCIA Use to read blood sugars as per 's instructions. 1 each 0     Continuous Blood Gluc Sensor (FREESTYLE SEGUNDO 3 SENSOR) MISC 1 each every 14 days Use 1 sensor every 14 days. Use to read blood sugars per 's instructions. 6 each 5     Continuous Blood Gluc Sensor (FREESTYLE SEGUNDO 3 SENSOR) MISC 1 each every 14 days 6 each 5     empagliflozin (JARDIANCE) 25 MG TABS tablet Take 1 tablet (25 mg) by mouth daily 90 tablet 3     fluticasone (FLONASE) 50 MCG/ACT nasal spray Spray 2 sprays into both nostrils daily (Patient not taking: Reported on 8/22/2024)       insulin glargine (LANTUS SOLOSTAR) 100 UNIT/ML pen Inject 40 Units Subcutaneous every morning Increase dose by 2 units every three days until fasting blood sugar are  mg/dL. Max 40 units/day 45 mL 1     insulin pen needle (FIFTY50 PEN NEEDLES) 32G X 4 MM miscellaneous Use one pen  needle daily or as directed. 100 each 2     LENalidomide (REVLIMID) 10 MG CAPS capsule Take 1 capsule (10 mg) by mouth daily for 28 days 28 capsule 0     LENalidomide (REVLIMID) 10 MG CAPS capsule Take 1 capsule (10 mg) by mouth daily for 28 days (Patient not taking: Reported on 8/22/2024) 28 capsule 0     LENalidomide (REVLIMID) 10 MG CAPS capsule Take 1 capsule (10 mg) by mouth daily 28 capsule 0     levothyroxine (SYNTHROID/LEVOTHROID) 200 MCG tablet Take 1 tablet (200 mcg) by mouth every morning (before breakfast) 90 tablet 3     loperamide (IMODIUM A-D) 2 MG tablet Take 1 tablet (2 mg) by mouth daily as needed for diarrhea Start Imodium 2 pills with first loose stool and then 1 pill with each loose stool after, aiming for 1-2 stools per day. Max of 8 pills a day. 180 tablet 3     loratadine (CLARITIN) 10 MG tablet Take 10 mg by mouth daily       losartan (COZAAR) 25 MG tablet Take 1 tablet (25 mg) by mouth daily 90 tablet 3     mupirocin (BACTROBAN) 2 % external ointment Apply topically 3 times daily 15 g 1     neomycin-polymyxin-hydrocortisone (CORTISPORIN) 3.5-63831-0 otic solution Place 3 drops in ear(s) 4 times daily (Patient not taking: Reported on 8/22/2024) 10 mL 0     pregabalin (LYRICA) 100 MG capsule Take 1 capsule (100 mg) by mouth 2 times daily 180 capsule 3     propranolol ER (INDERAL LA) 60 MG 24 hr capsule Take 1 capsule (60 mg) by mouth daily 90 capsule 3     Semaglutide, 1 MG/DOSE, (OZEMPIC) 4 MG/3ML pen Inject 1 mg subcutaneously every 7 days 3 mL 1     sertraline (ZOLOFT) 100 MG tablet Take 1 tablet (100 mg) by mouth daily 90 tablet 3     triamcinolone (ARISTOCORT HP) 0.5 % external cream Apply topically 2 times daily. 60 g 2       Video physical exam  General: Patient appears well in no acute distress.   Skin: No visualized rash or lesions on visualized skin  Eyes: EOMI, no erythema, sclera icterus or discharge noted  Resp: Appears to be breathing comfortably without accessory muscle usage,  speaking in full sentences, no cough  MSK: Appears to have normal range of motion based on visualized movements  Neurologic: No apparent tremors, facial movements symmetric  Psych: affect bright, alert and oriented          Labs:     Blood Counts       Recent Labs   Lab Test 10/01/24  1352 09/05/24  1240 08/08/24  1211 07/16/24  1606 06/11/24  1556   HGB 14.1 12.9 15.2 14.7 14.3   HCT 42.4 39.4 44.8 44.8 43.1   WBC 4.0 2.8* 3.6* 3.6* 3.2*   ANEUTAUTO 2.5 1.7 2.3 2.2 1.8   ALYMPAUTO 0.8 0.5* 0.7* 0.7* 0.7*   AMONOAUTO 0.3 0.3 0.3 0.3 0.4   AEOSAUTO 0.3 0.2 0.2 0.3 0.2   ABSBASO 0.1 0.1 0.1 0.1 0.1   NRBCMAN  --  0.0  --  0.0 0.0   * 101* 126* 123* 108*       ABO/RH    No lab results found.      Chemistries     Basic Panel  Recent Labs   Lab Test 10/01/24  1352 09/05/24  1240 08/08/24  1211    140 140   POTASSIUM 4.3 3.5 4.2   CHLORIDE 105 107 105   CO2 22 24 23   BUN 12.6 11.9 14.8   CR 0.77 0.76 0.83   * 105* 160*        Calcium, Magnesium, Phosphorus  Recent Labs   Lab Test 10/01/24  1352 09/05/24  1240 08/08/24  1211   ASHLEY 9.5 8.3* 8.7*        LFTs  Recent Labs   Lab Test 10/01/24  1352 09/05/24  1240 08/08/24  1211   BILITOTAL 1.0 2.0* 2.4*   ALKPHOS 117 94 125   AST 20 28 32   ALT 38 29 41   ALBUMIN 4.3 4.1 4.7       LDH  No lab results found.    B2-Microglobulin  Recent Labs   Lab Test 02/18/20  0849   [id number] 1.6           Immunoglobulins     Recent Labs   Lab Test 07/16/24  1606 04/16/24  0912 03/14/24  1305 02/20/24  1026 01/22/24  1017 12/20/23  1026   * 464* 558* 495* 602* 590*       Recent Labs   Lab Test 07/16/24  1606 04/16/24  0912 03/14/24  1305 02/20/24  1026 01/22/24  1017 12/20/23  1026    107 136 131 130 139       Recent Labs   Lab Test 07/16/24  1606 04/16/24  0912 03/14/24  1305 02/20/24  1026 01/22/24  1017 12/20/23  1026   IGM 37 34* 41 <10* 42 41         Monocloncal Protein Studies     M spike    Recent Labs   Lab Test 10/01/24  1352 09/05/24  1240  08/08/24  1211 07/16/24  1606 06/11/24  1556 05/14/24  1023   ELPM 0.0 0.0 0.0 0.0 0.0 0.0       Betances FLC    Recent Labs   Lab Test 07/16/24  1606 04/16/24  0912 03/14/24  1305 02/20/24  1026 01/22/24  1017 12/20/23  1026   KFLCA 1.85 1.57 2.79* 2.45* 2.65* 2.34*       Lambda FLC    Recent Labs   Lab Test 07/16/24  1606 04/16/24  0912 03/14/24  1305 02/20/24  1026 01/22/24  1017 12/20/23  1026   LFLCA 1.36 1.30 1.54 1.60 1.74 1.56       FLC Ratio    Recent Labs   Lab Test 07/16/24  1606 04/16/24  0912 03/14/24  1305 02/20/24  1026 01/22/24  1017 12/20/23  1026   KLRA 1.36 1.21 1.81* 1.53 1.52 1.50       Assessment/plan:   Winter Loya is a 68 year old woman with standard risk IgG kappa multiple myeloma, s/p post auto-transplant in April 2019,  currently on lenalidomide maintenance. She remains in remission by FLC, EBONIE, SPEP. ASA 81 mg for dvt ppx    Persistently elevated bilirubin led to hepatology consult in October 2023.  Determination was that this is most likely fatty liver disease.  Recommendation was to continue to follow with PCP for management of obesity, DM2, and HTN.    Diarrhea improved after starting cholestyramine with meals TID.      Continue monthly labs and video visits.    24 minutes spent on the date of the encounter doing chart review, review of test results, interpretation of tests, patient visit, and documentation     The longitudinal plan of care for the diagnosis(es)/condition(s) as documented were addressed during this visit. Due to the added complexity in care, I will continue to support Winter in the subsequent management and with ongoing continuity of care.    FALGUNI Fink [facility] [address] [address] [phone]      Again, thank you for allowing me to participate in the care of your patient.        Sincerely,        FALGUNI Ahn CNP

## 2024-10-18 ENCOUNTER — VIRTUAL VISIT (OUTPATIENT)
Dept: PHARMACY | Facility: CLINIC | Age: 69
End: 2024-10-18
Payer: COMMERCIAL

## 2024-10-18 DIAGNOSIS — E11.42 TYPE 2 DIABETES MELLITUS WITH DIABETIC POLYNEUROPATHY, WITH LONG-TERM CURRENT USE OF INSULIN (H): Primary | ICD-10-CM

## 2024-10-18 DIAGNOSIS — R12 HEARTBURN: ICD-10-CM

## 2024-10-18 DIAGNOSIS — E89.0 POSTOPERATIVE HYPOTHYROIDISM: ICD-10-CM

## 2024-10-18 DIAGNOSIS — R19.7 DIARRHEA, UNSPECIFIED TYPE: ICD-10-CM

## 2024-10-18 DIAGNOSIS — Z79.4 TYPE 2 DIABETES MELLITUS WITH DIABETIC POLYNEUROPATHY, WITH LONG-TERM CURRENT USE OF INSULIN (H): Primary | ICD-10-CM

## 2024-10-18 PROCEDURE — 99207 PR NO CHARGE LOS: CPT | Mod: 93 | Performed by: PHARMACIST

## 2024-10-18 RX ORDER — FAMOTIDINE 20 MG/1
20 TABLET, FILM COATED ORAL 2 TIMES DAILY PRN
Qty: 180 TABLET | Refills: 1 | Status: SHIPPED | OUTPATIENT
Start: 2024-10-18
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
## 2024-10-18 NOTE — PATIENT INSTRUCTIONS
"Recommendations from today's MTM visit:                                                         1. Continue current medications.  2. You can recheck your thyroid labs in another month.     Follow-up: Return in about 2 months (around 12/18/2024) for Medication Therapy Management.    It was great speaking with you today.  I value your experience and would be very thankful for your time in providing feedback in our clinic survey. In the next few days, you may receive an email or text message from [employer] with a link to a survey related to your  clinical pharmacist.\"     To schedule another MTM appointment, please call the clinic directly or you may call the MTM scheduling line at [phone] or toll-free at [phone].     My Clinical Pharmacist's contact information:                                                      Please feel free to contact me with any questions or concerns you have.      Vonnie Corrigan, PharmD  Medication Therapy Management Pharmacist     "
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
## 2024-10-22 ENCOUNTER — TELEPHONE (OUTPATIENT)
Dept: ONCOLOGY | Facility: CLINIC | Age: 69
End: 2024-10-22
Payer: COMMERCIAL

## 2024-10-22 ENCOUNTER — TELEPHONE (OUTPATIENT)
Dept: FAMILY MEDICINE | Facility: CLINIC | Age: 69
End: 2024-10-22
Payer: COMMERCIAL

## 2024-10-22 DIAGNOSIS — C90.01 MULTIPLE MYELOMA IN REMISSION (H): Primary | ICD-10-CM

## 2024-10-22 RX ORDER — LENALIDOMIDE 10 MG/1
10 CAPSULE ORAL DAILY
Qty: 28 CAPSULE | Refills: 0 | Status: SHIPPED | OUTPATIENT
Start: 2024-10-22

## 2024-10-22 NOTE — TELEPHONE ENCOUNTER
Oral Chemotherapy Monitoring Program    Medication: Revlimid  Rx:  10 mg PO daily for a 28 day cycle    Auth #: [id number]  Risk Category: Adult female NOT of reproductive capacity    Routine survey questions reviewed.    Thank you,    Mary Franklin  Oncology Pharmacy Liaison LUCINDA [email or web address]  Phone: [phone]  Fax: [phone]

## 2024-10-22 NOTE — TELEPHONE ENCOUNTER
Patient dropped a heavy wrench on her her middle left toe. This happened yesterday. There is pain present. It is swollen and purple.  She is on a baby aspirin. She has not been elevating area. She applied ice right away.     She stated she has neuropathy, so wonders if there is more pain than what she can feel. RN discouraged walking on area until she has eval.     RN gave 3 options:   1) To schedule at primary care clinic  2) To be seen at [facility] walk in clinic  3) Be seen in urgent care.      She stated that she will go to the [facility] sports ortho     Jessica Flores RN on 10/22/2024 at 10:31 AM

## 2024-10-23 ENCOUNTER — OFFICE VISIT (OUTPATIENT)
Dept: ORTHOPEDICS | Facility: CLINIC | Age: 69
End: 2024-10-23
Payer: COMMERCIAL

## 2024-10-23 ENCOUNTER — ANCILLARY PROCEDURE (OUTPATIENT)
Dept: GENERAL RADIOLOGY | Facility: CLINIC | Age: 69
End: 2024-10-23
Attending: PEDIATRICS
Payer: COMMERCIAL

## 2024-10-23 VITALS — HEIGHT: 69 IN | BODY MASS INDEX: 37.03 KG/M2 | WEIGHT: 250 LBS

## 2024-10-23 DIAGNOSIS — S97.122A CRUSHING INJURY OF SECOND TOE OF LEFT FOOT, INITIAL ENCOUNTER: ICD-10-CM

## 2024-10-23 DIAGNOSIS — Z79.4 TYPE 2 DIABETES MELLITUS WITH DIABETIC POLYNEUROPATHY, WITH LONG-TERM CURRENT USE OF INSULIN (H): ICD-10-CM

## 2024-10-23 DIAGNOSIS — S92.535A CLOSED NONDISPLACED FRACTURE OF DISTAL PHALANX OF LESSER TOE OF LEFT FOOT, INITIAL ENCOUNTER: Primary | ICD-10-CM

## 2024-10-23 DIAGNOSIS — E11.42 TYPE 2 DIABETES MELLITUS WITH DIABETIC POLYNEUROPATHY, WITH LONG-TERM CURRENT USE OF INSULIN (H): ICD-10-CM

## 2024-10-23 PROCEDURE — 73660 X-RAY EXAM OF TOE(S): CPT | Mod: TC | Performed by: RADIOLOGY

## 2024-10-23 PROCEDURE — 99203 OFFICE O/P NEW LOW 30 MIN: CPT | Performed by: PEDIATRICS

## 2024-10-23 RX ORDER — SEMAGLUTIDE 1.34 MG/ML
INJECTION, SOLUTION SUBCUTANEOUS
Qty: 3 ML | Refills: 1 | Status: SHIPPED | OUTPATIENT
Start: 2024-10-23

## 2024-10-23 NOTE — PATIENT INSTRUCTIONS
Nondisplaced left second toe distal phalanx tuft fracture.  Supportive care reviewed.  May do icing, over-the-counter medication as needed.  Taping reviewed.  Also reviewed support options, such as fracture shoe.  Otherwise, advised comfortable footwear.  Monitor 4 weeks to begin, and anticipate recheck at that time, particular if any persistent symptoms.  Contact clinic sooner if any other questions or concerns.    If you have any further questions for your physician or physician s care team you can contact them thru [employer] or by calling [phone].

## 2024-10-23 NOTE — LETTER
10/23/2024      Winter Loya  [address]  [facility] [facility] [address]      Dear Colleague,    Thank you for referring your patient, Winter Loya, to the [facility]. Please see a copy of my visit note below.    ASSESSMENT & PLAN    Winter was seen today for injury.    Diagnoses and all orders for this visit:    Closed nondisplaced fracture of distal phalanx of lesser toe of left foot, initial encounter    Crushing injury of second toe of left foot, initial encounter  -     XR Toe LT G/E 2 vw; Future        See Patient Instructions  Patient Instructions   Nondisplaced left second toe distal phalanx tuft fracture.  Supportive care reviewed.  May do icing, over-the-counter medication as needed.  Taping reviewed.  Also reviewed support options, such as fracture shoe.  Otherwise, advised comfortable footwear.  Monitor 4 weeks to begin, and anticipate recheck at that time, particular if any persistent symptoms.  Contact clinic sooner if any other questions or concerns.    If you have any further questions for your physician or physician s care team you can contact them thru MyChart or by calling [phone].      Abisai Davidson DO  [facility] [place]    -----  Chief Complaint   Patient presents with     Left 2nd Toe - Injury       SUBJECTIVE  Winter Loya is a/an 68 year old female who is seen as a WALK IN patient for evaluation of left second toe.     The patient is seen by themselves.    Onset: 2 day(s) ago. Patient describes injury as dropping a large torque wrench on the 2nd toe  Location of Pain: left second toe, diffuse 2nd toe  Worsened by: Extension, flexion  Better with:   Treatments tried: Icing, tylenol and ibuprofen  Associated symptoms: swelling, bruising    Orthopedic/Surgical history: NO  Social History/Occupation: Currently unemployed      REVIEW OF SYSTEMS:  Review of Systems    OBJECTIVE:  There were no vitals taken for this visit.        Left foot:  Mild diffuse swelling second toe, with ecchymosis in the second toe  Nail intact, very small area subungual hematoma  Grossly intact flexion, extension 2nd toe, some discomfort  Sensation reduced light touch 2nd toe  Cap refill brisk  Tenderness distal 2nd toe    RADIOLOGY:  Final results and radiologist's interpretation, available in the [facility] health record.  Images were reviewed with the patient in the office today.  My personal interpretation of the performed imaging: nondisplaced tuft fracture distal phalanx 2nd toe.    Recent Results (from the past 24 hours)   XR Toe LT G/E 2 vw    Narrative    XR TOE LEFT G/E 2 VIEWS 10/23/2024 11:52 AM    HISTORY: Question fracture; Crushing injury of second toe of left  foot, initial encounter    COMPARISON: None.      Impression    IMPRESSION: Acute nondisplaced fracture of the tuft of the distal  phalanx of the left second toe.    RACIEL GAONA MD         SYSTEM ID:  [id number]                  Again, thank you for allowing me to participate in the care of your patient.        Sincerely,        Abisai Davidson DO

## 2024-10-23 NOTE — PROGRESS NOTES
ASSESSMENT & PLAN    Winter was seen today for injury.    Diagnoses and all orders for this visit:    Closed nondisplaced fracture of distal phalanx of lesser toe of left foot, initial encounter    Crushing injury of second toe of left foot, initial encounter  -     XR Toe LT G/E 2 vw; Future        See Patient Instructions  Patient Instructions   Nondisplaced left second toe distal phalanx tuft fracture.  Supportive care reviewed.  May do icing, over-the-counter medication as needed.  Taping reviewed.  Also reviewed support options, such as fracture shoe.  Otherwise, advised comfortable footwear.  Monitor 4 weeks to begin, and anticipate recheck at that time, particular if any persistent symptoms.  Contact clinic sooner if any other questions or concerns.    If you have any further questions for your physician or physician s care team you can contact them thru MyChart or by calling [phone].      Abisai Davidson [facility]    -----  Chief Complaint   Patient presents with    Left 2nd Toe - Injury       SUBJECTIVE  Winter Loya is a/an 68 year old female who is seen as a WALK IN patient for evaluation of left second toe.     The patient is seen by themselves.    Onset: 2 day(s) ago. Patient describes injury as dropping a large torque wrench on the 2nd toe  Location of Pain: left second toe, diffuse 2nd toe  Worsened by: Extension, flexion  Better with:   Treatments tried: Icing, tylenol and ibuprofen  Associated symptoms: swelling, bruising    Orthopedic/Surgical history: NO  Social History/Occupation: Currently unemployed      REVIEW OF SYSTEMS:  Review of Systems    OBJECTIVE:  There were no vitals taken for this visit.       Left foot:  Mild diffuse swelling second toe, with ecchymosis in the second toe  Nail intact, very small area subungual hematoma  Grossly intact flexion, extension 2nd toe, some discomfort  Sensation reduced light touch 2nd toe  Cap refill  brisk  Tenderness distal 2nd toe    RADIOLOGY:  Final results and radiologist's interpretation, available in the [facility] health record.  Images were reviewed with the patient in the office today.  My personal interpretation of the performed imaging: nondisplaced tuft fracture distal phalanx 2nd toe.    Recent Results (from the past 24 hours)   XR Toe LT G/E 2 vw    Narrative    XR TOE LEFT G/E 2 VIEWS 10/23/2024 11:52 AM    HISTORY: Question fracture; Crushing injury of second toe of left  foot, initial encounter    COMPARISON: None.      Impression    IMPRESSION: Acute nondisplaced fracture of the tuft of the distal  phalanx of the left second toe.    RACIEL GAONA MD         SYSTEM ID:  [id number]

## 2024-10-28 ENCOUNTER — LAB (OUTPATIENT)
Dept: LAB | Facility: CLINIC | Age: 69
End: 2024-10-28
Payer: COMMERCIAL

## 2024-10-28 DIAGNOSIS — C90.01 MULTIPLE MYELOMA IN REMISSION (H): ICD-10-CM

## 2024-10-28 DIAGNOSIS — E03.9 HYPOTHYROIDISM, UNSPECIFIED TYPE: ICD-10-CM

## 2024-10-28 LAB
BASOPHILS # BLD AUTO: 0.1 10E3/UL (ref 0–0.2)
BASOPHILS NFR BLD AUTO: 1 %
EOSINOPHIL # BLD AUTO: 0.2 10E3/UL (ref 0–0.7)
EOSINOPHIL NFR BLD AUTO: 5 %
ERYTHROCYTE [DISTWIDTH] IN BLOOD BY AUTOMATED COUNT: 14 % (ref 10–15)
HCT VFR BLD AUTO: 46.4 % (ref 35–47)
HGB BLD-MCNC: 15.4 G/DL (ref 11.7–15.7)
IMM GRANULOCYTES # BLD: 0 10E3/UL
IMM GRANULOCYTES NFR BLD: 1 %
LYMPHOCYTES # BLD AUTO: 0.9 10E3/UL (ref 0.8–5.3)
LYMPHOCYTES NFR BLD AUTO: 23 %
MCH RBC QN AUTO: 29.4 PG (ref 26.5–33)
MCHC RBC AUTO-ENTMCNC: 33.2 G/DL (ref 31.5–36.5)
MCV RBC AUTO: 89 FL (ref 78–100)
MONOCYTES # BLD AUTO: 0.4 10E3/UL (ref 0–1.3)
MONOCYTES NFR BLD AUTO: 9 %
NEUTROPHILS # BLD AUTO: 2.3 10E3/UL (ref 1.6–8.3)
NEUTROPHILS NFR BLD AUTO: 61 %
PLATELET # BLD AUTO: 178 10E3/UL (ref 150–450)
RBC # BLD AUTO: 5.24 10E6/UL (ref 3.8–5.2)
TOTAL PROTEIN SERUM FOR ELP: 6.7 G/DL (ref 6.4–8.3)
WBC # BLD AUTO: 3.8 10E3/UL (ref 4–11)

## 2024-10-28 PROCEDURE — 84155 ASSAY OF PROTEIN SERUM: CPT | Mod: 59

## 2024-10-28 PROCEDURE — 85025 COMPLETE CBC W/AUTO DIFF WBC: CPT

## 2024-10-28 PROCEDURE — 84443 ASSAY THYROID STIM HORMONE: CPT

## 2024-10-28 PROCEDURE — 84439 ASSAY OF FREE THYROXINE: CPT

## 2024-10-28 PROCEDURE — 82784 ASSAY IGA/IGD/IGG/IGM EACH: CPT | Performed by: REGISTERED NURSE

## 2024-10-28 PROCEDURE — 80053 COMPREHEN METABOLIC PANEL: CPT

## 2024-10-28 PROCEDURE — 83521 IG LIGHT CHAINS FREE EACH: CPT

## 2024-10-28 PROCEDURE — 36415 COLL VENOUS BLD VENIPUNCTURE: CPT

## 2024-10-28 PROCEDURE — 84165 PROTEIN E-PHORESIS SERUM: CPT | Performed by: STUDENT IN AN ORGANIZED HEALTH CARE EDUCATION/TRAINING PROGRAM

## 2024-10-29 ENCOUNTER — TELEPHONE (OUTPATIENT)
Dept: FAMILY MEDICINE | Facility: CLINIC | Age: 69
End: 2024-10-29

## 2024-10-29 DIAGNOSIS — Z79.4 TYPE 2 DIABETES MELLITUS WITH DIABETIC POLYNEUROPATHY, WITH LONG-TERM CURRENT USE OF INSULIN (H): Primary | ICD-10-CM

## 2024-10-29 DIAGNOSIS — E11.42 TYPE 2 DIABETES MELLITUS WITH DIABETIC POLYNEUROPATHY, WITH LONG-TERM CURRENT USE OF INSULIN (H): Primary | ICD-10-CM

## 2024-10-29 LAB
ALBUMIN SERPL BCG-MCNC: 4.7 G/DL (ref 3.5–5.2)
ALBUMIN SERPL ELPH-MCNC: 4.5 G/DL (ref 3.7–5.1)
ALP SERPL-CCNC: 143 U/L (ref 40–150)
ALPHA1 GLOB SERPL ELPH-MCNC: 0.3 G/DL (ref 0.2–0.4)
ALPHA2 GLOB SERPL ELPH-MCNC: 0.6 G/DL (ref 0.5–0.9)
ALT SERPL W P-5'-P-CCNC: 36 U/L (ref 0–50)
ANION GAP SERPL CALCULATED.3IONS-SCNC: 16 MMOL/L (ref 7–15)
AST SERPL W P-5'-P-CCNC: 24 U/L (ref 0–45)
B-GLOBULIN SERPL ELPH-MCNC: 0.8 G/DL (ref 0.6–1)
BILIRUB SERPL-MCNC: 1.1 MG/DL
BUN SERPL-MCNC: 15.2 MG/DL (ref 8–23)
CALCIUM SERPL-MCNC: 9.1 MG/DL (ref 8.8–10.4)
CHLORIDE SERPL-SCNC: 104 MMOL/L (ref 98–107)
CREAT SERPL-MCNC: 0.8 MG/DL (ref 0.51–0.95)
EGFRCR SERPLBLD CKD-EPI 2021: 81 ML/MIN/1.73M2
GAMMA GLOB SERPL ELPH-MCNC: 0.5 G/DL (ref 0.7–1.6)
GLUCOSE SERPL-MCNC: 220 MG/DL (ref 70–99)
HCO3 SERPL-SCNC: 21 MMOL/L (ref 22–29)
KAPPA LC FREE SER-MCNC: 1.98 MG/DL (ref 0.33–1.94)
KAPPA LC FREE/LAMBDA FREE SER NEPH: 1.47 {RATIO} (ref 0.26–1.65)
LAMBDA LC FREE SERPL-MCNC: 1.35 MG/DL (ref 0.57–2.63)
LOCATION OF TASK: ABNORMAL
M PROTEIN SERPL ELPH-MCNC: 0 G/DL
POTASSIUM SERPL-SCNC: 5.3 MMOL/L (ref 3.4–5.3)
PROT PATTERN SERPL ELPH-IMP: ABNORMAL
PROT SERPL-MCNC: 6.9 G/DL (ref 6.4–8.3)
SODIUM SERPL-SCNC: 141 MMOL/L (ref 135–145)
T4 FREE SERPL-MCNC: 1.21 NG/DL (ref 0.9–1.7)
TSH SERPL DL<=0.005 MIU/L-ACNC: 9.26 UIU/ML (ref 0.3–4.2)

## 2024-10-29 RX ORDER — HYDROCHLOROTHIAZIDE 12.5 MG/1
CAPSULE ORAL
Qty: 6 EACH | Refills: 1 | Status: SHIPPED | OUTPATIENT
Start: 2024-10-29 | End: 2024-11-07 | Stop reason: ALTCHOICE

## 2024-10-29 NOTE — TELEPHONE ENCOUNTER
Converting patient from [employer] to [employer] due to the shortage. Updated Rx on file per prescription CPA.     Kang Duttno PharmD  [facility]

## 2024-10-30 ENCOUNTER — VIRTUAL VISIT (OUTPATIENT)
Dept: ONCOLOGY | Facility: CLINIC | Age: 69
End: 2024-10-30
Attending: STUDENT IN AN ORGANIZED HEALTH CARE EDUCATION/TRAINING PROGRAM
Payer: COMMERCIAL

## 2024-10-30 VITALS — HEIGHT: 69 IN | BODY MASS INDEX: 37.03 KG/M2 | WEIGHT: 250 LBS

## 2024-10-30 DIAGNOSIS — C90.01 MULTIPLE MYELOMA IN REMISSION (H): Primary | ICD-10-CM

## 2024-10-30 PROCEDURE — G2211 COMPLEX E/M VISIT ADD ON: HCPCS | Mod: 95 | Performed by: REGISTERED NURSE

## 2024-10-30 PROCEDURE — 99213 OFFICE O/P EST LOW 20 MIN: CPT | Mod: 95 | Performed by: REGISTERED NURSE

## 2024-10-30 RX ORDER — LEVOTHYROXINE SODIUM 25 UG/1
25 TABLET ORAL DAILY
Qty: 90 TABLET | Refills: 0 | Status: SHIPPED | OUTPATIENT
Start: 2024-10-30

## 2024-10-30 ASSESSMENT — PAIN SCALES - GENERAL: PAINLEVEL_OUTOF10: NO PAIN (0)

## 2024-10-30 NOTE — PROGRESS NOTES
Virtual Visit Details    Type of service:  Video Visit   Video Start Time: 1:27 PM  Video End Time:1:42 PM    Originating Location (pt. Location): Home    Distant Location (provider location):  Off-site  Platform used for Video Visit: [facility]          ONCOLOGY/HEMATOLOGY PROGRESS NOTE  Oct 30, 2024    Reason for Visit: IgA Kappa Multiple Myeloma    Oncology HPI:   Winter Loya is a 68 year old woman with IgA Kappa Multiple Myeloma. In 2018 she had anemia and was fatigued. She was found to have IgA kappa monocloncal antibody with M-spike of 0.17. IgA elevated. Skeletal survey was unremarkable and UPEP had minimal protein (156 mg/m2). PET 11/2018 showed bone marrow consistent with hypermetabolic plasma cell myeloma. M spike was 0.17. She started RVD and Zometa. On 4/2019 she had an autologous transplant.      Her bone marrow bx from September 2019 was sent here for review. It showed marrow cellularity of 60%, with decreased trilineage hematopoiesis and 15% plasma cells as well as peripheral blood with slight anemia. 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 She is on revlimid maintenance and zometa from her prior oncologist in [place]. On 12/6/19 her K/L ratio is 1.1, M spike is 0.04.     Currently on Revlimid maintenance.      Interval history:   - Broke her toe by dropping a wrench on it  - She got evicted from her apartment so has to move within a month (there was an inspection for upcoming renovations and there were concerns raised about items in her son's room that led to the eviction notice); doesn't know yet where she is going to move, [facility]  is aware and helping her sort out housing options  - Denies new pain  - No recent illnesses    Comprehensive ROS neg other than the symptoms noted above in the HPI.      Past Medical History:   Diagnosis Date    Abnormal EMG  2021 5/25/2021    Interpretation: This is an abnormal study, demonstrating electrophysiologic evidence of a length-dependent axonal sensorimotor polyneuropathy. Asymmetry of peroneal compound muscle action potential amplitudes is a finding of uncertain significance.       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   I have personally reviewed the examination and initial interpretation and I agree with the findings.   ANNE GOODMAN MD    History of MRI of cervical spine 6/2020 3/15/2021    Impression: Multilevel cervical spondylosis, most pronounced at the level of C4-5 and C5-6 with moderate to severe spinal canal stenosis and moderate spinal canal stenosis at C6-7. No abnormal cord signal. No significant neural foraminal narrowing at any level.   I have personally reviewed the examination and initial interpretation and I agree with the findings.   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 100 each 3    aspirin 81 MG EC tablet Take 81 mg by mouth daily      atorvastatin (LIPITOR) 20 MG tablet Take 1 tablet (20 mg) by mouth at bedtime 90 tablet 3    blood  glucose (NO BRAND SPECIFIED) test strip Use to test blood sugar 3 times daily or as directed. To accompany: Blood Glucose Monitor Brands: per insurance. (Patient not taking: Reported on 4/18/2024) 100 strip 6    blood glucose calibration (NO BRAND SPECIFIED) solution To accompany: Blood Glucose Monitor Brands: per insurance. (Patient not taking: Reported on 4/18/2024) 4 each 0    blood glucose monitoring (NO BRAND SPECIFIED) meter device kit Use to test blood sugar 3 times daily or as directed. Preferred blood glucose meter OR supplies to accompany: Blood Glucose Monitor Brands: per insurance. (Patient not taking: Reported on 4/18/2024) 1 kit 0    calcium carbonate (OS-ASHLEY) 500 MG tablet Take 1 tablet (500 mg) by mouth 2 times daily. 180 tablet 3    cholestyramine (QUESTRAN) 4 g packet Take 1 packet (4 g) by mouth 3 times daily (with meals) (Patient taking differently: Take 1 packet by mouth daily as needed.) 90 packet 4    Continuous Glucose Sensor (FREESTYLE SEGUNDO 3 PLUS SENSOR) MISC Change every 15 days. 6 each 1    empagliflozin (JARDIANCE) 25 MG TABS tablet Take 1 tablet (25 mg) by mouth daily 90 tablet 3    famotidine (PEPCID) 20 MG tablet Take 1 tablet (20 mg) by mouth 2 times daily as needed. 180 tablet 1    fluticasone (FLONASE) 50 MCG/ACT nasal spray Spray 2 sprays into both nostrils daily (Patient not taking: Reported on 8/22/2024)      insulin glargine (LANTUS SOLOSTAR) 100 UNIT/ML pen Inject 40 Units Subcutaneous every morning Increase dose by 2 units every three days until fasting blood sugar are  mg/dL. Max 40 units/day 45 mL 1    insulin pen needle (FIFTY50 PEN NEEDLES) 32G X 4 MM miscellaneous Use one pen needle daily or as directed. 100 each 2    LENalidomide (REVLIMID) 10 MG CAPS capsule Take 1 capsule (10 mg) by mouth daily 28 capsule 0    levothyroxine (SYNTHROID/LEVOTHROID) 200 MCG tablet Take 1 tablet (200 mcg) by mouth every morning (before breakfast) 90 tablet 3    levothyroxine  (SYNTHROID/LEVOTHROID) 25 MCG tablet Take 1 tablet (25 mcg) by mouth daily. 90 tablet 0    loperamide (IMODIUM A-D) 2 MG tablet Take 1 tablet (2 mg) by mouth daily as needed for diarrhea Start Imodium 2 pills with first loose stool and then 1 pill with each loose stool after, aiming for 1-2 stools per day. Max of 8 pills a day. 180 tablet 3    loratadine (CLARITIN) 10 MG tablet Take 10 mg by mouth daily      losartan (COZAAR) 25 MG tablet Take 1 tablet (25 mg) by mouth daily 90 tablet 3    mupirocin (BACTROBAN) 2 % external ointment Apply topically 3 times daily 15 g 1    OZEMPIC, 1 MG/DOSE, 4 MG/3ML pen INJECT 1MG UNDER THE SKIN EVERY 7 DAYS 3 mL 1    pregabalin (LYRICA) 100 MG capsule Take 1 capsule (100 mg) by mouth 2 times daily 180 capsule 3    propranolol ER (INDERAL LA) 60 MG 24 hr capsule Take 1 capsule (60 mg) by mouth daily 90 capsule 3    sertraline (ZOLOFT) 100 MG tablet Take 1 tablet (100 mg) by mouth daily 90 tablet 3    triamcinolone (ARISTOCORT HP) 0.5 % external cream Apply topically 2 times daily. 60 g 2       Video physical exam  General: Patient appears well in no acute distress.   Skin: No visualized rash or lesions on visualized skin  Eyes: EOMI, no erythema, sclera icterus or discharge noted  Resp: Appears to be breathing comfortably without accessory muscle usage, speaking in full sentences, no cough  MSK: Appears to have normal range of motion based on visualized movements  Neurologic: No apparent tremors, facial movements symmetric  Psych: affect bright, alert and oriented          Labs:     Blood Counts       Recent Labs   Lab Test 10/28/24  1031 10/01/24  1352 09/05/24  1240 08/08/24  1211 07/16/24  1606 06/11/24  1556   HGB 15.4 14.1 12.9   < > 14.7 14.3   HCT 46.4 42.4 39.4   < > 44.8 43.1   WBC 3.8* 4.0 2.8*   < > 3.6* 3.2*   ANEUTAUTO 2.3 2.5 1.7   < > 2.2 1.8   ALYMPAUTO 0.9 0.8 0.5*   < > 0.7* 0.7*   AMONOAUTO 0.4 0.3 0.3   < > 0.3 0.4   AEOSAUTO 0.2 0.3 0.2   < > 0.3 0.2   ABSBASO  0.1 0.1 0.1   < > 0.1 0.1   NRBCMAN  --   --  0.0  --  0.0 0.0    125* 101*   < > 123* 108*    < > = values in this interval not displayed.       ABO/RH    No lab results found.      Chemistries     Basic Panel  Recent Labs   Lab Test 10/28/24  1031 10/01/24  1352 09/05/24  1240    139 140   POTASSIUM 5.3 4.3 3.5   CHLORIDE 104 105 107   CO2 21* 22 24   BUN 15.2 12.6 11.9   CR 0.80 0.77 0.76   * 215* 105*        Calcium, Magnesium, Phosphorus  Recent Labs   Lab Test 10/28/24  1031 10/01/24  1352 09/05/24  1240   ASHLEY 9.1 9.5 8.3*        LFTs  Recent Labs   Lab Test 10/28/24  1031 10/01/24  1352 09/05/24  1240   BILITOTAL 1.1 1.0 2.0*   ALKPHOS 143 117 94   AST 24 20 28   ALT 36 38 29   ALBUMIN 4.7 4.3 4.1       LDH  No lab results found.    B2-Microglobulin  Recent Labs   Lab Test 02/18/20  0849   [id number] 1.6           Immunoglobulins     Recent Labs   Lab Test 07/16/24  1606 04/16/24  0912 03/14/24  1305 02/20/24  1026 01/22/24  1017 12/20/23  1026   * 464* 558* 495* 602* 590*       Recent Labs   Lab Test 07/16/24  1606 04/16/24  0912 03/14/24  1305 02/20/24  1026 01/22/24  1017 12/20/23  1026    107 136 131 130 139       Recent Labs   Lab Test 07/16/24  1606 04/16/24  0912 03/14/24  1305 02/20/24  1026 01/22/24  1017 12/20/23  1026   IGM 37 34* 41 <10* 42 41         Monocloncal Protein Studies     M spike    Recent Labs   Lab Test 10/28/24  1031 10/01/24  1352 09/05/24  1240 08/08/24  1211 07/16/24  1606 06/11/24  1556   ELPM 0.0 0.0 0.0 0.0 0.0 0.0       [place] FLC    Recent Labs   Lab Test 10/28/24  1031 07/16/24  1606 04/16/24  0912 03/14/24  1305 02/20/24  1026 01/22/24  1017   KFLCA 1.98* 1.85 1.57 2.79* 2.45* 2.65*       [place] FLC    Recent Labs   Lab Test 10/28/24  1031 07/16/24  1606 04/16/24  0912 03/14/24  1305 02/20/24  1026 01/22/24  1017   LFLCA 1.35 1.36 1.30 1.54 1.60 1.74       FLC Ratio    Recent Labs   Lab Test 10/28/24  1031 07/16/24  1606 04/16/24  0912  03/14/24  1305 02/20/24  1026 01/22/24  1017   KLRA 1.47 1.36 1.21 1.81* 1.53 1.52       Assessment/plan:   Winter Loya is a 68 year old woman with standard risk IgG kappa multiple myeloma, s/p post auto-transplant in April 2019,  currently on lenalidomide maintenance. She remains in remission by FLC, EBONIE, SPEP. ASA 81 mg for dvt ppx    Persistently elevated bilirubin led to hepatology consult in October 2023.  Determination was that this is most likely fatty liver disease.  Recommendation was to continue to follow with PCP for management of obesity, DM2, and HTN.    Diarrhea improved after starting cholestyramine once in the morning.      Continue monthly labs and video visits.    20 minutes spent on the date of the encounter doing chart review, review of test results, interpretation of tests, patient visit, and documentation     The longitudinal plan of care for the diagnosis(es)/condition(s) as documented were addressed during this visit. Due to the added complexity in care, I will continue to support Winter in the subsequent management and with ongoing continuity of care.    FALGUNI Fink [facility]  [address], [place] [id number] [phone]

## 2024-10-30 NOTE — LETTER
10/30/2024      Winter Loya  [address]  [place] [address]      Dear Colleague,    Thank you for referring your patient, Winter Loya, to the [facility]. Please see a copy of my visit note below.    Virtual Visit Details    Type of service:  Video Visit   Video Start Time: 1:27 PM  Video End Time:1:42 PM    Originating Location (pt. Location): Home    Distant Location (provider location):  Off-site  Platform used for Video Visit: [facility]          ONCOLOGY/HEMATOLOGY PROGRESS NOTE  Oct 30, 2024    Reason for Visit: IgA Kappa Multiple Myeloma    Oncology HPI:   Winter Loya is a 68 year old woman with IgA Kappa Multiple Myeloma. In 2018 she had anemia and was fatigued. She was found to have IgA kappa monocloncal antibody with M-spike of 0.17. IgA elevated. Skeletal survey was unremarkable and UPEP had minimal protein (156 mg/m2). PET 11/2018 showed bone marrow consistent with hypermetabolic plasma cell myeloma. M spike was 0.17. She started RVD and Zometa. On 4/2019 she had an autologous transplant.      Her bone marrow bx from September 2019 was sent here for review. It showed marrow cellularity of 60%, with decreased trilineage hematopoiesis and 15% plasma cells as well as peripheral blood with slight anemia. 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 She is on revlimid maintenance and zometa from her prior oncologist in [place]. On 12/6/19 her K/L ratio is 1.1, M spike is 0.04.     Currently on Revlimid maintenance.      Interval history:   - Broke her toe by dropping a wrench on it  - She got evicted from her apartment so has to move within a month (there was an inspection for upcoming renovations and there were concerns raised about items in her son's room that led to the eviction notice); doesn't know yet where she is going to move, county   is aware and helping her sort out housing options  - Denies new pain  - No recent illnesses    Comprehensive ROS neg other than the symptoms noted above in the HPI.      Past Medical History:   Diagnosis Date     Abnormal EMG 2021 5/25/2021    Interpretation: This is an abnormal study, demonstrating electrophysiologic evidence of a length-dependent axonal sensorimotor polyneuropathy. Asymmetry of peroneal compound muscle action potential amplitudes is a finding of uncertain significance.        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   I have personally reviewed the examination and initial interpretation and I agree with the findings.   ANNE GOODMAN MD     History of MRI of cervical spine 6/2020 3/15/2021    Impression: Multilevel cervical spondylosis, most pronounced at the level of C4-5 and C5-6 with moderate to severe spinal canal stenosis and moderate spinal canal stenosis at C6-7. No abnormal cord signal. No significant neural foraminal narrowing at any level.   I have personally reviewed the examination and initial interpretation and I agree with the findings.   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     Sleep apnea     no c-pap use     Status post complete thyroidectomy 8/26/2020     Thyroid goiter 8/5/2020     Tremor 12/9/2020        Current Outpatient Medications   Medication Sig Dispense Refill     acyclovir (ZOVIRAX) 400 MG tablet TAKE 1 TABLET BY MOUTH EVERY TWELVE HOURS  180 tablet 3     alcohol swab prep pads Use to swab area of injection/sowmya as directed. 100 each 3     aspirin 81 MG EC tablet Take 81 mg by mouth daily       atorvastatin (LIPITOR) 20 MG tablet Take 1 tablet (20 mg) by mouth at bedtime 90 tablet 3     blood glucose (NO BRAND SPECIFIED) test strip Use to test blood sugar 3 times daily or as directed. To accompany: Blood Glucose Monitor Brands: per insurance. (Patient not taking: Reported on 4/18/2024) 100 strip 6     blood glucose calibration (NO BRAND SPECIFIED) solution To accompany: Blood Glucose Monitor Brands: per insurance. (Patient not taking: Reported on 4/18/2024) 4 each 0     blood glucose monitoring (NO BRAND SPECIFIED) meter device kit Use to test blood sugar 3 times daily or as directed. Preferred blood glucose meter OR supplies to accompany: Blood Glucose Monitor Brands: per insurance. (Patient not taking: Reported on 4/18/2024) 1 kit 0     calcium carbonate (OS-ASHLEY) 500 MG tablet Take 1 tablet (500 mg) by mouth 2 times daily. 180 tablet 3     cholestyramine (QUESTRAN) 4 g packet Take 1 packet (4 g) by mouth 3 times daily (with meals) (Patient taking differently: Take 1 packet by mouth daily as needed.) 90 packet 4     Continuous Glucose Sensor (FREESTYLE SEGUNDO 3 PLUS SENSOR) MISC Change every 15 days. 6 each 1     empagliflozin (JARDIANCE) 25 MG TABS tablet Take 1 tablet (25 mg) by mouth daily 90 tablet 3     famotidine (PEPCID) 20 MG tablet Take 1 tablet (20 mg) by mouth 2 times daily as needed. 180 tablet 1     fluticasone (FLONASE) 50 MCG/ACT nasal spray Spray 2 sprays into both nostrils daily (Patient not taking: Reported on 8/22/2024)       insulin glargine (LANTUS SOLOSTAR) 100 UNIT/ML pen Inject 40 Units Subcutaneous every morning Increase dose by 2 units every three days until fasting blood sugar are  mg/dL. Max 40 units/day 45 mL 1     insulin pen needle (FIFTY50 PEN NEEDLES) 32G X 4 MM miscellaneous Use one pen needle daily or as  directed. 100 each 2     LENalidomide (REVLIMID) 10 MG CAPS capsule Take 1 capsule (10 mg) by mouth daily 28 capsule 0     levothyroxine (SYNTHROID/LEVOTHROID) 200 MCG tablet Take 1 tablet (200 mcg) by mouth every morning (before breakfast) 90 tablet 3     levothyroxine (SYNTHROID/LEVOTHROID) 25 MCG tablet Take 1 tablet (25 mcg) by mouth daily. 90 tablet 0     loperamide (IMODIUM A-D) 2 MG tablet Take 1 tablet (2 mg) by mouth daily as needed for diarrhea Start Imodium 2 pills with first loose stool and then 1 pill with each loose stool after, aiming for 1-2 stools per day. Max of 8 pills a day. 180 tablet 3     loratadine (CLARITIN) 10 MG tablet Take 10 mg by mouth daily       losartan (COZAAR) 25 MG tablet Take 1 tablet (25 mg) by mouth daily 90 tablet 3     mupirocin (BACTROBAN) 2 % external ointment Apply topically 3 times daily 15 g 1     OZEMPIC, 1 MG/DOSE, 4 MG/3ML pen INJECT 1MG UNDER THE SKIN EVERY 7 DAYS 3 mL 1     pregabalin (LYRICA) 100 MG capsule Take 1 capsule (100 mg) by mouth 2 times daily 180 capsule 3     propranolol ER (INDERAL LA) 60 MG 24 hr capsule Take 1 capsule (60 mg) by mouth daily 90 capsule 3     sertraline (ZOLOFT) 100 MG tablet Take 1 tablet (100 mg) by mouth daily 90 tablet 3     triamcinolone (ARISTOCORT HP) 0.5 % external cream Apply topically 2 times daily. 60 g 2       Video physical exam  General: Patient appears well in no acute distress.   Skin: No visualized rash or lesions on visualized skin  Eyes: EOMI, no erythema, sclera icterus or discharge noted  Resp: Appears to be breathing comfortably without accessory muscle usage, speaking in full sentences, no cough  MSK: Appears to have normal range of motion based on visualized movements  Neurologic: No apparent tremors, facial movements symmetric  Psych: affect bright, alert and oriented          Labs:     Blood Counts       Recent Labs   Lab Test 10/28/24  1031 10/01/24  1352 09/05/24  1240 08/08/24  1211 07/16/24  1606  06/11/24  1556   HGB 15.4 14.1 12.9   < > 14.7 14.3   HCT 46.4 42.4 39.4   < > 44.8 43.1   WBC 3.8* 4.0 2.8*   < > 3.6* 3.2*   ANEUTAUTO 2.3 2.5 1.7   < > 2.2 1.8   ALYMPAUTO 0.9 0.8 0.5*   < > 0.7* 0.7*   AMONOAUTO 0.4 0.3 0.3   < > 0.3 0.4   AEOSAUTO 0.2 0.3 0.2   < > 0.3 0.2   ABSBASO 0.1 0.1 0.1   < > 0.1 0.1   NRBCMAN  --   --  0.0  --  0.0 0.0    125* 101*   < > 123* 108*    < > = values in this interval not displayed.       ABO/RH    No lab results found.      Chemistries     Basic Panel  Recent Labs   Lab Test 10/28/24  1031 10/01/24  1352 09/05/24  1240    139 140   POTASSIUM 5.3 4.3 3.5   CHLORIDE 104 105 107   CO2 21* 22 24   BUN 15.2 12.6 11.9   CR 0.80 0.77 0.76   * 215* 105*        Calcium, Magnesium, Phosphorus  Recent Labs   Lab Test 10/28/24  1031 10/01/24  1352 09/05/24  1240   ASHLEY 9.1 9.5 8.3*        LFTs  Recent Labs   Lab Test 10/28/24  1031 10/01/24  1352 09/05/24  1240   BILITOTAL 1.1 1.0 2.0*   ALKPHOS 143 117 94   AST 24 20 28   ALT 36 38 29   ALBUMIN 4.7 4.3 4.1       LDH  No lab results found.    B2-Microglobulin  Recent Labs   Lab Test 02/18/20  0849   [id number] 1.6           Immunoglobulins     Recent Labs   Lab Test 07/16/24  1606 04/16/24  0912 03/14/24  1305 02/20/24  1026 01/22/24  1017 12/20/23  1026   * 464* 558* 495* 602* 590*       Recent Labs   Lab Test 07/16/24  1606 04/16/24  0912 03/14/24  1305 02/20/24  1026 01/22/24  1017 12/20/23  1026    107 136 131 130 139       Recent Labs   Lab Test 07/16/24  1606 04/16/24  0912 03/14/24  1305 02/20/24  1026 01/22/24  1017 12/20/23  1026   IGM 37 34* 41 <10* 42 41         Monocloncal Protein Studies     M spike    Recent Labs   Lab Test 10/28/24  1031 10/01/24  1352 09/05/24  1240 08/08/24  1211 07/16/24  1606 06/11/24  1556   ELPM 0.0 0.0 0.0 0.0 0.0 0.0       [place] FLC    Recent Labs   Lab Test 10/28/24  1031 07/16/24  1606 04/16/24  0912 03/14/24  1305 02/20/24  1026 01/22/24  1017   KFLCA 1.98*  1.85 1.57 2.79* 2.45* 2.65*       [place] FLC    Recent Labs   Lab Test 10/28/24  1031 07/16/24  1606 04/16/24  0912 03/14/24  1305 02/20/24  1026 01/22/24  1017   LFLCA 1.35 1.36 1.30 1.54 1.60 1.74       FLC Ratio    Recent Labs   Lab Test 10/28/24  1031 07/16/24  1606 04/16/24  0912 03/14/24  1305 02/20/24  1026 01/22/24  1017   KLRA 1.47 1.36 1.21 1.81* 1.53 1.52       Assessment/plan:   Winter Loya is a 68 year old woman with standard risk IgG kappa multiple myeloma, s/p post auto-transplant in April 2019,  currently on lenalidomide maintenance. She remains in remission by FLC, EBONIE, SPEP. ASA 81 mg for dvt ppx    Persistently elevated bilirubin led to hepatology consult in October 2023.  Determination was that this is most likely fatty liver disease.  Recommendation was to continue to follow with PCP for management of obesity, DM2, and HTN.    Diarrhea improved after starting cholestyramine once in the morning.      Continue monthly labs and video visits.    20 minutes spent on the date of the encounter doing chart review, review of test results, interpretation of tests, patient visit, and documentation     The longitudinal plan of care for the diagnosis(es)/condition(s) as documented were addressed during this visit. Due to the added complexity in care, I will continue to support Winter in the subsequent management and with ongoing continuity of care.    FALGUNI Fink CNP  [facility] [address] [address]  [phone]      Again, thank you for allowing me to participate in the care of your patient.        Sincerely,        FALGUNI Ahn CNP

## 2024-10-31 ENCOUNTER — ONCOLOGY VISIT (OUTPATIENT)
Dept: ONCOLOGY | Facility: HOSPITAL | Age: 69
End: 2024-10-31
Payer: COMMERCIAL

## 2024-10-31 DIAGNOSIS — F40.00 AGORAPHOBIA, UNSPECIFIED: ICD-10-CM

## 2024-10-31 DIAGNOSIS — F33.1 MAJOR DEPRESSIVE DISORDER, RECURRENT EPISODE, MODERATE (H): Primary | ICD-10-CM

## 2024-10-31 LAB — IGG SERPL-MCNC: 529 MG/DL (ref 610–1616)

## 2024-10-31 PROCEDURE — 90837 PSYTX W PT 60 MINUTES: CPT | Performed by: PSYCHOLOGIST

## 2024-10-31 NOTE — LETTER
"10/31/2024      Winter Loya  [address]  [facility] [address]      Dear Colleague,    Thank you for referring your patient, Winter Loya, to the [facility] [place]. Please see a copy of my visit note below.        [facility]- Psychotherapy                                    Progress Note    Patient Name: Winter Loya  Date: 10/31/2024       Service Type: Individual      Session Start Time: 0930  Session End Time: 1023     Session Length: 53 mins    Attendees: Client attended alone    Service Modality:  In-person    DATA  Interactive Complexity: No  Crisis: No        Progress Since Last Session (Related to Symptoms / Goals / Homework):   Symptoms: Worsening Pt reports she has been evicted from her home and that has caused stress, anxiety, fear, worry, and sadness.      Pt reports low mood.     Homework:  none      Episode of Care Goals: Satisfactory progress - ACTION (Actively working towards change); Intervened by reinforcing change plan / affirming steps taken     Current / Ongoing Stressors and Concerns:   Worsening Pt reports she has been evicted from her home and that has caused stress, anxiety, fear, worry, and sadness.      Pt reports her son is dismissive and argumentative with her. Pt thinks he stole the copper wire out of the chargers in the parking lot. He has a CD issue. He has been able to ghold a job recently which is a nice change.      Pt reports her family is not supportive. She has a good relationship with her sister but her brothers are not a support.      Pt may work in [place], [place] and live that way.        Social Hx  Pt reports she lives with one of her sons (38) who has \"tons of issues,\" He has job hopped, his 7th job in the past year. Pt describes him as emotionally abusive at times. He is a  who works on factory machines. He uses cannabis   regularly and has a hx of meth abuse.                  Pt rpeorts she lost a son two " years ago who  from Leukemia at 38.                  Pt worked as a CD person for 13 years. She reports she was in [employer] for 35 years prior.                  Pt reports a son (44) who lives in [facility]. He is in the [employer]. He was in [place] X 4-5 tours.                  Pt reports all her sons have/had depression.                  Pt is  since . She was  X 20 years. Pt reports he was cheating on her prior to the divorce.                  Pt has a social work degree with a CD minor.                  Pt reports she has a 15 year old grand daughter. Her  son's daughter. She lives in [place], [place].       Treatment Objective(s) Addressed in This Session:   Decrease frequency and intensity of feeling down, depressed, hopeless       Intervention:   CBT: .  Emotion Focused Therapy: .  Solution Focused: .    Assessments completed prior to visit:  none       ASSESSMENT: Current Emotional / Mental Status (status of significant symptoms):   Risk status (Self / Other harm or suicidal ideation)   Patient denies current fears or concerns for personal safety.   Patient denies current or recent suicidal ideation or behaviors.   Patient denies current or recent homicidal ideation or behaviors.   Patient denies current or recent self injurious behavior or ideation.   Patient denies other safety concerns.   Patient reports there has been no change in risk factors since their last session.     Patient reports there has been no change in protective factors since their last session.     Recommended that patient call 911 or go to the local ED should there be a change in any of these risk factors     Appearance:   Appropriate    Eye Contact:   Good    Psychomotor Behavior: Normal    Attitude:   Cooperative    Orientation:   All   Speech    Rate / Production: Normal     Volume:  Normal    Mood:    Anxious  Depressed    Affect:    Labile    Thought Content:  Clear    Thought Form:  Coherent     Insight:    Good      Medication Review:   No changes to current psychiatric medication(s)     Medication Compliance:   Yes     Changes in Health Issues:   Yes: cancer, Associated Psychological Distress     Chemical Use Review:   Substance Use: Chemical use reviewed, no active concerns identified      Tobacco Use: No current tobacco use.      Diagnosis:  F33.1 MDD  300.22 (F40.00) Agoraphobia    Collateral Reports Completed:   Not Applicable    PLAN: (Patient Tasks / Therapist Tasks / Other)  Return in a week        Ben Lizama LP                                                         Individual Treatment Plan     Patient's Name: Winter Loya                    YOB: 1955     Date of Creation: 6/18/2024     Date Treatment Plan Last Reviewed/Revised: 10/31/2024       DSM5 Diagnoses: 296.32 (F33.1) Major Depressive Disorder, Recurrent Episode, Moderate _ or 300.22 (F40.00) Agoraphobia  Psychosocial / Contextual Factors: cancer dx  PROMIS (reviewed every 90 days):      Referral / Collaboration:  Referral to another professional/service is not indicated at this time..     Anticipated number of session for this episode of care: 9-12 sessions  Anticipation frequency of session: Biweekly  Anticipated Duration of each session: 53 or more minutes  Treatment plan will be reviewed in 90 days or when goals have been changed.         MeasurableTreatment Goal(s) related to diagnosis / functional impairment(s)  Goal 1: Patient will improve depressive sx.      Objective #A (Patient Action)                          Patient will Decrease frequency and intensity of feeling down, depressed, hopeless.  Status: Reviewed 10/31/2024     Intervention(s)  Therapist will teach emotional regulation skills.   .     Objective #B  Patient will Increase interest, engagement, and pleasure in doing things.  Status: Reviewed 10/31/2024     Intervention(s)  Therapist will teach emotional regulation skills.   .        Goal  1: Patient will improve agorpahobia symptoms.        Objective #A (Patient Action)                          Patient will use at least 3 coping skills for anxiety management in the next 2 weeks.  Status: Reviewed 10/31/2024    Intervention(s)  Therapist will teach emotional regulation skills. .     Objective #B  Patient will identify three distraction and diversion activities and use those activities to decrease level of anxiety  .  Status: Reviewed 10/31/2024    Intervention(s)  Therapist will teach emotional regulation skills. , .     Patient has reviewed and agreed to the above plan.          Ben Lizama LP  October 31, 2024      Again, thank you for allowing me to participate in the care of your patient.        Sincerely,        Ben Lizama LP

## 2024-10-31 NOTE — PROGRESS NOTES
"[facility] Oncology- Psychotherapy                                    Progress Note    Patient Name: Winter Loya  Date: 10/31/2024       Service Type: Individual      Session Start Time: 930  Session End Time:      Session Length: 53 mins    Attendees: Client attended alone    Service Modality:  In-person    DATA  Interactive Complexity: No  Crisis: No        Progress Since Last Session (Related to Symptoms / Goals / Homework):   Symptoms: Worsening Pt reports she has been evicted from her home and that has caused stress, anxiety, fear, worry, and sadness.      Pt reports low mood.     Homework:  none      Episode of Care Goals: Satisfactory progress - ACTION (Actively working towards change); Intervened by reinforcing change plan / affirming steps taken     Current / Ongoing Stressors and Concerns:   Worsening Pt reports she has been evicted from her home and that has caused stress, anxiety, fear, worry, and sadness.      Pt reports her son is dismissive and argumentative with her. Pt thinks he stole the copper wire out of the chargers in the parking lot. He has a CD issue. He has been able to ghold a job recently which is a nice change.      Pt reports her family is not supportive. She has a good relationship with her sister but her brothers are not a support.      Pt may work in [place], [place] and live that way.        Social Hx  Pt reports she lives with one of her sons (38) who has \"tons of issues,\" He has job hopped, his 7th job in the past year. Pt describes him as emotionally abusive at times. He is a  who works on factory machines. He uses cannabis   regularly and has a hx of meth abuse.                  Pt rpeorts she lost a son two years ago who  from Leukemia at 38.                  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                  Pt reports a son (44) who lives in [facility] also. He is in the [employer]. He " was in [place] X 4-5 tours.                  Pt reports all her sons have/had depression.                  Pt is  since . She was  X 20 years. Pt reports he was cheating on her prior to the divorce.                  Pt has a social work degree with a CD minor.                  Pt reports she has a 15 year old grand daughter. Her  son's daughter. She lives in [place], [place].       Treatment Objective(s) Addressed in This Session:   Decrease frequency and intensity of feeling down, depressed, hopeless       Intervention:   CBT: .  Emotion Focused Therapy: .  Solution Focused: .    Assessments completed prior to visit:  none       ASSESSMENT: Current Emotional / Mental Status (status of significant symptoms):   Risk status (Self / Other harm or suicidal ideation)   Patient denies current fears or concerns for personal safety.   Patient denies current or recent suicidal ideation or behaviors.   Patient denies current or recent homicidal ideation or behaviors.   Patient denies current or recent self injurious behavior or ideation.   Patient denies other safety concerns.   Patient reports there has been no change in risk factors since their last session.     Patient reports there has been no change in protective factors since their last session.     Recommended that patient call 911 or go to the local ED should there be a change in any of these risk factors     Appearance:   Appropriate    Eye Contact:   Good    Psychomotor Behavior: Normal    Attitude:   Cooperative    Orientation:   All   Speech    Rate / Production: Normal     Volume:  Normal    Mood:    Anxious  Depressed    Affect:    Labile    Thought Content:  Clear    Thought Form:  Coherent    Insight:    Good      Medication Review:   No changes to current psychiatric medication(s)     Medication Compliance:   Yes     Changes in Health Issues:   Yes: cancer, Associated Psychological Distress     Chemical Use Review:   Substance Use: Chemical  use reviewed, no active concerns identified      Tobacco Use: No current tobacco use.      Diagnosis:  F33.1 MDD  300.22 (F40.00) Agoraphobia    Collateral Reports Completed:   Not Applicable    PLAN: (Patient Tasks / Therapist Tasks / Other)  Return in a week        Ben Lizama LP                                                         Individual Treatment Plan     Patient's Name: Winter Loya                    YOB: 1955     Date of Creation: 6/18/2024     Date Treatment Plan Last Reviewed/Revised: 10/31/2024       DSM5 Diagnoses: 296.32 (F33.1) Major Depressive Disorder, Recurrent Episode, Moderate _ or 300.22 (F40.00) Agoraphobia  Psychosocial / Contextual Factors: cancer dx  PROMIS (reviewed every 90 days):      Referral / Collaboration:  Referral to another professional/service is not indicated at this time..     Anticipated number of session for this episode of care: 9-12 sessions  Anticipation frequency of session: Biweekly  Anticipated Duration of each session: 53 or more minutes  Treatment plan will be reviewed in 90 days or when goals have been changed.         MeasurableTreatment Goal(s) related to diagnosis / functional impairment(s)  Goal 1: Patient will improve depressive sx.      Objective #A (Patient Action)                          Patient will Decrease frequency and intensity of feeling down, depressed, hopeless.  Status: Reviewed 10/31/2024     Intervention(s)  Therapist will teach emotional regulation skills.   .     Objective #B  Patient will Increase interest, engagement, and pleasure in doing things.  Status: Reviewed 10/31/2024     Intervention(s)  Therapist will teach emotional regulation skills.   .        Goal 1: Patient will improve agorpahobia symptoms.        Objective #A (Patient Action)                          Patient will use at least 3 coping skills for anxiety management in the next 2 weeks.  Status: Reviewed 10/31/2024    Intervention(s)  Therapist  will teach emotional regulation skills. .     Objective #B  Patient will identify three distraction and diversion activities and use those activities to decrease level of anxiety  .  Status: Reviewed 10/31/2024    Intervention(s)  Therapist will teach emotional regulation skills. , .     Patient has reviewed and agreed to the above plan.          eBn Lizama LP  October 31, 2024

## 2024-11-05 DIAGNOSIS — K59.1 FUNCTIONAL DIARRHEA: ICD-10-CM

## 2024-11-05 RX ORDER — CHOLESTYRAMINE 4 G/9G
1 POWDER, FOR SUSPENSION ORAL
Qty: 90 PACKET | Refills: 4 | Status: SHIPPED | OUTPATIENT
Start: 2024-11-05

## 2024-11-06 ENCOUNTER — ANCILLARY PROCEDURE (OUTPATIENT)
Dept: GENERAL RADIOLOGY | Facility: CLINIC | Age: 69
End: 2024-11-06
Attending: FAMILY MEDICINE
Payer: COMMERCIAL

## 2024-11-06 ENCOUNTER — OFFICE VISIT (OUTPATIENT)
Dept: ORTHOPEDICS | Facility: CLINIC | Age: 69
End: 2024-11-06
Payer: COMMERCIAL

## 2024-11-06 VITALS
HEIGHT: 69 IN | DIASTOLIC BLOOD PRESSURE: 75 MMHG | WEIGHT: 254 LBS | BODY MASS INDEX: 37.62 KG/M2 | SYSTOLIC BLOOD PRESSURE: 113 MMHG

## 2024-11-06 DIAGNOSIS — M25.561 CHRONIC PAIN OF RIGHT KNEE: ICD-10-CM

## 2024-11-06 DIAGNOSIS — G89.29 CHRONIC PAIN OF RIGHT KNEE: Primary | ICD-10-CM

## 2024-11-06 DIAGNOSIS — M25.561 CHRONIC PAIN OF RIGHT KNEE: Primary | ICD-10-CM

## 2024-11-06 DIAGNOSIS — M25.461 EFFUSION, RIGHT KNEE: ICD-10-CM

## 2024-11-06 DIAGNOSIS — M17.11 PRIMARY OSTEOARTHRITIS OF RIGHT KNEE: ICD-10-CM

## 2024-11-06 DIAGNOSIS — G89.29 CHRONIC PAIN OF RIGHT KNEE: ICD-10-CM

## 2024-11-06 PROCEDURE — 99214 OFFICE O/P EST MOD 30 MIN: CPT | Mod: 25 | Performed by: FAMILY MEDICINE

## 2024-11-06 PROCEDURE — 73562 X-RAY EXAM OF KNEE 3: CPT | Mod: TC | Performed by: RADIOLOGY

## 2024-11-06 PROCEDURE — 20611 DRAIN/INJ JOINT/BURSA W/US: CPT | Mod: RT | Performed by: FAMILY MEDICINE

## 2024-11-06 RX ORDER — TRIAMCINOLONE ACETONIDE 40 MG/ML
40 INJECTION, SUSPENSION INTRA-ARTICULAR; INTRAMUSCULAR
Status: COMPLETED | OUTPATIENT
Start: 2024-11-06 | End: 2024-11-06

## 2024-11-06 RX ORDER — ROPIVACAINE HYDROCHLORIDE 5 MG/ML
3 INJECTION, SOLUTION EPIDURAL; INFILTRATION; PERINEURAL
Status: COMPLETED | OUTPATIENT
Start: 2024-11-06 | End: 2024-11-06

## 2024-11-06 RX ADMIN — ROPIVACAINE HYDROCHLORIDE 3 ML: 5 INJECTION, SOLUTION EPIDURAL; INFILTRATION; PERINEURAL at 14:40

## 2024-11-06 RX ADMIN — TRIAMCINOLONE ACETONIDE 40 MG: 40 INJECTION, SUSPENSION INTRA-ARTICULAR; INTRAMUSCULAR at 14:40

## 2024-11-06 NOTE — PROGRESS NOTES
Winter Loya  :  1955  DOS: 2024  MRN: [id number]    Sports Medicine Clinic Visit    PCP: Montse Bucio    Winter Loya is a 68 year old female who is seen as a WALK IN patient presenting with acute on chronic right knee pain.    Injury: Reports insidious onset without acute precipitating event that patient first noticed approximately 3 days after she was going to stand.  Pain located over right deep anterior lateral & medial knee, radiating to right lower leg.  Additional Features:  Positive: swelling, popping, and joint stiffness.  Symptoms are better with Tylenol, Ibuprofen, and Rest.  Symptoms are worse with: going from sit to stand, bending knee, stairs.  Other evaluation and/or treatments so far consists of: Ice, Tylenol, Ibuprofen, and Rest.  Recent imaging completed: No recent imaging completed.  Prior History of related problems: history of chronic intermittent right knee pain over past 2 - 3 years, status post right knee arthroscopy in .  Patient has last completed right knee steroid injection in 2024 from PCP that provided ~ 2 - 3 months of relief.    Social History: unemployed    Review of Systems  Musculoskeletal: as above  Remainder of review of systems is negative including constitutional, CV, pulmonary, GI, Skin and Neurologic except as noted in HPI or medical history.    Past Medical History:   Diagnosis Date    Abnormal EMG 2021    Interpretation: This is an abnormal study, demonstrating electrophysiologic evidence of a length-dependent axonal sensorimotor polyneuropathy. Asymmetry of peroneal compound muscle action potential amplitudes is a finding of uncertain significance.       Adjustment disorder with mixed anxiety and depressed mood 3/16/2013    Anxiety     Axonal neuropathy 2021    Depression     Diabetes (H)     Glaucoma (increased eye pressure)     H/O magnetic resonance imaging of lumbar spine 2020 3/15/2021    IMPRESSION: Multilevel mild lumbar  spondylosis, most pronounced at the level of L4-5 and L5-S1 as detailed above.   I have personally reviewed the examination and initial interpretation and I agree with the findings.   ANNE GOODMAN MD    History of MRI of cervical spine 6/2020 3/15/2021    Impression: Multilevel cervical spondylosis, most pronounced at the level of C4-5 and C5-6 with moderate to severe spinal canal stenosis and moderate spinal canal stenosis at C6-7. No abnormal cord signal. No significant neural foraminal narrowing at any level.   I have personally reviewed the examination and initial interpretation and I agree with the findings.   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    Sleep apnea     no c-pap use    Status post complete thyroidectomy 8/26/2020    Thyroid goiter 8/5/2020    Tremor 12/9/2020     Past Surgical History:   Procedure Laterality Date    ARTHROSCOPY KNEE Left 02/2014    ARTHROSCOPY KNEE Right 12/2010    KNEE ARTHROSCOPY Dec 2010  Right    CHOLECYSTECTOMY  2002    COLONOSCOPY N/A 07/23/2020    Procedure: COLONOSCOPY;  Surgeon: Za Denis MD;  Location: [facility]    COLONOSCOPY N/A 2/25/2022    Procedure: COLONOSCOPY with biopsies;  Surgeon: Jose Bangura MD;  Location: [facility] OR    ENDOSCOPIC ULTRASOUND UPPER GASTROINTESTINAL TRACT (GI) N/A 2/25/2022    Procedure: ENDOSCOPIC ULTRASOUND, ESOPHAGOSCOPY with biopsies / UPPER GASTROINTESTINAL TRACT (GI);  Surgeon: Jose Bangura MD;  Location: [facility] OR    EYE SURGERY  1985    lasersx-spot behind eye started leaking    THYROIDECTOMY N/A 08/26/2020    Procedure: THYROIDECTOMY, TOTAL;  Surgeon: Tiff Burgos MD;  Location: [facility] OR    TONSILLECTOMY  2003    TUBAL LIGATION  1986     Family History   Problem Relation Age of Onset    Chronic Obstructive Pulmonary Disease Mother  "    Substance Abuse Mother     Alcoholism Mother     Diabetes Mother     Parkinsonism Father         bed bound, stiffness. no dementia or hallucinatinos    Diabetes Brother     Arrhythmia Brother     Diabetes Brother     Gastrointestinal Disease Brother     Glaucoma Paternal Grandfather     Leukemia Son     Macular Degeneration No family hx of     Colon Cancer No family hx of     Liver Disease No family hx of        Objective  /75   Ht 1.753 m (5' 9\")   Wt 115.2 kg (254 lb)   BMI 37.51 kg/m      General: healthy, alert and in no distress    HEENT: no scleral icterus or conjunctival erythema   Skin: no suspicious lesions or rash. No jaundice.   CV: regular rhythm by palpation, 2+ distal pulses, no pedal edema    Resp: normal respiratory effort without conversational dyspnea   Psych: normal mood and affect    Gait: antalgic, appropriate coordination and balance   Neuro: normal light touch sensory exam of the extremities. Motor strength as noted below     Right Knee exam    ROM:        Decreased terminal active and passive ROM with flexion and extension    Inspection:       no visible ecchymosis        effusion noted moderate    Skin:       no visible deformities       well perfused       capillary refill brisk    Patellar Motion:        Normal patellar tracking noted through range of motion       Crepitus noted in the patellofemoral joint    Tender:        medial patellar border       lateral patellar border       medial joint line       lateral joint line    Non Tender:         remainder of knee area    Special Tests:        painful Michele       neg (-) Lachman       neg (-) anterior drawer       neg (-) posterior drawer       neg (-) varus at 0 deg and 30 deg       neg (-) valgus at 0 deg and 30 deg       mild pain with forced extension    Evaluation of ipsilateral kinetic chain       normal strength with hip extension and abduction      Radiology  Recent Results (from the past 744 hours)   XR Toe LT G/E " 2 vw    Narrative    XR TOE LEFT G/E 2 VIEWS 10/23/2024 11:52 AM    HISTORY: Question fracture; Crushing injury of second toe of left  foot, initial encounter    COMPARISON: None.      Impression    IMPRESSION: Acute nondisplaced fracture of the tuft of the distal  phalanx of the left second toe.    RACIEL GAONA MD         SYSTEM ID:  [id number]   XR Knee Standing AP [address] Bilat Lat Right    Narrative    XR KNEE STANDING AP [place] BILAT LAT RIGHT 11/6/2024 1:22 PM     HISTORY: Chronic pain of right knee; Chronic pain of right knee    COMPARISON: None.         Impression    IMPRESSION: Degenerative narrowing medial compartment both knees.  Osteophytic spurring medial joint line left knee lateral compartment  right knee. No evidence for acute fracture on either side. Mild  degenerative change patellofemoral compartment bilaterally. No  significant effusion.    ALMA ROSA PEREZ MD         SYSTEM ID:  [id number]       Large Joint Injection/Arthocentesis: R knee joint    Date/Time: 11/6/2024 2:40 PM    Performed by: Abisai Hein DO  Authorized by: Abisai Hein DO    Indications:  Pain, osteoarthritis and joint swelling  Needle Size:  21 G  Guidance: ultrasound    Approach:  Superolateral  Location:  Knee      Medications:  40 mg triamcinolone 40 MG/ML; 3 mL ROPivacaine 5 MG/ML  Aspirate amount (mL):  28  Aspirate:  Serous and yellow  Outcome:  Tolerated well, no immediate complications  Procedure discussed: discussed risks, benefits, and alternatives    Consent Given by:  Patient  Timeout: timeout called immediately prior to procedure    Prep: patient was prepped and draped in usual sterile fashion     Ultrasound images of procedure were permanently stored.         Assessment:  1. Chronic pain of right knee    2. Effusion, right knee    3. Primary osteoarthritis of right knee        Plan:  Discussed the assessment with the patient.  Follow up: 3 weeks if no change or incomplete relief  Acute  flare of severe right knee pain, in the setting of milder chronic pain  Substantial DJD changes in b/l knees, reviewed today  XR images independently visualized and reviewed with patient today in clinic  Reviewed wt loss, activity modification and progressive increase in activity as tolerated and guided by pain  Reviewed options for potential steroid vs viscosupplementation injections and the possibility for future orthopedic referral prn  Reviewed safe and appropriate OTC medication choices, try tylenol first  Up to 3000mg daily of tylenol is generally safe, NSAID dosing and duration limitations reviewed  Discussed nature of degenerative arthrosis of the knee.   Discussed symptom treatment with ice or heat, topical treatments, and rest if needed.   Oral Tylenol and topical Voltaren gel reviewed as safe OTC options, reviewed safe dosing strategies  Low impact activity strategies reviewed, PT options reviewed  Assistive device and bracing options reviewed  Trial of US guided CSI with aspiration for pain control, reviewed relative risks and goals  Orthopedic surgery referral for TKa discussion would be available in the future if needed, defer for now  Expectations and goals of CSI reviewed  Often 2-3 days for steroid effect, and can take up to two weeks for maximum effect  We discussed modified progressive pain-free activity as tolerated  Do not overuse in first two weeks if feeling better due to concern for vulnerability while steroid is working  Supportive care reviewed  All questions were answered today  Contact us with additional questions or concerns  Signs and sx of concern reviewed      Abisai Hein DO, MANFRED  Sports Medicine Physician  [facility]            Disclaimer: This note consists of symbols derived from keyboarding, dictation and/or voice recognition software. As a result, there may be errors in the script that have gone undetected. Please consider this when  interpreting information found in this chart.

## 2024-11-06 NOTE — LETTER
2024      Winter Loya  [address]  [facility] [address]      Dear Colleague,    Thank you for referring your patient, Winter Loya, to the [facility]. Please see a copy of my visit note below.    Winter Loya  :  1955  DOS: 2024  MRN: [id number]    Sports Medicine Clinic Visit    PCP: Montse Bucio    Winter Loya is a 68 year old female who is seen as a WALK IN patient presenting with acute on chronic right knee pain.    Injury: Reports insidious onset without acute precipitating event that patient first noticed approximately 3 days after she was going to stand.  Pain located over right deep anterior lateral & medial knee, radiating to right lower leg.  Additional Features:  Positive: swelling, popping, and joint stiffness.  Symptoms are better with Tylenol, Ibuprofen, and Rest.  Symptoms are worse with: going from sit to stand, bending knee, stairs.  Other evaluation and/or treatments so far consists of: Ice, Tylenol, Ibuprofen, and Rest.  Recent imaging completed: No recent imaging completed.  Prior History of related problems: history of chronic intermittent right knee pain over past 2 - 3 years, status post right knee arthroscopy in .  Patient has last completed right knee steroid injection in 2024 from PCP that provided ~ 2 - 3 months of relief.    Social History: unemployed    Review of Systems  Musculoskeletal: as above  Remainder of review of systems is negative including constitutional, CV, pulmonary, GI, Skin and Neurologic except as noted in HPI or medical history.    Past Medical History:   Diagnosis Date     Abnormal EMG 2021    Interpretation: This is an abnormal study, demonstrating electrophysiologic evidence of a length-dependent axonal sensorimotor polyneuropathy. Asymmetry of peroneal compound muscle action potential amplitudes is a finding of uncertain significance.        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   I have personally reviewed the examination and initial interpretation and I agree with the findings.   ANNE GOODMAN MD     History of MRI of cervical spine 6/2020 3/15/2021    Impression: Multilevel cervical spondylosis, most pronounced at the level of C4-5 and C5-6 with moderate to severe spinal canal stenosis and moderate spinal canal stenosis at C6-7. No abnormal cord signal. No significant neural foraminal narrowing at any level.   I have personally reviewed the examination and initial interpretation and I agree with the findings.   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     Sleep apnea     no c-pap use     Status post complete thyroidectomy 8/26/2020     Thyroid goiter 8/5/2020     Tremor 12/9/2020     Past Surgical History:   Procedure Laterality Date     ARTHROSCOPY KNEE Left 02/2014     ARTHROSCOPY KNEE Right 12/2010    KNEE ARTHROSCOPY Dec 2010  Right     CHOLECYSTECTOMY  2002     COLONOSCOPY N/A 07/23/2020    Procedure: COLONOSCOPY;  Surgeon: Za Denis MD;  Location: [facility] OR     COLONOSCOPY N/A 2/25/2022    Procedure: COLONOSCOPY with biopsies;  Surgeon: Jose Bangura MD;  Location: [facility] OR     ENDOSCOPIC ULTRASOUND UPPER GASTROINTESTINAL TRACT (GI) N/A 2/25/2022    Procedure: ENDOSCOPIC ULTRASOUND, ESOPHAGOSCOPY with biopsies / UPPER GASTROINTESTINAL TRACT (GI);  Surgeon: Jose Bangura MD;  Location: [facility] OR     EYE SURGERY  1985    lasersx-spot behind eye started  "leaking     THYROIDECTOMY N/A 08/26/2020    Procedure: THYROIDECTOMY, TOTAL;  Surgeon: Tiff Burgos MD;  Location: [facility] OR     TONSILLECTOMY  2003     TUBAL LIGATION  1986     Family History   Problem Relation Age of Onset     Chronic Obstructive Pulmonary Disease Mother      Substance Abuse Mother      Alcoholism Mother      Diabetes Mother      Parkinsonism Father         bed bound, stiffness. no dementia or hallucinatinos     Diabetes Brother      Arrhythmia Brother      Diabetes Brother      Gastrointestinal Disease Brother      Glaucoma Paternal Grandfather      Leukemia Son      Macular Degeneration No family hx of      Colon Cancer No family hx of      Liver Disease No family hx of        Objective  /75   Ht 1.753 m (5' 9\")   Wt 115.2 kg (254 lb)   BMI 37.51 kg/m      General: healthy, alert and in no distress    HEENT: no scleral icterus or conjunctival erythema   Skin: no suspicious lesions or rash. No jaundice.   CV: regular rhythm by palpation, 2+ distal pulses, no pedal edema    Resp: normal respiratory effort without conversational dyspnea   Psych: normal mood and affect    Gait: antalgic, appropriate coordination and balance   Neuro: normal light touch sensory exam of the extremities. Motor strength as noted below     Right Knee exam    ROM:        Decreased terminal active and passive ROM with flexion and extension    Inspection:       no visible ecchymosis        effusion noted moderate    Skin:       no visible deformities       well perfused       capillary refill brisk    Patellar Motion:        Normal patellar tracking noted through range of motion       Crepitus noted in the patellofemoral joint    Tender:        medial patellar border       lateral patellar border       medial joint line       lateral joint line    Non Tender:         remainder of knee area    Special Tests:        painful Michele       neg (-) Lachman       neg (-) anterior drawer       neg (-) posterior " drawer       neg (-) varus at 0 deg and 30 deg       neg (-) valgus at 0 deg and 30 deg       mild pain with forced extension    Evaluation of ipsilateral kinetic chain       normal strength with hip extension and abduction      Radiology  Recent Results (from the past 744 hours)   XR Toe LT G/E 2 vw    Narrative    XR TOE LEFT G/E 2 VIEWS 10/23/2024 11:52 AM    HISTORY: Question fracture; Crushing injury of second toe of left  foot, initial encounter    COMPARISON: None.      Impression    IMPRESSION: Acute nondisplaced fracture of the tuft of the distal  phalanx of the left second toe.    RACIEL GAONA MD         SYSTEM ID:  [id number]   XR Knee Standing AP Caruthers Bilat Lat Right    Narrative    XR KNEE STANDING AP SUNRISE BILAT LAT RIGHT 11/6/2024 1:22 PM     HISTORY: Chronic pain of right knee; Chronic pain of right knee    COMPARISON: None.         Impression    IMPRESSION: Degenerative narrowing medial compartment both knees.  Osteophytic spurring medial joint line left knee lateral compartment  right knee. No evidence for acute fracture on either side. Mild  degenerative change patellofemoral compartment bilaterally. No  significant effusion.    ALMA ROSA PEREZ MD         SYSTEM ID:  [id number]       Large Joint Injection/Arthocentesis: R knee joint    Date/Time: 11/6/2024 2:40 PM    Performed by: Abisai Hein DO  Authorized by: Abisai Hein DO    Indications:  Pain, osteoarthritis and joint swelling  Needle Size:  21 G  Guidance: ultrasound    Approach:  Superolateral  Location:  Knee      Medications:  40 mg triamcinolone 40 MG/ML; 3 mL ROPivacaine 5 MG/ML  Aspirate amount (mL):  28  Aspirate:  Serous and yellow  Outcome:  Tolerated well, no immediate complications  Procedure discussed: discussed risks, benefits, and alternatives    Consent Given by:  Patient  Timeout: timeout called immediately prior to procedure    Prep: patient was prepped and draped in usual sterile fashion      Ultrasound images of procedure were permanently stored.         Assessment:  1. Chronic pain of right knee    2. Effusion, right knee    3. Primary osteoarthritis of right knee        Plan:  Discussed the assessment with the patient.  Follow up: 3 weeks if no change or incomplete relief  Acute flare of severe right knee pain, in the setting of milder chronic pain  Substantial DJD changes in b/l knees, reviewed today  XR images independently visualized and reviewed with patient today in clinic  Reviewed wt loss, activity modification and progressive increase in activity as tolerated and guided by pain  Reviewed options for potential steroid vs viscosupplementation injections and the possibility for future orthopedic referral prn  Reviewed safe and appropriate OTC medication choices, try tylenol first  Up to 3000mg daily of tylenol is generally safe, NSAID dosing and duration limitations reviewed  Discussed nature of degenerative arthrosis of the knee.   Discussed symptom treatment with ice or heat, topical treatments, and rest if needed.   Oral Tylenol and topical Voltaren gel reviewed as safe OTC options, reviewed safe dosing strategies  Low impact activity strategies reviewed, PT options reviewed  Assistive device and bracing options reviewed  Trial of US guided CSI with aspiration for pain control, reviewed relative risks and goals  Orthopedic surgery referral for TKa discussion would be available in the future if needed, defer for now  Expectations and goals of CSI reviewed  Often 2-3 days for steroid effect, and can take up to two weeks for maximum effect  We discussed modified progressive pain-free activity as tolerated  Do not overuse in first two weeks if feeling better due to concern for vulnerability while steroid is working  Supportive care reviewed  All questions were answered today  Contact us with additional questions or concerns  Signs and sx of concern reviewed      Abisai Hein, DO, CAQ  Sports  Medicine Physician  [facility]            Disclaimer: This note consists of symbols derived from keyboarding, dictation and/or voice recognition software. As a result, there may be errors in the script that have gone undetected. Please consider this when interpreting information found in this chart.      Again, thank you for allowing me to participate in the care of your patient.        Sincerely,        Abisai Hein, DO

## 2024-11-06 NOTE — TELEPHONE ENCOUNTER
Pharmacy calling family [facility] for medication concern, transferred to [facility] at [phone].    Josie Figueroa RN on 11/6/2024 at 11:11 AM

## 2024-11-07 ENCOUNTER — TELEPHONE (OUTPATIENT)
Dept: FAMILY MEDICINE | Facility: CLINIC | Age: 69
End: 2024-11-07
Payer: COMMERCIAL

## 2024-11-07 DIAGNOSIS — Z79.4 TYPE 2 DIABETES MELLITUS WITH DIABETIC POLYNEUROPATHY, WITH LONG-TERM CURRENT USE OF INSULIN (H): Primary | ICD-10-CM

## 2024-11-07 DIAGNOSIS — E11.42 TYPE 2 DIABETES MELLITUS WITH DIABETIC POLYNEUROPATHY, WITH LONG-TERM CURRENT USE OF INSULIN (H): Primary | ICD-10-CM

## 2024-11-07 RX ORDER — ACYCLOVIR 800 MG/1
TABLET ORAL
Qty: 6 EACH | Refills: 1 | Status: SHIPPED | OUTPATIENT
Start: 2024-11-07

## 2024-11-07 NOTE — TELEPHONE ENCOUNTER
Changed patient back to Freestyle Анна 3 sensors as her insurance does not cover the FL3+ sensors yet.     Kang Dutton, PharmD  [facility]

## 2024-11-08 NOTE — TELEPHONE ENCOUNTER
Pharmacist called to clarify signature for med. Per notes from oncology using 1x daily now. No other concerns.     Thanks,  IVAN Tim  [facility]

## 2024-11-18 ENCOUNTER — TELEPHONE (OUTPATIENT)
Dept: ONCOLOGY | Facility: CLINIC | Age: 69
End: 2024-11-18
Payer: COMMERCIAL

## 2024-11-18 DIAGNOSIS — C90.01 MULTIPLE MYELOMA IN REMISSION (H): Primary | ICD-10-CM

## 2024-11-18 RX ORDER — LENALIDOMIDE 10 MG/1
10 CAPSULE ORAL DAILY
Qty: 28 CAPSULE | Refills: 0 | Status: SHIPPED | OUTPATIENT
Start: 2024-11-18 | End: 2024-12-16

## 2024-11-19 ENCOUNTER — TELEPHONE (OUTPATIENT)
Dept: ORTHOPEDICS | Facility: CLINIC | Age: 69
End: 2024-11-19
Payer: COMMERCIAL

## 2024-11-19 DIAGNOSIS — M17.11 PRIMARY OSTEOARTHRITIS OF RIGHT KNEE: Primary | ICD-10-CM

## 2024-11-19 NOTE — TELEPHONE ENCOUNTER
Patient scheduled for appointment on 11/27/24 @ [facility] for discussion of viscosupplementation injection vs steroid injection of right knee.        Steroid  injection last completed 11/6/24.  Patient reports relief for unknown    Patient has failed 3 month trial of Pharmacologic Approach (e.g., topical NSAIDs, oral NSAIDs with or without oral proton pump inhibitors, HENAO-2 inhibitors, topical capsaicin, acetaminophen, tramadol, duloxetine, etc.):  Yes     Patient has completed 3 month trial of Non-Pharmacologic treatments (i.e., physical, psychosocial, or mind-body approach (e.g., exercise-land based or aquatic, physical therapy, alen chi, yoga, weight management, cognitive behavioral therapy, knee brace or cane, etc).  Yes    Has patient had prior reaction to Synvisc/SynviscOne or any alternative HA product?: No    Prior authorization referral for SynviscOne injection pended.    Please advise    Arnoldo Sage ATC

## 2024-11-26 ENCOUNTER — LAB (OUTPATIENT)
Dept: LAB | Facility: CLINIC | Age: 69
End: 2024-11-26
Attending: STUDENT IN AN ORGANIZED HEALTH CARE EDUCATION/TRAINING PROGRAM
Payer: COMMERCIAL

## 2024-11-26 DIAGNOSIS — C90.01 MULTIPLE MYELOMA IN REMISSION (H): ICD-10-CM

## 2024-11-26 LAB
ALBUMIN SERPL BCG-MCNC: 4.4 G/DL (ref 3.5–5.2)
ALP SERPL-CCNC: 139 U/L (ref 40–150)
ALT SERPL W P-5'-P-CCNC: 22 U/L (ref 0–50)
ANION GAP SERPL CALCULATED.3IONS-SCNC: 11 MMOL/L (ref 7–15)
AST SERPL W P-5'-P-CCNC: 18 U/L (ref 0–45)
BASOPHILS # BLD AUTO: 0 10E3/UL (ref 0–0.2)
BASOPHILS NFR BLD AUTO: 1 %
BILIRUB SERPL-MCNC: 1.1 MG/DL
BUN SERPL-MCNC: 11.8 MG/DL (ref 8–23)
CALCIUM SERPL-MCNC: 9.5 MG/DL (ref 8.8–10.4)
CHLORIDE SERPL-SCNC: 105 MMOL/L (ref 98–107)
CREAT SERPL-MCNC: 0.82 MG/DL (ref 0.51–0.95)
EGFRCR SERPLBLD CKD-EPI 2021: 78 ML/MIN/1.73M2
EOSINOPHIL # BLD AUTO: 0.3 10E3/UL (ref 0–0.7)
EOSINOPHIL NFR BLD AUTO: 8 %
ERYTHROCYTE [DISTWIDTH] IN BLOOD BY AUTOMATED COUNT: 14.7 % (ref 10–15)
GLUCOSE SERPL-MCNC: 203 MG/DL (ref 70–99)
HCO3 SERPL-SCNC: 25 MMOL/L (ref 22–29)
HCT VFR BLD AUTO: 45.1 % (ref 35–47)
HGB BLD-MCNC: 14.9 G/DL (ref 11.7–15.7)
IMM GRANULOCYTES # BLD: 0 10E3/UL
IMM GRANULOCYTES NFR BLD: 0 %
LYMPHOCYTES # BLD AUTO: 0.7 10E3/UL (ref 0.8–5.3)
LYMPHOCYTES NFR BLD AUTO: 22 %
MCH RBC QN AUTO: 29.5 PG (ref 26.5–33)
MCHC RBC AUTO-ENTMCNC: 33 G/DL (ref 31.5–36.5)
MCV RBC AUTO: 89 FL (ref 78–100)
MONOCYTES # BLD AUTO: 0.4 10E3/UL (ref 0–1.3)
MONOCYTES NFR BLD AUTO: 12 %
NEUTROPHILS # BLD AUTO: 1.9 10E3/UL (ref 1.6–8.3)
NEUTROPHILS NFR BLD AUTO: 57 %
PLATELET # BLD AUTO: 138 10E3/UL (ref 150–450)
POTASSIUM SERPL-SCNC: 4.1 MMOL/L (ref 3.4–5.3)
PROT SERPL-MCNC: 6.5 G/DL (ref 6.4–8.3)
RBC # BLD AUTO: 5.05 10E6/UL (ref 3.8–5.2)
SODIUM SERPL-SCNC: 141 MMOL/L (ref 135–145)
TOTAL PROTEIN SERUM FOR ELP: 6.1 G/DL (ref 6.4–8.3)
WBC # BLD AUTO: 3.3 10E3/UL (ref 4–11)

## 2024-11-26 PROCEDURE — 85025 COMPLETE CBC W/AUTO DIFF WBC: CPT

## 2024-11-26 PROCEDURE — 84165 PROTEIN E-PHORESIS SERUM: CPT | Performed by: PATHOLOGY

## 2024-11-26 PROCEDURE — 80053 COMPREHEN METABOLIC PANEL: CPT

## 2024-11-26 PROCEDURE — 84155 ASSAY OF PROTEIN SERUM: CPT | Mod: 59

## 2024-11-26 PROCEDURE — 36415 COLL VENOUS BLD VENIPUNCTURE: CPT

## 2024-11-26 PROCEDURE — 82784 ASSAY IGA/IGD/IGG/IGM EACH: CPT

## 2024-11-26 PROCEDURE — 83521 IG LIGHT CHAINS FREE EACH: CPT

## 2024-11-27 LAB
ALBUMIN SERPL ELPH-MCNC: 4.1 G/DL (ref 3.7–5.1)
ALPHA1 GLOB SERPL ELPH-MCNC: 0.3 G/DL (ref 0.2–0.4)
ALPHA2 GLOB SERPL ELPH-MCNC: 0.6 G/DL (ref 0.5–0.9)
B-GLOBULIN SERPL ELPH-MCNC: 0.7 G/DL (ref 0.6–1)
GAMMA GLOB SERPL ELPH-MCNC: 0.4 G/DL (ref 0.7–1.6)
IGG SERPL-MCNC: 408 MG/DL (ref 610–1616)
KAPPA LC FREE SER-MCNC: 1.93 MG/DL (ref 0.33–1.94)
KAPPA LC FREE/LAMBDA FREE SER NEPH: 1.68 {RATIO} (ref 0.26–1.65)
LAMBDA LC FREE SERPL-MCNC: 1.15 MG/DL (ref 0.57–2.63)
LOCATION OF TASK: ABNORMAL
M PROTEIN SERPL ELPH-MCNC: 0 G/DL
PROT PATTERN SERPL ELPH-IMP: ABNORMAL

## 2024-12-09 DIAGNOSIS — E11.42 TYPE 2 DIABETES MELLITUS WITH DIABETIC POLYNEUROPATHY, WITH LONG-TERM CURRENT USE OF INSULIN (H): ICD-10-CM

## 2024-12-09 DIAGNOSIS — Z79.4 TYPE 2 DIABETES MELLITUS WITH DIABETIC POLYNEUROPATHY, WITH LONG-TERM CURRENT USE OF INSULIN (H): ICD-10-CM

## 2024-12-09 RX ORDER — INSULIN GLARGINE 100 [IU]/ML
40 INJECTION, SOLUTION SUBCUTANEOUS EVERY MORNING
Qty: 45 ML | Refills: 0 | Status: SHIPPED | OUTPATIENT
Start: 2024-12-09

## 2025-01-01 NOTE — TELEPHONE ENCOUNTER
Oral Chemotherapy Monitoring Program    Medication: Revlimid  Rx:  10 mg PO daily for a 28 day cycle    Auth #: [id number]  Risk Category: Adult female NOT of reproductive capacity    Routine survey questions reviewed.    Thank you,    Mary Franklin  Oncology Pharmacy Liaison LUCINDA [email or web address]  Phone: [phone]  Fax: [phone]      
full range of motion in all extremities

## (undated) DEVICE — ESU LIGASURE DISSECTOR EXACT LF2019

## (undated) DEVICE — SU SILK 3-0 SH CR 8X18" C013D

## (undated) DEVICE — SURGICEL FIBRILLAR HEMOSTAT 2"X4" JJ1962

## (undated) DEVICE — LINEN TOWEL PACK X5 5464

## (undated) DEVICE — CLIP HORIZON MED BLUE 002200

## (undated) DEVICE — WIPE PREMOIST CLEANSING WASHCLOTHS 7988

## (undated) DEVICE — GOWN IMPERVIOUS 2XL BLUE

## (undated) DEVICE — LIGHT HANDLE X1 31140133

## (undated) DEVICE — SU VICRYL 3-0 SH 27" UND J416H

## (undated) DEVICE — SPECIMEN CONTAINER 3OZ W/FORMALIN 59901

## (undated) DEVICE — KIT ENDO TURNOVER/PROCEDURE CARRY-ON 101822

## (undated) DEVICE — PREP CHLORAPREP 26ML TINTED ORANGE  260815

## (undated) DEVICE — SU CHROMIC 3-0 SH 27" G122H

## (undated) DEVICE — TUBING SUCTION 12"X1/4" N612

## (undated) DEVICE — TUBING SMOKE EVAC ATTACHMENT E3590

## (undated) DEVICE — KIT CONNECTOR FOR OLYMPUS ENDOSCOPES DEFENDO 100310

## (undated) DEVICE — SU SILK 2-0 TIE 12X30" A305H

## (undated) DEVICE — SU SILK 0 TIE 6X30" A306H

## (undated) DEVICE — SURGICEL HEMOSTAT 4X8" 1952

## (undated) DEVICE — PACK ENDOSCOPY GI CUSTOM UMMC

## (undated) DEVICE — SUCTION MANIFOLD NEPTUNE 2 SYS 1 PORT 702-025-000

## (undated) DEVICE — ESU GROUND PAD ADULT W/CORD E7507

## (undated) DEVICE — SOL WATER IRRIG 1000ML BOTTLE 2F7114

## (undated) DEVICE — DRAIN JACKSON PRATT RESERVOIR 100ML SU130-1305

## (undated) DEVICE — SOL NACL 0.9% IRRIG 1000ML BOTTLE 2F7124

## (undated) DEVICE — SU ETHILON 3-0 PS-1 18" 1663H

## (undated) DEVICE — BLADE KNIFE SURG 15 371115

## (undated) DEVICE — DRSG TEGADERM 2 3/8X2 3/4" 1624W

## (undated) DEVICE — CLIP HORIZON SM RED WIDE SLOT 001201

## (undated) DEVICE — SUCTION SLEEVE NEPTUNE 2 165MM 0703-005-165

## (undated) DEVICE — TUBING SUCTION MEDI-VAC SOFT 3/16"X20' N520A

## (undated) DEVICE — SUCTION MANIFOLD NEPTUNE 2 SYS 4 PORT 0702-020-000

## (undated) DEVICE — DRAIN JACKSON PRATT CHANNEL 15FR ROUND HUBLESS SIL JP-2228

## (undated) DEVICE — ENDO CAP AND TUBING STERILE FOR ENDOGATOR  100130

## (undated) DEVICE — SU SILK 4-0 TIE 12X30" A303H

## (undated) DEVICE — ESU ELEC BLADE 2.75" COATED/INSULATED E1455

## (undated) DEVICE — ENDO BITE BLOCK ADULT OMNI-BLOC

## (undated) DEVICE — KIT ENDO FIRST STEP DISINFECTANT 200ML W/POUCH EP-4

## (undated) DEVICE — SU SILK 2-0 SH CR 5X18" C0125

## (undated) DEVICE — ADH SKIN CLOSURE PREMIERPRO EXOFIN 1.0ML 3470

## (undated) DEVICE — SU SILK 3-0 TIE 12X30" A304H

## (undated) DEVICE — PAD CHUX UNDERPAD 23X24" 7136

## (undated) DEVICE — TUBE ENDOTRACHEAL NIM TRIVANTAGE 6.0MM 8229706

## (undated) DEVICE — ESU PENCIL SMOKE EVAC W/ROCKER SWITCH 0703-047-000

## (undated) DEVICE — SPONGE KITTNER 30-101

## (undated) DEVICE — PACK NEURO MINOR UMMC SNE32MNMU4

## (undated) DEVICE — LINEN TOWEL PACK X6 WHITE 5487

## (undated) DEVICE — ENDO FORCEP SPIKED SERRATED SHAFT JUMBO 239CM G56998

## (undated) DEVICE — TEASPOON METAL STERILE 17506/24

## (undated) DEVICE — SUCTION SLEEVE NEPTUNE 2 125MM 0703-005-125

## (undated) DEVICE — NIM PROBE PRASS INCREMENTING TIP 8225825

## (undated) DEVICE — SU MONOCRYL 5-0 P-3 18" UND Y493G

## (undated) DEVICE — ENDO TUBING CO2 SMARTCAP STERILE DISP 100145CO2EXT

## (undated) RX ORDER — EPHEDRINE SULFATE 50 MG/ML
INJECTION, SOLUTION INTRAMUSCULAR; INTRAVENOUS; SUBCUTANEOUS
Status: DISPENSED
Start: 2022-02-25

## (undated) RX ORDER — LIDOCAINE HYDROCHLORIDE 20 MG/ML
INJECTION, SOLUTION EPIDURAL; INFILTRATION; INTRACAUDAL; PERINEURAL
Status: DISPENSED
Start: 2020-08-26

## (undated) RX ORDER — ONDANSETRON 2 MG/ML
INJECTION INTRAMUSCULAR; INTRAVENOUS
Status: DISPENSED
Start: 2020-08-26

## (undated) RX ORDER — ONDANSETRON 2 MG/ML
INJECTION INTRAMUSCULAR; INTRAVENOUS
Status: DISPENSED
Start: 2020-07-23

## (undated) RX ORDER — EPHEDRINE SULFATE 50 MG/ML
INJECTION, SOLUTION INTRAMUSCULAR; INTRAVENOUS; SUBCUTANEOUS
Status: DISPENSED
Start: 2020-08-26

## (undated) RX ORDER — EPINEPHRINE 1 MG/ML
INJECTION, SOLUTION INTRAMUSCULAR; SUBCUTANEOUS
Status: DISPENSED
Start: 2022-02-25

## (undated) RX ORDER — FENTANYL CITRATE 50 UG/ML
INJECTION, SOLUTION INTRAMUSCULAR; INTRAVENOUS
Status: DISPENSED
Start: 2020-07-23

## (undated) RX ORDER — SODIUM CHLORIDE, SODIUM LACTATE, POTASSIUM CHLORIDE, CALCIUM CHLORIDE 600; 310; 30; 20 MG/100ML; MG/100ML; MG/100ML; MG/100ML
INJECTION, SOLUTION INTRAVENOUS
Status: DISPENSED
Start: 2020-08-26

## (undated) RX ORDER — ONDANSETRON 2 MG/ML
INJECTION INTRAMUSCULAR; INTRAVENOUS
Status: DISPENSED
Start: 2022-02-25

## (undated) RX ORDER — ROCURONIUM BROMIDE 50 MG/5 ML
SYRINGE (ML) INTRAVENOUS
Status: DISPENSED
Start: 2022-02-25

## (undated) RX ORDER — CEFAZOLIN SODIUM 2 G/100ML
INJECTION, SOLUTION INTRAVENOUS
Status: DISPENSED
Start: 2020-08-26

## (undated) RX ORDER — LIDOCAINE HYDROCHLORIDE 10 MG/ML
INJECTION, SOLUTION EPIDURAL; INFILTRATION; INTRACAUDAL; PERINEURAL
Status: DISPENSED
Start: 2020-03-03

## (undated) RX ORDER — LIDOCAINE HYDROCHLORIDE 20 MG/ML
INJECTION, SOLUTION EPIDURAL; INFILTRATION; INTRACAUDAL; PERINEURAL
Status: DISPENSED
Start: 2022-02-25

## (undated) RX ORDER — INDOMETHACIN 50 MG/1
SUPPOSITORY RECTAL
Status: DISPENSED
Start: 2022-02-25

## (undated) RX ORDER — PROPOFOL 10 MG/ML
INJECTION, EMULSION INTRAVENOUS
Status: DISPENSED
Start: 2022-02-25

## (undated) RX ORDER — ATROPINE SULFATE 0.4 MG/ML
AMPUL (ML) INJECTION
Status: DISPENSED
Start: 2020-08-24

## (undated) RX ORDER — IOPAMIDOL 510 MG/ML
INJECTION, SOLUTION INTRAVASCULAR
Status: DISPENSED
Start: 2022-02-25

## (undated) RX ORDER — FENTANYL CITRATE 50 UG/ML
INJECTION, SOLUTION INTRAMUSCULAR; INTRAVENOUS
Status: DISPENSED
Start: 2020-08-26

## (undated) RX ORDER — SIMETHICONE 40MG/0.6ML
SUSPENSION, DROPS(FINAL DOSAGE FORM)(ML) ORAL
Status: DISPENSED
Start: 2022-02-25

## (undated) RX ORDER — ESMOLOL HYDROCHLORIDE 10 MG/ML
INJECTION INTRAVENOUS
Status: DISPENSED
Start: 2020-08-26

## (undated) RX ORDER — DOBUTAMINE HYDROCHLORIDE 200 MG/100ML
INJECTION INTRAVENOUS
Status: DISPENSED
Start: 2020-08-24

## (undated) RX ORDER — PROPOFOL 10 MG/ML
INJECTION, EMULSION INTRAVENOUS
Status: DISPENSED
Start: 2020-08-26

## (undated) RX ORDER — LABETALOL 20 MG/4 ML (5 MG/ML) INTRAVENOUS SYRINGE
Status: DISPENSED
Start: 2020-08-26

## (undated) RX ORDER — DIPHENHYDRAMINE HYDROCHLORIDE 50 MG/ML
INJECTION INTRAMUSCULAR; INTRAVENOUS
Status: DISPENSED
Start: 2020-07-23

## (undated) RX ORDER — FENTANYL CITRATE-0.9 % NACL/PF 10 MCG/ML
PLASTIC BAG, INJECTION (ML) INTRAVENOUS
Status: DISPENSED
Start: 2022-02-25

## (undated) RX ORDER — DEXAMETHASONE SODIUM PHOSPHATE 4 MG/ML
INJECTION, SOLUTION INTRA-ARTICULAR; INTRALESIONAL; INTRAMUSCULAR; INTRAVENOUS; SOFT TISSUE
Status: DISPENSED
Start: 2020-08-26

## (undated) RX ORDER — METOPROLOL TARTRATE 1 MG/ML
INJECTION, SOLUTION INTRAVENOUS
Status: DISPENSED
Start: 2020-08-24